# Patient Record
Sex: FEMALE | Race: WHITE | NOT HISPANIC OR LATINO | Employment: OTHER | URBAN - METROPOLITAN AREA
[De-identification: names, ages, dates, MRNs, and addresses within clinical notes are randomized per-mention and may not be internally consistent; named-entity substitution may affect disease eponyms.]

---

## 2017-01-23 ENCOUNTER — HOSPITAL ENCOUNTER (EMERGENCY)
Facility: HOSPITAL | Age: 67
Discharge: HOME | End: 2017-01-24
Attending: EMERGENCY MEDICINE
Payer: COMMERCIAL

## 2017-01-23 DIAGNOSIS — J40 BRONCHITIS: Primary | ICD-10-CM

## 2017-01-23 DIAGNOSIS — R05.9 COUGH: ICD-10-CM

## 2017-01-23 PROCEDURE — 99284 EMERGENCY DEPT VISIT MOD MDM: CPT | Mod: ,,, | Performed by: EMERGENCY MEDICINE

## 2017-01-23 PROCEDURE — 99283 EMERGENCY DEPT VISIT LOW MDM: CPT

## 2017-01-23 NOTE — ED AVS SNAPSHOT
[facility]  [address]  [facility] [address]               Angelina Man   2017 11:19 PM   ED    Description:  Female : 1950   Department:  [facility]           Your Care was Coordinated By:     Provider Role From To    Zuleika Bean MD Attending Provider [phone] --      Reason for Visit     Hemoptysis           Diagnoses this Visit        Comments    Bronchitis    -  Primary     Cough           ED Disposition     ED Disposition Condition Comment    Discharge             To Do List           Follow-up Information     Schedule an appointment as soon as possible for a visit with Marli Wilkinson MD.    Specialty:  Family Medicine    Contact information:    [address] [facility]  [address]  [facility] [address]  [phone]        [facility]     [facility] -  Assistance  Registered nurses in the [facility] provide clinical advisement, health education, appointment booking, and other advisory services.  Call for this free service at [phone].             Medications           Message regarding Medications     Verify the changes and/or additions to your medication regime listed below are the same as discussed with your clinician today.  If any of these changes or additions are incorrect, please notify your healthcare provider.             Verify that the below list of medications is an accurate representation of the medications you are currently taking.  If none reported, the list may be blank. If incorrect, please contact your healthcare provider. Carry this list with you in case of emergency.           Current Medications     acyclovir (ZOVIRAX) 400 MG tablet Take 1 tablet (400 mg total) by mouth 2 (two) times daily.    ascorbic acid (VITAMIN C) 1000 MG tablet Take 1,000 mg by mouth once daily.     b complex vitamins (VITAMIN B COMPLEX) tablet Take 1 tablet by mouth  "daily as needed.     biotin 1 mg tablet Take 1 mg by mouth once daily.     buPROPion (WELLBUTRIN XL) 150 MG TB24 tablet TAKE ONE TABLET BY MOUTH ONCE DAILY    calcium-vitamin D 500 mg(1,250mg) -200 unit per tablet Take 1 tablet by mouth once daily.     chondroitin sulfate-vit C-Mn (CHONDROITIN SULFATE COMPLEX) 400-60-2.5 mg Cap Take 1 tablet by mouth once daily.     clonazePAM (KLONOPIN) 1 MG tablet Take 1 tablet (1 mg total) by mouth nightly as needed.    CYANOCOBALAMIN/FOLIC ACID (VITAMIN B12-FOLIC ACID ORAL) Take by mouth.     dicyclomine (BENTYL) 20 mg tablet Take 1 tablet (20 mg total) by mouth 3 (three) times daily.    digestive enzymes Tab Take 1 tablet by mouth once daily.     diphenhydrAMINE (BENADRYL) 25 mg capsule Take 25 mg by mouth nightly as needed.     fexofenadine (ALLEGRA) 180 MG tablet Take 1 tablet (180 mg total) by mouth once daily.    fluticasone (FLONASE) 50 mcg/actuation nasal spray Spray once in each nostril twice daily as needed for rhinitis.    gabapentin (NEURONTIN) 300 MG capsule Take 1 capsule (300 mg total) by mouth 3 (three) times daily.    ginseng 200 mg Cap Take 200 mg by mouth 2 (two) times daily as needed.     glucosamine sulfate 2KCl 1,000 mg Tab Take 1 tablet by mouth once daily.     levothyroxine (SYNTHROID) 112 MCG tablet Take 1 tablet (112 mcg total) by mouth once daily.    methylPREDNISolone (MEDROL DOSEPACK) 4 mg tablet use as directed    multivitamin (THERAGRAN) per tablet Take 1 tablet by mouth once daily.     tretinoin (RETIN-A) 0.1 % cream Apply topically every evening.    triamterene-hydrochlorothiazide 37.5-25 mg (MAXZIDE-25) 37.5-25 mg per tablet Take 1 tablet by mouth once daily.    zinc 50 mg Tab Take 50 mg by mouth once daily.            Clinical Reference Information           Your Vitals Were     BP Pulse Temp Resp Height Weight    147/71 71 98.3 °F (36.8 °C) (Oral) 66 5' 4" (1.626 m) 52.2 kg (115 lb)    SpO2 BMI             99% 19.74 kg/m2         Allergies as " of 1/24/2017     No Known Allergies      Immunizations Administered on Date of Encounter - 1/24/2017     None      ED Micro, Lab, POCT     None      ED Imaging Orders     Start Ordered       Status Ordering Provider    01/24/17 0013 01/24/17 0013  X-Ray Chest PA And Lateral  1 time imaging      Final result         Discharge Instructions         Bronchitis, Viral (Adult)    You have a viral bronchitis. Bronchitis is inflammation and swelling of the lining of the lungs. This is often caused by an infection. Symptoms include a dry, hacking cough that is worse at night. The cough may bring up yellow-green mucus. You may also feel short of breath or wheeze. Other symptoms may include tiredness, chest discomfort, and chills.  Bronchitis that is caused by a virus is not treated with antibiotics. Instead, medicines may be given to help relieve symptoms. Symptoms can last up to 2 weeks, although the cough may last much longer.  This illness is contagious during the first few days and is spread through the air by coughing and sneezing, or by direct contact (touching the sick person and then touching your own eyes, nose, or mouth).  Most viral illnesses resolve within 10 to 14 days with rest and simple home remedies, although they may sometimes last for several weeks.  Home care  · If symptoms are severe, rest at home for the first 2 to 3 days. When you go back to your usual activities, don't let yourself get too tired.  · Do not smoke. Also avoid being exposed to secondhand smoke.  · You may use over-the-counter medicine to control fever or pain, unless another pain medicine was prescribed. (Note: If you have chronic liver or kidney disease or have ever had a stomach ulcer or gastrointestinal bleeding, talk with your healthcare provider before using these medicines. Also talk to your provider if you are taking medicine to prevent blood clots.) Aspirin should never be given to anyone younger than 18 years of age who is ill  with a viral infection or fever. It may cause severe liver or brain damage.  · Your appetite may be poor, so a light diet is fine. Avoid dehydration by drinking 6 to 8 glasses of fluids per day (such as water, soft drinks, sports drinks, juices, tea, or soup). Extra fluids will help loosen secretions in the nose and lungs.  · Over-the-counter cough, cold, and sore-throat medicines will not shorten the length of the illness, but they may help to reduce symptoms. (Note: Do not use decongestants if you have high blood pressure.)  Follow-up care  Follow up with your healthcare provider, or as advised. If you had an X-ray or ECG (electrocardiogram), a specialist will review it. You will be notified of any new findings that may affect your care.  Note: If you are age 65 or older, or if you have a chronic lung disease or condition that affects your immune system, or you smoke, talk to your healthcare provider about having pneumococcal vaccinations and a yearly influenza vaccination (flu shot).  When to seek medical advice  Call your healthcare provider right away if any of these occur:  · Fever of 100.4°F (38°C) or higher  · Coughing up increased amounts of colored sputum  · Weakness, drowsiness, headache, facial pain, ear pain, or a stiff neck  Call 911, or get immediate medical care  Contact emergency services right away if any of these occur:  · Coughing up blood  · Worsening weakness, drowsiness, headache, or stiff neck  · Trouble breathing, wheezing, or pain with breathing  © 8068-6465 [employer]. [address] [place] [address], [place] [address]. All rights reserved. This information is not intended as a substitute for professional medical care. Always follow your healthcare professional's instructions.        Hemoptysis    Hemoptysis is the medical term for coughing up blood. There are many causes for this, including minor illnesses like bronchitis. Hemoptysis can also be an early sign of a more serious  illness, like a blood clot in the lung (pulmonary embolism), cancer, tuberculosis, or pneumonia.  Less common causes of hemoptysis can be hard to diagnose in an emergency department or a clinic. More testing will be needed if the symptoms continue.  Home care  · Stay away from cigarette smoke. Smoke irritates the bronchial passages.  · Unless you are taking daily aspirin to prevent stroke or heart attack, do not take aspirin or products that contain aspirin. Aspirin affects how readily the blood clots. Medicines that prevent clotting may make hemoptysis worse.  · If you have a lung infection, drinking extra fluid will help loosen secretions in the lungs.  · Over-the-counter cough medicines that contain dextromethorphan may help reduce coughing. Check with a medical provider before taking dextromethorphan if you have a chronic illness, are pregnant, or take daily medications.  · If you were prescribed an antibiotic, take it until it is all gone. Take it even if you are feeling better after only a few days.  Follow-up care  Follow up with your health care provider, or as advised.  When to seek medical advice  Call your healthcare provider right away if any of these occur:  · Fever of 100.4ºF (38ºC) or higher  Call 911, or get immediate medical care  Call emergency services right away if any of these occur:  · Coughing up an increased amount of blood  · Trouble breathing, wheezing, or pain with breathing  · Chest pain or chest pressure  · Fainting or losing consciousness  · Rapid heartbeat  · Weakness or dizziness  © 5306-5539 [employer]. [address] [place] [address], [place] [address]. All rights reserved. This information is not intended as a substitute for professional medical care. Always follow your healthcare professional's instructions.          Your Scheduled Appointments     Jan 31, 2017  9:45 AM CST   Established Patient Visit with MD Hemal Simons - Neurosurgery 7th Fl ([address] )     [address]  [facility] [address]   [phone]               [facility] complies with applicable Federal civil rights laws and does not discriminate on the basis of race, color, national origin, age, disability, or sex.        Language Assistance Services     ATTENTION: Language assistance services are available, free of charge. Please call [phone].      ATENCIÓN: Si habla español, tiene a asher disposición servicios gratuitos de asistencia lingüística. Llame al [phone].     CHÚ Ý: N?u b?n nói Ti?ng Vi?t, có các d?ch v? h? tr? ngôn ng? mi?n phí dành cho b?n. G?i s? [phone].

## 2017-01-24 VITALS
WEIGHT: 115 LBS | DIASTOLIC BLOOD PRESSURE: 75 MMHG | HEART RATE: 75 BPM | SYSTOLIC BLOOD PRESSURE: 115 MMHG | HEIGHT: 64 IN | TEMPERATURE: 98 F | OXYGEN SATURATION: 100 % | BODY MASS INDEX: 19.63 KG/M2 | RESPIRATION RATE: 18 BRPM

## 2017-01-24 NOTE — DISCHARGE INSTRUCTIONS
Bronchitis, Viral (Adult)    You have a viral bronchitis. Bronchitis is inflammation and swelling of the lining of the lungs. This is often caused by an infection. Symptoms include a dry, hacking cough that is worse at night. The cough may bring up yellow-green mucus. You may also feel short of breath or wheeze. Other symptoms may include tiredness, chest discomfort, and chills.  Bronchitis that is caused by a virus is not treated with antibiotics. Instead, medicines may be given to help relieve symptoms. Symptoms can last up to 2 weeks, although the cough may last much longer.  This illness is contagious during the first few days and is spread through the air by coughing and sneezing, or by direct contact (touching the sick person and then touching your own eyes, nose, or mouth).  Most viral illnesses resolve within 10 to 14 days with rest and simple home remedies, although they may sometimes last for several weeks.  Home care  · If symptoms are severe, rest at home for the first 2 to 3 days. When you go back to your usual activities, don't let yourself get too tired.  · Do not smoke. Also avoid being exposed to secondhand smoke.  · You may use over-the-counter medicine to control fever or pain, unless another pain medicine was prescribed. (Note: If you have chronic liver or kidney disease or have ever had a stomach ulcer or gastrointestinal bleeding, talk with your healthcare provider before using these medicines. Also talk to your provider if you are taking medicine to prevent blood clots.) Aspirin should never be given to anyone younger than 18 years of age who is ill with a viral infection or fever. It may cause severe liver or brain damage.  · Your appetite may be poor, so a light diet is fine. Avoid dehydration by drinking 6 to 8 glasses of fluids per day (such as water, soft drinks, sports drinks, juices, tea, or soup). Extra fluids will help loosen secretions in the nose and lungs.  · Over-the-counter  cough, cold, and sore-throat medicines will not shorten the length of the illness, but they may help to reduce symptoms. (Note: Do not use decongestants if you have high blood pressure.)  Follow-up care  Follow up with your healthcare provider, or as advised. If you had an X-ray or ECG (electrocardiogram), a specialist will review it. You will be notified of any new findings that may affect your care.  Note: If you are age 65 or older, or if you have a chronic lung disease or condition that affects your immune system, or you smoke, talk to your healthcare provider about having pneumococcal vaccinations and a yearly influenza vaccination (flu shot).  When to seek medical advice  Call your healthcare provider right away if any of these occur:  · Fever of 100.4°F (38°C) or higher  · Coughing up increased amounts of colored sputum  · Weakness, drowsiness, headache, facial pain, ear pain, or a stiff neck  Call 911, or get immediate medical care  Contact emergency services right away if any of these occur:  · Coughing up blood  · Worsening weakness, drowsiness, headache, or stiff neck  · Trouble breathing, wheezing, or pain with breathing  © 2445-9657 [employer]. [address] [place] [address]. All rights reserved. This information is not intended as a substitute for professional medical care. Always follow your healthcare professional's instructions.        Hemoptysis    Hemoptysis is the medical term for coughing up blood. There are many causes for this, including minor illnesses like bronchitis. Hemoptysis can also be an early sign of a more serious illness, like a blood clot in the lung (pulmonary embolism), cancer, tuberculosis, or pneumonia.  Less common causes of hemoptysis can be hard to diagnose in an emergency department or a clinic. More testing will be needed if the symptoms continue.  Home care  · Stay away from cigarette smoke. Smoke irritates the bronchial passages.  · Unless you  are taking daily aspirin to prevent stroke or heart attack, do not take aspirin or products that contain aspirin. Aspirin affects how readily the blood clots. Medicines that prevent clotting may make hemoptysis worse.  · If you have a lung infection, drinking extra fluid will help loosen secretions in the lungs.  · Over-the-counter cough medicines that contain dextromethorphan may help reduce coughing. Check with a medical provider before taking dextromethorphan if you have a chronic illness, are pregnant, or take daily medications.  · If you were prescribed an antibiotic, take it until it is all gone. Take it even if you are feeling better after only a few days.  Follow-up care  Follow up with your health care provider, or as advised.  When to seek medical advice  Call your healthcare provider right away if any of these occur:  · Fever of 100.4ºF (38ºC) or higher  Call 911, or get immediate medical care  Call emergency services right away if any of these occur:  · Coughing up an increased amount of blood  · Trouble breathing, wheezing, or pain with breathing  · Chest pain or chest pressure  · Fainting or losing consciousness  · Rapid heartbeat  · Weakness or dizziness  © 1216-4552 [employer]. [address] [place] [address], [place] [address]. All rights reserved. This information is not intended as a substitute for professional medical care. Always follow your healthcare professional's instructions.

## 2017-01-24 NOTE — ED TRIAGE NOTES
"Angelina Man, a 67 y.o. female presents to the ED with a chief complaint of having one episode of hemoptysis.  Patient states that she was having a severe coughing spell and coughed up blood. Patient reports that the chief complaint has resolved, and that she feels better due to taking OTC medicines.      Chief Complaint   Patient presents with    Hemoptysis     Pt reports coughing for a couple of days and "spit up some blood" today.     Review of patient's allergies indicates:  No Known Allergies  Past Medical History   Diagnosis Date    Arthritis     Cataract     Dry eyes     Hypothyroidism 6/23/2016    Rash     Squamous cell carcinoma      in-situ right upper inner arm, right wrist    Status post lumbar surgery 6/23/2016    Stress fracture     Thyroid disease        "

## 2017-01-24 NOTE — ED PROVIDER NOTES
"Encounter Date: 1/23/2017    SCRIBE #1 NOTE: I, Serafin Olmedo, am scribing for, and in the presence of, Dr. Bean.       History     Chief Complaint   Patient presents with    Hemoptysis     Pt reports coughing for a couple of days and "spit up some blood" today.     Review of patient's allergies indicates:  No Known Allergies  HPI Comments: Time patient was seen by the provider: 12:02 AM      The patient is a 67 y.o. female with hx of: thyroid disease and hypothyroidism that presents to the ED with a complaint of acute hemoptysis, which began tonight. She notes an associated excessive productive cough, which began several days ago. She denies any sore throat, fever, chills, or shortness of breath. Pt endorses taking mucinex with minimal symptom relief.  No history of tobacco abuse.     The history is provided by the patient.     Past Medical History   Diagnosis Date    Arthritis     Cataract     Dry eyes     Hypothyroidism 6/23/2016    Rash     Squamous cell carcinoma      in-situ right upper inner arm, right wrist    Status post lumbar surgery 6/23/2016    Stress fracture     Thyroid disease      Past Medical History Pertinent Negatives   Diagnosis Date Noted    Abnormal Pap smear of vagina 6/29/2015    Amblyopia 8/28/2014    Basal cell carcinoma 8/7/2014    Diabetes mellitus 8/28/2014    Diabetic retinopathy 8/28/2014    Glaucoma 8/28/2014    Hypertension 8/28/2014    Macular degeneration 8/28/2014    Melanoma 8/7/2014    Retinal detachment 8/28/2014    Strabismus 8/28/2014    Uveitis 8/28/2014     Past Surgical History   Procedure Laterality Date    Colonoscopy      Cervical fusion       Family History   Problem Relation Age of Onset    Cancer Mother      Lung cancer    Heart failure Father     Colon cancer Father     Hypertension Father     Cataracts Father     Diabetes Son     No Known Problems Sister     No Known Problems Brother     No Known Problems Maternal Aunt     No " Known Problems Maternal Uncle     No Known Problems Paternal Aunt     No Known Problems Paternal Uncle     No Known Problems Maternal Grandmother     No Known Problems Maternal Grandfather     No Known Problems Paternal Grandmother     No Known Problems Paternal Grandfather     Melanoma Daughter     Amblyopia Neg Hx     Blindness Neg Hx     Glaucoma Neg Hx     Macular degeneration Neg Hx     Retinal detachment Neg Hx     Strabismus Neg Hx     Stroke Neg Hx     Thyroid disease Neg Hx     Breast cancer Neg Hx     Ovarian cancer Neg Hx      Social History   Substance Use Topics    Smoking status: Never Smoker    Smokeless tobacco: Never Used    Alcohol use Yes      Comment: socially, n ot weekly     Review of Systems   Constitutional: Negative for chills and fever.   HENT: Negative for sore throat.    Respiratory: Positive for cough. Negative for shortness of breath.         Hemoptysis   Cardiovascular: Negative for chest pain.   Gastrointestinal: Negative for nausea.   Genitourinary: Negative for dysuria.   Musculoskeletal: Negative for back pain.   Skin: Negative for rash.   Neurological: Negative for weakness.   Hematological: Does not bruise/bleed easily.       Physical Exam   Initial Vitals   BP Pulse Resp Temp SpO2   01/23/17 2008 01/23/17 2008 01/23/17 2008 01/23/17 2008 01/23/17 2008   147/71 71 66 98.3 °F (36.8 °C) 99 %     Physical Exam    Nursing note and vitals reviewed.  Constitutional: She appears well-developed.   Comfortable, well appearing, no distress   HENT:   Head: Normocephalic and atraumatic.   Oropharynx is clear. Mucous membranes are moist.     Neck: Neck supple.   Cardiovascular: Normal rate, regular rhythm and normal heart sounds.   No murmur heard.  Pulmonary/Chest: Breath sounds normal. No respiratory distress. She has no wheezes. She has no rales.   Musculoskeletal: She exhibits no edema.         ED Course   Procedures  Labs Reviewed - No data to display       X-Rays:    Independently Interpreted Readings:   Chest X-Ray: No acute infiltrate or effusion     Medical Decision Making:   History:   Old Medical Records: I decided to obtain old medical records.  Initial Assessment:   66 yo healthy f, here with URI sx x 3 days, worsening cough, episode hemoptysis tonight w coughing.  No fever/chills/night sweats/wt loss/cp/sob.  Nonsmoker.  On exam, pt very well-appearing, lungs CTA.  Most likely bronchitis, less likely PE or dangerous infection like TB, given acute nature of sx.  Plan for CXR and if negative, safe for d/c home with close outpatient f/u  Independently Interpreted Test(s):   I have ordered and independently interpreted X-rays - see prior notes.  Clinical Tests:   Radiological Study: Ordered and Reviewed            Scribe Attestation:   Scribe #1: I performed the above scribed service and the documentation accurately describes the services I performed. I attest to the accuracy of the note.    Attending Attestation:           Physician Attestation for Scribe:  Physician Attestation Statement for Scribe #1: I, Dr. Bean, reviewed documentation, as scribed by Serafin Olmedo in my presence, and it is both accurate and complete.                 ED Course     12:53 AM  CXR negative, will d/c home    Clinical Impression:   The primary encounter diagnosis was Bronchitis. A diagnosis of Cough was also pertinent to this visit.          Zuleika Bean MD  01/24/17 1957

## 2017-01-31 ENCOUNTER — TELEPHONE (OUTPATIENT)
Dept: NEUROSURGERY | Facility: CLINIC | Age: 67
End: 2017-01-31

## 2017-01-31 ENCOUNTER — OFFICE VISIT (OUTPATIENT)
Dept: NEUROSURGERY | Facility: CLINIC | Age: 67
End: 2017-01-31
Payer: COMMERCIAL

## 2017-01-31 VITALS
SYSTOLIC BLOOD PRESSURE: 114 MMHG | TEMPERATURE: 97 F | BODY MASS INDEX: 19.63 KG/M2 | WEIGHT: 115 LBS | HEART RATE: 67 BPM | HEIGHT: 64 IN | DIASTOLIC BLOOD PRESSURE: 73 MMHG

## 2017-01-31 DIAGNOSIS — M48.061 LUMBAR STENOSIS: ICD-10-CM

## 2017-01-31 DIAGNOSIS — M51.06 LUMBAR DISC HERNIATION WITH MYELOPATHY: Primary | ICD-10-CM

## 2017-01-31 DIAGNOSIS — Z98.890 STATUS POST LUMBAR SURGERY: Primary | ICD-10-CM

## 2017-01-31 PROCEDURE — 99214 OFFICE O/P EST MOD 30 MIN: CPT | Mod: S$GLB,,, | Performed by: NEUROLOGICAL SURGERY

## 2017-01-31 PROCEDURE — 99999 PR PBB SHADOW E&M-EST. PATIENT-LVL III: CPT | Mod: PBBFAC,,, | Performed by: NEUROLOGICAL SURGERY

## 2017-01-31 NOTE — PROGRESS NOTES
Subjective:    I, Kurtis Catalan, am scribing for, and in the presence of, Dr. Kaba.     Patient ID: Angelina Man is a 67 y.o. female.    Chief Complaint: Follow-up    HPI   Pt is a 67 y.o. female who presents for follow up after last evaluation on 11/29/2016. Pt has a history of right L4-5 diskectomy, now with some left-sided pain. We wanted to try a transformainal injection and were considering decompression and fusion. Pt states having injection 6 weeks ago without any response, now having had 2 rounds of injection. Regardless, she states that her left-sided back and leg pain currently is not very debilitating and that she is able to tolerate pain when walking. Pt reports that previous right-sided pain was improved for about 2 weeks after surgery, until she lifted a heavy suitcase.       Review of Systems   Constitutional: Negative for chills, fatigue and fever.   HENT: Negative for sinus pressure and trouble swallowing.    Eyes: Negative.  Negative for visual disturbance.   Respiratory: Negative.    Cardiovascular: Negative.    Gastrointestinal: Negative.  Negative for nausea and vomiting.   Endocrine: Negative.    Genitourinary: Negative.    Musculoskeletal: Positive for back pain (left-sided lower back pain, radiates to LLE).        Positive for LLE pain.   Neurological: Negative for dizziness, seizures, syncope, speech difficulty, weakness and numbness.       Objective:      Physical Exam:    Constitutional: She appears well-developed.     Eyes: Pupils are equal, round, and reactive to light. Conjunctivae and EOM are normal.     Cardiovascular: Normal rate, regular rhythm, normal pulses and intact distal pulses.     Abdominal: Soft.     Psych/Behavior: She is alert. She is oriented to person, place, and time. She has a normal mood and affect.     Musculoskeletal: Gait is normal.        Neck: Range of motion is full. There is no tenderness. Muscle strength is 5/5. Tone is normal.        Back: Range of motion is  full. There is no tenderness. Muscle strength is 5/5. Tone is normal.        Right Upper Extremities: Range of motion is full. There is no tenderness. Muscle strength is 5/5. Tone is normal.        Left Upper Extremities: Range of motion is full. There is no tenderness. Muscle strength is 5/5. Tone is normal.       Right Lower Extremities: Range of motion is full. There is no tenderness. Muscle strength is 5/5. Tone is normal.        Left Lower Extremities: Range of motion is full. There is no tenderness. Muscle strength is 5/5. Tone is normal.     Neurological:        Coordination: She has a normal Romberg Test, normal finger to nose coordination, normal heel to shin coordination and normal tandem walking coordination.        Sensory: There is no sensory deficit in the trunk. There is no sensory deficit in the extremities.        DTRs: DTRs are normal. Tricep reflexes are 2+ on the right side and 2+ on the left side. Bicep reflexes are 2+ on the right side and 2+ on the left side. Brachioradialis reflexes are 2+ on the right side and 2+ on the left side. Patellar reflexes are 2+ on the right side and 2+ on the left side. Achilles reflexes are 2+ on the right side and 2+ on the left side. She displays no Babinski's sign on the right side. She displays no Babinski's sign on the left side.        Cranial nerves: Cranial nerve(s) II, III, IV, V, VI, VII, VIII, IX, X, XI and XII are intact.       Pt still has significant has left-sided radiculopathy.   Still has positive SLR.   Wound is well-healed.   Has restricted lumbar range of motion.     Imaging:   MRI L-spine, dated 9/08/2016, shows multiple-level DDD, postsurgical changes at the right L4-5, but looks like disc herniation is asymmetric the left, with some foraminal stenosis indicative of re-herniation or scarring. Pt does not have contrast, so I cannot determine if there is scarring.     Assessment/Plan:   Pt has what looks like a recurrent disc on the oppositve  side at L4-5. She has failed conservative management which so far has included physical therapy and epidural injections. I would like to try another round of transforaminal injections. I think she would benefit from minimally invasive diskectomy. We discussed possibility of a fusion, but she is hesitant to have a TLIF. I think that is reasonable given her age. We will get a MRI scan with contrast to evaluate for scarring before surgery. I have discussed the risks/benefits, indications, and alternatives for the proposed procedure in detail. I have answered all of their questions and patient wishes to proceed with surgery. We will schedule patient.      I, Dr. Kaba, personally performed the services described in this documentation as scribed by Kurtis Catalan in my presence, and it is both accurate and complete.

## 2017-01-31 NOTE — LETTER
February 2, 2017      Marli Wilkinson MD  [address] [facility]  [address]  [facility] [address]           [facility] - Neurosurgery 7th Fl  [address]  [facility] [address]  Phone: [phone]          Patient: Angelina Man   MR Number: [id number]   YOB: 1950   Date of Visit: 1/31/2017       Dear Dr. Marli Wilkinson:    Thank you for referring Angelina Man to me for evaluation. Attached you will find relevant portions of my assessment and plan of care.    If you have questions, please do not hesitate to call me. I look forward to following Angelina Man along with you.    Sincerely,    Kalen Kaba MD    Enclosure  CC:  No Recipients    If you would like to receive this communication electronically, please contact [email or web address] or [phone] to request more information on [facility] access.    For providers and/or their staff who would like to refer a patient to [facility], please contact us through our one-stop-shop provider referral line, [facility] , at [phone].    If you feel you have received this communication in error or would no longer like to receive these types of communications, please e-mail [email or web address]

## 2017-02-01 ENCOUNTER — TELEPHONE (OUTPATIENT)
Dept: NEUROSURGERY | Facility: CLINIC | Age: 67
End: 2017-02-01

## 2017-02-01 NOTE — TELEPHONE ENCOUNTER
----- Message from Lilibeth Coyle sent at 2/1/2017 11:10 AM CST -----  Contact: Pt  Pt states IR/Pain Lumbar Injection was to be schedule for her at the [facility] location, she is checking the status on it    Pt contact number [phone] (work) [phone] (cell)  Thanks

## 2017-02-01 NOTE — TELEPHONE ENCOUNTER
I returned the pt call and LM regarding being scheduled at [facility] for injections

## 2017-02-02 ENCOUNTER — PATIENT MESSAGE (OUTPATIENT)
Dept: PAIN MEDICINE | Facility: CLINIC | Age: 67
End: 2017-02-02

## 2017-02-02 ENCOUNTER — TELEPHONE (OUTPATIENT)
Dept: NEUROSURGERY | Facility: CLINIC | Age: 67
End: 2017-02-02

## 2017-02-08 ENCOUNTER — HOSPITAL ENCOUNTER (OUTPATIENT)
Facility: OTHER | Age: 67
Discharge: HOME | End: 2017-02-08
Attending: ANESTHESIOLOGY | Admitting: ANESTHESIOLOGY
Payer: COMMERCIAL

## 2017-02-08 ENCOUNTER — SURGERY (OUTPATIENT)
Age: 67
End: 2017-02-08

## 2017-02-08 VITALS
BODY MASS INDEX: 19.63 KG/M2 | HEART RATE: 58 BPM | RESPIRATION RATE: 16 BRPM | WEIGHT: 115 LBS | HEIGHT: 64 IN | TEMPERATURE: 98 F | OXYGEN SATURATION: 94 % | DIASTOLIC BLOOD PRESSURE: 71 MMHG | SYSTOLIC BLOOD PRESSURE: 107 MMHG

## 2017-02-08 DIAGNOSIS — M48.061 LUMBAR STENOSIS: Primary | ICD-10-CM

## 2017-02-08 DIAGNOSIS — M51.36 DDD (DEGENERATIVE DISC DISEASE), LUMBAR: ICD-10-CM

## 2017-02-08 PROCEDURE — 25000003 PHARM REV CODE 250: Performed by: ANESTHESIOLOGY

## 2017-02-08 PROCEDURE — 64483 NJX AA&/STRD TFRM EPI L/S 1: CPT | Mod: LT,,, | Performed by: ANESTHESIOLOGY

## 2017-02-08 PROCEDURE — 63600175 PHARM REV CODE 636 W HCPCS: Performed by: ANESTHESIOLOGY

## 2017-02-08 PROCEDURE — 64483 NJX AA&/STRD TFRM EPI L/S 1: CPT | Performed by: ANESTHESIOLOGY

## 2017-02-08 PROCEDURE — 25500020 PHARM REV CODE 255: Performed by: ANESTHESIOLOGY

## 2017-02-08 PROCEDURE — 99152 MOD SED SAME PHYS/QHP 5/>YRS: CPT | Mod: ,,, | Performed by: ANESTHESIOLOGY

## 2017-02-08 RX ORDER — SODIUM CHLORIDE 9 MG/ML
3 INJECTION, SOLUTION INTRAMUSCULAR; INTRAVENOUS; SUBCUTANEOUS ONCE
Status: DISCONTINUED | OUTPATIENT
Start: 2017-02-08 | End: 2017-02-08 | Stop reason: HOSPADM

## 2017-02-08 RX ORDER — METHYLPREDNISOLONE ACETATE 40 MG/ML
INJECTION, SUSPENSION INTRA-ARTICULAR; INTRALESIONAL; INTRAMUSCULAR; SOFT TISSUE
Status: DISCONTINUED | OUTPATIENT
Start: 2017-02-08 | End: 2017-02-08 | Stop reason: HOSPADM

## 2017-02-08 RX ORDER — METHYLPREDNISOLONE ACETATE 80 MG/ML
80 INJECTION, SUSPENSION INTRA-ARTICULAR; INTRALESIONAL; INTRAMUSCULAR; SOFT TISSUE ONCE
Status: DISCONTINUED | OUTPATIENT
Start: 2017-02-08 | End: 2017-02-08 | Stop reason: HOSPADM

## 2017-02-08 RX ORDER — BUPIVACAINE HYDROCHLORIDE 2.5 MG/ML
10 INJECTION, SOLUTION EPIDURAL; INFILTRATION; INTRACAUDAL ONCE
Status: DISCONTINUED | OUTPATIENT
Start: 2017-02-08 | End: 2017-02-08 | Stop reason: HOSPADM

## 2017-02-08 RX ORDER — SODIUM CHLORIDE 9 MG/ML
INJECTION, SOLUTION INTRAVENOUS CONTINUOUS
Status: DISCONTINUED | OUTPATIENT
Start: 2017-02-08 | End: 2017-02-08 | Stop reason: HOSPADM

## 2017-02-08 RX ORDER — LIDOCAINE HYDROCHLORIDE 10 MG/ML
10 INJECTION INFILTRATION; PERINEURAL
Status: COMPLETED | OUTPATIENT
Start: 2017-02-08 | End: 2017-02-08

## 2017-02-08 RX ORDER — MIDAZOLAM HYDROCHLORIDE 1 MG/ML
2 INJECTION INTRAMUSCULAR; INTRAVENOUS
Status: DISCONTINUED | OUTPATIENT
Start: 2017-02-08 | End: 2017-02-08 | Stop reason: HOSPADM

## 2017-02-08 RX ORDER — MIDAZOLAM HYDROCHLORIDE 1 MG/ML
INJECTION INTRAMUSCULAR; INTRAVENOUS
Status: DISCONTINUED | OUTPATIENT
Start: 2017-02-08 | End: 2017-02-08 | Stop reason: HOSPADM

## 2017-02-08 RX ORDER — BUPIVACAINE HYDROCHLORIDE 2.5 MG/ML
INJECTION, SOLUTION EPIDURAL; INFILTRATION; INTRACAUDAL
Status: DISCONTINUED | OUTPATIENT
Start: 2017-02-08 | End: 2017-02-08 | Stop reason: HOSPADM

## 2017-02-08 RX ADMIN — LIDOCAINE HYDROCHLORIDE 10 ML: 10 INJECTION, SOLUTION INFILTRATION; PERINEURAL at 09:02

## 2017-02-08 RX ADMIN — MIDAZOLAM HYDROCHLORIDE 2 MG: 1 INJECTION, SOLUTION INTRAMUSCULAR; INTRAVENOUS at 09:02

## 2017-02-08 RX ADMIN — SODIUM CHLORIDE: 0.9 INJECTION, SOLUTION INTRAVENOUS at 08:02

## 2017-02-08 RX ADMIN — METHYLPREDNISOLONE ACETATE 40 MG: 40 INJECTION, SUSPENSION INTRA-ARTICULAR; INTRALESIONAL; INTRAMUSCULAR; SOFT TISSUE at 09:02

## 2017-02-08 RX ADMIN — BUPIVACAINE HYDROCHLORIDE 10 ML: 2.5 INJECTION, SOLUTION EPIDURAL; INFILTRATION; INTRACAUDAL; PERINEURAL at 09:02

## 2017-02-08 RX ADMIN — IOHEXOL 5 ML: 300 INJECTION, SOLUTION INTRAVENOUS at 09:02

## 2017-02-08 NOTE — OP NOTE
INFORMED CONSENT: The procedure, risks, benefits and options were discussed with patient. There are no contraindications to the procedure. The patient expressed understanding and agreed to proceed. The personnel performing the procedure was discussed.    02/08/2017    Surgeon: Cliff Acosta MD    Assistants: None    PROCEDURE:  Left  L4  TRANSFORAMINAL EPIDURAL STEROID INJECTION    Pre Procedure diagnosis:   Left  L4  transforaminal stenosis with radiculopathy    Post-Procedure diagnosis:   same    Complications: None    Specimens: None    Sedation: None    DESCRIPTION OF PROCEDURE: The patient was brought to the procedure room. IV access was obtained prior to the procedure. The patient was positioned prone on the fluoroscopy table. Continuous hemodynamic monitoring was initiated including blood pressure, EKG, and pulse oximetry. . The skin was prepped with chlorhexidine and draped in a sterile fashion. Skin anesthesia was achieved using a total of 5mL of lidocaine over the respective injection site.     The Left L4  transforaminal space was identified with fluoroscopy in the  AP, oblique, and lateral views.  A 22 gauge spinal quinke needle was then advanced into the area of the trans foraminal space with confirmation of proper needle position using AP, oblique, and lateral fluoroscopic views. Once the needle tip was in the area of the transforaminal space, and there was no blood, CSF or paraesthesias,  1.5 mL of Omnipaque 300mg/ml was injected.  Fluoroscopic imaging in the AP and lateral views revealed a clear outline of the spinal nerve with proximal spread of agent through the neural foramen into the epidural space. A total combination of 1 mL of Bupivicaine 0.25% and 40 mg depo medrol was injected with displacement of the contrast dye confirming that the medication went into the area of the transforaminal space. A sterile dressing was applied.   Patient tolerated the procedure well.    Conscious sedation  provided by M.D    The patient was monitored with continuous pulse oximetry, EKG, and intermittent blood pressure monitors.  The patient was hemodynamically stable throughout the entire process was responsive to voice, and breathing spontaneously.  Supplemental O2 was provided at 2L/min via nasal cannula.  Patient was comfortable for the duration of the procedure.    There was a total of 2mg IV Midazolam was titrated for the procedure      Patient was taken back to the recovery room for further observation.     The patient was discharged to home in stable condition

## 2017-02-08 NOTE — DISCHARGE INSTRUCTIONS

## 2017-02-08 NOTE — DISCHARGE SUMMARY
Discharge Note  Short Stay      SUMMARY     Admit Date: 2/8/2017    Attending Physician: Cliff Acosta      Discharge Physician: Cliff Acosta      Discharge Date: 2/8/2017 9:14 AM     PROCEDURE:  Left  L4  TRANSFORAMINAL EPIDURAL STEROID INJECTION    Pre Procedure diagnosis:   Left  L4  transforaminal stenosis with radiculopathy    Disposition: Home or self care    Patient Instructions:   Current Discharge Medication List      CONTINUE these medications which have NOT CHANGED    Details   acyclovir (ZOVIRAX) 400 MG tablet Take 1 tablet (400 mg total) by mouth 2 (two) times daily.  Qty: 60 tablet, Refills: 12    Associated Diagnoses: HSV infection      ascorbic acid (VITAMIN C) 1000 MG tablet Take 1,000 mg by mouth once daily.       b complex vitamins (VITAMIN B COMPLEX) tablet Take 1 tablet by mouth daily as needed.       biotin 1 mg tablet Take 1 mg by mouth once daily.       buPROPion (WELLBUTRIN XL) 150 MG TB24 tablet TAKE ONE TABLET BY MOUTH ONCE DAILY  Qty: 30 tablet, Refills: 12    Associated Diagnoses: Depression, unspecified depression type      calcium-vitamin D 500 mg(1,250mg) -200 unit per tablet Take 1 tablet by mouth once daily.       chondroitin sulfate-vit C-Mn (CHONDROITIN SULFATE COMPLEX) 400-60-2.5 mg Cap Take 1 tablet by mouth once daily.       clonazePAM (KLONOPIN) 1 MG tablet Take 1 tablet (1 mg total) by mouth nightly as needed.  Qty: 30 tablet, Refills: 5    Associated Diagnoses: Anxiety      dicyclomine (BENTYL) 20 mg tablet Take 1 tablet (20 mg total) by mouth 3 (three) times daily.  Qty: 90 tablet, Refills: 12    Associated Diagnoses: Irritable bowel syndrome with diarrhea      digestive enzymes Tab Take 1 tablet by mouth once daily.       diphenhydrAMINE (BENADRYL) 25 mg capsule Take 25 mg by mouth nightly as needed.       fluticasone (FLONASE) 50 mcg/actuation nasal spray Spray once in each nostril twice daily as needed for rhinitis.  Qty: 16 g, Refills: 11    Associated Diagnoses:  Other seasonal allergic rhinitis      gabapentin (NEURONTIN) 300 MG capsule Take 1 capsule (300 mg total) by mouth 3 (three) times daily.  Qty: 90 capsule, Refills: 1      ginseng 200 mg Cap Take 200 mg by mouth 2 (two) times daily as needed.       glucosamine sulfate 2KCl 1,000 mg Tab Take 1 tablet by mouth once daily.       levothyroxine (SYNTHROID) 112 MCG tablet Take 1 tablet (112 mcg total) by mouth once daily.  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       tretinoin (RETIN-A) 0.1 % cream Apply topically every evening.  Qty: 20 g, Refills: 6    Associated Diagnoses: Other acne      zinc 50 mg Tab Take 50 mg by mouth once daily.       CYANOCOBALAMIN/FOLIC ACID (VITAMIN B12-FOLIC ACID ORAL) Take by mouth.       fexofenadine (ALLEGRA) 180 MG tablet Take 1 tablet (180 mg total) by mouth once daily.  Qty: 30 tablet, Refills: 12    Associated Diagnoses: Seasonal allergies      triamterene-hydrochlorothiazide 37.5-25 mg (MAXZIDE-25) 37.5-25 mg per tablet Take 1 tablet by mouth once daily.  Qty: 30 tablet, Refills: 11             Resume home diet and activity

## 2017-02-08 NOTE — IP AVS SNAPSHOT
[facility] Location ([facility])  [address] [place], [place] [id number]  Phone: [phone]           Patient Discharge Instructions     Our goal is to set you up for success. This packet includes information on your condition, medications, and your home care. It will help you to care for yourself so you don't get sicker and need to go back to the hospital.     Please ask your nurse if you have any questions.        There are many details to remember when preparing to leave the hospital. Here is what you will need to do:    1. Take your medicine. If you are prescribed medications, review your Medication List in the following pages. You may have new medications to  at the pharmacy and others that you'll need to stop taking. Review the instructions for how and when to take your medications. Talk with your doctor or nurses if you are unsure of what to do.     2. Go to your follow-up appointments. Specific follow-up information is listed in the following pages. Your may be contacted by a transition nurse or clinical provider about future appointments. Be sure we have all of the phone numbers to reach you, if needed. Please contact your provider's office if you are unable to make an appointment.     3. Watch for warning signs. Your doctor or nurse will give you detailed warning signs to watch for and when to call for assistance. These instructions may also include educational information about your condition. If you experience any of warning signs to your health, call your doctor.               [facility]  Unless otherwise directed by your provider, please contact [facility], our nurse care line that is available for 24/7 assistance.     [phone] (toll-free)    Registered nurses in the [facility] provide clinical advisement, health education, appointment booking, and other advisory services.                    ** Verify the list of medication(s) below is accurate and up to  date. Carry this with you in case of emergency. If your medications have changed, please notify your healthcare provider.             Medication List      CHANGE how you take these medications        Additional Info                      fexofenadine 180 MG tablet   Commonly known as:  ALLEGRA   Quantity:  30 tablet   Refills:  12   Dose:  180 mg   What changed:    - when to take this  - reasons to take this    Instructions:  Take 1 tablet (180 mg total) by mouth once daily.     Begin Date    AM    Noon    PM    Bedtime         CONTINUE taking these medications        Additional Info                      acyclovir 400 MG tablet   Commonly known as:  ZOVIRAX   Quantity:  60 tablet   Refills:  12   Dose:  400 mg    Instructions:  Take 1 tablet (400 mg total) by mouth 2 (two) times daily.     Begin Date    AM    Noon    PM    Bedtime       ascorbic acid (vitamin C) 1000 MG tablet   Commonly known as:  VITAMIN C   Refills:  0   Dose:  1000 mg    Instructions:  Take 1,000 mg by mouth once daily.     Begin Date    AM    Noon    PM    Bedtime       biotin 1 mg tablet   Refills:  0   Dose:  1 mg    Instructions:  Take 1 mg by mouth once daily.     Begin Date    AM    Noon    PM    Bedtime       buPROPion 150 MG TB24 tablet   Commonly known as:  WELLBUTRIN XL   Quantity:  30 tablet   Refills:  12    Instructions:  TAKE ONE TABLET BY MOUTH ONCE DAILY     Begin Date    AM    Noon    PM    Bedtime       calcium-vitamin D3 500 mg(1,250mg) -200 unit per tablet   Refills:  0   Dose:  1 tablet    Instructions:  Take 1 tablet by mouth once daily.     Begin Date    AM    Noon    PM    Bedtime       CHONDROITIN SULFATE COMPLEX 400-60-2.5 mg Cap   Refills:  0   Dose:  1 tablet   Generic drug:  chondroitin sulfate-vit C-Mn    Instructions:  Take 1 tablet by mouth once daily.     Begin Date    AM    Noon    PM    Bedtime       clonazePAM 1 MG tablet   Commonly known as:  KLONOPIN   Quantity:  30 tablet   Refills:  5   Dose:  1 mg     Instructions:  Take 1 tablet (1 mg total) by mouth nightly as needed.     Begin Date    AM    Noon    PM    Bedtime       dicyclomine 20 mg tablet   Commonly known as:  BENTYL   Quantity:  90 tablet   Refills:  12   Dose:  20 mg    Instructions:  Take 1 tablet (20 mg total) by mouth 3 (three) times daily.     Begin Date    AM    Noon    PM    Bedtime       digestive enzymes Tab   Refills:  0   Dose:  1 tablet    Instructions:  Take 1 tablet by mouth once daily.     Begin Date    AM    Noon    PM    Bedtime       diphenhydrAMINE 25 mg capsule   Commonly known as:  BENADRYL   Refills:  0   Dose:  25 mg    Instructions:  Take 25 mg by mouth nightly as needed.     Begin Date    AM    Noon    PM    Bedtime       fluticasone 50 mcg/actuation nasal spray   Commonly known as:  FLONASE   Quantity:  16 g   Refills:  11    Instructions:  Spray once in each nostril twice daily as needed for rhinitis.     Begin Date    AM    Noon    PM    Bedtime       gabapentin 300 MG capsule   Commonly known as:  NEURONTIN   Quantity:  90 capsule   Refills:  1   Dose:  300 mg    Instructions:  Take 1 capsule (300 mg total) by mouth 3 (three) times daily.     Begin Date    AM    Noon    PM    Bedtime       ginseng 200 mg Cap   Refills:  0   Dose:  200 mg    Instructions:  Take 200 mg by mouth 2 (two) times daily as needed.     Begin Date    AM    Noon    PM    Bedtime       glucosamine sulfate 2KCl 1,000 mg Tab   Refills:  0   Dose:  1 tablet    Instructions:  Take 1 tablet by mouth once daily.     Begin Date    AM    Noon    PM    Bedtime       levothyroxine 112 MCG tablet   Commonly known as:  SYNTHROID   Quantity:  30 tablet   Refills:  11   Dose:  112 mcg    Instructions:  Take 1 tablet (112 mcg total) by mouth once daily.     Begin Date    AM    Noon    PM    Bedtime       multivitamin per tablet   Commonly known as:  THERAGRAN   Refills:  0   Dose:  1 tablet    Instructions:  Take 1 tablet by mouth once daily.     Begin Date    AM     Noon    PM    Bedtime       tretinoin 0.1 % cream   Commonly known as:  RETIN-A   Quantity:  20 g   Refills:  6    Instructions:  Apply topically every evening.     Begin Date    AM    Noon    PM    Bedtime       triamterene-hydrochlorothiazide 37.5-25 mg 37.5-25 mg per tablet   Commonly known as:  MAXZIDE-25   Quantity:  30 tablet   Refills:  11   Dose:  1 tablet    Instructions:  Take 1 tablet by mouth once daily.     Begin Date    AM    Noon    PM    Bedtime       VITAMIN B12-FOLIC ACID ORAL   Refills:  0    Instructions:  Take by mouth.     Begin Date    AM    Noon    PM    Bedtime       VITAMINS B COMPLEX tablet   Refills:  0   Dose:  1 tablet   Generic drug:  b complex vitamins    Instructions:  Take 1 tablet by mouth daily as needed.     Begin Date    AM    Noon    PM    Bedtime       zinc 50 mg Tab   Refills:  0   Dose:  50 mg    Instructions:  Take 50 mg by mouth once daily.     Begin Date    AM    Noon    PM    Bedtime                  Please bring to all follow up appointments:    1. A copy of your discharge instructions.  2. All medicines you are currently taking in their original bottles.  3. Identification and insurance card.    Please arrive 15 minutes ahead of scheduled appointment time.    Please call 24 hours in advance if you must reschedule your appointment and/or time.        Your Scheduled Appointments     Mar 13, 2017  1:00 PM CDT   Non-Fasting Lab with LAB, APPOINTMENT [place] [facility])    [address] [facility] [address] [phone]            Mar 13, 2017  1:30 PM CDT   Mri L Spine Cont with [facility] MRI2   [facility] ([facility] )    [address] [facility] [address] [phone]            Mar 13, 2017  3:00 PM CDT   Education Class with SPINE CLASS, [facility] - Spine Class 7th Fl ([facility] )    [address]  [place] [address] [phone]             "  Your Future Surgeries/Procedures     Mar 20, 2017   Surgery with Kalen Kaba MD   [facility] ([facility])    [address] [facility] [address] [phone]                  Discharge Instructions       Home Care Instructions Pain Management:    1. DIET:   You may resume your normal diet today.   2. BATHING:   You may shower with luke warm water.  3. DRESSING:   You may remove your bandage today.   4. ACTIVITY LEVEL:   You may resume your normal activities 24 hrs after your procedure.  5. MEDICATIONS:   You may resume your normal medications today.   6. SPECIAL INSTRUCTIONS:   No heat to the injection site for 24 hrs including, bath or shower, heating pad, moist heat, or hot tubs.    Use ice pack to injection site for any pain or discomfort.  Apply ice packs for 20 minute intervals as needed.   If you have received any sedatives by mouth today you may not drive for 12 hours.    If you have received any sedation through your IV, you may not drive for 24 hrs.     PLEASE CALL YOUR DOCTOR IF:  1. Redness or swelling around the injection site.  2. Fever of 101 degrees  3. Drainage (pus) from the injection site.  4. For any continuous bleeding (some dried blood over the incision is normal.)    FOR EMERGENCIES:   If any unusual problems or difficulties occur during clinic hours, call [phone] or [phone].         Admission Information     Date & Time Provider Department CSN    2/8/2017  8:02 AM Cliff Acosta MD [facility] [id number]      Care Providers     Provider Role Specialty Primary office phone    Cliff Acosta MD Attending Provider Pain Medicine [phone]    Cliff Acosta MD Surgeon  Pain Medicine [phone]      Your Vitals Were     BP Pulse Temp Resp Height Weight    138/83 61 97.5 °F (36.4 °C) (Oral) 18 5' 4" (1.626 m) 52.2 kg (115 lb)    SpO2 BMI             100% 19.74 kg/m2         Recent Lab Values        7/30/2012 8/8/2013 " 8/13/2014 8/24/2015                  8:20 AM  9:02 AM  9:03 AM  8:40 AM        A1C 5.5 5.5 5.3 5.4                 Allergies as of 2/8/2017     No Known Allergies      Advance Directives     An advance directive is a document which, in the event you are no longer able to make decisions for yourself, tells your healthcare team what kind of treatment you do or do not want to receive, or who you would like to make those decisions for you.  If you do not currently have an advance directive, [facility] encourages you to create one.  For more information call:  [phone]), [phone] ([phone]),  or log on to [email or web address].        Language Assistance Services     ATTENTION: Language assistance services are available, free of charge. Please call [phone].      ATENCIÓN: Si habla español, tiene a asher disposición servicios gratuitos de asistencia lingüística. Llame al [phone].     [facility] Ý: N?u b?n nói Ti?ng Vi?t, có các d?ch v? h? tr? ngôn ng? mi?n phí dành cho b?n. G?i s? [phone].         [facility] complies with applicable Federal civil rights laws and does not discriminate on the basis of race, color, national origin, age, disability, or sex.

## 2017-02-08 NOTE — PLAN OF CARE
Pt tolerated procedure well. Pt reports 0 pain after procedure. Assisted pt up for first time. Steady on feet. All discharge instructions given.

## 2017-02-08 NOTE — INTERVAL H&P NOTE
"The patient has been examined and the H&P has been reviewed:    I concur with the findings and no changes have occurred since H&P was written.     HPI  She continues to complains of LBP with radiation down the left leg which is not getting any better with the previous two CARISSA's.  She is currently scheduled for surgery with Dr. Kaba but would like to proceed with the third injection regardless.    PMHx, PSHx, Allergies, Medications reviewed in epic    ROS negative except pain complaints in HPI    OBJECTIVE:    Visit Vitals    /66 (BP Location: Right arm, Patient Position: Lying, BP Method: Automatic)    Pulse 64    Temp 97.5 °F (36.4 °C) (Oral)    Resp 18    Ht 5' 4" (1.626 m)    Wt 52.2 kg (115 lb)    SpO2 100%    Breastfeeding No    BMI 19.74 kg/m2       PHYSICAL EXAMINATION:    GENERAL: Well appearing, in no acute distress, alert and oriented x3.  PSYCH:  Mood and affect appropriate.  SKIN: Skin color, texture, turgor normal, no rashes or lesions.  CV: RRR with palpation of the radial artery.  PULM: No evidence of respiratory difficulty, symmetric chest rise. Clear to auscultation.  NEURO: Cranial nerves grossly intact.    Plan:    Proceed with procedure as planned    Delfin Dutta  02/08/2017     Anesthesia/Surgery risks, benefits and alternative options discussed and understood by patient/family.          There are no hospital problems to display for this patient.    "

## 2017-03-05 ENCOUNTER — PATIENT MESSAGE (OUTPATIENT)
Dept: NEUROSURGERY | Facility: CLINIC | Age: 67
End: 2017-03-05

## 2017-03-07 ENCOUNTER — TELEPHONE (OUTPATIENT)
Dept: NEUROSURGERY | Facility: CLINIC | Age: 67
End: 2017-03-07

## 2017-03-07 NOTE — TELEPHONE ENCOUNTER
The pt was not sure of the time of the surgery and what the spine class was for.  i informed the pt that Saumya the Dept. Lead MA calls the day before the surgery to go over arrival times and other instruction involving the surgery. I also informed the pt that the Spine class is designed to answer any question before the surgery, proper cleaning post surgery, and some expectations after the surgery.  The pt verbalized understanding.

## 2017-03-08 ENCOUNTER — PATIENT MESSAGE (OUTPATIENT)
Dept: NEUROSURGERY | Facility: CLINIC | Age: 67
End: 2017-03-08

## 2017-03-13 ENCOUNTER — HOSPITAL ENCOUNTER (OUTPATIENT)
Dept: RADIOLOGY | Facility: HOSPITAL | Age: 67
Discharge: HOME | End: 2017-03-13
Attending: NEUROLOGICAL SURGERY
Payer: COMMERCIAL

## 2017-03-13 ENCOUNTER — CLINICAL SUPPORT (OUTPATIENT)
Dept: NEUROSURGERY | Facility: CLINIC | Age: 67
End: 2017-03-13
Payer: COMMERCIAL

## 2017-03-13 ENCOUNTER — ANESTHESIA EVENT (OUTPATIENT)
Dept: SURGERY | Facility: HOSPITAL | Age: 67
End: 2017-03-13
Payer: COMMERCIAL

## 2017-03-13 DIAGNOSIS — Z98.890 STATUS POST LUMBAR SURGERY: ICD-10-CM

## 2017-03-13 DIAGNOSIS — M48.061 LUMBAR STENOSIS: ICD-10-CM

## 2017-03-13 LAB
CREAT SERPL-MCNC: 0.7 MG/DL (ref 0.5–1.4)
SAMPLE: NORMAL

## 2017-03-13 PROCEDURE — 72158 MRI LUMBAR SPINE W/O & W/DYE: CPT | Mod: TC

## 2017-03-13 PROCEDURE — A9585 GADOBUTROL INJECTION: HCPCS | Performed by: NEUROLOGICAL SURGERY

## 2017-03-13 PROCEDURE — 25500020 PHARM REV CODE 255: Performed by: NEUROLOGICAL SURGERY

## 2017-03-13 PROCEDURE — 72158 MRI LUMBAR SPINE W/O & W/DYE: CPT | Mod: 26,,, | Performed by: RADIOLOGY

## 2017-03-13 RX ORDER — GADOBUTROL 604.72 MG/ML
6 INJECTION INTRAVENOUS
Status: COMPLETED | OUTPATIENT
Start: 2017-03-13 | End: 2017-03-13

## 2017-03-13 RX ADMIN — GADOBUTROL 6 ML: 604.72 INJECTION INTRAVENOUS at 02:03

## 2017-03-13 NOTE — PRE ADMISSION SCREENING
Anesthesia Assessment: Preoperative EQUATION    Planned Procedure: Procedure(s) (LRB):  MICRODISKECTOMY-MINIMALLY INVASIVE (Left)  Requested Anesthesia Type:General  Surgeon: Kalen Kaba MD  Service: Neurosurgery  Known or anticipated Date of Surgery:3/20/2017    Surgeon notes: reviewed    Electronic QUestionnaire Assessment completed via nurse interview with patient.          Angelina Man [[id number]] - 67 y.o. Female        Providers Outside of [facility]      No data to display       Surgical Risk Level      Surgical Risk Level:  2           caRDScore (Clinical Anesthesia Rapid Decision Score)        Low  Total Score: 11      11 Sum of Clinical Scores       caRDScores (Grouped)      caRDScore - Ane:  10                caRDScore - CVD:  0                caRDScore - Pul:  0                caRDScore - Met:  1                caRDScore - Phy:  0           caRDScore Items           Pre-admit from 3/20/2017 in [facility]    Admission (Discharged) from 5/19/2016 in [facility]     Anesthesia        Has Hx of chronic urticaria or angioedema    Yes [2 instances that she broke out in hive unexplained ]     Had IM or IV steroid injections -2 or more in last 6 mo as outpt or in-patient  Yes       Has degenerative arthritis causing severely limited neck movement  Yes       Has painful neck extension  Yes       S/P cervical fusion or other surgery severely affecting neck mobility  Yes  Yes     S/P facial/plastic reconstruction/chin implant  Yes [face lift 2006 ]  Yes [face life in 2007]     Has had S/P back surgery for Disc/Fusion/Scoliosis  Yes [microsiskectomy ]       Has panic attacks    Yes     Has chronic anxiety  Yes  Yes     CVD        Activity similar to best ability for maximal activity or exercise  METS 4  METS 6-9     Pulmonary        Metabolic        Is on Rx for depression or bipolar disease  Yes  Yes     Physiologic          Flags      Red Flag Score:  0                Yellow  Flag Score:  9           Red Flags      No data to display       Yellow Flags           Pre-admit from 3/20/2017 in [facility]    Admission (Discharged) from 5/19/2016 in [facility]     Has Hx of chronic urticaria or angioedema    Yes [2 instances that she broke out in hive unexplained ]     Has had steroids in the last year  Yes  Yes [CARISSA 4/28/16 and po steroids 4/2016 for back ]     Takes herbal medications or vitamin supplements    Yes     NSAID    Yes [Mobic ]     Has degenerative arthritis causing severely limited neck movement  Yes       Has painful neck extension  Yes       S/P cervical fusion or other surgery severely affecting neck mobility  Yes  Yes     S/P facial/plastic reconstruction/chin implant  Yes [face lift 2006 ]  Yes [face life in 2007]     Has pain  Yes  Yes       PONV Risk Score (assumes periop narcotic use = +1, Max=4)      PONV Risk Score:  3           PONV Risk Factors  Total Score: 2      1 Female     1 Non-Smoker at present       Sleep Apnea  Total Score: 0        VIDAL STOP-Bang Risk Factors (Max=8)  Total Score: 1      1 Age >50       VIDAL Risk Level - 1 (Low), 2 (Moderate), 3 (High)      VIDAL Risk Level:  1           RCRI (Revised Cardiac Risk Indices of ACC/AHA guidelines, Max=6)  Total Score: 0        CAD Risk Factors  Total Score: 2      1 Female. Age >55     1 Exercise on a routine basis       CHADS Score if applicable (history of atrial fib/flutter, Max=6)  Total Score: 0        Maximal Exercise Capacity           Pre-admit from 3/20/2017 in [facility]     Maximal Exercise Capacity  METS 4       Summary of Dependence  Total Score: 1      1 Is totally independent of others for activities of daily living       Phone Fraility Score (Max = 17)  Total Score: 0        Pain Factors           Pre-admit from 3/20/2017 in [facility]     Has pain  Yes     Location and description of pain  left lower back and left leg       Associated pain symptoms  leg left pain      Duration of pain  Pain is chronic, has been present 6-12 months     Typical Pain Scores  5 to 6       Risk Triggers (Evidence-Based Risk Triggers)        Pulmonary Risk Triggers  Total Score: 1      1 Age > or = 60       Renal Risk Triggers  Total Score: 0        Delirium Risk Triggers  Total Score: 0        Urologic Risk Triggers  Total Score: 0        Logistics        Pre-op Clinic Logistics  Total Score: 8      1 Has Hx of chronic urticaria or angioedema     2 Takes herbal medications or vitamin supplements     1 Has had anesthesia, either as adult or as a child     2 NSAID     1 Has had S/P back surgery for Disc/Fusion/Scoliosis     1 Has chronic pain / depends on strong Rx (opioids, adjunctive meds)       DOSC Logistics  Total Score: 3      1 NSAID     2 S/P facial/plastic reconstruction/chin implant       Discharge Logistics  Total Score: 1      1 Has had S/P back surgery for Disc/Fusion/Scoliosis       Discharge Planning           Pre-admit from 3/20/2017 in [facility]    Admission (Discharged) from 5/19/2016 in [facility]     Discharge Planning        Discharge plans  Home with assistance  Home alone     Will assist patient 24/7, if needed  daughter   daughter      Who will transport you to therapy, if need  daughter        Any home barriers to going home    Yes     Barriers: Bedroom upstairs    Yes       Anes <For office use only>      Total Score: 12        Surgical Risk Level Assessment         Triage considerations:     The patient has no apparent active cardiac condition (No unstable coronary Syndrome such as severe unstable angina or recent [<1 month] myocardial infarction, decompensated CHF, severe valvular   disease or significant arrhythmia)    Previous anesthesia records:CHEYENNE 5/19/16     Last PCP note: 3-6 months ago , within [facility]   Subspecialty notes: n/a    Other important co-morbidities: hypothyroid,  anxiety      Tests already available:  Results have been reviewed. EKG 5/13/16            Instructions given. (See in Nurse's note)    Optimization:  Anesthesia Preop Clinic Assessment not   Indicated    Medical Opinion not  Indicated             Plan:    Testing:  Hematology Profile, BMP, PT/INR, PTT and T&S- done on 3/13/17    Pre-anesthesia  visit       Visit focus: n/a     Consultation:not indicated      Navigation: Tests Scheduled.                         Results will be tracked by Preop Clinic.                 Rocky Bolden,MSN,BA,RN-BC 3/13/17

## 2017-03-13 NOTE — ANESTHESIA PREPROCEDURE EVALUATION
Anesthesia Assessment: Preoperative EQUATION    Planned Procedure: Procedure(s) (LRB):  MICRODISKECTOMY-MINIMALLY INVASIVE (Left)  Requested Anesthesia Type:General  Surgeon: Kalen Kaba MD  Service: Neurosurgery  Known or anticipated Date of Surgery:3/20/2017    Surgeon notes: reviewed    Electronic QUestionnaire Assessment completed via nurse interview with patient.          Angelina Man [[id number]] - 67 y.o. Female        Providers Outside of [facility]      No data to display       Surgical Risk Level      Surgical Risk Level:  2           caRDScore (Clinical Anesthesia Rapid Decision Score)        Low  Total Score: 11      11 Sum of Clinical Scores       caRDScores (Grouped)      caRDScore - Ane:  10                caRDScore - CVD:  0                caRDScore - Pul:  0                caRDScore - Met:  1                caRDScore - Phy:  0           caRDScore Items           Pre-admit from 3/20/2017 in [facility]    Admission (Discharged) from 5/19/2016 in [facility]     Anesthesia        Has Hx of chronic urticaria or angioedema    Yes [2 instances that she broke out in hive unexplained ]     Had IM or IV steroid injections -2 or more in last 6 mo as outpt or in-patient  Yes       Has degenerative arthritis causing severely limited neck movement  Yes       Has painful neck extension  Yes       S/P cervical fusion or other surgery severely affecting neck mobility  Yes  Yes     S/P facial/plastic reconstruction/chin implant  Yes [face lift 2006 ]  Yes [face life in 2007]     Has had S/P back surgery for Disc/Fusion/Scoliosis  Yes [microsiskectomy ]       Has panic attacks    Yes     Has chronic anxiety  Yes  Yes     CVD        Activity similar to best ability for maximal activity or exercise  METS 4  METS 6-9     Pulmonary        Metabolic        Is on Rx for depression or bipolar disease  Yes  Yes     Physiologic          Flags      Red Flag Score:  0                Yellow  Flag Score:  9           Red Flags      No data to display       Yellow Flags           Pre-admit from 3/20/2017 in [facility]    Admission (Discharged) from 5/19/2016 in [facility]     Has Hx of chronic urticaria or angioedema    Yes [2 instances that she broke out in hive unexplained ]     Has had steroids in the last year  Yes  Yes [CARISSA 4/28/16 and po steroids 4/2016 for back ]     Takes herbal medications or vitamin supplements    Yes     NSAID    Yes [Mobic ]     Has degenerative arthritis causing severely limited neck movement  Yes       Has painful neck extension  Yes       S/P cervical fusion or other surgery severely affecting neck mobility  Yes  Yes     S/P facial/plastic reconstruction/chin implant  Yes [face lift 2006 ]  Yes [face life in 2007]     Has pain  Yes  Yes       PONV Risk Score (assumes periop narcotic use = +1, Max=4)      PONV Risk Score:  3           PONV Risk Factors  Total Score: 2      1 Female     1 Non-Smoker at present       Sleep Apnea  Total Score: 0        VIDAL STOP-Bang Risk Factors (Max=8)  Total Score: 1      1 Age >50       VIDAL Risk Level - 1 (Low), 2 (Moderate), 3 (High)      VIDAL Risk Level:  1           RCRI (Revised Cardiac Risk Indices of ACC/AHA guidelines, Max=6)  Total Score: 0        CAD Risk Factors  Total Score: 2      1 Female. Age >55     1 Exercise on a routine basis       CHADS Score if applicable (history of atrial fib/flutter, Max=6)  Total Score: 0        Maximal Exercise Capacity           Pre-admit from 3/20/2017 in [facility]     Maximal Exercise Capacity  METS 4       Summary of Dependence  Total Score: 1      1 Is totally independent of others for activities of daily living       Phone Fraility Score (Max = 17)  Total Score: 0        Pain Factors           Pre-admit from 3/20/2017 in [facility]     Has pain  Yes     Location and description of pain  left lower back and left leg       Associated pain symptoms  leg left pain      Duration of pain  Pain is chronic, has been present 6-12 months     Typical Pain Scores  5 to 6       Risk Triggers (Evidence-Based Risk Triggers)        Pulmonary Risk Triggers  Total Score: 1      1 Age > or = 60       Renal Risk Triggers  Total Score: 0        Delirium Risk Triggers  Total Score: 0        Urologic Risk Triggers  Total Score: 0        Logistics        Pre-op Clinic Logistics  Total Score: 8      1 Has Hx of chronic urticaria or angioedema     2 Takes herbal medications or vitamin supplements     1 Has had anesthesia, either as adult or as a child     2 NSAID     1 Has had S/P back surgery for Disc/Fusion/Scoliosis     1 Has chronic pain / depends on strong Rx (opioids, adjunctive meds)       DOSC Logistics  Total Score: 3      1 NSAID     2 S/P facial/plastic reconstruction/chin implant       Discharge Logistics  Total Score: 1      1 Has had S/P back surgery for Disc/Fusion/Scoliosis       Discharge Planning           Pre-admit from 3/20/2017 in [facility]    Admission (Discharged) from 5/19/2016 in [facility]     Discharge Planning        Discharge plans  Home with assistance  Home alone     Will assist patient 24/7, if needed  daughter   daughter      Who will transport you to therapy, if need  daughter        Any home barriers to going home    Yes     Barriers: Bedroom upstairs    Yes       Anes <For office use only>      Total Score: 12        Surgical Risk Level Assessment         Triage considerations:     The patient has no apparent active cardiac condition (No unstable coronary Syndrome such as severe unstable angina or recent [<1 month] myocardial infarction, decompensated CHF, severe valvular   disease or significant arrhythmia)    Previous anesthesia records:CHEYENNE 5/19/16     Last PCP note: 3-6 months ago , within [facility]   Subspecialty notes: n/a    Other important co-morbidities: hypothyroid,  anxiety      Tests already available:  Results have been reviewed. EKG 5/13/16            Instructions given. (See in Nurse's note)    Optimization:  Anesthesia Preop Clinic Assessment not   Indicated    Medical Opinion not  Indicated             Plan:    Testing:  Hematology Profile, BMP, PT/INR, PTT and T&S- done on 3/13/17    Pre-anesthesia  visit       Visit focus: n/a     Consultation:not indicated      Navigation: Tests Scheduled.                         Results will be tracked by Preop Clinic.                 Rocky Bolden,MSN,BA,RN-BC 3/13/17                                                                                                                     03/13/2017  Angelina Man is a 67 y.o., female.    [facility] Anesthesia Evaluation    I have reviewed the Patient Summary Reports.    I have reviewed the Nursing Notes.   I have reviewed the Medications.     Review of Systems  Anesthesia Hx:  No problems with previous Anesthesia  History of prior surgery of interest to airway management or planning: Previous anesthesia: General  Denies Personal Hx of Anesthesia complications.   Cardiovascular:  Cardiovascular Normal     Pulmonary:  Pulmonary Normal    Renal/:  Renal/ Normal     Hepatic/GI:  Hepatic/GI Normal    Musculoskeletal:   Arthritis   Spine Disorders: lumbar    Neurological:  Neurology Normal    Endocrine:   Hypothyroidism        Physical Exam  General:  Well nourished    Airway/Jaw/Neck:  Airway Findings: Mouth Opening: Normal Tongue: Normal  General Airway Assessment: Adult  Mallampati: II  TM Distance: Normal, at least 6 cm  Jaw/Neck Findings:  Neck ROM: Normal ROM      Dental:  Dental Findings: In tact   Chest/Lungs:  Chest/Lungs Findings: Clear to auscultation     Heart/Vascular:  Heart Findings: Rate: Normal  Rhythm: Regular Rhythm  Sounds: Normal        Mental Status:  Mental Status Findings:  Cooperative, Alert and Oriented         Anesthesia Plan  Type of Anesthesia, risks & benefits  discussed:  Anesthesia Type:  general  Patient's Preference: general  Intra-op Monitoring Plan:   Intra-op Monitoring Plan Comments:   Post Op Pain Control Plan:   Post Op Pain Control Plan Comments:   Induction:   IV  Beta Blocker:  Patient is not currently on a Beta-Blocker (No further documentation required).       Informed Consent: Patient understands risks and agrees with Anesthesia plan.  Questions answered. Anesthesia consent signed with patient.  ASA Score: 2     Day of Surgery Review of History & Physical:    H&P update referred to the surgeon.         Ready For Surgery From Anesthesia Perspective.

## 2017-03-17 ENCOUNTER — TELEPHONE (OUTPATIENT)
Dept: NEUROSURGERY | Facility: CLINIC | Age: 67
End: 2017-03-17

## 2017-03-17 NOTE — TELEPHONE ENCOUNTER
Called patient with her arrival time for surgery. Patient has been informed to arrival for 11am for her 1pm surgery start time. Patient has also been informed fast after midnight the night before surgery. Patient has also been informed to take a complete bath in dial antibacteria soap or Hibiclens the night before and the morning of surgery.

## 2017-03-19 NOTE — H&P
Pt is a 67 y.o. female who presents for follow up after last evaluation on 11/29/2016. Pt has a history of right L4-5 diskectomy, now with some left-sided pain. We wanted to try a transformainal injection and were considering decompression and fusion. Pt states having injection 6 weeks ago without any response, now having had 2 rounds of injection. Regardless, she states that her left-sided back and leg pain currently is not very debilitating and that she is able to tolerate pain when walking. Pt reports that previous right-sided pain was improved for about 2 weeks after surgery, until she lifted a heavy suitcase.       Review of Systems   Constitutional: Negative for chills, fatigue and fever.   HENT: Negative for sinus pressure and trouble swallowing.   Eyes: Negative. Negative for visual disturbance.   Respiratory: Negative.   Cardiovascular: Negative.   Gastrointestinal: Negative. Negative for nausea and vomiting.   Endocrine: Negative.   Genitourinary: Negative.   Musculoskeletal: Positive for back pain (left-sided lower back pain, radiates to LLE).   Positive for LLE pain.   Neurological: Negative for dizziness, seizures, syncope, speech difficulty, weakness and numbness.       Objective:      Physical Exam:     Constitutional: She appears well-developed.      Eyes: Pupils are equal, round, and reactive to light. Conjunctivae and EOM are normal.      Cardiovascular: Normal rate, regular rhythm, normal pulses and intact distal pulses.      Abdominal: Soft.     Psych/Behavior: She is alert. She is oriented to person, place, and time. She has a normal mood and affect.     Musculoskeletal: Gait is normal.   Neck: Range of motion is full. There is no tenderness. Muscle strength is 5/5. Tone is normal.   Back: Range of motion is full. There is no tenderness. Muscle strength is 5/5. Tone is normal.   Right Upper Extremities: Range of motion is full. There is no tenderness. Muscle strength is 5/5. Tone is normal.    Left Upper Extremities: Range of motion is full. There is no tenderness. Muscle strength is 5/5. Tone is normal.   Right Lower Extremities: Range of motion is full. There is no tenderness. Muscle strength is 5/5. Tone is normal.   Left Lower Extremities: Range of motion is full. There is no tenderness. Muscle strength is 5/5. Tone is normal.     Neurological:   Coordination: She has a normal Romberg Test, normal finger to nose coordination, normal heel to shin coordination and normal tandem walking coordination.   Sensory: There is no sensory deficit in the trunk. There is no sensory deficit in the extremities.   DTRs: DTRs are normal. Tricep reflexes are 2+ on the right side and 2+ on the left side. Bicep reflexes are 2+ on the right side and 2+ on the left side. Brachioradialis reflexes are 2+ on the right side and 2+ on the left side. Patellar reflexes are 2+ on the right side and 2+ on the left side. Achilles reflexes are 2+ on the right side and 2+ on the left side. She displays no Babinski's sign on the right side. She displays no Babinski's sign on the left side.   Cranial nerves: Cranial nerve(s) II, III, IV, V, VI, VII, VIII, IX, X, XI and XII are intact.       Pt still has significant has left-sided radiculopathy.   Still has positive SLR.   Wound is well-healed.   Has restricted lumbar range of motion.      Imaging:   MRI L-spine, dated 9/08/2016, shows multiple-level DDD, postsurgical changes at the right L4-5, but looks like disc herniation is asymmetric the left, with some foraminal stenosis indicative of re-herniation or scarring. Pt does not have contrast, so I cannot determine if there is scarring.      Assessment/Plan:   Pt has what looks like a recurrent disc on the oppositve side at L4-5. She has failed conservative management which so far has included physical therapy and epidural injections. I would like to try another round of transforaminal injections. I think she would benefit from  minimally invasive diskectomy. We discussed possibility of a fusion, but she is hesitant to have a TLIF. I think that is reasonable given her age. We will get a MRI scan with contrast to evaluate for scarring before surgery. I have discussed the risks/benefits, indications, and alternatives for the proposed procedure in detail. I have answered all of their questions and patient wishes to proceed with surgery. We will schedule patient.

## 2017-03-20 ENCOUNTER — ANESTHESIA (OUTPATIENT)
Dept: SURGERY | Facility: HOSPITAL | Age: 67
End: 2017-03-20
Payer: COMMERCIAL

## 2017-03-20 ENCOUNTER — HOSPITAL ENCOUNTER (OUTPATIENT)
Facility: HOSPITAL | Age: 67
Discharge: HOME | End: 2017-03-21
Attending: NEUROLOGICAL SURGERY | Admitting: NEUROLOGICAL SURGERY
Payer: COMMERCIAL

## 2017-03-20 DIAGNOSIS — M51.26 LUMBAR DISC HERNIATION: Primary | ICD-10-CM

## 2017-03-20 LAB
ANION GAP SERPL CALC-SCNC: 8 MMOL/L
APTT BLDCRRT: 25.3 SEC
BASOPHILS # BLD AUTO: 0.02 K/UL
BASOPHILS NFR BLD: 0.5 %
BUN SERPL-MCNC: 15 MG/DL
CALCIUM SERPL-MCNC: 8.9 MG/DL
CHLORIDE SERPL-SCNC: 105 MMOL/L
CO2 SERPL-SCNC: 30 MMOL/L
CREAT SERPL-MCNC: 0.7 MG/DL
DIFFERENTIAL METHOD: ABNORMAL
EOSINOPHIL # BLD AUTO: 0 K/UL
EOSINOPHIL NFR BLD: 1 %
ERYTHROCYTE [DISTWIDTH] IN BLOOD BY AUTOMATED COUNT: 13 %
EST. GFR  (AFRICAN AMERICAN): >60 ML/MIN/1.73 M^2
EST. GFR  (NON AFRICAN AMERICAN): >60 ML/MIN/1.73 M^2
GLUCOSE SERPL-MCNC: 84 MG/DL
HCT VFR BLD AUTO: 37.7 %
HGB BLD-MCNC: 12.5 G/DL
INR PPP: 1
LYMPHOCYTES # BLD AUTO: 1.4 K/UL
LYMPHOCYTES NFR BLD: 32.4 %
MCH RBC QN AUTO: 31.7 PG
MCHC RBC AUTO-ENTMCNC: 33.2 %
MCV RBC AUTO: 96 FL
MONOCYTES # BLD AUTO: 0.4 K/UL
MONOCYTES NFR BLD: 9.5 %
NEUTROPHILS # BLD AUTO: 2.4 K/UL
NEUTROPHILS NFR BLD: 56.6 %
PLATELET # BLD AUTO: 220 K/UL
PMV BLD AUTO: 10.1 FL
POTASSIUM SERPL-SCNC: 3.8 MMOL/L
PROTHROMBIN TIME: 10.7 SEC
RBC # BLD AUTO: 3.94 M/UL
SODIUM SERPL-SCNC: 143 MMOL/L
WBC # BLD AUTO: 4.2 K/UL

## 2017-03-20 PROCEDURE — 25000003 PHARM REV CODE 250: Performed by: NURSE ANESTHETIST, CERTIFIED REGISTERED

## 2017-03-20 PROCEDURE — 25000003 PHARM REV CODE 250: Performed by: STUDENT IN AN ORGANIZED HEALTH CARE EDUCATION/TRAINING PROGRAM

## 2017-03-20 PROCEDURE — D9220A PRA ANESTHESIA: Mod: ANES,,, | Performed by: ANESTHESIOLOGY

## 2017-03-20 PROCEDURE — 37000008 HC ANESTHESIA 1ST 15 MINUTES: Performed by: NEUROLOGICAL SURGERY

## 2017-03-20 PROCEDURE — 63600175 PHARM REV CODE 636 W HCPCS: Performed by: NURSE ANESTHETIST, CERTIFIED REGISTERED

## 2017-03-20 PROCEDURE — 85730 THROMBOPLASTIN TIME PARTIAL: CPT

## 2017-03-20 PROCEDURE — 71000039 HC RECOVERY, EACH ADD'L HOUR: Performed by: NEUROLOGICAL SURGERY

## 2017-03-20 PROCEDURE — 27000221 HC OXYGEN, UP TO 24 HOURS

## 2017-03-20 PROCEDURE — 36000711: Performed by: NEUROLOGICAL SURGERY

## 2017-03-20 PROCEDURE — 85610 PROTHROMBIN TIME: CPT

## 2017-03-20 PROCEDURE — 63600175 PHARM REV CODE 636 W HCPCS

## 2017-03-20 PROCEDURE — D9220A PRA ANESTHESIA: Mod: CRNA,,, | Performed by: NURSE ANESTHETIST, CERTIFIED REGISTERED

## 2017-03-20 PROCEDURE — 11000001 HC ACUTE MED/SURG PRIVATE ROOM

## 2017-03-20 PROCEDURE — 85025 COMPLETE CBC W/AUTO DIFF WBC: CPT

## 2017-03-20 PROCEDURE — 63600175 PHARM REV CODE 636 W HCPCS: Performed by: STUDENT IN AN ORGANIZED HEALTH CARE EDUCATION/TRAINING PROGRAM

## 2017-03-20 PROCEDURE — 25000003 PHARM REV CODE 250: Performed by: NEUROLOGICAL SURGERY

## 2017-03-20 PROCEDURE — 71000033 HC RECOVERY, INTIAL HOUR: Performed by: NEUROLOGICAL SURGERY

## 2017-03-20 PROCEDURE — 63042 LAMINOTOMY SINGLE LUMBAR: CPT | Mod: LT,,, | Performed by: NEUROLOGICAL SURGERY

## 2017-03-20 PROCEDURE — 27201423 OPTIME MED/SURG SUP & DEVICES STERILE SUPPLY: Performed by: NEUROLOGICAL SURGERY

## 2017-03-20 PROCEDURE — 37000009 HC ANESTHESIA EA ADD 15 MINS: Performed by: NEUROLOGICAL SURGERY

## 2017-03-20 PROCEDURE — 63600175 PHARM REV CODE 636 W HCPCS: Performed by: NEUROLOGICAL SURGERY

## 2017-03-20 PROCEDURE — 63600175 PHARM REV CODE 636 W HCPCS: Performed by: ANESTHESIOLOGY

## 2017-03-20 PROCEDURE — 80048 BASIC METABOLIC PNL TOTAL CA: CPT

## 2017-03-20 PROCEDURE — 63042 LAMINOTOMY SINGLE LUMBAR: CPT | Mod: 82,LT,, | Performed by: NEUROLOGICAL SURGERY

## 2017-03-20 PROCEDURE — 36000710: Performed by: NEUROLOGICAL SURGERY

## 2017-03-20 RX ORDER — MIDAZOLAM HYDROCHLORIDE 1 MG/ML
INJECTION, SOLUTION INTRAMUSCULAR; INTRAVENOUS
Status: DISCONTINUED | OUTPATIENT
Start: 2017-03-20 | End: 2017-03-20

## 2017-03-20 RX ORDER — ONDANSETRON 8 MG/1
8 TABLET, ORALLY DISINTEGRATING ORAL EVERY 6 HOURS PRN
Status: DISCONTINUED | OUTPATIENT
Start: 2017-03-20 | End: 2017-03-21 | Stop reason: HOSPADM

## 2017-03-20 RX ORDER — GABAPENTIN 300 MG/1
300 CAPSULE ORAL 3 TIMES DAILY
Status: DISCONTINUED | OUTPATIENT
Start: 2017-03-20 | End: 2017-03-21 | Stop reason: HOSPADM

## 2017-03-20 RX ORDER — DICYCLOMINE HYDROCHLORIDE 20 MG/1
20 TABLET ORAL 3 TIMES DAILY
Status: DISCONTINUED | OUTPATIENT
Start: 2017-03-20 | End: 2017-03-21 | Stop reason: HOSPADM

## 2017-03-20 RX ORDER — GLYCOPYRROLATE 0.2 MG/ML
INJECTION INTRAMUSCULAR; INTRAVENOUS
Status: DISCONTINUED | OUTPATIENT
Start: 2017-03-20 | End: 2017-03-20

## 2017-03-20 RX ORDER — ROCURONIUM BROMIDE 10 MG/ML
INJECTION, SOLUTION INTRAVENOUS
Status: DISCONTINUED | OUTPATIENT
Start: 2017-03-20 | End: 2017-03-20

## 2017-03-20 RX ORDER — DIPHENHYDRAMINE HCL 25 MG
25 CAPSULE ORAL NIGHTLY PRN
Status: DISCONTINUED | OUTPATIENT
Start: 2017-03-20 | End: 2017-03-21 | Stop reason: HOSPADM

## 2017-03-20 RX ORDER — MAG HYDROX/ALUMINUM HYD/SIMETH 200-200-20
30 SUSPENSION, ORAL (FINAL DOSE FORM) ORAL EVERY 4 HOURS PRN
Status: DISCONTINUED | OUTPATIENT
Start: 2017-03-20 | End: 2017-03-21 | Stop reason: HOSPADM

## 2017-03-20 RX ORDER — BACITRACIN 50000 [IU]/1
INJECTION, POWDER, FOR SOLUTION INTRAMUSCULAR
Status: DISCONTINUED | OUTPATIENT
Start: 2017-03-20 | End: 2017-03-20 | Stop reason: HOSPADM

## 2017-03-20 RX ORDER — SODIUM CHLORIDE 9 MG/ML
INJECTION, SOLUTION INTRAVENOUS CONTINUOUS
Status: DISCONTINUED | OUTPATIENT
Start: 2017-03-20 | End: 2017-03-21 | Stop reason: HOSPADM

## 2017-03-20 RX ORDER — LIDOCAINE HYDROCHLORIDE 10 MG/ML
1 INJECTION, SOLUTION EPIDURAL; INFILTRATION; INTRACAUDAL; PERINEURAL ONCE
Status: COMPLETED | OUTPATIENT
Start: 2017-03-20 | End: 2017-03-20

## 2017-03-20 RX ORDER — FENTANYL CITRATE 50 UG/ML
25 INJECTION, SOLUTION INTRAMUSCULAR; INTRAVENOUS EVERY 5 MIN PRN
Status: DISCONTINUED | OUTPATIENT
Start: 2017-03-20 | End: 2017-03-20 | Stop reason: HOSPADM

## 2017-03-20 RX ORDER — FAMOTIDINE 10 MG/ML
INJECTION INTRAVENOUS
Status: DISCONTINUED | OUTPATIENT
Start: 2017-03-20 | End: 2017-03-20

## 2017-03-20 RX ORDER — MORPHINE SULFATE 2 MG/ML
2 INJECTION, SOLUTION INTRAMUSCULAR; INTRAVENOUS EVERY 4 HOURS PRN
Status: DISCONTINUED | OUTPATIENT
Start: 2017-03-20 | End: 2017-03-21 | Stop reason: HOSPADM

## 2017-03-20 RX ORDER — BUPROPION HYDROCHLORIDE 150 MG/1
150 TABLET ORAL DAILY
Status: DISCONTINUED | OUTPATIENT
Start: 2017-03-21 | End: 2017-03-21 | Stop reason: HOSPADM

## 2017-03-20 RX ORDER — FENTANYL CITRATE 50 UG/ML
INJECTION, SOLUTION INTRAMUSCULAR; INTRAVENOUS
Status: COMPLETED
Start: 2017-03-20 | End: 2017-03-20

## 2017-03-20 RX ORDER — DEXAMETHASONE SODIUM PHOSPHATE 4 MG/ML
INJECTION, SOLUTION INTRA-ARTICULAR; INTRALESIONAL; INTRAMUSCULAR; INTRAVENOUS; SOFT TISSUE
Status: DISCONTINUED | OUTPATIENT
Start: 2017-03-20 | End: 2017-03-20

## 2017-03-20 RX ORDER — HYDROCODONE BITARTRATE AND ACETAMINOPHEN 5; 325 MG/1; MG/1
1 TABLET ORAL EVERY 4 HOURS PRN
Status: DISCONTINUED | OUTPATIENT
Start: 2017-03-20 | End: 2017-03-21 | Stop reason: HOSPADM

## 2017-03-20 RX ORDER — AMOXICILLIN 250 MG
2 CAPSULE ORAL NIGHTLY PRN
Status: DISCONTINUED | OUTPATIENT
Start: 2017-03-20 | End: 2017-03-21 | Stop reason: HOSPADM

## 2017-03-20 RX ORDER — ACETAMINOPHEN 10 MG/ML
1000 INJECTION, SOLUTION INTRAVENOUS EVERY 8 HOURS
Status: DISCONTINUED | OUTPATIENT
Start: 2017-03-20 | End: 2017-03-21 | Stop reason: HOSPADM

## 2017-03-20 RX ORDER — CETIRIZINE HYDROCHLORIDE 10 MG/1
10 TABLET ORAL DAILY
Status: DISCONTINUED | OUTPATIENT
Start: 2017-03-21 | End: 2017-03-21 | Stop reason: HOSPADM

## 2017-03-20 RX ORDER — CLONAZEPAM 1 MG/1
1 TABLET ORAL NIGHTLY PRN
Status: DISCONTINUED | OUTPATIENT
Start: 2017-03-20 | End: 2017-03-21 | Stop reason: HOSPADM

## 2017-03-20 RX ORDER — LIDOCAINE HCL/PF 100 MG/5ML
SYRINGE (ML) INTRAVENOUS
Status: DISCONTINUED | OUTPATIENT
Start: 2017-03-20 | End: 2017-03-20

## 2017-03-20 RX ORDER — METHYLPREDNISOLONE ACETATE 40 MG/ML
INJECTION, SUSPENSION INTRA-ARTICULAR; INTRALESIONAL; INTRAMUSCULAR; SOFT TISSUE
Status: DISCONTINUED | OUTPATIENT
Start: 2017-03-20 | End: 2017-03-20 | Stop reason: HOSPADM

## 2017-03-20 RX ORDER — SODIUM CHLORIDE 0.9 % (FLUSH) 0.9 %
3 SYRINGE (ML) INJECTION EVERY 8 HOURS
Status: DISCONTINUED | OUTPATIENT
Start: 2017-03-20 | End: 2017-03-20 | Stop reason: HOSPADM

## 2017-03-20 RX ORDER — BISACODYL 10 MG
10 SUPPOSITORY, RECTAL RECTAL DAILY
Status: DISCONTINUED | OUTPATIENT
Start: 2017-03-21 | End: 2017-03-21 | Stop reason: HOSPADM

## 2017-03-20 RX ORDER — ACYCLOVIR 200 MG/1
400 CAPSULE ORAL 2 TIMES DAILY
Status: DISCONTINUED | OUTPATIENT
Start: 2017-03-20 | End: 2017-03-21 | Stop reason: HOSPADM

## 2017-03-20 RX ORDER — BUPIVACAINE HYDROCHLORIDE AND EPINEPHRINE 5; 5 MG/ML; UG/ML
INJECTION, SOLUTION EPIDURAL; INTRACAUDAL; PERINEURAL
Status: DISCONTINUED | OUTPATIENT
Start: 2017-03-20 | End: 2017-03-20 | Stop reason: HOSPADM

## 2017-03-20 RX ORDER — FLUTICASONE PROPIONATE 50 MCG
2 SPRAY, SUSPENSION (ML) NASAL DAILY
Status: DISCONTINUED | OUTPATIENT
Start: 2017-03-21 | End: 2017-03-21 | Stop reason: HOSPADM

## 2017-03-20 RX ORDER — ONDANSETRON 2 MG/ML
INJECTION INTRAMUSCULAR; INTRAVENOUS
Status: DISCONTINUED | OUTPATIENT
Start: 2017-03-20 | End: 2017-03-20

## 2017-03-20 RX ORDER — PROPOFOL 10 MG/ML
VIAL (ML) INTRAVENOUS
Status: DISCONTINUED | OUTPATIENT
Start: 2017-03-20 | End: 2017-03-20

## 2017-03-20 RX ORDER — TRIAMTERENE AND HYDROCHLOROTHIAZIDE 37.5; 25 MG/1; MG/1
1 CAPSULE ORAL DAILY
Status: DISCONTINUED | OUTPATIENT
Start: 2017-03-21 | End: 2017-03-21 | Stop reason: HOSPADM

## 2017-03-20 RX ORDER — NEOSTIGMINE METHYLSULFATE 1 MG/ML
INJECTION, SOLUTION INTRAVENOUS
Status: DISCONTINUED | OUTPATIENT
Start: 2017-03-20 | End: 2017-03-20

## 2017-03-20 RX ORDER — LEVOTHYROXINE SODIUM 112 UG/1
112 TABLET ORAL DAILY
Status: DISCONTINUED | OUTPATIENT
Start: 2017-03-21 | End: 2017-03-21 | Stop reason: HOSPADM

## 2017-03-20 RX ORDER — FENTANYL CITRATE 50 UG/ML
INJECTION, SOLUTION INTRAMUSCULAR; INTRAVENOUS
Status: DISCONTINUED | OUTPATIENT
Start: 2017-03-20 | End: 2017-03-20

## 2017-03-20 RX ORDER — SODIUM CHLORIDE 0.9 % (FLUSH) 0.9 %
3 SYRINGE (ML) INJECTION
Status: DISCONTINUED | OUTPATIENT
Start: 2017-03-20 | End: 2017-03-20 | Stop reason: HOSPADM

## 2017-03-20 RX ORDER — LIDOCAINE HYDROCHLORIDE AND EPINEPHRINE 20; 10 MG/ML; UG/ML
INJECTION, SOLUTION INFILTRATION; PERINEURAL
Status: DISCONTINUED | OUTPATIENT
Start: 2017-03-20 | End: 2017-03-20 | Stop reason: HOSPADM

## 2017-03-20 RX ADMIN — FENTANYL CITRATE 25 MCG: 50 INJECTION INTRAMUSCULAR; INTRAVENOUS at 07:03

## 2017-03-20 RX ADMIN — EPHEDRINE SULFATE 15 MG: 50 INJECTION, SOLUTION INTRAMUSCULAR; INTRAVENOUS; SUBCUTANEOUS at 04:03

## 2017-03-20 RX ADMIN — ONDANSETRON 4 MG: 2 INJECTION INTRAMUSCULAR; INTRAVENOUS at 05:03

## 2017-03-20 RX ADMIN — EPHEDRINE SULFATE 15 MG: 50 INJECTION, SOLUTION INTRAMUSCULAR; INTRAVENOUS; SUBCUTANEOUS at 03:03

## 2017-03-20 RX ADMIN — ONDANSETRON 4 MG: 2 INJECTION INTRAMUSCULAR; INTRAVENOUS at 04:03

## 2017-03-20 RX ADMIN — FAMOTIDINE 20 MG: 10 INJECTION, SOLUTION INTRAVENOUS at 04:03

## 2017-03-20 RX ADMIN — SODIUM CHLORIDE: 0.9 INJECTION, SOLUTION INTRAVENOUS at 02:03

## 2017-03-20 RX ADMIN — ACETAMINOPHEN 1000 MG: 10 INJECTION, SOLUTION INTRAVENOUS at 09:03

## 2017-03-20 RX ADMIN — LIDOCAINE HYDROCHLORIDE 0.1 ML: 10 INJECTION, SOLUTION EPIDURAL; INFILTRATION; INTRACAUDAL; PERINEURAL at 02:03

## 2017-03-20 RX ADMIN — FENTANYL CITRATE 50 MCG: 50 INJECTION, SOLUTION INTRAMUSCULAR; INTRAVENOUS at 03:03

## 2017-03-20 RX ADMIN — ROCURONIUM BROMIDE 20 MG: 10 INJECTION, SOLUTION INTRAVENOUS at 03:03

## 2017-03-20 RX ADMIN — GLYCOPYRROLATE 0.6 MG: 0.2 INJECTION, SOLUTION INTRAMUSCULAR; INTRAVENOUS at 05:03

## 2017-03-20 RX ADMIN — EPHEDRINE SULFATE 5 MG: 50 INJECTION, SOLUTION INTRAMUSCULAR; INTRAVENOUS; SUBCUTANEOUS at 03:03

## 2017-03-20 RX ADMIN — DEXAMETHASONE SODIUM PHOSPHATE 8 MG: 4 INJECTION, SOLUTION INTRAMUSCULAR; INTRAVENOUS at 04:03

## 2017-03-20 RX ADMIN — HYDROCODONE BITARTRATE AND ACETAMINOPHEN 1 TABLET: 5; 325 TABLET ORAL at 11:03

## 2017-03-20 RX ADMIN — HYDROCODONE BITARTRATE AND ACETAMINOPHEN 1 TABLET: 5; 325 TABLET ORAL at 06:03

## 2017-03-20 RX ADMIN — PROPOFOL 140 MG: 10 INJECTION, EMULSION INTRAVENOUS at 03:03

## 2017-03-20 RX ADMIN — LIDOCAINE HYDROCHLORIDE 80 MG: 20 INJECTION, SOLUTION INTRAVENOUS at 03:03

## 2017-03-20 RX ADMIN — CLONAZEPAM 1 MG: 1 TABLET ORAL at 07:03

## 2017-03-20 RX ADMIN — Medication 2 G: at 03:03

## 2017-03-20 RX ADMIN — ACYCLOVIR 400 MG: 200 CAPSULE ORAL at 09:03

## 2017-03-20 RX ADMIN — MIDAZOLAM HYDROCHLORIDE 2 MG: 1 INJECTION, SOLUTION INTRAMUSCULAR; INTRAVENOUS at 03:03

## 2017-03-20 RX ADMIN — NEOSTIGMINE METHYLSULFATE 3 MG: 1 INJECTION INTRAVENOUS at 05:03

## 2017-03-20 RX ADMIN — GABAPENTIN 300 MG: 300 CAPSULE ORAL at 09:03

## 2017-03-20 RX ADMIN — DICYCLOMINE HYDROCHLORIDE 20 MG: 20 TABLET ORAL at 09:03

## 2017-03-20 RX ADMIN — DIPHENHYDRAMINE HYDROCHLORIDE 25 MG: 25 CAPSULE ORAL at 09:03

## 2017-03-20 NOTE — TRANSFER OF CARE
Anesthesia Transfer of Care Note    Patient: Angelina Man    Procedure(s) Performed: Procedure(s) (LRB):  MICRODISKECTOMY-MINIMALLY INVASIVE LEFT  L4-5 (Left)    Patient location: PACU    Anesthesia Type: general    Transport from OR: Transported from OR on 6-10 L/min O2 by face mask with adequate spontaneous ventilation    Post pain: adequate analgesia    Post assessment: no apparent anesthetic complications    Post vital signs: stable    Level of consciousness: awake, alert and lethargic    Nausea/Vomiting: no nausea/vomiting    Complications: none          Last vitals:   Visit Vitals    BP (!) 141/67 (BP Location: Right arm, Patient Position: Lying, BP Method: Automatic)    Pulse 81    Temp 36.3 °C (97.3 °F) (Temporal)    Resp 13    Ht 5' (1.524 m)    Wt 52.2 kg (115 lb)    SpO2 100%    Breastfeeding No    BMI 22.46 kg/m2

## 2017-03-20 NOTE — DISCHARGE SUMMARY
[facility]  Discharge Summary  Neurosurgery    Admit Date: 3/20/2017    Discharge Date and Time:  03/20/2017 5:50 PM    Attending Physician: Kalen Kaba MD     Discharge Physician: Driss Correia    Reason for Admission: L4/5 discectomy    Procedures Performed: Procedure(s) (LRB):  MICRODISKECTOMY-MINIMALLY INVASIVE LEFT  L4-5 (Left)    Hospital Course: pt with hx of LLE radiculopathy presented for elective MIS left L4/5 discectomy.  Pt tolerated the procedure well and there were no complications.  Pt will recover in PACU and then is stable for dc home to fu in clinic.    Consults: none    Significant Diagnostic Studies: none    Final Diagnoses:     Discharged Condition: good   Principal Problem: <principal problem not specified>   Secondary Diagnoses:   Active Hospital Problems    Diagnosis  POA    Lumbar disc herniation [M51.26]  Yes      Resolved Hospital Problems    Diagnosis Date Resolved POA   No resolved problems to display.       Disposition: Home or Self Care    Follow Up/Patient Instructions:   Pt will fu in clinic in 2 wks for wound check.  Medications:  Reconciled Home Medications:   Current Discharge Medication List      CONTINUE these medications which have NOT CHANGED    Details   acyclovir (ZOVIRAX) 400 MG tablet Take 1 tablet (400 mg total) by mouth 2 (two) times daily.  Qty: 60 tablet, Refills: 12    Associated Diagnoses: HSV infection      ascorbic acid (VITAMIN C) 1000 MG tablet Take 1,000 mg by mouth once daily.       b complex vitamins (VITAMIN B COMPLEX) tablet Take 1 tablet by mouth daily as needed.       biotin 1 mg tablet Take 1 mg by mouth once daily.       buPROPion (WELLBUTRIN XL) 150 MG TB24 tablet TAKE ONE TABLET BY MOUTH ONCE DAILY  Qty: 30 tablet, Refills: 12    Associated Diagnoses: Depression, unspecified depression type      calcium-vitamin D 500 mg(1,250mg) -200 unit per tablet Take 1 tablet by mouth once daily.       chondroitin sulfate-vit C-Mn  (CHONDROITIN SULFATE COMPLEX) 400-60-2.5 mg Cap Take 1 tablet by mouth once daily.       clonazePAM (KLONOPIN) 1 MG tablet Take 1 tablet (1 mg total) by mouth nightly as needed.  Qty: 30 tablet, Refills: 5    Associated Diagnoses: Anxiety      dicyclomine (BENTYL) 20 mg tablet Take 1 tablet (20 mg total) by mouth 3 (three) times daily.  Qty: 90 tablet, Refills: 12    Associated Diagnoses: Irritable bowel syndrome with diarrhea      digestive enzymes Tab Take 1 tablet by mouth once daily.       diphenhydrAMINE (BENADRYL) 25 mg capsule Take 25 mg by mouth nightly as needed.       fluticasone (FLONASE) 50 mcg/actuation nasal spray Spray once in each nostril twice daily as needed for rhinitis.  Qty: 16 g, Refills: 11    Associated Diagnoses: Other seasonal allergic rhinitis      gabapentin (NEURONTIN) 300 MG capsule Take 1 capsule (300 mg total) by mouth 3 (three) times daily.  Qty: 90 capsule, Refills: 1      ginseng 200 mg Cap Take 200 mg by mouth 2 (two) times daily as needed.       glucosamine sulfate 2KCl 1,000 mg Tab Take 1 tablet by mouth once daily.       levothyroxine (SYNTHROID) 112 MCG tablet Take 1 tablet (112 mcg total) by mouth once daily.  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       tretinoin (RETIN-A) 0.1 % cream Apply topically every evening.  Qty: 20 g, Refills: 6    Associated Diagnoses: Other acne      triamterene-hydrochlorothiazide 37.5-25 mg (MAXZIDE-25) 37.5-25 mg per tablet Take 1 tablet by mouth once daily.  Qty: 30 tablet, Refills: 11      zinc 50 mg Tab Take 50 mg by mouth once daily.       CYANOCOBALAMIN/FOLIC ACID (VITAMIN B12-FOLIC ACID ORAL) Take by mouth.       fexofenadine (ALLEGRA) 180 MG tablet Take 1 tablet (180 mg total) by mouth once daily.  Qty: 30 tablet, Refills: 12    Associated Diagnoses: Seasonal allergies           No discharge procedures on  file.

## 2017-03-20 NOTE — IP AVS SNAPSHOT
[facility]  [address]  [facility] [address]  Phone: [phone]           Patient Discharge Instructions     Our goal is to set you up for success. This packet includes information on your condition, medications, and your home care. It will help you to care for yourself so you don't get sicker and need to go back to the hospital.     Please ask your nurse if you have any questions.        There are many details to remember when preparing to leave the hospital. Here is what you will need to do:    1. Take your medicine. If you are prescribed medications, review your Medication List in the following pages. You may have new medications to  at the pharmacy and others that you'll need to stop taking. Review the instructions for how and when to take your medications. Talk with your doctor or nurses if you are unsure of what to do.     2. Go to your follow-up appointments. Specific follow-up information is listed in the following pages. Your may be contacted by a transition nurse or clinical provider about future appointments. Be sure we have all of the phone numbers to reach you, if needed. Please contact your provider's office if you are unable to make an appointment.     3. Watch for warning signs. Your doctor or nurse will give you detailed warning signs to watch for and when to call for assistance. These instructions may also include educational information about your condition. If you experience any of warning signs to your health, call your doctor.               [facility]  Unless otherwise directed by your provider, please contact [facility], our nurse care line that is available for 24/7 assistance.     [phone] (toll-free)    Registered nurses in the [facility] provide clinical advisement, health education, appointment booking, and other advisory services.                    ** Verify the list of medication(s) below is accurate and up  to date. Carry this with you in case of emergency. If your medications have changed, please notify your healthcare provider.             Medication List      START taking these medications        Additional Info                      hydrocodone-acetaminophen 5-325mg 5-325 mg per tablet   Commonly known as:  NORCO   Quantity:  60 tablet   Refills:  0   Dose:  1 tablet    Last time this was given:  1 tablet on 3/21/2017  5:02 AM   Instructions:  Take 1 tablet by mouth every 6 (six) hours as needed (pain 4-5/10).     Begin Date    AM    Noon    PM    Bedtime       ondansetron 8 MG Tbdl   Commonly known as:  ZOFRAN-ODT   Quantity:  60 tablet   Refills:  0   Dose:  8 mg    Instructions:  Take 1 tablet (8 mg total) by mouth every 6 (six) hours as needed.     Begin Date    AM    Noon    PM    Bedtime         CHANGE how you take these medications        Additional Info                      dicyclomine 20 mg tablet   Commonly known as:  BENTYL   Quantity:  90 tablet   Refills:  12   Dose:  20 mg   What changed:    - when to take this  - reasons to take this    Last time this was given:  20 mg on 3/21/2017  5:02 AM   Instructions:  Take 1 tablet (20 mg total) by mouth 3 (three) times daily.     Begin Date    AM    Noon    PM    Bedtime       fexofenadine 180 MG tablet   Commonly known as:  ALLEGRA   Quantity:  30 tablet   Refills:  12   Dose:  180 mg   What changed:    - when to take this  - reasons to take this    Instructions:  Take 1 tablet (180 mg total) by mouth once daily.     Begin Date    AM    Noon    PM    Bedtime       triamterene-hydrochlorothiazide 37.5-25 mg 37.5-25 mg per tablet   Commonly known as:  MAXZIDE-25   Quantity:  30 tablet   Refills:  11   Dose:  1 tablet   What changed:    - when to take this  - reasons to take this    Instructions:  Take 1 tablet by mouth once daily.     Begin Date    AM    Noon    PM    Bedtime         CONTINUE taking these medications        Additional Info                       acyclovir 400 MG tablet   Commonly known as:  ZOVIRAX   Quantity:  60 tablet   Refills:  12   Dose:  400 mg    Instructions:  Take 1 tablet (400 mg total) by mouth 2 (two) times daily.     Begin Date    AM    Noon    PM    Bedtime       ascorbic acid (vitamin C) 1000 MG tablet   Commonly known as:  VITAMIN C   Refills:  0   Dose:  1000 mg    Instructions:  Take 1,000 mg by mouth once daily.     Begin Date    AM    Noon    PM    Bedtime       biotin 1 mg tablet   Refills:  0   Dose:  1 mg    Instructions:  Take 1 mg by mouth once daily.     Begin Date    AM    Noon    PM    Bedtime       buPROPion 150 MG TB24 tablet   Commonly known as:  WELLBUTRIN XL   Quantity:  30 tablet   Refills:  12    Last time this was given:  150 mg on 3/21/2017  9:15 AM   Instructions:  TAKE ONE TABLET BY MOUTH ONCE DAILY     Begin Date    AM    Noon    PM    Bedtime       calcium-vitamin D3 500 mg(1,250mg) -200 unit per tablet   Refills:  0   Dose:  1 tablet    Instructions:  Take 1 tablet by mouth once daily.     Begin Date    AM    Noon    PM    Bedtime       CHONDROITIN SULFATE COMPLEX 400-60-2.5 mg Cap   Refills:  0   Dose:  1 tablet   Generic drug:  chondroitin sulfate-vit C-Mn    Instructions:  Take 1 tablet by mouth once daily.     Begin Date    AM    Noon    PM    Bedtime       clonazePAM 1 MG tablet   Commonly known as:  KLONOPIN   Quantity:  30 tablet   Refills:  5   Dose:  1 mg    Last time this was given:  1 mg on 3/20/2017  7:50 PM   Instructions:  Take 1 tablet (1 mg total) by mouth nightly as needed.     Begin Date    AM    Noon    PM    Bedtime       digestive enzymes Tab   Refills:  0   Dose:  1 tablet    Instructions:  Take 1 tablet by mouth once daily.     Begin Date    AM    Noon    PM    Bedtime       diphenhydrAMINE 25 mg capsule   Commonly known as:  BENADRYL   Refills:  0   Dose:  25 mg    Last time this was given:  25 mg on 3/20/2017  9:58 PM   Instructions:  Take 25 mg by mouth nightly as needed.     Begin Date     AM    Noon    PM    Bedtime       fluticasone 50 mcg/actuation nasal spray   Commonly known as:  FLONASE   Quantity:  16 g   Refills:  11    Last time this was given:  2 sprays on 3/21/2017  9:17 AM   Instructions:  Spray once in each nostril twice daily as needed for rhinitis.     Begin Date    AM    Noon    PM    Bedtime       gabapentin 300 MG capsule   Commonly known as:  NEURONTIN   Quantity:  90 capsule   Refills:  1   Dose:  300 mg    Last time this was given:  300 mg on 3/21/2017  5:02 AM   Instructions:  Take 1 capsule (300 mg total) by mouth 3 (three) times daily.     Begin Date    AM    Noon    PM    Bedtime       ginseng 200 mg Cap   Refills:  0   Dose:  200 mg    Instructions:  Take 200 mg by mouth 2 (two) times daily as needed.     Begin Date    AM    Noon    PM    Bedtime       glucosamine sulfate 2KCl 1,000 mg Tab   Refills:  0   Dose:  1 tablet    Instructions:  Take 1 tablet by mouth once daily.     Begin Date    AM    Noon    PM    Bedtime       levothyroxine 112 MCG tablet   Commonly known as:  SYNTHROID   Quantity:  30 tablet   Refills:  11   Dose:  112 mcg    Last time this was given:  112 mcg on 3/21/2017  9:15 AM   Instructions:  Take 1 tablet (112 mcg total) by mouth once daily.     Begin Date    AM    Noon    PM    Bedtime       multivitamin per tablet   Commonly known as:  THERAGRAN   Refills:  0   Dose:  1 tablet    Instructions:  Take 1 tablet by mouth once daily.     Begin Date    AM    Noon    PM    Bedtime       tretinoin 0.1 % cream   Commonly known as:  RETIN-A   Quantity:  20 g   Refills:  6    Instructions:  Apply topically every evening.     Begin Date    AM    Noon    PM    Bedtime       VITAMIN B12-FOLIC ACID ORAL   Refills:  0    Instructions:  Take by mouth.     Begin Date    AM    Noon    PM    Bedtime       VITAMINS B COMPLEX tablet   Refills:  0   Dose:  1 tablet   Generic drug:  b complex vitamins    Instructions:  Take 1 tablet by mouth daily as needed.     Begin Date     AM    Noon    PM    Bedtime       zinc 50 mg Tab   Refills:  0   Dose:  50 mg    Instructions:  Take 50 mg by mouth once daily.     Begin Date    AM    Noon    PM    Bedtime            Where to Get Your Medications      You can get these medications from any pharmacy     Bring a paper prescription for each of these medications     hydrocodone-acetaminophen 5-325mg 5-325 mg per tablet    ondansetron 8 MG Tbdl                  Please bring to all follow up appointments:    1. A copy of your discharge instructions.  2. All medicines you are currently taking in their original bottles.  3. Identification and insurance card.    Please arrive 15 minutes ahead of scheduled appointment time.    Please call 24 hours in advance if you must reschedule your appointment and/or time.        Your Scheduled Appointments     Apr 03, 2017  9:00 AM CDT   Follow Up/Office Visit with Marli Wilkinson MD   [facility] ([facility])    [address], [address]  [place] [address]   [phone]            Apr 03, 2017 10:00 AM CDT   Post OP with RN, NEUROSURGERY   [address] ([facility] )    [address]  [place] [address] [phone]            May 09, 2017  9:45 AM CDT   Post OP with Kalen Kaba MD   [address] ([facility] )    [address]  [place] [address] [phone]              Follow-up Information     Follow up with Kalen Kaba MD In 2 weeks.    Specialty:  Neurosurgery    Why:  For wound re-check    Contact information:    [address]  [place] [id number] [phone]          Discharge Instructions     Future Orders    Activity as tolerated     Call MD for:  difficulty breathing or increased cough     Call MD for:  increased confusion or weakness     Call MD for:  persistent dizziness, light-headedness, or visual disturbances     Call MD for:  persistent nausea and vomiting or diarrhea      Call MD for:  redness, tenderness, or signs of infection (pain, swelling, redness, odor or green/yellow discharge around incision site)     Call MD for:  severe persistent headache     Call MD for:  severe uncontrolled pain     Call MD for:  worsening rash     Diet general     Questions:    Total calories:      Fat restriction, if any:      Protein restriction, if any:      Na restriction, if any:      Fluid restriction:      Additional restrictions:          Discharge Instructions       Please follow ONLY the instructions that are checked below.    Activity Restrictions:  [x]  Return to work will be determined on an individual basis.  [x]  No lifting greater than 10 pounds.  [x]  Avoid bending and twisting the area of your surgery more than 45 degrees from neutral position in any direction.  [x]  No driving or operating machinery:  []  until cleared by your surgeon.  [x]  while taking narcotic pain medications or muscle relaxants.  [x]  No cervical collar, soft collar, or lumbar brace required.  []  Wear your brace at all times. You may be given an extra brace or soft collar to wear when showering.  []  Wear your brace at all times except when flat in bed.  []  Wear brace for comfort only.  []  Increase ambulation over the next 2 weeks so that you are walking 2 miles per day at 2 weeks post-operatively.  []  Walk on paved surfaces only. It is okay to walk up and down stairs while holding onto a side rail.  []  No sexual activity for 2-3 weeks.    Discharge Medication/Follow-up:  [x]  Please refer to discharge medication reconciliation form.  [x]  Do not take ANY non-steroidal anti-inflammatory drugs (NSAIDS), including the following: ibuprofen, naprosyn, Aleve, Advil, Indocin, Mobic, or Celebrex for:  [x]  4 weeks  []  8 weeks  []  6 months  [x]  Prescriptions for appropriate medication will be given upon discharge.   [x]  Pain control:             []  Muscle relaxer:            [x]  Take docusate (Colace 100 mg):  take one capsule a day as needed for constipation. You can get this over the counter.  [x]  Follow-up appointment:  [x]  10-14 days post-op for wound check by physician assistant/nurse  [x]  4-6 weeks with MD:  []  with x-rays  []  without x-rays  [x]  An appointment will be mailed to you.    Wound Care:  []  Remove dressing or bandaid in    days.  [x]  No bandage required. Keep your incision open to the air.  [x]  You may shower on the 2nd day after your surgery. Have the force of water hit you opposite from the incision. Pat the incision dry after your shower; do not scrub the incision.  [x]  You cannot take a bath until 8 weeks after surgery.  [x]  Apply bacitracin to incision twice a day for  14  more days.    Call your doctor or go to the Emergency Room for any signs of infection, including: increased redness, drainage, pain, or fever (temperature ?101.5 for 24 hours). Call your doctor or go to the Emergency Room if there are any localized neurological changes; problems with speech, vision, numbness, tingling, weakness, or severe headache; or for other concerns.    Special Instructions:  []  No use of tobacco products.  [x]  Diet: Please eat a regular diet as tolerated.  []  Other diet:              Specific physician instructions:           Physicians need 3 days' notice for pain medicine to be refilled. Pain medicine will only be refilled between 8 AM and 5 PM, Monday through Friday, due to Food and Drug Administration regulation of documentation.    If you have any questions about this form, please call [phone].    Form No. 26684 (Revised 10/31/2013)        Primary Diagnosis     Your primary diagnosis was:  Herniated Lumbar Intervertebral Disc      Admission Information     Date & Time Provider Department CSN    3/20/2017 11:13 AM Kalen Kaba MD [facility] [id number]      Care Providers     Provider Role Specialty Primary office phone    Kalen Kaba MD Attending Provider  Neurosurgery [phone]    Kalen Kaba MD Surgeon  Neurosurgery [phone]      Your Vitals Were     BP Pulse Temp Resp Height Weight    118/64 (BP Location: Left arm, Patient Position: Lying, BP Method: Automatic) 61 97.5 °F (36.4 °C) (Oral) 16 5' (1.524 m) 52.2 kg (115 lb)    SpO2 BMI             100% 22.46 kg/m2         Recent Lab Values        7/30/2012 8/8/2013 8/13/2014 8/24/2015                  8:20 AM  9:02 AM  9:03 AM  8:40 AM        A1C 5.5 5.5 5.3 5.4                 Pending Labs     Order Current Status    Type & Screen Collected (03/20/17 1322)      Allergies as of 3/21/2017     No Known Allergies      Advance Directives     An advance directive is a document which, in the event you are no longer able to make decisions for yourself, tells your healthcare team what kind of treatment you do or do not want to receive, or who you would like to make those decisions for you.  If you do not currently have an advance directive, [facility] encourages you to create one.  For more information call:  [phone]), [phone] ([phone]),  or log on to [email or web address].        Language Assistance Services     ATTENTION: Language assistance services are available, free of charge. Please call [phone].      ATENCIÓN: Si habla español, tiene a asher disposición servicios gratuitos de asistencia lingüística. Llame al [phone].     [facility] Ý: N?u b?n nói Ti?ng Vi?t, có các d?ch v? h? tr? ngôn ng? mi?n phí dành cho b?n. G?i s? [phone].         [facility] complies with applicable Federal civil rights laws and does not discriminate on the basis of race, color, national origin, age, disability, or sex.

## 2017-03-20 NOTE — BRIEF OP NOTE
[facility]  Neurosurgery  Operative Note    SUMMARY      Date of Procedure: 3/20/2017     Procedure: Procedure(s) (LRB):  MICRODISKECTOMY-MINIMALLY INVASIVE LEFT  L4-5 (Left)     Surgeon(s) and Role:     * Kalen Kaba MD - Primary     * Driss Correia DO - Resident - Assisting        Pre-Operative Diagnosis: Lumbar disc herniation with myelopathy [M51.06]    Post-Operative Diagnosis: Post-Op Diagnosis Codes:     * Lumbar disc herniation with myelopathy [M51.06]    Anesthesia: General    Technical Procedures Used: na    Description of the Findings of the Procedure: left L4/5 MIS discectomy    Significant Surgical Tasks Conducted by the Assistant(s), if Applicable: na    Complications: No    Estimated Blood Loss (EBL): * No values recorded between 3/20/2017  3:59 PM and 3/20/2017  5:49 PM *           Specimens:   Specimen     None           Implants: * No implants in log *           Condition: Good    Disposition: PACU - hemodynamically stable.    Attestation: I was present and scrubbed for the entire procedure.

## 2017-03-21 VITALS
SYSTOLIC BLOOD PRESSURE: 114 MMHG | RESPIRATION RATE: 18 BRPM | HEART RATE: 61 BPM | OXYGEN SATURATION: 96 % | TEMPERATURE: 97 F | HEIGHT: 60 IN | WEIGHT: 115 LBS | BODY MASS INDEX: 22.58 KG/M2 | DIASTOLIC BLOOD PRESSURE: 67 MMHG

## 2017-03-21 LAB
ANION GAP SERPL CALC-SCNC: 10 MMOL/L
BASOPHILS # BLD AUTO: 0.01 K/UL
BASOPHILS NFR BLD: 0.2 %
BUN SERPL-MCNC: 11 MG/DL
CALCIUM SERPL-MCNC: 7.9 MG/DL
CHLORIDE SERPL-SCNC: 107 MMOL/L
CO2 SERPL-SCNC: 24 MMOL/L
CREAT SERPL-MCNC: 0.7 MG/DL
DIFFERENTIAL METHOD: ABNORMAL
EOSINOPHIL # BLD AUTO: 0 K/UL
EOSINOPHIL NFR BLD: 0 %
ERYTHROCYTE [DISTWIDTH] IN BLOOD BY AUTOMATED COUNT: 12.9 %
EST. GFR  (AFRICAN AMERICAN): >60 ML/MIN/1.73 M^2
EST. GFR  (NON AFRICAN AMERICAN): >60 ML/MIN/1.73 M^2
GLUCOSE SERPL-MCNC: 98 MG/DL
HCT VFR BLD AUTO: 30.6 %
HGB BLD-MCNC: 10.7 G/DL
LYMPHOCYTES # BLD AUTO: 1 K/UL
LYMPHOCYTES NFR BLD: 15.5 %
MCH RBC QN AUTO: 32.1 PG
MCHC RBC AUTO-ENTMCNC: 35 %
MCV RBC AUTO: 92 FL
MONOCYTES # BLD AUTO: 0.5 K/UL
MONOCYTES NFR BLD: 8 %
NEUTROPHILS # BLD AUTO: 5 K/UL
NEUTROPHILS NFR BLD: 76.1 %
PLATELET # BLD AUTO: 202 K/UL
PMV BLD AUTO: 9.9 FL
POTASSIUM SERPL-SCNC: 4.6 MMOL/L
RBC # BLD AUTO: 3.33 M/UL
SODIUM SERPL-SCNC: 141 MMOL/L
WBC # BLD AUTO: 6.51 K/UL

## 2017-03-21 PROCEDURE — 97161 PT EVAL LOW COMPLEX 20 MIN: CPT

## 2017-03-21 PROCEDURE — 80048 BASIC METABOLIC PNL TOTAL CA: CPT

## 2017-03-21 PROCEDURE — 63600175 PHARM REV CODE 636 W HCPCS: Performed by: STUDENT IN AN ORGANIZED HEALTH CARE EDUCATION/TRAINING PROGRAM

## 2017-03-21 PROCEDURE — 36415 COLL VENOUS BLD VENIPUNCTURE: CPT

## 2017-03-21 PROCEDURE — 99024 POSTOP FOLLOW-UP VISIT: CPT | Mod: ,,, | Performed by: PHYSICIAN ASSISTANT

## 2017-03-21 PROCEDURE — 85025 COMPLETE CBC W/AUTO DIFF WBC: CPT

## 2017-03-21 PROCEDURE — 25000003 PHARM REV CODE 250: Performed by: STUDENT IN AN ORGANIZED HEALTH CARE EDUCATION/TRAINING PROGRAM

## 2017-03-21 PROCEDURE — 97165 OT EVAL LOW COMPLEX 30 MIN: CPT

## 2017-03-21 RX ORDER — ONDANSETRON 8 MG/1
8 TABLET, ORALLY DISINTEGRATING ORAL EVERY 6 HOURS PRN
Qty: 60 TABLET | Refills: 0 | Status: SHIPPED | OUTPATIENT
Start: 2017-03-21 | End: 2017-09-26

## 2017-03-21 RX ORDER — CEFAZOLIN SODIUM 2 G/50ML
2 SOLUTION INTRAVENOUS
Status: COMPLETED | OUTPATIENT
Start: 2017-03-21 | End: 2017-03-21

## 2017-03-21 RX ORDER — HYDROCODONE BITARTRATE AND ACETAMINOPHEN 5; 325 MG/1; MG/1
1 TABLET ORAL EVERY 6 HOURS PRN
Qty: 60 TABLET | Refills: 0 | Status: SHIPPED | OUTPATIENT
Start: 2017-03-21 | End: 2017-09-26

## 2017-03-21 RX ADMIN — ACETAMINOPHEN 1000 MG: 10 INJECTION, SOLUTION INTRAVENOUS at 05:03

## 2017-03-21 RX ADMIN — LEVOTHYROXINE SODIUM 112 MCG: 112 TABLET ORAL at 09:03

## 2017-03-21 RX ADMIN — BUPROPION HYDROCHLORIDE 150 MG: 150 TABLET, FILM COATED, EXTENDED RELEASE ORAL at 09:03

## 2017-03-21 RX ADMIN — TRIAMTERENE AND HYDROCHLOROTHIAZIDE 1 CAPSULE: 25; 37.5 CAPSULE ORAL at 09:03

## 2017-03-21 RX ADMIN — HYDROCODONE BITARTRATE AND ACETAMINOPHEN 1 TABLET: 5; 325 TABLET ORAL at 05:03

## 2017-03-21 RX ADMIN — ACYCLOVIR 400 MG: 200 CAPSULE ORAL at 09:03

## 2017-03-21 RX ADMIN — CEFAZOLIN SODIUM 2 G: 2 SOLUTION INTRAVENOUS at 09:03

## 2017-03-21 RX ADMIN — FLUTICASONE PROPIONATE 2 SPRAY: 50 SPRAY, METERED NASAL at 09:03

## 2017-03-21 RX ADMIN — CETIRIZINE HYDROCHLORIDE 10 MG: 10 TABLET, FILM COATED ORAL at 09:03

## 2017-03-21 RX ADMIN — CEFAZOLIN SODIUM 2 G: 2 SOLUTION INTRAVENOUS at 01:03

## 2017-03-21 RX ADMIN — DICYCLOMINE HYDROCHLORIDE 20 MG: 20 TABLET ORAL at 05:03

## 2017-03-21 RX ADMIN — GABAPENTIN 300 MG: 300 CAPSULE ORAL at 05:03

## 2017-03-21 NOTE — DISCHARGE INSTRUCTIONS
Please follow ONLY the instructions that are checked below.    Activity Restrictions:  [x]  Return to work will be determined on an individual basis.  [x]  No lifting greater than 10 pounds.  [x]  Avoid bending and twisting the area of your surgery more than 45 degrees from neutral position in any direction.  [x]  No driving or operating machinery:  []  until cleared by your surgeon.  [x]  while taking narcotic pain medications or muscle relaxants.  [x]  No cervical collar, soft collar, or lumbar brace required.  []  Wear your brace at all times. You may be given an extra brace or soft collar to wear when showering.  []  Wear your brace at all times except when flat in bed.  []  Wear brace for comfort only.  []  Increase ambulation over the next 2 weeks so that you are walking 2 miles per day at 2 weeks post-operatively.  []  Walk on paved surfaces only. It is okay to walk up and down stairs while holding onto a side rail.  []  No sexual activity for 2-3 weeks.    Discharge Medication/Follow-up:  [x]  Please refer to discharge medication reconciliation form.  [x]  Do not take ANY non-steroidal anti-inflammatory drugs (NSAIDS), including the following: ibuprofen, naprosyn, Aleve, Advil, Indocin, Mobic, or Celebrex for:  [x]  4 weeks  []  8 weeks  []  6 months  [x]  Prescriptions for appropriate medication will be given upon discharge.   [x]  Pain control:             []  Muscle relaxer:            [x]  Take docusate (Colace 100 mg): take one capsule a day as needed for constipation. You can get this over the counter.  [x]  Follow-up appointment:  [x]  10-14 days post-op for wound check by physician assistant/nurse  [x]  4-6 weeks with MD:  []  with x-rays  []  without x-rays  [x]  An appointment will be mailed to you.    Wound Care:  []  Remove dressing or bandaid in    days.  [x]  No bandage required. Keep your incision open to the air.  [x]  You may shower on the 2nd day after your surgery. Have the force of water  hit you opposite from the incision. Pat the incision dry after your shower; do not scrub the incision.  [x]  You cannot take a bath until 8 weeks after surgery.  [x]  Apply bacitracin to incision twice a day for  14  more days.    Call your doctor or go to the Emergency Room for any signs of infection, including: increased redness, drainage, pain, or fever (temperature ?101.5 for 24 hours). Call your doctor or go to the Emergency Room if there are any localized neurological changes; problems with speech, vision, numbness, tingling, weakness, or severe headache; or for other concerns.    Special Instructions:  []  No use of tobacco products.  [x]  Diet: Please eat a regular diet as tolerated.  []  Other diet:              Specific physician instructions:           Physicians need 3 days' notice for pain medicine to be refilled. Pain medicine will only be refilled between 8 AM and 5 PM, Monday through Friday, due to Food and Drug Administration regulation of documentation.    If you have any questions about this form, please call [phone].    Form No. 01821 (Revised 10/31/2013)

## 2017-03-21 NOTE — PT/OT/SLP EVAL
Physical Therapy  Evaluation/Discharge    Angelina Man   MRN: [id number]   Admitting Diagnosis: Lumbar disc herniation    PT Received On: 17  PT Start Time: 08     PT Stop Time: 0843    PT Total Time (min): 23 min       Billable Minutes:  Evaluation 23 min (Co-eval with OT)    Diagnosis: Lumbar disc herniation  S/p MICRODISKECTOMY-MINIMALLY INVASIVE LEFT L4-5 (Left)    Past Medical History:   Diagnosis Date    Arthritis     Bronchitis     Cataract     Dry eyes     Hypothyroidism 2016    Rash     Squamous cell carcinoma     in-situ right upper inner arm, right wrist    Status post lumbar surgery 2016    Stress fracture     Thyroid disease       Past Surgical History:   Procedure Laterality Date    CERVICAL FUSION      COLONOSCOPY         Referring physician: DO Bern  Date referred to PT: 3/20/17    General Precautions: Standard, fall  Orthopedic Precautions: N/A   Braces: N/A       Do you have any cultural, spiritual, Hoahaoism conflicts, given your current situation?: no conflicts    Patient History:  Lives With: alone  Living Arrangements: house  Home Accessibility: stairs within home  Home Layout: Bathroom on 2nd floor, Bedroom on 2nd floor  Number of Stairs Within Home: 12  Transportation Available: family or friend will provide  Living Environment Comment: Pt reported living alone in two story town house with br/ba on 2nd floor. Prior to admit, pt was (I) with all mobility and ADLs, very active. Pt owns no DME.   Equipment Currently Used at Home: none  DME owned (not currently used): none    Previous Level of Function:  Ambulation Skills: independent  Transfer Skills: independent  ADL Skills: independent  Work/Leisure Activity: independent (Works full time as )    Subjective:  Communicated with RN prior to session. Pt agreeable to participate in therapy evaluation.     Chief Complaint: decreased mobility, pain   Patient goals: return to PLOF     Pain Ratin/10    Pain Rating Post-Intervention: 0/10    Objective:   Patient found with: peripheral IV     Cognitive Exam:  Oriented to: Person, Place, Time and Situation    Follows Commands/attention: Follows multistep  commands  Communication: clear/fluent  Safety awareness/insight to disability: intact    Physical Exam:  Postural examination/scapula alignment: No postural abnormalities identified    Skin integrity: Visible skin intact  Edema: None noted BLEs    Sensation:   Intact  light/touch BLEs    Lower Extremity Range of Motion:  Right Lower Extremity: WFL  Left Lower Extremity: WFL    Lower Extremity Strength:  Right Lower Extremity: WFL  Left Lower Extremity: WFL    Gross motor coordination: WFL    Functional Mobility:  Bed Mobility:  Scooting/Bridging: Independent  Supine to Sit: Independent  Sit to Supine: Independent    Transfers:  Sit <> Stand Assistance: Independent (from EOB)  Sit <> Stand Assistive Device: No Assistive Device    Gait:   Gait Distance: Pt ambulated ~150' with no AD, no LOB; no significant gait or balance deficits, mild decrease in gait speed  Assistance 1: Supervision  Gait Assistive Device: No device  Gait Pattern: reciprocal  Gait Deviation(s): decreased raine    Stairs:  N/T     Balance:   Static Sit: GOOD: Takes MODERATE challenges from all directions  Dynamic Sit: GOOD: Maintains balance through MODERATE excursions of active trunk movement  Static Stand: GOOD: Takes MODERATE challenges from all directions  Dynamic stand: GOOD: Needs SUPERVISION only during gait and able to self right with moderate     Therapeutic Activities and Exercises:  Pt educated on role of PT and POC/discharge from therapy as well as safety with mobility. Pt verbalized understanding. Pt expressed no further concerns/questions.       AM-PAC 6 CLICK MOBILITY  How much help from another person does this patient currently need?   1 = Unable, Total/Dependent Assistance  2 = A lot, Maximum/Moderate Assistance  3 = A little,  Minimum/Contact Guard/Supervision  4 = None, Modified [place]/Independent    Turning over in bed (including adjusting bedclothes, sheets and blankets)?: 4  Sitting down on and standing up from a chair with arms (e.g., wheelchair, bedside commode, etc.): 4  Moving from lying on back to sitting on the side of the bed?: 4  Moving to and from a bed to a chair (including a wheelchair)?: 4  Need to walk in hospital room?: 3  Climbing 3-5 steps with a railing?: 3  Total Score: 22     AM-PAC Raw Score CMS G-Code Modifier Level of Impairment Assistance   6 % Total / Unable   7 - 9 CM 80 - 100% Maximal Assist   10 - 14 CL 60 - 80% Moderate Assist   15 - 19 CK 40 - 60% Moderate Assist   20 - 22 CJ 20 - 40% Minimal Assist   23 CI 1-20% SBA / CGA   24 CH 0% Independent/ Mod I     Patient left HOB elevated with all lines intact, call button in reach and RN notified.    Assessment:   Angelina Man is a 67 y.o. female with a medical diagnosis of Lumbar disc herniation, s/p MICRODISKECTOMY -MINIMALLY INVASIVE LEFT L4-5 (Left). Upon evaluation, pt demonstrated high (I) with functional mobility, no significant gait or balance deficits identified. Pt discharged from PT services 2/2 no further acute therapy needs. Recommending pt discharge home with no further therapy or DME needs anticipated.     Rehab identified problem list/impairments: Rehab identified problem list/impairments: impaired endurance, pain    Rehab potential is good.    Activity tolerance: Good    Discharge recommendations: Discharge Facility/Level Of Care Needs: home     Barriers to discharge: Barriers to Discharge: Inaccessible home environment (stairs within home)    Equipment recommendations: Equipment Needed After Discharge: none     GOALS:   Physical Therapy Goals     Not on file      Multidisciplinary Problems (Resolved)        Problem: Physical Therapy Goal    Goal Priority Disciplines Outcome Goal Variances Interventions   Physical Therapy Goal    (Resolved)     PT/OT, PT Outcome(s) achieved               PLAN:    Pt discharged from PT services 2/2 no further acute therapy needs. Recommending pt discharge home with no further therapy or DME needs anticipated.   Plan of Care reviewed with: patient        Ebony Manny, PT, DPT   3/21/2017  Pager: [phone]

## 2017-03-21 NOTE — PLAN OF CARE
PCP: Marli Wilkinson MD  ADDRESS: [address] [facility] [address] / [place] [address]    Payor: [employer]ENEFIT / Plan: [employer] [facility] [employer] / Product Type: Self Funded /     PHARMACY:    [facility] [place] - [address] [facility]  [address] [facility] [address]  Phone: [phone] Fax: [phone]      Future Appointments  Date Time Provider Department Center   4/3/2017 9:00 AM Marli Wilkinson MD [facility]   4/3/2017 10:00 AM RN, NEUROSURGERY [facility]   5/9/2017 9:45 AM Kalen Kaba MD [facility] [place]OS[address] [place] [address]       CM visit with pt to discuss POC and d/c plan. Pt lives alone, dtr to provide transportation home, likely today (dtr has small window today to pick pt up). Pt tolerating PO, minimal pain on left side, anxious to d/c home, CM anticipate early d/c.        03/21/17 1054   Discharge Assessment   Assessment Type Discharge Planning Assessment   Confirmed/corrected address and phone number on facesheet? Yes   Assessment information obtained from? Patient   Expected Length of Stay (days) 2   Communicated expected length of stay with patient/caregiver yes   Prior to hospitilization cognitive status: Alert/Oriented   Prior to hospitalization functional status: Independent   Current cognitive status: Alert/Oriented   Current Functional Status: Independent   Arrived From home or self-care   Lives With alone   Able to Return to Prior Arrangements yes   Is patient able to care for self after discharge? Yes   How many people do you have in your home that can help with your care after discharge? 1   Who are your caregiver(s) and their phone number(s)? Dtr, Sobe[place], [phone]   Patient's perception of discharge disposition home or selfcare   Readmission Within The Last 30 Days no previous admission in last 30 days   Patient currently being followed by outpatient case management? No   Patient  currently receives home health services? No   Does the patient currently use HME? No   Patient currently receives private duty nursing? N/A   Patient currently receives any other outside agency services? No   Equipment Currently Used at Home none   Do you have any problems affording any of your prescribed medications? No   Is the patient taking medications as prescribed? yes   Do you have any financial concerns preventing you from receiving the healthcare you need? No   Does the patient have transportation to healthcare appointments? Yes   Transportation Available family or friend will provide;car   On Dialysis? No   Does the patient receive services at the Coumadin Clinic? No   Are there any open cases? No   Discharge Plan A Home   Discharge Plan B Home with family   Patient/Family In Agreement With Plan yes

## 2017-03-21 NOTE — PLAN OF CARE
Problem: Occupational Therapy Goal  Goal: Occupational Therapy Goal  Outcome: Outcome(s) achieved Date Met:  03/21/17     OT evaluation complete and POC established.  No major deficits noted with ADLs and mobility.  Pt is safe to return home and does not require OT services in the acute care setting at this time.       JUDITH Sosa  3/21/2017

## 2017-03-21 NOTE — PLAN OF CARE
Future Appointments  Date Time Provider Department Center   4/3/2017 9:00 AM Marli Wilkinson MD [facility]   4/3/2017 10:00 AM RN, NEUROSURGERY [facility] NEUROS[address] [place] [address]   5/9/2017 9:45 AM Kalen Kaba MD [facility] NEUROS[address] [place] [address]          03/21/17 1312   Final Note   Assessment Type Final Discharge Note   Discharge Disposition Home   Discharge planning education complete? Yes   What phone number can be called within the next 1-3 days to see how you are doing after discharge? [phone]   Hospital Follow Up  Appt(s) scheduled? Yes   Discharge plans and expectations educations in teach back method with documentation complete? Yes   Offered [facility] -- Bedside Delivery? n/a   Discharge/Hospital Encounter Summary to ([facility]) PCP n/a   Referral to Outpatient Case Management complete? n/a   Referral to / orders for Home Health Complete? n/a   30 day supply of medicines given at discharge, if documented non-compliance / non-adherence? n/a   Any social issues identified prior to discharge? Yes   Did you assess the readiness or willingness of the family or caregiver to support self management of care? Yes

## 2017-03-21 NOTE — PROGRESS NOTES
Progress Note  Neurosurgery    Admit Date: 3/20/2017  Post-operative Day: 1 Day Post-Op  Hospital Day: 2    SUBJECTIVE:     Angelina Man is a 67 y.o. female s/p MIS left L4-5 microdiskectomy POD#1. Doing well post operatively. LLE radiculopathy stable. Ambulating without difficulty. Tolerating diet, voiding appropriately. Would like to dc home.     Follow-up For:  Procedure(s) (LRB):  MICRODISKECTOMY-MINIMALLY INVASIVE LEFT  L4-5 (Left)      Scheduled Meds:   acetaminophen  1,000 mg Intravenous Q8H    acyclovir  400 mg Oral BID    bisacodyl  10 mg Rectal Daily    buPROPion  150 mg Oral Daily    ceFAZolin 2 g/50mL Dextrose IVPB  2 g Intravenous Q8H    cetirizine  10 mg Oral Daily    dicyclomine  20 mg Oral TID    fluticasone  2 spray Each Nare Daily    gabapentin  300 mg Oral TID    levothyroxine  112 mcg Oral Daily    triamterene-hydrochlorothiazide 37.5-25 mg  1 capsule Oral Daily     Continuous Infusions:   sodium chloride 0.9% 10 mL/hr at 03/20/17 1411    sodium chloride 0.9%       PRN Meds:aluminum-magnesium hydroxide-simethicone, clonazePAM, diphenhydrAMINE, hydrocodone-acetaminophen 5-325mg, morphine, ondansetron, promethazine (PHENERGAN) IVPB, senna-docusate 8.6-50 mg    Review of patient's allergies indicates:  No Known Allergies    OBJECTIVE:     Vital Signs (Most Recent)  Temp: 97.6 °F (36.4 °C) (03/21/17 0405)  Pulse: 72 (03/21/17 0405)  Resp: 15 (03/21/17 0405)  BP: (!) 118/56 (03/21/17 0405)  SpO2: 99 % (03/21/17 0405)    Vital Signs Range (Last 24H):  Temp:  [97 °F (36.1 °C)-97.9 °F (36.6 °C)]   Pulse:  [59-81]   Resp:  [10-16]   BP: (118-141)/(56-71)   SpO2:  [98 %-100 %]     I & O (Last 24H):  Intake/Output Summary (Last 24 hours) at 03/21/17 0893  Last data filed at 03/21/17 0405   Gross per 24 hour   Intake             2162 ml   Output               75 ml   Net             2087 ml     Physical Exam:  General: well developed, well nourished, no distress.   Head: normocephalic,  atraumatic  Neurologic: Alert and oriented. Thought content appropriate.  GCS: Motor: 6/Verbal: 5/Eyes: 4 GCS Total: 15  Mental Status: Awake, Alert, Oriented x 4  Language: No aphasia  Speech: No dysarthria  Cranial nerves: face symmetric, tongue midline, CN II-XII grossly intact.   Eyes: pupils equal, round, reactive to light with accomodation, EOMI.  Pulmonary: normal respirations, no signs of respiratory distress  Abdomen: soft, non-distended, not tender to palpation  Sensory: intact to light touch throughout    Motor Strength: Moves all extremities spontaneously with good tone.  Full strength upper and lower extremities. No abnormal movements seen.     Strength  Deltoids Triceps Biceps Wrist Extension Wrist Flexion Hand    Upper: R 5/5 5/5 5/5 5/5 5/5 5/5    L 5/5 5/5 5/5 5/5 5/5 5/5     Iliopsoas Quadriceps Knee  Flexion Tibialis  anterior Gastro- cnemius EHL   Lower: R 5/5 5/5 5/5 5/5 5/5 5/5    L 5/5 5/5 5/5 5/5 5/5 5/5     DTR's: 2 + and symmetric in UE and LE  Pronator Drift: no drift noted  Finger-to-nose: Intact bilaterally  [place]: absent  Clonus: absent  Babinski: absent  Pulses: 2+ and symmetric radial and dorsalis pedis.  Skin: Skin is warm, dry and intact.    Lines/Drains:       Peripheral IV - Single Lumen 12/14/16 1043 Left Antecubital (Active)   Number of days:96            Peripheral IV - Single Lumen 03/20/17 1400 Left Forearm (Active)   Site Assessment Clean;Dry;Intact;No redness;No swelling 3/20/2017  7:30 PM   Line Status Blood return noted;Infusing 3/20/2017  7:30 PM   Dressing Status Clean;Dry;Intact 3/20/2017  7:30 PM   Dressing Intervention New dressing 3/20/2017  7:30 PM   Reason Not Rotated Not due 3/20/2017  7:30 PM   Number of days:0       Wound/Incision:  clean, dry, intact    Laboratory:  CBC:   Recent Labs  Lab 03/21/17  0446   WBC 6.51   RBC 3.33*   HGB 10.7*   HCT 30.6*      MCV 92   MCH 32.1*   MCHC 35.0     BMP:   Recent Labs  Lab 03/21/17  0446   GLU 98       K 4.6      CO2 24   BUN 11   CREATININE 0.7   CALCIUM 7.9*     ASSESSMENT/PLAN:     67 y.o. female s/p MIS left L4-5 microdiskectomy POD#1.    - Neurologically stable. Doing well post operatively.  - Pain control with NORCO or tylenol PRN.  - No brace required.  - Instructions were given verbally and written to patient and family. They voiced understanding. They were instructed to contact the clinic with any questions they might have prior to his follow up appointments.    Dispo: DC home today. F/u in 2 weeks with RN for wound check and in 4-6 weeks with Dr. Kaba.       MITZI RobbinsC  Neurosurgery  Pager: [phone]

## 2017-03-21 NOTE — PLAN OF CARE
Problem: Patient Care Overview  Goal: Plan of Care Review  Outcome: Ongoing (interventions implemented as appropriate)  Pt AAOX4, VSS. Surgical site CDI. No family at bedside. Pt is resting quietly now. She ambulates independently well without difficulty. No s/s infection, skin breakdown/pressure ulcers. Pain managed with PRN medications. IS education complete. SCD's on and functioning properly. Fall precautions maintained. Will resume care.     Problem: Pain, Acute (Adult)  Goal: Identify Related Risk Factors and Signs and Symptoms  Related risk factors and signs and symptoms are identified upon initiation of Human Response Clinical Practice Guideline (CPG)  Outcome: Ongoing (interventions implemented as appropriate)  PRN medication offered/administered

## 2017-03-21 NOTE — ANESTHESIA POSTPROCEDURE EVALUATION
Anesthesia Post Evaluation    Patient: Angelina Man    Procedure(s) Performed: Procedure(s) (LRB):  MICRODISKECTOMY-MINIMALLY INVASIVE LEFT  L4-5 (Left)    Final Anesthesia Type: general  Patient location during evaluation: PACU  Patient participation: Yes- Able to Participate  Level of consciousness: awake and alert and oriented  Post-procedure vital signs: reviewed and stable  Pain management: adequate  Airway patency: patent  PONV status at discharge: No PONV  Anesthetic complications: no      Cardiovascular status: blood pressure returned to baseline  Respiratory status: unassisted and room air  Hydration status: euvolemic  Follow-up not needed.        Visit Vitals    /63    Pulse 71    Temp 36.3 °C (97.3 °F) (Temporal)    Resp 10    Ht 5' (1.524 m)    Wt 52.2 kg (115 lb)    SpO2 98%    Breastfeeding No    BMI 22.46 kg/m2       Pain/Aliya Score: Pain Assessment Performed: Yes (3/20/2017  6:30 PM)  Presence of Pain: complains of pain/discomfort (3/20/2017  6:30 PM)  Pain Rating Prior to Med Admin: 6 (3/20/2017  7:05 PM)  Aliya Score: 10 (3/20/2017  6:30 PM)

## 2017-03-21 NOTE — PT/OT/SLP EVAL
Occupational Therapy  Evaluation/Discharge    Angelina Man   MRN: [id number]   Admitting Diagnosis: Lumbar disc herniation    OT Date of Treatment: 03/21/17   OT Start Time: 0820  OT Stop Time: 0842  OT Total Time (min): 22 min    Billable Minutes:  Evaluation 22  *Completed with PT    Diagnosis: Lumbar disc herniation     Past Medical History:   Diagnosis Date    Arthritis     Bronchitis     Cataract     Dry eyes     Hypothyroidism 6/23/2016    Rash     Squamous cell carcinoma     in-situ right upper inner arm, right wrist    Status post lumbar surgery 6/23/2016    Stress fracture     Thyroid disease       Past Surgical History:   Procedure Laterality Date    CERVICAL FUSION      COLONOSCOPY         Referring physician: Driss Correia, DO  Date referred to OT: 3/21/2017    General Precautions: Standard, fall  Orthopedic Precautions: N/A  Braces: N/A    Do you have any cultural, spiritual, Muslim conflicts, given your current situation?: None reported     Patient History:  Living Environment  Lives With: alone  Living Arrangements: house  Home Accessibility: stairs within home  Home Layout: Bathroom on 2nd floor, Bedroom on 2nd floor  Number of Stairs Within Home: 12  Transportation Available: family or friend will provide, car  Living Environment Comment: Pt lives alone in [address] [place] [address] house with bedroom and bathroom on 2nd floor.  PTA pt was (I) with all ADLs and mobility, and states she was very active working out regularly.  No DME.  Equipment Currently Used at Home: none    Prior level of function:   Bed Mobility/Transfers: independent  Grooming: independent  Bathing: independent  Upper Body Dressing: independent  Lower Body Dressing: independent  Toileting: independent  Home Management Skills: independent  Driving License: Yes  Mode of Transportation: Car  Occupation: Full time employment  Type of Occupation:  for [employer]  Leisure and Hobbies: Being active      Dominant hand: right    Subjective:  Communicated with RN prior to session.    Chief Complaint: None reported  Patient/Family stated goals: Return home and back to work    Pain Ratin/10  Pain Rating Post-Intervention: 0/10    Objective:  Patient found with: peripheral IV    Cognitive Exam:  Oriented to: Person, Place, Time and Situation  Follows Commands/attention: Follows multistep  commands  Communication: clear/fluent  Memory:  No Deficits noted  Safety awareness/insight to disability: intact  Coping skills/emotional control: Appropriate to situation    Visual/perceptual:  Intact    Physical Exam:  Postural examination/scapula alignment: No postural abnormalities identified  Skin integrity: Visible skin intact  Edema: None noted     Sensation:   Intact    Upper Extremity Range of Motion:  Right Upper Extremity: WNL  Left Upper Extremity: WNL    Upper Extremity Strength:  Right Upper Extremity: WNL  Left Upper Extremity: WNL   Strength: 5/5 both hands    Fine motor coordination:   Intact    Gross motor coordination: WFL    Functional Mobility:  Bed Mobility:  Rolling/Turning to Left: Independent  Rolling/Turning Right: Independent  Scooting/Bridging: Independent  Supine to Sit: Independent  Sit to Supine: Independent    Transfers:  Sit <> Stand Assistance: Independent x 1 trial from EOB  Sit <> Stand Assistive Device: No Assistive Device  Toilet Transfer Assistance: Independent  Toilet Transfer Assistive Device: No Assistive Device    Functional Ambulation: Pt walked 150 ft with supervision; no instances of LOB noted.      Activities of Daily Living:    LE Dressing Level of Assistance: Independent for doffing/donning socks while seated EOB    Balance:   Static Sit: NORMAL: No deviations seen in posture held statically  Dynamic Sit: NORMAL: No deviations seen in posture held dynamically  Static Stand: NORMAL: No deviations seen in posture held statically  Dynamic stand: NORMAL: No deviations seen in  "posture held dynamically    Therapeutic Activities and Exercises:  *Pt educated on role of OT/PT and POC discussed  *Spinal precautions reviewed with pt    AM-PAC 6 CLICK ADL  How much help from another person does this patient currently need?  1 = Unable, Total/Dependent Assistance  2 = A lot, Maximum/Moderate Assistance  3 = A little, Minimum/Contact Guard/Supervision  4 = None, Modified [place]/Independent    Putting on and taking off regular lower body clothing? : 4  Bathing (including washing, rinsing, drying)?: 4  Toileting, which includes using toilet, bedpan, or urinal? : 4  Putting on and taking off regular upper body clothing?: 4  Taking care of personal grooming such as brushing teeth?: 4  Eating meals?: 4  Total Score: 24    AM-PAC Raw Score CMS "G-Code Modifier Level of Impairment Assistance   6 % Total / Unable   7 - 9 CM 80 - 100% Maximal Assist   10 - 14 CL 60 - 80% Moderate Assist   15 - 19 CK 40 - 60% Moderate Assist   20 - 22 CJ 20 - 40% Minimal Assist   23 CI 1-20% SBA / CGA   24 CH 0% Independent/ Mod I       Patient left HOB elevated with all lines intact and call button in reach    Assessment:  Angelina Man is a 67 y.o. female with a medical diagnosis of Lumbar disc herniation and presents with some pain with movement in back.  Pt demonstrates strength and ROM in (B) UE needed for ADLs that is WNL, and is able to ambulate 150 ft with supervision.  PTA pt reports being (I) with all ADLs, and maintained active lifestyle.  Pt is safe to return home and does not require OT services in the acute care setting at this time.  Pt to d/c from OT with no further therapy needs recommended upon d/c from acute care.      Rehab identified problem list/impairments: Rehab identified problem list/impairments: impaired endurance, pain    Rehab potential is excellent.    Activity tolerance: Good    Discharge recommendations: Discharge Facility/Level Of Care Needs: home     Barriers to discharge: " Barriers to Discharge: Inaccessible home environment (stairs within home)    Equipment recommendations: none     GOALS:   Occupational Therapy Goals     Not on file      Multidisciplinary Problems (Resolved)        Problem: Occupational Therapy Goal    Goal Priority Disciplines Outcome Interventions   Occupational Therapy Goal   (Resolved)     OT, PT/OT Outcome(s) achieved              PLAN:  Patient does not require OT services in the acute care setting.  Pt to d/c from OT with no further therapy needs recommended upon d/c.    Plan of Care reviewed with: patient     JUDITH Sosa  03/21/2017

## 2017-03-21 NOTE — ANESTHESIA RELEASE NOTE
Anesthesia Release from PACU Note    Patient: Angelina Man    Procedure(s) Performed: Procedure(s) (LRB):  MICRODISKECTOMY-MINIMALLY INVASIVE LEFT  L4-5 (Left)    Anesthesia type: general    Post pain: Adequate analgesia    Post assessment: no apparent anesthetic complications, tolerated procedure well and no evidence of recall    Last Vitals:   Visit Vitals    /63    Pulse 71    Temp 36.3 °C (97.3 °F) (Temporal)    Resp 10    Ht 5' (1.524 m)    Wt 52.2 kg (115 lb)    SpO2 98%    Breastfeeding No    BMI 22.46 kg/m2       Post vital signs: stable    Level of consciousness: awake, alert  and oriented    Nausea/Vomiting: no nausea/no vomiting    Complications: none    Airway Patency: patent    Respiratory: unassisted, spontaneous ventilation, room air    Cardiovascular: stable and blood pressure at baseline    Hydration: euvolemic

## 2017-03-21 NOTE — PLAN OF CARE
Problem: Physical Therapy Goal  Goal: Physical Therapy Goal  Outcome: Outcome(s) achieved Date Met:  03/21/17  Pt chart reviewed and PT evaluation completed- see note for details. Pt demonstrated high (I) with functional mobility this date, no significant gait or balance deficits identified. Pt discharged from PT services 2/2 no further acute therapy needs. Recommending pt discharge home with no further therapy or DME needs anticipated.      Ebony Bryant, PT, DPT   3/21/2017  Pager: [phone]

## 2017-03-21 NOTE — PROGRESS NOTES
Certification of Assistant at Surgery       Surgery Date: 3/20/2017     Participating Surgeons:  Surgeon(s) and Role:     * Kalen Kaba MD - Primary     * Ladi Daniels MD - Assisting    Procedures:  Procedure(s) (LRB):  MICRODISKECTOMY-MINIMALLY INVASIVE LEFT  L4-5 (Left)    Assistant Surgeon's Certification of Necessity:  I understand that section 1842 (b) (6) (d) of the Social Security Act generally prohibits Medicare Part B reasonable charge payment for the services of assistants at surgery in teaching hospitals when qualified residents are available to furnish such services. I certify that the services for which payment is claimed were medically necessary, and that no qualified resident was available to perform the services. I further understand that these services are subject to post-payment review by the Medicare carrier.      Ladi Daniels MD    03/21/2017  4:33 PM

## 2017-03-21 NOTE — NURSING TRANSFER
Nursing Transfer Note      3/20/2017     Transfer To: 527[facility] from pacu    Transfer via stretcher    Transfer with none    Transported by pacu pct    Medicines sent: none    Chart send with patient: Yes    Notified: daughter    Patient reassessed at: 1830 3-

## 2017-03-22 NOTE — OP NOTE
DATE OF PROCEDURE:  03/20/2017    PREOPERATIVE DIAGNOSIS:  Recurrent L4-L5 herniated nucleus pulposus with L4-L5   spondylolisthesis.    POSTOPERATIVE DIAGNOSIS:  Recurrent L4-L5 herniated nucleus pulposus with L4-L5   spondylolisthesis.    OPERATIVE PROCEDURE UNDERGONE:  Redo L4-L5 hemilaminectomy, medial facetectomy   and microdiskectomy with microsurgical technique.    SURGEON:  Kalen Kaba M.D.    ASSISTANT:  Ladi Daniels M.D.  No resident physician was available to assist in   the operation.    ANESTHESIA:  General.    INDICATIONS FOR PROCEDURE:  This is a 67-year-old female who had a previous   L4-L5 diskectomy for right-sided radiculopathy in the past and now developed   left-sided radiculopathy, was found to have recurrent disk at L4-L5 with   spondylolisthesis.  We discussed the possibility of a TLIF, but the patient is   very hesitant for fusion and wanted to try a redo diskectomy.  The patient   understands that this increases her chance for instability and worsening of the   slippage.  She also had failed conservative management.    OPERATIVE NOTE:  The patient was taken to the Operating Room, anesthetized and   intubated by Anesthesia.  Preop antibiotics were administered.  The patient was   flipped on to prone position on a Andrés frame.  Back was prepped and draped in   sterile fashion.  Fluoroscopy was used to localize the appropriate level.  I   made a paramedian skin incision at the same levels of the previous incision,   just more asymmetric to the left.  We dissected the fascia.  We used to the   METRx tube system, dilated up to a 22 mm #4 tube, parked over the L4-L5 disk   space.  Microscope was brought in the field.  Under microsurgical technique, the   soft tissue and some of the scar tissue was removed.  We parked on the lamina   on the contralateral side.  I used a high-speed drill to perform a   hemilaminectomy and medial facetectomy on the left side, went through some of   the scarring from  the previous operation at that level in the epidural space.    After removing the scar, we were able to finally retract the thecal sac   medially, got into a calcified disk fragment that had extended cranially,   removed from underneath the axilla of the nerve root and then proceeded into the   disk space, performed microdiskectomy, went out laterally and performed a   foraminotomy, unroofed the nerve root as it went down and out.  Once we were   happy with the redo microdiskectomy and decompression, we obtained hemostasis   with FloSeal.  We then placed some FloSeal and then some Depo-Medrol, removed   the METRx tube, closed the wound in layers.  Sterile dressing was put in place.    The patient was extubated and brought to Recovery Room, without any problems or   complications.  EBL was minimal.      MAYELA/GAYATRI  dd: 03/22/2017 08:24:32 (CDT)  td: 03/22/2017 10:02:39 (CDT)  Doc ID   #[id number]  Job ID #[id number]    CC:

## 2017-03-23 ENCOUNTER — TELEPHONE (OUTPATIENT)
Dept: NEUROSURGERY | Facility: CLINIC | Age: 67
End: 2017-03-23

## 2017-03-23 RX ORDER — METHYLPREDNISOLONE 4 MG/1
TABLET ORAL
Qty: 1 PACKAGE | Refills: 0 | Status: SHIPPED | OUTPATIENT
Start: 2017-03-23 | End: 2017-04-03 | Stop reason: ALTCHOICE

## 2017-03-23 NOTE — TELEPHONE ENCOUNTER
Patient reports bilateral leg pain worse than prior to surgery. Denies any new numbness, weakness, or bowel/bladder incontinence. Reports she is taking Tylenol PRN and Gabapentin as prescribed. Advised patient, per Melody Jacome PA-C, Medrol dosepack called into pharmacy on file. Continue to take Gabapentin as prescribed. RN will call patient on Monday to follow-up. Understanding verbalized.

## 2017-03-27 ENCOUNTER — TELEPHONE (OUTPATIENT)
Dept: NEUROSURGERY | Facility: CLINIC | Age: 67
End: 2017-03-27

## 2017-03-27 NOTE — TELEPHONE ENCOUNTER
Spoke to patient to follow-up after starting Medrol dose pack on Thursday 3/23/17. Patient states she initially had some relief in the beginning of the dose pack, but now that she is tapering off the pain is returning. Reports the pain is in her leg left and extends past her knee to her ankle. Denies any new numbness or weakness. States she is taking her Gabapentin 300mg BID. She is not taking it as prescribed TID. Advised patient, per Melody Jacome PA-C, take the Gabapentin 3 times/day. Understanding verbalized. Encouraged to call clinic with any questions or concerns.

## 2017-03-31 ENCOUNTER — TELEPHONE (OUTPATIENT)
Dept: NEUROSURGERY | Facility: CLINIC | Age: 67
End: 2017-03-31

## 2017-03-31 NOTE — TELEPHONE ENCOUNTER
----- Message from Shira Barnett sent at 3/31/2017  8:50 AM CDT -----  Contact: pt [phone]  Pt is calling to speak with nurse regarding her pain level.pls call

## 2017-03-31 NOTE — TELEPHONE ENCOUNTER
----- Message from Zuleika Quinonez RN sent at 3/31/2017 11:46 AM CDT -----  Contact: Patient work [phone] cell  [phone]      ----- Message -----     From: Loly Valentine     Sent: 3/31/2017  11:41 AM       To: Sendy JACOBSON Staff    Patient is returning Zuleika's call.

## 2017-03-31 NOTE — TELEPHONE ENCOUNTER
"Patient reports she increased the Gabapentin to TID as instructed, but the pain in her left leg still has not gotten any better. States she thinks the pain is worse than prior to the surgery. Patient states "she thinks something is wrong". States she knows nothing can be done immediately, but wanted to give an update prior to her appointment on Monday.  "

## 2017-04-03 ENCOUNTER — CLINICAL SUPPORT (OUTPATIENT)
Dept: NEUROSURGERY | Facility: CLINIC | Age: 67
End: 2017-04-03
Payer: COMMERCIAL

## 2017-04-03 VITALS
HEART RATE: 73 BPM | SYSTOLIC BLOOD PRESSURE: 110 MMHG | WEIGHT: 104.31 LBS | HEIGHT: 60 IN | DIASTOLIC BLOOD PRESSURE: 76 MMHG | TEMPERATURE: 98 F | BODY MASS INDEX: 20.48 KG/M2

## 2017-04-03 PROCEDURE — 99999 PR PBB SHADOW E&M-EST. PATIENT-LVL IV: CPT | Mod: PBBFAC,,,

## 2017-04-03 RX ORDER — GABAPENTIN 300 MG/1
300 CAPSULE ORAL 3 TIMES DAILY
Qty: 90 CAPSULE | Refills: 3 | Status: SHIPPED | OUTPATIENT
Start: 2017-04-03 | End: 2017-09-05 | Stop reason: SINTOL

## 2017-04-03 NOTE — PROGRESS NOTES
Patient seen 2 weeks post op L4-5 microdiskectomy on 3/20/17 with Dr. Kaba. Incision to lower back assessed. Dissolvable sutures intact. Edges well approximated. No erythema, swelling, or drainage noted. Instructed patient to keep incision open to air, discontinue Bacitracin, no scrubbing incision, no submerging incision in tub bath/pool for 6 more weeks, and pat to dry. Patient denies any new numbness or weakness. No bowel/bladder incontinence. Reports preoperative left leg pain still present. States the pain extends down to her ankle. Patient reports she did a lot of activity this weekend and has been walking on the treadmill. Patient reports she has not been as cautious this time. Had long discussion with patient regarding too much activity and not enough activity. Discharge instructions reviewed including no lifting greater than 10 pounds, avoid bending or twisting of the back, no driving while taking narcotic pain medication/muscle relaxants, and no NSAID's for 6 more weeks. Patient verbalized understanding of all instructions given. 6 week post op appointment reviewed and provided in print. Encouraged to call clinic with any questions or concerns. Will call in refill for Gabapentin 300mg to [facility] per patient request.

## 2017-04-04 NOTE — DISCHARGE SUMMARY
[facility]  Discharge Summary  Neurosurgery    Admit Date: 3/20/2017    Discharge Date and Time:  04/04/2017 5:50 PM    Attending Physician: No att. providers found     Discharge Physician: Kalen Kaba    Reason for Admission: L4/5 discectomy    Procedures Performed: Procedure(s) (LRB):  MICRODISKECTOMY-MINIMALLY INVASIVE LEFT  L4-5 (Left)    Hospital Course: pt with hx of LLE radiculopathy presented for elective MIS left L4/5 discectomy.  Pt tolerated the procedure well and there were no complications.  Pt will recover in PACU and then is stable for dc home to fu in clinic.    Consults: none    Significant Diagnostic Studies: none    Final Diagnoses: Lumbar disc herniation      Discharged Condition: good   Principal Problem: Lumbar disc herniation   Secondary Diagnoses:   Active Hospital Problems    Diagnosis  POA    *Lumbar disc herniation [M51.26]  Yes      Resolved Hospital Problems    Diagnosis Date Resolved POA   No resolved problems to display.       Disposition: Home or Self Care     Diet: normal    Follow Up/Patient Instructions:   Pt will fu in clinic in 2 wks for wound check.  Medications:  Reconciled Home Medications:   Discharge Medication List as of 3/21/2017 11:21 AM      START taking these medications    Details   hydrocodone-acetaminophen 5-325mg (NORCO) 5-325 mg per tablet Take 1 tablet by mouth every 6 (six) hours as needed (pain 4-5/10)., Starting 3/21/2017, Until Discontinued, Print      ondansetron (ZOFRAN-ODT) 8 MG TbDL Take 1 tablet (8 mg total) by mouth every 6 (six) hours as needed., Starting 3/21/2017, Until Discontinued, Print         CONTINUE these medications which have NOT CHANGED    Details   acyclovir (ZOVIRAX) 400 MG tablet Take 1 tablet (400 mg total) by mouth 2 (two) times daily., Starting 9/20/2016, Until Wed 9/20/17, Print      ascorbic acid (VITAMIN C) 1000 MG tablet Take 1,000 mg by mouth once daily. , Until Discontinued, Historical Med      b complex  vitamins (VITAMIN B COMPLEX) tablet Take 1 tablet by mouth daily as needed. , Until Discontinued, Historical Med      biotin 1 mg tablet Take 1 mg by mouth once daily. , Until Discontinued, Historical Med      buPROPion (WELLBUTRIN XL) 150 MG TB24 tablet TAKE ONE TABLET BY MOUTH ONCE DAILY, Normal      calcium-vitamin D 500 mg(1,250mg) -200 unit per tablet Take 1 tablet by mouth once daily. , Until Discontinued, Historical Med      chondroitin sulfate-vit C-Mn (CHONDROITIN SULFATE COMPLEX) 400-60-2.5 mg Cap Take 1 tablet by mouth once daily. , Until Discontinued, Historical Med      clonazePAM (KLONOPIN) 1 MG tablet Take 1 tablet (1 mg total) by mouth nightly as needed., Starting 9/20/2016, Until Wed 9/20/17, Print      dicyclomine (BENTYL) 20 mg tablet Take 1 tablet (20 mg total) by mouth 3 (three) times daily., Starting 9/20/2016, Until Discontinued, Print      digestive enzymes Tab Take 1 tablet by mouth once daily. , Until Discontinued, Historical Med      diphenhydrAMINE (BENADRYL) 25 mg capsule Take 25 mg by mouth nightly as needed. , Until Discontinued, Historical Med      fluticasone (FLONASE) 50 mcg/actuation nasal spray Spray once in each nostril twice daily as needed for rhinitis., Starting 9/20/2016, Until Discontinued, Print      ginseng 200 mg Cap Take 200 mg by mouth 2 (two) times daily as needed. , Until Discontinued, Historical Med      glucosamine sulfate 2KCl 1,000 mg Tab Take 1 tablet by mouth once daily. , Until Discontinued, Historical Med      levothyroxine (SYNTHROID) 112 MCG tablet Take 1 tablet (112 mcg total) by mouth once daily., Starting 10/12/2016, Until Thu 10/12/17, Normal      multivitamin (THERAGRAN) per tablet Take 1 tablet by mouth once daily. , Until Discontinued, Historical Med      tretinoin (RETIN-A) 0.1 % cream Apply topically every evening., Starting 9/20/2016, Until Discontinued, Print      triamterene-hydrochlorothiazide 37.5-25 mg (MAXZIDE-25) 37.5-25 mg per tablet Take 1  tablet by mouth once daily., Starting 9/20/2016, Until Wed 9/20/17, Print      zinc 50 mg Tab Take 50 mg by mouth once daily. , Until Discontinued, Historical Med      gabapentin (NEURONTIN) 300 MG capsule Take 1 capsule (300 mg total) by mouth 3 (three) times daily., Starting 10/5/2016, Until Thu 10/5/17, Normal      CYANOCOBALAMIN/FOLIC ACID (VITAMIN B12-FOLIC ACID ORAL) Take by mouth. , Until Discontinued, Historical Med      fexofenadine (ALLEGRA) 180 MG tablet Take 1 tablet (180 mg total) by mouth once daily., Starting 1/22/2015, Until Discontinued, Print             Discharge Procedure Orders  Diet general     Activity as tolerated     Call MD for:  persistent nausea and vomiting or diarrhea     Call MD for:  severe uncontrolled pain     Call MD for:  redness, tenderness, or signs of infection (pain, swelling, redness, odor or green/yellow discharge around incision site)     Call MD for:  difficulty breathing or increased cough     Call MD for:  severe persistent headache     Call MD for:  worsening rash     Call MD for:  persistent dizziness, light-headedness, or visual disturbances     Call MD for:  increased confusion or weakness     Remove dressing in 24 hours   Order Comments: Pt may remove dressing tomorrow and may shower tomorrow.  Pt shouldn't soak wound.  Please pat wound dry and don't rub it.  Please don't apply gels, creams, ointments to wound.       Follow-up Information     Follow up with Kalen Kaba MD In 2 weeks.    Specialty:  Neurosurgery    Why:  For wound re-check    Contact information:    [facility] [address]  [facility] [id number] [phone]

## 2017-04-05 ENCOUNTER — TELEPHONE (OUTPATIENT)
Dept: FAMILY MEDICINE | Facility: CLINIC | Age: 67
End: 2017-04-05

## 2017-04-05 ENCOUNTER — OFFICE VISIT (OUTPATIENT)
Dept: FAMILY MEDICINE | Facility: CLINIC | Age: 67
End: 2017-04-05
Payer: COMMERCIAL

## 2017-04-05 VITALS
TEMPERATURE: 98 F | WEIGHT: 104.94 LBS | SYSTOLIC BLOOD PRESSURE: 92 MMHG | DIASTOLIC BLOOD PRESSURE: 62 MMHG | HEART RATE: 68 BPM | BODY MASS INDEX: 17.92 KG/M2 | HEIGHT: 64 IN

## 2017-04-05 DIAGNOSIS — N94.89 PELVIC CONGESTION: ICD-10-CM

## 2017-04-05 DIAGNOSIS — F41.9 ANXIETY: ICD-10-CM

## 2017-04-05 DIAGNOSIS — M54.10 RADICULOPATHY, UNSPECIFIED SPINAL REGION: Primary | ICD-10-CM

## 2017-04-05 PROCEDURE — 99214 OFFICE O/P EST MOD 30 MIN: CPT | Mod: S$GLB,,, | Performed by: FAMILY MEDICINE

## 2017-04-05 PROCEDURE — 99999 PR PBB SHADOW E&M-EST. PATIENT-LVL V: CPT | Mod: PBBFAC,,, | Performed by: FAMILY MEDICINE

## 2017-04-05 RX ORDER — CLONAZEPAM 1 MG/1
1 TABLET ORAL NIGHTLY PRN
Qty: 30 TABLET | Refills: 5 | Status: SHIPPED | OUTPATIENT
Start: 2017-04-05 | End: 2017-09-26 | Stop reason: SDUPTHER

## 2017-04-05 RX ORDER — GABAPENTIN 400 MG/1
400 CAPSULE ORAL 2 TIMES DAILY PRN
Qty: 60 CAPSULE | Refills: 11 | Status: SHIPPED | OUTPATIENT
Start: 2017-04-05 | End: 2017-09-05

## 2017-04-05 NOTE — TELEPHONE ENCOUNTER
Spoke with patient advised her of your message below to alternate the 300mg and 400mg of gabapentin according to the severity of the  Pain.  Patient verbalizes understanding.

## 2017-04-05 NOTE — TELEPHONE ENCOUNTER
----- Message from Mague Dangelo sent at 4/5/2017  8:56 AM CDT -----  Contact: Angelina [phone] (w) / [phone] (c)  Pt would like some clarification on gabapentin (NEURONTIN) dosage. Was taking 300mg 3xday, changed rx today to 400mg 2xday.    What dosage should she be taking and how frequently?    Pt can be reached at work number or you may leave a message on her cell.    Thanks!

## 2017-04-05 NOTE — MR AVS SNAPSHOT
[facility]  [address] [facility], [address]  [facility] [address]  Phone: [phone]  Fax: [phone]                  Angelina Man   2017 8:00 AM   Office Visit    Description:  Female : 1950   Provider:  Marli Wilkinson MD   Department:  [facility]           Reason for Visit     Medication Refill     Leg Pain           Diagnoses this Visit        Comments    Radiculopathy, unspecified spinal region    -  Primary     Anxiety         Pelvic congestion                To Do List           Future Appointments        Provider Department Dept Phone    2017 9:45 AM Kalen Kaba MD [facility] - Neurosurgery [facility] [phone]      Goals (5 Years of Data)     None       These Medications        Disp Refills Start End    gabapentin (NEURONTIN) 400 MG capsule 60 capsule 11 2017    Take 1 capsule (400 mg total) by mouth 2 (two) times daily as needed. - Oral    Pharmacy: [facility] - [place], [place] - [address] Ph #: [phone]       clonazePAM (KLONOPIN) 1 MG tablet 30 tablet 5 2017    Take 1 tablet (1 mg total) by mouth nightly as needed. - Oral    Pharmacy: [facility] - [place], [place] - [address] Ph #: [phone]         [facility]     [facility] Nurse Care Line -  Assistance  Unless otherwise directed by your provider, please contact [facility], our nurse care line that is available for  assistance.     Registered nurses in the [facility] provide: appointment scheduling, clinical advisement, health education, and other advisory services.  Call: [phone] (toll free)               Medications           Message regarding Medications     Verify the changes and/or additions to your medication regime listed below are the same as discussed with your clinician today.  If any of these changes or additions  are incorrect, please notify your healthcare provider.        START taking these NEW medications        Refills    gabapentin (NEURONTIN) 400 MG capsule 11    Sig: Take 1 capsule (400 mg total) by mouth 2 (two) times daily as needed.    Class: Normal    Route: Oral           Verify that the below list of medications is an accurate representation of the medications you are currently taking.  If none reported, the list may be blank. If incorrect, please contact your healthcare provider. Carry this list with you in case of emergency.           Current Medications     acyclovir (ZOVIRAX) 400 MG tablet Take 1 tablet (400 mg total) by mouth 2 (two) times daily.    ascorbic acid (VITAMIN C) 1000 MG tablet Take 1,000 mg by mouth once daily.     b complex vitamins (VITAMIN B COMPLEX) tablet Take 1 tablet by mouth daily as needed.     biotin 1 mg tablet Take 1 mg by mouth once daily.     buPROPion (WELLBUTRIN XL) 150 MG TB24 tablet TAKE ONE TABLET BY MOUTH ONCE DAILY    calcium-vitamin D 500 mg(1,250mg) -200 unit per tablet Take 1 tablet by mouth once daily.     chondroitin sulfate-vit C-Mn (CHONDROITIN SULFATE COMPLEX) 400-60-2.5 mg Cap Take 1 tablet by mouth once daily.     clonazePAM (KLONOPIN) 1 MG tablet Take 1 tablet (1 mg total) by mouth nightly as needed.    CYANOCOBALAMIN/FOLIC ACID (VITAMIN B12-FOLIC ACID ORAL) Take by mouth.     dicyclomine (BENTYL) 20 mg tablet Take 1 tablet (20 mg total) by mouth 3 (three) times daily.    digestive enzymes Tab Take 1 tablet by mouth once daily.     diphenhydrAMINE (BENADRYL) 25 mg capsule Take 25 mg by mouth nightly as needed.     fexofenadine (ALLEGRA) 180 MG tablet Take 1 tablet (180 mg total) by mouth once daily.    fluticasone (FLONASE) 50 mcg/actuation nasal spray Spray once in each nostril twice daily as needed for rhinitis.    gabapentin (NEURONTIN) 300 MG capsule Take 1 capsule (300 mg total) by mouth 3 (three) times daily.    ginseng 200 mg Cap Take 200 mg by mouth 2  "(two) times daily as needed.     glucosamine sulfate 2KCl 1,000 mg Tab Take 1 tablet by mouth once daily.     hydrocodone-acetaminophen 5-325mg (NORCO) 5-325 mg per tablet Take 1 tablet by mouth every 6 (six) hours as needed (pain 4-5/10).    levothyroxine (SYNTHROID) 112 MCG tablet Take 1 tablet (112 mcg total) by mouth once daily.    multivitamin (THERAGRAN) per tablet Take 1 tablet by mouth once daily.     ondansetron (ZOFRAN-ODT) 8 MG TbDL Take 1 tablet (8 mg total) by mouth every 6 (six) hours as needed.    tretinoin (RETIN-A) 0.1 % cream Apply topically every evening.    triamterene-hydrochlorothiazide 37.5-25 mg (MAXZIDE-25) 37.5-25 mg per tablet Take 1 tablet by mouth once daily.    zinc 50 mg Tab Take 50 mg by mouth once daily.     gabapentin (NEURONTIN) 400 MG capsule Take 1 capsule (400 mg total) by mouth 2 (two) times daily as needed.           Clinical Reference Information           Your Vitals Were     BP Pulse Temp Height Weight BMI    92/62 (BP Location: Right arm) 68 98 °F (36.7 °C) 5' 4" (1.626 m) 47.6 kg (104 lb 15 oz) 18.01 kg/m2      Blood Pressure          Most Recent Value    BP  92/62      Allergies as of 4/5/2017     No Known Allergies      Immunizations Administered on Date of Encounter - 4/5/2017     None      Orders Placed During Today's Visit     Future Labs/Procedures Expected by Expires    US Pelvis Complete Non OB  4/5/2017 4/5/2018      Language Assistance Services     ATTENTION: Language assistance services are available, free of charge. Please call [phone].      ATENCIÓN: Si chrisla bari, tiene a asher disposición servicios gratuitos de asistencia lingüística. Llame al [phone].     DUANE Ý: N?u b?n nói Ti?ng Vi?t, có các d?ch v? h? tr? ngôn ng? mi?n phí dành cho b?n. G?i s? [phone].         [facility] complies with applicable Federal civil rights laws and does not discriminate on the basis of race, color, national origin, age, disability, or sex.   "

## 2017-04-05 NOTE — TELEPHONE ENCOUNTER
----- Message from Jennifer Hernandez sent at 4/5/2017 11:59 AM CDT -----  Contact: self/[phone] cell/ office [phone]  Return call.

## 2017-04-06 NOTE — PROGRESS NOTES
Subjective:       Patient ID: Angelina Man is a 67 y.o. female.    Chief Complaint: Medication Refill (KLONOPIN) and Leg Pain (LEFT, POST SURGERY)   patient recently had a echo discectomy and is now on more pain than she was before surgery  Patient was placed on gabapentin 300 mg 3 times a day.  Would like to know if there is anything else that can help her  Patient also having a problem with bloating in the pelvis after eating.  Patient has a normal colonoscopy recently  HPI see above  Review of Systems   Constitutional: Negative.    HENT: Negative.    Eyes: Negative.    Respiratory: Negative.    Cardiovascular: Negative.    Gastrointestinal: Positive for abdominal distention.   Endocrine: Negative.    Genitourinary: Positive for pelvic pain.   Musculoskeletal: Positive for back pain.   Skin: Negative.    Allergic/Immunologic: Negative.    Neurological: Positive for numbness.   Psychiatric/Behavioral: Positive for sleep disturbance. The patient is nervous/anxious.        Objective:      Physical Exam   Constitutional: She is oriented to person, place, and time. She appears well-developed and well-nourished. No distress.   HENT:   Head: Normocephalic and atraumatic.   Right Ear: External ear normal.   Left Ear: External ear normal.   Nose: Nose normal.   Mouth/Throat: Oropharynx is clear and moist.   Eyes: Conjunctivae and EOM are normal. Pupils are equal, round, and reactive to light.   Neck: Neck supple. No JVD present. No thyromegaly present.   Cardiovascular: Normal rate, regular rhythm, normal heart sounds and intact distal pulses.  Exam reveals no friction rub.    No murmur heard.  Pulmonary/Chest: Effort normal and breath sounds normal.   Abdominal: Soft. Bowel sounds are normal. She exhibits no distension.   Musculoskeletal:        Lumbar back: She exhibits decreased range of motion, tenderness and spasm.   Neurological: She is alert and oriented to person, place, and time. She has normal reflexes. She  displays normal reflexes. No cranial nerve deficit. She exhibits normal muscle tone. Coordination normal.   Skin: Skin is warm and dry. No rash noted. She is not diaphoretic. No erythema. No pallor.   Psychiatric: She has a normal mood and affect. Her behavior is normal. Judgment and thought content normal.   Nursing note and vitals reviewed.      Assessment:       1. Radiculopathy, unspecified spinal region    2. Anxiety    3. Pelvic congestion        Plan:        US Pelvis Complete Non OB  4/5/2017 4/5/2018   We'll contact the patient results of testing are available.  Refer to the med card dated 4/5/2017      gabapentin (NEURONTIN) 400 MG capsule 60 capsule 11 4/5/2017 4/5/2018      Take 1 capsule (400 mg total) by mouth 2 (two) times daily as needed. - Oral     Pharmacy: [facility] - [place], [place] - [address] Ph #: [phone]        clonazePAM (KLONOPIN) 1 MG tablet 30 tablet 5 4/5/2017 4/5/2018     Take 1 tablet (1 mg total) by mouth nightly as needed. - Oral     Pharmacy: [facility] - [place], [place] - [address] Ph #: [phone]

## 2017-04-12 ENCOUNTER — TELEPHONE (OUTPATIENT)
Dept: NEUROSURGERY | Facility: CLINIC | Age: 67
End: 2017-04-12

## 2017-04-12 NOTE — TELEPHONE ENCOUNTER
I contacted the pt regarding recent changes in health. I LM on the  pt  cell VM requesting a call back. I also attempted to contact the pt on the work line with not success. Should the pt call back please scheduled with PA next avail.

## 2017-04-12 NOTE — TELEPHONE ENCOUNTER
----- Message from Sihra Barnett sent at 4/12/2017 12:46 PM CDT -----  Contact: pt [phone] or [phone]  Pt states she can't stand on her leg and would like to speak with nurse.pls call

## 2017-04-17 ENCOUNTER — HOSPITAL ENCOUNTER (OUTPATIENT)
Dept: RADIOLOGY | Facility: HOSPITAL | Age: 67
Discharge: HOME | End: 2017-04-17
Attending: FAMILY MEDICINE
Payer: COMMERCIAL

## 2017-04-17 DIAGNOSIS — N94.89 PELVIC CONGESTION: ICD-10-CM

## 2017-04-17 PROCEDURE — 76856 US EXAM PELVIC COMPLETE: CPT | Mod: TC

## 2017-04-17 PROCEDURE — 76830 TRANSVAGINAL US NON-OB: CPT | Mod: 26,,, | Performed by: RADIOLOGY

## 2017-04-17 PROCEDURE — 76856 US EXAM PELVIC COMPLETE: CPT | Mod: 26,,, | Performed by: RADIOLOGY

## 2017-04-18 ENCOUNTER — OFFICE VISIT (OUTPATIENT)
Dept: NEUROSURGERY | Facility: CLINIC | Age: 67
End: 2017-04-18
Payer: COMMERCIAL

## 2017-04-18 VITALS
HEIGHT: 64 IN | DIASTOLIC BLOOD PRESSURE: 73 MMHG | WEIGHT: 104.88 LBS | HEART RATE: 56 BPM | BODY MASS INDEX: 17.9 KG/M2 | SYSTOLIC BLOOD PRESSURE: 114 MMHG | TEMPERATURE: 98 F

## 2017-04-18 DIAGNOSIS — M62.838 MUSCLE SPASM OF LEFT LOWER EXTREMITY: ICD-10-CM

## 2017-04-18 DIAGNOSIS — M54.16 RADICULOPATHY OF LUMBAR REGION: ICD-10-CM

## 2017-04-18 DIAGNOSIS — M51.26 LUMBAR DISC HERNIATION: ICD-10-CM

## 2017-04-18 DIAGNOSIS — M51.36 DDD (DEGENERATIVE DISC DISEASE), LUMBAR: Primary | ICD-10-CM

## 2017-04-18 DIAGNOSIS — Z98.890 STATUS POST LUMBAR SURGERY: ICD-10-CM

## 2017-04-18 DIAGNOSIS — M48.061 LUMBAR STENOSIS: ICD-10-CM

## 2017-04-18 PROCEDURE — 99024 POSTOP FOLLOW-UP VISIT: CPT | Mod: S$GLB,,, | Performed by: PHYSICIAN ASSISTANT

## 2017-04-18 PROCEDURE — 99999 PR PBB SHADOW E&M-EST. PATIENT-LVL V: CPT | Mod: PBBFAC,,, | Performed by: PHYSICIAN ASSISTANT

## 2017-04-18 RX ORDER — METHYLPREDNISOLONE 4 MG/1
TABLET ORAL
Qty: 1 PACKAGE | Refills: 0 | Status: SHIPPED | OUTPATIENT
Start: 2017-04-18 | End: 2017-05-09

## 2017-04-18 NOTE — LETTER
April 18, 2017      Marli Wilkinson MD  [address] [facility]  [address]  [facility] [address]           [facility] 7th Fl  [address]  [facility] [address]  Phone: [phone]          Patient: Angelina Man   MR Number: [id number]   YOB: 1950   Date of Visit: 4/18/2017       Dear Dr. Marli Wilkinson:    Thank you for referring Angelina Man to me for evaluation. Attached you will find relevant portions of my assessment and plan of care.    If you have questions, please do not hesitate to call me. I look forward to following Angelina Man along with you.    Sincerely,    Werner Farnsworth PA-C    Enclosure  CC:  No Recipients    If you would like to receive this communication electronically, please contact [email or web address] or [phone] to request more information on [employer] Link access.    For providers and/or their staff who would like to refer a patient to [facility], please contact us through our one-stop-shop provider referral line, [facility], at [phone].    If you feel you have received this communication in error or would no longer like to receive these types of communications, please e-mail [email or web address]

## 2017-04-18 NOTE — PROGRESS NOTES
[facility] Fl  Established Patient  Neurosurgery    SUBJECTIVE:     Chief Complaint: Worsening LLE pain     History of Present Illness:  Angelina Man is a 67 y.o. female with history of right L4-5 discectomy with Dr. Kaba on 5/19/2016 who later presented with recurrent left L4-5 disc and LLE radiculopathy (left hip and left thigh) on MRI 3/2016. At that time, patient failed conservative tx including therapy and CARISSA so he recommended TLIF but patient preferred to have MIS discectomy instead. She is s/p Left L4-5 discectomy with Dr. Kaba on 3/20/17. Post operatively, she called into [facility] clinic 3/23/17 with continued LLE pain and was recommended a medrol dose pack (relieved pain initially) and continue gabapentin. She presents today for worsening LLE pain after surgery. Previously, left hip/buttock pain radiated to left thigh. After surgery, pain is now radiating down to lower leg and into ankle. She describes pain has constant pressure at thigh and becoming pressure/tingling in left lower leg. She has LLE muscle spasms. Denies any back pain, LE weakness, b/b incontinence, or gait instability. She does report of difficulty with walking secondary to pain.  Pain worsens at the end of the day, sitting directly on left buttocks, transitioning from standing/sitting, and with bending over. Some pain improvement with ibuprofen, flexeril, and gabapentin; no relief with OTC tylenol. Patient is reluctant to take pain meds at this time.           Review of patient's allergies indicates:  No Known Allergies    Past Medical History:   Diagnosis Date    Arthritis     Bronchitis     Cataract     Dry eyes     Hypothyroidism 6/23/2016    Rash     Squamous cell carcinoma     in-situ right upper inner arm, right wrist    Status post lumbar surgery 6/23/2016    Stress fracture     Thyroid disease      Past Surgical History:   Procedure Laterality Date    CERVICAL FUSION      COLONOSCOPY       Family History    Problem Relation Age of Onset    Cancer Mother      Lung cancer    Heart failure Father     Colon cancer Father     Hypertension Father     Cataracts Father     Diabetes Son     Heart disease Son     No Known Problems Sister     No Known Problems Brother     No Known Problems Maternal Aunt     No Known Problems Maternal Uncle     No Known Problems Paternal Aunt     No Known Problems Paternal Uncle     No Known Problems Maternal Grandmother     No Known Problems Maternal Grandfather     No Known Problems Paternal Grandmother     No Known Problems Paternal Grandfather     Melanoma Daughter     Amblyopia Neg Hx     Blindness Neg Hx     Glaucoma Neg Hx     Macular degeneration Neg Hx     Retinal detachment Neg Hx     Strabismus Neg Hx     Stroke Neg Hx     Thyroid disease Neg Hx     Breast cancer Neg Hx     Ovarian cancer Neg Hx      Social History   Substance Use Topics    Smoking status: Never Smoker    Smokeless tobacco: Never Used    Alcohol use Yes      Comment: socially, n ot weekly        Review of Systems:  Constitutional: no fever, chills or night sweats. No changes in weight   Eyes: no visual changes   ENT: no nasal congestion or sore throat   Respiratory: no cough or shortness of breath   Cardiovascular: no chest pain or palpitations   Gastrointestinal: no nausea or vomiting   Genitourinary: no hematuria or dysuria   Integument/Breast: no rash or pruritis   Hematologic/Lymphatic: no easy bruising or lymphadenopathy   Musculoskeletal: no arthralgias or myalgias.  Neurological: no seizures or tremors. No weakness or paresthesia. Positive for LLE radiculopathy.   Behavioral/Psych: no auditory or visual hallucinations   Endocrine: no heat or cold intolerance       OBJECTIVE:     Vital Signs (Most Recent):  Temp: 97.6 °F (36.4 °C) (04/18/17 1115)  Pulse: (!) 56 (04/18/17 1115)  BP: 114/73 (04/18/17 1115)    Physical Exam:  General: well developed, well nourished. no acute  distress.  Neurologic: Awake, alert and oriented x3. Thought content appropriate.  Head: normocephalic, atraumatic   GCS: Motor: 6/Verbal: 5/Eyes: 4 GCS Total: 15  Cranial nerves: face symmetric, tongue midline, pupils equal, round, reactive to light with accomodation, extraocular muscles intact. CN II-XII grossly intact.   Language: no aphasia  Speech: no dysarthria   Sensory: response to light touch throughout  Motor Strength: Moves all extremities spontaneously with good tone. Full strength upper and lower extremities. No abnormal movements seen.             Strength  Deltoids Triceps Biceps Wrist Extension Wrist Flexion Hand    Upper: R 5/5 5/5 5/5 5/5 5/5 5/5    L 5/5 5/5 5/5 5/5 5/5 5/5     Iliopsoas Quadriceps Knee  Flexion Tibialis  anterior Gastro- cnemius EHL   Lower: R 5/5 5/5 5/5 5/5 5/5 5/5    L 5/5 5/5 5/5 5/5 5/5 5/5   Interossi muscle strength- intact    Pronator Drift: no drift noted  Coordination: finger to nose normal bilaterally  Chacon: absent  Clonus: absent  Babinski: absent   Gait: Normal  Straight leg raise: negative bilaterally  No focal numbness or weakness  No midline or paraspinal tenderness to palpation  TTP of left buttock at sciatic notch  ENT: normal hearing with finger rub  Heart: RRR, no cyanosis, pallor, or edema.   Lungs:  normal respiratory effort  Abdomen: soft, non-tender and symmetric  Extremities: warm with no cyanosis, edema, or clubbing  Pulses: palpable distal pulses  Skin: warm, dry and intact. No visible rashes or lesions.     wound is c/d/i,  no drainage.  wound edges are well approximated.      Diagnostic Results:  None new    ASSESSMENT/PLAN:      67 y.o. female with history of right L4-5 discectomy with Dr. Kaba on 5/19/2016 who later presented with recurrent left L4-5 disc and LLE radiculopathy (left hip and left thigh) on MRI 3/2016. At that time, patient failed conservative tx including therapy and CARISSA so he recommended TLIF but patient preferred to have MIS  discectomy instead. She is s/p Left L4-5 discectomy with Dr. Kaba on 3/20/17.     -Patient is neuro stable. She has worsening of LLE radiculopathy post op.   -Medrol dose pack.  -Continue gabapentin, flexeril, and norco prn.   -Jainism pain mgt for Left L4-5 TESI.  -RTC in 3 weeks with MRI L spine prior. Continue f/u appt with Dr. Kaba    Discussed with Dr. Martínez    All questions were answered. Patient was encouraged to call clinic with any future concerns prior to follow up appt.    Please call with any questions.    Werner Farnsworth PA-C  Neurosurgery

## 2017-04-19 ENCOUNTER — TELEPHONE (OUTPATIENT)
Dept: PAIN MEDICINE | Facility: CLINIC | Age: 67
End: 2017-04-19

## 2017-04-19 NOTE — TELEPHONE ENCOUNTER
Left voice message requesting a return call to schedule left L4-5 TESI with Dr. Acosta.  Referred by Dr. Martínez.

## 2017-04-30 ENCOUNTER — PATIENT MESSAGE (OUTPATIENT)
Dept: NEUROSURGERY | Facility: CLINIC | Age: 67
End: 2017-04-30

## 2017-05-08 ENCOUNTER — HOSPITAL ENCOUNTER (OUTPATIENT)
Dept: RADIOLOGY | Facility: HOSPITAL | Age: 67
Discharge: HOME | End: 2017-05-08
Attending: NEUROLOGICAL SURGERY
Payer: COMMERCIAL

## 2017-05-08 ENCOUNTER — HOSPITAL ENCOUNTER (OUTPATIENT)
Dept: RADIOLOGY | Facility: HOSPITAL | Age: 67
Discharge: HOME | End: 2017-05-08
Payer: COMMERCIAL

## 2017-05-08 DIAGNOSIS — M62.838 MUSCLE SPASM OF LEFT LOWER EXTREMITY: ICD-10-CM

## 2017-05-08 DIAGNOSIS — M48.061 LUMBAR STENOSIS: ICD-10-CM

## 2017-05-08 DIAGNOSIS — M51.26 LUMBAR DISC HERNIATION: ICD-10-CM

## 2017-05-08 DIAGNOSIS — M51.36 DDD (DEGENERATIVE DISC DISEASE), LUMBAR: ICD-10-CM

## 2017-05-08 DIAGNOSIS — Z98.890 STATUS POST LUMBAR SURGERY: ICD-10-CM

## 2017-05-08 DIAGNOSIS — M54.16 RADICULOPATHY OF LUMBAR REGION: ICD-10-CM

## 2017-05-08 PROCEDURE — 72149 MRI LUMBAR SPINE W/DYE: CPT | Mod: 26,,, | Performed by: RADIOLOGY

## 2017-05-08 PROCEDURE — 72149 MRI LUMBAR SPINE W/DYE: CPT | Mod: TC

## 2017-05-08 PROCEDURE — 72148 MRI LUMBAR SPINE W/O DYE: CPT | Mod: 26,,, | Performed by: RADIOLOGY

## 2017-05-08 PROCEDURE — 72148 MRI LUMBAR SPINE W/O DYE: CPT | Mod: TC

## 2017-05-08 PROCEDURE — A9585 GADOBUTROL INJECTION: HCPCS

## 2017-05-08 PROCEDURE — 25500020 PHARM REV CODE 255

## 2017-05-08 RX ORDER — GADOBUTROL 604.72 MG/ML
5 INJECTION INTRAVENOUS
Status: COMPLETED | OUTPATIENT
Start: 2017-05-08 | End: 2017-05-08

## 2017-05-08 RX ADMIN — GADOBUTROL 5 ML: 604.72 INJECTION INTRAVENOUS at 10:05

## 2017-05-09 ENCOUNTER — PATIENT MESSAGE (OUTPATIENT)
Dept: NEUROSURGERY | Facility: CLINIC | Age: 67
End: 2017-05-09

## 2017-05-09 ENCOUNTER — OFFICE VISIT (OUTPATIENT)
Dept: NEUROSURGERY | Facility: CLINIC | Age: 67
End: 2017-05-09
Payer: COMMERCIAL

## 2017-05-09 VITALS
HEART RATE: 71 BPM | SYSTOLIC BLOOD PRESSURE: 104 MMHG | TEMPERATURE: 98 F | WEIGHT: 104.88 LBS | HEIGHT: 64 IN | DIASTOLIC BLOOD PRESSURE: 72 MMHG | BODY MASS INDEX: 17.9 KG/M2

## 2017-05-09 DIAGNOSIS — M48.061 LUMBAR STENOSIS: ICD-10-CM

## 2017-05-09 DIAGNOSIS — M51.26 LUMBAR DISC HERNIATION: Primary | ICD-10-CM

## 2017-05-09 PROCEDURE — 99024 POSTOP FOLLOW-UP VISIT: CPT | Mod: S$GLB,,, | Performed by: NEUROLOGICAL SURGERY

## 2017-05-09 PROCEDURE — 99999 PR PBB SHADOW E&M-EST. PATIENT-LVL III: CPT | Mod: PBBFAC,,, | Performed by: NEUROLOGICAL SURGERY

## 2017-05-09 NOTE — PROGRESS NOTES
Subjective:    I, Kurtis Catalan, am scribing for, and in the presence of, Dr. Kaba.     Patient ID: Angelina Man is a 67 y.o. female.    Chief Complaint: No chief complaint on file.    HPI   Pt is a 67 y.o. female who is 6 weeks s/p microdiskectomy on 3/20/2017 who presents for follow up evaluation. Pt today states that her LLE pain is increased compared to preoperatively. Whereas she previously had left-sided lower back pain that radiated to her buttocks and left thigh, she now has pain that originates in the left hip area and radiates down to her ankle. She has now tried 2 courses of Medrol dose pack, each time with some relief.     Review of Systems   Constitutional: Negative for chills, fatigue and fever.   HENT: Negative for sinus pressure and trouble swallowing.    Eyes: Negative.  Negative for visual disturbance.   Respiratory: Negative.    Cardiovascular: Negative.    Gastrointestinal: Negative.  Negative for nausea and vomiting.   Endocrine: Negative.    Genitourinary: Negative.    Musculoskeletal:        Positive for pain from her left hip down to her left foot.    Neurological: Negative for dizziness, seizures, syncope, speech difficulty, weakness and numbness.       Objective:      Physical Exam:    Constitutional: She appears well-developed.     Eyes: Pupils are equal, round, and reactive to light. Conjunctivae and EOM are normal.     Cardiovascular: Normal rate, regular rhythm, normal pulses and intact distal pulses.     Abdominal: Soft.     Psych/Behavior: She is alert. She is oriented to person, place, and time. She has a normal mood and affect.     Musculoskeletal: Gait is normal.        Neck: Range of motion is full. There is no tenderness. Muscle strength is 5/5. Tone is normal.        Back: Range of motion is full. There is no tenderness. Muscle strength is 5/5. Tone is normal.        Right Upper Extremities: Range of motion is full. There is no tenderness. Muscle strength is 5/5. Tone is normal.         Left Upper Extremities: Range of motion is full. There is no tenderness. Muscle strength is 5/5. Tone is normal.       Right Lower Extremities: Range of motion is full. There is no tenderness. Muscle strength is 5/5. Tone is normal.        Left Lower Extremities: Range of motion is full. There is no tenderness. Muscle strength is 5/5. Tone is normal.     Neurological:        Coordination: She has a normal Romberg Test, normal finger to nose coordination, normal heel to shin coordination and normal tandem walking coordination.        Sensory: There is no sensory deficit in the trunk. There is no sensory deficit in the extremities.        DTRs: DTRs are normal. Tricep reflexes are 2+ on the right side and 2+ on the left side. Bicep reflexes are 2+ on the right side and 2+ on the left side. Brachioradialis reflexes are 2+ on the right side and 2+ on the left side. Patellar reflexes are 2+ on the right side and 2+ on the left side. Achilles reflexes are 2+ on the right side and 2+ on the left side. She displays no Babinski's sign on the right side. She displays no Babinski's sign on the left side.        Cranial nerves: Cranial nerve(s) II, III, IV, V, VI, VII, VIII, IX, X, XI and XII are intact.       Pt with worsening left leg and hip pain.   Wound is well healed.     Imaging:   MRI L-spine, dated 5/08/2017, does not show any recurrent disc herniation. Previous disc herniation is improved. There is some scarring around to postoperative site and there is a synovial cyst on the right side. The left side does show postoperative changes. Grade I spondylolisthesis at L4-5.     Assessment/Plan:   Pt s/p redo L4-5 discectomy on the contralateral side for recurrent disc herniation, now with increasing leg pain and back pain. Updated MRI scan shows no disc herniation. She does have a grade I spondylolisthesis. Her pain did improve with Medrol dose pack, so I hope this is just irritation of the nerve and some scarring.  We will try transformainal injection and some aquatherapy. We will also get x-rays with flexion/extension to make sure she is not moving at that level. We will plan to get these things done and see her back in the near future.  I think pt will need TLIF at some point but she is understandably hesitant.     I, Dr. Kaba, personally performed the services described in this documentation as scribed by Kurtis Catalan in my presence, and it is both accurate and complete.

## 2017-05-09 NOTE — LETTER
May 10, 2017      Marli Wilkinson MD  [address] [facility]  [address]  [facility] [address]           [facility] 7th Fl  [address]  [facility] [address]  Phone: [phone]          Patient: Angelina Man   MR Number: [id number]   YOB: 1950   Date of Visit: 5/9/2017       Dear Dr. Marli Wilkinson:    Thank you for referring Angelina Man to me for evaluation. Attached you will find relevant portions of my assessment and plan of care.    If you have questions, please do not hesitate to call me. I look forward to following Angelina Man along with you.    Sincerely,    Kalen Kaba MD    Enclosure  CC:  No Recipients    If you would like to receive this communication electronically, please contact [email or web address] or [phone] to request more information on [employer] Link access.    For providers and/or their staff who would like to refer a patient to [facility], please contact us through our one-stop-shop provider referral line, [facility] , at [phone].    If you feel you have received this communication in error or would no longer like to receive these types of communications, please e-mail [email or web address]

## 2017-05-11 ENCOUNTER — PATIENT MESSAGE (OUTPATIENT)
Dept: NEUROSURGERY | Facility: CLINIC | Age: 67
End: 2017-05-11

## 2017-05-17 ENCOUNTER — SURGERY (OUTPATIENT)
Age: 67
End: 2017-05-17

## 2017-05-17 ENCOUNTER — HOSPITAL ENCOUNTER (OUTPATIENT)
Facility: OTHER | Age: 67
Discharge: HOME | End: 2017-05-17
Attending: ANESTHESIOLOGY | Admitting: ANESTHESIOLOGY
Payer: COMMERCIAL

## 2017-05-17 ENCOUNTER — TELEPHONE (OUTPATIENT)
Dept: PAIN MEDICINE | Facility: CLINIC | Age: 67
End: 2017-05-17

## 2017-05-17 VITALS
TEMPERATURE: 98 F | HEART RATE: 66 BPM | HEIGHT: 63 IN | DIASTOLIC BLOOD PRESSURE: 82 MMHG | BODY MASS INDEX: 18.61 KG/M2 | OXYGEN SATURATION: 98 % | SYSTOLIC BLOOD PRESSURE: 125 MMHG | RESPIRATION RATE: 17 BRPM | WEIGHT: 105 LBS

## 2017-05-17 DIAGNOSIS — M54.16 RADICULOPATHY OF LUMBAR REGION: Primary | ICD-10-CM

## 2017-05-17 PROCEDURE — 63600175 PHARM REV CODE 636 W HCPCS: Performed by: ANESTHESIOLOGY

## 2017-05-17 PROCEDURE — 99152 MOD SED SAME PHYS/QHP 5/>YRS: CPT | Mod: ,,, | Performed by: ANESTHESIOLOGY

## 2017-05-17 PROCEDURE — 25500020 PHARM REV CODE 255: Performed by: ANESTHESIOLOGY

## 2017-05-17 PROCEDURE — 64483 NJX AA&/STRD TFRM EPI L/S 1: CPT | Performed by: ANESTHESIOLOGY

## 2017-05-17 PROCEDURE — 64483 NJX AA&/STRD TFRM EPI L/S 1: CPT | Mod: LT,,, | Performed by: ANESTHESIOLOGY

## 2017-05-17 PROCEDURE — 25000003 PHARM REV CODE 250: Performed by: ANESTHESIOLOGY

## 2017-05-17 RX ORDER — BUPIVACAINE HYDROCHLORIDE 2.5 MG/ML
INJECTION, SOLUTION EPIDURAL; INFILTRATION; INTRACAUDAL
Status: DISCONTINUED | OUTPATIENT
Start: 2017-05-17 | End: 2017-05-17 | Stop reason: HOSPADM

## 2017-05-17 RX ORDER — BUPIVACAINE HYDROCHLORIDE 2.5 MG/ML
10 INJECTION, SOLUTION EPIDURAL; INFILTRATION; INTRACAUDAL ONCE
Status: DISCONTINUED | OUTPATIENT
Start: 2017-05-17 | End: 2017-05-17 | Stop reason: HOSPADM

## 2017-05-17 RX ORDER — METHYLPREDNISOLONE ACETATE 40 MG/ML
40 INJECTION, SUSPENSION INTRA-ARTICULAR; INTRALESIONAL; INTRAMUSCULAR; SOFT TISSUE ONCE
Status: COMPLETED | OUTPATIENT
Start: 2017-05-17 | End: 2017-05-17

## 2017-05-17 RX ORDER — MIDAZOLAM HYDROCHLORIDE 1 MG/ML
INJECTION INTRAMUSCULAR; INTRAVENOUS
Status: DISCONTINUED | OUTPATIENT
Start: 2017-05-17 | End: 2017-05-17 | Stop reason: HOSPADM

## 2017-05-17 RX ORDER — MIDAZOLAM HYDROCHLORIDE 1 MG/ML
2 INJECTION INTRAMUSCULAR; INTRAVENOUS
Status: DISCONTINUED | OUTPATIENT
Start: 2017-05-17 | End: 2017-05-17 | Stop reason: HOSPADM

## 2017-05-17 RX ORDER — LIDOCAINE HYDROCHLORIDE 10 MG/ML
10 INJECTION INFILTRATION; PERINEURAL
Status: COMPLETED | OUTPATIENT
Start: 2017-05-17 | End: 2017-05-17

## 2017-05-17 RX ORDER — SODIUM CHLORIDE 9 MG/ML
INJECTION, SOLUTION INTRAVENOUS CONTINUOUS
Status: DISCONTINUED | OUTPATIENT
Start: 2017-05-17 | End: 2017-05-17 | Stop reason: HOSPADM

## 2017-05-17 RX ADMIN — SODIUM CHLORIDE: 0.9 INJECTION, SOLUTION INTRAVENOUS at 07:05

## 2017-05-17 RX ADMIN — IOHEXOL 50 ML: 300 INJECTION, SOLUTION INTRAVENOUS at 08:05

## 2017-05-17 RX ADMIN — MIDAZOLAM HYDROCHLORIDE 2 MG: 1 INJECTION, SOLUTION INTRAMUSCULAR; INTRAVENOUS at 08:05

## 2017-05-17 RX ADMIN — METHYLPREDNISOLONE ACETATE 40 MG: 40 INJECTION, SUSPENSION INTRA-ARTICULAR; INTRALESIONAL; INTRAMUSCULAR; SOFT TISSUE at 08:05

## 2017-05-17 RX ADMIN — LIDOCAINE HYDROCHLORIDE 10 ML: 10 INJECTION, SOLUTION INFILTRATION; PERINEURAL at 08:05

## 2017-05-17 RX ADMIN — BUPIVACAINE HYDROCHLORIDE 10 ML: 2.5 INJECTION, SOLUTION EPIDURAL; INFILTRATION; INTRACAUDAL; PERINEURAL at 08:05

## 2017-05-17 NOTE — INTERVAL H&P NOTE
"The patient has been examined and the H&P has been reviewed:    I concur with the findings and no changes have occurred since H&P was written.    Anesthesia/Surgery risks, benefits and alternative options discussed and understood by patient/family.    HPI  Patient s/p microdiskectomy 3/2017 with persisting radicular symptoms with MRI 5/8 showing ? Fluid / scar over L4 nerve root causing mass effect, and patient reporting left L4 radiculopathy, patient has not had any fevers or chills or any signs of cauda equina syndrome.  There is no evidence of infection on the back.  Patient has been sent for direct procedural referral for left L4 TFESI for diagnostic/therapeutic purposes.    PMHx, PSHx, Allergies, Medications reviewed in epic    ROS negative except pain complaints in HPI    OBJECTIVE:    /72 (BP Location: Right arm, Patient Position: Lying, BP Method: Automatic)  Pulse 69  Temp 98 °F (36.7 °C) (Oral)   Resp 18  Ht 5' 3" (1.6 m)  Wt 47.6 kg (105 lb)  SpO2 97%  BMI 18.6 kg/m2    PHYSICAL EXAMINATION:    GENERAL: Well appearing, in no acute distress, alert and oriented x3.  PSYCH:  Mood and affect appropriate.  SKIN: Skin color, texture, turgor normal, no rashes or lesions.  CV: RRR with palpation of the radial artery.  PULM: No evidence of respiratory difficulty, symmetric chest rise. Clear to auscultation.  NEURO: Cranial nerves grossly intact.    Plan:    Proceed with procedure as planned    Obtain already ordered flex/ext xray of the lumbar spine.    F/u in clinic in 2 weeks.    Cliff Acosta  05/17/2017        There are no hospital problems to display for this patient.    "

## 2017-05-17 NOTE — OP NOTE
INFORMED CONSENT: The procedure, risks, benefits and options were discussed with patient. There are no contraindications to the procedure. The patient expressed understanding and agreed to proceed. The personnel performing the procedure was discussed.    05/17/2017    Surgeon: Cliff Acosta MD    Assistants: None    PROCEDURE:  Left  L4  TRANSFORAMINAL EPIDURAL STEROID INJECTION    Pre Procedure diagnosis:   Left  L4  transforaminal stenosis with radiculopathy    Post-Procedure diagnosis:   same    Complications: None    Specimens: None    Sedation: None    DESCRIPTION OF PROCEDURE: The patient was brought to the procedure room. IV access was obtained prior to the procedure. The patient was positioned prone on the fluoroscopy table. Continuous hemodynamic monitoring was initiated including blood pressure, EKG, and pulse oximetry. . The skin was prepped with chlorhexidine and draped in a sterile fashion. Skin anesthesia was achieved using a total of 5mL of lidocaine over the respective injection site.     The Left L4  transforaminal space was identified with fluoroscopy in the  AP, oblique, and lateral views.  A 22 gauge spinal quinke needle was then advanced into the area of the trans foraminal space with confirmation of proper needle position using AP, oblique, and lateral fluoroscopic views. Once the needle tip was in the area of the transforaminal space, and there was no blood, CSF or paraesthesias,  1.5 mL of Omnipaque 300mg/ml was injected.  Fluoroscopic imaging in the AP and lateral views revealed a clear outline of the spinal nerve with proximal spread of agent through the neural foramen into the epidural space. A total combination of 1 mL of Bupivicaine 0.25% and 40 mg depo medrol was injected with displacement of the contrast dye confirming that the medication went into the area of the transforaminal space. A sterile dressing was applied.   Patient tolerated the procedure well.    Conscious sedation  provided by M.D    The patient was monitored with continuous pulse oximetry, EKG, and intermittent blood pressure monitors.  The patient was hemodynamically stable throughout the entire process was responsive to voice, and breathing spontaneously.  Supplemental O2 was provided at 2L/min via nasal cannula.  Patient was comfortable for the duration of the procedure.    There was a total of 2mg IV Midazolam was titrated for the procedure      Patient was taken back to the recovery room for further observation.     The patient was discharged to home in stable condition

## 2017-05-17 NOTE — DISCHARGE SUMMARY
Discharge Note  Short Stay      SUMMARY     Admit Date: 5/17/2017    Attending Physician: Cliff Acosta      Discharge Physician: Cliff Acosta      Discharge Date: 5/17/2017 8:45 AM     PROCEDURE:  Left  L4  TRANSFORAMINAL EPIDURAL STEROID INJECTION    Pre Procedure diagnosis:   Left  L4  transforaminal stenosis with radiculopathy    Disposition: Home or self care    Patient Instructions:   Current Discharge Medication List      CONTINUE these medications which have NOT CHANGED    Details   acyclovir (ZOVIRAX) 400 MG tablet Take 1 tablet (400 mg total) by mouth 2 (two) times daily.  Qty: 60 tablet, Refills: 12    Associated Diagnoses: HSV infection      ascorbic acid (VITAMIN C) 1000 MG tablet Take 1,000 mg by mouth once daily.       b complex vitamins (VITAMIN B COMPLEX) tablet Take 1 tablet by mouth daily as needed.       biotin 1 mg tablet Take 1 mg by mouth once daily.       buPROPion (WELLBUTRIN XL) 150 MG TB24 tablet TAKE ONE TABLET BY MOUTH ONCE DAILY  Qty: 30 tablet, Refills: 12    Associated Diagnoses: Depression, unspecified depression type      calcium-vitamin D 500 mg(1,250mg) -200 unit per tablet Take 1 tablet by mouth once daily.       chondroitin sulfate-vit C-Mn (CHONDROITIN SULFATE COMPLEX) 400-60-2.5 mg Cap Take 1 tablet by mouth once daily.       clonazePAM (KLONOPIN) 1 MG tablet Take 1 tablet (1 mg total) by mouth nightly as needed.  Qty: 30 tablet, Refills: 5    Associated Diagnoses: Anxiety      CYANOCOBALAMIN/FOLIC ACID (VITAMIN B12-FOLIC ACID ORAL) Take by mouth.       dicyclomine (BENTYL) 20 mg tablet Take 1 tablet (20 mg total) by mouth 3 (three) times daily.  Qty: 90 tablet, Refills: 12    Associated Diagnoses: Irritable bowel syndrome with diarrhea      digestive enzymes Tab Take 1 tablet by mouth once daily.       diphenhydrAMINE (BENADRYL) 25 mg capsule Take 25 mg by mouth nightly as needed.       !! gabapentin (NEURONTIN) 300 MG capsule Take 1 capsule (300 mg total) by mouth 3  (three) times daily.  Qty: 90 capsule, Refills: 3      ginseng 200 mg Cap Take 200 mg by mouth 2 (two) times daily as needed.       glucosamine sulfate 2KCl 1,000 mg Tab Take 1 tablet by mouth once daily.       levothyroxine (SYNTHROID) 112 MCG tablet Take 1 tablet (112 mcg total) by mouth once daily.  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       tretinoin (RETIN-A) 0.1 % cream Apply topically every evening.  Qty: 20 g, Refills: 6    Associated Diagnoses: Other acne      zinc 50 mg Tab Take 50 mg by mouth once daily.       fexofenadine (ALLEGRA) 180 MG tablet Take 1 tablet (180 mg total) by mouth once daily.  Qty: 30 tablet, Refills: 12    Associated Diagnoses: Seasonal allergies      fluticasone (FLONASE) 50 mcg/actuation nasal spray Spray once in each nostril twice daily as needed for rhinitis.  Qty: 16 g, Refills: 11    Associated Diagnoses: Other seasonal allergic rhinitis      !! gabapentin (NEURONTIN) 400 MG capsule Take 1 capsule (400 mg total) by mouth 2 (two) times daily as needed.  Qty: 60 capsule, Refills: 11    Associated Diagnoses: Radiculopathy, unspecified spinal region      hydrocodone-acetaminophen 5-325mg (NORCO) 5-325 mg per tablet Take 1 tablet by mouth every 6 (six) hours as needed (pain 4-5/10).  Qty: 60 tablet, Refills: 0      ondansetron (ZOFRAN-ODT) 8 MG TbDL Take 1 tablet (8 mg total) by mouth every 6 (six) hours as needed.  Qty: 60 tablet, Refills: 0      triamterene-hydrochlorothiazide 37.5-25 mg (MAXZIDE-25) 37.5-25 mg per tablet Take 1 tablet by mouth once daily.  Qty: 30 tablet, Refills: 11       !! - Potential duplicate medications found. Please discuss with provider.          Resume home diet and activity

## 2017-05-17 NOTE — IP AVS SNAPSHOT
[facility] Location ([facility])  [address] [place], [place] [id number]  Phone: [phone]           Patient Discharge Instructions   Our goal is to set you up for success. This packet includes information on your condition, medications, and your home care.  It will help you care for yourself to prevent having to return to the hospital.     Please ask your nurse if you have any questions.      There are many details to remember when preparing to leave the hospital. Here is what you will need to do:    1. Take your medicine. If you are prescribed medications, review your Medication List on the following pages. You may have new medications to  at the pharmacy and others that you'll need to stop taking. Review the instructions for how and when to take your medications. Talk with your doctor or nurses if you are unsure of what to do.     2. Go to your follow-up appointments. Specific follow-up information is listed in the following pages. Your may be contacted by a nurse or clinical provider about future appointments. Be sure we have all of the phone numbers to reach you. Please contact your provider's office if you are unable to make an appointment.     3. Watch for warning signs. Your doctor or nurse will give you detailed warning signs to watch for and when to call for assistance. These instructions may also include educational information about your condition. If you experience any of warning signs to your health, call your doctor.           [facility]  Unless otherwise directed by your provider, please   contact [facility], our nurse care line   that is available for 24/7 assistance.     [phone] (toll-free)     Registered nurses in the [facility]   provide: appointment scheduling, clinical advisement, health education, and other advisory services.                  ** Verify the list of medication(s) below is accurate and up to date. Carry this with you in case of  emergency. If your medications have changed, please notify your healthcare provider.             Medication List      CHANGE how you take these medications        Additional Info                      dicyclomine 20 mg tablet   Commonly known as:  BENTYL   Quantity:  90 tablet   Refills:  12   Dose:  20 mg   What changed:    - when to take this  - reasons to take this    Instructions:  Take 1 tablet (20 mg total) by mouth 3 (three) times daily.     Begin Date    AM    Noon    PM    Bedtime       fexofenadine 180 MG tablet   Commonly known as:  ALLEGRA   Quantity:  30 tablet   Refills:  12   Dose:  180 mg   What changed:    - when to take this  - reasons to take this    Instructions:  Take 1 tablet (180 mg total) by mouth once daily.     Begin Date    AM    Noon    PM    Bedtime       triamterene-hydrochlorothiazide 37.5-25 mg 37.5-25 mg per tablet   Commonly known as:  MAXZIDE-25   Quantity:  30 tablet   Refills:  11   Dose:  1 tablet   What changed:    - when to take this  - reasons to take this    Instructions:  Take 1 tablet by mouth once daily.     Begin Date    AM    Noon    PM    Bedtime         CONTINUE taking these medications        Additional Info                      acyclovir 400 MG tablet   Commonly known as:  ZOVIRAX   Quantity:  60 tablet   Refills:  12   Dose:  400 mg    Instructions:  Take 1 tablet (400 mg total) by mouth 2 (two) times daily.     Begin Date    AM    Noon    PM    Bedtime       ascorbic acid (vitamin C) 1000 MG tablet   Commonly known as:  VITAMIN C   Refills:  0   Dose:  1000 mg    Instructions:  Take 1,000 mg by mouth once daily.     Begin Date    AM    Noon    PM    Bedtime       biotin 1 mg tablet   Refills:  0   Dose:  1 mg    Instructions:  Take 1 mg by mouth once daily.     Begin Date    AM    Noon    PM    Bedtime       buPROPion 150 MG TB24 tablet   Commonly known as:  WELLBUTRIN XL   Quantity:  30 tablet   Refills:  12    Instructions:  TAKE ONE TABLET BY MOUTH ONCE DAILY      Begin Date    AM    Noon    PM    Bedtime       calcium-vitamin D3 500 mg(1,250mg) -200 unit per tablet   Refills:  0   Dose:  1 tablet    Instructions:  Take 1 tablet by mouth once daily.     Begin Date    AM    Noon    PM    Bedtime       CHONDROITIN SULFATE COMPLEX 400-60-2.5 mg Cap   Refills:  0   Dose:  1 tablet   Generic drug:  chondroitin sulfate-vit C-Mn    Instructions:  Take 1 tablet by mouth once daily.     Begin Date    AM    Noon    PM    Bedtime       clonazePAM 1 MG tablet   Commonly known as:  KLONOPIN   Quantity:  30 tablet   Refills:  5   Dose:  1 mg    Instructions:  Take 1 tablet (1 mg total) by mouth nightly as needed.     Begin Date    AM    Noon    PM    Bedtime       digestive enzymes Tab   Refills:  0   Dose:  1 tablet    Instructions:  Take 1 tablet by mouth once daily.     Begin Date    AM    Noon    PM    Bedtime       diphenhydrAMINE 25 mg capsule   Commonly known as:  BENADRYL   Refills:  0   Dose:  25 mg    Instructions:  Take 25 mg by mouth nightly as needed.     Begin Date    AM    Noon    PM    Bedtime       fluticasone 50 mcg/actuation nasal spray   Commonly known as:  FLONASE   Quantity:  16 g   Refills:  11    Instructions:  Spray once in each nostril twice daily as needed for rhinitis.     Begin Date    AM    Noon    PM    Bedtime       * gabapentin 300 MG capsule   Commonly known as:  NEURONTIN   Quantity:  90 capsule   Refills:  3   Dose:  300 mg    Instructions:  Take 1 capsule (300 mg total) by mouth 3 (three) times daily.     Begin Date    AM    Noon    PM    Bedtime       * gabapentin 400 MG capsule   Commonly known as:  NEURONTIN   Quantity:  60 capsule   Refills:  11   Dose:  400 mg    Instructions:  Take 1 capsule (400 mg total) by mouth 2 (two) times daily as needed.     Begin Date    AM    Noon    PM    Bedtime       ginseng 200 mg Cap   Refills:  0   Dose:  200 mg    Instructions:  Take 200 mg by mouth 2 (two) times daily as needed.     Begin Date    AM    Noon     PM    Bedtime       glucosamine sulfate 2KCl 1,000 mg Tab   Refills:  0   Dose:  1 tablet    Instructions:  Take 1 tablet by mouth once daily.     Begin Date    AM    Noon    PM    Bedtime       hydrocodone-acetaminophen 5-325mg 5-325 mg per tablet   Commonly known as:  NORCO   Quantity:  60 tablet   Refills:  0   Dose:  1 tablet    Instructions:  Take 1 tablet by mouth every 6 (six) hours as needed (pain 4-5/10).     Begin Date    AM    Noon    PM    Bedtime       levothyroxine 112 MCG tablet   Commonly known as:  SYNTHROID   Quantity:  30 tablet   Refills:  11   Dose:  112 mcg    Instructions:  Take 1 tablet (112 mcg total) by mouth once daily.     Begin Date    AM    Noon    PM    Bedtime       multivitamin per tablet   Commonly known as:  THERAGRAN   Refills:  0   Dose:  1 tablet    Instructions:  Take 1 tablet by mouth once daily.     Begin Date    AM    Noon    PM    Bedtime       ondansetron 8 MG Tbdl   Commonly known as:  ZOFRAN-ODT   Quantity:  60 tablet   Refills:  0   Dose:  8 mg    Instructions:  Take 1 tablet (8 mg total) by mouth every 6 (six) hours as needed.     Begin Date    AM    Noon    PM    Bedtime       tretinoin 0.1 % cream   Commonly known as:  RETIN-A   Quantity:  20 g   Refills:  6    Instructions:  Apply topically every evening.     Begin Date    AM    Noon    PM    Bedtime       VITAMIN B12-FOLIC ACID ORAL   Refills:  0    Instructions:  Take by mouth.     Begin Date    AM    Noon    PM    Bedtime       VITAMINS B COMPLEX tablet   Refills:  0   Dose:  1 tablet   Generic drug:  b complex vitamins    Instructions:  Take 1 tablet by mouth daily as needed.     Begin Date    AM    Noon    PM    Bedtime       zinc 50 mg Tab   Refills:  0   Dose:  50 mg    Instructions:  Take 50 mg by mouth once daily.     Begin Date    AM    Noon    PM    Bedtime       * Notice:  This list has 2 medication(s) that are the same as other medications prescribed for you. Read the directions carefully, and ask your  doctor or other care provider to review them with you.               Please bring to all follow up appointments:    1. A copy of your discharge instructions.  2. All medicines you are currently taking in their original bottles.  3. Identification and insurance card.    Please arrive 15 minutes ahead of scheduled appointment time.    Please call 24 hours in advance if you must reschedule your appointment and/or time.        Your Scheduled Appointments     Jun 19, 2017 12:00 PM CDT   Diagnostic Xray with [facility] XROP3 485 LB LIMIT   [facility] ([facility] )    [address]  [facility] [address]   [phone]                  Discharge Instructions       Home Care Instructions Pain Management:    1. DIET:   You may resume your normal diet today.   2. BATHING:   You may shower with luke warm water. No tub baths or anything that will soak injection sites under water for 24 hours.  3. DRESSING:   You may remove your bandage today.   4. ACTIVITY LEVEL:   You may resume your normal activities 24 hrs after your procedure. Nothing strenuous today.  5. MEDICATIONS:   You may resume your normal medications today. To restart blood thinners, ask your doctor.  6. SPECIAL INSTRUCTIONS:   No heat to the injection site for 24 hrs including, bath or shower, heating pad, moist heat, or hot tubs.    Use ice pack to injection site for any pain or discomfort.  Apply ice packs for 20 minute intervals as needed.     If you have received any sedatives by mouth today you may not drive for 12 hours.    If you have received any sedation through your IV, you may not drive for 24 hrs.     PLEASE CALL YOUR DOCTOR IF:  1. Redness or swelling around the injection site.  2. Fever of 101 degrees or more  3. Drainage (pus) from the injection site.  4. For any continuous bleeding (some dried blood over the incision is normal.)    FOR EMERGENCIES:   If any unusual problems or difficulties occur during clinic hours,  "call [phone] or [phone].       Admission Information     Date & Time Provider Department CSN    5/17/2017  7:06 AM Cliff Acosta MD [facility] [id number]      Care Providers     Provider Role Specialty Primary office phone    Cliff Acosta MD Attending Provider Pain Medicine [phone]    Cliff Acosta MD Surgeon  Pain Medicine [phone]      Your Vitals Were     BP Pulse Temp Resp Height Weight    131/72 (BP Location: Right arm, Patient Position: Lying, BP Method: Automatic) 70 98 °F (36.7 °C) (Oral) 17 5' 3" (1.6 m) 47.6 kg (105 lb)    SpO2 BMI             98% 18.6 kg/m2         Recent Lab Values        7/30/2012 8/8/2013 8/13/2014 8/24/2015                  8:20 AM  9:02 AM  9:03 AM  8:40 AM        A1C 5.5 5.5 5.3 5.4                 Allergies as of 5/17/2017     No Known Allergies      Advance Directives     An advance directive is a document which, in the event you are no longer able to make decisions for yourself, tells your healthcare team what kind of treatment you do or do not want to receive, or who you would like to make those decisions for you.  If you do not currently have an advance directive, [facility] encourages you to create one.  For more information call:  [phone] ([phone]), [phone] ([phone]),  or log on to [email or web address].        Language Assistance Services     ATTENTION: Language assistance services are available, free of charge. Please call [phone].      ATENCIÓN: Si habla español, tiene a asher disposición servicios gratuitos de asistencia lingüística. Llame al [phone].     CHÚ Ý: N?u b?n nói Ti?ng Vi?t, có các d?ch v? h? tr? ngôn ng? mi?n phí dành cho b?n. G?i s? [phone].         [facility] complies with applicable Federal civil rights laws and does not discriminate on the basis of race, color, national origin, age, disability, or sex.        "

## 2017-05-17 NOTE — DISCHARGE INSTRUCTIONS
Home Care Instructions Pain Management:    1. DIET:   You may resume your normal diet today.   2. BATHING:   You may shower with luke warm water. No tub baths or anything that will soak injection sites under water for 24 hours.  3. DRESSING:   You may remove your bandage today.   4. ACTIVITY LEVEL:   You may resume your normal activities 24 hrs after your procedure. Nothing strenuous today.  5. MEDICATIONS:   You may resume your normal medications today. To restart blood thinners, ask your doctor.  6. SPECIAL INSTRUCTIONS:   No heat to the injection site for 24 hrs including, bath or shower, heating pad, moist heat, or hot tubs.    Use ice pack to injection site for any pain or discomfort.  Apply ice packs for 20 minute intervals as needed.     If you have received any sedatives by mouth today you may not drive for 12 hours.    If you have received any sedation through your IV, you may not drive for 24 hrs.     PLEASE CALL YOUR DOCTOR IF:  1. Redness or swelling around the injection site.  2. Fever of 101 degrees or more  3. Drainage (pus) from the injection site.  4. For any continuous bleeding (some dried blood over the incision is normal.)    FOR EMERGENCIES:   If any unusual problems or difficulties occur during clinic hours, call [phone] or [phone].

## 2017-05-17 NOTE — PLAN OF CARE
Pt tolerated procedure well. Pt reports 4/10 pain after procedure. Assisted pt up for first time. Steady on feet. All discharge instructions given.

## 2017-05-17 NOTE — TELEPHONE ENCOUNTER
----- Message from Virgen Mcconnell sent at 5/17/2017  9:40 AM CDT -----  _x  1st Request  _  2nd Request  _  3rd Request        Who: antoine    Why: pt. Would to speak with nurse in regards to xray appt.please call to discuss    What Number to Call Back:[phone] leave a voicemail or [phone] work number    When to Expect a call back: (Before the end of the day)   -- if the call is after 12:00, the call back will be tomorrow.

## 2017-05-30 ENCOUNTER — OFFICE VISIT (OUTPATIENT)
Dept: PAIN MEDICINE | Facility: CLINIC | Age: 67
End: 2017-05-30
Attending: ANESTHESIOLOGY
Payer: COMMERCIAL

## 2017-05-30 ENCOUNTER — HOSPITAL ENCOUNTER (OUTPATIENT)
Dept: RADIOLOGY | Facility: OTHER | Age: 67
Discharge: HOME | End: 2017-05-30
Attending: NEUROLOGICAL SURGERY
Payer: COMMERCIAL

## 2017-05-30 VITALS
BODY MASS INDEX: 18.71 KG/M2 | RESPIRATION RATE: 18 BRPM | HEIGHT: 63 IN | DIASTOLIC BLOOD PRESSURE: 79 MMHG | SYSTOLIC BLOOD PRESSURE: 121 MMHG | HEART RATE: 63 BPM | WEIGHT: 105.63 LBS | TEMPERATURE: 98 F

## 2017-05-30 DIAGNOSIS — M54.16 RADICULOPATHY OF LUMBAR REGION: ICD-10-CM

## 2017-05-30 DIAGNOSIS — M19.90 OTHER TYPE OF OSTEOARTHRITIS, UNSPECIFIED SITE: Primary | ICD-10-CM

## 2017-05-30 DIAGNOSIS — M48.061 LUMBAR STENOSIS: ICD-10-CM

## 2017-05-30 DIAGNOSIS — M51.26 LUMBAR DISC HERNIATION: ICD-10-CM

## 2017-05-30 PROCEDURE — 72114 X-RAY EXAM L-S SPINE BENDING: CPT | Mod: 26,,, | Performed by: RADIOLOGY

## 2017-05-30 PROCEDURE — 99999 PR PBB SHADOW E&M-EST. PATIENT-LVL III: CPT | Mod: PBBFAC,,, | Performed by: ANESTHESIOLOGY

## 2017-05-30 PROCEDURE — 72114 X-RAY EXAM L-S SPINE BENDING: CPT | Mod: TC

## 2017-05-30 PROCEDURE — 99214 OFFICE O/P EST MOD 30 MIN: CPT | Mod: S$GLB,,, | Performed by: ANESTHESIOLOGY

## 2017-05-30 RX ORDER — GABAPENTIN 300 MG/1
CAPSULE ORAL
Qty: 180 CAPSULE | Refills: 2 | Status: SHIPPED | OUTPATIENT
Start: 2017-05-30 | End: 2017-09-05 | Stop reason: SINTOL

## 2017-05-30 NOTE — PROGRESS NOTES
Chronic Pain - New Consult    Referring Physician: No ref. provider found    Chief Complaint:   Chief Complaint   Patient presents with    Low-back Pain     follow up after procedure        SUBJECTIVE: Disclaimer: This note has been generated using voice-recognition software. There may be typographical errors that have been missed during proof-reading    Initial encounter:    Angelina Man presents to the clinic for the evaluation of leg pain. The pain started in 2016 following prolonged walking and symptoms have been unchanged. She previously had radicular symptoms to both lower extremities. She had right sided microdiskectomy at L4-5 in 2016 which resolved her right sided radicular pain. She recently had left sided microdiskectomy at L4-5 on 3/20/2017 but continues to have left sided leg pain. She has had epidurals in the past through our office, ordered per neurosurgery with limited relief. She is s/p left L4 TF CARISSA on 5/17/17 with significant relief of her pain from her knee to her ankle. She does report occasional tingling in the calf muscle.     Brief history:    Pain Description:    The pain is located in the left thigh area and radiates to the ankle.      At BEST  2/10     At WORST  7/10 on the WORST day.      On average pain is rated as 5/10.     Today the pain is rated as 5/10    The pain is described as burning      Symptoms interfere with daily activity and work.     Exacerbating factors: Standing and Walking.      Mitigating factors rest and sitting.     Patient denies urinary incontinence and bowel incontinence.  Patient denies any suicidal or homicidal ideations    Pain Medications:  Current:  Gabapentin 300 mg TID     Tried in Past:  NSAIDs -Never  TCA -Never  SNRI -yes  Anti-convulsants -Never  Muscle Relaxants -Never  Opioids-Norco for post op pain    Physical Therapy/Home Exercise: yes       report:  Reviewed and consistent with medication use as prescribed.    Pain Procedures:   10/19/16-  Left L4 TF CARISSA  12/14/16- Left  L5 TF CARISSA  2/8/17- Left L4 TF CARISSA   5/17/17- Left L4 TF CARISSA    Chiropractor -never  Acupuncture - never  TENS unit -never  Spinal decompression - 5/19/16- right L4-5 microdiskectomy; 3/20/17- left L4-5 microdiskectomy    Joint replacement -never    Imaging:   X-ray Lumbar Spine 5/30/2017:  Findings:   Extensive degenerative disc disease at multiple levels. Grade 1 anterolisthesis L4 and L5 (12 mm). No significant change on flexion/extension views. Vertebral body heights are maintained. Paravertebral soft tissues are unremarkable.   Impression        Extensive degenerative changes as above without instability.     MRI Lumbar Spine 5/3/2017:  FINDINGS:    There are 5 lumbar vertebrae.  Vertebral body heights and alignment are maintained.  Bone marrow signal is preserved.  Diffuse disc space narrowing.  There is mild increased her signal within the L4-L5 disc space which is likely postsurgical. Conus terminates at L1 and appears unremarkable. Subcentimeter left renal cyst and right hepatic cyst.  Posterior abdominal structures otherwise unremarkable.Evaluation of sacroiliac joints is unremarkable.    L1-L2: Mild disc bulge with bilateral facet arthropathy without evidence of spinal stenosis or neuroforaminal narrowing.    L2-L3: Diffuse dilatation with bilateral facet arthropathy and hypertrophy of the ligamentum of flavum resulting in mild spinal stenosis and mild left neural foraminal narrowing.    L3-L4: Diffuse disc bulge with bilateral facet arthropathy and hypertrophy of the ligamentum of flavum resulting in mild spinal stenosis and mild bilateral neural foraminal narrowing.    L4-L5: Unchanged grade 1 anterolisthesis measuring 0.3 cm with postsurgical changes from prior hemilaminectomy and discectomy.  Diffuse disc bulge and facet arthropathy resulting in moderate bilateral neural foraminal narrowing. There is mild increased STIR signal within the intervertebral disc which is  likely postsurgical.  A peripherally enhancing fluid collection measuring maximal diameter of 1.3 cm is identified within the left L4-5 hemilaminectomy surgical bed.  There are persistent inflammatory changes exerting mass effect on the posterior left lateral thecal sac which is worsened in comparison to prior exam resulting in moderate spinal stenosis.    L5-S1: Diffuse disc bulge with bilateral facet arthropathy resulting in mild right neural foramen no spinal stenosis or left neural foraminal narrowing.   Impression        1.  Post surgical changes at L4-5 as above with a small peripherally enhancing fluid collection within the left L4-5 hemilaminectomy surgical bed likely representing a postsurgical seroma versus abscess.  There are worsening inflammatory changes exerting mass effect on the posterolateral left thecal sac at L4-5 resulting in moderate spinal stenosis.  2.  Unchanged grade 1 anterolisthesis at L4-5 with diffuse disc bulge and facet arthropathy resulting in moderate bilateral neural foraminal narrowing.  3.  Multilevel degenerative changes of the lumbar spine as above resulting in mild spinal stenosis at L2-3 and L3-4.  Mild neural foraminal narrowing by level as above.         Past Medical History:   Diagnosis Date    Arthritis     Bronchitis     Cataract     Dry eyes     Hypothyroidism 6/23/2016    Rash     Squamous cell carcinoma     in-situ right upper inner arm, right wrist    Status post lumbar surgery 6/23/2016    Stress fracture     Thyroid disease      Past Surgical History:   Procedure Laterality Date    CERVICAL FUSION      COLONOSCOPY       Social History     Social History    Marital status: Single     Spouse name: N/A    Number of children: N/A    Years of education: N/A     Occupational History     [employer]     Social History Main Topics    Smoking status: Never Smoker    Smokeless tobacco: Never Used    Alcohol use Yes      Comment: socially, n ot  weekly    Drug use: No    Sexual activity: No     Other Topics Concern    Are You Pregnant Or Think You May Be? No    Breast-Feeding No     Social History Narrative    No narrative on file     Family History   Problem Relation Age of Onset    Cancer Mother      Lung cancer    Heart failure Father     Colon cancer Father     Hypertension Father     Cataracts Father     Diabetes Son     Heart disease Son     No Known Problems Sister     No Known Problems Brother     No Known Problems Maternal Aunt     No Known Problems Maternal Uncle     No Known Problems Paternal Aunt     No Known Problems Paternal Uncle     No Known Problems Maternal Grandmother     No Known Problems Maternal Grandfather     No Known Problems Paternal Grandmother     No Known Problems Paternal Grandfather     Melanoma Daughter     Amblyopia Neg Hx     Blindness Neg Hx     Glaucoma Neg Hx     Macular degeneration Neg Hx     Retinal detachment Neg Hx     Strabismus Neg Hx     Stroke Neg Hx     Thyroid disease Neg Hx     Breast cancer Neg Hx     Ovarian cancer Neg Hx        Review of patient's allergies indicates:  No Known Allergies    Current Outpatient Prescriptions   Medication Sig    acyclovir (ZOVIRAX) 400 MG tablet Take 1 tablet (400 mg total) by mouth 2 (two) times daily.    ascorbic acid (VITAMIN C) 1000 MG tablet Take 1,000 mg by mouth once daily.     b complex vitamins (VITAMIN B COMPLEX) tablet Take 1 tablet by mouth daily as needed.     biotin 1 mg tablet Take 1 mg by mouth once daily.     buPROPion (WELLBUTRIN XL) 150 MG TB24 tablet TAKE ONE TABLET BY MOUTH ONCE DAILY    calcium-vitamin D 500 mg(1,250mg) -200 unit per tablet Take 1 tablet by mouth once daily.     chondroitin sulfate-vit C-Mn (CHONDROITIN SULFATE COMPLEX) 400-60-2.5 mg Cap Take 1 tablet by mouth once daily.     clonazePAM (KLONOPIN) 1 MG tablet Take 1 tablet (1 mg total) by mouth nightly as needed.    CYANOCOBALAMIN/FOLIC ACID  (VITAMIN B12-FOLIC ACID ORAL) Take by mouth.     dicyclomine (BENTYL) 20 mg tablet Take 1 tablet (20 mg total) by mouth 3 (three) times daily. (Patient taking differently: Take 20 mg by mouth 3 (three) times daily as needed. )    digestive enzymes Tab Take 1 tablet by mouth once daily.     diphenhydrAMINE (BENADRYL) 25 mg capsule Take 25 mg by mouth nightly as needed.     fexofenadine (ALLEGRA) 180 MG tablet Take 1 tablet (180 mg total) by mouth once daily. (Patient taking differently: Take 180 mg by mouth daily as needed. )    fluticasone (FLONASE) 50 mcg/actuation nasal spray Spray once in each nostril twice daily as needed for rhinitis.    gabapentin (NEURONTIN) 300 MG capsule Take 1 capsule (300 mg total) by mouth 3 (three) times daily.    gabapentin (NEURONTIN) 400 MG capsule Take 1 capsule (400 mg total) by mouth 2 (two) times daily as needed.    ginseng 200 mg Cap Take 200 mg by mouth 2 (two) times daily as needed.     glucosamine sulfate 2KCl 1,000 mg Tab Take 1 tablet by mouth once daily.     hydrocodone-acetaminophen 5-325mg (NORCO) 5-325 mg per tablet Take 1 tablet by mouth every 6 (six) hours as needed (pain 4-5/10).    levothyroxine (SYNTHROID) 112 MCG tablet Take 1 tablet (112 mcg total) by mouth once daily.    multivitamin (THERAGRAN) per tablet Take 1 tablet by mouth once daily.     ondansetron (ZOFRAN-ODT) 8 MG TbDL Take 1 tablet (8 mg total) by mouth every 6 (six) hours as needed.    tretinoin (RETIN-A) 0.1 % cream Apply topically every evening.    triamterene-hydrochlorothiazide 37.5-25 mg (MAXZIDE-25) 37.5-25 mg per tablet Take 1 tablet by mouth once daily. (Patient taking differently: Take 1 tablet by mouth daily as needed. )    zinc 50 mg Tab Take 50 mg by mouth once daily.     gabapentin (NEURONTIN) 300 MG capsule Increase to 2 tablets at night, 1 in AM and 1 in afternoon for 7 days.  Then increase to 2 tablets at night and 2 in morning, 1 tablet in afternoon for 7 days.  Then  "increase to 2 tablets three times a day (1800mg)     No current facility-administered medications for this visit.        REVIEW OF SYSTEMS:    GENERAL:  No weight loss, malaise or fevers.  HEENT:   No recent changes in vision or hearing  NECK:  Negative for lumps, no difficulty with swallowing.  RESPIRATORY:  Negative for cough, wheezing or shortness of breath, patient denies any recent URI.  CARDIOVASCULAR:  Negative for chest pain, leg swelling or palpitations.  GI:  Negative for abdominal discomfort, blood in stools or black stools or change in bowel habits.  MUSCULOSKELETAL:  See HPI.  SKIN:  Negative for lesions, rash, and itching.  PSYCH:  No mood disorder or recent psychosocial stressors.  Patients sleep is not disturbed secondary to pain.  HEMATOLOGY/LYMPHOLOGY:  Negative for prolonged bleeding, bruising easily or swollen nodes.  Patient is not currently taking any anti-coagulants  ENDO: No history of diabetes or thyroid dysfunction  NEURO:   No history of headaches, syncope, paralysis, seizures or tremors.  ENDO: Thyroid disease.   All other reviewed and negative other than HPI.    OBJECTIVE:    /79   Pulse 63   Temp 98.3 °F (36.8 °C) (Oral)   Resp 18   Ht 5' 3" (1.6 m)   Wt 47.9 kg (105 lb 9.6 oz)   BMI 18.71 kg/m²     PHYSICAL EXAMINATION:    GENERAL: Well appearing, in no acute distress, alert and oriented x3.  PSYCH:  Mood and affect appropriate.  SKIN: Skin color, texture, turgor normal, no rashes or lesions.  HEAD/FACE:  Normocephalic, atraumatic. Cranial nerves grossly intact.  NECK: No pain to palpation over the cervical paraspinous muscles. Spurling Negative. No pain with neck flexion, extension, or lateral flexion.   CV: RRR with palpation of the radial artery.  PULM: No evidence of respiratory difficulty, symmetric chest rise.  GI:  Soft and non-tender.  BACK: Straight leg raising in the supine position is negative to radicular pain. No pain to palpation over the facet joints of the " lumbar spine or spinous processes. Full ROM with pain on extension.   EXTREMITIES: Peripheral joint ROM is full and pain free without obvious instability or laxity in all four extremities. No deformities, edema, or skin discoloration. Good capillary refill.  MUSCULOSKELETAL: Shoulder, hip, and knee provocative maneuvers are negative.  There is no pain with palpation over the sacroiliac joints bilaterally.  FABERs test is negative.  FADIRs test is negative.   Bilateral upper and lower extremity strength is normal and symmetric.  No atrophy or tone abnormalities are noted.  NEURO: Bilateral upper and lower extremity coordination and muscle stretch reflexes are physiologic and symmetric.  Plantar response are downgoing. No clonus.  No loss of sensation is noted.  GAIT: normal.    ASSESSMENT: 67 y.o. year old female with pain, consistent with     Encounter Diagnoses   Name Primary?    Other type of osteoarthritis, unspecified site Yes    Radiculopathy of lumbar region        PLAN:     - Previous imaging was reviewed and discussed with the patient today.    - Schedule for repeat left L4 TF CARISSA. The procedure, risks, benefits and options were discussed with patient. There are no contraindications to the procedure. The patient expressed understanding and agreed to proceed.  Consent obtained today.    - Increase Gabapentin to 1800 mg daily. Titration instructions provided today.     - She may be a candidate for spinal cord stimulation in the future. Informational DVD provided today.     - RTC 2 weeks after above procedure.     The above plan and management options were discussed at length with patient. Patient is in agreement with the above and verbalized understanding. It will be communicated with the referring physician via electronic record, fax, or mail.    Cliff Acosta  05/30/2017

## 2017-05-31 ENCOUNTER — TELEPHONE (OUTPATIENT)
Dept: PAIN MEDICINE | Facility: CLINIC | Age: 67
End: 2017-05-31

## 2017-05-31 NOTE — TELEPHONE ENCOUNTER
----- Message from Elina Ingram sent at 5/31/2017  3:58 PM CDT -----  Contact: pt  _  1st Request  _  2nd Request  _  3rd Request        Who: pt    Why: pt needs to schedule an epidural for her back. Please call the pt [phone]. Please leave a message [phone] leave a voice mail at work also.    What Number to Call Back:[phone]    When to Expect a call back: (Before the end of the day)   -- if the call is after 12:00, the call back will be tomorrow.

## 2017-05-31 NOTE — TELEPHONE ENCOUNTER
Please contact patient and schedule.       PLAN:      - Previous imaging was reviewed and discussed with the patient today.     - Schedule for repeat left L4 TF CARISSA. The procedure, risks, benefits and options were discussed with patient. There are no contraindications to the procedure. The patient expressed understanding and agreed to proceed.  Consent obtained today.     - Increase Gabapentin to 1800 mg daily. Titration instructions provided today.      - She may be a candidate for spinal cord stimulation in the future. Informational DVD provided today.      - RTC 2 weeks after above procedure.      The above plan and management options were discussed at length with patient. Patient is in agreement with the above and verbalized understanding. It will be communicated with the referring physician via electronic record, fax, or mail.     Cliff Acosta  05/30/2017

## 2017-06-14 ENCOUNTER — HOSPITAL ENCOUNTER (OUTPATIENT)
Facility: OTHER | Age: 67
Discharge: HOME | End: 2017-06-14
Attending: ANESTHESIOLOGY | Admitting: ANESTHESIOLOGY
Payer: COMMERCIAL

## 2017-06-14 VITALS
HEART RATE: 67 BPM | DIASTOLIC BLOOD PRESSURE: 68 MMHG | HEIGHT: 63 IN | OXYGEN SATURATION: 98 % | RESPIRATION RATE: 16 BRPM | BODY MASS INDEX: 18.61 KG/M2 | TEMPERATURE: 98 F | WEIGHT: 105 LBS | SYSTOLIC BLOOD PRESSURE: 129 MMHG

## 2017-06-14 DIAGNOSIS — M54.16 RADICULOPATHY OF LUMBAR REGION: ICD-10-CM

## 2017-06-14 DIAGNOSIS — M51.26 LUMBAR DISC HERNIATION: Primary | ICD-10-CM

## 2017-06-14 PROCEDURE — 64483 NJX AA&/STRD TFRM EPI L/S 1: CPT | Mod: LT,,, | Performed by: ANESTHESIOLOGY

## 2017-06-14 PROCEDURE — 25000003 PHARM REV CODE 250: Performed by: ANESTHESIOLOGY

## 2017-06-14 PROCEDURE — 64483 NJX AA&/STRD TFRM EPI L/S 1: CPT | Performed by: ANESTHESIOLOGY

## 2017-06-14 PROCEDURE — 63600175 PHARM REV CODE 636 W HCPCS: Performed by: ANESTHESIOLOGY

## 2017-06-14 PROCEDURE — 99152 MOD SED SAME PHYS/QHP 5/>YRS: CPT | Mod: ,,, | Performed by: ANESTHESIOLOGY

## 2017-06-14 PROCEDURE — 25500020 PHARM REV CODE 255: Performed by: ANESTHESIOLOGY

## 2017-06-14 RX ORDER — SODIUM CHLORIDE 9 MG/ML
INJECTION, SOLUTION INTRAVENOUS CONTINUOUS
Status: DISCONTINUED | OUTPATIENT
Start: 2017-06-14 | End: 2017-06-14 | Stop reason: HOSPADM

## 2017-06-14 RX ORDER — LIDOCAINE HYDROCHLORIDE 10 MG/ML
INJECTION INFILTRATION; PERINEURAL
Status: DISCONTINUED | OUTPATIENT
Start: 2017-06-14 | End: 2017-06-14 | Stop reason: HOSPADM

## 2017-06-14 RX ORDER — BUPIVACAINE HYDROCHLORIDE 2.5 MG/ML
INJECTION, SOLUTION EPIDURAL; INFILTRATION; INTRACAUDAL
Status: DISCONTINUED | OUTPATIENT
Start: 2017-06-14 | End: 2017-06-14 | Stop reason: HOSPADM

## 2017-06-14 RX ORDER — METHYLPREDNISOLONE ACETATE 40 MG/ML
INJECTION, SUSPENSION INTRA-ARTICULAR; INTRALESIONAL; INTRAMUSCULAR; SOFT TISSUE
Status: DISCONTINUED | OUTPATIENT
Start: 2017-06-14 | End: 2017-06-14 | Stop reason: HOSPADM

## 2017-06-14 RX ORDER — MIDAZOLAM HYDROCHLORIDE 1 MG/ML
INJECTION INTRAMUSCULAR; INTRAVENOUS
Status: DISCONTINUED | OUTPATIENT
Start: 2017-06-14 | End: 2017-06-14 | Stop reason: HOSPADM

## 2017-06-14 RX ADMIN — SODIUM CHLORIDE: 0.9 INJECTION, SOLUTION INTRAVENOUS at 09:06

## 2017-06-14 NOTE — OP NOTE
INFORMED CONSENT: The procedure, risks, benefits and options were discussed with patient. There are no contraindications to the procedure. The patient expressed understanding and agreed to proceed. The personnel performing the procedure was discussed.    06/14/2017    Surgeon: Cliff Acosta MD    Assistants:   Malvin Jin MD PGY-4  I was present and supervising all critical portions of the procedure    PROCEDURE:  Left  L4  TRANSFORAMINAL EPIDURAL STEROID INJECTION    Pre Procedure diagnosis:   Left  L4  transforaminal stenosis with radiculopathy    Post-Procedure diagnosis:   same    Complications: None    Specimens: None    Sedation: None    DESCRIPTION OF PROCEDURE: The patient was brought to the procedure room. IV access was obtained prior to the procedure. The patient was positioned prone on the fluoroscopy table. Continuous hemodynamic monitoring was initiated including blood pressure, EKG, and pulse oximetry. . The skin was prepped with chlorhexidine and draped in a sterile fashion. Skin anesthesia was achieved using a total of 5mL of lidocaine over the respective injection site.     The Left L4  transforaminal space was identified with fluoroscopy in the  AP, oblique, and lateral views.  A 22 gauge spinal quinke needle was then advanced into the area of the trans foraminal space with confirmation of proper needle position using AP, oblique, and lateral fluoroscopic views. Once the needle tip was in the area of the transforaminal space, and there was no blood, CSF or paraesthesias,  1.5 mL of Omnipaque 300mg/ml was injected.  Fluoroscopic imaging in the AP and lateral views revealed a clear outline of the spinal nerve with proximal spread of agent through the neural foramen into the epidural space. A total combination of 1 mL of Bupivicaine 0.25% and 40 mg depo medrol was injected with displacement of the contrast dye confirming that the medication went into the area of the transforaminal space. A  sterile dressing was applied.   Patient tolerated the procedure well.    Conscious sedation provided by M.D    The patient was monitored with continuous pulse oximetry, EKG, and intermittent blood pressure monitors.  The patient was hemodynamically stable throughout the entire process was responsive to voice, and breathing spontaneously.  Supplemental O2 was provided at 2L/min via nasal cannula.  Patient was comfortable for the duration of the procedure.    There was a total of 2mg IV Midazolam was titrated for the procedure    Patient was taken back to the recovery room for further observation.     The patient was discharged to home in stable condition

## 2017-06-14 NOTE — DISCHARGE SUMMARY
Discharge Note  Short Stay      SUMMARY     Admit Date: 6/14/2017    Attending Physician: Cliff Acosta      Discharge Physician: Cliff Acosta      Discharge Date: 6/14/2017 9:43 AM     PROCEDURE:  Left  L4  TRANSFORAMINAL EPIDURAL STEROID INJECTION    Pre Procedure diagnosis:   Left  L4  transforaminal stenosis with radiculopathy    Disposition: Home or self care    Patient Instructions:   Current Discharge Medication List      CONTINUE these medications which have NOT CHANGED    Details   acyclovir (ZOVIRAX) 400 MG tablet Take 1 tablet (400 mg total) by mouth 2 (two) times daily.  Qty: 60 tablet, Refills: 12    Associated Diagnoses: HSV infection      ascorbic acid (VITAMIN C) 1000 MG tablet Take 1,000 mg by mouth once daily.       biotin 1 mg tablet Take 1 mg by mouth once daily.       buPROPion (WELLBUTRIN XL) 150 MG TB24 tablet TAKE ONE TABLET BY MOUTH ONCE DAILY  Qty: 30 tablet, Refills: 12    Associated Diagnoses: Depression, unspecified depression type      calcium-vitamin D 500 mg(1,250mg) -200 unit per tablet Take 1 tablet by mouth once daily.       chondroitin sulfate-vit C-Mn (CHONDROITIN SULFATE COMPLEX) 400-60-2.5 mg Cap Take 1 tablet by mouth once daily.       clonazePAM (KLONOPIN) 1 MG tablet Take 1 tablet (1 mg total) by mouth nightly as needed.  Qty: 30 tablet, Refills: 5    Associated Diagnoses: Anxiety      dicyclomine (BENTYL) 20 mg tablet Take 1 tablet (20 mg total) by mouth 3 (three) times daily.  Qty: 90 tablet, Refills: 12    Associated Diagnoses: Irritable bowel syndrome with diarrhea      digestive enzymes Tab Take 1 tablet by mouth once daily.       diphenhydrAMINE (BENADRYL) 25 mg capsule Take 25 mg by mouth nightly as needed.       fexofenadine (ALLEGRA) 180 MG tablet Take 1 tablet (180 mg total) by mouth once daily.  Qty: 30 tablet, Refills: 12    Associated Diagnoses: Seasonal allergies      !! gabapentin (NEURONTIN) 300 MG capsule Take 1 capsule (300 mg total) by mouth 3  (three) times daily.  Qty: 90 capsule, Refills: 3      !! gabapentin (NEURONTIN) 300 MG capsule Increase to 2 tablets at night, 1 in AM and 1 in afternoon for 7 days.  Then increase to 2 tablets at night and 2 in morning, 1 tablet in afternoon for 7 days.  Then increase to 2 tablets three times a day (1800mg)  Qty: 180 capsule, Refills: 2      !! gabapentin (NEURONTIN) 400 MG capsule Take 1 capsule (400 mg total) by mouth 2 (two) times daily as needed.  Qty: 60 capsule, Refills: 11    Associated Diagnoses: Radiculopathy, unspecified spinal region      ginseng 200 mg Cap Take 200 mg by mouth 2 (two) times daily as needed.       glucosamine sulfate 2KCl 1,000 mg Tab Take 1 tablet by mouth once daily.       levothyroxine (SYNTHROID) 112 MCG tablet Take 1 tablet (112 mcg total) by mouth once daily.  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       tretinoin (RETIN-A) 0.1 % cream Apply topically every evening.  Qty: 20 g, Refills: 6    Associated Diagnoses: Other acne      zinc 50 mg Tab Take 50 mg by mouth once daily.       b complex vitamins (VITAMIN B COMPLEX) tablet Take 1 tablet by mouth daily as needed.       CYANOCOBALAMIN/FOLIC ACID (VITAMIN B12-FOLIC ACID ORAL) Take by mouth.       fluticasone (FLONASE) 50 mcg/actuation nasal spray Spray once in each nostril twice daily as needed for rhinitis.  Qty: 16 g, Refills: 11    Associated Diagnoses: Other seasonal allergic rhinitis      hydrocodone-acetaminophen 5-325mg (NORCO) 5-325 mg per tablet Take 1 tablet by mouth every 6 (six) hours as needed (pain 4-5/10).  Qty: 60 tablet, Refills: 0      ondansetron (ZOFRAN-ODT) 8 MG TbDL Take 1 tablet (8 mg total) by mouth every 6 (six) hours as needed.  Qty: 60 tablet, Refills: 0      triamterene-hydrochlorothiazide 37.5-25 mg (MAXZIDE-25) 37.5-25 mg per tablet Take 1 tablet by mouth once daily.  Qty:  30 tablet, Refills: 11       !! - Potential duplicate medications found. Please discuss with provider.          Resume home diet and activity

## 2017-06-14 NOTE — DISCHARGE INSTRUCTIONS
Home Care Instructions Pain Management:    1. DIET:   You may resume your normal diet today.   2. BATHING:   You may shower with luke warm water. No tub baths or anything that will soak injection sites under water for 24 hours.  3. DRESSING:   You may remove your bandage today.   4. ACTIVITY LEVEL:   You may resume your normal activities 24 hrs after your procedure. Nothing strenuous today.  5. MEDICATIONS:   You may resume your normal medications today. To restart blood thinners, ask your doctor.  6. SPECIAL INSTRUCTIONS:   No heat to the injection site for 24 hrs including, bath or shower, heating pad, moist heat, or hot tubs.    Use ice pack to injection site for any pain or discomfort.  Apply ice packs for 20 minute intervals as needed.     If you have received any sedatives by mouth today you may not drive for 12 hours.    If you have received any sedation through your IV, you may not drive for 24 hrs.     PLEASE CALL YOUR DOCTOR IF:  1. Redness or swelling around the injection site.  2. Fever of 101 degrees or more  3. Drainage (pus) from the injection site.  4. For any continuous bleeding (some dried blood over the incision is normal.)    FOR EMERGENCIES:   If any unusual problems or difficulties occur during clinic hours, call [phone] or 388. Procedural Sedation  Procedural sedation is medicine to ease discomfort, pain, and anxiety during a procedure. The medicine is often given through an intravenous (IV) line in your arm or hand. In some cases, the medicine may be taken by mouth or inhaled. While you are under sedation, you will likely be awake. But you may not remember anything afterward.  Why procedural sedation is used  Sedation is used for many types of procedures. The goal is to reduce pain, anxiety, and stressful memories of a procedure. It can also help your health care provider treat you. For example, having a broken bone fixed may be easier if you feel relaxed.  Procedural sedation is used  only for short, basic procedures. It is not used for complex surgeries. Some procedures that use this type of sedation include:  · Dental surgery  · Breast biopsy, to take a sample of breast tissue  · Endoscopy, to look at gastrointestinal problems  · Bronchoscopy, to check for lung problems  · Bone or joint realignment, to fix a broken bone or dislocated joint  · Minor foot or skin surgery  · Electrical cardioversion, to restore a normal heart rhythm  · Lumbar puncture, to assess neurological disease  Risks of procedural sedation  Procedural sedation has some risks and possible side effects, such as:  · Headache  · Nausea and vomiting  · Unpleasant memory of the procedure  · Lowered rate of breathing  · Changes in heart rate and blood pressure (rare)  · Inhalation of stomach contents into your lungs (rare)  Side effects will likely go away shortly after the procedure. Your health care team will watch your heart rate and breathing during your sedation. This is to help prevent problems.  Your own risks may vary based on your age and your overall health. They also depend on the type of sedation you are given. Talk with your health care provider about the risks that apply most to you.  Getting ready for procedural sedation  Talk with your health care provider how to get ready for your procedure. Tell him or her about all the medicines you take. This includes over-the-counter medicines such as ibuprofen. It also includes vitamins, herbs, and other supplements. You may need to stop taking some medicines before the procedure, such as blood thinners and aspirin. If you smoke, you may need to stop. Talk with your health care provider if you need help to stop smoking.  Tell your health care provider if you:  · Have had any problems in the past with sedation or anesthesia  · Have had any recent changes in your health, such as an infection or fever  · Are pregnant or think you may be pregnant  Also, make sure to:  · Ask a  family member or friend to take you home after the procedure. You cannot drive on the day you receive sedation.  · Not eat or drink after midnight the night before your procedure, if advised.  · Follow all other instructions from your health care provider.  During your procedural sedation  You may have your procedure in a hospital or a medical clinic. Sedation is done by a trained health care provider. In general, you can expect the following:  · You will be given medicine through an IV line in your arm or hand. Or you may receive a shot. The medicine may also be given by mouth. Or you may inhale it through a mask.  · If you receive medicine through an IV, you may feel the effects very quickly. You will start to feel relaxed and drowsy.  · During the procedure, your heart rate, breathing, and blood pressure will be closely watched. Your breathing and blood pressure may decrease a little. But you will likely not need help with your breathing. You may receive a little extra oxygen through a mask.  · You will probably be awake the entire time. If you do fall asleep, you should be easy to wake up, if needed. You should feel little or no pain.  · When your procedure is over, the sedative medicine will be stopped.  After your procedural sedation  You will begin to feel more awake and aware. But you will likely be drowsy for a while afterward. You will be closely watched as you become more alert. You may have a faint memory of the procedure. Or you may not remember it at all.  You should be able to return home within an hour or two after your procedure. Plan to have someone stay with you for a few hours. Side effects such as headache and nausea may go away quickly. Tell your health care provider if they continue.  Dont drive or make any important decisions for at least 24 hours. Be sure to follow all after-care instructions.      When to call your health care provider  Have someone call your health care provider right  away if you have any of these:  · Drowsiness that gets worse  · Weakness or dizziness that gets worse  · Repeated vomiting  · You cant be awakened   Date Last Reviewed: 2/6/2015  © 1476-2602 [employer]. [address] [place] [address], [place] [address]. All rights reserved. This information is not intended as a substitute for professional medical care. Always follow your healthcare professional's instructions.

## 2017-06-29 ENCOUNTER — TELEPHONE (OUTPATIENT)
Dept: FAMILY MEDICINE | Facility: CLINIC | Age: 67
End: 2017-06-29

## 2017-06-29 ENCOUNTER — OFFICE VISIT (OUTPATIENT)
Dept: PAIN MEDICINE | Facility: CLINIC | Age: 67
End: 2017-06-29
Attending: ANESTHESIOLOGY
Payer: COMMERCIAL

## 2017-06-29 VITALS
WEIGHT: 103.63 LBS | HEIGHT: 63 IN | SYSTOLIC BLOOD PRESSURE: 101 MMHG | HEART RATE: 58 BPM | TEMPERATURE: 98 F | DIASTOLIC BLOOD PRESSURE: 67 MMHG | RESPIRATION RATE: 20 BRPM | BODY MASS INDEX: 18.36 KG/M2

## 2017-06-29 DIAGNOSIS — M54.17 LUMBOSACRAL RADICULOPATHY: ICD-10-CM

## 2017-06-29 DIAGNOSIS — Z00.00 ROUTINE GENERAL MEDICAL EXAMINATION AT A HEALTH CARE FACILITY: Primary | ICD-10-CM

## 2017-06-29 DIAGNOSIS — M51.36 DDD (DEGENERATIVE DISC DISEASE), LUMBAR: ICD-10-CM

## 2017-06-29 DIAGNOSIS — M54.16 RADICULOPATHY OF LUMBAR REGION: Primary | ICD-10-CM

## 2017-06-29 DIAGNOSIS — M51.26 LUMBAR DISC HERNIATION: ICD-10-CM

## 2017-06-29 PROCEDURE — 1125F AMNT PAIN NOTED PAIN PRSNT: CPT | Mod: S$GLB,,, | Performed by: ANESTHESIOLOGY

## 2017-06-29 PROCEDURE — 99213 OFFICE O/P EST LOW 20 MIN: CPT | Mod: S$GLB,,, | Performed by: ANESTHESIOLOGY

## 2017-06-29 PROCEDURE — 1159F MED LIST DOCD IN RCRD: CPT | Mod: S$GLB,,, | Performed by: ANESTHESIOLOGY

## 2017-06-29 PROCEDURE — 99999 PR PBB SHADOW E&M-EST. PATIENT-LVL III: CPT | Mod: PBBFAC,,, | Performed by: ANESTHESIOLOGY

## 2017-06-29 NOTE — PROGRESS NOTES
Chronic Pain - Established Visit    Referring Physician: No ref. provider found    Chief Complaint:   Chief Complaint   Patient presents with    Leg Pain        SUBJECTIVE: Disclaimer: This note has been generated using voice-recognition software. There may be typographical errors that have been missed during proof-reading    Interval History 6/29/2017:  The patient returns today for follow up of lower back and leg pain.  She is s/p left L4 TF CARISSA on 6/14/17 with 75% relief.  Her pain is now tolerable.  She has increased her exercise and activity recently.  She still has mild pain and numbness to her left lateral thigh above the knee.  She takes 2-3 fitness classes per week and also walks on the treadmill.  She is cautious with her exercises and avoids leg lifts and squats.  She has increased Gabapentin to 1500 mg daily with benefit.  She is not scheduled for any further follow up with Dr. Kaba.  Her pain today is 2/10.  The patient denies any bowel or bladder incontinence or signs of saddle paresthesia.  The patient denies any major medical changes since last office visit.    Initial encounter:    Angelina Man presents to the clinic for the evaluation of leg pain. The pain started in 2016 following prolonged walking and symptoms have been unchanged. She previously had radicular symptoms to both lower extremities. She had right sided microdiskectomy at L4-5 in 2016 which resolved her right sided radicular pain. She recently had left sided microdiskectomy at L4-5 on 3/20/2017 but continues to have left sided leg pain. She has had epidurals in the past through our office, ordered per neurosurgery with limited relief. She is s/p left L4 TF CARISSA on 5/17/17 with significant relief of her pain from her knee to her ankle. She does report occasional tingling in the calf muscle.     Brief history:    Pain Description:    The pain is located in the left thigh area and radiates to the ankle.      At BEST  2/10     At WORST   7/10 on the WORST day.      On average pain is rated as 5/10.     Today the pain is rated as 5/10    The pain is described as burning      Symptoms interfere with daily activity and work.     Exacerbating factors: Standing and Walking.      Mitigating factors rest and sitting.     Patient denies urinary incontinence and bowel incontinence.  Patient denies any suicidal or homicidal ideations    Pain Medications:  Current:  Gabapentin 1500 mg daily    Tried in Past:  NSAIDs -Never  TCA -Never  SNRI -yes  Anti-convulsants - Gabapentin  Muscle Relaxants -Never  Opioids-Norco for post op pain    Physical Therapy/Home Exercise: yes       report:  Reviewed and consistent with medication use as prescribed.    Pain Procedures:   10/19/16- Left L4 TF CARISSA  12/14/16- Left  L5 TF CARISSA  2/8/17- Left L4 TF CARISSA   5/17/17- Left L4 TF CARISSA  6/14/17 Left L4 TF CARISSA- 75% relief    Chiropractor -never  Acupuncture - never  TENS unit -never  Spinal decompression - 5/19/16- right L4-5 microdiskectomy; 3/20/17- left L4-5 microdiskectomy    Joint replacement -never    Imaging:   X-ray Lumbar Spine 5/30/2017:  Findings:   Extensive degenerative disc disease at multiple levels. Grade 1 anterolisthesis L4 and L5 (12 mm). No significant change on flexion/extension views. Vertebral body heights are maintained. Paravertebral soft tissues are unremarkable.   Impression        Extensive degenerative changes as above without instability.     MRI Lumbar Spine 5/3/2017:  FINDINGS:    There are 5 lumbar vertebrae.  Vertebral body heights and alignment are maintained.  Bone marrow signal is preserved.  Diffuse disc space narrowing.  There is mild increased her signal within the L4-L5 disc space which is likely postsurgical. Conus terminates at L1 and appears unremarkable. Subcentimeter left renal cyst and right hepatic cyst.  Posterior abdominal structures otherwise unremarkable.Evaluation of sacroiliac joints is unremarkable.    L1-L2: Mild disc bulge  with bilateral facet arthropathy without evidence of spinal stenosis or neuroforaminal narrowing.    L2-L3: Diffuse dilatation with bilateral facet arthropathy and hypertrophy of the ligamentum of flavum resulting in mild spinal stenosis and mild left neural foraminal narrowing.    L3-L4: Diffuse disc bulge with bilateral facet arthropathy and hypertrophy of the ligamentum of flavum resulting in mild spinal stenosis and mild bilateral neural foraminal narrowing.    L4-L5: Unchanged grade 1 anterolisthesis measuring 0.3 cm with postsurgical changes from prior hemilaminectomy and discectomy.  Diffuse disc bulge and facet arthropathy resulting in moderate bilateral neural foraminal narrowing. There is mild increased STIR signal within the intervertebral disc which is likely postsurgical.  A peripherally enhancing fluid collection measuring maximal diameter of 1.3 cm is identified within the left L4-5 hemilaminectomy surgical bed.  There are persistent inflammatory changes exerting mass effect on the posterior left lateral thecal sac which is worsened in comparison to prior exam resulting in moderate spinal stenosis.    L5-S1: Diffuse disc bulge with bilateral facet arthropathy resulting in mild right neural foramen no spinal stenosis or left neural foraminal narrowing.   Impression        1.  Post surgical changes at L4-5 as above with a small peripherally enhancing fluid collection within the left L4-5 hemilaminectomy surgical bed likely representing a postsurgical seroma versus abscess.  There are worsening inflammatory changes exerting mass effect on the posterolateral left thecal sac at L4-5 resulting in moderate spinal stenosis.  2.  Unchanged grade 1 anterolisthesis at L4-5 with diffuse disc bulge and facet arthropathy resulting in moderate bilateral neural foraminal narrowing.  3.  Multilevel degenerative changes of the lumbar spine as above resulting in mild spinal stenosis at L2-3 and L3-4.  Mild neural  foraminal narrowing by level as above.         Past Medical History:   Diagnosis Date    Arthritis     Bronchitis     Cataract     Dry eyes     Hypothyroidism 6/23/2016    Rash     Squamous cell carcinoma     in-situ right upper inner arm, right wrist    Status post lumbar surgery 6/23/2016    Stress fracture     Thyroid disease      Past Surgical History:   Procedure Laterality Date    CERVICAL FUSION      COLONOSCOPY       Social History     Social History    Marital status: Single     Spouse name: N/A    Number of children: N/A    Years of education: N/A     Occupational History     [employer]     Social History Main Topics    Smoking status: Never Smoker    Smokeless tobacco: Never Used    Alcohol use Yes      Comment: socially, n ot weekly    Drug use: No    Sexual activity: No     Other Topics Concern    Are You Pregnant Or Think You May Be? No    Breast-Feeding No     Social History Narrative    No narrative on file     Family History   Problem Relation Age of Onset    Cancer Mother      Lung cancer    Heart failure Father     Colon cancer Father     Hypertension Father     Cataracts Father     Diabetes Son     Heart disease Son     No Known Problems Sister     No Known Problems Brother     No Known Problems Maternal Aunt     No Known Problems Maternal Uncle     No Known Problems Paternal Aunt     No Known Problems Paternal Uncle     No Known Problems Maternal Grandmother     No Known Problems Maternal Grandfather     No Known Problems Paternal Grandmother     No Known Problems Paternal Grandfather     Melanoma Daughter     Amblyopia Neg Hx     Blindness Neg Hx     Glaucoma Neg Hx     Macular degeneration Neg Hx     Retinal detachment Neg Hx     Strabismus Neg Hx     Stroke Neg Hx     Thyroid disease Neg Hx     Breast cancer Neg Hx     Ovarian cancer Neg Hx        Review of patient's allergies indicates:  No Known Allergies    Current Outpatient  Prescriptions   Medication Sig    acyclovir (ZOVIRAX) 400 MG tablet Take 1 tablet (400 mg total) by mouth 2 (two) times daily.    ascorbic acid (VITAMIN C) 1000 MG tablet Take 1,000 mg by mouth once daily.     b complex vitamins (VITAMIN B COMPLEX) tablet Take 1 tablet by mouth daily as needed.     biotin 1 mg tablet Take 1 mg by mouth once daily.     buPROPion (WELLBUTRIN XL) 150 MG TB24 tablet TAKE ONE TABLET BY MOUTH ONCE DAILY    calcium-vitamin D 500 mg(1,250mg) -200 unit per tablet Take 1 tablet by mouth once daily.     chondroitin sulfate-vit C-Mn (CHONDROITIN SULFATE COMPLEX) 400-60-2.5 mg Cap Take 1 tablet by mouth once daily.     clonazePAM (KLONOPIN) 1 MG tablet Take 1 tablet (1 mg total) by mouth nightly as needed.    CYANOCOBALAMIN/FOLIC ACID (VITAMIN B12-FOLIC ACID ORAL) Take by mouth.     dicyclomine (BENTYL) 20 mg tablet Take 1 tablet (20 mg total) by mouth 3 (three) times daily. (Patient taking differently: Take 20 mg by mouth 3 (three) times daily as needed. )    digestive enzymes Tab Take 1 tablet by mouth once daily.     diphenhydrAMINE (BENADRYL) 25 mg capsule Take 25 mg by mouth nightly as needed.     fexofenadine (ALLEGRA) 180 MG tablet Take 1 tablet (180 mg total) by mouth once daily. (Patient taking differently: Take 180 mg by mouth daily as needed. )    fluticasone (FLONASE) 50 mcg/actuation nasal spray Spray once in each nostril twice daily as needed for rhinitis.    gabapentin (NEURONTIN) 300 MG capsule Take 1 capsule (300 mg total) by mouth 3 (three) times daily.    gabapentin (NEURONTIN) 300 MG capsule Increase to 2 tablets at night, 1 in AM and 1 in afternoon for 7 days.  Then increase to 2 tablets at night and 2 in morning, 1 tablet in afternoon for 7 days.  Then increase to 2 tablets three times a day (1800mg)    gabapentin (NEURONTIN) 400 MG capsule Take 1 capsule (400 mg total) by mouth 2 (two) times daily as needed.    ginseng 200 mg Cap Take 200 mg by mouth 2  (two) times daily as needed.     glucosamine sulfate 2KCl 1,000 mg Tab Take 1 tablet by mouth once daily.     hydrocodone-acetaminophen 5-325mg (NORCO) 5-325 mg per tablet Take 1 tablet by mouth every 6 (six) hours as needed (pain 4-5/10).    levothyroxine (SYNTHROID) 112 MCG tablet Take 1 tablet (112 mcg total) by mouth once daily.    multivitamin (THERAGRAN) per tablet Take 1 tablet by mouth once daily.     ondansetron (ZOFRAN-ODT) 8 MG TbDL Take 1 tablet (8 mg total) by mouth every 6 (six) hours as needed.    tretinoin (RETIN-A) 0.1 % cream Apply topically every evening.    triamterene-hydrochlorothiazide 37.5-25 mg (MAXZIDE-25) 37.5-25 mg per tablet Take 1 tablet by mouth once daily. (Patient taking differently: Take 1 tablet by mouth daily as needed. )    zinc 50 mg Tab Take 50 mg by mouth once daily.      No current facility-administered medications for this visit.        REVIEW OF SYSTEMS:    GENERAL:  No weight loss, malaise or fevers.  HEENT:   No recent changes in vision or hearing  NECK:  Negative for lumps, no difficulty with swallowing.  RESPIRATORY:  Negative for cough, wheezing or shortness of breath, patient denies any recent URI.  CARDIOVASCULAR:  Negative for chest pain, leg swelling or palpitations.  GI:  Negative for abdominal discomfort, blood in stools or black stools or change in bowel habits.  MUSCULOSKELETAL:  See HPI.  SKIN:  Negative for lesions, rash, and itching.  PSYCH:  No mood disorder or recent psychosocial stressors.  Patients sleep is not disturbed secondary to pain.  HEMATOLOGY/LYMPHOLOGY:  Negative for prolonged bleeding, bruising easily or swollen nodes.  Patient is not currently taking any anti-coagulants  ENDO: No history of diabetes or thyroid dysfunction  NEURO:   No history of headaches, syncope, paralysis, seizures or tremors.  ENDO: Thyroid disease.   All other reviewed and negative other than HPI.    OBJECTIVE:    /67   Pulse (!) 58   Temp 97.8 °F (36.6  "°C) (Oral)   Resp 20   Ht 5' 3" (1.6 m)   Wt 47 kg (103 lb 9.6 oz)   BMI 18.35 kg/m²     PHYSICAL EXAMINATION:    GENERAL: Well appearing, in no acute distress, alert and oriented x3.  PSYCH:  Mood and affect appropriate.  SKIN: Skin color, texture, turgor normal, no rashes or lesions.  HEAD/FACE:  Normocephalic, atraumatic. Cranial nerves grossly intact.  NECK: No pain to palpation over the cervical paraspinous muscles. Spurling Negative. No pain with neck flexion, extension, or lateral flexion.   CV: RRR with palpation of the radial artery.  PULM: No evidence of respiratory difficulty, symmetric chest rise.  GI:  Soft and non-tender.  BACK: Straight leg raising in the supine position is negative to radicular pain. No pain to palpation over the facet joints of the lumbar spine or spinous processes. Full ROM with pain on extension and flexion. Positive facet loading bilaterally, L>R.  EXTREMITIES: Peripheral joint ROM is full and pain free without obvious instability or laxity in all four extremities. No deformities, edema, or skin discoloration. Good capillary refill.  MUSCULOSKELETAL: Shoulder, hip, and knee provocative maneuvers are negative.  There is no pain with palpation over the sacroiliac joints bilaterally.  FABERs test is negative.  FADIRs test is negative.   Bilateral upper and lower extremity strength is normal and symmetric.  No atrophy or tone abnormalities are noted.  NEURO: Bilateral upper and lower extremity coordination and muscle stretch reflexes are physiologic and symmetric.  Plantar response are downgoing. No clonus.  No loss of sensation is noted.  GAIT: Normal.    ASSESSMENT: 67 y.o. year old female with lower back and left leg pain, consistent with     Encounter Diagnoses   Name Primary?    Radiculopathy of lumbar region Yes    Lumbosacral radiculopathy     Lumbar disc herniation     DDD (degenerative disc disease), lumbar        PLAN:     - Previous imaging was reviewed and " discussed with the patient today.    - She is s/p left L4 TF CARISSA with significant benefit.  We discussed that we can repeat if needed in the future but will be mindful of steroid load.    - We discussed specific exercises to avoid given recent history of lumbar surgery.  I cautioned against exercises such as squats and leg raises.    - Can continue Gabapentin 1500 mg daily.  She will attempt to increase to 1800 mg if tolerated.    - She may be a candidate for spinal cord stimulation in the future if pain worsens.    - RTC in 3 months or sooner if needed.        The above plan and management options were discussed at length with patient. Patient is in agreement with the above and verbalized understanding.    Cliff Acosta  06/29/2017

## 2017-06-29 NOTE — TELEPHONE ENCOUNTER
----- Message from Karol Hernandez sent at 6/29/2017  1:32 PM CDT -----  Contact: pt   Doctor appointment and lab have been scheduled.  Please link lab orders to the lab appointment.  Date of doctor appointment:  9/26  Physical or EP:  epp  Date of lab appointment:  9/20  Comments:

## 2017-07-31 ENCOUNTER — TELEPHONE (OUTPATIENT)
Dept: FAMILY MEDICINE | Facility: CLINIC | Age: 67
End: 2017-07-31

## 2017-07-31 NOTE — TELEPHONE ENCOUNTER
----- Message from Rebecca Pineda sent at 7/31/2017  1:27 PM CDT -----  Regarding: PA Submitted   Good Afternoon,    I submitted the PA for Ms. Man's Tretinoin 0.1% cream.  It will take up to 72 hours to get an decision from insurance.  Let me know if you have any questions.    Thanks,  Rebecca Pineda  Pharmacy Technician   [facility]  Phone: [phone]  Fax: [phone]

## 2017-07-31 NOTE — TELEPHONE ENCOUNTER
----- Message from Rebecca Pineda sent at 7/31/2017  1:27 PM CDT -----  Regarding: PA Submitted   Good Afternoon,    I submitted the PA for Ms. Man's Tretinoin 0.1% cream.  It will take up to 72 hours to get an decision from insurance.  Let me know if you have any questions.    Thanks,  Rebecca Pineda  Pharmacy Technician   [facility]  Phone: [phone]  Fax: [phone]

## 2017-08-01 ENCOUNTER — TELEPHONE (OUTPATIENT)
Dept: FAMILY MEDICINE | Facility: CLINIC | Age: 67
End: 2017-08-01

## 2017-08-01 NOTE — TELEPHONE ENCOUNTER
----- Message from Rebecca Pineda sent at 8/1/2017  9:13 AM CDT -----  Regarding: PA Approved   Good Morning,     I wanted to let you know that the PA for Ms. Man's Tretinoin cream was approved today. I left a message for the patient.  Please let me know if you have any questions.    Thanks,  Rebecca Pineda  Pharmacy Technician   [facility]  Phone: [phone]  Fax: [phone]

## 2017-08-25 ENCOUNTER — TELEPHONE (OUTPATIENT)
Dept: PAIN MEDICINE | Facility: CLINIC | Age: 67
End: 2017-08-25

## 2017-08-25 NOTE — TELEPHONE ENCOUNTER
----- Message from Ahmet Sneed sent at 8/25/2017 11:12 AM CDT -----  _X  1st Request  _  2nd Request  _  3rd Request        Who: ELSA LIU [[id number]]    Why: Pt would like to speak with someone regarding her gabapentin medication. Please call to discuss.    What Number to Call Back:[phone] or [phone]    When to Expect a call back: (With in 24 hours)     Returned patient call, no answer, left voicemail message

## 2017-08-30 ENCOUNTER — TELEPHONE (OUTPATIENT)
Dept: PAIN MEDICINE | Facility: CLINIC | Age: 67
End: 2017-08-30

## 2017-08-30 NOTE — TELEPHONE ENCOUNTER
----- Message from Heena Valdes sent at 8/30/2017 11:54 AM CDT -----  x 1st Request  _ 2nd Request  _ 3rd Request    Who: Angelina     Why: Pt is currently taking gabapentin and is requesting to speak to nurse in regards to being prescribe a different medication. Please call and advise.     What Number to Call Back: [phone] cell phone or office number: [phone]     When to Expect a call back: (Before the end of the day)  -- if call after 3:00 call back will be tomorrow.       Returned patient call, no answer, left voicemail message

## 2017-09-04 ENCOUNTER — PATIENT MESSAGE (OUTPATIENT)
Dept: PAIN MEDICINE | Facility: CLINIC | Age: 67
End: 2017-09-04

## 2017-09-05 ENCOUNTER — TELEPHONE (OUTPATIENT)
Dept: PAIN MEDICINE | Facility: CLINIC | Age: 67
End: 2017-09-05

## 2017-09-05 ENCOUNTER — OFFICE VISIT (OUTPATIENT)
Dept: PAIN MEDICINE | Facility: CLINIC | Age: 67
End: 2017-09-05
Payer: COMMERCIAL

## 2017-09-05 VITALS
HEIGHT: 63 IN | WEIGHT: 105 LBS | DIASTOLIC BLOOD PRESSURE: 83 MMHG | TEMPERATURE: 99 F | SYSTOLIC BLOOD PRESSURE: 135 MMHG | HEART RATE: 61 BPM | BODY MASS INDEX: 18.61 KG/M2

## 2017-09-05 DIAGNOSIS — M51.26 LUMBAR DISC HERNIATION: ICD-10-CM

## 2017-09-05 DIAGNOSIS — M96.1 POSTLAMINECTOMY SYNDROME OF LUMBAR REGION: ICD-10-CM

## 2017-09-05 DIAGNOSIS — M54.17 LUMBOSACRAL RADICULOPATHY: Primary | ICD-10-CM

## 2017-09-05 DIAGNOSIS — M51.36 DDD (DEGENERATIVE DISC DISEASE), LUMBAR: ICD-10-CM

## 2017-09-05 PROCEDURE — 3075F SYST BP GE 130 - 139MM HG: CPT | Mod: S$GLB,,, | Performed by: NURSE PRACTITIONER

## 2017-09-05 PROCEDURE — 3079F DIAST BP 80-89 MM HG: CPT | Mod: S$GLB,,, | Performed by: NURSE PRACTITIONER

## 2017-09-05 PROCEDURE — 1125F AMNT PAIN NOTED PAIN PRSNT: CPT | Mod: S$GLB,,, | Performed by: NURSE PRACTITIONER

## 2017-09-05 PROCEDURE — 99213 OFFICE O/P EST LOW 20 MIN: CPT | Mod: S$GLB,,, | Performed by: NURSE PRACTITIONER

## 2017-09-05 PROCEDURE — 99999 PR PBB SHADOW E&M-EST. PATIENT-LVL III: CPT | Mod: PBBFAC,,, | Performed by: NURSE PRACTITIONER

## 2017-09-05 PROCEDURE — 3008F BODY MASS INDEX DOCD: CPT | Mod: S$GLB,,, | Performed by: NURSE PRACTITIONER

## 2017-09-05 PROCEDURE — 1159F MED LIST DOCD IN RCRD: CPT | Mod: S$GLB,,, | Performed by: NURSE PRACTITIONER

## 2017-09-05 RX ORDER — PREGABALIN 75 MG/1
75 CAPSULE ORAL NIGHTLY
Qty: 30 CAPSULE | Refills: 0 | Status: SHIPPED | OUTPATIENT
Start: 2017-09-05 | End: 2017-09-12 | Stop reason: SDUPTHER

## 2017-09-05 NOTE — PROGRESS NOTES
Chronic Pain - Established Visit    Referring Physician: No ref. provider found    Chief Complaint:   Chief Complaint   Patient presents with    Low-back Pain        SUBJECTIVE: Disclaimer: This note has been generated using voice-recognition software. There may be typographical errors that have been missed during proof-reading    Interval History 9/5/2017:  The patient returns to clinic today for follow up. She reports increased low back pain with radiating pain down the back and side of both legs to her ankles, right greater than left. She describes the pain as burning and tingling. She reports that she has recently decreased her Gabapentin to 600 mg daily due to constipation and bloating. She continues to be physically active. She denies any other health changes. She denies any bowel or bladder incontinence. Her pain today is 7/10.    Interval History 6/29/2017:  The patient returns today for follow up of lower back and leg pain.  She is s/p left L4 TF CARISSA on 6/14/17 with 75% relief.  Her pain is now tolerable.  She has increased her exercise and activity recently.  She still has mild pain and numbness to her left lateral thigh above the knee.  She takes 2-3 fitness classes per week and also walks on the treadmill.  She is cautious with her exercises and avoids leg lifts and squats.  She has increased Gabapentin to 1500 mg daily with benefit.  She is not scheduled for any further follow up with Dr. Kaba.  Her pain today is 2/10.  The patient denies any bowel or bladder incontinence or signs of saddle paresthesia.  The patient denies any major medical changes since last office visit.    Initial encounter:    Angelina Man presents to the clinic for the evaluation of leg pain. The pain started in 2016 following prolonged walking and symptoms have been unchanged. She previously had radicular symptoms to both lower extremities. She had right sided microdiskectomy at L4-5 in 2016 which resolved her right sided  radicular pain. She recently had left sided microdiskectomy at L4-5 on 3/20/2017 but continues to have left sided leg pain. She has had epidurals in the past through our office, ordered per neurosurgery with limited relief. She is s/p left L4 TF CARISSA on 5/17/17 with significant relief of her pain from her knee to her ankle. She does report occasional tingling in the calf muscle.     Brief history:    Pain Description:    The pain is located in the left thigh area and radiates to the ankle.      At BEST  2/10     At WORST  7/10 on the WORST day.      On average pain is rated as 5/10.     Today the pain is rated as 5/10    The pain is described as burning      Symptoms interfere with daily activity and work.     Exacerbating factors: Standing and Walking.      Mitigating factors rest and sitting.     Patient denies urinary incontinence and bowel incontinence.  Patient denies any suicidal or homicidal ideations    Pain Medications:  Current:  Gabapentin 600 mg daily    Tried in Past:  NSAIDs -Never  TCA -Never  SNRI -yes  Anti-convulsants - Gabapentin  Muscle Relaxants -Never  Opioids-Norco for post op pain    Physical Therapy/Home Exercise: yes       report:  Reviewed and consistent with medication use as prescribed.    Pain Procedures:   10/19/16- Left L4 TF CARISSA  12/14/16- Left  L5 TF CARISSA  2/8/17- Left L4 TF CARISSA   5/17/17- Left L4 TF CARISSA  6/14/17 Left L4 TF CARISSA- 75% relief    Chiropractor -never  Acupuncture - never  TENS unit -never  Spinal decompression - 5/19/16- right L4-5 microdiskectomy; 3/20/17- left L4-5 microdiskectomy    Joint replacement -never    Imaging:   X-ray Lumbar Spine 5/30/2017:  Findings:   Extensive degenerative disc disease at multiple levels. Grade 1 anterolisthesis L4 and L5 (12 mm). No significant change on flexion/extension views. Vertebral body heights are maintained. Paravertebral soft tissues are unremarkable.   Impression        Extensive degenerative changes as above without  instability.     MRI Lumbar Spine 5/3/2017:  FINDINGS:    There are 5 lumbar vertebrae.  Vertebral body heights and alignment are maintained.  Bone marrow signal is preserved.  Diffuse disc space narrowing.  There is mild increased her signal within the L4-L5 disc space which is likely postsurgical. Conus terminates at L1 and appears unremarkable. Subcentimeter left renal cyst and right hepatic cyst.  Posterior abdominal structures otherwise unremarkable.Evaluation of sacroiliac joints is unremarkable.    L1-L2: Mild disc bulge with bilateral facet arthropathy without evidence of spinal stenosis or neuroforaminal narrowing.    L2-L3: Diffuse dilatation with bilateral facet arthropathy and hypertrophy of the ligamentum of flavum resulting in mild spinal stenosis and mild left neural foraminal narrowing.    L3-L4: Diffuse disc bulge with bilateral facet arthropathy and hypertrophy of the ligamentum of flavum resulting in mild spinal stenosis and mild bilateral neural foraminal narrowing.    L4-L5: Unchanged grade 1 anterolisthesis measuring 0.3 cm with postsurgical changes from prior hemilaminectomy and discectomy.  Diffuse disc bulge and facet arthropathy resulting in moderate bilateral neural foraminal narrowing. There is mild increased STIR signal within the intervertebral disc which is likely postsurgical.  A peripherally enhancing fluid collection measuring maximal diameter of 1.3 cm is identified within the left L4-5 hemilaminectomy surgical bed.  There are persistent inflammatory changes exerting mass effect on the posterior left lateral thecal sac which is worsened in comparison to prior exam resulting in moderate spinal stenosis.    L5-S1: Diffuse disc bulge with bilateral facet arthropathy resulting in mild right neural foramen no spinal stenosis or left neural foraminal narrowing.   Impression        1.  Post surgical changes at L4-5 as above with a small peripherally enhancing fluid collection within the  left L4-5 hemilaminectomy surgical bed likely representing a postsurgical seroma versus abscess.  There are worsening inflammatory changes exerting mass effect on the posterolateral left thecal sac at L4-5 resulting in moderate spinal stenosis.  2.  Unchanged grade 1 anterolisthesis at L4-5 with diffuse disc bulge and facet arthropathy resulting in moderate bilateral neural foraminal narrowing.  3.  Multilevel degenerative changes of the lumbar spine as above resulting in mild spinal stenosis at L2-3 and L3-4.  Mild neural foraminal narrowing by level as above.         Past Medical History:   Diagnosis Date    Arthritis     Bronchitis     Cataract     Dry eyes     Hypothyroidism 6/23/2016    Rash     Squamous cell carcinoma     in-situ right upper inner arm, right wrist    Status post lumbar surgery 6/23/2016    Stress fracture     Thyroid disease      Past Surgical History:   Procedure Laterality Date    CERVICAL FUSION      COLONOSCOPY       Social History     Social History    Marital status: Single     Spouse name: N/A    Number of children: N/A    Years of education: N/A     Occupational History     [email or web address]     Social History Main Topics    Smoking status: Never Smoker    Smokeless tobacco: Never Used    Alcohol use Yes      Comment: socially, n ot weekly    Drug use: No    Sexual activity: No     Other Topics Concern    Are You Pregnant Or Think You May Be? No    Breast-Feeding No     Social History Narrative    No narrative on file     Family History   Problem Relation Age of Onset    Cancer Mother      Lung cancer    Heart failure Father     Colon cancer Father     Hypertension Father     Cataracts Father     Diabetes Son     Heart disease Son     No Known Problems Sister     No Known Problems Brother     No Known Problems Maternal Aunt     No Known Problems Maternal Uncle     No Known Problems Paternal Aunt     No Known Problems Paternal Uncle     No  Known Problems Maternal Grandmother     No Known Problems Maternal Grandfather     No Known Problems Paternal Grandmother     No Known Problems Paternal Grandfather     Melanoma Daughter     Amblyopia Neg Hx     Blindness Neg Hx     Glaucoma Neg Hx     Macular degeneration Neg Hx     Retinal detachment Neg Hx     Strabismus Neg Hx     Stroke Neg Hx     Thyroid disease Neg Hx     Breast cancer Neg Hx     Ovarian cancer Neg Hx        Review of patient's allergies indicates:  No Known Allergies    Current Outpatient Prescriptions   Medication Sig    acyclovir (ZOVIRAX) 400 MG tablet Take 1 tablet (400 mg total) by mouth 2 (two) times daily.    ascorbic acid (VITAMIN C) 1000 MG tablet Take 1,000 mg by mouth once daily.     b complex vitamins (VITAMIN B COMPLEX) tablet Take 1 tablet by mouth daily as needed.     biotin 1 mg tablet Take 1 mg by mouth once daily.     buPROPion (WELLBUTRIN XL) 150 MG TB24 tablet TAKE ONE TABLET BY MOUTH ONCE DAILY    calcium-vitamin D 500 mg(1,250mg) -200 unit per tablet Take 1 tablet by mouth once daily.     chondroitin sulfate-vit C-Mn (CHONDROITIN SULFATE COMPLEX) 400-60-2.5 mg Cap Take 1 tablet by mouth once daily.     clonazePAM (KLONOPIN) 1 MG tablet Take 1 tablet (1 mg total) by mouth nightly as needed.    CYANOCOBALAMIN/FOLIC ACID (VITAMIN B12-FOLIC ACID ORAL) Take by mouth.     dicyclomine (BENTYL) 20 mg tablet Take 1 tablet (20 mg total) by mouth 3 (three) times daily. (Patient taking differently: Take 20 mg by mouth 3 (three) times daily as needed. )    digestive enzymes Tab Take 1 tablet by mouth once daily.     diphenhydrAMINE (BENADRYL) 25 mg capsule Take 25 mg by mouth nightly as needed.     fexofenadine (ALLEGRA) 180 MG tablet Take 1 tablet (180 mg total) by mouth once daily. (Patient taking differently: Take 180 mg by mouth daily as needed. )    fluticasone (FLONASE) 50 mcg/actuation nasal spray Spray once in each nostril twice daily as needed  for rhinitis.    gabapentin (NEURONTIN) 300 MG capsule Take 1 capsule (300 mg total) by mouth 3 (three) times daily.    gabapentin (NEURONTIN) 300 MG capsule Increase to 2 tablets at night, 1 in AM and 1 in afternoon for 7 days.  Then increase to 2 tablets at night and 2 in morning, 1 tablet in afternoon for 7 days.  Then increase to 2 tablets three times a day (1800mg)    ginseng 200 mg Cap Take 200 mg by mouth 2 (two) times daily as needed.     glucosamine sulfate 2KCl 1,000 mg Tab Take 1 tablet by mouth once daily.     hydrocodone-acetaminophen 5-325mg (NORCO) 5-325 mg per tablet Take 1 tablet by mouth every 6 (six) hours as needed (pain 4-5/10).    levothyroxine (SYNTHROID) 112 MCG tablet Take 1 tablet (112 mcg total) by mouth once daily.    multivitamin (THERAGRAN) per tablet Take 1 tablet by mouth once daily.     ondansetron (ZOFRAN-ODT) 8 MG TbDL Take 1 tablet (8 mg total) by mouth every 6 (six) hours as needed.    tretinoin (RETIN-A) 0.1 % cream Apply topically every evening.    triamterene-hydrochlorothiazide 37.5-25 mg (MAXZIDE-25) 37.5-25 mg per tablet Take 1 tablet by mouth once daily. (Patient taking differently: Take 1 tablet by mouth daily as needed. )    zinc 50 mg Tab Take 50 mg by mouth once daily.     gabapentin (NEURONTIN) 400 MG capsule Take 1 capsule (400 mg total) by mouth 2 (two) times daily as needed.     No current facility-administered medications for this visit.        REVIEW OF SYSTEMS:    GENERAL:  No weight loss, malaise or fevers.  HEENT:   No recent changes in vision or hearing  NECK:  Negative for lumps, no difficulty with swallowing.  RESPIRATORY:  Negative for cough, wheezing or shortness of breath, patient denies any recent URI.  CARDIOVASCULAR:  Negative for chest pain, leg swelling or palpitations.  GI:  Negative for abdominal discomfort, blood in stools or black stools or change in bowel habits.  MUSCULOSKELETAL:  See HPI.  SKIN:  Negative for lesions, rash, and  "itching.  PSYCH:  No mood disorder or recent psychosocial stressors.  Patients sleep is not disturbed secondary to pain.  HEMATOLOGY/LYMPHOLOGY:  Negative for prolonged bleeding, bruising easily or swollen nodes.  Patient is not currently taking any anti-coagulants  ENDO: No history of diabetes or thyroid dysfunction  NEURO:   No history of headaches, syncope, paralysis, seizures or tremors.  ENDO: Thyroid disease.   All other reviewed and negative other than HPI.    OBJECTIVE:    /83   Pulse 61   Temp 98.5 °F (36.9 °C) (Oral)   Ht 5' 3" (1.6 m)   Wt 47.6 kg (105 lb)   BMI 18.60 kg/m²     PHYSICAL EXAMINATION:    GENERAL: Well appearing, in no acute distress, alert and oriented x3.  PSYCH:  Mood and affect appropriate.  SKIN: Skin color, texture, turgor normal, no rashes or lesions.  HEAD/FACE:  Normocephalic, atraumatic. Cranial nerves grossly intact.  NECK: No pain to palpation over the cervical paraspinous muscles. Spurling Negative. No pain with neck flexion, extension, or lateral flexion.   CV: RRR with palpation of the radial artery.  PULM: No evidence of respiratory difficulty, symmetric chest rise.  GI:  Soft and non-tender.  BACK: Straight leg raising in the supine position is positive to radicular pain bilaterally. No pain to palpation over the facet joints of the lumbar spine or spinous processes. Full ROM with pain on extension and flexion. Positive facet loading bilaterally.  EXTREMITIES: Peripheral joint ROM is full and pain free without obvious instability or laxity in all four extremities. No deformities, edema, or skin discoloration. Good capillary refill.  MUSCULOSKELETAL: Shoulder, hip, and knee provocative maneuvers are negative.  There is no pain with palpation over the sacroiliac joints bilaterally.  FABERs test is negative.  FADIRs test is negative.   Bilateral upper and lower extremity strength is normal and symmetric.  No atrophy or tone abnormalities are noted.  NEURO: Bilateral " upper and lower extremity coordination and muscle stretch reflexes are physiologic and symmetric.  Plantar response are downgoing. No clonus.  No loss of sensation is noted.  GAIT: Antalgic- ambulates without assistance.    ASSESSMENT: 67 y.o. year old female with lower back and left leg pain, consistent with     Encounter Diagnoses   Name Primary?    Lumbosacral radiculopathy Yes    Lumbar disc herniation     DDD (degenerative disc disease), lumbar     Postlaminectomy syndrome of lumbar region        PLAN:     - Previous imaging was reviewed and discussed with the patient today.    - Schedule for bilateral L4 TF CARISSA. The procedure, risks, benefits and options were discussed with patient. There are no contraindications to the procedure. The patient expressed understanding and agreed to proceed.  Consent obtained today.    - Discontinue Gabapentin. Trial Lyrica 75 mg at bedtime.     - We discussed specific exercises to avoid given recent history of lumbar surgery.  I cautioned against exercises such as squats and leg raises.    - She may be a candidate for spinal cord stimulation in the future if pain worsens.    - RTC 2 weeks after above procedure.     - Dr. Acosta was consulted on the patient and agrees with this plan.    The above plan and management options were discussed at length with patient. Patient is in agreement with the above and verbalized understanding.    Mague Nunes NP  09/05/2017

## 2017-09-12 DIAGNOSIS — M51.36 DDD (DEGENERATIVE DISC DISEASE), LUMBAR: ICD-10-CM

## 2017-09-12 DIAGNOSIS — M51.26 LUMBAR DISC HERNIATION: ICD-10-CM

## 2017-09-12 DIAGNOSIS — M54.17 LUMBOSACRAL RADICULOPATHY: ICD-10-CM

## 2017-09-12 DIAGNOSIS — M96.1 POSTLAMINECTOMY SYNDROME OF LUMBAR REGION: ICD-10-CM

## 2017-09-12 RX ORDER — PREGABALIN 75 MG/1
75 CAPSULE ORAL 2 TIMES DAILY
Qty: 60 CAPSULE | Refills: 0 | Status: SHIPPED | OUTPATIENT
Start: 2017-09-12 | End: 2017-11-27

## 2017-09-12 NOTE — TELEPHONE ENCOUNTER
Spoke with patient about her pain. She reports increased pain since last visit. She has not noticed any benefit with Lyrica at this time. We will increase the Lyrica to 75 mg BID. She is scheduled for CARISSA next week. She is currently taking Ibuprofen with benefit. I did inform her to not exceed 3200 mg in a day.

## 2017-09-12 NOTE — TELEPHONE ENCOUNTER
"Contacted and spoke to patient regarding message. She reports she is having a lot more pain since her office visit. She doesn't feel the Lyrica is helping with her pain. She has been taking advil more and more and would like to discuss different treatment options, as she has pain medication available to her but she doesn't take it and wants to know if she does, how should she.     She would like you to discuss giving her a "double dose" of medication in her epidural to help with her pain.      She is asking If you would contact her to discuss further [phone] or [phone].  "

## 2017-09-12 NOTE — TELEPHONE ENCOUNTER
----- Message from Lorenzo Oliva sent at 9/12/2017 10:22 AM CDT -----  Contact: pt  x_ 1st Request  _ 2nd Request  _ 3rd Request    Who: pt    Why: is experiencing pain and is needing to speak with staff    What Number to Call Back: cell [phone] or work [phone] [facility]    When to Expect a call back: (Before the end of the day)  -- if call after 3:00 call back will be tomorrow.

## 2017-09-18 ENCOUNTER — TELEPHONE (OUTPATIENT)
Dept: PAIN MEDICINE | Facility: CLINIC | Age: 67
End: 2017-09-18

## 2017-09-18 NOTE — TELEPHONE ENCOUNTER
----- Message from Lorenzo Oliva sent at 9/18/2017 11:18 AM CDT -----  Contact: pt  x_ 1st Request  _ 2nd Request  _ 3rd Request    Who: pt    Why: is needing to confirm arrival time for procedure on 09/19/17    What Number to Call Back: cell [phone] or office [phone]    When to Expect a call back: (Before the end of the day)  -- if call after 3:00 call back will be tomorrow.     Returned patient call, no answer, left voicemail message

## 2017-09-19 ENCOUNTER — SURGERY (OUTPATIENT)
Age: 67
End: 2017-09-19

## 2017-09-19 ENCOUNTER — HOSPITAL ENCOUNTER (OUTPATIENT)
Facility: OTHER | Age: 67
Discharge: HOME | End: 2017-09-19
Attending: ANESTHESIOLOGY | Admitting: ANESTHESIOLOGY
Payer: COMMERCIAL

## 2017-09-19 VITALS
OXYGEN SATURATION: 98 % | TEMPERATURE: 98 F | DIASTOLIC BLOOD PRESSURE: 79 MMHG | HEART RATE: 68 BPM | BODY MASS INDEX: 17.93 KG/M2 | WEIGHT: 105 LBS | HEIGHT: 64 IN | RESPIRATION RATE: 16 BRPM | SYSTOLIC BLOOD PRESSURE: 137 MMHG

## 2017-09-19 DIAGNOSIS — G89.29 CHRONIC PAIN: ICD-10-CM

## 2017-09-19 DIAGNOSIS — M51.36 DDD (DEGENERATIVE DISC DISEASE), LUMBAR: Primary | ICD-10-CM

## 2017-09-19 PROCEDURE — 25000003 PHARM REV CODE 250: Performed by: ANESTHESIOLOGY

## 2017-09-19 PROCEDURE — 64483 NJX AA&/STRD TFRM EPI L/S 1: CPT | Mod: 50 | Performed by: ANESTHESIOLOGY

## 2017-09-19 PROCEDURE — 25500020 PHARM REV CODE 255: Performed by: ANESTHESIOLOGY

## 2017-09-19 PROCEDURE — 63600175 PHARM REV CODE 636 W HCPCS: Performed by: ANESTHESIOLOGY

## 2017-09-19 PROCEDURE — 99152 MOD SED SAME PHYS/QHP 5/>YRS: CPT | Mod: ,,, | Performed by: ANESTHESIOLOGY

## 2017-09-19 PROCEDURE — 64483 NJX AA&/STRD TFRM EPI L/S 1: CPT | Mod: 50,,, | Performed by: ANESTHESIOLOGY

## 2017-09-19 PROCEDURE — 25000003 PHARM REV CODE 250: Performed by: PHYSICAL MEDICINE & REHABILITATION

## 2017-09-19 RX ORDER — LIDOCAINE HYDROCHLORIDE 10 MG/ML
INJECTION INFILTRATION; PERINEURAL
Status: DISCONTINUED | OUTPATIENT
Start: 2017-09-19 | End: 2017-09-19 | Stop reason: HOSPADM

## 2017-09-19 RX ORDER — MIDAZOLAM HYDROCHLORIDE 1 MG/ML
INJECTION INTRAMUSCULAR; INTRAVENOUS
Status: DISCONTINUED | OUTPATIENT
Start: 2017-09-19 | End: 2017-09-19 | Stop reason: HOSPADM

## 2017-09-19 RX ORDER — SODIUM CHLORIDE 9 MG/ML
500 INJECTION, SOLUTION INTRAVENOUS CONTINUOUS
Status: DISCONTINUED | OUTPATIENT
Start: 2017-09-19 | End: 2017-09-19 | Stop reason: HOSPADM

## 2017-09-19 RX ORDER — BUPIVACAINE HYDROCHLORIDE 2.5 MG/ML
INJECTION, SOLUTION EPIDURAL; INFILTRATION; INTRACAUDAL
Status: DISCONTINUED | OUTPATIENT
Start: 2017-09-19 | End: 2017-09-19 | Stop reason: HOSPADM

## 2017-09-19 RX ORDER — METHYLPREDNISOLONE ACETATE 40 MG/ML
INJECTION, SUSPENSION INTRA-ARTICULAR; INTRALESIONAL; INTRAMUSCULAR; SOFT TISSUE
Status: DISCONTINUED | OUTPATIENT
Start: 2017-09-19 | End: 2017-09-19 | Stop reason: HOSPADM

## 2017-09-19 RX ADMIN — METHYLPREDNISOLONE ACETATE 40 MG: 40 INJECTION, SUSPENSION INTRA-ARTICULAR; INTRALESIONAL; INTRAMUSCULAR; SOFT TISSUE at 09:09

## 2017-09-19 RX ADMIN — MIDAZOLAM HYDROCHLORIDE 2 MG: 1 INJECTION, SOLUTION INTRAMUSCULAR; INTRAVENOUS at 09:09

## 2017-09-19 RX ADMIN — IOHEXOL 10 ML: 300 INJECTION, SOLUTION INTRAVENOUS at 09:09

## 2017-09-19 RX ADMIN — SODIUM CHLORIDE 500 ML: 900 INJECTION, SOLUTION INTRAVENOUS at 08:09

## 2017-09-19 RX ADMIN — BUPIVACAINE HYDROCHLORIDE 10 ML: 2.5 INJECTION, SOLUTION EPIDURAL; INFILTRATION; INTRACAUDAL; PERINEURAL at 09:09

## 2017-09-19 RX ADMIN — LIDOCAINE HYDROCHLORIDE 10 ML: 10 INJECTION, SOLUTION INFILTRATION; PERINEURAL at 09:09

## 2017-09-19 NOTE — H&P
"HPI patient here for bilateral L4 transforaminal CARISSA. She reports no changes in location or distribution of pain, only increased intensity on the right side.       PMHx, PSHx, Allergies, Medications reviewed in epic    ROS negative except pain complaints in HPI    OBJECTIVE:    /70   Pulse 88   Temp 97.7 °F (36.5 °C) (Oral)   Resp 18   Ht 5' 4" (1.626 m)   Wt 47.6 kg (105 lb)   SpO2 99%   BMI 18.02 kg/m²     PHYSICAL EXAMINATION:    GENERAL: Well appearing, in no acute distress, alert and oriented x3.  PSYCH:  Mood and affect appropriate.  SKIN: Skin color, texture, turgor normal, no rashes or lesions.  CV: RRR with palpation of the radial artery.  PULM: No evidence of respiratory difficulty, symmetric chest rise. Clear to auscultation.  NEURO: Cranial nerves grossly intact.    Plan:    Proceed with procedure with conscious sedation as planned    Shon Navas  09/19/2017      "

## 2017-09-19 NOTE — OP NOTE
INFORMED CONSENT: The procedure, risks, benefits and options were discussed with patient. There are no contraindications to the procedure. The patient expressed understanding and agreed to proceed. The personnel performing the procedure was discussed.    09/19/2017    Surgeon: Cliff Acosta MD    Assistants: None    PROCEDURE:  Bilateral  L4  1) Left  L4 TRANSFORAMINAL EPIDURAL STEROID INJECTION  2) Right  L4 TRANSFORAMINAL EPIDURAL STEROID INJECTION      Pre Procedure diagnosis:  Bilateral L4  Lumbar radiculopathy [M54.16]    Post-Procedure diagnosis:   same    Complications: None    Specimens: None      DESCRIPTION OF PROCEDURE: The patient was brought to the procedure room. IV access was obtained prior to the procedure. The patient was positioned prone on the fluoroscopy table. Continuous hemodynamic monitoring was initiated including blood pressure, EKG, and pulse oximetry. . The skin was prepped with chlorhexidine and draped in a sterile fashion. Skin anesthesia was achieved using a total of 10mL of lidocaine, 5mL over each respective injection site.     The  L4 transforaminal spaces were identified with fluoroscopy in the  AP, oblique, and lateral views.  A 22 gauge spinal quinke needle was then advanced into the area of the trans foraminal spaces bilaterally with confirmation of proper needle position using AP, oblique, and lateral fluoroscopic views. Once the needle tip was in the area of the transforaminal space, and there was no blood, CSF or paraesthesias,  1.5 mL of Omnipaque 300mg/ml was injected on each side for a total of 3mL.  Fluoroscopic imaging in the AP and lateral views revealed a clear outline of the spinal nerve with proximal spread of agent through the neural foramen into the epidural space. A total combination of 1 mL of Bupivicaine 0.25% and 40 mg depo medrol was injected on each side for a total of 4mL of injected medications with displacement of the contrast dye confirming that the  medication went into the area of the transforaminal spaces bilaterally. A sterile dressing was applied.   Patient tolerated the procedure well.    Conscious sedation provided by M.D    The patient was monitored with continuous pulse oximetry, EKG, and intermittent blood pressure monitors.  The patient was hemodynamically stable throughout the entire process was responsive to voice, and breathing spontaneously.  Supplemental O2 was provided at 2L/min via nasal cannula.  Patient was comfortable for the duration of the procedure. (See nurse documentation and case log for sedation time)    There was a total of 2mg IV Midazolam was titrated for the procedure      Patient was taken back to the recovery room for further observation.     The patient was discharged to home in stable condition

## 2017-09-19 NOTE — DISCHARGE INSTRUCTIONS

## 2017-09-19 NOTE — PLAN OF CARE
Pt tolerated procedure well. Pt [id number] tolerated procedure well. Pt reports /10 pain after procedure. Assisted pt up for first time. Steady on feet. All discharge instructions given. /10 pain after procedure. Assisted pt up for first time. Steady on feet. All discharge instructions given.

## 2017-09-19 NOTE — DISCHARGE SUMMARY
Discharge Note  Short Stay      SUMMARY     Admit Date: 9/19/2017    Attending Physician: Cliff Acosta    Discharge Diagnosis: Lumbar radiculopathy [M54.16]    Discharge Physician: Cliff Acosta      Discharge Date: 9/19/2017 9:21 AM     PROCEDURE:  Bilateral  L4  1) Left  L4 TRANSFORAMINAL EPIDURAL STEROID INJECTION  2) Right  L4 TRANSFORAMINAL EPIDURAL STEROID INJECTION      Pre Procedure diagnosis:  Bilateral L4  Lumbar radiculopathy [M54.16]    Disposition: Home or self care    Patient Instructions:   Current Discharge Medication List      CONTINUE these medications which have NOT CHANGED    Details   acyclovir (ZOVIRAX) 400 MG tablet Take 1 tablet (400 mg total) by mouth 2 (two) times daily.  Qty: 60 tablet, Refills: 12    Associated Diagnoses: HSV infection      ascorbic acid (VITAMIN C) 1000 MG tablet Take 1,000 mg by mouth once daily.       b complex vitamins (VITAMIN B COMPLEX) tablet Take 1 tablet by mouth daily as needed.       biotin 1 mg tablet Take 1 mg by mouth once daily.       buPROPion (WELLBUTRIN XL) 150 MG TB24 tablet TAKE ONE TABLET BY MOUTH ONCE DAILY  Qty: 30 tablet, Refills: 12    Associated Diagnoses: Depression, unspecified depression type      calcium-vitamin D 500 mg(1,250mg) -200 unit per tablet Take 1 tablet by mouth once daily.       chondroitin sulfate-vit C-Mn (CHONDROITIN SULFATE COMPLEX) 400-60-2.5 mg Cap Take 1 tablet by mouth once daily.       clonazePAM (KLONOPIN) 1 MG tablet Take 1 tablet (1 mg total) by mouth nightly as needed.  Qty: 30 tablet, Refills: 5    Associated Diagnoses: Anxiety      CYANOCOBALAMIN/FOLIC ACID (VITAMIN B12-FOLIC ACID ORAL) Take by mouth.       dicyclomine (BENTYL) 20 mg tablet Take 1 tablet (20 mg total) by mouth 3 (three) times daily.  Qty: 90 tablet, Refills: 12    Associated Diagnoses: Irritable bowel syndrome with diarrhea      digestive enzymes Tab Take 1 tablet by mouth once daily.       diphenhydrAMINE (BENADRYL) 25 mg capsule Take 25 mg  by mouth nightly as needed.       fexofenadine (ALLEGRA) 180 MG tablet Take 1 tablet (180 mg total) by mouth once daily.  Qty: 30 tablet, Refills: 12    Associated Diagnoses: Seasonal allergies      fluticasone (FLONASE) 50 mcg/actuation nasal spray Spray once in each nostril twice daily as needed for rhinitis.  Qty: 16 g, Refills: 11    Associated Diagnoses: Other seasonal allergic rhinitis      ginseng 200 mg Cap Take 200 mg by mouth 2 (two) times daily as needed.       glucosamine sulfate 2KCl 1,000 mg Tab Take 1 tablet by mouth once daily.       hydrocodone-acetaminophen 5-325mg (NORCO) 5-325 mg per tablet Take 1 tablet by mouth every 6 (six) hours as needed (pain 4-5/10).  Qty: 60 tablet, Refills: 0      levothyroxine (SYNTHROID) 112 MCG tablet Take 1 tablet (112 mcg total) by mouth once daily.  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       ondansetron (ZOFRAN-ODT) 8 MG TbDL Take 1 tablet (8 mg total) by mouth every 6 (six) hours as needed.  Qty: 60 tablet, Refills: 0      pregabalin (LYRICA) 75 MG capsule Take 1 capsule (75 mg total) by mouth 2 (two) times daily.  Qty: 60 capsule, Refills: 0    Associated Diagnoses: Lumbosacral radiculopathy; Lumbar disc herniation; DDD (degenerative disc disease), lumbar; Postlaminectomy syndrome of lumbar region      tretinoin (RETIN-A) 0.1 % cream Apply topically every evening.  Qty: 20 g, Refills: 6    Associated Diagnoses: Other acne      triamterene-hydrochlorothiazide 37.5-25 mg (MAXZIDE-25) 37.5-25 mg per tablet Take 1 tablet by mouth once daily.  Qty: 30 tablet, Refills: 11      zinc 50 mg Tab Take 50 mg by mouth once daily.              Resume home diet and activity

## 2017-09-20 ENCOUNTER — LAB VISIT (OUTPATIENT)
Dept: LAB | Facility: HOSPITAL | Age: 67
End: 2017-09-20
Attending: FAMILY MEDICINE
Payer: COMMERCIAL

## 2017-09-20 ENCOUNTER — PATIENT MESSAGE (OUTPATIENT)
Dept: PAIN MEDICINE | Facility: CLINIC | Age: 67
End: 2017-09-20

## 2017-09-20 DIAGNOSIS — Z00.00 ROUTINE GENERAL MEDICAL EXAMINATION AT A HEALTH CARE FACILITY: ICD-10-CM

## 2017-09-20 LAB
ALBUMIN SERPL BCP-MCNC: 3.6 G/DL
ALP SERPL-CCNC: 74 U/L
ALT SERPL W/O P-5'-P-CCNC: 17 U/L
ANION GAP SERPL CALC-SCNC: 12 MMOL/L
AST SERPL-CCNC: 23 U/L
BASOPHILS # BLD AUTO: 0.02 K/UL
BASOPHILS NFR BLD: 0.4 %
BILIRUB SERPL-MCNC: 0.4 MG/DL
BUN SERPL-MCNC: 23 MG/DL
CALCIUM SERPL-MCNC: 8.9 MG/DL
CHLORIDE SERPL-SCNC: 102 MMOL/L
CHOLEST SERPL-MCNC: 191 MG/DL
CHOLEST/HDLC SERPL: 2.3 {RATIO}
CO2 SERPL-SCNC: 25 MMOL/L
CREAT SERPL-MCNC: 0.9 MG/DL
DIFFERENTIAL METHOD: ABNORMAL
EOSINOPHIL # BLD AUTO: 0.1 K/UL
EOSINOPHIL NFR BLD: 1.6 %
ERYTHROCYTE [DISTWIDTH] IN BLOOD BY AUTOMATED COUNT: 12.8 %
EST. GFR  (AFRICAN AMERICAN): >60 ML/MIN/1.73 M^2
EST. GFR  (NON AFRICAN AMERICAN): >60 ML/MIN/1.73 M^2
GLUCOSE SERPL-MCNC: 83 MG/DL
HCT VFR BLD AUTO: 36.8 %
HDLC SERPL-MCNC: 83 MG/DL
HDLC SERPL: 43.5 %
HGB BLD-MCNC: 12.7 G/DL
LDLC SERPL CALC-MCNC: 99 MG/DL
LYMPHOCYTES # BLD AUTO: 1.7 K/UL
LYMPHOCYTES NFR BLD: 35.3 %
MCH RBC QN AUTO: 32.3 PG
MCHC RBC AUTO-ENTMCNC: 34.5 G/DL
MCV RBC AUTO: 94 FL
MONOCYTES # BLD AUTO: 0.5 K/UL
MONOCYTES NFR BLD: 10.5 %
NEUTROPHILS # BLD AUTO: 2.5 K/UL
NEUTROPHILS NFR BLD: 52 %
NONHDLC SERPL-MCNC: 108 MG/DL
PLATELET # BLD AUTO: 226 K/UL
PMV BLD AUTO: 9.9 FL
POTASSIUM SERPL-SCNC: 3.8 MMOL/L
PROT SERPL-MCNC: 6.6 G/DL
RBC # BLD AUTO: 3.93 M/UL
SODIUM SERPL-SCNC: 139 MMOL/L
T4 FREE SERPL-MCNC: 1.2 NG/DL
TRIGL SERPL-MCNC: 45 MG/DL
TSH SERPL DL<=0.005 MIU/L-ACNC: 4.1 UIU/ML
WBC # BLD AUTO: 4.85 K/UL

## 2017-09-20 PROCEDURE — 36415 COLL VENOUS BLD VENIPUNCTURE: CPT | Mod: PO

## 2017-09-20 PROCEDURE — 85025 COMPLETE CBC W/AUTO DIFF WBC: CPT

## 2017-09-20 PROCEDURE — 80061 LIPID PANEL: CPT

## 2017-09-20 PROCEDURE — 84443 ASSAY THYROID STIM HORMONE: CPT

## 2017-09-20 PROCEDURE — 80053 COMPREHEN METABOLIC PANEL: CPT

## 2017-09-20 PROCEDURE — 84439 ASSAY OF FREE THYROXINE: CPT

## 2017-09-21 NOTE — TELEPHONE ENCOUNTER
Spoke with patient. I informed her that her Lyrica was sent on 9/12/2017. We also discussed spinal cord stimulation. She is interested in this. We will discuss this further at her follow up.

## 2017-09-25 ENCOUNTER — OFFICE VISIT (OUTPATIENT)
Dept: DERMATOLOGY | Facility: CLINIC | Age: 67
End: 2017-09-25
Payer: COMMERCIAL

## 2017-09-25 DIAGNOSIS — L82.1 SK (SEBORRHEIC KERATOSIS): ICD-10-CM

## 2017-09-25 DIAGNOSIS — D69.2 SENILE PURPURA: Primary | ICD-10-CM

## 2017-09-25 DIAGNOSIS — Z85.828 PERSONAL HISTORY OF SKIN CANCER: ICD-10-CM

## 2017-09-25 DIAGNOSIS — D22.9 NEVUS: ICD-10-CM

## 2017-09-25 DIAGNOSIS — L90.5 SCAR: ICD-10-CM

## 2017-09-25 DIAGNOSIS — L81.4 LENTIGO: ICD-10-CM

## 2017-09-25 PROCEDURE — 3008F BODY MASS INDEX DOCD: CPT | Mod: S$GLB,,, | Performed by: DERMATOLOGY

## 2017-09-25 PROCEDURE — 1159F MED LIST DOCD IN RCRD: CPT | Mod: S$GLB,,, | Performed by: DERMATOLOGY

## 2017-09-25 PROCEDURE — 1126F AMNT PAIN NOTED NONE PRSNT: CPT | Mod: S$GLB,,, | Performed by: DERMATOLOGY

## 2017-09-25 PROCEDURE — 99214 OFFICE O/P EST MOD 30 MIN: CPT | Mod: S$GLB,,, | Performed by: DERMATOLOGY

## 2017-09-25 PROCEDURE — 99999 PR PBB SHADOW E&M-EST. PATIENT-LVL II: CPT | Mod: PBBFAC,,, | Performed by: DERMATOLOGY

## 2017-09-25 NOTE — PROGRESS NOTES
Subjective:       Patient ID:  Angelina Man is a 67 y.o. female who presents for   Chief Complaint   Patient presents with    Lesion     Pt here today for a  TBSE. Pt c/o rough lesions on right arm x a few months. No bleeding or pain. No prev tx.  Has easy bruising on arms.  Used exicpial for bruising and did not help.    This is a high risk patient here to check for the development of new lesions.  Daughter diagnosed with melanoma last year        Lesion         Review of Systems   Skin: Positive for daily sunscreen use and activity-related sunscreen use. Negative for recent sunburn and wears hat.   Hematologic/Lymphatic: Bruises/bleeds easily.        Objective:    Physical Exam   Constitutional: She appears well-developed and well-nourished. No distress.   Neurological: She is alert and oriented to person, place, and time. She is not disoriented.   Psychiatric: She has a normal mood and affect.   Skin:   Areas Examined (abnormalities noted in diagram):   Scalp / Hair Palpated and Inspected  Head / Face Inspection Performed  Neck Inspection Performed  Chest / Axilla Inspection Performed  Abdomen Inspection Performed  Genitals / Buttocks / Groin Inspection Performed  Back Inspection Performed  RUE Inspected  LUE Inspection Performed  RLE Inspected  LLE Inspection Performed  Nails and Digits Inspection Performed                   Diagram Legend     Erythematous scaling macule/papule c/w actinic keratosis       Vascular papule c/w angioma      Pigmented verrucoid papule/plaque c/w seborrheic keratosis      Yellow umbilicated papule c/w sebaceous hyperplasia      Irregularly shaped tan macule c/w lentigo     1-2 mm smooth white papules consistent with Milia      Movable subcutaneous cyst with punctum c/w epidermal inclusion cyst      Subcutaneous movable cyst c/w pilar cyst      Firm pink to brown papule c/w dermatofibroma      Pedunculated fleshy papule(s) c/w skin tag(s)      Evenly pigmented macule c/w  junctional nevus     Mildly variegated pigmented, slightly irregular-bordered macule c/w mildly atypical nevus      Flesh colored to evenly pigmented papule c/w intradermal nevus       Pink pearly papule/plaque c/w basal cell carcinoma      Erythematous hyperkeratotic cursted plaque c/w SCC      Surgical scar with no sign of skin cancer recurrence      Open and closed comedones      Inflammatory papules and pustules      Verrucoid papule consistent consistent with wart     Erythematous eczematous patches and plaques     Dystrophic onycholytic nail with subungual debris c/w onychomycosis     Umbilicated papule    Erythematous-base heme-crusted tan verrucoid plaque consistent with inflamed seborrheic keratosis     Erythematous Silvery Scaling Plaque c/w Psoriasis     See annotation      Assessment / Plan:        Senile purpura  Reassurance. Secondary to sun damage  and age causing weakening of the support around the blood vessels.   Can be exacerbated by blood thinners as well  Can use OTC Amlactin lotion or Dermend    Lentigo  This is a benign hyperpigmented sun induced lesion. Daily sun protection will reduce the number of new lesions. Treatment of these benign lesions are considered cosmetic.  The nature of sun-induced photo-aging and skin cancers is discussed.  Sun avoidance, protective clothing, and the use of 30-SPF sunscreens is advised. Observe closely for skin damage/changes, and call if such occurs.    Nevus  Discussed ABCDE's of nevi.  Monitor for new mole or moles that are becoming bigger, darker, irritated, or developing irregular borders. Brochure provided.    SK (seborrheic keratosis)  These are benign inherited growths without a malignant potential. Reassurance given to patient. No treatment is necessary.     Personal history of skin cancer  Scar  Pt with history of non melanoma skin cancer  Total body skin examination performed today including at least 12 points as noted in physical examination. No  suspicious lesions noted.             Return in about 1 year (around 9/25/2018) for recheck moles on left thigh.

## 2017-09-26 ENCOUNTER — OFFICE VISIT (OUTPATIENT)
Dept: FAMILY MEDICINE | Facility: CLINIC | Age: 67
End: 2017-09-26
Payer: COMMERCIAL

## 2017-09-26 VITALS
WEIGHT: 105.81 LBS | HEIGHT: 64 IN | DIASTOLIC BLOOD PRESSURE: 82 MMHG | SYSTOLIC BLOOD PRESSURE: 120 MMHG | TEMPERATURE: 98 F | BODY MASS INDEX: 18.06 KG/M2

## 2017-09-26 DIAGNOSIS — Z80.0 FAMILY HX OF COLON CANCER: ICD-10-CM

## 2017-09-26 DIAGNOSIS — Z12.31 OTHER SCREENING MAMMOGRAM: ICD-10-CM

## 2017-09-26 DIAGNOSIS — K58.0 IRRITABLE BOWEL SYNDROME WITH DIARRHEA: ICD-10-CM

## 2017-09-26 DIAGNOSIS — F41.9 ANXIETY: ICD-10-CM

## 2017-09-26 DIAGNOSIS — F32.A DEPRESSION, UNSPECIFIED DEPRESSION TYPE: ICD-10-CM

## 2017-09-26 DIAGNOSIS — E03.1 CONGENITAL HYPOTHYROIDISM WITHOUT GOITER: ICD-10-CM

## 2017-09-26 DIAGNOSIS — L70.8 OTHER ACNE: ICD-10-CM

## 2017-09-26 DIAGNOSIS — B00.9 HSV INFECTION: ICD-10-CM

## 2017-09-26 DIAGNOSIS — Z23 IMMUNIZATION DUE: ICD-10-CM

## 2017-09-26 DIAGNOSIS — I10 ESSENTIAL HYPERTENSION: ICD-10-CM

## 2017-09-26 DIAGNOSIS — J30.2 ACUTE SEASONAL ALLERGIC RHINITIS, UNSPECIFIED TRIGGER: Primary | ICD-10-CM

## 2017-09-26 PROCEDURE — 99397 PER PM REEVAL EST PAT 65+ YR: CPT | Mod: 25,S$GLB,, | Performed by: FAMILY MEDICINE

## 2017-09-26 PROCEDURE — 90715 TDAP VACCINE 7 YRS/> IM: CPT | Mod: S$GLB,,, | Performed by: FAMILY MEDICINE

## 2017-09-26 PROCEDURE — G0101 CA SCREEN;PELVIC/BREAST EXAM: HCPCS | Mod: S$GLB,,, | Performed by: FAMILY MEDICINE

## 2017-09-26 PROCEDURE — 90471 IMMUNIZATION ADMIN: CPT | Mod: S$GLB,,, | Performed by: FAMILY MEDICINE

## 2017-09-26 PROCEDURE — 99999 PR PBB SHADOW E&M-EST. PATIENT-LVL III: CPT | Mod: PBBFAC,,, | Performed by: FAMILY MEDICINE

## 2017-09-26 RX ORDER — FLUTICASONE PROPIONATE 50 MCG
1 SPRAY, SUSPENSION (ML) NASAL DAILY
Qty: 16 G | Refills: 2 | Status: SHIPPED | OUTPATIENT
Start: 2017-09-26 | End: 2021-12-22 | Stop reason: SDUPTHER

## 2017-09-26 RX ORDER — ACYCLOVIR 400 MG/1
400 TABLET ORAL 2 TIMES DAILY
Qty: 60 TABLET | Refills: 12 | Status: SHIPPED | OUTPATIENT
Start: 2017-09-26 | End: 2018-10-01 | Stop reason: SDUPTHER

## 2017-09-26 RX ORDER — TRIAMTERENE/HYDROCHLOROTHIAZID 37.5-25 MG
1 TABLET ORAL DAILY
Qty: 30 TABLET | Refills: 2 | Status: SHIPPED | OUTPATIENT
Start: 2017-09-26 | End: 2018-04-16

## 2017-09-26 RX ORDER — DICYCLOMINE HYDROCHLORIDE 20 MG/1
20 TABLET ORAL 3 TIMES DAILY PRN
Qty: 90 TABLET | Refills: 12 | Status: SHIPPED | OUTPATIENT
Start: 2017-09-26 | End: 2018-11-05 | Stop reason: SDUPTHER

## 2017-09-26 RX ORDER — BUPROPION HYDROCHLORIDE 150 MG/1
150 TABLET ORAL DAILY
Qty: 30 TABLET | Refills: 12 | Status: SHIPPED | OUTPATIENT
Start: 2017-09-26 | End: 2018-09-28 | Stop reason: SDUPTHER

## 2017-09-26 RX ORDER — CLONAZEPAM 1 MG/1
1 TABLET ORAL NIGHTLY PRN
Qty: 30 TABLET | Refills: 5 | Status: SHIPPED | OUTPATIENT
Start: 2017-09-26 | End: 2018-04-16 | Stop reason: SDUPTHER

## 2017-09-26 RX ORDER — TRETINOIN 1 MG/G
CREAM TOPICAL NIGHTLY
Qty: 20 G | Refills: 6 | Status: SHIPPED | OUTPATIENT
Start: 2017-09-26 | End: 2018-10-01 | Stop reason: SDUPTHER

## 2017-09-26 RX ORDER — TRIAMTERENE/HYDROCHLOROTHIAZID 37.5-25 MG
1 TABLET ORAL DAILY
COMMUNITY
End: 2017-09-26 | Stop reason: SDUPTHER

## 2017-09-26 RX ORDER — LEVOTHYROXINE SODIUM 112 UG/1
112 TABLET ORAL DAILY
Qty: 30 TABLET | Refills: 11 | Status: SHIPPED | OUTPATIENT
Start: 2017-09-26 | End: 2018-09-28 | Stop reason: SDUPTHER

## 2017-09-27 NOTE — PROGRESS NOTES
Angelina Man is a 67 y.o. female   Routine physical  Source of history: Patient  Past Medical History:   Diagnosis Date    Arthritis     Bronchitis     Cataract     Dry eyes     Hypothyroidism 6/23/2016    Rash     Squamous cell carcinoma     in-situ right upper inner arm, right wrist    Status post lumbar surgery 6/23/2016    Stress fracture     Thyroid disease      Patient  reports that she has never smoked. She has never used smokeless tobacco. She reports that she drinks alcohol. She reports that she does not use drugs.  Family History   Problem Relation Age of Onset    Cancer Mother      Lung cancer    Heart failure Father     Colon cancer Father     Hypertension Father     Cataracts Father     Diabetes Son     Heart disease Son     No Known Problems Sister     No Known Problems Brother     No Known Problems Maternal Aunt     No Known Problems Maternal Uncle     No Known Problems Paternal Aunt     No Known Problems Paternal Uncle     No Known Problems Maternal Grandmother     No Known Problems Maternal Grandfather     No Known Problems Paternal Grandmother     No Known Problems Paternal Grandfather     Melanoma Daughter     Amblyopia Neg Hx     Blindness Neg Hx     Glaucoma Neg Hx     Macular degeneration Neg Hx     Retinal detachment Neg Hx     Strabismus Neg Hx     Stroke Neg Hx     Thyroid disease Neg Hx     Breast cancer Neg Hx     Ovarian cancer Neg Hx      ROS:  GENERAL: No fever, chills, fatigability or weight loss.  SKIN: No rashes, itching or changes in color or texture of skin.  HEAD: No headaches or recent head trauma.  EYES: Visual acuity fine. No photophobia, ocular pain or diplopia.  EARS: Denies ear pain, discharge or vertigo.  NOSE: No loss of smell, no epistaxis or postnasal drip.  MOUTH & THROAT: No hoarseness or change in voice. No excessive gum bleeding.  NODES: Denies swollen glands.  CHEST: Denies JENNINGS, cyanosis, wheezing, cough and sputum  production.  CARDIOVASCULAR: Denies chest pain, PND, orthopnea or reduced exercise tolerance.  ABDOMEN: Appetite fine. No weight loss. Denies diarrhea, abdominal pain, hematemesis or blood in stool.  URINARY: No flank pain, dysuria or hematuria.  PERIPHERAL VASCULAR: No claudication or cyanosis.  MUSCULOSKELETAL: No joint stiffness or swelling. Denies back pain.  NEUROLOGIC: No history of seizures, paralysis, alteration of gait or coordination.  Answers for HPI/ROS submitted by the patient on 9/24/2017   activity change: Yes  unexpected weight change: No  neck pain: No  hearing loss: No  rhinorrhea: No  trouble swallowing: No  eye discharge: No  visual disturbance: No  chest tightness: No  wheezing: No  chest pain: No  palpitations: No  blood in stool: No  constipation: Yes  vomiting: No  diarrhea: No  polydipsia: No  polyuria: No  difficulty urinating: No  hematuria: No  menstrual problem: No  dysuria: No  joint swelling: No  arthralgias: Yes  headaches: No  weakness: No  confusion: No  dysphoric mood: No    OBJECTIVE:  APPEARANCE: thin , no acute distress  Vitals:    09/26/17 1301   BP: 120/82   Temp: 98 °F (36.7 °C)     SKIN: Normal skin turgor, no lesions.  HEENT: Both external auditory canals clear. Both tympanic membranes intact. PERRL. EOMI. Disk margins sharp.   No tonsillar enlargement. No pharyngeal erythema or exudate. No stridor.  NECK: No bruits. No cervical spine tenderness. No cervical lymphadenopathy. No thyromegaly.  NODES: No cervical, axillary or inguinal lymph node enlargement.  CHEST: Breath sounds clear bilaterally. Lungs clear to auscultation & percussion. Good air movement. No rales. No retractions. No rhonchi. No stridor. No wheezes.  CARDIOVASCULAR: Normal S1, S2. No murmurs. No edema.  BREASTS: no masses palpated in either breast or axillary area, symmetry noted.  ABDOMEN: Bowel sounds normal. No palpable aortic enlargement. No CVA tenderness. No pulsatile mass. No rebound  tenderness.  PELVIC:uterus, small non-palpable, no cmt, no adenexal masses palpable, no cystocoele palpated  GENITALIA:   Normal external exam          PERIPHERAL VASCULAR: Femoral pulses present and symmetrical. No edema.  MUSCULOSKELETAL: Degenerative changes of both ankles, foot, knee, wrist and hand.  BACK: No CVA tenderness. There is no spasm, tenderness or radiculopathy noted with palpation and there is full range of motion.   NEUROLOGIC:   Cranial Nerves: II-XII grossly intact.  Motor: 5/5 strength major flexors/extensors. No tremor.  DTR's: Knees, Ankles 2+ and equal bilaterally; downgoing toes.  Sensory: Intact to light touch distally.  Gait & Posture: Normal gait and fine motion. No cerebellar signs.  MENTAL STATUS: Alert. Oriented x 3. Language skills normal. Memory intact. No suicidal ideation. Normal affect.. Well kept appearance.    ASSESSMENT/PLAN:   Angelina was seen today for annual exam.    Diagnoses and all orders for this visit:    Acute seasonal allergic rhinitis, unspecified trigger  -     fluticasone (FLONASE) 50 mcg/actuation nasal spray; 1 spray by Each Nare route once daily.    HSV infection  -     acyclovir (ZOVIRAX) 400 MG tablet; Take 1 tablet (400 mg total) by mouth 2 (two) times daily.    Depression, unspecified depression type  -     buPROPion (WELLBUTRIN XL) 150 MG TB24 tablet; Take 1 tablet (150 mg total) by mouth once daily.    Anxiety  -     clonazePAM (KLONOPIN) 1 MG tablet; Take 1 tablet (1 mg total) by mouth nightly as needed.    Irritable bowel syndrome with diarrhea  -     dicyclomine (BENTYL) 20 mg tablet; Take 1 tablet (20 mg total) by mouth 3 (three) times daily as needed.    Congenital hypothyroidism without goiter  -     levothyroxine (SYNTHROID) 112 MCG tablet; Take 1 tablet (112 mcg total) by mouth once daily.    Other acne  -     tretinoin (RETIN-A) 0.1 % cream; Apply topically every evening.    Family hx of colon cancer  -     Case request GI: COLONOSCOPY    Other  screening mammogram  -     Mammo Digital Screening Bilateral With CAD; Future    Essential hypertension  -     triamterene-hydrochlorothiazide 37.5-25 mg (MAXZIDE-25) 37.5-25 mg per tablet; Take 1 tablet by mouth once daily.    Immunization due  -     Tdap Vaccine    labs discussed with pt.  Will contact pt when remainder  Of test results are available.

## 2017-10-06 DIAGNOSIS — Z80.0 FAMILY HISTORY OF COLON CANCER REQUIRING SCREENING COLONOSCOPY: Primary | ICD-10-CM

## 2017-10-06 RX ORDER — SODIUM, POTASSIUM,MAG SULFATES 17.5-3.13G
SOLUTION, RECONSTITUTED, ORAL ORAL
Qty: 1 BOTTLE | Refills: 0 | Status: ON HOLD | OUTPATIENT
Start: 2017-10-06 | End: 2017-11-14

## 2017-10-09 ENCOUNTER — OFFICE VISIT (OUTPATIENT)
Dept: PAIN MEDICINE | Facility: CLINIC | Age: 67
End: 2017-10-09
Attending: ANESTHESIOLOGY
Payer: COMMERCIAL

## 2017-10-09 VITALS
BODY MASS INDEX: 17.58 KG/M2 | HEART RATE: 71 BPM | WEIGHT: 103 LBS | HEIGHT: 64 IN | DIASTOLIC BLOOD PRESSURE: 79 MMHG | SYSTOLIC BLOOD PRESSURE: 121 MMHG | TEMPERATURE: 98 F

## 2017-10-09 DIAGNOSIS — M51.36 DDD (DEGENERATIVE DISC DISEASE), LUMBAR: ICD-10-CM

## 2017-10-09 DIAGNOSIS — M54.17 LUMBOSACRAL RADICULOPATHY: ICD-10-CM

## 2017-10-09 DIAGNOSIS — M96.1 POSTLAMINECTOMY SYNDROME OF LUMBAR REGION: Primary | ICD-10-CM

## 2017-10-09 DIAGNOSIS — G89.4 CHRONIC PAIN SYNDROME: ICD-10-CM

## 2017-10-09 PROCEDURE — 99213 OFFICE O/P EST LOW 20 MIN: CPT | Mod: S$GLB,,, | Performed by: NURSE PRACTITIONER

## 2017-10-09 PROCEDURE — 99999 PR PBB SHADOW E&M-EST. PATIENT-LVL III: CPT | Mod: PBBFAC,,, | Performed by: NURSE PRACTITIONER

## 2017-10-09 NOTE — PROGRESS NOTES
Chronic Pain - Established Visit    Referring Physician: No ref. provider found    Chief Complaint:   Chief Complaint   Patient presents with    Low-back Pain        SUBJECTIVE: Disclaimer: This note has been generated using voice-recognition software. There may be typographical errors that have been missed during proof-reading    Interval History 10/9/2017:  The patient returns to clinic today for follow up. She is s/p bilateral L4 TF CARISSA on 9/19/2017. She reports 50% relief of her left leg pain. She denies any relief to her right leg pain. She continues to report pain that radiates down the side of both legs to her ankles. Her pain is worse with prolonged walking. She is frustrated with continued pain, as this is stopping her from doing the activities that she enjoys. She is currently taking Lyrica 75 mg once daily with limited benefit. She did try to increase to BID but this caused constipation and bloating. She continues to be physically active as much as she can tolerate. She denies any other health changes. She denies any bowel or bladder incontinence. Her pain today is 9/10.    Interval History 9/5/2017:  The patient returns to clinic today for follow up. She reports increased low back pain with radiating pain down the back and side of both legs to her ankles, right greater than left. She describes the pain as burning and tingling. She reports that she has recently decreased her Gabapentin to 600 mg daily due to constipation and bloating. She continues to be physically active. She denies any other health changes. She denies any bowel or bladder incontinence. Her pain today is 7/10.    Interval History 6/29/2017:  The patient returns today for follow up of lower back and leg pain.  She is s/p left L4 TF CARISSA on 6/14/17 with 75% relief.  Her pain is now tolerable.  She has increased her exercise and activity recently.  She still has mild pain and numbness to her left lateral thigh above the knee.  She takes  2-3 fitness classes per week and also walks on the treadmill.  She is cautious with her exercises and avoids leg lifts and squats.  She has increased Gabapentin to 1500 mg daily with benefit.  She is not scheduled for any further follow up with Dr. Kaba.  Her pain today is 2/10.  The patient denies any bowel or bladder incontinence or signs of saddle paresthesia.  The patient denies any major medical changes since last office visit.    Initial encounter:    Angelina Man presents to the clinic for the evaluation of leg pain. The pain started in 2016 following prolonged walking and symptoms have been unchanged. She previously had radicular symptoms to both lower extremities. She had right sided microdiskectomy at L4-5 in 2016 which resolved her right sided radicular pain. She recently had left sided microdiskectomy at L4-5 on 3/20/2017 but continues to have left sided leg pain. She has had epidurals in the past through our office, ordered per neurosurgery with limited relief. She is s/p left L4 TF CARISSA on 5/17/17 with significant relief of her pain from her knee to her ankle. She does report occasional tingling in the calf muscle.     Brief history:    Pain Description:    The pain is located in the left thigh area and radiates to the ankle.      At BEST  2/10     At WORST  7/10 on the WORST day.      On average pain is rated as 5/10.     Today the pain is rated as 5/10    The pain is described as burning      Symptoms interfere with daily activity and work.     Exacerbating factors: Standing and Walking.      Mitigating factors rest and sitting.     Patient denies urinary incontinence and bowel incontinence.  Patient denies any suicidal or homicidal ideations    Pain Medications:  Current:  Gabapentin 600 mg daily    Tried in Past:  NSAIDs -Never  TCA -Never  SNRI -yes  Anti-convulsants - Gabapentin  Muscle Relaxants -Never  Opioids-Norco for post op pain    Physical Therapy/Home Exercise: yes       report:   Reviewed and consistent with medication use as prescribed.    Pain Procedures:   10/19/16- Left L4 TF CARISSA  12/14/16- Left  L5 TF CARISSA  2/8/17- Left L4 TF CARISSA   5/17/17- Left L4 TF CARISSA  6/14/17 Left L4 TF CARISSA- 75% relief  9/19/2017- Bilateral L4 TF CARISSA    Chiropractor -never  Acupuncture - never  TENS unit -never  Spinal decompression - 5/19/16- right L4-5 microdiskectomy; 3/20/17- left L4-5 microdiskectomy    Joint replacement -never    Imaging:   X-ray Lumbar Spine 5/30/2017:  Findings:   Extensive degenerative disc disease at multiple levels. Grade 1 anterolisthesis L4 and L5 (12 mm). No significant change on flexion/extension views. Vertebral body heights are maintained. Paravertebral soft tissues are unremarkable.   Impression        Extensive degenerative changes as above without instability.     MRI Lumbar Spine 5/3/2017:  FINDINGS:    There are 5 lumbar vertebrae.  Vertebral body heights and alignment are maintained.  Bone marrow signal is preserved.  Diffuse disc space narrowing.  There is mild increased her signal within the L4-L5 disc space which is likely postsurgical. Conus terminates at L1 and appears unremarkable. Subcentimeter left renal cyst and right hepatic cyst.  Posterior abdominal structures otherwise unremarkable.Evaluation of sacroiliac joints is unremarkable.    L1-L2: Mild disc bulge with bilateral facet arthropathy without evidence of spinal stenosis or neuroforaminal narrowing.    L2-L3: Diffuse dilatation with bilateral facet arthropathy and hypertrophy of the ligamentum of flavum resulting in mild spinal stenosis and mild left neural foraminal narrowing.    L3-L4: Diffuse disc bulge with bilateral facet arthropathy and hypertrophy of the ligamentum of flavum resulting in mild spinal stenosis and mild bilateral neural foraminal narrowing.    L4-L5: Unchanged grade 1 anterolisthesis measuring 0.3 cm with postsurgical changes from prior hemilaminectomy and discectomy.  Diffuse disc bulge and  facet arthropathy resulting in moderate bilateral neural foraminal narrowing. There is mild increased STIR signal within the intervertebral disc which is likely postsurgical.  A peripherally enhancing fluid collection measuring maximal diameter of 1.3 cm is identified within the left L4-5 hemilaminectomy surgical bed.  There are persistent inflammatory changes exerting mass effect on the posterior left lateral thecal sac which is worsened in comparison to prior exam resulting in moderate spinal stenosis.    L5-S1: Diffuse disc bulge with bilateral facet arthropathy resulting in mild right neural foramen no spinal stenosis or left neural foraminal narrowing.   Impression        1.  Post surgical changes at L4-5 as above with a small peripherally enhancing fluid collection within the left L4-5 hemilaminectomy surgical bed likely representing a postsurgical seroma versus abscess.  There are worsening inflammatory changes exerting mass effect on the posterolateral left thecal sac at L4-5 resulting in moderate spinal stenosis.  2.  Unchanged grade 1 anterolisthesis at L4-5 with diffuse disc bulge and facet arthropathy resulting in moderate bilateral neural foraminal narrowing.  3.  Multilevel degenerative changes of the lumbar spine as above resulting in mild spinal stenosis at L2-3 and L3-4.  Mild neural foraminal narrowing by level as above.         Past Medical History:   Diagnosis Date    Arthritis     Bronchitis     Cataract     Dry eyes     Hypothyroidism 6/23/2016    Rash     Squamous cell carcinoma     in-situ right upper inner arm, right wrist    Status post lumbar surgery 6/23/2016    Stress fracture     Thyroid disease      Past Surgical History:   Procedure Laterality Date    CERVICAL FUSION      COLONOSCOPY       Social History     Social History    Marital status: Single     Spouse name: N/A    Number of children: N/A    Years of education: N/A     Occupational History     [employer]     Social History Main Topics    Smoking status: Never Smoker    Smokeless tobacco: Never Used    Alcohol use Yes      Comment: socially, n ot weekly    Drug use: No    Sexual activity: No     Other Topics Concern    Are You Pregnant Or Think You May Be? No    Breast-Feeding No     Social History Narrative    No narrative on file     Family History   Problem Relation Age of Onset    Cancer Mother      Lung cancer    Heart failure Father     Colon cancer Father     Hypertension Father     Cataracts Father     Diabetes Son     Heart disease Son     No Known Problems Sister     No Known Problems Brother     No Known Problems Maternal Aunt     No Known Problems Maternal Uncle     No Known Problems Paternal Aunt     No Known Problems Paternal Uncle     No Known Problems Maternal Grandmother     No Known Problems Maternal Grandfather     No Known Problems Paternal Grandmother     No Known Problems Paternal Grandfather     Melanoma Daughter     Amblyopia Neg Hx     Blindness Neg Hx     Glaucoma Neg Hx     Macular degeneration Neg Hx     Retinal detachment Neg Hx     Strabismus Neg Hx     Stroke Neg Hx     Thyroid disease Neg Hx     Breast cancer Neg Hx     Ovarian cancer Neg Hx        Review of patient's allergies indicates:  No Known Allergies    Current Outpatient Prescriptions   Medication Sig    acyclovir (ZOVIRAX) 400 MG tablet Take 1 tablet (400 mg total) by mouth 2 (two) times daily.    ascorbic acid (VITAMIN C) 1000 MG tablet Take 1,000 mg by mouth once daily.     b complex vitamins (VITAMIN B COMPLEX) tablet Take 1 tablet by mouth daily as needed.     biotin 1 mg tablet Take 1 mg by mouth once daily.     buPROPion (WELLBUTRIN XL) 150 MG TB24 tablet Take 1 tablet (150 mg total) by mouth once daily.    calcium-vitamin D 500 mg(1,250mg) -200 unit per tablet Take 1 tablet by mouth once daily.     chondroitin sulfate-vit C-Mn (CHONDROITIN SULFATE COMPLEX) 400-60-2.5  mg Cap Take 1 tablet by mouth once daily.     clonazePAM (KLONOPIN) 1 MG tablet Take 1 tablet (1 mg total) by mouth nightly as needed.    CYANOCOBALAMIN/FOLIC ACID (VITAMIN B12-FOLIC ACID ORAL) Take by mouth.     dicyclomine (BENTYL) 20 mg tablet Take 1 tablet (20 mg total) by mouth 3 (three) times daily as needed.    digestive enzymes Tab Take 1 tablet by mouth once daily.     diphenhydrAMINE (BENADRYL) 25 mg capsule Take 25 mg by mouth nightly as needed.     fexofenadine (ALLEGRA) 180 MG tablet Take 1 tablet (180 mg total) by mouth once daily. (Patient taking differently: Take 180 mg by mouth daily as needed. )    fluticasone (FLONASE) 50 mcg/actuation nasal spray Spray once in each nostril twice daily as needed for rhinitis.    fluticasone (FLONASE) 50 mcg/actuation nasal spray 1 spray by Each Nare route once daily.    ginseng 200 mg Cap Take 200 mg by mouth 2 (two) times daily as needed.     glucosamine sulfate 2KCl 1,000 mg Tab Take 1 tablet by mouth once daily.     levothyroxine (SYNTHROID) 112 MCG tablet Take 1 tablet (112 mcg total) by mouth once daily.    multivitamin (THERAGRAN) per tablet Take 1 tablet by mouth once daily.     pregabalin (LYRICA) 75 MG capsule Take 1 capsule (75 mg total) by mouth 2 (two) times daily.    sodium,potassium,mag sulfates (SUPREP BOWEL PREP KIT) 17.5-3.13-1.6 gram SolR As directed-dispense 1 kit    tretinoin (RETIN-A) 0.1 % cream Apply topically every evening.    triamterene-hydrochlorothiazide 37.5-25 mg (MAXZIDE-25) 37.5-25 mg per tablet Take 1 tablet by mouth once daily.    zinc 50 mg Tab Take 50 mg by mouth once daily.      No current facility-administered medications for this visit.        REVIEW OF SYSTEMS:    GENERAL:  No weight loss, malaise or fevers.  HEENT:   No recent changes in vision or hearing  NECK:  Negative for lumps, no difficulty with swallowing.  RESPIRATORY:  Negative for cough, wheezing or shortness of breath, patient denies any recent  "URI.  CARDIOVASCULAR:  Negative for chest pain, leg swelling or palpitations.  GI:  Negative for abdominal discomfort, blood in stools or black stools or change in bowel habits.  MUSCULOSKELETAL:  See HPI.  SKIN:  Negative for lesions, rash, and itching.  PSYCH:  No mood disorder or recent psychosocial stressors.  Patients sleep is not disturbed secondary to pain.  HEMATOLOGY/LYMPHOLOGY:  Negative for prolonged bleeding, bruising easily or swollen nodes.  Patient is not currently taking any anti-coagulants  ENDO: No history of diabetes or thyroid dysfunction  NEURO:   No history of headaches, syncope, paralysis, seizures or tremors.  ENDO: Thyroid disease.   All other reviewed and negative other than HPI.    OBJECTIVE:    /79   Pulse 71   Temp 98 °F (36.7 °C) (Oral)   Ht 5' 4" (1.626 m)   Wt 46.7 kg (103 lb)   BMI 17.68 kg/m²     PHYSICAL EXAMINATION:    GENERAL: Well appearing, in no acute distress, alert and oriented x3.  PSYCH:  Mood and affect appropriate.  SKIN: Skin color, texture, turgor normal, no rashes or lesions.  HEAD/FACE:  Normocephalic, atraumatic. Cranial nerves grossly intact.  NECK: No pain to palpation over the cervical paraspinous muscles. Spurling Negative. No pain with neck flexion, extension, or lateral flexion.   CV: RRR with palpation of the radial artery.  PULM: No evidence of respiratory difficulty, symmetric chest rise.  GI:  Soft and non-tender.  BACK: Straight leg raising in the supine position is positive to radicular pain bilaterally. No pain to palpation over the facet joints of the lumbar spine or spinous processes. Full ROM with pain on extension and flexion. Positive facet loading bilaterally.  EXTREMITIES: Peripheral joint ROM is full and pain free without obvious instability or laxity in all four extremities. No deformities, edema, or skin discoloration. Good capillary refill.  MUSCULOSKELETAL: Shoulder, hip, and knee provocative maneuvers are negative.  There is no " pain with palpation over the sacroiliac joints bilaterally.  FABERs test is negative.  FADIRs test is negative.   Bilateral upper and lower extremity strength is normal and symmetric.  No atrophy or tone abnormalities are noted.  NEURO: Bilateral upper and lower extremity coordination and muscle stretch reflexes are physiologic and symmetric.  Plantar response are downgoing. No clonus.  No loss of sensation is noted.  GAIT: Antalgic- ambulates without assistance.    ASSESSMENT: 67 y.o. year old female with lower back and left leg pain, consistent with     Encounter Diagnoses   Name Primary?    Postlaminectomy syndrome of lumbar region Yes    Chronic pain syndrome     Lumbosacral radiculopathy     DDD (degenerative disc disease), lumbar        PLAN:     - Previous imaging was reviewed and discussed with the patient today.    - She has had limited relief with TF ESIs. She has been evaluated by neurosurgery in the past and would require a fusion. She is not interested in surgery at this time. She was previously given information about [employer] spinal cord stimulation and would like to move forward with this.     - Referral to psychiatry for testing for spinal cord stimulation.     - Continue Lyrica 75 mg at bedtime.     - We discussed specific exercises to avoid given recent history of lumbar surgery.  I cautioned against exercises such as squats and leg raises.    - RTC after above testing.     - Dr. Acosta was consulted on the patient and agrees with this plan.    The above plan and management options were discussed at length with patient. Patient is in agreement with the above and verbalized understanding.    Mague Nunes NP  10/09/2017

## 2017-10-23 ENCOUNTER — PATIENT MESSAGE (OUTPATIENT)
Dept: PAIN MEDICINE | Facility: CLINIC | Age: 67
End: 2017-10-23

## 2017-10-24 ENCOUNTER — TELEPHONE (OUTPATIENT)
Dept: PAIN MEDICINE | Facility: CLINIC | Age: 67
End: 2017-10-24

## 2017-10-24 DIAGNOSIS — T40.2X5A THERAPEUTIC OPIOID INDUCED CONSTIPATION: Primary | ICD-10-CM

## 2017-10-24 DIAGNOSIS — K59.03 THERAPEUTIC OPIOID INDUCED CONSTIPATION: Primary | ICD-10-CM

## 2017-10-24 NOTE — TELEPHONE ENCOUNTER
Phoned patient to discuss increased pain. She is awaiting psychiatric testing for SCS.     We will send Tramadol for her pain.     She has a history of constipation with opioids and Lyrica. We will send [employer].

## 2017-10-24 NOTE — TELEPHONE ENCOUNTER
Patient question routed to NP. Emailed patient inquiring what interventions have provided relief in [facility].

## 2017-10-29 ENCOUNTER — PATIENT MESSAGE (OUTPATIENT)
Dept: PAIN MEDICINE | Facility: CLINIC | Age: 67
End: 2017-10-29

## 2017-10-31 ENCOUNTER — OFFICE VISIT (OUTPATIENT)
Dept: PAIN MEDICINE | Facility: CLINIC | Age: 67
End: 2017-10-31
Payer: COMMERCIAL

## 2017-10-31 ENCOUNTER — HOSPITAL ENCOUNTER (OUTPATIENT)
Dept: RADIOLOGY | Facility: HOSPITAL | Age: 67
Discharge: HOME | End: 2017-10-31
Attending: NURSE PRACTITIONER
Payer: COMMERCIAL

## 2017-10-31 VITALS
HEART RATE: 69 BPM | SYSTOLIC BLOOD PRESSURE: 131 MMHG | HEIGHT: 64 IN | TEMPERATURE: 97 F | RESPIRATION RATE: 16 BRPM | BODY MASS INDEX: 17.58 KG/M2 | DIASTOLIC BLOOD PRESSURE: 85 MMHG | WEIGHT: 103 LBS

## 2017-10-31 DIAGNOSIS — M51.36 DDD (DEGENERATIVE DISC DISEASE), LUMBAR: ICD-10-CM

## 2017-10-31 DIAGNOSIS — M43.06 SPONDYLOLYSIS OF LUMBAR REGION: ICD-10-CM

## 2017-10-31 DIAGNOSIS — M96.1 POSTLAMINECTOMY SYNDROME OF LUMBAR REGION: ICD-10-CM

## 2017-10-31 DIAGNOSIS — M79.10 MYALGIA: ICD-10-CM

## 2017-10-31 DIAGNOSIS — M54.17 LUMBOSACRAL RADICULOPATHY: ICD-10-CM

## 2017-10-31 DIAGNOSIS — M96.1 POSTLAMINECTOMY SYNDROME OF LUMBAR REGION: Primary | ICD-10-CM

## 2017-10-31 DIAGNOSIS — M48.061 SPINAL STENOSIS OF LUMBAR REGION WITHOUT NEUROGENIC CLAUDICATION: ICD-10-CM

## 2017-10-31 PROCEDURE — 73521 X-RAY EXAM HIPS BI 2 VIEWS: CPT | Mod: TC

## 2017-10-31 PROCEDURE — 72114 X-RAY EXAM L-S SPINE BENDING: CPT | Mod: 26,,, | Performed by: RADIOLOGY

## 2017-10-31 PROCEDURE — 99213 OFFICE O/P EST LOW 20 MIN: CPT | Mod: S$GLB,,, | Performed by: NURSE PRACTITIONER

## 2017-10-31 PROCEDURE — 72114 X-RAY EXAM L-S SPINE BENDING: CPT | Mod: TC

## 2017-10-31 PROCEDURE — 99999 PR PBB SHADOW E&M-EST. PATIENT-LVL IV: CPT | Mod: PBBFAC,,, | Performed by: NURSE PRACTITIONER

## 2017-10-31 PROCEDURE — 73521 X-RAY EXAM HIPS BI 2 VIEWS: CPT | Mod: 26,,, | Performed by: RADIOLOGY

## 2017-10-31 RX ORDER — TRAMADOL HYDROCHLORIDE 50 MG/1
50 TABLET ORAL EVERY 6 HOURS PRN
COMMUNITY
End: 2018-01-03

## 2017-10-31 RX ORDER — CYCLOBENZAPRINE HCL 10 MG
10 TABLET ORAL 3 TIMES DAILY PRN
Qty: 90 TABLET | Refills: 1 | Status: SHIPPED | OUTPATIENT
Start: 2017-10-31 | End: 2017-11-30

## 2017-10-31 NOTE — PROGRESS NOTES
Chronic Pain - Established Visit    Referring Physician: No ref. provider found    Chief Complaint:   Chief Complaint   Patient presents with    Back Pain        SUBJECTIVE: Disclaimer: This note has been generated using voice-recognition software. There may be typographical errors that have been missed during proof-reading    Interval History 10/31/2017:  The patient returns today for follow up. She reports that her low back pain has worsened in the last week. She continues to report pain in her lower back that radiates down the side of both legs to her ankles. She describes this pain as throbbing and shooting. She reports that her pain is worse with prolonged standing and walking. She reports that after walking for a distance she has to stop and rest due to her pain. She also feels like she is limping due to her pain. She reports that she is unable to exercise due to this pain. Her pain does improve with sitting and resting. She is currently taking Tramadol with limited benefit. She denies any other health changes. She denies any bowel or bladder incontinence. Her pain today is 8/10.    Interval History 10/9/2017:  The patient returns to clinic today for follow up. She is s/p bilateral L4 TF CARISSA on 9/19/2017. She reports 50% relief of her left leg pain. She denies any relief to her right leg pain. She continues to report pain that radiates down the side of both legs to her ankles. Her pain is worse with prolonged walking. She is frustrated with continued pain, as this is stopping her from doing the activities that she enjoys. She is currently taking Lyrica 75 mg once daily with limited benefit. She did try to increase to BID but this caused constipation and bloating. She continues to be physically active as much as she can tolerate. She denies any other health changes. She denies any bowel or bladder incontinence. Her pain today is 9/10.    Interval History 9/5/2017:  The patient returns to clinic today for  follow up. She reports increased low back pain with radiating pain down the back and side of both legs to her ankles, right greater than left. She describes the pain as burning and tingling. She reports that she has recently decreased her Gabapentin to 600 mg daily due to constipation and bloating. She continues to be physically active. She denies any other health changes. She denies any bowel or bladder incontinence. Her pain today is 7/10.    Interval History 6/29/2017:  The patient returns today for follow up of lower back and leg pain.  She is s/p left L4 TF CARISSA on 6/14/17 with 75% relief.  Her pain is now tolerable.  She has increased her exercise and activity recently.  She still has mild pain and numbness to her left lateral thigh above the knee.  She takes 2-3 fitness classes per week and also walks on the treadmill.  She is cautious with her exercises and avoids leg lifts and squats.  She has increased Gabapentin to 1500 mg daily with benefit.  She is not scheduled for any further follow up with Dr. Kaba.  Her pain today is 2/10.  The patient denies any bowel or bladder incontinence or signs of saddle paresthesia.  The patient denies any major medical changes since last office visit.    Initial encounter:    Angelina Man presents to the clinic for the evaluation of leg pain. The pain started in 2016 following prolonged walking and symptoms have been unchanged. She previously had radicular symptoms to both lower extremities. She had right sided microdiskectomy at L4-5 in 2016 which resolved her right sided radicular pain. She recently had left sided microdiskectomy at L4-5 on 3/20/2017 but continues to have left sided leg pain. She has had epidurals in the past through our office, ordered per neurosurgery with limited relief. She is s/p left L4 TF CARISSA on 5/17/17 with significant relief of her pain from her knee to her ankle. She does report occasional tingling in the calf muscle.     Brief history:    Pain  Description:    The pain is located in the left thigh area and radiates to the ankle.      At BEST  2/10     At WORST  7/10 on the WORST day.      On average pain is rated as 5/10.     Today the pain is rated as 5/10    The pain is described as burning      Symptoms interfere with daily activity and work.     Exacerbating factors: Standing and Walking.      Mitigating factors rest and sitting.     Patient denies urinary incontinence and bowel incontinence.  Patient denies any suicidal or homicidal ideations    Pain Medications:  Current:  Gabapentin 600 mg daily    Tried in Past:  NSAIDs -Never  TCA -Never  SNRI -yes  Anti-convulsants - Gabapentin  Muscle Relaxants -Never  Opioids-Norco for post op pain    Physical Therapy/Home Exercise: yes       report:  Reviewed and consistent with medication use as prescribed.    Pain Procedures:   10/19/16- Left L4 TF CARISSA  12/14/16- Left  L5 TF CARISSA  2/8/17- Left L4 TF CARISSA   5/17/17- Left L4 TF CARISSA  6/14/17 Left L4 TF CARISSA- 75% relief  9/19/2017- Bilateral L4 TF CARISSA    Chiropractor -never  Acupuncture - never  TENS unit -never  Spinal decompression - 5/19/16- right L4-5 microdiskectomy; 3/20/17- left L4-5 microdiskectomy    Joint replacement -never    Imaging:   X-ray Lumbar Spine 5/30/2017:  Findings:   Extensive degenerative disc disease at multiple levels. Grade 1 anterolisthesis L4 and L5 (12 mm). No significant change on flexion/extension views. Vertebral body heights are maintained. Paravertebral soft tissues are unremarkable.   Impression        Extensive degenerative changes as above without instability.     MRI Lumbar Spine 5/3/2017:  FINDINGS:    There are 5 lumbar vertebrae.  Vertebral body heights and alignment are maintained.  Bone marrow signal is preserved.  Diffuse disc space narrowing.  There is mild increased her signal within the L4-L5 disc space which is likely postsurgical. Conus terminates at L1 and appears unremarkable. Subcentimeter left renal cyst and  right hepatic cyst.  Posterior abdominal structures otherwise unremarkable.Evaluation of sacroiliac joints is unremarkable.    L1-L2: Mild disc bulge with bilateral facet arthropathy without evidence of spinal stenosis or neuroforaminal narrowing.    L2-L3: Diffuse dilatation with bilateral facet arthropathy and hypertrophy of the ligamentum of flavum resulting in mild spinal stenosis and mild left neural foraminal narrowing.    L3-L4: Diffuse disc bulge with bilateral facet arthropathy and hypertrophy of the ligamentum of flavum resulting in mild spinal stenosis and mild bilateral neural foraminal narrowing.    L4-L5: Unchanged grade 1 anterolisthesis measuring 0.3 cm with postsurgical changes from prior hemilaminectomy and discectomy.  Diffuse disc bulge and facet arthropathy resulting in moderate bilateral neural foraminal narrowing. There is mild increased STIR signal within the intervertebral disc which is likely postsurgical.  A peripherally enhancing fluid collection measuring maximal diameter of 1.3 cm is identified within the left L4-5 hemilaminectomy surgical bed.  There are persistent inflammatory changes exerting mass effect on the posterior left lateral thecal sac which is worsened in comparison to prior exam resulting in moderate spinal stenosis.    L5-S1: Diffuse disc bulge with bilateral facet arthropathy resulting in mild right neural foramen no spinal stenosis or left neural foraminal narrowing.   Impression        1.  Post surgical changes at L4-5 as above with a small peripherally enhancing fluid collection within the left L4-5 hemilaminectomy surgical bed likely representing a postsurgical seroma versus abscess.  There are worsening inflammatory changes exerting mass effect on the posterolateral left thecal sac at L4-5 resulting in moderate spinal stenosis.  2.  Unchanged grade 1 anterolisthesis at L4-5 with diffuse disc bulge and facet arthropathy resulting in moderate bilateral neural  foraminal narrowing.  3.  Multilevel degenerative changes of the lumbar spine as above resulting in mild spinal stenosis at L2-3 and L3-4.  Mild neural foraminal narrowing by level as above.         Past Medical History:   Diagnosis Date    Arthritis     Bronchitis     Cataract     Dry eyes     Hypothyroidism 6/23/2016    Rash     Squamous cell carcinoma     in-situ right upper inner arm, right wrist    Status post lumbar surgery 6/23/2016    Stress fracture     Thyroid disease      Past Surgical History:   Procedure Laterality Date    CERVICAL FUSION      COLONOSCOPY       Social History     Social History    Marital status: Single     Spouse name: N/A    Number of children: N/A    Years of education: N/A     Occupational History     [employer]     Social History Main Topics    Smoking status: Never Smoker    Smokeless tobacco: Never Used    Alcohol use Yes      Comment: socially, n ot weekly    Drug use: No    Sexual activity: No     Other Topics Concern    Are You Pregnant Or Think You May Be? No    Breast-Feeding No     Social History Narrative    No narrative on file     Family History   Problem Relation Age of Onset    Cancer Mother      Lung cancer    Heart failure Father     Colon cancer Father     Hypertension Father     Cataracts Father     Diabetes Son     Heart disease Son     No Known Problems Sister     No Known Problems Brother     No Known Problems Maternal Aunt     No Known Problems Maternal Uncle     No Known Problems Paternal Aunt     No Known Problems Paternal Uncle     No Known Problems Maternal Grandmother     No Known Problems Maternal Grandfather     No Known Problems Paternal Grandmother     No Known Problems Paternal Grandfather     Melanoma Daughter     Amblyopia Neg Hx     Blindness Neg Hx     Glaucoma Neg Hx     Macular degeneration Neg Hx     Retinal detachment Neg Hx     Strabismus Neg Hx     Stroke Neg Hx     Thyroid  disease Neg Hx     Breast cancer Neg Hx     Ovarian cancer Neg Hx        Review of patient's allergies indicates:  No Known Allergies    Current Outpatient Prescriptions   Medication Sig    acyclovir (ZOVIRAX) 400 MG tablet Take 1 tablet (400 mg total) by mouth 2 (two) times daily.    ascorbic acid (VITAMIN C) 1000 MG tablet Take 1,000 mg by mouth once daily.     b complex vitamins (VITAMIN B COMPLEX) tablet Take 1 tablet by mouth daily as needed.     biotin 1 mg tablet Take 1 mg by mouth once daily.     buPROPion (WELLBUTRIN XL) 150 MG TB24 tablet Take 1 tablet (150 mg total) by mouth once daily.    calcium-vitamin D 500 mg(1,250mg) -200 unit per tablet Take 1 tablet by mouth once daily.     chondroitin sulfate-vit C-Mn (CHONDROITIN SULFATE COMPLEX) 400-60-2.5 mg Cap Take 1 tablet by mouth once daily.     clonazePAM (KLONOPIN) 1 MG tablet Take 1 tablet (1 mg total) by mouth nightly as needed.    CYANOCOBALAMIN/FOLIC ACID (VITAMIN B12-FOLIC ACID ORAL) Take by mouth.     dicyclomine (BENTYL) 20 mg tablet Take 1 tablet (20 mg total) by mouth 3 (three) times daily as needed.    digestive enzymes Tab Take 1 tablet by mouth once daily.     diphenhydrAMINE (BENADRYL) 25 mg capsule Take 25 mg by mouth nightly as needed.     fexofenadine (ALLEGRA) 180 MG tablet Take 1 tablet (180 mg total) by mouth once daily. (Patient taking differently: Take 180 mg by mouth daily as needed. )    fluticasone (FLONASE) 50 mcg/actuation nasal spray Spray once in each nostril twice daily as needed for rhinitis.    fluticasone (FLONASE) 50 mcg/actuation nasal spray 1 spray by Each Nare route once daily.    ginseng 200 mg Cap Take 200 mg by mouth 2 (two) times daily as needed.     glucosamine sulfate 2KCl 1,000 mg Tab Take 1 tablet by mouth once daily.     levothyroxine (SYNTHROID) 112 MCG tablet Take 1 tablet (112 mcg total) by mouth once daily.    multivitamin (THERAGRAN) per tablet Take 1 tablet by mouth once daily.      "naloxegol (MOVANTIK) tablet Take 25 mg by mouth once daily.    sodium,potassium,mag sulfates (SUPREP BOWEL PREP KIT) 17.5-3.13-1.6 gram SolR As directed-dispense 1 kit    traMADol (ULTRAM) 50 mg tablet Take 50 mg by mouth every 6 (six) hours as needed for Pain.    tretinoin (RETIN-A) 0.1 % cream Apply topically every evening.    triamterene-hydrochlorothiazide 37.5-25 mg (MAXZIDE-25) 37.5-25 mg per tablet Take 1 tablet by mouth once daily.    zinc 50 mg Tab Take 50 mg by mouth once daily.     pregabalin (LYRICA) 75 MG capsule Take 1 capsule (75 mg total) by mouth 2 (two) times daily.     No current facility-administered medications for this visit.        REVIEW OF SYSTEMS:    GENERAL:  No weight loss, malaise or fevers.  HEENT:   No recent changes in vision or hearing  NECK:  Negative for lumps, no difficulty with swallowing.  RESPIRATORY:  Negative for cough, wheezing or shortness of breath, patient denies any recent URI.  CARDIOVASCULAR:  Negative for chest pain, leg swelling or palpitations.  GI:  Negative for abdominal discomfort, blood in stools or black stools or change in bowel habits.  MUSCULOSKELETAL:  See HPI.  SKIN:  Negative for lesions, rash, and itching.  PSYCH:  No mood disorder or recent psychosocial stressors.  Patients sleep is not disturbed secondary to pain.  HEMATOLOGY/LYMPHOLOGY:  Negative for prolonged bleeding, bruising easily or swollen nodes.  Patient is not currently taking any anti-coagulants  ENDO: No history of diabetes or thyroid dysfunction  NEURO:   No history of headaches, syncope, paralysis, seizures or tremors.  ENDO: Thyroid disease.   All other reviewed and negative other than HPI.    OBJECTIVE:    /85   Pulse 69   Temp 97.1 °F (36.2 °C) (Oral)   Resp 16   Ht 5' 4" (1.626 m)   Wt 46.7 kg (103 lb)   BMI 17.68 kg/m²     PHYSICAL EXAMINATION:    GENERAL: Well appearing, in no acute distress, alert and oriented x3.  PSYCH:  Mood and affect appropriate.  SKIN: Skin " color, texture, turgor normal, no rashes or lesions.  HEAD/FACE:  Normocephalic, atraumatic. Cranial nerves grossly intact.  NECK: No pain to palpation over the cervical paraspinous muscles. Spurling Negative. No pain with neck flexion, extension, or lateral flexion.   CV: RRR with palpation of the radial artery.  PULM: No evidence of respiratory difficulty, symmetric chest rise.  GI:  Soft and non-tender.  BACK: Straight leg raising in the supine position is positive to radicular pain bilaterally. There is pain with palpation over lumbar spine. Full ROM with pain on extension and flexion. Positive facet loading bilaterally.  EXTREMITIES: Peripheral joint ROM is full and pain free without obvious instability or laxity in all four extremities. No deformities, edema, or skin discoloration. Good capillary refill.  MUSCULOSKELETAL: Shoulder, hip, and knee provocative maneuvers are negative.  There is no pain with palpation over the sacroiliac joints bilaterally.  FABERs test is negative.  FADIRs test is negative.   Bilateral upper and lower extremity strength is normal and symmetric.  No atrophy or tone abnormalities are noted.  NEURO: Patellar reflexes are 3+ bilaterally. 5/5 strength in right ankle with plantar and dorsiflexion. 5/5 strength in left ankle with plantar and dorsiflexion. 5/5 strength with right knee flexion and extension. 5/5 strength with left knee flexion and extension. 5/5 strength in right EHL, 5/5 strength in left EHL. Plantar response are downgoing. No clonus.  No loss of sensation is noted.  GAIT: Antalgic- ambulates without assistance.    ASSESSMENT: 67 y.o. year old female with lower back and left leg pain, consistent with     Encounter Diagnoses   Name Primary?    Postlaminectomy syndrome of lumbar region Yes    Lumbosacral radiculopathy     DDD (degenerative disc disease), lumbar     Spinal stenosis of lumbar region without neurogenic claudication     Spondylolysis of lumbar region      Myalgia        PLAN:     - Previous imaging was reviewed and discussed with the patient today.    - Obtain updated MRI and lumbar and hip x-rays to assess increased pain.    - She has had limited relief with TF ESIs. She has been evaluated by neurosurgery in the past and would require a fusion. She is not interested in surgery at this time. She was previously given information about [employer] spinal cord stimulation and would like to move forward with this.     - She is scheduled for psychiatric evaluation for spinal cord stimulation.     - Continue Lyrica 75 mg at bedtime.     - Continue Tramadol.     - Continue Flexeril. Refill provided today.     - We discussed specific exercises to avoid given recent history of lumbar surgery.  I cautioned against exercises such as squats and leg raises.    - RTC PRN. I will call her with imaging results.     - Dr. Acosta was consulted on the patient and agrees with this plan.    The above plan and management options were discussed at length with patient. Patient is in agreement with the above and verbalized understanding.    Mague Nunes NP  10/31/2017

## 2017-11-02 ENCOUNTER — HOSPITAL ENCOUNTER (OUTPATIENT)
Dept: RADIOLOGY | Facility: HOSPITAL | Age: 67
Discharge: HOME | End: 2017-11-02
Attending: NURSE PRACTITIONER
Payer: COMMERCIAL

## 2017-11-02 DIAGNOSIS — M96.1 POSTLAMINECTOMY SYNDROME OF LUMBAR REGION: ICD-10-CM

## 2017-11-02 DIAGNOSIS — M51.36 DDD (DEGENERATIVE DISC DISEASE), LUMBAR: ICD-10-CM

## 2017-11-02 DIAGNOSIS — M43.06 SPONDYLOLYSIS OF LUMBAR REGION: ICD-10-CM

## 2017-11-02 DIAGNOSIS — M54.17 LUMBOSACRAL RADICULOPATHY: ICD-10-CM

## 2017-11-02 DIAGNOSIS — M48.061 SPINAL STENOSIS OF LUMBAR REGION WITHOUT NEUROGENIC CLAUDICATION: ICD-10-CM

## 2017-11-02 PROCEDURE — 25500020 PHARM REV CODE 255: Performed by: NURSE PRACTITIONER

## 2017-11-02 PROCEDURE — A9585 GADOBUTROL INJECTION: HCPCS | Performed by: NURSE PRACTITIONER

## 2017-11-02 PROCEDURE — 72158 MRI LUMBAR SPINE W/O & W/DYE: CPT | Mod: 26,,, | Performed by: RADIOLOGY

## 2017-11-02 PROCEDURE — 72148 MRI LUMBAR SPINE W/O DYE: CPT | Mod: TC

## 2017-11-02 PROCEDURE — 72158 MRI LUMBAR SPINE W/O & W/DYE: CPT | Mod: TC

## 2017-11-02 RX ORDER — GADOBUTROL 604.72 MG/ML
5 INJECTION INTRAVENOUS
Status: COMPLETED | OUTPATIENT
Start: 2017-11-02 | End: 2017-11-02

## 2017-11-02 RX ADMIN — GADOBUTROL 5 ML: 604.72 INJECTION INTRAVENOUS at 08:11

## 2017-11-03 ENCOUNTER — TELEPHONE (OUTPATIENT)
Dept: NEUROSURGERY | Facility: CLINIC | Age: 67
End: 2017-11-03

## 2017-11-03 ENCOUNTER — TELEPHONE (OUTPATIENT)
Dept: PAIN MEDICINE | Facility: CLINIC | Age: 67
End: 2017-11-03

## 2017-11-03 NOTE — TELEPHONE ENCOUNTER
----- Message from Douglas Cedillo sent at 11/3/2017  2:28 PM CDT -----  Contact: PT  Pt would like a call back regarding scheduling appointment to have a stimulator placed pt insists on message being sent       Pt can be reached at [phone]  Or [phone]

## 2017-11-03 NOTE — TELEPHONE ENCOUNTER
SPOKE WITH PATIENT INFORMED HER THAT HER RESULTS WILL BE SHOWN TO DR. PATHAK. PATIENT HAS BEEN SCHEDULED FOR AN APPOINTMENT WITH DR. PATHAK ON 1/16/2018. PATIENT WAS ALSO INFORMED IF DR. PATHAK WANTED TO SEE HER SOONER, SHE WILL BE GIVEN CALL TO MOVE HER APPOINTMENT UP SOONER. PATIENT VERBALIZED CLEAR UNDERSTANDING.

## 2017-11-04 ENCOUNTER — OFFICE VISIT (OUTPATIENT)
Dept: URGENT CARE | Facility: CLINIC | Age: 67
End: 2017-11-04
Payer: COMMERCIAL

## 2017-11-04 VITALS
TEMPERATURE: 98 F | HEART RATE: 71 BPM | WEIGHT: 110 LBS | SYSTOLIC BLOOD PRESSURE: 113 MMHG | OXYGEN SATURATION: 100 % | DIASTOLIC BLOOD PRESSURE: 79 MMHG | BODY MASS INDEX: 18.78 KG/M2 | HEIGHT: 64 IN

## 2017-11-04 DIAGNOSIS — K59.00 CONSTIPATION, UNSPECIFIED CONSTIPATION TYPE: Primary | ICD-10-CM

## 2017-11-04 PROCEDURE — 99213 OFFICE O/P EST LOW 20 MIN: CPT | Mod: S$GLB,,, | Performed by: NURSE PRACTITIONER

## 2017-11-04 RX ORDER — LACTULOSE 10 G/15ML
10 SOLUTION ORAL 2 TIMES DAILY
Qty: 30 ML | Refills: 0 | Status: SHIPPED | OUTPATIENT
Start: 2017-11-04 | End: 2017-11-04 | Stop reason: SDUPTHER

## 2017-11-04 RX ORDER — LACTULOSE 10 G/15ML
10 SOLUTION ORAL 2 TIMES DAILY
Qty: 30 ML | Refills: 0 | Status: SHIPPED | OUTPATIENT
Start: 2017-11-04 | End: 2017-11-06 | Stop reason: SDUPTHER

## 2017-11-04 NOTE — PROGRESS NOTES
"Subjective:       Patient ID: Angelina Man is a 67 y.o. female.    Vitals:  height is 5' 4" (1.626 m) and weight is 49.9 kg (110 lb). Her temperature is 98.4 °F (36.9 °C). Her blood pressure is 113/79 and her pulse is 71. Her oxygen saturation is 100%.     Chief Complaint: Constipation    PATIENT PRESENTS WITH CONSTIPATION RELATED TO CHRONIC USE OF PAIN MEDICATION. SHE STATES THAT SHE HAS TAKEN EVERYTHING OVER THE COUNTER. SHE WAS ALSO PRESCRIBED MORVANTIK, BUT STATES THAT IT IS NOT WORKING.  SHE STATES THAT SHE WAS SEEN IN THE ER 1-2 YEARS AGO FOR THIS PROBLEM AND WAS PRESCRIBED AN ORANGE SYRUP THAT WORKED REALLY WELL. SHE IS REQUESTING RX FOR THIS MEDICATION.      Constipation   This is a new (started taking percocet......) problem. The current episode started 1 to 4 weeks ago. The problem has been gradually worsening since onset. The patient is not on a high fiber diet. She does not exercise regularly. There has not been adequate water intake. Associated symptoms include abdominal pain and bloating. Risk factors include change in medication usage/dosage. She has tried laxatives for the symptoms. The treatment provided no relief.     Review of Systems   Gastrointestinal: Positive for bloating, abdominal pain and constipation.       Objective:      Physical Exam   Constitutional: She is oriented to person, place, and time. She appears well-developed and well-nourished.   HENT:   Head: Normocephalic and atraumatic.   Right Ear: External ear normal.   Left Ear: External ear normal.   Nose: Nose normal.   Mouth/Throat: Mucous membranes are normal.   Eyes: Conjunctivae and lids are normal.   Neck: Trachea normal and full passive range of motion without pain. Neck supple.   Cardiovascular: Normal rate, regular rhythm and normal heart sounds.    Pulmonary/Chest: Effort normal and breath sounds normal. No respiratory distress.   Abdominal: Soft. Normal appearance and bowel sounds are normal. She exhibits distension. " She exhibits no abdominal bruit, no pulsatile midline mass and no mass. There is generalized tenderness. There is rigidity. There is no guarding.   Musculoskeletal: Normal range of motion. She exhibits no edema.   Neurological: She is alert and oriented to person, place, and time. She has normal strength.   Skin: Skin is warm, dry and intact. She is not diaphoretic. No pallor.   Psychiatric: She has a normal mood and affect. Her speech is normal and behavior is normal. Judgment and thought content normal. Cognition and memory are normal.   Nursing note and vitals reviewed.      Assessment:       1. Constipation, unspecified constipation type        Plan:         Constipation, unspecified constipation type  -     lactulose 10 gram/15 ml (CHRONULAC) 10 gram/15 mL (15 mL) solution; Take 15 mLs (10 g total) by mouth 2 (two) times daily.  Dispense: 30 mL; Refill: 0    IF NO RELIEF IN 24-48 HOURS, GO TO THE EMERGENCY ROOM    Please return here or go to the Emergency Department for any concerns or worsening of condition.  If you were prescribed antibiotics, please take them to completion.  If you were prescribed a narcotic medication, do not drive or operate heavy equipment or machinery while taking these medications.  Please follow up with your primary care doctor or specialist as needed.    If you  smoke, please stop smoking.

## 2017-11-04 NOTE — PATIENT INSTRUCTIONS
IF NO RELIEF IN 24-48 HOURS, GO TO THE EMERGENCY ROOM    Please return here or go to the Emergency Department for any concerns or worsening of condition.  If you were prescribed antibiotics, please take them to completion.  If you were prescribed a narcotic medication, do not drive or operate heavy equipment or machinery while taking these medications.  Please follow up with your primary care doctor or specialist as needed.    If you  smoke, please stop smoking.  Constipation (Adult)  Constipation means that you have bowel movements that are less frequent than usual. Stools often become very hard and difficult to pass.  Constipation is very common. At some point in life it affects almost everyone. Since everyone's bowel habits are different, what is constipation to one person may not be to another. Your healthcare provider may do tests to diagnose constipation. It depends on what he or she finds when evaluating you.    Symptoms of constipation include:  · Abdominal pain  · Bloating  · Vomiting  · Painful bowel movements  · Itching, swelling, bleeding, or pain around the anus  Causes  Constipation can have many causes. These include:  · Diet low in fiber  · Too much dairy  · Not drinking enough liquids  · Lack of exercise or physical activity. This is especially true for older adults.  · Changes in lifestyle or daily routine, including pregnancy, aging, work, and travel  · Frequent use or misuse of laxatives  · Ignoring the urge to have a bowel movement or delaying it until later  · Medicines, such as certain prescription pain medicines, iron supplements, antacids, certain antidepressants, and calcium supplements  · Diseases like irritable bowel syndrome, bowel obstructions, stroke, diabetes, thyroid disease, Parkinson disease, hemorrhoids, and colon cancer  Complications  Potential complications of constipation can include:  · Hemorrhoids  · Rectal bleeding from hemorrhoids or anal fissures (skin  tears)  · Hernias  · Dependency on laxatives  · Chronic constipation  · Fecal impaction  · Bowel obstruction or perforation  Home care  All treatment should be done after talking with your healthcare provider. This is especially true if you have another medical problems, are taking prescription medicines, or are an older adult. Treatment most often involves lifestyle changes. You may also need medicines. Your healthcare provider will tell you which will work best for you. Follow the advice below to help avoid this problem in the future.  Lifestyle changes  These lifestyle changes can help prevent constipation:  · Diet. Eat a high-fiber diet, with fresh fruit and vegetables, and reduce dairy intake, meats, and processed foods  · Fluids. It's important to get enough fluids each day. Drink plenty of water when you eat more fiber. If you are on diet that limits the amount of fluid you can have, talk about this with your healthcare provider.  · Regular exercise. Check with your healthcare provider first.  Medications  Take any medicines as directed. Some laxatives are safe to use only every now and then. Others can be taken on a regular basis. Talk with your doctor or pharmacist if you have questions.  Prescription pain medicines can cause constipation. If you are taking this kind of medicine, ask your healthcare provider if you should also take a stool softener.  Medicines you may take to treat constipation include:  · Fiber supplements  · Stool softeners  · Laxatives  · Enemas  · Rectal suppositories  Follow-up care  Follow up with your healthcare provider if symptoms don't get better in the next few days. You may need to have more tests or see a specialist.  Call 911  Call 911 if any of these occur:  · Trouble breathing  · Stiff, rigid abdomen that is severely painful to touch  · Confusion  · Fainting or loss of consciousness  · Rapid heart rate  · Chest pain  When to seek medical advice  Call your healthcare provider  right away if any of these occur:  · Fever over 100.4°F (38°C)  · Failure to resume normal bowel movements  · Pain in your abdomen or back gets worse  · Nausea or vomiting  · Swelling in your abdomen  · Blood in the stool  · Black, tarry stool  · Involuntary weight loss  · Weakness  Date Last Reviewed: 12/30/2015  © 6688-3169 [employer]. [address] [place] [address], [place] [address]. All rights reserved. This information is not intended as a substitute for professional medical care. Always follow your healthcare professional's instructions.

## 2017-11-05 ENCOUNTER — PATIENT MESSAGE (OUTPATIENT)
Dept: NEUROSURGERY | Facility: CLINIC | Age: 67
End: 2017-11-05

## 2017-11-05 ENCOUNTER — PATIENT MESSAGE (OUTPATIENT)
Dept: PAIN MEDICINE | Facility: CLINIC | Age: 67
End: 2017-11-05

## 2017-11-06 ENCOUNTER — PATIENT MESSAGE (OUTPATIENT)
Dept: FAMILY MEDICINE | Facility: CLINIC | Age: 67
End: 2017-11-06

## 2017-11-06 ENCOUNTER — TELEPHONE (OUTPATIENT)
Dept: FAMILY MEDICINE | Facility: CLINIC | Age: 67
End: 2017-11-06

## 2017-11-06 ENCOUNTER — TELEPHONE (OUTPATIENT)
Dept: PAIN MEDICINE | Facility: CLINIC | Age: 67
End: 2017-11-06

## 2017-11-06 ENCOUNTER — HOSPITAL ENCOUNTER (OUTPATIENT)
Dept: RADIOLOGY | Facility: HOSPITAL | Age: 67
Discharge: HOME | End: 2017-11-06
Attending: FAMILY MEDICINE
Payer: COMMERCIAL

## 2017-11-06 DIAGNOSIS — K59.00 CONSTIPATION, UNSPECIFIED CONSTIPATION TYPE: ICD-10-CM

## 2017-11-06 DIAGNOSIS — Z12.31 SCREENING MAMMOGRAM, ENCOUNTER FOR: ICD-10-CM

## 2017-11-06 DIAGNOSIS — K59.03 THERAPEUTIC OPIOID INDUCED CONSTIPATION: Primary | ICD-10-CM

## 2017-11-06 DIAGNOSIS — T40.2X5A THERAPEUTIC OPIOID INDUCED CONSTIPATION: Primary | ICD-10-CM

## 2017-11-06 DIAGNOSIS — K59.00 CONSTIPATION, UNSPECIFIED CONSTIPATION TYPE: Primary | ICD-10-CM

## 2017-11-06 PROCEDURE — 77063 BREAST TOMOSYNTHESIS BI: CPT | Mod: 26,,, | Performed by: RADIOLOGY

## 2017-11-06 PROCEDURE — 77067 SCR MAMMO BI INCL CAD: CPT | Mod: TC

## 2017-11-06 PROCEDURE — 77067 SCR MAMMO BI INCL CAD: CPT | Mod: 26,,, | Performed by: RADIOLOGY

## 2017-11-06 RX ORDER — LACTULOSE 10 G/15ML
10 SOLUTION ORAL 2 TIMES DAILY
Qty: 300 ML | Refills: 0 | Status: SHIPPED | OUTPATIENT
Start: 2017-11-06 | End: 2017-11-16

## 2017-11-06 RX ORDER — LUBIPROSTONE 8 UG/1
8 CAPSULE ORAL 2 TIMES DAILY WITH MEALS
Qty: 60 CAPSULE | Refills: 2 | Status: SHIPPED | OUTPATIENT
Start: 2017-11-06 | End: 2018-02-22 | Stop reason: SDUPTHER

## 2017-11-06 NOTE — TELEPHONE ENCOUNTER
----- Message from Cm To sent at 11/6/2017  2:38 PM CST -----  Contact: Angelina Man  _X  1st Request  _  2nd Request  _  3rd Request        Who: Angelina Man    Why: Patient following up on [facility] message regarding medication    What Number to Call Back: [phone]    When to Expect a call back: (Within 24 hours)    Please return the call at earliest convenience. Thanks!      Writer called patient to let her know that NP had given her an extension of prescription for lactulose and that they were looking into getting her an earlier appointment in neuro. No answer on patients end of phone after ringing stopped.

## 2017-11-06 NOTE — TELEPHONE ENCOUNTER
----- Message from Lazara Ashton sent at 11/6/2017  3:10 PM CST -----  Contact: patient work [phone] until 4 and cell [phone] cell doesn't work in office  Pt spoke with Rosamaria a little earlier today and needs something for constipation . Pain management doctor prescribed lactulose earlier in the year and it helped, Pt went to Urgent Care and what they prescribed isn't helping/ Mirialax. Can you order the lactulose? Please call pt .    Pt uses pharmacy at [facility] [phone]

## 2017-11-07 ENCOUNTER — PATIENT MESSAGE (OUTPATIENT)
Dept: PAIN MEDICINE | Facility: CLINIC | Age: 67
End: 2017-11-07

## 2017-11-07 NOTE — TELEPHONE ENCOUNTER
Emailed patient to update her on progress in obtaining earlier appointment with neurosurgery and to let her know that she has a prescription available for lactulose.

## 2017-11-08 ENCOUNTER — PATIENT MESSAGE (OUTPATIENT)
Dept: PAIN MEDICINE | Facility: CLINIC | Age: 67
End: 2017-11-08

## 2017-11-08 ENCOUNTER — PATIENT MESSAGE (OUTPATIENT)
Dept: NEUROSURGERY | Facility: CLINIC | Age: 67
End: 2017-11-08

## 2017-11-09 ENCOUNTER — PATIENT MESSAGE (OUTPATIENT)
Dept: PAIN MEDICINE | Facility: CLINIC | Age: 67
End: 2017-11-09

## 2017-11-10 ENCOUNTER — TELEPHONE (OUTPATIENT)
Dept: NEUROSURGERY | Facility: CLINIC | Age: 67
End: 2017-11-10

## 2017-11-10 DIAGNOSIS — M48.061 SPINAL STENOSIS OF LUMBAR REGION, UNSPECIFIED WHETHER NEUROGENIC CLAUDICATION PRESENT: Primary | ICD-10-CM

## 2017-11-14 ENCOUNTER — HOSPITAL ENCOUNTER (OUTPATIENT)
Facility: HOSPITAL | Age: 67
Discharge: HOME | End: 2017-11-14
Attending: COLON & RECTAL SURGERY | Admitting: COLON & RECTAL SURGERY
Payer: COMMERCIAL

## 2017-11-14 ENCOUNTER — PATIENT MESSAGE (OUTPATIENT)
Dept: ENDOSCOPY | Facility: HOSPITAL | Age: 67
End: 2017-11-14

## 2017-11-14 ENCOUNTER — SURGERY (OUTPATIENT)
Age: 67
End: 2017-11-14

## 2017-11-14 ENCOUNTER — ANESTHESIA EVENT (OUTPATIENT)
Dept: ENDOSCOPY | Facility: HOSPITAL | Age: 67
End: 2017-11-14
Payer: COMMERCIAL

## 2017-11-14 ENCOUNTER — ANESTHESIA (OUTPATIENT)
Dept: ENDOSCOPY | Facility: HOSPITAL | Age: 67
End: 2017-11-14
Payer: COMMERCIAL

## 2017-11-14 VITALS
WEIGHT: 105 LBS | OXYGEN SATURATION: 98 % | BODY MASS INDEX: 18.61 KG/M2 | HEIGHT: 63 IN | HEART RATE: 80 BPM | DIASTOLIC BLOOD PRESSURE: 60 MMHG | TEMPERATURE: 99 F | RESPIRATION RATE: 12 BRPM | SYSTOLIC BLOOD PRESSURE: 114 MMHG

## 2017-11-14 DIAGNOSIS — Z12.11 SCREEN FOR COLON CANCER: ICD-10-CM

## 2017-11-14 PROCEDURE — 37000008 HC ANESTHESIA 1ST 15 MINUTES: Performed by: COLON & RECTAL SURGERY

## 2017-11-14 PROCEDURE — 63600175 PHARM REV CODE 636 W HCPCS: Performed by: NURSE ANESTHETIST, CERTIFIED REGISTERED

## 2017-11-14 PROCEDURE — 25000003 PHARM REV CODE 250: Performed by: NURSE PRACTITIONER

## 2017-11-14 PROCEDURE — D9220A PRA ANESTHESIA: Mod: 33,CRNA,, | Performed by: NURSE ANESTHETIST, CERTIFIED REGISTERED

## 2017-11-14 PROCEDURE — G0105 COLORECTAL SCRN; HI RISK IND: HCPCS | Performed by: COLON & RECTAL SURGERY

## 2017-11-14 PROCEDURE — 37000009 HC ANESTHESIA EA ADD 15 MINS: Performed by: COLON & RECTAL SURGERY

## 2017-11-14 PROCEDURE — D9220A PRA ANESTHESIA: Mod: 33,ANES,, | Performed by: ANESTHESIOLOGY

## 2017-11-14 PROCEDURE — G0105 COLORECTAL SCRN; HI RISK IND: HCPCS | Mod: ,,, | Performed by: COLON & RECTAL SURGERY

## 2017-11-14 RX ORDER — LIDOCAINE HCL/PF 100 MG/5ML
SYRINGE (ML) INTRAVENOUS
Status: DISCONTINUED | OUTPATIENT
Start: 2017-11-14 | End: 2017-11-14

## 2017-11-14 RX ORDER — PROPOFOL 10 MG/ML
VIAL (ML) INTRAVENOUS CONTINUOUS PRN
Status: DISCONTINUED | OUTPATIENT
Start: 2017-11-14 | End: 2017-11-14

## 2017-11-14 RX ORDER — PROPOFOL 10 MG/ML
VIAL (ML) INTRAVENOUS
Status: DISCONTINUED | OUTPATIENT
Start: 2017-11-14 | End: 2017-11-14

## 2017-11-14 RX ORDER — SODIUM CHLORIDE 9 MG/ML
INJECTION, SOLUTION INTRAVENOUS CONTINUOUS
Status: DISCONTINUED | OUTPATIENT
Start: 2017-11-14 | End: 2017-11-14 | Stop reason: HOSPADM

## 2017-11-14 RX ADMIN — LIDOCAINE HYDROCHLORIDE 100 MG: 20 INJECTION, SOLUTION INTRAVENOUS at 08:11

## 2017-11-14 RX ADMIN — PROPOFOL 200 MCG/KG/MIN: 10 INJECTION, EMULSION INTRAVENOUS at 08:11

## 2017-11-14 RX ADMIN — PROPOFOL 40 MG: 10 INJECTION, EMULSION INTRAVENOUS at 08:11

## 2017-11-14 RX ADMIN — SODIUM CHLORIDE: 0.9 INJECTION, SOLUTION INTRAVENOUS at 08:11

## 2017-11-14 NOTE — ANESTHESIA PREPROCEDURE EVALUATION
11/14/2017  Angelina Man is a 67 y.o., female.    Anesthesia Evaluation    I have reviewed the Patient Summary Reports.     I have reviewed the Medications.     Review of Systems  Anesthesia Hx:  History of prior surgery of interest to airway management or planning:  Denies Personal Hx of Anesthesia complications.   Social:  Non-Smoker, No Alcohol Use    Hematology/Oncology:  Hematology Normal   Oncology Normal     EENT/Dental:EENT/Dental Normal   Cardiovascular:  Cardiovascular Normal Exercise tolerance: good     Pulmonary:  Pulmonary Normal    Renal/:  Renal/ Normal     Hepatic/GI:  Hepatic/GI Normal    Musculoskeletal:  Spine Disorders:    Neurological:  Neurology Normal    Endocrine:   Hypothyroidism (controlled)    Dermatological:  Skin Normal    Psych:  Psychiatric Normal           Physical Exam  General:  Well nourished    Airway/Jaw/Neck:  Airway Findings: Mouth Opening: Normal Tongue: Normal  General Airway Assessment: Adult, Good  Mallampati: II  TM Distance: Normal, at least 6 cm     Eyes/Ears/Nose:  EYES/EARS/NOSE FINDINGS: Normal   Dental:  Dental Findings: In tact   Chest/Lungs:  Chest/Lungs Findings: Clear to auscultation, Normal Respiratory Rate     Heart/Vascular:  Heart Findings: Rate: Normal  Rhythm: Regular Rhythm  Sounds: Normal  Heart murmur: negative       Mental Status:  Mental Status Findings:  Cooperative, Alert and Oriented         Anesthesia Plan  Type of Anesthesia, risks & benefits discussed:  Anesthesia Type:  general  Patient's Preference:   Intra-op Monitoring Plan:   Intra-op Monitoring Plan Comments:   Post Op Pain Control Plan:   Post Op Pain Control Plan Comments:   Induction:   IV  Beta Blocker:  Patient is not currently on a Beta-Blocker (No further documentation required).       Informed Consent: Patient understands risks and agrees with Anesthesia plan.   Questions answered. Anesthesia consent signed with patient.  ASA Score: 2     Day of Surgery Review of History & Physical:    H&P update referred to the surgeon.     Anesthesia Plan Notes:   67F hypothyroid, for screening cscope under [place] TIVA        Ready For Surgery From Anesthesia Perspective.

## 2017-11-14 NOTE — DISCHARGE INSTRUCTIONS

## 2017-11-14 NOTE — TRANSFER OF CARE
"Anesthesia Transfer of Care Note    Patient: Angelina Man    Procedure(s) Performed: Procedure(s) (LRB):  COLONOSCOPY (N/A)    Patient location: GI    Anesthesia Type: general    Transport from OR: Transported from OR on room air with adequate spontaneous ventilation    Post pain: adequate analgesia    Post assessment: no apparent anesthetic complications    Post vital signs: stable    Level of consciousness: awake    Nausea/Vomiting: no nausea/vomiting    Complications: none    Transfer of care protocol was followed      Last vitals:   Visit Vitals  BP (!) 103/58 (BP Location: Left arm, Patient Position: Lying)   Pulse 78   Temp 36.7 °C (98.1 °F) (Temporal)   Resp 15   Ht 5' 3" (1.6 m)   Wt 47.6 kg (105 lb)   SpO2 97%   Breastfeeding? No   BMI 18.60 kg/m²     "

## 2017-11-14 NOTE — H&P
Endoscopy H&P    Procedure : Colonoscopy      family history of colon cancer (father)       Past Medical History:   Diagnosis Date    Arthritis     Bronchitis     Cataract     Dry eyes     Hypothyroidism 6/23/2016    Rash     Squamous cell carcinoma     in-situ right upper inner arm, right wrist    Status post lumbar surgery 6/23/2016    Stress fracture     Thyroid disease              Review of Systems -ROS:  GENERAL: No fever, chills, fatigability or weight loss.  CHEST: Denies JENNINGS, cyanosis, wheezing, cough and sputum production.  CARDIOVASCULAR: Denies chest pain, PND, orthopnea or reduced exercise tolerance.   Musculoskeletal ROS: negative for - gait disturbance or joint pain  Neurological ROS: negative for - confusion or memory loss        Physical Exam:  General: well developed, well nourished, no distress  Head: normocephalic  Neck: supple, symmetrical, trachea midline  Lungs:  clear to auscultation bilaterally and normal respiratory effort  Heart: regular rate and rhythm, S1, S2 normal, no murmur, rub or gallop and regular rate and rhythm  Abdomen: soft, non-tender non-distented; bowel sounds normal; no masses,  no organomegaly  Extremities: no cyanosis or edema, or clubbing       Moderate Sedation (choice): Mallampati Score 1    ASA : II    IMP: family history of colon cancer (father)     Plan: Colonoscopy with Moderate sedation.  I have explained the procedure including indications, alternatives, expected outcomes and potential complications. The patient appears to understand and gives informed consent. The patient is medically ready for surgery.

## 2017-11-14 NOTE — PATIENT INSTRUCTIONS
Discharge Summary/Instructions after an Endoscopic Procedure  Patient Name: Angelina Man  Patient MRN: [id number]  Patient YOB: 1950 Tuesday, November 14, 2017  Kasi Mercado MD  RESTRICTIONS:  During your procedure today, you received medications for sedation.  These   medications may affect your judgment, balance and coordination.  Therefore,   for 24 hours, you have the following restrictions:   - DO NOT drive a car, operate machinery, make legal/financial decisions,   sign important papers or drink alcohol.    ACTIVITY:  The following day: return to full activity including work, except no heavy   lifting, straining or running for 3 days if polyps were removed.  DIET:  Eat and drink normally unless instructed otherwise.     TREATMENT FOR COMMON SIDE EFFECTS:  - Mild abdominal pain, belching, bloating or excessive gas: rest, eat   lightly and use a heating pad.  - Sore Throat: treat with throat lozenges and/or gargle with warm salt   water.  SYMPTOMS TO WATCH FOR AND REPORT TO YOUR PHYSICIAN:  1. Abdominal pain or bloating, other than gas cramps.  2. Chest pain.  3. Back pain.  4. Chills or fever occurring within 24 hours after the procedure.  5. Rectal bleeding, which would show as bright red, maroon, or black stools.   (A tablespoon of blood from the rectum is not serious, especially if   hemorrhoids are present.)  6. Vomiting.  7. Weakness or dizziness.  8. Because air was used during the procedure, expelling large amounts of air   from your rectum or belching is normal.  9. If a bowel prep was taken, you may not have a bowel movement for 1-3   days.  This is normal.  GO DIRECTLY TO THE NEAREST EMERGENCY ROOM IF YOU HAVE ANY OF THE FOLLOWING:      Difficulty breathing  Chills and/or fever over 101 F   Persistent vomiting and/or vomiting blood   Severe abdominal pain   Severe chest pain   Black, tarry stools   Bleeding- more than one tablespoon   Any other symptom or condition that you may feel needs  urgent attention  Your doctor recommends these additional instructions:  If any biopsies were taken, your doctor s clinic will contact you in 1 to 2   weeks with any results.  You are being discharged to home.   Your physician has recommended a repeat colonoscopy in five years for   screening purposes.  For questions, problems or results please call your physician - Kasi Mercado MD at Work:  [phone].  [facility] [place], EMERGENCY ROOM PHONE NUMBER: [phone]  IF A COMPLICATION OR EMERGENCY SITUATION ARISES AND YOU ARE UNABLE TO REACH   YOUR PHYSICIAN - GO DIRECTLY TO THE EMERGENCY ROOM.  Kasi Mercado MD  11/14/2017 8:48:07 AM  This report has been verified and signed electronically.

## 2017-11-15 ENCOUNTER — HOSPITAL ENCOUNTER (OUTPATIENT)
Dept: RADIOLOGY | Facility: OTHER | Age: 67
Discharge: HOME | End: 2017-11-15
Attending: NEUROLOGICAL SURGERY
Payer: COMMERCIAL

## 2017-11-15 ENCOUNTER — OFFICE VISIT (OUTPATIENT)
Dept: SPINE | Facility: CLINIC | Age: 67
End: 2017-11-15
Attending: NEUROLOGICAL SURGERY
Payer: COMMERCIAL

## 2017-11-15 ENCOUNTER — PATIENT MESSAGE (OUTPATIENT)
Dept: PAIN MEDICINE | Facility: CLINIC | Age: 67
End: 2017-11-15

## 2017-11-15 ENCOUNTER — TELEPHONE (OUTPATIENT)
Dept: PAIN MEDICINE | Facility: CLINIC | Age: 67
End: 2017-11-15

## 2017-11-15 VITALS — WEIGHT: 103 LBS | BODY MASS INDEX: 18.25 KG/M2

## 2017-11-15 DIAGNOSIS — M48.061 SPINAL STENOSIS OF LUMBAR REGION, UNSPECIFIED WHETHER NEUROGENIC CLAUDICATION PRESENT: ICD-10-CM

## 2017-11-15 DIAGNOSIS — M48.061 SPINAL STENOSIS OF LUMBAR REGION WITHOUT NEUROGENIC CLAUDICATION: Primary | ICD-10-CM

## 2017-11-15 DIAGNOSIS — M54.16 RADICULOPATHY OF LUMBAR REGION: ICD-10-CM

## 2017-11-15 PROCEDURE — 72131 CT LUMBAR SPINE W/O DYE: CPT | Mod: 26,,, | Performed by: RADIOLOGY

## 2017-11-15 PROCEDURE — 99999 PR PBB SHADOW E&M-EST. PATIENT-LVL II: CPT | Mod: PBBFAC,,, | Performed by: NEUROLOGICAL SURGERY

## 2017-11-15 PROCEDURE — 72120 X-RAY BEND ONLY L-S SPINE: CPT | Mod: 26,,, | Performed by: RADIOLOGY

## 2017-11-15 PROCEDURE — 72100 X-RAY EXAM L-S SPINE 2/3 VWS: CPT | Mod: 26,,, | Performed by: RADIOLOGY

## 2017-11-15 PROCEDURE — 72100 X-RAY EXAM L-S SPINE 2/3 VWS: CPT | Mod: TC

## 2017-11-15 PROCEDURE — 99214 OFFICE O/P EST MOD 30 MIN: CPT | Mod: S$GLB,,, | Performed by: NEUROLOGICAL SURGERY

## 2017-11-15 PROCEDURE — 72131 CT LUMBAR SPINE W/O DYE: CPT | Mod: TC

## 2017-11-15 RX ORDER — LACTULOSE 10 G/15ML
SOLUTION ORAL; RECTAL
Refills: 0 | COMMUNITY
Start: 2017-11-04 | End: 2017-11-15

## 2017-11-15 NOTE — ANESTHESIA POSTPROCEDURE EVALUATION
"Anesthesia Post Evaluation    Patient: Angelina aMn    Procedure(s) Performed: Procedure(s) (LRB):  COLONOSCOPY (N/A)    Final Anesthesia Type: general  Patient location during evaluation: GI PACU  Patient participation: Yes- Able to Participate  Level of consciousness: awake and alert  Pain management: adequate  Airway patency: patent  PONV status at discharge: No PONV  Anesthetic complications: no      Cardiovascular status: blood pressure returned to baseline  Respiratory status: unassisted, spontaneous ventilation and room air  Hydration status: euvolemic  Follow-up not needed.        Visit Vitals  /60 (BP Location: Left arm, Patient Position: Sitting)   Pulse 80   Temp 37.1 °C (98.7 °F) (Oral)   Resp 12   Ht 5' 3" (1.6 m)   Wt 47.6 kg (105 lb)   SpO2 98%   Breastfeeding? No   BMI 18.60 kg/m²       Pain/Aliya Score: Pain Assessment Performed: Yes (11/14/2017  9:20 AM)  Presence of Pain: denies (11/14/2017  9:20 AM)  Pain Rating Prior to Med Admin: 0 (11/14/2017  9:20 AM)  Aliya Score: 10 (11/14/2017  9:20 AM)      "

## 2017-11-15 NOTE — PROGRESS NOTES
Subjective:    I, Cata Spear, attest that this documentation has been prepared under the direction and in the presence of ARMIN Kaba MD.     Patient ID: Angelina Man is a 67 y.o. female.    Chief Complaint: No chief complaint on file.    HPI   Pt is a 67 y.o. female who presents for follow up after last evaluation on 5/9/2017. Pt had a previous L4-5 microdiskectomy in May 2016 and developed a recurrent disc and had another diskectomy in March 2017. She has now become grossly unstable with a pars defect on the right and a significant left L4-5 facet disruption. Pt is currently enduring hip pain radiating into her bilateral lower extremities, down to her ankles. Pt denies any back pain.     Review of Systems   Constitutional: Negative for chills, fatigue and fever.   HENT: Negative for sinus pressure and trouble swallowing.    Eyes: Negative.  Negative for visual disturbance.   Respiratory: Negative.    Cardiovascular: Negative.    Gastrointestinal: Negative.  Negative for nausea and vomiting.   Endocrine: Negative.    Genitourinary: Negative.    Musculoskeletal: Negative.  Negative for back pain.        Positive for hip pain radiating to bilateral lower extremities.    Neurological: Negative for dizziness, seizures, syncope, speech difficulty, weakness and numbness.       Objective:      Physical Exam:  Nursing note and vitals reviewed.    Constitutional: She appears well-developed.     Eyes: Pupils are equal, round, and reactive to light. Conjunctivae and EOM are normal.     Cardiovascular: Normal rate, regular rhythm, normal pulses and intact distal pulses.     Abdominal: Soft.     Psych/Behavior: She is alert. She is oriented to person, place, and time. She has a normal mood and affect.     Musculoskeletal: Gait is normal.        Neck: Range of motion is full. There is no tenderness. Muscle strength is 5/5. Tone is normal.        Back: Range of motion is full. There is no tenderness. Muscle strength is 5/5.  Tone is normal.        Right Upper Extremities: Range of motion is full. There is no tenderness. Muscle strength is 5/5. Tone is normal.        Left Upper Extremities: Range of motion is full. There is no tenderness. Muscle strength is 5/5. Tone is normal.       Right Lower Extremities: Range of motion is full. There is no tenderness. Muscle strength is 5/5. Tone is normal.        Left Lower Extremities: Range of motion is full. There is no tenderness. Muscle strength is 5/5. Tone is normal.     Neurological:        Coordination: She has a normal Romberg Test, normal finger to nose coordination, normal heel to shin coordination and normal tandem walking coordination.        DTRs: DTRs are normal. Tricep reflexes are 2+ on the right side and 2+ on the left side. Bicep reflexes are 2+ on the right side and 2+ on the left side. Brachioradialis reflexes are 2+ on the right side and 2+ on the left side. Patellar reflexes are 2+ on the right side and 2+ on the left side. Achilles reflexes are 2+ on the right side and 2+ on the left side.        Cranial nerves: Cranial nerve(s) II, III, IV, V, VI, VII, VIII, IX, X, XI and XII are intact.       Pt has mostly bilateral leg pain that fits a L5 distribution that has gotten worse. Increased back pain with standing up for a long period of time.   Full strength in both lower extremities.   Her back wound is well healed.   She walks with an antalgic gait leaning forward.     Imaging:   MRI L spine, dated 11/02/2017, clearly shows grade II spondylodesis and L5-S1 degeneration.     MRI scan shows she is slipped slightly more compared to the March scan. We had wanted to get flex/ extension X rays. We discussed the possibility of needing a fusion. The pt was not as enthusiastic about that and has not come back to see me since then.     IARMIN MD, personally reviewed the imaging and interpreted independent of the radiology report.    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 It looks like left L5-S1 joint is severely compromised and has a right pars fracture. I think she needs to be re-operated on with a TLIF at this level.     I have discussed the risks/benefits, indications, and alternatives for the proposed procedure in detail. I have answered all of their questions and patient wishes to proceed with surgery. We will schedule patient.     Dr. Hoskins in our department who has more experience with grade II or more slippage and since I have not made her better with 2 previous operations I would like him to perform her surgery. I would like to get her a back brace now and get her cleared preoperatively.     I, ARMIN Kaba MD, personally performed the services described in this documentation. All medical record entries made by the scribe, Cata Spear, were at my direction and in my presence.  I have reviewed the chart and agree that the record reflects my personal performance and is accurate and complete.

## 2017-11-16 ENCOUNTER — TELEPHONE (OUTPATIENT)
Dept: NEUROSURGERY | Facility: CLINIC | Age: 67
End: 2017-11-16

## 2017-11-16 NOTE — TELEPHONE ENCOUNTER
Left message on patient voicemail informing her of below appointment  For 10:15     . ----- Message from Domenica Scott RN sent at 11/16/2017  3:48 PM CST -----  Contact: Self @ [phone] or work: [phone]  There is no earlier availability. This is the first clinic upon his return.  ----- Message -----  From: Ronaldo Conner  Sent: 11/16/2017   3:41 PM  To: [facility] [facility] Staff    Pt states she's feeling worse, she can barely walk, and she's scared because her legs are feeling paralyzed, and she's asking to see Dr. Hoskins earlier than 12/05/17. Pt is added to wait list. Pls call if pt can be seen.

## 2017-11-16 NOTE — TELEPHONE ENCOUNTER
Patient is requesting a refill on oxycodone. Medication was not filled by Pain management. Please review and advise

## 2017-11-17 ENCOUNTER — TELEPHONE (OUTPATIENT)
Dept: FAMILY MEDICINE | Facility: CLINIC | Age: 67
End: 2017-11-17

## 2017-11-17 DIAGNOSIS — M54.9 BACK PAIN, UNSPECIFIED BACK LOCATION, UNSPECIFIED BACK PAIN LATERALITY, UNSPECIFIED CHRONICITY: Primary | ICD-10-CM

## 2017-11-17 NOTE — TELEPHONE ENCOUNTER
----- Message from Rosalva Hackett sent at 11/17/2017 12:13 PM CST -----  Contact: self [phone]  Patient would like a call back from MD only stated it's personal .. Please advise , Thanks

## 2017-11-17 NOTE — TELEPHONE ENCOUNTER
Spoke with the patient today, she advised that she is scheduled for surgery in a few weeks but is currently in severe pain. The only medication that has helped he pain is percocet, which she had left over from a previous procedure. She is requesting that pain medication be called in for her to last until she has surgery. Patient advised that she has tried to contact Dr. Kaba, but has been unsuccessful contacting him. Advised her that Dr. Christopher is out of clinic until Monday, and that I can not guarantee a response or call until then. Pt advised that she thinks she has enough medication to last until Monday, but would really like to speak with Dr. Christopher.

## 2017-11-20 ENCOUNTER — TELEPHONE (OUTPATIENT)
Dept: FAMILY MEDICINE | Facility: CLINIC | Age: 67
End: 2017-11-20

## 2017-11-20 ENCOUNTER — TELEPHONE (OUTPATIENT)
Dept: NEUROSURGERY | Facility: CLINIC | Age: 67
End: 2017-11-20

## 2017-11-20 RX ORDER — TRAMADOL HYDROCHLORIDE 50 MG/1
50 TABLET ORAL EVERY 6 HOURS PRN
Qty: 60 TABLET | Refills: 0 | Status: SHIPPED | OUTPATIENT
Start: 2017-11-20 | End: 2018-01-03

## 2017-11-20 NOTE — TELEPHONE ENCOUNTER
----- Message from Flory Brush sent at 11/20/2017  1:06 PM CST -----  Contact: self/[phone]/cell [phone]/office  Patient called in regards needing to talk with Dr Christopher about getting more pain medication until her surgery is done. Please call and advise.     Thank you!!!

## 2017-11-20 NOTE — TELEPHONE ENCOUNTER
Spoke with patient, she states that she needs a sooner appointment , I informed her that the soonest appointment we have is the way she already has set up , she can be added to the waiting list   ----- Message from Domenica Scott RN sent at 11/17/2017  1:39 PM CST -----  Contact: Self @ [phone] or work: [phone]      ----- Message -----  From: Ronaldo Conner  Sent: 11/17/2017  12:06 PM  To: [employer] Staff    Pt says she's returning a call to [facility].

## 2017-11-21 ENCOUNTER — TELEPHONE (OUTPATIENT)
Dept: PAIN MEDICINE | Facility: CLINIC | Age: 67
End: 2017-11-21

## 2017-11-21 ENCOUNTER — TELEPHONE (OUTPATIENT)
Dept: ENDOSCOPY | Facility: HOSPITAL | Age: 67
End: 2017-11-21

## 2017-11-21 DIAGNOSIS — M48.061 SPINAL STENOSIS OF LUMBAR REGION WITHOUT NEUROGENIC CLAUDICATION: ICD-10-CM

## 2017-11-21 DIAGNOSIS — M54.17 LUMBOSACRAL RADICULOPATHY: ICD-10-CM

## 2017-11-21 DIAGNOSIS — M51.36 DDD (DEGENERATIVE DISC DISEASE), LUMBAR: ICD-10-CM

## 2017-11-21 DIAGNOSIS — M96.1 POSTLAMINECTOMY SYNDROME OF LUMBAR REGION: Primary | ICD-10-CM

## 2017-11-21 DIAGNOSIS — M43.06 SPONDYLOLYSIS OF LUMBAR REGION: ICD-10-CM

## 2017-11-21 RX ORDER — OXYCODONE AND ACETAMINOPHEN 10; 325 MG/1; MG/1
1 TABLET ORAL EVERY 6 HOURS PRN
Qty: 120 TABLET | Refills: 0 | Status: SHIPPED | OUTPATIENT
Start: 2017-11-21 | End: 2017-12-21 | Stop reason: SDUPTHER

## 2017-11-21 NOTE — TELEPHONE ENCOUNTER
----- Message from Violeta Morrell MA sent at 11/20/2017 11:09 AM CST -----      ----- Message -----  From: Violeta Morrell MA  Sent: 11/20/2017  11:09 AM  To: Violeta Morrell MA    Received: 3 days ago   Message Contents   KYLE Villegas MA Whitley Matthews P Orgeron Amy Staff   Caller: Patient (Today, 12:09 PM)           x_  1st Request   _  2nd Request   _  3rd Request     Who: ELSA LIU [[id number]]     Why: Patient would like to speak with staff in regards to getting pain medication prescribed for her back and legs. Patients states she is in a lot of pain and cannot walk.     What Number to Call Back:[phone]     When to Expect a call back: (Within 24 hours)     Please return the call at earliest convenience. Thanks!

## 2017-11-21 NOTE — TELEPHONE ENCOUNTER
----- Message from Ahmet Sneed sent at 11/21/2017  3:23 PM CST -----  X_  1st Request  _  2nd Request  _  3rd Request        Who: ELSA LIU [[id number]]    Why: Requesting a call back in regards to getting some pain medicine. Please call to discuss.    What Number to Call Back:[phone]    When to Expect a call back: (Within 24 hours)    Please return the call at earliest convenience. Thanks!

## 2017-11-22 ENCOUNTER — TELEPHONE (OUTPATIENT)
Dept: PAIN MEDICINE | Facility: CLINIC | Age: 67
End: 2017-11-22

## 2017-11-22 NOTE — TELEPHONE ENCOUNTER
Spoke with patient regarding request for stronger medication, request sent to Dr Acosta on yesterday

## 2017-11-22 NOTE — TELEPHONE ENCOUNTER
----- Message from Violeta Morrell MA sent at 11/21/2017  5:04 PM CST -----  Bri Coronado Staff  Caller: Patient (Today,  3:19 PM)         x_  1st Request   _  2nd Request   _  3rd Request     Who: ELSA LIU [[id number]]     Why: Patient would like to speak with staff in regards to getting stronger pain medication. Patient states tramadol is not working.     What Number to Call Back: [phone]   When to Expect a call back: (Within 24 hours)     Please return the call at earliest convenience. Thanks!

## 2017-11-27 ENCOUNTER — OFFICE VISIT (OUTPATIENT)
Dept: OPTOMETRY | Facility: CLINIC | Age: 67
End: 2017-11-27
Payer: COMMERCIAL

## 2017-11-27 ENCOUNTER — PATIENT MESSAGE (OUTPATIENT)
Dept: SPINE | Facility: CLINIC | Age: 67
End: 2017-11-27

## 2017-11-27 ENCOUNTER — HOSPITAL ENCOUNTER (EMERGENCY)
Facility: HOSPITAL | Age: 67
Discharge: HOME | End: 2017-11-27
Attending: EMERGENCY MEDICINE
Payer: COMMERCIAL

## 2017-11-27 VITALS
RESPIRATION RATE: 18 BRPM | WEIGHT: 105 LBS | HEIGHT: 64 IN | BODY MASS INDEX: 17.93 KG/M2 | TEMPERATURE: 98 F | HEART RATE: 99 BPM | DIASTOLIC BLOOD PRESSURE: 71 MMHG | SYSTOLIC BLOOD PRESSURE: 145 MMHG | OXYGEN SATURATION: 100 %

## 2017-11-27 DIAGNOSIS — M53.86 SCIATICA ASSOCIATED WITH DISORDER OF LUMBAR SPINE: Primary | ICD-10-CM

## 2017-11-27 DIAGNOSIS — M54.17 LUMBOSACRAL RADICULOPATHY: ICD-10-CM

## 2017-11-27 DIAGNOSIS — H52.4 ASTIGMATISM WITH PRESBYOPIA, BILATERAL: Primary | ICD-10-CM

## 2017-11-27 DIAGNOSIS — H52.203 ASTIGMATISM WITH PRESBYOPIA, BILATERAL: Primary | ICD-10-CM

## 2017-11-27 DIAGNOSIS — H40.013 OPEN ANGLE WITH BORDERLINE FINDINGS AND LOW GLAUCOMA RISK IN BOTH EYES: ICD-10-CM

## 2017-11-27 DIAGNOSIS — H25.13 NUCLEAR SCLEROSIS, BILATERAL: ICD-10-CM

## 2017-11-27 DIAGNOSIS — M96.1 POSTLAMINECTOMY SYNDROME OF LUMBAR REGION: ICD-10-CM

## 2017-11-27 DIAGNOSIS — M51.26 LUMBAR DISC HERNIATION: ICD-10-CM

## 2017-11-27 DIAGNOSIS — H04.123 DRY EYES, BILATERAL: ICD-10-CM

## 2017-11-27 DIAGNOSIS — M51.36 DDD (DEGENERATIVE DISC DISEASE), LUMBAR: ICD-10-CM

## 2017-11-27 PROCEDURE — 92014 COMPRE OPH EXAM EST PT 1/>: CPT | Mod: S$GLB,,, | Performed by: OPTOMETRIST

## 2017-11-27 PROCEDURE — 99283 EMERGENCY DEPT VISIT LOW MDM: CPT

## 2017-11-27 PROCEDURE — 92015 DETERMINE REFRACTIVE STATE: CPT | Mod: S$GLB,,, | Performed by: OPTOMETRIST

## 2017-11-27 PROCEDURE — 99283 EMERGENCY DEPT VISIT LOW MDM: CPT | Mod: ,,, | Performed by: PHYSICIAN ASSISTANT

## 2017-11-27 PROCEDURE — 99999 PR PBB SHADOW E&M-EST. PATIENT-LVL II: CPT | Mod: PBBFAC,,, | Performed by: OPTOMETRIST

## 2017-11-27 RX ORDER — PREGABALIN 75 MG/1
75 CAPSULE ORAL 2 TIMES DAILY
Qty: 28 CAPSULE | Refills: 0 | Status: SHIPPED | OUTPATIENT
Start: 2017-11-27 | End: 2018-01-18 | Stop reason: SDUPTHER

## 2017-11-27 NOTE — ED PROVIDER NOTES
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 She presents to the ED for chronic back pain and leg pain. She reports right leg pain that increases at night. Describes the pain as constant, burning, and shooting pain. Rates pain 10/10. Denies relief of pain with percocet, which is prescribed by pain management- Dr. Acosta. Denies taking Lyrica. Reports chronic back pain is at baseline and unchanged. Denies recent falls, head trauma, or LOC. She denies urinary/fecal incontinence, new paresthesias, or lower extremity weakness. She denies fever,chills, nausea, vomiting, sob, chest pain, abd pain, dysuria, diarrhea, or constipation. She is a non smoker and reports alcohol use. Patient is followed by pain management- Dr. Acosta and neurosurgery Dr. Kaba.             Review of patient's allergies indicates:  No Known Allergies  Past Medical History:   Diagnosis Date    Arthritis     Bronchitis     Cataract     Dry eyes     Hypothyroidism 6/23/2016    Rash     Squamous cell carcinoma     in-situ right upper inner arm, right wrist    Status post lumbar surgery 6/23/2016    Stress fracture     Thyroid disease      Past Surgical History:   Procedure Laterality Date    CERVICAL FUSION      COLONOSCOPY      COLONOSCOPY N/A 11/14/2017    Procedure: COLONOSCOPY;  Surgeon: Kasi Mercado MD;  Location: [address];  Service: Endoscopy;  Laterality: N/A;    l4-5 mid discectomy Left 03/2017    l4-5 MIS diskectomy Right 05/2016     Family History   Problem Relation Age of Onset    Cancer Mother      Lung cancer    Heart failure Father     Colon cancer Father      Hypertension Father     Cataracts Father     Cancer Father 80     colon    Diabetes Son     Heart disease Son     No Known Problems Sister     No Known Problems Brother     No Known Problems Maternal Aunt     No Known Problems Maternal Uncle     No Known Problems Paternal Aunt     No Known Problems Paternal Uncle     No Known Problems Maternal Grandmother     No Known Problems Maternal Grandfather     No Known Problems Paternal Grandmother     No Known Problems Paternal Grandfather     Melanoma Daughter     Amblyopia Neg Hx     Blindness Neg Hx     Glaucoma Neg Hx     Macular degeneration Neg Hx     Retinal detachment Neg Hx     Strabismus Neg Hx     Stroke Neg Hx     Thyroid disease Neg Hx     Breast cancer Neg Hx     Ovarian cancer Neg Hx      Social History   Substance Use Topics    Smoking status: Never Smoker    Smokeless tobacco: Never Used    Alcohol use Yes      Comment: socially, not weekly     Review of Systems   Constitutional: Negative for fever.   HENT: Negative for sore throat.    Respiratory: Negative for shortness of breath.    Cardiovascular: Negative for chest pain.   Gastrointestinal: Negative for nausea.   Genitourinary: Negative for dysuria.   Musculoskeletal: Positive for arthralgias (right leg pain) and back pain.   Skin: Negative for rash.   Neurological: Negative for weakness.   Hematological: Does not bruise/bleed easily.       Physical Exam     Initial Vitals [11/27/17 0925]   BP Pulse Resp Temp SpO2   (!) 145/71 99 18 97.9 °F (36.6 °C) 100 %      MAP       95.67         Physical Exam    Vitals reviewed.  Constitutional: She appears well-developed and well-nourished. She is not diaphoretic. No distress.   Patient resting comfortably in NAD on RA.    HENT:   Head: Normocephalic and atraumatic.   Nose: Nose normal.   Eyes: Conjunctivae and EOM are normal. Pupils are equal, round, and reactive to light.   Neck: Normal range of motion.   Cardiovascular: Normal rate  and regular rhythm. Exam reveals no friction rub.    No murmur heard.  Pulmonary/Chest: Breath sounds normal. No respiratory distress. She has no wheezes. She has no rales.   Abdominal: Soft. Bowel sounds are normal. She exhibits no distension. There is no tenderness. There is no rebound.   Musculoskeletal: Normal range of motion.   No midline spinal tenderness.    Neurological: She is alert and oriented to person, place, and time. She has normal strength. No cranial nerve deficit or sensory deficit. Gait normal.   Motor strength of b/l UE and LE 5/5.    Skin: Skin is warm and dry. No erythema.   Psychiatric: She has a normal mood and affect. Thought content normal.         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 She presents to the ED for chronic back pain and leg pain. Patient is in no acute distress. Vitals stable. AAOx3. RRR. Lungs CTA. Abdomen is soft, non tender, non distended. Skin is normal appearance without rashes.     Differential diagnoses include, but are not limited to: sciatica, lumbar strain, arthritis, and lumbar degenerative disk disease. I considered but do not suspect cauda equina or spinal epidural abscess.     Will discharge home with prescription for Lyrica and instructed patient to F/U with neurosurgery and spine as scheduled.     I have discussed and reviewed with my supervising physician.           Attending Attestation:     Physician Attestation Statement for NP/PA:   I discussed this assessment and plan of this patient with the NP/PA, but I did not personally examine the patient. The face to face encounter was performed by the NP/PA.                  ED Course      Clinical Impression:   The primary encounter diagnosis was Sciatica associated with disorder  of lumbar spine. Diagnoses of Lumbosacral radiculopathy.   Disposition:   Disposition: Discharged  Condition: Stable                        Melody Cowan PA-C  11/27/17 1614       Kasi Shultz MD  11/28/17 [id number]

## 2017-11-27 NOTE — PROGRESS NOTES
HPI     ARI 11/2016 Using Systane once a day and another drop for redness.   Trouble with night driving and reading not as clear OD.OS.  Only small change in vision    Last edited by Tone Diaz, OD on 11/27/2017  2:47 PM. (History)              Assessment /Plan     For exam results, see Encounter Report.    Astigmatism with presbyopia, bilateral    Open angle with borderline findings and low glaucoma risk in both eyes    Nuclear sclerosis, bilateral    Dry eyes, bilateral      2. IOP low, nerve eval stable, Prev Normal HVF and prev normal OCT of RNFL . Still No treat at this time. Nerve eval stable. Low risk based on Cd . No fam history, pachy normal. IOP normal. RTC yearly with HVf and OCt.    3. Educated pt on presence of cataracts and effects on vision. No surgery at this time. Recheck in one year.  4. Recommend artificial tears. 1 drop 3-4x per day. Chronicity of disease and treatment discussed.       1. Spec Rx given. Different lens options discussed with patient. RTC 1 year full exam.

## 2017-11-27 NOTE — ED TRIAGE NOTES
Presents to ER with chronic back and lower extremity pain.  States that the pain is worse on the right.  Is to see her neurosurgeon 12/5 for the same.    GENERAL: The patient is well-developed and well-nourished in no apparent distress. Alert and oriented x4.                                                HEENT: Head is normocephalic and atraumatic. Extraocular muscles are intact. Pupils are equal, round, and reactive to light and accommodation. Nares appeared normal. Mouth is well hydrated and without lesions. Mucous membranes are moist. Posterior pharynx clear of any exudate or lesions.    NECK: Supple. No carotid bruits. No lymphadenopathy or thyromegaly.    LUNGS: Clear to auscultation.    HEART: Regular rate and rhythm without murmur.     ABDOMEN: Soft, nontender, and nondistended. Positive bowel sounds. No hepatosplenomegaly was noted.     EXTREMITIES: Without any cyanosis, clubbing, rash, lesions or edema.     NEUROLOGIC: Cranial nerves II through XII are grossly intact.     PSYCHIATRIC: Flat affect, but denies suicidal or homicidal ideations.    SKIN: No ulceration or induration present.

## 2017-11-30 ENCOUNTER — PATIENT MESSAGE (OUTPATIENT)
Dept: SPINE | Facility: CLINIC | Age: 67
End: 2017-11-30

## 2017-11-30 ENCOUNTER — TELEPHONE (OUTPATIENT)
Dept: NEUROSURGERY | Facility: CLINIC | Age: 67
End: 2017-11-30

## 2017-11-30 NOTE — TELEPHONE ENCOUNTER
----- Message from Ronaldo Conner sent at 11/29/2017  4:23 PM CST -----  Contact: Shanel colorado/ [employer] @ [phone]  Shanel states [employer] denied claim for MRI, due to not having clinical notes. [employer] is asking the office call to reopen the case to complete another claim. Pls call @ [phone]; tracking #[id number].

## 2017-11-30 NOTE — TELEPHONE ENCOUNTER
Left message on patient voicemail informing her that Dr. Hoskins does not have any sooner appointments .  Her appointment is next Tuesday and that's the day he's back in clinic  and it's the soonest . Give me a call back .      ----- Message from Bri Bush sent at 11/30/2017  3:38 PM CST -----  Contact: pt  x_  1st Request  _  2nd Request  _  3rd Request    Who: ELSA LIU [[id number]]    Why: Patient would like to speak with staff in regards to getting a sooner appt than 12/5. Patient states her right like fees like its paralyzed.       What Number to Call Back:[phone] or [phone]     When to Expect a call back: (Within 24 hours)    Please return the call at earliest convenience. Thanks!

## 2017-12-05 ENCOUNTER — TELEPHONE (OUTPATIENT)
Dept: NEUROSURGERY | Facility: CLINIC | Age: 67
End: 2017-12-05

## 2017-12-05 ENCOUNTER — OFFICE VISIT (OUTPATIENT)
Dept: SPINE | Facility: CLINIC | Age: 67
End: 2017-12-05
Attending: NEUROLOGICAL SURGERY
Payer: COMMERCIAL

## 2017-12-05 VITALS
DIASTOLIC BLOOD PRESSURE: 74 MMHG | BODY MASS INDEX: 17.42 KG/M2 | HEIGHT: 64 IN | WEIGHT: 102 LBS | SYSTOLIC BLOOD PRESSURE: 112 MMHG | RESPIRATION RATE: 16 BRPM

## 2017-12-05 DIAGNOSIS — M43.26 FUSION OF LUMBAR SPINE: Primary | ICD-10-CM

## 2017-12-05 DIAGNOSIS — M48.061 SPINAL STENOSIS OF LUMBAR REGION WITHOUT NEUROGENIC CLAUDICATION: ICD-10-CM

## 2017-12-05 DIAGNOSIS — M43.16 SPONDYLOLISTHESIS AT L4-L5 LEVEL: Primary | ICD-10-CM

## 2017-12-05 DIAGNOSIS — M43.17 SPONDYLOLISTHESIS OF LUMBOSACRAL REGION: Primary | ICD-10-CM

## 2017-12-05 PROCEDURE — 99999 PR PBB SHADOW E&M-EST. PATIENT-LVL III: CPT | Mod: PBBFAC,,, | Performed by: NEUROLOGICAL SURGERY

## 2017-12-05 PROCEDURE — 99214 OFFICE O/P EST MOD 30 MIN: CPT | Mod: S$GLB,,, | Performed by: NEUROLOGICAL SURGERY

## 2017-12-05 NOTE — PROGRESS NOTES
CHIEF COMPLAINT:  Pain across the whole lower back and in the lateral regions of the right leg    HPI:  Angelina Man is a 67 y.o. female who presents for neurosurgical evaluation. She is having pain across the whole lower back and in the lateral regions of the right leg, as well as anterior right leg paresthesias that started 1 years ago.  The pain came on gradually, and it has been constant ever since.  The patient describes the pain as stabbing, rated as a 10 on a pain scale of 1-10. She reports there is weakness in the right leg. The pain gets better with sitting and laying down in bed , and the pain is worse with standing. She tried narcotics with no relief. Patient denies accidents or trauma, reports bowel or bladder symptoms, and reports saddle anesthesia.        (Not in a hospital admission)    Review of patient's allergies indicates:  No Known Allergies    Past Medical History:   Diagnosis Date    Arthritis     Bronchitis     Cataract     Dry eyes     Hypothyroidism 6/23/2016    Rash     Squamous cell carcinoma     in-situ right upper inner arm, right wrist    Status post lumbar surgery 6/23/2016    Stress fracture     Thyroid disease      Past Surgical History:   Procedure Laterality Date    CERVICAL FUSION      COLONOSCOPY      COLONOSCOPY N/A 11/14/2017    Procedure: COLONOSCOPY;  Surgeon: Kasi Mercado MD;  Location: [facility] ([address] [place] [address]);  Service: Endoscopy;  Laterality: N/A;    l4-5 mid discectomy Left 03/2017    l4-5 MIS diskectomy Right 05/2016     Family History   Problem Relation Age of Onset    Cancer Mother      Lung cancer    Heart failure Father     Colon cancer Father     Hypertension Father     Cataracts Father     Cancer Father 80     colon    Diabetes Son     Heart disease Son     No Known Problems Sister     No Known Problems Brother     No Known Problems Maternal Aunt     No Known Problems Maternal Uncle     No Known Problems Paternal Aunt     No Known  Problems Paternal Uncle     No Known Problems Maternal Grandmother     No Known Problems Maternal Grandfather     No Known Problems Paternal Grandmother     No Known Problems Paternal Grandfather     Melanoma Daughter     Amblyopia Neg Hx     Blindness Neg Hx     Glaucoma Neg Hx     Macular degeneration Neg Hx     Retinal detachment Neg Hx     Strabismus Neg Hx     Stroke Neg Hx     Thyroid disease Neg Hx     Breast cancer Neg Hx     Ovarian cancer Neg Hx      Social History   Substance Use Topics    Smoking status: Never Smoker    Smokeless tobacco: Never Used    Alcohol use Yes      Comment: socially, not weekly        Review of Systems:  Constitutional: no fever or chills, pain well controlled  Eyes: no visual changes  ENT: no nasal congestion or sore throat  Respiratory: no cough or shortness of breath  Cardiovascular: no chest pain or palpitations  Gastrointestinal: no nausea or vomiting, tolerating diet  Genitourinary: no hematuria or dysuria  Integument/Breast: no rash or pruritis  Hematologic/Lymphatic: no easy bruising or lymphadenopathy  Musculoskeletal: no arthralgias or myalgias, positive for back pain  Neurological: no seizures or tremors  Behavioral/Psych: no auditory or visual hallucinations  Endocrine: no heat or cold intolerance    Review of Systems    OBJECTIVE:     Vital Signs (Most Recent)       Physical Exam:  Physical Exam:  Nursing note and vitals reviewed.    Constitutional: She appears well-developed and well-nourished.     Eyes: Pupils are equal, round, and reactive to light. Conjunctivae and EOM are normal.     Cardiovascular: Normal rate.     Abdominal: Soft. Bowel sounds are normal.     Psych/Behavior: She is alert. She is oriented to person, place, and time. She has a normal mood and affect.     Musculoskeletal: Gait is abnormal.        Neck: Range of motion is limited. Muscle strength is 5/5. Tone is normal.        Back: Range of motion is limited. Muscle strength is  5/5. Tone is normal.        Right Upper Extremities: Range of motion is full. Muscle strength is 5/5. Tone is normal.        Left Upper Extremities: Range of motion is full. Muscle strength is 5/5. Tone is normal.       Right Lower Extremities: Range of motion is limited. Muscle strength is 4/5. Tone is normal.        Left Lower Extremities: Range of motion is limited. Tone is normal.     Neurological:        DTRs: DTRs are DTRS NORMAL AND SYMMETRICnormal and symmetric.        Cranial nerves: Cranial nerve(s) II, III, IV, V, VI, VII, VIII, IX, X, XI and XII are intact.       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 All attendant risks, benefits and potential complications of the anticipated surgery were dicussed and patient wants to proceed with surgery

## 2017-12-07 ENCOUNTER — PATIENT MESSAGE (OUTPATIENT)
Dept: PAIN MEDICINE | Facility: CLINIC | Age: 67
End: 2017-12-07

## 2017-12-07 ENCOUNTER — PATIENT MESSAGE (OUTPATIENT)
Dept: SURGERY | Facility: HOSPITAL | Age: 67
End: 2017-12-07

## 2017-12-08 ENCOUNTER — TELEPHONE (OUTPATIENT)
Dept: OPHTHALMOLOGY | Facility: CLINIC | Age: 67
End: 2017-12-08

## 2017-12-08 ENCOUNTER — OFFICE VISIT (OUTPATIENT)
Dept: OPTOMETRY | Facility: CLINIC | Age: 67
End: 2017-12-08
Payer: COMMERCIAL

## 2017-12-08 DIAGNOSIS — M51.36 DDD (DEGENERATIVE DISC DISEASE), LUMBAR: ICD-10-CM

## 2017-12-08 DIAGNOSIS — M96.1 POSTLAMINECTOMY SYNDROME OF LUMBAR REGION: Primary | ICD-10-CM

## 2017-12-08 DIAGNOSIS — H04.123 DRY EYES, BILATERAL: ICD-10-CM

## 2017-12-08 DIAGNOSIS — K59.03 THERAPEUTIC OPIOID INDUCED CONSTIPATION: ICD-10-CM

## 2017-12-08 DIAGNOSIS — M54.17 LUMBOSACRAL RADICULOPATHY: ICD-10-CM

## 2017-12-08 DIAGNOSIS — H02.889 MEIBOMIAN GLAND DYSFUNCTION: ICD-10-CM

## 2017-12-08 DIAGNOSIS — M48.061 SPINAL STENOSIS OF LUMBAR REGION WITHOUT NEUROGENIC CLAUDICATION: ICD-10-CM

## 2017-12-08 DIAGNOSIS — H16.141 SPK (SUPERFICIAL PUNCTATE KERATITIS), RIGHT: Primary | ICD-10-CM

## 2017-12-08 DIAGNOSIS — H25.13 NUCLEAR SCLEROSIS, BILATERAL: ICD-10-CM

## 2017-12-08 DIAGNOSIS — T40.2X5A THERAPEUTIC OPIOID INDUCED CONSTIPATION: ICD-10-CM

## 2017-12-08 PROCEDURE — 99999 PR PBB SHADOW E&M-EST. PATIENT-LVL II: CPT | Mod: PBBFAC,,, | Performed by: OPTOMETRIST

## 2017-12-08 PROCEDURE — 92012 INTRM OPH EXAM EST PATIENT: CPT | Mod: S$GLB,,, | Performed by: OPTOMETRIST

## 2017-12-08 RX ORDER — LACTULOSE 10 G/15ML
10 SOLUTION ORAL 2 TIMES DAILY PRN
Qty: 900 ML | Refills: 1 | Status: SHIPPED | OUTPATIENT
Start: 2017-12-08 | End: 2018-01-04 | Stop reason: CLARIF

## 2017-12-08 NOTE — PATIENT INSTRUCTIONS
You have dryness of your eyes. Please use over-the-counter artificial tears or lubricants (such as Systane, Refresh, Blink, Genteal), 1 drop in each eye 3-4 times per day. Please avoid drops that advertise redness-relief as these can be unhealthy for your eyes and can cause permanent redness if used long-term.    You can also use Genteal Gel or Refresh PM in both eyes every evening. This gel is very thick and may cause blurred vision, so we recommend you use it before bedtime. In the morning you can gently wipe your eyes with a damp washcloth to remove any residual gel.     ==============================================     MEIBOMITIS    Your eyes look dry today. Your eyes are dry not because you're missing the watery portion of your tear film but because you're missing an oily component. The oil component keeps the tears from evaporating and provides stability to the tears.    This portion of the tears is produced by the Meibomian glands which are located just behind the base of the eyelashes. Your Meibomian glands are clogged because of a condition called Meibomitis. Meibomitis is caused by chronic inflammation inside the glands thought to be a reaction to the normal bacteria that live on your skin.     As this is a chronic condition the goal of treatment is to reduce your symptoms and the inflammation under control. The initial treatment is as follows:     - Warm Compresses: Heat a wash cloth by running it under hot water. Then hold this wash cloth over your closed eyelids and allow your eyelids to absorb the heat. After 20-30 seconds once the wash cloth cools down you'll need to heat it up again.  (An alternative heat source is a gel pack or microwavable eye mask, warmed in the microwave or in a dish of water,  wrapped with a warm wet washcloth). You want to get 10 minutes of warmth to your eyelids, so if the compress feels cool before the end of 10 minutes you should reheat it. You should consistently do this 2  times a day.     - Artificial tears: Some good Artificial tear drops to try are Blink, Refresh Optive, Systane Balance, Thera Tears or Genteal. You should use these consistently 2-3 times a day more if you need them. It's important to use these when in breezy environments or when doing prolonged near work such as reading or computer. The goal is to prevent your eyes from drying rather than instilling them once your eye is already dry.     - Omega 3 Supplement: 1000 mg of good quality fish oil (labeled DHA and/or EPA).   Fish oil, flax seed oil or omega 3 supplements have found to be beneficial in Meibomitis and dry eye syndrome. There are a lot of choices out there and not one single product has been shown to be more beneficial than others.    It usually takes 1-2 months of treatment before you'll notice a difference. However some will continue to have problems even with this therapy. If you continue to have problems please let me know.

## 2017-12-08 NOTE — PROGRESS NOTES
"HPI     Ms. Angelina Man here for an urgent visit.     Pt awoke this morning with blurred vision OD at all ranges, even with   correction. Vision has improved since this morning, but still a little   blurry even with correction. She also reports tearing OD, which is   improving. She denies eye pain, foreign body sensation, burning, or   itching; however OD feels a little "off." Pt wonders if blurred vision is   caused by pain medications (she has been taking them since October 2017),   or from sleeping on her right side every night. No previous occurences. OS   asymptomatic.     Would patient like a refraction today? No    (+)drops: Systane BID OU (last used this morning).   (-)flashes  (-)floaters  (-)diplopia    Diabetic No   Hemoglobin A1C       Date                     Value               Ref Range             Status                08/24/2015               5.4                 4.5 - 6.2 %           Final                 08/13/2014               5.3                 4.5 - 6.2 %           Final                 08/08/2013               5.5                 4.0 - 6.2 %           Final            ----------    OCULAR HISTORY  Last Eye Exam 11/27/2017 with Dr. Diaz   (-)eye surgery   Dry eyes OU  Cataracts OU  Open angle with borderline findings and low glaucoma risk in both eyes     FAMILY HISTORY  (-)Glaucoma       Last edited by Carol Ann Keith, OD on 12/8/2017  2:07 PM. (History)            Assessment /Plan     For exam results, see Encounter Report.    SPK (superficial punctate keratitis), right  Meibomian gland dysfunction  Dry eyes, bilateral   Central SPK OD cause of reduced best-corrected visual acuity OD. Explained relationship between dry eyes and blurred vision.   Continue artificial tears (recommended oil-based artificial tears) BID-TID OU. Also start warm compresses for 10 minutes qDay-BID OU and OTC lubricating gel QHS (OD or OU).   RTC if symptoms do not improve in 1-2 weeks.    Nuclear sclerosis, " bilateral   Stable since last exam about 2 weeks ago. Monitor.      RTC prn  Next annual comprehensive eye exam due in November 2018 with Dr. Diaz

## 2017-12-18 ENCOUNTER — PATIENT MESSAGE (OUTPATIENT)
Dept: SURGERY | Facility: HOSPITAL | Age: 67
End: 2017-12-18

## 2017-12-20 ENCOUNTER — PATIENT MESSAGE (OUTPATIENT)
Dept: PAIN MEDICINE | Facility: CLINIC | Age: 67
End: 2017-12-20

## 2017-12-20 DIAGNOSIS — M51.36 DDD (DEGENERATIVE DISC DISEASE), LUMBAR: ICD-10-CM

## 2017-12-20 DIAGNOSIS — M96.1 POSTLAMINECTOMY SYNDROME OF LUMBAR REGION: ICD-10-CM

## 2017-12-20 DIAGNOSIS — M54.17 LUMBOSACRAL RADICULOPATHY: ICD-10-CM

## 2017-12-20 DIAGNOSIS — M43.06 SPONDYLOLYSIS OF LUMBAR REGION: ICD-10-CM

## 2017-12-20 DIAGNOSIS — M48.061 SPINAL STENOSIS OF LUMBAR REGION WITHOUT NEUROGENIC CLAUDICATION: ICD-10-CM

## 2017-12-21 ENCOUNTER — PATIENT MESSAGE (OUTPATIENT)
Dept: PAIN MEDICINE | Facility: CLINIC | Age: 67
End: 2017-12-21

## 2017-12-21 RX ORDER — OXYCODONE AND ACETAMINOPHEN 10; 325 MG/1; MG/1
1 TABLET ORAL EVERY 6 HOURS PRN
Qty: 120 TABLET | Refills: 0 | Status: SHIPPED | OUTPATIENT
Start: 2017-12-21 | End: 2018-01-20

## 2017-12-21 NOTE — TELEPHONE ENCOUNTER
Emailed patient to let her know that prescription request was approved and should be ready later today.

## 2018-01-03 ENCOUNTER — HOSPITAL ENCOUNTER (OUTPATIENT)
Dept: RADIOLOGY | Facility: HOSPITAL | Age: 68
Discharge: HOME | End: 2018-01-03
Attending: FAMILY MEDICINE
Payer: COMMERCIAL

## 2018-01-03 ENCOUNTER — ANESTHESIA EVENT (OUTPATIENT)
Dept: SURGERY | Facility: HOSPITAL | Age: 68
DRG: 460 | End: 2018-01-03
Payer: COMMERCIAL

## 2018-01-03 ENCOUNTER — OFFICE VISIT (OUTPATIENT)
Dept: FAMILY MEDICINE | Facility: CLINIC | Age: 68
End: 2018-01-03
Payer: COMMERCIAL

## 2018-01-03 VITALS
BODY MASS INDEX: 17.13 KG/M2 | TEMPERATURE: 98 F | HEART RATE: 72 BPM | WEIGHT: 100.31 LBS | HEIGHT: 64 IN | DIASTOLIC BLOOD PRESSURE: 78 MMHG | SYSTOLIC BLOOD PRESSURE: 118 MMHG | RESPIRATION RATE: 14 BRPM

## 2018-01-03 DIAGNOSIS — Z01.818 PREOP EXAMINATION: Primary | ICD-10-CM

## 2018-01-03 DIAGNOSIS — R11.0 NAUSEA: ICD-10-CM

## 2018-01-03 DIAGNOSIS — K59.00 CONSTIPATION, UNSPECIFIED CONSTIPATION TYPE: ICD-10-CM

## 2018-01-03 DIAGNOSIS — Z01.818 PREOP EXAMINATION: ICD-10-CM

## 2018-01-03 LAB
BILIRUB UR QL STRIP: NEGATIVE
CAOX CRY UR QL COMP ASSIST: ABNORMAL
CLARITY UR REFRACT.AUTO: ABNORMAL
COLOR UR AUTO: ABNORMAL
GLUCOSE UR QL STRIP: NEGATIVE
HGB UR QL STRIP: NEGATIVE
HYALINE CASTS UR QL AUTO: 3 /LPF
KETONES UR QL STRIP: NEGATIVE
LEUKOCYTE ESTERASE UR QL STRIP: NEGATIVE
MICROSCOPIC COMMENT: ABNORMAL
NITRITE UR QL STRIP: NEGATIVE
PH UR STRIP: 5 [PH] (ref 5–8)
PROT UR QL STRIP: NEGATIVE
RBC #/AREA URNS AUTO: 1 /HPF (ref 0–4)
SP GR UR STRIP: 1.01 (ref 1–1.03)
URN SPEC COLLECT METH UR: ABNORMAL
UROBILINOGEN UR STRIP-ACNC: NEGATIVE EU/DL
WBC #/AREA URNS AUTO: 0 /HPF (ref 0–5)

## 2018-01-03 PROCEDURE — 71046 X-RAY EXAM CHEST 2 VIEWS: CPT | Mod: TC,FY,PO

## 2018-01-03 PROCEDURE — 81001 URINALYSIS AUTO W/SCOPE: CPT

## 2018-01-03 PROCEDURE — 99999 PR PBB SHADOW E&M-EST. PATIENT-LVL III: CPT | Mod: PBBFAC,,, | Performed by: FAMILY MEDICINE

## 2018-01-03 PROCEDURE — 71046 X-RAY EXAM CHEST 2 VIEWS: CPT | Mod: FY,,, | Performed by: RADIOLOGY

## 2018-01-03 PROCEDURE — 93010 ELECTROCARDIOGRAM REPORT: CPT | Mod: S$GLB,,, | Performed by: INTERNAL MEDICINE

## 2018-01-03 PROCEDURE — 99214 OFFICE O/P EST MOD 30 MIN: CPT | Mod: S$GLB,,, | Performed by: FAMILY MEDICINE

## 2018-01-03 PROCEDURE — 93005 ELECTROCARDIOGRAM TRACING: CPT | Mod: S$GLB,,, | Performed by: FAMILY MEDICINE

## 2018-01-03 RX ORDER — LACTULOSE 10 G/15ML
20 SOLUTION ORAL 3 TIMES DAILY
Qty: 900 ML | Refills: 0 | Status: SHIPPED | OUTPATIENT
Start: 2018-01-03 | End: 2018-01-13

## 2018-01-03 RX ORDER — CYCLOBENZAPRINE HCL 10 MG
10 TABLET ORAL 3 TIMES DAILY PRN
COMMUNITY
End: 2018-02-19 | Stop reason: SDUPTHER

## 2018-01-03 RX ORDER — PROMETHAZINE HYDROCHLORIDE 25 MG/1
25 TABLET ORAL EVERY 6 HOURS PRN
Qty: 30 TABLET | Refills: 2 | Status: SHIPPED | OUTPATIENT
Start: 2018-01-03 | End: 2018-04-16

## 2018-01-03 NOTE — ANESTHESIA PREPROCEDURE EVALUATION
[facility]  Anesthesia Pre-Operative Evaluation         Patient Name: Angelina Man  YOB: 1950  MRN: [id number]    SUBJECTIVE:     Pre-operative evaluation for Procedure(s) (LRB):  RIght L4-5 and L5-S1 MIS TLIF and Perc Screws (Right)     02/04/2018    Angelina Man is a 68 y.o. female w/ a significant PMHx of HTN, hypothyroidism and previous lumbar spine surgery.    Patient now presents for the above procedure(s).    Prev airway:   Placement Date: 05/19/16; Placement Time: 1701; Method of Intubation: Direct laryngoscopy; Inserted by: CRNA; Airway Device: Endotracheal Tube; Mask Ventilation: Easy; Intubated: Postinduction; Blade: Ellis #2; Airway Device Size: 7.0; Style: Cuffed; Cuff Inflation: Minimal occlusive pressure; Inflation Amount: 6; Placement Verified By: Auscultation, Capnometry, Colorimetric EtCO2 device; Grade: Grade I; Complicating Factors: None;      Patient Active Problem List   Diagnosis    Anxiety    DJD (degenerative joint disease)    Menopause    Spinal stenosis of lumbar region without neurogenic claudication    DDD (degenerative disc disease), lumbar    Status post lumbar surgery    Hypothyroidism    Low back pain    Lumbar disc herniation    Radiculopathy of lumbar region    Chronic pain    Screen for colon cancer    Spondylolisthesis at L4-L5 level       Review of patient's allergies indicates:  No Known Allergies    No current facility-administered medications for this encounter.     Current Outpatient Prescriptions:     acetaminophen (TYLENOL) 500 MG tablet, Take 500 mg by mouth every 6 (six) hours as needed for Pain., Disp: , Rfl:     acyclovir (ZOVIRAX) 400 MG tablet, Take 1 tablet (400 mg total) by mouth 2 (two) times daily., Disp: 60 tablet, Rfl: 12    biotin 1 mg tablet, Take 1 mg by mouth once daily. , Disp: , Rfl:     buPROPion (WELLBUTRIN XL) 150 MG TB24 tablet, Take 1 tablet (150 mg total) by mouth once daily., Disp: 30 tablet,  Rfl: 12    calcium-vitamin D 500 mg(1,250mg) -200 unit per tablet, Take 1 tablet by mouth once daily. , Disp: , Rfl:     chondroitin sulfate-vit C-Mn (CHONDROITIN SULFATE COMPLEX) 400-60-2.5 mg Cap, Take 1 tablet by mouth once daily. , Disp: , Rfl:     clonazePAM (KLONOPIN) 1 MG tablet, Take 1 tablet (1 mg total) by mouth nightly as needed., Disp: 30 tablet, Rfl: 5    cyclobenzaprine (FLEXERIL) 10 MG tablet, Take 10 mg by mouth 3 (three) times daily as needed for Muscle spasms., Disp: , Rfl:     dicyclomine (BENTYL) 20 mg tablet, Take 1 tablet (20 mg total) by mouth 3 (three) times daily as needed., Disp: 90 tablet, Rfl: 12    digestive enzymes Tab, Take 1 tablet by mouth once daily. , Disp: , Rfl:     fexofenadine (ALLEGRA) 180 MG tablet, Take 1 tablet (180 mg total) by mouth once daily. (Patient taking differently: Take 180 mg by mouth daily as needed. ), Disp: 30 tablet, Rfl: 12    ginseng 200 mg Cap, Take 200 mg by mouth 2 (two) times daily as needed. , Disp: , Rfl:     glucosamine sulfate 2KCl 1,000 mg Tab, Take 1 tablet by mouth once daily. , Disp: , Rfl:     levothyroxine (SYNTHROID) 112 MCG tablet, Take 1 tablet (112 mcg total) by mouth once daily., Disp: 30 tablet, Rfl: 11    lubiprostone (AMITIZA) 8 MCG Cap, Take 1 capsule (8 mcg total) by mouth 2 (two) times daily with meals., Disp: 60 capsule, Rfl: 2    promethazine (PHENERGAN) 25 MG tablet, Take 1 tablet (25 mg total) by mouth every 6 (six) hours as needed for Nausea., Disp: 30 tablet, Rfl: 2    tretinoin (RETIN-A) 0.1 % cream, Apply topically every evening., Disp: 20 g, Rfl: 6    zinc 50 mg Tab, Take 50 mg by mouth once daily. , Disp: , Rfl:     ascorbic acid (VITAMIN C) 1000 MG tablet, Take 1,000 mg by mouth once daily. , Disp: , Rfl:     CYANOCOBALAMIN/FOLIC ACID (VITAMIN B12-FOLIC ACID ORAL), Take by mouth. , Disp: , Rfl:     diphenhydrAMINE (BENADRYL) 25 mg capsule, Take 25 mg by mouth nightly as needed. , Disp: , Rfl:      fluticasone (FLONASE) 50 mcg/actuation nasal spray, 1 spray by Each Nare route once daily., Disp: 16 g, Rfl: 2    multivitamin (THERAGRAN) per tablet, Take 1 tablet by mouth once daily. , Disp: , Rfl:     naloxegol (MOVANTIK) tablet, Take 25 mg by mouth once daily., Disp: 30 tablet, Rfl: 1    pregabalin (LYRICA) 75 MG capsule, Take 1 capsule (75 mg total) by mouth 2 (two) times daily., Disp: 60 capsule, Rfl: 2    triamterene-hydrochlorothiazide 37.5-25 mg (MAXZIDE-25) 37.5-25 mg per tablet, Take 1 tablet by mouth once daily., Disp: 30 tablet, Rfl: 2    No current facility-administered medications on file prior to encounter.      Current Outpatient Prescriptions on File Prior to Encounter   Medication Sig Dispense Refill    acyclovir (ZOVIRAX) 400 MG tablet Take 1 tablet (400 mg total) by mouth 2 (two) times daily. 60 tablet 12    biotin 1 mg tablet Take 1 mg by mouth once daily.       buPROPion (WELLBUTRIN XL) 150 MG TB24 tablet Take 1 tablet (150 mg total) by mouth once daily. 30 tablet 12    calcium-vitamin D 500 mg(1,250mg) -200 unit per tablet Take 1 tablet by mouth once daily.       chondroitin sulfate-vit C-Mn (CHONDROITIN SULFATE COMPLEX) 400-60-2.5 mg Cap Take 1 tablet by mouth once daily.       clonazePAM (KLONOPIN) 1 MG tablet Take 1 tablet (1 mg total) by mouth nightly as needed. 30 tablet 5    dicyclomine (BENTYL) 20 mg tablet Take 1 tablet (20 mg total) by mouth 3 (three) times daily as needed. 90 tablet 12    digestive enzymes Tab Take 1 tablet by mouth once daily.       fexofenadine (ALLEGRA) 180 MG tablet Take 1 tablet (180 mg total) by mouth once daily. (Patient taking differently: Take 180 mg by mouth daily as needed. ) 30 tablet 12    ginseng 200 mg Cap Take 200 mg by mouth 2 (two) times daily as needed.       glucosamine sulfate 2KCl 1,000 mg Tab Take 1 tablet by mouth once daily.       levothyroxine (SYNTHROID) 112 MCG tablet Take 1 tablet (112 mcg total) by mouth once daily. 30  tablet 11    lubiprostone (AMITIZA) 8 MCG Cap Take 1 capsule (8 mcg total) by mouth 2 (two) times daily with meals. 60 capsule 2    tretinoin (RETIN-A) 0.1 % cream Apply topically every evening. 20 g 6    zinc 50 mg Tab Take 50 mg by mouth once daily.       ascorbic acid (VITAMIN C) 1000 MG tablet Take 1,000 mg by mouth once daily.       CYANOCOBALAMIN/FOLIC ACID (VITAMIN B12-FOLIC ACID ORAL) Take by mouth.       diphenhydrAMINE (BENADRYL) 25 mg capsule Take 25 mg by mouth nightly as needed.       fluticasone (FLONASE) 50 mcg/actuation nasal spray 1 spray by Each Nare route once daily. 16 g 2    multivitamin (THERAGRAN) per tablet Take 1 tablet by mouth once daily.       triamterene-hydrochlorothiazide 37.5-25 mg (MAXZIDE-25) 37.5-25 mg per tablet Take 1 tablet by mouth once daily. 30 tablet 2       Past Surgical History:   Procedure Laterality Date    CERVICAL FUSION      COLONOSCOPY      COLONOSCOPY N/A 11/14/2017    Procedure: COLONOSCOPY;  Surgeon: Kasi Mercado MD;  Location: [facility] ([address] [place] [address]);  Service: Endoscopy;  Laterality: N/A;    l4-5 mid discectomy Left 03/2017    l4-5 MIS diskectomy Right 05/2016       Social History     Social History    Marital status: Single     Spouse name: N/A    Number of children: N/A    Years of education: N/A     Occupational History     [place] [employer]     Social History Main Topics    Smoking status: Never Smoker    Smokeless tobacco: Never Used    Alcohol use Yes      Comment: socially, not weekly    Drug use: No    Sexual activity: No     Other Topics Concern    Are You Pregnant Or Think You May Be? No    Breast-Feeding No     Social History Narrative    No narrative on file       OBJECTIVE:     Vital Signs Range (Last 24H):         Significant Labs:  Lab Results   Component Value Date    WBC 5.06 01/03/2018    HGB 12.2 01/03/2018    HCT 36.6 (L) 01/03/2018     01/03/2018    CHOL 191 09/20/2017    TRIG 45 09/20/2017    HDL 83 (H)  2017    ALT 23 2018    AST 27 2018     2018    K 4.4 2018     2018    CREATININE 0.7 2018    BUN 11 2018    CO2 24 2018    TSH 4.101 (H) 2017    INR 1.0 2018    HGBA1C 5.4 2015       Diagnostic Studies: No relevant studies.    EK/3/2018  Vent. Rate : 072 BPM     Atrial Rate : 072 BPM     P-R Int : 136 ms          QRS Dur : 060 ms      QT Int : 368 ms       P-R-T Axes : 085 055 040 degrees     QTc Int : 402 ms    Sinus rhythm with Premature atrial complexes  Otherwise normal ECG  When compared with ECG of 13-MAY-2016 10:12,  Premature atrial complexes are now Present  Confirmed by DANY PATEL MD ([id number]) on 2018 10:00:42 AM    2D ECHO:  No results found for this or any previous visit.       ASSESSMENT/PLAN:         Anesthesia Evaluation    I have reviewed the Patient Summary Reports.     I have reviewed the Medications.     Review of Systems  Anesthesia Hx:  No problems with previous Anesthesia  History of prior surgery of interest to airway management or planning:  Denies Personal Hx of Anesthesia complications.   Social:  Non-Smoker, Social Alcohol Use    Hematology/Oncology:  Hematology Normal   Oncology Normal    --  Cancer in past history:    EENT/Dental:EENT/Dental Normal   Cardiovascular:   Exercise tolerance: poor Hypertension  Functional Capacity 2 METS    Pulmonary:  Pulmonary Normal    Renal/:  Renal/ Normal     Hepatic/GI:  Hepatic/GI Normal    Musculoskeletal:   Arthritis   Spine Disorders:    Neurological:  Neurology Normal    Endocrine:   Hypothyroidism    Dermatological:  Skin Normal    Psych:  Psychiatric Normal       STEFANY SCHULTZ 18    Physical Exam  General:  Well nourished    Airway/Jaw/Neck:  Airway Findings: Mouth Opening: Normal Tongue: Normal  General Airway Assessment: Adult  Mallampati: II  TM Distance: Normal, at least 6 cm  Jaw/Neck Findings:  Neck ROM: Normal ROM      Dental:  Dental Findings: In  tact   Chest/Lungs:  Chest/Lungs Findings: Clear to auscultation     Heart/Vascular:  Heart Findings: Rate: Normal  Rhythm: Regular Rhythm  Sounds: Normal        Mental Status:  Mental Status Findings:  Cooperative, Alert and Oriented         Anesthesia Plan  Type of Anesthesia, risks & benefits discussed:  Anesthesia Type:  general  Patient's Preference:   Intra-op Monitoring Plan: standard ASA monitors  Intra-op Monitoring Plan Comments:   Post Op Pain Control Plan: per primary service following discharge from PACU, multimodal analgesia and IV/PO Opioids PRN  Post Op Pain Control Plan Comments:   Induction:   IV  Beta Blocker:  Patient is not currently on a Beta-Blocker (No further documentation required).       Informed Consent: Patient understands risks and agrees with Anesthesia plan.  Questions answered. Anesthesia consent signed with patient.  ASA Score: 2     Day of Surgery Review of History & Physical:    H&P update referred to the surgeon.         Ready For Surgery From Anesthesia Perspective.

## 2018-01-03 NOTE — PRE ADMISSION SCREENING
Anesthesia Assessment: Preoperative EQUATION    Planned Procedure: Procedure(s) (LRB):  RIght L4-5 and L5-S1 MIS TLIF and Perc Screws (Right)  Requested Anesthesia Type:General  Surgeon: Maikol Hoskins MD  Service: Neurosurgery  Known or anticipated Date of Surgery:2/5/2018      Optimization:  Anesthesia Preop Clinic Assessment not  Indicated  Plan:    Testing:  Hematology Profile, CMP, PT/INR, PTT, T&S and UA      Consultation:Patient's PCP for a statement of optimization      Patient  has previously scheduled Medical Appointment: 1/3    Navigation: Tests Scheduled.              Consults scheduled.             Results will be tracked by Preop Clinic                        No anesthesia preop clinic visit.

## 2018-01-04 ENCOUNTER — TELEPHONE (OUTPATIENT)
Dept: FAMILY MEDICINE | Facility: CLINIC | Age: 68
End: 2018-01-04

## 2018-01-04 RX ORDER — ACETAMINOPHEN 500 MG
500 TABLET ORAL EVERY 6 HOURS PRN
COMMUNITY

## 2018-01-04 NOTE — PROGRESS NOTES
Angelina Man is a 67 y.o. female   preop clearance exam 2-8-18 lumbar fusion  Source of history: Patient  Past Medical History:   Diagnosis Date    Arthritis     Bronchitis     Cataract     Dry eyes     Hypothyroidism 6/23/2016    Rash     Squamous cell carcinoma     in-situ right upper inner arm, right wrist    Status post lumbar surgery 6/23/2016    Stress fracture     Thyroid disease      Patient  reports that she has never smoked. She has never used smokeless tobacco. She reports that she drinks alcohol. She reports that she does not use drugs.  Family History   Problem Relation Age of Onset    Cancer Mother      Lung cancer    Heart failure Father     Colon cancer Father     Hypertension Father     Cataracts Father     Cancer Father 80     colon    Diabetes Son     Heart disease Son     No Known Problems Sister     No Known Problems Brother     No Known Problems Maternal Aunt     No Known Problems Maternal Uncle     No Known Problems Paternal Aunt     No Known Problems Paternal Uncle     No Known Problems Maternal Grandmother     No Known Problems Maternal Grandfather     No Known Problems Paternal Grandmother     No Known Problems Paternal Grandfather     Melanoma Daughter     Amblyopia Neg Hx     Blindness Neg Hx     Glaucoma Neg Hx     Macular degeneration Neg Hx     Retinal detachment Neg Hx     Strabismus Neg Hx     Stroke Neg Hx     Thyroid disease Neg Hx     Breast cancer Neg Hx     Ovarian cancer Neg Hx      ROS:Answers for HPI/ROS submitted by the patient on 1/1/2018   activity change: Yes  unexpected weight change: No  neck pain: No  hearing loss: No  rhinorrhea: No  trouble swallowing: No  eye discharge: No  visual disturbance: No  chest tightness: No  wheezing: No  chest pain: No  palpitations: No  blood in stool: No  constipation: Yes  vomiting: No  diarrhea: No  polydipsia: No  polyuria: No  difficulty urinating: No  hematuria: No  menstrual problem:  No  dysuria: No  joint swelling: No  arthralgias: Yes  headaches: No  weakness: No  confusion: No  dysphoric mood: Yes    GENERAL: No fever, chills, fatigability or weight loss.  SKIN: No rashes, itching or changes in color or texture of skin.  HEAD: No headaches or recent head trauma.  EYES: Visual acuity fine. No photophobia, ocular pain or diplopia.  EARS: Denies ear pain, discharge or vertigo.  NOSE: No loss of smell, no epistaxis or postnasal drip.  MOUTH & THROAT: No hoarseness or change in voice. No excessive gum bleeding.  NODES: Denies swollen glands.  CHEST: Denies JENNINGS, cyanosis, wheezing, cough and sputum production.  CARDIOVASCULAR: Denies chest pain, PND, orthopnea or reduced exercise tolerance.  ABDOMEN: Appetite fine. No weight loss. Denies diarrhea, abdominal pain, hematemesis or blood in stool.  URINARY: No flank pain, dysuria or hematuria.  PERIPHERAL VASCULAR: No claudication or cyanosis.  MUSCULOSKELETAL: No joint stiffness or swelling. Denies back pain.  NEUROLOGIC: No history of seizures, paralysis, alteration of gait or coordination.    OBJECTIVE:  APPEARANCE: uncomfortable  Vitals:    01/03/18 0839   BP: 118/78   Pulse: 72   Resp: 14   Temp: 97.7 °F (36.5 °C)     SKIN: Normal skin turgor, no lesions.  HEENT: Both external auditory canals clear. Both tympanic membranes intact. PERRL. EOMI.   Disk margins sharp. No tonsillar enlargement. No pharyngeal erythema or exudate. No stridor.  NECK: No bruits. No cervical spine tenderness. No cervical lymphadenopathy. No thyromegaly.  CHEST: Breath sounds clear bilaterally. Lungs clear to auscultation & percussion. Good air movement. No rales.   CARDIOVASCULAR: Normal S1, S2. No murmurs. No edema.  ABDOMEN: Bowel sounds normal. No palpable aortic enlargement. No CVA tenderness. No pulsatile mass. No rebound tenderness.  PERIPHERAL VASCULAR: Femoral pulses present and symmetrical. No edema.  MUSCULOSKELETAL: Degenerative changes of both ankles, foot,  knee, wrist and hand.  BACK: No CVA tenderness. There is no spasm, tenderness or radiculopathy noted with palpation and there is full range of motion.   NEUROLOGIC:   Cranial Nerves: II-XII grossly intact.  Motor: 5/5 strength major flexors/extensors. No tremor.  DTR's: Knees, Ankles 2+ and equal bilaterally; downgoing toes.  Sensory: Intact to light touch distally.  Gait & Posture: Normal gait and fine motion. No cerebellar signs.  MENTAL STATUS: Alert. Oriented x 3. Language skills normal. Memory intact. No suicidal ideation.Well kept appearance.    ASSESSMENT/PLAN:   Angelina was seen today for pre-op exam.    Diagnoses and all orders for this visit:    Preop examination  -     IN OFFICE EKG 12-LEAD (to Muse)  -     X-Ray Chest PA And Lateral; Future  -     CBC auto differential; Future  -     Comprehensive metabolic panel; Future  -     APTT; Future  -     Protime-INR; Future  -     Cancel: POCT URINE DIPSTICK WITH MICROSCOPE, AUTOMATED  -     Cancel: URINALYSIS  -     Cancel: URINALYSIS; Future  -     URINALYSIS    Constipation, unspecified constipation type  -     lactulose (CHRONULAC) 20 gram/30 mL Soln; Take 30 mLs (20 g total) by mouth 3 (three) times daily.    Nausea  -     promethazine (PHENERGAN) 25 MG tablet; Take 1 tablet (25 mg total) by mouth every 6 (six) hours as needed for Nausea.    will give final clearance once results are available.    1-4-18 results reviewed pt cleared for surgery

## 2018-01-04 NOTE — TELEPHONE ENCOUNTER
----- Message from Ting Mauricio RN sent at 1/4/2018 10:23 AM CST -----  Patient is having  RIGHT L 4-5 & L5-S1 MIS TLIF on 2/5 with DR VAZQUEZ.  Anesthesia is requesting a medical optimization/clearance prior to surgery.  You recently saw pt on 1/3/18 for clearance.Please advise.    Thank you for your assistance  Ninoska SCHULTZ  Pre op anesthesia  [id number]

## 2018-01-17 ENCOUNTER — PATIENT MESSAGE (OUTPATIENT)
Dept: PAIN MEDICINE | Facility: CLINIC | Age: 68
End: 2018-01-17

## 2018-01-18 DIAGNOSIS — M54.17 LUMBOSACRAL RADICULOPATHY: ICD-10-CM

## 2018-01-18 DIAGNOSIS — T40.2X5A THERAPEUTIC OPIOID INDUCED CONSTIPATION: Primary | ICD-10-CM

## 2018-01-18 DIAGNOSIS — M51.26 LUMBAR DISC HERNIATION: ICD-10-CM

## 2018-01-18 DIAGNOSIS — M51.36 DDD (DEGENERATIVE DISC DISEASE), LUMBAR: ICD-10-CM

## 2018-01-18 DIAGNOSIS — M96.1 POSTLAMINECTOMY SYNDROME OF LUMBAR REGION: ICD-10-CM

## 2018-01-18 DIAGNOSIS — K59.03 THERAPEUTIC OPIOID INDUCED CONSTIPATION: Primary | ICD-10-CM

## 2018-01-18 RX ORDER — PREGABALIN 75 MG/1
75 CAPSULE ORAL 2 TIMES DAILY
Qty: 60 CAPSULE | Refills: 2 | Status: SHIPPED | OUTPATIENT
Start: 2018-01-18 | End: 2018-02-17

## 2018-01-23 ENCOUNTER — TELEPHONE (OUTPATIENT)
Dept: NEUROSURGERY | Facility: CLINIC | Age: 68
End: 2018-01-23

## 2018-02-02 ENCOUNTER — TELEPHONE (OUTPATIENT)
Dept: NEUROSURGERY | Facility: CLINIC | Age: 68
End: 2018-02-02

## 2018-02-02 NOTE — TELEPHONE ENCOUNTER
Spoke with patient , I advised patient to arrive for surgery at 5 am at the main campus location on the 2nd floor surgery center. Patient states that she will compliant and be on time and surgery center

## 2018-02-04 NOTE — H&P
Angelina Man is a 67 y.o. female who presents for neurosurgical evaluation. She is having pain across the whole lower back and in the lateral regions of the right leg, as well as anterior right leg paresthesias that started 1 years ago.  The pain came on gradually, and it has been constant ever since.  The patient describes the pain as stabbing, rated as a 10 on a pain scale of 1-10. She reports there is weakness in the right leg. The pain gets better with sitting and laying down in bed , and the pain is worse with standing. She tried narcotics with no relief. Patient denies accidents or trauma, reports bowel or bladder symptoms, and reports saddle anesthesia.           (Not in a hospital admission)     Review of patient's allergies indicates:  No Known Allergies          Past Medical History:   Diagnosis Date    Arthritis      Bronchitis      Cataract      Dry eyes      Hypothyroidism 6/23/2016    Rash      Squamous cell carcinoma       in-situ right upper inner arm, right wrist    Status post lumbar surgery 6/23/2016    Stress fracture      Thyroid disease              Past Surgical History:   Procedure Laterality Date    CERVICAL FUSION        COLONOSCOPY        COLONOSCOPY N/A 11/14/2017     Procedure: COLONOSCOPY;  Surgeon: Kasi Mercado MD;  Location: [address]);  Service: Endoscopy;  Laterality: N/A;    l4-5 mid discectomy Left 03/2017    l4-5 MIS diskectomy Right 05/2016             Family History   Problem Relation Age of Onset    Cancer Mother         Lung cancer    Heart failure Father      Colon cancer Father      Hypertension Father      Cataracts Father      Cancer Father 80       colon    Diabetes Son      Heart disease Son      No Known Problems Sister      No Known Problems Brother      No Known Problems Maternal Aunt      No Known Problems Maternal Uncle      No Known Problems Paternal Aunt      No Known Problems Paternal Uncle      No Known Problems  Maternal Grandmother      No Known Problems Maternal Grandfather      No Known Problems Paternal Grandmother      No Known Problems Paternal Grandfather      Melanoma Daughter      Amblyopia Neg Hx      Blindness Neg Hx      Glaucoma Neg Hx      Macular degeneration Neg Hx      Retinal detachment Neg Hx      Strabismus Neg Hx      Stroke Neg Hx      Thyroid disease Neg Hx      Breast cancer Neg Hx      Ovarian cancer Neg Hx                Social History    Substance Use Topics     Smoking status: Never Smoker     Smokeless tobacco: Never Used     Alcohol use Yes         Comment: socially, not weekly          Review of Systems:  Constitutional: no fever or chills, pain well controlled  Eyes: no visual changes  ENT: no nasal congestion or sore throat  Respiratory: no cough or shortness of breath  Cardiovascular: no chest pain or palpitations  Gastrointestinal: no nausea or vomiting, tolerating diet  Genitourinary: no hematuria or dysuria  Integument/Breast: no rash or pruritis  Hematologic/Lymphatic: no easy bruising or lymphadenopathy  Musculoskeletal: no arthralgias or myalgias, positive for back pain  Neurological: no seizures or tremors  Behavioral/Psych: no auditory or visual hallucinations  Endocrine: no heat or cold intolerance     Review of Systems     OBJECTIVE:      Vital Signs (Most Recent)     Physical Exam:  Physical Exam:  Nursing note and vitals reviewed.     Constitutional: She appears well-developed and well-nourished.      Eyes: Pupils are equal, round, and reactive to light. Conjunctivae and EOM are normal.      Cardiovascular: Normal rate.      Abdominal: Soft. Bowel sounds are normal.     Psych/Behavior: She is alert. She is oriented to person, place, and time. She has a normal mood and affect.     Musculoskeletal: Gait is abnormal.        Neck: Range of motion is limited. Muscle strength is 5/5. Tone is normal.        Back: Range of motion is limited. Muscle strength is 5/5. Tone  is normal.        Right Upper Extremities: Range of motion is full. Muscle strength is 5/5. Tone is normal.        Left Upper Extremities: Range of motion is full. Muscle strength is 5/5. Tone is normal.       Right Lower Extremities: Range of motion is limited. Muscle strength is 4/5. Tone is normal.        Left Lower Extremities: Range of motion is limited. Tone is normal.     Neurological:        DTRs: DTRs are DTRS NORMAL AND SYMMETRICnormal and symmetric.        Cranial nerves: Cranial nerve(s) II, III, IV, V, VI, VII, VIII, IX, X, XI and XII are intact.         Laboratory  none     Diagnostic Results:  X-Ray: Reviewed  MRI: Reviewed  L-spine- Grade 2 spondylolytic spondylolisthesis L4-5 and severe DDD L5-S1 with spinal stenosis     ASSESSMENT/PLAN:      Symptomatic L4-5 grade II spondylolisthesis and L5-S1 DDD with spinal stenosis     Plan- MIS-TLIF L4-5 and L5-S1 with L4-S1 perc instrumentation. All attendant risks, benefits and potential complications of the anticipated surgery were dicussed and patient wants to proceed with surgery

## 2018-02-05 ENCOUNTER — ANESTHESIA (OUTPATIENT)
Dept: SURGERY | Facility: HOSPITAL | Age: 68
DRG: 460 | End: 2018-02-05
Payer: COMMERCIAL

## 2018-02-05 ENCOUNTER — HOSPITAL ENCOUNTER (INPATIENT)
Facility: HOSPITAL | Age: 68
LOS: 2 days | Discharge: HOME | DRG: 460 | End: 2018-02-07
Attending: NEUROLOGICAL SURGERY | Admitting: NEUROLOGICAL SURGERY
Payer: COMMERCIAL

## 2018-02-05 DIAGNOSIS — M43.16 SPONDYLOLISTHESIS AT L4-L5 LEVEL: Primary | ICD-10-CM

## 2018-02-05 LAB
ABO + RH BLD: NORMAL
ANION GAP SERPL CALC-SCNC: 11 MMOL/L
ANION GAP SERPL CALC-SCNC: 9 MMOL/L
APTT BLDCRRT: 24.4 SEC
BASOPHILS # BLD AUTO: 0.02 K/UL
BASOPHILS # BLD AUTO: 0.05 K/UL
BASOPHILS NFR BLD: 0.2 %
BASOPHILS NFR BLD: 1.2 %
BLD GP AB SCN CELLS X3 SERPL QL: NORMAL
BUN SERPL-MCNC: 13 MG/DL
BUN SERPL-MCNC: 18 MG/DL
CALCIUM SERPL-MCNC: 8 MG/DL
CALCIUM SERPL-MCNC: 9 MG/DL
CHLORIDE SERPL-SCNC: 103 MMOL/L
CHLORIDE SERPL-SCNC: 108 MMOL/L
CO2 SERPL-SCNC: 23 MMOL/L
CO2 SERPL-SCNC: 27 MMOL/L
CREAT SERPL-MCNC: 0.7 MG/DL
CREAT SERPL-MCNC: 0.7 MG/DL
DIFFERENTIAL METHOD: ABNORMAL
DIFFERENTIAL METHOD: ABNORMAL
EOSINOPHIL # BLD AUTO: 0 K/UL
EOSINOPHIL # BLD AUTO: 0.1 K/UL
EOSINOPHIL NFR BLD: 0 %
EOSINOPHIL NFR BLD: 1.9 %
ERYTHROCYTE [DISTWIDTH] IN BLOOD BY AUTOMATED COUNT: 13.4 %
ERYTHROCYTE [DISTWIDTH] IN BLOOD BY AUTOMATED COUNT: 13.4 %
EST. GFR  (AFRICAN AMERICAN): >60 ML/MIN/1.73 M^2
EST. GFR  (AFRICAN AMERICAN): >60 ML/MIN/1.73 M^2
EST. GFR  (NON AFRICAN AMERICAN): >60 ML/MIN/1.73 M^2
EST. GFR  (NON AFRICAN AMERICAN): >60 ML/MIN/1.73 M^2
GLUCOSE SERPL-MCNC: 152 MG/DL
GLUCOSE SERPL-MCNC: 78 MG/DL
HCT VFR BLD AUTO: 30.7 %
HCT VFR BLD AUTO: 34.9 %
HGB BLD-MCNC: 10.2 G/DL
HGB BLD-MCNC: 11.9 G/DL
IMM GRANULOCYTES # BLD AUTO: 0.01 K/UL
IMM GRANULOCYTES # BLD AUTO: 0.05 K/UL
IMM GRANULOCYTES NFR BLD AUTO: 0.2 %
IMM GRANULOCYTES NFR BLD AUTO: 0.5 %
INR PPP: 1
LYMPHOCYTES # BLD AUTO: 0.7 K/UL
LYMPHOCYTES # BLD AUTO: 1.8 K/UL
LYMPHOCYTES NFR BLD: 40.9 %
LYMPHOCYTES NFR BLD: 7 %
MCH RBC QN AUTO: 32.5 PG
MCH RBC QN AUTO: 32.6 PG
MCHC RBC AUTO-ENTMCNC: 33.2 G/DL
MCHC RBC AUTO-ENTMCNC: 34.1 G/DL
MCV RBC AUTO: 95 FL
MCV RBC AUTO: 98 FL
MONOCYTES # BLD AUTO: 0.1 K/UL
MONOCYTES # BLD AUTO: 0.4 K/UL
MONOCYTES NFR BLD: 1.5 %
MONOCYTES NFR BLD: 9.3 %
NEUTROPHILS # BLD AUTO: 2 K/UL
NEUTROPHILS # BLD AUTO: 8.6 K/UL
NEUTROPHILS NFR BLD: 46.5 %
NEUTROPHILS NFR BLD: 90.8 %
NRBC BLD-RTO: 0 /100 WBC
NRBC BLD-RTO: 0 /100 WBC
PLATELET # BLD AUTO: 214 K/UL
PLATELET # BLD AUTO: 228 K/UL
PMV BLD AUTO: 9.3 FL
PMV BLD AUTO: 9.4 FL
POTASSIUM SERPL-SCNC: 3.5 MMOL/L
POTASSIUM SERPL-SCNC: 3.7 MMOL/L
PROTHROMBIN TIME: 10.9 SEC
RBC # BLD AUTO: 3.13 M/UL
RBC # BLD AUTO: 3.66 M/UL
SODIUM SERPL-SCNC: 140 MMOL/L
SODIUM SERPL-SCNC: 141 MMOL/L
WBC # BLD AUTO: 4.3 K/UL
WBC # BLD AUTO: 9.43 K/UL

## 2018-02-05 PROCEDURE — 71000033 HC RECOVERY, INTIAL HOUR: Performed by: NEUROLOGICAL SURGERY

## 2018-02-05 PROCEDURE — 25000003 PHARM REV CODE 250: Performed by: NEUROLOGICAL SURGERY

## 2018-02-05 PROCEDURE — C9290 INJ, BUPIVACAINE LIPOSOME: HCPCS | Performed by: NEUROLOGICAL SURGERY

## 2018-02-05 PROCEDURE — 63600175 PHARM REV CODE 636 W HCPCS: Performed by: NEUROLOGICAL SURGERY

## 2018-02-05 PROCEDURE — 85730 THROMBOPLASTIN TIME PARTIAL: CPT

## 2018-02-05 PROCEDURE — 63600175 PHARM REV CODE 636 W HCPCS: Performed by: STUDENT IN AN ORGANIZED HEALTH CARE EDUCATION/TRAINING PROGRAM

## 2018-02-05 PROCEDURE — 22630 ARTHRD PST TQ 1NTRSPC LUM: CPT | Mod: ,,, | Performed by: NEUROLOGICAL SURGERY

## 2018-02-05 PROCEDURE — 20930 SP BONE ALGRFT MORSEL ADD-ON: CPT | Mod: ,,, | Performed by: NEUROLOGICAL SURGERY

## 2018-02-05 PROCEDURE — D9220A PRA ANESTHESIA: Mod: CRNA,,, | Performed by: NURSE ANESTHETIST, CERTIFIED REGISTERED

## 2018-02-05 PROCEDURE — 85610 PROTHROMBIN TIME: CPT

## 2018-02-05 PROCEDURE — 86850 RBC ANTIBODY SCREEN: CPT

## 2018-02-05 PROCEDURE — 94799 UNLISTED PULMONARY SVC/PX: CPT

## 2018-02-05 PROCEDURE — 0SG00AJ FUSION OF LUMBAR VERTEBRAL JOINT WITH INTERBODY FUSION DEVICE, POSTERIOR APPROACH, ANTERIOR COLUMN, OPEN APPROACH: ICD-10-PCS | Performed by: NEUROLOGICAL SURGERY

## 2018-02-05 PROCEDURE — 07DR3ZZ EXTRACTION OF ILIAC BONE MARROW, PERCUTANEOUS APPROACH: ICD-10-PCS | Performed by: NEUROLOGICAL SURGERY

## 2018-02-05 PROCEDURE — 20600001 HC STEP DOWN PRIVATE ROOM

## 2018-02-05 PROCEDURE — 37000009 HC ANESTHESIA EA ADD 15 MINS: Performed by: NEUROLOGICAL SURGERY

## 2018-02-05 PROCEDURE — C1769 GUIDE WIRE: HCPCS | Performed by: NEUROLOGICAL SURGERY

## 2018-02-05 PROCEDURE — 20936 SP BONE AGRFT LOCAL ADD-ON: CPT | Mod: ,,, | Performed by: NEUROLOGICAL SURGERY

## 2018-02-05 PROCEDURE — 0SG30AJ FUSION OF LUMBOSACRAL JOINT WITH INTERBODY FUSION DEVICE, POSTERIOR APPROACH, ANTERIOR COLUMN, OPEN APPROACH: ICD-10-PCS | Performed by: NEUROLOGICAL SURGERY

## 2018-02-05 PROCEDURE — 36000710: Performed by: NEUROLOGICAL SURGERY

## 2018-02-05 PROCEDURE — 25000003 PHARM REV CODE 250: Performed by: STUDENT IN AN ORGANIZED HEALTH CARE EDUCATION/TRAINING PROGRAM

## 2018-02-05 PROCEDURE — C1713 ANCHOR/SCREW BN/BN,TIS/BN: HCPCS | Performed by: NEUROLOGICAL SURGERY

## 2018-02-05 PROCEDURE — 22853 INSJ BIOMECHANICAL DEVICE: CPT | Mod: ,,, | Performed by: NEUROLOGICAL SURGERY

## 2018-02-05 PROCEDURE — 22632 ARTHRD PST TQ 1NTRSPC LM EA: CPT | Mod: ,,, | Performed by: NEUROLOGICAL SURGERY

## 2018-02-05 PROCEDURE — 63600175 PHARM REV CODE 636 W HCPCS: Performed by: NURSE ANESTHETIST, CERTIFIED REGISTERED

## 2018-02-05 PROCEDURE — 85025 COMPLETE CBC W/AUTO DIFF WBC: CPT | Mod: 91

## 2018-02-05 PROCEDURE — 80048 BASIC METABOLIC PNL TOTAL CA: CPT | Mod: 91

## 2018-02-05 PROCEDURE — 36000711: Performed by: NEUROLOGICAL SURGERY

## 2018-02-05 PROCEDURE — 25000003 PHARM REV CODE 250: Performed by: NURSE ANESTHETIST, CERTIFIED REGISTERED

## 2018-02-05 PROCEDURE — 94761 N-INVAS EAR/PLS OXIMETRY MLT: CPT

## 2018-02-05 PROCEDURE — 27201423 OPTIME MED/SURG SUP & DEVICES STERILE SUPPLY: Performed by: NEUROLOGICAL SURGERY

## 2018-02-05 PROCEDURE — 27000221 HC OXYGEN, UP TO 24 HOURS

## 2018-02-05 PROCEDURE — 71000039 HC RECOVERY, EACH ADD'L HOUR: Performed by: NEUROLOGICAL SURGERY

## 2018-02-05 PROCEDURE — 27800903 OPTIME MED/SURG SUP & DEVICES OTHER IMPLANTS: Performed by: NEUROLOGICAL SURGERY

## 2018-02-05 PROCEDURE — 37000008 HC ANESTHESIA 1ST 15 MINUTES: Performed by: NEUROLOGICAL SURGERY

## 2018-02-05 PROCEDURE — D9220A PRA ANESTHESIA: Mod: ANES,,, | Performed by: ANESTHESIOLOGY

## 2018-02-05 PROCEDURE — 80048 BASIC METABOLIC PNL TOTAL CA: CPT

## 2018-02-05 PROCEDURE — 63600175 PHARM REV CODE 636 W HCPCS

## 2018-02-05 PROCEDURE — 22842 INSERT SPINE FIXATION DEVICE: CPT | Mod: ,,, | Performed by: NEUROLOGICAL SURGERY

## 2018-02-05 PROCEDURE — 0ST40ZZ RESECTION OF LUMBOSACRAL DISC, OPEN APPROACH: ICD-10-PCS | Performed by: NEUROLOGICAL SURGERY

## 2018-02-05 DEVICE — IMPLANTABLE DEVICE: Type: IMPLANTABLE DEVICE | Site: SPINE LUMBAR | Status: FUNCTIONAL

## 2018-02-05 DEVICE — SCREW CREO CANN MOD 6.5X45MM: Type: IMPLANTABLE DEVICE | Site: SPINE LUMBAR | Status: FUNCTIONAL

## 2018-02-05 DEVICE — CAP LOCKING CREO MIS: Type: IMPLANTABLE DEVICE | Site: SPINE LUMBAR | Status: FUNCTIONAL

## 2018-02-05 DEVICE — TULIP CREO MIS MOD POLY 30MM: Type: IMPLANTABLE DEVICE | Site: SPINE LUMBAR | Status: FUNCTIONAL

## 2018-02-05 DEVICE — IMPLANTABLE DEVICE: Type: IMPLANTABLE DEVICE | Site: BACK | Status: FUNCTIONAL

## 2018-02-05 DEVICE — SCREW CREO CANN MOD 7.5X45MM: Type: IMPLANTABLE DEVICE | Site: SPINE LUMBAR | Status: FUNCTIONAL

## 2018-02-05 RX ORDER — FENTANYL CITRATE 50 UG/ML
25 INJECTION, SOLUTION INTRAMUSCULAR; INTRAVENOUS EVERY 4 HOURS PRN
Status: DISCONTINUED | OUTPATIENT
Start: 2018-02-05 | End: 2018-02-07 | Stop reason: HOSPADM

## 2018-02-05 RX ORDER — MUPIROCIN 20 MG/G
OINTMENT TOPICAL
Status: DISCONTINUED | OUTPATIENT
Start: 2018-02-05 | End: 2018-02-05

## 2018-02-05 RX ORDER — LUBIPROSTONE 8 UG/1
8 CAPSULE ORAL 2 TIMES DAILY WITH MEALS
Status: DISCONTINUED | OUTPATIENT
Start: 2018-02-05 | End: 2018-02-07 | Stop reason: HOSPADM

## 2018-02-05 RX ORDER — ONDANSETRON 8 MG/1
8 TABLET, ORALLY DISINTEGRATING ORAL EVERY 6 HOURS PRN
Status: DISCONTINUED | OUTPATIENT
Start: 2018-02-05 | End: 2018-02-07 | Stop reason: HOSPADM

## 2018-02-05 RX ORDER — PHENYLEPHRINE HYDROCHLORIDE 10 MG/ML
INJECTION INTRAVENOUS
Status: DISCONTINUED | OUTPATIENT
Start: 2018-02-05 | End: 2018-02-05

## 2018-02-05 RX ORDER — SODIUM CHLORIDE 9 MG/ML
INJECTION, SOLUTION INTRAVENOUS CONTINUOUS
Status: DISCONTINUED | OUTPATIENT
Start: 2018-02-05 | End: 2018-02-06

## 2018-02-05 RX ORDER — BACITRACIN ZINC 500 UNIT/G
OINTMENT (GRAM) TOPICAL
Status: DISCONTINUED | OUTPATIENT
Start: 2018-02-05 | End: 2018-02-05 | Stop reason: HOSPADM

## 2018-02-05 RX ORDER — FENTANYL CITRATE 50 UG/ML
INJECTION, SOLUTION INTRAMUSCULAR; INTRAVENOUS
Status: COMPLETED
Start: 2018-02-05 | End: 2018-02-05

## 2018-02-05 RX ORDER — MUPIROCIN 20 MG/G
1 OINTMENT TOPICAL 2 TIMES DAILY
Status: DISCONTINUED | OUTPATIENT
Start: 2018-02-05 | End: 2018-02-07 | Stop reason: HOSPADM

## 2018-02-05 RX ORDER — OXYCODONE AND ACETAMINOPHEN 10; 325 MG/1; MG/1
1 TABLET ORAL EVERY 4 HOURS PRN
Status: DISCONTINUED | OUTPATIENT
Start: 2018-02-05 | End: 2018-02-07 | Stop reason: HOSPADM

## 2018-02-05 RX ORDER — SUCCINYLCHOLINE CHLORIDE 20 MG/ML
INJECTION INTRAMUSCULAR; INTRAVENOUS
Status: DISCONTINUED | OUTPATIENT
Start: 2018-02-05 | End: 2018-02-05

## 2018-02-05 RX ORDER — PREGABALIN 75 MG/1
75 CAPSULE ORAL 2 TIMES DAILY
Status: DISCONTINUED | OUTPATIENT
Start: 2018-02-05 | End: 2018-02-07 | Stop reason: HOSPADM

## 2018-02-05 RX ORDER — PROPOFOL 10 MG/ML
VIAL (ML) INTRAVENOUS
Status: DISCONTINUED | OUTPATIENT
Start: 2018-02-05 | End: 2018-02-05

## 2018-02-05 RX ORDER — BACITRACIN 50000 [IU]/1
INJECTION, POWDER, FOR SOLUTION INTRAMUSCULAR
Status: DISCONTINUED | OUTPATIENT
Start: 2018-02-05 | End: 2018-02-05 | Stop reason: HOSPADM

## 2018-02-05 RX ORDER — MIDAZOLAM HYDROCHLORIDE 1 MG/ML
INJECTION INTRAMUSCULAR; INTRAVENOUS
Status: DISCONTINUED | OUTPATIENT
Start: 2018-02-05 | End: 2018-02-05

## 2018-02-05 RX ORDER — ACETAMINOPHEN 10 MG/ML
INJECTION, SOLUTION INTRAVENOUS
Status: DISCONTINUED | OUTPATIENT
Start: 2018-02-05 | End: 2018-02-05

## 2018-02-05 RX ORDER — SODIUM CHLORIDE 9 MG/ML
INJECTION, SOLUTION INTRAVENOUS CONTINUOUS PRN
Status: DISCONTINUED | OUTPATIENT
Start: 2018-02-05 | End: 2018-02-05

## 2018-02-05 RX ORDER — AMOXICILLIN 250 MG
2 CAPSULE ORAL NIGHTLY PRN
Status: DISCONTINUED | OUTPATIENT
Start: 2018-02-05 | End: 2018-02-07 | Stop reason: HOSPADM

## 2018-02-05 RX ORDER — ROCURONIUM BROMIDE 10 MG/ML
INJECTION, SOLUTION INTRAVENOUS
Status: DISCONTINUED | OUTPATIENT
Start: 2018-02-05 | End: 2018-02-05

## 2018-02-05 RX ORDER — ACETAMINOPHEN 325 MG/1
650 TABLET ORAL EVERY 4 HOURS PRN
Status: DISCONTINUED | OUTPATIENT
Start: 2018-02-05 | End: 2018-02-07 | Stop reason: HOSPADM

## 2018-02-05 RX ORDER — BIOTIN 1 MG
1 TABLET ORAL DAILY
Status: DISCONTINUED | OUTPATIENT
Start: 2018-02-05 | End: 2018-02-06 | Stop reason: ALTCHOICE

## 2018-02-05 RX ORDER — LIDOCAINE HCL/PF 100 MG/5ML
SYRINGE (ML) INTRAVENOUS
Status: DISCONTINUED | OUTPATIENT
Start: 2018-02-05 | End: 2018-02-05

## 2018-02-05 RX ORDER — DIAZEPAM 5 MG/ML
5 INJECTION, SOLUTION INTRAMUSCULAR; INTRAVENOUS EVERY 8 HOURS
Status: COMPLETED | OUTPATIENT
Start: 2018-02-05 | End: 2018-02-06

## 2018-02-05 RX ORDER — FENTANYL CITRATE 50 UG/ML
INJECTION, SOLUTION INTRAMUSCULAR; INTRAVENOUS
Status: DISCONTINUED | OUTPATIENT
Start: 2018-02-05 | End: 2018-02-05

## 2018-02-05 RX ORDER — FLUTICASONE PROPIONATE 50 MCG
1 SPRAY, SUSPENSION (ML) NASAL DAILY
Status: DISCONTINUED | OUTPATIENT
Start: 2018-02-05 | End: 2018-02-07 | Stop reason: HOSPADM

## 2018-02-05 RX ORDER — BUPROPION HYDROCHLORIDE 150 MG/1
150 TABLET ORAL DAILY
Status: DISCONTINUED | OUTPATIENT
Start: 2018-02-06 | End: 2018-02-07 | Stop reason: HOSPADM

## 2018-02-05 RX ORDER — FENTANYL CITRATE 50 UG/ML
25 INJECTION, SOLUTION INTRAMUSCULAR; INTRAVENOUS EVERY 5 MIN PRN
Status: DISCONTINUED | OUTPATIENT
Start: 2018-02-05 | End: 2018-02-05

## 2018-02-05 RX ORDER — MUPIROCIN 20 MG/G
1 OINTMENT TOPICAL
Status: COMPLETED | OUTPATIENT
Start: 2018-02-05 | End: 2018-02-05

## 2018-02-05 RX ORDER — ONDANSETRON 2 MG/ML
INJECTION INTRAMUSCULAR; INTRAVENOUS
Status: DISCONTINUED | OUTPATIENT
Start: 2018-02-05 | End: 2018-02-05

## 2018-02-05 RX ORDER — DIAZEPAM 5 MG/1
5 TABLET ORAL EVERY 8 HOURS PRN
Status: DISCONTINUED | OUTPATIENT
Start: 2018-02-06 | End: 2018-02-06

## 2018-02-05 RX ORDER — LIDOCAINE HYDROCHLORIDE AND EPINEPHRINE 10; 10 MG/ML; UG/ML
INJECTION, SOLUTION INFILTRATION; PERINEURAL
Status: DISCONTINUED | OUTPATIENT
Start: 2018-02-05 | End: 2018-02-05 | Stop reason: HOSPADM

## 2018-02-05 RX ORDER — MAG HYDROX/ALUMINUM HYD/SIMETH 200-200-20
30 SUSPENSION, ORAL (FINAL DOSE FORM) ORAL EVERY 4 HOURS PRN
Status: DISCONTINUED | OUTPATIENT
Start: 2018-02-05 | End: 2018-02-07 | Stop reason: HOSPADM

## 2018-02-05 RX ORDER — LEVOTHYROXINE SODIUM 112 UG/1
112 TABLET ORAL
Status: DISCONTINUED | OUTPATIENT
Start: 2018-02-06 | End: 2018-02-07 | Stop reason: HOSPADM

## 2018-02-05 RX ORDER — CALCIUM CHLORIDE INJECTION 100 MG/ML
INJECTION, SOLUTION INTRAVENOUS
Status: DISCONTINUED | OUTPATIENT
Start: 2018-02-05 | End: 2018-02-05 | Stop reason: HOSPADM

## 2018-02-05 RX ORDER — CEFAZOLIN SODIUM 1 G/3ML
2 INJECTION, POWDER, FOR SOLUTION INTRAMUSCULAR; INTRAVENOUS
Status: COMPLETED | OUTPATIENT
Start: 2018-02-05 | End: 2018-02-05

## 2018-02-05 RX ORDER — BISACODYL 10 MG
10 SUPPOSITORY, RECTAL RECTAL DAILY
Status: DISCONTINUED | OUTPATIENT
Start: 2018-02-05 | End: 2018-02-07 | Stop reason: HOSPADM

## 2018-02-05 RX ORDER — ONDANSETRON 2 MG/ML
4 INJECTION INTRAMUSCULAR; INTRAVENOUS ONCE AS NEEDED
Status: DISCONTINUED | OUTPATIENT
Start: 2018-02-05 | End: 2018-02-05 | Stop reason: HOSPADM

## 2018-02-05 RX ORDER — SODIUM CHLORIDE 0.9 % (FLUSH) 0.9 %
3 SYRINGE (ML) INJECTION
Status: DISCONTINUED | OUTPATIENT
Start: 2018-02-05 | End: 2018-02-07 | Stop reason: HOSPADM

## 2018-02-05 RX ORDER — BUPIVACAINE HYDROCHLORIDE AND EPINEPHRINE 5; 5 MG/ML; UG/ML
INJECTION, SOLUTION EPIDURAL; INTRACAUDAL; PERINEURAL
Status: DISCONTINUED | OUTPATIENT
Start: 2018-02-05 | End: 2018-02-05 | Stop reason: HOSPADM

## 2018-02-05 RX ORDER — FENTANYL CITRATE 50 UG/ML
25 INJECTION, SOLUTION INTRAMUSCULAR; INTRAVENOUS EVERY 5 MIN PRN
Status: COMPLETED | OUTPATIENT
Start: 2018-02-05 | End: 2018-02-05

## 2018-02-05 RX ORDER — DIPHENHYDRAMINE HYDROCHLORIDE 50 MG/ML
25 INJECTION INTRAMUSCULAR; INTRAVENOUS EVERY 6 HOURS PRN
Status: DISCONTINUED | OUTPATIENT
Start: 2018-02-05 | End: 2018-02-05 | Stop reason: HOSPADM

## 2018-02-05 RX ORDER — TRIAMTERENE AND HYDROCHLOROTHIAZIDE 37.5; 25 MG/1; MG/1
1 CAPSULE ORAL DAILY
Status: DISCONTINUED | OUTPATIENT
Start: 2018-02-05 | End: 2018-02-07 | Stop reason: HOSPADM

## 2018-02-05 RX ORDER — HEPARIN SODIUM 1000 [USP'U]/ML
INJECTION, SOLUTION INTRAVENOUS; SUBCUTANEOUS
Status: DISCONTINUED | OUTPATIENT
Start: 2018-02-05 | End: 2018-02-05 | Stop reason: HOSPADM

## 2018-02-05 RX ORDER — KETAMINE HYDROCHLORIDE 100 MG/ML
INJECTION, SOLUTION INTRAMUSCULAR; INTRAVENOUS
Status: DISCONTINUED | OUTPATIENT
Start: 2018-02-05 | End: 2018-02-05

## 2018-02-05 RX ORDER — DEXAMETHASONE SODIUM PHOSPHATE 4 MG/ML
INJECTION, SOLUTION INTRA-ARTICULAR; INTRALESIONAL; INTRAMUSCULAR; INTRAVENOUS; SOFT TISSUE
Status: DISCONTINUED | OUTPATIENT
Start: 2018-02-05 | End: 2018-02-05

## 2018-02-05 RX ORDER — ASCORBIC ACID 250 MG
1000 TABLET ORAL DAILY
Status: DISCONTINUED | OUTPATIENT
Start: 2018-02-06 | End: 2018-02-07 | Stop reason: HOSPADM

## 2018-02-05 RX ORDER — GLYCOPYRROLATE 0.2 MG/ML
INJECTION INTRAMUSCULAR; INTRAVENOUS
Status: DISCONTINUED | OUTPATIENT
Start: 2018-02-05 | End: 2018-02-05

## 2018-02-05 RX ADMIN — OXYCODONE HYDROCHLORIDE AND ACETAMINOPHEN 1 TABLET: 10; 325 TABLET ORAL at 12:02

## 2018-02-05 RX ADMIN — OXYCODONE HYDROCHLORIDE AND ACETAMINOPHEN 1 TABLET: 10; 325 TABLET ORAL at 09:02

## 2018-02-05 RX ADMIN — FENTANYL CITRATE 25 MCG: 50 INJECTION INTRAMUSCULAR; INTRAVENOUS at 12:02

## 2018-02-05 RX ADMIN — EPHEDRINE SULFATE 5 MG: 50 INJECTION, SOLUTION INTRAMUSCULAR; INTRAVENOUS; SUBCUTANEOUS at 08:02

## 2018-02-05 RX ADMIN — PHENYLEPHRINE HYDROCHLORIDE 100 MCG: 10 INJECTION INTRAVENOUS at 07:02

## 2018-02-05 RX ADMIN — SUCCINYLCHOLINE CHLORIDE 100 MG: 20 INJECTION, SOLUTION INTRAMUSCULAR; INTRAVENOUS at 07:02

## 2018-02-05 RX ADMIN — PHENYLEPHRINE HYDROCHLORIDE 100 MCG: 10 INJECTION INTRAVENOUS at 11:02

## 2018-02-05 RX ADMIN — FENTANYL CITRATE 25 MCG: 50 INJECTION INTRAMUSCULAR; INTRAVENOUS at 01:02

## 2018-02-05 RX ADMIN — PHENYLEPHRINE HYDROCHLORIDE 200 MCG: 10 INJECTION INTRAVENOUS at 08:02

## 2018-02-05 RX ADMIN — SODIUM CHLORIDE: 0.9 INJECTION, SOLUTION INTRAVENOUS at 06:02

## 2018-02-05 RX ADMIN — PHENYLEPHRINE HYDROCHLORIDE 0.4 MCG/KG/MIN: 10 INJECTION INTRAVENOUS at 08:02

## 2018-02-05 RX ADMIN — EPHEDRINE SULFATE 5 MG: 50 INJECTION, SOLUTION INTRAMUSCULAR; INTRAVENOUS; SUBCUTANEOUS at 09:02

## 2018-02-05 RX ADMIN — PREGABALIN 75 MG: 75 CAPSULE ORAL at 12:02

## 2018-02-05 RX ADMIN — MIDAZOLAM HYDROCHLORIDE 2 MG: 1 INJECTION, SOLUTION INTRAMUSCULAR; INTRAVENOUS at 07:02

## 2018-02-05 RX ADMIN — GLYCOPYRROLATE 0.2 MG: 0.2 INJECTION, SOLUTION INTRAMUSCULAR; INTRAVENOUS at 07:02

## 2018-02-05 RX ADMIN — ONDANSETRON 4 MG: 2 INJECTION INTRAMUSCULAR; INTRAVENOUS at 10:02

## 2018-02-05 RX ADMIN — PHENYLEPHRINE HYDROCHLORIDE 100 MCG: 10 INJECTION INTRAVENOUS at 10:02

## 2018-02-05 RX ADMIN — EPHEDRINE SULFATE 5 MG: 50 INJECTION, SOLUTION INTRAMUSCULAR; INTRAVENOUS; SUBCUTANEOUS at 10:02

## 2018-02-05 RX ADMIN — CEFAZOLIN 2 G: 330 INJECTION, POWDER, FOR SOLUTION INTRAMUSCULAR; INTRAVENOUS at 07:02

## 2018-02-05 RX ADMIN — PROPOFOL 130 MG: 10 INJECTION, EMULSION INTRAVENOUS at 07:02

## 2018-02-05 RX ADMIN — MUPIROCIN 1 G: 20 OINTMENT TOPICAL at 06:02

## 2018-02-05 RX ADMIN — MUPIROCIN 1 G: 20 OINTMENT TOPICAL at 09:02

## 2018-02-05 RX ADMIN — SODIUM CHLORIDE, SODIUM GLUCONATE, SODIUM ACETATE, POTASSIUM CHLORIDE, MAGNESIUM CHLORIDE, SODIUM PHOSPHATE, DIBASIC, AND POTASSIUM PHOSPHATE: .53; .5; .37; .037; .03; .012; .00082 INJECTION, SOLUTION INTRAVENOUS at 08:02

## 2018-02-05 RX ADMIN — OXYCODONE HYDROCHLORIDE AND ACETAMINOPHEN 1 TABLET: 10; 325 TABLET ORAL at 05:02

## 2018-02-05 RX ADMIN — PREGABALIN 75 MG: 75 CAPSULE ORAL at 09:02

## 2018-02-05 RX ADMIN — FENTANYL CITRATE 25 MCG: 50 INJECTION, SOLUTION INTRAMUSCULAR; INTRAVENOUS at 11:02

## 2018-02-05 RX ADMIN — DIAZEPAM 5 MG: 5 INJECTION, SOLUTION INTRAMUSCULAR; INTRAVENOUS at 10:02

## 2018-02-05 RX ADMIN — DIAZEPAM 5 MG: 5 INJECTION, SOLUTION INTRAMUSCULAR; INTRAVENOUS at 02:02

## 2018-02-05 RX ADMIN — PHENYLEPHRINE HYDROCHLORIDE 50 MCG: 10 INJECTION INTRAVENOUS at 10:02

## 2018-02-05 RX ADMIN — FENTANYL CITRATE 25 MCG: 50 INJECTION INTRAMUSCULAR; INTRAVENOUS at 06:02

## 2018-02-05 RX ADMIN — EPHEDRINE SULFATE 10 MG: 50 INJECTION, SOLUTION INTRAMUSCULAR; INTRAVENOUS; SUBCUTANEOUS at 08:02

## 2018-02-05 RX ADMIN — PHENYLEPHRINE HYDROCHLORIDE 200 MCG: 10 INJECTION INTRAVENOUS at 07:02

## 2018-02-05 RX ADMIN — ROCURONIUM BROMIDE 10 MG: 10 INJECTION, SOLUTION INTRAVENOUS at 07:02

## 2018-02-05 RX ADMIN — FENTANYL CITRATE 150 MCG: 50 INJECTION, SOLUTION INTRAMUSCULAR; INTRAVENOUS at 07:02

## 2018-02-05 RX ADMIN — DEXAMETHASONE SODIUM PHOSPHATE 8 MG: 4 INJECTION, SOLUTION INTRAMUSCULAR; INTRAVENOUS at 07:02

## 2018-02-05 RX ADMIN — LIDOCAINE HYDROCHLORIDE 75 MG: 20 INJECTION, SOLUTION INTRAVENOUS at 07:02

## 2018-02-05 RX ADMIN — PHENYLEPHRINE HYDROCHLORIDE 100 MCG: 10 INJECTION INTRAVENOUS at 08:02

## 2018-02-05 RX ADMIN — KETAMINE HYDROCHLORIDE 20 MG: 100 INJECTION, SOLUTION, CONCENTRATE INTRAMUSCULAR; INTRAVENOUS at 07:02

## 2018-02-05 RX ADMIN — ACETAMINOPHEN 675 MG: 10 INJECTION, SOLUTION INTRAVENOUS at 07:02

## 2018-02-05 NOTE — NURSING TRANSFER
Nursing Transfer Note      2/5/2018      Transfer To: x-ray-->[id number]    Transfer via bed    Transfer with nonw    Transported by pct    Medicines sent: iv fluids    Chart send with patient: Yes    Notified: daughter    Patient reassessed at: 2/5/18 see flowsheets

## 2018-02-05 NOTE — OP NOTE
DATE OF PROCEDURE: 2/5/2018     PREOPERATIVE DIAGNOSES:   Spondylolisthesis of lumbosacral region [M43.17]    POSTOPERATIVE DIAGNOSES:   Spondylolisthesis of lumbosacral region [M43.17]    PROCEDURES PERFORMED:   Procedure(s) (LRB):  RIght L4-5 and L5-S1 MIS TLIF and Perc Screws (Right)    Surgeon(s) and Role:     * Artem Carrasco MD - Resident - Assisting     * Maikol Hoskins MD - Primary    ANESTHESIA: General    ESTIMATED BLOOD LOSS: 100cc    CLINICAL HISTORY AND INDICATION FOR SURGERY: Symptomatic L4 to L5 grade II degenerative spondylolisthesis and L5 to S1 severe degenerative disk disease with associated spinal stenosis in patient who presented with severe low back pain and Bilateral leg pain. she  failed nonsurgical treatment including physical therapy and spinal injections. She had 2 previous MIS laminectomy which helped but her back pain worsened over the last few months. We discussed surgery including all the attendant risks, benefits and potential complications of surgery, and she  wanted to proceed with surgery and consented to surgery.     OPERATIVE PROCEDURE: The patient was brought into the Operating Room, identified and general anesthesia was induced using endotracheal intubation. All the required IV lines were placed. Thigh-high PERI stockings and SCDs were placed for DVT prophylaxis. The patient was then gently logrolled in a prone position on the Taco table, and all neurocutaneous pressure points were checked and padded. Lumbosacral region was prepped and draped using sterile technique. We cannulated the iliac crest with Jamshidi needle to obtain bone marrow aspirate which was the spun down to obtain stem cells for later use in our interbody fusion.  We then used AP x-ray to ba the midline and the lateral margins of the L4, L5, and S1 pedicles bilaterally. We then designed paramedian incisions overlying the L4 to L5 and L5 to S1 disk spaces bilaterally. Local anesthetic was infiltrated.      We incised the skin, subcutaneous tissue, and the thoracolumbar fascia bilaterally. We then brought in the fluoroscopic machine, and using the AP images, we cannulated the pedicles of L4, L5 and S1 bilaterally using the Jamshidi needles. Using lateral x-ray, we advanced  Jamshidi needles and placed the K-wires into the bodies of L4, L5 and S1. We bent the K-wires out of the way on the Right side and assembled the METRx tube over the L4-5 disk space in order to do the TLIF. We brought in the operating microscope, and then using monopolar cautery, we removed soft tissue overlying the L4-5 facet on the Right side and the L4 lamina. There was a lost of scar tissue from the previous surgery and this combined with the grade II spondylolisthesis made this surgery very challenging and complex. We then used a high-speed drill and Kerrison of different configuration to expand the previous laminotomy at L4 to decompress  L4.nerve root, and osteotome and mallet to remove the medial facet at L4-5. Further decompression of exiting L4 nerve root was performed by taking out fibrous tissue overlying the L4 foramen.  The removed facet bone was given to the scrub tech who removed all the soft tissue, and grind up the bone for later use in our interbody. We then retracted the thecal sac medially, cauterized epidural bleeding veins, and used a 15 blade to make a annulotomy into the L4-5  disk space which was completely collapsed.  We then used disk   space brooks and rasp to remove remnant cartilage. We then placed a mixture of BMA and autograft from facet anteriorly into the L4-510 X 22. 7-13mm 4 degree lordotic expandable care and inserted it into L4-5 using fluoroscopic image guidance. We were satisfied with the final placement of the TLIF cage.     We removed the METRx retractor system and then assembled it at L5-S1 under x-ray guidance. We then brought in the operating microscope and used monopolar cautery to remove the soft  tissue overlying the L5 lamina and  L5-S1 facet. We used a high-speed drill and Kerrison of different configuration to do a laminotomy at L5. We used osteotome and mallet to remove the facet. We identified the attachment of ligamentum flavum and peeled it inferolaterally and removed it in piecemeal using Kerrison of different configuration. We unroofed the L5 foramen and retracted the thecal sac medially. We cauterized epidural bleeding veins. We then used a 15 blade to make an annulotomy at  L5-S1 and used disk space brooks to do a complete discectomy at  L5-S1. We used angled curette and ring curette to remove cartilaginous endplate materials and additional disk materials. We then placed a mixture of BMA and autograft anteriorly into the  L5-S1 and then placed 10 X 22, 8-14mm, 10 degree expandable cage into  L5-S1. Final AP and lateral x-rays demonstrated good placement of the TLIF cages.    For final instrumentation, since the patient's bone was relatively osteoporotic, we did not tap over the K-wires. Instead, we placed the pedicle screws using 6.5 X 45 at L4 and L4 bilateral and 6.5 X 40 at S1 bilaterally. This was done under x-ray guidance. We then placed the adjoining rods. We used  70 mm pratik, placed set screws and tightened them. We were able to fully reduce the listhetic L4 segment using the GLOBUS system. Final AP and lateral x-rays were satisfactory.     We irrigated the area of the surgery with bacitracin saline and. We then used 0 Vicryl suture to approximate the thoracolumbar fascia for the deep cutaneous tissue, 2-0 Vicryl stitch for the subcuticular layer and 4-0 Monocryl to approximate the skin edges. We applied bacitracin ointment and Telfa and Tegaderm for final dressing of the incisions. The patient was repositioned supine on the hospital bed, weaned off general anesthesia and extubated. She was moving both lower extremities symmetrically and strongly and was transferred to the Recovery Room in  stable condition.

## 2018-02-05 NOTE — TRANSFER OF CARE
"Anesthesia Transfer of Care Note    Patient: Angelina Man    Procedure(s) Performed: Procedure(s) (LRB):  RIght L4-5 and L5-S1 MIS TLIF and Perc Screws (Right)    Patient location: PACU    Anesthesia Type: general    Transport from OR: Transported from OR on 6-10 L/min O2 by face mask with adequate spontaneous ventilation    Post pain: adequate analgesia    Post assessment: no apparent anesthetic complications    Post vital signs: stable    Level of consciousness: responds to stimulation and sedated    Nausea/Vomiting: no nausea/vomiting    Complications: none    Transfer of care protocol was followed      Last vitals:   Visit Vitals  BP (!) 94/51 (BP Location: Right arm, Patient Position: Lying)   Pulse 81   Temp 36.5 °C (97.7 °F) (Oral)   Resp 19   Ht 5' 4" (1.626 m)   Wt 45.5 kg (100 lb 5 oz)   SpO2 100%   Breastfeeding? No   BMI 17.22 kg/m²     "

## 2018-02-05 NOTE — ANESTHESIA POSTPROCEDURE EVALUATION
"Anesthesia Post Evaluation    Patient: Angelina Man    Procedure(s) Performed: Procedure(s) (LRB):  RIght L4-5 and L5-S1 MIS TLIF and Perc Screws (Right)    Final Anesthesia Type: general  Patient location during evaluation: PACU  Patient participation: Yes- Able to Participate  Level of consciousness: awake and alert and oriented  Post-procedure vital signs: reviewed and stable  Pain management: adequate  Airway patency: patent  PONV status at discharge: No PONV  Anesthetic complications: no      Cardiovascular status: hemodynamically stable  Respiratory status: unassisted, spontaneous ventilation and room air  Hydration status: euvolemic  Follow-up not needed.        Visit Vitals  BP (!) 95/56   Pulse 70   Temp 36.9 °C (98.5 °F) (Oral)   Resp (!) 8   Ht 5' 4" (1.626 m)   Wt 45.5 kg (100 lb 5 oz)   SpO2 97%   Breastfeeding? No   BMI 17.22 kg/m²       Pain/Aliya Score: Pain Assessment Performed: Yes (2/5/2018  4:00 PM)  Presence of Pain: complains of pain/discomfort (2/5/2018  3:00 PM)  Pain Rating Prior to Med Admin: 6 (2/5/2018  1:20 PM)  Pain Rating Post Med Admin: 5 (2/5/2018  4:00 PM)  Aliya Score: 10 (2/5/2018 12:45 PM)      "

## 2018-02-05 NOTE — BRIEF OP NOTE
[facility]  Brief Operative Note    SUMMARY     Surgery Date: 2/5/2018     Surgeon(s) and Role:     * Artem Carrasco MD - Resident - Assisting     * Maikol Hoskins MD - Primary        Pre-op Diagnosis:  Spondylolisthesis of lumbosacral region [M43.17]    Post-op Diagnosis:  Post-Op Diagnosis Codes:     * Spondylolisthesis of lumbosacral region [M43.17]    Procedure(s) (LRB):  RIght L4-5 and L5-S1 MIS TLIF and Perc Screws (Right)    Anesthesia: General    Description of Procedure: Right posterolateral approach for minimally invasive total facetectomy L4-L5, L5-S1 with transforaminal lumbar interbody fusion L4-L5, L5-S1 with autologous PSIS aspirate harvested via left percutaneous approach plus bilateral percutaneous screw and prtaik placement L4-L5-S1.     Description of the findings of the procedure: See full op note.    Estimated Blood Loss: 150 mL         Specimens:   Specimen (12h ago through future)    None

## 2018-02-06 LAB
ANION GAP SERPL CALC-SCNC: 6 MMOL/L
BASOPHILS # BLD AUTO: 0.02 K/UL
BASOPHILS NFR BLD: 0.3 %
BUN SERPL-MCNC: 8 MG/DL
CALCIUM SERPL-MCNC: 8.1 MG/DL
CHLORIDE SERPL-SCNC: 106 MMOL/L
CO2 SERPL-SCNC: 27 MMOL/L
CREAT SERPL-MCNC: 0.7 MG/DL
DIFFERENTIAL METHOD: ABNORMAL
EOSINOPHIL # BLD AUTO: 0 K/UL
EOSINOPHIL NFR BLD: 0 %
ERYTHROCYTE [DISTWIDTH] IN BLOOD BY AUTOMATED COUNT: 13.8 %
EST. GFR  (AFRICAN AMERICAN): >60 ML/MIN/1.73 M^2
EST. GFR  (NON AFRICAN AMERICAN): >60 ML/MIN/1.73 M^2
GLUCOSE SERPL-MCNC: 125 MG/DL
HCT VFR BLD AUTO: 28.5 %
HGB BLD-MCNC: 9.5 G/DL
IMM GRANULOCYTES # BLD AUTO: 0.02 K/UL
IMM GRANULOCYTES NFR BLD AUTO: 0.3 %
LYMPHOCYTES # BLD AUTO: 0.9 K/UL
LYMPHOCYTES NFR BLD: 12.3 %
MCH RBC QN AUTO: 33.2 PG
MCHC RBC AUTO-ENTMCNC: 33.3 G/DL
MCV RBC AUTO: 100 FL
MONOCYTES # BLD AUTO: 0.7 K/UL
MONOCYTES NFR BLD: 9.8 %
NEUTROPHILS # BLD AUTO: 5.8 K/UL
NEUTROPHILS NFR BLD: 77.3 %
NRBC BLD-RTO: 0 /100 WBC
PLATELET # BLD AUTO: 191 K/UL
PMV BLD AUTO: 9.5 FL
POTASSIUM SERPL-SCNC: 3.7 MMOL/L
RBC # BLD AUTO: 2.86 M/UL
SODIUM SERPL-SCNC: 139 MMOL/L
WBC # BLD AUTO: 7.46 K/UL

## 2018-02-06 PROCEDURE — 63600175 PHARM REV CODE 636 W HCPCS: Performed by: STUDENT IN AN ORGANIZED HEALTH CARE EDUCATION/TRAINING PROGRAM

## 2018-02-06 PROCEDURE — 36415 COLL VENOUS BLD VENIPUNCTURE: CPT

## 2018-02-06 PROCEDURE — 85025 COMPLETE CBC W/AUTO DIFF WBC: CPT

## 2018-02-06 PROCEDURE — 99024 POSTOP FOLLOW-UP VISIT: CPT | Mod: ,,, | Performed by: PHYSICIAN ASSISTANT

## 2018-02-06 PROCEDURE — 97161 PT EVAL LOW COMPLEX 20 MIN: CPT

## 2018-02-06 PROCEDURE — 80048 BASIC METABOLIC PNL TOTAL CA: CPT

## 2018-02-06 PROCEDURE — 97165 OT EVAL LOW COMPLEX 30 MIN: CPT

## 2018-02-06 PROCEDURE — 63600175 PHARM REV CODE 636 W HCPCS: Performed by: PHYSICIAN ASSISTANT

## 2018-02-06 PROCEDURE — 25000003 PHARM REV CODE 250: Performed by: PHYSICIAN ASSISTANT

## 2018-02-06 PROCEDURE — 20600001 HC STEP DOWN PRIVATE ROOM

## 2018-02-06 PROCEDURE — 25000003 PHARM REV CODE 250: Performed by: STUDENT IN AN ORGANIZED HEALTH CARE EDUCATION/TRAINING PROGRAM

## 2018-02-06 RX ORDER — KETOROLAC TROMETHAMINE 15 MG/ML
15 INJECTION, SOLUTION INTRAMUSCULAR; INTRAVENOUS ONCE
Status: COMPLETED | OUTPATIENT
Start: 2018-02-06 | End: 2018-02-06

## 2018-02-06 RX ORDER — METOCLOPRAMIDE HYDROCHLORIDE 5 MG/ML
10 INJECTION INTRAMUSCULAR; INTRAVENOUS EVERY 6 HOURS
Status: DISPENSED | OUTPATIENT
Start: 2018-02-06 | End: 2018-02-07

## 2018-02-06 RX ORDER — LACTULOSE 10 G/15ML
20 SOLUTION ORAL ONCE
Status: COMPLETED | OUTPATIENT
Start: 2018-02-06 | End: 2018-02-06

## 2018-02-06 RX ORDER — HEPARIN SODIUM 5000 [USP'U]/ML
5000 INJECTION, SOLUTION INTRAVENOUS; SUBCUTANEOUS EVERY 8 HOURS
Status: DISCONTINUED | OUTPATIENT
Start: 2018-02-06 | End: 2018-02-07 | Stop reason: HOSPADM

## 2018-02-06 RX ORDER — ACETAMINOPHEN 10 MG/ML
1000 INJECTION, SOLUTION INTRAVENOUS EVERY 8 HOURS
Status: DISCONTINUED | OUTPATIENT
Start: 2018-02-06 | End: 2018-02-07 | Stop reason: HOSPADM

## 2018-02-06 RX ORDER — DICYCLOMINE HYDROCHLORIDE 20 MG/1
20 TABLET ORAL
Status: DISCONTINUED | OUTPATIENT
Start: 2018-02-06 | End: 2018-02-07 | Stop reason: HOSPADM

## 2018-02-06 RX ORDER — DIAZEPAM 5 MG/1
5 TABLET ORAL EVERY 6 HOURS PRN
Status: DISCONTINUED | OUTPATIENT
Start: 2018-02-06 | End: 2018-02-07 | Stop reason: HOSPADM

## 2018-02-06 RX ADMIN — LUBIPROSTONE 8 MCG: 8 CAPSULE, GELATIN COATED ORAL at 10:02

## 2018-02-06 RX ADMIN — PREGABALIN 75 MG: 75 CAPSULE ORAL at 09:02

## 2018-02-06 RX ADMIN — DICYCLOMINE HYDROCHLORIDE 20 MG: 20 TABLET ORAL at 09:02

## 2018-02-06 RX ADMIN — ACETAMINOPHEN 650 MG: 325 TABLET, FILM COATED ORAL at 06:02

## 2018-02-06 RX ADMIN — HEPARIN SODIUM 5000 UNITS: 5000 INJECTION, SOLUTION INTRAVENOUS; SUBCUTANEOUS at 09:02

## 2018-02-06 RX ADMIN — BUPROPION HYDROCHLORIDE 150 MG: 150 TABLET, EXTENDED RELEASE ORAL at 10:02

## 2018-02-06 RX ADMIN — METOCLOPRAMIDE 10 MG: 5 INJECTION, SOLUTION INTRAMUSCULAR; INTRAVENOUS at 11:02

## 2018-02-06 RX ADMIN — DIAZEPAM 5 MG: 5 INJECTION, SOLUTION INTRAMUSCULAR; INTRAVENOUS at 05:02

## 2018-02-06 RX ADMIN — LACTULOSE 20 G: 20 SOLUTION ORAL at 12:02

## 2018-02-06 RX ADMIN — ACETAMINOPHEN 1000 MG: 10 INJECTION, SOLUTION INTRAVENOUS at 09:02

## 2018-02-06 RX ADMIN — Medication 1000 MG: at 09:02

## 2018-02-06 RX ADMIN — OXYCODONE HYDROCHLORIDE AND ACETAMINOPHEN 1 TABLET: 10; 325 TABLET ORAL at 03:02

## 2018-02-06 RX ADMIN — OXYCODONE HYDROCHLORIDE AND ACETAMINOPHEN 1 TABLET: 10; 325 TABLET ORAL at 07:02

## 2018-02-06 RX ADMIN — DICYCLOMINE HYDROCHLORIDE 20 MG: 20 TABLET ORAL at 04:02

## 2018-02-06 RX ADMIN — KETOROLAC TROMETHAMINE 15 MG: 15 INJECTION, SOLUTION INTRAMUSCULAR; INTRAVENOUS at 07:02

## 2018-02-06 RX ADMIN — OXYCODONE HYDROCHLORIDE AND ACETAMINOPHEN 1 TABLET: 10; 325 TABLET ORAL at 12:02

## 2018-02-06 RX ADMIN — SODIUM PHOSPHATE, DIBASIC AND SODIUM PHOSPHATE, MONOBASIC 1 ENEMA: 7; 19 ENEMA RECTAL at 09:02

## 2018-02-06 RX ADMIN — LUBIPROSTONE 8 MCG: 8 CAPSULE, GELATIN COATED ORAL at 04:02

## 2018-02-06 RX ADMIN — TRIAMTERENE AND HYDROCHLOROTHIAZIDE 1 CAPSULE: 25; 37.5 CAPSULE ORAL at 09:02

## 2018-02-06 RX ADMIN — LEVOTHYROXINE SODIUM 112 MCG: 112 TABLET ORAL at 05:02

## 2018-02-06 NOTE — PLAN OF CARE
Problem: Physical Therapy Goal  Goal: Physical Therapy Goal  Outcome: Outcome(s) achieved Date Met: 02/06/18  Pt evaluation complete. Pt safe to d/c home from a mobility standpoint without needs for skilled PT at this time.    PAULO BRITO, PT  2/6/2018

## 2018-02-06 NOTE — PT/OT/SLP EVAL
Occupational Therapy   Evaluation and Discharge Note    Name: Angelina Man  MRN: [id number]  Admitting Diagnosis:  Spondylolisthesis at L4-L5 level 1 Day Post-Op; RIght L4-5 and L5-S1 MIS TLIF and Perc Screws (Right)    Recommendations:     Discharge Recommendations: home, outpatient PT  Discharge Equipment Recommendations:  shower chair  Barriers to discharge:  Inaccessible home environment, Decreased caregiver support (level of assistance needed )    History:     Occupational Profile:  Living Environment: Pt lives alone in the [place] area in a condo on the second story of the building; it has a 2 step entryway. Pt has a walk in shower with grab bars; she also has grab bars near her toilet.   Previous level of function: Pt was fully independent in all aspects of her life. She is currently driving and performing all typical ADL and IADL tasks at home and in her community. 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   Roles and Routines: employee, caretaker to self and home, community dweller, , friend    Equipment Owned:  grab bar, reacher, long handled bath sponge  Assistance upon Discharge: none readily available    Past Medical History:   Diagnosis Date    Arthritis     Bronchitis     Cataract     Dry eyes     Hypothyroidism 6/23/2016    Rash     Squamous cell carcinoma     in-situ right upper inner arm, right wrist    Status post lumbar surgery 6/23/2016    Stress fracture     Thyroid disease        Past Surgical History:   Procedure Laterality Date    ANGIOPLASTY      CERVICAL FUSION      COLONOSCOPY      COLONOSCOPY N/A 11/14/2017    Procedure: COLONOSCOPY;  Surgeon: Kasi Mercado MD;  Location: [address];  Service: Endoscopy;  Laterality: N/A;    l4-5 mid discectomy Left 03/2017    l4-5 MIS diskectomy Right 05/2016       Subjective     Chief Complaint: Feels weaker, pain in back and ankles  Patient/Family stated goals: To return to  [facility]  Communicated with: RN prior to session. Eval completed with PT. Pt alert and agreeable to session.   Pain/Comfort:  · Pain Rating 1: 7/10 (in lower back )  · Pain Addressed 1: Reposition, Cessation of Activity    Patients cultural, spiritual, Faith conflicts given the current situation: none reported     Objective:     Patient found with: peripheral IV    General Precautions: Standard, fall   Orthopedic Precautions:spinal precautions   Braces: LSO     Occupational Performance:  *LSO brace donned at EOB with Modified [place]*    Bed Mobility:    · Patient completed Rolling/Turning to Left with  modified independence, with side rail and HOB elevated   · Patient completed Scooting/Bridging with modified independence and HOB elevated   · Patient completed Supine to Sit with modified independence and HOB elevated     Functional Mobility/Transfers:  · Patient completed Sit <> Stand Transfer with supervision and from EOB   with  no assistive device   · Functional Mobility: supervision for short household distance    Activities of Daily Living:  · Grooming: modified independence to wash hands while standing at sink  · UB Dressing: modified independence to don gown like jacket while sitting EOB   · LB Dressing: modified independence to doff and don socks in 4 point position while sitting in chair    Cognitive/Visual Perceptual:  Cognitive/Psychosocial Skills:     -       Oriented to: Person, Place, Time and Situation   -       Follows Commands/attention:Follows multistep  commands  -       Communication: clear/fluent  -       Memory: No Deficits noted  -       Safety awareness/insight to disability: intact     Physical Exam:  Balance:    -       Modified [place] for static and dynamic sitting and static standing, supervision for dynamic standing  Postural examination/scapula alignment:    -       No postural abnormalities identified  Skin integrity: Wound on lumbar region (sx site)  Edema:  None  "noted  Sensation:    -       Intact  Dominant hand:    -       R  Upper Extremity Range of Motion:     -       Right Upper Extremity: WFL  -       Left Upper Extremity: WFL  Upper Extremity Strength:    -       Right Upper Extremity: WFL 4/5  -       Left Upper Extremity: WFL 4/5    Strength:    -       Right Upper Extremity: WFL 5/5   -       Left Upper Extremity: WFL 5/5  Fine Motor Coordination:    -       Intact-independently tied gown after setup while sitting EOB     Patient left up in chair with all lines intact and call button in reach    [facility] 6 Click:  [facility] Total Score: 24    Treatment & Education:  -Communication board updated, no family present for education  -Education regarding spinal precautions with ADL tasks  *handout reviewed and left with pt  *pt demo understanding and stated she practiced prior to sx  Education:    Assessment:     Angelina Man is a 68 y.o. female with a medical diagnosis of Spondylolisthesis at L4-L5 level; RIght L4-5 and L5-S1 MIS TLIF and Perc Screws (right). Pt modified independence with donning/doffing LSO brace while seated EOB; she stated she has utilized this brace with a prior sx. Pt demo good understanding and adherence to spinal precautions in ADL tasks. Pt does not require OT services at this time. Pt would greatly benefit from skilled OP PT at appropriate time in order to fully return to PLOF and maximum safety.      Clinical Decision Makin.  OT Low:  "Pt evaluation falls under low complexity for evaluation coding due to performance deficits noted in 1-3 areas as stated above and 0 co-morbities affecting current functional status. Data obtained from problem focused assessments. No modifications or assistance was required for completion of evaluation. Only brief occupational profile and history review completed."     Plan:     During this hospitalization, patient does not require further acute OT services.  Please re-consult if situation changes.  "   · Plan of Care Reviewed with: patient      Time Tracking:     OT Date of Treatment: 02/06/18  OT Start Time: 1055  OT Stop Time: 1119  OT Total Time (min): 24 min    Billable Minutes:Evaluation 24    DAVID Sosa  2/6/2018

## 2018-02-06 NOTE — PLAN OF CARE
Problem: Occupational Therapy Goal  Goal: Occupational Therapy Goal  Outcome: Outcome(s) achieved Date Met: 02/06/18  OT bony completed this date. No OT needs. Recommending OP PT upon DC. DAVID Sosa 2/6/2018

## 2018-02-06 NOTE — PLAN OF CARE
CM met with patient this am to obtain discharge planning assessment. Patient is POD 1 for TLIF.Planned discharge is home - Plan (A) or home with home health - Plan (B).    PCP:  Marli Wilkinson MD     Payor: BLUE [employer]OSS [employer] B[employer]EFIT / Plan: [employer]OSS [employer] EMPLOYEE / Product Type: Self Funded /        [facility] [address]  [address] [address]  Phone: [phone] Fax: [phone]       02/06/18 1227   Discharge Assessment   Assessment Type Discharge Planning Assessment   Confirmed/corrected address and phone number on facesheet? Yes   Assessment information obtained from? Patient   Communicated expected length of stay with patient/caregiver no   Prior to hospitilization cognitive status: Alert/Oriented   Prior to hospitalization functional status: Independent   Current cognitive status: Alert/Oriented   Current Functional Status: Independent   Lives With alone   Able to Return to Prior Arrangements yes   Is patient able to care for self after discharge? Yes   Who are your caregiver(s) and their phone number(s)? Sobeida Milton - daughter [phone]   Patient's perception of discharge disposition home or selfcare   Readmission Within The Last 30 Days no previous admission in last 30 days   Patient currently being followed by outpatient case management? No   Patient currently receives any other outside agency services? No   Equipment Currently Used at Home grab bar   Do you have any problems affording any of your prescribed medications? No   Is the patient taking medications as prescribed? yes   Does the patient have transportation home? Yes   Transportation Available family or friend will provide   Does the patient receive services at the [facility]? No   Discharge Plan A Home   Discharge Plan B Home;Home Health   Patient/Family In Agreement With Plan yes

## 2018-02-06 NOTE — PT/OT/SLP EVAL
Physical Therapy Evaluation and Discharge Note    Patient Name:  Angelina Man   MRN:  [id number]    Recommendations:     Discharge Recommendations:  outpatient PT   Discharge Equipment Recommendations: shower chair   Barriers to discharge: None    Assessment:     Angelina Man is a 68 y.o. female admitted with a medical diagnosis of Spondylolisthesis at L4-L5 level. .  At this time, patient is functioning at their prior level of function and does not require further acute PT services.     Recent Surgery: Procedure(s) (LRB):  RIght L4-5 and L5-S1 MIS TLIF and Perc Screws (Right) 1 Day Post-Op    Plan:     During this hospitalization, patient does not require further acute PT services.  Please re-consult if situation changes.     Plan of Care Reviewed with: patient    Subjective     Communicated with RN prior to session.  Patient found supine in bed upon PT entry to room, agreeable to evaluation.      Chief Complaint: LBP  Patient comments/goals: return home  Pain/Comfort:  · Pain Rating 1: 7/10  · Location - Side 1: Bilateral  · Location - Orientation 1: lower  · Location 1: back  · Pain Addressed 1: Reposition, Distraction  · Pain Rating Post-Intervention 1: 7/10    Living Environment:  Pt lives alone in 2nd floor apt with B HR, 2 QUE, and walk-in shower. Pt reports (I) with ADLs and amb and working at [employer].   Prior to admission, patients level of function was (I).  Patient has the following equipment:  none.  DME owned (not currently used): none.      Objective:     General Precautions: Standard, fall   Orthopedic Precautions:spinal precautions   Braces: LSO     Exams:  · Cognitive Exam:  Patient is oriented to Person, Place, Time and Situation and follows 100% of multi-step commands   · Gross Motor Coordination:  WFL  · Postural Exam:  Patient presented with the following abnormalities:    · -       No postural abnormalities identified  · Sensation:    · -       Intact  light/touch B LE,  impaired at B ankles  · Skin Integrity/Edema:      · -       Skin integrity: Visible skin intact  · RLE ROM: WFL  · RLE Strength: WFL  · LLE ROM: WFL  · LLE Strength: WFL    Functional Mobility:  Bed Mobility:     · Supine to Sit: modified independence    Transfers:     · Sit to Stand:  modified independence with no AD    Gait: ~200ft S without AD; no LOB or SOB    Stairs:  Pt ascended/descended 10 stair(s) with No Assistive Device with left handrail with Supervision.     AM-PAC 6 CLICK MOBILITY  Total Score:22       Therapeutic Activities and Exercises:  Pt donned LSO with mod (I) while seated EOB.  Pt educated on:  -role of PT/POC  -log rolling  -safety with transfers and amb  Pt safe to amb in hallway with RN staff.     Patient left up in chair with all lines intact, call button in reach and RN notified.    GOALS:    Physical Therapy Goals     Not on file          Multidisciplinary Problems (Resolved)        Problem: Physical Therapy Goal    Goal Priority Disciplines Outcome Goal Variances Interventions   Physical Therapy Goal   (Resolved)     PT/OT, PT Outcome(s) achieved                     History:     Past Medical History:   Diagnosis Date    Arthritis     Bronchitis     Cataract     Dry eyes     Hypothyroidism 6/23/2016    Rash     Squamous cell carcinoma     in-situ right upper inner arm, right wrist    Status post lumbar surgery 6/23/2016    Stress fracture     Thyroid disease        Past Surgical History:   Procedure Laterality Date    ANGIOPLASTY      CERVICAL FUSION      COLONOSCOPY      COLONOSCOPY N/A 11/14/2017    Procedure: COLONOSCOPY;  Surgeon: Kasi Mercado MD;  Location: [address];  Service: Endoscopy;  Laterality: N/A;    l4-5 mid discectomy Left 03/2017    l4-5 MIS diskectomy Right 05/2016       Clinical Decision Making:     History  Co-morbidities and personal factors that may impact the plan of care Examination  Body Structures and Functions, activity limitations  and participation restrictions that may impact the plan of care Clinical Presentation   Decision Making/ Complexity Score   Co-morbidities:   [] Time since onset of injury / illness / exacerbation  [] Status of current condition  []Patient's cognitive status and safety concerns    [] Multiple Medical Problems (see med hx)  Personal Factors:   [] Patient's age  [] Prior Level of function   [] Patient's home situation (environment and family support)  [] Patient's level of motivation  [] Expected progression of patient      HISTORY:(criteria)    [x] 08958 - no personal factors/history    [] 37195 - has 1-2 personal factor/comorbidity     [] 83584 - has >3 personal factor/comorbidity     Body Regions:  [x] Objective examination findings  [] Head     []  Neck  [x] Trunk   [] Upper Extremity  [x] Lower Extremity    Body Systems:  [x] For communication ability, affect, cognition, language, and learning style: the assessment of the ability to make needs known, consciousness, orientation (person, place, and time), expected emotional /behavioral responses, and learning preferences (eg, learning barriers, education  needs)  [x] For the neuromuscular system: a general assessment of gross coordinated movement (eg, balance, gait, locomotion, transfers, and transitions) and motor function  (motor control and motor learning)  [x] For the musculoskeletal system: the assessment of gross symmetry, gross range of motion, gross strength, height, and weight  [] For the integumentary system: the assessment of pliability(texture), presence of scar formation, skin color, and skin integrity  [] For cardiovascular/pulmonary system: the assessment of heart rate, respiratory rate, blood pressure, and edema     Activity limitations:    [] Patient's cognitive status and saf ety concerns          [x] Status of current condition      [] Weight bearing restriction  [] Cardiopulmunary Restriction    Participation Restrictions:   [] Goals and goal  agreement with the patient     [] Rehab potential (prognosis) and probable outcome      Examination of Body System: (criteria)    [] 50112 - addressing 1-2 elements    [] 92941 - addressing a total of 3 or more elements     [x] 25737 -  Addressing a total of 4 or more elements         Clinical Presentation: (criteria)  Stable - 06639     On examination of body system using standardized tests and measures patient presents with 1-2 elements from any of the following: body structures and functions, activity limitations, and/or participation restrictions.  Leading to a clinical presentation that is considered stable and/or uncomplicated                              Clinical Decision Making  (Eval Complexity):  Low- 64670     Time Tracking:     PT Received On: 02/06/18  PT Start Time: 1054     PT Stop Time: 1117  PT Total Time (min): 23 min     Billable Minutes: Evaluation 23      PAULO BRITO, PT  02/06/2018

## 2018-02-06 NOTE — PLAN OF CARE
Problem: Fall Risk (Adult)  Intervention: Reduce Risk/Promote Restraint Free Environment  Patient awake, lying in bed reading paper. iso brace on patient. No distress noted. Oxygen sat on room air 99%. Patient complaints of back pain and fentyl iv given. Pt tolerated well and instructed to call for assistance up. Dressings to lower back dry and intact.  Bed locked in lowest position, call light in reach.

## 2018-02-06 NOTE — PROGRESS NOTES
[facility]  Neurosurgery  Progress Note    Subjective:     History of Present Illness: Angelina Man is a 67 y.o. female who presents for neurosurgical evaluation. She is having pain across the whole lower back and in the lateral regions of the right leg, as well as anterior right leg paresthesias that started 1 years ago.  The pain came on gradually, and it has been constant ever since.  The patient describes the pain as stabbing, rated as a 10 on a pain scale of 1-10. She reports there is weakness in the right leg. The pain gets better with sitting and laying down in bed , and the pain is worse with standing. She tried narcotics with no relief. Patient denies accidents or trauma, reports bowel or bladder symptoms, and reports saddle anesthesia.          Post-Op Info:  Procedure(s) (LRB):  RIght L4-5 and L5-S1 MIS TLIF and Perc Screws (Right)   1 Day Post-Op   Interval History: The patient is doing well from a back pain perspective.  She ambulated in the esposito & climbed steps with therapy.  Her complaint is constipation & abdominal distention.  No N/V.  She states this is a chronic problem, but she is very bloated this am.  She did eat some breakfast.    Medications:  Continuous Infusions:  Scheduled Meds:   acetaminophen  1,000 mg Intravenous Q8H    ascorbic acid (vitamin C)  1,000 mg Oral Daily    bisacodyl  10 mg Rectal Daily    buPROPion  150 mg Oral Daily    dicyclomine  20 mg Oral QID (AC & HS)    fluticasone  1 spray Each Nare Daily    ketorolac  15 mg Intravenous Once    levothyroxine  112 mcg Oral Before breakfast    lubiprostone  8 mcg Oral BID WM    metoclopramide HCl  10 mg Intravenous Q6H    mupirocin  1 g Nasal BID    pregabalin  75 mg Oral BID    sodium phosphates  1 enema Rectal Once    triamterene-hydrochlorothiazide 37.5-25 mg  1 capsule Oral Daily     PRN Meds:acetaminophen, aluminum-magnesium hydroxide-simethicone, [COMPLETED] diazePAM **FOLLOWED BY** diazePAM,  fentaNYL, ondansetron, oxyCODONE-acetaminophen, promethazine (PHENERGAN) IVPB, senna-docusate 8.6-50 mg, sodium chloride 0.9%     Review of Systems   Allergic/Immunologic: Positive for food allergies.     Objective:     Weight: 45.5 kg (100 lb 5 oz)  Body mass index is 17.22 kg/m².  Vital Signs (Most Recent):  Temp: (!) 102.4 °F (39.1 °C) (02/06/18 1750)  Pulse: 94 (02/06/18 1750)  Resp: 18 (02/06/18 1750)  BP: 128/61 (02/06/18 1750)  SpO2: 97 % (02/06/18 1750) Vital Signs (24h Range):  Temp:  [98.1 °F (36.7 °C)-102.4 °F (39.1 °C)] 102.4 °F (39.1 °C)  Pulse:  [] 94  Resp:  [15-20] 18  SpO2:  [93 %-99 %] 97 %  BP: ()/(57-61) 128/61                           Neurosurgery Physical Exam   General: well developed, well nourished, no distress  Head: normocephalic, atraumatic  Neurologic: Alert and oriented. Thought content appropriate  GCS: Motor: 6/Verbal: 5/Eyes: 4 GCS Total: 15  Mental Status: Awake, Alert, Oriented x 4  Language: No aphasia  Speech: No dysarthria  Cranial nerves: face symmetric, tongue midline, CN II-XII grossly intact.   Eyes: pupils equal, round, reactive to light with accommodation, EOMI  Pulmonary: normal respirations, not labored, no accessory muscles used  Abdomen: soft, distended, slightly TTP  Sensory: intact to light touch throughout  Motor Strength: Moves all extremities spontaneously with good tone.  Full strength upper and lower extremities. No abnormal movements seen.     Strength  Deltoids Triceps Biceps Wrist Extension Wrist Flexion Hand    Upper: R 5/5 5/5 5/5 5/5 5/5 5/5    L 5/5 5/5 5/5 5/5 5/5 5/5     Iliopsoas Quadriceps Knee  Flexion Tibialis  anterior Gastro- cnemius EHL   Lower: R 5/5 5/5 5/5 5/5 5/5 5/5    L 5/5 5/5 5/5 5/5 5/5 5/5     Pronator Drift: no drift noted  Finger-to-nose: Intact bilaterally  [place]: absent  Clonus: absent  Babinski: absent  Pulses: 2+ and symmetric radial and dorsalis pedis  Skin: warm, dry and intact, no rashes  Dressing is clean,  dry, and intact.  There is no drainage, erythema, or edema.        Significant Labs:    Recent Labs  Lab 02/05/18  0611 02/05/18  1213 02/06/18  1027   GLU 78 152* 125*    140 139   K 3.7 3.5 3.7    108 106   CO2 27 23 27   BUN 18 13 8   CREATININE 0.7 0.7 0.7   CALCIUM 9.0 8.0* 8.1*       Recent Labs  Lab 02/05/18  0611 02/05/18  1213 02/06/18  1027   WBC 4.30 9.43 7.46   HGB 11.9* 10.2* 9.5*   HCT 34.9* 30.7* 28.5*    214 191       Recent Labs  Lab 02/05/18  0611   INR 1.0   APTT 24.4     Microbiology Results (last 7 days)     ** No results found for the last 168 hours. **          Significant Diagnostics:  I personally reviewed lumbar xrays & agree with the findings: New L4-S1 posterior rods and interpedicular screws, as well as interbody cages project in expected position. Near resolution of previously seen anterior listhesis of L4 over L5.    Unchanged mild dextroscoliosis of the lumbar spine centered at L2. No displaced fracture.    Assessment/Plan:     * Spondylolisthesis at L4-L5 level    Ms. Man is a 68yoF with lumbar spondylolisthesis s/p L4-5, L5-S1 TLIF  -Neurologically stable  -Leave dressing to incision  -Xrays stable  -TLSO brace when up/OOB  -Concern for ileus - given lactulose, enema, & reglan  -Reduce opioids as much as possible - IV tylenol, 1x dose IV toradol  -PT/OT recs for outpatient PT  -TEDs, SCDs, SQH for DVTP  -Likely home tomorrow if abd distention resolves with BM  -Rounded on patient with Dr. aGto Mauricio PA-C  Neurosurgery  [facility]

## 2018-02-06 NOTE — PLAN OF CARE
SW following for DC needs. SW in communication with CM.    No OT needs identified.     Makayla Walker, DODIE  [id number]

## 2018-02-07 VITALS
OXYGEN SATURATION: 98 % | WEIGHT: 100.31 LBS | RESPIRATION RATE: 18 BRPM | SYSTOLIC BLOOD PRESSURE: 117 MMHG | HEART RATE: 86 BPM | BODY MASS INDEX: 17.13 KG/M2 | DIASTOLIC BLOOD PRESSURE: 60 MMHG | HEIGHT: 64 IN | TEMPERATURE: 99 F

## 2018-02-07 LAB
ANION GAP SERPL CALC-SCNC: 9 MMOL/L
BASOPHILS # BLD AUTO: 0.03 K/UL
BASOPHILS NFR BLD: 0.3 %
BUN SERPL-MCNC: 6 MG/DL
CALCIUM SERPL-MCNC: 8.2 MG/DL
CHLORIDE SERPL-SCNC: 106 MMOL/L
CO2 SERPL-SCNC: 24 MMOL/L
CREAT SERPL-MCNC: 0.6 MG/DL
DIFFERENTIAL METHOD: ABNORMAL
EOSINOPHIL # BLD AUTO: 0 K/UL
EOSINOPHIL NFR BLD: 0 %
ERYTHROCYTE [DISTWIDTH] IN BLOOD BY AUTOMATED COUNT: 13.6 %
EST. GFR  (AFRICAN AMERICAN): >60 ML/MIN/1.73 M^2
EST. GFR  (NON AFRICAN AMERICAN): >60 ML/MIN/1.73 M^2
GLUCOSE SERPL-MCNC: 97 MG/DL
HCT VFR BLD AUTO: 27 %
HGB BLD-MCNC: 9.2 G/DL
IMM GRANULOCYTES # BLD AUTO: 0.03 K/UL
IMM GRANULOCYTES NFR BLD AUTO: 0.3 %
LYMPHOCYTES # BLD AUTO: 1.1 K/UL
LYMPHOCYTES NFR BLD: 11.6 %
MCH RBC QN AUTO: 32.7 PG
MCHC RBC AUTO-ENTMCNC: 34.1 G/DL
MCV RBC AUTO: 96 FL
MONOCYTES # BLD AUTO: 0.9 K/UL
MONOCYTES NFR BLD: 9.9 %
NEUTROPHILS # BLD AUTO: 7.1 K/UL
NEUTROPHILS NFR BLD: 77.9 %
NRBC BLD-RTO: 0 /100 WBC
PLATELET # BLD AUTO: 172 K/UL
PMV BLD AUTO: 9.7 FL
POTASSIUM SERPL-SCNC: 3.3 MMOL/L
RBC # BLD AUTO: 2.81 M/UL
SODIUM SERPL-SCNC: 139 MMOL/L
WBC # BLD AUTO: 9.07 K/UL

## 2018-02-07 PROCEDURE — 63600175 PHARM REV CODE 636 W HCPCS: Performed by: PHYSICIAN ASSISTANT

## 2018-02-07 PROCEDURE — 85025 COMPLETE CBC W/AUTO DIFF WBC: CPT

## 2018-02-07 PROCEDURE — 25000003 PHARM REV CODE 250: Performed by: PHYSICIAN ASSISTANT

## 2018-02-07 PROCEDURE — 25000003 PHARM REV CODE 250: Performed by: STUDENT IN AN ORGANIZED HEALTH CARE EDUCATION/TRAINING PROGRAM

## 2018-02-07 PROCEDURE — 36415 COLL VENOUS BLD VENIPUNCTURE: CPT

## 2018-02-07 PROCEDURE — 80048 BASIC METABOLIC PNL TOTAL CA: CPT

## 2018-02-07 PROCEDURE — 99024 POSTOP FOLLOW-UP VISIT: CPT | Mod: ,,, | Performed by: PHYSICIAN ASSISTANT

## 2018-02-07 RX ORDER — SYRING-NEEDL,DISP,INSUL,0.3 ML 29 G X1/2"
296 SYRINGE, EMPTY DISPOSABLE MISCELLANEOUS ONCE
Status: COMPLETED | OUTPATIENT
Start: 2018-02-07 | End: 2018-02-07

## 2018-02-07 RX ORDER — OXYCODONE AND ACETAMINOPHEN 10; 325 MG/1; MG/1
1 TABLET ORAL EVERY 6 HOURS PRN
Qty: 60 TABLET | Refills: 0 | Status: SHIPPED | OUTPATIENT
Start: 2018-02-07 | End: 2018-04-16

## 2018-02-07 RX ORDER — SULFAMETHOXAZOLE AND TRIMETHOPRIM 800; 160 MG/1; MG/1
1 TABLET ORAL 2 TIMES DAILY
Qty: 6 TABLET | Refills: 0 | Status: SHIPPED | OUTPATIENT
Start: 2018-02-07 | End: 2018-02-10

## 2018-02-07 RX ADMIN — MAGNESIUM CITRATE 296 ML: 1.75 LIQUID ORAL at 10:02

## 2018-02-07 RX ADMIN — LEVOTHYROXINE SODIUM 112 MCG: 112 TABLET ORAL at 08:02

## 2018-02-07 RX ADMIN — BUPROPION HYDROCHLORIDE 150 MG: 150 TABLET, EXTENDED RELEASE ORAL at 08:02

## 2018-02-07 RX ADMIN — Medication 1000 MG: at 08:02

## 2018-02-07 RX ADMIN — HEPARIN SODIUM 5000 UNITS: 5000 INJECTION, SOLUTION INTRAVENOUS; SUBCUTANEOUS at 05:02

## 2018-02-07 RX ADMIN — METOCLOPRAMIDE 10 MG: 5 INJECTION, SOLUTION INTRAMUSCULAR; INTRAVENOUS at 05:02

## 2018-02-07 RX ADMIN — ACETAMINOPHEN 1000 MG: 10 INJECTION, SOLUTION INTRAVENOUS at 05:02

## 2018-02-07 RX ADMIN — LUBIPROSTONE 8 MCG: 8 CAPSULE, GELATIN COATED ORAL at 01:02

## 2018-02-07 RX ADMIN — DICYCLOMINE HYDROCHLORIDE 20 MG: 20 TABLET ORAL at 01:02

## 2018-02-07 RX ADMIN — PREGABALIN 75 MG: 75 CAPSULE ORAL at 08:02

## 2018-02-07 NOTE — ASSESSMENT & PLAN NOTE
Ms. Man is a 68yoF with lumbar spondylolisthesis s/p L4-5, L5-S1 TLIF    -Neurologically stable  -Incision c/d/i open to air.  Continue bacitracin bid.  -Xrays stable  -TLSO brace when up/OOB  -Constipation- given lactulose, enema, & reglan with slight improvement. Two bowel movements reported today.    -Continue current bowel regimen.   -Reduce opioids as much as possible - IV tylenol, 1x dose IV toradol  -PT/OT recs for outpatient PT  -TEDs, SCDs, SQH for DVTP  -Pt with tmax of 102.4 last pm. CXR negative. WBC normal. UA and urine cx's pending.   -Will discuss plan with Dr. Hoskins     Please call with any questions or concerns

## 2018-02-07 NOTE — NURSING
NG tube out this morning, patient said she does not want to put it back in. Ambulated in the esposito this morning and at 10:30 pm. Magnesium citrate solution given to patient. Awaiting for results.

## 2018-02-07 NOTE — NURSING
Patient had another bowel movement at 12:00 pm, medium amount of liquid stool.Neurosurgery team notified.

## 2018-02-07 NOTE — HPI
Angelina Man is a 67 y.o. female who presents for neurosurgical evaluation. She is having pain across the whole lower back and in the lateral regions of the right leg, as well as anterior right leg paresthesias that started 1 years ago.  The pain came on gradually, and it has been constant ever since.  The patient describes the pain as stabbing, rated as a 10 on a pain scale of 1-10. She reports there is weakness in the right leg. The pain gets better with sitting and laying down in bed , and the pain is worse with standing. She tried narcotics with no relief. Patient denies accidents or trauma, reports bowel or bladder symptoms, and reports saddle anesthesia.

## 2018-02-07 NOTE — DISCHARGE INSTRUCTIONS
Please follow ONLY the instructions that are checked below.    Activity Restrictions:  [x]  Return to work will be determined on an individual basis.  [x]  No lifting greater than 10 pounds.  [x]  Avoid bending and twisting the area of your surgery more than 45 degrees from neutral position in any direction.  [x]  No driving or operating machinery:  [x]  until cleared by your surgeon.  [x]  while taking narcotic pain medications or muscle relaxants.  []  No cervical collar, soft collar, or lumbar brace required.  []  Wear your brace at all times. You may be given an extra brace or soft collar to wear when showering.  [x]  Wear your brace at all times except when flat in bed.  []  Wear brace for comfort only.  [x]  Increase ambulation over the next 2 weeksy.  [x]  Walk on paved surfaces only. It is okay to walk up and down stairs while holding onto a side rail.  [x]  No sexual activity for 2-3 weeks.    Discharge Medication/Follow-up:  [x]  Please refer to discharge medication reconciliation form.  [x]  Do not take ANY non-steroidal anti-inflammatory drugs (NSAIDS), including the following: ibuprofen, naprosyn, Aleve, Advil, Indocin, Mobic, or Celebrex for:  []  4 weeks  [x]  8 weeks  []  6 months  [x]  Prescriptions for appropriate medication will be given upon discharge.  [x]  Take docusate (Colace 100 mg): take one capsule a day as needed for constipation. You can get this over the counter. Continue current bowel regimen.  Please contact our clinic or go the ER immediately if you continue to experience constipation, abdominal distention and pain, nausea or vomiting.   [x]  Follow-up appointment:  [x]  10-14 days post-op for wound check by physician assistant/nurse- Aria Gore PA-C 2/19/18  [x]  4-6 weeks with MD:  []  with x-rays  []  without x-rays  []  An appointment will be mailed to you.    Wound Care:  []  Remove dressing or bandaid in    days.  [x]  No bandage required. Keep your incision open to the  air.  [x]  You may shower on the 2nd day after your surgery. Have the force of water hit you opposite from the incision. Pat the incision dry after your shower; do not scrub the incision.  [x]  You cannot take a bath until 8 weeks after surgery.  [x]  Apply bacitracin to incision twice a day    Call your doctor or go to the Emergency Room for any signs of infection, including: increased redness, drainage, pain, or fever (temperature ?101.5 for 24 hours). Call your doctor or go to the Emergency Room if there are any localized neurological changes; problems with speech, vision, numbness, tingling, weakness, or severe headache; or for other concerns.    Special Instructions:  [x]  No use of tobacco products.  [x]  Diet: Please eat a regular diet as tolerated.  []  Other diet:              Specific physician instructions:           Physicians need 3 days' notice for pain medicine to be refilled. Pain medicine will only be refilled between 8 AM and 5 PM, Monday through Friday, due to Food and Drug Administration regulation of documentation.    If you have any questions about this form, please call [phone].    Form No. 12609 (Revised 10/31/2013)

## 2018-02-07 NOTE — PROGRESS NOTES
[facility]  Neurosurgery  Progress Note    Subjective:     History of Present Illness: Angelina Man is a 67 y.o. female who presents for neurosurgical evaluation. She is having pain across the whole lower back and in the lateral regions of the right leg, as well as anterior right leg paresthesias that started 1 years ago.  The pain came on gradually, and it has been constant ever since.  The patient describes the pain as stabbing, rated as a 10 on a pain scale of 1-10. She reports there is weakness in the right leg. The pain gets better with sitting and laying down in bed , and the pain is worse with standing. She tried narcotics with no relief. Patient denies accidents or trauma, reports bowel or bladder symptoms, and reports saddle anesthesia.          Post-Op Info:  Procedure(s) (LRB):  RIght L4-5 and L5-S1 MIS TLIF and Perc Screws (Right)   2 Days Post-Op     Interval History: Pt reports back and leg pain is well controlled, especially with increased ambulation. She still is c/o abdominal distention, but claims abdominal pain has improved.  She reports liquid bm this morning and is passing gas. Denies fever, n/v, numbness/tingling, or weakness. NG tube removed prior to seeing pt this am.     Medications:  Continuous Infusions:  Scheduled Meds:   acetaminophen  1,000 mg Intravenous Q8H    ascorbic acid (vitamin C)  1,000 mg Oral Daily    bisacodyl  10 mg Rectal Daily    buPROPion  150 mg Oral Daily    dicyclomine  20 mg Oral QID (AC & HS)    fluticasone  1 spray Each Nare Daily    heparin (porcine)  5,000 Units Subcutaneous Q8H    levothyroxine  112 mcg Oral Before breakfast    lubiprostone  8 mcg Oral BID WM    mupirocin  1 g Nasal BID    pregabalin  75 mg Oral BID    triamterene-hydrochlorothiazide 37.5-25 mg  1 capsule Oral Daily     PRN Meds:acetaminophen, aluminum-magnesium hydroxide-simethicone, [COMPLETED] diazePAM **FOLLOWED BY** diazePAM, fentaNYL, ondansetron,  oxyCODONE-acetaminophen, promethazine (PHENERGAN) IVPB, senna-docusate 8.6-50 mg, sodium chloride 0.9%     Review of Systems  Objective:     Weight: 45.5 kg (100 lb 5 oz)  Body mass index is 17.22 kg/m².  Vital Signs (Most Recent):  Temp: 99.3 °F (37.4 °C) (02/07/18 1142)  Pulse: 75 (02/07/18 1142)  Resp: 18 (02/07/18 1142)  BP: (!) 90/53 (02/07/18 1142)  SpO2: 97 % (02/07/18 1142) Vital Signs (24h Range):  Temp:  [98.8 °F (37.1 °C)-102.4 °F (39.1 °C)] 99.3 °F (37.4 °C)  Pulse:  [] 75  Resp:  [15-18] 18  SpO2:  [95 %-97 %] 97 %  BP: ()/(53-66) 90/53                           Neurosurgery Physical Exam  General: well developed, well nourished, no distress.   Head: normocephalic, atraumatic  Neurologic: Alert and oriented. Thought content appropriate.  GCS: Motor: 6/Verbal: 5/Eyes: 4 GCS Total: 15  Mental Status: Awake, Alert, Oriented x 4  Language: No aphasia  Speech: No dysarthria  Cranial nerves: face symmetric, tongue midline, CN II-XII grossly intact.   Eyes: pupils equal, round, reactive to light with accomodation, EOMI.  Pulmonary: normal respirations, no signs of respiratory distress  Abdomen: soft, non-distended, not tender to palpation  Sensory: intact to light touch throughout    Motor Strength:Moves all extremities spontaneously with good tone.  Full strength upper and lower extremities. No abnormal movements seen.     Strength  Deltoids Triceps Biceps Wrist Extension Wrist Flexion Hand    Upper: R 5/5 5/5 5/5 5/5 5/5 5/5    L 5/5 5/5 5/5 5/5 5/5 5/5     Iliopsoas Quadriceps Knee  Flexion Tibialis  anterior Gastro- cnemius EHL   Lower: R 5/5 5/5 5/5 5/5 5/5 5/5    L 5/5 5/5 5/5 5/5 5/5 5/5     DTR's - 2 + and symmetric in UE and LE  Chacon: absent  Clonus: absent  Babinski: absent  Pulses: 2+ and symmetric radial and dorsalis pedis.  Skin: Skin is warm, dry and intact. Incision c/d/i    Significant Labs:    Recent Labs  Lab 02/06/18  1027 02/07/18  0627   * 97    139   K 3.7  3.3*    106   CO2 27 24   BUN 8 6*   CREATININE 0.7 0.6   CALCIUM 8.1* 8.2*       Recent Labs  Lab 02/06/18  1027 02/07/18  0627   WBC 7.46 9.07   HGB 9.5* 9.2*   HCT 28.5* 27.0*    172     No results for input(s): LABPT, INR, APTT in the last 48 hours.  Microbiology Results (last 7 days)     ** No results found for the last 168 hours. **        Recent Lab Results       02/07/18  0627      Immature Granulocytes 0.3     Immature Grans (Abs) 0.03  Comment:  Mild elevation in immature granulocytes is non specific and   can be seen in a variety of conditions including stress response,   acute inflammation, trauma and pregnancy. Correlation with other   laboratory and clinical findings is essential.       Anion Gap 9     Baso # 0.03     Basophil% 0.3     BUN, Bld 6(L)     Calcium 8.2(L)     Chloride 106     CO2 24     Creatinine 0.6     Differential Method Automated     eGFR if African American >60.0     eGFR if non  >60.0  Comment:  Calculation used to obtain the estimated glomerular filtration  rate (eGFR) is the CKD-EPI equation.        Eos # 0.0     Eosinophil% 0.0     Glucose 97     Gran # (ANC) 7.1     Gran% 77.9(H)     Hematocrit 27.0(L)     Hemoglobin 9.2(L)     Lymph # 1.1     Lymph% 11.6(L)     MCH 32.7(H)     MCHC 34.1     MCV 96     Mono # 0.9     Mono% 9.9     MPV 9.7     nRBC 0     Platelets 172     Potassium 3.3(L)     RBC 2.81(L)     RDW 13.6     Sodium 139     WBC 9.07         All pertinent labs from the last 24 hours have been reviewed.    Significant Diagnostics:  XR abdomen pelvis 2/6/18: Impression       Nonspecific gaseous distention of small and large bowel. Nasogastric tube terminates in the stomach.  No large fecal burden is identified. Postoperative changes are noted involving the lumbar spine.      XR chest 2/6/18:   No acute cardiopulmonary finding.    XR Lumbar spine 2/5/18:Findings:  New L4-S1 posterior rods and interpedicular screws, as well as interbody cages  project in expected position. Near resolution of previously seen anterior listhesis of L4 over L5.    Unchanged mild dextroscoliosis of the lumbar spine centered at L2. No displaced fracture.  I have reviewed all pertinent imaging results/findings within the past 24 hours.    Assessment/Plan:     * Spondylolisthesis at L4-L5 level    Ms. Man is a 68yoF with lumbar spondylolisthesis s/p L4-5, L5-S1 TLIF    -Neurologically stable  -Incision c/d/i open to air.  Continue bacitracin bid.  -Xrays stable  -TLSO brace when up/OOB  -Constipation- given lactulose, enema, & reglan with slight improvement. Pt reports this is a chronic problem and usually occurs with pain medication. Two bowel movements reported today.    -Continue current bowel regimen.   -Reduce opioids as much as possible - IV tylenol, 1x dose IV toradol  -PT/OT recs for outpatient PT  -TEDs, SCDs, SQH for DVTP  -Pt with tmax of 102.4 last pm. CXR negative. WBC normal. Pt states she gets asymptomatic UTI regularly.  Will begin Bactrim bid x 3 days. Encouraged to continue incentive spirometry   -Rounded on pt with Dr. Hoskins. Pt stable for dc.     Please call with any questions or concerns            Analy Jose PA-C  Neurosurgery  [facility]

## 2018-02-07 NOTE — PLAN OF CARE
Patient to be discharged home. CM ordered a BSC for home use. The patient does not have any home needs.  Family to provide transportation home.  Neurosurgery clinic to schedule follow up appointment.    Future Appointments  Date Time Provider Department Center   2/19/2018 9:00 AM Aria Gore PA-C [facility]        02/07/18 1525   Final Note   Assessment Type Final Discharge Note   Discharge Disposition Home   Hospital Follow Up  Appt(s) scheduled? (Neurosurgery clinic to schedule follow up appointment.)   Discharge plans and expectations educations in teach back method with documentation complete? Yes

## 2018-02-07 NOTE — SUBJECTIVE & OBJECTIVE
Interval History: The patient is doing well from a back pain perspective.  She ambulated in the esposito & climbed steps with therapy.  Her complaint is constipation & abdominal distention.  No N/V.  She states this is a chronic problem, but she is very bloated this am.  She did eat some breakfast.    Medications:  Continuous Infusions:  Scheduled Meds:   acetaminophen  1,000 mg Intravenous Q8H    ascorbic acid (vitamin C)  1,000 mg Oral Daily    bisacodyl  10 mg Rectal Daily    buPROPion  150 mg Oral Daily    dicyclomine  20 mg Oral QID (AC & HS)    fluticasone  1 spray Each Nare Daily    ketorolac  15 mg Intravenous Once    levothyroxine  112 mcg Oral Before breakfast    lubiprostone  8 mcg Oral BID WM    metoclopramide HCl  10 mg Intravenous Q6H    mupirocin  1 g Nasal BID    pregabalin  75 mg Oral BID    sodium phosphates  1 enema Rectal Once    triamterene-hydrochlorothiazide 37.5-25 mg  1 capsule Oral Daily     PRN Meds:acetaminophen, aluminum-magnesium hydroxide-simethicone, [COMPLETED] diazePAM **FOLLOWED BY** diazePAM, fentaNYL, ondansetron, oxyCODONE-acetaminophen, promethazine (PHENERGAN) IVPB, senna-docusate 8.6-50 mg, sodium chloride 0.9%     Review of Systems   Allergic/Immunologic: Positive for food allergies.     Objective:     Weight: 45.5 kg (100 lb 5 oz)  Body mass index is 17.22 kg/m².  Vital Signs (Most Recent):  Temp: (!) 102.4 °F (39.1 °C) (02/06/18 1750)  Pulse: 94 (02/06/18 1750)  Resp: 18 (02/06/18 1750)  BP: 128/61 (02/06/18 1750)  SpO2: 97 % (02/06/18 1750) Vital Signs (24h Range):  Temp:  [98.1 °F (36.7 °C)-102.4 °F (39.1 °C)] 102.4 °F (39.1 °C)  Pulse:  [] 94  Resp:  [15-20] 18  SpO2:  [93 %-99 %] 97 %  BP: ()/(57-61) 128/61                           Neurosurgery Physical Exam   General: well developed, well nourished, no distress  Head: normocephalic, atraumatic  Neurologic: Alert and oriented. Thought content appropriate  GCS: Motor: 6/Verbal: 5/Eyes: 4 GCS  Total: 15  Mental Status: Awake, Alert, Oriented x 4  Language: No aphasia  Speech: No dysarthria  Cranial nerves: face symmetric, tongue midline, CN II-XII grossly intact.   Eyes: pupils equal, round, reactive to light with accommodation, EOMI  Pulmonary: normal respirations, not labored, no accessory muscles used  Abdomen: soft, distended, slightly TTP  Sensory: intact to light touch throughout  Motor Strength: Moves all extremities spontaneously with good tone.  Full strength upper and lower extremities. No abnormal movements seen.     Strength  Deltoids Triceps Biceps Wrist Extension Wrist Flexion Hand    Upper: R 5/5 5/5 5/5 5/5 5/5 5/5    L 5/5 5/5 5/5 5/5 5/5 5/5     Iliopsoas Quadriceps Knee  Flexion Tibialis  anterior Gastro- cnemius EHL   Lower: R 5/5 5/5 5/5 5/5 5/5 5/5    L 5/5 5/5 5/5 5/5 5/5 5/5     Pronator Drift: no drift noted  Finger-to-nose: Intact bilaterally  Chacon: absent  Clonus: absent  Babinski: absent  Pulses: 2+ and symmetric radial and dorsalis pedis  Skin: warm, dry and intact, no rashes  Dressing is clean, dry, and intact.  There is no drainage, erythema, or edema.        Significant Labs:    Recent Labs  Lab 02/05/18  0611 02/05/18  1213 02/06/18  1027   GLU 78 152* 125*    140 139   K 3.7 3.5 3.7    108 106   CO2 27 23 27   BUN 18 13 8   CREATININE 0.7 0.7 0.7   CALCIUM 9.0 8.0* 8.1*       Recent Labs  Lab 02/05/18  0611 02/05/18  1213 02/06/18  1027   WBC 4.30 9.43 7.46   HGB 11.9* 10.2* 9.5*   HCT 34.9* 30.7* 28.5*    214 191       Recent Labs  Lab 02/05/18  0611   INR 1.0   APTT 24.4     Microbiology Results (last 7 days)     ** No results found for the last 168 hours. **          Significant Diagnostics:  I personally reviewed lumbar xrays & agree with the findings: New L4-S1 posterior rods and interpedicular screws, as well as interbody cages project in expected position. Near resolution of previously seen anterior listhesis of L4 over L5.    Unchanged  mild dextroscoliosis of the lumbar spine centered at L2. No displaced fracture.

## 2018-02-07 NOTE — ASSESSMENT & PLAN NOTE
Ms. Man is a 68yoF with lumbar spondylolisthesis s/p L4-5, L5-S1 TLIF    -Neurologically stable  -Incision c/d/i open to air.  Continue bacitracin bid.  -Xrays stable  -TLSO brace when up/OOB  -Constipation- given lactulose, enema, & reglan with slight improvement. Two bowel movements reported today.    -Continue current bowel regimen.   -Reduce opioids as much as possible - IV tylenol, 1x dose IV toradol  -PT/OT recs for outpatient PT  -TEDs, SCDs, SQH for DVTP  -Pt with tmax of 102.4 last pm. CXR negative. WBC normal. Most likely due to post op  -Pt stable for discharge.  She was encouraged to continue current bowel regimen and call with any questions, concerns, or worsening of symptoms.     Discussed with Dr. Hoskins

## 2018-02-07 NOTE — DISCHARGE SUMMARY
[facility]  Neurosurgery  Discharge Summary      Patient Name: Angelina Man  MRN: [id number]  Admission Date: 2/5/20182  Hospital Length of Stay: 2 days  Discharge Date and Time:  02/07/2018 2:27 PM  Attending Physician: Maikol Hoskins MD   Discharging Provider: Analy Jose PA-C  Primary Care Provider: Marli Wilkinson MD    HPI:   Angelina Man is a 67 y.o. female who presents for neurosurgical evaluation. She is having pain across the whole lower back and in the lateral regions of the right leg, as well as anterior right leg paresthesias that started 1 years ago.  The pain came on gradually, and it has been constant ever since.  The patient describes the pain as stabbing, rated as a 10 on a pain scale of 1-10. She reports there is weakness in the right leg. The pain gets better with sitting and laying down in bed , and the pain is worse with standing. She tried narcotics with no relief. Patient denies accidents or trauma, reports bowel or bladder symptoms, and reports saddle anesthesia.          Procedure(s) (LRB):  RIght L4-5 and L5-S1 MIS TLIF and Perc Screws (Right)     Hospital Course: 2/6: abd distention - reglan, enema, lactulose, back pain controlled  2/7: Pt reports small liquid BM today and is passing gas. Pain well controlled.     Consults:     Significant Diagnostic Studies: Labs:   BMP:   Recent Labs  Lab 02/06/18  1027 02/07/18  0627   * 97    139   K 3.7 3.3*    106   CO2 27 24   BUN 8 6*   CREATININE 0.7 0.6   CALCIUM 8.1* 8.2*    and CBC   Recent Labs  Lab 02/06/18  1027 02/07/18  0627   WBC 7.46 9.07   HGB 9.5* 9.2*   HCT 28.5* 27.0*    172     Radiology: X-Ray: CXR: X-Ray Chest 1 View (CXR):   Results for orders placed or performed during the hospital encounter of 02/05/18   X-Ray Chest 1 View    Narrative    COMPARISON: 01/03/2018.    FINDINGS: One view of the chest was obtained.  Cardiac silhouette is not enlarged. Nasogastric  "tube overlies the stomach. No large focal consolidation. No pleural effusion or pneumothorax.    Impression    No acute cardiopulmonary finding.      Electronically signed by: Jerry Deshpande  Date:     02/06/18  Time:    23:53     and X-Ray Chest PA and Lateral (CXR): No results found for this visit on 02/05/18.    Pending Diagnostic Studies:     None        Final Active Diagnoses:    Diagnosis Date Noted POA    PRINCIPAL PROBLEM:  Spondylolisthesis at L4-L5 level [M43.16] 12/05/2017 Not Applicable      Problems Resolved During this Admission:    Diagnosis Date Noted Date Resolved POA      Discharged Condition: good    Disposition:     Follow Up:    Patient Instructions:     COMMODE FOR HOME USE   Order Specific Question Answer Comments   Type: Standard    Height: 5' 4" (1.626 m)    Weight: 45.5 kg (100 lb 5 oz)    Does patient have medical equipment at home? grab bar    Length of need (1-99 months): 99      Diet Adult Regular     Activity as tolerated     Notify your health care provider if you experience any of the following:  increased confusion or weakness     Notify your health care provider if you experience any of the following:  persistent dizziness, light-headedness, or visual disturbances     Notify your health care provider if you experience any of the following:  worsening rash     Notify your health care provider if you experience any of the following:  severe persistent headache     Notify your health care provider if you experience any of the following:  difficulty breathing or increased cough     Notify your health care provider if you experience any of the following:  redness, tenderness, or signs of infection (pain, swelling, redness, odor or green/yellow discharge around incision site)     Notify your health care provider if you experience any of the following:  severe uncontrolled pain     Notify your health care provider if you experience any of the following:  persistent nausea and vomiting or " diarrhea     Notify your health care provider if you experience any of the following:  temperature >100.4       Medications:  Reconciled Home Medications:   Current Discharge Medication List      CONTINUE these medications which have NOT CHANGED    Details   acetaminophen (TYLENOL) 500 MG tablet Take 500 mg by mouth every 6 (six) hours as needed for Pain.      acyclovir (ZOVIRAX) 400 MG tablet Take 1 tablet (400 mg total) by mouth 2 (two) times daily.  Qty: 60 tablet, Refills: 12    Associated Diagnoses: HSV infection      ascorbic acid (VITAMIN C) 1000 MG tablet Take 1,000 mg by mouth once daily.       biotin 1 mg tablet Take 1 mg by mouth once daily.       buPROPion (WELLBUTRIN XL) 150 MG TB24 tablet Take 1 tablet (150 mg total) by mouth once daily.  Qty: 30 tablet, Refills: 12    Associated Diagnoses: Depression, unspecified depression type      calcium-vitamin D 500 mg(1,250mg) -200 unit per tablet Take 1 tablet by mouth once daily.       chondroitin sulfate-vit C-Mn (CHONDROITIN SULFATE COMPLEX) 400-60-2.5 mg Cap Take 1 tablet by mouth once daily.       clonazePAM (KLONOPIN) 1 MG tablet Take 1 tablet (1 mg total) by mouth nightly as needed.  Qty: 30 tablet, Refills: 5    Associated Diagnoses: Anxiety      cyclobenzaprine (FLEXERIL) 10 MG tablet Take 10 mg by mouth 3 (three) times daily as needed for Muscle spasms.      dicyclomine (BENTYL) 20 mg tablet Take 1 tablet (20 mg total) by mouth 3 (three) times daily as needed.  Qty: 90 tablet, Refills: 12    Associated Diagnoses: Irritable bowel syndrome with diarrhea      digestive enzymes Tab Take 1 tablet by mouth once daily.       fluticasone (FLONASE) 50 mcg/actuation nasal spray 1 spray by Each Nare route once daily.  Qty: 16 g, Refills: 2    Associated Diagnoses: Acute seasonal allergic rhinitis, unspecified trigger      glucosamine sulfate 2KCl 1,000 mg Tab Take 1 tablet by mouth once daily.       levothyroxine (SYNTHROID) 112 MCG tablet Take 1 tablet (112  mcg total) by mouth once daily.  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  Qty: 60 capsule, Refills: 2    Associated Diagnoses: Constipation, unspecified constipation type      multivitamin (THERAGRAN) per tablet Take 1 tablet by mouth once daily.       naloxegol (MOVANTIK) tablet Take 25 mg by mouth once daily.  Qty: 30 tablet, Refills: 1    Associated Diagnoses: Therapeutic opioid induced constipation      pregabalin (LYRICA) 75 MG capsule Take 1 capsule (75 mg total) by mouth 2 (two) times daily.  Qty: 60 capsule, Refills: 2    Associated Diagnoses: Lumbosacral radiculopathy; Lumbar disc herniation; DDD (degenerative disc disease), lumbar; Postlaminectomy syndrome of lumbar region      promethazine (PHENERGAN) 25 MG tablet Take 1 tablet (25 mg total) by mouth every 6 (six) hours as needed for Nausea.  Qty: 30 tablet, Refills: 2    Associated Diagnoses: Nausea      tretinoin (RETIN-A) 0.1 % cream Apply topically every evening.  Qty: 20 g, Refills: 6    Associated Diagnoses: Other acne      zinc 50 mg Tab Take 50 mg by mouth once daily.       CYANOCOBALAMIN/FOLIC ACID (VITAMIN B12-FOLIC ACID ORAL) Take by mouth.       diphenhydrAMINE (BENADRYL) 25 mg capsule Take 25 mg by mouth nightly as needed.       fexofenadine (ALLEGRA) 180 MG tablet Take 1 tablet (180 mg total) by mouth once daily.  Qty: 30 tablet, Refills: 12    Associated Diagnoses: Seasonal allergies      ginseng 200 mg Cap Take 200 mg by mouth 2 (two) times daily as needed.       triamterene-hydrochlorothiazide 37.5-25 mg (MAXZIDE-25) 37.5-25 mg per tablet Take 1 tablet by mouth once daily.  Qty: 30 tablet, Refills: 2    Associated Diagnoses: Essential hypertension             Analy Jose PA-C  Neurosurgery  [facility]

## 2018-02-07 NOTE — NURSING
Pt's stomach is distended and pt c/o abdominal pain. Notified Dr. Avilez with neuro surgery. KUB and NG tube placement orders received. Ordered not to call him back tonight, that he will look at KUB results in the morning.

## 2018-02-07 NOTE — HOSPITAL COURSE
2/6: abd distention - reglan, enema, lactulose, back pain controlled  2/7: Pt reports small liquid BM today and is passing gas. Pain well controlled.

## 2018-02-07 NOTE — SUBJECTIVE & OBJECTIVE
Interval History: Pt reports back and leg pain is well controlled, especially with increased ambulation. She still is c/o abdominal distention, but claims abdominal pain has improved.  She reports liquid bm this morning and is passing gas. Denies fever, n/v, numbness/tingling, or weakness.     Medications:  Continuous Infusions:  Scheduled Meds:   acetaminophen  1,000 mg Intravenous Q8H    ascorbic acid (vitamin C)  1,000 mg Oral Daily    bisacodyl  10 mg Rectal Daily    buPROPion  150 mg Oral Daily    dicyclomine  20 mg Oral QID (AC & HS)    fluticasone  1 spray Each Nare Daily    heparin (porcine)  5,000 Units Subcutaneous Q8H    levothyroxine  112 mcg Oral Before breakfast    lubiprostone  8 mcg Oral BID WM    mupirocin  1 g Nasal BID    pregabalin  75 mg Oral BID    triamterene-hydrochlorothiazide 37.5-25 mg  1 capsule Oral Daily     PRN Meds:acetaminophen, aluminum-magnesium hydroxide-simethicone, [COMPLETED] diazePAM **FOLLOWED BY** diazePAM, fentaNYL, ondansetron, oxyCODONE-acetaminophen, promethazine (PHENERGAN) IVPB, senna-docusate 8.6-50 mg, sodium chloride 0.9%     Review of Systems  Objective:     Weight: 45.5 kg (100 lb 5 oz)  Body mass index is 17.22 kg/m².  Vital Signs (Most Recent):  Temp: 99.3 °F (37.4 °C) (02/07/18 1142)  Pulse: 75 (02/07/18 1142)  Resp: 18 (02/07/18 1142)  BP: (!) 90/53 (02/07/18 1142)  SpO2: 97 % (02/07/18 1142) Vital Signs (24h Range):  Temp:  [98.8 °F (37.1 °C)-102.4 °F (39.1 °C)] 99.3 °F (37.4 °C)  Pulse:  [] 75  Resp:  [15-18] 18  SpO2:  [95 %-97 %] 97 %  BP: ()/(53-66) 90/53                           Neurosurgery Physical Exam  General: well developed, well nourished, no distress.   Head: normocephalic, atraumatic  Neurologic: Alert and oriented. Thought content appropriate.  GCS: Motor: 6/Verbal: 5/Eyes: 4 GCS Total: 15  Mental Status: Awake, Alert, Oriented x 4  Language: No aphasia  Speech: No dysarthria  Cranial nerves: face symmetric, tongue  midline, CN II-XII grossly intact.   Eyes: pupils equal, round, reactive to light with accomodation, EOMI.  Pulmonary: normal respirations, no signs of respiratory distress  Abdomen: soft, non-distended, not tender to palpation  Sensory: intact to light touch throughout    Motor Strength:Moves all extremities spontaneously with good tone.  Full strength upper and lower extremities. No abnormal movements seen.     Strength  Deltoids Triceps Biceps Wrist Extension Wrist Flexion Hand    Upper: R 5/5 5/5 5/5 5/5 5/5 5/5    L 5/5 5/5 5/5 5/5 5/5 5/5     Iliopsoas Quadriceps Knee  Flexion Tibialis  anterior Gastro- cnemius EHL   Lower: R 5/5 5/5 5/5 5/5 5/5 5/5    L 5/5 5/5 5/5 5/5 5/5 5/5     DTR's - 2 + and symmetric in UE and LE  [place]: absent  Clonus: absent  Babinski: absent  Pulses: 2+ and symmetric radial and dorsalis pedis.  Skin: Skin is warm, dry and intact. Incision c/d/i    Significant Labs:    Recent Labs  Lab 02/06/18  1027 02/07/18  0627   * 97    139   K 3.7 3.3*    106   CO2 27 24   BUN 8 6*   CREATININE 0.7 0.6   CALCIUM 8.1* 8.2*       Recent Labs  Lab 02/06/18  1027 02/07/18  0627   WBC 7.46 9.07   HGB 9.5* 9.2*   HCT 28.5* 27.0*    172     No results for input(s): LABPT, INR, APTT in the last 48 hours.  Microbiology Results (last 7 days)     ** No results found for the last 168 hours. **        Recent Lab Results       02/07/18  0627      Immature Granulocytes 0.3     Immature Grans (Abs) 0.03  Comment:  Mild elevation in immature granulocytes is non specific and   can be seen in a variety of conditions including stress response,   acute inflammation, trauma and pregnancy. Correlation with other   laboratory and clinical findings is essential.       Anion Gap 9     Baso # 0.03     Basophil% 0.3     BUN, Bld 6(L)     Calcium 8.2(L)     Chloride 106     CO2 24     Creatinine 0.6     Differential Method Automated     eGFR if African American >60.0     eGFR if non African  American >60.0  Comment:  Calculation used to obtain the estimated glomerular filtration  rate (eGFR) is the CKD-EPI equation.        Eos # 0.0     Eosinophil% 0.0     Glucose 97     Gran # (ANC) 7.1     Gran% 77.9(H)     Hematocrit 27.0(L)     Hemoglobin 9.2(L)     Lymph # 1.1     Lymph% 11.6(L)     MCH 32.7(H)     MCHC 34.1     MCV 96     Mono # 0.9     Mono% 9.9     MPV 9.7     nRBC 0     Platelets 172     Potassium 3.3(L)     RBC 2.81(L)     RDW 13.6     Sodium 139     WBC 9.07         All pertinent labs from the last 24 hours have been reviewed.    Significant Diagnostics:  XR abdomen pelvis 2/6/18: Impression       Nonspecific gaseous distention of small and large bowel. Nasogastric tube terminates in the stomach.  No large fecal burden is identified. Postoperative changes are noted involving the lumbar spine.      XR chest 2/6/18:   No acute cardiopulmonary finding.    XR Lumbar spine 2/5/18:Findings:  New L4-S1 posterior rods and interpedicular screws, as well as interbody cages project in expected position. Near resolution of previously seen anterior listhesis of L4 over L5.    Unchanged mild dextroscoliosis of the lumbar spine centered at L2. No displaced fracture.  I have reviewed all pertinent imaging results/findings within the past 24 hours.

## 2018-02-07 NOTE — ASSESSMENT & PLAN NOTE
Ms. Man is a 68yoF with lumbar spondylolisthesis s/p L4-5, L5-S1 TLIF  -Neurologically stable  -Leave dressing to incision  -Xrays stable  -TLSO brace when up/OOB  -Concern for ileus - given lactulose, enema, & reglan  -Reduce opioids as much as possible - IV tylenol, 1x dose IV toradol  -PT/OT recs for outpatient PT  -TEDs, SCDs, SQH for DVTP  -Likely home tomorrow if abd distention resolves with BM  -Rounded on patient with Dr. Hoskins

## 2018-02-07 NOTE — PLAN OF CARE
SW following for DC needs. SW in communication with CM.    Makayla Walker, [facility]  [id number]

## 2018-02-14 ENCOUNTER — TELEPHONE (OUTPATIENT)
Dept: NEUROSURGERY | Facility: CLINIC | Age: 68
End: 2018-02-14

## 2018-02-14 DIAGNOSIS — Z98.1 S/P LUMBAR SPINAL FUSION: Primary | ICD-10-CM

## 2018-02-18 NOTE — PROGRESS NOTES
Subjective:      Patient ID: Angelina Man is a 68 y.o. female.    Chief Complaint: Follow-up      HPI  (Gato)    She is here for her 2 week postop wound check. She is s/p RIght L4-5 and L5-S1 MIS TLIF and Perc Screws (Right) by Dr. Hoskins on 2/5/18. She is doing very well. Her preop right leg pain has improved and is only aching at this point. She does note difficulty sleeping at night- she feels like she has restless leg syndrome, she has pain in both legs (more aching than like her preop pain) and she has movement in right leg that is difficult to control. Night is her worst time. She had only been taking tylenol for pain. She tried a percocet last night and it did not help.       Review of Systems   Constitution: Negative for chills, fever, night sweats and weight gain.   Gastrointestinal: Negative for bowel incontinence, nausea and vomiting.   Genitourinary: Negative for bladder incontinence.   Neurological: Negative for disturbances in coordination and loss of balance.           Objective:        General: Angelina is well-developed, well-nourished, appears stated age, in no acute distress, alert and oriented to time, place and person.     Ortho/SPM Exam    On exam of patient's lumbar spine, the incisions are clean, dry and intact. No surrounding erythema, fluctuance, or signs of infection noted. ROM not tested due to being postop. She is wearing her LSO brace.     Strength testing of the bilateral LEs shows  Right hip abduction:  +5/5  Left hip abduction:  +5/5  Right hip flexion:  +5/5   Left hip flexion:  +5/5  Right hip extensors:  +5/5  Left hip extensors:  +5/5  Right quadriceps:  +5/5  Left quadriceps:  +5/5  Right hamstring:  +5/5  Left hamstring:  +5/5  Right dorsiflexion:  +5/5  Left dorsiflexion:  +5/5  Right plantar flexion:  +5/5  Left plantar flexion:  +5/5   Right EHL:  +5/5   Left EHL:  +5/5    Sensation grossly intact to bilateral LEs as well.     Calf is supple and nontender bilaterally.          Assessment:       1. S/P lumbar fusion           Plan:       Orders Placed This Encounter    cyclobenzaprine (FLEXERIL) 10 MG tablet       Doing well s/p above surgery. Having most difficulty at night with bilateral leg pain/aching. This should continue to improve. Following plan made with patient:     - Reviewed wound care.   - Continue with walking program. Continue with LSO brace.   - Refill given on flexeril to use mostly at night. May try starting back on lyrica as well.   - She has valium at home and can try this if flexeril does not help. She knows not to take them together. Call if she needs refill.   - Will review with Dr. Hoskins regarding going back to work on 3/12/18. Will email and call her regarding this.   - She will f/u as scheduled.     Follow-up: Follow-up in about 4 weeks (around 3/19/2018). If there are any questions prior to this, the patient was instructed to contact the office.

## 2018-02-19 ENCOUNTER — OFFICE VISIT (OUTPATIENT)
Dept: SPINE | Facility: CLINIC | Age: 68
End: 2018-02-19
Payer: COMMERCIAL

## 2018-02-19 VITALS
SYSTOLIC BLOOD PRESSURE: 112 MMHG | BODY MASS INDEX: 17.07 KG/M2 | HEIGHT: 64 IN | WEIGHT: 100 LBS | HEART RATE: 86 BPM | DIASTOLIC BLOOD PRESSURE: 55 MMHG

## 2018-02-19 DIAGNOSIS — Z98.1 S/P LUMBAR FUSION: Primary | ICD-10-CM

## 2018-02-19 PROCEDURE — 99024 POSTOP FOLLOW-UP VISIT: CPT | Mod: S$GLB,,, | Performed by: PHYSICIAN ASSISTANT

## 2018-02-19 PROCEDURE — 99999 PR PBB SHADOW E&M-EST. PATIENT-LVL V: CPT | Mod: PBBFAC,,, | Performed by: PHYSICIAN ASSISTANT

## 2018-02-19 RX ORDER — LACTULOSE 10 G/15ML
10 SOLUTION ORAL; RECTAL DAILY PRN
COMMUNITY
Start: 2018-01-03 | End: 2018-10-02

## 2018-02-19 RX ORDER — CYCLOBENZAPRINE HCL 10 MG
10 TABLET ORAL 3 TIMES DAILY PRN
Qty: 90 TABLET | Refills: 0 | Status: SHIPPED | OUTPATIENT
Start: 2018-02-19 | End: 2018-03-21

## 2018-02-21 ENCOUNTER — PATIENT MESSAGE (OUTPATIENT)
Dept: FAMILY MEDICINE | Facility: CLINIC | Age: 68
End: 2018-02-21

## 2018-02-21 DIAGNOSIS — K59.00 CONSTIPATION, UNSPECIFIED CONSTIPATION TYPE: ICD-10-CM

## 2018-02-22 RX ORDER — LUBIPROSTONE 8 UG/1
8 CAPSULE ORAL 2 TIMES DAILY WITH MEALS
Qty: 60 CAPSULE | Refills: 6 | Status: SHIPPED | OUTPATIENT
Start: 2018-02-22 | End: 2018-04-16

## 2018-02-23 ENCOUNTER — PATIENT MESSAGE (OUTPATIENT)
Dept: SPINE | Facility: CLINIC | Age: 68
End: 2018-02-23

## 2018-02-27 ENCOUNTER — PATIENT MESSAGE (OUTPATIENT)
Dept: SPINE | Facility: CLINIC | Age: 68
End: 2018-02-27

## 2018-03-08 ENCOUNTER — DOCUMENTATION ONLY (OUTPATIENT)
Dept: SPINE | Facility: CLINIC | Age: 68
End: 2018-03-08

## 2018-03-08 NOTE — PROGRESS NOTES
Called and left message that Dr Hoskins had a change with his schedule.  Appointment that was scheduled ont he 27th has been rescheduled to the 20th at 3:15 for x-rays and see Dr Hoskins at 4. Appointment letter mailed to patient.

## 2018-03-20 ENCOUNTER — OFFICE VISIT (OUTPATIENT)
Dept: SPINE | Facility: CLINIC | Age: 68
End: 2018-03-20
Attending: NEUROLOGICAL SURGERY
Payer: COMMERCIAL

## 2018-03-20 ENCOUNTER — HOSPITAL ENCOUNTER (OUTPATIENT)
Dept: RADIOLOGY | Facility: OTHER | Age: 68
Discharge: HOME | End: 2018-03-20
Attending: NEUROLOGICAL SURGERY
Payer: COMMERCIAL

## 2018-03-20 VITALS
HEIGHT: 64 IN | BODY MASS INDEX: 17.07 KG/M2 | DIASTOLIC BLOOD PRESSURE: 60 MMHG | SYSTOLIC BLOOD PRESSURE: 108 MMHG | WEIGHT: 100 LBS | HEART RATE: 65 BPM

## 2018-03-20 DIAGNOSIS — Z98.1 S/P LUMBAR SPINAL FUSION: ICD-10-CM

## 2018-03-20 DIAGNOSIS — Z98.1 S/P LUMBAR FUSION: Primary | ICD-10-CM

## 2018-03-20 PROCEDURE — 72100 X-RAY EXAM L-S SPINE 2/3 VWS: CPT | Mod: TC,FY

## 2018-03-20 PROCEDURE — 72100 X-RAY EXAM L-S SPINE 2/3 VWS: CPT | Mod: 26,,, | Performed by: RADIOLOGY

## 2018-03-20 PROCEDURE — 99024 POSTOP FOLLOW-UP VISIT: CPT | Mod: S$GLB,,, | Performed by: NEUROLOGICAL SURGERY

## 2018-03-20 PROCEDURE — 99999 PR PBB SHADOW E&M-EST. PATIENT-LVL III: CPT | Mod: PBBFAC,,, | Performed by: NEUROLOGICAL SURGERY

## 2018-03-20 NOTE — PROGRESS NOTES
CHIEF COMPLAINT:    4-6 week follow-up    HPI:    Angelina Man is a 68 y.o.-year-old female who presents today for post-operative follow-up.She is s/p Right L4-5 and L5-S1 MIS TLIF and Perc Screws that was done by Dr. Cuello on 2-5-2018. Currently, the patient's symptoms are better since surgery. The patient is complaining of pain in the low back pain moving into the buttocks with associated paresthesias into the bilateral leg pain and numbness in right foot.She denies any new onset of weakness. The patient describes the pain as dull. The patient rates the pain 1 on the pain scale. The pain is worse with sitting and standing and better with movement. Post-op medications the patient is currently taking are: medication list.    ROS:    NAD    PE:    AAOX3  PERRL  EOMI    Lumbar incision:  C/D/I    ROM:   Full    Sensation:  Intact to light touch (All 4 extremities)    Strength: Full strength      Deltoids Biceps Triceps Wrist Ext. Wrist Flex. Hand    RUE         LUE          Hip Flex. Knee Flex. Knee Ext. Dorsi Flex Plantar Flex EHL   RLE         LLE           Gait:  normal    DTR:  2+ and symmetric bilaterally    SLR- negative    IMAGING:    XRays: L-spine: L4-S1 MIS TLIF with good placement of instrumentation and cages    CT: none    MRI: none    ASSESSMENT:   Doing very well post op    PLAN:   Follow 6 months with L-spine CT to assess spinal fusion.

## 2018-03-21 ENCOUNTER — PATIENT MESSAGE (OUTPATIENT)
Dept: SPINE | Facility: CLINIC | Age: 68
End: 2018-03-21

## 2018-04-16 ENCOUNTER — OFFICE VISIT (OUTPATIENT)
Dept: FAMILY MEDICINE | Facility: CLINIC | Age: 68
End: 2018-04-16
Payer: COMMERCIAL

## 2018-04-16 VITALS
TEMPERATURE: 98 F | SYSTOLIC BLOOD PRESSURE: 98 MMHG | WEIGHT: 102.06 LBS | DIASTOLIC BLOOD PRESSURE: 66 MMHG | BODY MASS INDEX: 17 KG/M2 | HEIGHT: 65 IN | HEART RATE: 60 BPM

## 2018-04-16 DIAGNOSIS — F41.9 ANXIETY: ICD-10-CM

## 2018-04-16 DIAGNOSIS — M54.2 NECK PAIN: ICD-10-CM

## 2018-04-16 DIAGNOSIS — K59.00 CONSTIPATION, UNSPECIFIED CONSTIPATION TYPE: Primary | ICD-10-CM

## 2018-04-16 PROCEDURE — 99999 PR PBB SHADOW E&M-EST. PATIENT-LVL IV: CPT | Mod: PBBFAC,,, | Performed by: FAMILY MEDICINE

## 2018-04-16 PROCEDURE — 3078F DIAST BP <80 MM HG: CPT | Mod: CPTII,S$GLB,, | Performed by: FAMILY MEDICINE

## 2018-04-16 PROCEDURE — 99214 OFFICE O/P EST MOD 30 MIN: CPT | Mod: S$GLB,,, | Performed by: FAMILY MEDICINE

## 2018-04-16 PROCEDURE — 3074F SYST BP LT 130 MM HG: CPT | Mod: CPTII,S$GLB,, | Performed by: FAMILY MEDICINE

## 2018-04-16 RX ORDER — PREGABALIN 75 MG/1
CAPSULE ORAL
COMMUNITY
Start: 2018-03-15 | End: 2018-04-16

## 2018-04-16 RX ORDER — NALOXEGOL OXALATE 25 MG/1
TABLET, FILM COATED ORAL
COMMUNITY
Start: 2018-03-02 | End: 2018-04-16

## 2018-04-16 RX ORDER — LUBIPROSTONE 24 UG/1
24 CAPSULE ORAL 2 TIMES DAILY WITH MEALS
Qty: 60 CAPSULE | Refills: 6 | Status: SHIPPED | OUTPATIENT
Start: 2018-04-16 | End: 2018-04-16 | Stop reason: SDUPTHER

## 2018-04-16 RX ORDER — CLONAZEPAM 1 MG/1
1.5 TABLET ORAL NIGHTLY PRN
Qty: 45 TABLET | Refills: 5 | Status: SHIPPED | OUTPATIENT
Start: 2018-04-16 | End: 2018-11-05 | Stop reason: SDUPTHER

## 2018-04-16 RX ORDER — LUBIPROSTONE 24 UG/1
24 CAPSULE ORAL 2 TIMES DAILY WITH MEALS
Qty: 60 CAPSULE | Refills: 6 | Status: SHIPPED | OUTPATIENT
Start: 2018-04-16 | End: 2019-02-05

## 2018-04-17 NOTE — PROGRESS NOTES
Subjective:       Patient ID: Angelina Man is a 68 y.o. female.    Chief Complaint: Neck Pain; Abdominal Pain; and Medication Refill    HPI  Review of Systems   Constitutional: Negative for activity change and unexpected weight change.   HENT: Negative for hearing loss, rhinorrhea and trouble swallowing.    Eyes: Negative for discharge and visual disturbance.   Respiratory: Negative for chest tightness and wheezing.    Cardiovascular: Negative for chest pain and palpitations.   Gastrointestinal: Negative for blood in stool, constipation, diarrhea and vomiting.   Endocrine: Negative for polydipsia and polyuria.   Genitourinary: Negative for difficulty urinating, dysuria, hematuria and menstrual problem.   Musculoskeletal: Negative for arthralgias, joint swelling and neck pain.   Neurological: Negative for weakness and headaches.   Psychiatric/Behavioral: Negative for confusion and dysphoric mood.       Objective:      Physical Exam   Constitutional: She is oriented to person, place, and time. She appears well-developed and well-nourished. No distress.   HENT:   Head: Normocephalic and atraumatic.   Eyes: Conjunctivae and EOM are normal. Pupils are equal, round, and reactive to light.   Neck: Normal range of motion. Neck supple. No JVD present.   Pulmonary/Chest: Effort normal.   Abdominal: Soft. Bowel sounds are normal.   Musculoskeletal: Normal range of motion. She exhibits edema, tenderness and deformity.   Neurological: She is alert and oriented to person, place, and time. She displays normal reflexes. No cranial nerve deficit or sensory deficit. She exhibits normal muscle tone. Coordination normal.   Skin: Skin is warm and dry. No rash noted. She is not diaphoretic. No erythema. No pallor.   Psychiatric: She has a normal mood and affect. Her behavior is normal. Judgment and thought content normal.   Nursing note and vitals reviewed.      Assessment:       1. Constipation, unspecified constipation type    2.  Anxiety    3. Neck pain        Plan:   See med card 4-16-18  zinc 50 mg Tab 50 mg, Daily         tretinoin (RETIN-A) 0.1 % cream Nightly        pregabalin (LYRICA) 75 MG capsule () 75 mg, 2 times daily        multivitamin (THERAGRAN) per tablet 1 tablet, Daily        lubiprostone (AMITIZA) 24 MCG Cap 24 mcg, 2 times daily with meals        levothyroxine (SYNTHROID) 112 MCG tablet 112 mcg, Daily        lactulose (CHRONULAC) 10 gram/15 mL solution 10 g, Daily PRN        glucosamine sulfate 2KCl 1,000 mg Tab 1 tablet, Daily        ginseng 200 mg Cap 200 mg, Daily PRN        fluticasone (FLONASE) 50 mcg/actuation nasal spray 1 spray, Daily            diphenhydrAMINE (BENADRYL) 25 mg capsule 25 mg, Nightly PRN        digestive enzymes Tab 1 tablet, Daily        dicyclomine (BENTYL) 20 mg tablet 20 mg, 3 times daily PRN        clonazePAM (KLONOPIN) 1 MG tablet 1.5 mg, Nightly PRN       Will contact pt with results when available   X-Ray Cervical Spine AP And Lateral

## 2018-05-12 ENCOUNTER — PATIENT MESSAGE (OUTPATIENT)
Dept: DERMATOLOGY | Facility: CLINIC | Age: 68
End: 2018-05-12

## 2018-05-15 ENCOUNTER — HOSPITAL ENCOUNTER (OUTPATIENT)
Dept: RADIOLOGY | Facility: HOSPITAL | Age: 68
Discharge: HOME | End: 2018-05-15
Attending: FAMILY MEDICINE
Payer: COMMERCIAL

## 2018-05-15 DIAGNOSIS — M54.2 NECK PAIN: ICD-10-CM

## 2018-05-15 PROCEDURE — 72040 X-RAY EXAM NECK SPINE 2-3 VW: CPT | Mod: 26,,, | Performed by: RADIOLOGY

## 2018-05-15 PROCEDURE — 72040 X-RAY EXAM NECK SPINE 2-3 VW: CPT | Mod: TC

## 2018-06-11 ENCOUNTER — PATIENT MESSAGE (OUTPATIENT)
Dept: DERMATOLOGY | Facility: CLINIC | Age: 68
End: 2018-06-11

## 2018-06-11 ENCOUNTER — OFFICE VISIT (OUTPATIENT)
Dept: DERMATOLOGY | Facility: CLINIC | Age: 68
End: 2018-06-11
Payer: COMMERCIAL

## 2018-06-11 DIAGNOSIS — L82.1 SK (SEBORRHEIC KERATOSIS): ICD-10-CM

## 2018-06-11 DIAGNOSIS — L90.5 SCAR: Primary | ICD-10-CM

## 2018-06-11 DIAGNOSIS — L81.4 LENTIGO: ICD-10-CM

## 2018-06-11 DIAGNOSIS — L82.0 INFLAMED SEBORRHEIC KERATOSIS: ICD-10-CM

## 2018-06-11 DIAGNOSIS — L57.0 AK (ACTINIC KERATOSIS): ICD-10-CM

## 2018-06-11 DIAGNOSIS — Z85.828 PERSONAL HISTORY OF SKIN CANCER: ICD-10-CM

## 2018-06-11 DIAGNOSIS — D18.00 ANGIOMA: ICD-10-CM

## 2018-06-11 PROCEDURE — 17110 DESTRUCTION B9 LES UP TO 14: CPT | Mod: S$GLB,,, | Performed by: DERMATOLOGY

## 2018-06-11 PROCEDURE — 99214 OFFICE O/P EST MOD 30 MIN: CPT | Mod: 25,S$GLB,, | Performed by: DERMATOLOGY

## 2018-06-11 PROCEDURE — 99999 PR PBB SHADOW E&M-EST. PATIENT-LVL II: CPT | Mod: PBBFAC,,, | Performed by: DERMATOLOGY

## 2018-06-11 PROCEDURE — 17000 DESTRUCT PREMALG LESION: CPT | Mod: 59,S$GLB,, | Performed by: DERMATOLOGY

## 2018-06-11 NOTE — PROGRESS NOTES
Subjective:       Patient ID:  Angelina Man is a 68 y.o. female who presents for   Chief Complaint   Patient presents with    Skin Check    Lesion     High risk pt with hx NMSC here to check for the development of new lesions.  Has several new lesions, not bleeding or painful. No tx.  Lesion on right shoulder is new for patient.  Raised and scaly and has larger scaly growth associated with it.  No tx.  Irritated by friction.         Lesion         Review of Systems   Constitutional: Negative for fever, chills, weight loss, weight gain, fatigue, night sweats and malaise.   Skin: Positive for daily sunscreen use and activity-related sunscreen use. Negative for recent sunburn.        Objective:    Physical Exam   Constitutional: She appears well-developed and well-nourished. No distress.   Neurological: She is alert and oriented to person, place, and time. She is not disoriented.   Psychiatric: She has a normal mood and affect.   Skin:   Areas Examined (abnormalities noted in diagram):   Scalp / Hair Palpated and Inspected  Head / Face Inspection Performed  Neck Inspection Performed  Chest / Axilla Inspection Performed  Abdomen Inspection Performed  Genitals / Buttocks / Groin Inspection Performed  Back Inspection Performed  RUE Inspected  LUE Inspection Performed  RLE Inspected  LLE Inspection Performed  Nails and Digits Inspection Performed                   Diagram Legend     Erythematous scaling macule/papule c/w actinic keratosis       Vascular papule c/w angioma      Pigmented verrucoid papule/plaque c/w seborrheic keratosis      Yellow umbilicated papule c/w sebaceous hyperplasia      Irregularly shaped tan macule c/w lentigo     1-2 mm smooth white papules consistent with Milia      Movable subcutaneous cyst with punctum c/w epidermal inclusion cyst      Subcutaneous movable cyst c/w pilar cyst      Firm pink to brown papule c/w dermatofibroma      Pedunculated fleshy papule(s) c/w skin tag(s)       Evenly pigmented macule c/w junctional nevus     Mildly variegated pigmented, slightly irregular-bordered macule c/w mildly atypical nevus      Flesh colored to evenly pigmented papule c/w intradermal nevus       Pink pearly papule/plaque c/w basal cell carcinoma      Erythematous hyperkeratotic cursted plaque c/w SCC      Surgical scar with no sign of skin cancer recurrence      Open and closed comedones      Inflammatory papules and pustules      Verrucoid papule consistent consistent with wart     Erythematous eczematous patches and plaques     Dystrophic onycholytic nail with subungual debris c/w onychomycosis     Umbilicated papule    Erythematous-base heme-crusted tan verrucoid plaque consistent with inflamed seborrheic keratosis     Erythematous Silvery Scaling Plaque c/w Psoriasis     See annotation      Assessment / Plan:        Scar  Personal history of skin cancer  Pt with history of non melanoma skin cancer  Total body skin examination performed today including at least 12 points as noted in physical examination. No suspicious lesions noted.    AK (actinic keratosis)  Cryosurgery Procedure Note    Verbal consent from the patient is obtained including, but not limited to, risk of hypopigmentation/hyperpigmentation, scar, recurrence of lesion. The patient is aware of the precancerous quality and need for treatment of these lesions. Liquid nitrogen cryosurgery is applied to the 1 actinic keratoses, as detailed in the physical exam, to produce a freeze injury. The patient is aware that blisters may form and is instructed on wound care with gentle cleansing and use of vaseline ointment to keep moist until healed. The patient is supplied a handout on cryosurgery and is instructed to call if lesions do not completely resolve.    Inflamed seborrheic keratosis  Cryosurgery procedure note:    Verbal consent from the patient is obtained including, but not limited to, risk of hypopigmentation/hyperpigmentation,  scar, recurrence of lesion. Liquid nitrogen cryosurgery is applied to 1 lesions to produce a freeze injury. The patient is aware that blisters may form and is instructed on wound care with gentle cleansing and use of vaseline ointment to keep moist until healed. The patient is supplied a handout on cryosurgery and is instructed to call if lesions do not completely resolve.    SK (seborrheic keratosis)  These are benign inherited growths without a malignant potential. Reassurance given to patient. No treatment is necessary.     Angioma  These are benign vascular lesions that are inherited.  Treatment is not necessary.    Lentigo  This is a benign hyperpigmented sun induced lesion. Daily sun protection will reduce the number of new lesions. Treatment of these benign lesions are considered cosmetic.  The nature of sun-induced photo-aging and skin cancers is discussed.  Sun avoidance, protective clothing, and the use of 30-SPF sunscreens is advised. Observe closely for skin damage/changes, and call if such occurs.             Follow-up in about 1 year (around 6/11/2019) for f/u sooner if lesion on right shoulder does not resolve.

## 2018-07-23 ENCOUNTER — PATIENT MESSAGE (OUTPATIENT)
Dept: SPINE | Facility: CLINIC | Age: 68
End: 2018-07-23

## 2018-08-13 ENCOUNTER — TELEPHONE (OUTPATIENT)
Dept: FAMILY MEDICINE | Facility: CLINIC | Age: 68
End: 2018-08-13

## 2018-08-13 DIAGNOSIS — Z00.00 ROUTINE GENERAL MEDICAL EXAMINATION AT A HEALTH CARE FACILITY: Primary | ICD-10-CM

## 2018-08-13 NOTE — TELEPHONE ENCOUNTER
Spoke with patient concerning the request for a lab appt for scheduled appt on 9/27 patient states she was informed that she was scheduled for a physical.  Patient advised that I would need to reschedule her since she was booked incorrectly.  Patient scheduled for an EPP with labs prior.  Patient would like to keep scheduled appt in September since she would like to discuss medications for hiking with Dr. Christopher

## 2018-08-13 NOTE — TELEPHONE ENCOUNTER
----- Message from Lurdes Munoz sent at 8/13/2018  2:42 PM CDT -----  Contact: Angelina Man  [phone]  Angelina is requesting labs to go along with her EP appointment on 9/27. Please call her back at your soonest convince.

## 2018-08-22 ENCOUNTER — PATIENT MESSAGE (OUTPATIENT)
Dept: SPINE | Facility: CLINIC | Age: 68
End: 2018-08-22

## 2018-09-04 ENCOUNTER — PATIENT MESSAGE (OUTPATIENT)
Dept: FAMILY MEDICINE | Facility: CLINIC | Age: 68
End: 2018-09-04

## 2018-09-04 NOTE — TELEPHONE ENCOUNTER
"Patient Review of Clinical Information     Problems   This clinical information was not verified.   Medications   This clinical information was not verified, but there are updates pending review:   Reported Medications Start Date Reported By  Comments   acetazolamide 25 mg/mL Susp  Angelina Man when i have gone hiking in '14 and '15, Dr. Christopher presecribed Acetazolamide 125 MG tabs, which help with altitude adjustment; I am leaving on Oct. 1st 2018 and would like a prescription, if possible. I have also changed my "drugstore" from [facility] to [facility] on [facility] in [place]: [phone]; Please let me know if this can be approved. Thank you for your assistance,  Angelina Man   Allergies   This clinical information was not verified.     Pharmacy and medication added.   "

## 2018-09-12 ENCOUNTER — PATIENT MESSAGE (OUTPATIENT)
Dept: FAMILY MEDICINE | Facility: CLINIC | Age: 68
End: 2018-09-12

## 2018-09-12 ENCOUNTER — PATIENT MESSAGE (OUTPATIENT)
Dept: SPINE | Facility: CLINIC | Age: 68
End: 2018-09-12

## 2018-09-17 ENCOUNTER — PATIENT MESSAGE (OUTPATIENT)
Dept: NEUROSURGERY | Facility: CLINIC | Age: 68
End: 2018-09-17

## 2018-09-25 ENCOUNTER — PATIENT MESSAGE (OUTPATIENT)
Dept: FAMILY MEDICINE | Facility: CLINIC | Age: 68
End: 2018-09-25

## 2018-09-26 ENCOUNTER — PATIENT MESSAGE (OUTPATIENT)
Dept: FAMILY MEDICINE | Facility: CLINIC | Age: 68
End: 2018-09-26

## 2018-09-27 ENCOUNTER — PATIENT MESSAGE (OUTPATIENT)
Dept: FAMILY MEDICINE | Facility: CLINIC | Age: 68
End: 2018-09-27

## 2018-09-28 ENCOUNTER — TELEPHONE (OUTPATIENT)
Dept: SPINE | Facility: CLINIC | Age: 68
End: 2018-09-28

## 2018-09-28 DIAGNOSIS — F32.A DEPRESSION, UNSPECIFIED DEPRESSION TYPE: ICD-10-CM

## 2018-09-28 DIAGNOSIS — E03.1 CONGENITAL HYPOTHYROIDISM WITHOUT GOITER: ICD-10-CM

## 2018-09-28 RX ORDER — LEVOTHYROXINE SODIUM 112 UG/1
112 TABLET ORAL DAILY
Qty: 30 TABLET | Refills: 11 | Status: SHIPPED | OUTPATIENT
Start: 2018-09-28 | End: 2018-11-01

## 2018-09-28 RX ORDER — BUPROPION HYDROCHLORIDE 150 MG/1
150 TABLET ORAL DAILY
Qty: 30 TABLET | Refills: 12 | Status: SHIPPED | OUTPATIENT
Start: 2018-09-28 | End: 2019-10-21 | Stop reason: SDUPTHER

## 2018-09-28 RX ORDER — LEVOTHYROXINE SODIUM 112 UG/1
112 TABLET ORAL DAILY
Qty: 30 TABLET | Refills: 11 | Status: SHIPPED | OUTPATIENT
Start: 2018-09-28 | End: 2018-09-28

## 2018-09-28 RX ORDER — BUPROPION HYDROCHLORIDE 150 MG/1
150 TABLET ORAL DAILY
Qty: 30 TABLET | Refills: 12 | Status: SHIPPED | OUTPATIENT
Start: 2018-09-28 | End: 2018-09-28

## 2018-09-29 ENCOUNTER — PATIENT MESSAGE (OUTPATIENT)
Dept: FAMILY MEDICINE | Facility: CLINIC | Age: 68
End: 2018-09-29

## 2018-10-01 ENCOUNTER — PATIENT MESSAGE (OUTPATIENT)
Dept: FAMILY MEDICINE | Facility: CLINIC | Age: 68
End: 2018-10-01

## 2018-10-01 DIAGNOSIS — L70.8 OTHER ACNE: ICD-10-CM

## 2018-10-01 DIAGNOSIS — B00.9 HSV INFECTION: ICD-10-CM

## 2018-10-01 RX ORDER — TRETINOIN 1 MG/G
CREAM TOPICAL NIGHTLY
Qty: 20 G | Refills: 6 | Status: SHIPPED | OUTPATIENT
Start: 2018-10-01 | End: 2019-11-11 | Stop reason: SDUPTHER

## 2018-10-01 RX ORDER — ACYCLOVIR 400 MG/1
400 TABLET ORAL 2 TIMES DAILY
Qty: 60 TABLET | Refills: 12 | Status: SHIPPED | OUTPATIENT
Start: 2018-10-01 | End: 2019-02-05 | Stop reason: SDUPTHER

## 2018-10-02 ENCOUNTER — OFFICE VISIT (OUTPATIENT)
Dept: SPINE | Facility: CLINIC | Age: 68
End: 2018-10-02
Attending: NEUROLOGICAL SURGERY
Payer: MEDICARE

## 2018-10-02 ENCOUNTER — HOSPITAL ENCOUNTER (OUTPATIENT)
Dept: RADIOLOGY | Facility: OTHER | Age: 68
Discharge: HOME | End: 2018-10-02
Attending: NEUROLOGICAL SURGERY
Payer: MEDICARE

## 2018-10-02 VITALS
WEIGHT: 108 LBS | TEMPERATURE: 98 F | SYSTOLIC BLOOD PRESSURE: 116 MMHG | DIASTOLIC BLOOD PRESSURE: 70 MMHG | HEART RATE: 74 BPM | BODY MASS INDEX: 18.25 KG/M2

## 2018-10-02 DIAGNOSIS — Z98.1 S/P LUMBAR SPINAL FUSION: ICD-10-CM

## 2018-10-02 DIAGNOSIS — Z98.1 S/P LUMBAR FUSION: ICD-10-CM

## 2018-10-02 DIAGNOSIS — Z98.1 S/P LUMBAR FUSION: Primary | ICD-10-CM

## 2018-10-02 PROCEDURE — 72131 CT LUMBAR SPINE W/O DYE: CPT | Mod: 26,,, | Performed by: RADIOLOGY

## 2018-10-02 PROCEDURE — 3074F SYST BP LT 130 MM HG: CPT | Mod: CPTII,,, | Performed by: NEUROLOGICAL SURGERY

## 2018-10-02 PROCEDURE — 3288F FALL RISK ASSESSMENT DOCD: CPT | Mod: CPTII,,, | Performed by: NEUROLOGICAL SURGERY

## 2018-10-02 PROCEDURE — 99213 OFFICE O/P EST LOW 20 MIN: CPT | Mod: S$PBB,,, | Performed by: NEUROLOGICAL SURGERY

## 2018-10-02 PROCEDURE — 72131 CT LUMBAR SPINE W/O DYE: CPT | Mod: TC

## 2018-10-02 PROCEDURE — 99213 OFFICE O/P EST LOW 20 MIN: CPT | Mod: PBBFAC,25 | Performed by: NEUROLOGICAL SURGERY

## 2018-10-02 PROCEDURE — 1100F PTFALLS ASSESS-DOCD GE2>/YR: CPT | Mod: CPTII,,, | Performed by: NEUROLOGICAL SURGERY

## 2018-10-02 PROCEDURE — 3078F DIAST BP <80 MM HG: CPT | Mod: CPTII,,, | Performed by: NEUROLOGICAL SURGERY

## 2018-10-02 PROCEDURE — 99999 PR PBB SHADOW E&M-EST. PATIENT-LVL III: CPT | Mod: PBBFAC,,, | Performed by: NEUROLOGICAL SURGERY

## 2018-10-02 NOTE — PROGRESS NOTES
CHIEF COMPLAINT:    6 month follow-up    HPI:    Angelina Man is a 68 y.o.-year-old female who presents today for post-operative follow-up. She is s/p Right L4-5 and L5-S1 MIS TLIF that was done by Dr. Hoskins on 2/5/2018. Currently, the patient's symptoms are better since surgery. The patient denies pain and expresses muscle aches after exercising. She denies any new onset of weakness. The patient does describes numbness and tingling in the bilateral feet. Post-op medications the patient is currently taking are: none.    ROS:    NAD    PE:    AAOX3  PERRL  EOMI    Lumbar incision:  C/D/I    ROM:   Decreased secondary to pain    Sensation:  Intact to light touch (All 4 extremities)    Strength: Full strength      Deltoids Biceps Triceps Wrist Ext. Wrist Flex. Hand    RUE         LUE          Hip Flex. Knee Flex. Knee Ext. Dorsi Flex Plantar Flex EHL   RLE         LLE           Gait:  normal    DTR:  2+ and symmetric bilaterally    No abnormal reflexes    SLR- negative    IMAGING:    XRays:none    CT:  L-spine: L4-5 and L5-S1 MIS TLIF with good placement of spacers and instrumentation. Good evidence of interbody bony mass.     MRI: none    ASSESSMENT:   Doing better post op    PLAN:   Follow up 6 months with repeat CT scan to check progression of spinal fusion.

## 2018-10-04 ENCOUNTER — OFFICE VISIT (OUTPATIENT)
Dept: URGENT CARE | Facility: CLINIC | Age: 68
End: 2018-10-04
Payer: MEDICARE

## 2018-10-04 VITALS
OXYGEN SATURATION: 100 % | SYSTOLIC BLOOD PRESSURE: 132 MMHG | RESPIRATION RATE: 16 BRPM | TEMPERATURE: 97 F | DIASTOLIC BLOOD PRESSURE: 84 MMHG | BODY MASS INDEX: 17.99 KG/M2 | WEIGHT: 108 LBS | HEIGHT: 65 IN | HEART RATE: 69 BPM

## 2018-10-04 DIAGNOSIS — H61.23 BILATERAL IMPACTED CERUMEN: Primary | ICD-10-CM

## 2018-10-04 PROCEDURE — 1100F PTFALLS ASSESS-DOCD GE2>/YR: CPT | Mod: CPTII,S$GLB,, | Performed by: PHYSICIAN ASSISTANT

## 2018-10-04 PROCEDURE — 3288F FALL RISK ASSESSMENT DOCD: CPT | Mod: CPTII,S$GLB,, | Performed by: PHYSICIAN ASSISTANT

## 2018-10-04 PROCEDURE — 3075F SYST BP GE 130 - 139MM HG: CPT | Mod: CPTII,S$GLB,, | Performed by: PHYSICIAN ASSISTANT

## 2018-10-04 PROCEDURE — 3079F DIAST BP 80-89 MM HG: CPT | Mod: CPTII,S$GLB,, | Performed by: PHYSICIAN ASSISTANT

## 2018-10-04 PROCEDURE — 69210 REMOVE IMPACTED EAR WAX UNI: CPT | Mod: S$GLB,,, | Performed by: PHYSICIAN ASSISTANT

## 2018-10-04 PROCEDURE — 99213 OFFICE O/P EST LOW 20 MIN: CPT | Mod: 25,S$GLB,, | Performed by: PHYSICIAN ASSISTANT

## 2018-10-04 NOTE — PROGRESS NOTES
"Subjective:       Patient ID: Angelina Man is a 68 y.o. female.    Vitals:  height is 5' 4.5" (1.638 m) and weight is 49 kg (108 lb). Her oral temperature is 97.4 °F (36.3 °C). Her blood pressure is 132/84 and her pulse is 69. Her respiration is 16 and oxygen saturation is 100%.     Chief Complaint: Otalgia    Patient thinks there may be something in her right ear. She noticed it today around 4pm when she dug in it and she is not sure if it is clogged from earwax or if she oushed something in it.      Otalgia    There is pain in the right ear. This is a new problem. The current episode started today. The problem has been unchanged. Pertinent negatives include no abdominal pain, coughing, headaches or sore throat. She has tried nothing for the symptoms. The treatment provided no relief.     Review of Systems   Constitution: Negative for chills, fever and malaise/fatigue.   HENT: Positive for ear pain. Negative for congestion, hoarse voice and sore throat.    Eyes: Negative for discharge and redness.   Cardiovascular: Negative for chest pain, dyspnea on exertion and leg swelling.   Respiratory: Negative for cough, shortness of breath, sputum production and wheezing.    Musculoskeletal: Negative for myalgias.   Gastrointestinal: Negative for abdominal pain and nausea.   Neurological: Negative for headaches.       Objective:      Physical Exam   Constitutional: She is oriented to person, place, and time. She appears well-developed and well-nourished.   HENT:   Head: Normocephalic and atraumatic.   Right Ear: External ear normal.   Left Ear: External ear normal.   Nose: Nose normal.   Mouth/Throat: Oropharynx is clear and moist.   Bilateral cerumen impactions   Eyes: Conjunctivae, EOM and lids are normal.   Neck: Trachea normal, full passive range of motion without pain and phonation normal. Neck supple.   Musculoskeletal: Normal range of motion.   Neurological: She is alert and oriented to person, place, and time. "   Skin: Skin is warm, dry and intact.   Psychiatric: She has a normal mood and affect. Her speech is normal and behavior is normal. Judgment and thought content normal. Cognition and memory are normal.   Nursing note and vitals reviewed.      Assessment:       1. Bilateral impacted cerumen        Plan:         Bilateral impacted cerumen    CERUMEN REMOVAL (SEPARATE PROCEDURE IN OFFICE)     Procedure: Removal of impacted cerumen bilateral  Pre Procedure Diagnosis: Cerumen Impaction  Post Procedure Diagnosis: Cerumen Impaction     Verbal informed consent : Reviewed with the patient the risk of trauma to ear canal, ear drum or hearing, discomfort during procedure and/or inability to remove cerumen impaction in one session or unforeseen events or complications.     Anesthesia: None needed    Procedure in detail:  Utilizing the following: Ring Currette of appropriate size. The impacted cerumen of the ear canal(s) was removed atraumatically with TM and EAC then inspected and found to be clear of wax.      Tympanic Membrane findings and examination after cerumen removal:Right Ear: normal Left Ear: normal     Complications: No      TM clear post removal bilaterally     Condition:  Improved/Good

## 2018-10-04 NOTE — PATIENT INSTRUCTIONS
Earwax (Treated)    Everyone produces earwax from the lining of the ear canal. It lubricates and protects the ear. The wax that forms in the canal slowly moves toward the outside of the ear and falls out. Sometimes wax can build up in the ear canal. This can cause a blockage and loss of hearing. A buildup of earwax was removed from your ear today.  Home care  If you have a tendency to build up wax in the ear canal, you should clear the wax at home regularly, before it causes discomfort. This should be about once every six months.  · Unless a medicine was prescribed, you may use an over-the-counter product made for clearing earwax. These contain carbamide peroxide and are available over-the-counter in a kit with a small bulb syringe.  · Lie down with the blocked ear facing upward. Apply one dropper full of medicine and wait a few minutes. Grasp the outer ear and wiggle it to help the solution enter the canal.  · Lean over a sink or basin with the blocked ear turned downward. Use a rubber bulb syringe filled with warm (not hot or cold) water to rinse the ear several times. Use gentle pressure only. You may need to repeat the irrigation several times before the wax flows out.  · If you are having trouble draining all the water out of your ear canal, put a few drops of rubbing alcohol into the ear canal. This will help remove the remaining water.  Don'ts  · Dont use cold water to rinse the ear. This will make you dizzy.  · Dont do this procedure if you have an ear infection. Symptoms include ear pain, fever, or fluid draining from the ear.  · Dont do this procedure if you have a punctured eardrum.  · Dont use cotton swabs, matches, hairpins, keys, or other objects to clean the ear canal. This can cause infection of the ear canal or rupture of the eardrum. Because of their size and shape, cotton swabs can push the earwax deeper into the ear canal instead of removing it.  Follow-up care  Follow up with your  healthcare provider, or as advised.  When to seek medical advice  Call your healthcare provider right away if any of these occur:  · Worsening ear pain  · Fever of 100.4°F (38°C) or higher, or as directed by your healthcare provider  · Hearing does not return to normal after three days of treatment  · Fluid drainage or bleeding from the ear canal  · Swelling, redness, or tenderness of the outer ear  · Headache, neck pain, or stiff neck  Date Last Reviewed: 3/22/2015  © 8801-7048 [employer]. [address] [place] [address]. All rights reserved. This information is not intended as a substitute for professional medical care. Always follow your healthcare professional's instructions.

## 2018-10-30 ENCOUNTER — LAB VISIT (OUTPATIENT)
Dept: LAB | Facility: HOSPITAL | Age: 68
End: 2018-10-30
Attending: FAMILY MEDICINE
Payer: MEDICARE

## 2018-10-30 DIAGNOSIS — E03.9 HYPOTHYROIDISM, UNSPECIFIED TYPE: Primary | ICD-10-CM

## 2018-10-30 DIAGNOSIS — Z00.00 ROUTINE GENERAL MEDICAL EXAMINATION AT A HEALTH CARE FACILITY: ICD-10-CM

## 2018-10-30 LAB
ALBUMIN SERPL BCP-MCNC: 3.5 G/DL
ALP SERPL-CCNC: 76 U/L
ALT SERPL W/O P-5'-P-CCNC: 18 U/L
ANION GAP SERPL CALC-SCNC: 7 MMOL/L
AST SERPL-CCNC: 20 U/L
BASOPHILS # BLD AUTO: 0.05 K/UL
BASOPHILS NFR BLD: 1 %
BILIRUB SERPL-MCNC: 0.6 MG/DL
BUN SERPL-MCNC: 15 MG/DL
CALCIUM SERPL-MCNC: 9.2 MG/DL
CHLORIDE SERPL-SCNC: 105 MMOL/L
CHOLEST SERPL-MCNC: 208 MG/DL
CHOLEST/HDLC SERPL: 2.3 {RATIO}
CO2 SERPL-SCNC: 29 MMOL/L
CREAT SERPL-MCNC: 0.7 MG/DL
DIFFERENTIAL METHOD: ABNORMAL
EOSINOPHIL # BLD AUTO: 0.2 K/UL
EOSINOPHIL NFR BLD: 3.1 %
ERYTHROCYTE [DISTWIDTH] IN BLOOD BY AUTOMATED COUNT: 12.9 %
EST. GFR  (AFRICAN AMERICAN): >60 ML/MIN/1.73 M^2
EST. GFR  (NON AFRICAN AMERICAN): >60 ML/MIN/1.73 M^2
GLUCOSE SERPL-MCNC: 84 MG/DL
HCT VFR BLD AUTO: 40.6 %
HDLC SERPL-MCNC: 89 MG/DL
HDLC SERPL: 42.8 %
HGB BLD-MCNC: 13.2 G/DL
IMM GRANULOCYTES # BLD AUTO: 0.01 K/UL
IMM GRANULOCYTES NFR BLD AUTO: 0.2 %
LDLC SERPL CALC-MCNC: 103 MG/DL
LYMPHOCYTES # BLD AUTO: 1.6 K/UL
LYMPHOCYTES NFR BLD: 31.6 %
MCH RBC QN AUTO: 32.8 PG
MCHC RBC AUTO-ENTMCNC: 32.5 G/DL
MCV RBC AUTO: 101 FL
MONOCYTES # BLD AUTO: 0.5 K/UL
MONOCYTES NFR BLD: 9 %
NEUTROPHILS # BLD AUTO: 2.8 K/UL
NEUTROPHILS NFR BLD: 55.1 %
NONHDLC SERPL-MCNC: 119 MG/DL
NRBC BLD-RTO: 0 /100 WBC
PLATELET # BLD AUTO: 248 K/UL
PMV BLD AUTO: 10.4 FL
POTASSIUM SERPL-SCNC: 3.7 MMOL/L
PROT SERPL-MCNC: 6.6 G/DL
RBC # BLD AUTO: 4.02 M/UL
SODIUM SERPL-SCNC: 141 MMOL/L
T4 FREE SERPL-MCNC: 1.11 NG/DL
TRIGL SERPL-MCNC: 80 MG/DL
TSH SERPL DL<=0.005 MIU/L-ACNC: 4.01 UIU/ML
WBC # BLD AUTO: 5.13 K/UL

## 2018-10-30 PROCEDURE — 80053 COMPREHEN METABOLIC PANEL: CPT

## 2018-10-30 PROCEDURE — 85025 COMPLETE CBC W/AUTO DIFF WBC: CPT

## 2018-10-30 PROCEDURE — 80061 LIPID PANEL: CPT

## 2018-10-30 PROCEDURE — 36415 COLL VENOUS BLD VENIPUNCTURE: CPT | Mod: PO

## 2018-10-30 PROCEDURE — 84439 ASSAY OF FREE THYROXINE: CPT

## 2018-10-30 PROCEDURE — 84443 ASSAY THYROID STIM HORMONE: CPT

## 2018-11-01 ENCOUNTER — TELEPHONE (OUTPATIENT)
Dept: FAMILY MEDICINE | Facility: CLINIC | Age: 68
End: 2018-11-01

## 2018-11-01 RX ORDER — LEVOTHYROXINE SODIUM 125 UG/1
125 TABLET ORAL DAILY
Qty: 30 TABLET | Refills: 11 | Status: SHIPPED | OUTPATIENT
Start: 2018-11-01 | End: 2018-11-05 | Stop reason: SDUPTHER

## 2018-11-05 ENCOUNTER — OFFICE VISIT (OUTPATIENT)
Dept: OPTOMETRY | Facility: CLINIC | Age: 68
End: 2018-11-05
Payer: COMMERCIAL

## 2018-11-05 ENCOUNTER — OFFICE VISIT (OUTPATIENT)
Dept: FAMILY MEDICINE | Facility: CLINIC | Age: 68
End: 2018-11-05
Payer: MEDICARE

## 2018-11-05 VITALS
RESPIRATION RATE: 16 BRPM | DIASTOLIC BLOOD PRESSURE: 70 MMHG | BODY MASS INDEX: 17.72 KG/M2 | WEIGHT: 103.81 LBS | HEIGHT: 64 IN | SYSTOLIC BLOOD PRESSURE: 110 MMHG | TEMPERATURE: 98 F

## 2018-11-05 DIAGNOSIS — E87.6 HYPOKALEMIA: ICD-10-CM

## 2018-11-05 DIAGNOSIS — N94.89 PELVIC CONGESTION SYNDROME: ICD-10-CM

## 2018-11-05 DIAGNOSIS — Z23 IMMUNIZATION DUE: Primary | ICD-10-CM

## 2018-11-05 DIAGNOSIS — H16.139 ACTINIC KERATITIS, UNSPECIFIED LATERALITY: ICD-10-CM

## 2018-11-05 DIAGNOSIS — Z12.39 SCREENING BREAST EXAMINATION: ICD-10-CM

## 2018-11-05 DIAGNOSIS — H52.4 ASTIGMATISM WITH PRESBYOPIA, BILATERAL: Primary | ICD-10-CM

## 2018-11-05 DIAGNOSIS — H52.203 ASTIGMATISM WITH PRESBYOPIA, BILATERAL: Primary | ICD-10-CM

## 2018-11-05 DIAGNOSIS — K58.0 IRRITABLE BOWEL SYNDROME WITH DIARRHEA: ICD-10-CM

## 2018-11-05 DIAGNOSIS — R10.2 PELVIC PAIN: ICD-10-CM

## 2018-11-05 DIAGNOSIS — E03.9 HYPOTHYROIDISM, UNSPECIFIED TYPE: ICD-10-CM

## 2018-11-05 DIAGNOSIS — E83.51 HYPOCALCEMIA: ICD-10-CM

## 2018-11-05 DIAGNOSIS — F41.9 ANXIETY: ICD-10-CM

## 2018-11-05 DIAGNOSIS — H25.13 NUCLEAR SCLEROSIS, BILATERAL: ICD-10-CM

## 2018-11-05 DIAGNOSIS — H40.013 OPEN ANGLE WITH BORDERLINE FINDINGS AND LOW GLAUCOMA RISK IN BOTH EYES: ICD-10-CM

## 2018-11-05 DIAGNOSIS — H04.123 DRY EYES, BILATERAL: ICD-10-CM

## 2018-11-05 PROCEDURE — G0009 ADMIN PNEUMOCOCCAL VACCINE: HCPCS | Mod: S$GLB,,, | Performed by: FAMILY MEDICINE

## 2018-11-05 PROCEDURE — 99999 PR PBB SHADOW E&M-EST. PATIENT-LVL II: CPT | Mod: PBBFAC,,, | Performed by: OPTOMETRIST

## 2018-11-05 PROCEDURE — 3074F SYST BP LT 130 MM HG: CPT | Mod: CPTII,S$GLB,, | Performed by: FAMILY MEDICINE

## 2018-11-05 PROCEDURE — 99397 PER PM REEVAL EST PAT 65+ YR: CPT | Mod: 25,S$GLB,, | Performed by: FAMILY MEDICINE

## 2018-11-05 PROCEDURE — 92015 DETERMINE REFRACTIVE STATE: CPT | Mod: S$GLB,,, | Performed by: OPTOMETRIST

## 2018-11-05 PROCEDURE — 90732 PPSV23 VACC 2 YRS+ SUBQ/IM: CPT | Mod: S$GLB,,, | Performed by: FAMILY MEDICINE

## 2018-11-05 PROCEDURE — 3078F DIAST BP <80 MM HG: CPT | Mod: CPTII,S$GLB,, | Performed by: FAMILY MEDICINE

## 2018-11-05 PROCEDURE — 92014 COMPRE OPH EXAM EST PT 1/>: CPT | Mod: S$GLB,,, | Performed by: OPTOMETRIST

## 2018-11-05 PROCEDURE — 17000 DESTRUCT PREMALG LESION: CPT | Mod: S$GLB,,, | Performed by: FAMILY MEDICINE

## 2018-11-05 PROCEDURE — 99999 PR PBB SHADOW E&M-EST. PATIENT-LVL V: CPT | Mod: PBBFAC,,, | Performed by: FAMILY MEDICINE

## 2018-11-05 RX ORDER — DICYCLOMINE HYDROCHLORIDE 20 MG/1
20 TABLET ORAL 3 TIMES DAILY PRN
Qty: 90 TABLET | Refills: 12 | Status: SHIPPED | OUTPATIENT
Start: 2018-11-05 | End: 2020-08-14

## 2018-11-05 RX ORDER — CLONAZEPAM 1 MG/1
1.5 TABLET ORAL NIGHTLY PRN
Qty: 45 TABLET | Refills: 5 | Status: SHIPPED | OUTPATIENT
Start: 2018-11-05 | End: 2019-08-13 | Stop reason: SDUPTHER

## 2018-11-05 RX ORDER — LEVOTHYROXINE SODIUM 137 UG/1
137 TABLET ORAL
Qty: 30 TABLET | Refills: 11 | Status: SHIPPED | OUTPATIENT
Start: 2018-11-05 | End: 2019-02-05 | Stop reason: SDUPTHER

## 2018-11-05 RX ORDER — POTASSIUM CHLORIDE 600 MG/1
8 TABLET, FILM COATED, EXTENDED RELEASE ORAL ONCE
Qty: 30 TABLET | Refills: 12 | Status: SHIPPED | OUTPATIENT
Start: 2018-11-05 | End: 2018-11-05

## 2018-11-05 RX ORDER — LEVOTHYROXINE SODIUM 125 UG/1
125 TABLET ORAL DAILY
Qty: 30 TABLET | Refills: 11 | Status: SHIPPED | OUTPATIENT
Start: 2018-11-05 | End: 2018-11-05

## 2018-11-05 NOTE — PROGRESS NOTES
Two patient identifiers verified.  Allergies reviewed.  Pneumovax administered to Left deltoid as per MD order advise patient to wait 15min after shot is given for any adverse reaction.

## 2018-11-06 NOTE — PROGRESS NOTES
Angelina Man is a 68 y.o. female   Routine physical  Source of history: Patient  Past Medical History:   Diagnosis Date    Arthritis     Bronchitis     Cataract     Dry eyes     Hypothyroidism 6/23/2016    Rash     Squamous cell carcinoma     in-situ right upper inner arm, right wrist    Status post lumbar surgery 6/23/2016    Stress fracture     Thyroid disease      Patient  reports that  has never smoked. she has never used smokeless tobacco. She reports that she drinks alcohol. She reports that she does not use drugs.  Family History   Problem Relation Age of Onset    Cancer Mother         Lung cancer    Heart failure Father     Colon cancer Father     Hypertension Father     Cataracts Father     Cancer Father 80        colon    Diabetes Son     Heart disease Son     No Known Problems Sister     No Known Problems Brother     No Known Problems Maternal Aunt     No Known Problems Maternal Uncle     No Known Problems Paternal Aunt     No Known Problems Paternal Uncle     No Known Problems Maternal Grandmother     No Known Problems Maternal Grandfather     No Known Problems Paternal Grandmother     No Known Problems Paternal Grandfather     Melanoma Daughter     Amblyopia Neg Hx     Blindness Neg Hx     Glaucoma Neg Hx     Macular degeneration Neg Hx     Retinal detachment Neg Hx     Strabismus Neg Hx     Stroke Neg Hx     Thyroid disease Neg Hx     Breast cancer Neg Hx     Ovarian cancer Neg Hx      ROS:  GENERAL: No fever, chills, fatigability or weight loss.  SKIN: No rashes, itching or changes in color or texture of skin.  HEAD: No headaches or recent head trauma.  EYES: Visual acuity fine. No photophobia, ocular pain or diplopia.  EARS: Denies ear pain, discharge or vertigo.  NOSE: No loss of smell, no epistaxis or postnasal drip.  MOUTH & THROAT: No hoarseness or change in voice. No excessive gum bleeding.  NODES: Denies swollen glands.  CHEST: Denies JENNINGS, cyanosis,  wheezing, cough and sputum production.  CARDIOVASCULAR: Denies chest pain, PND, orthopnea or reduced exercise tolerance.  ABDOMEN: Appetite fine. No weight loss. Denies diarrhea, abdominal pain, hematemesis or blood in stool.  URINARY: No flank pain, dysuria or hematuria.  PERIPHERAL VASCULAR: No claudication or cyanosis.  MUSCULOSKELETAL: No joint stiffness or swelling. Denies back pain.  NEUROLOGIC: No history of seizures, paralysis, alteration of gait or coordination.    OBJECTIVE:  APPEARANCE:      Thin no acute distress  SKIN: Normal skin turgor, no rashes noted good turgor 1 large actinic keratosis at the base of the neck  HEENT: Both external auditory canals clear. Both tympanic membranes intact. PERRL. EOMI. Disk margins sharp. No tonsillar enlargement. No pharyngeal erythema or exudate. No stridor.  NECK: No bruits. No cervical spine tenderness. No cervical lymphadenopathy. No thyromegaly.  NODES: No cervical, axillary or inguinal lymph node enlargement.  CHEST: Breath sounds clear bilaterally. Lungs clear to auscultation & percussion. Good air movement. No rales. No retractions. No rhonchi. No stridor. No wheezes.  CARDIOVASCULAR: Normal S1, S2. No murmurs. No edema.  BREASTS: no masses palpated in either breast or axillary area, symmetry noted.  ABDOMEN: Bowel sounds normal. No palpable aortic enlargement. No CVA tenderness. No pulsatile mass. No rebound tenderness.  PERIPHERAL VASCULAR: Femoral pulses present and symmetrical. No edema.  MUSCULOSKELETAL: Degenerative changes of both ankles, foot, knee, wrist and hand.  BACK: No CVA tenderness. There is no spasm, tenderness or radiculopathy noted with palpation and there is full range of motion.   NEUROLOGIC:   Cranial Nerves: II-XII grossly intact.  Motor: 5/5 strength major flexors/extensors. No tremor.  DTR's: Knees, Ankles 2+ and equal bilaterally; downgoing toes.  Sensory: Intact to light touch distally.  Gait & Posture: Normal gait and fine motion.  No cerebellar signs.  MENTAL STATUS: Alert. Oriented x 3. Language skills normal. Memory intact. No suicidal ideation. Normal affect. Well kept appearance.    ASSESSMENT/PLAN:   Angelina was seen today for annual exam.    Diagnoses and all orders for this visit:    Immunization due  -     (In Office Administered) Pneumococcal Polysaccharide Vaccine (23 Valent) (SQ/IM)    Hypothyroidism, unspecified type  -     TSH; Future  -     Discontinue: levothyroxine (SYNTHROID) 125 MCG tablet; Take 1 tablet (125 mcg total) by mouth once daily.  -     levothyroxine (SYNTHROID) 137 MCG Tab tablet; Take 1 tablet (137 mcg total) by mouth before breakfast.    Hypocalcemia  -     Calcium; Future    Anxiety  -     clonazePAM (KLONOPIN) 1 MG tablet; Take 1.5 tablets (1.5 mg total) by mouth nightly as needed.    Irritable bowel syndrome with diarrhea  -     dicyclomine (BENTYL) 20 mg tablet; Take 1 tablet (20 mg total) by mouth 3 (three) times daily as needed.    Screening breast examination  -     Mammo Digital Screening Bilateral With CAD; Future    Actinic keratitis, unspecified laterality  -     Ambulatory referral to Dermatology    Pelvic pain  -     US Pelvis Complete Non OB; Future    Pelvic congestion syndrome  -     US Pelvis Complete Non OB; Future    Hypokalemia  -     Potassium; Future    Other orders  -     potassium chloride (KLOR-CON) 8 MEQ TbSR; Take 1 tablet (8 mEq total) by mouth once. for 1 dose     With informed consent cryotherapy was placed on the lesion at the base of the neck x3 with good margins   patient was instructed on daily local wound care to the area heals patient is follow-up appoint with Dermatology for possible retreat if needed

## 2018-11-07 ENCOUNTER — HOSPITAL ENCOUNTER (OUTPATIENT)
Dept: RADIOLOGY | Facility: HOSPITAL | Age: 68
Discharge: HOME | End: 2018-11-07
Attending: FAMILY MEDICINE
Payer: MEDICARE

## 2018-11-07 DIAGNOSIS — Z12.39 SCREENING BREAST EXAMINATION: ICD-10-CM

## 2018-11-07 DIAGNOSIS — N94.89 PELVIC CONGESTION SYNDROME: ICD-10-CM

## 2018-11-07 DIAGNOSIS — R10.2 PELVIC PAIN: ICD-10-CM

## 2018-11-07 PROCEDURE — 76830 TRANSVAGINAL US NON-OB: CPT | Mod: 26,,, | Performed by: RADIOLOGY

## 2018-11-07 PROCEDURE — 76856 US EXAM PELVIC COMPLETE: CPT | Mod: TC

## 2018-11-07 PROCEDURE — 76856 US EXAM PELVIC COMPLETE: CPT | Mod: 26,,, | Performed by: RADIOLOGY

## 2018-11-07 PROCEDURE — 77063 BREAST TOMOSYNTHESIS BI: CPT | Mod: 26,,, | Performed by: RADIOLOGY

## 2018-11-07 PROCEDURE — 76830 TRANSVAGINAL US NON-OB: CPT | Mod: TC

## 2018-11-07 PROCEDURE — 77067 SCR MAMMO BI INCL CAD: CPT | Mod: 26,,, | Performed by: RADIOLOGY

## 2018-11-07 PROCEDURE — 77063 BREAST TOMOSYNTHESIS BI: CPT | Mod: TC

## 2018-11-08 ENCOUNTER — PATIENT MESSAGE (OUTPATIENT)
Dept: FAMILY MEDICINE | Facility: CLINIC | Age: 68
End: 2018-11-08

## 2018-11-08 NOTE — TELEPHONE ENCOUNTER
Patient noticed the following per [facility], unable to edit problem list please advise.   Patient Review of Clinical Information     Problems   This clinical information was not verified, but there are updates pending review:   No Longer Applicable Problems Start Date Reported By  Comments   Menopause 10/29/2012 Angelina Man am 69 years old: not in menopause   Medications   This clinical information was not verified.   Allergies   This clinical information was not verified.

## 2019-01-28 ENCOUNTER — LAB VISIT (OUTPATIENT)
Dept: LAB | Facility: HOSPITAL | Age: 69
End: 2019-01-28
Attending: FAMILY MEDICINE
Payer: MEDICARE

## 2019-01-28 DIAGNOSIS — E87.6 HYPOKALEMIA: ICD-10-CM

## 2019-01-28 DIAGNOSIS — E83.51 HYPOCALCEMIA: ICD-10-CM

## 2019-01-28 DIAGNOSIS — E03.9 HYPOTHYROIDISM, UNSPECIFIED TYPE: ICD-10-CM

## 2019-01-28 LAB
CALCIUM SERPL-MCNC: 9.2 MG/DL
POTASSIUM SERPL-SCNC: 4.3 MMOL/L
TSH SERPL DL<=0.005 MIU/L-ACNC: 3.14 UIU/ML

## 2019-01-28 PROCEDURE — 84443 ASSAY THYROID STIM HORMONE: CPT | Mod: HCNC

## 2019-01-28 PROCEDURE — 36415 COLL VENOUS BLD VENIPUNCTURE: CPT | Mod: HCNC,PO

## 2019-01-28 PROCEDURE — 82310 ASSAY OF CALCIUM: CPT | Mod: HCNC

## 2019-01-28 PROCEDURE — 84132 ASSAY OF SERUM POTASSIUM: CPT | Mod: HCNC

## 2019-02-05 ENCOUNTER — PATIENT MESSAGE (OUTPATIENT)
Dept: FAMILY MEDICINE | Facility: CLINIC | Age: 69
End: 2019-02-05

## 2019-02-05 ENCOUNTER — OFFICE VISIT (OUTPATIENT)
Dept: FAMILY MEDICINE | Facility: CLINIC | Age: 69
End: 2019-02-05
Payer: MEDICARE

## 2019-02-05 ENCOUNTER — PATIENT MESSAGE (OUTPATIENT)
Dept: SPINE | Facility: CLINIC | Age: 69
End: 2019-02-05

## 2019-02-05 VITALS
WEIGHT: 102.94 LBS | HEIGHT: 64 IN | TEMPERATURE: 98 F | SYSTOLIC BLOOD PRESSURE: 118 MMHG | DIASTOLIC BLOOD PRESSURE: 70 MMHG | HEART RATE: 58 BPM | BODY MASS INDEX: 17.57 KG/M2

## 2019-02-05 DIAGNOSIS — B00.9 HSV INFECTION: ICD-10-CM

## 2019-02-05 DIAGNOSIS — E03.9 HYPOTHYROIDISM, UNSPECIFIED TYPE: ICD-10-CM

## 2019-02-05 DIAGNOSIS — I73.00 RAYNAUD'S PHENOMENON WITHOUT GANGRENE: Primary | ICD-10-CM

## 2019-02-05 PROCEDURE — 3074F SYST BP LT 130 MM HG: CPT | Mod: CPTII,S$GLB,, | Performed by: FAMILY MEDICINE

## 2019-02-05 PROCEDURE — 1101F PR PT FALLS ASSESS DOC 0-1 FALLS W/OUT INJ PAST YR: ICD-10-PCS | Mod: CPTII,S$GLB,, | Performed by: FAMILY MEDICINE

## 2019-02-05 PROCEDURE — 3078F DIAST BP <80 MM HG: CPT | Mod: CPTII,S$GLB,, | Performed by: FAMILY MEDICINE

## 2019-02-05 PROCEDURE — 1101F PT FALLS ASSESS-DOCD LE1/YR: CPT | Mod: CPTII,S$GLB,, | Performed by: FAMILY MEDICINE

## 2019-02-05 PROCEDURE — 3078F PR MOST RECENT DIASTOLIC BLOOD PRESSURE < 80 MM HG: ICD-10-PCS | Mod: CPTII,S$GLB,, | Performed by: FAMILY MEDICINE

## 2019-02-05 PROCEDURE — 99214 PR OFFICE/OUTPT VISIT, EST, LEVL IV, 30-39 MIN: ICD-10-PCS | Mod: S$GLB,,, | Performed by: FAMILY MEDICINE

## 2019-02-05 PROCEDURE — 99999 PR PBB SHADOW E&M-EST. PATIENT-LVL IV: CPT | Mod: PBBFAC,,, | Performed by: FAMILY MEDICINE

## 2019-02-05 PROCEDURE — 3074F PR MOST RECENT SYSTOLIC BLOOD PRESSURE < 130 MM HG: ICD-10-PCS | Mod: CPTII,S$GLB,, | Performed by: FAMILY MEDICINE

## 2019-02-05 PROCEDURE — 99214 OFFICE O/P EST MOD 30 MIN: CPT | Mod: S$GLB,,, | Performed by: FAMILY MEDICINE

## 2019-02-05 PROCEDURE — 99999 PR PBB SHADOW E&M-EST. PATIENT-LVL IV: ICD-10-PCS | Mod: PBBFAC,,, | Performed by: FAMILY MEDICINE

## 2019-02-05 RX ORDER — DILTIAZEM HYDROCHLORIDE 30 MG/1
TABLET, FILM COATED ORAL
Qty: 90 TABLET | Refills: 3 | Status: ON HOLD | OUTPATIENT
Start: 2019-02-05 | End: 2021-07-14 | Stop reason: HOSPADM

## 2019-02-05 RX ORDER — ACYCLOVIR 400 MG/1
400 TABLET ORAL 2 TIMES DAILY
Qty: 60 TABLET | Refills: 12 | Status: SHIPPED | OUTPATIENT
Start: 2019-02-05 | End: 2020-03-16 | Stop reason: SDUPTHER

## 2019-02-05 RX ORDER — LEVOTHYROXINE SODIUM 137 UG/1
137 TABLET ORAL
Qty: 30 TABLET | Refills: 11 | Status: SHIPPED | OUTPATIENT
Start: 2019-02-05 | End: 2019-10-21 | Stop reason: SDUPTHER

## 2019-02-05 RX ORDER — ACYCLOVIR 400 MG/1
400 TABLET ORAL 2 TIMES DAILY
Qty: 60 TABLET | Refills: 12 | Status: SHIPPED | OUTPATIENT
Start: 2019-02-05 | End: 2019-02-05 | Stop reason: SDUPTHER

## 2019-02-05 NOTE — PATIENT INSTRUCTIONS
Raynaud Disease  Your healthcare provider has told you that you have Raynaud disease. It is also called Raynaud phenomenon or Raynaud syndrome. There is no cure for Raynaud disease, but you can manage it to help prevent attacks.    What are the symptoms of Raynaud disease?  A Raynaud disease attack is often triggered by cold or stress. During an attack, blood vessels suddenly narrow (called vasospasm).  This most often happens in fingers and toes. In rare cases, the nose, ears, or even tongue are affected. Narrowed blood vessels reduce the blood supply to the area. The area then turns white, then blue. The area may feel numb or painful. As the attack passes, the blood vessels open. The affected area may turn bright red as it warms up, then returns to normal color.  What is the cause of Raynaud disease?  With Raynaud disease, it is believed that blood vessels in the affected areas overrespond to certain triggers, such as cold. This makes them narrow (called vasospasm) much more than in people without the disease. What causes the blood vessels to react so strongly to certain triggers is unknown. In between attacks, the blood vessels are normal and healthy. Attacks dont permanently damage the blood vessels, but may thicken the artery walls.   In some cases, Raynaud disease happens along with another disease or condition. This is often a connective tissue disorder, such as lupus, scleroderma, or rheumatoid arthritis. This is called secondary Raynaud disease (as opposed to primary Raynaud disease discussed above) and may be more severe. If this is the case for you, you and your healthcare provider can discuss treatment for the underlying condition.  What are the risk factors?  Risk factors for Raynaud disease include:  · Women are more likely to get Raynaud disease than men.  · Younger individuals are at higher risk, usually ages 15 to 30.  · Living in colder climates increases risk.  · Having a family member with  Raynaud disease increases one's risk.  · Underlying rheumatoid conditions may increase one's risk.   What are possible triggers?  Triggers for Raynaud disease include:   · Cold  · Stress  · Caffeine  · Smoking  · Repetitive movements  · Certain medicines, such as beta-blockers, migraine medicine, birth control pills and others  · Injury  How is Raynaud disease diagnosed?  Your description of your symptoms, a health history, and a physical exam are often enough for a diagnosis. Blood tests and other tests may be done to see if any underlying conditions are present and rule out other problems.  How is Raynaud disease treated?  There is no cure for Raynaud disease. But you can control symptoms and reduce the number and severity of attacks. For most people, avoiding triggers is enough to limit attacks. Your healthcare provider may suggest the following:  · Take precautions to help prevent your hands and feet from losing circulation. This includes:  ¨ Dressing warmly in cold weather.  ¨ Wear gloves or mittens when your hands may become cold, such as when you use the refrigerator or freezer.  ¨ Avoid stress and caffeine.  ¨ Exercise regularly. This may reduce the number and severity of attacks.   ¨ If you smoke, quitting may improve the condition. This is because smoking causes your blood vessels to narrow and reduces blood flow.  · Soak your hands or feet in warm (not hot) water. Do this at the first sign of attack. Keep soaking until your skin color returns to normal.  In some people, symptoms are persistent or troubling. For these cases, other treatments are a choice. Your healthcare provider can tell you more about the following:  · Prescription medicines that relax and widen blood vessels, such as calcium channel blockers. These may help relieve symptoms.  · Nerve surgery for severe cases that dont respond to other treatments. Surgery removes the nerves that surround the blood vessels in the hands and feet. Without  nerve stimulation, the blood vessels stay more relaxed. They are less likely to become very narrow due to stimulus. Nerves may be blocked using injections in some cases.  Most cases of Raynaud disease are not cause for concern. The disease doesnt get worse and isnt likely to cause any permanent damage. If attacks are severe, very prolonged, or very often, skin damage may result. Controlling attacks can help prevent this.  When to seek medical care  The following problems happen rarely, but they can be serious. Call your healthcare provider right away if you notice any of the following:  · Infection or sores on the skin  · A finger or toe turns black  · The skin breaks open on its own  · A rash develops  · A finger or toe joint becomes painful or swollen   Date Last Reviewed: 1/27/2016  © 0236-2598 [employer]. [address] [place] [address], [place] [address]. All rights reserved. This information is not intended as a substitute for professional medical care. Always follow your healthcare professional's instructions.

## 2019-02-06 NOTE — PROGRESS NOTES
Subjective:       Patient ID: Angelina Man is a 69 y.o. female.    Chief Complaint: Results (labs)   A discuss results of her last set of labs  Get refills of her medications and treatment for her Raynaud's which is seem to have gotten worse  HPI see above  Review of Systems   Constitutional: Negative for activity change and unexpected weight change.   HENT: Negative for hearing loss, rhinorrhea and trouble swallowing.    Eyes: Negative for discharge and visual disturbance.   Respiratory: Negative for chest tightness and wheezing.    Cardiovascular: Negative for chest pain and palpitations.   Gastrointestinal: Negative for blood in stool, constipation, diarrhea and vomiting.   Endocrine: Negative for polydipsia and polyuria.   Genitourinary: Negative for difficulty urinating, dysuria, hematuria and menstrual problem.   Musculoskeletal: Negative for arthralgias, joint swelling and neck pain.   Neurological: Negative for weakness and headaches.   Psychiatric/Behavioral: Negative for confusion and dysphoric mood.       Objective:      Physical Exam   Constitutional: She is oriented to person, place, and time. She appears well-developed and well-nourished. No distress.   HENT:   Head: Normocephalic and atraumatic.   Right Ear: External ear normal.   Left Ear: External ear normal.   Nose: Nose normal.   Mouth/Throat: Oropharynx is clear and moist.   Eyes: Conjunctivae and EOM are normal. Pupils are equal, round, and reactive to light.   Neck: Normal range of motion. Neck supple. No JVD present. No thyromegaly present.   Pulmonary/Chest: Effort normal.   Musculoskeletal: Normal range of motion. She exhibits tenderness and deformity. She exhibits no edema.   Neurological: She is alert and oriented to person, place, and time. She displays normal reflexes. No cranial nerve deficit or sensory deficit. She exhibits normal muscle tone. Coordination normal.   Skin: Skin is warm and dry. No rash noted. She is not diaphoretic.  No erythema. No pallor.   Psychiatric: She has a normal mood and affect. Her behavior is normal. Judgment and thought content normal.   Nursing note and vitals reviewed.      Assessment:       1. Raynaud's phenomenon without gangrene    2. Hypothyroidism, unspecified type    3. HSV infection        Plan:         see med card dated 02/05/2019   tretinoin (RETIN-A) 0.1 % cream Nightly       Alternative Therapy - Antiarthritics    glucosamine sulfate 2KCl 1,000 mg Tab 1 tablet, Daily       Alternative Therapy - Unclassified    chondroitin sulfate-vit C-Mn (CHONDROITIN SULFATE COMPLEX) 400-60-2.5 mg Cap 1 tablet, 2 times daily        ginseng 200 mg Cap 200 mg, Daily PRN       Analgesic or Antipyretic Non-Opioid    acetaminophen (TYLENOL) 500 MG tablet 500 mg, Every 6 hours PRN       Antianginal - Coronary Vasodilators (Nitrates)    nitroGLYCERIN 2% TD oint (NITROGLYN) 2 % ointment        Antianxiety Agent - Benzodiazepines, Anticonvulsant - Benzodiazepines, Benzodiazepines    clonazePAM (KLONOPIN) 1 MG tablet 1.5 mg, Nightly PRN       Antidepressant-Norepinephrine and Dopamine Reuptake Inhibitors (NDRIs)    buPROPion (WELLBUTRIN XL) 150 MG TB24 tablet 150 mg, Daily       Antihistamine - 1st Generation - Ethanolamines, Antihistamines - 1st Generation, Sedative-Hypnotic - Antihistamines    diphenhydrAMINE (BENADRYL) 25 mg capsule 25 mg, Nightly PRN       Calcium Channel Blockers - Benzothiazepines    diltiaZEM (CARDIZEM) 30 MG tablet        Digestive Enzymes    digestive enzymes Tab 1 tablet, Daily       Diuretic - Carbonic Anhydrase Inhibitors    acetazolamide 25 mg/mL Susp        GI Antispasmodic - Synthetic Tertiary Amines    dicyclomine (BENTYL) 20 mg tablet 20 mg, 3 times daily PRN       Herpes Antiviral Agent - Purine Analogs    acyclovir (ZOVIRAX) 400 MG tablet 400 mg, 2 times daily       IBS Agent - Gastrointestinal Chloride Channel Activator Agents, Irritable Bowel Syndrome (IBS) Agents        Minerals and  Electrolytes - Calcium Replacement/Vitamin D Combinations    calcium-vitamin D 500 mg(1,250mg) -200 unit per tablet 1 tablet, 2 times daily with meals       Minerals and Electrolytes - Zinc    zinc 50 mg Tab 50 mg, Daily       Multivitamins    multivitamin (THERAGRAN) per tablet 1 tablet, Daily       Nasal Corticosteroids    fluticasone (FLONASE) 50 mcg/actuation nasal spray 1 spray, Daily        Patient taking differently: 1 spray Each Nare Daily PRN, Reported on 4/16/2018      Thyroid Hormones - Synthetic T4 (Thyroxine)    levothyroxine (SYNTHROID) 137 MCG Tab tablet 137 mcg, Before breakfast       Vitamins - Biotin    biotin 1 mg tablet 1 mg, 2 times daily       Vitamins - C, Ascorbic Acid and Derivatives    ascorbic acid (VITAMIN C) 1000 MG tablet 1,000 mg, Daily         Follow-up in 3 months to review the blood test for thyroid

## 2019-02-11 ENCOUNTER — INITIAL CONSULT (OUTPATIENT)
Dept: DERMATOLOGY | Facility: CLINIC | Age: 69
End: 2019-02-11
Payer: MEDICARE

## 2019-02-11 DIAGNOSIS — L57.0 AK (ACTINIC KERATOSIS): ICD-10-CM

## 2019-02-11 DIAGNOSIS — B35.1 ONYCHOMYCOSIS: Primary | ICD-10-CM

## 2019-02-11 PROCEDURE — 99999 PR PBB SHADOW E&M-EST. PATIENT-LVL II: ICD-10-PCS | Mod: PBBFAC,,, | Performed by: DERMATOLOGY

## 2019-02-11 PROCEDURE — 17000 PR DESTRUCTION(LASER SURGERY,CRYOSURGERY,CHEMOSURGERY),PREMALIGNANT LESIONS,FIRST LESION: ICD-10-PCS | Mod: S$GLB,,, | Performed by: DERMATOLOGY

## 2019-02-11 PROCEDURE — 99999 PR PBB SHADOW E&M-EST. PATIENT-LVL II: CPT | Mod: PBBFAC,,, | Performed by: DERMATOLOGY

## 2019-02-11 PROCEDURE — 1101F PR PT FALLS ASSESS DOC 0-1 FALLS W/OUT INJ PAST YR: ICD-10-PCS | Mod: CPTII,S$GLB,, | Performed by: DERMATOLOGY

## 2019-02-11 PROCEDURE — 99213 OFFICE O/P EST LOW 20 MIN: CPT | Mod: 25,S$GLB,, | Performed by: DERMATOLOGY

## 2019-02-11 PROCEDURE — 99213 PR OFFICE/OUTPT VISIT, EST, LEVL III, 20-29 MIN: ICD-10-PCS | Mod: 25,S$GLB,, | Performed by: DERMATOLOGY

## 2019-02-11 PROCEDURE — 1101F PT FALLS ASSESS-DOCD LE1/YR: CPT | Mod: CPTII,S$GLB,, | Performed by: DERMATOLOGY

## 2019-02-11 PROCEDURE — 17000 DESTRUCT PREMALG LESION: CPT | Mod: S$GLB,,, | Performed by: DERMATOLOGY

## 2019-02-11 RX ORDER — CICLOPIROX OLAMINE 7.7 MG/100ML
SUSPENSION TOPICAL
Qty: 30 ML | Refills: 6 | Status: SHIPPED | OUTPATIENT
Start: 2019-02-11 | End: 2021-10-07

## 2019-02-11 NOTE — PROGRESS NOTES
Subjective:       Patient ID:  Angelina Man is a 69 y.o. female who presents for   Chief Complaint   Patient presents with    Spot    Nail Problem     Pt present today to have a spot rechecked on chest. Pt stated her PCP froze the spot and told her that a dermatologist should recheck the spot. Pt also present for a nail fungus. Has had the fungus for about two months with no treatment. Fungus is on pt's right hand on the third finger.       Spot  - Initial    Nail Problem         Review of Systems   Constitutional: Negative for fever, chills and fatigue.   Skin: Positive for daily sunscreen use and activity-related sunscreen use. Negative for tendency to form keloidal scars and recent sunburn.   Hematologic/Lymphatic: Bruises/bleeds easily.        Objective:    Physical Exam   Constitutional: She appears well-developed and well-nourished. No distress.   Neurological: She is alert and oriented to person, place, and time. She is not disoriented.   Psychiatric: She has a normal mood and affect.   Skin:   Areas Examined (abnormalities noted in diagram):   Chest / Axilla Inspection Performed  Nails and Digits Inspection Performed                  Diagram Legend     Erythematous scaling macule/papule c/w actinic keratosis       Vascular papule c/w angioma      Pigmented verrucoid papule/plaque c/w seborrheic keratosis      Yellow umbilicated papule c/w sebaceous hyperplasia      Irregularly shaped tan macule c/w lentigo     1-2 mm smooth white papules consistent with Milia      Movable subcutaneous cyst with punctum c/w epidermal inclusion cyst      Subcutaneous movable cyst c/w pilar cyst      Firm pink to brown papule c/w dermatofibroma      Pedunculated fleshy papule(s) c/w skin tag(s)      Evenly pigmented macule c/w junctional nevus     Mildly variegated pigmented, slightly irregular-bordered macule c/w mildly atypical nevus      Flesh colored to evenly pigmented papule c/w intradermal nevus       Pink pearly  papule/plaque c/w basal cell carcinoma      Erythematous hyperkeratotic cursted plaque c/w SCC      Surgical scar with no sign of skin cancer recurrence      Open and closed comedones      Inflammatory papules and pustules      Verrucoid papule consistent consistent with wart     Erythematous eczematous patches and plaques     Dystrophic onycholytic nail with subungual debris c/w onychomycosis     Umbilicated papule    Erythematous-base heme-crusted tan verrucoid plaque consistent with inflamed seborrheic keratosis     Erythematous Silvery Scaling Plaque c/w Psoriasis     See annotation      Assessment / Plan:        Onychomycosis  Pt with acrylic nail in place, cannot do culture of nail plate  -     ciclopirox (LOPROX) 0.77 % Susp; aaa qhs  Dispense: 30 mL; Refill: 6    AK (actinic keratosis)  Cryosurgery Procedure Note    Verbal consent from the patient is obtained including, but not limited to, risk of hypopigmentation/hyperpigmentation, scar, recurrence of lesion. The patient is aware of the precancerous quality and need for treatment of these lesions. Liquid nitrogen cryosurgery is applied to the 1 actinic keratoses, as detailed in the physical exam, to produce a freeze injury. The patient is aware that blisters may form and is instructed on wound care with gentle cleansing and use of vaseline ointment to keep moist until healed. The patient is supplied a handout on cryosurgery and is instructed to call if lesions do not completely resolve.               Follow-up if symptoms worsen or fail to improve.

## 2019-02-11 NOTE — LETTER
February 11, 2019      Marli Wilkinson MD  [address] [facility]  [address]  [facility] [address]           [place] - Dermatology  [address] [place] [address]  Phone: [phone]  Fax: [phone]          Patient: Angelina Man   MR Number: [id number]   YOB: 1950   Date of Visit: 2/11/2019       Dear Dr. Marli Wilkinson:    Thank you for referring Angelina Man to me for evaluation. Attached you will find relevant portions of my assessment and plan of care.    If you have questions, please do not hesitate to call me. I look forward to following Angelina Man along with you.    Sincerely,    Fozia Garcia MD    Enclosure  CC:  No Recipients    If you would like to receive this communication electronically, please contact [email or web address] or [phone] to request more information on [employer] Link access.    For providers and/or their staff who would like to refer a patient to [facility], please contact us through our one-stop-shop provider referral line, [facility], at [phone].    If you feel you have received this communication in error or would no longer like to receive these types of communications, please e-mail [email or web address]

## 2019-03-07 ENCOUNTER — PATIENT MESSAGE (OUTPATIENT)
Dept: SPINE | Facility: CLINIC | Age: 69
End: 2019-03-07

## 2019-04-07 ENCOUNTER — PATIENT MESSAGE (OUTPATIENT)
Dept: SPINE | Facility: CLINIC | Age: 69
End: 2019-04-07

## 2019-04-09 ENCOUNTER — HOSPITAL ENCOUNTER (OUTPATIENT)
Dept: RADIOLOGY | Facility: OTHER | Age: 69
Discharge: HOME | End: 2019-04-09
Attending: NEUROLOGICAL SURGERY
Payer: MEDICARE

## 2019-04-09 ENCOUNTER — OFFICE VISIT (OUTPATIENT)
Dept: SPINE | Facility: CLINIC | Age: 69
End: 2019-04-09
Attending: NEUROLOGICAL SURGERY
Payer: MEDICARE

## 2019-04-09 VITALS — SYSTOLIC BLOOD PRESSURE: 128 MMHG | DIASTOLIC BLOOD PRESSURE: 76 MMHG | HEART RATE: 65 BPM

## 2019-04-09 DIAGNOSIS — Z98.1 S/P LUMBAR SPINAL FUSION: ICD-10-CM

## 2019-04-09 PROCEDURE — 3074F PR MOST RECENT SYSTOLIC BLOOD PRESSURE < 130 MM HG: ICD-10-PCS | Mod: CPTII,S$GLB,, | Performed by: NEUROLOGICAL SURGERY

## 2019-04-09 PROCEDURE — 3074F SYST BP LT 130 MM HG: CPT | Mod: CPTII,S$GLB,, | Performed by: NEUROLOGICAL SURGERY

## 2019-04-09 PROCEDURE — 99213 OFFICE O/P EST LOW 20 MIN: CPT | Mod: S$GLB,,, | Performed by: NEUROLOGICAL SURGERY

## 2019-04-09 PROCEDURE — 72131 CT LUMBAR SPINE W/O DYE: CPT | Mod: TC,HCNC

## 2019-04-09 PROCEDURE — 72131 CT LUMBAR SPINE W/O DYE: CPT | Mod: 26,HCNC,, | Performed by: RADIOLOGY

## 2019-04-09 PROCEDURE — 99999 PR PBB SHADOW E&M-EST. PATIENT-LVL III: CPT | Mod: PBBFAC,HCNC,, | Performed by: NEUROLOGICAL SURGERY

## 2019-04-09 PROCEDURE — 3078F DIAST BP <80 MM HG: CPT | Mod: CPTII,S$GLB,, | Performed by: NEUROLOGICAL SURGERY

## 2019-04-09 PROCEDURE — 3078F PR MOST RECENT DIASTOLIC BLOOD PRESSURE < 80 MM HG: ICD-10-PCS | Mod: CPTII,S$GLB,, | Performed by: NEUROLOGICAL SURGERY

## 2019-04-09 PROCEDURE — 72131 CT LUMBAR SPINE WITHOUT CONTRAST: ICD-10-PCS | Mod: 26,HCNC,, | Performed by: RADIOLOGY

## 2019-04-09 PROCEDURE — 1101F PT FALLS ASSESS-DOCD LE1/YR: CPT | Mod: CPTII,S$GLB,, | Performed by: NEUROLOGICAL SURGERY

## 2019-04-09 PROCEDURE — 99999 PR PBB SHADOW E&M-EST. PATIENT-LVL III: ICD-10-PCS | Mod: PBBFAC,HCNC,, | Performed by: NEUROLOGICAL SURGERY

## 2019-04-09 PROCEDURE — 1101F PR PT FALLS ASSESS DOC 0-1 FALLS W/OUT INJ PAST YR: ICD-10-PCS | Mod: CPTII,S$GLB,, | Performed by: NEUROLOGICAL SURGERY

## 2019-04-09 PROCEDURE — 99213 PR OFFICE/OUTPT VISIT, EST, LEVL III, 20-29 MIN: ICD-10-PCS | Mod: S$GLB,,, | Performed by: NEUROLOGICAL SURGERY

## 2019-04-09 NOTE — PROGRESS NOTES
CHIEF COMPLAINT:    1 year follow up    HPI:    Angelina Man is a 69 y.o.-year-old female who presents today for post-operative follow-up. She is s/p Rt L4-5,L5-S1 MIS TLIF & Perc Screws that was done by Dr. Hoskins on 2/5/2018. Currently, the patient's symptoms are better since surgery. The patient denies pain. She denies any new onset of weakness. The patient does describe a feeling of numbness to right foot. The patient rates the pain 0 on the pain scale. Post-op medications the patient is currently taking are: None    ROS:    NAD    PE:    AAOX3  PERRL  EOMI    Lumbar incision:  C/D/I    ROM:   Full    Sensation:  Intact to light touch (All 4 extremities)    Strength: Full strength      Deltoids Biceps Triceps Wrist Ext. Wrist Flex. Hand    RUE         LUE          Hip Flex. Knee Flex. Knee Ext. Dorsi Flex Plantar Flex EHL   RLE         LLE           Gait:  normal    DTR:  2+ and symmetric bilaterally'    No abnormal reflexes    SLR-negative    IMAGING:    XRays: none    CT: none    MRI: none    ASSESSMENT:   Doing better 1 year post op    PLAN:   Follow up 1 year with lumbar CT scan to assess progression of spinal fusion.

## 2019-04-10 ENCOUNTER — OFFICE VISIT (OUTPATIENT)
Dept: URGENT CARE | Facility: CLINIC | Age: 69
End: 2019-04-10
Payer: MEDICARE

## 2019-04-10 VITALS
WEIGHT: 115 LBS | SYSTOLIC BLOOD PRESSURE: 101 MMHG | OXYGEN SATURATION: 100 % | TEMPERATURE: 98 F | HEIGHT: 64 IN | DIASTOLIC BLOOD PRESSURE: 63 MMHG | RESPIRATION RATE: 18 BRPM | HEART RATE: 75 BPM | BODY MASS INDEX: 19.63 KG/M2

## 2019-04-10 DIAGNOSIS — T14.8XXA PUNCTURE WOUND: Primary | ICD-10-CM

## 2019-04-10 PROCEDURE — 3078F PR MOST RECENT DIASTOLIC BLOOD PRESSURE < 80 MM HG: ICD-10-PCS | Mod: CPTII,S$GLB,, | Performed by: NURSE PRACTITIONER

## 2019-04-10 PROCEDURE — 99214 PR OFFICE/OUTPT VISIT, EST, LEVL IV, 30-39 MIN: ICD-10-PCS | Mod: S$GLB,,, | Performed by: NURSE PRACTITIONER

## 2019-04-10 PROCEDURE — 3074F PR MOST RECENT SYSTOLIC BLOOD PRESSURE < 130 MM HG: ICD-10-PCS | Mod: CPTII,S$GLB,, | Performed by: NURSE PRACTITIONER

## 2019-04-10 PROCEDURE — 99214 OFFICE O/P EST MOD 30 MIN: CPT | Mod: S$GLB,,, | Performed by: NURSE PRACTITIONER

## 2019-04-10 PROCEDURE — 3078F DIAST BP <80 MM HG: CPT | Mod: CPTII,S$GLB,, | Performed by: NURSE PRACTITIONER

## 2019-04-10 PROCEDURE — 1101F PT FALLS ASSESS-DOCD LE1/YR: CPT | Mod: CPTII,S$GLB,, | Performed by: NURSE PRACTITIONER

## 2019-04-10 PROCEDURE — 3074F SYST BP LT 130 MM HG: CPT | Mod: CPTII,S$GLB,, | Performed by: NURSE PRACTITIONER

## 2019-04-10 PROCEDURE — 1101F PR PT FALLS ASSESS DOC 0-1 FALLS W/OUT INJ PAST YR: ICD-10-PCS | Mod: CPTII,S$GLB,, | Performed by: NURSE PRACTITIONER

## 2019-04-10 RX ORDER — MUPIROCIN 20 MG/G
OINTMENT TOPICAL
Qty: 22 G | Refills: 1 | Status: SHIPPED | OUTPATIENT
Start: 2019-04-10 | End: 2021-12-21

## 2019-04-10 RX ORDER — SULFAMETHOXAZOLE AND TRIMETHOPRIM 800; 160 MG/1; MG/1
1 TABLET ORAL 2 TIMES DAILY
Qty: 20 TABLET | Refills: 0 | Status: SHIPPED | OUTPATIENT
Start: 2019-04-10 | End: 2019-04-20

## 2019-04-10 NOTE — PATIENT INSTRUCTIONS
Patient educated to look for signs of increased infection such as purulent drainage, fever, or increased swelling.    If symptoms persist or worsen patient is to follow up immediately.      Cellulitis  Cellulitis is an infection of the deep layers of skin. A break in the skin, such as a cut or scratch, can let bacteria under the skin. If the bacteria get to deep layers of the skin, it can be serious. If not treated, cellulitis can get into the bloodstream and lymph nodes. The infection can then spread throughout the body. This causes serious illness.  Cellulitis causes the affected skin to become red, swollen, warm, and sore. The reddened areas have a visible border. An open sore may leak fluid (pus). You may have a fever, chills, and pain.  Cellulitis is treated with antibiotics taken for 7 to 10 days. An open sore may be cleaned and covered with cool wet gauze. Symptoms should get better 1 to 2 days after treatment is started. Make sure to take all the antibiotics for the full number of days until they are gone. Keep taking the medicine even if your symptoms go away.  Home care  Follow these tips:  · Limit the use of the part of your body with cellulitis.   · If the infection is on your leg, keep your leg raised while sitting. This will help to reduce swelling.  · Take all of the antibiotic medicine exactly as directed until it is gone. Do not miss any doses, especially during the first 7 days. Dont stop taking the medicine when your symptoms get better.  · Keep the affected area clean and dry.  · Wash your hands with soap and warm water before and after touching your skin. Anyone else who touches your skin should also wash his or her hands. Don't share towels.  Follow-up care  Follow up with your healthcare provider, or as advised. If your infection does not go away on the first antibiotic, your healthcare provider will prescribe a different one.  When to seek medical advice  Call your healthcare provider right  away if any of these occur:  · Red areas that spread  · Swelling or pain that gets worse  · Fluid leaking from the skin (pus)  · Fever higher of 100.4º F (38.0º C) or higher after 2 days on antibiotics  Date Last Reviewed: 9/1/2016  © 5498-8741 [employer]. [address] [place] [address], [place] [address]. All rights reserved. This information is not intended as a substitute for professional medical care. Always follow your healthcare professional's instructions.

## 2019-07-31 RX ORDER — LUBIPROSTONE 24 UG/1
CAPSULE, GELATIN COATED ORAL
Qty: 60 CAPSULE | Refills: 0 | Status: SHIPPED | OUTPATIENT
Start: 2019-07-31 | End: 2019-08-13 | Stop reason: SDUPTHER

## 2019-08-13 ENCOUNTER — OFFICE VISIT (OUTPATIENT)
Dept: PRIMARY CARE CLINIC | Facility: CLINIC | Age: 69
End: 2019-08-13
Payer: MEDICARE

## 2019-08-13 ENCOUNTER — TELEPHONE (OUTPATIENT)
Dept: PRIMARY CARE CLINIC | Facility: CLINIC | Age: 69
End: 2019-08-13

## 2019-08-13 VITALS
DIASTOLIC BLOOD PRESSURE: 72 MMHG | TEMPERATURE: 98 F | SYSTOLIC BLOOD PRESSURE: 116 MMHG | BODY MASS INDEX: 18.51 KG/M2 | WEIGHT: 108.44 LBS | HEART RATE: 67 BPM | HEIGHT: 64 IN

## 2019-08-13 DIAGNOSIS — K58.9 IRRITABLE BOWEL SYNDROME, UNSPECIFIED TYPE: Primary | ICD-10-CM

## 2019-08-13 DIAGNOSIS — Z00.00 ROUTINE ADULT HEALTH MAINTENANCE: Primary | ICD-10-CM

## 2019-08-13 DIAGNOSIS — F41.9 ANXIETY: ICD-10-CM

## 2019-08-13 DIAGNOSIS — E03.9 HYPOTHYROIDISM, UNSPECIFIED TYPE: ICD-10-CM

## 2019-08-13 PROCEDURE — 3074F PR MOST RECENT SYSTOLIC BLOOD PRESSURE < 130 MM HG: ICD-10-PCS | Mod: HCNC,CPTII,S$GLB, | Performed by: FAMILY MEDICINE

## 2019-08-13 PROCEDURE — 99999 PR PBB SHADOW E&M-EST. PATIENT-LVL III: ICD-10-PCS | Mod: PBBFAC,HCNC,, | Performed by: FAMILY MEDICINE

## 2019-08-13 PROCEDURE — 1101F PR PT FALLS ASSESS DOC 0-1 FALLS W/OUT INJ PAST YR: ICD-10-PCS | Mod: HCNC,CPTII,S$GLB, | Performed by: FAMILY MEDICINE

## 2019-08-13 PROCEDURE — 99999 PR PBB SHADOW E&M-EST. PATIENT-LVL III: CPT | Mod: PBBFAC,HCNC,, | Performed by: FAMILY MEDICINE

## 2019-08-13 PROCEDURE — 3074F SYST BP LT 130 MM HG: CPT | Mod: HCNC,CPTII,S$GLB, | Performed by: FAMILY MEDICINE

## 2019-08-13 PROCEDURE — 99214 PR OFFICE/OUTPT VISIT, EST, LEVL IV, 30-39 MIN: ICD-10-PCS | Mod: HCNC,S$GLB,, | Performed by: FAMILY MEDICINE

## 2019-08-13 PROCEDURE — 1101F PT FALLS ASSESS-DOCD LE1/YR: CPT | Mod: HCNC,CPTII,S$GLB, | Performed by: FAMILY MEDICINE

## 2019-08-13 PROCEDURE — 3078F PR MOST RECENT DIASTOLIC BLOOD PRESSURE < 80 MM HG: ICD-10-PCS | Mod: HCNC,CPTII,S$GLB, | Performed by: FAMILY MEDICINE

## 2019-08-13 PROCEDURE — 3078F DIAST BP <80 MM HG: CPT | Mod: HCNC,CPTII,S$GLB, | Performed by: FAMILY MEDICINE

## 2019-08-13 PROCEDURE — 99214 OFFICE O/P EST MOD 30 MIN: CPT | Mod: HCNC,S$GLB,, | Performed by: FAMILY MEDICINE

## 2019-08-13 RX ORDER — LUBIPROSTONE 24 UG/1
24 CAPSULE ORAL 2 TIMES DAILY
Qty: 60 CAPSULE | Refills: 0 | Status: SHIPPED | OUTPATIENT
Start: 2019-08-13 | End: 2020-12-17 | Stop reason: SDUPTHER

## 2019-08-13 RX ORDER — CLONAZEPAM 1 MG/1
1.5 TABLET ORAL NIGHTLY PRN
Qty: 45 TABLET | Refills: 5 | Status: SHIPPED | OUTPATIENT
Start: 2019-08-13 | End: 2020-04-25 | Stop reason: SDUPTHER

## 2019-08-13 RX ORDER — LACTULOSE 10 G/15ML
20 SOLUTION ORAL 3 TIMES DAILY
Qty: 900 ML | Refills: 3 | Status: SHIPPED | OUTPATIENT
Start: 2019-08-13 | End: 2019-08-23

## 2019-08-13 NOTE — TELEPHONE ENCOUNTER
Labs linked to appt    ----- Message from Racheal Lima sent at 8/13/2019  1:56 PM CDT -----  Doctor appointment and lab have been scheduled.  Please link lab orders to the lab appointment.  Date of doctor appointment: 12/12    Date of lab appointment:  12/5  Physical or EP: Physical   Comments:

## 2019-08-14 NOTE — PROGRESS NOTES
Subjective:       Patient ID: Angelina Man is a 69 y.o. female.    Chief Complaint: Medication Refill (clonazepam)   Patient use this medication for sleep only.  Patient is still having problems with constipation associated with irritable bowel  Patient has been using Amitiza which is expensive and her insurance no longer covers this medication.  Patient was advised to see her primary care get a replacement medication to help with constipation  HPI see above  Review of Systems   Constitutional: Negative for activity change and unexpected weight change.   HENT: Negative for hearing loss, rhinorrhea and trouble swallowing.    Eyes: Negative for discharge and visual disturbance.   Respiratory: Negative for chest tightness and wheezing.    Cardiovascular: Negative for chest pain and palpitations.   Gastrointestinal: Positive for constipation. Negative for blood in stool, diarrhea and vomiting.   Endocrine: Negative for polydipsia and polyuria.   Genitourinary: Negative for difficulty urinating, dysuria, hematuria and menstrual problem.   Musculoskeletal: Positive for neck pain. Negative for arthralgias and joint swelling.   Neurological: Negative for weakness and headaches.   Psychiatric/Behavioral: Negative for confusion and dysphoric mood.       Objective:      Physical Exam   Constitutional: She appears well-developed.   HENT:   Head: Normocephalic and atraumatic.   Eyes: Pupils are equal, round, and reactive to light. Conjunctivae and EOM are normal.   Neck: Normal range of motion. Neck supple.   Cardiovascular: Normal rate, regular rhythm, normal heart sounds and intact distal pulses.   Pulmonary/Chest: Effort normal and breath sounds normal.   Abdominal: Soft. Bowel sounds are normal. She exhibits distension.   Skin: Skin is warm and dry.   Psychiatric: She has a normal mood and affect. Her behavior is normal. Judgment and thought content normal.   Nursing note and vitals reviewed.      Assessment:       1.  Irritable bowel syndrome, unspecified type    2. Anxiety        Plan:     Angelina was seen today for medication refill.    Diagnoses and all orders for this visit:    Irritable bowel syndrome, unspecified type  -     lactulose (CHRONULAC) 20 gram/30 mL Soln; Take 30 mLs (20 g total) by mouth 3 (three) times daily. for 10 days  -     lubiprostone (AMITIZA) 24 MCG Cap; Take 1 capsule (24 mcg total) by mouth 2 (two) times daily.    Anxiety  -     clonazePAM (KLONOPIN) 1 MG tablet; Take 1.5 tablets (1.5 mg total) by mouth nightly as needed.

## 2019-08-15 ENCOUNTER — LAB VISIT (OUTPATIENT)
Dept: LAB | Facility: HOSPITAL | Age: 69
End: 2019-08-15
Attending: INTERNAL MEDICINE
Payer: MEDICARE

## 2019-08-15 ENCOUNTER — OFFICE VISIT (OUTPATIENT)
Dept: GASTROENTEROLOGY | Facility: CLINIC | Age: 69
End: 2019-08-15
Payer: MEDICARE

## 2019-08-15 VITALS
DIASTOLIC BLOOD PRESSURE: 77 MMHG | BODY MASS INDEX: 18.6 KG/M2 | SYSTOLIC BLOOD PRESSURE: 116 MMHG | HEART RATE: 75 BPM | HEIGHT: 64 IN | WEIGHT: 108.94 LBS

## 2019-08-15 DIAGNOSIS — K58.1 IRRITABLE BOWEL SYNDROME WITH CONSTIPATION: ICD-10-CM

## 2019-08-15 DIAGNOSIS — K21.9 GASTROESOPHAGEAL REFLUX DISEASE, ESOPHAGITIS PRESENCE NOT SPECIFIED: ICD-10-CM

## 2019-08-15 DIAGNOSIS — K58.1 IRRITABLE BOWEL SYNDROME WITH CONSTIPATION: Primary | ICD-10-CM

## 2019-08-15 LAB
IGA SERPL-MCNC: 263 MG/DL (ref 40–350)
IGG SERPL-MCNC: 766 MG/DL (ref 650–1600)
IGM SERPL-MCNC: 36 MG/DL (ref 50–300)

## 2019-08-15 PROCEDURE — 3074F PR MOST RECENT SYSTOLIC BLOOD PRESSURE < 130 MM HG: ICD-10-PCS | Mod: HCNC,CPTII,S$GLB, | Performed by: INTERNAL MEDICINE

## 2019-08-15 PROCEDURE — 1101F PT FALLS ASSESS-DOCD LE1/YR: CPT | Mod: HCNC,CPTII,S$GLB, | Performed by: INTERNAL MEDICINE

## 2019-08-15 PROCEDURE — 99999 PR PBB SHADOW E&M-EST. PATIENT-LVL III: ICD-10-PCS | Mod: PBBFAC,HCNC,, | Performed by: INTERNAL MEDICINE

## 2019-08-15 PROCEDURE — 3078F DIAST BP <80 MM HG: CPT | Mod: HCNC,CPTII,S$GLB, | Performed by: INTERNAL MEDICINE

## 2019-08-15 PROCEDURE — 99204 OFFICE O/P NEW MOD 45 MIN: CPT | Mod: HCNC,S$GLB,, | Performed by: INTERNAL MEDICINE

## 2019-08-15 PROCEDURE — 1101F PR PT FALLS ASSESS DOC 0-1 FALLS W/OUT INJ PAST YR: ICD-10-PCS | Mod: HCNC,CPTII,S$GLB, | Performed by: INTERNAL MEDICINE

## 2019-08-15 PROCEDURE — 99204 PR OFFICE/OUTPT VISIT, NEW, LEVL IV, 45-59 MIN: ICD-10-PCS | Mod: HCNC,S$GLB,, | Performed by: INTERNAL MEDICINE

## 2019-08-15 PROCEDURE — 82784 ASSAY IGA/IGD/IGG/IGM EACH: CPT | Mod: 59,HCNC

## 2019-08-15 PROCEDURE — 83516 IMMUNOASSAY NONANTIBODY: CPT | Mod: HCNC

## 2019-08-15 PROCEDURE — 99999 PR PBB SHADOW E&M-EST. PATIENT-LVL III: CPT | Mod: PBBFAC,HCNC,, | Performed by: INTERNAL MEDICINE

## 2019-08-15 PROCEDURE — 3078F PR MOST RECENT DIASTOLIC BLOOD PRESSURE < 80 MM HG: ICD-10-PCS | Mod: HCNC,CPTII,S$GLB, | Performed by: INTERNAL MEDICINE

## 2019-08-15 PROCEDURE — 3074F SYST BP LT 130 MM HG: CPT | Mod: HCNC,CPTII,S$GLB, | Performed by: INTERNAL MEDICINE

## 2019-08-15 NOTE — PROGRESS NOTES
Subjective:       Patient ID: Angelina Man is a 69 y.o. female.    Chief Complaint: GI Problem (bloating)    HPI  Review of Systems   Constitutional: Negative for activity change, appetite change, chills, diaphoresis, fatigue, fever and unexpected weight change.   HENT: Negative for congestion, ear pain, mouth sores, nosebleeds, postnasal drip, rhinorrhea, sinus pressure, sore throat, trouble swallowing and voice change.    Eyes: Negative for pain.   Respiratory: Negative for cough, shortness of breath and wheezing.    Cardiovascular: Negative for chest pain, palpitations and leg swelling.   Genitourinary: Negative for difficulty urinating, dysuria, flank pain, hematuria and menstrual problem.   Musculoskeletal: Negative for arthralgias, back pain, gait problem, joint swelling, myalgias and neck pain.   Skin: Negative for rash.   Neurological: Negative for dizziness, tremors, syncope, numbness and headaches.   Hematological: Negative for adenopathy. Does not bruise/bleed easily.   Psychiatric/Behavioral: Negative for agitation, behavioral problems, confusion, decreased concentration and dysphoric mood. The patient is not nervous/anxious.        Objective:      Physical Exam   Constitutional: She is oriented to person, place, and time. She appears well-developed and well-nourished. No distress.   HENT:   Head: Normocephalic and atraumatic.   Right Ear: External ear normal.   Left Ear: External ear normal.   Nose: Nose normal.   Mouth/Throat: Oropharynx is clear and moist. No oropharyngeal exudate.   Eyes: Pupils are equal, round, and reactive to light. Conjunctivae are normal. No scleral icterus.   Neck: Normal range of motion. Neck supple. No thyromegaly present.   Cardiovascular: Normal rate, regular rhythm, normal heart sounds and intact distal pulses. Exam reveals no gallop.   No murmur heard.  Pulmonary/Chest: Effort normal and breath sounds normal. She has no wheezes. She has no rales.   Abdominal: Soft.  Normal appearance. She exhibits distension. She exhibits no shifting dullness, no fluid wave, no abdominal bruit and no mass. Bowel sounds are increased. There is no hepatosplenomegaly. There is no tenderness.   Musculoskeletal: Normal range of motion. She exhibits no edema or tenderness.   Lymphadenopathy:     She has no cervical adenopathy.   Neurological: She is alert and oriented to person, place, and time. No cranial nerve deficit.   Skin: Skin is warm and dry. No rash noted.   Psychiatric: She has a normal mood and affect. Her behavior is normal. Judgment and thought content normal.       Assessment:       1. Irritable bowel syndrome with constipation    2. Gastroesophageal reflux disease, esophagitis presence not specified        Plan:         Reina is here today in consultation.  She has seen gastroenterology before , but was many years ago and I have no record of that.  She starts off by saying that her entire life she has had a problem with abdominal bloating.  Also her entire life she has had a problem with constipation and she has been laxative dependent for many years.  With regard to the bloating is present every day.  It is worse in the evening.  It is sometimes associated with abdominal pain. This pain is generalized across the lower abdomen.  It is possibly worse on the right side of the abdomen.  The pain seems to come on after certain foods.  She will go several days without a bowel movement but she does not do that because she will take laxatives.  When she takes the laxatives she will often have loose stool the next day. Often urgency.  She will have a sensation of incomplete evacuation.  Amitiza helps her but this also gives her loose stools.  It is also expensive is is not covered by her insurance very well. Her bloating is relieved after having a bowel movement.  When she tries to eat more fiber that makes all of her symptoms worse.  She also has occasional reflux symptoms.  Occasion the   symptoms are severe.  She gets relief with Gaviscon.  She describes intermittent solid food dysphagia.    Family history  Her father had colon cancer and unfortunately her son has been recently diagnosed with esophageal cancer    Social history she has excellent exercise activity.  Admittedly she does not drink enough water but is trying to do so now.  She  has tried various over-the-counter preparations including probiotics    Past medication trials  Amitiza this helps  Chronic lack this provides some relief but she has release stuck with that.  MiraLax initially she said it did not work for her but she has never really tried it on an every day basis.  Bentyl this was given for pain it may help with pain but seems with constipation worse  Linzess never tried  Gas-X he takes this regularly  [place]oushatta caps never taken  Beano may have been briefly tried in the past  I veronique never tried    Assessment  1.  IBS constipation. She has already had a colonoscopy this was done by Dr. barry less than 2 years ago.  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  He recommended a follow-up in 5 years.  She does not need any repeat colonoscopy.  I do not recommend any imaging.  I wonder whether her  significant bloating is associated with small bowel bacterial overgrowth.  I recommended that we do a breath test looking for that and if positive treat accordingly.  I have also recommended that we see if Linzess is covered by her insurance as this might give her better relief if taken on a regular basis.  If Linzess is not covered then we might try a combination of MiraLax every day combined with something for gas like [place] caps.  2.  Gastroesophageal reflux because she has the dysphagia, which is probably just due to a lower esophageal ring, I have recommended EGD.    Recommendation  1.  Breath test for SIBO  2.  Linzess   3.  EGD   4.  Consider charcoal capsules MiraLax combination   5.  Consider dietitian consult for FODMAP or possibly other  diets.  6.  Follow-up in our department with nurse practitioner in 4 months

## 2019-08-15 NOTE — LETTER
August 15, 2019      Marli Wilkinson MD  [address]  [facility] [address]           [facility] - Gastroenterology  [address]  [facility] [address]  Phone: [phone]  Fax: [phone]          Patient: Angelina Man   MR Number: [id number]   YOB: 1950   Date of Visit: 8/15/2019       Dear Dr. Marli Wilkinson:    Thank you for referring Angelina Man to me for evaluation. Attached you will find relevant portions of my assessment and plan of care.    If you have questions, please do not hesitate to call me. I look forward to following Angelina Man along with you.    Sincerely,    Rg Milton MD    Enclosure  CC:  No Recipients    If you would like to receive this communication electronically, please contact [email or web address] or [phone] to request more information on [employer] Link access.    For providers and/or their staff who would like to refer a patient to [facility], please contact us through our one-stop-shop provider referral line, [facility], at [phone].    If you feel you have received this communication in error or would no longer like to receive these types of communications, please e-mail [email or web address]

## 2019-08-19 ENCOUNTER — PATIENT MESSAGE (OUTPATIENT)
Dept: ENDOSCOPY | Facility: HOSPITAL | Age: 69
End: 2019-08-19

## 2019-08-19 LAB — TTG IGA SER-ACNC: 5 UNITS

## 2019-08-28 ENCOUNTER — TELEPHONE (OUTPATIENT)
Dept: ENDOSCOPY | Facility: HOSPITAL | Age: 69
End: 2019-08-28

## 2019-09-12 ENCOUNTER — TELEPHONE (OUTPATIENT)
Dept: GASTROENTEROLOGY | Facility: CLINIC | Age: 69
End: 2019-09-12

## 2019-09-12 ENCOUNTER — PATIENT MESSAGE (OUTPATIENT)
Dept: OPTOMETRY | Facility: CLINIC | Age: 69
End: 2019-09-12

## 2019-09-12 NOTE — TELEPHONE ENCOUNTER
----- Message from Rg Milton MD sent at 9/12/2019 12:29 PM CDT -----  Contact: [phone]  My next recommendation was to try daily miralax with charcocaps for the gas.  And then she would f/u with Melissa  ----- Message -----  From: Melissa Shah MA  Sent: 9/12/2019   9:33 AM  To: Rg Milton MD    Spoke with patient regarding recent breath test.  Aware test was negative.  Stated she tried the Linzess but stopped after 3 days of taking it because it caused severe stomach pain.

## 2019-09-12 NOTE — TELEPHONE ENCOUNTER
----- Message from Rg Milton MD sent at 9/12/2019 12:29 PM CDT -----  Contact: [phone]  My next recommendation was to try daily miralax with charcocaps for the gas.  And then she would f/u with Melissa  ----- Message -----  From: Melissa Shah MA  Sent: 9/12/2019   9:33 AM  To: Rg Milton MD    Spoke with patient regarding recent breath test.  Aware test was negative.  Stated she tried the Linzess but stopped after 3 days of taking it because it caused severe stomach pain.

## 2019-09-12 NOTE — TELEPHONE ENCOUNTER
----- Message from Rg Milton MD sent at 9/12/2019 12:29 PM CDT -----  Contact: [phone]  My next recommendation was to try daily miralax with charcocaps for the gas.  And then she would f/u with Melissa  ----- Message -----  From: Melissa Shah MA  Sent: 9/12/2019   9:33 AM  To: Rg Milton MD    Spoke with patient regarding recent breath test.  Aware test was negative.  Stated she tried the Linzess but stopped after 3 days of taking it because it caused severe stomach pain.

## 2019-09-12 NOTE — TELEPHONE ENCOUNTER
----- Message from Rg Milton MD sent at 9/12/2019 12:29 PM CDT -----  Contact: [phone]  My next recommendation was to try daily miralax with charcocaps for the gas.  And then she would f/u with Melissa  ----- Message -----  From: Melissa Shah MA  Sent: 9/12/2019   9:33 AM  To: Rg Milton MD    Spoke with patient regarding recent breath test.  Aware test was negative.  Stated she tried the Linzess but stopped after 3 days of taking it because it caused severe stomach pain.

## 2019-10-09 ENCOUNTER — ANESTHESIA EVENT (OUTPATIENT)
Dept: ENDOSCOPY | Facility: HOSPITAL | Age: 69
End: 2019-10-09
Payer: MEDICARE

## 2019-10-10 ENCOUNTER — ANESTHESIA (OUTPATIENT)
Dept: ENDOSCOPY | Facility: HOSPITAL | Age: 69
End: 2019-10-10
Payer: MEDICARE

## 2019-10-10 ENCOUNTER — HOSPITAL ENCOUNTER (OUTPATIENT)
Facility: HOSPITAL | Age: 69
Discharge: HOME | End: 2019-10-10
Attending: INTERNAL MEDICINE | Admitting: INTERNAL MEDICINE
Payer: MEDICARE

## 2019-10-10 VITALS
SYSTOLIC BLOOD PRESSURE: 123 MMHG | WEIGHT: 110 LBS | RESPIRATION RATE: 16 BRPM | HEIGHT: 64 IN | BODY MASS INDEX: 18.78 KG/M2 | OXYGEN SATURATION: 100 % | DIASTOLIC BLOOD PRESSURE: 66 MMHG | TEMPERATURE: 97 F | HEART RATE: 63 BPM

## 2019-10-10 DIAGNOSIS — K21.9 GASTROESOPHAGEAL REFLUX DISEASE, ESOPHAGITIS PRESENCE NOT SPECIFIED: Primary | ICD-10-CM

## 2019-10-10 PROCEDURE — 37000008 HC ANESTHESIA 1ST 15 MINUTES: Mod: HCNC | Performed by: INTERNAL MEDICINE

## 2019-10-10 PROCEDURE — 25000003 PHARM REV CODE 250: Mod: HCNC | Performed by: NURSE ANESTHETIST, CERTIFIED REGISTERED

## 2019-10-10 PROCEDURE — 37000009 HC ANESTHESIA EA ADD 15 MINS: Mod: HCNC | Performed by: INTERNAL MEDICINE

## 2019-10-10 PROCEDURE — E9220 PRA ENDO ANESTHESIA: ICD-10-PCS | Mod: HCNC,,, | Performed by: NURSE ANESTHETIST, CERTIFIED REGISTERED

## 2019-10-10 PROCEDURE — 43249 ESOPH EGD DILATION <30 MM: CPT | Mod: HCNC,,, | Performed by: INTERNAL MEDICINE

## 2019-10-10 PROCEDURE — 43249 ESOPH EGD DILATION <30 MM: CPT | Mod: HCNC | Performed by: INTERNAL MEDICINE

## 2019-10-10 PROCEDURE — E9220 PRA ENDO ANESTHESIA: HCPCS | Mod: HCNC,,, | Performed by: NURSE ANESTHETIST, CERTIFIED REGISTERED

## 2019-10-10 PROCEDURE — 43249 PR EGD, FLEX, W/BALL DILATION, < 30MM: ICD-10-PCS | Mod: HCNC,,, | Performed by: INTERNAL MEDICINE

## 2019-10-10 PROCEDURE — 63600175 PHARM REV CODE 636 W HCPCS: Mod: HCNC | Performed by: NURSE ANESTHETIST, CERTIFIED REGISTERED

## 2019-10-10 PROCEDURE — 63600175 PHARM REV CODE 636 W HCPCS: Mod: HCNC | Performed by: INTERNAL MEDICINE

## 2019-10-10 PROCEDURE — C1726 CATH, BAL DIL, NON-VASCULAR: HCPCS | Mod: HCNC | Performed by: INTERNAL MEDICINE

## 2019-10-10 RX ORDER — GLYCOPYRROLATE 0.2 MG/ML
INJECTION INTRAMUSCULAR; INTRAVENOUS
Status: DISCONTINUED | OUTPATIENT
Start: 2019-10-10 | End: 2019-10-10

## 2019-10-10 RX ORDER — SODIUM CHLORIDE 9 MG/ML
INJECTION, SOLUTION INTRAVENOUS CONTINUOUS
Status: CANCELLED | OUTPATIENT
Start: 2019-10-10

## 2019-10-10 RX ORDER — LIDOCAINE HCL/PF 100 MG/5ML
SYRINGE (ML) INTRAVENOUS
Status: DISCONTINUED | OUTPATIENT
Start: 2019-10-10 | End: 2019-10-10

## 2019-10-10 RX ORDER — SODIUM CHLORIDE 9 MG/ML
INJECTION, SOLUTION INTRAVENOUS CONTINUOUS
Status: DISCONTINUED | OUTPATIENT
Start: 2019-10-10 | End: 2019-10-10 | Stop reason: HOSPADM

## 2019-10-10 RX ORDER — SODIUM CHLORIDE 0.9 % (FLUSH) 0.9 %
10 SYRINGE (ML) INJECTION
Status: DISCONTINUED | OUTPATIENT
Start: 2019-10-10 | End: 2019-10-10 | Stop reason: HOSPADM

## 2019-10-10 RX ORDER — PROPOFOL 10 MG/ML
VIAL (ML) INTRAVENOUS
Status: DISCONTINUED | OUTPATIENT
Start: 2019-10-10 | End: 2019-10-10

## 2019-10-10 RX ADMIN — PROPOFOL 70 MG: 10 INJECTION, EMULSION INTRAVENOUS at 07:10

## 2019-10-10 RX ADMIN — PROPOFOL 20 MG: 10 INJECTION, EMULSION INTRAVENOUS at 07:10

## 2019-10-10 RX ADMIN — LIDOCAINE HYDROCHLORIDE 50 MG: 20 INJECTION, SOLUTION INTRAVENOUS at 07:10

## 2019-10-10 RX ADMIN — SODIUM CHLORIDE: 0.9 INJECTION, SOLUTION INTRAVENOUS at 07:10

## 2019-10-10 RX ADMIN — GLYCOPYRROLATE 0.2 MG: 0.2 INJECTION, SOLUTION INTRAMUSCULAR; INTRAVENOUS at 07:10

## 2019-10-10 NOTE — H&P
Short Stay Endoscopy History and Physical    PCP - Marli Wilkinson MD  Referring Physician - Rg Milton MD  [address] [place] [address]    Procedure - EGD  ASA - per anesthesia  Mallampati - per anesthesia  History of Anesthesia problems - no  Family history Anesthesia problems -  no   Plan of anesthesia - General    HPI  69 y.o. female with hypothyroidism, here for EGD evaluation of reflux and dysphagia    Reason for procedure: Reflux and dysphagia    ROS:  Constitutional: No fevers, chills, No weight loss  CV: No chest pain  Pulm: No cough, No shortness of breath  GI: see HPI    Medical History:  has a past medical history of Arthritis, Bronchitis, Cataract, Dry eyes, Hypothyroidism (6/23/2016), Rash, Squamous cell carcinoma, Status post lumbar surgery (6/23/2016), Stress fracture, and Thyroid disease.    Surgical History:  has a past surgical history that includes Colonoscopy; Cervical fusion; Colonoscopy (N/A, 11/14/2017); l4-5 MIS diskectomy (Right, 05/2016); l4-5 mid discectomy (Left, 03/2017); and Angioplasty.    Family History: family history includes Cancer in her mother; Cancer (age of onset: 80) in her father; Cataracts in her father; Colon cancer in her father; Diabetes in her son; Heart disease in her son; Heart failure in her father; Hypertension in her father; Melanoma in her daughter; No Known Problems in her brother, maternal aunt, maternal grandfather, maternal grandmother, maternal uncle, paternal aunt, paternal grandfather, paternal grandmother, paternal uncle, and sister., see HPI    Social History:  reports that she has never smoked. She has never used smokeless tobacco. She reports that she drinks alcohol. She reports that she does not use drugs.    Review of patient's allergies indicates:  No Known Allergies    Medications:   Medications Prior to Admission   Medication Sig Dispense Refill Last Dose    acyclovir (ZOVIRAX) 400 MG tablet Take 1 tablet (400 mg  total) by mouth 2 (two) times daily. 60 tablet 12 10/9/2019 at Unknown time    ascorbic acid (VITAMIN C) 1000 MG tablet Take 1,000 mg by mouth once daily.    Past Week at Unknown time    biotin 1 mg tablet Take 1 mg by mouth 2 (two) times daily.    Past Week at Unknown time    buPROPion (WELLBUTRIN XL) 150 MG TB24 tablet Take 1 tablet (150 mg total) by mouth once daily. 30 tablet 12 10/9/2019 at Unknown time    calcium-vitamin D 500 mg(1,250mg) -200 unit per tablet Take 1 tablet by mouth 2 (two) times daily with meals.    Past Week at Unknown time    clonazePAM (KLONOPIN) 1 MG tablet Take 1.5 tablets (1.5 mg total) by mouth nightly as needed. 45 tablet 5 10/9/2019 at Unknown time    digestive enzymes Tab Take 1 tablet by mouth once daily.    Past Week at Unknown time    diphenhydrAMINE (BENADRYL) 25 mg capsule Take 25 mg by mouth nightly as needed.    10/9/2019 at Unknown time    ginseng 200 mg Cap Take 200 mg by mouth daily as needed.    Past Week at Unknown time    levothyroxine (SYNTHROID) 137 MCG Tab tablet Take 1 tablet (137 mcg total) by mouth before breakfast. 30 tablet 11 10/9/2019 at Unknown time    lubiprostone (AMITIZA) 24 MCG Cap Take 1 capsule (24 mcg total) by mouth 2 (two) times daily. 60 capsule 0 Past Week at Unknown time    multivitamin (THERAGRAN) per tablet Take 1 tablet by mouth once daily.    Past Week at Unknown time    zinc 50 mg Tab Take 50 mg by mouth once daily.    Past Week at Unknown time    acetaminophen (TYLENOL) 500 MG tablet Take 500 mg by mouth every 6 (six) hours as needed for Pain.   More than a month at Unknown time    acetazolamide 25 mg/mL Susp    More than a month at Unknown time    chondroitin sulfate-vit C-Mn (CHONDROITIN SULFATE COMPLEX) 400-60-2.5 mg Cap Take 1 tablet by mouth 2 (two) times daily.    More than a month at Unknown time    ciclopirox (LOPROX) 0.77 % Susp aaa qhs 30 mL 6 Taking    dicyclomine (BENTYL) 20 mg tablet Take 1 tablet (20 mg total)  by mouth 3 (three) times daily as needed. 90 tablet 12 More than a month at Unknown time    diltiaZEM (CARDIZEM) 30 MG tablet Once daily for raynauds. 90 tablet 3 More than a month at Unknown time    fluticasone (FLONASE) 50 mcg/actuation nasal spray 1 spray by Each Nare route once daily. (Patient taking differently: 1 spray by Each Nare route daily as needed. ) 16 g 2 More than a month at Unknown time    glucosamine sulfate 2KCl 1,000 mg Tab Take 1 tablet by mouth once daily.    Taking    linaCLOtide (LINZESS) 145 mcg Cap capsule Take 1 capsule (145 mcg total) by mouth once daily. 30 capsule 6 More than a month at Unknown time    mupirocin (BACTROBAN) 2 % ointment Apply to affected area 3 times daily 22 g 1 Taking    nitroGLYCERIN 2% TD oint (NITROGLYN) 2 % ointment Apply small amount to fingertips prn raynaud attacks 30 g 2 Taking    tretinoin (RETIN-A) 0.1 % cream Apply topically every evening. 20 g 6 Taking       Physical Exam:    Vital Signs:   Vitals:    10/10/19 0716   BP: (!) 148/70   Pulse: 61   Resp: 18       General Appearance: Well appearing in no acute distress  Abdomen: Soft, non tender, non distended with normal bowel sounds, no masses    Labs:  Lab Results   Component Value Date    WBC 5.13 10/30/2018    HGB 13.2 10/30/2018    HCT 40.6 10/30/2018     10/30/2018    CHOL 208 (H) 10/30/2018    TRIG 80 10/30/2018    HDL 89 (H) 10/30/2018    ALT 18 10/30/2018    AST 20 10/30/2018     10/30/2018    K 4.3 01/28/2019     10/30/2018    CREATININE 0.7 10/30/2018    BUN 15 10/30/2018    CO2 29 10/30/2018    TSH 3.138 01/28/2019    INR 1.0 02/05/2018    HGBA1C 5.4 08/24/2015       I have explained the risks and benefits of this endoscopic procedure to the patient including but not limited to bleeding, inflammation, infection, perforation, and death.      Veto Greenwood MD

## 2019-10-10 NOTE — ANESTHESIA POSTPROCEDURE EVALUATION
Anesthesia Post Evaluation    Patient: Angelina Man    Procedure(s) Performed: Procedure(s) (LRB):  EGD (ESOPHAGOGASTRODUODENOSCOPY) (N/A)    Final Anesthesia Type: general  Patient location during evaluation: PACU  Patient participation: Yes- Able to Participate  Level of consciousness: awake and alert and oriented  Post-procedure vital signs: reviewed and stable  Pain management: adequate  Airway patency: patent  PONV status at discharge: No PONV  Anesthetic complications: no      Cardiovascular status: hemodynamically stable  Respiratory status: nasal cannula  Hydration status: euvolemic  Follow-up not needed.          Vitals Value Taken Time   /66 10/10/2019  8:19 AM   Temp 36.1 °C (97 °F) 10/10/2019  7:50 AM   Pulse 63 10/10/2019  8:19 AM   Resp 16 10/10/2019  8:19 AM   SpO2 100 % 10/10/2019  8:19 AM         Event Time     Out of Recovery 08:23:33          Pain/Aliya Score: Aliya Score: 10 (10/10/2019  8:19 AM)

## 2019-10-10 NOTE — TRANSFER OF CARE
"Anesthesia Transfer of Care Note    Patient: Angelina Man    Procedure(s) Performed: Procedure(s) (LRB):  EGD (ESOPHAGOGASTRODUODENOSCOPY) (N/A)    Patient location: GI    Anesthesia Type: general    Transport from OR: Transported from OR on room air with adequate spontaneous ventilation    Post pain: adequate analgesia    Post assessment: no apparent anesthetic complications and tolerated procedure well    Post vital signs: stable    Level of consciousness: responds to stimulation    Nausea/Vomiting: no nausea/vomiting    Complications: none    Transfer of care protocol was followed      Last vitals:   Visit Vitals  /63 (BP Location: Left arm, Patient Position: Lying)   Pulse 61   Temp 36.1 °C (97 °F) (Axillary)   Resp 16   Ht 5' 4" (1.626 m)   Wt 49.9 kg (110 lb)   LMP  (LMP Unknown)   SpO2 100%   Breastfeeding? No   BMI 18.88 kg/m²     "

## 2019-10-10 NOTE — ANESTHESIA PREPROCEDURE EVALUATION
10/10/2019  Angelina Man is a 69 y.o., female.    Past Medical History:   Diagnosis Date    Arthritis     Bronchitis     Cataract     Dry eyes     Hypothyroidism 6/23/2016    Rash     Squamous cell carcinoma     in-situ right upper inner arm, right wrist    Status post lumbar surgery 6/23/2016    Stress fracture     Thyroid disease      Past Surgical History:   Procedure Laterality Date    ANGIOPLASTY      CERVICAL FUSION      COLONOSCOPY      COLONOSCOPY N/A 11/14/2017    Procedure: COLONOSCOPY;  Surgeon: Kasi Mercado MD;  Location: [address];  Service: Endoscopy;  Laterality: N/A;    l4-5 mid discectomy Left 03/2017    l4-5 MIS diskectomy Right 05/2016           Anesthesia Evaluation    I have reviewed the Patient Summary Reports.    I have reviewed the Nursing Notes.   I have reviewed the Medications.     Review of Systems  Anesthesia Hx:  No problems with previous Anesthesia  History of prior surgery of interest to airway management or planning: cervical fusion. Previous anesthesia: General   Social:  Non-Smoker    Musculoskeletal:   Arthritis     Neurological:   Neuromuscular Disease,    Endocrine:   Hypothyroidism        Physical Exam  General:  Well nourished    Airway/Jaw/Neck:  Airway Findings: Mouth Opening: Normal Tongue: Normal  General Airway Assessment: Adult  Mallampati: II  TM Distance: Normal, at least 6 cm  Jaw/Neck Findings:  Neck ROM: Extension Painful     Eyes/Ears/Nose:  Eyes/Ears/Nose Findings:    Dental:  Dental Findings: In tact   Chest/Lungs:  Chest/Lungs Findings: Clear to auscultation     Heart/Vascular:  Heart Findings: Rate: Normal  Rhythm: Regular Rhythm  Sounds: Normal        Mental Status:  Mental Status Findings:  Cooperative, Alert and Oriented         Anesthesia Plan  Type of Anesthesia, risks & benefits discussed:  Anesthesia Type:   general  Patient's Preference:   Intra-op Monitoring Plan: standard ASA monitors  Intra-op Monitoring Plan Comments:   Post Op Pain Control Plan: multimodal analgesia  Post Op Pain Control Plan Comments:   Induction:   IV  Beta Blocker:  Patient is not currently on a Beta-Blocker (No further documentation required).       Informed Consent: Patient understands risks and agrees with Anesthesia plan.  Questions answered. Anesthesia consent signed with patient.  ASA Score: 2     Day of Surgery Review of History & Physical: I have interviewed and examined the patient. I have reviewed the patient's H&P dated:  There are no significant changes.          Ready For Surgery From Anesthesia Perspective.

## 2019-10-10 NOTE — PLAN OF CARE
Plan of care discussed with patient. All questions and concerns addressed and answered. Free of pain or distress. PIV removed without complications. VS stable. Procedure report and discharge instructions gone over and patient aware of restrictions and when to resume medications. Patient in agreement.    [id number] Dr. Milton at bedside

## 2019-10-10 NOTE — [employer] PATIENT INSTRUCTIONS
Discharge Summary/Instructions after an Endoscopic Procedure  Patient Name: Angelina Man  Patient MRN: [id number]  Patient YOB: 1950  Thursday, October 10, 2019  Rg Milton MD  RESTRICTIONS:  During your procedure today, you received medications for sedation.  These   medications may affect your judgment, balance and coordination.  Therefore,   for 24 hours, you have the following restrictions:   - DO NOT drive a car, operate machinery, make legal/financial decisions,   sign important papers or drink alcohol.    ACTIVITY:  Today: no heavy lifting, straining or running due to procedural   sedation/anesthesia.  The following day: return to full activity including work.  DIET:  Eat and drink normally unless instructed otherwise.     TREATMENT FOR COMMON SIDE EFFECTS:  - Mild abdominal pain, nausea, belching, bloating or excessive gas:  rest,   eat lightly and use a heating pad.  - Sore Throat: treat with throat lozenges and/or gargle with warm salt   water.  - Because air was used during the procedure, expelling large amounts of air   from your rectum or belching is normal.  - If a bowel prep was taken, you may not have a bowel movement for 1-3 days.    This is normal.  SYMPTOMS TO WATCH FOR AND REPORT TO YOUR PHYSICIAN:  1. Abdominal pain or bloating, other than gas cramps.  2. Chest pain.  3. Back pain.  4. Signs of infection such as: chills or fever occurring within 24 hours   after the procedure.  5. Rectal bleeding, which would show as bright red, maroon, or black stools.   (A tablespoon of blood from the rectum is not serious, especially if   hemorrhoids are present.)  6. Vomiting.  7. Weakness or dizziness.  GO DIRECTLY TO THE NEAREST EMERGENCY ROOM IF YOU HAVE ANY OF THE FOLLOWING:      Difficulty breathing              Chills and/or fever over 101 F   Persistent vomiting and/or vomiting blood   Severe abdominal pain   Severe chest pain   Black, tarry stools   Bleeding- more than one  tablespoon   Any other symptom or condition that you feel may need urgent attention  Your doctor recommends these additional instructions:  If any biopsies were taken, your doctors clinic will contact you in 1 to 2   weeks with any results.  - Patient has a contact number available for emergencies.  The signs and   symptoms of potential delayed complications were discussed with the   patient.  Return to normal activities tomorrow.  Written discharge   instructions were provided to the patient.   - Discharge patient to home (ambulatory).   - Resume previous diet.   - Continue present medications.   - Return to GI clinic PRN.  For questions, problems or results please call your physician - Rg Milton MD at Work:  [phone].  [facility], EMERGENCY ROOM PHONE NUMBER: [phone]  IF A COMPLICATION OR EMERGENCY SITUATION ARISES AND YOU ARE UNABLE TO REACH   YOUR PHYSICIAN - GO DIRECTLY TO THE EMERGENCY ROOM.  Rg Milton MD  10/10/2019 7:47:25 AM  This report has been verified and signed electronically.  [employer]

## 2019-10-17 ENCOUNTER — TELEPHONE (OUTPATIENT)
Dept: ENDOSCOPY | Facility: HOSPITAL | Age: 69
End: 2019-10-17

## 2019-10-21 ENCOUNTER — PATIENT MESSAGE (OUTPATIENT)
Dept: PRIMARY CARE CLINIC | Facility: CLINIC | Age: 69
End: 2019-10-21

## 2019-10-21 DIAGNOSIS — F32.A DEPRESSION, UNSPECIFIED DEPRESSION TYPE: ICD-10-CM

## 2019-10-21 DIAGNOSIS — E03.9 HYPOTHYROIDISM, UNSPECIFIED TYPE: ICD-10-CM

## 2019-10-21 RX ORDER — LEVOTHYROXINE SODIUM 137 UG/1
137 TABLET ORAL
Qty: 30 TABLET | Refills: 11 | Status: CANCELLED | OUTPATIENT
Start: 2019-10-21 | End: 2020-10-20

## 2019-10-21 RX ORDER — BUPROPION HYDROCHLORIDE 150 MG/1
150 TABLET ORAL DAILY
Qty: 30 TABLET | Refills: 5 | Status: SHIPPED | OUTPATIENT
Start: 2019-10-21 | End: 2019-10-22 | Stop reason: SDUPTHER

## 2019-10-21 RX ORDER — LEVOTHYROXINE SODIUM 137 UG/1
137 TABLET ORAL
Qty: 30 TABLET | Refills: 5 | Status: SHIPPED | OUTPATIENT
Start: 2019-10-21 | End: 2019-10-22 | Stop reason: SDUPTHER

## 2019-10-21 NOTE — TELEPHONE ENCOUNTER
Pharmacy advised that the Rx for levothyroxine sent on 02/05/19 was never received. Please send new Rx.     LOV 08/13/19

## 2019-10-22 DIAGNOSIS — F32.A DEPRESSION, UNSPECIFIED DEPRESSION TYPE: ICD-10-CM

## 2019-10-22 DIAGNOSIS — E03.9 HYPOTHYROIDISM, UNSPECIFIED TYPE: ICD-10-CM

## 2019-10-22 RX ORDER — BUPROPION HYDROCHLORIDE 150 MG/1
150 TABLET ORAL DAILY
Qty: 30 TABLET | Refills: 5 | Status: SHIPPED | OUTPATIENT
Start: 2019-10-22 | End: 2020-04-14 | Stop reason: SDUPTHER

## 2019-10-22 RX ORDER — LEVOTHYROXINE SODIUM 137 UG/1
137 TABLET ORAL
Qty: 30 TABLET | Refills: 5 | Status: SHIPPED | OUTPATIENT
Start: 2019-10-22 | End: 2020-05-19 | Stop reason: SDUPTHER

## 2019-11-06 ENCOUNTER — PATIENT MESSAGE (OUTPATIENT)
Dept: NEUROSURGERY | Facility: CLINIC | Age: 69
End: 2019-11-06

## 2019-11-06 ENCOUNTER — OFFICE VISIT (OUTPATIENT)
Dept: OPTOMETRY | Facility: CLINIC | Age: 69
End: 2019-11-06
Payer: COMMERCIAL

## 2019-11-06 DIAGNOSIS — H04.123 DRY EYES, BILATERAL: ICD-10-CM

## 2019-11-06 DIAGNOSIS — H40.013 OPEN ANGLE WITH BORDERLINE FINDINGS AND LOW GLAUCOMA RISK IN BOTH EYES: ICD-10-CM

## 2019-11-06 DIAGNOSIS — H52.203 ASTIGMATISM WITH PRESBYOPIA, BILATERAL: ICD-10-CM

## 2019-11-06 DIAGNOSIS — H43.812 POSTERIOR VITREOUS DETACHMENT OF LEFT EYE: ICD-10-CM

## 2019-11-06 DIAGNOSIS — H25.13 NUCLEAR SCLEROSIS, BILATERAL: Primary | ICD-10-CM

## 2019-11-06 DIAGNOSIS — H52.4 ASTIGMATISM WITH PRESBYOPIA, BILATERAL: ICD-10-CM

## 2019-11-06 PROCEDURE — 92014 COMPRE OPH EXAM EST PT 1/>: CPT | Mod: S$GLB,,, | Performed by: OPTOMETRIST

## 2019-11-06 PROCEDURE — 92014 PR EYE EXAM, EST PATIENT,COMPREHESV: ICD-10-PCS | Mod: S$GLB,,, | Performed by: OPTOMETRIST

## 2019-11-06 PROCEDURE — 99999 PR PBB SHADOW E&M-EST. PATIENT-LVL II: ICD-10-PCS | Mod: PBBFAC,,, | Performed by: OPTOMETRIST

## 2019-11-06 PROCEDURE — 92015 DETERMINE REFRACTIVE STATE: CPT | Mod: S$GLB,,, | Performed by: OPTOMETRIST

## 2019-11-06 PROCEDURE — 92015 PR REFRACTION: ICD-10-PCS | Mod: S$GLB,,, | Performed by: OPTOMETRIST

## 2019-11-06 PROCEDURE — 99999 PR PBB SHADOW E&M-EST. PATIENT-LVL II: CPT | Mod: PBBFAC,,, | Performed by: OPTOMETRIST

## 2019-11-06 NOTE — PROGRESS NOTES
HPI     Blur ou at dist/near, x mos, no assoc pain or red, no relief over time,   constant  Using tears with relief  Black line in vision OS just today, no flash    Last edited by Tone Diaz, OD on 11/6/2019  1:19 PM. (History)            Assessment /Plan     For exam results, see Encounter Report.    Nuclear sclerosis, bilateral    Dry eyes, bilateral    Open angle with borderline findings and low glaucoma risk in both eyes    Posterior vitreous detachment of left eye    Astigmatism with presbyopia, bilateral      1. Educated pt on presence of cataracts and effects on vision. No surgery at this time. Recheck in one year.  2. Recommend artificial tears. 1 drop 2x per day. Chronicity of disease and treatment discussed.  3. IOP low and stable, nerve eval stable, pt does not want any testing that is not covered by insurance, CD ratio stable, pachy normal, no fam history of glaucoma.  4. No evidence of holes, tears or detachment of retina. Negative Shafers sign in vitreous. 90 diopter lens exam negative in all quadrants. Patient educated to signs and symptoms of retinal detachment and return to clinic immediately if signs or symptoms arise. RTC 1 month follow up dfe if symptoms persist.   5. New Spec Rx given. Different lens options discussed with patient. RTC 1 year full exam.

## 2019-11-10 ENCOUNTER — PATIENT MESSAGE (OUTPATIENT)
Dept: PRIMARY CARE CLINIC | Facility: CLINIC | Age: 69
End: 2019-11-10

## 2019-11-10 DIAGNOSIS — Z12.39 SCREENING FOR BREAST CANCER: Primary | ICD-10-CM

## 2019-11-10 DIAGNOSIS — Z12.31 ENCOUNTER FOR SCREENING MAMMOGRAM FOR BREAST CANCER: ICD-10-CM

## 2019-11-10 DIAGNOSIS — Z00.00 ROUTINE ADULT HEALTH MAINTENANCE: ICD-10-CM

## 2019-11-11 ENCOUNTER — PATIENT MESSAGE (OUTPATIENT)
Dept: PRIMARY CARE CLINIC | Facility: CLINIC | Age: 69
End: 2019-11-11

## 2019-11-11 DIAGNOSIS — L70.8 OTHER ACNE: ICD-10-CM

## 2019-11-11 RX ORDER — TRETINOIN 1 MG/G
CREAM TOPICAL NIGHTLY
Qty: 20 G | Refills: 6 | Status: SHIPPED | OUTPATIENT
Start: 2019-11-11 | End: 2020-11-28 | Stop reason: SDUPTHER

## 2019-11-14 ENCOUNTER — HOSPITAL ENCOUNTER (OUTPATIENT)
Dept: RADIOLOGY | Facility: HOSPITAL | Age: 69
Discharge: HOME | End: 2019-11-14
Attending: FAMILY MEDICINE
Payer: MEDICARE

## 2019-11-14 DIAGNOSIS — Z12.31 ENCOUNTER FOR SCREENING MAMMOGRAM FOR BREAST CANCER: ICD-10-CM

## 2019-11-14 PROCEDURE — 77067 MAMMO DIGITAL SCREENING BILAT WITH TOMOSYNTHESIS_CAD: ICD-10-PCS | Mod: 26,HCNC,, | Performed by: RADIOLOGY

## 2019-11-14 PROCEDURE — 77063 MAMMO DIGITAL SCREENING BILAT WITH TOMOSYNTHESIS_CAD: ICD-10-PCS | Mod: 26,HCNC,, | Performed by: RADIOLOGY

## 2019-11-14 PROCEDURE — 77067 SCR MAMMO BI INCL CAD: CPT | Mod: TC,HCNC,PO

## 2019-11-14 PROCEDURE — 77067 SCR MAMMO BI INCL CAD: CPT | Mod: 26,HCNC,, | Performed by: RADIOLOGY

## 2019-11-14 PROCEDURE — 77063 BREAST TOMOSYNTHESIS BI: CPT | Mod: 26,HCNC,, | Performed by: RADIOLOGY

## 2019-12-05 ENCOUNTER — LAB VISIT (OUTPATIENT)
Dept: LAB | Facility: HOSPITAL | Age: 69
End: 2019-12-05
Attending: FAMILY MEDICINE
Payer: MEDICARE

## 2019-12-05 DIAGNOSIS — Z00.00 ROUTINE ADULT HEALTH MAINTENANCE: ICD-10-CM

## 2019-12-05 DIAGNOSIS — E03.9 HYPOTHYROIDISM, UNSPECIFIED TYPE: ICD-10-CM

## 2019-12-05 LAB
ALBUMIN SERPL BCP-MCNC: 3.6 G/DL (ref 3.5–5.2)
ALP SERPL-CCNC: 68 U/L (ref 55–135)
ALT SERPL W/O P-5'-P-CCNC: 18 U/L (ref 10–44)
ANION GAP SERPL CALC-SCNC: 8 MMOL/L (ref 8–16)
AST SERPL-CCNC: 21 U/L (ref 10–40)
BASOPHILS # BLD AUTO: 0.04 K/UL (ref 0–0.2)
BASOPHILS NFR BLD: 1 % (ref 0–1.9)
BILIRUB SERPL-MCNC: 0.5 MG/DL (ref 0.1–1)
BUN SERPL-MCNC: 11 MG/DL (ref 8–23)
CALCIUM SERPL-MCNC: 8.6 MG/DL (ref 8.7–10.5)
CHLORIDE SERPL-SCNC: 107 MMOL/L (ref 95–110)
CHOLEST SERPL-MCNC: 205 MG/DL (ref 120–199)
CHOLEST/HDLC SERPL: 2.5 {RATIO} (ref 2–5)
CO2 SERPL-SCNC: 29 MMOL/L (ref 23–29)
CREAT SERPL-MCNC: 0.7 MG/DL (ref 0.5–1.4)
DIFFERENTIAL METHOD: ABNORMAL
EOSINOPHIL # BLD AUTO: 0.1 K/UL (ref 0–0.5)
EOSINOPHIL NFR BLD: 2.5 % (ref 0–8)
ERYTHROCYTE [DISTWIDTH] IN BLOOD BY AUTOMATED COUNT: 13.3 % (ref 11.5–14.5)
EST. GFR  (AFRICAN AMERICAN): >60 ML/MIN/1.73 M^2
EST. GFR  (NON AFRICAN AMERICAN): >60 ML/MIN/1.73 M^2
GLUCOSE SERPL-MCNC: 84 MG/DL (ref 70–110)
HCT VFR BLD AUTO: 39.3 % (ref 37–48.5)
HDLC SERPL-MCNC: 83 MG/DL (ref 40–75)
HDLC SERPL: 40.5 % (ref 20–50)
HGB BLD-MCNC: 12.6 G/DL (ref 12–16)
IMM GRANULOCYTES # BLD AUTO: 0 K/UL (ref 0–0.04)
IMM GRANULOCYTES NFR BLD AUTO: 0 % (ref 0–0.5)
LDLC SERPL CALC-MCNC: 110.2 MG/DL (ref 63–159)
LYMPHOCYTES # BLD AUTO: 1.3 K/UL (ref 1–4.8)
LYMPHOCYTES NFR BLD: 31.4 % (ref 18–48)
MCH RBC QN AUTO: 33.6 PG (ref 27–31)
MCHC RBC AUTO-ENTMCNC: 32.1 G/DL (ref 32–36)
MCV RBC AUTO: 105 FL (ref 82–98)
MONOCYTES # BLD AUTO: 0.5 K/UL (ref 0.3–1)
MONOCYTES NFR BLD: 11.8 % (ref 4–15)
NEUTROPHILS # BLD AUTO: 2.2 K/UL (ref 1.8–7.7)
NEUTROPHILS NFR BLD: 53.3 % (ref 38–73)
NONHDLC SERPL-MCNC: 122 MG/DL
NRBC BLD-RTO: 0 /100 WBC
PLATELET # BLD AUTO: 224 K/UL (ref 150–350)
PMV BLD AUTO: 10.7 FL (ref 9.2–12.9)
POTASSIUM SERPL-SCNC: 4.1 MMOL/L (ref 3.5–5.1)
PROT SERPL-MCNC: 6.3 G/DL (ref 6–8.4)
RBC # BLD AUTO: 3.75 M/UL (ref 4–5.4)
SODIUM SERPL-SCNC: 144 MMOL/L (ref 136–145)
TRIGL SERPL-MCNC: 59 MG/DL (ref 30–150)
TSH SERPL DL<=0.005 MIU/L-ACNC: 1.61 UIU/ML (ref 0.4–4)
WBC # BLD AUTO: 4.08 K/UL (ref 3.9–12.7)

## 2019-12-05 PROCEDURE — 80053 COMPREHEN METABOLIC PANEL: CPT | Mod: HCNC

## 2019-12-05 PROCEDURE — 84443 ASSAY THYROID STIM HORMONE: CPT | Mod: HCNC

## 2019-12-05 PROCEDURE — 80061 LIPID PANEL: CPT | Mod: HCNC

## 2019-12-05 PROCEDURE — 85025 COMPLETE CBC W/AUTO DIFF WBC: CPT | Mod: HCNC

## 2019-12-05 PROCEDURE — 36415 COLL VENOUS BLD VENIPUNCTURE: CPT | Mod: HCNC,PO

## 2019-12-09 ENCOUNTER — OFFICE VISIT (OUTPATIENT)
Dept: OPTOMETRY | Facility: CLINIC | Age: 69
End: 2019-12-09
Payer: MEDICARE

## 2019-12-09 DIAGNOSIS — H01.02B SQUAMOUS BLEPHARITIS OF UPPER AND LOWER EYELIDS OF BOTH EYES: ICD-10-CM

## 2019-12-09 DIAGNOSIS — H01.02A SQUAMOUS BLEPHARITIS OF UPPER AND LOWER EYELIDS OF BOTH EYES: ICD-10-CM

## 2019-12-09 DIAGNOSIS — H00.029 MEIBOMITIS, UNSPECIFIED LATERALITY: Primary | ICD-10-CM

## 2019-12-09 PROCEDURE — 92012 PR EYE EXAM, EST PATIENT,INTERMED: ICD-10-PCS | Mod: HCNC,S$GLB,, | Performed by: OPTOMETRIST

## 2019-12-09 PROCEDURE — 99999 PR PBB SHADOW E&M-EST. PATIENT-LVL II: CPT | Mod: PBBFAC,HCNC,, | Performed by: OPTOMETRIST

## 2019-12-09 PROCEDURE — 92012 INTRM OPH EXAM EST PATIENT: CPT | Mod: HCNC,S$GLB,, | Performed by: OPTOMETRIST

## 2019-12-09 PROCEDURE — 99999 PR PBB SHADOW E&M-EST. PATIENT-LVL II: ICD-10-PCS | Mod: PBBFAC,HCNC,, | Performed by: OPTOMETRIST

## 2019-12-09 RX ORDER — NEOMYCIN SULFATE, POLYMYXIN B SULFATE AND DEXAMETHASONE 3.5; 10000; 1 MG/ML; [USP'U]/ML; MG/ML
1 SUSPENSION/ DROPS OPHTHALMIC 4 TIMES DAILY
Qty: 5 ML | Refills: 0 | Status: SHIPPED | OUTPATIENT
Start: 2019-12-09 | End: 2019-12-16

## 2019-12-09 NOTE — PROGRESS NOTES
URIEL CHAPMAN 11/2019  Patient had noticed blood on white part of eye about 1 week   ago and has since cleared.  Patient has also noticed some redness along   lid margins.  Eyes sometimes feel irritated.  Using Systane 2 or 3 times a   day.   No pain or vision changes.    Last edited by Minal Denis on 12/9/2019  2:41 PM. (History)            Assessment /Plan     For exam results, see Encounter Report.    Meibomitis, unspecified laterality  -     neomycin-polymyxin-dexamethasone (MAXITROL) 3.5mg/mL-10,000 unit/mL-0.1 % DrpS; Place 1 drop into both eyes 4 (four) times daily. 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 Start Maxitrol drops 4x/day x 1 week OU, RTC or call after 1 week if no better.

## 2019-12-12 ENCOUNTER — OFFICE VISIT (OUTPATIENT)
Dept: PRIMARY CARE CLINIC | Facility: CLINIC | Age: 69
End: 2019-12-12
Payer: MEDICARE

## 2019-12-12 VITALS
HEART RATE: 60 BPM | SYSTOLIC BLOOD PRESSURE: 130 MMHG | BODY MASS INDEX: 19.06 KG/M2 | TEMPERATURE: 98 F | HEIGHT: 63 IN | WEIGHT: 107.56 LBS | OXYGEN SATURATION: 97 % | DIASTOLIC BLOOD PRESSURE: 82 MMHG

## 2019-12-12 DIAGNOSIS — M94.9 DISORDER OF CARTILAGE, UNSPECIFIED: ICD-10-CM

## 2019-12-12 DIAGNOSIS — R06.00 DYSPNEA, UNSPECIFIED TYPE: ICD-10-CM

## 2019-12-12 DIAGNOSIS — Z01.31 ENCOUNTER FOR EXAMINATION OF BLOOD PRESSURE WITH ABNORMAL FINDINGS: ICD-10-CM

## 2019-12-12 DIAGNOSIS — M85.80 OSTEOPENIA, UNSPECIFIED LOCATION: ICD-10-CM

## 2019-12-12 DIAGNOSIS — Z82.49 FAMILY HISTORY OF ACUTE HEART FAILURE: ICD-10-CM

## 2019-12-12 DIAGNOSIS — T78.40XA ALLERGIC STATE, INITIAL ENCOUNTER: Primary | ICD-10-CM

## 2019-12-12 DIAGNOSIS — R20.9 COLD EXTREMITIES: ICD-10-CM

## 2019-12-12 PROCEDURE — 99397 PER PM REEVAL EST PAT 65+ YR: CPT | Mod: HCNC,S$GLB,, | Performed by: FAMILY MEDICINE

## 2019-12-12 PROCEDURE — 99999 PR PBB SHADOW E&M-EST. PATIENT-LVL IV: ICD-10-PCS | Mod: PBBFAC,HCNC,, | Performed by: FAMILY MEDICINE

## 2019-12-12 PROCEDURE — 99397 PR PREVENTIVE VISIT,EST,65 & OVER: ICD-10-PCS | Mod: HCNC,S$GLB,, | Performed by: FAMILY MEDICINE

## 2019-12-12 PROCEDURE — 99999 PR PBB SHADOW E&M-EST. PATIENT-LVL IV: CPT | Mod: PBBFAC,HCNC,, | Performed by: FAMILY MEDICINE

## 2019-12-12 RX ORDER — MONTELUKAST SODIUM 10 MG/1
10 TABLET ORAL NIGHTLY
Qty: 30 TABLET | Refills: 6 | Status: SHIPPED | OUTPATIENT
Start: 2019-12-12 | End: 2020-01-11

## 2019-12-13 NOTE — PROGRESS NOTES
Angelina Man is a 69 y.o. female   Routine physical  Source of history: Patient  Past Medical History:   Diagnosis Date    Arthritis     Bronchitis     Cataract     Dry eyes     Hypothyroidism 6/23/2016    Rash     Squamous cell carcinoma     in-situ right upper inner arm, right wrist    Status post lumbar surgery 6/23/2016    Stress fracture     Thyroid disease      Patient  reports that she has never smoked. She has never used smokeless tobacco. She reports that she drinks alcohol. She reports that she does not use drugs.  Family History   Problem Relation Age of Onset    Cancer Mother         Lung cancer    Heart failure Father     Colon cancer Father     Hypertension Father     Cataracts Father     Cancer Father 80        colon    Diabetes Son     Heart disease Son     No Known Problems Sister     No Known Problems Brother     No Known Problems Maternal Aunt     No Known Problems Maternal Uncle     No Known Problems Paternal Aunt     No Known Problems Paternal Uncle     No Known Problems Maternal Grandmother     No Known Problems Maternal Grandfather     No Known Problems Paternal Grandmother     No Known Problems Paternal Grandfather     Melanoma Daughter     Amblyopia Neg Hx     Blindness Neg Hx     Glaucoma Neg Hx     Macular degeneration Neg Hx     Retinal detachment Neg Hx     Strabismus Neg Hx     Stroke Neg Hx     Thyroid disease Neg Hx     Breast cancer Neg Hx     Ovarian cancer Neg Hx      ROS:Answers for HPI/ROS submitted by the patient on 12/5/2019   activity change: No  unexpected weight change: No  neck pain: Yes  hearing loss: No  rhinorrhea: No  trouble swallowing: No  eye discharge: No  visual disturbance: No  chest tightness: No  wheezing: No  chest pain: No  palpitations: No  blood in stool: No  constipation: Yes  vomiting: No  diarrhea: No  polydipsia: No  polyuria: No  difficulty urinating: No  hematuria: No  menstrual problem: No  dysuria: No  joint  swelling: No  arthralgias: No  headaches: No  weakness: No  confusion: No  dysphoric mood: No    GENERAL: No fever, chills, fatigability or weight loss.  SKIN: No rashes, itching or changes in color or texture of skin.  HEAD: No headaches or recent head trauma.  EYES: Visual acuity fine. No photophobia, ocular pain or diplopia.  EARS: Denies ear pain, discharge or vertigo.  NOSE: No loss of smell, no epistaxis or postnasal drip.  MOUTH & THROAT: No hoarseness or change in voice. No excessive gum bleeding.  NODES: Denies swollen glands.  CHEST: Denies JENNINGS, cyanosis, wheezing, cough and sputum production.  CARDIOVASCULAR: Denies chest pain, PND, orthopnea or reduced exercise tolerance.  ABDOMEN: Appetite fine. No weight loss. Denies diarrhea, abdominal pain, hematemesis or blood in stool.  URINARY: No flank pain, dysuria or hematuria.  PERIPHERAL VASCULAR: No claudication or cyanosis.  MUSCULOSKELETAL: No joint stiffness or swelling. Denies back pain.  NEUROLOGIC: No history of seizures, paralysis, alteration of gait or coordination.    OBJECTIVE:  APPEARANCE:  Thin no acute distress  Vitals:    12/12/19 1450   BP: 130/82   Pulse: 60   Temp: 97.6 °F (36.4 °C)     SKIN: Normal skin turgor, no lesions.  HEENT: Both external auditory canals clear. Both tympanic membranes intact. PERRL. EOMI.   Disk margins sharp. No tonsillar enlargement. No pharyngeal erythema or exudate. No stridor.  NECK: No bruits. No cervical spine tenderness. No cervical lymphadenopathy. No thyromegaly.  NODES: No cervical, axillary or inguinal lymph node enlargement.  CHEST: Breath sounds clear bilaterally. Lungs clear to auscultation & percussion.   Good air movement. No rales. No retractions. No rhonchi. No stridor. No wheezes.  CARDIOVASCULAR: Normal S1, S2. No murmurs. No edema.  BREASTS: no masses palpated in either breast or axillary area, symmetry noted.  ABDOMEN: Bowel sounds normal. No palpable aortic enlargement. No CVA tenderness.   No  pulsatile mass. No rebound tenderness.  PERIPHERAL VASCULAR: Femoral pulses present and symmetrical. No edema.  MUSCULOSKELETAL: Degenerative changes of both ankles, foot, knee, wrist and hand.  BACK: No CVA tenderness. There is no spasm, tenderness or radiculopathy noted with palpation and there is full range of motion.   NEUROLOGIC:   Cranial Nerves: II-XII grossly intact.  Motor: 5/5 strength major flexors/extensors. No tremor.  DTR's: Knees, Ankles 2+ and equal bilaterally; downgoing toes.  Sensory: Intact to light touch distally.  Gait & Posture: Normal gait and fine motion. No cerebellar signs.  MENTAL STATUS: Alert. Oriented x 3. Language skills normal.   Memory intact. No suicidal ideation. Normal affect. Normal cognitive functions.Well kept appearance.    ASSESSMENT/PLAN:   Angelina was seen today for annual exam.    Diagnoses and all orders for this visit:    Allergic state, initial encounter  -     montelukast (SINGULAIR) 10 mg tablet; Take 1 tablet (10 mg total) by mouth every evening.    Osteopenia, unspecified location  -     DXA Bone Density Spine And Hip; Future    Disorder of cartilage, unspecified   -     DXA Bone Density Spine And Hip; Future    Cold extremities  -     US Lower Extremity Arteries Bilateral; Future    Family history of acute heart failure    Dyspnea, unspecified type  -     Stress Echo Which stress agent will be used? Treadmill Exercise; Color Flow Doppler? No; Future    Encounter for examination of blood pressure with abnormal findings   -     Stress Echo Which stress agent will be used? Treadmill Exercise; Color Flow Doppler? No; Future     Labs reviewed no areas of concern will contact patient with results of pending test when available

## 2019-12-16 ENCOUNTER — HOSPITAL ENCOUNTER (OUTPATIENT)
Dept: CARDIOLOGY | Facility: CLINIC | Age: 69
Discharge: HOME | End: 2019-12-16
Attending: FAMILY MEDICINE
Payer: MEDICARE

## 2019-12-16 ENCOUNTER — OFFICE VISIT (OUTPATIENT)
Dept: GASTROENTEROLOGY | Facility: CLINIC | Age: 69
End: 2019-12-16
Payer: MEDICARE

## 2019-12-16 ENCOUNTER — TELEPHONE (OUTPATIENT)
Dept: GASTROENTEROLOGY | Facility: CLINIC | Age: 69
End: 2019-12-16

## 2019-12-16 VITALS
BODY MASS INDEX: 18.95 KG/M2 | WEIGHT: 106.94 LBS | SYSTOLIC BLOOD PRESSURE: 122 MMHG | HEIGHT: 63 IN | DIASTOLIC BLOOD PRESSURE: 79 MMHG | HEART RATE: 73 BPM

## 2019-12-16 VITALS — WEIGHT: 107 LBS | HEIGHT: 63 IN | BODY MASS INDEX: 18.96 KG/M2

## 2019-12-16 DIAGNOSIS — K58.1 IRRITABLE BOWEL SYNDROME WITH CONSTIPATION: Primary | ICD-10-CM

## 2019-12-16 DIAGNOSIS — Z01.31 ENCOUNTER FOR EXAMINATION OF BLOOD PRESSURE WITH ABNORMAL FINDINGS: ICD-10-CM

## 2019-12-16 DIAGNOSIS — R13.10 DYSPHAGIA, UNSPECIFIED TYPE: ICD-10-CM

## 2019-12-16 DIAGNOSIS — R06.00 DYSPNEA, UNSPECIFIED TYPE: ICD-10-CM

## 2019-12-16 LAB
ASCENDING AORTA: 2.85 CM
BSA FOR ECHO PROCEDURE: 1.47 M2
CV ECHO LV RWT: 0.32 CM
CV STRESS BASE HR: 64 BPM
DIASTOLIC BLOOD PRESSURE: 77 MMHG
DOP CALC LVOT AREA: 2.7 CM2
DOP CALC LVOT DIAMETER: 1.86 CM
DOP CALC LVOT PEAK VEL: 0.7 M/S
DOP CALC LVOT STROKE VOLUME: 41.14 CM3
DOP CALCLVOT PEAK VEL VTI: 15.15 CM
E WAVE DECELERATION TIME: 209.6 MSEC
E/A RATIO: 1.38
E/E' RATIO: 6.6 M/S
ECHO LV POSTERIOR WALL: 0.67 CM (ref 0.6–1.1)
FRACTIONAL SHORTENING: 38 % (ref 28–44)
INTERVENTRICULAR SEPTUM: 0.72 CM (ref 0.6–1.1)
IVRT: 0.09 MSEC
LA MAJOR: 5.14 CM
LA MINOR: 5.42 CM
LA WIDTH: 4.32 CM
LEFT ATRIUM SIZE: 3.3 CM
LEFT ATRIUM VOLUME INDEX: 43.1 ML/M2
LEFT ATRIUM VOLUME: 63.94 CM3
LEFT INTERNAL DIMENSION IN SYSTOLE: 2.6 CM (ref 2.1–4)
LEFT VENTRICLE DIASTOLIC VOLUME INDEX: 53.45 ML/M2
LEFT VENTRICLE DIASTOLIC VOLUME: 79.23 ML
LEFT VENTRICLE MASS INDEX: 57 G/M2
LEFT VENTRICLE SYSTOLIC VOLUME INDEX: 16.6 ML/M2
LEFT VENTRICLE SYSTOLIC VOLUME: 24.66 ML
LEFT VENTRICULAR INTERNAL DIMENSION IN DIASTOLE: 4.21 CM (ref 3.5–6)
LEFT VENTRICULAR MASS: 84.63 G
LV LATERAL E/E' RATIO: 5.5 M/S
LV SEPTAL E/E' RATIO: 8.25 M/S
MV PEAK A VEL: 0.48 M/S
MV PEAK E VEL: 0.66 M/S
OHS CV CPX 1 MINUTE RECOVERY HEART RATE: 104 BPM
OHS CV CPX 85 PERCENT MAX PREDICTED HEART RATE MALE: 122
OHS CV CPX ESTIMATED METS: 13
OHS CV CPX MAX PREDICTED HEART RATE: 144
OHS CV CPX PATIENT IS FEMALE: 1
OHS CV CPX PATIENT IS MALE: 0
OHS CV CPX PEAK DIASTOLIC BLOOD PRESSURE: 65 MMHG
OHS CV CPX PEAK HEAR RATE: 126 BPM
OHS CV CPX PEAK RATE PRESSURE PRODUCT: NORMAL
OHS CV CPX PEAK SYSTOLIC BLOOD PRESSURE: 150 MMHG
OHS CV CPX PERCENT MAX PREDICTED HEART RATE ACHIEVED: 88
OHS CV CPX RATE PRESSURE PRODUCT PRESENTING: 8512
PISA TR MAX VEL: 2.2 M/S
PULM VEIN S/D RATIO: 1
PV PEAK D VEL: 0.35 M/S
PV PEAK S VEL: 0.35 M/S
RA MAJOR: 4.8 CM
RA PRESSURE: 8 MMHG
RA WIDTH: 3.47 CM
RIGHT VENTRICULAR END-DIASTOLIC DIMENSION: 3.24 CM
RV TISSUE DOPPLER FREE WALL SYSTOLIC VELOCITY 1 (APICAL 4 CHAMBER VIEW): 12.01 CM/S
SINUS: 3 CM
STJ: 2.47 CM
STRESS ECHO POST EXERCISE DUR MIN: 7 MINUTES
STRESS ECHO POST EXERCISE DUR SEC: 49 SECONDS
SYSTOLIC BLOOD PRESSURE: 133 MMHG
TDI LATERAL: 0.12 M/S
TDI SEPTAL: 0.08 M/S
TDI: 0.1 M/S
TR MAX PG: 19 MMHG
TRICUSPID ANNULAR PLANE SYSTOLIC EXCURSION: 2.6 CM
TV REST PULMONARY ARTERY PRESSURE: 27 MMHG

## 2019-12-16 PROCEDURE — 3074F SYST BP LT 130 MM HG: CPT | Mod: HCNC,CPTII,S$GLB, | Performed by: NURSE PRACTITIONER

## 2019-12-16 PROCEDURE — 99214 PR OFFICE/OUTPT VISIT, EST, LEVL IV, 30-39 MIN: ICD-10-PCS | Mod: HCNC,S$GLB,, | Performed by: NURSE PRACTITIONER

## 2019-12-16 PROCEDURE — 3288F PR FALLS RISK ASSESSMENT DOCUMENTED: ICD-10-PCS | Mod: HCNC,CPTII,S$GLB, | Performed by: NURSE PRACTITIONER

## 2019-12-16 PROCEDURE — 1126F AMNT PAIN NOTED NONE PRSNT: CPT | Mod: HCNC,S$GLB,, | Performed by: NURSE PRACTITIONER

## 2019-12-16 PROCEDURE — 1100F PR PT FALLS ASSESS DOC 2+ FALLS/FALL W/INJURY/YR: ICD-10-PCS | Mod: HCNC,CPTII,S$GLB, | Performed by: NURSE PRACTITIONER

## 2019-12-16 PROCEDURE — 1159F PR MEDICATION LIST DOCUMENTED IN MEDICAL RECORD: ICD-10-PCS | Mod: HCNC,S$GLB,, | Performed by: NURSE PRACTITIONER

## 2019-12-16 PROCEDURE — 3074F PR MOST RECENT SYSTOLIC BLOOD PRESSURE < 130 MM HG: ICD-10-PCS | Mod: HCNC,CPTII,S$GLB, | Performed by: NURSE PRACTITIONER

## 2019-12-16 PROCEDURE — 3078F DIAST BP <80 MM HG: CPT | Mod: HCNC,CPTII,S$GLB, | Performed by: NURSE PRACTITIONER

## 2019-12-16 PROCEDURE — 1100F PTFALLS ASSESS-DOCD GE2>/YR: CPT | Mod: HCNC,CPTII,S$GLB, | Performed by: NURSE PRACTITIONER

## 2019-12-16 PROCEDURE — 99214 OFFICE O/P EST MOD 30 MIN: CPT | Mod: HCNC,S$GLB,, | Performed by: NURSE PRACTITIONER

## 2019-12-16 PROCEDURE — 99499 UNLISTED E&M SERVICE: CPT | Mod: HCNC,S$GLB,, | Performed by: NURSE PRACTITIONER

## 2019-12-16 PROCEDURE — 1159F MED LIST DOCD IN RCRD: CPT | Mod: HCNC,S$GLB,, | Performed by: NURSE PRACTITIONER

## 2019-12-16 PROCEDURE — 99499 RISK ADDL DX/OHS AUDIT: ICD-10-PCS | Mod: HCNC,S$GLB,, | Performed by: NURSE PRACTITIONER

## 2019-12-16 PROCEDURE — 93351 STRESS TTE COMPLETE: CPT | Mod: HCNC,S$GLB,, | Performed by: INTERNAL MEDICINE

## 2019-12-16 PROCEDURE — 99999 PR PBB SHADOW E&M-EST. PATIENT-LVL V: ICD-10-PCS | Mod: PBBFAC,HCNC,, | Performed by: NURSE PRACTITIONER

## 2019-12-16 PROCEDURE — 1126F PR PAIN SEVERITY QUANTIFIED, NO PAIN PRESENT: ICD-10-PCS | Mod: HCNC,S$GLB,, | Performed by: NURSE PRACTITIONER

## 2019-12-16 PROCEDURE — 3078F PR MOST RECENT DIASTOLIC BLOOD PRESSURE < 80 MM HG: ICD-10-PCS | Mod: HCNC,CPTII,S$GLB, | Performed by: NURSE PRACTITIONER

## 2019-12-16 PROCEDURE — 3288F FALL RISK ASSESSMENT DOCD: CPT | Mod: HCNC,CPTII,S$GLB, | Performed by: NURSE PRACTITIONER

## 2019-12-16 PROCEDURE — 99999 PR PBB SHADOW E&M-EST. PATIENT-LVL V: CPT | Mod: PBBFAC,HCNC,, | Performed by: NURSE PRACTITIONER

## 2019-12-16 PROCEDURE — 93351 STRESS ECHO (CUPID ONLY): ICD-10-PCS | Mod: HCNC,S$GLB,, | Performed by: INTERNAL MEDICINE

## 2019-12-16 NOTE — PROGRESS NOTES
[facility] Consultation Note    Reason for Consult:  The primary encounter diagnosis was Irritable bowel syndrome with constipation. A diagnosis of Dysphagia, unspecified type was also pertinent to this visit.    PCP:   Marli Wilkinson   [address] [facility] [place] [address]    Referring MD:  Marli Wilkinson Md  [address] [place] [address]    HPI:  This is a 69 y.o. female here for evaluation of IBS with constipation and bloating. She is an established patient last seen by Dr. Milton 8/15/2019 . SIBO breath test was negative.  A colonoscopy about 2 years ago that was normal. Dr. Milton prescribed Linzess 145 mcg and it caused severe abdominal cramping for the patient. It was recommended she try MiraLax and charcoal capsules.  Tried Linzess 145 mcg. Has not tried the Miralax. Right now she is using several OTC laxatives. She desires to have a BM every day in order to feel better.   Was prescribed Amitiza by PCP, worked, but insurance does not cover.   Has a lot of bloating , got worse last summer.  Salads make sx worse.    ROS:  Constitutional: No fevers, chills, No weight loss  ENT: + allergies  CV: No chest pain  Pulm: No cough, + shortness of breath  GI: see HPI  Psych: + Anxiety, +depression    Medical History:  has a past medical history of Arthritis, Bronchitis, Cataract, Dry eyes, Hypothyroidism (6/23/2016), Rash, Squamous cell carcinoma, Status post lumbar surgery (6/23/2016), Stress fracture, and Thyroid disease.    Surgical History:  has a past surgical history that includes Colonoscopy; Cervical fusion; Colonoscopy (N/A, 11/14/2017); l4-5 MIS diskectomy (Right, 05/2016); l4-5 mid discectomy (Left, 03/2017); Angioplasty; and Esophagogastroduodenoscopy (N/A, 10/10/2019).    Family History: family history includes Cancer in her mother; Cancer (age of onset: 80) in her father; Cataracts in her father; Colon cancer in her father; Diabetes in her  son; Heart disease in her son; Heart failure in her father; Hypertension in her father; Melanoma in her daughter; No Known Problems in her brother, maternal aunt, maternal grandfather, maternal grandmother, maternal uncle, paternal aunt, paternal grandfather, paternal grandmother, paternal uncle, and sister..     Social History:  reports that she has never smoked. She has never used smokeless tobacco. She reports that she drinks alcohol. She reports that she does not use drugs.    Review of patient's allergies indicates:  No Known Allergies    Current Outpatient Medications on File Prior to Visit   Medication Sig Dispense Refill    acetaminophen (TYLENOL) 500 MG tablet Take 500 mg by mouth every 6 (six) hours as needed for Pain.      acetazolamide 25 mg/mL Susp       acyclovir (ZOVIRAX) 400 MG tablet Take 1 tablet (400 mg total) by mouth 2 (two) times daily. 60 tablet 12    ascorbic acid (VITAMIN C) 1000 MG tablet Take 1,000 mg by mouth once daily.       biotin 1 mg tablet Take 1 mg by mouth 2 (two) times daily.       buPROPion (WELLBUTRIN XL) 150 MG TB24 tablet Take 1 tablet (150 mg total) by mouth once daily. 30 tablet 5    calcium-vitamin D 500 mg(1,250mg) -200 unit per tablet Take 1 tablet by mouth 2 (two) times daily with meals.       chondroitin sulfate-vit C-Mn (CHONDROITIN SULFATE COMPLEX) 400-60-2.5 mg Cap Take 1 tablet by mouth 2 (two) times daily.       ciclopirox (LOPROX) 0.77 % Susp aaa qhs 30 mL 6    clonazePAM (KLONOPIN) 1 MG tablet Take 1.5 tablets (1.5 mg total) by mouth nightly as needed. 45 tablet 5    dicyclomine (BENTYL) 20 mg tablet Take 1 tablet (20 mg total) by mouth 3 (three) times daily as needed. 90 tablet 12    digestive enzymes Tab Take 1 tablet by mouth once daily.       diltiaZEM (CARDIZEM) 30 MG tablet Once daily for raynauds. 90 tablet 3    diphenhydrAMINE (BENADRYL) 25 mg capsule Take 25 mg by mouth nightly as needed.       fluticasone (FLONASE) 50 mcg/actuation nasal  "spray 1 spray by Each Nare route once daily. 16 g 2    ginseng 200 mg Cap Take 200 mg by mouth daily as needed.       glucosamine sulfate 2KCl 1,000 mg Tab Take 1 tablet by mouth once daily.       levothyroxine (SYNTHROID) 137 MCG Tab tablet Take 1 tablet (137 mcg total) by mouth before breakfast. 30 tablet 5    lubiprostone (AMITIZA) 24 MCG Cap Take 1 capsule (24 mcg total) by mouth 2 (two) times daily. 60 capsule 0    montelukast (SINGULAIR) 10 mg tablet Take 1 tablet (10 mg total) by mouth every evening. 30 tablet 6    multivitamin (THERAGRAN) per tablet Take 1 tablet by mouth once daily.       mupirocin (BACTROBAN) 2 % ointment Apply to affected area 3 times daily 22 g 1    neomycin-polymyxin-dexamethasone (MAXITROL) 3.5mg/mL-10,000 unit/mL-0.1 % DrpS Place 1 drop into both eyes 4 (four) times daily. for 7 days 5 mL 0    nitroGLYCERIN 2% TD oint (NITROGLYN) 2 % ointment Apply small amount to fingertips prn raynaud attacks 30 g 2    tretinoin (RETIN-A) 0.1 % cream Apply topically every evening. 20 g 6    zinc 50 mg Tab Take 50 mg by mouth once daily.       [DISCONTINUED] linaCLOtide (LINZESS) 145 mcg Cap capsule Take 1 capsule (145 mcg total) by mouth once daily. 30 capsule 6     No current facility-administered medications on file prior to visit.          Objective Findings:    Vital Signs:  /79 (BP Location: Right arm)   Pulse 73   Ht 5' 3" (1.6 m)   Wt 48.5 kg (106 lb 14.8 oz)   LMP  (LMP Unknown)   BMI 18.94 kg/m²   Body mass index is 18.94 kg/m².    Physical Exam:  General Appearance: Well appearing in no acute distress  Head:   Normocephalic, without obvious abnormality  Eyes:    No scleral icterus  ENT: Neck supple  Lungs: CTA bilaterally in anterior and posterior fields, no wheezes, no crackles.  Heart:  Regular rate and rhythm, S1, S2 normal, no murmurs heard  Abdomen: Soft, non tender, non distended with positive bowel sounds in all four quadrants. No hepatosplenomegaly, ascites, " or mass  Extremities: No edema  Skin: No rash  Neurologic: AAO x 3      Labs:  Lab Results   Component Value Date    WBC 4.08 12/05/2019    HGB 12.6 12/05/2019    HCT 39.3 12/05/2019     12/05/2019    CRP 1.2 06/24/2009    CHOL 205 (H) 12/05/2019    TRIG 59 12/05/2019    HDL 83 (H) 12/05/2019    ALT 18 12/05/2019    AST 21 12/05/2019     12/05/2019    K 4.1 12/05/2019     12/05/2019    CREATININE 0.7 12/05/2019    BUN 11 12/05/2019    CO2 29 12/05/2019    TSH 1.609 12/05/2019    INR 1.0 02/05/2018    HGBA1C 5.4 08/24/2015       Imaging reviewed:  None  Endoscopy: reviewed    EGD 10/10/2019 normal    Colonoscopy 2017 W/ Dr. Mercado     - Preparation of the colon was fair.                        - Melanosis in the colon.                        - The examination was otherwise normal.                        - No specimens collected.    Assessment:  Ms. Man is a 69 y.o. F with:    1. Irritable bowel syndrome with constipation    2. Dysphagia, unspecified type         She did not try the MiraLax as recommended that she reports she tried this in the past and did not work. Amitiza works but insurance is not covered by insurance. Will see if Trulance is covered.  We discussed other natural supplements that can be found on [employer] to help with - Zenwise and Calm. She agreed to trying those.  Dysphagia is stable right now    Recommendations:  1. Trulance  2. FODMAP diet. She will let me know if she would like to meet with dietician.   3. Zenwise or Calm (bought off amazon)    F/u pending above    25 MINUTES TOTAL FACE TO FACE >50% SPENT IN COUNSELING AND COORDINATION OF CARE     Order summary:  Orders Placed This Encounter    plecanatide (TRULANCE) 3 mg Tab         Thank you so much for allowing me to participate in the care of Angelina Man    Melissa Brown, SHADIP-C

## 2019-12-16 NOTE — LETTER
December 16, 2019      Marli Wilkinson MD  [address]  [facility] [address]           [facility]  [address]  [facility] [address]  Phone: [phone]  Fax: [phone]          Patient: Angelina Man   MR Number: [id number]   YOB: 1950   Date of Visit: 12/16/2019       Dear Dr. Marli Wilkinson:    Thank you for referring Angelina Man to me for evaluation. Attached you will find relevant portions of my assessment and plan of care.    If you have questions, please do not hesitate to call me. I look forward to following Angelina Man along with you.    Sincerely,    Melissa Brown, AFUA    Enclosure  CC:  No Recipients    If you would like to receive this communication electronically, please contact [email or web address] or [phone] to request more information on [email or web address] access.    For providers and/or their staff who would like to refer a patient to [facility], please contact us through our one-stop-shop provider referral line, [facility] , at [phone].    If you feel you have received this communication in error or would no longer like to receive these types of communications, please e-mail [email or web address]

## 2019-12-16 NOTE — PATIENT INSTRUCTIONS
Try Zenwise or Calm, only one at a time. This can be found on amazon.  Let Melissa know if you would like to see GI dietician.      The following foods have been identified as being high in FODMAPs:   THESE ARE FOODS TO AVOID  Fruits    Apples  Apricots  Blackberries  Cherries  Grapefruit  Sperry  Nectarines  Peaches  Pears  Plums and prunes  Pomegranates  Watermelon  High concentration of fructose from canned fruit, dried fruit or fruit juice  Grains    Barley  Couscous  Farro  Rye  Semolina  Wheat  Lactose-Containing Foods    Buttermilk  Cream  Custard  Ice cream  Margarine  Milk (cow, goat, sheep)  Soft cheese, including cottage cheese and ricotta  Yogurt (regular and Greek)  Dairy Substitutes    Oat milk (although a 1/8 serving is considered low-FODMAP)  Soy milk (U[place]  Legumes    Baked beans  Black-eyed peas  Butter beans  Chickpeas  Lentils  Kidney beans  Lima beans  Soybeans  Split peas  Sweeteners    Agave  Fructose  High fructose corn syrup  Honey  Isomalt  Maltitol  Mannitol  Molasses  Sorbitol  Xylitol  Vegetables    Artichokes  Asparagus  Beets  Goodyear sprouts  Cauliflower  Celery  Garlic  Leeks  Mushrooms  Okra  Onions  Peas  Scallions (white parts)  Shallots  Snow peas  Sugar snap peas          The following foods have been identified as being low in FODMAPs:  THESE ARE FOODS THAT ARE OKAY TO EAT  Fruits    Avocado (limit 1/8 of whole)  Banana  Blueberry  Cantaloupe  Grapes  Honeydew melon  Kiwi  Lemon  Lime  Mandarin oranges  Olives  Orange  Papaya  Plantain  Pineapple  Raspberry  Rhubarb  Strawberry  Tangelo  Sweeteners    Artificial sweeteners that do not end in -ol  Brown sugar  Glucose  Maple syrup  Powdered sugar  Sugar (sucrose)  Dairy and Alternatives    [place]ton milk  Coconut milk (limit 1/2 cup)  Hemp milk  Rice milk  Butter  Certain cheeses, such as  brie, camembert, mozzarella, Parmesan  Lactose-free products, such as lactose-free milk, ice cream, and yogurt  Vegetables    Arugula (rockPikimal  lettuce)  Bamboo shoots  Bell peppers  Broccoli  Bok dara  Carrots  Celeriac  Andres greens  Common Cabbage  Corn (half a cob)  Eggplant  Endive  Fennel  Green beans  Kale  Lettuce  Parsley  Parsnip  Potato  Radicchio   Scallions (green parts only)  Spinach, baby  Squash  Sweet potato  Swiss chard  Tomato  Turnip  Water chestnut  Zucchini  Grains    Amaranth  Brown rice  Bulgur wheat (limit to 1/4 cup cooked)  Oats  Gluten-free products  Quinoa  Spelt products  Nuts    Almonds (limit 10)  Brazil nuts  Hazelnuts (limit 10)  Macadamia nuts  Peanuts  Pecan  Pine nuts  Walnuts  Seeds    Woodstock  Chriss  Pumpkin  Sesame  Sunflower  Protein Sources    Beef  Chicken  Eggs  Fish  Lamb  Pork  Shellfish  Tofu and tempeh  McGraw

## 2019-12-21 RX ORDER — POTASSIUM CHLORIDE 600 MG/1
TABLET, FILM COATED, EXTENDED RELEASE ORAL
Qty: 30 TABLET | Refills: 0 | Status: SHIPPED | OUTPATIENT
Start: 2019-12-21 | End: 2020-02-27 | Stop reason: SDUPTHER

## 2020-01-06 ENCOUNTER — HOSPITAL ENCOUNTER (OUTPATIENT)
Dept: RADIOLOGY | Facility: HOSPITAL | Age: 70
Discharge: HOME | End: 2020-01-06
Attending: FAMILY MEDICINE
Payer: MEDICARE

## 2020-01-06 DIAGNOSIS — M81.0 OSTEOPOROSIS, UNSPECIFIED OSTEOPOROSIS TYPE, UNSPECIFIED PATHOLOGICAL FRACTURE PRESENCE: Primary | ICD-10-CM

## 2020-01-06 DIAGNOSIS — M94.9 DISORDER OF CARTILAGE, UNSPECIFIED: ICD-10-CM

## 2020-01-06 DIAGNOSIS — M85.80 OSTEOPENIA, UNSPECIFIED LOCATION: ICD-10-CM

## 2020-01-06 DIAGNOSIS — R20.9 COLD EXTREMITIES: ICD-10-CM

## 2020-01-06 PROCEDURE — 77080 DXA BONE DENSITY AXIAL: CPT | Mod: 26,HCNC,, | Performed by: RADIOLOGY

## 2020-01-06 PROCEDURE — 77080 DEXA BONE DENSITY SPINE HIP: ICD-10-PCS | Mod: 26,HCNC,, | Performed by: RADIOLOGY

## 2020-01-06 PROCEDURE — 93925 LOWER EXTREMITY STUDY: CPT | Mod: TC,HCNC

## 2020-01-06 PROCEDURE — 77080 DXA BONE DENSITY AXIAL: CPT | Mod: TC,HCNC

## 2020-01-06 PROCEDURE — 93925 US LOWER EXTREMITY ARTERIES BILATERAL: ICD-10-PCS | Mod: 26,HCNC,, | Performed by: RADIOLOGY

## 2020-01-06 PROCEDURE — 93925 LOWER EXTREMITY STUDY: CPT | Mod: 26,HCNC,, | Performed by: RADIOLOGY

## 2020-01-06 RX ORDER — ALENDRONATE SODIUM 70 MG/1
70 TABLET ORAL
Qty: 4 TABLET | Refills: 11 | Status: SHIPPED | OUTPATIENT
Start: 2020-01-06 | End: 2020-08-14

## 2020-01-07 ENCOUNTER — TELEPHONE (OUTPATIENT)
Dept: PRIMARY CARE CLINIC | Facility: CLINIC | Age: 70
End: 2020-01-07

## 2020-01-07 NOTE — TELEPHONE ENCOUNTER
Patient states that she does not want to take any medication that may upset her stomach.  She would like to speak with you further about what other options she may have and just how bad the results are.

## 2020-01-07 NOTE — TELEPHONE ENCOUNTER
----- Message from Lazara Buckley sent at 1/7/2020  4:21 PM CST -----  Contact: self/[phone]  Pt called in regards to not really wanting to take the Rx for     alendronate (FOSAMAX) 70 MG tablet 4 tablet 11 1/6/2020 1/5/2021  Take 1 tablet (70 mg total) by mouth every 7 days     She also wants to talk to the dr about her test results.      Please advise

## 2020-01-08 NOTE — TELEPHONE ENCOUNTER
Spoke with pt who has bone loss in her jaw already is scheduled for bone grafts. Pt will wait till next bone density   Before deciding. In the meantime she will continue to exercise, take vitamin D 400iu per day and calcium 1200meq per day.

## 2020-02-06 ENCOUNTER — PATIENT MESSAGE (OUTPATIENT)
Dept: NEUROSURGERY | Facility: CLINIC | Age: 70
End: 2020-02-06

## 2020-02-13 ENCOUNTER — OFFICE VISIT (OUTPATIENT)
Dept: DERMATOLOGY | Facility: CLINIC | Age: 70
End: 2020-02-13
Payer: MEDICARE

## 2020-02-13 DIAGNOSIS — L81.4 LENTIGO: ICD-10-CM

## 2020-02-13 DIAGNOSIS — D18.01 CHERRY ANGIOMA: ICD-10-CM

## 2020-02-13 DIAGNOSIS — L82.0 INFLAMED SEBORRHEIC KERATOSIS: Primary | ICD-10-CM

## 2020-02-13 DIAGNOSIS — D22.9 NEVUS: ICD-10-CM

## 2020-02-13 DIAGNOSIS — L90.5 SCAR: ICD-10-CM

## 2020-02-13 DIAGNOSIS — Z85.828 PERSONAL HISTORY OF SKIN CANCER: ICD-10-CM

## 2020-02-13 DIAGNOSIS — L82.1 SK (SEBORRHEIC KERATOSIS): ICD-10-CM

## 2020-02-13 PROCEDURE — 1126F AMNT PAIN NOTED NONE PRSNT: CPT | Mod: HCNC,S$GLB,, | Performed by: DERMATOLOGY

## 2020-02-13 PROCEDURE — 99214 OFFICE O/P EST MOD 30 MIN: CPT | Mod: 25,HCNC,S$GLB, | Performed by: DERMATOLOGY

## 2020-02-13 PROCEDURE — 1159F MED LIST DOCD IN RCRD: CPT | Mod: HCNC,S$GLB,, | Performed by: DERMATOLOGY

## 2020-02-13 PROCEDURE — 1101F PT FALLS ASSESS-DOCD LE1/YR: CPT | Mod: HCNC,CPTII,S$GLB, | Performed by: DERMATOLOGY

## 2020-02-13 PROCEDURE — 99999 PR PBB SHADOW E&M-EST. PATIENT-LVL II: CPT | Mod: PBBFAC,HCNC,, | Performed by: DERMATOLOGY

## 2020-02-13 PROCEDURE — 99999 PR PBB SHADOW E&M-EST. PATIENT-LVL II: ICD-10-PCS | Mod: PBBFAC,HCNC,, | Performed by: DERMATOLOGY

## 2020-02-13 PROCEDURE — 99214 PR OFFICE/OUTPT VISIT, EST, LEVL IV, 30-39 MIN: ICD-10-PCS | Mod: 25,HCNC,S$GLB, | Performed by: DERMATOLOGY

## 2020-02-13 PROCEDURE — 17110 PR DESTRUCTION BENIGN LESIONS UP TO 14: ICD-10-PCS | Mod: HCNC,S$GLB,, | Performed by: DERMATOLOGY

## 2020-02-13 PROCEDURE — 1159F PR MEDICATION LIST DOCUMENTED IN MEDICAL RECORD: ICD-10-PCS | Mod: HCNC,S$GLB,, | Performed by: DERMATOLOGY

## 2020-02-13 PROCEDURE — 1126F PR PAIN SEVERITY QUANTIFIED, NO PAIN PRESENT: ICD-10-PCS | Mod: HCNC,S$GLB,, | Performed by: DERMATOLOGY

## 2020-02-13 PROCEDURE — 1101F PR PT FALLS ASSESS DOC 0-1 FALLS W/OUT INJ PAST YR: ICD-10-PCS | Mod: HCNC,CPTII,S$GLB, | Performed by: DERMATOLOGY

## 2020-02-13 PROCEDURE — 17110 DESTRUCTION B9 LES UP TO 14: CPT | Mod: HCNC,S$GLB,, | Performed by: DERMATOLOGY

## 2020-02-13 NOTE — PROGRESS NOTES
Subjective:       Patient ID:  Angelina Man is a 70 y.o. female who presents for   Chief Complaint   Patient presents with    Skin Check    Spot     Pt here today for tbse  Pt c/o spot on right forearm, x 2wks, scaly , bleeding and inflamed , no tx  C/o moles on right side. Present for years but getting larger. No tx.   This is a high risk patient here to check for the development of new lesions.        Review of Systems   Constitutional: Negative for fever, chills, fatigue and malaise.   Skin: Positive for daily sunscreen use and activity-related sunscreen use. Negative for recent sunburn.   Hematologic/Lymphatic: Bruises/bleeds easily.        Objective:    Physical Exam   Constitutional: She appears well-developed and well-nourished. No distress.   Neurological: She is alert and oriented to person, place, and time. She is not disoriented.   Psychiatric: She has a normal mood and affect.   Skin:   Areas Examined (abnormalities noted in diagram):   Scalp / Hair Palpated and Inspected  Head / Face Inspection Performed  Neck Inspection Performed  Chest / Axilla Inspection Performed  Abdomen Inspection Performed  Genitals / Buttocks / Groin Inspection Performed  Back Inspection Performed  RUE Inspected  LUE Inspection Performed  RLE Inspected  LLE Inspection Performed  Nails and Digits Inspection Performed                   Diagram Legend     Erythematous scaling macule/papule c/w actinic keratosis       Vascular papule c/w angioma      Pigmented verrucoid papule/plaque c/w seborrheic keratosis      Yellow umbilicated papule c/w sebaceous hyperplasia      Irregularly shaped tan macule c/w lentigo     1-2 mm smooth white papules consistent with Milia      Movable subcutaneous cyst with punctum c/w epidermal inclusion cyst      Subcutaneous movable cyst c/w pilar cyst      Firm pink to brown papule c/w dermatofibroma      Pedunculated fleshy papule(s) c/w skin tag(s)      Evenly pigmented macule c/w junctional  nevus     Mildly variegated pigmented, slightly irregular-bordered macule c/w mildly atypical nevus      Flesh colored to evenly pigmented papule c/w intradermal nevus       Pink pearly papule/plaque c/w basal cell carcinoma      Erythematous hyperkeratotic cursted plaque c/w SCC      Surgical scar with no sign of skin cancer recurrence      Open and closed comedones      Inflammatory papules and pustules      Verrucoid papule consistent consistent with wart     Erythematous eczematous patches and plaques     Dystrophic onycholytic nail with subungual debris c/w onychomycosis     Umbilicated papule    Erythematous-base heme-crusted tan verrucoid plaque consistent with inflamed seborrheic keratosis     Erythematous Silvery Scaling Plaque c/w Psoriasis     See annotation      Assessment / Plan:        Inflamed seborrheic keratosis  Cryosurgery procedure note:    Verbal consent from the patient is obtained including, but not limited to, risk of hypopigmentation/hyperpigmentation, scar, recurrence of lesion. Liquid nitrogen cryosurgery is applied to 2 lesions to produce a freeze injury. The patient is aware that blisters may form and is instructed on wound care with gentle cleansing and use of vaseline ointment to keep moist until healed. The patient is supplied a handout on cryosurgery and is instructed to call if lesions do not completely resolve.    SK (seborrheic keratosis)  These are benign inherited growths without a malignant potential. Reassurance given to patient. No treatment is necessary.     Cherry angioma  These are benign vascular lesions that are inherited.  Treatment is not necessary.    Nevus  Discussed ABCDE's of nevi.  Monitor for new mole or moles that are becoming bigger, darker, irritated, or developing irregular borders. Brochure provided.    Lentigo  This is a benign hyperpigmented sun induced lesion. Daily sun protection will reduce the number of new lesions. Treatment of these benign lesions  are considered cosmetic.  The nature of sun-induced photo-aging and skin cancers is discussed.  Sun avoidance, protective clothing, and the use of 30-SPF sunscreens is advised. Observe closely for skin damage/changes, and call if such occurs.    Personal history of skin cancer  Scar  Pt with history of non melanoma skin cancer  Total body skin examination performed today including at least 12 points as noted in physical examination. No suspicious lesions noted.             Follow up in about 1 year (around 2/13/2021).

## 2020-02-27 RX ORDER — POTASSIUM CHLORIDE 600 MG/1
8 TABLET, FILM COATED, EXTENDED RELEASE ORAL DAILY
Qty: 30 TABLET | Refills: 0 | Status: SHIPPED | OUTPATIENT
Start: 2020-02-27 | End: 2020-04-25 | Stop reason: SDUPTHER

## 2020-03-16 DIAGNOSIS — B00.9 HSV INFECTION: ICD-10-CM

## 2020-03-16 RX ORDER — ACYCLOVIR 400 MG/1
400 TABLET ORAL 2 TIMES DAILY
Qty: 60 TABLET | Refills: 12 | Status: SHIPPED | OUTPATIENT
Start: 2020-03-16 | End: 2021-03-28 | Stop reason: SDUPTHER

## 2020-03-17 ENCOUNTER — HOSPITAL ENCOUNTER (OUTPATIENT)
Dept: RADIOLOGY | Facility: HOSPITAL | Age: 70
Discharge: HOME | End: 2020-03-17
Attending: NEUROLOGICAL SURGERY
Payer: MEDICARE

## 2020-03-17 ENCOUNTER — OFFICE VISIT (OUTPATIENT)
Dept: NEUROSURGERY | Facility: CLINIC | Age: 70
End: 2020-03-17
Payer: MEDICARE

## 2020-03-17 VITALS
SYSTOLIC BLOOD PRESSURE: 124 MMHG | WEIGHT: 110 LBS | HEART RATE: 90 BPM | DIASTOLIC BLOOD PRESSURE: 82 MMHG | HEIGHT: 64 IN | TEMPERATURE: 97 F | BODY MASS INDEX: 18.78 KG/M2

## 2020-03-17 DIAGNOSIS — Z98.1 S/P LUMBAR SPINAL FUSION: ICD-10-CM

## 2020-03-17 DIAGNOSIS — Z98.1 HISTORY OF LUMBAR FUSION: Primary | ICD-10-CM

## 2020-03-17 PROCEDURE — 99999 PR PBB SHADOW E&M-EST. PATIENT-LVL V: CPT | Mod: PBBFAC,HCNC,, | Performed by: NEUROLOGICAL SURGERY

## 2020-03-17 PROCEDURE — 1159F MED LIST DOCD IN RCRD: CPT | Mod: HCNC,S$GLB,, | Performed by: NEUROLOGICAL SURGERY

## 2020-03-17 PROCEDURE — 99214 OFFICE O/P EST MOD 30 MIN: CPT | Mod: HCNC,S$GLB,, | Performed by: NEUROLOGICAL SURGERY

## 2020-03-17 PROCEDURE — 1101F PR PT FALLS ASSESS DOC 0-1 FALLS W/OUT INJ PAST YR: ICD-10-PCS | Mod: HCNC,CPTII,S$GLB, | Performed by: NEUROLOGICAL SURGERY

## 2020-03-17 PROCEDURE — 3074F SYST BP LT 130 MM HG: CPT | Mod: HCNC,CPTII,S$GLB, | Performed by: NEUROLOGICAL SURGERY

## 2020-03-17 PROCEDURE — 3079F DIAST BP 80-89 MM HG: CPT | Mod: HCNC,CPTII,S$GLB, | Performed by: NEUROLOGICAL SURGERY

## 2020-03-17 PROCEDURE — 72131 CT LUMBAR SPINE W/O DYE: CPT | Mod: TC,HCNC

## 2020-03-17 PROCEDURE — 1101F PT FALLS ASSESS-DOCD LE1/YR: CPT | Mod: HCNC,CPTII,S$GLB, | Performed by: NEUROLOGICAL SURGERY

## 2020-03-17 PROCEDURE — 72131 CT LUMBAR SPINE WITHOUT CONTRAST: ICD-10-PCS | Mod: 26,HCNC,, | Performed by: RADIOLOGY

## 2020-03-17 PROCEDURE — 1125F AMNT PAIN NOTED PAIN PRSNT: CPT | Mod: HCNC,S$GLB,, | Performed by: NEUROLOGICAL SURGERY

## 2020-03-17 PROCEDURE — 3079F PR MOST RECENT DIASTOLIC BLOOD PRESSURE 80-89 MM HG: ICD-10-PCS | Mod: HCNC,CPTII,S$GLB, | Performed by: NEUROLOGICAL SURGERY

## 2020-03-17 PROCEDURE — 1159F PR MEDICATION LIST DOCUMENTED IN MEDICAL RECORD: ICD-10-PCS | Mod: HCNC,S$GLB,, | Performed by: NEUROLOGICAL SURGERY

## 2020-03-17 PROCEDURE — 99214 PR OFFICE/OUTPT VISIT, EST, LEVL IV, 30-39 MIN: ICD-10-PCS | Mod: HCNC,S$GLB,, | Performed by: NEUROLOGICAL SURGERY

## 2020-03-17 PROCEDURE — 72131 CT LUMBAR SPINE W/O DYE: CPT | Mod: 26,HCNC,, | Performed by: RADIOLOGY

## 2020-03-17 PROCEDURE — 1125F PR PAIN SEVERITY QUANTIFIED, PAIN PRESENT: ICD-10-PCS | Mod: HCNC,S$GLB,, | Performed by: NEUROLOGICAL SURGERY

## 2020-03-17 PROCEDURE — 3074F PR MOST RECENT SYSTOLIC BLOOD PRESSURE < 130 MM HG: ICD-10-PCS | Mod: HCNC,CPTII,S$GLB, | Performed by: NEUROLOGICAL SURGERY

## 2020-03-17 PROCEDURE — 99999 PR PBB SHADOW E&M-EST. PATIENT-LVL V: ICD-10-PCS | Mod: PBBFAC,HCNC,, | Performed by: NEUROLOGICAL SURGERY

## 2020-03-17 NOTE — PROGRESS NOTES
Established [facility]    SUBJECTIVE:     History of Present Illness:  Patient is here to see me for evaluation after our last evaluation or on 04/09/2019.  She is status post 2 level lumbar interbody fusion by Dr. Shaw from 02/05/2018.  She has done relatively well currently just mild back discomfort but is able to work out remain active.  She feels her back pain is much better than preop.  She denies any bowel bladder issues.    Review of patient's allergies indicates:  No Known Allergies    Current Outpatient Medications   Medication Sig Dispense Refill    acetaminophen (TYLENOL) 500 MG tablet Take 500 mg by mouth every 6 (six) hours as needed for Pain.      acetazolamide 25 mg/mL Susp       acyclovir (ZOVIRAX) 400 MG tablet Take 1 tablet (400 mg total) by mouth 2 (two) times daily. 60 tablet 12    ascorbic acid (VITAMIN C) 1000 MG tablet Take 1,000 mg by mouth once daily.       biotin 1 mg tablet Take 1 mg by mouth 2 (two) times daily.       buPROPion (WELLBUTRIN XL) 150 MG TB24 tablet Take 1 tablet (150 mg total) by mouth once daily. 30 tablet 5    calcium-vitamin D 500 mg(1,250mg) -200 unit per tablet Take 1 tablet by mouth 2 (two) times daily with meals.       chondroitin sulfate-vit C-Mn (CHONDROITIN SULFATE COMPLEX) 400-60-2.5 mg Cap Take 1 tablet by mouth 2 (two) times daily.       ciclopirox (LOPROX) 0.77 % Susp aaa qhs 30 mL 6    clonazePAM (KLONOPIN) 1 MG tablet Take 1.5 tablets (1.5 mg total) by mouth nightly as needed. 45 tablet 5    digestive enzymes Tab Take 1 tablet by mouth once daily.       diltiaZEM (CARDIZEM) 30 MG tablet Once daily for raynauds. 90 tablet 3    diphenhydrAMINE (BENADRYL) 25 mg capsule Take 25 mg by mouth nightly as needed.       fluticasone (FLONASE) 50 mcg/actuation nasal spray 1 spray by Each Nare route once daily. 16 g 2    ginseng 200 mg Cap Take 200 mg by mouth daily as needed.       glucosamine sulfate 2KCl 1,000 mg Tab Take 1 tablet by mouth once daily.        levothyroxine (SYNTHROID) 137 MCG Tab tablet Take 1 tablet (137 mcg total) by mouth before breakfast. 30 tablet 5    lubiprostone (AMITIZA) 24 MCG Cap Take 1 capsule (24 mcg total) by mouth 2 (two) times daily. 60 capsule 0    multivitamin (THERAGRAN) per tablet Take 1 tablet by mouth once daily.       mupirocin (BACTROBAN) 2 % ointment Apply to affected area 3 times daily 22 g 1    nitroGLYCERIN 2% TD oint (NITROGLYN) 2 % ointment Apply small amount to fingertips prn raynaud attacks 30 g 2    plecanatide (TRULANCE) 3 mg Tab Take 1 tablet (3 mg) by mouth once daily. 30 tablet 2    potassium chloride (KLOR-CON) 8 MEQ TbSR Take 1 tablet (8 mEq total) by mouth once daily. 30 tablet 0    tretinoin (RETIN-A) 0.1 % cream Apply topically every evening. 20 g 6    zinc 50 mg Tab Take 50 mg by mouth once daily.       alendronate (FOSAMAX) 70 MG tablet Take 1 tablet (70 mg total) by mouth every 7 days. 4 tablet 11    dicyclomine (BENTYL) 20 mg tablet Take 1 tablet (20 mg total) by mouth 3 (three) times daily as needed. 90 tablet 12     No current facility-administered medications for this visit.        Past Medical History:   Diagnosis Date    Arthritis     Bronchitis     Cataract     Dry eyes     Hypothyroidism 6/23/2016    Rash     Squamous cell carcinoma     in-situ right upper inner arm, right wrist    Status post lumbar surgery 6/23/2016    Stress fracture     Thyroid disease      Past Surgical History:   Procedure Laterality Date    ANGIOPLASTY      CERVICAL FUSION      COLONOSCOPY      COLONOSCOPY N/A 11/14/2017    Procedure: COLONOSCOPY;  Surgeon: Kasi Mercado MD;  Location: [facility] ([address] [place] [address]);  Service: Endoscopy;  Laterality: N/A;    ESOPHAGOGASTRODUODENOSCOPY N/A 10/10/2019    Procedure: EGD (ESOPHAGOGASTRODUODENOSCOPY);  Surgeon: Rg Milton MD;  [facility]  Service: Endoscopy;  Laterality: N/A;    l4-5 mid discectomy Left 03/2017    l4-5 MIS diskectomy  Right 05/2016     Family History     Problem Relation (Age of Onset)    Cancer Mother, Father (80)    Cataracts Father    Colon cancer Father    Diabetes Son    Heart disease Son    Heart failure Father    Hypertension Father    Melanoma Daughter    No Known Problems Sister, Brother, Maternal Aunt, Maternal Uncle, Paternal Aunt, Paternal Uncle, Maternal Grandmother, Maternal Grandfather, Paternal Grandmother, Paternal Grandfather        Social History     Socioeconomic History    Marital status: Single     Spouse name: Not on file    Number of children: Not on file    Years of education: Not on file    Highest education level: Not on file   Occupational History     Employer: [employer]   Social Needs    Financial resource strain: Not on file    Food insecurity:     Worry: Not on file     Inability: Not on file    Transportation needs:     Medical: Not on file     Non-medical: Not on file   Tobacco Use    Smoking status: Never Smoker    Smokeless tobacco: Never Used   Substance and Sexual Activity    Alcohol use: Yes     Comment: socially, not weekly    Drug use: No    Sexual activity: Never   Lifestyle    Physical activity:     Days per week: Not on file     Minutes per session: Not on file    Stress: Not on file   Relationships    Social connections:     Talks on phone: Not on file     Gets together: Not on file     Attends Sikhism service: Not on file     Active member of club or organization: Not on file     Attends meetings of clubs or organizations: Not on file     Relationship status: Not on file   Other Topics Concern    Are you pregnant or think you may be? No    Breast-feeding No   Social History Narrative    Not on file       Review of Systems:  Review of Systems   Constitutional: Negative.    HENT: Negative.    Eyes: Negative.    Respiratory: Positive for shortness of breath.    Cardiovascular: Negative.    Gastrointestinal: Negative.    Musculoskeletal: Positive for back  "pain.   Allergic/Immunologic: Negative.    Hematological: Negative.    Psychiatric/Behavioral: Negative.        OBJECTIVE:     Vital Signs  Temp: 97.2 °F (36.2 °C)  Pulse: 90  BP: 124/82  Pain Score:   3  Height: 5' 4" (162.6 cm)  Weight: 49.9 kg (110 lb)  Body mass index is 18.88 kg/m².    Physical Exam:  Physical Exam:    Musculoskeletal:        Right Upper Extremities: Muscle strength is 5/5.        Left Upper Extremities: Muscle strength is 5/5.       Right Lower Extremities: Muscle strength is 5/5.        Left Lower Extremities: Muscle strength is 5/5.         Diagnostic Results:  CT scan shows hardware is still in good position.  Looks like she is not yet completely fused across the 2 levels and there is slight concerned about some lucency around the screws but no evidence that the screws were backing out no evidence of true hardware failure.  She has got pretty significant degenerative changes at the 2 level above the fusion as well.    ASSESSMENT/PLAN:     Overall I think patient is doing very well.  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        Note dictated with voice recognition software, please excuse any grammatical errors.    "

## 2020-03-18 ENCOUNTER — TELEPHONE (OUTPATIENT)
Dept: PRIMARY CARE CLINIC | Facility: CLINIC | Age: 70
End: 2020-03-18

## 2020-04-14 DIAGNOSIS — F32.A DEPRESSION, UNSPECIFIED DEPRESSION TYPE: ICD-10-CM

## 2020-04-14 RX ORDER — BUPROPION HYDROCHLORIDE 150 MG/1
150 TABLET ORAL DAILY
Qty: 30 TABLET | Refills: 11 | Status: SHIPPED | OUTPATIENT
Start: 2020-04-14 | End: 2021-02-06

## 2020-04-25 DIAGNOSIS — F41.9 ANXIETY: ICD-10-CM

## 2020-04-25 RX ORDER — POTASSIUM CHLORIDE 600 MG/1
8 TABLET, FILM COATED, EXTENDED RELEASE ORAL DAILY
Qty: 30 TABLET | Refills: 0 | Status: SHIPPED | OUTPATIENT
Start: 2020-04-25 | End: 2020-06-01

## 2020-04-25 RX ORDER — CLONAZEPAM 1 MG/1
1.5 TABLET ORAL NIGHTLY PRN
Qty: 45 TABLET | Refills: 0 | Status: SHIPPED | OUTPATIENT
Start: 2020-04-25 | End: 2020-12-17 | Stop reason: SDUPTHER

## 2020-04-25 NOTE — TELEPHONE ENCOUNTER
LOV 12/12/19    Clonazepam last filled on 01/24/2020 per the [facility]

## 2020-05-19 ENCOUNTER — PATIENT MESSAGE (OUTPATIENT)
Dept: PRIMARY CARE CLINIC | Facility: CLINIC | Age: 70
End: 2020-05-19

## 2020-06-03 ENCOUNTER — OFFICE VISIT (OUTPATIENT)
Dept: URGENT CARE | Facility: CLINIC | Age: 70
End: 2020-06-03
Payer: MEDICARE

## 2020-06-03 VITALS
SYSTOLIC BLOOD PRESSURE: 113 MMHG | RESPIRATION RATE: 18 BRPM | TEMPERATURE: 97 F | DIASTOLIC BLOOD PRESSURE: 74 MMHG | HEIGHT: 64 IN | OXYGEN SATURATION: 98 % | BODY MASS INDEX: 18.78 KG/M2 | HEART RATE: 99 BPM | WEIGHT: 110 LBS

## 2020-06-03 DIAGNOSIS — Z71.89 EDUCATED ABOUT COVID-19 VIRUS INFECTION: Primary | ICD-10-CM

## 2020-06-03 PROCEDURE — 99000 PR SPECIMEN HANDLING,DR OFF->LAB: ICD-10-PCS | Mod: S$GLB,,, | Performed by: NURSE PRACTITIONER

## 2020-06-03 PROCEDURE — 99211 PR OFFICE/OUTPT VISIT, EST, LEVL I: ICD-10-PCS | Mod: S$GLB,,, | Performed by: NURSE PRACTITIONER

## 2020-06-03 PROCEDURE — 99000 SPECIMEN HANDLING OFFICE-LAB: CPT | Mod: S$GLB,,, | Performed by: NURSE PRACTITIONER

## 2020-06-03 PROCEDURE — 99211 OFF/OP EST MAY X REQ PHY/QHP: CPT | Mod: S$GLB,,, | Performed by: NURSE PRACTITIONER

## 2020-06-03 PROCEDURE — 86769 SARS-COV-2 COVID-19 ANTIBODY: CPT | Mod: HCNC

## 2020-06-03 RX ORDER — MONTELUKAST SODIUM 10 MG/1
10 TABLET ORAL DAILY
COMMUNITY
Start: 2020-05-14 | End: 2021-02-18 | Stop reason: SDUPTHER

## 2020-06-03 NOTE — PROGRESS NOTES
"Subjective:       Patient ID: Angelina Man is a 70 y.o. female.    Vitals:  height is 5' 4" (1.626 m) and weight is 49.9 kg (110 lb). Her temperature is 97 °F (36.1 °C). Her blood pressure is 113/74 and her pulse is 99. Her respiration is 18 and oxygen saturation is 98%.     Chief Complaint: COVID-19 Concerns    70 yr old female presenting to the Urgent Care for COVID antibody test. Patient denies any symptoms now. Patient denies any past symptoms. Patient just wants to know if she was ever exposed to the virus.     Cough   This is a new problem. Pertinent negatives include no chills, ear pain, eye redness, fever, hemoptysis, myalgias, rash, sore throat, shortness of breath or wheezing. Nothing aggravates the symptoms. She has tried nothing for the symptoms.       Constitution: Negative for chills, sweating, fatigue and fever.   HENT: Negative for ear pain, congestion, sinus pain, sinus pressure, sore throat and voice change.    Neck: Negative for painful lymph nodes.   Eyes: Negative for eye redness.   Respiratory: Negative for chest tightness, cough, sputum production, bloody sputum, COPD, shortness of breath, stridor, wheezing and asthma.    Gastrointestinal: Negative for nausea and vomiting.   Musculoskeletal: Negative for muscle ache.   Skin: Negative for rash.   Allergic/Immunologic: Negative for seasonal allergies and asthma.   Hematologic/Lymphatic: Negative for swollen lymph nodes.       Objective:      Physical Exam    Counseled patient and answered questions in regards to COVID-19 testing and diagnosis.      Assessment:       1. Educated About Covid-19 Virus Infection        Plan:         Educated About Covid-19 Virus Infection  -     COVID-19 (SARS CoV-2) IgG Antibody      Patient Instructions   Someone will call you from [facility] within 24-48 hours to discuss lab results.       Instructions for Patients with Confirmed or Suspected COVID-19    If you are awaiting your test result, you will either be " called or it will be released to the patient portal.  If you have any questions about your test, please visit [email or web address] or call our COVID-19 information line at [phone].       Stay home and stay away from family members and friends. The CDC says, you can leave home after these three things have happened: 1) You have had no fever for at least 72 hours (that is three full days of no fever without the use of medicine that reduces fevers) 2) AND other symptoms have improved (for example, when your cough or shortness of breath have improved) 3) AND at least 7 days have passed since your symptoms first appeared.   Separate yourself from other people and animals in your home.   Call ahead before visiting your doctor.   Wear a facemask.   Cover your coughs and sneezes.   Wash your hands often with soap and water; hand  can be used, too.   Avoid sharing personal household items.   Wipe down surfaces used daily.   Monitor your symptoms. Seek prompt medical attention if your illness is worsening (e.g., difficulty breathing).    Before seeking care, call your healthcare provider.   If you have a medical emergency and need to call 911, notify the dispatch personnel that you have, or are being evaluated for COVID-19. If possible, put on a facemask before emergency medical services arrive.        Recommended precautions for household members, intimate partners, and caregivers in a home setting of a patient with symptomatic laboratory-confirmed COVID-19 or a patient under investigation.  Household members, intimate partners, and caregivers in the home setting awaiting tests results have close contact with a person with symptomatic, laboratory-confirmed COVID-19 or a person under investigation. Close contacts should monitor their health; they should call their provider right away if they develop symptoms suggestive of COVID-19 (e.g., fever, cough, shortness of breath).    Close contacts  should also follow these recommendations:   Make sure that you understand and can help the patient follow their provider's instructions for medication(s) and care. You should help the patient with basic needs in the home and provide support for getting groceries, prescriptions, and other personal needs.   Monitor the patient's symptoms. If the patient is getting sicker, call his or her healthcare provider and tell them that the patient has laboratory-confirmed COVID-19. If the patient has a medical emergency and you need to call 911, notify the dispatch personnel that the patient has, or is being evaluated for COVID-19.   Household members should stay in another room or be  from the patient. Household members should use a separate bedroom and bathroom, if available.   Prohibit visitors.   Household members should care for any pets in the home.   Make sure that shared spaces in the home have good air flow, such as by an air conditioner or an opened window, weather permitting.   Perform hand hygiene frequently. Wash your hands often with soap and water for at least 20 seconds or use an alcohol-based hand  (that contains > 60% alcohol) covering all surfaces of your hands and rubbing them together until they feel dry. Soap and water should be used preferentially.   Avoid touching your eyes, nose, and mouth.   The patient should wear a facemask. If the patient is not able to wear a facemask (for example, because it causes trouble breathing), caregivers should wear a mask when they are in the same room as the patient.   Wear a disposable facemask and gloves when you touch or have contact with the patient's blood, stool, or body fluids, such as saliva, sputum, nasal mucus, vomit, urine.  o Throw out disposable facemasks and gloves after using them. Do not reuse.  o When removing personal protective equipment, first remove and dispose of gloves. Then, immediately clean your hands with soap and  water or alcohol-based hand . Next, remove and dispose of facemask, and immediately clean your hands again with soap and water or alcohol-based hand .   You should not share dishes, drinking glasses, cups, eating utensils, towels, bedding, or other items with the patient. After the patient uses these items, you should wash them thoroughly (see below Wash laundry thoroughly).   Clean all high-touch surfaces, such as counters, tabletops, doorknobs, bathroom fixtures, toilets, phones, keyboards, tablets, and bedside tables, every day. Also, clean any surfaces that may have blood, stool, or body fluids on them.   Use a household cleaning spray or wipe, according to the label instructions. Labels contain instructions for safe and effective use of the cleaning product including precautions you should take when applying the product, such as wearing gloves and making sure you have good ventilation during use of the product.   Wash laundry thoroughly.  o Immediately remove and wash clothes or bedding that have blood, stool, or body fluids on them.  o Wear disposable gloves while handling soiled items and keep soiled items away from your body. Clean your hands (with soap and water or an alcohol-based hand ) immediately after removing your gloves.  o Read and follow directions on labels of laundry or clothing items and detergent. In general, using a normal laundry detergent according to washing machine instructions and dry thoroughly using the warmest temperatures recommended on the clothing label.   Place all used disposable gloves, facemasks, and other contaminated items in a lined container before disposing of them with other household waste. Clean your hands (with soap and water or an alcohol-based hand ) immediately after handling these items. Soap and water should be used preferentially if hands are visibly dirty.   Discuss any additional questions with your state or local  health department or healthcare provider. Check available hours when contacting your local health department.    For more information see CDC link below.      https://www.cdc.gov/coronavirus/2019-ncov/hcp/guidance-prevent-spread.html#precautions        Sources:  [facility], [place] [facility] and [facility]    If you were prescribed a narcotic or controlled medication, do not drive or operate heavy equipment or machinery while taking these medications.  You must understand that you've received an Urgent Care treatment only and that you may be released before all your medical problems are known or treated. You, the patient, will arrange for follow up care as instructed.  Follow up with your PCP or specialty clinic as directed within 2-5 days if not improved or as needed.  You can call [phone] to schedule an appointment with the appropriate provider.  If your condition worsens we recommend that you receive another evaluation at the emergency room immediately or contact your primary medical clinics after hours call service to discuss your concerns.  Please return here or go to the Emergency Department for any concerns or worsening of condition.

## 2020-06-03 NOTE — PATIENT INSTRUCTIONS
Someone will call you from [facility] within 24-48 hours to discuss lab results.       Instructions for Patients with Confirmed or Suspected COVID-19    If you are awaiting your test result, you will either be called or it will be released to the patient portal.  If you have any questions about your test, please visit [email or web address] or call our COVID-19 information line at [phone].       Stay home and stay away from family members and friends. The CDC says, you can leave home after these three things have happened: 1) You have had no fever for at least 72 hours (that is three full days of no fever without the use of medicine that reduces fevers) 2) AND other symptoms have improved (for example, when your cough or shortness of breath have improved) 3) AND at least 7 days have passed since your symptoms first appeared.   Separate yourself from other people and animals in your home.   Call ahead before visiting your doctor.   Wear a facemask.   Cover your coughs and sneezes.   Wash your hands often with soap and water; hand  can be used, too.   Avoid sharing personal household items.   Wipe down surfaces used daily.   Monitor your symptoms. Seek prompt medical attention if your illness is worsening (e.g., difficulty breathing).    Before seeking care, call your healthcare provider.   If you have a medical emergency and need to call 911, notify the dispatch personnel that you have, or are being evaluated for COVID-19. If possible, put on a facemask before emergency medical services arrive.        Recommended precautions for household members, intimate partners, and caregivers in a home setting of a patient with symptomatic laboratory-confirmed COVID-19 or a patient under investigation.  Household members, intimate partners, and caregivers in the home setting awaiting tests results have close contact with a person with symptomatic, laboratory-confirmed COVID-19 or a person under  investigation. Close contacts should monitor their health; they should call their provider right away if they develop symptoms suggestive of COVID-19 (e.g., fever, cough, shortness of breath).    Close contacts should also follow these recommendations:   Make sure that you understand and can help the patient follow their provider's instructions for medication(s) and care. You should help the patient with basic needs in the home and provide support for getting groceries, prescriptions, and other personal needs.   Monitor the patient's symptoms. If the patient is getting sicker, call his or her healthcare provider and tell them that the patient has laboratory-confirmed COVID-19. If the patient has a medical emergency and you need to call 911, notify the dispatch personnel that the patient has, or is being evaluated for COVID-19.   Household members should stay in another room or be  from the patient. Household members should use a separate bedroom and bathroom, if available.   Prohibit visitors.   Household members should care for any pets in the home.   Make sure that shared spaces in the home have good air flow, such as by an air conditioner or an opened window, weather permitting.   Perform hand hygiene frequently. Wash your hands often with soap and water for at least 20 seconds or use an alcohol-based hand  (that contains > 60% alcohol) covering all surfaces of your hands and rubbing them together until they feel dry. Soap and water should be used preferentially.   Avoid touching your eyes, nose, and mouth.   The patient should wear a facemask. If the patient is not able to wear a facemask (for example, because it causes trouble breathing), caregivers should wear a mask when they are in the same room as the patient.   Wear a disposable facemask and gloves when you touch or have contact with the patient's blood, stool, or body fluids, such as saliva, sputum, nasal mucus, vomit,  urine.  o Throw out disposable facemasks and gloves after using them. Do not reuse.  o When removing personal protective equipment, first remove and dispose of gloves. Then, immediately clean your hands with soap and water or alcohol-based hand . Next, remove and dispose of facemask, and immediately clean your hands again with soap and water or alcohol-based hand .   You should not share dishes, drinking glasses, cups, eating utensils, towels, bedding, or other items with the patient. After the patient uses these items, you should wash them thoroughly (see below Wash laundry thoroughly).   Clean all high-touch surfaces, such as counters, tabletops, doorknobs, bathroom fixtures, toilets, phones, keyboards, tablets, and bedside tables, every day. Also, clean any surfaces that may have blood, stool, or body fluids on them.   Use a household cleaning spray or wipe, according to the label instructions. Labels contain instructions for safe and effective use of the cleaning product including precautions you should take when applying the product, such as wearing gloves and making sure you have good ventilation during use of the product.   Wash laundry thoroughly.  o Immediately remove and wash clothes or bedding that have blood, stool, or body fluids on them.  o Wear disposable gloves while handling soiled items and keep soiled items away from your body. Clean your hands (with soap and water or an alcohol-based hand ) immediately after removing your gloves.  o Read and follow directions on labels of laundry or clothing items and detergent. In general, using a normal laundry detergent according to washing machine instructions and dry thoroughly using the warmest temperatures recommended on the clothing label.   Place all used disposable gloves, facemasks, and other contaminated items in a lined container before disposing of them with other household waste. Clean your hands (with soap and  water or an alcohol-based hand ) immediately after handling these items. Soap and water should be used preferentially if hands are visibly dirty.   Discuss any additional questions with your state or local health department or healthcare provider. Check available hours when contacting your local health department.    For more information see CDC link below.      https://www.cdc.gov/coronavirus/2019-ncov/hcp/guidance-prevent-spread.html#precautions        Sources:  CDC, [place] Department of [facility] and [facility]    If you were prescribed a narcotic or controlled medication, do not drive or operate heavy equipment or machinery while taking these medications.  You must understand that you've received an Urgent Care treatment only and that you may be released before all your medical problems are known or treated. You, the patient, will arrange for follow up care as instructed.  Follow up with your PCP or specialty clinic as directed within 2-5 days if not improved or as needed.  You can call [phone] to schedule an appointment with the appropriate provider.  If your condition worsens we recommend that you receive another evaluation at the emergency room immediately or contact your primary medical clinics after hours call service to discuss your concerns.  Please return here or go to the Emergency Department for any concerns or worsening of condition.

## 2020-06-04 ENCOUNTER — TELEPHONE (OUTPATIENT)
Dept: URGENT CARE | Facility: CLINIC | Age: 70
End: 2020-06-04

## 2020-06-04 LAB — SARS-COV-2 IGG SERPLBLD QL IA.RAPID: NEGATIVE

## 2020-06-04 NOTE — TELEPHONE ENCOUNTER
Called and discussed COVID-19 lab results with patient. Patient verbalized understanding. Patient reported she had already viewed the results on [facility].

## 2020-06-25 ENCOUNTER — OFFICE VISIT (OUTPATIENT)
Dept: PRIMARY CARE CLINIC | Facility: CLINIC | Age: 70
End: 2020-06-25
Payer: MEDICARE

## 2020-06-25 VITALS
DIASTOLIC BLOOD PRESSURE: 84 MMHG | SYSTOLIC BLOOD PRESSURE: 118 MMHG | TEMPERATURE: 98 F | RESPIRATION RATE: 18 BRPM | OXYGEN SATURATION: 100 %

## 2020-06-25 DIAGNOSIS — M19.90 OTHER TYPE OF OSTEOARTHRITIS, UNSPECIFIED SITE: ICD-10-CM

## 2020-06-25 DIAGNOSIS — F41.9 ANXIETY: Primary | ICD-10-CM

## 2020-06-25 DIAGNOSIS — E03.9 HYPOTHYROIDISM, UNSPECIFIED TYPE: ICD-10-CM

## 2020-06-25 PROCEDURE — 1159F MED LIST DOCD IN RCRD: CPT | Mod: HCNC,S$GLB,, | Performed by: FAMILY MEDICINE

## 2020-06-25 PROCEDURE — 1126F AMNT PAIN NOTED NONE PRSNT: CPT | Mod: HCNC,S$GLB,, | Performed by: FAMILY MEDICINE

## 2020-06-25 PROCEDURE — 1126F PR PAIN SEVERITY QUANTIFIED, NO PAIN PRESENT: ICD-10-PCS | Mod: HCNC,S$GLB,, | Performed by: FAMILY MEDICINE

## 2020-06-25 PROCEDURE — 3074F PR MOST RECENT SYSTOLIC BLOOD PRESSURE < 130 MM HG: ICD-10-PCS | Mod: HCNC,CPTII,S$GLB, | Performed by: FAMILY MEDICINE

## 2020-06-25 PROCEDURE — 99999 PR PBB SHADOW E&M-EST. PATIENT-LVL III: ICD-10-PCS | Mod: PBBFAC,HCNC,, | Performed by: FAMILY MEDICINE

## 2020-06-25 PROCEDURE — 3079F PR MOST RECENT DIASTOLIC BLOOD PRESSURE 80-89 MM HG: ICD-10-PCS | Mod: HCNC,CPTII,S$GLB, | Performed by: FAMILY MEDICINE

## 2020-06-25 PROCEDURE — 99999 PR PBB SHADOW E&M-EST. PATIENT-LVL III: CPT | Mod: PBBFAC,HCNC,, | Performed by: FAMILY MEDICINE

## 2020-06-25 PROCEDURE — 3074F SYST BP LT 130 MM HG: CPT | Mod: HCNC,CPTII,S$GLB, | Performed by: FAMILY MEDICINE

## 2020-06-25 PROCEDURE — 1159F PR MEDICATION LIST DOCUMENTED IN MEDICAL RECORD: ICD-10-PCS | Mod: HCNC,S$GLB,, | Performed by: FAMILY MEDICINE

## 2020-06-25 PROCEDURE — 3079F DIAST BP 80-89 MM HG: CPT | Mod: HCNC,CPTII,S$GLB, | Performed by: FAMILY MEDICINE

## 2020-06-25 PROCEDURE — 99499 UNLISTED E&M SERVICE: CPT | Mod: HCNC,S$GLB,, | Performed by: FAMILY MEDICINE

## 2020-06-25 PROCEDURE — 1101F PT FALLS ASSESS-DOCD LE1/YR: CPT | Mod: HCNC,CPTII,S$GLB, | Performed by: FAMILY MEDICINE

## 2020-06-25 PROCEDURE — 1101F PR PT FALLS ASSESS DOC 0-1 FALLS W/OUT INJ PAST YR: ICD-10-PCS | Mod: HCNC,CPTII,S$GLB, | Performed by: FAMILY MEDICINE

## 2020-06-25 PROCEDURE — 99499 RISK ADDL DX/OHS AUDIT: ICD-10-PCS | Mod: HCNC,S$GLB,, | Performed by: FAMILY MEDICINE

## 2020-06-25 PROCEDURE — 99214 OFFICE O/P EST MOD 30 MIN: CPT | Mod: HCNC,S$GLB,, | Performed by: FAMILY MEDICINE

## 2020-06-25 PROCEDURE — 99214 PR OFFICE/OUTPT VISIT, EST, LEVL IV, 30-39 MIN: ICD-10-PCS | Mod: HCNC,S$GLB,, | Performed by: FAMILY MEDICINE
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
## 2020-07-10 ENCOUNTER — TELEPHONE (OUTPATIENT)
Dept: OPTOMETRY | Facility: CLINIC | Age: 70
End: 2020-07-10

## 2020-07-10 DIAGNOSIS — H04.123 BILATERAL DRY EYES: Primary | ICD-10-CM

## 2020-07-10 RX ORDER — NEOMYCIN SULFATE, POLYMYXIN B SULFATE AND DEXAMETHASONE 3.5; 10000; 1 MG/ML; [USP'U]/ML; MG/ML
1 SUSPENSION/ DROPS OPHTHALMIC 4 TIMES DAILY
Qty: 5 ML | Refills: 0 | Status: SHIPPED | OUTPATIENT
Start: 2020-07-10 | End: 2020-07-17

## 2020-07-10 NOTE — TELEPHONE ENCOUNTER
----- Message from Tone Diaz, OD sent at 7/10/2020 10:15 AM CDT -----  Contact: Pt @ [phone]  Sent in Maxitrol, have pt follow up next week if not better.   ----- Message -----  From: Minal Denis  Sent: 7/10/2020   9:56 AM CDT  To: Tone Diaz, OD    Can you send maxitrol in for her again?  She said her eyes feel dry and irritated and she has not been doing lid scrubs.  ----- Message -----  From: Gabrielle Arreola  Sent: 7/10/2020   9:27 AM CDT  To: Joe OTOOLE Staff    Pt says she woke up w/ the OS red, itching and swollen. She says she has experienced this before and it may possibly be severe dry eye. She is requesting to be seen today, but there isn't an availability that soon. She is wanting to speak to someone to see about getting in or some pt advice since Dr. Diaz has handled this issue before.

## 2020-07-10 NOTE — PROGRESS NOTES
Assessment /Plan     For exam results, see Encounter Report.    Bilateral dry eyes  -     neomycin-polymyxin-dexamethasone (MAXITROL) 3.5mg/mL-10,000 unit/mL-0.1 % DrpS; Place 1 drop into both eyes 4 (four) times daily. for 7 days  Dispense: 5 mL; Refill: 0      1. Sent in Maxitrol drop for eyes , itchy swelling and red. RTC Monday f no better over the weekend.

## 2020-07-14 ENCOUNTER — TELEPHONE (OUTPATIENT)
Dept: OPTOMETRY | Facility: CLINIC | Age: 70
End: 2020-07-14

## 2020-07-14 NOTE — TELEPHONE ENCOUNTER
----- Message from Rosamaria Mcconnell MA sent at 7/14/2020  8:54 AM CDT -----  Regarding: FW: Symptoms not improving and appears to be getting worse  Contact: Angelina    ----- Message -----  From: Dodie Calderon  Sent: 7/14/2020   8:47 AM CDT  To: Joe Hoskins  Subject: Symptoms not improving and appears to be get#    Pt calling to inform that her symptoms have not improved since using the Maxitrol drops.  She needs to know what to do now and if she can be seen on today or tomorrow.  Please contact her to discuss further  [phone]

## 2020-07-16 ENCOUNTER — HOSPITAL ENCOUNTER (OUTPATIENT)
Facility: HOSPITAL | Age: 70
Discharge: HOME | End: 2020-07-17
Attending: EMERGENCY MEDICINE | Admitting: EMERGENCY MEDICINE
Payer: MEDICARE

## 2020-07-16 DIAGNOSIS — I48.91 ATRIAL FIBRILLATION: ICD-10-CM

## 2020-07-16 DIAGNOSIS — I48.91 ATRIAL FIBRILLATION WITH RVR: Primary | ICD-10-CM

## 2020-07-16 DIAGNOSIS — I48.91 ATRIAL FIBRILLATION, UNSPECIFIED TYPE: ICD-10-CM

## 2020-07-16 LAB
ALBUMIN SERPL BCP-MCNC: 3.7 G/DL (ref 3.5–5.2)
ALP SERPL-CCNC: 86 U/L (ref 55–135)
ALT SERPL W/O P-5'-P-CCNC: 30 U/L (ref 10–44)
ANION GAP SERPL CALC-SCNC: 10 MMOL/L (ref 8–16)
AST SERPL-CCNC: 39 U/L (ref 10–40)
BASOPHILS # BLD AUTO: 0.03 K/UL (ref 0–0.2)
BASOPHILS NFR BLD: 0.5 % (ref 0–1.9)
BILIRUB SERPL-MCNC: 0.4 MG/DL (ref 0.1–1)
BILIRUB UR QL STRIP: NEGATIVE
BNP SERPL-MCNC: 189 PG/ML (ref 0–99)
BUN SERPL-MCNC: 8 MG/DL (ref 8–23)
CALCIUM SERPL-MCNC: 8.8 MG/DL (ref 8.7–10.5)
CHLORIDE SERPL-SCNC: 105 MMOL/L (ref 95–110)
CLARITY UR REFRACT.AUTO: CLEAR
CO2 SERPL-SCNC: 28 MMOL/L (ref 23–29)
COLOR UR AUTO: YELLOW
CREAT SERPL-MCNC: 0.8 MG/DL (ref 0.5–1.4)
DIFFERENTIAL METHOD: ABNORMAL
EOSINOPHIL # BLD AUTO: 0 K/UL (ref 0–0.5)
EOSINOPHIL NFR BLD: 0.2 % (ref 0–8)
ERYTHROCYTE [DISTWIDTH] IN BLOOD BY AUTOMATED COUNT: 13 % (ref 11.5–14.5)
EST. GFR  (AFRICAN AMERICAN): >60 ML/MIN/1.73 M^2
EST. GFR  (NON AFRICAN AMERICAN): >60 ML/MIN/1.73 M^2
GLUCOSE SERPL-MCNC: 98 MG/DL (ref 70–110)
GLUCOSE UR QL STRIP: NEGATIVE
HCT VFR BLD AUTO: 44.6 % (ref 37–48.5)
HGB BLD-MCNC: 14.1 G/DL (ref 12–16)
HGB UR QL STRIP: NEGATIVE
IMM GRANULOCYTES # BLD AUTO: 0.01 K/UL (ref 0–0.04)
IMM GRANULOCYTES NFR BLD AUTO: 0.2 % (ref 0–0.5)
KETONES UR QL STRIP: NEGATIVE
LEUKOCYTE ESTERASE UR QL STRIP: NEGATIVE
LYMPHOCYTES # BLD AUTO: 0.6 K/UL (ref 1–4.8)
LYMPHOCYTES NFR BLD: 11.3 % (ref 18–48)
MAGNESIUM SERPL-MCNC: 1.8 MG/DL (ref 1.6–2.6)
MCH RBC QN AUTO: 32.7 PG (ref 27–31)
MCHC RBC AUTO-ENTMCNC: 31.6 G/DL (ref 32–36)
MCV RBC AUTO: 104 FL (ref 82–98)
MONOCYTES # BLD AUTO: 0.1 K/UL (ref 0.3–1)
MONOCYTES NFR BLD: 2.5 % (ref 4–15)
NEUTROPHILS # BLD AUTO: 4.8 K/UL (ref 1.8–7.7)
NEUTROPHILS NFR BLD: 85.3 % (ref 38–73)
NITRITE UR QL STRIP: NEGATIVE
NRBC BLD-RTO: 0 /100 WBC
PH UR STRIP: 7 [PH] (ref 5–8)
PLATELET # BLD AUTO: 170 K/UL (ref 150–350)
PMV BLD AUTO: 11.5 FL (ref 9.2–12.9)
POTASSIUM SERPL-SCNC: 5.1 MMOL/L (ref 3.5–5.1)
PROT SERPL-MCNC: 7.1 G/DL (ref 6–8.4)
PROT UR QL STRIP: NEGATIVE
RBC # BLD AUTO: 4.31 M/UL (ref 4–5.4)
SARS-COV-2 RDRP RESP QL NAA+PROBE: NEGATIVE
SODIUM SERPL-SCNC: 143 MMOL/L (ref 136–145)
SP GR UR STRIP: 1.01 (ref 1–1.03)
TROPONIN I SERPL DL<=0.01 NG/ML-MCNC: <0.006 NG/ML (ref 0–0.03)
TSH SERPL DL<=0.005 MIU/L-ACNC: 1.2 UIU/ML (ref 0.4–4)
URN SPEC COLLECT METH UR: NORMAL
WBC # BLD AUTO: 5.58 K/UL (ref 3.9–12.7)

## 2020-07-16 PROCEDURE — 25000003 PHARM REV CODE 250: Mod: HCNC | Performed by: EMERGENCY MEDICINE

## 2020-07-16 PROCEDURE — 93010 ELECTROCARDIOGRAM REPORT: CPT | Mod: HCNC,,, | Performed by: INTERNAL MEDICINE

## 2020-07-16 PROCEDURE — 25000003 PHARM REV CODE 250: Mod: HCNC | Performed by: STUDENT IN AN ORGANIZED HEALTH CARE EDUCATION/TRAINING PROGRAM

## 2020-07-16 PROCEDURE — 99291 CRITICAL CARE FIRST HOUR: CPT | Mod: HCNC,,, | Performed by: EMERGENCY MEDICINE

## 2020-07-16 PROCEDURE — 99220 PR INITIAL OBSERVATION CARE,LEVL III: ICD-10-PCS | Mod: HCNC,,, | Performed by: HOSPITALIST

## 2020-07-16 PROCEDURE — 96374 THER/PROPH/DIAG INJ IV PUSH: CPT | Mod: HCNC

## 2020-07-16 PROCEDURE — G0378 HOSPITAL OBSERVATION PER HR: HCPCS | Mod: HCNC

## 2020-07-16 PROCEDURE — 99203 PR OFFICE/OUTPT VISIT, NEW, LEVL III, 30-44 MIN: ICD-10-PCS | Mod: HCNC,GC,, | Performed by: INTERNAL MEDICINE

## 2020-07-16 PROCEDURE — 93005 ELECTROCARDIOGRAM TRACING: CPT | Mod: HCNC

## 2020-07-16 PROCEDURE — 25000003 PHARM REV CODE 250: Mod: HCNC | Performed by: HOSPITALIST

## 2020-07-16 PROCEDURE — 83735 ASSAY OF MAGNESIUM: CPT | Mod: HCNC

## 2020-07-16 PROCEDURE — 85025 COMPLETE CBC W/AUTO DIFF WBC: CPT | Mod: HCNC

## 2020-07-16 PROCEDURE — 80053 COMPREHEN METABOLIC PANEL: CPT | Mod: HCNC

## 2020-07-16 PROCEDURE — 84443 ASSAY THYROID STIM HORMONE: CPT | Mod: HCNC

## 2020-07-16 PROCEDURE — 84484 ASSAY OF TROPONIN QUANT: CPT | Mod: HCNC

## 2020-07-16 PROCEDURE — 81003 URINALYSIS AUTO W/O SCOPE: CPT | Mod: HCNC

## 2020-07-16 PROCEDURE — 93010 EKG 12-LEAD: ICD-10-PCS | Mod: HCNC,,, | Performed by: INTERNAL MEDICINE

## 2020-07-16 PROCEDURE — U0002 COVID-19 LAB TEST NON-CDC: HCPCS | Mod: HCNC

## 2020-07-16 PROCEDURE — 96361 HYDRATE IV INFUSION ADD-ON: CPT | Mod: HCNC

## 2020-07-16 PROCEDURE — 83880 ASSAY OF NATRIURETIC PEPTIDE: CPT | Mod: HCNC

## 2020-07-16 PROCEDURE — 96376 TX/PRO/DX INJ SAME DRUG ADON: CPT | Mod: HCNC

## 2020-07-16 PROCEDURE — 99220 PR INITIAL OBSERVATION CARE,LEVL III: CPT | Mod: HCNC,,, | Performed by: HOSPITALIST

## 2020-07-16 PROCEDURE — 99291 CRITICAL CARE FIRST HOUR: CPT | Mod: 25,HCNC

## 2020-07-16 PROCEDURE — 99291 PR CRITICAL CARE, E/M 30-74 MINUTES: ICD-10-PCS | Mod: HCNC,,, | Performed by: EMERGENCY MEDICINE

## 2020-07-16 PROCEDURE — 99203 OFFICE O/P NEW LOW 30 MIN: CPT | Mod: HCNC,GC,, | Performed by: INTERNAL MEDICINE

## 2020-07-16 RX ORDER — TALC
6 POWDER (GRAM) TOPICAL NIGHTLY PRN
Status: DISCONTINUED | OUTPATIENT
Start: 2020-07-16 | End: 2020-07-17 | Stop reason: HOSPADM

## 2020-07-16 RX ORDER — LANOLIN ALCOHOL/MO/W.PET/CERES
400 CREAM (GRAM) TOPICAL ONCE
Status: COMPLETED | OUTPATIENT
Start: 2020-07-16 | End: 2020-07-16

## 2020-07-16 RX ORDER — METOPROLOL TARTRATE 25 MG/1
25 TABLET, FILM COATED ORAL 2 TIMES DAILY
Status: DISCONTINUED | OUTPATIENT
Start: 2020-07-16 | End: 2020-07-17 | Stop reason: HOSPADM

## 2020-07-16 RX ORDER — SODIUM CHLORIDE 9 MG/ML
500 INJECTION, SOLUTION INTRAVENOUS
Status: COMPLETED | OUTPATIENT
Start: 2020-07-16 | End: 2020-07-16

## 2020-07-16 RX ORDER — ONDANSETRON 2 MG/ML
4 INJECTION INTRAMUSCULAR; INTRAVENOUS EVERY 8 HOURS PRN
Status: DISCONTINUED | OUTPATIENT
Start: 2020-07-16 | End: 2020-07-17 | Stop reason: HOSPADM

## 2020-07-16 RX ORDER — PROCHLORPERAZINE EDISYLATE 5 MG/ML
5 INJECTION INTRAMUSCULAR; INTRAVENOUS EVERY 6 HOURS PRN
Status: DISCONTINUED | OUTPATIENT
Start: 2020-07-16 | End: 2020-07-17 | Stop reason: HOSPADM

## 2020-07-16 RX ORDER — IBUPROFEN 200 MG
16 TABLET ORAL
Status: DISCONTINUED | OUTPATIENT
Start: 2020-07-16 | End: 2020-07-17 | Stop reason: HOSPADM

## 2020-07-16 RX ORDER — DILTIAZEM HYDROCHLORIDE 5 MG/ML
10 INJECTION INTRAVENOUS
Status: COMPLETED | OUTPATIENT
Start: 2020-07-16 | End: 2020-07-16

## 2020-07-16 RX ORDER — IPRATROPIUM BROMIDE AND ALBUTEROL SULFATE 2.5; .5 MG/3ML; MG/3ML
3 SOLUTION RESPIRATORY (INHALATION) EVERY 6 HOURS PRN
Status: DISCONTINUED | OUTPATIENT
Start: 2020-07-16 | End: 2020-07-17 | Stop reason: HOSPADM

## 2020-07-16 RX ORDER — SODIUM CHLORIDE 0.9 % (FLUSH) 0.9 %
10 SYRINGE (ML) INJECTION
Status: DISCONTINUED | OUTPATIENT
Start: 2020-07-16 | End: 2020-07-17 | Stop reason: HOSPADM

## 2020-07-16 RX ORDER — ACETAMINOPHEN 325 MG/1
650 TABLET ORAL EVERY 4 HOURS PRN
Status: DISCONTINUED | OUTPATIENT
Start: 2020-07-16 | End: 2020-07-16

## 2020-07-16 RX ORDER — CLONAZEPAM 0.5 MG/1
1.5 TABLET ORAL NIGHTLY PRN
Status: DISCONTINUED | OUTPATIENT
Start: 2020-07-16 | End: 2020-07-17 | Stop reason: HOSPADM

## 2020-07-16 RX ORDER — HYDROCODONE BITARTRATE AND ACETAMINOPHEN 5; 325 MG/1; MG/1
1 TABLET ORAL EVERY 6 HOURS PRN
Status: DISCONTINUED | OUTPATIENT
Start: 2020-07-16 | End: 2020-07-17 | Stop reason: HOSPADM

## 2020-07-16 RX ORDER — BUPROPION HYDROCHLORIDE 150 MG/1
150 TABLET ORAL DAILY
Status: DISCONTINUED | OUTPATIENT
Start: 2020-07-17 | End: 2020-07-17 | Stop reason: HOSPADM

## 2020-07-16 RX ORDER — ACYCLOVIR 200 MG/1
400 CAPSULE ORAL 2 TIMES DAILY
Status: DISCONTINUED | OUTPATIENT
Start: 2020-07-16 | End: 2020-07-17 | Stop reason: HOSPADM

## 2020-07-16 RX ORDER — MONTELUKAST SODIUM 10 MG/1
10 TABLET ORAL NIGHTLY
Status: DISCONTINUED | OUTPATIENT
Start: 2020-07-16 | End: 2020-07-17 | Stop reason: HOSPADM

## 2020-07-16 RX ORDER — CHLORHEXIDINE GLUCONATE ORAL RINSE 1.2 MG/ML
15 SOLUTION DENTAL 2 TIMES DAILY
Status: DISCONTINUED | OUTPATIENT
Start: 2020-07-16 | End: 2020-07-17 | Stop reason: HOSPADM

## 2020-07-16 RX ORDER — IBUPROFEN 200 MG
24 TABLET ORAL
Status: DISCONTINUED | OUTPATIENT
Start: 2020-07-16 | End: 2020-07-17 | Stop reason: HOSPADM

## 2020-07-16 RX ORDER — AMOXICILLIN 500 MG/1
500 CAPSULE ORAL EVERY 12 HOURS
Status: DISCONTINUED | OUTPATIENT
Start: 2020-07-16 | End: 2020-07-17

## 2020-07-16 RX ADMIN — ACYCLOVIR 400 MG: 200 CAPSULE ORAL at 09:07

## 2020-07-16 RX ADMIN — DILTIAZEM HYDROCHLORIDE 10 MG: 5 INJECTION INTRAVENOUS at 02:07

## 2020-07-16 RX ADMIN — SODIUM CHLORIDE 500 ML: 0.9 INJECTION, SOLUTION INTRAVENOUS at 02:07

## 2020-07-16 RX ADMIN — MAGNESIUM GLUCONATE 500 MG ORAL TABLET 400 MG: 500 TABLET ORAL at 05:07

## 2020-07-16 RX ADMIN — CLONAZEPAM 1.5 MG: 0.5 TABLET ORAL at 09:07

## 2020-07-16 RX ADMIN — APIXABAN 5 MG: 5 TABLET, FILM COATED ORAL at 09:07

## 2020-07-16 RX ADMIN — CHLORHEXIDINE GLUCONATE 0.12% ORAL RINSE 15 ML: 1.2 LIQUID ORAL at 11:07

## 2020-07-16 RX ADMIN — AMOXICILLIN 500 MG: 500 CAPSULE ORAL at 11:07

## 2020-07-16 RX ADMIN — HYDROCODONE BITARTRATE AND ACETAMINOPHEN 1 TABLET: 5; 325 TABLET ORAL at 09:07

## 2020-07-16 RX ADMIN — Medication 6 MG: at 09:07

## 2020-07-16 RX ADMIN — MONTELUKAST 10 MG: 10 TABLET, FILM COATED ORAL at 09:07

## 2020-07-16 RX ADMIN — METOPROLOL TARTRATE 25 MG: 25 TABLET, FILM COATED ORAL at 11:07

## 2020-07-16 RX ADMIN — DILTIAZEM HYDROCHLORIDE 10 MG: 5 INJECTION INTRAVENOUS at 04:07

## 2020-07-16 NOTE — CONSULTS
[facility]  Cardiology  Consult Note    Patient Name: Angelina Man  MRN: [id number]  Admission Date: 7/16/2020  Hospital Length of Stay: 0 days  Code Status: Prior   Attending Provider: Suzan Mcpherson MD   Consulting Provider: Jevon Black MD  Primary Care Physician: Marli Wilkinson MD  Principal Problem:<principal problem not specified>    Patient information was obtained from patient and ER records.     Inpatient consult to Cardiology  Consult performed by: Jevon Black MD  Consult ordered by: Suzan Mcpherson MD        Subjective:     Chief Complaint:  AF     HPI:   69 yo F with PMH of HTN (now resolved - controlled with no meds just diet), Raynauds,  presenting with new onset AF from her dentist office after he placed a new crown. She denies symptoms. Although she says from time to time gets dyspneic and took 1 dose of diuretic yday from LE swelling. Labs benign , EKG rate controlled AF after IV diltiazem in ER. Bedside TTE biatrial enlargement, low normal EF, small circumfrential pericardial effusion, dilated IVC, at least moderate MR.    Past Medical History:   Diagnosis Date    Arthritis     Bronchitis     Cataract     Dry eyes     Hypothyroidism 6/23/2016    Rash     Squamous cell carcinoma     in-situ right upper inner arm, right wrist    Status post lumbar surgery 6/23/2016    Stress fracture     Thyroid disease        Past Surgical History:   Procedure Laterality Date    ANGIOPLASTY      CERVICAL FUSION      COLONOSCOPY      COLONOSCOPY N/A 11/14/2017    Procedure: COLONOSCOPY;  Surgeon: Kasi Mercado MD;  Location: [address]);  Service: Endoscopy;  Laterality: N/A;    ESOPHAGOGASTRODUODENOSCOPY N/A 10/10/2019    Procedure: EGD (ESOPHAGOGASTRODUODENOSCOPY);  Surgeon: Rg Milton MD;  Location: [address]);  Service: Endoscopy;  Laterality: N/A;    l4-5 mid discectomy Left 03/2017    l4-5 MIS diskectomy  Right 05/2016       Review of patient's allergies indicates:  No Known Allergies    No current facility-administered medications on file prior to encounter.      Current Outpatient Medications on File Prior to Encounter   Medication Sig    acyclovir (ZOVIRAX) 400 MG tablet Take 1 tablet (400 mg total) by mouth 2 (two) times daily.    buPROPion (WELLBUTRIN XL) 150 MG TB24 tablet Take 1 tablet (150 mg total) by mouth once daily.    clonazePAM (KLONOPIN) 1 MG tablet Take 1.5 tablets (1.5 mg total) by mouth nightly as needed.    levothyroxine (SYNTHROID) 137 MCG Tab tablet Take 1 tablet (137 mcg total) by mouth before breakfast.    montelukast (SINGULAIR) 10 mg tablet     acetaminophen (TYLENOL) 500 MG tablet Take 500 mg by mouth every 6 (six) hours as needed for Pain.    acetazolamide 25 mg/mL Susp     alendronate (FOSAMAX) 70 MG tablet Take 1 tablet (70 mg total) by mouth every 7 days.    ascorbic acid (VITAMIN C) 1000 MG tablet Take 1,000 mg by mouth once daily.     biotin 1 mg tablet Take 1 mg by mouth 2 (two) times daily.     calcium-vitamin D 500 mg(1,250mg) -200 unit per tablet Take 1 tablet by mouth 2 (two) times daily with meals.     chondroitin sulfate-vit C-Mn (CHONDROITIN SULFATE COMPLEX) 400-60-2.5 mg Cap Take 1 tablet by mouth 2 (two) times daily.     ciclopirox (LOPROX) 0.77 % Susp aaa qhs    dicyclomine (BENTYL) 20 mg tablet Take 1 tablet (20 mg total) by mouth 3 (three) times daily as needed.    digestive enzymes Tab Take 1 tablet by mouth once daily.     diltiaZEM (CARDIZEM) 30 MG tablet Once daily for raynauds.    diphenhydrAMINE (BENADRYL) 25 mg capsule Take 25 mg by mouth nightly as needed.     fluticasone (FLONASE) 50 mcg/actuation nasal spray 1 spray by Each Nare route once daily.    ginseng 200 mg Cap Take 200 mg by mouth daily as needed.     glucosamine sulfate 2KCl 1,000 mg Tab Take 1 tablet by mouth once daily.     lubiprostone (AMITIZA) 24 MCG Cap Take 1 capsule (24 mcg  total) by mouth 2 (two) times daily.    multivitamin (THERAGRAN) per tablet Take 1 tablet by mouth once daily.     mupirocin (BACTROBAN) 2 % ointment Apply to affected area 3 times daily    neomycin-polymyxin-dexamethasone (MAXITROL) 3.5mg/mL-10,000 unit/mL-0.1 % DrpS Place 1 drop into both eyes 4 (four) times daily. for 7 days    nitroGLYCERIN 2% TD oint (NITROGLYN) 2 % ointment Apply small amount to fingertips prn raynaud attacks    plecanatide (TRULANCE) 3 mg Tab Take 1 tablet (3 mg) by mouth once daily.    potassium chloride (KLOR-CON) 8 MEQ TbSR TAKE 1 TABLET(8 MEQ) BY MOUTH EVERY DAY    tretinoin (RETIN-A) 0.1 % cream Apply topically every evening.    zinc 50 mg Tab Take 50 mg by mouth once daily.      Family History     Problem Relation (Age of Onset)    Cancer Mother, Father (80)    Cataracts Father    Colon cancer Father    Diabetes Son    Heart disease Son    Heart failure Father    Hypertension Father    Melanoma Daughter    No Known Problems Sister, Brother, Maternal Aunt, Maternal Uncle, Paternal Aunt, Paternal Uncle, Maternal Grandmother, Maternal Grandfather, Paternal Grandmother, Paternal Grandfather        Tobacco Use    Smoking status: Never Smoker    Smokeless tobacco: Never Used   Substance and Sexual Activity    Alcohol use: Yes     Frequency: 4 or more times a week     Drinks per session: 1 or 2     Binge frequency: Never     Comment: socially, not weekly    Drug use: No    Sexual activity: Never     Review of Systems   Constitution: Negative for chills, decreased appetite and diaphoresis.   HENT: Negative for congestion and ear discharge.    Eyes: Negative for blurred vision and discharge.   Cardiovascular: Negative for chest pain, dyspnea on exertion, irregular heartbeat, leg swelling and paroxysmal nocturnal dyspnea.   Respiratory: Negative for cough, hemoptysis and shortness of breath.    Gastrointestinal: Negative for abdominal pain.     Objective:     Vital Signs (Most  Recent):  Temp: 97.7 °F (36.5 °C) (07/16/20 1229)  Pulse: 76 (07/16/20 1736)  Resp: 14 (07/16/20 1741)  BP: 123/75 (07/16/20 1736)  SpO2: 98 % (07/16/20 1536) Vital Signs (24h Range):  Temp:  [97.7 °F (36.5 °C)] 97.7 °F (36.5 °C)  Pulse:  [] 76  Resp:  [14-18] 14  SpO2:  [95 %-98 %] 98 %  BP: (114-146)/() 123/75     Weight: 54.4 kg (120 lb)  Body mass index is 19.37 kg/m².    SpO2: 98 %  O2 Device (Oxygen Therapy): room air      Intake/Output Summary (Last 24 hours) at 7/16/2020 1811  Last data filed at 7/16/2020 1523  Gross per 24 hour   Intake 500 ml   Output --   Net 500 ml       Lines/Drains/Airways     Peripheral Intravenous Line                 Peripheral IV - Single Lumen 07/16/20 1241 20 G Right Forearm less than 1 day         Peripheral IV - Single Lumen 07/16/20 1422 18 G Left Antecubital less than 1 day                Physical Exam   Constitutional: She is oriented to person, place, and time. She appears well-developed and well-nourished. No distress.   Eyes: Pupils are equal, round, and reactive to light. Conjunctivae are normal.   Neck: No tracheal deviation present. No thyromegaly present.   Cardiovascular: Normal rate, normal heart sounds and intact distal pulses. An irregularly irregular rhythm present. Exam reveals no gallop and no friction rub.   No murmur heard.  Pulses:       Radial pulses are 2+ on the right side and 2+ on the left side.        Femoral pulses are 2+ on the right side and 2+ on the left side.  Pulmonary/Chest: Effort normal and breath sounds normal. No respiratory distress. She has no wheezes. She has no rales.   Abdominal: Soft. Bowel sounds are normal. She exhibits no distension. There is no abdominal tenderness.   Musculoskeletal:         General: No deformity or edema.   Neurological: She is alert and oriented to person, place, and time. No cranial nerve deficit. Coordination normal.   Skin: Skin is warm and dry. She is not diaphoretic.   Psychiatric: She has a  normal mood and affect. Her behavior is normal.       Significant Labs:   BMP:   Recent Labs   Lab 07/16/20  1330   GLU 98      K 5.1      CO2 28   BUN 8   CREATININE 0.8   CALCIUM 8.8   MG 1.8       Significant Imaging: Echocardiogram: Transthoracic echo (TTE) complete (Cupid Only): No results found for this or any previous visit.    Assessment and Plan:     New onset a-fib  - Patient seen and examined  - New diagnosis of AF  - Admit to Medicine  - NPO at midnight  - [facility] 2-3  - SANGITA/DCCV in AM  - Eliquis 5 bid  - Metoprolol Tart 25 bid uptitrate as needed for rate control  - Formal TTE  - Keep MG> 2 K> 4  - I talked to her dnetist on the phone, ok to proceed with anticoagulation (may have some oozing)  - Here are the medications he ordered for her to start today post crown placement (please start them inpatient)  1. Amoxicillin 500 tid x 5 days  2. Chlohexedeine mouth wash 0.12% bid  *She will probably need PRN pain meds for tooth pain          VTE Risk Mitigation (From admission, onward)         Ordered     apixaban tablet 5 mg  2 times daily      07/16/20 8987                Thank you for your consult. I will follow-up with patient. Please contact us if you have any additional questions.    Jevon Black MD  Cardiology   [facility]

## 2020-07-16 NOTE — ED TRIAGE NOTES
Irregular Heart Beat (Getting crown replaced at dentist, dentist called EMS scondary to patient having new onset afib RVR)

## 2020-07-16 NOTE — HPI
71 yo F with PMH of HTN (now resolved - controlled with no meds just diet), Raynauds,  presenting with new onset AF from her dentist office after he placed a new crown. She denies symptoms. , EKG rate controlled AF after IV diltiazem in ER. Bedside TTE biatrial enlargement, low normal EF, small circumfrential pericardial effusion, dilated IVC, at least moderate MR. No formal TTE in system. Prior EGD dysphagia in 2019, unremarkable findings.

## 2020-07-16 NOTE — ED NOTES
Patient identifiers verified and correct for Angelina Man 70 y.o. female  Chief Complaint   Patient presents with    Irregular Heart Beat     Getting crown replaced at dentist, dentist called EMS scondary to patient having new onset afib RVR     Past Medical History:   Diagnosis Date    Arthritis     Bronchitis     Cataract     Dry eyes     Hypothyroidism 6/23/2016    Rash     Squamous cell carcinoma     in-situ right upper inner arm, right wrist    Status post lumbar surgery 6/23/2016    Stress fracture     Thyroid disease      Allergies reported Review of patient's allergies indicates:  No Known Allergies    LOC: The patient is awake, alert and aware of environment with an appropriate affect, the patient is oriented x 3 and speaking appropriately.   APPEARANCE: Patient appears comfortable and in no acute distress, patient is clean and well groomed.  SKIN: The skin is warm and dry, color consistent with ethnicity, patient has normal skin turgor and moist mucus membranes, skin intact, no breakdown or bruising noted.   MUSCULOSKELETAL: Patient moving all extremities spontaneously, no swelling noted.  RESPIRATORY: Airway is open and patent, respirations are spontaneous, patient has a normal effort and rate, no accessory muscle use noted, pt placed on continuous pulse ox with O2 sats noted at 97% on room air.  CARDIAC: Pt placed on cardiac monitor. no edema noted, capillary refill < 3 seconds. Irregular Heart Beat (Getting crown replaced at dentist, dentist called EMS scondary to patient having new onset afib RVR)  GASTRO: Soft and non tender to palpation, no distention noted, normoactive bowel sounds present in all four quadrants. Pt states bowel movements have been regular.  : Pt denies any pain or frequency with urination.  NEURO: Pt opens eyes spontaneously, behavior appropriate to situation, follows commands, facial expression symmetrical, bilateral hand grasp equal and even, purposeful motor  response noted, normal sensation in all extremities when touched with a finger.

## 2020-07-16 NOTE — H&P
Hospital Medicine  History and Physical Exam    [place] [facility] Sherman Milton MD  Admit Date: 7/16/2020  Principal Problem:  New onset a-fib   Patient information was obtained from spouse/SO, past medical records and ER records.   Primary care Physician: Marli Wilkinson MD  Code status: Full Code    HPI: 69 yo F with PMHx hypothyroidism, anxiety, and arthritis presents to the ED from dental office after she was noted to have an irregular heart beat with rate in the 140's. The patient underwent a dental implant and received propofol, fentanyl, decadron, versed, and zofran IV during the procedure. The irregular HR was noted shortly following the procedure while pt. Was recovering. On to ED arrival, pt. Presented in atrial fibrillation with rate 147 which improved after administration of IV diltiazem. She remains irregular at the time of my interview but rate controlled. The patient denies any previous history of irregular heart beat. She denies any lightheadedness, palpitations, SOB, or chest pain. She retrospectively mentions that she has occasionally noted that her heart beat felt  irregular at times when she checked it over the last few months, but she denies ever feeling like her heart was beating fast or any other symptoms ar the time.    Hemoglobin A1C   Date Value Ref Range Status   08/24/2015 5.4 4.5 - 6.2 % Final   08/13/2014 5.3 4.5 - 6.2 % Final   08/08/2013 5.5 4.0 - 6.2 % Final       Past Medical History: Patient has a past medical history of Arthritis, Bronchitis, Cataract, Dry eyes, Hypothyroidism (6/23/2016), Rash, Squamous cell carcinoma, Status post lumbar surgery (6/23/2016), Stress fracture, and Thyroid disease.    Past Surgical History: Patient has a past surgical history that includes Colonoscopy; Cervical fusion; Colonoscopy (N/A, 11/14/2017); l4-5 MIS diskectomy (Right, 05/2016); l4-5 mid discectomy (Left, 03/2017); Angioplasty; and Esophagogastroduodenoscopy (N/A,  10/10/2019).    Social History: Patient reports that she has never smoked. She has never used smokeless tobacco. She reports current alcohol use. She reports that she does not use drugs.    Family History: family history includes Cancer in her mother; Cancer (age of onset: 80) in her father; Cataracts in her father; Colon cancer in her father; Diabetes in her son; Heart disease in her son; Heart failure in her father; Hypertension in her father; Melanoma in her daughter; No Known Problems in her brother, maternal aunt, maternal grandfather, maternal grandmother, maternal uncle, paternal aunt, paternal grandfather, paternal grandmother, paternal uncle, and sister.    Medications: reviewed     Allergies: Patient has No Known Allergies.    ROS  Pain Scale: 0 /10   Constitutional: no fever or chills  Respiratory: no cough or shortness of breath  Cardiovascular: no chest pain or palpitations  Gastrointestinal: no nausea or vomiting, no abdominal pain or change in bowel habits  Genitourinary: no hematuria or dysuria  Integument/Breast: no rash or pruritis  Hematologic/Lymphatic: no easy bruising or lymphadenopathy  Musculoskeletal: no arthralgias or myalgias  Neurological: no seizures or tremors  Behavioral/Psych: no depression or anxiety    PEx  Temp:  [97.7 °F (36.5 °C)]   Pulse:  []   Resp:  [14-18]   BP: (114-146)/()   SpO2:  [95 %-98 %]   Body mass index is 19.37 kg/m².     Intake/Output Summary (Last 24 hours) at 7/16/2020 1845  Last data filed at 7/16/2020 1523  Gross per 24 hour   Intake 500 ml   Output --   Net 500 ml       General appearance: no distress, pt. Resting comfortably  Mental status: Alert and oriented x 3  HEENT:  conjunctivae/corneas clear, PERRL  Neck: supple, thyroid not enlarged  Pulm:   normal respiratory effort, CTA B, no c/w/r  Card: Irregularly Irregular Rhythm, S1, S2 normal, no murmur, click, rub or gallop  Abd: soft, NT, ND, BS present; no masses, no organomegaly  Ext: no  c/c/e  Pulses: 2+, symmetric  Skin: color, texture, turgor normal. No rashes or lesions  Neuro: CN II-XII grossly intact, no focal numbness or weakness, normal strength and tone     Recent Results (from the past 24 hour(s))   CBC auto differential    Collection Time: 07/16/20  1:30 PM   Result Value Ref Range    WBC 5.58 3.90 - 12.70 K/uL    RBC 4.31 4.00 - 5.40 M/uL    Hemoglobin 14.1 12.0 - 16.0 g/dL    Hematocrit 44.6 37.0 - 48.5 %    Mean Corpuscular Volume 104 (H) 82 - 98 fL    Mean Corpuscular Hemoglobin 32.7 (H) 27.0 - 31.0 pg    Mean Corpuscular Hemoglobin Conc 31.6 (L) 32.0 - 36.0 g/dL    RDW 13.0 11.5 - 14.5 %    Platelets 170 150 - 350 K/uL    MPV 11.5 9.2 - 12.9 fL    Immature Granulocytes 0.2 0.0 - 0.5 %    Gran # (ANC) 4.8 1.8 - 7.7 K/uL    Immature Grans (Abs) 0.01 0.00 - 0.04 K/uL    Lymph # 0.6 (L) 1.0 - 4.8 K/uL    Mono # 0.1 (L) 0.3 - 1.0 K/uL    Eos # 0.0 0.0 - 0.5 K/uL    Baso # 0.03 0.00 - 0.20 K/uL    nRBC 0 0 /100 WBC    Gran% 85.3 (H) 38.0 - 73.0 %    Lymph% 11.3 (L) 18.0 - 48.0 %    Mono% 2.5 (L) 4.0 - 15.0 %    Eosinophil% 0.2 0.0 - 8.0 %    Basophil% 0.5 0.0 - 1.9 %    Differential Method Automated    Comprehensive metabolic panel    Collection Time: 07/16/20  1:30 PM   Result Value Ref Range    Sodium 143 136 - 145 mmol/L    Potassium 5.1 3.5 - 5.1 mmol/L    Chloride 105 95 - 110 mmol/L    CO2 28 23 - 29 mmol/L    Glucose 98 70 - 110 mg/dL    BUN, Bld 8 8 - 23 mg/dL    Creatinine 0.8 0.5 - 1.4 mg/dL    Calcium 8.8 8.7 - 10.5 mg/dL    Total Protein 7.1 6.0 - 8.4 g/dL    Albumin 3.7 3.5 - 5.2 g/dL    Total Bilirubin 0.4 0.1 - 1.0 mg/dL    Alkaline Phosphatase 86 55 - 135 U/L    AST 39 10 - 40 U/L    ALT 30 10 - 44 U/L    Anion Gap 10 8 - 16 mmol/L    eGFR if African American >60.0 >60 mL/min/1.73 m^2    eGFR if non African American >60.0 >60 mL/min/1.73 m^2   Magnesium    Collection Time: 07/16/20  1:30 PM   Result Value Ref Range    Magnesium 1.8 1.6 - 2.6 mg/dL   TSH    Collection Time:  07/16/20  1:30 PM   Result Value Ref Range    TSH 1.196 0.400 - 4.000 uIU/mL   Troponin I    Collection Time: 07/16/20  1:30 PM   Result Value Ref Range    Troponin I <0.006 0.000 - 0.026 ng/mL   Brain natriuretic peptide    Collection Time: 07/16/20  1:30 PM   Result Value Ref Range     (H) 0 - 99 pg/mL   Urinalysis, Reflex to Urine Culture Urine, Clean Catch    Collection Time: 07/16/20  2:01 PM    Specimen: Urine   Result Value Ref Range    Specimen UA Urine, Clean Catch     Color, UA Yellow Yellow, Straw, Carmen    Appearance, UA Clear Clear    pH, UA 7.0 5.0 - 8.0    Specific Gravity, UA 1.010 1.005 - 1.030    Protein, UA Negative Negative    Glucose, UA Negative Negative    Ketones, UA Negative Negative    Bilirubin (UA) Negative Negative    Occult Blood UA Negative Negative    Nitrite, UA Negative Negative    Leukocytes, UA Negative Negative       No results for input(s): POCTGLUCOSE in the last 168 hours.    Active Hospital Problems    Diagnosis  POA    New onset a-fib [I48.91]  Unknown    Atrial fibrillation with RVR [I48.91]  Yes      Resolved Hospital Problems   No resolved problems to display.         Assessment and Plan:  New Onset Atrial Fibrillation/Flutter  -Pt. With Afib with RVR noted on admit EKG, initial rate 140's. Remains irregular even after rate controlled. Flutter waves noted on monitor and on repeat EKG, suggesting may be flutter with variable block  -Cardiology consulted, recommend SANGITA cardioversion in am. Pt. Made NPO at midnight  -Eliquis initiated for AC  -Lopressor 25 mg BID started for rate control    Dental Implant  -Pt. S/p dental implant, needs to be on amoxicillin 500 mg TID x 5 days and chlorhexidine mouth was BID  -Pain control ordered PRN    Hypothyroidism  -Continue home med synthroid    Anxiety  -Continue home meds wellbutrin and PRN klonopin    DVT PPx: Ruchi Milton MD  Hospital Medicine Staff  [phone] pager

## 2020-07-16 NOTE — SUBJECTIVE & OBJECTIVE
Past Medical History:   Diagnosis Date    Arthritis     Bronchitis     Cataract     Dry eyes     Hypothyroidism 6/23/2016    Rash     Squamous cell carcinoma     in-situ right upper inner arm, right wrist    Status post lumbar surgery 6/23/2016    Stress fracture     Thyroid disease        Past Surgical History:   Procedure Laterality Date    ANGIOPLASTY      CERVICAL FUSION      COLONOSCOPY      COLONOSCOPY N/A 11/14/2017    Procedure: COLONOSCOPY;  Surgeon: Kasi Mercado MD;  [facility]  Service: Endoscopy;  Laterality: N/A;    ESOPHAGOGASTRODUODENOSCOPY N/A 10/10/2019    Procedure: EGD (ESOPHAGOGASTRODUODENOSCOPY);  Surgeon: Rg Milton MD;  [facility]  Service: Endoscopy;  Laterality: N/A;    l4-5 mid discectomy Left 03/2017    l4-5 MIS diskectomy Right 05/2016       Review of patient's allergies indicates:  No Known Allergies    No current facility-administered medications on file prior to encounter.      Current Outpatient Medications on File Prior to Encounter   Medication Sig    acyclovir (ZOVIRAX) 400 MG tablet Take 1 tablet (400 mg total) by mouth 2 (two) times daily.    buPROPion (WELLBUTRIN XL) 150 MG TB24 tablet Take 1 tablet (150 mg total) by mouth once daily.    clonazePAM (KLONOPIN) 1 MG tablet Take 1.5 tablets (1.5 mg total) by mouth nightly as needed.    levothyroxine (SYNTHROID) 137 MCG Tab tablet Take 1 tablet (137 mcg total) by mouth before breakfast.    montelukast (SINGULAIR) 10 mg tablet     acetaminophen (TYLENOL) 500 MG tablet Take 500 mg by mouth every 6 (six) hours as needed for Pain.    acetazolamide 25 mg/mL Susp     alendronate (FOSAMAX) 70 MG tablet Take 1 tablet (70 mg total) by mouth every 7 days.    ascorbic acid (VITAMIN C) 1000 MG tablet Take 1,000 mg by mouth once daily.     biotin 1 mg tablet Take 1 mg by mouth 2 (two) times daily.     calcium-vitamin D 500 mg(1,250mg) -200 unit per tablet Take 1 tablet by  mouth 2 (two) times daily with meals.     chondroitin sulfate-vit C-Mn (CHONDROITIN SULFATE COMPLEX) 400-60-2.5 mg Cap Take 1 tablet by mouth 2 (two) times daily.     ciclopirox (LOPROX) 0.77 % Susp aaa qhs    dicyclomine (BENTYL) 20 mg tablet Take 1 tablet (20 mg total) by mouth 3 (three) times daily as needed.    digestive enzymes Tab Take 1 tablet by mouth once daily.     diltiaZEM (CARDIZEM) 30 MG tablet Once daily for raynauds.    diphenhydrAMINE (BENADRYL) 25 mg capsule Take 25 mg by mouth nightly as needed.     fluticasone (FLONASE) 50 mcg/actuation nasal spray 1 spray by Each Nare route once daily.    ginseng 200 mg Cap Take 200 mg by mouth daily as needed.     glucosamine sulfate 2KCl 1,000 mg Tab Take 1 tablet by mouth once daily.     lubiprostone (AMITIZA) 24 MCG Cap Take 1 capsule (24 mcg total) by mouth 2 (two) times daily.    multivitamin (THERAGRAN) per tablet Take 1 tablet by mouth once daily.     mupirocin (BACTROBAN) 2 % ointment Apply to affected area 3 times daily    neomycin-polymyxin-dexamethasone (MAXITROL) 3.5mg/mL-10,000 unit/mL-0.1 % DrpS Place 1 drop into both eyes 4 (four) times daily. for 7 days    nitroGLYCERIN 2% TD oint (NITROGLYN) 2 % ointment Apply small amount to fingertips prn raynaud attacks    plecanatide (TRULANCE) 3 mg Tab Take 1 tablet (3 mg) by mouth once daily.    potassium chloride (KLOR-CON) 8 MEQ TbSR TAKE 1 TABLET(8 MEQ) BY MOUTH EVERY DAY    tretinoin (RETIN-A) 0.1 % cream Apply topically every evening.    zinc 50 mg Tab Take 50 mg by mouth once daily.      Family History     Problem Relation (Age of Onset)    Cancer Mother, Father (80)    Cataracts Father    Colon cancer Father    Diabetes Son    Heart disease Son    Heart failure Father    Hypertension Father    Melanoma Daughter    No Known Problems Sister, Brother, Maternal Aunt, Maternal Uncle, Paternal Aunt, Paternal Uncle, Maternal Grandmother, Maternal Grandfather, Paternal Grandmother,  Paternal Grandfather        Tobacco Use    Smoking status: Never Smoker    Smokeless tobacco: Never Used   Substance and Sexual Activity    Alcohol use: Yes     Frequency: 4 or more times a week     Drinks per session: 1 or 2     Binge frequency: Never     Comment: socially, not weekly    Drug use: No    Sexual activity: Never     Review of Systems   Constitution: Negative for chills, decreased appetite and diaphoresis.   HENT: Negative for congestion and ear discharge.    Eyes: Negative for blurred vision and discharge.   Cardiovascular: Negative for chest pain, dyspnea on exertion, irregular heartbeat, leg swelling and paroxysmal nocturnal dyspnea.   Respiratory: Negative for cough, hemoptysis and shortness of breath.    Gastrointestinal: Negative for abdominal pain.     Objective:     Vital Signs (Most Recent):  Temp: 97.7 °F (36.5 °C) (07/16/20 1229)  Pulse: 76 (07/16/20 1736)  Resp: 14 (07/16/20 1741)  BP: 123/75 (07/16/20 1736)  SpO2: 98 % (07/16/20 1536) Vital Signs (24h Range):  Temp:  [97.7 °F (36.5 °C)] 97.7 °F (36.5 °C)  Pulse:  [] 76  Resp:  [14-18] 14  SpO2:  [95 %-98 %] 98 %  BP: (114-146)/() 123/75     Weight: 54.4 kg (120 lb)  Body mass index is 19.37 kg/m².    SpO2: 98 %  O2 Device (Oxygen Therapy): room air      Intake/Output Summary (Last 24 hours) at 7/16/2020 1811  Last data filed at 7/16/2020 1523  Gross per 24 hour   Intake 500 ml   Output --   Net 500 ml       Lines/Drains/Airways     Peripheral Intravenous Line                 Peripheral IV - Single Lumen 07/16/20 1241 20 G Right Forearm less than 1 day         Peripheral IV - Single Lumen 07/16/20 1422 18 G Left Antecubital less than 1 day                Physical Exam   Constitutional: She is oriented to person, place, and time. She appears well-developed and well-nourished. No distress.   Eyes: Pupils are equal, round, and reactive to light. Conjunctivae are normal.   Neck: No tracheal deviation present. No thyromegaly  present.   Cardiovascular: Normal rate, normal heart sounds and intact distal pulses. An irregularly irregular rhythm present. Exam reveals no gallop and no friction rub.   No murmur heard.  Pulses:       Radial pulses are 2+ on the right side and 2+ on the left side.        Femoral pulses are 2+ on the right side and 2+ on the left side.  Pulmonary/Chest: Effort normal and breath sounds normal. No respiratory distress. She has no wheezes. She has no rales.   Abdominal: Soft. Bowel sounds are normal. She exhibits no distension. There is no abdominal tenderness.   Musculoskeletal:         General: No deformity or edema.   Neurological: She is alert and oriented to person, place, and time. No cranial nerve deficit. Coordination normal.   Skin: Skin is warm and dry. She is not diaphoretic.   Psychiatric: She has a normal mood and affect. Her behavior is normal.       Significant Labs:   BMP:   Recent Labs   Lab 07/16/20  1330   GLU 98      K 5.1      CO2 28   BUN 8   CREATININE 0.8   CALCIUM 8.8   MG 1.8       Significant Imaging: Echocardiogram: Transthoracic echo (TTE) complete (Cupid Only): No results found for this or any previous visit.

## 2020-07-16 NOTE — ED PROVIDER NOTES
Encounter Date: 7/16/2020       History     Chief Complaint   Patient presents with    Irregular Heart Beat     Getting crown replaced at dentist, dentist called EMS scondary to patient having new onset afib RVR     70-year-old female with past medical history anxiety, hypothyroid, presenting with new onset atrial fibrillation just prior to arrival.  Patient reports that she was at dentist undergoing sedation with propofol, fentanyl, Versed, for dental implant, when she was noted to have AFib with RVR in the 140s on the monitor.  She denies any chest pain, shortness of breath, recent illness.  She does note that sometimes she feels as if her heart rate is irregular when she is working out, which she does daily, but has never experienced palpitations. No exacerbating or alleviating factors.  Patient prescribed Cardizem 30 mg daily for Raynaud's but has never taken it.         Review of patient's allergies indicates:  No Known Allergies  Past Medical History:   Diagnosis Date    Arthritis     Bronchitis     Cataract     Dry eyes     Hypothyroidism 6/23/2016    Rash     Squamous cell carcinoma     in-situ right upper inner arm, right wrist    Status post lumbar surgery 6/23/2016    Stress fracture     Thyroid disease      Past Surgical History:   Procedure Laterality Date    ANGIOPLASTY      CERVICAL FUSION      COLONOSCOPY      COLONOSCOPY N/A 11/14/2017    Procedure: COLONOSCOPY;  Surgeon: Kasi Mercado MD;  Location: [address]);  Service: Endoscopy;  Laterality: N/A;    ESOPHAGOGASTRODUODENOSCOPY N/A 10/10/2019    Procedure: EGD (ESOPHAGOGASTRODUODENOSCOPY);  Surgeon: Rg Milton MD;  Location: [address]);  Service: Endoscopy;  Laterality: N/A;    l4-5 mid discectomy Left 03/2017    l4-5 MIS diskectomy Right 05/2016     Family History   Problem Relation Age of Onset    Cancer Mother         Lung cancer    Heart failure Father     Colon cancer Father     Hypertension Father      Cataracts Father     Cancer Father 80        colon    Diabetes Son     Heart disease Son     No Known Problems Sister     No Known Problems Brother     No Known Problems Maternal Aunt     No Known Problems Maternal Uncle     No Known Problems Paternal Aunt     No Known Problems Paternal Uncle     No Known Problems Maternal Grandmother     No Known Problems Maternal Grandfather     No Known Problems Paternal Grandmother     No Known Problems Paternal Grandfather     Melanoma Daughter     Amblyopia Neg Hx     Blindness Neg Hx     Glaucoma Neg Hx     Macular degeneration Neg Hx     Retinal detachment Neg Hx     Strabismus Neg Hx     Stroke Neg Hx     Thyroid disease Neg Hx     Breast cancer Neg Hx     Ovarian cancer Neg Hx      Social History     Tobacco Use    Smoking status: Never Smoker    Smokeless tobacco: Never Used   Substance Use Topics    Alcohol use: Yes     Frequency: 4 or more times a week     Drinks per session: 1 or 2     Binge frequency: Never     Comment: socially, not weekly    Drug use: No     Review of Systems   Constitutional: Negative for chills, diaphoresis, fatigue and fever.   HENT: Positive for dental problem. Negative for congestion, nosebleeds and sinus pain.    Eyes: Negative for visual disturbance.        Neg vision changes.    Respiratory: Negative for cough, chest tightness and shortness of breath.    Cardiovascular: Positive for palpitations. Negative for chest pain and leg swelling.   Gastrointestinal: Negative for abdominal distention, abdominal pain, anal bleeding, blood in stool, diarrhea, nausea and vomiting.        Neg changes in stooling.   Genitourinary: Negative for decreased urine volume, dysuria and pelvic pain.        Neg changes in urination. Neg vaginal itching, lesions, or discharge.   Musculoskeletal: Negative for arthralgias, back pain, myalgias and neck stiffness.   Skin: Negative for pallor and rash.   Allergic/Immunologic: Negative for  immunocompromised state.   Neurological: Negative for dizziness, tremors, speech difficulty, weakness, light-headedness and headaches.   Hematological: Does not bruise/bleed easily.   Psychiatric/Behavioral: Negative for decreased concentration.       Physical Exam     Initial Vitals [07/16/20 1229]   BP Pulse Resp Temp SpO2   130/80 (!) 147 18 97.7 °F (36.5 °C) 98 %      MAP       --         Physical Exam    Nursing note and vitals reviewed.  Constitutional: She appears well-developed and well-nourished. She is not diaphoretic. No distress.   HENT:   Head: Normocephalic and atraumatic.   Nose: Nose normal.   Eyes: Conjunctivae and EOM are normal. Pupils are equal, round, and reactive to light. No scleral icterus.   Neck: Normal range of motion. Neck supple. No thyromegaly present.   Cardiovascular: Intact distal pulses.   No murmur heard.  Afib with rate 100s-140s   Pulmonary/Chest: Breath sounds normal. No respiratory distress. She has no wheezes. She has no rhonchi. She has no rales. She exhibits no tenderness.   Abdominal: Soft. Bowel sounds are normal. She exhibits no distension. There is no abdominal tenderness.   Musculoskeletal: Normal range of motion. No tenderness or edema.   Neurological: She is alert and oriented to person, place, and time. She has normal strength. No cranial nerve deficit or sensory deficit.   Skin: Skin is warm and dry. Capillary refill takes less than 2 seconds. No rash noted. No pallor.   Psychiatric: She has a normal mood and affect. Her behavior is normal. Judgment and thought content normal.         ED Course   Critical Care    Date/Time: 7/16/2020 7:00 PM  Performed by: Suzan Mcpherson MD  Authorized by: Suzan Mcpherson MD   Direct patient critical care time: 10 minutes  Additional history critical care time: 10 minutes  Ordering / reviewing critical care time: 10 minutes  Documentation critical care time: 10 minutes  Consulting other physicians critical care time: 10  minutes  Total critical care time (exclusive of procedural time) : 50 minutes  Critical care time was exclusive of separately billable procedures and treating other patients and teaching time.  Critical care was necessary to treat or prevent imminent or life-threatening deterioration of the following conditions: cardiac failure.  Critical care was time spent personally by me on the following activities: development of treatment plan with patient or surrogate, discussions with consultants, interpretation of cardiac output measurements, evaluation of patient's response to treatment, examination of patient, obtaining history from patient or surrogate, ordering and performing treatments and interventions, ordering and review of laboratory studies, ordering and review of radiographic studies, pulse oximetry, re-evaluation of patient's condition and review of old charts.        Labs Reviewed   CBC W/ AUTO DIFFERENTIAL - Abnormal; Notable for the following components:       Result Value    Mean Corpuscular Volume 104 (*)     Mean Corpuscular Hemoglobin 32.7 (*)     Mean Corpuscular Hemoglobin Conc 31.6 (*)     Lymph # 0.6 (*)     Mono # 0.1 (*)     Gran% 85.3 (*)     Lymph% 11.3 (*)     Mono% 2.5 (*)     All other components within normal limits   B-TYPE NATRIURETIC PEPTIDE - Abnormal; Notable for the following components:     (*)     All other components within normal limits   COMPREHENSIVE METABOLIC PANEL   URINALYSIS, REFLEX TO URINE CULTURE    Narrative:     Specimen Source->Urine   MAGNESIUM   TSH   TROPONIN I   SARS-COV-2 RNA AMPLIFICATION, QUAL        ECG Results                         EKG 12-lead (Final result)  Result time 07/16/20 21:54:57    Final result by Interface, Lab In [facility] (07/16/20 21:54:57)                 Narrative:    Test Reason : I48.91,    Vent. Rate : 098 BPM     Atrial Rate : 394 BPM     P-R Int : 000 ms          QRS Dur : 058 ms      QT Int : 324 ms       P-R-T Axes : 000 083 018  degrees     QTc Int : 413 ms    Atrial fibrillation  Low voltage QRS  Abnormal ECG  When compared with ECG of 16-DEC-2019 13:03,  Atrial fibrillation has replaced sinus rhythm  Confirmed by Horacio NGUYEN, Rolan REYES ([id number]) on 7/16/2020 9:54:44 PM    Referred By: NGA   SELF           Confirmed By:Rolan Leonard MD                            Imaging Results          X-Ray Chest AP Portable (Final result)  Result time 07/16/20 13:58:38    Final result by Jimmie Noland III, MD (07/16/20 13:58:38)                 Impression:      Cardiomegaly.      Electronically signed by: Jimmie Noland MD  Date:    07/16/2020  Time:    13:58             Narrative:    EXAMINATION:  XR CHEST AP PORTABLE    CLINICAL HISTORY:  Unspecified atrial fibrillation    FINDINGS:  There is cardiomegaly.  Lungs are clear and there is DJD.                                 Medical Decision Making:   History:   Old Medical Records: I decided to obtain old medical records.  Old Records Summarized: records from another hospital.       <> Summary of Records: Given sedatives (see HPI)  Initial Assessment:   Atrial fibrillation initially 100s-140s, now 90s-110s.   Differential Diagnosis:   Medication reaction, CHF, arrhythmia, electrolyte or metabolic abnormality, thyroid d/o  Independently Interpreted Test(s):   I have ordered and independently interpreted X-rays - see summary below.       <> Summary of X-Ray Reading(s): Cardiomegaly, but no pulm edema or consolidations  I have ordered and independently interpreted EKG Reading(s) - see summary below       <> Summary of EKG Reading(s): Afib rate rate 98, no ischemic changes  Clinical Tests:   Lab Tests: Ordered and Reviewed  Radiological Study: Ordered and Reviewed  Medical Tests: Ordered and Reviewed  ED Management:  New onset Afib, no evidence of HF, will give Cardizem    3:12 PM  Heart rate 70s but remains irregular after Cardizem 10 mg    3:56 PM  Labs neg aside from slight BNP elevated 170s, CXR also  shows cardiomegaly.  Other:   I have discussed this case with another health care provider.       <> Summary of the Discussion: D/w Cards who will admit for cardioversion tomorrow                                 Clinical Impression:       ICD-10-CM ICD-9-CM   1. Atrial fibrillation with RVR  I48.91 427.31   2. Atrial fibrillation  I48.91 427.31   3. Atrial fibrillation, unspecified type  I48.91 427.31             ED Disposition Condition    Observation                           Suzan Mcpherson MD  07/18/20 1956

## 2020-07-16 NOTE — ASSESSMENT & PLAN NOTE
- Patient seen and examined  - New diagnosis of AF  - Admit to Medicine  - NPO at midnight  [facility]  - SANGITA/DCCV in AM  - Eliquis 5 bid  - Metoprolol Tart 25 bid uptitrate as needed for rate control  - Formal TTE  - Keep MG> 2 K> 4  - I talked to her dnetist on the phone, ok to proceed with anticoagulation (may have some oozing)  - Here are the medications he ordered for her to start today post crown placement (please start them inpatient)  1. Amoxicillin 500 tid x 5 days  2. Chlohexedeine mouth wash 0.12% bid  *She will probably need PRN pain meds for tooth pain

## 2020-07-17 ENCOUNTER — ANESTHESIA EVENT (OUTPATIENT)
Dept: MEDSURG UNIT | Facility: HOSPITAL | Age: 70
End: 2020-07-17
Payer: MEDICARE

## 2020-07-17 ENCOUNTER — ANESTHESIA (OUTPATIENT)
Dept: MEDSURG UNIT | Facility: HOSPITAL | Age: 70
End: 2020-07-17
Payer: MEDICARE

## 2020-07-17 VITALS
TEMPERATURE: 98 F | DIASTOLIC BLOOD PRESSURE: 65 MMHG | OXYGEN SATURATION: 99 % | SYSTOLIC BLOOD PRESSURE: 126 MMHG | HEART RATE: 54 BPM | WEIGHT: 115 LBS | HEIGHT: 64 IN | BODY MASS INDEX: 19.63 KG/M2 | RESPIRATION RATE: 16 BRPM

## 2020-07-17 LAB
ANION GAP SERPL CALC-SCNC: 7 MMOL/L (ref 8–16)
ASCENDING AORTA: 2.96 CM
AV INDEX (PROSTH): 0.64
AV MEAN GRADIENT: 2 MMHG
AV PEAK GRADIENT: 4 MMHG
AV VALVE AREA: 1.5 CM2
AV VELOCITY RATIO: 0.64
BASOPHILS # BLD AUTO: 0.03 K/UL (ref 0–0.2)
BASOPHILS NFR BLD: 0.5 % (ref 0–1.9)
BSA FOR ECHO PROCEDURE: 1.54 M2
BUN SERPL-MCNC: 10 MG/DL (ref 8–23)
CALCIUM SERPL-MCNC: 8.3 MG/DL (ref 8.7–10.5)
CHLORIDE SERPL-SCNC: 106 MMOL/L (ref 95–110)
CO2 SERPL-SCNC: 27 MMOL/L (ref 23–29)
CREAT SERPL-MCNC: 0.7 MG/DL (ref 0.5–1.4)
CV ECHO LV RWT: 0.29 CM
DIFFERENTIAL METHOD: ABNORMAL
DOP CALC AO PEAK VEL: 1 M/S
DOP CALC AO VTI: 19.25 CM
DOP CALC LVOT AREA: 2.3 CM2
DOP CALC LVOT DIAMETER: 1.73 CM
DOP CALC LVOT PEAK VEL: 0.64 M/S
DOP CALC LVOT STROKE VOLUME: 28.94 CM3
DOP CALCLVOT PEAK VEL VTI: 12.32 CM
ECHO LV POSTERIOR WALL: 0.64 CM (ref 0.6–1.1)
EOSINOPHIL # BLD AUTO: 0.1 K/UL (ref 0–0.5)
EOSINOPHIL NFR BLD: 1 % (ref 0–8)
ERYTHROCYTE [DISTWIDTH] IN BLOOD BY AUTOMATED COUNT: 13 % (ref 11.5–14.5)
EST. GFR  (AFRICAN AMERICAN): >60 ML/MIN/1.73 M^2
EST. GFR  (NON AFRICAN AMERICAN): >60 ML/MIN/1.73 M^2
FRACTIONAL SHORTENING: 30 % (ref 28–44)
GLUCOSE SERPL-MCNC: 85 MG/DL (ref 70–110)
HCT VFR BLD AUTO: 37.5 % (ref 37–48.5)
HGB BLD-MCNC: 12.1 G/DL (ref 12–16)
IMM GRANULOCYTES # BLD AUTO: 0.01 K/UL (ref 0–0.04)
IMM GRANULOCYTES NFR BLD AUTO: 0.2 % (ref 0–0.5)
INTERVENTRICULAR SEPTUM: 0.62 CM (ref 0.6–1.1)
LA MAJOR: 5.95 CM
LA MINOR: 4.29 CM
LA WIDTH: 3.48 CM
LEFT ATRIUM SIZE: 3.27 CM
LEFT ATRIUM VOLUME INDEX: 31.1 ML/M2
LEFT ATRIUM VOLUME: 48.22 CM3
LEFT INTERNAL DIMENSION IN SYSTOLE: 3.15 CM (ref 2.1–4)
LEFT VENTRICLE DIASTOLIC VOLUME INDEX: 59.12 ML/M2
LEFT VENTRICLE DIASTOLIC VOLUME: 91.58 ML
LEFT VENTRICLE MASS INDEX: 54 G/M2
LEFT VENTRICLE SYSTOLIC VOLUME INDEX: 25.4 ML/M2
LEFT VENTRICLE SYSTOLIC VOLUME: 39.31 ML
LEFT VENTRICULAR INTERNAL DIMENSION IN DIASTOLE: 4.48 CM (ref 3.5–6)
LEFT VENTRICULAR MASS: 83.14 G
LYMPHOCYTES # BLD AUTO: 2.1 K/UL (ref 1–4.8)
LYMPHOCYTES NFR BLD: 36 % (ref 18–48)
MAGNESIUM SERPL-MCNC: 1.7 MG/DL (ref 1.6–2.6)
MCH RBC QN AUTO: 33.1 PG (ref 27–31)
MCHC RBC AUTO-ENTMCNC: 32.3 G/DL (ref 32–36)
MCV RBC AUTO: 103 FL (ref 82–98)
MONOCYTES # BLD AUTO: 0.6 K/UL (ref 0.3–1)
MONOCYTES NFR BLD: 10.8 % (ref 4–15)
MV PEAK E VEL: 0.83 M/S
NEUTROPHILS # BLD AUTO: 3 K/UL (ref 1.8–7.7)
NEUTROPHILS NFR BLD: 51.5 % (ref 38–73)
NRBC BLD-RTO: 0 /100 WBC
PISA TR MAX VEL: 1.87 M/S
PLATELET # BLD AUTO: 184 K/UL (ref 150–350)
PMV BLD AUTO: 10.8 FL (ref 9.2–12.9)
POTASSIUM SERPL-SCNC: 4.2 MMOL/L (ref 3.5–5.1)
RA MAJOR: 5.36 CM
RA PRESSURE: 15 MMHG
RA WIDTH: 3.09 CM
RBC # BLD AUTO: 3.66 M/UL (ref 4–5.4)
RV TISSUE DOPPLER FREE WALL SYSTOLIC VELOCITY 1 (APICAL 4 CHAMBER VIEW): 11.37 CM/S
SINUS: 2.76 CM
SODIUM SERPL-SCNC: 140 MMOL/L (ref 136–145)
STJ: 2.71 CM
TR MAX PG: 14 MMHG
TRICUSPID ANNULAR PLANE SYSTOLIC EXCURSION: 2.51 CM
TV REST PULMONARY ARTERY PRESSURE: 29 MMHG
WBC # BLD AUTO: 5.73 K/UL (ref 3.9–12.7)

## 2020-07-17 PROCEDURE — 25000003 PHARM REV CODE 250: Mod: HCNC | Performed by: PHYSICIAN ASSISTANT

## 2020-07-17 PROCEDURE — 25000003 PHARM REV CODE 250: Mod: HCNC | Performed by: HOSPITALIST

## 2020-07-17 PROCEDURE — D9220A PRA ANESTHESIA: Mod: HCNC,ANES,, | Performed by: ANESTHESIOLOGY

## 2020-07-17 PROCEDURE — 25000003 PHARM REV CODE 250: Mod: HCNC | Performed by: INTERNAL MEDICINE

## 2020-07-17 PROCEDURE — 25000003 PHARM REV CODE 250: Mod: HCNC | Performed by: STUDENT IN AN ORGANIZED HEALTH CARE EDUCATION/TRAINING PROGRAM

## 2020-07-17 PROCEDURE — 63600175 PHARM REV CODE 636 W HCPCS: Mod: HCNC | Performed by: NURSE ANESTHETIST, CERTIFIED REGISTERED

## 2020-07-17 PROCEDURE — A4216 STERILE WATER/SALINE, 10 ML: HCPCS | Mod: HCNC | Performed by: ANESTHESIOLOGY

## 2020-07-17 PROCEDURE — 80048 BASIC METABOLIC PNL TOTAL CA: CPT | Mod: HCNC

## 2020-07-17 PROCEDURE — 99217 PR OBSERVATION CARE DISCHARGE: CPT | Mod: HCNC,,, | Performed by: PHYSICIAN ASSISTANT

## 2020-07-17 PROCEDURE — D9220A PRA ANESTHESIA: ICD-10-PCS | Mod: HCNC,ANES,, | Performed by: ANESTHESIOLOGY

## 2020-07-17 PROCEDURE — 92960 PR CARDIOVERSION, ELECTIVE;EXTERN: ICD-10-PCS | Mod: HCNC,,, | Performed by: INTERNAL MEDICINE

## 2020-07-17 PROCEDURE — 92960 CARDIOVERSION ELECTRIC EXT: CPT | Mod: HCNC,,, | Performed by: INTERNAL MEDICINE

## 2020-07-17 PROCEDURE — 94761 N-INVAS EAR/PLS OXIMETRY MLT: CPT | Mod: HCNC

## 2020-07-17 PROCEDURE — D9220A PRA ANESTHESIA: Mod: HCNC,CRNA,, | Performed by: NURSE ANESTHETIST, CERTIFIED REGISTERED

## 2020-07-17 PROCEDURE — 99217 PR OBSERVATION CARE DISCHARGE: ICD-10-PCS | Mod: HCNC,,, | Performed by: PHYSICIAN ASSISTANT

## 2020-07-17 PROCEDURE — 25000003 PHARM REV CODE 250: Mod: HCNC | Performed by: NURSE ANESTHETIST, CERTIFIED REGISTERED

## 2020-07-17 PROCEDURE — 37000008 HC ANESTHESIA 1ST 15 MINUTES: Mod: HCNC | Performed by: INTERNAL MEDICINE

## 2020-07-17 PROCEDURE — D9220A PRA ANESTHESIA: ICD-10-PCS | Mod: HCNC,CRNA,, | Performed by: NURSE ANESTHETIST, CERTIFIED REGISTERED

## 2020-07-17 PROCEDURE — 25000003 PHARM REV CODE 250: Mod: HCNC | Performed by: ANESTHESIOLOGY

## 2020-07-17 PROCEDURE — 92960 CARDIOVERSION ELECTRIC EXT: CPT | Mod: HCNC | Performed by: INTERNAL MEDICINE

## 2020-07-17 PROCEDURE — 85025 COMPLETE CBC W/AUTO DIFF WBC: CPT | Mod: HCNC

## 2020-07-17 PROCEDURE — 36415 COLL VENOUS BLD VENIPUNCTURE: CPT | Mod: HCNC

## 2020-07-17 PROCEDURE — G0378 HOSPITAL OBSERVATION PER HR: HCPCS | Mod: HCNC

## 2020-07-17 PROCEDURE — 83735 ASSAY OF MAGNESIUM: CPT | Mod: HCNC

## 2020-07-17 PROCEDURE — 37000009 HC ANESTHESIA EA ADD 15 MINS: Mod: HCNC | Performed by: INTERNAL MEDICINE

## 2020-07-17 RX ORDER — AMOXICILLIN 500 MG/1
500 CAPSULE ORAL EVERY 8 HOURS
Status: DISCONTINUED | OUTPATIENT
Start: 2020-07-17 | End: 2020-07-17 | Stop reason: HOSPADM

## 2020-07-17 RX ORDER — LANOLIN ALCOHOL/MO/W.PET/CERES
400 CREAM (GRAM) TOPICAL ONCE
Status: DISCONTINUED | OUTPATIENT
Start: 2020-07-17 | End: 2020-07-17

## 2020-07-17 RX ORDER — HYDROMORPHONE HYDROCHLORIDE 1 MG/ML
0.2 INJECTION, SOLUTION INTRAMUSCULAR; INTRAVENOUS; SUBCUTANEOUS EVERY 5 MIN PRN
Status: CANCELLED | OUTPATIENT
Start: 2020-07-17

## 2020-07-17 RX ORDER — PROPOFOL 10 MG/ML
VIAL (ML) INTRAVENOUS CONTINUOUS PRN
Status: DISCONTINUED | OUTPATIENT
Start: 2020-07-17 | End: 2020-07-17

## 2020-07-17 RX ORDER — LANOLIN ALCOHOL/MO/W.PET/CERES
400 CREAM (GRAM) TOPICAL EVERY 4 HOURS
Status: DISCONTINUED | OUTPATIENT
Start: 2020-07-17 | End: 2020-07-17 | Stop reason: HOSPADM

## 2020-07-17 RX ORDER — SODIUM CHLORIDE 0.9 % (FLUSH) 0.9 %
3 SYRINGE (ML) INJECTION
Status: DISCONTINUED | OUTPATIENT
Start: 2020-07-17 | End: 2020-07-17 | Stop reason: HOSPADM

## 2020-07-17 RX ORDER — LIDOCAINE HYDROCHLORIDE 20 MG/ML
INJECTION INTRAVENOUS
Status: DISCONTINUED | OUTPATIENT
Start: 2020-07-17 | End: 2020-07-17

## 2020-07-17 RX ORDER — PROPOFOL 10 MG/ML
VIAL (ML) INTRAVENOUS
Status: DISCONTINUED | OUTPATIENT
Start: 2020-07-17 | End: 2020-07-17

## 2020-07-17 RX ORDER — METOPROLOL TARTRATE 25 MG/1
25 TABLET, FILM COATED ORAL 2 TIMES DAILY
Qty: 60 TABLET | Refills: 11 | Status: ON HOLD | OUTPATIENT
Start: 2020-07-17 | End: 2021-07-14 | Stop reason: HOSPADM

## 2020-07-17 RX ORDER — SODIUM CHLORIDE 9 MG/ML
INJECTION, SOLUTION INTRAVENOUS CONTINUOUS PRN
Status: DISCONTINUED | OUTPATIENT
Start: 2020-07-17 | End: 2020-07-17

## 2020-07-17 RX ORDER — GLYCOPYRROLATE 0.2 MG/ML
INJECTION INTRAMUSCULAR; INTRAVENOUS
Status: DISCONTINUED | OUTPATIENT
Start: 2020-07-17 | End: 2020-07-17

## 2020-07-17 RX ORDER — SILVER SULFADIAZINE 10 G/1000G
CREAM TOPICAL
Status: DISCONTINUED | OUTPATIENT
Start: 2020-07-17 | End: 2020-07-17 | Stop reason: HOSPADM

## 2020-07-17 RX ORDER — ONDANSETRON 2 MG/ML
4 INJECTION INTRAMUSCULAR; INTRAVENOUS DAILY PRN
Status: CANCELLED | OUTPATIENT
Start: 2020-07-17

## 2020-07-17 RX ADMIN — CHLORHEXIDINE GLUCONATE 0.12% ORAL RINSE 15 ML: 1.2 LIQUID ORAL at 10:07

## 2020-07-17 RX ADMIN — Medication 3 ML: at 01:07

## 2020-07-17 RX ADMIN — MAGNESIUM OXIDE 400 MG (241.3 MG MAGNESIUM) TABLET 400 MG: TABLET at 10:07

## 2020-07-17 RX ADMIN — LEVOTHYROXINE SODIUM 137 MCG: 25 TABLET ORAL at 05:07

## 2020-07-17 RX ADMIN — AMOXICILLIN 500 MG: 500 CAPSULE ORAL at 01:07

## 2020-07-17 RX ADMIN — SODIUM CHLORIDE: 0.9 INJECTION, SOLUTION INTRAVENOUS at 02:07

## 2020-07-17 RX ADMIN — APIXABAN 5 MG: 5 TABLET, FILM COATED ORAL at 10:07

## 2020-07-17 RX ADMIN — METOPROLOL TARTRATE 25 MG: 25 TABLET, FILM COATED ORAL at 10:07

## 2020-07-17 RX ADMIN — GLYCOPYRROLATE 0.2 MG: 0.2 INJECTION, SOLUTION INTRAMUSCULAR; INTRAVENOUS at 02:07

## 2020-07-17 RX ADMIN — PROPOFOL 80 MG: 10 INJECTION, EMULSION INTRAVENOUS at 02:07

## 2020-07-17 RX ADMIN — PROPOFOL 100 MCG/KG/MIN: 10 INJECTION, EMULSION INTRAVENOUS at 02:07

## 2020-07-17 RX ADMIN — MAGNESIUM OXIDE 400 MG (241.3 MG MAGNESIUM) TABLET 400 MG: TABLET at 01:07

## 2020-07-17 RX ADMIN — LIDOCAINE HYDROCHLORIDE 100 MG: 20 INJECTION, SOLUTION INTRAVENOUS at 02:07

## 2020-07-17 RX ADMIN — ACYCLOVIR 400 MG: 200 CAPSULE ORAL at 10:07

## 2020-07-17 RX ADMIN — BUPROPION HYDROCHLORIDE 150 MG: 150 TABLET, FILM COATED, EXTENDED RELEASE ORAL at 10:07

## 2020-07-17 NOTE — PLAN OF CARE
CM met with patient at the bedside to discuss discharge planning assessment. Pt lives alone and has transportation home at discharge. Pt verified PCP and Pharmacy. CM will continue to follow for discharge needs.         07/17/20 0957   Discharge Assessment   Assessment Type Discharge Planning Assessment   Confirmed/corrected address and phone number on facesheet? Yes   Assessment information obtained from? Patient   Expected Length of Stay (days) 1   Communicated expected length of stay with patient/caregiver yes   Prior to hospitilization cognitive status: Alert/Oriented   Prior to hospitalization functional status: Independent   Current cognitive status: Alert/Oriented   Current Functional Status: Independent   Lives With alone   Able to Return to Prior Arrangements yes   Is patient able to care for self after discharge? Yes   Patient's perception of discharge disposition home or selfcare   Readmission Within the Last 30 Days no previous admission in last 30 days   Patient currently being followed by outpatient case management? No   Patient currently receives any other outside agency services? No   Equipment Currently Used at Home none   Do you have any problems affording any of your prescribed medications? No   Is the patient taking medications as prescribed? yes   Does the patient have transportation home? Yes   Transportation Anticipated family or friend will provide   Does the patient receive services at the [facility]? No   Discharge Plan A Home   Discharge Plan B Home with family   DME Needed Upon Discharge  none   Patient/Family in Agreement with Plan yes

## 2020-07-17 NOTE — HOSPITAL COURSE
Mrs. Man was admitted to observation for new onset Afib. Started on metoprolol 25 mg BID for rate control. Eliquis 5 mg BID. CHADS-VASC 2-3. Cardiology consulted, SANGITA/DCCV successful.  TTE with EF 55%, normal diastolic function, Mild mitral prolapse of the posterior mitral leaflet, Mild mitral regurgitation, Elevated central venous pressure (15 mmHg), estimated PA systolic pressure is 29 mmHg, Trivial circumferential pericardial effusion. Patient discharged on Metoprolol 25 mg BID, eliquis 5 mg BID. Ambulatory referral to cardiology given. Patient verbalized understanding. All questions answered.

## 2020-07-17 NOTE — TRANSFER OF CARE
"Anesthesia Transfer of Care Note    Patient: Angelina Man    Procedure(s) Performed: Procedure(s) (LRB):  CARDIOVERSION (N/A)  ECHOCARDIOGRAM,TRANSESOPHAGEAL (N/A)    Patient location: PACU    Anesthesia Type: general    Transport from OR: Transported from OR on 2-3 L/min O2 by NC with adequate spontaneous ventilation    Post pain: adequate analgesia    Post assessment: no apparent anesthetic complications and tolerated procedure well    Post vital signs: stable    Level of consciousness: sedated    Nausea/Vomiting: no nausea/vomiting    Complications: none    Transfer of care protocol was followed      Last vitals:   Visit Vitals  /66   Pulse 77   Temp 36.2 °C (97.2 °F)   Resp 16   Ht 5' 4" (1.626 m)   Wt 52.2 kg (115 lb)   LMP  (LMP Unknown)   SpO2 100%   Breastfeeding No   BMI 19.74 kg/m²     "

## 2020-07-17 NOTE — SUBJECTIVE & OBJECTIVE
Past Medical History:   Diagnosis Date    Arthritis     Bronchitis     Cataract     Dry eyes     Hypothyroidism 6/23/2016    Rash     Squamous cell carcinoma     in-situ right upper inner arm, right wrist    Status post lumbar surgery 6/23/2016    Stress fracture     Thyroid disease        Past Surgical History:   Procedure Laterality Date    ANGIOPLASTY      CERVICAL FUSION      COLONOSCOPY      COLONOSCOPY N/A 11/14/2017    Procedure: COLONOSCOPY;  Surgeon: Kasi Mercado MD;  [facility]  Service: Endoscopy;  Laterality: N/A;    ESOPHAGOGASTRODUODENOSCOPY N/A 10/10/2019    Procedure: EGD (ESOPHAGOGASTRODUODENOSCOPY);  Surgeon: Rg Milton MD;  [facility]  Service: Endoscopy;  Laterality: N/A;    l4-5 mid discectomy Left 03/2017    l4-5 MIS diskectomy Right 05/2016       Review of patient's allergies indicates:  No Known Allergies    No current facility-administered medications on file prior to encounter.      Current Outpatient Medications on File Prior to Encounter   Medication Sig    acyclovir (ZOVIRAX) 400 MG tablet Take 1 tablet (400 mg total) by mouth 2 (two) times daily.    buPROPion (WELLBUTRIN XL) 150 MG TB24 tablet Take 1 tablet (150 mg total) by mouth once daily.    clonazePAM (KLONOPIN) 1 MG tablet Take 1.5 tablets (1.5 mg total) by mouth nightly as needed.    levothyroxine (SYNTHROID) 137 MCG Tab tablet Take 1 tablet (137 mcg total) by mouth before breakfast.    montelukast (SINGULAIR) 10 mg tablet     acetaminophen (TYLENOL) 500 MG tablet Take 500 mg by mouth every 6 (six) hours as needed for Pain.    acetazolamide 25 mg/mL Susp     alendronate (FOSAMAX) 70 MG tablet Take 1 tablet (70 mg total) by mouth every 7 days.    ascorbic acid (VITAMIN C) 1000 MG tablet Take 1,000 mg by mouth once daily.     biotin 1 mg tablet Take 1 mg by mouth 2 (two) times daily.     calcium-vitamin D 500 mg(1,250mg) -200 unit per tablet Take 1 tablet by  mouth 2 (two) times daily with meals.     chondroitin sulfate-vit C-Mn (CHONDROITIN SULFATE COMPLEX) 400-60-2.5 mg Cap Take 1 tablet by mouth 2 (two) times daily.     ciclopirox (LOPROX) 0.77 % Susp aaa qhs    dicyclomine (BENTYL) 20 mg tablet Take 1 tablet (20 mg total) by mouth 3 (three) times daily as needed.    digestive enzymes Tab Take 1 tablet by mouth once daily.     diltiaZEM (CARDIZEM) 30 MG tablet Once daily for raynauds.    diphenhydrAMINE (BENADRYL) 25 mg capsule Take 25 mg by mouth nightly as needed.     fluticasone (FLONASE) 50 mcg/actuation nasal spray 1 spray by Each Nare route once daily.    ginseng 200 mg Cap Take 200 mg by mouth daily as needed.     glucosamine sulfate 2KCl 1,000 mg Tab Take 1 tablet by mouth once daily.     lubiprostone (AMITIZA) 24 MCG Cap Take 1 capsule (24 mcg total) by mouth 2 (two) times daily.    multivitamin (THERAGRAN) per tablet Take 1 tablet by mouth once daily.     mupirocin (BACTROBAN) 2 % ointment Apply to affected area 3 times daily    neomycin-polymyxin-dexamethasone (MAXITROL) 3.5mg/mL-10,000 unit/mL-0.1 % DrpS Place 1 drop into both eyes 4 (four) times daily. for 7 days    nitroGLYCERIN 2% TD oint (NITROGLYN) 2 % ointment Apply small amount to fingertips prn raynaud attacks    plecanatide (TRULANCE) 3 mg Tab Take 1 tablet (3 mg) by mouth once daily.    potassium chloride (KLOR-CON) 8 MEQ TbSR TAKE 1 TABLET(8 MEQ) BY MOUTH EVERY DAY    tretinoin (RETIN-A) 0.1 % cream Apply topically every evening.    zinc 50 mg Tab Take 50 mg by mouth once daily.      Family History     Problem Relation (Age of Onset)    Cancer Mother, Father (80)    Cataracts Father    Colon cancer Father    Diabetes Son    Heart disease Son    Heart failure Father    Hypertension Father    Melanoma Daughter    No Known Problems Sister, Brother, Maternal Aunt, Maternal Uncle, Paternal Aunt, Paternal Uncle, Maternal Grandmother, Maternal Grandfather, Paternal Grandmother,  Paternal Grandfather        Tobacco Use    Smoking status: Never Smoker    Smokeless tobacco: Never Used   Substance and Sexual Activity    Alcohol use: Yes     Frequency: 4 or more times a week     Drinks per session: 1 or 2     Binge frequency: Never     Comment: socially, not weekly    Drug use: No    Sexual activity: Never     Review of Systems   Constitution: Negative. Negative for chills and fever.   HENT: Negative.    Eyes: Negative.    Cardiovascular: Negative for chest pain, claudication and paroxysmal nocturnal dyspnea.   Respiratory: Negative for cough, shortness of breath and wheezing.    Endocrine: Negative.    Hematologic/Lymphatic: Does not bruise/bleed easily.   Skin: Negative for nail changes and rash.   Musculoskeletal: Negative.  Negative for back pain.   Gastrointestinal: Negative for abdominal pain, melena, nausea and vomiting.   Neurological: Negative for dizziness and headaches.   Psychiatric/Behavioral: Negative for altered mental status, depression and substance abuse.   Allergic/Immunologic: Negative.      Objective:     Vital Signs (Most Recent):  Temp: 97.4 °F (36.3 °C) (07/17/20 0809)  Pulse: 66 (07/17/20 0809)  Resp: 16 (07/17/20 0809)  BP: 114/62 (07/17/20 0809)  SpO2: 100 % (07/17/20 1000) Vital Signs (24h Range):  Temp:  [97 °F (36.1 °C)-97.9 °F (36.6 °C)] 97.4 °F (36.3 °C)  Pulse:  [] 66  Resp:  [14-20] 16  SpO2:  [95 %-100 %] 100 %  BP: ()/() 114/62     Weight: 52.4 kg (115 lb 8.3 oz)  Body mass index is 19.83 kg/m².    SpO2: 100 %  O2 Device (Oxygen Therapy): room air      Intake/Output Summary (Last 24 hours) at 7/17/2020 1122  Last data filed at 7/16/2020 1523  Gross per 24 hour   Intake 500 ml   Output --   Net 500 ml       Lines/Drains/Airways     Peripheral Intravenous Line                 Peripheral IV - Single Lumen 07/16/20 1422 18 G Left Antecubital less than 1 day                Physical Exam   Constitutional: She is oriented to person, place, and  time. She appears well-developed and well-nourished.   HENT:   Head: Normocephalic and atraumatic.   Eyes: Pupils are equal, round, and reactive to light. Conjunctivae and EOM are normal.   Neck: No JVD present.   Cardiovascular: Normal rate, normal heart sounds and intact distal pulses. Exam reveals no gallop and no friction rub.   No murmur heard.  Irregular rhythm   Pulmonary/Chest: Effort normal. No stridor. She has no wheezes. She has no rales. She exhibits no tenderness.   Abdominal: Soft. Bowel sounds are normal. She exhibits no distension and no mass. There is no abdominal tenderness. There is no guarding.   Musculoskeletal:         General: No tenderness, deformity or edema.   Neurological: She is alert and oriented to person, place, and time.   Skin: Skin is warm and dry. No rash noted. No erythema.   Psychiatric: She has a normal mood and affect.       Significant Labs:   CMP   Recent Labs   Lab 07/16/20  1330 07/17/20  0634    140   K 5.1 4.2    106   CO2 28 27   GLU 98 85   BUN 8 10   CREATININE 0.8 0.7   CALCIUM 8.8 8.3*   PROT 7.1  --    ALBUMIN 3.7  --    BILITOT 0.4  --    ALKPHOS 86  --    AST 39  --    ALT 30  --    ANIONGAP 10 7*   ESTGFRAFRICA >60.0 >60.0   EGFRNONAA >60.0 >60.0    and CBC   Recent Labs   Lab 07/16/20  1330 07/17/20  0634   WBC 5.58 5.73   HGB 14.1 12.1   HCT 44.6 37.5    184       Significant Imaging: Echocardiogram: Transthoracic echo (TTE) complete (Cupid Only): No results found for this or any previous visit.

## 2020-07-17 NOTE — DISCHARGE SUMMARY
[facility] Hospital Medicine  Discharge Summary      Patient Name: Angelina Man  MRN: [id number]  Admission Date: 7/16/2020  Hospital Length of Stay: 0 days  Discharge Date and Time: 7/17/2020  5:58 PM  Attending Physician: Kasi Malhotra MD   Discharging Provider: Yas Vega PA-C  Primary Care Provider: Marli Wilkinson MD  [facility] Medicine Team: [facility] TEAM C - FELLOWS/CONSULTS Yas Vega PA-C    HPI:   69 yo F with PMHx hypothyroidism, anxiety, and arthritis presents to the ED from dental office after she was noted to have an irregular heart beat with rate in the 140's. The patient underwent a dental implant and received propofol, fentanyl, decadron, versed, and zofran IV during the procedure. The irregular HR was noted shortly following the procedure while pt. Was recovering. On to ED arrival, pt. Presented in atrial fibrillation with rate 147 which improved after administration of IV diltiazem. She remains irregular at the time of my interview but rate controlled. The patient denies any previous history of irregular heart beat. She denies any lightheadedness, palpitations, SOB, or chest pain. She retrospectively mentions that she has occasionally noted that her heart beat felt  irregular at times when she checked it over the last few months, but she denies ever feeling like her heart was beating fast or any other symptoms ar the time.    Procedure(s) (LRB):  CARDIOVERSION (N/A)  ECHOCARDIOGRAM,TRANSESOPHAGEAL (N/A)      Hospital Course:   Mrs. Man was admitted to observation for new onset Afib. Started on metoprolol 25 mg BID for rate control. Eliquis 5 mg BID. CHADS-VASC 2-3. Cardiology consulted, SANGITA/DCCV successful.  TTE with EF 55%, normal diastolic function, Mild mitral prolapse of the posterior mitral leaflet, Mild mitral regurgitation, Elevated central venous pressure (15 mmHg), estimated PA systolic pressure is 29 mmHg, Trivial  circumferential pericardial effusion. Patient discharged on Metoprolol 25 mg BID, eliquis 5 mg BID. Ambulatory referral to cardiology given. Patient verbalized understanding. All questions answered.      Consults:   Consults (From admission, onward)        Status Ordering Provider     Inpatient consult to Cardiology  Once     Provider:  (Not yet assigned)    Completed LOI [place]          * New onset a-fib  -Pt. with new onset Afib with RVR noted on admit EKG, initial rate 140's. Remains irregular even after rate controlled.  -Cardiology consulted, appreciate assistance  - DCCV/SANGITA cardioversion successful  - CHADSVASC 2-3  - metoprolol 25 mg BID ol  - Eliquis 5mg BID   - TTE with EF 55%, normal diastolic function, Mild mitral prolapse of the posterior mitral leaflet, Mild mitral regurgitation, Elevated central venous pressure (15 mmHg), estimated PA systolic pressure is 29 mmHg, Trivial circumferential pericardial effusion.  - discharged on eliquis and metoprolol  - ambulatory referral to cardiology given      Final Active Diagnoses:    Diagnosis Date Noted POA    PRINCIPAL PROBLEM:  New onset a-fib [I48.91] 07/16/2020 Yes    Atrial fibrillation with RVR [I48.91] 07/16/2020 Yes    Hypothyroidism [E03.9] 06/23/2016 Yes    Anxiety [F41.9] 10/29/2012 Yes      Problems Resolved During this Admission:       Discharged Condition: good    Disposition: Home or Self Care    Follow Up:  Follow-up Information     Marli Wilkinson MD In 1 week.    Specialty: Family Medicine  Contact information:  [address] [address] [address]  [address]  [place] [id number] [phone]                 Patient Instructions:      Ambulatory referral/consult to Cardiology   Standing Status: Future   Referral Priority: Routine Referral Type: Consultation   Referral Reason: Specialty Services Required   Requested Specialty: Cardiology   Number of Visits Requested: 1     Diet Cardiac     Notify your health care provider if you  experience any of the following:  temperature >100.4     Notify your health care provider if you experience any of the following:  severe uncontrolled pain     Notify your health care provider if you experience any of the following:  increased confusion or weakness     Notify your health care provider if you experience any of the following:  persistent dizziness, light-headedness, or visual disturbances     Activity as tolerated       Significant Diagnostic Studies: Labs:   CMP   Recent Labs   Lab 07/16/20  1330 07/17/20  0634    140   K 5.1 4.2    106   CO2 28 27   GLU 98 85   BUN 8 10   CREATININE 0.8 0.7   CALCIUM 8.8 8.3*   PROT 7.1  --    ALBUMIN 3.7  --    BILITOT 0.4  --    ALKPHOS 86  --    AST 39  --    ALT 30  --    ANIONGAP 10 7*   ESTGFRAFRICA >60.0 >60.0   EGFRNONAA >60.0 >60.0    and CBC   Recent Labs   Lab 07/16/20  1330 07/17/20  0634   WBC 5.58 5.73   HGB 14.1 12.1   HCT 44.6 37.5    184       Pending Diagnostic Studies:     None         Medications:  Reconciled Home Medications:      Medication List      START taking these medications    apixaban 5 mg Tab  Commonly known as: ELIQUIS  Take 1 tablet (5 mg total) by mouth 2 (two) times daily.     metoprolol tartrate 25 MG tablet  Commonly known as: LOPRESSOR  Take 1 tablet (25 mg total) by mouth 2 (two) times daily.        CONTINUE taking these medications    acetaminophen 500 MG tablet  Commonly known as: TYLENOL  Take 500 mg by mouth every 6 (six) hours as needed for Pain.     acetazolamide 25 mg/mL Susp     acyclovir 400 MG tablet  Commonly known as: ZOVIRAX  Take 1 tablet (400 mg total) by mouth 2 (two) times daily.     alendronate 70 MG tablet  Commonly known as: FOSAMAX  Take 1 tablet (70 mg total) by mouth every 7 days.     ascorbic acid (vitamin C) 1000 MG tablet  Commonly known as: VITAMIN C  Take 1,000 mg by mouth once daily.     biotin 1 mg tablet  Take 1 mg by mouth 2 (two) times daily.     buPROPion 150 MG TB24  tablet  Commonly known as: WELLBUTRIN XL  Take 1 tablet (150 mg total) by mouth once daily.     calcium-vitamin D3 500 mg(1,250mg) -200 unit per tablet  Commonly known as: OS-IGLESIA 500 + D3  Take 1 tablet by mouth 2 (two) times daily with meals.     CHONDROITIN SULFATE COMPLEX 400-60-2.5 mg Cap  Generic drug: chondroitin sulfate-vit C-Mn  Take 1 tablet by mouth 2 (two) times daily.     ciclopirox 0.77 % Susp  Commonly known as: LOPROX  aaa qhs     clonazePAM 1 MG tablet  Commonly known as: KLONOPIN  Take 1.5 tablets (1.5 mg total) by mouth nightly as needed.     dicyclomine 20 mg tablet  Commonly known as: BentyL  Take 1 tablet (20 mg total) by mouth 3 (three) times daily as needed.     digestive enzymes Tab  Take 1 tablet by mouth once daily.     diltiaZEM 30 MG tablet  Commonly known as: CARDIZEM  Once daily for raynauds.     diphenhydrAMINE 25 mg capsule  Commonly known as: BENADRYL  Take 25 mg by mouth nightly as needed.     fluticasone propionate 50 mcg/actuation nasal spray  Commonly known as: FLONASE  1 spray by Each Nare route once daily.     ginseng 200 mg Cap  Take 200 mg by mouth daily as needed.     glucosamine sulfate 2KCl 1,000 mg Tab  Take 1 tablet by mouth once daily.     levothyroxine 137 MCG Tab tablet  Commonly known as: SYNTHROID  Take 1 tablet (137 mcg total) by mouth before breakfast.     lubiprostone 24 MCG Cap  Commonly known as: AMITIZA  Take 1 capsule (24 mcg total) by mouth 2 (two) times daily.     montelukast 10 mg tablet  Commonly known as: SINGULAIR     multivitamin per tablet  Commonly known as: THERAGRAN  Take 1 tablet by mouth once daily.     mupirocin 2 % ointment  Commonly known as: BACTROBAN  Apply to affected area 3 times daily     neomycin-polymyxin-dexamethasone 3.5mg/mL-10,000 unit/mL-0.1 % Drps  Commonly known as: MAXITROL  Place 1 drop into both eyes 4 (four) times daily. for 7 days     nitroGLYCERIN 2% TD oint 2 % ointment  Commonly known as: NITROGLYN  Apply small amount to  fingertips prn raynaud attacks     potassium chloride 8 MEQ Tbsr  Commonly known as: KLOR-CON  TAKE 1 TABLET(8 MEQ) BY MOUTH EVERY DAY     tretinoin 0.1 % cream  Commonly known as: RETIN-A  Apply topically every evening.     TRULANCE 3 mg Tab  Generic drug: plecanatide  Take 1 tablet (3 mg) by mouth once daily.     zinc 50 mg Tab  Take 50 mg by mouth once daily.            Indwelling Lines/Drains at time of discharge:   Lines/Drains/Airways     None                 Time spent on the discharge of patient: 36 minutes  Patient was seen and examined on the date of discharge and determined to be suitable for discharge.         Yas Vega PA-C  Department of Hospital Medicine  [facility]

## 2020-07-17 NOTE — PLAN OF CARE
Pt resting in bed in NAD. A-Fib is rate controlled at the present. Pain well controlled though night. NPO status maintained since midnight. VSS and patient is AOX4. No safety issues this shift

## 2020-07-17 NOTE — HPI
71 yo F with PMHx hypothyroidism, anxiety, and arthritis presents to the ED from dental office after she was noted to have an irregular heart beat with rate in the 140's. The patient underwent a dental implant and received propofol, fentanyl, decadron, versed, and zofran IV during the procedure. The irregular HR was noted shortly following the procedure while pt. Was recovering. On to ED arrival, pt. Presented in atrial fibrillation with rate 147 which improved after administration of IV diltiazem. She remains irregular at the time of my interview but rate controlled. The patient denies any previous history of irregular heart beat. She denies any lightheadedness, palpitations, SOB, or chest pain. She retrospectively mentions that she has occasionally noted that her heart beat felt  irregular at times when she checked it over the last few months, but she denies ever feeling like her heart was beating fast or any other symptoms ar the time.

## 2020-07-17 NOTE — CONSULTS
[facility]  Cardiology  Consult Note    Patient Name: Angelina Man  MRN: [id number]  Admission Date: 7/16/2020  Hospital Length of Stay: 0 days  Code Status: Full Code   Attending Provider: Kasi Malhotra MD   Consulting Provider: Graham Snow MD  Primary Care Physician: Marli Wilkinson MD  Principal Problem:New onset a-fib    Patient information was obtained from patient, past medical records and ER records.     Consults  Subjective:     Chief Complaint: afib w RVR     HPI:   71 yo F with PMH of HTN (now resolved - controlled with no meds just diet), Raynauds,  presenting with new onset AF from her dentist office after he placed a new crown. She denies symptoms. , EKG rate controlled AF after IV diltiazem in ER. Bedside TTE biatrial enlargement, low normal EF, small circumfrential pericardial effusion, dilated IVC, at least moderate MR. No formal TTE in system. Prior EGD dysphagia in 2019, unremarkable findings.      Past Medical History:   Diagnosis Date    Arthritis     Bronchitis     Cataract     Dry eyes     Hypothyroidism 6/23/2016    Rash     Squamous cell carcinoma     in-situ right upper inner arm, right wrist    Status post lumbar surgery 6/23/2016    Stress fracture     Thyroid disease        Past Surgical History:   Procedure Laterality Date    ANGIOPLASTY      CERVICAL FUSION      COLONOSCOPY      COLONOSCOPY N/A 11/14/2017    Procedure: COLONOSCOPY;  Surgeon: Kasi Mercado MD;  Location: [address]);  Service: Endoscopy;  Laterality: N/A;    ESOPHAGOGASTRODUODENOSCOPY N/A 10/10/2019    Procedure: EGD (ESOPHAGOGASTRODUODENOSCOPY);  Surgeon: Rg Milton MD;  Location: [address]);  Service: Endoscopy;  Laterality: N/A;    l4-5 mid discectomy Left 03/2017    l4-5 MIS diskectomy Right 05/2016       Review of patient's allergies indicates:  No Known Allergies    No current facility-administered medications on file prior to  encounter.      Current Outpatient Medications on File Prior to Encounter   Medication Sig    acyclovir (ZOVIRAX) 400 MG tablet Take 1 tablet (400 mg total) by mouth 2 (two) times daily.    buPROPion (WELLBUTRIN XL) 150 MG TB24 tablet Take 1 tablet (150 mg total) by mouth once daily.    clonazePAM (KLONOPIN) 1 MG tablet Take 1.5 tablets (1.5 mg total) by mouth nightly as needed.    levothyroxine (SYNTHROID) 137 MCG Tab tablet Take 1 tablet (137 mcg total) by mouth before breakfast.    montelukast (SINGULAIR) 10 mg tablet     acetaminophen (TYLENOL) 500 MG tablet Take 500 mg by mouth every 6 (six) hours as needed for Pain.    acetazolamide 25 mg/mL Susp     alendronate (FOSAMAX) 70 MG tablet Take 1 tablet (70 mg total) by mouth every 7 days.    ascorbic acid (VITAMIN C) 1000 MG tablet Take 1,000 mg by mouth once daily.     biotin 1 mg tablet Take 1 mg by mouth 2 (two) times daily.     calcium-vitamin D 500 mg(1,250mg) -200 unit per tablet Take 1 tablet by mouth 2 (two) times daily with meals.     chondroitin sulfate-vit C-Mn (CHONDROITIN SULFATE COMPLEX) 400-60-2.5 mg Cap Take 1 tablet by mouth 2 (two) times daily.     ciclopirox (LOPROX) 0.77 % Susp aaa qhs    dicyclomine (BENTYL) 20 mg tablet Take 1 tablet (20 mg total) by mouth 3 (three) times daily as needed.    digestive enzymes Tab Take 1 tablet by mouth once daily.     diltiaZEM (CARDIZEM) 30 MG tablet Once daily for raynauds.    diphenhydrAMINE (BENADRYL) 25 mg capsule Take 25 mg by mouth nightly as needed.     fluticasone (FLONASE) 50 mcg/actuation nasal spray 1 spray by Each Nare route once daily.    ginseng 200 mg Cap Take 200 mg by mouth daily as needed.     glucosamine sulfate 2KCl 1,000 mg Tab Take 1 tablet by mouth once daily.     lubiprostone (AMITIZA) 24 MCG Cap Take 1 capsule (24 mcg total) by mouth 2 (two) times daily.    multivitamin (THERAGRAN) per tablet Take 1 tablet by mouth once daily.     mupirocin (BACTROBAN) 2 %  ointment Apply to affected area 3 times daily    neomycin-polymyxin-dexamethasone (MAXITROL) 3.5mg/mL-10,000 unit/mL-0.1 % DrpS Place 1 drop into both eyes 4 (four) times daily. for 7 days    nitroGLYCERIN 2% TD oint (NITROGLYN) 2 % ointment Apply small amount to fingertips prn raynaud attacks    plecanatide (TRULANCE) 3 mg Tab Take 1 tablet (3 mg) by mouth once daily.    potassium chloride (KLOR-CON) 8 MEQ TbSR TAKE 1 TABLET(8 MEQ) BY MOUTH EVERY DAY    tretinoin (RETIN-A) 0.1 % cream Apply topically every evening.    zinc 50 mg Tab Take 50 mg by mouth once daily.      Family History     Problem Relation (Age of Onset)    Cancer Mother, Father (80)    Cataracts Father    Colon cancer Father    Diabetes Son    Heart disease Son    Heart failure Father    Hypertension Father    Melanoma Daughter    No Known Problems Sister, Brother, Maternal Aunt, Maternal Uncle, Paternal Aunt, Paternal Uncle, Maternal Grandmother, Maternal Grandfather, Paternal Grandmother, Paternal Grandfather        Tobacco Use    Smoking status: Never Smoker    Smokeless tobacco: Never Used   Substance and Sexual Activity    Alcohol use: Yes     Frequency: 4 or more times a week     Drinks per session: 1 or 2     Binge frequency: Never     Comment: socially, not weekly    Drug use: No    Sexual activity: Never     Review of Systems   Constitution: Negative. Negative for chills and fever.   HENT: Negative.    Eyes: Negative.    Cardiovascular: Negative for chest pain, claudication and paroxysmal nocturnal dyspnea.   Respiratory: Negative for cough, shortness of breath and wheezing.    Endocrine: Negative.    Hematologic/Lymphatic: Does not bruise/bleed easily.   Skin: Negative for nail changes and rash.   Musculoskeletal: Negative.  Negative for back pain.   Gastrointestinal: Negative for abdominal pain, melena, nausea and vomiting.   Neurological: Negative for dizziness and headaches.   Psychiatric/Behavioral: Negative for altered  mental status, depression and substance abuse.   Allergic/Immunologic: Negative.      Objective:     Vital Signs (Most Recent):  Temp: 97.4 °F (36.3 °C) (07/17/20 0809)  Pulse: 66 (07/17/20 0809)  Resp: 16 (07/17/20 0809)  BP: 114/62 (07/17/20 0809)  SpO2: 100 % (07/17/20 1000) Vital Signs (24h Range):  Temp:  [97 °F (36.1 °C)-97.9 °F (36.6 °C)] 97.4 °F (36.3 °C)  Pulse:  [] 66  Resp:  [14-20] 16  SpO2:  [95 %-100 %] 100 %  BP: ()/() 114/62     Weight: 52.4 kg (115 lb 8.3 oz)  Body mass index is 19.83 kg/m².    SpO2: 100 %  O2 Device (Oxygen Therapy): room air      Intake/Output Summary (Last 24 hours) at 7/17/2020 1122  Last data filed at 7/16/2020 1523  Gross per 24 hour   Intake 500 ml   Output --   Net 500 ml       Lines/Drains/Airways     Peripheral Intravenous Line                 Peripheral IV - Single Lumen 07/16/20 1422 18 G Left Antecubital less than 1 day                Physical Exam   Constitutional: She is oriented to person, place, and time. She appears well-developed and well-nourished.   HENT:   Head: Normocephalic and atraumatic.   Eyes: Pupils are equal, round, and reactive to light. Conjunctivae and EOM are normal.   Neck: No JVD present.   Cardiovascular: Normal rate, normal heart sounds and intact distal pulses. Exam reveals no gallop and no friction rub.   No murmur heard.  Irregular rhythm   Pulmonary/Chest: Effort normal. No stridor. She has no wheezes. She has no rales. She exhibits no tenderness.   Abdominal: Soft. Bowel sounds are normal. She exhibits no distension and no mass. There is no abdominal tenderness. There is no guarding.   Musculoskeletal:         General: No tenderness, deformity or edema.   Neurological: She is alert and oriented to person, place, and time.   Skin: Skin is warm and dry. No rash noted. No erythema.   Psychiatric: She has a normal mood and affect.       Significant Labs:   [facility]   Recent Labs   Lab 07/16/20  1330 07/17/20  0634    140   K  5.1 4.2    106   CO2 28 27   GLU 98 85   BUN 8 10   CREATININE 0.8 0.7   CALCIUM 8.8 8.3*   PROT 7.1  --    ALBUMIN 3.7  --    BILITOT 0.4  --    ALKPHOS 86  --    AST 39  --    ALT 30  --    ANIONGAP 10 7*   ESTGFRAFRICA >60.0 >60.0   EGFRNONAA >60.0 >60.0    and CBC   Recent Labs   Lab 07/16/20  1330 07/17/20  0634   WBC 5.58 5.73   HGB 14.1 12.1   HCT 44.6 37.5    184       Significant Imaging: Echocardiogram: Transthoracic echo (TTE) complete (Cupid Only): No results found for this or any previous visit.    Assessment and Plan:     * New onset a-fib  1. SANGITA for evaluation of left atrial appendage thrombus  -No absolute contraindications of esophageal stricture, tumor, perforation, laceration,or diverticulum and/or active GI bleed  -The risks, benefits & alternatives of the procedure were explained to the patient.   -The risks of transesophageal echo include but are not limited to:  Dental trauma, esophageal trauma/perforation, bleeding, laryngospasm/brochospasm, aspiration, sore throat/hoarseness, & dislodgement of the endotracheal tube/nasogastric tube (where applicable).    -The risks of moderate sedation include hypotension, respiratory depression, arrhythmias, bronchospasm, & death.    -Informed consent was obtained. The patient is agreeable to proceed with the procedure and all questions and concerns addressed.    Case discussed with an attending in echocardiography lab.     Further recommendations per attending addendum            VTE Risk Mitigation (From admission, onward)         Ordered     apixaban tablet 5 mg  2 times daily      07/16/20 7076                Thank you for your consult. I will follow-up with patient. Please contact us if you have any additional questions.    Graham Snow MD  Cardiology   [facility]

## 2020-07-17 NOTE — NURSING TRANSFER
Nursing Transfer Note      7/17/2020     Transfer To: 746    Transfer via stretcher    Transfer with cardiac monitoring and called telemetry room to let them know patient going back to her room    Transported by mallorie    Medicines sent: silvadene cream    Chart send with patient: Yes    Notified: reported to floor nurse and left at 425pm to transport and getting back from transport now    Patient reassessed at: see epic (date, time)    Upon arrival to floor: to room no complaints no distress noted.

## 2020-07-17 NOTE — ED NOTES
Pt placed on portable telemetry monitoring box # 5136.  Ability to visualize pt's rhythm confirmed with telemetry.  afib with a HR of 81 noted.

## 2020-07-17 NOTE — PROGRESS NOTES
Patient admitted to recovery see [facility] for complete assessment pacu bcg's maintained safety measures verified patient waking from sedation called for ekg called daughter and updated on patient location.

## 2020-07-17 NOTE — NURSING
Patient discharged home to care of self and family. Education provided on follow up care and medication management.  Pt instructed on site care and activity restrictions.  Patient verbalizes understanding and acknowledges all discharge instructions and follow up care. Pt to follow up with outpatient cardiologist and instructed to return to nearest emergency dept for re-occurring symptoms. Pt transported home via family with all personal belongings.

## 2020-07-17 NOTE — NURSING
Received pt from ED via stretcher in NAD. Pt ambulated from stretcher to bed. Pt is AOX4. No c/o pain at this time. Oriented to room, bed controls and call system. A-FIB on tele. Plan of care reviewed with patient and questions answered.

## 2020-07-17 NOTE — ASSESSMENT & PLAN NOTE
-Pt. with new onset Afib with RVR noted on admit EKG, initial rate 140's. Remains irregular even after rate controlled.  -Cardiology consulted, appreciate assistance  - DCCV/SANGITA cardioversion successful  - CHADSVASC 2-3  - metoprolol 25 mg BID ol  - Eliquis 5mg BID   - TTE with EF 55%, normal diastolic function, Mild mitral prolapse of the posterior mitral leaflet, Mild mitral regurgitation, Elevated central venous pressure (15 mmHg), estimated PA systolic pressure is 29 mmHg, Trivial circumferential pericardial effusion.  - discharged on eliquis and metoprolol  - ambulatory referral to cardiology given

## 2020-07-17 NOTE — ASSESSMENT & PLAN NOTE
1. SANGITA for evaluation of left atrial appendage thrombus  -No absolute contraindications of esophageal stricture, tumor, perforation, laceration,or diverticulum and/or active GI bleed  -The risks, benefits & alternatives of the procedure were explained to the patient.   -The risks of transesophageal echo include but are not limited to:  Dental trauma, esophageal trauma/perforation, bleeding, laryngospasm/brochospasm, aspiration, sore throat/hoarseness, & dislodgement of the endotracheal tube/nasogastric tube (where applicable).    -The risks of moderate sedation include hypotension, respiratory depression, arrhythmias, bronchospasm, & death.    -Informed consent was obtained. The patient is agreeable to proceed with the procedure and all questions and concerns addressed.    Case discussed with an attending in echocardiography lab.     Further recommendations per attending addendum

## 2020-07-17 NOTE — ANESTHESIA PREPROCEDURE EVALUATION
07/17/2020  Angelina Man is a 70 y.o., female with new onset a.fib.     Patient Active Problem List   Diagnosis    Anxiety    DJD (degenerative joint disease)    Menopause    Spinal stenosis of lumbar region without neurogenic claudication    DDD (degenerative disc disease), lumbar    S/P lumbar fusion    Hypothyroidism    Low back pain    Lumbar disc herniation    Radiculopathy of lumbar region    Chronic pain    Screen for colon cancer    Spondylolisthesis at L4-L5 level    Personal history of skin cancer    Gastroesophageal reflux disease    New onset a-fib    Atrial fibrillation with RVR           Anesthesia Evaluation    I have reviewed the Patient Summary Reports.    I have reviewed the Nursing Notes. I have reviewed the NPO Status.   I have reviewed the Medications.     Review of Systems  Anesthesia Hx:  No problems with previous Anesthesia    Social:  Non-Smoker, No Alcohol Use    Hematology/Oncology:  Hematology Normal   Oncology Normal     EENT/Dental:EENT/Dental Normal   Cardiovascular:   Exercise tolerance: good Dysrhythmias atrial fibrillation NYHA Classification I    Pulmonary:  Pulmonary Normal    Renal/:  Renal/ Normal     Hepatic/GI:   GERD    Musculoskeletal:   Arthritis     Neurological:   Neuromuscular Disease,    Endocrine:   Hypothyroidism    Dermatological:  Skin Normal    Psych:  Psychiatric Normal           Physical Exam  General:  Well nourished    Airway/Jaw/Neck:  Airway Findings: Mouth Opening: Normal Tongue: Normal  General Airway Assessment: Adult  Mallampati: I  Improves to I with phonation.  TM Distance: Normal, at least 6 cm        Eyes/Ears/Nose:  EYES/EARS/NOSE FINDINGS: Normal   Dental:  DENTAL FINDINGS: Normal   Chest/Lungs:  Chest/Lungs Findings: Clear to auscultation, Normal Respiratory Rate     Heart/Vascular:  Heart Findings: Rate: Normal   Rhythm: Regular Rhythm  Sounds: Normal     Abdomen:  Abdomen Findings: Normal    Musculoskeletal:  Musculoskeletal Findings: Normal   Skin:  Skin Findings: Normal    Mental Status:  Mental Status Findings:  Cooperative, Alert and Oriented         Anesthesia Plan  Type of Anesthesia, risks & benefits discussed:  Anesthesia Type:  general  Patient's Preference:   Intra-op Monitoring Plan: standard ASA monitors  Intra-op Monitoring Plan Comments:   Post Op Pain Control Plan: per primary service following discharge from PACU  Post Op Pain Control Plan Comments:   Induction:   IV  Beta Blocker:         Informed Consent: Patient understands risks and agrees with Anesthesia plan.  Questions answered. Anesthesia consent signed with patient.  ASA Score: 2     Day of Surgery Review of History & Physical:            Ready For Surgery From Anesthesia Perspective.     Lab Results   Component Value Date    WBC 5.73 07/17/2020    HGB 12.1 07/17/2020    HCT 37.5 07/17/2020     (H) 07/17/2020     07/17/2020       BMP  Lab Results   Component Value Date     07/17/2020    K 4.2 07/17/2020     07/17/2020    CO2 27 07/17/2020    BUN 10 07/17/2020    CREATININE 0.7 07/17/2020    CALCIUM 8.3 (L) 07/17/2020    ANIONGAP 7 (L) 07/17/2020    ESTGFRAFRICA >60.0 07/17/2020    EGFRNONAA >60.0 07/17/2020           ECHO-  · The stress echo portion of this study is negative for myocardial ischemia.  · The EKG portion of this study is negative for myocardial ischemia.  · Arrhythmias during stress: rare PACs,  · The patient's exercise capacity was above average.  · The test was stopped because the patient experienced atypical leg pain.  · Normal left ventricular systolic function. The estimated ejection fraction is 65%  · Normal LV diastolic function.  · Normal right ventricular systolic function.  · Moderate left atrial enlargement.  · The estimated PA systolic pressure is 27 mm Hg  · Intermediate central venous pressure (8  mm Hg).

## 2020-07-18 RX ORDER — METOPROLOL TARTRATE 25 MG/1
25 TABLET, FILM COATED ORAL 2 TIMES DAILY
Qty: 60 TABLET | Refills: 11 | Status: ON HOLD | OUTPATIENT
Start: 2020-07-18 | End: 2021-07-14 | Stop reason: HOSPADM

## 2020-07-20 ENCOUNTER — TELEPHONE (OUTPATIENT)
Dept: ELECTROPHYSIOLOGY | Facility: CLINIC | Age: 70
End: 2020-07-20

## 2020-07-20 DIAGNOSIS — I49.8 OTHER SPECIFIED CARDIAC ARRHYTHMIAS: Primary | ICD-10-CM

## 2020-07-20 LAB — BSA FOR ECHO PROCEDURE: 1.53 M2

## 2020-07-20 NOTE — ANESTHESIA POSTPROCEDURE EVALUATION
Anesthesia Post Evaluation    Patient: Angelina Man    Procedure(s) Performed: Procedure(s) (LRB):  CARDIOVERSION (N/A)  ECHOCARDIOGRAM,TRANSESOPHAGEAL (N/A)    Final Anesthesia Type: general    Patient location during evaluation: PACU  Patient participation: Yes- Able to Participate  Level of consciousness: awake and alert  Post-procedure vital signs: reviewed and stable  Pain management: adequate  Airway patency: patent    PONV status at discharge: No PONV  Anesthetic complications: no      Cardiovascular status: blood pressure returned to baseline  Respiratory status: unassisted, spontaneous ventilation and room air  Hydration status: euvolemic  Follow-up not needed.          Vitals Value Taken Time   /65 07/17/20 1659   Temp 36.7 °C (98.1 °F) 07/17/20 1600   Pulse 54 07/17/20 1659   Resp 16 07/17/20 1659   SpO2 99 % 07/17/20 1659         Event Time   Out of Recovery 07/17/2020 17:00:00         Pain/Aliya Score: No data recorded

## 2020-07-20 NOTE — TELEPHONE ENCOUNTER
Spoke with pt to schedule f/u appt s/p DCCV. Pt will call us to r/s appt if she cannot make it.  Pt noted that she had an angiogram with Dr Bryant many years ago at [facility], but has not seen any other cardiologists.  Pt asked if she needed to keep her appt with Dr Pereira for which I told her she should, as her ED visit included an ambulatory referral to general cardiology.

## 2020-07-20 NOTE — PLAN OF CARE
07/20/20 0859   Final Note   Assessment Type Final Discharge Note   Anticipated Discharge Disposition Home   What phone number can be called within the next 1-3 days to see how you are doing after discharge? [phone]   Discharge plans and expectations educations in teach back method with documentation complete? Yes   Right Care Referral Info   Post Acute Recommendation No Care   Post-Acute Status   Post-Acute Authorization Other   Other Status No Post-Acute Service Needs

## 2020-07-29 ENCOUNTER — OFFICE VISIT (OUTPATIENT)
Dept: PRIMARY CARE CLINIC | Facility: CLINIC | Age: 70
End: 2020-07-29
Payer: MEDICARE

## 2020-07-29 DIAGNOSIS — I48.91 ATRIAL FIBRILLATION STATUS POST CARDIOVERSION: Primary | ICD-10-CM

## 2020-07-29 DIAGNOSIS — Z79.01 ANTICOAGULATED: ICD-10-CM

## 2020-07-29 PROCEDURE — 1101F PT FALLS ASSESS-DOCD LE1/YR: CPT | Mod: HCNC,CPTII,95, | Performed by: FAMILY MEDICINE

## 2020-07-29 PROCEDURE — 1101F PR PT FALLS ASSESS DOC 0-1 FALLS W/OUT INJ PAST YR: ICD-10-PCS | Mod: HCNC,CPTII,95, | Performed by: FAMILY MEDICINE

## 2020-07-29 PROCEDURE — 1159F MED LIST DOCD IN RCRD: CPT | Mod: HCNC,95,, | Performed by: FAMILY MEDICINE

## 2020-07-29 PROCEDURE — 1159F PR MEDICATION LIST DOCUMENTED IN MEDICAL RECORD: ICD-10-PCS | Mod: HCNC,95,, | Performed by: FAMILY MEDICINE

## 2020-07-29 PROCEDURE — 99214 OFFICE O/P EST MOD 30 MIN: CPT | Mod: HCNC,95,, | Performed by: FAMILY MEDICINE

## 2020-07-29 PROCEDURE — 99214 PR OFFICE/OUTPT VISIT, EST, LEVL IV, 30-39 MIN: ICD-10-PCS | Mod: HCNC,95,, | Performed by: FAMILY MEDICINE

## 2020-08-03 NOTE — PROGRESS NOTES
Subjective:    The patient location is: [place]  The chief complaint leading to consultation is: ER follow up    Visit type: audiovisual    Face to Face time with patient: 20 minutes of total time spent on the encounter, which includes face to face time and non-face to face time preparing to see the patient (eg, review of tests), Obtaining and/or reviewing separately obtained history, Documenting clinical information in the electronic or other health record, Independently interpreting results (not separately reported) and communicating results to the patient/family/caregiver, or Care coordination (not separately reported).         Each patient to whom he or she provides medical services by telemedicine is:  (1) informed of the relationship between the physician and patient and the respective role of any other health care provider with respect to management of the patient; and (2) notified that he or she may decline to receive medical services by telemedicine and may withdraw from such care at any time.    Notes:    Patient ID: Angelina Man is a 70 y.o. female.    Chief Complaint: f/u after visit to ER for afib new onset  HPIsee above  Review of Systems   Constitutional: Negative for activity change and unexpected weight change.   HENT: Negative for hearing loss, rhinorrhea and trouble swallowing.    Eyes: Negative for discharge and visual disturbance.   Respiratory: Negative for chest tightness and wheezing.    Cardiovascular: Negative for chest pain and palpitations.   Gastrointestinal: Negative for blood in stool, constipation, diarrhea and vomiting.   Endocrine: Negative for polydipsia and polyuria.   Genitourinary: Negative for difficulty urinating, dysuria, hematuria and menstrual problem.   Musculoskeletal: Negative for arthralgias, joint swelling and neck pain.   Neurological: Negative for weakness and headaches.   Psychiatric/Behavioral: Negative for confusion and dysphoric mood.         Objective:       Physical Exam  Constitutional:       Appearance: Normal appearance. She is normal weight.   HENT:      Head: Normocephalic and atraumatic.      Nose: Nose normal.   Eyes:      Extraocular Movements: Extraocular movements intact.      Conjunctiva/sclera: Conjunctivae normal.      Pupils: Pupils are equal, round, and reactive to light.   Neck:      Musculoskeletal: Normal range of motion.   Pulmonary:      Effort: Pulmonary effort is normal. No respiratory distress.      Breath sounds: No stridor.   Musculoskeletal: Normal range of motion.   Skin:     General: Skin is dry.      Coloration: Skin is not jaundiced.      Findings: No bruising, erythema, lesion or rash.   Neurological:      Mental Status: She is alert.   Psychiatric:         Mood and Affect: Mood normal.         Behavior: Behavior normal.         Thought Content: Thought content normal.         Judgment: Judgment normal.         Assessment:     atrial fibrillation new onset  The ER follow-up for the above the   Plan:      acetaminophen (TYLENOL) 500 MG tablet     acetazolamide 25 mg/mL Susp     acyclovir (ZOVIRAX) 400 MG tablet     alendronate (FOSAMAX) 70 MG tablet     apixaban (ELIQUIS) 5 mg Tab     apixaban (ELIQUIS) 5 mg Tab     ascorbic acid (VITAMIN C) 1000 MG tablet     biotin 1 mg tablet     buPROPion (WELLBUTRIN XL) 150 MG TB24 tablet     calcium-vitamin D 500 mg(1,250mg) -200 unit per tablet     chondroitin sulfate-vit C-Mn (CHONDROITIN SULFATE COMPLEX) 400-60-2.5 mg Cap     ciclopirox (LOPROX) 0.77 % Susp     clonazePAM (KLONOPIN) 1 MG tablet ()     dicyclomine (BENTYL) 20 mg tablet     digestive enzymes Tab     diltiaZEM (CARDIZEM) 30 MG tablet     diphenhydrAMINE (BENADRYL) 25 mg capsule     fluticasone (FLONASE) 50 mcg/actuation nasal spray     ginseng 200 mg Cap     glucosamine sulfate 2KCl 1,000 mg Tab     levothyroxine (SYNTHROID) 137 MCG Tab tablet     lubiprostone (AMITIZA) 24 MCG Cap     metoprolol tartrate (LOPRESSOR) 25 MG  tablet     metoprolol tartrate (LOPRESSOR) 25 MG tablet     montelukast (SINGULAIR) 10 mg tablet     multivitamin (THERAGRAN) per tablet     mupirocin (BACTROBAN) 2 % ointment     nitroGLYCERIN 2% TD oint (NITROGLYN) 2 % ointment     plecanatide (TRULANCE) 3 mg Tab     potassium chloride (KLOR-CON) 8 MEQ TbSR     tretinoin (RETIN-A) 0.1 % cream     zinc 50 mg Tab

## 2020-08-14 ENCOUNTER — OFFICE VISIT (OUTPATIENT)
Dept: CARDIOLOGY | Facility: CLINIC | Age: 70
End: 2020-08-14
Payer: MEDICARE

## 2020-08-14 VITALS
SYSTOLIC BLOOD PRESSURE: 116 MMHG | HEART RATE: 79 BPM | WEIGHT: 106.69 LBS | BODY MASS INDEX: 18.21 KG/M2 | HEIGHT: 64 IN | DIASTOLIC BLOOD PRESSURE: 74 MMHG

## 2020-08-14 DIAGNOSIS — I48.0 PAROXYSMAL ATRIAL FIBRILLATION: Primary | ICD-10-CM

## 2020-08-14 DIAGNOSIS — E03.9 HYPOTHYROIDISM, UNSPECIFIED TYPE: ICD-10-CM

## 2020-08-14 DIAGNOSIS — Z98.1 S/P LUMBAR FUSION: ICD-10-CM

## 2020-08-14 DIAGNOSIS — I49.8 OTHER SPECIFIED CARDIAC ARRHYTHMIAS: ICD-10-CM

## 2020-08-14 DIAGNOSIS — K21.9 GASTROESOPHAGEAL REFLUX DISEASE, ESOPHAGITIS PRESENCE NOT SPECIFIED: ICD-10-CM

## 2020-08-14 DIAGNOSIS — I48.91 ATRIAL FIBRILLATION WITH RVR: ICD-10-CM

## 2020-08-14 PROCEDURE — 3074F PR MOST RECENT SYSTOLIC BLOOD PRESSURE < 130 MM HG: ICD-10-PCS | Mod: HCNC,CPTII,S$GLB, | Performed by: INTERNAL MEDICINE

## 2020-08-14 PROCEDURE — 3078F PR MOST RECENT DIASTOLIC BLOOD PRESSURE < 80 MM HG: ICD-10-PCS | Mod: HCNC,CPTII,S$GLB, | Performed by: INTERNAL MEDICINE

## 2020-08-14 PROCEDURE — 99214 OFFICE O/P EST MOD 30 MIN: CPT | Mod: 25,HCNC,S$GLB, | Performed by: INTERNAL MEDICINE

## 2020-08-14 PROCEDURE — 3074F SYST BP LT 130 MM HG: CPT | Mod: HCNC,CPTII,S$GLB, | Performed by: INTERNAL MEDICINE

## 2020-08-14 PROCEDURE — 93000 ELECTROCARDIOGRAM COMPLETE: CPT | Mod: HCNC,S$GLB,, | Performed by: INTERNAL MEDICINE

## 2020-08-14 PROCEDURE — 3008F PR BODY MASS INDEX (BMI) DOCUMENTED: ICD-10-PCS | Mod: HCNC,CPTII,S$GLB, | Performed by: INTERNAL MEDICINE

## 2020-08-14 PROCEDURE — 3008F BODY MASS INDEX DOCD: CPT | Mod: HCNC,CPTII,S$GLB, | Performed by: INTERNAL MEDICINE

## 2020-08-14 PROCEDURE — 99999 PR PBB SHADOW E&M-EST. PATIENT-LVL IV: ICD-10-PCS | Mod: PBBFAC,HCNC,, | Performed by: INTERNAL MEDICINE

## 2020-08-14 PROCEDURE — 3078F DIAST BP <80 MM HG: CPT | Mod: HCNC,CPTII,S$GLB, | Performed by: INTERNAL MEDICINE

## 2020-08-14 PROCEDURE — 1159F PR MEDICATION LIST DOCUMENTED IN MEDICAL RECORD: ICD-10-PCS | Mod: HCNC,S$GLB,, | Performed by: INTERNAL MEDICINE

## 2020-08-14 PROCEDURE — 99999 PR PBB SHADOW E&M-EST. PATIENT-LVL IV: CPT | Mod: PBBFAC,HCNC,, | Performed by: INTERNAL MEDICINE

## 2020-08-14 PROCEDURE — 93000 RHYTHM STRIP: ICD-10-PCS | Mod: HCNC,S$GLB,, | Performed by: INTERNAL MEDICINE

## 2020-08-14 PROCEDURE — 1126F PR PAIN SEVERITY QUANTIFIED, NO PAIN PRESENT: ICD-10-PCS | Mod: HCNC,S$GLB,, | Performed by: INTERNAL MEDICINE

## 2020-08-14 PROCEDURE — 1159F MED LIST DOCD IN RCRD: CPT | Mod: HCNC,S$GLB,, | Performed by: INTERNAL MEDICINE

## 2020-08-14 PROCEDURE — 1101F PR PT FALLS ASSESS DOC 0-1 FALLS W/OUT INJ PAST YR: ICD-10-PCS | Mod: HCNC,CPTII,S$GLB, | Performed by: INTERNAL MEDICINE

## 2020-08-14 PROCEDURE — 99214 PR OFFICE/OUTPT VISIT, EST, LEVL IV, 30-39 MIN: ICD-10-PCS | Mod: 25,HCNC,S$GLB, | Performed by: INTERNAL MEDICINE

## 2020-08-14 PROCEDURE — 1101F PT FALLS ASSESS-DOCD LE1/YR: CPT | Mod: HCNC,CPTII,S$GLB, | Performed by: INTERNAL MEDICINE

## 2020-08-14 PROCEDURE — 1126F AMNT PAIN NOTED NONE PRSNT: CPT | Mod: HCNC,S$GLB,, | Performed by: INTERNAL MEDICINE

## 2020-08-14 NOTE — LETTER
August 14, 2020      Yas Vega PA-C  [address]  [facility] [address]           [facility] 3rd Floor  [address]  [facility] [address]  Phone: [phone]          Patient: Angelina Man   MR Number: [id number]   YOB: 1950   Date of Visit: 8/14/2020       Dear Yas Vega:    Thank you for referring Angelina Man to me for evaluation. Attached you will find relevant portions of my assessment and plan of care.    If you have questions, please do not hesitate to call me. I look forward to following Angelina Man along with you.    Sincerely,    David Pereira MD    Enclosure  CC:  No Recipients    If you would like to receive this communication electronically, please contact [email or web address] or [phone] to request more information on [employer] access.    For providers and/or their staff who would like to refer a patient to [facility], please contact us through our one-stop-shop provider referral line, [facility], at [phone].    If you feel you have received this communication in error or would no longer like to receive these types of communications, please e-mail [email or web address]

## 2020-08-14 NOTE — Clinical Note
Thank you for referring Angelina Man for follow-up of  Paroxysmal atrial fibrillation. Please see my note for details of this encounter. If you have any questions, please contact me.  Thank you again for the referral.

## 2020-08-17 ENCOUNTER — TELEPHONE (OUTPATIENT)
Dept: PRIMARY CARE CLINIC | Facility: CLINIC | Age: 70
End: 2020-08-17

## 2020-08-17 DIAGNOSIS — E03.9 HYPOTHYROIDISM, UNSPECIFIED TYPE: ICD-10-CM

## 2020-08-17 DIAGNOSIS — Z00.00 ROUTINE GENERAL MEDICAL EXAMINATION AT A HEALTH CARE FACILITY: Primary | ICD-10-CM

## 2020-08-17 NOTE — TELEPHONE ENCOUNTER
Annual 12/17/2020 scheduled, labs ordered and message sent to patient to schedule lab appointment.

## 2020-09-18 ENCOUNTER — OFFICE VISIT (OUTPATIENT)
Dept: CARDIOLOGY | Facility: CLINIC | Age: 70
End: 2020-09-18
Payer: MEDICARE

## 2020-09-18 ENCOUNTER — TELEPHONE (OUTPATIENT)
Dept: CARDIOLOGY | Facility: CLINIC | Age: 70
End: 2020-09-18

## 2020-09-18 VITALS
HEART RATE: 70 BPM | DIASTOLIC BLOOD PRESSURE: 70 MMHG | BODY MASS INDEX: 18.26 KG/M2 | HEIGHT: 64 IN | SYSTOLIC BLOOD PRESSURE: 107 MMHG | WEIGHT: 106.94 LBS

## 2020-09-18 DIAGNOSIS — Z98.1 S/P LUMBAR FUSION: ICD-10-CM

## 2020-09-18 DIAGNOSIS — K21.9 GASTROESOPHAGEAL REFLUX DISEASE, ESOPHAGITIS PRESENCE NOT SPECIFIED: ICD-10-CM

## 2020-09-18 DIAGNOSIS — E03.9 HYPOTHYROIDISM, UNSPECIFIED TYPE: ICD-10-CM

## 2020-09-18 DIAGNOSIS — I48.91 ATRIAL FIBRILLATION AND FLUTTER: Primary | ICD-10-CM

## 2020-09-18 DIAGNOSIS — I48.91 ATRIAL FIBRILLATION, UNSPECIFIED TYPE: Primary | ICD-10-CM

## 2020-09-18 DIAGNOSIS — I48.92 ATRIAL FIBRILLATION AND FLUTTER: Primary | ICD-10-CM

## 2020-09-18 PROCEDURE — 1159F MED LIST DOCD IN RCRD: CPT | Mod: HCNC,S$GLB,, | Performed by: INTERNAL MEDICINE

## 2020-09-18 PROCEDURE — 3008F BODY MASS INDEX DOCD: CPT | Mod: HCNC,CPTII,S$GLB, | Performed by: INTERNAL MEDICINE

## 2020-09-18 PROCEDURE — 1125F PR PAIN SEVERITY QUANTIFIED, PAIN PRESENT: ICD-10-PCS | Mod: HCNC,S$GLB,, | Performed by: INTERNAL MEDICINE

## 2020-09-18 PROCEDURE — 99214 PR OFFICE/OUTPT VISIT, EST, LEVL IV, 30-39 MIN: ICD-10-PCS | Mod: HCNC,S$GLB,, | Performed by: INTERNAL MEDICINE

## 2020-09-18 PROCEDURE — 99999 PR PBB SHADOW E&M-EST. PATIENT-LVL III: CPT | Mod: PBBFAC,HCNC,, | Performed by: INTERNAL MEDICINE

## 2020-09-18 PROCEDURE — 1101F PT FALLS ASSESS-DOCD LE1/YR: CPT | Mod: HCNC,CPTII,S$GLB, | Performed by: INTERNAL MEDICINE

## 2020-09-18 PROCEDURE — 3078F PR MOST RECENT DIASTOLIC BLOOD PRESSURE < 80 MM HG: ICD-10-PCS | Mod: HCNC,CPTII,S$GLB, | Performed by: INTERNAL MEDICINE

## 2020-09-18 PROCEDURE — 93010 EKG 12-LEAD: ICD-10-PCS | Mod: HCNC,S$GLB,, | Performed by: INTERNAL MEDICINE

## 2020-09-18 PROCEDURE — 99999 PR PBB SHADOW E&M-EST. PATIENT-LVL III: ICD-10-PCS | Mod: PBBFAC,HCNC,, | Performed by: INTERNAL MEDICINE

## 2020-09-18 PROCEDURE — 93005 ELECTROCARDIOGRAM TRACING: CPT | Mod: HCNC,S$GLB,, | Performed by: INTERNAL MEDICINE

## 2020-09-18 PROCEDURE — 3074F PR MOST RECENT SYSTOLIC BLOOD PRESSURE < 130 MM HG: ICD-10-PCS | Mod: HCNC,CPTII,S$GLB, | Performed by: INTERNAL MEDICINE

## 2020-09-18 PROCEDURE — 1125F AMNT PAIN NOTED PAIN PRSNT: CPT | Mod: HCNC,S$GLB,, | Performed by: INTERNAL MEDICINE

## 2020-09-18 PROCEDURE — 93010 ELECTROCARDIOGRAM REPORT: CPT | Mod: HCNC,S$GLB,, | Performed by: INTERNAL MEDICINE

## 2020-09-18 PROCEDURE — 1159F PR MEDICATION LIST DOCUMENTED IN MEDICAL RECORD: ICD-10-PCS | Mod: HCNC,S$GLB,, | Performed by: INTERNAL MEDICINE

## 2020-09-18 PROCEDURE — 3008F PR BODY MASS INDEX (BMI) DOCUMENTED: ICD-10-PCS | Mod: HCNC,CPTII,S$GLB, | Performed by: INTERNAL MEDICINE

## 2020-09-18 PROCEDURE — 3074F SYST BP LT 130 MM HG: CPT | Mod: HCNC,CPTII,S$GLB, | Performed by: INTERNAL MEDICINE

## 2020-09-18 PROCEDURE — 93005 EKG 12-LEAD: ICD-10-PCS | Mod: HCNC,S$GLB,, | Performed by: INTERNAL MEDICINE

## 2020-09-18 PROCEDURE — 3078F DIAST BP <80 MM HG: CPT | Mod: HCNC,CPTII,S$GLB, | Performed by: INTERNAL MEDICINE

## 2020-09-18 PROCEDURE — 99214 OFFICE O/P EST MOD 30 MIN: CPT | Mod: HCNC,S$GLB,, | Performed by: INTERNAL MEDICINE

## 2020-09-18 PROCEDURE — 1101F PR PT FALLS ASSESS DOC 0-1 FALLS W/OUT INJ PAST YR: ICD-10-PCS | Mod: HCNC,CPTII,S$GLB, | Performed by: INTERNAL MEDICINE

## 2020-09-18 RX ORDER — APIXABAN 5 MG/1
TABLET, FILM COATED ORAL 2 TIMES DAILY
COMMUNITY
Start: 2020-09-13 | End: 2020-12-07 | Stop reason: SDUPTHER

## 2020-09-18 NOTE — PROGRESS NOTES
Subjective:   Patient ID:  Angelina Man is a 70 y.o. female who presents for follow-up of Paroxysmal atrial fibrillation (6 weeks fu)      HPI: Patient with hypothyroidism, anxiety, and arthritis was seen in the ED 16-Jul-2020 with atrial fibrillation.  Patient underwent successful cardioversion but was noted to be in atrial fibrillation at her last clinic visit.  She has had periodic dyspnea, but not totally predictive.  Most of the dyspnea is associated with exertion.  Patient denies any chest discomfort on exertion or at rest. She denies any orthopnea, PND, or edema.  Patient denies any palpitations, lightheadedness, or syncope. Patient denies any palpitations, lightheadedness, or syncope.     Past Medical History:   Diagnosis Date    Arthritis     Bronchitis     Cataract     Dry eyes     Hypothyroidism 6/23/2016    Rash     Squamous cell carcinoma     in-situ right upper inner arm, right wrist    Status post lumbar surgery 6/23/2016    Stress fracture     Thyroid disease        Outpatient Medications Prior to Visit   Medication Sig Dispense Refill    acetaminophen (TYLENOL) 500 MG tablet Take 500 mg by mouth every 6 (six) hours as needed for Pain.      acyclovir (ZOVIRAX) 400 MG tablet Take 1 tablet (400 mg total) by mouth 2 (two) times daily. 60 tablet 12    ascorbic acid (VITAMIN C) 1000 MG tablet Take 1,000 mg by mouth once daily.       biotin 1 mg tablet Take 1 mg by mouth 2 (two) times daily.       buPROPion (WELLBUTRIN XL) 150 MG TB24 tablet Take 1 tablet (150 mg total) by mouth once daily. 30 tablet 11    calcium-vitamin D 500 mg(1,250mg) -200 unit per tablet Take 1 tablet by mouth 2 (two) times daily with meals.       chondroitin sulfate-vit C-Mn (CHONDROITIN SULFATE COMPLEX) 400-60-2.5 mg Cap Take 1 tablet by mouth 2 (two) times daily.       ciclopirox (LOPROX) 0.77 % Susp aaa qhs 30 mL 6    clonazePAM (KLONOPIN) 1 MG tablet Take 1.5 tablets (1.5 mg total) by mouth nightly as  needed. 45 tablet 0    digestive enzymes Tab Take 1 tablet by mouth once daily.       diltiaZEM (CARDIZEM) 30 MG tablet Once daily for raynauds. 90 tablet 3    diphenhydrAMINE (BENADRYL) 25 mg capsule Take 25 mg by mouth nightly as needed.       ELIQUIS 5 mg Tab 2 (two) times a day.      fluticasone (FLONASE) 50 mcg/actuation nasal spray 1 spray by Each Nare route once daily. 16 g 2    ginseng 200 mg Cap Take 200 mg by mouth daily as needed.       glucosamine sulfate 2KCl 1,000 mg Tab Take 1 tablet by mouth once daily.       levothyroxine (SYNTHROID) 137 MCG Tab tablet Take 1 tablet (137 mcg total) by mouth before breakfast. 30 tablet 5    lubiprostone (AMITIZA) 24 MCG Cap Take 1 capsule (24 mcg total) by mouth 2 (two) times daily. 60 capsule 0    metoprolol tartrate (LOPRESSOR) 25 MG tablet Take 1 tablet (25 mg total) by mouth 2 (two) times daily. 60 tablet 11    montelukast (SINGULAIR) 10 mg tablet Take 10 mg by mouth once daily.       multivitamin (THERAGRAN) per tablet Take 1 tablet by mouth once daily.       mupirocin (BACTROBAN) 2 % ointment Apply to affected area 3 times daily 22 g 1    nitroGLYCERIN 2% TD oint (NITROGLYN) 2 % ointment Apply small amount to fingertips prn raynaud attacks 30 g 2    tretinoin (RETIN-A) 0.1 % cream Apply topically every evening. 20 g 6    zinc 50 mg Tab Take 50 mg by mouth once daily.       acetazolamide 25 mg/mL Susp       metoprolol tartrate (LOPRESSOR) 25 MG tablet Take 1 tablet (25 mg total) by mouth 2 (two) times daily. (Patient not taking: Reported on 9/18/2020) 60 tablet 11    plecanatide (TRULANCE) 3 mg Tab Take 1 tablet (3 mg) by mouth once daily. (Patient not taking: Reported on 9/18/2020) 30 tablet 2     No facility-administered medications prior to visit.        ROS - No change from prior visit in neurologic, respiratory, endocrine, GI, hematologic, or constitutional complaints   Objective:   Physical Exam   Constitutional: She is oriented to  "person, place, and time. She appears well-developed and well-nourished.   /70 (BP Location: Left arm, Patient Position: Sitting, BP Method: Pediatric (Automatic))   Pulse 70   Ht 5' 4" (1.626 m)   Wt 48.5 kg (106 lb 14.8 oz)   LMP  (LMP Unknown)   BMI 18.35 kg/m²      Neck: Neck supple. No JVD (Flutter waves present) present. Carotid bruit is not present.   Cardiovascular: Normal rate, normal heart sounds and intact distal pulses. An irregularly irregular rhythm present. Exam reveals no gallop and no friction rub.   No murmur heard.  Pulmonary/Chest: Effort normal and breath sounds normal. She has no wheezes. She has no rales.   Abdominal: Soft. Bowel sounds are normal. She exhibits no abdominal bruit. There is no hepatomegaly. There is no abdominal tenderness.   Musculoskeletal:         General: No edema.   Neurological: She is alert and oriented to person, place, and time. She has normal strength.   Skin: No cyanosis. Nails show no clubbing.         Lab Results   Component Value Date     07/17/2020    K 4.2 07/17/2020    BUN 10 07/17/2020    CREATININE 0.7 07/17/2020    GLU 85 07/17/2020    HGBA1C 5.4 08/24/2015    CHOL 205 (H) 12/05/2019    HDL 83 (H) 12/05/2019    LDLCALC 110.2 12/05/2019    TRIG 59 12/05/2019    CHOLHDL 40.5 12/05/2019    HGB 12.1 07/17/2020    HCT 37.5 07/17/2020     07/17/2020    INR 1.0 02/05/2018     ECG shows atrial flutter with variable ventricular response and average heart rate around 80 bpm. There is low limb lead voltage in an otherwise normal ECG (except for rhythm).  Assessment:     1. Atrial fibrillation and flutter    2. Hypothyroidism, unspecified type    3. S/P lumbar fusion    4. Gastroesophageal reflux disease, esophagitis presence not specified      The patient continues in an atrial arrhythmia, now appearing as atrial flutter.  Past echocardiograms show the patient to have normal LV function.  Her heart rate is presently controlled, but may be " increasing when she exerts herself which may be the reason for her occasional bouts of dyspnea.  She sees Dr Bray (Cardiac Electrophysiology) next week for further evaluation and intervention of her arrhythmia.  Unless there are intervening problems, patient should return for re-evaluation in 3 months.   Plan:     Angelina was seen today for paroxysmal atrial fibrillation.    Diagnoses and all orders for this visit:    Atrial fibrillation and flutter  -     EKG 12-lead; Future  -     EKG 12-lead    Hypothyroidism, unspecified type    S/P lumbar fusion    Gastroesophageal reflux disease, esophagitis presence not specified    Other orders  -     ELIQUIS 5 mg Tab; 2 (two) times a day.          David Pereira MD  Consultative Cardiology

## 2020-09-22 ENCOUNTER — TELEPHONE (OUTPATIENT)
Dept: ELECTROPHYSIOLOGY | Facility: CLINIC | Age: 70
End: 2020-09-22

## 2020-09-23 ENCOUNTER — HOSPITAL ENCOUNTER (OUTPATIENT)
Dept: CARDIOLOGY | Facility: CLINIC | Age: 70
Discharge: HOME | End: 2020-09-23
Payer: MEDICARE

## 2020-09-23 ENCOUNTER — OFFICE VISIT (OUTPATIENT)
Dept: ELECTROPHYSIOLOGY | Facility: CLINIC | Age: 70
End: 2020-09-23
Payer: MEDICARE

## 2020-09-23 VITALS
WEIGHT: 108.94 LBS | HEIGHT: 64 IN | HEART RATE: 74 BPM | SYSTOLIC BLOOD PRESSURE: 110 MMHG | DIASTOLIC BLOOD PRESSURE: 80 MMHG | BODY MASS INDEX: 18.6 KG/M2

## 2020-09-23 DIAGNOSIS — E03.4 HYPOTHYROIDISM DUE TO ACQUIRED ATROPHY OF THYROID: ICD-10-CM

## 2020-09-23 DIAGNOSIS — I49.8 OTHER SPECIFIED CARDIAC ARRHYTHMIAS: ICD-10-CM

## 2020-09-23 DIAGNOSIS — I48.19 PERSISTENT ATRIAL FIBRILLATION: Primary | ICD-10-CM

## 2020-09-23 DIAGNOSIS — F41.9 ANXIETY: ICD-10-CM

## 2020-09-23 PROCEDURE — 1126F PR PAIN SEVERITY QUANTIFIED, NO PAIN PRESENT: ICD-10-PCS | Mod: HCNC,S$GLB,, | Performed by: INTERNAL MEDICINE

## 2020-09-23 PROCEDURE — 93005 ELECTROCARDIOGRAM TRACING: CPT | Mod: HCNC,S$GLB,, | Performed by: INTERNAL MEDICINE

## 2020-09-23 PROCEDURE — 93010 ELECTROCARDIOGRAM REPORT: CPT | Mod: HCNC,S$GLB,, | Performed by: INTERNAL MEDICINE

## 2020-09-23 PROCEDURE — 3074F PR MOST RECENT SYSTOLIC BLOOD PRESSURE < 130 MM HG: ICD-10-PCS | Mod: HCNC,CPTII,S$GLB, | Performed by: INTERNAL MEDICINE

## 2020-09-23 PROCEDURE — 3079F DIAST BP 80-89 MM HG: CPT | Mod: HCNC,CPTII,S$GLB, | Performed by: INTERNAL MEDICINE

## 2020-09-23 PROCEDURE — 99204 OFFICE O/P NEW MOD 45 MIN: CPT | Mod: HCNC,S$GLB,, | Performed by: INTERNAL MEDICINE

## 2020-09-23 PROCEDURE — 99204 PR OFFICE/OUTPT VISIT, NEW, LEVL IV, 45-59 MIN: ICD-10-PCS | Mod: HCNC,S$GLB,, | Performed by: INTERNAL MEDICINE

## 2020-09-23 PROCEDURE — 1101F PT FALLS ASSESS-DOCD LE1/YR: CPT | Mod: HCNC,CPTII,S$GLB, | Performed by: INTERNAL MEDICINE

## 2020-09-23 PROCEDURE — 93005 RHYTHM STRIP: ICD-10-PCS | Mod: HCNC,S$GLB,, | Performed by: INTERNAL MEDICINE

## 2020-09-23 PROCEDURE — 99999 PR PBB SHADOW E&M-EST. PATIENT-LVL III: ICD-10-PCS | Mod: PBBFAC,HCNC,, | Performed by: INTERNAL MEDICINE

## 2020-09-23 PROCEDURE — 1159F PR MEDICATION LIST DOCUMENTED IN MEDICAL RECORD: ICD-10-PCS | Mod: HCNC,S$GLB,, | Performed by: INTERNAL MEDICINE

## 2020-09-23 PROCEDURE — 99999 PR PBB SHADOW E&M-EST. PATIENT-LVL III: CPT | Mod: PBBFAC,HCNC,, | Performed by: INTERNAL MEDICINE

## 2020-09-23 PROCEDURE — 93010 RHYTHM STRIP: ICD-10-PCS | Mod: HCNC,S$GLB,, | Performed by: INTERNAL MEDICINE

## 2020-09-23 PROCEDURE — 3008F PR BODY MASS INDEX (BMI) DOCUMENTED: ICD-10-PCS | Mod: HCNC,CPTII,S$GLB, | Performed by: INTERNAL MEDICINE

## 2020-09-23 PROCEDURE — 3079F PR MOST RECENT DIASTOLIC BLOOD PRESSURE 80-89 MM HG: ICD-10-PCS | Mod: HCNC,CPTII,S$GLB, | Performed by: INTERNAL MEDICINE

## 2020-09-23 PROCEDURE — 1126F AMNT PAIN NOTED NONE PRSNT: CPT | Mod: HCNC,S$GLB,, | Performed by: INTERNAL MEDICINE

## 2020-09-23 PROCEDURE — 1101F PR PT FALLS ASSESS DOC 0-1 FALLS W/OUT INJ PAST YR: ICD-10-PCS | Mod: HCNC,CPTII,S$GLB, | Performed by: INTERNAL MEDICINE

## 2020-09-23 PROCEDURE — 1159F MED LIST DOCD IN RCRD: CPT | Mod: HCNC,S$GLB,, | Performed by: INTERNAL MEDICINE

## 2020-09-23 PROCEDURE — 3074F SYST BP LT 130 MM HG: CPT | Mod: HCNC,CPTII,S$GLB, | Performed by: INTERNAL MEDICINE

## 2020-09-23 PROCEDURE — 3008F BODY MASS INDEX DOCD: CPT | Mod: HCNC,CPTII,S$GLB, | Performed by: INTERNAL MEDICINE

## 2020-09-23 NOTE — PROGRESS NOTES
"Subjective:    Patient ID:  Angelina Man is a 70 y.o. female who presents for follow-up of Atrial Fibrillation      HPI 71 yo female with atrial fibrillation, anxiety, hypothyroidism.  Presented to dentist's office for a crown. Sent to ED for elevated HR. Noted to be in atrial fibrillation with RVR. She was not symptomatic.  SANGITA/DCCV 7/17/20. Placed on Eliquis and metoprolol.    Echo 7/17/20 normal biventricular structure and function, mild prolapse of the posterior mitral valve leaflet.  Thinks she feels "ok." Feels like she may be more fatigued.  Significant exercise (spinning classes, walks or does stairmaster daily, Pilates, body-pumping). Notes rare shortness of breath with significant exertion, not new, improved with singulair.  Notes shortness of breath with anxiety. Her son passed away last year. Has other significant stressors.    CHADSVASc is 2, on Eliquis 5 mg BID.    Has not been evaluated for sleep apnea.  1 cup of coffee daily.  1/2- 1 glass of wine nightly.        Review of Systems   Constitution: Negative. Negative for fever and malaise/fatigue.   HENT: Negative for congestion and sore throat.    Cardiovascular: Negative for chest pain, dyspnea on exertion, irregular heartbeat, leg swelling, near-syncope, orthopnea, palpitations, paroxysmal nocturnal dyspnea and syncope.   Respiratory: Positive for shortness of breath. Negative for cough.    Gastrointestinal: Negative for abdominal pain, constipation and diarrhea.   Neurological: Negative for dizziness, light-headedness and weakness.   Psychiatric/Behavioral: Negative for depression. The patient is not nervous/anxious.         Objective:    Physical Exam   Constitutional: She is oriented to person, place, and time. She appears well-developed and well-nourished.   Eyes: Conjunctivae are normal. No scleral icterus.   Neck: No JVD present. No tracheal deviation present.   Cardiovascular: Normal rate. An irregularly irregular rhythm present. PMI is " not displaced.   Pulmonary/Chest: Effort normal and breath sounds normal. No respiratory distress.   Abdominal: Soft. There is no hepatosplenomegaly. There is no abdominal tenderness.   Musculoskeletal:         General: No tenderness or edema.   Neurological: She is alert and oriented to person, place, and time.   Skin: Skin is warm and dry. No rash noted.   Psychiatric: She has a normal mood and affect. Her behavior is normal.         Assessment:       1. Persistent atrial fibrillation    2. Anxiety    3. Hypothyroidism due to acquired atrophy of thyroid         Plan:           Persistent atrial fibrillation.  Appears to be asymptomatic. The majority of her challenges appear to be related to anxiety.   Offered trial of Flecainide + DCCV and assess for symptomatic benefit. Recommended rate control strategy.

## 2020-09-25 ENCOUNTER — TELEPHONE (OUTPATIENT)
Dept: OPTOMETRY | Facility: CLINIC | Age: 70
End: 2020-09-25

## 2020-09-25 NOTE — TELEPHONE ENCOUNTER
----- Message from Zoey Benton sent at 9/25/2020  2:11 PM CDT -----  Regarding: FW: Eyes are really blurry and she is having a problem focusing  Contact: Angelina    ----- Message -----  From: Uzma Herr  Sent: 9/25/2020   2:04 PM CDT  To: Joe OTOOLE Staff  Subject: Eyes are really blurry and she is having a p#    MsDenverMagda would like a sooner appointment. Eyes are really blurry and she is having a problem focusing today. She can be reached at [phone]

## 2020-09-28 ENCOUNTER — OFFICE VISIT (OUTPATIENT)
Dept: OPTOMETRY | Facility: CLINIC | Age: 70
End: 2020-09-28
Payer: MEDICARE

## 2020-09-28 DIAGNOSIS — H04.123 BILATERAL DRY EYES: Primary | ICD-10-CM

## 2020-09-28 PROCEDURE — 99999 PR PBB SHADOW E&M-EST. PATIENT-LVL II: CPT | Mod: PBBFAC,HCNC,, | Performed by: OPTOMETRIST

## 2020-09-28 PROCEDURE — 92012 INTRM OPH EXAM EST PATIENT: CPT | Mod: HCNC,S$GLB,, | Performed by: OPTOMETRIST

## 2020-09-28 PROCEDURE — 92012 PR EYE EXAM, EST PATIENT,INTERMED: ICD-10-PCS | Mod: HCNC,S$GLB,, | Performed by: OPTOMETRIST

## 2020-09-28 PROCEDURE — 99999 PR PBB SHADOW E&M-EST. PATIENT-LVL II: ICD-10-PCS | Mod: PBBFAC,HCNC,, | Performed by: OPTOMETRIST

## 2020-09-28 RX ORDER — HYDROCHLOROTHIAZIDE 12.5 MG/1
CAPSULE ORAL
COMMUNITY
Start: 2020-06-25 | End: 2021-06-21

## 2020-09-28 RX ORDER — CYCLOSPORINE 0.5 MG/ML
1 EMULSION OPHTHALMIC 2 TIMES DAILY
Qty: 60 EACH | Refills: 11 | OUTPATIENT
Start: 2020-09-28 | End: 2020-09-28 | Stop reason: SDUPTHER

## 2020-09-28 RX ORDER — TRAMADOL HYDROCHLORIDE 50 MG/1
TABLET ORAL
COMMUNITY
Start: 2020-07-16 | End: 2021-12-21

## 2020-09-28 RX ORDER — IBUPROFEN 800 MG/1
TABLET ORAL
COMMUNITY
Start: 2020-07-16 | End: 2021-10-07

## 2020-09-28 RX ORDER — CYCLOSPORINE 0.5 MG/ML
1 EMULSION OPHTHALMIC 2 TIMES DAILY
Qty: 60 EACH | Refills: 11 | OUTPATIENT
Start: 2020-09-28 | End: 2020-09-30 | Stop reason: SDUPTHER

## 2020-09-28 NOTE — PROGRESS NOTES
URIEL CHAPMAN 12/2019  Patient is back early due to blurred vision off and on for   the past several months  Using AT's BID ou but patient still has a gritty,   FBS OU.  Patient has appt. For annual exam in November and would like to   address the problem she is having today.     Last edited by Minal Denis on 9/28/2020  1:50 PM. (History)            Assessment /Plan     For exam results, see Encounter Report.    Bilateral dry eyes  -     Discontinue: cycloSPORINE (RESTASIS) 0.05 % ophthalmic emulsion; Place 1 drop into both eyes 2 (two) times daily.  Dispense: 60 each; Refill: 11  -     cycloSPORINE (RESTASIS) 0.05 % ophthalmic emulsion; Place 1 drop into both eyes 2 (two) times daily.  Dispense: 60 each; Refill: 11      1. Vision fluctuations and foreign body sensation, start Restasis bid ou, add systane during the day. RTC or call if no better in 2-4 weeks.

## 2020-09-30 DIAGNOSIS — H04.123 BILATERAL DRY EYES: ICD-10-CM

## 2020-09-30 RX ORDER — CYCLOSPORINE 0.5 MG/ML
1 EMULSION OPHTHALMIC 2 TIMES DAILY
Qty: 60 EACH | Refills: 11 | Status: SHIPPED | OUTPATIENT
Start: 2020-09-30 | End: 2021-12-07 | Stop reason: SDUPTHER

## 2020-10-15 ENCOUNTER — TELEPHONE (OUTPATIENT)
Dept: OPTOMETRY | Facility: CLINIC | Age: 70
End: 2020-10-15

## 2020-10-15 ENCOUNTER — PATIENT MESSAGE (OUTPATIENT)
Dept: PRIMARY CARE CLINIC | Facility: CLINIC | Age: 70
End: 2020-10-15

## 2020-10-15 DIAGNOSIS — E87.6 HYPOKALEMIA: Primary | ICD-10-CM

## 2020-10-15 NOTE — TELEPHONE ENCOUNTER
----- Message from Laureen Robb sent at 10/15/2020  2:21 PM CDT -----  Pt is calling because she stated she did not cancel or want to cancel her appt for the 9th of Nov she still wants to keep it       PT CONTACT [phone]

## 2020-10-17 RX ORDER — POTASSIUM CHLORIDE 600 MG/1
8 CAPSULE, EXTENDED RELEASE ORAL DAILY
Qty: 30 CAPSULE | Refills: 6 | Status: SHIPPED | OUTPATIENT
Start: 2020-10-17 | End: 2021-11-18 | Stop reason: SDUPTHER

## 2020-11-08 ENCOUNTER — PATIENT MESSAGE (OUTPATIENT)
Dept: PRIMARY CARE CLINIC | Facility: CLINIC | Age: 70
End: 2020-11-08

## 2020-11-09 ENCOUNTER — OFFICE VISIT (OUTPATIENT)
Dept: OPTOMETRY | Facility: CLINIC | Age: 70
End: 2020-11-09
Payer: COMMERCIAL

## 2020-11-09 DIAGNOSIS — H52.4 ASTIGMATISM WITH PRESBYOPIA, BILATERAL: ICD-10-CM

## 2020-11-09 DIAGNOSIS — H04.123 BILATERAL DRY EYES: Primary | ICD-10-CM

## 2020-11-09 DIAGNOSIS — H40.013 OPEN ANGLE WITH BORDERLINE FINDINGS AND LOW GLAUCOMA RISK IN BOTH EYES: ICD-10-CM

## 2020-11-09 DIAGNOSIS — H25.13 NUCLEAR SCLEROSIS, BILATERAL: ICD-10-CM

## 2020-11-09 DIAGNOSIS — H52.203 ASTIGMATISM WITH PRESBYOPIA, BILATERAL: ICD-10-CM

## 2020-11-09 DIAGNOSIS — Z12.31 SCREENING MAMMOGRAM, ENCOUNTER FOR: Primary | ICD-10-CM

## 2020-11-09 PROCEDURE — 99999 PR PBB SHADOW E&M-EST. PATIENT-LVL III: ICD-10-PCS | Mod: PBBFAC,,, | Performed by: OPTOMETRIST

## 2020-11-09 PROCEDURE — 92012 PR EYE EXAM, EST PATIENT,INTERMED: ICD-10-PCS | Mod: S$GLB,,, | Performed by: OPTOMETRIST

## 2020-11-09 PROCEDURE — 99999 PR PBB SHADOW E&M-EST. PATIENT-LVL III: CPT | Mod: PBBFAC,,, | Performed by: OPTOMETRIST

## 2020-11-09 PROCEDURE — 92012 INTRM OPH EXAM EST PATIENT: CPT | Mod: S$GLB,,, | Performed by: OPTOMETRIST

## 2020-11-09 NOTE — PROGRESS NOTES
URIEL CHAPMAN 09/20  Here for dry eye follow up.  Using Restasis BID OU and Systane    BID OU.  Patient states her vision has improved and her eyes are no   longer hurting.  Wants glasses rx recheck    Last edited by Tone Diaz, OD on 11/9/2020  2:15 PM. (History)            Assessment /Plan     For exam results, see Encounter Report.    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 Doing better with Restasis, cont bid ou, supplement with tears.  2. Educated pt on presence of cataracts and effects on vision. No surgery at this time. Recheck in one year.  3. New Spec Rx given. Different lens options discussed with patient. RTC 1 year full exam.  4. Cont with yearly eye exams.

## 2020-11-16 DIAGNOSIS — E03.9 HYPOTHYROIDISM, UNSPECIFIED TYPE: ICD-10-CM

## 2020-11-16 RX ORDER — LEVOTHYROXINE SODIUM 137 UG/1
137 TABLET ORAL
Qty: 30 TABLET | Refills: 5 | Status: SHIPPED | OUTPATIENT
Start: 2020-11-16 | End: 2021-05-12 | Stop reason: SDUPTHER

## 2020-11-24 ENCOUNTER — HOSPITAL ENCOUNTER (OUTPATIENT)
Dept: RADIOLOGY | Facility: HOSPITAL | Age: 70
Discharge: HOME | End: 2020-11-24
Attending: FAMILY MEDICINE
Payer: MEDICARE

## 2020-11-24 DIAGNOSIS — Z12.31 SCREENING MAMMOGRAM, ENCOUNTER FOR: ICD-10-CM

## 2020-11-24 PROCEDURE — 77063 BREAST TOMOSYNTHESIS BI: CPT | Mod: 26,HCNC,, | Performed by: RADIOLOGY

## 2020-11-24 PROCEDURE — 77067 MAMMO DIGITAL SCREENING BILAT WITH TOMO: ICD-10-PCS | Mod: 26,HCNC,, | Performed by: RADIOLOGY

## 2020-11-24 PROCEDURE — 77067 SCR MAMMO BI INCL CAD: CPT | Mod: 26,HCNC,, | Performed by: RADIOLOGY

## 2020-11-24 PROCEDURE — 77067 SCR MAMMO BI INCL CAD: CPT | Mod: TC,HCNC,PO

## 2020-11-24 PROCEDURE — 77063 MAMMO DIGITAL SCREENING BILAT WITH TOMO: ICD-10-PCS | Mod: 26,HCNC,, | Performed by: RADIOLOGY

## 2020-11-28 DIAGNOSIS — L70.8 OTHER ACNE: ICD-10-CM

## 2020-11-30 RX ORDER — TRETINOIN 1 MG/G
CREAM TOPICAL NIGHTLY
Qty: 20 G | Refills: 6 | Status: SHIPPED | OUTPATIENT
Start: 2020-11-30 | End: 2021-12-22 | Stop reason: SDUPTHER

## 2020-12-03 ENCOUNTER — LAB VISIT (OUTPATIENT)
Dept: LAB | Facility: HOSPITAL | Age: 70
End: 2020-12-03
Attending: FAMILY MEDICINE
Payer: MEDICARE

## 2020-12-03 DIAGNOSIS — E03.9 HYPOTHYROIDISM, UNSPECIFIED TYPE: ICD-10-CM

## 2020-12-03 DIAGNOSIS — Z00.00 ROUTINE GENERAL MEDICAL EXAMINATION AT A HEALTH CARE FACILITY: ICD-10-CM

## 2020-12-03 LAB
ALBUMIN SERPL BCP-MCNC: 3.5 G/DL (ref 3.5–5.2)
ALP SERPL-CCNC: 94 U/L (ref 55–135)
ALT SERPL W/O P-5'-P-CCNC: 43 U/L (ref 10–44)
ANION GAP SERPL CALC-SCNC: 7 MMOL/L (ref 8–16)
AST SERPL-CCNC: 36 U/L (ref 10–40)
BASOPHILS # BLD AUTO: 0.03 K/UL (ref 0–0.2)
BASOPHILS NFR BLD: 0.7 % (ref 0–1.9)
BILIRUB SERPL-MCNC: 0.6 MG/DL (ref 0.1–1)
BUN SERPL-MCNC: 13 MG/DL (ref 8–23)
CALCIUM SERPL-MCNC: 8.5 MG/DL (ref 8.7–10.5)
CHLORIDE SERPL-SCNC: 103 MMOL/L (ref 95–110)
CHOLEST SERPL-MCNC: 172 MG/DL (ref 120–199)
CHOLEST/HDLC SERPL: 2.4 {RATIO} (ref 2–5)
CO2 SERPL-SCNC: 31 MMOL/L (ref 23–29)
CREAT SERPL-MCNC: 0.8 MG/DL (ref 0.5–1.4)
DIFFERENTIAL METHOD: ABNORMAL
EOSINOPHIL # BLD AUTO: 0.1 K/UL (ref 0–0.5)
EOSINOPHIL NFR BLD: 1.4 % (ref 0–8)
ERYTHROCYTE [DISTWIDTH] IN BLOOD BY AUTOMATED COUNT: 13 % (ref 11.5–14.5)
EST. GFR  (AFRICAN AMERICAN): >60 ML/MIN/1.73 M^2
EST. GFR  (NON AFRICAN AMERICAN): >60 ML/MIN/1.73 M^2
GLUCOSE SERPL-MCNC: 83 MG/DL (ref 70–110)
HCT VFR BLD AUTO: 41 % (ref 37–48.5)
HDLC SERPL-MCNC: 72 MG/DL (ref 40–75)
HDLC SERPL: 41.9 % (ref 20–50)
HGB BLD-MCNC: 13 G/DL (ref 12–16)
IMM GRANULOCYTES # BLD AUTO: 0.01 K/UL (ref 0–0.04)
IMM GRANULOCYTES NFR BLD AUTO: 0.2 % (ref 0–0.5)
LDLC SERPL CALC-MCNC: 86 MG/DL (ref 63–159)
LYMPHOCYTES # BLD AUTO: 1.3 K/UL (ref 1–4.8)
LYMPHOCYTES NFR BLD: 30 % (ref 18–48)
MCH RBC QN AUTO: 33.5 PG (ref 27–31)
MCHC RBC AUTO-ENTMCNC: 31.7 G/DL (ref 32–36)
MCV RBC AUTO: 106 FL (ref 82–98)
MONOCYTES # BLD AUTO: 0.5 K/UL (ref 0.3–1)
MONOCYTES NFR BLD: 11.8 % (ref 4–15)
NEUTROPHILS # BLD AUTO: 2.3 K/UL (ref 1.8–7.7)
NEUTROPHILS NFR BLD: 55.9 % (ref 38–73)
NONHDLC SERPL-MCNC: 100 MG/DL
NRBC BLD-RTO: 0 /100 WBC
PLATELET # BLD AUTO: 194 K/UL (ref 150–350)
PMV BLD AUTO: 10.9 FL (ref 9.2–12.9)
POTASSIUM SERPL-SCNC: 4 MMOL/L (ref 3.5–5.1)
PROT SERPL-MCNC: 5.9 G/DL (ref 6–8.4)
RBC # BLD AUTO: 3.88 M/UL (ref 4–5.4)
SODIUM SERPL-SCNC: 141 MMOL/L (ref 136–145)
TRIGL SERPL-MCNC: 70 MG/DL (ref 30–150)
TSH SERPL DL<=0.005 MIU/L-ACNC: 1.67 UIU/ML (ref 0.4–4)
WBC # BLD AUTO: 4.16 K/UL (ref 3.9–12.7)

## 2020-12-03 PROCEDURE — 80053 COMPREHEN METABOLIC PANEL: CPT | Mod: HCNC

## 2020-12-03 PROCEDURE — 85025 COMPLETE CBC W/AUTO DIFF WBC: CPT | Mod: HCNC

## 2020-12-03 PROCEDURE — 36415 COLL VENOUS BLD VENIPUNCTURE: CPT | Mod: HCNC,PO

## 2020-12-03 PROCEDURE — 80061 LIPID PANEL: CPT | Mod: HCNC

## 2020-12-03 PROCEDURE — 84443 ASSAY THYROID STIM HORMONE: CPT | Mod: HCNC

## 2020-12-05 ENCOUNTER — PATIENT MESSAGE (OUTPATIENT)
Dept: CARDIOLOGY | Facility: CLINIC | Age: 70
End: 2020-12-05

## 2020-12-07 RX ORDER — APIXABAN 5 MG/1
5 TABLET, FILM COATED ORAL 2 TIMES DAILY
Qty: 180 TABLET | Refills: 3 | Status: SHIPPED | OUTPATIENT
Start: 2020-12-07 | End: 2021-10-05 | Stop reason: SDUPTHER

## 2020-12-16 ENCOUNTER — PATIENT MESSAGE (OUTPATIENT)
Dept: OPTOMETRY | Facility: CLINIC | Age: 70
End: 2020-12-16

## 2020-12-17 ENCOUNTER — OFFICE VISIT (OUTPATIENT)
Dept: PRIMARY CARE CLINIC | Facility: CLINIC | Age: 70
End: 2020-12-17
Payer: MEDICARE

## 2020-12-17 VITALS
HEART RATE: 75 BPM | DIASTOLIC BLOOD PRESSURE: 80 MMHG | HEIGHT: 64 IN | SYSTOLIC BLOOD PRESSURE: 106 MMHG | BODY MASS INDEX: 17.77 KG/M2 | WEIGHT: 104.06 LBS | OXYGEN SATURATION: 99 %

## 2020-12-17 DIAGNOSIS — F41.9 ANXIETY: ICD-10-CM

## 2020-12-17 DIAGNOSIS — M48.07 SPINAL STENOSIS, LUMBOSACRAL REGION: ICD-10-CM

## 2020-12-17 DIAGNOSIS — Z98.1 ARTHRODESIS STATUS: ICD-10-CM

## 2020-12-17 DIAGNOSIS — M43.27 FUSION OF SPINE, LUMBOSACRAL REGION: ICD-10-CM

## 2020-12-17 DIAGNOSIS — R14.0 ABDOMINAL BLOATING: ICD-10-CM

## 2020-12-17 DIAGNOSIS — K58.9 IRRITABLE BOWEL SYNDROME, UNSPECIFIED TYPE: Primary | ICD-10-CM

## 2020-12-17 PROCEDURE — 99999 PR PBB SHADOW E&M-EST. PATIENT-LVL V: ICD-10-PCS | Mod: PBBFAC,HCNC,, | Performed by: FAMILY MEDICINE

## 2020-12-17 PROCEDURE — 1125F PR PAIN SEVERITY QUANTIFIED, PAIN PRESENT: ICD-10-PCS | Mod: HCNC,S$GLB,, | Performed by: FAMILY MEDICINE

## 2020-12-17 PROCEDURE — 3008F PR BODY MASS INDEX (BMI) DOCUMENTED: ICD-10-PCS | Mod: HCNC,CPTII,S$GLB, | Performed by: FAMILY MEDICINE

## 2020-12-17 PROCEDURE — 99397 PER PM REEVAL EST PAT 65+ YR: CPT | Mod: HCNC,S$GLB,, | Performed by: FAMILY MEDICINE

## 2020-12-17 PROCEDURE — 1101F PR PT FALLS ASSESS DOC 0-1 FALLS W/OUT INJ PAST YR: ICD-10-PCS | Mod: HCNC,CPTII,S$GLB, | Performed by: FAMILY MEDICINE

## 2020-12-17 PROCEDURE — 1157F PR ADVANCE CARE PLAN OR EQUIV PRESENT IN MEDICAL RECORD: ICD-10-PCS | Mod: HCNC,S$GLB,, | Performed by: FAMILY MEDICINE

## 2020-12-17 PROCEDURE — 3288F PR FALLS RISK ASSESSMENT DOCUMENTED: ICD-10-PCS | Mod: HCNC,CPTII,S$GLB, | Performed by: FAMILY MEDICINE

## 2020-12-17 PROCEDURE — 3079F PR MOST RECENT DIASTOLIC BLOOD PRESSURE 80-89 MM HG: ICD-10-PCS | Mod: HCNC,CPTII,S$GLB, | Performed by: FAMILY MEDICINE

## 2020-12-17 PROCEDURE — 3074F PR MOST RECENT SYSTOLIC BLOOD PRESSURE < 130 MM HG: ICD-10-PCS | Mod: HCNC,CPTII,S$GLB, | Performed by: FAMILY MEDICINE

## 2020-12-17 PROCEDURE — 1101F PT FALLS ASSESS-DOCD LE1/YR: CPT | Mod: HCNC,CPTII,S$GLB, | Performed by: FAMILY MEDICINE

## 2020-12-17 PROCEDURE — 3008F BODY MASS INDEX DOCD: CPT | Mod: HCNC,CPTII,S$GLB, | Performed by: FAMILY MEDICINE

## 2020-12-17 PROCEDURE — 3288F FALL RISK ASSESSMENT DOCD: CPT | Mod: HCNC,CPTII,S$GLB, | Performed by: FAMILY MEDICINE

## 2020-12-17 PROCEDURE — 3079F DIAST BP 80-89 MM HG: CPT | Mod: HCNC,CPTII,S$GLB, | Performed by: FAMILY MEDICINE

## 2020-12-17 PROCEDURE — 99397 PR PREVENTIVE VISIT,EST,65 & OVER: ICD-10-PCS | Mod: HCNC,S$GLB,, | Performed by: FAMILY MEDICINE

## 2020-12-17 PROCEDURE — 1157F ADVNC CARE PLAN IN RCRD: CPT | Mod: HCNC,S$GLB,, | Performed by: FAMILY MEDICINE

## 2020-12-17 PROCEDURE — 3074F SYST BP LT 130 MM HG: CPT | Mod: HCNC,CPTII,S$GLB, | Performed by: FAMILY MEDICINE

## 2020-12-17 PROCEDURE — 1125F AMNT PAIN NOTED PAIN PRSNT: CPT | Mod: HCNC,S$GLB,, | Performed by: FAMILY MEDICINE

## 2020-12-17 PROCEDURE — 99999 PR PBB SHADOW E&M-EST. PATIENT-LVL V: CPT | Mod: PBBFAC,HCNC,, | Performed by: FAMILY MEDICINE

## 2020-12-17 RX ORDER — CLONAZEPAM 1 MG/1
1.5 TABLET ORAL NIGHTLY PRN
Qty: 45 TABLET | Refills: 5 | Status: SHIPPED | OUTPATIENT
Start: 2020-12-17 | End: 2021-02-06

## 2020-12-17 RX ORDER — LUBIPROSTONE 24 UG/1
24 CAPSULE ORAL
Qty: 30 CAPSULE | Refills: 4 | Status: SHIPPED | OUTPATIENT
Start: 2020-12-17 | End: 2021-12-22 | Stop reason: SDUPTHER

## 2020-12-17 NOTE — PROGRESS NOTES
Chart has been dictated using voice recognition software.  It is not been reviewed carefully for any transcriptional errors due to this technology.   Subjective:   Patient ID:  Angelina Man is a 70 y.o. female who presents for follow-up of No chief complaint on file.      HPI: Patient with hypothyroidism, anxiety, and arthritis was seen in the ED 16-Jul-2020 with atrial fibrillation adn underwent successful cardioversion 17-Jul-2020.    Patient notes that she has tanesha a mild increase in dyspnea but in iother ways, she feels better (fewer naps).  Patient denies any dyspnea at rest or on exertion, orthopnea, or PND. Has occasional edema when she eats salty snacks. Patient denies any chest discomfort on exertion or at rest. Patient denies any palpitations, lightheadedness, or syncope.     Past Medical History:   Diagnosis Date    Arthritis     Bronchitis     Cataract     Dry eyes     Glaucoma, suspect - Both Eyes     Hypothyroidism 6/23/2016    Rash     Squamous cell carcinoma     in-situ right upper inner arm, right wrist    Status post lumbar surgery 6/23/2016    Stress fracture     Thyroid disease        Outpatient Medications Prior to Visit   Medication Sig Dispense Refill    acetaminophen (TYLENOL) 500 MG tablet Take 500 mg by mouth every 6 (six) hours as needed for Pain.      acyclovir (ZOVIRAX) 400 MG tablet Take 1 tablet (400 mg total) by mouth 2 (two) times daily. 60 tablet 12    ascorbic acid (VITAMIN C) 1000 MG tablet Take 1,000 mg by mouth once daily.       biotin 1 mg tablet Take 1 mg by mouth 2 (two) times daily.       buPROPion (WELLBUTRIN XL) 150 MG TB24 tablet Take 1 tablet (150 mg total) by mouth once daily. 30 tablet 11    calcium-vitamin D 500 mg(1,250mg) -200 unit per tablet Take 1 tablet by mouth 2 (two) times daily with meals.       chondroitin sulfate-vit C-Mn (CHONDROITIN SULFATE COMPLEX) 400-60-2.5 mg Cap Take 1 tablet by mouth 2 (two) times daily.       ciclopirox  (LOPROX) 0.77 % Susp aaa qhs 30 mL 6    clonazePAM (KLONOPIN) 1 MG tablet Take 1.5 tablets (1.5 mg total) by mouth nightly as needed. 45 tablet 5    cycloSPORINE (RESTASIS) 0.05 % ophthalmic emulsion Place 1 drop into both eyes 2 (two) times daily. 60 each 11    digestive enzymes Tab Take 1 tablet by mouth once daily.       diltiaZEM (CARDIZEM) 30 MG tablet Once daily for raynauds. 90 tablet 3    diphenhydrAMINE (BENADRYL) 25 mg capsule Take 25 mg by mouth nightly as needed.       ELIQUIS 5 mg Tab Take 1 tablet (5 mg total) by mouth 2 (two) times daily. 180 tablet 3    fluticasone (FLONASE) 50 mcg/actuation nasal spray 1 spray by Each Nare route once daily. 16 g 2    ginseng 200 mg Cap Take 200 mg by mouth daily as needed.       glucosamine sulfate 2KCl 1,000 mg Tab Take 1 tablet by mouth once daily.       hydroCHLOROthiazide (MICROZIDE) 12.5 mg capsule       ibuprofen (ADVIL,MOTRIN) 800 MG tablet       levothyroxine (SYNTHROID) 137 MCG Tab tablet Take 1 tablet (137 mcg total) by mouth before breakfast. 30 tablet 5    lubiprostone (AMITIZA) 24 MCG Cap Take 1 capsule (24 mcg total) by mouth daily with breakfast. 30 capsule 4    metoprolol tartrate (LOPRESSOR) 25 MG tablet Take 1 tablet (25 mg total) by mouth 2 (two) times daily. 60 tablet 11    metoprolol tartrate (LOPRESSOR) 25 MG tablet Take 1 tablet (25 mg total) by mouth 2 (two) times daily. 60 tablet 11    montelukast (SINGULAIR) 10 mg tablet Take 10 mg by mouth once daily.       multivitamin (THERAGRAN) per tablet Take 1 tablet by mouth once daily.       mupirocin (BACTROBAN) 2 % ointment Apply to affected area 3 times daily 22 g 1    nitroGLYCERIN 2% TD oint (NITROGLYN) 2 % ointment Apply small amount to fingertips prn raynaud attacks 30 g 2    plecanatide (TRULANCE) 3 mg Tab Take 1 tablet (3 mg) by mouth once daily. 30 tablet 2    potassium chloride (MICRO-K) 8 mEq CpSR Take 1 capsule (8 mEq total) by mouth once daily. 30 capsule 6     "traMADoL (ULTRAM) 50 mg tablet       tretinoin (RETIN-A) 0.1 % cream Apply topically every evening. 20 g 6    zinc 50 mg Tab Take 50 mg by mouth once daily.        No facility-administered medications prior to visit.        ROS - No change from prior visit in neurologic, respiratory, endocrine, GI, hematologic, or constitutional complaints.  The patient still has significant anxiety.  Objective:   Physical Exam   Constitutional: She is oriented to person, place, and time. She appears well-developed and well-nourished.   /85 (BP Location: Left arm, Patient Position: Sitting, BP Method: Small (Automatic))   Pulse 82   Ht 5' 4" (1.626 m)   Wt 46.4 kg (102 lb 4.7 oz)   LMP  (LMP Unknown)   BMI 17.56 kg/m²      Neck: Neck supple. No JVD present. Carotid bruit is not present.   Cardiovascular: Normal rate, normal heart sounds and intact distal pulses. An irregularly irregular rhythm present. Exam reveals no gallop and no friction rub.   No murmur heard.  Pulmonary/Chest: Effort normal and breath sounds normal. She has no wheezes. She has no rales.   Abdominal: Soft. Bowel sounds are normal. She exhibits no abdominal bruit. There is no hepatomegaly. There is no abdominal tenderness.   Musculoskeletal:         General: No edema.   Neurological: She is alert and oriented to person, place, and time. She has normal strength.   Skin: No cyanosis. Nails show no clubbing.         Lab Results   Component Value Date     12/03/2020    K 4.0 12/03/2020    BUN 13 12/03/2020    CREATININE 0.8 12/03/2020    GLU 83 12/03/2020    HGBA1C 5.4 08/24/2015    CHOL 172 12/03/2020    HDL 72 12/03/2020    LDLCALC 86.0 12/03/2020    TRIG 70 12/03/2020    CHOLHDL 41.9 12/03/2020    HGB 13.0 12/03/2020    HCT 41.0 12/03/2020     12/03/2020    INR 1.0 02/05/2018     ECG shows trial fibrillation with Low voltage QRS in limb leads.  No change from 23-Sep-2020.  Assessment:     1. Atrial fibrillation, unspecified type    2. " Hypothyroidism, unspecified type    3. S/P lumbar fusion    4. Gastroesophageal reflux disease, unspecified whether esophagitis present      The patient remains in atrial fibrillation with a controlled ventricular response.  Patient is stable with no real symptoms cardiac ischemia, heart failure, or significant arrhythmias.  The majority of her shortness of breath is related to anxiety.  At this time, there does not appear to be any advantage for cardioverting the patient and starting medications that may have significant side effects.  This was discussed with the patient in the decision was made to continue with rate controlled therapy for her atrial fibrillation.  No changes were made in the patient's medical regimen at this time. Unless there are intervening problems, patient should return for re-evaluation in 6 months.   Plan:     Diagnoses and all orders for this visit:    Atrial fibrillation, unspecified type    Hypothyroidism, unspecified type    S/P lumbar fusion    Gastroesophageal reflux disease, unspecified whether esophagitis present          David Pereira MD  Consultative Cardiology

## 2020-12-18 ENCOUNTER — PATIENT MESSAGE (OUTPATIENT)
Dept: PRIMARY CARE CLINIC | Facility: CLINIC | Age: 70
End: 2020-12-18

## 2020-12-18 ENCOUNTER — OFFICE VISIT (OUTPATIENT)
Dept: CARDIOLOGY | Facility: CLINIC | Age: 70
End: 2020-12-18
Payer: MEDICARE

## 2020-12-18 ENCOUNTER — HOSPITAL ENCOUNTER (OUTPATIENT)
Dept: CARDIOLOGY | Facility: CLINIC | Age: 70
Discharge: HOME | End: 2020-12-18
Payer: MEDICARE

## 2020-12-18 VITALS
WEIGHT: 102.31 LBS | HEART RATE: 82 BPM | BODY MASS INDEX: 17.47 KG/M2 | DIASTOLIC BLOOD PRESSURE: 85 MMHG | SYSTOLIC BLOOD PRESSURE: 123 MMHG | HEIGHT: 64 IN

## 2020-12-18 DIAGNOSIS — E03.9 HYPOTHYROIDISM, UNSPECIFIED TYPE: ICD-10-CM

## 2020-12-18 DIAGNOSIS — I48.91 ATRIAL FIBRILLATION AND FLUTTER: ICD-10-CM

## 2020-12-18 DIAGNOSIS — Z98.1 S/P LUMBAR FUSION: ICD-10-CM

## 2020-12-18 DIAGNOSIS — I48.91 ATRIAL FIBRILLATION, UNSPECIFIED TYPE: Primary | ICD-10-CM

## 2020-12-18 DIAGNOSIS — K21.9 GASTROESOPHAGEAL REFLUX DISEASE, UNSPECIFIED WHETHER ESOPHAGITIS PRESENT: ICD-10-CM

## 2020-12-18 DIAGNOSIS — I48.92 ATRIAL FIBRILLATION AND FLUTTER: ICD-10-CM

## 2020-12-18 PROCEDURE — 1159F PR MEDICATION LIST DOCUMENTED IN MEDICAL RECORD: ICD-10-PCS | Mod: HCNC,S$GLB,, | Performed by: INTERNAL MEDICINE

## 2020-12-18 PROCEDURE — 1157F ADVNC CARE PLAN IN RCRD: CPT | Mod: HCNC,S$GLB,, | Performed by: INTERNAL MEDICINE

## 2020-12-18 PROCEDURE — 99999 PR PBB SHADOW E&M-EST. PATIENT-LVL III: ICD-10-PCS | Mod: PBBFAC,HCNC,, | Performed by: INTERNAL MEDICINE

## 2020-12-18 PROCEDURE — 3074F SYST BP LT 130 MM HG: CPT | Mod: HCNC,CPTII,S$GLB, | Performed by: INTERNAL MEDICINE

## 2020-12-18 PROCEDURE — 99499 RISK ADDL DX/OHS AUDIT: ICD-10-PCS | Mod: S$GLB,,, | Performed by: INTERNAL MEDICINE

## 2020-12-18 PROCEDURE — 3079F DIAST BP 80-89 MM HG: CPT | Mod: HCNC,CPTII,S$GLB, | Performed by: INTERNAL MEDICINE

## 2020-12-18 PROCEDURE — 99499 UNLISTED E&M SERVICE: CPT | Mod: S$GLB,,, | Performed by: INTERNAL MEDICINE

## 2020-12-18 PROCEDURE — 3079F PR MOST RECENT DIASTOLIC BLOOD PRESSURE 80-89 MM HG: ICD-10-PCS | Mod: HCNC,CPTII,S$GLB, | Performed by: INTERNAL MEDICINE

## 2020-12-18 PROCEDURE — 1159F MED LIST DOCD IN RCRD: CPT | Mod: HCNC,S$GLB,, | Performed by: INTERNAL MEDICINE

## 2020-12-18 PROCEDURE — 99214 OFFICE O/P EST MOD 30 MIN: CPT | Mod: HCNC,S$GLB,, | Performed by: INTERNAL MEDICINE

## 2020-12-18 PROCEDURE — 1126F PR PAIN SEVERITY QUANTIFIED, NO PAIN PRESENT: ICD-10-PCS | Mod: HCNC,S$GLB,, | Performed by: INTERNAL MEDICINE

## 2020-12-18 PROCEDURE — 93010 ELECTROCARDIOGRAM REPORT: CPT | Mod: HCNC,S$GLB,, | Performed by: INTERNAL MEDICINE

## 2020-12-18 PROCEDURE — 1157F PR ADVANCE CARE PLAN OR EQUIV PRESENT IN MEDICAL RECORD: ICD-10-PCS | Mod: HCNC,S$GLB,, | Performed by: INTERNAL MEDICINE

## 2020-12-18 PROCEDURE — 93010 EKG 12-LEAD: ICD-10-PCS | Mod: HCNC,S$GLB,, | Performed by: INTERNAL MEDICINE

## 2020-12-18 PROCEDURE — 99214 PR OFFICE/OUTPT VISIT, EST, LEVL IV, 30-39 MIN: ICD-10-PCS | Mod: HCNC,S$GLB,, | Performed by: INTERNAL MEDICINE

## 2020-12-18 PROCEDURE — 1126F AMNT PAIN NOTED NONE PRSNT: CPT | Mod: HCNC,S$GLB,, | Performed by: INTERNAL MEDICINE

## 2020-12-18 PROCEDURE — 3008F PR BODY MASS INDEX (BMI) DOCUMENTED: ICD-10-PCS | Mod: HCNC,CPTII,S$GLB, | Performed by: INTERNAL MEDICINE

## 2020-12-18 PROCEDURE — 3074F PR MOST RECENT SYSTOLIC BLOOD PRESSURE < 130 MM HG: ICD-10-PCS | Mod: HCNC,CPTII,S$GLB, | Performed by: INTERNAL MEDICINE

## 2020-12-18 PROCEDURE — 99999 PR PBB SHADOW E&M-EST. PATIENT-LVL III: CPT | Mod: PBBFAC,HCNC,, | Performed by: INTERNAL MEDICINE

## 2020-12-18 PROCEDURE — 93005 ELECTROCARDIOGRAM TRACING: CPT | Mod: HCNC,S$GLB,, | Performed by: INTERNAL MEDICINE

## 2020-12-18 PROCEDURE — 93005 EKG 12-LEAD: ICD-10-PCS | Mod: HCNC,S$GLB,, | Performed by: INTERNAL MEDICINE

## 2020-12-18 PROCEDURE — 3008F BODY MASS INDEX DOCD: CPT | Mod: HCNC,CPTII,S$GLB, | Performed by: INTERNAL MEDICINE

## 2020-12-18 NOTE — Clinical Note
Thank you for referring Angelina Man for follow-up of atrial fibrillation. Please see my note for details of this encounter. If you have any questions, please contact me.  Thank you again for the referral.

## 2020-12-22 NOTE — PROGRESS NOTES
Angelina Man is a 70 y.o. female   Routine physical  Source of history: Patient  Past Medical History:   Diagnosis Date    Arthritis     Bronchitis     Cataract     Dry eyes     Glaucoma, suspect - Both Eyes     Hypothyroidism 6/23/2016    Rash     Squamous cell carcinoma     in-situ right upper inner arm, right wrist    Status post lumbar surgery 6/23/2016    Stress fracture     Thyroid disease      Patient  reports that she has never smoked. She has never used smokeless tobacco. She reports current alcohol use of about 7.0 standard drinks of alcohol per week. She reports that she does not use drugs.  Family History   Problem Relation Age of Onset    Cancer Mother         Lung cancer    Heart failure Father     Colon cancer Father     Hypertension Father     Cataracts Father     Cancer Father 80        colon    Diabetes Son     Heart disease Son     No Known Problems Sister     No Known Problems Brother     No Known Problems Maternal Aunt     No Known Problems Maternal Uncle     No Known Problems Paternal Aunt     No Known Problems Paternal Uncle     No Known Problems Maternal Grandmother     No Known Problems Maternal Grandfather     No Known Problems Paternal Grandmother     No Known Problems Paternal Grandfather     Melanoma Daughter     Amblyopia Neg Hx     Blindness Neg Hx     Glaucoma Neg Hx     Macular degeneration Neg Hx     Retinal detachment Neg Hx     Strabismus Neg Hx     Stroke Neg Hx     Thyroid disease Neg Hx     Breast cancer Neg Hx     Ovarian cancer Neg Hx      ROS:  Answers for HPI/ROS submitted by the patient on 12/14/2020   activity change: No  unexpected weight change: No  neck pain: Yes  hearing loss: No  rhinorrhea: Yes  trouble swallowing: No  eye discharge: No  visual disturbance: Yes  chest tightness: No  wheezing: No  chest pain: No  palpitations: No  blood in stool: No  constipation: Yes  vomiting: No  diarrhea: No  polydipsia: No  polyuria:  No  difficulty urinating: No  hematuria: No  menstrual problem: No  dysuria: No  joint swelling: No  arthralgias: No  headaches: No  weakness: No  confusion: No  dysphoric mood: Yes    GENERAL: No fever, chills, fatigability or weight loss.  SKIN: No rashes, itching or changes in color or texture of skin.  HEAD: No headaches or recent head trauma.  EYES: Visual acuity fine. No photophobia, ocular pain or diplopia.  EARS: Denies ear pain, discharge or vertigo.  NOSE: No loss of smell, no epistaxis or postnasal drip.  MOUTH & THROAT: No hoarseness or change in voice. No excessive gum bleeding.  NODES: Denies swollen glands.  CHEST: Denies JENNINGS, cyanosis, wheezing, cough and sputum production.  CARDIOVASCULAR: Denies chest pain, PND, orthopnea or reduced exercise tolerance.  ABDOMEN: Appetite fine. No weight loss. Denies diarrhea, abdominal pain, hematemesis or blood in stool.  URINARY: No flank pain, dysuria or hematuria.  PERIPHERAL VASCULAR: No claudication or cyanosis.  MUSCULOSKELETAL: No joint stiffness or swelling. Denies back pain.  NEUROLOGIC: No history of seizures, paralysis, alteration of gait or coordination.    OBJECTIVE:  APPEARANCE: thin no acute distress  Vitals:    12/17/20 1259   BP: 106/80   Pulse: 75     SKIN: Normal skin turgor, no lesions.  HEENT: Both external auditory canals clear. Both tympanic membranes intact.   PERRL. EOMI. Disk margins sharp. No tonsillar enlargement. No pharyngeal erythema or exudate. No stridor.  NECK: No bruits. No cervical spine tenderness. No cervical lymphadenopathy. No thyromegaly.  NODES: No cervical, axillary or inguinal lymph node enlargement.  CHEST: Breath sounds clear bilaterally. Lungs clear to auscultation & percussion.   Good air movement. No rales. No retractions. No rhonchi. No stridor. No wheezes.  CARDIOVASCULAR: Normal S1, S2. No murmurs. No edema.  BREASTS: no masses palpated in either breast or axillary area, symmetry noted.  ABDOMEN: Bowel sounds  normal. No palpable aortic enlargement. No CVA tenderness.   No pulsatile mass. No rebound tenderness.  PERIPHERAL VASCULAR: Femoral pulses present and symmetrical. No edema.  MUSCULOSKELETAL: Degenerative changes of both ankles, foot, knee, wrist and hand.  BACK: No CVA tenderness. There is no spasm, tenderness or radiculopathy noted with palpation and there is full range of motion.   NEUROLOGIC:   Cranial Nerves: II-XII grossly intact.  Motor: 5/5 strength major flexors/extensors. No tremor.  DTR's: Knees, Ankles 2+ and equal bilaterally; downgoing toes.  Sensory: Intact to light touch distally.  Gait & Posture: Normal gait and fine motion. No cerebellar signs.  MENTAL STATUS: Alert. Oriented x 3. Language skills normal. Memory intact.   No suicidal ideation. Normal affect. Normal cognitive functions.  Well kept appearance.    ASSESSMENT/PLAN:   Angelina was seen today for annual exam.    Diagnoses and all orders for this visit:    Irritable bowel syndrome, unspecified type  -     Ambulatory referral/consult to Pharmacy Assistance; Future  -     lubiprostone (AMITIZA) 24 MCG Cap; Take 1 capsule (24 mcg total) by mouth daily with breakfast. (Patient not taking: Reported on 12/18/2020)    Anxiety  -     clonazePAM (KLONOPIN) 1 MG tablet; Take 1.5 tablets (1.5 mg total) by mouth nightly as needed.    Arthrodesis status  -     MRI Cervical Spine W WO Cont; Future    Spinal stenosis, lumbosacral region  -     MRI Lumbar Spine Without Contrast; Future    Fusion of spine, lumbosacral region  -     MRI Lumbar Spine Without Contrast; Future    Abdominal bloating  -     US Pelvis Complete Non OB; Future    labs reviewed will contact   Pt with results when available

## 2021-01-03 ENCOUNTER — PATIENT MESSAGE (OUTPATIENT)
Dept: PRIMARY CARE CLINIC | Facility: CLINIC | Age: 71
End: 2021-01-03

## 2021-01-07 ENCOUNTER — HOSPITAL ENCOUNTER (OUTPATIENT)
Dept: RADIOLOGY | Facility: OTHER | Age: 71
Discharge: HOME | End: 2021-01-07
Attending: FAMILY MEDICINE
Payer: MEDICARE

## 2021-01-07 ENCOUNTER — HOSPITAL ENCOUNTER (OUTPATIENT)
Dept: RADIOLOGY | Facility: HOSPITAL | Age: 71
Discharge: HOME | End: 2021-01-07
Attending: FAMILY MEDICINE
Payer: MEDICARE

## 2021-01-07 DIAGNOSIS — Z98.1 ARTHRODESIS STATUS: ICD-10-CM

## 2021-01-07 DIAGNOSIS — M48.07 SPINAL STENOSIS, LUMBOSACRAL REGION: ICD-10-CM

## 2021-01-07 DIAGNOSIS — M43.27 FUSION OF SPINE, LUMBOSACRAL REGION: ICD-10-CM

## 2021-01-07 DIAGNOSIS — R14.0 ABDOMINAL BLOATING: ICD-10-CM

## 2021-01-07 PROCEDURE — 25500020 PHARM REV CODE 255: Mod: HCNC | Performed by: FAMILY MEDICINE

## 2021-01-07 PROCEDURE — 72156 MRI NECK SPINE W/O & W/DYE: CPT | Mod: TC,HCNC

## 2021-01-07 PROCEDURE — 76856 US EXAM PELVIC COMPLETE: CPT | Mod: TC,HCNC

## 2021-01-07 PROCEDURE — 76830 TRANSVAGINAL US NON-OB: CPT | Mod: 26,HCNC,, | Performed by: RADIOLOGY

## 2021-01-07 PROCEDURE — 76856 US PELVIS COMP WITH TRANSVAG NON-OB (XPD): ICD-10-PCS | Mod: 26,HCNC,, | Performed by: RADIOLOGY

## 2021-01-07 PROCEDURE — 76830 US PELVIS COMP WITH TRANSVAG NON-OB (XPD): ICD-10-PCS | Mod: 26,HCNC,, | Performed by: RADIOLOGY

## 2021-01-07 PROCEDURE — 72158 MRI LUMBAR SPINE W/O & W/DYE: CPT | Mod: 26,HCNC,, | Performed by: RADIOLOGY

## 2021-01-07 PROCEDURE — 76856 US EXAM PELVIC COMPLETE: CPT | Mod: 26,HCNC,, | Performed by: RADIOLOGY

## 2021-01-07 PROCEDURE — 72156 MRI NECK SPINE W/O & W/DYE: CPT | Mod: 26,HCNC,, | Performed by: RADIOLOGY

## 2021-01-07 PROCEDURE — 72156 MRI CERVICAL SPINE W WO CONTRAST: ICD-10-PCS | Mod: 26,HCNC,, | Performed by: RADIOLOGY

## 2021-01-07 PROCEDURE — 72158 MRI LUMBAR SPINE W WO CONTRAST: ICD-10-PCS | Mod: 26,HCNC,, | Performed by: RADIOLOGY

## 2021-01-07 PROCEDURE — 72158 MRI LUMBAR SPINE W/O & W/DYE: CPT | Mod: TC,HCNC

## 2021-01-07 PROCEDURE — A9585 GADOBUTROL INJECTION: HCPCS | Mod: HCNC | Performed by: FAMILY MEDICINE

## 2021-01-07 RX ORDER — GADOBUTROL 604.72 MG/ML
5 INJECTION INTRAVENOUS
Status: COMPLETED | OUTPATIENT
Start: 2021-01-07 | End: 2021-01-07

## 2021-01-07 RX ADMIN — GADOBUTROL 5 ML: 604.72 INJECTION INTRAVENOUS at 01:01

## 2021-01-12 ENCOUNTER — IMMUNIZATION (OUTPATIENT)
Dept: INTERNAL MEDICINE | Facility: CLINIC | Age: 71
End: 2021-01-12
Payer: MEDICARE

## 2021-01-12 DIAGNOSIS — Z23 NEED FOR VACCINATION: ICD-10-CM

## 2021-01-12 PROCEDURE — 91300 COVID-19, MRNA, LNP-S, PF, 30 MCG/0.3 ML DOSE VACCINE: CPT | Mod: PBBFAC | Performed by: FAMILY MEDICINE

## 2021-01-13 ENCOUNTER — PATIENT MESSAGE (OUTPATIENT)
Dept: PRIMARY CARE CLINIC | Facility: CLINIC | Age: 71
End: 2021-01-13

## 2021-01-14 ENCOUNTER — PATIENT MESSAGE (OUTPATIENT)
Dept: PRIMARY CARE CLINIC | Facility: CLINIC | Age: 71
End: 2021-01-14

## 2021-01-14 DIAGNOSIS — N28.1 RENAL CYST: ICD-10-CM

## 2021-01-14 DIAGNOSIS — K76.89 LIVER CYST: Primary | ICD-10-CM

## 2021-01-15 ENCOUNTER — PATIENT MESSAGE (OUTPATIENT)
Dept: PRIMARY CARE CLINIC | Facility: CLINIC | Age: 71
End: 2021-01-15

## 2021-01-19 ENCOUNTER — HOSPITAL ENCOUNTER (OUTPATIENT)
Dept: RADIOLOGY | Facility: HOSPITAL | Age: 71
Discharge: HOME | End: 2021-01-19
Attending: FAMILY MEDICINE
Payer: MEDICARE

## 2021-01-19 DIAGNOSIS — K76.89 LIVER CYST: ICD-10-CM

## 2021-01-19 PROCEDURE — 76700 US EXAM ABDOM COMPLETE: CPT | Mod: TC

## 2021-01-19 PROCEDURE — 76700 US ABDOMEN COMPLETE: ICD-10-PCS | Mod: 26,,, | Performed by: RADIOLOGY

## 2021-01-19 PROCEDURE — 76700 US EXAM ABDOM COMPLETE: CPT | Mod: 26,,, | Performed by: RADIOLOGY

## 2021-01-21 ENCOUNTER — HOSPITAL ENCOUNTER (OUTPATIENT)
Dept: RADIOLOGY | Facility: HOSPITAL | Age: 71
Discharge: HOME | End: 2021-01-21
Attending: FAMILY MEDICINE
Payer: MEDICARE

## 2021-01-21 DIAGNOSIS — N28.1 RENAL CYST: ICD-10-CM

## 2021-01-21 DIAGNOSIS — D17.9 ANGIOMYOLIPOMA: Primary | ICD-10-CM

## 2021-01-21 DIAGNOSIS — K76.89 LIVER CYST: ICD-10-CM

## 2021-01-21 PROCEDURE — 76770 US EXAM ABDO BACK WALL COMP: CPT | Mod: TC

## 2021-01-21 PROCEDURE — 76770 US EXAM ABDO BACK WALL COMP: CPT | Mod: 26,,, | Performed by: RADIOLOGY

## 2021-01-21 PROCEDURE — 76770 US RETROPERITONEAL COMPLETE: ICD-10-PCS | Mod: 26,,, | Performed by: RADIOLOGY

## 2021-01-25 ENCOUNTER — PATIENT MESSAGE (OUTPATIENT)
Dept: PRIMARY CARE CLINIC | Facility: CLINIC | Age: 71
End: 2021-01-25

## 2021-01-25 ENCOUNTER — TELEPHONE (OUTPATIENT)
Dept: PRIMARY CARE CLINIC | Facility: CLINIC | Age: 71
End: 2021-01-25

## 2021-01-27 ENCOUNTER — PATIENT MESSAGE (OUTPATIENT)
Dept: PRIMARY CARE CLINIC | Facility: CLINIC | Age: 71
End: 2021-01-27

## 2021-01-27 ENCOUNTER — OFFICE VISIT (OUTPATIENT)
Dept: SURGERY | Facility: CLINIC | Age: 71
End: 2021-01-27
Payer: MEDICARE

## 2021-01-27 VITALS
TEMPERATURE: 99 F | BODY MASS INDEX: 18.1 KG/M2 | HEIGHT: 64 IN | SYSTOLIC BLOOD PRESSURE: 130 MMHG | OXYGEN SATURATION: 100 % | WEIGHT: 106 LBS | HEART RATE: 66 BPM | DIASTOLIC BLOOD PRESSURE: 71 MMHG

## 2021-01-27 DIAGNOSIS — D17.9 ANGIOMYOLIPOMA: ICD-10-CM

## 2021-01-27 DIAGNOSIS — K76.89 LIVER CYST: Primary | ICD-10-CM

## 2021-01-27 PROCEDURE — 1126F AMNT PAIN NOTED NONE PRSNT: CPT | Mod: S$GLB,,, | Performed by: SURGERY

## 2021-01-27 PROCEDURE — 99999 PR PBB SHADOW E&M-EST. PATIENT-LVL III: CPT | Mod: PBBFAC,,, | Performed by: SURGERY

## 2021-01-27 PROCEDURE — 3075F SYST BP GE 130 - 139MM HG: CPT | Mod: CPTII,S$GLB,, | Performed by: SURGERY

## 2021-01-27 PROCEDURE — 1159F PR MEDICATION LIST DOCUMENTED IN MEDICAL RECORD: ICD-10-PCS | Mod: S$GLB,,, | Performed by: SURGERY

## 2021-01-27 PROCEDURE — 99203 PR OFFICE/OUTPT VISIT, NEW, LEVL III, 30-44 MIN: ICD-10-PCS | Mod: S$GLB,,, | Performed by: SURGERY

## 2021-01-27 PROCEDURE — 1159F MED LIST DOCD IN RCRD: CPT | Mod: S$GLB,,, | Performed by: SURGERY

## 2021-01-27 PROCEDURE — 3008F PR BODY MASS INDEX (BMI) DOCUMENTED: ICD-10-PCS | Mod: CPTII,S$GLB,, | Performed by: SURGERY

## 2021-01-27 PROCEDURE — 1157F ADVNC CARE PLAN IN RCRD: CPT | Mod: S$GLB,,, | Performed by: SURGERY

## 2021-01-27 PROCEDURE — 3075F PR MOST RECENT SYSTOLIC BLOOD PRESS GE 130-139MM HG: ICD-10-PCS | Mod: CPTII,S$GLB,, | Performed by: SURGERY

## 2021-01-27 PROCEDURE — 3078F PR MOST RECENT DIASTOLIC BLOOD PRESSURE < 80 MM HG: ICD-10-PCS | Mod: CPTII,S$GLB,, | Performed by: SURGERY

## 2021-01-27 PROCEDURE — 1157F PR ADVANCE CARE PLAN OR EQUIV PRESENT IN MEDICAL RECORD: ICD-10-PCS | Mod: S$GLB,,, | Performed by: SURGERY

## 2021-01-27 PROCEDURE — 99999 PR PBB SHADOW E&M-EST. PATIENT-LVL III: ICD-10-PCS | Mod: PBBFAC,,, | Performed by: SURGERY

## 2021-01-27 PROCEDURE — 1126F PR PAIN SEVERITY QUANTIFIED, NO PAIN PRESENT: ICD-10-PCS | Mod: S$GLB,,, | Performed by: SURGERY

## 2021-01-27 PROCEDURE — 99203 OFFICE O/P NEW LOW 30 MIN: CPT | Mod: S$GLB,,, | Performed by: SURGERY

## 2021-01-27 PROCEDURE — 3008F BODY MASS INDEX DOCD: CPT | Mod: CPTII,S$GLB,, | Performed by: SURGERY

## 2021-01-27 PROCEDURE — 3078F DIAST BP <80 MM HG: CPT | Mod: CPTII,S$GLB,, | Performed by: SURGERY

## 2021-02-01 ENCOUNTER — PATIENT MESSAGE (OUTPATIENT)
Dept: PRIMARY CARE CLINIC | Facility: CLINIC | Age: 71
End: 2021-02-01

## 2021-02-04 ENCOUNTER — IMMUNIZATION (OUTPATIENT)
Dept: INTERNAL MEDICINE | Facility: CLINIC | Age: 71
End: 2021-02-04
Payer: MEDICARE

## 2021-02-04 DIAGNOSIS — Z23 NEED FOR VACCINATION: Primary | ICD-10-CM

## 2021-02-04 PROCEDURE — 91300 COVID-19, MRNA, LNP-S, PF, 30 MCG/0.3 ML DOSE VACCINE: CPT | Mod: PBBFAC | Performed by: INTERNAL MEDICINE

## 2021-02-04 PROCEDURE — 0002A COVID-19, MRNA, LNP-S, PF, 30 MCG/0.3 ML DOSE VACCINE: CPT | Mod: PBBFAC | Performed by: INTERNAL MEDICINE

## 2021-02-06 ENCOUNTER — OFFICE VISIT (OUTPATIENT)
Dept: URGENT CARE | Facility: CLINIC | Age: 71
End: 2021-02-06
Payer: MEDICARE

## 2021-02-06 VITALS
RESPIRATION RATE: 14 BRPM | SYSTOLIC BLOOD PRESSURE: 136 MMHG | HEART RATE: 64 BPM | DIASTOLIC BLOOD PRESSURE: 91 MMHG | TEMPERATURE: 98 F | HEIGHT: 64 IN | WEIGHT: 106 LBS | BODY MASS INDEX: 18.1 KG/M2 | OXYGEN SATURATION: 99 %

## 2021-02-06 DIAGNOSIS — S60.512A CAT SCRATCH OF LEFT HAND, INITIAL ENCOUNTER: Primary | ICD-10-CM
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
## 2021-02-06 PROCEDURE — 3008F PR BODY MASS INDEX (BMI) DOCUMENTED: ICD-10-PCS | Mod: CPTII,S$GLB,, | Performed by: FAMILY MEDICINE

## 2021-02-06 PROCEDURE — 99214 OFFICE O/P EST MOD 30 MIN: CPT | Mod: S$GLB,,, | Performed by: FAMILY MEDICINE

## 2021-02-06 PROCEDURE — 1157F PR ADVANCE CARE PLAN OR EQUIV PRESENT IN MEDICAL RECORD: ICD-10-PCS | Mod: S$GLB,,, | Performed by: FAMILY MEDICINE

## 2021-02-06 PROCEDURE — 3008F BODY MASS INDEX DOCD: CPT | Mod: CPTII,S$GLB,, | Performed by: FAMILY MEDICINE

## 2021-02-06 PROCEDURE — 1157F ADVNC CARE PLAN IN RCRD: CPT | Mod: S$GLB,,, | Performed by: FAMILY MEDICINE

## 2021-02-06 PROCEDURE — 99214 PR OFFICE/OUTPT VISIT, EST, LEVL IV, 30-39 MIN: ICD-10-PCS | Mod: S$GLB,,, | Performed by: FAMILY MEDICINE

## 2021-02-06 RX ORDER — AMOXICILLIN AND CLAVULANATE POTASSIUM 875; 125 MG/1; MG/1
1 TABLET, FILM COATED ORAL 2 TIMES DAILY
Qty: 20 TABLET | Refills: 0 | Status: SHIPPED | OUTPATIENT
Start: 2021-02-06 | End: 2021-02-16

## 2021-02-06 RX ORDER — MUPIROCIN 20 MG/G
OINTMENT TOPICAL
Qty: 22 G | Refills: 1 | Status: SHIPPED | OUTPATIENT
Start: 2021-02-06 | End: 2021-12-21

## 2021-02-15 ENCOUNTER — TELEPHONE (OUTPATIENT)
Dept: PRIMARY CARE CLINIC | Facility: CLINIC | Age: 71
End: 2021-02-15

## 2021-02-17 ENCOUNTER — PATIENT MESSAGE (OUTPATIENT)
Dept: PRIMARY CARE CLINIC | Facility: CLINIC | Age: 71
End: 2021-02-17

## 2021-02-17 ENCOUNTER — TELEPHONE (OUTPATIENT)
Dept: PRIMARY CARE CLINIC | Facility: CLINIC | Age: 71
End: 2021-02-17

## 2021-02-17 DIAGNOSIS — J30.2 SEASONAL ALLERGIES: Primary | ICD-10-CM

## 2021-02-18 RX ORDER — MONTELUKAST SODIUM 10 MG/1
10 TABLET ORAL DAILY
Qty: 30 TABLET | Refills: 1 | Status: SHIPPED | OUTPATIENT
Start: 2021-02-18 | End: 2021-05-12 | Stop reason: SDUPTHER

## 2021-02-22 ENCOUNTER — OFFICE VISIT (OUTPATIENT)
Dept: DERMATOLOGY | Facility: CLINIC | Age: 71
End: 2021-02-22
Payer: MEDICARE

## 2021-02-22 ENCOUNTER — PATIENT MESSAGE (OUTPATIENT)
Dept: DERMATOLOGY | Facility: CLINIC | Age: 71
End: 2021-02-22

## 2021-02-22 DIAGNOSIS — L81.7 PIGMENTED PURPURIC DERMATOSIS: ICD-10-CM

## 2021-02-22 DIAGNOSIS — L81.4 LENTIGO: ICD-10-CM

## 2021-02-22 DIAGNOSIS — L21.9 SEBORRHEIC DERMATITIS, UNSPECIFIED: ICD-10-CM

## 2021-02-22 DIAGNOSIS — Z85.828 PERSONAL HISTORY OF SKIN CANCER: ICD-10-CM

## 2021-02-22 DIAGNOSIS — L90.5 SCAR: ICD-10-CM

## 2021-02-22 DIAGNOSIS — L82.1 SK (SEBORRHEIC KERATOSIS): ICD-10-CM

## 2021-02-22 DIAGNOSIS — D22.9 NEVUS: ICD-10-CM

## 2021-02-22 DIAGNOSIS — L57.0 AK (ACTINIC KERATOSIS): Primary | ICD-10-CM

## 2021-02-22 DIAGNOSIS — D18.01 CHERRY ANGIOMA: ICD-10-CM

## 2021-02-22 PROBLEM — Z80.8 FAMILY HX OF MELANOMA: Status: ACTIVE | Noted: 2021-02-22

## 2021-02-22 PROCEDURE — 1101F PR PT FALLS ASSESS DOC 0-1 FALLS W/OUT INJ PAST YR: ICD-10-PCS | Mod: CPTII,S$GLB,, | Performed by: DERMATOLOGY

## 2021-02-22 PROCEDURE — 17000 PR DESTRUCTION(LASER SURGERY,CRYOSURGERY,CHEMOSURGERY),PREMALIGNANT LESIONS,FIRST LESION: ICD-10-PCS | Mod: S$GLB,,, | Performed by: DERMATOLOGY

## 2021-02-22 PROCEDURE — 1157F ADVNC CARE PLAN IN RCRD: CPT | Mod: S$GLB,,, | Performed by: DERMATOLOGY

## 2021-02-22 PROCEDURE — 99999 PR PBB SHADOW E&M-EST. PATIENT-LVL IV: ICD-10-PCS | Mod: PBBFAC,,, | Performed by: DERMATOLOGY

## 2021-02-22 PROCEDURE — 1159F MED LIST DOCD IN RCRD: CPT | Mod: S$GLB,,, | Performed by: DERMATOLOGY

## 2021-02-22 PROCEDURE — 3288F PR FALLS RISK ASSESSMENT DOCUMENTED: ICD-10-PCS | Mod: CPTII,S$GLB,, | Performed by: DERMATOLOGY

## 2021-02-22 PROCEDURE — 1159F PR MEDICATION LIST DOCUMENTED IN MEDICAL RECORD: ICD-10-PCS | Mod: S$GLB,,, | Performed by: DERMATOLOGY

## 2021-02-22 PROCEDURE — 99213 PR OFFICE/OUTPT VISIT, EST, LEVL III, 20-29 MIN: ICD-10-PCS | Mod: 25,S$GLB,, | Performed by: DERMATOLOGY

## 2021-02-22 PROCEDURE — 17003 DESTRUCTION, PREMALIGNANT LESIONS; SECOND THROUGH 14 LESIONS: ICD-10-PCS | Mod: S$GLB,,, | Performed by: DERMATOLOGY

## 2021-02-22 PROCEDURE — 99213 OFFICE O/P EST LOW 20 MIN: CPT | Mod: 25,S$GLB,, | Performed by: DERMATOLOGY

## 2021-02-22 PROCEDURE — 17003 DESTRUCT PREMALG LES 2-14: CPT | Mod: S$GLB,,, | Performed by: DERMATOLOGY

## 2021-02-22 PROCEDURE — 1126F AMNT PAIN NOTED NONE PRSNT: CPT | Mod: S$GLB,,, | Performed by: DERMATOLOGY

## 2021-02-22 PROCEDURE — 3288F FALL RISK ASSESSMENT DOCD: CPT | Mod: CPTII,S$GLB,, | Performed by: DERMATOLOGY

## 2021-02-22 PROCEDURE — 1157F PR ADVANCE CARE PLAN OR EQUIV PRESENT IN MEDICAL RECORD: ICD-10-PCS | Mod: S$GLB,,, | Performed by: DERMATOLOGY

## 2021-02-22 PROCEDURE — 99999 PR PBB SHADOW E&M-EST. PATIENT-LVL IV: CPT | Mod: PBBFAC,,, | Performed by: DERMATOLOGY

## 2021-02-22 PROCEDURE — 1126F PR PAIN SEVERITY QUANTIFIED, NO PAIN PRESENT: ICD-10-PCS | Mod: S$GLB,,, | Performed by: DERMATOLOGY

## 2021-02-22 PROCEDURE — 17000 DESTRUCT PREMALG LESION: CPT | Mod: S$GLB,,, | Performed by: DERMATOLOGY

## 2021-02-22 PROCEDURE — 1101F PT FALLS ASSESS-DOCD LE1/YR: CPT | Mod: CPTII,S$GLB,, | Performed by: DERMATOLOGY

## 2021-03-12 ENCOUNTER — PATIENT MESSAGE (OUTPATIENT)
Dept: UROLOGY | Facility: CLINIC | Age: 71
End: 2021-03-12

## 2021-03-16 ENCOUNTER — OFFICE VISIT (OUTPATIENT)
Dept: NEUROSURGERY | Facility: CLINIC | Age: 71
End: 2021-03-16
Payer: MEDICARE

## 2021-03-16 VITALS — DIASTOLIC BLOOD PRESSURE: 75 MMHG | HEART RATE: 53 BPM | SYSTOLIC BLOOD PRESSURE: 113 MMHG

## 2021-03-16 DIAGNOSIS — Z98.1 HISTORY OF LUMBAR FUSION: Primary | ICD-10-CM

## 2021-03-16 DIAGNOSIS — M43.22 CERVICAL VERTEBRAL FUSION: ICD-10-CM

## 2021-03-16 PROCEDURE — 1101F PT FALLS ASSESS-DOCD LE1/YR: CPT | Mod: CPTII,S$GLB,, | Performed by: NEUROLOGICAL SURGERY

## 2021-03-16 PROCEDURE — 3288F PR FALLS RISK ASSESSMENT DOCUMENTED: ICD-10-PCS | Mod: CPTII,S$GLB,, | Performed by: NEUROLOGICAL SURGERY

## 2021-03-16 PROCEDURE — 99499 RISK ADDL DX/OHS AUDIT: ICD-10-PCS | Mod: S$GLB,,, | Performed by: NEUROLOGICAL SURGERY

## 2021-03-16 PROCEDURE — 1159F PR MEDICATION LIST DOCUMENTED IN MEDICAL RECORD: ICD-10-PCS | Mod: S$GLB,,, | Performed by: NEUROLOGICAL SURGERY

## 2021-03-16 PROCEDURE — 3078F DIAST BP <80 MM HG: CPT | Mod: CPTII,S$GLB,, | Performed by: NEUROLOGICAL SURGERY

## 2021-03-16 PROCEDURE — 3288F FALL RISK ASSESSMENT DOCD: CPT | Mod: CPTII,S$GLB,, | Performed by: NEUROLOGICAL SURGERY

## 2021-03-16 PROCEDURE — 3074F PR MOST RECENT SYSTOLIC BLOOD PRESSURE < 130 MM HG: ICD-10-PCS | Mod: CPTII,S$GLB,, | Performed by: NEUROLOGICAL SURGERY

## 2021-03-16 PROCEDURE — 1126F PR PAIN SEVERITY QUANTIFIED, NO PAIN PRESENT: ICD-10-PCS | Mod: S$GLB,,, | Performed by: NEUROLOGICAL SURGERY

## 2021-03-16 PROCEDURE — 1157F PR ADVANCE CARE PLAN OR EQUIV PRESENT IN MEDICAL RECORD: ICD-10-PCS | Mod: S$GLB,,, | Performed by: NEUROLOGICAL SURGERY

## 2021-03-16 PROCEDURE — 1101F PR PT FALLS ASSESS DOC 0-1 FALLS W/OUT INJ PAST YR: ICD-10-PCS | Mod: CPTII,S$GLB,, | Performed by: NEUROLOGICAL SURGERY

## 2021-03-16 PROCEDURE — 99214 OFFICE O/P EST MOD 30 MIN: CPT | Mod: S$GLB,,, | Performed by: NEUROLOGICAL SURGERY

## 2021-03-16 PROCEDURE — 99999 PR PBB SHADOW E&M-EST. PATIENT-LVL IV: ICD-10-PCS | Mod: PBBFAC,,, | Performed by: NEUROLOGICAL SURGERY

## 2021-03-16 PROCEDURE — 1159F MED LIST DOCD IN RCRD: CPT | Mod: S$GLB,,, | Performed by: NEUROLOGICAL SURGERY

## 2021-03-16 PROCEDURE — 3074F SYST BP LT 130 MM HG: CPT | Mod: CPTII,S$GLB,, | Performed by: NEUROLOGICAL SURGERY

## 2021-03-16 PROCEDURE — 99499 UNLISTED E&M SERVICE: CPT | Mod: S$GLB,,, | Performed by: NEUROLOGICAL SURGERY

## 2021-03-16 PROCEDURE — 99999 PR PBB SHADOW E&M-EST. PATIENT-LVL IV: CPT | Mod: PBBFAC,,, | Performed by: NEUROLOGICAL SURGERY

## 2021-03-16 PROCEDURE — 1126F AMNT PAIN NOTED NONE PRSNT: CPT | Mod: S$GLB,,, | Performed by: NEUROLOGICAL SURGERY

## 2021-03-16 PROCEDURE — 99214 PR OFFICE/OUTPT VISIT, EST, LEVL IV, 30-39 MIN: ICD-10-PCS | Mod: S$GLB,,, | Performed by: NEUROLOGICAL SURGERY

## 2021-03-16 PROCEDURE — 3078F PR MOST RECENT DIASTOLIC BLOOD PRESSURE < 80 MM HG: ICD-10-PCS | Mod: CPTII,S$GLB,, | Performed by: NEUROLOGICAL SURGERY

## 2021-03-16 PROCEDURE — 1157F ADVNC CARE PLAN IN RCRD: CPT | Mod: S$GLB,,, | Performed by: NEUROLOGICAL SURGERY

## 2021-03-16 RX ORDER — CLONAZEPAM 1 MG/1
1 TABLET ORAL DAILY
COMMUNITY
End: 2021-04-19

## 2021-03-16 RX ORDER — BUPROPION HYDROCHLORIDE 150 MG/1
150 TABLET ORAL DAILY
COMMUNITY
End: 2021-04-13 | Stop reason: SDUPTHER

## 2021-03-18 ENCOUNTER — PATIENT MESSAGE (OUTPATIENT)
Dept: RESEARCH | Facility: HOSPITAL | Age: 71
End: 2021-03-18

## 2021-03-26 ENCOUNTER — PATIENT MESSAGE (OUTPATIENT)
Dept: RESEARCH | Facility: HOSPITAL | Age: 71
End: 2021-03-26

## 2021-03-28 DIAGNOSIS — B00.9 HSV INFECTION: ICD-10-CM

## 2021-03-29 RX ORDER — ACYCLOVIR 400 MG/1
400 TABLET ORAL 2 TIMES DAILY
Qty: 60 TABLET | Refills: 12 | OUTPATIENT
Start: 2021-03-29 | End: 2021-03-29

## 2021-03-30 ENCOUNTER — OFFICE VISIT (OUTPATIENT)
Dept: UROLOGY | Facility: CLINIC | Age: 71
End: 2021-03-30
Payer: MEDICARE

## 2021-03-30 VITALS
WEIGHT: 104.75 LBS | SYSTOLIC BLOOD PRESSURE: 115 MMHG | HEART RATE: 73 BPM | BODY MASS INDEX: 17.88 KG/M2 | HEIGHT: 64 IN | DIASTOLIC BLOOD PRESSURE: 71 MMHG

## 2021-03-30 DIAGNOSIS — F41.9 ANXIETY: Primary | ICD-10-CM

## 2021-03-30 DIAGNOSIS — D17.71 RENAL ANGIOMYOLIPOMA: ICD-10-CM

## 2021-03-30 DIAGNOSIS — N28.1 RENAL CYST, ACQUIRED: ICD-10-CM

## 2021-03-30 PROCEDURE — 3078F PR MOST RECENT DIASTOLIC BLOOD PRESSURE < 80 MM HG: ICD-10-PCS | Mod: CPTII,S$GLB,, | Performed by: UROLOGY

## 2021-03-30 PROCEDURE — 3074F SYST BP LT 130 MM HG: CPT | Mod: CPTII,S$GLB,, | Performed by: UROLOGY

## 2021-03-30 PROCEDURE — 1101F PR PT FALLS ASSESS DOC 0-1 FALLS W/OUT INJ PAST YR: ICD-10-PCS | Mod: CPTII,S$GLB,, | Performed by: UROLOGY

## 2021-03-30 PROCEDURE — 99204 OFFICE O/P NEW MOD 45 MIN: CPT | Mod: S$GLB,,, | Performed by: UROLOGY

## 2021-03-30 PROCEDURE — 3288F FALL RISK ASSESSMENT DOCD: CPT | Mod: CPTII,S$GLB,, | Performed by: UROLOGY

## 2021-03-30 PROCEDURE — 1159F PR MEDICATION LIST DOCUMENTED IN MEDICAL RECORD: ICD-10-PCS | Mod: S$GLB,,, | Performed by: UROLOGY

## 2021-03-30 PROCEDURE — 1125F PR PAIN SEVERITY QUANTIFIED, PAIN PRESENT: ICD-10-PCS | Mod: S$GLB,,, | Performed by: UROLOGY

## 2021-03-30 PROCEDURE — 99204 PR OFFICE/OUTPT VISIT, NEW, LEVL IV, 45-59 MIN: ICD-10-PCS | Mod: S$GLB,,, | Performed by: UROLOGY

## 2021-03-30 PROCEDURE — 3078F DIAST BP <80 MM HG: CPT | Mod: CPTII,S$GLB,, | Performed by: UROLOGY

## 2021-03-30 PROCEDURE — 3008F PR BODY MASS INDEX (BMI) DOCUMENTED: ICD-10-PCS | Mod: CPTII,S$GLB,, | Performed by: UROLOGY

## 2021-03-30 PROCEDURE — 99999 PR PBB SHADOW E&M-EST. PATIENT-LVL V: ICD-10-PCS | Mod: PBBFAC,,, | Performed by: UROLOGY

## 2021-03-30 PROCEDURE — 1159F MED LIST DOCD IN RCRD: CPT | Mod: S$GLB,,, | Performed by: UROLOGY

## 2021-03-30 PROCEDURE — 1101F PT FALLS ASSESS-DOCD LE1/YR: CPT | Mod: CPTII,S$GLB,, | Performed by: UROLOGY

## 2021-03-30 PROCEDURE — 3074F PR MOST RECENT SYSTOLIC BLOOD PRESSURE < 130 MM HG: ICD-10-PCS | Mod: CPTII,S$GLB,, | Performed by: UROLOGY

## 2021-03-30 PROCEDURE — 99999 PR PBB SHADOW E&M-EST. PATIENT-LVL V: CPT | Mod: PBBFAC,,, | Performed by: UROLOGY

## 2021-03-30 PROCEDURE — 3008F BODY MASS INDEX DOCD: CPT | Mod: CPTII,S$GLB,, | Performed by: UROLOGY

## 2021-03-30 PROCEDURE — 1157F PR ADVANCE CARE PLAN OR EQUIV PRESENT IN MEDICAL RECORD: ICD-10-PCS | Mod: S$GLB,,, | Performed by: UROLOGY

## 2021-03-30 PROCEDURE — 1125F AMNT PAIN NOTED PAIN PRSNT: CPT | Mod: S$GLB,,, | Performed by: UROLOGY

## 2021-03-30 PROCEDURE — 3288F PR FALLS RISK ASSESSMENT DOCUMENTED: ICD-10-PCS | Mod: CPTII,S$GLB,, | Performed by: UROLOGY

## 2021-03-30 PROCEDURE — 1157F ADVNC CARE PLAN IN RCRD: CPT | Mod: S$GLB,,, | Performed by: UROLOGY

## 2021-04-01 ENCOUNTER — PES CALL (OUTPATIENT)
Dept: ADMINISTRATIVE | Facility: CLINIC | Age: 71
End: 2021-04-01

## 2021-04-12 ENCOUNTER — PATIENT MESSAGE (OUTPATIENT)
Dept: PRIMARY CARE CLINIC | Facility: CLINIC | Age: 71
End: 2021-04-12

## 2021-04-12 DIAGNOSIS — F41.9 ANXIETY: Primary | ICD-10-CM

## 2021-04-13 RX ORDER — BUPROPION HYDROCHLORIDE 150 MG/1
150 TABLET ORAL DAILY
Qty: 90 TABLET | Refills: 2 | Status: SHIPPED | OUTPATIENT
Start: 2021-04-13 | End: 2021-12-22 | Stop reason: SDUPTHER

## 2021-06-21 ENCOUNTER — OFFICE VISIT (OUTPATIENT)
Dept: CARDIOLOGY | Facility: CLINIC | Age: 71
End: 2021-06-21
Payer: MEDICARE

## 2021-06-21 VITALS
SYSTOLIC BLOOD PRESSURE: 121 MMHG | HEIGHT: 64 IN | BODY MASS INDEX: 17.42 KG/M2 | DIASTOLIC BLOOD PRESSURE: 71 MMHG | WEIGHT: 102.06 LBS | HEART RATE: 78 BPM

## 2021-06-21 DIAGNOSIS — I48.0 PAROXYSMAL ATRIAL FIBRILLATION: ICD-10-CM

## 2021-06-21 DIAGNOSIS — I83.93 ASYMPTOMATIC VARICOSE VEINS OF BOTH LOWER EXTREMITIES: ICD-10-CM

## 2021-06-21 DIAGNOSIS — I48.11 LONGSTANDING PERSISTENT ATRIAL FIBRILLATION: Primary | ICD-10-CM

## 2021-06-21 DIAGNOSIS — K21.9 GASTROESOPHAGEAL REFLUX DISEASE, UNSPECIFIED WHETHER ESOPHAGITIS PRESENT: ICD-10-CM

## 2021-06-21 DIAGNOSIS — Z98.1 S/P LUMBAR FUSION: ICD-10-CM

## 2021-06-21 DIAGNOSIS — E03.9 HYPOTHYROIDISM, UNSPECIFIED TYPE: ICD-10-CM

## 2021-06-21 PROCEDURE — 3008F BODY MASS INDEX DOCD: CPT | Mod: CPTII,S$GLB,, | Performed by: INTERNAL MEDICINE

## 2021-06-21 PROCEDURE — 1157F PR ADVANCE CARE PLAN OR EQUIV PRESENT IN MEDICAL RECORD: ICD-10-PCS | Mod: S$GLB,,, | Performed by: INTERNAL MEDICINE

## 2021-06-21 PROCEDURE — 1159F MED LIST DOCD IN RCRD: CPT | Mod: S$GLB,,, | Performed by: INTERNAL MEDICINE

## 2021-06-21 PROCEDURE — 93010 EKG 12-LEAD: ICD-10-PCS | Mod: S$GLB,,, | Performed by: INTERNAL MEDICINE

## 2021-06-21 PROCEDURE — 99499 RISK ADDL DX/OHS AUDIT: ICD-10-PCS | Mod: S$GLB,,, | Performed by: INTERNAL MEDICINE

## 2021-06-21 PROCEDURE — 1159F PR MEDICATION LIST DOCUMENTED IN MEDICAL RECORD: ICD-10-PCS | Mod: S$GLB,,, | Performed by: INTERNAL MEDICINE

## 2021-06-21 PROCEDURE — 93005 ELECTROCARDIOGRAM TRACING: CPT | Mod: S$GLB,,, | Performed by: INTERNAL MEDICINE

## 2021-06-21 PROCEDURE — 1126F PR PAIN SEVERITY QUANTIFIED, NO PAIN PRESENT: ICD-10-PCS | Mod: S$GLB,,, | Performed by: INTERNAL MEDICINE

## 2021-06-21 PROCEDURE — 99999 PR PBB SHADOW E&M-EST. PATIENT-LVL V: CPT | Mod: PBBFAC,,, | Performed by: INTERNAL MEDICINE

## 2021-06-21 PROCEDURE — 1157F ADVNC CARE PLAN IN RCRD: CPT | Mod: S$GLB,,, | Performed by: INTERNAL MEDICINE

## 2021-06-21 PROCEDURE — 93010 ELECTROCARDIOGRAM REPORT: CPT | Mod: S$GLB,,, | Performed by: INTERNAL MEDICINE

## 2021-06-21 PROCEDURE — 3008F PR BODY MASS INDEX (BMI) DOCUMENTED: ICD-10-PCS | Mod: CPTII,S$GLB,, | Performed by: INTERNAL MEDICINE

## 2021-06-21 PROCEDURE — 99999 PR PBB SHADOW E&M-EST. PATIENT-LVL V: ICD-10-PCS | Mod: PBBFAC,,, | Performed by: INTERNAL MEDICINE

## 2021-06-21 PROCEDURE — 99499 UNLISTED E&M SERVICE: CPT | Mod: S$GLB,,, | Performed by: INTERNAL MEDICINE

## 2021-06-21 PROCEDURE — 1126F AMNT PAIN NOTED NONE PRSNT: CPT | Mod: S$GLB,,, | Performed by: INTERNAL MEDICINE

## 2021-06-21 PROCEDURE — 93005 EKG 12-LEAD: ICD-10-PCS | Mod: S$GLB,,, | Performed by: INTERNAL MEDICINE

## 2021-06-21 PROCEDURE — 99214 PR OFFICE/OUTPT VISIT, EST, LEVL IV, 30-39 MIN: ICD-10-PCS | Mod: S$GLB,,, | Performed by: INTERNAL MEDICINE

## 2021-06-21 PROCEDURE — 99214 OFFICE O/P EST MOD 30 MIN: CPT | Mod: S$GLB,,, | Performed by: INTERNAL MEDICINE

## 2021-06-22 ENCOUNTER — OFFICE VISIT (OUTPATIENT)
Dept: CARDIOLOGY | Facility: CLINIC | Age: 71
End: 2021-06-22
Payer: MEDICARE

## 2021-06-22 VITALS
HEIGHT: 64 IN | DIASTOLIC BLOOD PRESSURE: 70 MMHG | HEART RATE: 70 BPM | SYSTOLIC BLOOD PRESSURE: 110 MMHG | BODY MASS INDEX: 17.32 KG/M2 | WEIGHT: 101.44 LBS

## 2021-06-22 DIAGNOSIS — I83.93 ASYMPTOMATIC VARICOSE VEINS OF BOTH LOWER EXTREMITIES: ICD-10-CM

## 2021-06-22 DIAGNOSIS — E03.9 HYPOTHYROIDISM, UNSPECIFIED TYPE: ICD-10-CM

## 2021-06-22 DIAGNOSIS — I83.893 VARICOSE VEINS OF BOTH LOWER EXTREMITIES WITH COMPLICATIONS: Primary | ICD-10-CM

## 2021-06-22 DIAGNOSIS — Z98.1 S/P LUMBAR FUSION: ICD-10-CM

## 2021-06-22 DIAGNOSIS — I48.11 LONGSTANDING PERSISTENT ATRIAL FIBRILLATION: ICD-10-CM

## 2021-06-22 DIAGNOSIS — K21.9 GASTROESOPHAGEAL REFLUX DISEASE WITHOUT ESOPHAGITIS: ICD-10-CM

## 2021-06-22 DIAGNOSIS — M43.16 SPONDYLOLISTHESIS AT L4-L5 LEVEL: ICD-10-CM

## 2021-06-22 DIAGNOSIS — R60.0 EDEMA OF BOTH LEGS: ICD-10-CM

## 2021-06-22 DIAGNOSIS — M48.061 SPINAL STENOSIS OF LUMBAR REGION WITHOUT NEUROGENIC CLAUDICATION: ICD-10-CM

## 2021-06-22 PROCEDURE — 99999 PR PBB SHADOW E&M-EST. PATIENT-LVL III: ICD-10-PCS | Mod: PBBFAC,,, | Performed by: INTERNAL MEDICINE

## 2021-06-22 PROCEDURE — 99205 OFFICE O/P NEW HI 60 MIN: CPT | Mod: S$GLB,,, | Performed by: INTERNAL MEDICINE

## 2021-06-22 PROCEDURE — 1159F PR MEDICATION LIST DOCUMENTED IN MEDICAL RECORD: ICD-10-PCS | Mod: S$GLB,,, | Performed by: INTERNAL MEDICINE

## 2021-06-22 PROCEDURE — 3008F PR BODY MASS INDEX (BMI) DOCUMENTED: ICD-10-PCS | Mod: CPTII,S$GLB,, | Performed by: INTERNAL MEDICINE

## 2021-06-22 PROCEDURE — 99999 PR PBB SHADOW E&M-EST. PATIENT-LVL III: CPT | Mod: PBBFAC,,, | Performed by: INTERNAL MEDICINE

## 2021-06-22 PROCEDURE — 1157F ADVNC CARE PLAN IN RCRD: CPT | Mod: S$GLB,,, | Performed by: INTERNAL MEDICINE

## 2021-06-22 PROCEDURE — 1157F PR ADVANCE CARE PLAN OR EQUIV PRESENT IN MEDICAL RECORD: ICD-10-PCS | Mod: S$GLB,,, | Performed by: INTERNAL MEDICINE

## 2021-06-22 PROCEDURE — 3008F BODY MASS INDEX DOCD: CPT | Mod: CPTII,S$GLB,, | Performed by: INTERNAL MEDICINE

## 2021-06-22 PROCEDURE — 99205 PR OFFICE/OUTPT VISIT, NEW, LEVL V, 60-74 MIN: ICD-10-PCS | Mod: S$GLB,,, | Performed by: INTERNAL MEDICINE

## 2021-06-22 PROCEDURE — 1159F MED LIST DOCD IN RCRD: CPT | Mod: S$GLB,,, | Performed by: INTERNAL MEDICINE

## 2021-06-25 ENCOUNTER — TELEPHONE (OUTPATIENT)
Dept: ELECTROPHYSIOLOGY | Facility: CLINIC | Age: 71
End: 2021-06-25

## 2021-06-25 ENCOUNTER — RESEARCH ENCOUNTER (OUTPATIENT)
Dept: RESEARCH | Facility: HOSPITAL | Age: 71
End: 2021-06-25

## 2021-06-25 ENCOUNTER — PES CALL (OUTPATIENT)
Dept: ADMINISTRATIVE | Facility: CLINIC | Age: 71
End: 2021-06-25

## 2021-06-25 DIAGNOSIS — I83.93 ASYMPTOMATIC VARICOSE VEINS OF BOTH LOWER EXTREMITIES: Primary | ICD-10-CM

## 2021-06-28 ENCOUNTER — PATIENT MESSAGE (OUTPATIENT)
Dept: ELECTROPHYSIOLOGY | Facility: CLINIC | Age: 71
End: 2021-06-28

## 2021-06-28 ENCOUNTER — OFFICE VISIT (OUTPATIENT)
Dept: ELECTROPHYSIOLOGY | Facility: CLINIC | Age: 71
End: 2021-06-28
Payer: MEDICARE

## 2021-06-28 ENCOUNTER — TELEPHONE (OUTPATIENT)
Dept: ELECTROPHYSIOLOGY | Facility: CLINIC | Age: 71
End: 2021-06-28

## 2021-06-28 VITALS
HEIGHT: 64 IN | SYSTOLIC BLOOD PRESSURE: 100 MMHG | HEART RATE: 92 BPM | DIASTOLIC BLOOD PRESSURE: 63 MMHG | BODY MASS INDEX: 17.28 KG/M2 | WEIGHT: 101.19 LBS

## 2021-06-28 DIAGNOSIS — I48.11 LONGSTANDING PERSISTENT ATRIAL FIBRILLATION: Primary | ICD-10-CM

## 2021-06-28 DIAGNOSIS — I48.0 PAROXYSMAL ATRIAL FIBRILLATION: ICD-10-CM

## 2021-06-28 DIAGNOSIS — Z01.89 ENCOUNTER FOR OTHER SPECIFIED SPECIAL EXAMINATIONS: ICD-10-CM

## 2021-06-28 DIAGNOSIS — I48.91 ATRIAL FIBRILLATION, UNSPECIFIED TYPE: Primary | ICD-10-CM

## 2021-06-28 DIAGNOSIS — F41.9 ANXIETY: ICD-10-CM

## 2021-06-28 DIAGNOSIS — E03.4 HYPOTHYROIDISM DUE TO ACQUIRED ATROPHY OF THYROID: ICD-10-CM

## 2021-06-28 PROCEDURE — 1126F AMNT PAIN NOTED NONE PRSNT: CPT | Mod: S$GLB,,, | Performed by: INTERNAL MEDICINE

## 2021-06-28 PROCEDURE — 99215 OFFICE O/P EST HI 40 MIN: CPT | Mod: S$GLB,,, | Performed by: INTERNAL MEDICINE

## 2021-06-28 PROCEDURE — 3288F FALL RISK ASSESSMENT DOCD: CPT | Mod: CPTII,S$GLB,, | Performed by: INTERNAL MEDICINE

## 2021-06-28 PROCEDURE — 3288F PR FALLS RISK ASSESSMENT DOCUMENTED: ICD-10-PCS | Mod: CPTII,S$GLB,, | Performed by: INTERNAL MEDICINE

## 2021-06-28 PROCEDURE — 1101F PR PT FALLS ASSESS DOC 0-1 FALLS W/OUT INJ PAST YR: ICD-10-PCS | Mod: CPTII,S$GLB,, | Performed by: INTERNAL MEDICINE

## 2021-06-28 PROCEDURE — 99215 PR OFFICE/OUTPT VISIT, EST, LEVL V, 40-54 MIN: ICD-10-PCS | Mod: S$GLB,,, | Performed by: INTERNAL MEDICINE

## 2021-06-28 PROCEDURE — 99999 PR PBB SHADOW E&M-EST. PATIENT-LVL III: ICD-10-PCS | Mod: PBBFAC,,, | Performed by: INTERNAL MEDICINE

## 2021-06-28 PROCEDURE — 1157F PR ADVANCE CARE PLAN OR EQUIV PRESENT IN MEDICAL RECORD: ICD-10-PCS | Mod: S$GLB,,, | Performed by: INTERNAL MEDICINE

## 2021-06-28 PROCEDURE — 1157F ADVNC CARE PLAN IN RCRD: CPT | Mod: S$GLB,,, | Performed by: INTERNAL MEDICINE

## 2021-06-28 PROCEDURE — 1101F PT FALLS ASSESS-DOCD LE1/YR: CPT | Mod: CPTII,S$GLB,, | Performed by: INTERNAL MEDICINE

## 2021-06-28 PROCEDURE — 3008F BODY MASS INDEX DOCD: CPT | Mod: CPTII,S$GLB,, | Performed by: INTERNAL MEDICINE

## 2021-06-28 PROCEDURE — 1126F PR PAIN SEVERITY QUANTIFIED, NO PAIN PRESENT: ICD-10-PCS | Mod: S$GLB,,, | Performed by: INTERNAL MEDICINE

## 2021-06-28 PROCEDURE — 1159F PR MEDICATION LIST DOCUMENTED IN MEDICAL RECORD: ICD-10-PCS | Mod: S$GLB,,, | Performed by: INTERNAL MEDICINE

## 2021-06-28 PROCEDURE — 1159F MED LIST DOCD IN RCRD: CPT | Mod: S$GLB,,, | Performed by: INTERNAL MEDICINE

## 2021-06-28 PROCEDURE — 99999 PR PBB SHADOW E&M-EST. PATIENT-LVL III: CPT | Mod: PBBFAC,,, | Performed by: INTERNAL MEDICINE

## 2021-06-28 PROCEDURE — 3008F PR BODY MASS INDEX (BMI) DOCUMENTED: ICD-10-PCS | Mod: CPTII,S$GLB,, | Performed by: INTERNAL MEDICINE

## 2021-06-28 RX ORDER — PROPAFENONE HYDROCHLORIDE 225 MG/1
225 CAPSULE, EXTENDED RELEASE ORAL EVERY 12 HOURS
Qty: 60 CAPSULE | Refills: 11 | Status: SHIPPED | OUTPATIENT
Start: 2021-06-28 | End: 2021-08-13

## 2021-07-01 ENCOUNTER — HOSPITAL ENCOUNTER (OUTPATIENT)
Dept: CARDIOLOGY | Facility: HOSPITAL | Age: 71
Discharge: HOME | End: 2021-07-01
Attending: INTERNAL MEDICINE
Payer: MEDICARE

## 2021-07-01 DIAGNOSIS — I83.893 VARICOSE VEINS OF BOTH LOWER EXTREMITIES WITH COMPLICATIONS: ICD-10-CM

## 2021-07-01 LAB
IMMEDIATE ARM BP: 127 MMHG
IMMEDIATE LEFT ABI: 1.28
IMMEDIATE LEFT TIBIAL: 162 MMHG
IMMEDIATE RIGHT ABI: 1.16
IMMEDIATE RIGHT TIBIAL: 147 MMHG
LEFT ABI: 1.17
LEFT ARM BP: 126 MMHG
LEFT CALF BP: 178 MMHG
LEFT DORSALIS PEDIS: 128 MMHG
LEFT GREAT SAPHENOUS DISTAL THIGH DIA: 0.18 CM
LEFT GREAT SAPHENOUS JUNCTION DIA: 0.6 CM
LEFT GREAT SAPHENOUS KNEE DIA: 0.25 CM
LEFT GREAT SAPHENOUS MIDDLE THIGH DIA: 0.3 CM
LEFT GREAT SAPHENOUS PROXIMAL CALF DIA: 0.26 CM
LEFT LOWER LEG BP: 151 MMHG
LEFT POSTERIOR TIBIAL: 147 MMHG
LEFT SMALL SAPHENOUS KNEE DIA: 0.27 CM
LEFT SMALL SAPHENOUS SPJ DIA: 0.18 CM
LEFT TBI: 0.4
LEFT TOE PRESSURE: 51 MMHG
LEFT UPPER LEG BP: 201 MMHG
RIGHT ABI: 1.1
RIGHT ARM BP: 121 MMHG
RIGHT CALF BP: 158 MMHG
RIGHT DORSALIS PEDIS: 128 MMHG
RIGHT GREAT SAPHENOUS DISTAL THIGH DIA: 0.2 CM
RIGHT GREAT SAPHENOUS JUNCTION DIA: 0.3 CM
RIGHT GREAT SAPHENOUS KNEE DIA: 0.3 CM
RIGHT GREAT SAPHENOUS KNEE REFLUX: 1050 MS
RIGHT GREAT SAPHENOUS MIDDLE THIGH DIA: 0.32 CM
RIGHT GREAT SAPHENOUS PROXIMAL CALF DIA: 0.18 CM
RIGHT LOWER LEG BP: 153 MMHG
RIGHT POSTERIOR TIBIAL: 138 MMHG
RIGHT SMALL SAPHENOUS KNEE DIA: 0.2 CM
RIGHT SMALL SAPHENOUS SPJ DIA: 0.14 CM
RIGHT TBI: 0.51
RIGHT TOE PRESSURE: 64 MMHG
RIGHT UPPER LEG BP: 215 MMHG
TREADMILL GRADE: 12 %
TREADMILL SPEED: 2 MPH
TREADMILL TIME: 5 MIN

## 2021-07-01 PROCEDURE — 93924 LWR XTR VASC STDY BILAT: CPT | Mod: 50

## 2021-07-01 PROCEDURE — 93924 LWR XTR VASC STDY BILAT: CPT | Mod: 26,,, | Performed by: INTERNAL MEDICINE

## 2021-07-01 PROCEDURE — 93970 EXTREMITY STUDY: CPT | Mod: 26,,, | Performed by: INTERNAL MEDICINE

## 2021-07-01 PROCEDURE — 93970 CV US LOWER VENOUS INSUFFICIENCY BILATERAL (CUPID ONLY): ICD-10-PCS | Mod: 26,,, | Performed by: INTERNAL MEDICINE

## 2021-07-01 PROCEDURE — 93924 SEGMENTAL PRESSURE LOWER EXTREMITY: ICD-10-PCS | Mod: 26,,, | Performed by: INTERNAL MEDICINE

## 2021-07-01 PROCEDURE — 93970 EXTREMITY STUDY: CPT | Mod: TC

## 2021-07-02 ENCOUNTER — PATIENT MESSAGE (OUTPATIENT)
Dept: CARDIOLOGY | Facility: CLINIC | Age: 71
End: 2021-07-02

## 2021-07-06 ENCOUNTER — PATIENT MESSAGE (OUTPATIENT)
Dept: CARDIOLOGY | Facility: CLINIC | Age: 71
End: 2021-07-06

## 2021-07-07 ENCOUNTER — PATIENT MESSAGE (OUTPATIENT)
Dept: ELECTROPHYSIOLOGY | Facility: CLINIC | Age: 71
End: 2021-07-07

## 2021-07-07 ENCOUNTER — LAB VISIT (OUTPATIENT)
Dept: LAB | Facility: HOSPITAL | Age: 71
End: 2021-07-07
Attending: INTERNAL MEDICINE
Payer: MEDICARE

## 2021-07-07 DIAGNOSIS — I48.91 ATRIAL FIBRILLATION, UNSPECIFIED TYPE: ICD-10-CM

## 2021-07-07 LAB
ANION GAP SERPL CALC-SCNC: 8 MMOL/L (ref 8–16)
APTT BLDCRRT: 27.8 SEC (ref 21–32)
BASOPHILS # BLD AUTO: 0.03 K/UL (ref 0–0.2)
BASOPHILS NFR BLD: 0.6 % (ref 0–1.9)
BUN SERPL-MCNC: 13 MG/DL (ref 8–23)
CALCIUM SERPL-MCNC: 9.1 MG/DL (ref 8.7–10.5)
CHLORIDE SERPL-SCNC: 102 MMOL/L (ref 95–110)
CO2 SERPL-SCNC: 29 MMOL/L (ref 23–29)
CREAT SERPL-MCNC: 0.8 MG/DL (ref 0.5–1.4)
DIFFERENTIAL METHOD: ABNORMAL
EOSINOPHIL # BLD AUTO: 0.1 K/UL (ref 0–0.5)
EOSINOPHIL NFR BLD: 1.4 % (ref 0–8)
ERYTHROCYTE [DISTWIDTH] IN BLOOD BY AUTOMATED COUNT: 12.6 % (ref 11.5–14.5)
EST. GFR  (AFRICAN AMERICAN): >60 ML/MIN/1.73 M^2
EST. GFR  (NON AFRICAN AMERICAN): >60 ML/MIN/1.73 M^2
GLUCOSE SERPL-MCNC: 86 MG/DL (ref 70–110)
HCT VFR BLD AUTO: 40.7 % (ref 37–48.5)
HGB BLD-MCNC: 13.4 G/DL (ref 12–16)
IMM GRANULOCYTES # BLD AUTO: 0.01 K/UL (ref 0–0.04)
IMM GRANULOCYTES NFR BLD AUTO: 0.2 % (ref 0–0.5)
INR PPP: 1.1 (ref 0.8–1.2)
LYMPHOCYTES # BLD AUTO: 1.6 K/UL (ref 1–4.8)
LYMPHOCYTES NFR BLD: 32.6 % (ref 18–48)
MCH RBC QN AUTO: 33.6 PG (ref 27–31)
MCHC RBC AUTO-ENTMCNC: 32.9 G/DL (ref 32–36)
MCV RBC AUTO: 102 FL (ref 82–98)
MONOCYTES # BLD AUTO: 0.5 K/UL (ref 0.3–1)
MONOCYTES NFR BLD: 10.2 % (ref 4–15)
NEUTROPHILS # BLD AUTO: 2.7 K/UL (ref 1.8–7.7)
NEUTROPHILS NFR BLD: 55 % (ref 38–73)
NRBC BLD-RTO: 0 /100 WBC
PLATELET # BLD AUTO: 218 K/UL (ref 150–450)
PMV BLD AUTO: 10.6 FL (ref 9.2–12.9)
POTASSIUM SERPL-SCNC: 4.1 MMOL/L (ref 3.5–5.1)
PROTHROMBIN TIME: 11.5 SEC (ref 9–12.5)
RBC # BLD AUTO: 3.99 M/UL (ref 4–5.4)
SODIUM SERPL-SCNC: 139 MMOL/L (ref 136–145)
WBC # BLD AUTO: 4.91 K/UL (ref 3.9–12.7)

## 2021-07-07 PROCEDURE — 36415 COLL VENOUS BLD VENIPUNCTURE: CPT | Performed by: INTERNAL MEDICINE

## 2021-07-07 PROCEDURE — 85025 COMPLETE CBC W/AUTO DIFF WBC: CPT | Performed by: INTERNAL MEDICINE

## 2021-07-07 PROCEDURE — 85610 PROTHROMBIN TIME: CPT | Performed by: INTERNAL MEDICINE

## 2021-07-07 PROCEDURE — 80048 BASIC METABOLIC PNL TOTAL CA: CPT | Performed by: INTERNAL MEDICINE

## 2021-07-07 PROCEDURE — 85730 THROMBOPLASTIN TIME PARTIAL: CPT | Performed by: INTERNAL MEDICINE

## 2021-07-09 ENCOUNTER — PATIENT MESSAGE (OUTPATIENT)
Dept: PRIMARY CARE CLINIC | Facility: CLINIC | Age: 71
End: 2021-07-09

## 2021-07-09 DIAGNOSIS — E03.9 HYPOTHYROIDISM, UNSPECIFIED TYPE: ICD-10-CM

## 2021-07-09 RX ORDER — LEVOTHYROXINE SODIUM 137 UG/1
137 TABLET ORAL
Qty: 30 TABLET | Refills: 0 | Status: SHIPPED | OUTPATIENT
Start: 2021-07-09 | End: 2021-08-09 | Stop reason: SDUPTHER

## 2021-07-10 ENCOUNTER — PATIENT MESSAGE (OUTPATIENT)
Dept: MEDSURG UNIT | Facility: HOSPITAL | Age: 71
End: 2021-07-10

## 2021-07-14 ENCOUNTER — ANESTHESIA EVENT (OUTPATIENT)
Dept: MEDSURG UNIT | Facility: HOSPITAL | Age: 71
End: 2021-07-14
Payer: MEDICARE

## 2021-07-14 ENCOUNTER — HOSPITAL ENCOUNTER (OUTPATIENT)
Facility: HOSPITAL | Age: 71
Discharge: HOME | End: 2021-07-14
Attending: STUDENT IN AN ORGANIZED HEALTH CARE EDUCATION/TRAINING PROGRAM | Admitting: STUDENT IN AN ORGANIZED HEALTH CARE EDUCATION/TRAINING PROGRAM
Payer: MEDICARE

## 2021-07-14 ENCOUNTER — ANESTHESIA (OUTPATIENT)
Dept: MEDSURG UNIT | Facility: HOSPITAL | Age: 71
End: 2021-07-14
Payer: MEDICARE

## 2021-07-14 VITALS
SYSTOLIC BLOOD PRESSURE: 122 MMHG | WEIGHT: 101 LBS | TEMPERATURE: 98 F | OXYGEN SATURATION: 100 % | HEIGHT: 64 IN | DIASTOLIC BLOOD PRESSURE: 86 MMHG | BODY MASS INDEX: 17.24 KG/M2 | HEART RATE: 49 BPM | RESPIRATION RATE: 14 BRPM

## 2021-07-14 DIAGNOSIS — I48.91 ATRIAL FIBRILLATION: ICD-10-CM

## 2021-07-14 DIAGNOSIS — I48.91 ATRIAL FIBRILLATION, UNSPECIFIED TYPE: ICD-10-CM

## 2021-07-14 PROCEDURE — 25000003 PHARM REV CODE 250: Performed by: ANESTHESIOLOGY

## 2021-07-14 PROCEDURE — 37000009 HC ANESTHESIA EA ADD 15 MINS: Performed by: STUDENT IN AN ORGANIZED HEALTH CARE EDUCATION/TRAINING PROGRAM

## 2021-07-14 PROCEDURE — 93005 ELECTROCARDIOGRAM TRACING: CPT

## 2021-07-14 PROCEDURE — 92960 CARDIOVERSION ELECTRIC EXT: CPT | Mod: ,,, | Performed by: STUDENT IN AN ORGANIZED HEALTH CARE EDUCATION/TRAINING PROGRAM

## 2021-07-14 PROCEDURE — 37000008 HC ANESTHESIA 1ST 15 MINUTES: Performed by: STUDENT IN AN ORGANIZED HEALTH CARE EDUCATION/TRAINING PROGRAM

## 2021-07-14 PROCEDURE — 92960 PR CARDIOVERSION, ELECTIVE;EXTERN: ICD-10-PCS | Mod: ,,, | Performed by: STUDENT IN AN ORGANIZED HEALTH CARE EDUCATION/TRAINING PROGRAM

## 2021-07-14 PROCEDURE — 93010 ELECTROCARDIOGRAM REPORT: CPT | Mod: ,,, | Performed by: INTERNAL MEDICINE

## 2021-07-14 PROCEDURE — 25000003 PHARM REV CODE 250: Performed by: STUDENT IN AN ORGANIZED HEALTH CARE EDUCATION/TRAINING PROGRAM

## 2021-07-14 PROCEDURE — 92960 CARDIOVERSION ELECTRIC EXT: CPT | Performed by: STUDENT IN AN ORGANIZED HEALTH CARE EDUCATION/TRAINING PROGRAM

## 2021-07-14 PROCEDURE — 63600175 PHARM REV CODE 636 W HCPCS: Performed by: ANESTHESIOLOGY

## 2021-07-14 PROCEDURE — D9220A PRA ANESTHESIA: ICD-10-PCS | Mod: ,,, | Performed by: ANESTHESIOLOGY

## 2021-07-14 PROCEDURE — D9220A PRA ANESTHESIA: Mod: ,,, | Performed by: ANESTHESIOLOGY

## 2021-07-14 PROCEDURE — 93010 EKG 12-LEAD: ICD-10-PCS | Mod: ,,, | Performed by: INTERNAL MEDICINE

## 2021-07-14 RX ORDER — SODIUM CHLORIDE 0.9 % (FLUSH) 0.9 %
3 SYRINGE (ML) INJECTION
Status: DISCONTINUED | OUTPATIENT
Start: 2021-07-14 | End: 2021-07-14 | Stop reason: HOSPADM

## 2021-07-14 RX ORDER — LIDOCAINE HYDROCHLORIDE 20 MG/ML
INJECTION, SOLUTION EPIDURAL; INFILTRATION; INTRACAUDAL; PERINEURAL
Status: DISCONTINUED | OUTPATIENT
Start: 2021-07-14 | End: 2021-07-14

## 2021-07-14 RX ORDER — METOPROLOL SUCCINATE 25 MG/1
12.5 TABLET, EXTENDED RELEASE ORAL DAILY
Qty: 15 TABLET | Refills: 11 | Status: SHIPPED | OUTPATIENT
Start: 2021-07-14 | End: 2021-08-13

## 2021-07-14 RX ORDER — SILVER SULFADIAZINE 10 G/1000G
CREAM TOPICAL
Status: DISCONTINUED | OUTPATIENT
Start: 2021-07-14 | End: 2021-07-14 | Stop reason: HOSPADM

## 2021-07-14 RX ORDER — METOPROLOL SUCCINATE 25 MG/1
12.5 TABLET, EXTENDED RELEASE ORAL DAILY
Qty: 15 TABLET | Refills: 11 | Status: SHIPPED | OUTPATIENT
Start: 2021-07-14 | End: 2021-07-14 | Stop reason: SDUPTHER

## 2021-07-14 RX ORDER — PROPOFOL 10 MG/ML
VIAL (ML) INTRAVENOUS
Status: DISCONTINUED | OUTPATIENT
Start: 2021-07-14 | End: 2021-07-14

## 2021-07-14 RX ORDER — HYDROMORPHONE HYDROCHLORIDE 1 MG/ML
0.2 INJECTION, SOLUTION INTRAMUSCULAR; INTRAVENOUS; SUBCUTANEOUS EVERY 5 MIN PRN
Status: DISCONTINUED | OUTPATIENT
Start: 2021-07-14 | End: 2021-07-14 | Stop reason: HOSPADM

## 2021-07-14 RX ORDER — ONDANSETRON 2 MG/ML
4 INJECTION INTRAMUSCULAR; INTRAVENOUS DAILY PRN
Status: DISCONTINUED | OUTPATIENT
Start: 2021-07-14 | End: 2021-07-14 | Stop reason: HOSPADM

## 2021-07-14 RX ORDER — MEPERIDINE HYDROCHLORIDE 50 MG/ML
12.5 INJECTION INTRAMUSCULAR; INTRAVENOUS; SUBCUTANEOUS ONCE AS NEEDED
Status: DISCONTINUED | OUTPATIENT
Start: 2021-07-14 | End: 2021-07-14 | Stop reason: HOSPADM

## 2021-07-14 RX ADMIN — LIDOCAINE HYDROCHLORIDE 50 MG: 20 INJECTION, SOLUTION EPIDURAL; INFILTRATION; INTRACAUDAL at 09:07

## 2021-07-14 RX ADMIN — PROPOFOL 100 MG: 10 INJECTION, EMULSION INTRAVENOUS at 09:07

## 2021-07-16 ENCOUNTER — TELEPHONE (OUTPATIENT)
Dept: GASTROENTEROLOGY | Facility: CLINIC | Age: 71
End: 2021-07-16

## 2021-07-16 ENCOUNTER — PATIENT MESSAGE (OUTPATIENT)
Dept: ELECTROPHYSIOLOGY | Facility: CLINIC | Age: 71
End: 2021-07-16

## 2021-07-21 ENCOUNTER — HOSPITAL ENCOUNTER (OUTPATIENT)
Dept: CARDIOLOGY | Facility: CLINIC | Age: 71
Discharge: HOME | End: 2021-07-21
Payer: MEDICARE

## 2021-07-21 DIAGNOSIS — I48.91 ATRIAL FIBRILLATION, UNSPECIFIED TYPE: Primary | ICD-10-CM

## 2021-07-21 DIAGNOSIS — I48.91 ATRIAL FIBRILLATION, UNSPECIFIED TYPE: ICD-10-CM

## 2021-07-21 PROCEDURE — 93005 RHYTHM STRIP: ICD-10-PCS | Mod: S$GLB,,, | Performed by: INTERNAL MEDICINE

## 2021-07-21 PROCEDURE — 93005 ELECTROCARDIOGRAM TRACING: CPT | Mod: S$GLB,,, | Performed by: INTERNAL MEDICINE

## 2021-07-21 PROCEDURE — 93010 RHYTHM STRIP: ICD-10-PCS | Mod: S$GLB,,, | Performed by: INTERNAL MEDICINE

## 2021-07-21 PROCEDURE — 93010 ELECTROCARDIOGRAM REPORT: CPT | Mod: S$GLB,,, | Performed by: INTERNAL MEDICINE

## 2021-07-23 ENCOUNTER — PATIENT MESSAGE (OUTPATIENT)
Dept: SURGERY | Facility: CLINIC | Age: 71
End: 2021-07-23

## 2021-07-23 ENCOUNTER — PATIENT MESSAGE (OUTPATIENT)
Dept: PRIMARY CARE CLINIC | Facility: CLINIC | Age: 71
End: 2021-07-23

## 2021-07-23 ENCOUNTER — PATIENT MESSAGE (OUTPATIENT)
Dept: UROLOGY | Facility: CLINIC | Age: 71
End: 2021-07-23

## 2021-07-27 ENCOUNTER — HOSPITAL ENCOUNTER (OUTPATIENT)
Dept: RADIOLOGY | Facility: OTHER | Age: 71
Discharge: HOME | End: 2021-07-27
Attending: STUDENT IN AN ORGANIZED HEALTH CARE EDUCATION/TRAINING PROGRAM
Payer: MEDICARE

## 2021-07-27 DIAGNOSIS — K76.89 LIVER CYST: ICD-10-CM

## 2021-07-27 DIAGNOSIS — D17.9 ANGIOMYOLIPOMA: ICD-10-CM

## 2021-07-27 PROCEDURE — 76705 ECHO EXAM OF ABDOMEN: CPT | Mod: TC

## 2021-07-27 PROCEDURE — 76705 ECHO EXAM OF ABDOMEN: CPT | Mod: 26,,, | Performed by: RADIOLOGY

## 2021-07-27 PROCEDURE — 76705 US ABDOMEN LIMITED: ICD-10-PCS | Mod: 26,,, | Performed by: RADIOLOGY

## 2021-07-27 PROCEDURE — 76770 US KIDNEY: ICD-10-PCS | Mod: 26,,, | Performed by: RADIOLOGY

## 2021-07-27 PROCEDURE — 76770 US EXAM ABDO BACK WALL COMP: CPT | Mod: 26,,, | Performed by: RADIOLOGY

## 2021-07-27 PROCEDURE — 76770 US EXAM ABDO BACK WALL COMP: CPT | Mod: TC

## 2021-07-31 ENCOUNTER — PATIENT MESSAGE (OUTPATIENT)
Dept: UROLOGY | Facility: CLINIC | Age: 71
End: 2021-07-31

## 2021-07-31 ENCOUNTER — PATIENT MESSAGE (OUTPATIENT)
Dept: SURGERY | Facility: CLINIC | Age: 71
End: 2021-07-31

## 2021-08-02 ENCOUNTER — OFFICE VISIT (OUTPATIENT)
Dept: GASTROENTEROLOGY | Facility: CLINIC | Age: 71
End: 2021-08-02
Payer: MEDICARE

## 2021-08-02 VITALS
HEIGHT: 64 IN | SYSTOLIC BLOOD PRESSURE: 107 MMHG | WEIGHT: 99.44 LBS | DIASTOLIC BLOOD PRESSURE: 68 MMHG | HEART RATE: 86 BPM | BODY MASS INDEX: 16.98 KG/M2

## 2021-08-02 DIAGNOSIS — R13.10 DYSPHAGIA, UNSPECIFIED TYPE: Primary | ICD-10-CM

## 2021-08-02 DIAGNOSIS — I48.91 ATRIAL FIBRILLATION: ICD-10-CM

## 2021-08-02 PROCEDURE — 99999 PR PBB SHADOW E&M-EST. PATIENT-LVL III: ICD-10-PCS | Mod: PBBFAC,GC,, | Performed by: STUDENT IN AN ORGANIZED HEALTH CARE EDUCATION/TRAINING PROGRAM

## 2021-08-02 PROCEDURE — 99213 PR OFFICE/OUTPT VISIT, EST, LEVL III, 20-29 MIN: ICD-10-PCS | Mod: GC,S$GLB,, | Performed by: STUDENT IN AN ORGANIZED HEALTH CARE EDUCATION/TRAINING PROGRAM

## 2021-08-02 PROCEDURE — 99213 OFFICE O/P EST LOW 20 MIN: CPT | Mod: GC,S$GLB,, | Performed by: STUDENT IN AN ORGANIZED HEALTH CARE EDUCATION/TRAINING PROGRAM

## 2021-08-02 PROCEDURE — 99999 PR PBB SHADOW E&M-EST. PATIENT-LVL III: CPT | Mod: PBBFAC,GC,, | Performed by: STUDENT IN AN ORGANIZED HEALTH CARE EDUCATION/TRAINING PROGRAM

## 2021-08-03 ENCOUNTER — PATIENT MESSAGE (OUTPATIENT)
Dept: SURGERY | Facility: CLINIC | Age: 71
End: 2021-08-03

## 2021-08-03 ENCOUNTER — PATIENT MESSAGE (OUTPATIENT)
Dept: PRIMARY CARE CLINIC | Facility: CLINIC | Age: 71
End: 2021-08-03

## 2021-08-04 ENCOUNTER — PATIENT MESSAGE (OUTPATIENT)
Dept: PRIMARY CARE CLINIC | Facility: CLINIC | Age: 71
End: 2021-08-04

## 2021-08-05 ENCOUNTER — TELEPHONE (OUTPATIENT)
Dept: RESEARCH | Facility: HOSPITAL | Age: 71
End: 2021-08-05

## 2021-08-05 ENCOUNTER — TELEPHONE (OUTPATIENT)
Dept: ENDOSCOPY | Facility: HOSPITAL | Age: 71
End: 2021-08-05

## 2021-08-09 ENCOUNTER — PATIENT MESSAGE (OUTPATIENT)
Dept: PRIMARY CARE CLINIC | Facility: CLINIC | Age: 71
End: 2021-08-09

## 2021-08-09 ENCOUNTER — PATIENT MESSAGE (OUTPATIENT)
Dept: CARDIOLOGY | Facility: CLINIC | Age: 71
End: 2021-08-09

## 2021-08-09 DIAGNOSIS — E03.9 HYPOTHYROIDISM, UNSPECIFIED TYPE: ICD-10-CM

## 2021-08-09 RX ORDER — LEVOTHYROXINE SODIUM 137 UG/1
137 TABLET ORAL
Qty: 30 TABLET | Refills: 4 | Status: SHIPPED | OUTPATIENT
Start: 2021-08-09 | End: 2021-12-22 | Stop reason: SDUPTHER

## 2021-08-12 PROBLEM — Z92.89 HISTORY OF CARDIOVERSION: Status: ACTIVE | Noted: 2021-08-12

## 2021-08-12 PROBLEM — I48.91 NEW ONSET A-FIB: Status: RESOLVED | Noted: 2020-07-16 | Resolved: 2021-08-12

## 2021-08-13 ENCOUNTER — OFFICE VISIT (OUTPATIENT)
Dept: ELECTROPHYSIOLOGY | Facility: CLINIC | Age: 71
End: 2021-08-13
Payer: MEDICARE

## 2021-08-13 VITALS
BODY MASS INDEX: 17.05 KG/M2 | SYSTOLIC BLOOD PRESSURE: 94 MMHG | HEART RATE: 94 BPM | HEIGHT: 64 IN | DIASTOLIC BLOOD PRESSURE: 70 MMHG | WEIGHT: 99.88 LBS

## 2021-08-13 DIAGNOSIS — I48.91 ATRIAL FIBRILLATION, UNSPECIFIED TYPE: Primary | ICD-10-CM

## 2021-08-13 DIAGNOSIS — Z92.89 HISTORY OF CARDIOVERSION: ICD-10-CM

## 2021-08-13 DIAGNOSIS — Z79.01 ANTICOAGULANT LONG-TERM USE: ICD-10-CM

## 2021-08-13 PROCEDURE — 93010 RHYTHM STRIP: ICD-10-PCS | Mod: S$GLB,,, | Performed by: INTERNAL MEDICINE

## 2021-08-13 PROCEDURE — 93005 RHYTHM STRIP: ICD-10-PCS | Mod: S$GLB,,, | Performed by: NURSE PRACTITIONER

## 2021-08-13 PROCEDURE — 1159F MED LIST DOCD IN RCRD: CPT | Mod: CPTII,S$GLB,, | Performed by: NURSE PRACTITIONER

## 2021-08-13 PROCEDURE — 99214 PR OFFICE/OUTPT VISIT, EST, LEVL IV, 30-39 MIN: ICD-10-PCS | Mod: S$GLB,,, | Performed by: NURSE PRACTITIONER

## 2021-08-13 PROCEDURE — 1101F PT FALLS ASSESS-DOCD LE1/YR: CPT | Mod: CPTII,S$GLB,, | Performed by: NURSE PRACTITIONER

## 2021-08-13 PROCEDURE — 1160F RVW MEDS BY RX/DR IN RCRD: CPT | Mod: CPTII,S$GLB,, | Performed by: NURSE PRACTITIONER

## 2021-08-13 PROCEDURE — 3074F SYST BP LT 130 MM HG: CPT | Mod: CPTII,S$GLB,, | Performed by: NURSE PRACTITIONER

## 2021-08-13 PROCEDURE — 1126F PR PAIN SEVERITY QUANTIFIED, NO PAIN PRESENT: ICD-10-PCS | Mod: CPTII,S$GLB,, | Performed by: NURSE PRACTITIONER

## 2021-08-13 PROCEDURE — 93005 ELECTROCARDIOGRAM TRACING: CPT | Mod: S$GLB,,, | Performed by: NURSE PRACTITIONER

## 2021-08-13 PROCEDURE — 3288F PR FALLS RISK ASSESSMENT DOCUMENTED: ICD-10-PCS | Mod: CPTII,S$GLB,, | Performed by: NURSE PRACTITIONER

## 2021-08-13 PROCEDURE — 1101F PR PT FALLS ASSESS DOC 0-1 FALLS W/OUT INJ PAST YR: ICD-10-PCS | Mod: CPTII,S$GLB,, | Performed by: NURSE PRACTITIONER

## 2021-08-13 PROCEDURE — 3008F BODY MASS INDEX DOCD: CPT | Mod: CPTII,S$GLB,, | Performed by: NURSE PRACTITIONER

## 2021-08-13 PROCEDURE — 99999 PR PBB SHADOW E&M-EST. PATIENT-LVL V: ICD-10-PCS | Mod: PBBFAC,,, | Performed by: NURSE PRACTITIONER

## 2021-08-13 PROCEDURE — 1157F ADVNC CARE PLAN IN RCRD: CPT | Mod: CPTII,S$GLB,, | Performed by: NURSE PRACTITIONER

## 2021-08-13 PROCEDURE — 3078F PR MOST RECENT DIASTOLIC BLOOD PRESSURE < 80 MM HG: ICD-10-PCS | Mod: CPTII,S$GLB,, | Performed by: NURSE PRACTITIONER

## 2021-08-13 PROCEDURE — 3008F PR BODY MASS INDEX (BMI) DOCUMENTED: ICD-10-PCS | Mod: CPTII,S$GLB,, | Performed by: NURSE PRACTITIONER

## 2021-08-13 PROCEDURE — 3074F PR MOST RECENT SYSTOLIC BLOOD PRESSURE < 130 MM HG: ICD-10-PCS | Mod: CPTII,S$GLB,, | Performed by: NURSE PRACTITIONER

## 2021-08-13 PROCEDURE — 93010 ELECTROCARDIOGRAM REPORT: CPT | Mod: S$GLB,,, | Performed by: INTERNAL MEDICINE

## 2021-08-13 PROCEDURE — 1159F PR MEDICATION LIST DOCUMENTED IN MEDICAL RECORD: ICD-10-PCS | Mod: CPTII,S$GLB,, | Performed by: NURSE PRACTITIONER

## 2021-08-13 PROCEDURE — 1160F PR REVIEW ALL MEDS BY PRESCRIBER/CLIN PHARMACIST DOCUMENTED: ICD-10-PCS | Mod: CPTII,S$GLB,, | Performed by: NURSE PRACTITIONER

## 2021-08-13 PROCEDURE — 99999 PR PBB SHADOW E&M-EST. PATIENT-LVL V: CPT | Mod: PBBFAC,,, | Performed by: NURSE PRACTITIONER

## 2021-08-13 PROCEDURE — 3288F FALL RISK ASSESSMENT DOCD: CPT | Mod: CPTII,S$GLB,, | Performed by: NURSE PRACTITIONER

## 2021-08-13 PROCEDURE — 99214 OFFICE O/P EST MOD 30 MIN: CPT | Mod: S$GLB,,, | Performed by: NURSE PRACTITIONER

## 2021-08-13 PROCEDURE — 1126F AMNT PAIN NOTED NONE PRSNT: CPT | Mod: CPTII,S$GLB,, | Performed by: NURSE PRACTITIONER

## 2021-08-13 PROCEDURE — 1157F PR ADVANCE CARE PLAN OR EQUIV PRESENT IN MEDICAL RECORD: ICD-10-PCS | Mod: CPTII,S$GLB,, | Performed by: NURSE PRACTITIONER

## 2021-08-13 PROCEDURE — 3078F DIAST BP <80 MM HG: CPT | Mod: CPTII,S$GLB,, | Performed by: NURSE PRACTITIONER

## 2021-08-13 RX ORDER — METOPROLOL SUCCINATE 25 MG/1
25 TABLET, EXTENDED RELEASE ORAL DAILY
Qty: 30 TABLET | Refills: 11 | Status: SHIPPED | OUTPATIENT
Start: 2021-08-13 | End: 2022-08-09 | Stop reason: SDUPTHER

## 2021-08-24 ENCOUNTER — CLINICAL SUPPORT (OUTPATIENT)
Dept: CARDIOLOGY | Facility: HOSPITAL | Age: 71
End: 2021-08-24
Attending: NURSE PRACTITIONER
Payer: MEDICARE

## 2021-08-24 DIAGNOSIS — I48.91 ATRIAL FIBRILLATION, UNSPECIFIED TYPE: ICD-10-CM

## 2021-08-24 DIAGNOSIS — E03.9 HYPOTHYROIDISM, UNSPECIFIED TYPE: Primary | ICD-10-CM

## 2021-08-24 DIAGNOSIS — Z12.31 SCREENING MAMMOGRAM, ENCOUNTER FOR: Primary | ICD-10-CM

## 2021-08-24 DIAGNOSIS — Z00.00 ROUTINE ADULT HEALTH MAINTENANCE: ICD-10-CM

## 2021-08-24 DIAGNOSIS — R79.9 ABNORMAL BLOOD CHEMISTRY: ICD-10-CM

## 2021-08-24 PROCEDURE — 93227 XTRNL ECG REC<48 HR R&I: CPT | Mod: ,,, | Performed by: STUDENT IN AN ORGANIZED HEALTH CARE EDUCATION/TRAINING PROGRAM

## 2021-08-24 PROCEDURE — 93227 HOLTER MONITOR - 24 HOUR (CUPID ONLY): ICD-10-PCS | Mod: ,,, | Performed by: STUDENT IN AN ORGANIZED HEALTH CARE EDUCATION/TRAINING PROGRAM

## 2021-08-24 PROCEDURE — 93225 XTRNL ECG REC<48 HRS REC: CPT

## 2021-08-26 ENCOUNTER — PATIENT MESSAGE (OUTPATIENT)
Dept: ENDOSCOPY | Facility: HOSPITAL | Age: 71
End: 2021-08-26

## 2021-08-26 DIAGNOSIS — Z01.818 PRE-OP TESTING: ICD-10-CM

## 2021-08-26 LAB
OHS CV EVENT MONITOR DAY: 0
OHS CV HOLTER LENGTH DECIMAL HOURS: 24
OHS CV HOLTER LENGTH HOURS: 24
OHS CV HOLTER LENGTH MINUTES: 0

## 2021-09-03 ENCOUNTER — PATIENT MESSAGE (OUTPATIENT)
Dept: NEUROSURGERY | Facility: CLINIC | Age: 71
End: 2021-09-03

## 2021-09-24 ENCOUNTER — PATIENT MESSAGE (OUTPATIENT)
Dept: PRIMARY CARE CLINIC | Facility: CLINIC | Age: 71
End: 2021-09-24

## 2021-09-27 ENCOUNTER — PATIENT MESSAGE (OUTPATIENT)
Dept: PRIMARY CARE CLINIC | Facility: CLINIC | Age: 71
End: 2021-09-27

## 2021-09-29 ENCOUNTER — IMMUNIZATION (OUTPATIENT)
Dept: INTERNAL MEDICINE | Facility: CLINIC | Age: 71
End: 2021-09-29
Payer: MEDICARE

## 2021-09-29 DIAGNOSIS — Z23 NEED FOR VACCINATION: Primary | ICD-10-CM

## 2021-09-29 PROCEDURE — 0003A COVID-19, MRNA, LNP-S, PF, 30 MCG/0.3 ML DOSE VACCINE: CPT | Mod: HCNC,CV19,PBBFAC | Performed by: INTERNAL MEDICINE

## 2021-09-29 PROCEDURE — 91300 COVID-19, MRNA, LNP-S, PF, 30 MCG/0.3 ML DOSE VACCINE: CPT | Mod: HCNC,PBBFAC | Performed by: INTERNAL MEDICINE

## 2021-10-03 ENCOUNTER — LAB VISIT (OUTPATIENT)
Dept: SPORTS MEDICINE | Facility: CLINIC | Age: 71
End: 2021-10-03
Payer: MEDICARE

## 2021-10-03 DIAGNOSIS — Z01.818 PRE-OP TESTING: ICD-10-CM

## 2021-10-03 LAB
SARS-COV-2 RNA RESP QL NAA+PROBE: NOT DETECTED
SARS-COV-2- CYCLE NUMBER: NORMAL

## 2021-10-03 PROCEDURE — U0005 INFEC AGEN DETEC AMPLI PROBE: HCPCS | Performed by: FAMILY MEDICINE

## 2021-10-03 PROCEDURE — U0003 INFECTIOUS AGENT DETECTION BY NUCLEIC ACID (DNA OR RNA); SEVERE ACUTE RESPIRATORY SYNDROME CORONAVIRUS 2 (SARS-COV-2) (CORONAVIRUS DISEASE [COVID-19]), AMPLIFIED PROBE TECHNIQUE, MAKING USE OF HIGH THROUGHPUT TECHNOLOGIES AS DESCRIBED BY CMS-2020-01-R: HCPCS | Mod: HCNC | Performed by: FAMILY MEDICINE

## 2021-10-05 ENCOUNTER — PATIENT MESSAGE (OUTPATIENT)
Dept: CARDIOLOGY | Facility: CLINIC | Age: 71
End: 2021-10-05

## 2021-10-05 ENCOUNTER — ANESTHESIA EVENT (OUTPATIENT)
Dept: ENDOSCOPY | Facility: HOSPITAL | Age: 71
End: 2021-10-05
Payer: MEDICARE

## 2021-10-06 ENCOUNTER — ANESTHESIA (OUTPATIENT)
Dept: ENDOSCOPY | Facility: HOSPITAL | Age: 71
End: 2021-10-06
Payer: MEDICARE

## 2021-10-06 ENCOUNTER — HOSPITAL ENCOUNTER (OUTPATIENT)
Facility: HOSPITAL | Age: 71
Discharge: HOME | End: 2021-10-06
Attending: INTERNAL MEDICINE | Admitting: INTERNAL MEDICINE
Payer: MEDICARE

## 2021-10-06 VITALS
HEIGHT: 65 IN | RESPIRATION RATE: 20 BRPM | TEMPERATURE: 98 F | OXYGEN SATURATION: 96 % | BODY MASS INDEX: 17.49 KG/M2 | HEART RATE: 72 BPM | SYSTOLIC BLOOD PRESSURE: 110 MMHG | DIASTOLIC BLOOD PRESSURE: 69 MMHG | WEIGHT: 105 LBS

## 2021-10-06 DIAGNOSIS — R13.10 DYSPHAGIA: ICD-10-CM

## 2021-10-06 PROCEDURE — E9220 PRA ENDO ANESTHESIA: HCPCS | Mod: HCNC,,, | Performed by: NURSE ANESTHETIST, CERTIFIED REGISTERED

## 2021-10-06 PROCEDURE — 37000008 HC ANESTHESIA 1ST 15 MINUTES: Mod: HCNC | Performed by: INTERNAL MEDICINE

## 2021-10-06 PROCEDURE — 37000009 HC ANESTHESIA EA ADD 15 MINS: Mod: HCNC | Performed by: INTERNAL MEDICINE

## 2021-10-06 PROCEDURE — 43249 PR EGD, FLEX, W/BALL DILATION, < 30MM: ICD-10-PCS | Mod: HCNC,,, | Performed by: INTERNAL MEDICINE

## 2021-10-06 PROCEDURE — 43249 ESOPH EGD DILATION <30 MM: CPT | Mod: HCNC,,, | Performed by: INTERNAL MEDICINE

## 2021-10-06 PROCEDURE — 25000003 PHARM REV CODE 250: Mod: HCNC | Performed by: INTERNAL MEDICINE

## 2021-10-06 PROCEDURE — 63600175 PHARM REV CODE 636 W HCPCS: Mod: HCNC | Performed by: NURSE ANESTHETIST, CERTIFIED REGISTERED

## 2021-10-06 PROCEDURE — 43249 ESOPH EGD DILATION <30 MM: CPT | Mod: HCNC | Performed by: INTERNAL MEDICINE

## 2021-10-06 PROCEDURE — C1726 CATH, BAL DIL, NON-VASCULAR: HCPCS | Mod: HCNC | Performed by: INTERNAL MEDICINE

## 2021-10-06 PROCEDURE — E9220 PRA ENDO ANESTHESIA: ICD-10-PCS | Mod: HCNC,,, | Performed by: NURSE ANESTHETIST, CERTIFIED REGISTERED

## 2021-10-06 RX ORDER — APIXABAN 5 MG/1
5 TABLET, FILM COATED ORAL 2 TIMES DAILY
Qty: 180 TABLET | Refills: 3 | Status: SHIPPED | OUTPATIENT
Start: 2021-10-06 | End: 2022-10-24

## 2021-10-06 RX ORDER — SODIUM CHLORIDE 9 MG/ML
INJECTION, SOLUTION INTRAVENOUS CONTINUOUS
Status: DISCONTINUED | OUTPATIENT
Start: 2021-10-06 | End: 2021-10-06 | Stop reason: HOSPADM

## 2021-10-06 RX ORDER — PROPOFOL 10 MG/ML
VIAL (ML) INTRAVENOUS
Status: DISCONTINUED | OUTPATIENT
Start: 2021-10-06 | End: 2021-10-06

## 2021-10-06 RX ORDER — SODIUM CHLORIDE 9 MG/ML
INJECTION, SOLUTION INTRAVENOUS CONTINUOUS
Status: CANCELLED | OUTPATIENT
Start: 2021-10-06

## 2021-10-06 RX ORDER — LIDOCAINE HCL/PF 100 MG/5ML
SYRINGE (ML) INTRAVENOUS
Status: DISCONTINUED | OUTPATIENT
Start: 2021-10-06 | End: 2021-10-06

## 2021-10-06 RX ORDER — PROPOFOL 10 MG/ML
VIAL (ML) INTRAVENOUS CONTINUOUS PRN
Status: DISCONTINUED | OUTPATIENT
Start: 2021-10-06 | End: 2021-10-06

## 2021-10-06 RX ADMIN — SODIUM CHLORIDE: 0.9 INJECTION, SOLUTION INTRAVENOUS at 07:10

## 2021-10-06 RX ADMIN — PROPOFOL 175 MCG/KG/MIN: 10 INJECTION, EMULSION INTRAVENOUS at 08:10

## 2021-10-06 RX ADMIN — PROPOFOL 40 MG: 10 INJECTION, EMULSION INTRAVENOUS at 08:10

## 2021-10-06 RX ADMIN — Medication 50 MG: at 08:10

## 2021-10-07 ENCOUNTER — OFFICE VISIT (OUTPATIENT)
Dept: CARDIOLOGY | Facility: CLINIC | Age: 71
End: 2021-10-07
Payer: MEDICARE

## 2021-10-07 VITALS
HEART RATE: 82 BPM | SYSTOLIC BLOOD PRESSURE: 110 MMHG | WEIGHT: 103.19 LBS | BODY MASS INDEX: 17.19 KG/M2 | HEIGHT: 65 IN | DIASTOLIC BLOOD PRESSURE: 66 MMHG | OXYGEN SATURATION: 99 %

## 2021-10-07 DIAGNOSIS — I87.2 VENOUS INSUFFICIENCY OF BOTH LOWER EXTREMITIES: Primary | ICD-10-CM

## 2021-10-07 DIAGNOSIS — I83.893 VARICOSE VEINS OF BOTH LOWER EXTREMITIES WITH COMPLICATIONS: ICD-10-CM

## 2021-10-07 DIAGNOSIS — R60.0 EDEMA OF BOTH LEGS: ICD-10-CM

## 2021-10-07 PROCEDURE — 99999 PR PBB SHADOW E&M-EST. PATIENT-LVL V: CPT | Mod: PBBFAC,HCNC,, | Performed by: INTERNAL MEDICINE

## 2021-10-07 PROCEDURE — 1126F PR PAIN SEVERITY QUANTIFIED, NO PAIN PRESENT: ICD-10-PCS | Mod: HCNC,CPTII,S$GLB, | Performed by: INTERNAL MEDICINE

## 2021-10-07 PROCEDURE — 3074F PR MOST RECENT SYSTOLIC BLOOD PRESSURE < 130 MM HG: ICD-10-PCS | Mod: HCNC,CPTII,S$GLB, | Performed by: INTERNAL MEDICINE

## 2021-10-07 PROCEDURE — 1159F PR MEDICATION LIST DOCUMENTED IN MEDICAL RECORD: ICD-10-PCS | Mod: HCNC,CPTII,S$GLB, | Performed by: INTERNAL MEDICINE

## 2021-10-07 PROCEDURE — 3078F PR MOST RECENT DIASTOLIC BLOOD PRESSURE < 80 MM HG: ICD-10-PCS | Mod: HCNC,CPTII,S$GLB, | Performed by: INTERNAL MEDICINE

## 2021-10-07 PROCEDURE — 1126F AMNT PAIN NOTED NONE PRSNT: CPT | Mod: HCNC,CPTII,S$GLB, | Performed by: INTERNAL MEDICINE

## 2021-10-07 PROCEDURE — 1101F PT FALLS ASSESS-DOCD LE1/YR: CPT | Mod: HCNC,CPTII,S$GLB, | Performed by: INTERNAL MEDICINE

## 2021-10-07 PROCEDURE — 3074F SYST BP LT 130 MM HG: CPT | Mod: HCNC,CPTII,S$GLB, | Performed by: INTERNAL MEDICINE

## 2021-10-07 PROCEDURE — 1157F ADVNC CARE PLAN IN RCRD: CPT | Mod: HCNC,CPTII,S$GLB, | Performed by: INTERNAL MEDICINE

## 2021-10-07 PROCEDURE — 1159F MED LIST DOCD IN RCRD: CPT | Mod: HCNC,CPTII,S$GLB, | Performed by: INTERNAL MEDICINE

## 2021-10-07 PROCEDURE — 3288F FALL RISK ASSESSMENT DOCD: CPT | Mod: HCNC,CPTII,S$GLB, | Performed by: INTERNAL MEDICINE

## 2021-10-07 PROCEDURE — 3078F DIAST BP <80 MM HG: CPT | Mod: HCNC,CPTII,S$GLB, | Performed by: INTERNAL MEDICINE

## 2021-10-07 PROCEDURE — 99999 PR PBB SHADOW E&M-EST. PATIENT-LVL V: ICD-10-PCS | Mod: PBBFAC,HCNC,, | Performed by: INTERNAL MEDICINE

## 2021-10-07 PROCEDURE — 1157F PR ADVANCE CARE PLAN OR EQUIV PRESENT IN MEDICAL RECORD: ICD-10-PCS | Mod: HCNC,CPTII,S$GLB, | Performed by: INTERNAL MEDICINE

## 2021-10-07 PROCEDURE — 3008F PR BODY MASS INDEX (BMI) DOCUMENTED: ICD-10-PCS | Mod: HCNC,CPTII,S$GLB, | Performed by: INTERNAL MEDICINE

## 2021-10-07 PROCEDURE — 1101F PR PT FALLS ASSESS DOC 0-1 FALLS W/OUT INJ PAST YR: ICD-10-PCS | Mod: HCNC,CPTII,S$GLB, | Performed by: INTERNAL MEDICINE

## 2021-10-07 PROCEDURE — 3288F PR FALLS RISK ASSESSMENT DOCUMENTED: ICD-10-PCS | Mod: HCNC,CPTII,S$GLB, | Performed by: INTERNAL MEDICINE

## 2021-10-07 PROCEDURE — 99215 PR OFFICE/OUTPT VISIT, EST, LEVL V, 40-54 MIN: ICD-10-PCS | Mod: HCNC,S$GLB,, | Performed by: INTERNAL MEDICINE

## 2021-10-07 PROCEDURE — 3008F BODY MASS INDEX DOCD: CPT | Mod: HCNC,CPTII,S$GLB, | Performed by: INTERNAL MEDICINE

## 2021-10-07 PROCEDURE — 99215 OFFICE O/P EST HI 40 MIN: CPT | Mod: HCNC,S$GLB,, | Performed by: INTERNAL MEDICINE

## 2021-10-11 ENCOUNTER — PATIENT MESSAGE (OUTPATIENT)
Dept: PRIMARY CARE CLINIC | Facility: CLINIC | Age: 71
End: 2021-10-11

## 2021-10-11 DIAGNOSIS — J30.2 SEASONAL ALLERGIES: ICD-10-CM

## 2021-10-11 RX ORDER — MONTELUKAST SODIUM 10 MG/1
10 TABLET ORAL DAILY
Qty: 30 TABLET | Refills: 1 | Status: SHIPPED | OUTPATIENT
Start: 2021-10-11 | End: 2021-10-13

## 2021-10-12 ENCOUNTER — DOCUMENTATION ONLY (OUTPATIENT)
Dept: CARDIOLOGY | Facility: CLINIC | Age: 71
End: 2021-10-12

## 2021-10-12 ENCOUNTER — OFFICE VISIT (OUTPATIENT)
Dept: CARDIOLOGY | Facility: CLINIC | Age: 71
End: 2021-10-12
Payer: MEDICARE

## 2021-10-12 ENCOUNTER — EDUCATION (OUTPATIENT)
Dept: CARDIOLOGY | Facility: CLINIC | Age: 71
End: 2021-10-12

## 2021-10-12 VITALS
WEIGHT: 104.25 LBS | HEART RATE: 82 BPM | DIASTOLIC BLOOD PRESSURE: 78 MMHG | SYSTOLIC BLOOD PRESSURE: 124 MMHG | OXYGEN SATURATION: 99 % | HEIGHT: 65 IN | BODY MASS INDEX: 17.37 KG/M2

## 2021-10-12 DIAGNOSIS — I83.893 VARICOSE VEINS OF BOTH LOWER EXTREMITIES WITH COMPLICATIONS: Primary | ICD-10-CM

## 2021-10-12 DIAGNOSIS — R60.0 EDEMA OF BOTH LEGS: ICD-10-CM

## 2021-10-12 DIAGNOSIS — I87.2 VENOUS INSUFFICIENCY OF BOTH LOWER EXTREMITIES: ICD-10-CM

## 2021-10-12 DIAGNOSIS — I48.11 LONGSTANDING PERSISTENT ATRIAL FIBRILLATION: ICD-10-CM

## 2021-10-12 DIAGNOSIS — Z92.89 HISTORY OF CARDIOVERSION: ICD-10-CM

## 2021-10-12 PROCEDURE — 99214 OFFICE O/P EST MOD 30 MIN: CPT | Mod: HCNC,S$GLB,, | Performed by: INTERNAL MEDICINE

## 2021-10-12 PROCEDURE — 3074F PR MOST RECENT SYSTOLIC BLOOD PRESSURE < 130 MM HG: ICD-10-PCS | Mod: HCNC,CPTII,S$GLB, | Performed by: INTERNAL MEDICINE

## 2021-10-12 PROCEDURE — 99214 PR OFFICE/OUTPT VISIT, EST, LEVL IV, 30-39 MIN: ICD-10-PCS | Mod: HCNC,S$GLB,, | Performed by: INTERNAL MEDICINE

## 2021-10-12 PROCEDURE — 99999 PR PBB SHADOW E&M-EST. PATIENT-LVL V: ICD-10-PCS | Mod: PBBFAC,HCNC,, | Performed by: INTERNAL MEDICINE

## 2021-10-12 PROCEDURE — 1126F PR PAIN SEVERITY QUANTIFIED, NO PAIN PRESENT: ICD-10-PCS | Mod: HCNC,CPTII,S$GLB, | Performed by: INTERNAL MEDICINE

## 2021-10-12 PROCEDURE — 3078F PR MOST RECENT DIASTOLIC BLOOD PRESSURE < 80 MM HG: ICD-10-PCS | Mod: HCNC,CPTII,S$GLB, | Performed by: INTERNAL MEDICINE

## 2021-10-12 PROCEDURE — 1160F PR REVIEW ALL MEDS BY PRESCRIBER/CLIN PHARMACIST DOCUMENTED: ICD-10-PCS | Mod: HCNC,CPTII,S$GLB, | Performed by: INTERNAL MEDICINE

## 2021-10-12 PROCEDURE — 1159F PR MEDICATION LIST DOCUMENTED IN MEDICAL RECORD: ICD-10-PCS | Mod: HCNC,CPTII,S$GLB, | Performed by: INTERNAL MEDICINE

## 2021-10-12 PROCEDURE — 3008F BODY MASS INDEX DOCD: CPT | Mod: HCNC,CPTII,S$GLB, | Performed by: INTERNAL MEDICINE

## 2021-10-12 PROCEDURE — 3074F SYST BP LT 130 MM HG: CPT | Mod: HCNC,CPTII,S$GLB, | Performed by: INTERNAL MEDICINE

## 2021-10-12 PROCEDURE — 1157F PR ADVANCE CARE PLAN OR EQUIV PRESENT IN MEDICAL RECORD: ICD-10-PCS | Mod: HCNC,CPTII,S$GLB, | Performed by: INTERNAL MEDICINE

## 2021-10-12 PROCEDURE — 3008F PR BODY MASS INDEX (BMI) DOCUMENTED: ICD-10-PCS | Mod: HCNC,CPTII,S$GLB, | Performed by: INTERNAL MEDICINE

## 2021-10-12 PROCEDURE — 1126F AMNT PAIN NOTED NONE PRSNT: CPT | Mod: HCNC,CPTII,S$GLB, | Performed by: INTERNAL MEDICINE

## 2021-10-12 PROCEDURE — 1159F MED LIST DOCD IN RCRD: CPT | Mod: HCNC,CPTII,S$GLB, | Performed by: INTERNAL MEDICINE

## 2021-10-12 PROCEDURE — 3078F DIAST BP <80 MM HG: CPT | Mod: HCNC,CPTII,S$GLB, | Performed by: INTERNAL MEDICINE

## 2021-10-12 PROCEDURE — 99999 PR PBB SHADOW E&M-EST. PATIENT-LVL V: CPT | Mod: PBBFAC,HCNC,, | Performed by: INTERNAL MEDICINE

## 2021-10-12 PROCEDURE — 1157F ADVNC CARE PLAN IN RCRD: CPT | Mod: HCNC,CPTII,S$GLB, | Performed by: INTERNAL MEDICINE

## 2021-10-12 PROCEDURE — 1160F RVW MEDS BY RX/DR IN RCRD: CPT | Mod: HCNC,CPTII,S$GLB, | Performed by: INTERNAL MEDICINE

## 2021-10-13 RX ORDER — DIAZEPAM 10 MG/1
10 TABLET ORAL ONCE
Qty: 1 TABLET | Refills: 0 | Status: SHIPPED | OUTPATIENT
Start: 2021-10-13 | End: 2021-12-21

## 2021-11-03 ENCOUNTER — OFFICE VISIT (OUTPATIENT)
Dept: CARDIOLOGY | Facility: CLINIC | Age: 71
End: 2021-11-03
Payer: MEDICARE

## 2021-11-03 VITALS
DIASTOLIC BLOOD PRESSURE: 79 MMHG | WEIGHT: 103.19 LBS | HEART RATE: 81 BPM | HEIGHT: 64 IN | BODY MASS INDEX: 17.62 KG/M2 | SYSTOLIC BLOOD PRESSURE: 125 MMHG

## 2021-11-03 DIAGNOSIS — I48.11 LONGSTANDING PERSISTENT ATRIAL FIBRILLATION: Primary | ICD-10-CM

## 2021-11-03 DIAGNOSIS — Z98.1 S/P LUMBAR FUSION: ICD-10-CM

## 2021-11-03 DIAGNOSIS — I83.93 ASYMPTOMATIC VARICOSE VEINS OF BOTH LOWER EXTREMITIES: ICD-10-CM

## 2021-11-03 DIAGNOSIS — K21.9 GASTROESOPHAGEAL REFLUX DISEASE WITHOUT ESOPHAGITIS: ICD-10-CM

## 2021-11-03 DIAGNOSIS — E03.4 HYPOTHYROIDISM DUE TO ACQUIRED ATROPHY OF THYROID: ICD-10-CM

## 2021-11-03 PROBLEM — Z79.01 ANTICOAGULANT LONG-TERM USE: Status: ACTIVE | Noted: 2021-11-03

## 2021-11-03 PROCEDURE — 1157F PR ADVANCE CARE PLAN OR EQUIV PRESENT IN MEDICAL RECORD: ICD-10-PCS | Mod: HCNC,CPTII,S$GLB, | Performed by: INTERNAL MEDICINE

## 2021-11-03 PROCEDURE — 3078F DIAST BP <80 MM HG: CPT | Mod: HCNC,CPTII,S$GLB, | Performed by: INTERNAL MEDICINE

## 2021-11-03 PROCEDURE — 1159F PR MEDICATION LIST DOCUMENTED IN MEDICAL RECORD: ICD-10-PCS | Mod: HCNC,CPTII,S$GLB, | Performed by: INTERNAL MEDICINE

## 2021-11-03 PROCEDURE — 3074F PR MOST RECENT SYSTOLIC BLOOD PRESSURE < 130 MM HG: ICD-10-PCS | Mod: HCNC,CPTII,S$GLB, | Performed by: INTERNAL MEDICINE

## 2021-11-03 PROCEDURE — 1160F RVW MEDS BY RX/DR IN RCRD: CPT | Mod: HCNC,CPTII,S$GLB, | Performed by: INTERNAL MEDICINE

## 2021-11-03 PROCEDURE — 3078F PR MOST RECENT DIASTOLIC BLOOD PRESSURE < 80 MM HG: ICD-10-PCS | Mod: HCNC,CPTII,S$GLB, | Performed by: INTERNAL MEDICINE

## 2021-11-03 PROCEDURE — 99214 OFFICE O/P EST MOD 30 MIN: CPT | Mod: HCNC,S$GLB,, | Performed by: INTERNAL MEDICINE

## 2021-11-03 PROCEDURE — 99999 PR PBB SHADOW E&M-EST. PATIENT-LVL V: CPT | Mod: PBBFAC,HCNC,, | Performed by: INTERNAL MEDICINE

## 2021-11-03 PROCEDURE — 3008F PR BODY MASS INDEX (BMI) DOCUMENTED: ICD-10-PCS | Mod: HCNC,CPTII,S$GLB, | Performed by: INTERNAL MEDICINE

## 2021-11-03 PROCEDURE — 3008F BODY MASS INDEX DOCD: CPT | Mod: HCNC,CPTII,S$GLB, | Performed by: INTERNAL MEDICINE

## 2021-11-03 PROCEDURE — 99999 PR PBB SHADOW E&M-EST. PATIENT-LVL V: ICD-10-PCS | Mod: PBBFAC,HCNC,, | Performed by: INTERNAL MEDICINE

## 2021-11-03 PROCEDURE — 1160F PR REVIEW ALL MEDS BY PRESCRIBER/CLIN PHARMACIST DOCUMENTED: ICD-10-PCS | Mod: HCNC,CPTII,S$GLB, | Performed by: INTERNAL MEDICINE

## 2021-11-03 PROCEDURE — 1159F MED LIST DOCD IN RCRD: CPT | Mod: HCNC,CPTII,S$GLB, | Performed by: INTERNAL MEDICINE

## 2021-11-03 PROCEDURE — 99214 PR OFFICE/OUTPT VISIT, EST, LEVL IV, 30-39 MIN: ICD-10-PCS | Mod: HCNC,S$GLB,, | Performed by: INTERNAL MEDICINE

## 2021-11-03 PROCEDURE — 1157F ADVNC CARE PLAN IN RCRD: CPT | Mod: HCNC,CPTII,S$GLB, | Performed by: INTERNAL MEDICINE

## 2021-11-03 PROCEDURE — 1125F PR PAIN SEVERITY QUANTIFIED, PAIN PRESENT: ICD-10-PCS | Mod: HCNC,CPTII,S$GLB, | Performed by: INTERNAL MEDICINE

## 2021-11-03 PROCEDURE — 1125F AMNT PAIN NOTED PAIN PRSNT: CPT | Mod: HCNC,CPTII,S$GLB, | Performed by: INTERNAL MEDICINE

## 2021-11-03 PROCEDURE — 3074F SYST BP LT 130 MM HG: CPT | Mod: HCNC,CPTII,S$GLB, | Performed by: INTERNAL MEDICINE

## 2021-11-11 ENCOUNTER — HOSPITAL ENCOUNTER (OUTPATIENT)
Dept: CARDIOLOGY | Facility: HOSPITAL | Age: 71
Discharge: HOME | End: 2021-11-11
Payer: MEDICARE

## 2021-11-11 ENCOUNTER — RESEARCH ENCOUNTER (OUTPATIENT)
Dept: RESEARCH | Facility: HOSPITAL | Age: 71
End: 2021-11-11
Payer: MEDICARE

## 2021-11-11 ENCOUNTER — HOSPITAL ENCOUNTER (OUTPATIENT)
Dept: CARDIOLOGY | Facility: HOSPITAL | Age: 71
Discharge: HOME | End: 2021-11-11
Attending: INTERNAL MEDICINE
Payer: MEDICARE

## 2021-11-11 DIAGNOSIS — I83.893 VARICOSE VEINS OF BOTH LOWER EXTREMITIES WITH COMPLICATIONS: ICD-10-CM

## 2021-11-11 DIAGNOSIS — I83.893 VARICOSE VEINS OF BOTH LOWER EXTREMITIES WITH COMPLICATIONS: Primary | ICD-10-CM

## 2021-11-11 PROCEDURE — C1885 CATH, TRANSLUMIN ANGIO LASER: HCPCS

## 2021-11-11 PROCEDURE — 36478 CV US LASER VEIN ABLATION 1ST VEIN WITH IMAGING: ICD-10-PCS | Mod: HCNC,,, | Performed by: INTERNAL MEDICINE

## 2021-11-11 PROCEDURE — 36478 ENDOVENOUS LASER 1ST VEIN: CPT | Mod: HCNC

## 2021-11-11 PROCEDURE — 36478 ENDOVENOUS LASER 1ST VEIN: CPT | Mod: HCNC,,, | Performed by: INTERNAL MEDICINE

## 2021-11-17 ENCOUNTER — OFFICE VISIT (OUTPATIENT)
Dept: ELECTROPHYSIOLOGY | Facility: CLINIC | Age: 71
End: 2021-11-17
Payer: MEDICARE

## 2021-11-17 ENCOUNTER — RESEARCH ENCOUNTER (OUTPATIENT)
Dept: RESEARCH | Facility: HOSPITAL | Age: 71
End: 2021-11-17

## 2021-11-17 ENCOUNTER — HOSPITAL ENCOUNTER (OUTPATIENT)
Dept: CARDIOLOGY | Facility: CLINIC | Age: 71
Discharge: HOME | End: 2021-11-17
Payer: MEDICARE

## 2021-11-17 VITALS
WEIGHT: 101.44 LBS | HEART RATE: 76 BPM | BODY MASS INDEX: 17.32 KG/M2 | HEIGHT: 64 IN | SYSTOLIC BLOOD PRESSURE: 124 MMHG | DIASTOLIC BLOOD PRESSURE: 69 MMHG

## 2021-11-17 DIAGNOSIS — Z79.01 ANTICOAGULANT LONG-TERM USE: ICD-10-CM

## 2021-11-17 DIAGNOSIS — I48.91 ATRIAL FIBRILLATION, UNSPECIFIED TYPE: ICD-10-CM

## 2021-11-17 DIAGNOSIS — I48.19 PERSISTENT ATRIAL FIBRILLATION: Primary | ICD-10-CM

## 2021-11-17 DIAGNOSIS — Z92.89 HISTORY OF CARDIOVERSION: ICD-10-CM

## 2021-11-17 PROCEDURE — 93000 ELECTROCARDIOGRAM COMPLETE: CPT | Mod: HCNC,S$GLB,, | Performed by: INTERNAL MEDICINE

## 2021-11-17 PROCEDURE — 3288F PR FALLS RISK ASSESSMENT DOCUMENTED: ICD-10-PCS | Mod: HCNC,CPTII,S$GLB, | Performed by: NURSE PRACTITIONER

## 2021-11-17 PROCEDURE — 3074F SYST BP LT 130 MM HG: CPT | Mod: HCNC,CPTII,S$GLB, | Performed by: NURSE PRACTITIONER

## 2021-11-17 PROCEDURE — 1157F PR ADVANCE CARE PLAN OR EQUIV PRESENT IN MEDICAL RECORD: ICD-10-PCS | Mod: HCNC,CPTII,S$GLB, | Performed by: NURSE PRACTITIONER

## 2021-11-17 PROCEDURE — 3078F PR MOST RECENT DIASTOLIC BLOOD PRESSURE < 80 MM HG: ICD-10-PCS | Mod: HCNC,CPTII,S$GLB, | Performed by: NURSE PRACTITIONER

## 2021-11-17 PROCEDURE — 3078F DIAST BP <80 MM HG: CPT | Mod: HCNC,CPTII,S$GLB, | Performed by: NURSE PRACTITIONER

## 2021-11-17 PROCEDURE — 99214 PR OFFICE/OUTPT VISIT, EST, LEVL IV, 30-39 MIN: ICD-10-PCS | Mod: HCNC,S$GLB,, | Performed by: NURSE PRACTITIONER

## 2021-11-17 PROCEDURE — 93000 RHYTHM STRIP: ICD-10-PCS | Mod: HCNC,S$GLB,, | Performed by: INTERNAL MEDICINE

## 2021-11-17 PROCEDURE — 1126F PR PAIN SEVERITY QUANTIFIED, NO PAIN PRESENT: ICD-10-PCS | Mod: HCNC,CPTII,S$GLB, | Performed by: NURSE PRACTITIONER

## 2021-11-17 PROCEDURE — 1160F PR REVIEW ALL MEDS BY PRESCRIBER/CLIN PHARMACIST DOCUMENTED: ICD-10-PCS | Mod: HCNC,CPTII,S$GLB, | Performed by: NURSE PRACTITIONER

## 2021-11-17 PROCEDURE — 1101F PR PT FALLS ASSESS DOC 0-1 FALLS W/OUT INJ PAST YR: ICD-10-PCS | Mod: HCNC,CPTII,S$GLB, | Performed by: NURSE PRACTITIONER

## 2021-11-17 PROCEDURE — 1126F AMNT PAIN NOTED NONE PRSNT: CPT | Mod: HCNC,CPTII,S$GLB, | Performed by: NURSE PRACTITIONER

## 2021-11-17 PROCEDURE — 1157F ADVNC CARE PLAN IN RCRD: CPT | Mod: HCNC,CPTII,S$GLB, | Performed by: NURSE PRACTITIONER

## 2021-11-17 PROCEDURE — 1159F MED LIST DOCD IN RCRD: CPT | Mod: HCNC,CPTII,S$GLB, | Performed by: NURSE PRACTITIONER

## 2021-11-17 PROCEDURE — 3074F PR MOST RECENT SYSTOLIC BLOOD PRESSURE < 130 MM HG: ICD-10-PCS | Mod: HCNC,CPTII,S$GLB, | Performed by: NURSE PRACTITIONER

## 2021-11-17 PROCEDURE — 1101F PT FALLS ASSESS-DOCD LE1/YR: CPT | Mod: HCNC,CPTII,S$GLB, | Performed by: NURSE PRACTITIONER

## 2021-11-17 PROCEDURE — 1159F PR MEDICATION LIST DOCUMENTED IN MEDICAL RECORD: ICD-10-PCS | Mod: HCNC,CPTII,S$GLB, | Performed by: NURSE PRACTITIONER

## 2021-11-17 PROCEDURE — 3008F PR BODY MASS INDEX (BMI) DOCUMENTED: ICD-10-PCS | Mod: HCNC,CPTII,S$GLB, | Performed by: NURSE PRACTITIONER

## 2021-11-17 PROCEDURE — 99999 PR PBB SHADOW E&M-EST. PATIENT-LVL V: ICD-10-PCS | Mod: PBBFAC,HCNC,, | Performed by: NURSE PRACTITIONER

## 2021-11-17 PROCEDURE — 99214 OFFICE O/P EST MOD 30 MIN: CPT | Mod: HCNC,S$GLB,, | Performed by: NURSE PRACTITIONER

## 2021-11-17 PROCEDURE — 3288F FALL RISK ASSESSMENT DOCD: CPT | Mod: HCNC,CPTII,S$GLB, | Performed by: NURSE PRACTITIONER

## 2021-11-17 PROCEDURE — 99999 PR PBB SHADOW E&M-EST. PATIENT-LVL V: CPT | Mod: PBBFAC,HCNC,, | Performed by: NURSE PRACTITIONER

## 2021-11-17 PROCEDURE — 3008F BODY MASS INDEX DOCD: CPT | Mod: HCNC,CPTII,S$GLB, | Performed by: NURSE PRACTITIONER

## 2021-11-17 PROCEDURE — 1160F RVW MEDS BY RX/DR IN RCRD: CPT | Mod: HCNC,CPTII,S$GLB, | Performed by: NURSE PRACTITIONER

## 2021-11-18 ENCOUNTER — TELEPHONE (OUTPATIENT)
Dept: RESEARCH | Facility: HOSPITAL | Age: 71
End: 2021-11-18
Payer: MEDICARE

## 2021-11-18 DIAGNOSIS — E87.6 HYPOKALEMIA: ICD-10-CM

## 2021-11-19 RX ORDER — POTASSIUM CHLORIDE 600 MG/1
8 CAPSULE, EXTENDED RELEASE ORAL DAILY
Qty: 30 CAPSULE | Refills: 1 | Status: SHIPPED | OUTPATIENT
Start: 2021-11-19 | End: 2021-11-19

## 2021-11-22 ENCOUNTER — RESEARCH ENCOUNTER (OUTPATIENT)
Dept: RESEARCH | Facility: HOSPITAL | Age: 71
End: 2021-11-22
Payer: MEDICARE

## 2021-11-22 ENCOUNTER — HOSPITAL ENCOUNTER (OUTPATIENT)
Dept: CARDIOLOGY | Facility: HOSPITAL | Age: 71
Discharge: HOME | End: 2021-11-22
Attending: NURSE PRACTITIONER
Payer: MEDICARE

## 2021-11-22 ENCOUNTER — PATIENT MESSAGE (OUTPATIENT)
Dept: ELECTROPHYSIOLOGY | Facility: CLINIC | Age: 71
End: 2021-11-22
Payer: MEDICARE

## 2021-11-22 VITALS
HEART RATE: 75 BPM | BODY MASS INDEX: 17.24 KG/M2 | WEIGHT: 101 LBS | HEIGHT: 64 IN | SYSTOLIC BLOOD PRESSURE: 130 MMHG | DIASTOLIC BLOOD PRESSURE: 70 MMHG

## 2021-11-22 DIAGNOSIS — I48.19 PERSISTENT ATRIAL FIBRILLATION: ICD-10-CM

## 2021-11-22 LAB
ASCENDING AORTA: 3.27 CM
AV INDEX (PROSTH): 0.66
AV MEAN GRADIENT: 2 MMHG
AV PEAK GRADIENT: 3 MMHG
AV VALVE AREA: 2.24 CM2
AV VELOCITY RATIO: 0.66
BSA FOR ECHO PROCEDURE: 1.44 M2
CV ECHO LV RWT: 0.41 CM
DOP CALC AO PEAK VEL: 0.93 M/S
DOP CALC AO VTI: 21.02 CM
DOP CALC LVOT AREA: 3.4 CM2
DOP CALC LVOT DIAMETER: 2.08 CM
DOP CALC LVOT PEAK VEL: 0.61 M/S
DOP CALC LVOT STROKE VOLUME: 47 CM3
DOP CALCLVOT PEAK VEL VTI: 13.84 CM
E/E' RATIO: 9.67 M/S
ECHO LV POSTERIOR WALL: 0.86 CM (ref 0.6–1.1)
EJECTION FRACTION: 50 %
FRACTIONAL SHORTENING: 22 % (ref 28–44)
INTERVENTRICULAR SEPTUM: 0.61 CM (ref 0.6–1.1)
IVRT: 95.15 MSEC
LA MAJOR: 5.93 CM
LA MINOR: 6.04 CM
LA WIDTH: 4.7 CM
LEFT ATRIUM SIZE: 3.89 CM
LEFT ATRIUM VOLUME INDEX MOD: 71 ML/M2
LEFT ATRIUM VOLUME INDEX: 63.7 ML/M2
LEFT ATRIUM VOLUME MOD: 103.73 CM3
LEFT ATRIUM VOLUME: 93 CM3
LEFT INTERNAL DIMENSION IN SYSTOLE: 3.24 CM (ref 2.1–4)
LEFT VENTRICLE DIASTOLIC VOLUME INDEX: 52.58 ML/M2
LEFT VENTRICLE DIASTOLIC VOLUME: 76.76 ML
LEFT VENTRICLE MASS INDEX: 61 G/M2
LEFT VENTRICLE SYSTOLIC VOLUME INDEX: 28.8 ML/M2
LEFT VENTRICLE SYSTOLIC VOLUME: 42.06 ML
LEFT VENTRICULAR INTERNAL DIMENSION IN DIASTOLE: 4.16 CM (ref 3.5–6)
LEFT VENTRICULAR MASS: 89.18 G
LV LATERAL E/E' RATIO: 8.7 M/S
LV SEPTAL E/E' RATIO: 10.88 M/S
MV PEAK E VEL: 0.87 M/S
PISA TR MAX VEL: 2.69 M/S
RA MAJOR: 5.77 CM
RA PRESSURE: 15 MMHG
RA WIDTH: 4.3 CM
RIGHT VENTRICULAR END-DIASTOLIC DIMENSION: 3.28 CM
RV TISSUE DOPPLER FREE WALL SYSTOLIC VELOCITY 1 (APICAL 4 CHAMBER VIEW): 9.12 CM/S
SINUS: 3.22 CM
STJ: 2.62 CM
TDI LATERAL: 0.1 M/S
TDI SEPTAL: 0.08 M/S
TDI: 0.09 M/S
TR MAX PG: 29 MMHG
TRICUSPID ANNULAR PLANE SYSTOLIC EXCURSION: 1.67 CM
TV REST PULMONARY ARTERY PRESSURE: 44 MMHG

## 2021-11-22 PROCEDURE — 93306 TTE W/DOPPLER COMPLETE: CPT | Mod: HCNC

## 2021-11-22 PROCEDURE — 93306 TTE W/DOPPLER COMPLETE: CPT | Mod: 26,HCNC,, | Performed by: INTERNAL MEDICINE

## 2021-11-22 PROCEDURE — 93306 ECHO (CUPID ONLY): ICD-10-PCS | Mod: 26,HCNC,, | Performed by: INTERNAL MEDICINE

## 2021-11-23 ENCOUNTER — TELEPHONE (OUTPATIENT)
Dept: CARDIOLOGY | Facility: HOSPITAL | Age: 71
End: 2021-11-23
Payer: MEDICARE

## 2021-11-23 DIAGNOSIS — I27.20 PULMONARY HYPERTENSION: Primary | ICD-10-CM

## 2021-11-29 ENCOUNTER — HOSPITAL ENCOUNTER (OUTPATIENT)
Dept: RADIOLOGY | Facility: HOSPITAL | Age: 71
Discharge: HOME | End: 2021-11-29
Attending: FAMILY MEDICINE
Payer: MEDICARE

## 2021-11-29 VITALS — HEIGHT: 64 IN | WEIGHT: 105 LBS | BODY MASS INDEX: 17.93 KG/M2

## 2021-11-29 DIAGNOSIS — Z12.31 SCREENING MAMMOGRAM, ENCOUNTER FOR: ICD-10-CM

## 2021-11-29 PROCEDURE — 77067 SCR MAMMO BI INCL CAD: CPT | Mod: 26,HCNC,, | Performed by: RADIOLOGY

## 2021-11-29 PROCEDURE — 77063 BREAST TOMOSYNTHESIS BI: CPT | Mod: 26,HCNC,, | Performed by: RADIOLOGY

## 2021-11-29 PROCEDURE — 77063 MAMMO DIGITAL SCREENING BILAT WITH TOMO: ICD-10-PCS | Mod: 26,HCNC,, | Performed by: RADIOLOGY

## 2021-11-29 PROCEDURE — 77067 MAMMO DIGITAL SCREENING BILAT WITH TOMO: ICD-10-PCS | Mod: 26,HCNC,, | Performed by: RADIOLOGY

## 2021-11-29 PROCEDURE — 77067 SCR MAMMO BI INCL CAD: CPT | Mod: TC,HCNC

## 2021-11-30 ENCOUNTER — PATIENT MESSAGE (OUTPATIENT)
Dept: ELECTROPHYSIOLOGY | Facility: CLINIC | Age: 71
End: 2021-11-30
Payer: MEDICARE

## 2021-12-01 ENCOUNTER — TELEPHONE (OUTPATIENT)
Dept: TRANSPLANT | Facility: CLINIC | Age: 71
End: 2021-12-01
Payer: MEDICARE

## 2021-12-01 DIAGNOSIS — Z79.899 POLYPHARMACY: Primary | ICD-10-CM

## 2021-12-01 DIAGNOSIS — I27.9 CHRONIC PULMONARY HEART DISEASE: ICD-10-CM

## 2021-12-01 DIAGNOSIS — R06.82 TACHYPNEA: ICD-10-CM

## 2021-12-06 ENCOUNTER — TELEPHONE (OUTPATIENT)
Dept: OPTOMETRY | Facility: CLINIC | Age: 71
End: 2021-12-06
Payer: MEDICARE

## 2021-12-07 ENCOUNTER — OFFICE VISIT (OUTPATIENT)
Dept: OPTOMETRY | Facility: CLINIC | Age: 71
End: 2021-12-07
Payer: MEDICARE

## 2021-12-07 DIAGNOSIS — H25.13 NUCLEAR SCLEROSIS, BILATERAL: ICD-10-CM

## 2021-12-07 DIAGNOSIS — H04.123 BILATERAL DRY EYES: Primary | ICD-10-CM

## 2021-12-07 DIAGNOSIS — H40.013 OPEN ANGLE WITH BORDERLINE FINDINGS AND LOW GLAUCOMA RISK IN BOTH EYES: ICD-10-CM

## 2021-12-07 DIAGNOSIS — H52.4 ASTIGMATISM WITH PRESBYOPIA, BILATERAL: ICD-10-CM

## 2021-12-07 DIAGNOSIS — H52.203 ASTIGMATISM WITH PRESBYOPIA, BILATERAL: ICD-10-CM

## 2021-12-07 PROCEDURE — 92012 PR EYE EXAM, EST PATIENT,INTERMED: ICD-10-PCS | Mod: HCNC,S$GLB,, | Performed by: OPTOMETRIST

## 2021-12-07 PROCEDURE — 99999 PR PBB SHADOW E&M-EST. PATIENT-LVL II: ICD-10-PCS | Mod: PBBFAC,HCNC,, | Performed by: OPTOMETRIST

## 2021-12-07 PROCEDURE — 92015 PR REFRACTION: ICD-10-PCS | Mod: HCNC,S$GLB,, | Performed by: OPTOMETRIST

## 2021-12-07 PROCEDURE — 92015 DETERMINE REFRACTIVE STATE: CPT | Mod: HCNC,S$GLB,, | Performed by: OPTOMETRIST

## 2021-12-07 PROCEDURE — 99999 PR PBB SHADOW E&M-EST. PATIENT-LVL II: CPT | Mod: PBBFAC,HCNC,, | Performed by: OPTOMETRIST

## 2021-12-07 PROCEDURE — 92012 INTRM OPH EXAM EST PATIENT: CPT | Mod: HCNC,S$GLB,, | Performed by: OPTOMETRIST

## 2021-12-07 RX ORDER — CYCLOSPORINE 0.5 MG/ML
1 EMULSION OPHTHALMIC 2 TIMES DAILY
Qty: 60 EACH | Refills: 11 | Status: SHIPPED | OUTPATIENT
Start: 2021-12-07 | End: 2024-01-02

## 2021-12-10 ENCOUNTER — TELEPHONE (OUTPATIENT)
Dept: RESEARCH | Facility: HOSPITAL | Age: 71
End: 2021-12-10
Payer: MEDICARE

## 2021-12-14 ENCOUNTER — HOSPITAL ENCOUNTER (OUTPATIENT)
Dept: CARDIOLOGY | Facility: HOSPITAL | Age: 71
Discharge: HOME | End: 2021-12-14
Attending: INTERNAL MEDICINE
Payer: MEDICARE

## 2021-12-14 ENCOUNTER — RESEARCH ENCOUNTER (OUTPATIENT)
Dept: RESEARCH | Facility: HOSPITAL | Age: 71
End: 2021-12-14
Payer: MEDICARE

## 2021-12-14 ENCOUNTER — DOCUMENTATION ONLY (OUTPATIENT)
Dept: CARDIOLOGY | Facility: CLINIC | Age: 71
End: 2021-12-14
Payer: MEDICARE

## 2021-12-14 ENCOUNTER — OFFICE VISIT (OUTPATIENT)
Dept: CARDIOLOGY | Facility: CLINIC | Age: 71
End: 2021-12-14
Payer: MEDICARE

## 2021-12-14 VITALS
BODY MASS INDEX: 17.88 KG/M2 | HEIGHT: 64 IN | HEART RATE: 73 BPM | WEIGHT: 104.75 LBS | DIASTOLIC BLOOD PRESSURE: 88 MMHG | SYSTOLIC BLOOD PRESSURE: 142 MMHG

## 2021-12-14 DIAGNOSIS — I83.893 VARICOSE VEINS OF BOTH LOWER EXTREMITIES WITH COMPLICATIONS: ICD-10-CM

## 2021-12-14 DIAGNOSIS — I83.893 VARICOSE VEINS OF BOTH LOWER EXTREMITIES WITH COMPLICATIONS: Primary | ICD-10-CM

## 2021-12-14 DIAGNOSIS — I48.11 LONGSTANDING PERSISTENT ATRIAL FIBRILLATION: ICD-10-CM

## 2021-12-14 DIAGNOSIS — I87.2 VENOUS INSUFFICIENCY OF BOTH LOWER EXTREMITIES: ICD-10-CM

## 2021-12-14 PROCEDURE — 1157F PR ADVANCE CARE PLAN OR EQUIV PRESENT IN MEDICAL RECORD: ICD-10-PCS | Mod: HCNC,CPTII,S$GLB, | Performed by: INTERNAL MEDICINE

## 2021-12-14 PROCEDURE — 1157F ADVNC CARE PLAN IN RCRD: CPT | Mod: HCNC,CPTII,S$GLB, | Performed by: INTERNAL MEDICINE

## 2021-12-14 PROCEDURE — 99999 PR PBB SHADOW E&M-EST. PATIENT-LVL V: ICD-10-PCS | Mod: PBBFAC,HCNC,, | Performed by: INTERNAL MEDICINE

## 2021-12-14 PROCEDURE — 93971 CV US LOWER VENOUS INSUFFICIENCY LEFT (CUPID ONLY): ICD-10-PCS | Mod: 26,HCNC,LT, | Performed by: INTERNAL MEDICINE

## 2021-12-14 PROCEDURE — 99999 PR PBB SHADOW E&M-EST. PATIENT-LVL V: CPT | Mod: PBBFAC,HCNC,, | Performed by: INTERNAL MEDICINE

## 2021-12-14 PROCEDURE — 99214 OFFICE O/P EST MOD 30 MIN: CPT | Mod: HCNC,S$GLB,, | Performed by: INTERNAL MEDICINE

## 2021-12-14 PROCEDURE — 93971 EXTREMITY STUDY: CPT | Mod: TC,HCNC,LT

## 2021-12-14 PROCEDURE — 93971 EXTREMITY STUDY: CPT | Mod: 26,HCNC,LT, | Performed by: INTERNAL MEDICINE

## 2021-12-14 PROCEDURE — 99214 PR OFFICE/OUTPT VISIT, EST, LEVL IV, 30-39 MIN: ICD-10-PCS | Mod: HCNC,S$GLB,, | Performed by: INTERNAL MEDICINE

## 2021-12-15 ENCOUNTER — LAB VISIT (OUTPATIENT)
Dept: LAB | Facility: HOSPITAL | Age: 71
End: 2021-12-15
Payer: MEDICARE

## 2021-12-15 DIAGNOSIS — R79.9 ABNORMAL BLOOD CHEMISTRY: ICD-10-CM

## 2021-12-15 DIAGNOSIS — E03.9 HYPOTHYROIDISM, UNSPECIFIED TYPE: ICD-10-CM

## 2021-12-15 LAB
ALBUMIN SERPL BCP-MCNC: 3.5 G/DL (ref 3.5–5.2)
ALP SERPL-CCNC: 111 U/L (ref 55–135)
ALT SERPL W/O P-5'-P-CCNC: 28 U/L (ref 10–44)
ANION GAP SERPL CALC-SCNC: 8 MMOL/L (ref 8–16)
AST SERPL-CCNC: 28 U/L (ref 10–40)
BASOPHILS # BLD AUTO: 0.03 K/UL (ref 0–0.2)
BASOPHILS NFR BLD: 0.6 % (ref 0–1.9)
BILIRUB SERPL-MCNC: 0.5 MG/DL (ref 0.1–1)
BUN SERPL-MCNC: 11 MG/DL (ref 8–23)
CALCIUM SERPL-MCNC: 8.9 MG/DL (ref 8.7–10.5)
CHLORIDE SERPL-SCNC: 103 MMOL/L (ref 95–110)
CHOLEST SERPL-MCNC: 187 MG/DL (ref 120–199)
CHOLEST/HDLC SERPL: 2.6 {RATIO} (ref 2–5)
CO2 SERPL-SCNC: 29 MMOL/L (ref 23–29)
CREAT SERPL-MCNC: 0.8 MG/DL (ref 0.5–1.4)
DIFFERENTIAL METHOD: ABNORMAL
EOSINOPHIL # BLD AUTO: 0.1 K/UL (ref 0–0.5)
EOSINOPHIL NFR BLD: 2.2 % (ref 0–8)
ERYTHROCYTE [DISTWIDTH] IN BLOOD BY AUTOMATED COUNT: 12.9 % (ref 11.5–14.5)
EST. GFR  (AFRICAN AMERICAN): >60 ML/MIN/1.73 M^2
EST. GFR  (NON AFRICAN AMERICAN): >60 ML/MIN/1.73 M^2
GLUCOSE SERPL-MCNC: 88 MG/DL (ref 70–110)
HCT VFR BLD AUTO: 41.6 % (ref 37–48.5)
HDLC SERPL-MCNC: 73 MG/DL (ref 40–75)
HDLC SERPL: 39 % (ref 20–50)
HGB BLD-MCNC: 13.5 G/DL (ref 12–16)
IMM GRANULOCYTES # BLD AUTO: 0 K/UL (ref 0–0.04)
IMM GRANULOCYTES NFR BLD AUTO: 0 % (ref 0–0.5)
LDLC SERPL CALC-MCNC: 102.4 MG/DL (ref 63–159)
LYMPHOCYTES # BLD AUTO: 1.8 K/UL (ref 1–4.8)
LYMPHOCYTES NFR BLD: 39.7 % (ref 18–48)
MCH RBC QN AUTO: 32.5 PG (ref 27–31)
MCHC RBC AUTO-ENTMCNC: 32.5 G/DL (ref 32–36)
MCV RBC AUTO: 100 FL (ref 82–98)
MONOCYTES # BLD AUTO: 0.5 K/UL (ref 0.3–1)
MONOCYTES NFR BLD: 9.9 % (ref 4–15)
NEUTROPHILS # BLD AUTO: 2.2 K/UL (ref 1.8–7.7)
NEUTROPHILS NFR BLD: 47.6 % (ref 38–73)
NONHDLC SERPL-MCNC: 114 MG/DL
NRBC BLD-RTO: 0 /100 WBC
PLATELET # BLD AUTO: 258 K/UL (ref 150–450)
PMV BLD AUTO: 10.4 FL (ref 9.2–12.9)
POTASSIUM SERPL-SCNC: 3.6 MMOL/L (ref 3.5–5.1)
PROT SERPL-MCNC: 6.4 G/DL (ref 6–8.4)
RBC # BLD AUTO: 4.15 M/UL (ref 4–5.4)
SODIUM SERPL-SCNC: 140 MMOL/L (ref 136–145)
T4 FREE SERPL-MCNC: 0.93 NG/DL (ref 0.71–1.51)
TRIGL SERPL-MCNC: 58 MG/DL (ref 30–150)
TSH SERPL DL<=0.005 MIU/L-ACNC: 4.82 UIU/ML (ref 0.4–4)
WBC # BLD AUTO: 4.64 K/UL (ref 3.9–12.7)

## 2021-12-15 PROCEDURE — 84443 ASSAY THYROID STIM HORMONE: CPT | Mod: HCNC | Performed by: FAMILY MEDICINE

## 2021-12-15 PROCEDURE — 36415 COLL VENOUS BLD VENIPUNCTURE: CPT | Mod: HCNC | Performed by: FAMILY MEDICINE

## 2021-12-15 PROCEDURE — 80061 LIPID PANEL: CPT | Mod: HCNC | Performed by: FAMILY MEDICINE

## 2021-12-15 PROCEDURE — 85025 COMPLETE CBC W/AUTO DIFF WBC: CPT | Mod: HCNC | Performed by: FAMILY MEDICINE

## 2021-12-15 PROCEDURE — 84439 ASSAY OF FREE THYROXINE: CPT | Mod: HCNC | Performed by: FAMILY MEDICINE

## 2021-12-15 PROCEDURE — 80053 COMPREHEN METABOLIC PANEL: CPT | Mod: HCNC | Performed by: FAMILY MEDICINE

## 2021-12-19 ENCOUNTER — PATIENT MESSAGE (OUTPATIENT)
Dept: OPTOMETRY | Facility: CLINIC | Age: 71
End: 2021-12-19
Payer: MEDICARE

## 2021-12-21 ENCOUNTER — OFFICE VISIT (OUTPATIENT)
Dept: CARDIOLOGY | Facility: CLINIC | Age: 71
End: 2021-12-21
Payer: MEDICARE

## 2021-12-21 VITALS
BODY MASS INDEX: 17.77 KG/M2 | WEIGHT: 104.06 LBS | SYSTOLIC BLOOD PRESSURE: 120 MMHG | OXYGEN SATURATION: 100 % | DIASTOLIC BLOOD PRESSURE: 82 MMHG | HEART RATE: 84 BPM | HEIGHT: 64 IN

## 2021-12-21 DIAGNOSIS — I48.11 LONGSTANDING PERSISTENT ATRIAL FIBRILLATION: Primary | ICD-10-CM

## 2021-12-21 DIAGNOSIS — I87.2 VENOUS INSUFFICIENCY OF BOTH LOWER EXTREMITIES: ICD-10-CM

## 2021-12-21 DIAGNOSIS — R60.0 EDEMA OF BOTH LEGS: ICD-10-CM

## 2021-12-21 DIAGNOSIS — I83.93 ASYMPTOMATIC VARICOSE VEINS OF BOTH LOWER EXTREMITIES: ICD-10-CM

## 2021-12-21 DIAGNOSIS — I83.893 VARICOSE VEINS OF BOTH LOWER EXTREMITIES WITH COMPLICATIONS: ICD-10-CM

## 2021-12-21 DIAGNOSIS — Z79.01 ANTICOAGULANT LONG-TERM USE: ICD-10-CM

## 2021-12-21 PROCEDURE — 1157F PR ADVANCE CARE PLAN OR EQUIV PRESENT IN MEDICAL RECORD: ICD-10-PCS | Mod: HCNC,CPTII,S$GLB, | Performed by: INTERNAL MEDICINE

## 2021-12-21 PROCEDURE — 99999 PR PBB SHADOW E&M-EST. PATIENT-LVL V: CPT | Mod: PBBFAC,HCNC,, | Performed by: INTERNAL MEDICINE

## 2021-12-21 PROCEDURE — 99215 OFFICE O/P EST HI 40 MIN: CPT | Mod: HCNC,S$GLB,, | Performed by: INTERNAL MEDICINE

## 2021-12-21 PROCEDURE — 99499 RISK ADDL DX/OHS AUDIT: ICD-10-PCS | Mod: HCNC,S$GLB,, | Performed by: STUDENT IN AN ORGANIZED HEALTH CARE EDUCATION/TRAINING PROGRAM

## 2021-12-21 PROCEDURE — 1157F ADVNC CARE PLAN IN RCRD: CPT | Mod: HCNC,CPTII,S$GLB, | Performed by: INTERNAL MEDICINE

## 2021-12-21 PROCEDURE — 99215 PR OFFICE/OUTPT VISIT, EST, LEVL V, 40-54 MIN: ICD-10-PCS | Mod: HCNC,S$GLB,, | Performed by: INTERNAL MEDICINE

## 2021-12-21 PROCEDURE — 99999 PR PBB SHADOW E&M-EST. PATIENT-LVL V: ICD-10-PCS | Mod: PBBFAC,HCNC,, | Performed by: INTERNAL MEDICINE

## 2021-12-21 PROCEDURE — 99499 UNLISTED E&M SERVICE: CPT | Mod: HCNC,S$GLB,, | Performed by: STUDENT IN AN ORGANIZED HEALTH CARE EDUCATION/TRAINING PROGRAM

## 2021-12-22 ENCOUNTER — LAB VISIT (OUTPATIENT)
Dept: LAB | Facility: HOSPITAL | Age: 71
End: 2021-12-22
Attending: FAMILY MEDICINE
Payer: MEDICARE

## 2021-12-22 ENCOUNTER — OFFICE VISIT (OUTPATIENT)
Dept: PRIMARY CARE CLINIC | Facility: CLINIC | Age: 71
End: 2021-12-22
Payer: MEDICARE

## 2021-12-22 VITALS
HEART RATE: 99 BPM | BODY MASS INDEX: 17.84 KG/M2 | WEIGHT: 104.5 LBS | TEMPERATURE: 98 F | SYSTOLIC BLOOD PRESSURE: 124 MMHG | OXYGEN SATURATION: 96 % | DIASTOLIC BLOOD PRESSURE: 86 MMHG | HEIGHT: 64 IN

## 2021-12-22 DIAGNOSIS — J30.2 ACUTE SEASONAL ALLERGIC RHINITIS: ICD-10-CM

## 2021-12-22 DIAGNOSIS — E03.8 OTHER SPECIFIED HYPOTHYROIDISM: Primary | ICD-10-CM

## 2021-12-22 DIAGNOSIS — I48.0 PAROXYSMAL ATRIAL FIBRILLATION: ICD-10-CM

## 2021-12-22 DIAGNOSIS — E03.8 OTHER SPECIFIED HYPOTHYROIDISM: ICD-10-CM

## 2021-12-22 DIAGNOSIS — E87.6 HYPOKALEMIA: ICD-10-CM

## 2021-12-22 DIAGNOSIS — K58.9 IRRITABLE BOWEL SYNDROME, UNSPECIFIED TYPE: ICD-10-CM

## 2021-12-22 DIAGNOSIS — R10.30 LOWER ABDOMINAL PAIN: ICD-10-CM

## 2021-12-22 DIAGNOSIS — L70.8 OTHER ACNE: ICD-10-CM

## 2021-12-22 DIAGNOSIS — F41.9 ANXIETY: ICD-10-CM

## 2021-12-22 DIAGNOSIS — B00.9 HSV INFECTION: ICD-10-CM

## 2021-12-22 DIAGNOSIS — E03.9 HYPOTHYROIDISM, UNSPECIFIED TYPE: ICD-10-CM

## 2021-12-22 LAB — TSH SERPL DL<=0.005 MIU/L-ACNC: 2.88 UIU/ML (ref 0.4–4)

## 2021-12-22 PROCEDURE — 99397 PER PM REEVAL EST PAT 65+ YR: CPT | Mod: HCNC,S$GLB,, | Performed by: FAMILY MEDICINE

## 2021-12-22 PROCEDURE — 3288F PR FALLS RISK ASSESSMENT DOCUMENTED: ICD-10-PCS | Mod: HCNC,CPTII,S$GLB, | Performed by: FAMILY MEDICINE

## 2021-12-22 PROCEDURE — 84443 ASSAY THYROID STIM HORMONE: CPT | Mod: HCNC | Performed by: FAMILY MEDICINE

## 2021-12-22 PROCEDURE — 3008F PR BODY MASS INDEX (BMI) DOCUMENTED: ICD-10-PCS | Mod: HCNC,CPTII,S$GLB, | Performed by: FAMILY MEDICINE

## 2021-12-22 PROCEDURE — 3074F PR MOST RECENT SYSTOLIC BLOOD PRESSURE < 130 MM HG: ICD-10-PCS | Mod: HCNC,CPTII,S$GLB, | Performed by: FAMILY MEDICINE

## 2021-12-22 PROCEDURE — 1157F ADVNC CARE PLAN IN RCRD: CPT | Mod: HCNC,CPTII,S$GLB, | Performed by: FAMILY MEDICINE

## 2021-12-22 PROCEDURE — 3079F PR MOST RECENT DIASTOLIC BLOOD PRESSURE 80-89 MM HG: ICD-10-PCS | Mod: HCNC,CPTII,S$GLB, | Performed by: FAMILY MEDICINE

## 2021-12-22 PROCEDURE — 99999 PR PBB SHADOW E&M-EST. PATIENT-LVL V: ICD-10-PCS | Mod: PBBFAC,HCNC,, | Performed by: FAMILY MEDICINE

## 2021-12-22 PROCEDURE — 1101F PT FALLS ASSESS-DOCD LE1/YR: CPT | Mod: HCNC,CPTII,S$GLB, | Performed by: FAMILY MEDICINE

## 2021-12-22 PROCEDURE — 1126F AMNT PAIN NOTED NONE PRSNT: CPT | Mod: HCNC,CPTII,S$GLB, | Performed by: FAMILY MEDICINE

## 2021-12-22 PROCEDURE — 3074F SYST BP LT 130 MM HG: CPT | Mod: HCNC,CPTII,S$GLB, | Performed by: FAMILY MEDICINE

## 2021-12-22 PROCEDURE — 3008F BODY MASS INDEX DOCD: CPT | Mod: HCNC,CPTII,S$GLB, | Performed by: FAMILY MEDICINE

## 2021-12-22 PROCEDURE — 99999 PR PBB SHADOW E&M-EST. PATIENT-LVL V: CPT | Mod: PBBFAC,HCNC,, | Performed by: FAMILY MEDICINE

## 2021-12-22 PROCEDURE — 99397 PR PREVENTIVE VISIT,EST,65 & OVER: ICD-10-PCS | Mod: HCNC,S$GLB,, | Performed by: FAMILY MEDICINE

## 2021-12-22 PROCEDURE — 3288F FALL RISK ASSESSMENT DOCD: CPT | Mod: HCNC,CPTII,S$GLB, | Performed by: FAMILY MEDICINE

## 2021-12-22 PROCEDURE — 3079F DIAST BP 80-89 MM HG: CPT | Mod: HCNC,CPTII,S$GLB, | Performed by: FAMILY MEDICINE

## 2021-12-22 PROCEDURE — 1159F PR MEDICATION LIST DOCUMENTED IN MEDICAL RECORD: ICD-10-PCS | Mod: HCNC,CPTII,S$GLB, | Performed by: FAMILY MEDICINE

## 2021-12-22 PROCEDURE — 1157F PR ADVANCE CARE PLAN OR EQUIV PRESENT IN MEDICAL RECORD: ICD-10-PCS | Mod: HCNC,CPTII,S$GLB, | Performed by: FAMILY MEDICINE

## 2021-12-22 PROCEDURE — 1159F MED LIST DOCD IN RCRD: CPT | Mod: HCNC,CPTII,S$GLB, | Performed by: FAMILY MEDICINE

## 2021-12-22 PROCEDURE — 1101F PR PT FALLS ASSESS DOC 0-1 FALLS W/OUT INJ PAST YR: ICD-10-PCS | Mod: HCNC,CPTII,S$GLB, | Performed by: FAMILY MEDICINE

## 2021-12-22 PROCEDURE — 36415 COLL VENOUS BLD VENIPUNCTURE: CPT | Mod: HCNC,PN | Performed by: FAMILY MEDICINE

## 2021-12-22 PROCEDURE — 1126F PR PAIN SEVERITY QUANTIFIED, NO PAIN PRESENT: ICD-10-PCS | Mod: HCNC,CPTII,S$GLB, | Performed by: FAMILY MEDICINE

## 2021-12-22 RX ORDER — LEVOTHYROXINE SODIUM 137 UG/1
137 TABLET ORAL
Qty: 30 TABLET | Refills: 11 | Status: SHIPPED | OUTPATIENT
Start: 2021-12-22 | End: 2022-07-28 | Stop reason: SDUPTHER

## 2021-12-22 RX ORDER — CLONAZEPAM 1 MG/1
TABLET ORAL
Qty: 45 TABLET | Refills: 0 | Status: SHIPPED | OUTPATIENT
Start: 2021-12-22 | End: 2021-12-22 | Stop reason: SDUPTHER

## 2021-12-22 RX ORDER — POTASSIUM CHLORIDE 600 MG/1
CAPSULE, EXTENDED RELEASE ORAL
Qty: 90 CAPSULE | Refills: 3 | Status: ON HOLD | OUTPATIENT
Start: 2021-12-22 | End: 2022-01-27

## 2021-12-22 RX ORDER — CLONAZEPAM 1 MG/1
TABLET ORAL
Qty: 45 TABLET | Refills: 5 | Status: SHIPPED | OUTPATIENT
Start: 2021-12-22 | End: 2022-07-28 | Stop reason: SDUPTHER

## 2021-12-22 RX ORDER — MONTELUKAST SODIUM 10 MG/1
10 TABLET ORAL DAILY
Qty: 30 TABLET | Refills: 11 | Status: SHIPPED | OUTPATIENT
Start: 2021-12-22 | End: 2023-01-03 | Stop reason: ALTCHOICE

## 2021-12-22 RX ORDER — LUBIPROSTONE 24 UG/1
24 CAPSULE ORAL DAILY PRN
Qty: 30 CAPSULE | Refills: 6 | Status: SHIPPED | OUTPATIENT
Start: 2021-12-22 | End: 2023-01-03 | Stop reason: SDUPTHER

## 2021-12-22 RX ORDER — ACYCLOVIR 400 MG/1
400 TABLET ORAL 2 TIMES DAILY
Qty: 60 TABLET | Refills: 12 | Status: SHIPPED | OUTPATIENT
Start: 2021-12-22 | End: 2023-06-08 | Stop reason: SDUPTHER

## 2021-12-22 RX ORDER — TRETINOIN 1 MG/G
CREAM TOPICAL NIGHTLY
Qty: 20 G | Refills: 6 | Status: SHIPPED | OUTPATIENT
Start: 2021-12-22 | End: 2023-01-03 | Stop reason: SDUPTHER

## 2021-12-22 RX ORDER — BUPROPION HYDROCHLORIDE 150 MG/1
150 TABLET ORAL DAILY
Qty: 90 TABLET | Refills: 2 | Status: SHIPPED | OUTPATIENT
Start: 2021-12-22 | End: 2022-07-28 | Stop reason: SDUPTHER

## 2021-12-22 RX ORDER — FLUTICASONE PROPIONATE 50 MCG
1 SPRAY, SUSPENSION (ML) NASAL DAILY
Qty: 16 G | Refills: 12 | Status: SHIPPED | OUTPATIENT
Start: 2021-12-22 | End: 2024-01-03 | Stop reason: SDUPTHER

## 2021-12-28 ENCOUNTER — HOSPITAL ENCOUNTER (OUTPATIENT)
Dept: PULMONOLOGY | Facility: CLINIC | Age: 71
Discharge: HOME | End: 2021-12-28
Attending: INTERNAL MEDICINE
Payer: MEDICARE

## 2021-12-28 ENCOUNTER — LAB VISIT (OUTPATIENT)
Dept: LAB | Facility: HOSPITAL | Age: 71
End: 2021-12-28
Attending: INTERNAL MEDICINE
Payer: MEDICARE

## 2021-12-28 ENCOUNTER — OFFICE VISIT (OUTPATIENT)
Dept: TRANSPLANT | Facility: CLINIC | Age: 71
End: 2021-12-28
Attending: INTERNAL MEDICINE
Payer: MEDICARE

## 2021-12-28 VITALS
BODY MASS INDEX: 17.84 KG/M2 | HEIGHT: 64 IN | HEART RATE: 71 BPM | WEIGHT: 105.38 LBS | SYSTOLIC BLOOD PRESSURE: 143 MMHG | DIASTOLIC BLOOD PRESSURE: 90 MMHG | HEIGHT: 64 IN | WEIGHT: 104.5 LBS | BODY MASS INDEX: 17.99 KG/M2

## 2021-12-28 DIAGNOSIS — I27.20 PULMONARY HYPERTENSION: Primary | ICD-10-CM

## 2021-12-28 DIAGNOSIS — R06.02 SOB (SHORTNESS OF BREATH): ICD-10-CM

## 2021-12-28 DIAGNOSIS — I27.9 CHRONIC PULMONARY HEART DISEASE: ICD-10-CM

## 2021-12-28 DIAGNOSIS — R06.82 TACHYPNEA: ICD-10-CM

## 2021-12-28 DIAGNOSIS — Z79.899 POLYPHARMACY: ICD-10-CM

## 2021-12-28 LAB
ALBUMIN SERPL BCP-MCNC: 3.5 G/DL (ref 3.5–5.2)
ALP SERPL-CCNC: 108 U/L (ref 55–135)
ALT SERPL W/O P-5'-P-CCNC: 35 U/L (ref 10–44)
ANION GAP SERPL CALC-SCNC: 7 MMOL/L (ref 8–16)
AST SERPL-CCNC: 33 U/L (ref 10–40)
BASOPHILS # BLD AUTO: 0.05 K/UL (ref 0–0.2)
BASOPHILS NFR BLD: 0.9 % (ref 0–1.9)
BILIRUB SERPL-MCNC: 0.3 MG/DL (ref 0.1–1)
BNP SERPL-MCNC: 268 PG/ML (ref 0–99)
BUN SERPL-MCNC: 10 MG/DL (ref 8–23)
CALCIUM SERPL-MCNC: 8.6 MG/DL (ref 8.7–10.5)
CHLORIDE SERPL-SCNC: 105 MMOL/L (ref 95–110)
CO2 SERPL-SCNC: 29 MMOL/L (ref 23–29)
CREAT SERPL-MCNC: 0.7 MG/DL (ref 0.5–1.4)
DIFFERENTIAL METHOD: ABNORMAL
EOSINOPHIL # BLD AUTO: 0.1 K/UL (ref 0–0.5)
EOSINOPHIL NFR BLD: 0.9 % (ref 0–8)
ERYTHROCYTE [DISTWIDTH] IN BLOOD BY AUTOMATED COUNT: 12.7 % (ref 11.5–14.5)
EST. GFR  (AFRICAN AMERICAN): >60 ML/MIN/1.73 M^2
EST. GFR  (NON AFRICAN AMERICAN): >60 ML/MIN/1.73 M^2
GLUCOSE SERPL-MCNC: 79 MG/DL (ref 70–110)
HCT VFR BLD AUTO: 36.9 % (ref 37–48.5)
HGB BLD-MCNC: 12.3 G/DL (ref 12–16)
IMM GRANULOCYTES # BLD AUTO: 0.01 K/UL (ref 0–0.04)
IMM GRANULOCYTES NFR BLD AUTO: 0.2 % (ref 0–0.5)
LYMPHOCYTES # BLD AUTO: 1.5 K/UL (ref 1–4.8)
LYMPHOCYTES NFR BLD: 26.6 % (ref 18–48)
MAGNESIUM SERPL-MCNC: 1.6 MG/DL (ref 1.6–2.6)
MCH RBC QN AUTO: 32.8 PG (ref 27–31)
MCHC RBC AUTO-ENTMCNC: 33.3 G/DL (ref 32–36)
MCV RBC AUTO: 98 FL (ref 82–98)
MONOCYTES # BLD AUTO: 0.4 K/UL (ref 0.3–1)
MONOCYTES NFR BLD: 7.5 % (ref 4–15)
NEUTROPHILS # BLD AUTO: 3.7 K/UL (ref 1.8–7.7)
NEUTROPHILS NFR BLD: 63.9 % (ref 38–73)
NRBC BLD-RTO: 0 /100 WBC
PLATELET # BLD AUTO: 220 K/UL (ref 150–450)
PMV BLD AUTO: 9.9 FL (ref 9.2–12.9)
POTASSIUM SERPL-SCNC: 3.5 MMOL/L (ref 3.5–5.1)
PROT SERPL-MCNC: 6.2 G/DL (ref 6–8.4)
RBC # BLD AUTO: 3.75 M/UL (ref 4–5.4)
SODIUM SERPL-SCNC: 141 MMOL/L (ref 136–145)
WBC # BLD AUTO: 5.71 K/UL (ref 3.9–12.7)

## 2021-12-28 PROCEDURE — 99204 PR OFFICE/OUTPT VISIT, NEW, LEVL IV, 45-59 MIN: ICD-10-PCS | Mod: HCNC,S$GLB,, | Performed by: INTERNAL MEDICINE

## 2021-12-28 PROCEDURE — 94618 PULMONARY STRESS TESTING: ICD-10-PCS | Mod: HCNC,S$GLB,, | Performed by: INTERNAL MEDICINE

## 2021-12-28 PROCEDURE — 83735 ASSAY OF MAGNESIUM: CPT | Mod: HCNC | Performed by: INTERNAL MEDICINE

## 2021-12-28 PROCEDURE — 99999 PR PBB SHADOW E&M-EST. PATIENT-LVL V: CPT | Mod: PBBFAC,HCNC,, | Performed by: INTERNAL MEDICINE

## 2021-12-28 PROCEDURE — 3080F DIAST BP >= 90 MM HG: CPT | Mod: HCNC,CPTII,S$GLB, | Performed by: INTERNAL MEDICINE

## 2021-12-28 PROCEDURE — 80053 COMPREHEN METABOLIC PANEL: CPT | Mod: HCNC | Performed by: INTERNAL MEDICINE

## 2021-12-28 PROCEDURE — 99999 PR PBB SHADOW E&M-EST. PATIENT-LVL V: ICD-10-PCS | Mod: PBBFAC,HCNC,, | Performed by: INTERNAL MEDICINE

## 2021-12-28 PROCEDURE — 1159F MED LIST DOCD IN RCRD: CPT | Mod: HCNC,CPTII,S$GLB, | Performed by: INTERNAL MEDICINE

## 2021-12-28 PROCEDURE — 99499 RISK ADDL DX/OHS AUDIT: ICD-10-PCS | Mod: HCNC,S$GLB,, | Performed by: INTERNAL MEDICINE

## 2021-12-28 PROCEDURE — 3288F FALL RISK ASSESSMENT DOCD: CPT | Mod: HCNC,CPTII,S$GLB, | Performed by: INTERNAL MEDICINE

## 2021-12-28 PROCEDURE — 1157F ADVNC CARE PLAN IN RCRD: CPT | Mod: HCNC,CPTII,S$GLB, | Performed by: INTERNAL MEDICINE

## 2021-12-28 PROCEDURE — 1159F PR MEDICATION LIST DOCUMENTED IN MEDICAL RECORD: ICD-10-PCS | Mod: HCNC,CPTII,S$GLB, | Performed by: INTERNAL MEDICINE

## 2021-12-28 PROCEDURE — 1157F PR ADVANCE CARE PLAN OR EQUIV PRESENT IN MEDICAL RECORD: ICD-10-PCS | Mod: HCNC,CPTII,S$GLB, | Performed by: INTERNAL MEDICINE

## 2021-12-28 PROCEDURE — 1101F PT FALLS ASSESS-DOCD LE1/YR: CPT | Mod: HCNC,CPTII,S$GLB, | Performed by: INTERNAL MEDICINE

## 2021-12-28 PROCEDURE — 1126F PR PAIN SEVERITY QUANTIFIED, NO PAIN PRESENT: ICD-10-PCS | Mod: HCNC,CPTII,S$GLB, | Performed by: INTERNAL MEDICINE

## 2021-12-28 PROCEDURE — 3008F PR BODY MASS INDEX (BMI) DOCUMENTED: ICD-10-PCS | Mod: HCNC,CPTII,S$GLB, | Performed by: INTERNAL MEDICINE

## 2021-12-28 PROCEDURE — 3077F SYST BP >= 140 MM HG: CPT | Mod: HCNC,CPTII,S$GLB, | Performed by: INTERNAL MEDICINE

## 2021-12-28 PROCEDURE — 94618 PULMONARY STRESS TESTING: CPT | Mod: HCNC,S$GLB,, | Performed by: INTERNAL MEDICINE

## 2021-12-28 PROCEDURE — 99499 UNLISTED E&M SERVICE: CPT | Mod: HCNC,S$GLB,, | Performed by: INTERNAL MEDICINE

## 2021-12-28 PROCEDURE — 3288F PR FALLS RISK ASSESSMENT DOCUMENTED: ICD-10-PCS | Mod: HCNC,CPTII,S$GLB, | Performed by: INTERNAL MEDICINE

## 2021-12-28 PROCEDURE — 85025 COMPLETE CBC W/AUTO DIFF WBC: CPT | Mod: HCNC | Performed by: INTERNAL MEDICINE

## 2021-12-28 PROCEDURE — 99204 OFFICE O/P NEW MOD 45 MIN: CPT | Mod: HCNC,S$GLB,, | Performed by: INTERNAL MEDICINE

## 2021-12-28 PROCEDURE — 1101F PR PT FALLS ASSESS DOC 0-1 FALLS W/OUT INJ PAST YR: ICD-10-PCS | Mod: HCNC,CPTII,S$GLB, | Performed by: INTERNAL MEDICINE

## 2021-12-28 PROCEDURE — 1160F RVW MEDS BY RX/DR IN RCRD: CPT | Mod: HCNC,CPTII,S$GLB, | Performed by: INTERNAL MEDICINE

## 2021-12-28 PROCEDURE — 3080F PR MOST RECENT DIASTOLIC BLOOD PRESSURE >= 90 MM HG: ICD-10-PCS | Mod: HCNC,CPTII,S$GLB, | Performed by: INTERNAL MEDICINE

## 2021-12-28 PROCEDURE — 36415 COLL VENOUS BLD VENIPUNCTURE: CPT | Mod: HCNC | Performed by: INTERNAL MEDICINE

## 2021-12-28 PROCEDURE — 1126F AMNT PAIN NOTED NONE PRSNT: CPT | Mod: HCNC,CPTII,S$GLB, | Performed by: INTERNAL MEDICINE

## 2021-12-28 PROCEDURE — 83880 ASSAY OF NATRIURETIC PEPTIDE: CPT | Mod: HCNC | Performed by: INTERNAL MEDICINE

## 2021-12-28 PROCEDURE — 3077F PR MOST RECENT SYSTOLIC BLOOD PRESSURE >= 140 MM HG: ICD-10-PCS | Mod: HCNC,CPTII,S$GLB, | Performed by: INTERNAL MEDICINE

## 2021-12-28 PROCEDURE — 3008F BODY MASS INDEX DOCD: CPT | Mod: HCNC,CPTII,S$GLB, | Performed by: INTERNAL MEDICINE

## 2021-12-28 PROCEDURE — 1160F PR REVIEW ALL MEDS BY PRESCRIBER/CLIN PHARMACIST DOCUMENTED: ICD-10-PCS | Mod: HCNC,CPTII,S$GLB, | Performed by: INTERNAL MEDICINE

## 2021-12-28 NOTE — H&P (VIEW-ONLY)
Subjective:    Patient ID:  Angelina Man is a 71 y.o. female who presents for evaluation of Pulmonary Hypertension.    HPI  71-year-old white female was referred after an echocardiogram demonstrated elevated pulmonary artery pressure after undergoing cardioversion of atrial fibrillation.  In 2021 she was back in AF and on ECHO at that time her CVP was estimated at 15 mm Hg and peak pulmonary artery systolic pressure estimated at 44 mm Hg.  Patient tells me that they have decided not to convert her further from her fibrillation.    She is extremely active.  She exercises regularly doing a combination of Pilates, treated, walking and expanded least 1 hour every day exercising.  She has never had deep venous thrombosis.  She has never had a pulmonary embolism.  She has never had hepatitis and has no history of risk factors for HIV.  No small joint pain or diagnosis of collagen vascular disease.  She does have a history of Raynaud's.    While she exercises regularly she tells me that she feels more difficulty accomplishing this exercise but is able to completed.  She reports  short of breath is mainly at rest.  She gives an example of 1 time she went out of town and had no shortness of breath but when she [place]ness of breath returned.  She sleeps propped up but says this is not for breathing is for general comfort.  She has rods in her neck and back and this is more comfortable for her.  She has no dizzy or lightheaded spells.  No palpitations presyncope or syncope.  She does not feel her atrial fibrillation.      Her son  2 years ago and she reports some stress with that.  She lives alone.    PAST MEDICAL HISTORY:  Raynaud's  Permanent atrial fibrillation  Hypothyroidism  Venous insufficiency    OPERATIONS:  Cervical spine fusion  Lumbar fusion    SOCIAL HISTORY:  She has never smoked cigarettes.  With COVID she got in the habit of drinking 1 glass of  "wine per night prior to that just occasional  No history of illicit drug use    FAMILY HISTORY:  Negative for amyloidosis  Both parents have  mother of lung cancer and father of colon cancer and hypertension.  Review of Systems   Constitutional: Negative for weight gain and weight loss.   Cardiovascular: Positive for leg swelling ( has known venous insufficiency at times has edema seeing Dr. Cedeño).   Musculoskeletal:        She has no history of carpal tunnel syndrome or symptoms of this entity   Gastrointestinal: Positive for constipation (She is prone to constipation but this is old). Negative for change in bowel habit and diarrhea.   Neurological: Negative for dizziness, focal weakness, light-headedness, numbness, paresthesias and sensory change.   Psychiatric/Behavioral: Negative for substance abuse. The patient is nervous/anxious.         Objective:  BP (!) 143/90 (BP Location: Left arm, Patient Position: Sitting, BP Method: Medium (Automatic))   Pulse 71   Ht 5' 4" (1.626 m)   Wt 47.8 kg (105 lb 6.1 oz)   LMP  (LMP Unknown)   BMI 18.09 kg/m²       Physical Exam  Constitutional:       General: She is not in acute distress.     Appearance: She is well-developed. She is not ill-appearing, toxic-appearing or diaphoretic.      Comments: BP (!) 143/90 (BP Location: Left arm, Patient Position: Sitting, BP Method: Medium (Automatic))   Pulse 71   Ht 5' 4" (1.626 m)   Wt 47.8 kg (105 lb 6.1 oz)   LMP  (LMP Unknown)   BMI 18.09 kg/m²   Friendly white female in no acute distress   HENT:      Head: Normocephalic and atraumatic.   Eyes:      General: No scleral icterus.        Right eye: No discharge.         Left eye: No discharge.      Conjunctiva/sclera: Conjunctivae normal.   Neck:      Thyroid: No thyromegaly.      Vascular: No JVD.      Trachea: No tracheal deviation.      Comments: CVP estimated at 6 cm water  Cardiovascular:      Rate and Rhythm: Normal rate and regular rhythm.      Heart sounds: " Normal heart sounds. No murmur heard.  No gallop.    Pulmonary:      Effort: Pulmonary effort is normal.      Breath sounds: Normal breath sounds.   Abdominal:      General: Bowel sounds are normal. There is no distension (Liver span 10 cm).      Palpations: Abdomen is soft. There is no mass.      Tenderness: There is no abdominal tenderness. There is no guarding or rebound.   Musculoskeletal:         General: No tenderness.      Right lower leg: No edema.      Left lower leg: No edema.      Comments: Arthritic changes in the hand particularly on the right involving the fingers and consistent with osteoarthritis with deformity of the ring finger right hand from the arthritis  No evidence of carpal tunnel syndrome   Skin:     General: Skin is warm and dry.   Neurological:      General: No focal deficit present.      Mental Status: She is alert and oriented to person, place, and time. Mental status is at baseline.   Psychiatric:         Mood and Affect: Mood normal.         Behavior: Behavior normal.         Thought Content: Thought content normal.         Judgment: Judgment normal.     Six Minute Walk Test:   427 m                                            O2 sat  100% at rest and 100% post exercise.       Ford 1 prior to exercise and 0.5 post exercise    Echo 11/22/2021  Summary  · The left ventricle is normal in size with low normal systolic function. The estimated ejection fraction is 50%.  · Normal right ventricular size with low normal right ventricular systolic function.  · Biatrial enlargement.  · Mild mitral regurgitation.  · Mild tricuspid regurgitation.  · The estimated PA systolic pressure is 44 mmHg.  · There is pulmonary hypertension.  · Elevated central venous pressure (15 mmHg).  · Trivial circumferential pericardial effusion.  · Atrial fibrillation observed.         EKG 11/17/2021 atrial fibrillation with low-voltage QRS      RHC  has not had one    CXR 07/16/2020 demonstrates cardiomegaly otherwise  unremarkable      CT Chest has not undergone one       PFTs has not undergone one    V/Q Scan  has not undergone one    Lab Results   Component Value Date     (H) 12/28/2021     (H) 07/16/2020     Lab Results   Component Value Date     (H) 12/28/2021     12/28/2021    K 3.5 12/28/2021    MG 1.6 12/28/2021     12/28/2021    CO2 29 12/28/2021    BUN 10 12/28/2021    CREATININE 0.7 12/28/2021    GLU 79 12/28/2021    HGBA1C 5.4 08/24/2015    AST 33 12/28/2021    ALT 35 12/28/2021    ALBUMIN 3.5 12/28/2021    PROT 6.2 12/28/2021    BILITOT 0.3 12/28/2021    WBC 5.71 12/28/2021    HGB 12.3 12/28/2021    HCT 36.9 (L) 12/28/2021     12/28/2021    INR 1.1 07/07/2021    TSH 2.882 12/22/2021    [id number] 6.8 10/27/2014    FREET4 0.93 12/15/2021    CHOL 187 12/15/2021    HDL 73 12/15/2021    LDLCALC 102.4 12/15/2021    TRIG 58 12/15/2021     Assessment:       1. Pulmonary hypertension    2. SOB (shortness of breath)      WHO Group:  I suspect group 2 or overestimation by echo as CVP estimated 15 mm Hg at that time which based upon today's examination was likely incorrect   Functional Class 1 (her shortness of breath occurs at rest)     Plan:   I discussed with the patient the performance of a right heart catheterization to determine/confirm whether she has pulmonary hypertension.  If so, I suspect it is due to diastolic dysfunction in a 71-year-old female who is otherwise healthy and this test will help with that determination if pulmonary hypertension is present as well.  If she has normal pulmonary artery pressures at rest will have her perform exercise in the lab.     She does not require repeat lab as her cardiac output will be determined with thermodilution with plan for exercise during the procedure and there is no need to repeat electrolytes for this study    We discussed the procedure and risk.  We discussed the other option of simply repeating the echocardiogram.  With the  shortness of breath visits she reports we have elected to proceed with a right heart catheterization.    This will be scheduled when I am next in the lab.    Patient has received COVID vaccination

## 2021-12-29 ENCOUNTER — HOSPITAL ENCOUNTER (OUTPATIENT)
Dept: RADIOLOGY | Facility: HOSPITAL | Age: 71
Discharge: HOME | End: 2021-12-29
Attending: FAMILY MEDICINE
Payer: MEDICARE

## 2021-12-29 DIAGNOSIS — R10.30 LOWER ABDOMINAL PAIN: ICD-10-CM

## 2021-12-29 PROCEDURE — 76830 US PELVIS COMP WITH TRANSVAG NON-OB (XPD): ICD-10-PCS | Mod: 26,HCNC,, | Performed by: RADIOLOGY

## 2021-12-29 PROCEDURE — 76830 TRANSVAGINAL US NON-OB: CPT | Mod: 26,HCNC,, | Performed by: RADIOLOGY

## 2021-12-29 PROCEDURE — 76856 US PELVIS COMP WITH TRANSVAG NON-OB (XPD): ICD-10-PCS | Mod: 26,HCNC,, | Performed by: RADIOLOGY

## 2021-12-29 PROCEDURE — 76856 US EXAM PELVIC COMPLETE: CPT | Mod: 26,HCNC,, | Performed by: RADIOLOGY

## 2021-12-29 PROCEDURE — 76856 US EXAM PELVIC COMPLETE: CPT | Mod: TC,HCNC

## 2021-12-31 ENCOUNTER — PATIENT MESSAGE (OUTPATIENT)
Dept: CARDIOLOGY | Facility: CLINIC | Age: 71
End: 2021-12-31
Payer: MEDICARE

## 2022-01-03 RX ORDER — APIXABAN 5 MG/1
5 TABLET, FILM COATED ORAL 2 TIMES DAILY
Qty: 180 TABLET | Refills: 3 | OUTPATIENT
Start: 2022-01-03

## 2022-01-04 ENCOUNTER — OFFICE VISIT (OUTPATIENT)
Dept: OPTOMETRY | Facility: CLINIC | Age: 72
End: 2022-01-04
Payer: MEDICARE

## 2022-01-04 DIAGNOSIS — H52.4 ASTIGMATISM WITH PRESBYOPIA, BILATERAL: Primary | ICD-10-CM

## 2022-01-04 DIAGNOSIS — H52.203 ASTIGMATISM WITH PRESBYOPIA, BILATERAL: Primary | ICD-10-CM

## 2022-01-04 PROCEDURE — 1101F PT FALLS ASSESS-DOCD LE1/YR: CPT | Mod: HCNC,CPTII,S$GLB, | Performed by: OPTOMETRIST

## 2022-01-04 PROCEDURE — 1159F MED LIST DOCD IN RCRD: CPT | Mod: HCNC,CPTII,S$GLB, | Performed by: OPTOMETRIST

## 2022-01-04 PROCEDURE — 99999 PR PBB SHADOW E&M-EST. PATIENT-LVL III: ICD-10-PCS | Mod: PBBFAC,HCNC,, | Performed by: OPTOMETRIST

## 2022-01-04 PROCEDURE — 1160F PR REVIEW ALL MEDS BY PRESCRIBER/CLIN PHARMACIST DOCUMENTED: ICD-10-PCS | Mod: HCNC,CPTII,S$GLB, | Performed by: OPTOMETRIST

## 2022-01-04 PROCEDURE — 1101F PR PT FALLS ASSESS DOC 0-1 FALLS W/OUT INJ PAST YR: ICD-10-PCS | Mod: HCNC,CPTII,S$GLB, | Performed by: OPTOMETRIST

## 2022-01-04 PROCEDURE — 1157F PR ADVANCE CARE PLAN OR EQUIV PRESENT IN MEDICAL RECORD: ICD-10-PCS | Mod: HCNC,CPTII,S$GLB, | Performed by: OPTOMETRIST

## 2022-01-04 PROCEDURE — 1126F AMNT PAIN NOTED NONE PRSNT: CPT | Mod: HCNC,CPTII,S$GLB, | Performed by: OPTOMETRIST

## 2022-01-04 PROCEDURE — 1159F PR MEDICATION LIST DOCUMENTED IN MEDICAL RECORD: ICD-10-PCS | Mod: HCNC,CPTII,S$GLB, | Performed by: OPTOMETRIST

## 2022-01-04 PROCEDURE — 3288F PR FALLS RISK ASSESSMENT DOCUMENTED: ICD-10-PCS | Mod: HCNC,CPTII,S$GLB, | Performed by: OPTOMETRIST

## 2022-01-04 PROCEDURE — 1126F PR PAIN SEVERITY QUANTIFIED, NO PAIN PRESENT: ICD-10-PCS | Mod: HCNC,CPTII,S$GLB, | Performed by: OPTOMETRIST

## 2022-01-04 PROCEDURE — 99499 NO LOS: ICD-10-PCS | Mod: HCNC,S$GLB,, | Performed by: OPTOMETRIST

## 2022-01-04 PROCEDURE — 99499 UNLISTED E&M SERVICE: CPT | Mod: HCNC,S$GLB,, | Performed by: OPTOMETRIST

## 2022-01-04 PROCEDURE — 3288F FALL RISK ASSESSMENT DOCD: CPT | Mod: HCNC,CPTII,S$GLB, | Performed by: OPTOMETRIST

## 2022-01-04 PROCEDURE — 1157F ADVNC CARE PLAN IN RCRD: CPT | Mod: HCNC,CPTII,S$GLB, | Performed by: OPTOMETRIST

## 2022-01-04 PROCEDURE — 99999 PR PBB SHADOW E&M-EST. PATIENT-LVL III: CPT | Mod: PBBFAC,HCNC,, | Performed by: OPTOMETRIST

## 2022-01-04 PROCEDURE — 1160F RVW MEDS BY RX/DR IN RCRD: CPT | Mod: HCNC,CPTII,S$GLB, | Performed by: OPTOMETRIST

## 2022-01-04 NOTE — PROGRESS NOTES
HPI     Patient states she went to the Optical at [facility] and was told she needed   to have her glasses check. Pt c/o of blurry vision at near with her new   glasses on. Patient states her eyes also feel weak and has a heaviness to   it. Patient states she sometimes has a FBS OS.    Eye meds:  Restasis 2-3 x a day OU  Systane Ultra PRN OU    Last edited by Melody Meadows on 1/4/2022  2:36 PM. (History)            Assessment /Plan     For exam results, see Encounter Report.    Astigmatism with presbyopia, bilateral      1. Remake Spectacle Rx given, discussed different options for glasses. RTC prn.

## 2022-01-06 ENCOUNTER — HOSPITAL ENCOUNTER (OUTPATIENT)
Dept: CARDIOLOGY | Facility: HOSPITAL | Age: 72
Discharge: HOME | End: 2022-01-06
Attending: INTERNAL MEDICINE
Payer: MEDICARE

## 2022-01-06 DIAGNOSIS — I83.893 VARICOSE VEINS OF BOTH LOWER EXTREMITIES WITH COMPLICATIONS: ICD-10-CM

## 2022-01-06 PROCEDURE — 36478 ENDOVENOUS LASER 1ST VEIN: CPT | Mod: HCNC,,, | Performed by: INTERNAL MEDICINE

## 2022-01-06 PROCEDURE — 36478 CV US LASER VEIN ABLATION 1ST VEIN WITH IMAGING: ICD-10-PCS | Mod: HCNC,,, | Performed by: INTERNAL MEDICINE

## 2022-01-06 PROCEDURE — 36478 ENDOVENOUS LASER 1ST VEIN: CPT | Mod: HCNC,RT

## 2022-01-06 PROCEDURE — C1885 CATH, TRANSLUMIN ANGIO LASER: HCPCS

## 2022-01-12 DIAGNOSIS — I87.2 VENOUS INSUFFICIENCY OF BOTH LOWER EXTREMITIES: Primary | ICD-10-CM

## 2022-01-13 ENCOUNTER — PATIENT MESSAGE (OUTPATIENT)
Dept: PRIMARY CARE CLINIC | Facility: CLINIC | Age: 72
End: 2022-01-13
Payer: MEDICARE

## 2022-01-14 ENCOUNTER — TELEPHONE (OUTPATIENT)
Dept: TRANSPLANT | Facility: CLINIC | Age: 72
End: 2022-01-14
Payer: MEDICARE

## 2022-01-14 ENCOUNTER — PATIENT MESSAGE (OUTPATIENT)
Dept: MEDSURG UNIT | Facility: HOSPITAL | Age: 72
End: 2022-01-14
Payer: MEDICARE

## 2022-01-14 NOTE — TELEPHONE ENCOUNTER
Received message asking how long she needs to hold Eliquis for RHC by me.  She just needs to hold the night before and day of with day of being the most important.  I cannot reach her so will ask PH nurse to convey and I will send message to her via [employer]

## 2022-01-16 ENCOUNTER — PATIENT MESSAGE (OUTPATIENT)
Dept: TRANSPLANT | Facility: CLINIC | Age: 72
End: 2022-01-16
Payer: MEDICARE

## 2022-01-27 ENCOUNTER — PATIENT MESSAGE (OUTPATIENT)
Dept: MEDSURG UNIT | Facility: HOSPITAL | Age: 72
End: 2022-01-27

## 2022-01-27 ENCOUNTER — HOSPITAL ENCOUNTER (OUTPATIENT)
Facility: HOSPITAL | Age: 72
Discharge: HOME | End: 2022-01-27
Attending: INTERNAL MEDICINE | Admitting: INTERNAL MEDICINE
Payer: MEDICARE

## 2022-01-27 VITALS
OXYGEN SATURATION: 99 % | BODY MASS INDEX: 17.35 KG/M2 | SYSTOLIC BLOOD PRESSURE: 140 MMHG | HEIGHT: 64 IN | DIASTOLIC BLOOD PRESSURE: 81 MMHG | WEIGHT: 101.63 LBS | TEMPERATURE: 98 F | RESPIRATION RATE: 16 BRPM | HEART RATE: 81 BPM

## 2022-01-27 DIAGNOSIS — I51.89 DIASTOLIC DYSFUNCTION: Primary | ICD-10-CM

## 2022-01-27 DIAGNOSIS — I50.9 CHF (CONGESTIVE HEART FAILURE): ICD-10-CM

## 2022-01-27 DIAGNOSIS — R06.02 SOB (SHORTNESS OF BREATH): ICD-10-CM

## 2022-01-27 DIAGNOSIS — I27.20 PULMONARY HYPERTENSION: ICD-10-CM

## 2022-01-27 DIAGNOSIS — I48.11 LONGSTANDING PERSISTENT ATRIAL FIBRILLATION: ICD-10-CM

## 2022-01-27 PROCEDURE — 93451 RIGHT HEART CATH: CPT | Mod: 26,HCNC,GC, | Performed by: INTERNAL MEDICINE

## 2022-01-27 PROCEDURE — 25000003 PHARM REV CODE 250: Mod: HCNC | Performed by: INTERNAL MEDICINE

## 2022-01-27 PROCEDURE — 93451 PR RIGHT HEART CATH O2 SATURATION & CARDIAC OUTPUT: ICD-10-PCS | Mod: 26,HCNC,GC, | Performed by: INTERNAL MEDICINE

## 2022-01-27 PROCEDURE — C1894 INTRO/SHEATH, NON-LASER: HCPCS | Mod: HCNC | Performed by: INTERNAL MEDICINE

## 2022-01-27 PROCEDURE — C1751 CATH, INF, PER/CENT/MIDLINE: HCPCS | Mod: HCNC | Performed by: INTERNAL MEDICINE

## 2022-01-27 PROCEDURE — 93451 RIGHT HEART CATH: CPT | Mod: HCNC,GC | Performed by: INTERNAL MEDICINE

## 2022-01-27 RX ORDER — LIDOCAINE HYDROCHLORIDE 20 MG/ML
INJECTION, SOLUTION EPIDURAL; INFILTRATION; INTRACAUDAL; PERINEURAL
Status: DISCONTINUED | OUTPATIENT
Start: 2022-01-27 | End: 2022-01-27 | Stop reason: HOSPADM

## 2022-01-27 RX ORDER — SPIRONOLACTONE 25 MG/1
25 TABLET ORAL DAILY
Qty: 30 TABLET | Refills: 1 | Status: SHIPPED | OUTPATIENT
Start: 2022-01-27 | End: 2022-03-21 | Stop reason: SDUPTHER

## 2022-01-27 NOTE — Clinical Note
The PA catheter was repositioned to the pulmonary wedge. Hemodynamics were performed. O2 saturation was measured at 96%.

## 2022-01-27 NOTE — PROCEDURES
Brief Operative Note:    : Satnam Pickard Jr., MD     Referring Physician: Satnam Pickard Jr.     All Operators: Surgeon(s):  MD Bal Najera Jr., MD     Preoperative Diagnosis: Pulmonary hypertension [I27.20]     Postop Diagnosis: Exercise induced Pulmonary hypertension [I27.20]    Treatments/Procedures: Procedure(s) (LRB):  INSERTION, CATHETER, RIGHT HEART (Right)    Findings: Exercise induced pulmonary hypertension and diastolic dysfunction is present. Normal PA and wedge pressures (11) before exercise and significant increase in PA and PCW pressures during exercise. Pre exercise CO of 2.63 by thermodilution which increased to 5.37. See catheterization report for full details.    Estimated Blood loss: 20 cc    Specimens removed: No    Patient care discussed with Dr Melly Denis

## 2022-01-27 NOTE — PROGRESS NOTES
Report received from ANTOINE Lawrence. Patient s/p RHC. dressingto R IJ cdi. No complaints from patient. Vss. Call light in reach.

## 2022-01-27 NOTE — Clinical Note
The site was marked. The right neck was prepped. The site was prepped with ChloraPrep. The site was clipped. The patient was draped. The patient was positioned supine.  (Right IJ)

## 2022-01-27 NOTE — OP NOTE
[facility] Lab Post Procedure Note    Patient tolerated the procedure well.   There were no complications.   Please see full report in EPIC for details.

## 2022-01-27 NOTE — PATIENT INSTRUCTIONS
Continue all activities as prior to this admission in the morning    IF NO BLEEDING RESUME ELIQUIS TONIGHT    AFTER THE PROCEDURE:   -DO NOT DRINK OR EAT ANYTHING UNTIL NO LONGER HOARSE   -You may remove the bandage in 24 hours and wash with soap and water.   -You may shower, but do not soak in a tub for three days.   PRECAUTIONS FOR THE NEXT 24 HOURS:   -If you need to cough, sneeze, have a bowel movement, or bear down, hold pressure over your bandage.   -Do not  anything heavier than a gallon of milk(about 5 pounds)   -Avoid excessive bending over.   SYMPTOMS TO WATCH FOR AND REPORT TO YOUR DOCTOR:   -BLEEDING: hold pressure over the site until bleeding stops. Proceed to Emergency Room by ambulance (do not drive yourself) if unable to stop bleeding. Notify your doctor.   -HEMATOMA(hard bruise under the skin): David around the bruise if one develops. Call your doctor if it increases in size or if you have difficulty talking, swallowing, breathing or anything unusual.   SIGNS OF INFECTION:Fever (temperature over 100.5 F), pus or redness   -RASH   -CHEST PAIN OR SHORTNESS OF BREATH   You may call you coordinator in the Heart Failure/Heart Transplant/Pulmonary Hypertension Clinic at [phone] during normal business hours(Monday through Friday from 8 A.M. to 5 P.M.) After hours, call the Heart Transplant Service doctor on call at [phone].

## 2022-01-27 NOTE — INTERVAL H&P NOTE
She feels no different than at the time of the visit  No change in exam  No additional labs were required for this procedure  Will perform thermodilution CO and exercise if needed  She held apixaban last night and this AM as instructed  Fellow obtained consent earlier.  I spoke with her again this AM re: procedure as we also reviewed in clinic  This procedure has been fully reviewed with the patient and written informed consent has been obtained.

## 2022-01-27 NOTE — PROGRESS NOTES
Patient discharged per MD orders. Instructions given on medications, wound care, activity, signs of infection, when to call MD, and follow up appointments. Pt verbalized understanding. Patient refused transport, ambulated off unit.

## 2022-01-27 NOTE — DISCHARGE SUMMARY
[facility] - Short Stay Cardiac Unit  Discharge Note  Short Stay    Procedure(s) (LRB):  INSERTION, CATHETER, RIGHT HEART (Right)    OUTCOME: Patient tolerated treatment/procedure well without complication and is now ready for discharge.    DISPOSITION: home  resume eliquis tonight if no bleeding. stop potassium and start aldactgone tomorow    FINAL DIAGNOSIS:  Diastolic dysfunction    FOLLOWUP: will obtain 24 hr holter.  will obtain EP consult.  will start aldactone 25 mg qd and stop potassium--she will make this channge tomorrow.  Will obtain BMP to check K level on 1/31 and 2/14.  Pressures sandro with exercise consistent with diastolic dysfunction.  Her problem however is at rest.  At time she has salvos of rapid AF with marked increase in PAD.  I am going to determine if spells of uncontrolled AF occur with her SOB at rest.  She will see EP for consideration of ablation to restore sinus.    DISCHARGE INSTRUCTIONS:  See patient instruction sheet, stop potassium after today and start aldactone tomorrow as in f/u above.    TIME SPENT ON DISCHARGE: 20 minutes

## 2022-01-27 NOTE — PROGRESS NOTES
She had [facility] today and during procedure at rest several salvos of AF with RVR.  When this occurs PASP fell but PADP was significantly higher.    At rest PA pressures normal but elevated significantly with exercise    I wonder if uncontrolled AF salvos account for symptoms at rest    She does fine with upright activity.  Discussed diastolic dysfunction and treatment option with aldactone international trial neg but [place] subgroup was positive.  She would like to start.  I am comfortable using baseline lab to start tomorrow and then Monday obtain BMP and also 2 weeks later    I will speak with EP re: observation of rapid beats and obtain holter.  I wonder if patient triggered loop recorder would help as well.

## 2022-01-27 NOTE — Clinical Note
A percutaneous stick to the right internal jugular vein was performed. Ultrasound guidance was used to obtain access.

## 2022-01-28 ENCOUNTER — PATIENT MESSAGE (OUTPATIENT)
Dept: PRIMARY CARE CLINIC | Facility: CLINIC | Age: 72
End: 2022-01-28
Payer: MEDICARE

## 2022-01-31 ENCOUNTER — LAB VISIT (OUTPATIENT)
Dept: LAB | Facility: HOSPITAL | Age: 72
End: 2022-01-31
Attending: INTERNAL MEDICINE
Payer: MEDICARE

## 2022-01-31 ENCOUNTER — PATIENT MESSAGE (OUTPATIENT)
Dept: PRIMARY CARE CLINIC | Facility: CLINIC | Age: 72
End: 2022-01-31
Payer: MEDICARE

## 2022-01-31 DIAGNOSIS — I51.89 DIASTOLIC DYSFUNCTION: ICD-10-CM

## 2022-01-31 LAB
ANION GAP SERPL CALC-SCNC: 10 MMOL/L (ref 8–16)
BUN SERPL-MCNC: 10 MG/DL (ref 8–23)
CALCIUM SERPL-MCNC: 8.8 MG/DL (ref 8.7–10.5)
CHLORIDE SERPL-SCNC: 105 MMOL/L (ref 95–110)
CO2 SERPL-SCNC: 27 MMOL/L (ref 23–29)
CREAT SERPL-MCNC: 0.8 MG/DL (ref 0.5–1.4)
EST. GFR  (AFRICAN AMERICAN): >60 ML/MIN/1.73 M^2
EST. GFR  (NON AFRICAN AMERICAN): >60 ML/MIN/1.73 M^2
GLUCOSE SERPL-MCNC: 86 MG/DL (ref 70–110)
POTASSIUM SERPL-SCNC: 3.7 MMOL/L (ref 3.5–5.1)
SODIUM SERPL-SCNC: 142 MMOL/L (ref 136–145)

## 2022-01-31 PROCEDURE — 80048 BASIC METABOLIC PNL TOTAL CA: CPT | Mod: HCNC | Performed by: INTERNAL MEDICINE

## 2022-01-31 PROCEDURE — 36415 COLL VENOUS BLD VENIPUNCTURE: CPT | Mod: HCNC | Performed by: INTERNAL MEDICINE

## 2022-01-31 NOTE — OP NOTE
[facility] Lab Post Procedure Note    Patient tolerated the procedure well.   There were no complications.   Please see full report in EPIC for details.

## 2022-01-31 NOTE — DISCHARGE SUMMARY
[facility] Lab Post Procedure Note    Patient tolerated the procedure well.   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   Please see full report in EPIC for details.

## 2022-01-31 NOTE — DISCHARGE SUMMARY
[facility] - Short Stay Cardiac Unit  Discharge Note  Short Stay    Procedure(s) (LRB):  INSERTION, CATHETER, RIGHT HEART (Right)    OUTCOME: Patient tolerated treatment/procedure well without complication and is now ready for discharge.  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  Explained to patient and she is willing to proceed.    DISPOSITION: Home or Self Care    FINAL DIAGNOSIS:  Diastolic dysfunction    FOLLOWUP: None needed with HTS once aldactone tolerance with lab confirmed will transition this script to her referring physician.     DISCHARGE INSTRUCTIONS:  See patient instructions; resume eliquis tonight  Start aldactone 25 mg qd Friday and stop potassium.  Obtain BMP on Monday and 2 weeks later.    TIME SPENT ON DISCHARGE: 15 minutes

## 2022-01-31 NOTE — DISCHARGE SUMMARY
[facility] - Short Stay Cardiac Unit  Discharge Note  Short Stay    Procedure(s) (LRB):  INSERTION, CATHETER, RIGHT HEART (Right)    OUTCOME: Patient tolerated treatment/procedure well without complication and is now ready for discharge.    DISPOSITION: Home or Self Care    FINAL DIAGNOSIS:  Diastolic dysfunction    FOLLOWUP: obtain BMP on Monday and 2 weeks later to determine K responsde to aldactone 25 mg qd with discontinuation of potassium.  Long term she will not require f/u with HTS.    DISCHARGE INSTRUCTIONS:  No discharge procedures on file.     TIME SPENT ON DISCHARGE: 15 minutes

## 2022-02-02 DIAGNOSIS — Z71.9 HEALTH EDUCATION/COUNSELING: Primary | ICD-10-CM

## 2022-02-08 DIAGNOSIS — Z71.9 HEALTH EDUCATION/COUNSELING: Primary | ICD-10-CM

## 2022-02-14 ENCOUNTER — LAB VISIT (OUTPATIENT)
Dept: LAB | Facility: HOSPITAL | Age: 72
End: 2022-02-14
Attending: INTERNAL MEDICINE
Payer: MEDICARE

## 2022-02-14 ENCOUNTER — CLINICAL SUPPORT (OUTPATIENT)
Dept: CARDIOLOGY | Facility: HOSPITAL | Age: 72
End: 2022-02-14
Attending: INTERNAL MEDICINE
Payer: MEDICARE

## 2022-02-14 DIAGNOSIS — I51.89 DIASTOLIC DYSFUNCTION: ICD-10-CM

## 2022-02-14 DIAGNOSIS — I48.11 LONGSTANDING PERSISTENT ATRIAL FIBRILLATION: ICD-10-CM

## 2022-02-14 LAB
ANION GAP SERPL CALC-SCNC: 11 MMOL/L (ref 8–16)
BUN SERPL-MCNC: 12 MG/DL (ref 8–23)
CALCIUM SERPL-MCNC: 9.3 MG/DL (ref 8.7–10.5)
CHLORIDE SERPL-SCNC: 103 MMOL/L (ref 95–110)
CO2 SERPL-SCNC: 29 MMOL/L (ref 23–29)
CREAT SERPL-MCNC: 0.8 MG/DL (ref 0.5–1.4)
EST. GFR  (AFRICAN AMERICAN): >60 ML/MIN/1.73 M^2
EST. GFR  (NON AFRICAN AMERICAN): >60 ML/MIN/1.73 M^2
GLUCOSE SERPL-MCNC: 84 MG/DL (ref 70–110)
POTASSIUM SERPL-SCNC: 4.3 MMOL/L (ref 3.5–5.1)
SODIUM SERPL-SCNC: 143 MMOL/L (ref 136–145)

## 2022-02-14 PROCEDURE — 93227 XTRNL ECG REC<48 HR R&I: CPT | Mod: HCNC,,, | Performed by: STUDENT IN AN ORGANIZED HEALTH CARE EDUCATION/TRAINING PROGRAM

## 2022-02-14 PROCEDURE — 80048 BASIC METABOLIC PNL TOTAL CA: CPT | Mod: HCNC | Performed by: INTERNAL MEDICINE

## 2022-02-14 PROCEDURE — 93226 XTRNL ECG REC<48 HR SCAN A/R: CPT | Mod: HCNC

## 2022-02-14 PROCEDURE — 93227 HOLTER MONITOR - 24 HOUR (CUPID ONLY): ICD-10-PCS | Mod: HCNC,,, | Performed by: STUDENT IN AN ORGANIZED HEALTH CARE EDUCATION/TRAINING PROGRAM

## 2022-02-14 PROCEDURE — 36415 COLL VENOUS BLD VENIPUNCTURE: CPT | Mod: HCNC | Performed by: INTERNAL MEDICINE

## 2022-02-17 ENCOUNTER — PATIENT MESSAGE (OUTPATIENT)
Dept: TRANSPLANT | Facility: CLINIC | Age: 72
End: 2022-02-17
Payer: MEDICARE

## 2022-02-18 NOTE — TELEPHONE ENCOUNTER
F/U via phone. Discussed PH, different types of PH, treatment plan, and next steps. She is happy that there is communication between Dr. Pickard, Dr. Bray, and Dr. Pereira. Answered all questions and concerns. Let her know that she can call with any further questions.

## 2022-02-22 ENCOUNTER — HOSPITAL ENCOUNTER (OUTPATIENT)
Dept: RADIOLOGY | Facility: HOSPITAL | Age: 72
Discharge: HOME | End: 2022-02-22
Attending: NEUROLOGICAL SURGERY
Payer: MEDICARE

## 2022-02-22 ENCOUNTER — OFFICE VISIT (OUTPATIENT)
Dept: NEUROSURGERY | Facility: CLINIC | Age: 72
End: 2022-02-22
Payer: MEDICARE

## 2022-02-22 VITALS
BODY MASS INDEX: 17.24 KG/M2 | TEMPERATURE: 98 F | WEIGHT: 101 LBS | SYSTOLIC BLOOD PRESSURE: 102 MMHG | DIASTOLIC BLOOD PRESSURE: 70 MMHG | HEART RATE: 89 BPM | HEIGHT: 64 IN

## 2022-02-22 DIAGNOSIS — Z98.1 S/P CERVICAL SPINAL FUSION: ICD-10-CM

## 2022-02-22 DIAGNOSIS — M43.22 CERVICAL VERTEBRAL FUSION: ICD-10-CM

## 2022-02-22 DIAGNOSIS — Z98.1 S/P LUMBAR FUSION: Primary | ICD-10-CM

## 2022-02-22 DIAGNOSIS — Z98.1 HISTORY OF LUMBAR FUSION: ICD-10-CM

## 2022-02-22 PROCEDURE — 3074F SYST BP LT 130 MM HG: CPT | Mod: HCNC,CPTII,S$GLB, | Performed by: PHYSICIAN ASSISTANT

## 2022-02-22 PROCEDURE — 72125 CT NECK SPINE W/O DYE: CPT | Mod: TC,HCNC

## 2022-02-22 PROCEDURE — 1101F PT FALLS ASSESS-DOCD LE1/YR: CPT | Mod: HCNC,CPTII,S$GLB, | Performed by: PHYSICIAN ASSISTANT

## 2022-02-22 PROCEDURE — 1101F PR PT FALLS ASSESS DOC 0-1 FALLS W/OUT INJ PAST YR: ICD-10-PCS | Mod: HCNC,CPTII,S$GLB, | Performed by: PHYSICIAN ASSISTANT

## 2022-02-22 PROCEDURE — 3288F PR FALLS RISK ASSESSMENT DOCUMENTED: ICD-10-PCS | Mod: HCNC,CPTII,S$GLB, | Performed by: PHYSICIAN ASSISTANT

## 2022-02-22 PROCEDURE — 72131 CT LUMBAR SPINE WITHOUT CONTRAST: ICD-10-PCS | Mod: 26,HCNC,, | Performed by: INTERNAL MEDICINE

## 2022-02-22 PROCEDURE — 99214 PR OFFICE/OUTPT VISIT, EST, LEVL IV, 30-39 MIN: ICD-10-PCS | Mod: HCNC,S$GLB,, | Performed by: PHYSICIAN ASSISTANT

## 2022-02-22 PROCEDURE — 99999 PR PBB SHADOW E&M-EST. PATIENT-LVL IV: CPT | Mod: PBBFAC,HCNC,, | Performed by: PHYSICIAN ASSISTANT

## 2022-02-22 PROCEDURE — 3078F PR MOST RECENT DIASTOLIC BLOOD PRESSURE < 80 MM HG: ICD-10-PCS | Mod: HCNC,CPTII,S$GLB, | Performed by: PHYSICIAN ASSISTANT

## 2022-02-22 PROCEDURE — 3008F BODY MASS INDEX DOCD: CPT | Mod: HCNC,CPTII,S$GLB, | Performed by: PHYSICIAN ASSISTANT

## 2022-02-22 PROCEDURE — 1157F PR ADVANCE CARE PLAN OR EQUIV PRESENT IN MEDICAL RECORD: ICD-10-PCS | Mod: HCNC,CPTII,S$GLB, | Performed by: PHYSICIAN ASSISTANT

## 2022-02-22 PROCEDURE — 1157F ADVNC CARE PLAN IN RCRD: CPT | Mod: HCNC,CPTII,S$GLB, | Performed by: PHYSICIAN ASSISTANT

## 2022-02-22 PROCEDURE — 1126F PR PAIN SEVERITY QUANTIFIED, NO PAIN PRESENT: ICD-10-PCS | Mod: HCNC,CPTII,S$GLB, | Performed by: PHYSICIAN ASSISTANT

## 2022-02-22 PROCEDURE — 72131 CT LUMBAR SPINE W/O DYE: CPT | Mod: 26,HCNC,, | Performed by: INTERNAL MEDICINE

## 2022-02-22 PROCEDURE — 3078F DIAST BP <80 MM HG: CPT | Mod: HCNC,CPTII,S$GLB, | Performed by: PHYSICIAN ASSISTANT

## 2022-02-22 PROCEDURE — 3008F PR BODY MASS INDEX (BMI) DOCUMENTED: ICD-10-PCS | Mod: HCNC,CPTII,S$GLB, | Performed by: PHYSICIAN ASSISTANT

## 2022-02-22 PROCEDURE — 72125 CT NECK SPINE W/O DYE: CPT | Mod: 26,HCNC,, | Performed by: INTERNAL MEDICINE

## 2022-02-22 PROCEDURE — 1126F AMNT PAIN NOTED NONE PRSNT: CPT | Mod: HCNC,CPTII,S$GLB, | Performed by: PHYSICIAN ASSISTANT

## 2022-02-22 PROCEDURE — 72131 CT LUMBAR SPINE W/O DYE: CPT | Mod: TC,HCNC

## 2022-02-22 PROCEDURE — 72125 CT CERVICAL SPINE WITHOUT CONTRAST: ICD-10-PCS | Mod: 26,HCNC,, | Performed by: INTERNAL MEDICINE

## 2022-02-22 PROCEDURE — 3288F FALL RISK ASSESSMENT DOCD: CPT | Mod: HCNC,CPTII,S$GLB, | Performed by: PHYSICIAN ASSISTANT

## 2022-02-22 PROCEDURE — 99214 OFFICE O/P EST MOD 30 MIN: CPT | Mod: HCNC,S$GLB,, | Performed by: PHYSICIAN ASSISTANT

## 2022-02-22 PROCEDURE — 99999 PR PBB SHADOW E&M-EST. PATIENT-LVL IV: ICD-10-PCS | Mod: PBBFAC,HCNC,, | Performed by: PHYSICIAN ASSISTANT

## 2022-02-22 PROCEDURE — 3074F PR MOST RECENT SYSTOLIC BLOOD PRESSURE < 130 MM HG: ICD-10-PCS | Mod: HCNC,CPTII,S$GLB, | Performed by: PHYSICIAN ASSISTANT

## 2022-02-22 NOTE — PROGRESS NOTES
Neurosurgery  Established Patient    SUBJECTIVE:     History of Present Illness:   72-year-old female with a history of cervical corpectomy and fusion as well as a 2 level lumbar interbody fusion done back in 2018 who presents to clinic for follow up. At her previous appointment last year, there was some concern about hardware loosening so CT cervical spine and CT lumbar spine were obtained. She reports she has been doing very well since her last visit. She walks multiple miles per day and her back pain is essentially resolved. She has unchanged neck discomfort but no evidence of myelopathy or radiculopathy.  She is a nonsmoker.     Review of patient's allergies indicates:  No Known Allergies    Current Outpatient Medications   Medication Sig Dispense Refill    acetaminophen (TYLENOL) 500 MG tablet Take 500 mg by mouth every 6 (six) hours as needed for Pain.      acyclovir (ZOVIRAX) 400 MG tablet Take 1 tablet (400 mg total) by mouth 2 (two) times daily. 60 tablet 12    ascorbic acid (VITAMIN C) 1000 MG tablet Take 1,000 mg by mouth once daily.       biotin 1 mg tablet Take 1 mg by mouth 2 (two) times daily.       buPROPion (WELLBUTRIN XL) 150 MG TB24 tablet Take 1 tablet (150 mg total) by mouth once daily. 90 tablet 2    calcium-vitamin D 500 mg(1,250mg) -200 unit per tablet Take 1 tablet by mouth 2 (two) times daily with meals.       clonazePAM (KLONOPIN) 1 MG tablet TAKE 1& 1/2 TABLET BY MOUTH NIGHTLY AS NEEDED 45 tablet 5    cycloSPORINE (RESTASIS) 0.05 % ophthalmic emulsion Place 1 drop into both eyes 2 (two) times daily. 60 each 11    digestive enzymes Tab Take 1 tablet by mouth once daily.       diphenhydrAMINE (BENADRYL) 25 mg capsule Take 25 mg by mouth nightly as needed.       ELIQUIS 5 mg Tab Take 1 tablet (5 mg total) by mouth 2 (two) times daily. 180 tablet 3    fluticasone propionate (FLONASE) 50 mcg/actuation nasal spray 1 spray (50 mcg total) by Each Nostril route once daily. 16 g 12     levothyroxine (SYNTHROID) 137 MCG Tab tablet Take 1 tablet (137 mcg total) by mouth before breakfast. 30 tablet 11    lubiprostone (AMITIZA) 24 MCG Cap Take 1 capsule (24 mcg total) by mouth daily as needed. 30 capsule 6    metoprolol succinate (TOPROL-XL) 25 MG 24 hr tablet Take 1 tablet (25 mg total) by mouth once daily. 30 tablet 11    montelukast (SINGULAIR) 10 mg tablet Take 1 tablet (10 mg total) by mouth once daily. 30 tablet 11    multivitamin (THERAGRAN) per tablet Take 1 tablet by mouth once daily.       spironolactone (ALDACTONE) 25 MG tablet Take 1 tablet (25 mg total) by mouth once daily. 30 tablet 1    tretinoin (RETIN-A) 0.1 % cream Apply topically every evening. 20 g 6    zinc 50 mg Tab Take 50 mg by mouth once daily.        No current facility-administered medications for this visit.       Past Medical History:   Diagnosis Date    Arthritis     Bronchitis     Cataract     Dry eyes     Glaucoma, suspect - Both Eyes     Hypothyroidism 6/23/2016    Rash     Squamous cell carcinoma     in-situ right upper inner arm, right wrist    Status post lumbar surgery 6/23/2016    Stress fracture     Thyroid disease      Past Surgical History:   Procedure Laterality Date    ANGIOPLASTY      CERVICAL FUSION      COLONOSCOPY      COLONOSCOPY N/A 11/14/2017    Procedure: COLONOSCOPY;  Surgeon: Kasi Mercado MD;  Location: [facility] ([address] [place] [address]);  Service: Endoscopy;  Laterality: N/A;    ESOPHAGOGASTRODUODENOSCOPY N/A 10/10/2019    Procedure: EGD (ESOPHAGOGASTRODUODENOSCOPY);  Surgeon: Rg Milton MD;  [address]  Service: Endoscopy;  Laterality: N/A;    ESOPHAGOGASTRODUODENOSCOPY N/A 10/6/2021    Procedure: EGD (ESOPHAGOGASTRODUODENOSCOPY);  Surgeon: Rg Milton MD;  Location: [facility] ([facility]);  Service: Endoscopy;  Laterality: N/A;  covid test 10/3 bernice pathak emailed/portal -ml    l4-5 mid discectomy Left 03/2017    l4-5 MIS diskectomy Right 05/2016     RIGHT HEART CATHETERIZATION Right 1/27/2022    Procedure: INSERTION, CATHETER, RIGHT HEART;  Surgeon: Satnam Pickard Jr., MD;  Location: [facility] CATH LAB;  Service: Cardiology;  Laterality: Right;    TREATMENT OF CARDIAC ARRHYTHMIA N/A 7/17/2020    Procedure: CARDIOVERSION;  Surgeon: Kwan Bray MD;  Location: [facility] EP LAB;  Service: Cardiology;  Laterality: N/A;  AF,DCCV/SANGITA, ANES, SK, 746    TREATMENT OF CARDIAC ARRHYTHMIA N/A 7/14/2021    Procedure: CARDIOVERSION;  Surgeon: SYDNIE Cedillo MD;  Location: [facility] EP LAB;  Service: Cardiology;  Laterality: N/A;  AF, SANGITA, DCCV, MAC, EH, 3 Prep     Family History     Problem Relation (Age of Onset)    Cancer Mother, Father (80), Son    Cataracts Father    Colon cancer Father    Diabetes Son    Heart disease Son    Heart failure Father    Hypertension Father    Melanoma Daughter    No Known Problems Sister, Brother, Maternal Aunt, Maternal Uncle, Paternal Aunt, Paternal Uncle, Maternal Grandmother, Maternal Grandfather, Paternal Grandmother, Paternal Grandfather        Social History     Socioeconomic History    Marital status: Single   Occupational History     Employer: [employer]   Tobacco Use    Smoking status: Never Smoker    Smokeless tobacco: Never Used   Substance and Sexual Activity    Alcohol use: Yes     Alcohol/week: 1.0 standard drink     Types: 1 Glasses of wine per week     Comment: 1 glass on weekends    Drug use: No    Sexual activity: Never   Other Topics Concern    Are you pregnant or think you may be? No    Breast-feeding No     Social Determinants of Health     Financial Resource Strain: Low Risk     Difficulty of Paying Living Expenses: Not hard at all   Food Insecurity: No Food Insecurity    Worried About Running Out of Food in the Last Year: Never true    Ran Out of Food in the Last Year: Never true   Transportation Needs: No Transportation Needs    Lack of Transportation (Medical): No    Lack of Transportation  (Non-Medical): No   Physical Activity: Sufficiently Active    Days of Exercise per Week: 7 days    Minutes of Exercise per Session: 60 min   Stress: No Stress Concern Present    Feeling of Stress : Only a little   Social Connections: Unknown    Frequency of Communication with Friends and Family: Once a week    Frequency of Social Gatherings with Friends and Family: Once a week    Active Member of Clubs or Organizations: Yes    Attends Club or Organization Meetings: 1 to 4 times per year    Marital Status:    Housing Stability: Low Risk     Unable to Pay for Housing in the Last Year: No    Number of Places Lived in the Last Year: 1    Unstable Housing in the Last Year: No       Review of Systems  Constitutional: no fever, chills or night sweats. No changes in weight   Eyes: no visual changes   ENT: no nasal congestion or sore throat   Respiratory: no cough or shortness of breath   Cardiovascular: no chest pain or palpitations   Gastrointestinal: no nausea or vomiting   Genitourinary: no hematuria or dysuria   Integument/Breast: no rash or pruritis   Hematologic/Lymphatic: no easy bruising or lymphadenopathy   Musculoskeletal: no arthralgias, + myalgias.   Neurological: no seizures or tremors   Behavioral/Psych: no auditory or visual hallucinations   Endocrine: no heat or cold intolerance   OBJECTIVE:     Vital Signs     Body mass index is 17.34 kg/m².    Neurosurgery Physical Exam  General: well developed, well nourished, no distress.   Head: normocephalic, atraumatic  Neurologic: Alert and oriented. Thought content appropriate.  GCS: Motor: 6/Verbal: 5/Eyes: 4 GCS Total: 15  Mental Status: Awake, Alert, Oriented x3  Cranial nerves: face symmetric, tongue midline, CN II-XII grossly intact.   Eyes: pupils equal, round, reactive to light with accomodation, EOMI.   Sensory: intact to light touch throughout  Motor Strength:Moves all extremities spontaneously with good tone.  Full strength upper and  lower extremities. No abnormal movements seen.   DTR's - 2 + and symmetric in UE and LE  [place]: absent  Clonus: absent  Babinski: absent  Pulses: 2+ and symmetric radial and dorsalis pedis. No lower extremity edema  Straight leg raise: negative  Gait: normal      Diagnostic Results:  MRI scan of cervical spine 01/07/21 shows post operative changes and adjacent level disease below level to fusion with mild cord compression but no cord signal changes. MRI of the lumbar spine postoperative changes but no obvious signs of compression.     CT cervical spine dated 2/22/22 reviewed and shows post-op changes from C5 corpectomy with C4-6 anterior fusion without hardware complication.     CT lumbar spine dated 2/22/22 reviewed and shows L4-S1 instrumented fusion without hardware complication and evidence for bony fusion     ASSESSMENT/PLAN:     72-year-old female with a history of C5 corpectomy and C4-6 fusion as well as a L4-S1 interbody fusion in 2018.  Patient clinically doing well at this stage. She remains very active and reports her back pain has improved. Imaging was reviewed with the patient and appears stable. She may RTC prn with any new or worsening complaints. The plan was discussed with the patient. All of the the patient's questions and concerns were answered and she voiced understanding. Please feel free to call with any further questions.         Marilou Shannon PA-C  Neurosurgery

## 2022-02-23 ENCOUNTER — OFFICE VISIT (OUTPATIENT)
Dept: DERMATOLOGY | Facility: CLINIC | Age: 72
End: 2022-02-23
Payer: MEDICARE

## 2022-02-23 DIAGNOSIS — L90.5 SCAR: ICD-10-CM

## 2022-02-23 DIAGNOSIS — L82.1 SK (SEBORRHEIC KERATOSIS): ICD-10-CM

## 2022-02-23 DIAGNOSIS — D22.9 NEVUS: ICD-10-CM

## 2022-02-23 DIAGNOSIS — D18.01 CHERRY ANGIOMA: Primary | ICD-10-CM

## 2022-02-23 DIAGNOSIS — Z85.828 PERSONAL HISTORY OF SKIN CANCER: ICD-10-CM

## 2022-02-23 DIAGNOSIS — L81.4 LENTIGO: ICD-10-CM

## 2022-02-23 PROCEDURE — 1101F PT FALLS ASSESS-DOCD LE1/YR: CPT | Mod: HCNC,CPTII,S$GLB, | Performed by: DERMATOLOGY

## 2022-02-23 PROCEDURE — 1159F PR MEDICATION LIST DOCUMENTED IN MEDICAL RECORD: ICD-10-PCS | Mod: HCNC,CPTII,S$GLB, | Performed by: DERMATOLOGY

## 2022-02-23 PROCEDURE — 1160F RVW MEDS BY RX/DR IN RCRD: CPT | Mod: HCNC,CPTII,S$GLB, | Performed by: DERMATOLOGY

## 2022-02-23 PROCEDURE — 1126F PR PAIN SEVERITY QUANTIFIED, NO PAIN PRESENT: ICD-10-PCS | Mod: HCNC,CPTII,S$GLB, | Performed by: DERMATOLOGY

## 2022-02-23 PROCEDURE — 3288F FALL RISK ASSESSMENT DOCD: CPT | Mod: HCNC,CPTII,S$GLB, | Performed by: DERMATOLOGY

## 2022-02-23 PROCEDURE — 1126F AMNT PAIN NOTED NONE PRSNT: CPT | Mod: HCNC,CPTII,S$GLB, | Performed by: DERMATOLOGY

## 2022-02-23 PROCEDURE — 99213 OFFICE O/P EST LOW 20 MIN: CPT | Mod: HCNC,S$GLB,, | Performed by: DERMATOLOGY

## 2022-02-23 PROCEDURE — 1160F PR REVIEW ALL MEDS BY PRESCRIBER/CLIN PHARMACIST DOCUMENTED: ICD-10-PCS | Mod: HCNC,CPTII,S$GLB, | Performed by: DERMATOLOGY

## 2022-02-23 PROCEDURE — 99213 PR OFFICE/OUTPT VISIT, EST, LEVL III, 20-29 MIN: ICD-10-PCS | Mod: HCNC,S$GLB,, | Performed by: DERMATOLOGY

## 2022-02-23 PROCEDURE — 1101F PR PT FALLS ASSESS DOC 0-1 FALLS W/OUT INJ PAST YR: ICD-10-PCS | Mod: HCNC,CPTII,S$GLB, | Performed by: DERMATOLOGY

## 2022-02-23 PROCEDURE — 99999 PR PBB SHADOW E&M-EST. PATIENT-LVL III: CPT | Mod: PBBFAC,HCNC,, | Performed by: DERMATOLOGY

## 2022-02-23 PROCEDURE — 1157F PR ADVANCE CARE PLAN OR EQUIV PRESENT IN MEDICAL RECORD: ICD-10-PCS | Mod: HCNC,CPTII,S$GLB, | Performed by: DERMATOLOGY

## 2022-02-23 PROCEDURE — 99999 PR PBB SHADOW E&M-EST. PATIENT-LVL III: ICD-10-PCS | Mod: PBBFAC,HCNC,, | Performed by: DERMATOLOGY

## 2022-02-23 PROCEDURE — 1159F MED LIST DOCD IN RCRD: CPT | Mod: HCNC,CPTII,S$GLB, | Performed by: DERMATOLOGY

## 2022-02-23 PROCEDURE — 3288F PR FALLS RISK ASSESSMENT DOCUMENTED: ICD-10-PCS | Mod: HCNC,CPTII,S$GLB, | Performed by: DERMATOLOGY

## 2022-02-23 PROCEDURE — 1157F ADVNC CARE PLAN IN RCRD: CPT | Mod: HCNC,CPTII,S$GLB, | Performed by: DERMATOLOGY

## 2022-02-23 NOTE — PROGRESS NOTES
"  Subjective:       Patient ID:  Angelina Man is a 72 y.o. female who presents for   Chief Complaint   Patient presents with    Skin Check     tbse    Lesion     Front neck, back , L inner ear     Pt c/o lesions on front neck and back x few weeks with no symptoms and no tx.   This is a high risk patient here to check for the development of new lesions.    H/o NMSC    Patient is here today for a "mole" check.   Pt has a history of  extensive sun exposure in the past.   Pt recalls several blistering sunburns in the past- several   Pt has history of tanning bed use- yes  Pt has  had moles removed in the past- no  Pt has history of melanoma in first degree relatives-  no    HPI     Skin Check      Additional comments: tbse              Lesion      Additional comments: Front neck, back , L inner ear          Last edited by Lyly Zimmerman MA on 2/23/2022  1:55 PM. (History)            Review of Systems   HENT: Negative for nosebleeds and sore throat.    Skin: Positive for daily sunscreen use and activity-related sunscreen use. Negative for recent sunburn.        Objective:    Physical Exam   Constitutional: She appears well-developed and well-nourished. No distress.   HENT:   Ears:    Neurological: She is alert and oriented to person, place, and time. She is not disoriented.   Psychiatric: She has a normal mood and affect.   Skin:   Areas Examined (abnormalities noted in diagram):   Scalp / Hair Palpated and Inspected  Head / Face Inspection Performed  Neck Inspection Performed  Chest / Axilla Inspection Performed  Abdomen Inspection Performed  Genitals / Buttocks / Groin Inspection Performed  Back Inspection Performed  RUE Inspected  LUE Inspection Performed  RLE Inspected  LLE Inspection Performed  Nails and Digits Inspection Performed                       Diagram Legend     Erythematous scaling macule/papule c/w actinic keratosis       Vascular papule c/w angioma      Pigmented verrucoid papule/plaque c/w " seborrheic keratosis      Yellow umbilicated papule c/w sebaceous hyperplasia      Irregularly shaped tan macule c/w lentigo     1-2 mm smooth white papules consistent with Milia      Movable subcutaneous cyst with punctum c/w epidermal inclusion cyst      Subcutaneous movable cyst c/w pilar cyst      Firm pink to brown papule c/w dermatofibroma      Pedunculated fleshy papule(s) c/w skin tag(s)      Evenly pigmented macule c/w junctional nevus     Mildly variegated pigmented, slightly irregular-bordered macule c/w mildly atypical nevus      Flesh colored to evenly pigmented papule c/w intradermal nevus       Pink pearly papule/plaque c/w basal cell carcinoma      Erythematous hyperkeratotic cursted plaque c/w SCC      Surgical scar with no sign of skin cancer recurrence      Open and closed comedones      Inflammatory papules and pustules      Verrucoid papule consistent consistent with wart     Erythematous eczematous patches and plaques     Dystrophic onycholytic nail with subungual debris c/w onychomycosis     Umbilicated papule    Erythematous-base heme-crusted tan verrucoid plaque consistent with inflamed seborrheic keratosis     Erythematous Silvery Scaling Plaque c/w Psoriasis     See annotation      Assessment / Plan:        Cherry angioma  These are benign vascular lesions that are inherited.  Treatment is not necessary.    Lentigo  This is a benign hyperpigmented sun induced lesion. Recommend daily sun protection/avoidance and use of at least SPF 30, broad spectrum sunscreen (OTC drug) will reduce the number of new lesions. Treatment of these benign lesions are considered cosmetic.    The nature of sun-induced photo-aging and skin cancers is discussed.  Sun avoidance, protective clothing, and the use of 30-SPF sunscreens is advised. Observe closely for skin damage/changes, and call if such occurs.    SK (seborrheic keratosis)  These are benign inherited growths without a malignant potential. Reassurance  given to patient. No treatment is necessary.     Nevus  Discussed ABCDE's of nevi.  Monitor for new mole or moles that are becoming bigger, darker, irritated, or developing irregular borders. Brochure provided. Instructed patient to observe lesion(s) for changes and follow up in clinic if changes are noted. Patient to monitor skin at home for new or changing lesions.     Personal history of skin cancer  Scar  Pt with history of non melanoma skin cancer  Total body skin examination performed today including at least 12 points as noted in physical examination. No suspicious lesions noted.Monitor for new or changing lesions as discussed such as pink scaly patches that are occasionally tender or not healing, 'pimples' that never go away, lesions that are not healing or bleeding.              Follow up in about 1 year (around 2/23/2023).

## 2022-03-01 ENCOUNTER — PATIENT MESSAGE (OUTPATIENT)
Dept: PRIMARY CARE CLINIC | Facility: CLINIC | Age: 72
End: 2022-03-01
Payer: MEDICARE

## 2022-03-15 ENCOUNTER — TELEPHONE (OUTPATIENT)
Dept: ELECTROPHYSIOLOGY | Facility: CLINIC | Age: 72
End: 2022-03-15
Payer: MEDICARE

## 2022-03-15 NOTE — TELEPHONE ENCOUNTER
Spoke with Ms. Man to remind her of her appt with Dr. Bray on tomorrow morning. The pt was asked to come in for 830a. The pt verbalized understanding.

## 2022-03-15 NOTE — PROGRESS NOTES
"Subjective:    Patient ID:  Angelina Man is a 72 y.o. female who presents for follow-up of Atrial Fibrillation      HPI 73 yo female with atrial fibrillation, anxiety, hypothyroidism.     Background:  Presented to dentist's office for a crown. Sent to ED for elevated HR. Noted to be in atrial fibrillation with RVR. She was not symptomatic.  SANGITA/DCCV 7/17/20. Placed on Eliquis and metoprolol.     Echo 7/17/20 normal biventricular structure and function, mild prolapse of the posterior mitral valve leaflet.  Thinks she feels "ok." Feels like she may be more fatigued.  Significant exercise (spinning classes, walks or does stairmaster daily, Pilates, body-pumping). Notes rare shortness of breath with significant exertion, not new, improved with singulair.  Notes shortness of breath with anxiety. Her son passed away last year. Has other significant stressors.     CHADSVASc is 2, on Eliquis 5 mg BID.     SANGITA/DCCV 7/17/20  Developed recurrence.  Offered trial of Flecainide + DCCV. She deferred.  his is unchanged since 9/20. "I can't say I am feeling bad."  It remained unclear as to whether she had symptoms.  DCCV 7/14/21. Started Propafenone 3 days prior. Maintained nsr for 2 days. Did not note symptomatic benefit. Noted recurrence via Apple Watch.  We elected for rate control strategy.    Noted to have Pulm Htn by echo.  Referred to Dr. Pickard. RHC revealed normal PA pressures at baseline, increased with exercise.      Update:    Reports occasional dyspnea. Question raised as to whether this may be related to AF.  Holter 2/14/22 AF with average ventricular response 79 bpm, 2.3 second pause while sleeping.          Review of Systems   Constitutional: Negative. Negative for fever and malaise/fatigue.   HENT: Negative for congestion and sore throat.    Cardiovascular: Negative for chest pain, dyspnea on exertion, irregular heartbeat, leg swelling, near-syncope, orthopnea, palpitations, paroxysmal nocturnal dyspnea and " syncope.   Respiratory: Positive for shortness of breath. Negative for cough.    Gastrointestinal: Negative for abdominal pain, constipation and diarrhea.   Neurological: Negative for dizziness, light-headedness and weakness.   Psychiatric/Behavioral: Negative for depression. The patient is not nervous/anxious.    All other systems reviewed and are negative.       Objective:    Physical Exam  Constitutional:       Appearance: She is well-developed.   Eyes:      General: No scleral icterus.     Conjunctiva/sclera: Conjunctivae normal.   Neck:      Vascular: No JVD.      Trachea: No tracheal deviation.   Cardiovascular:      Rate and Rhythm: Normal rate. Rhythm irregular.      Chest Wall: PMI is not displaced.   Pulmonary:      Effort: Pulmonary effort is normal. No respiratory distress.      Breath sounds: Normal breath sounds.   Abdominal:      Palpations: Abdomen is soft.      Tenderness: There is no abdominal tenderness.   Musculoskeletal:         General: No tenderness.   Skin:     General: Skin is warm and dry.      Findings: No rash.   Neurological:      Mental Status: She is alert and oriented to person, place, and time.   Psychiatric:         Behavior: Behavior normal.           Assessment:       1. Longstanding persistent atrial fibrillation    2. Diastolic dysfunction    3. Pulmonary hypertension    4. Chronic pulmonary heart disease    5. Chronic pain syndrome    6. Hypothyroidism due to acquired atrophy of thyroid         Plan:       Longstanding persistent atrial fibrillation.  She has developed dyspnea since I last saw her. Of note, when she was persistently out of rhythm in the past she did not have symptoms, and it is less likely to subsequently develop symptoms (generally the progression is from symptomatic to asymptomatic, not reversed).  One could could a rhythm control strategy and assess for symptomatic benefit, but given her longstanding persistent nature this would require either RFA (multiple  likely, with success rates of 30-40%) or Hybrid surgical approach. Other option would be PPM + AVN ablation and assess for symptomatic benefit. Given the lower likelihood of significant symptomatic benefit and the fact that this would render her dependent on PPM, I would defer.  Continue with rate control strategy.  She has questions as to whether she truly has Pulmonary Htn, and what this means. I asked her to speak with Dr. Pickard in regard to this.  PRN f/u with EP

## 2022-03-16 ENCOUNTER — OFFICE VISIT (OUTPATIENT)
Dept: ELECTROPHYSIOLOGY | Facility: CLINIC | Age: 72
End: 2022-03-16
Payer: MEDICARE

## 2022-03-16 VITALS
WEIGHT: 100.06 LBS | SYSTOLIC BLOOD PRESSURE: 117 MMHG | BODY MASS INDEX: 17.08 KG/M2 | DIASTOLIC BLOOD PRESSURE: 76 MMHG | HEART RATE: 68 BPM | HEIGHT: 64 IN

## 2022-03-16 DIAGNOSIS — E03.4 HYPOTHYROIDISM DUE TO ACQUIRED ATROPHY OF THYROID: ICD-10-CM

## 2022-03-16 DIAGNOSIS — I48.11 LONGSTANDING PERSISTENT ATRIAL FIBRILLATION: Primary | ICD-10-CM

## 2022-03-16 DIAGNOSIS — I27.20 PULMONARY HYPERTENSION: ICD-10-CM

## 2022-03-16 DIAGNOSIS — G89.4 CHRONIC PAIN SYNDROME: ICD-10-CM

## 2022-03-16 DIAGNOSIS — I27.9 CHRONIC PULMONARY HEART DISEASE: ICD-10-CM

## 2022-03-16 DIAGNOSIS — I51.89 DIASTOLIC DYSFUNCTION: ICD-10-CM

## 2022-03-16 PROCEDURE — 1101F PT FALLS ASSESS-DOCD LE1/YR: CPT | Mod: CPTII,S$GLB,, | Performed by: INTERNAL MEDICINE

## 2022-03-16 PROCEDURE — 99999 PR PBB SHADOW E&M-EST. PATIENT-LVL IV: CPT | Mod: PBBFAC,,, | Performed by: INTERNAL MEDICINE

## 2022-03-16 PROCEDURE — 3074F SYST BP LT 130 MM HG: CPT | Mod: CPTII,S$GLB,, | Performed by: INTERNAL MEDICINE

## 2022-03-16 PROCEDURE — 1101F PR PT FALLS ASSESS DOC 0-1 FALLS W/OUT INJ PAST YR: ICD-10-PCS | Mod: CPTII,S$GLB,, | Performed by: INTERNAL MEDICINE

## 2022-03-16 PROCEDURE — 3288F FALL RISK ASSESSMENT DOCD: CPT | Mod: CPTII,S$GLB,, | Performed by: INTERNAL MEDICINE

## 2022-03-16 PROCEDURE — 99215 PR OFFICE/OUTPT VISIT, EST, LEVL V, 40-54 MIN: ICD-10-PCS | Mod: S$GLB,,, | Performed by: INTERNAL MEDICINE

## 2022-03-16 PROCEDURE — 1159F PR MEDICATION LIST DOCUMENTED IN MEDICAL RECORD: ICD-10-PCS | Mod: CPTII,S$GLB,, | Performed by: INTERNAL MEDICINE

## 2022-03-16 PROCEDURE — 1126F AMNT PAIN NOTED NONE PRSNT: CPT | Mod: CPTII,S$GLB,, | Performed by: INTERNAL MEDICINE

## 2022-03-16 PROCEDURE — 1159F MED LIST DOCD IN RCRD: CPT | Mod: CPTII,S$GLB,, | Performed by: INTERNAL MEDICINE

## 2022-03-16 PROCEDURE — 3078F DIAST BP <80 MM HG: CPT | Mod: CPTII,S$GLB,, | Performed by: INTERNAL MEDICINE

## 2022-03-16 PROCEDURE — 99215 OFFICE O/P EST HI 40 MIN: CPT | Mod: S$GLB,,, | Performed by: INTERNAL MEDICINE

## 2022-03-16 PROCEDURE — 1126F PR PAIN SEVERITY QUANTIFIED, NO PAIN PRESENT: ICD-10-PCS | Mod: CPTII,S$GLB,, | Performed by: INTERNAL MEDICINE

## 2022-03-16 PROCEDURE — 3008F PR BODY MASS INDEX (BMI) DOCUMENTED: ICD-10-PCS | Mod: CPTII,S$GLB,, | Performed by: INTERNAL MEDICINE

## 2022-03-16 PROCEDURE — 1157F ADVNC CARE PLAN IN RCRD: CPT | Mod: CPTII,S$GLB,, | Performed by: INTERNAL MEDICINE

## 2022-03-16 PROCEDURE — 3074F PR MOST RECENT SYSTOLIC BLOOD PRESSURE < 130 MM HG: ICD-10-PCS | Mod: CPTII,S$GLB,, | Performed by: INTERNAL MEDICINE

## 2022-03-16 PROCEDURE — 3008F BODY MASS INDEX DOCD: CPT | Mod: CPTII,S$GLB,, | Performed by: INTERNAL MEDICINE

## 2022-03-16 PROCEDURE — 3288F PR FALLS RISK ASSESSMENT DOCUMENTED: ICD-10-PCS | Mod: CPTII,S$GLB,, | Performed by: INTERNAL MEDICINE

## 2022-03-16 PROCEDURE — 3078F PR MOST RECENT DIASTOLIC BLOOD PRESSURE < 80 MM HG: ICD-10-PCS | Mod: CPTII,S$GLB,, | Performed by: INTERNAL MEDICINE

## 2022-03-16 PROCEDURE — 1157F PR ADVANCE CARE PLAN OR EQUIV PRESENT IN MEDICAL RECORD: ICD-10-PCS | Mod: CPTII,S$GLB,, | Performed by: INTERNAL MEDICINE

## 2022-03-16 PROCEDURE — 99999 PR PBB SHADOW E&M-EST. PATIENT-LVL IV: ICD-10-PCS | Mod: PBBFAC,,, | Performed by: INTERNAL MEDICINE

## 2022-03-20 ENCOUNTER — PATIENT MESSAGE (OUTPATIENT)
Dept: TRANSPLANT | Facility: CLINIC | Age: 72
End: 2022-03-20
Payer: MEDICARE

## 2022-03-21 DIAGNOSIS — R06.02 SOB (SHORTNESS OF BREATH): ICD-10-CM

## 2022-03-21 DIAGNOSIS — I51.89 DIASTOLIC DYSFUNCTION: ICD-10-CM

## 2022-03-22 ENCOUNTER — OFFICE VISIT (OUTPATIENT)
Dept: CARDIOLOGY | Facility: CLINIC | Age: 72
End: 2022-03-22
Payer: MEDICARE

## 2022-03-22 ENCOUNTER — HOSPITAL ENCOUNTER (OUTPATIENT)
Dept: CARDIOLOGY | Facility: HOSPITAL | Age: 72
Discharge: HOME | End: 2022-03-22
Attending: INTERNAL MEDICINE
Payer: MEDICARE

## 2022-03-22 VITALS
DIASTOLIC BLOOD PRESSURE: 64 MMHG | SYSTOLIC BLOOD PRESSURE: 102 MMHG | OXYGEN SATURATION: 100 % | BODY MASS INDEX: 17.28 KG/M2 | HEART RATE: 85 BPM | HEIGHT: 64 IN | WEIGHT: 101.19 LBS

## 2022-03-22 DIAGNOSIS — I83.893 VARICOSE VEINS OF BOTH LOWER EXTREMITIES WITH COMPLICATIONS: Primary | ICD-10-CM

## 2022-03-22 DIAGNOSIS — I48.19 PERSISTENT ATRIAL FIBRILLATION: ICD-10-CM

## 2022-03-22 DIAGNOSIS — Z92.89 HISTORY OF CARDIOVERSION: ICD-10-CM

## 2022-03-22 DIAGNOSIS — I87.2 VENOUS INSUFFICIENCY OF BOTH LOWER EXTREMITIES: ICD-10-CM

## 2022-03-22 DIAGNOSIS — I27.20 PULMONARY HYPERTENSION: ICD-10-CM

## 2022-03-22 DIAGNOSIS — I27.9 CHRONIC PULMONARY HEART DISEASE: ICD-10-CM

## 2022-03-22 DIAGNOSIS — I51.89 DIASTOLIC DYSFUNCTION: ICD-10-CM

## 2022-03-22 DIAGNOSIS — R60.0 EDEMA OF BOTH LEGS: ICD-10-CM

## 2022-03-22 PROCEDURE — 3288F FALL RISK ASSESSMENT DOCD: CPT | Mod: CPTII,S$GLB,, | Performed by: INTERNAL MEDICINE

## 2022-03-22 PROCEDURE — 3008F PR BODY MASS INDEX (BMI) DOCUMENTED: ICD-10-PCS | Mod: CPTII,S$GLB,, | Performed by: INTERNAL MEDICINE

## 2022-03-22 PROCEDURE — 3074F PR MOST RECENT SYSTOLIC BLOOD PRESSURE < 130 MM HG: ICD-10-PCS | Mod: CPTII,S$GLB,, | Performed by: INTERNAL MEDICINE

## 2022-03-22 PROCEDURE — 93971 CV US LOWER VENOUS INSUFFICIENCY RIGHT: ICD-10-PCS | Mod: 26,RT,, | Performed by: INTERNAL MEDICINE

## 2022-03-22 PROCEDURE — 99999 PR PBB SHADOW E&M-EST. PATIENT-LVL V: CPT | Mod: PBBFAC,,, | Performed by: INTERNAL MEDICINE

## 2022-03-22 PROCEDURE — 99215 OFFICE O/P EST HI 40 MIN: CPT | Mod: S$GLB,,, | Performed by: INTERNAL MEDICINE

## 2022-03-22 PROCEDURE — 99215 PR OFFICE/OUTPT VISIT, EST, LEVL V, 40-54 MIN: ICD-10-PCS | Mod: S$GLB,,, | Performed by: INTERNAL MEDICINE

## 2022-03-22 PROCEDURE — 1126F AMNT PAIN NOTED NONE PRSNT: CPT | Mod: CPTII,S$GLB,, | Performed by: INTERNAL MEDICINE

## 2022-03-22 PROCEDURE — 3074F SYST BP LT 130 MM HG: CPT | Mod: CPTII,S$GLB,, | Performed by: INTERNAL MEDICINE

## 2022-03-22 PROCEDURE — 93971 EXTREMITY STUDY: CPT | Mod: TC,RT

## 2022-03-22 PROCEDURE — 3008F BODY MASS INDEX DOCD: CPT | Mod: CPTII,S$GLB,, | Performed by: INTERNAL MEDICINE

## 2022-03-22 PROCEDURE — 99999 PR PBB SHADOW E&M-EST. PATIENT-LVL V: ICD-10-PCS | Mod: PBBFAC,,, | Performed by: INTERNAL MEDICINE

## 2022-03-22 PROCEDURE — 3078F PR MOST RECENT DIASTOLIC BLOOD PRESSURE < 80 MM HG: ICD-10-PCS | Mod: CPTII,S$GLB,, | Performed by: INTERNAL MEDICINE

## 2022-03-22 PROCEDURE — 1126F PR PAIN SEVERITY QUANTIFIED, NO PAIN PRESENT: ICD-10-PCS | Mod: CPTII,S$GLB,, | Performed by: INTERNAL MEDICINE

## 2022-03-22 PROCEDURE — 1159F MED LIST DOCD IN RCRD: CPT | Mod: CPTII,S$GLB,, | Performed by: INTERNAL MEDICINE

## 2022-03-22 PROCEDURE — 1157F PR ADVANCE CARE PLAN OR EQUIV PRESENT IN MEDICAL RECORD: ICD-10-PCS | Mod: CPTII,S$GLB,, | Performed by: INTERNAL MEDICINE

## 2022-03-22 PROCEDURE — 3288F PR FALLS RISK ASSESSMENT DOCUMENTED: ICD-10-PCS | Mod: CPTII,S$GLB,, | Performed by: INTERNAL MEDICINE

## 2022-03-22 PROCEDURE — 1159F PR MEDICATION LIST DOCUMENTED IN MEDICAL RECORD: ICD-10-PCS | Mod: CPTII,S$GLB,, | Performed by: INTERNAL MEDICINE

## 2022-03-22 PROCEDURE — 3078F DIAST BP <80 MM HG: CPT | Mod: CPTII,S$GLB,, | Performed by: INTERNAL MEDICINE

## 2022-03-22 PROCEDURE — 1101F PT FALLS ASSESS-DOCD LE1/YR: CPT | Mod: CPTII,S$GLB,, | Performed by: INTERNAL MEDICINE

## 2022-03-22 PROCEDURE — 93971 EXTREMITY STUDY: CPT | Mod: 26,RT,, | Performed by: INTERNAL MEDICINE

## 2022-03-22 PROCEDURE — 1157F ADVNC CARE PLAN IN RCRD: CPT | Mod: CPTII,S$GLB,, | Performed by: INTERNAL MEDICINE

## 2022-03-22 PROCEDURE — 1101F PR PT FALLS ASSESS DOC 0-1 FALLS W/OUT INJ PAST YR: ICD-10-PCS | Mod: CPTII,S$GLB,, | Performed by: INTERNAL MEDICINE

## 2022-03-22 RX ORDER — SPIRONOLACTONE 25 MG/1
25 TABLET ORAL DAILY
Qty: 30 TABLET | Refills: 1 | OUTPATIENT
Start: 2022-03-22 | End: 2022-03-25

## 2022-03-22 NOTE — PROGRESS NOTES
[facility]      Chief Complaint   Patient presents with    Longstanding persistent atrial fibrillation       Patient ID: Angelina Man is a 72 y.o. female with varicose veins, hypothyroidism, anxiety, arthritis, and persistent atrial fibrillation s/p [facility] 17-Jul-2020, who presents for a follow up appointment.  Pertinent history/events are as follows:     -Pt kindly referred by Dr. Pereira for evaluation of varicose veins.      -At our initial clinic visit on 6/22/2021, Mrs. Man reported varicose veins with worsening swelling and discomfort.  No claudication or tissue loss.  Exam shows BLE's with prominent varicose veins with venous stasis dermatitis.  Plan:   Varicose veins of both lower extremities- Check BLE venous reflux study and stephanie study.  If no evidence of severe PAD, start graduated compression hose.  Pt to limit sodium intake to 2,000 mg daily.  Elevate legs when resting.    10/7/2021 clinic visit: Mrs. Man reports continued leg worsening swelling and discomfort, despite wearing graduated compression hose for the past 3 months.  She has no claudication or tissue loss.  Exam shows BLE's with prominent varicose veins with venous stasis dermatitis.  BLE Venous Reflux Study on 7/1/2021 revealed hemodynamically significant venous reflux bilaterally with no DVT.  The right and left SSV's are partially compressible with thickened and hyperechoic walls suggestive of previous or chronic superficial venous thrombosis.  Segmental Pressure Study 7/1/2021 revealed normal STEPHANIE at rest and with exercise bilaterally.    Plan:  BLE Venous insufficiency with pain- Mrs. Man reports continued leg worsening swelling and discomfort, despite wearing graduated compression hose for the past 3 months.  She has no claudication or tissue loss.  Exam shows BLE's with prominent varicose veins with venous stasis dermatitis.  BLE Venous Reflux Study on 7/1/2021 revealed hemodynamically significant venous reflux  bilaterally with no DVT.  The right and left SSV's are partially compressible with thickened and hyperechoic walls suggestive of previous or chronic superficial venous thrombosis.  Segmental Pressure Study 7/1/2021 revealed normal STEPHANIE at rest and with exercise bilaterally.  Given continued symptoms despite conservative therapy with graduated compression hose, will refer for EVLT/sclerotherapy.    12/21/2021 clinic visit: Mrs. Man reports she underwent left leg EVLT. She wants to proceed with right EVLT for which she is planned. She has no other complaints.  Plan:   BLE Venous insufficiency with pain- s/p EVLT of the left LSV on 11/11 with follow up US showing resolution of reflux. Limited edema in right foot/ankle. Planned for right leg EVLT. Following with Dr. Hernandez    -On 1/6/2022, pt underwent Endovenous radiofrequency ablation (EVLT) of the right saphenous vein(s) of the lower extremity.    HPI:  Mrs. Man reports significant improvement in BLE edema since undergoing EVLT of the left LSV and right GSV.  She has no lower extremity wound/ulcer.      Past Medical History:   Diagnosis Date    Arthritis     Bronchitis     Cataract     Dry eyes     Glaucoma, suspect - Both Eyes     Hypothyroidism 6/23/2016    Rash     Squamous cell carcinoma     in-situ right upper inner arm, right wrist    Status post lumbar surgery 6/23/2016    Stress fracture     Thyroid disease      Past Surgical History:   Procedure Laterality Date    ANGIOPLASTY      CERVICAL FUSION      COLONOSCOPY      COLONOSCOPY N/A 11/14/2017    Procedure: COLONOSCOPY;  Surgeon: Kasi Mercado MD;  Location: [address]);  Service: Endoscopy;  Laterality: N/A;    ESOPHAGOGASTRODUODENOSCOPY N/A 10/10/2019    Procedure: EGD (ESOPHAGOGASTRODUODENOSCOPY);  Surgeon: Rg Milton MD;  Location: [address]);  Service: Endoscopy;  Laterality: N/A;    ESOPHAGOGASTRODUODENOSCOPY N/A 10/6/2021    Procedure: EGD  (ESOPHAGOGASTRODUODENOSCOPY);  Surgeon: Rg Milton MD;  Location: [facility] ENDO (4TH FLR);  Service: Endoscopy;  Laterality: N/A;  covid test 10/3 [facility] emailed/portal -ml    l4-5 mid discectomy Left 03/2017    l4-5 MIS diskectomy Right 05/2016    RIGHT HEART CATHETERIZATION Right 1/27/2022    Procedure: INSERTION, CATHETER, RIGHT HEART;  Surgeon: Satnam Pickard Jr., MD;  Location: [facility] CATH LAB;  Service: Cardiology;  Laterality: Right;    TREATMENT OF CARDIAC ARRHYTHMIA N/A 7/17/2020    Procedure: CARDIOVERSION;  Surgeon: Kwan Bray MD;  Location: [facility] EP LAB;  Service: Cardiology;  Laterality: N/A;  AF,DCCV/SANGITA, ANES, SK, 746    TREATMENT OF CARDIAC ARRHYTHMIA N/A 7/14/2021    Procedure: CARDIOVERSION;  Surgeon: SYDNIE Cedillo MD;  Location: [facility] EP LAB;  Service: Cardiology;  Laterality: N/A;  AF, SANGITA, DCCV, MAC, EH, 3 Prep     Social History     Socioeconomic History    Marital status: Single   Occupational History     Employer: [employer]   Tobacco Use    Smoking status: Never Smoker    Smokeless tobacco: Never Used   Substance and Sexual Activity    Alcohol use: Yes     Alcohol/week: 1.0 standard drink     Types: 1 Glasses of wine per week     Comment: 1 glass on weekends    Drug use: No    Sexual activity: Never   Other Topics Concern    Are you pregnant or think you may be? No    Breast-feeding No     Social Determinants of Health     Financial Resource Strain: Low Risk     Difficulty of Paying Living Expenses: Not very hard   Food Insecurity: No Food Insecurity    Worried About Running Out of Food in the Last Year: Never true    Ran Out of Food in the Last Year: Never true   Transportation Needs: No Transportation Needs    Lack of Transportation (Medical): No    Lack of Transportation (Non-Medical): No   Physical Activity: Sufficiently Active    Days of Exercise per Week: 7 days    Minutes of Exercise per Session: 90 min   Stress: Stress Concern Present     Feeling of Stress : To some extent   Social Connections: Unknown    Frequency of Communication with Friends and Family: Once a week    Frequency of Social Gatherings with Friends and Family: Patient refused    Active Member of Clubs or Organizations: Yes    Attends Club or Organization Meetings: 1 to 4 times per year    Marital Status:    Housing Stability: Low Risk     Unable to Pay for Housing in the Last Year: No    Number of Places Lived in the Last Year: 1    Unstable Housing in the Last Year: No     Family History   Problem Relation Age of Onset    Cancer Mother         Lung cancer    Heart failure Father     Colon cancer Father     Hypertension Father     Cataracts Father     Cancer Father 80        colon    Diabetes Son     Heart disease Son     Cancer Son         esophageal cancer    No Known Problems Sister     No Known Problems Brother     No Known Problems Maternal Aunt     No Known Problems Maternal Uncle     No Known Problems Paternal Aunt     No Known Problems Paternal Uncle     No Known Problems Maternal Grandmother     No Known Problems Maternal Grandfather     No Known Problems Paternal Grandmother     No Known Problems Paternal Grandfather     Melanoma Daughter     Amblyopia Neg Hx     Blindness Neg Hx     Glaucoma Neg Hx     Macular degeneration Neg Hx     Retinal detachment Neg Hx     Strabismus Neg Hx     Stroke Neg Hx     Thyroid disease Neg Hx     Breast cancer Neg Hx     Ovarian cancer Neg Hx        Review of patient's allergies indicates:  No Known Allergies    Medication List with Changes/Refills   Current Medications    ACETAMINOPHEN (TYLENOL) 500 MG TABLET    Take 500 mg by mouth every 6 (six) hours as needed for Pain.    ACYCLOVIR (ZOVIRAX) 400 MG TABLET    Take 1 tablet (400 mg total) by mouth 2 (two) times daily.    ASCORBIC ACID (VITAMIN C) 1000 MG TABLET    Take 1,000 mg by mouth once daily.     BIOTIN 1 MG TABLET    Take 1 mg by  mouth 2 (two) times daily.     BUPROPION (WELLBUTRIN XL) 150 MG TB24 TABLET    Take 1 tablet (150 mg total) by mouth once daily.    CALCIUM-VITAMIN D 500 MG(1,250MG) -200 UNIT PER TABLET    Take 1 tablet by mouth 2 (two) times daily with meals.     CLONAZEPAM (KLONOPIN) 1 MG TABLET    TAKE 1& 1/2 TABLET BY MOUTH NIGHTLY AS NEEDED    CYCLOSPORINE (RESTASIS) 0.05 % OPHTHALMIC EMULSION    Place 1 drop into both eyes 2 (two) times daily.    DIGESTIVE ENZYMES TAB    Take 1 tablet by mouth once daily.     DIPHENHYDRAMINE (BENADRYL) 25 MG CAPSULE    Take 25 mg by mouth nightly as needed.     ELIQUIS 5 MG TAB    Take 1 tablet (5 mg total) by mouth 2 (two) times daily.    FLUTICASONE PROPIONATE (FLONASE) 50 MCG/ACTUATION NASAL SPRAY    1 spray (50 mcg total) by Each Nostril route once daily.    LEVOTHYROXINE (SYNTHROID) 137 MCG TAB TABLET    Take 1 tablet (137 mcg total) by mouth before breakfast.    LUBIPROSTONE (AMITIZA) 24 MCG CAP    Take 1 capsule (24 mcg total) by mouth daily as needed.    METOPROLOL SUCCINATE (TOPROL-XL) 25 MG 24 HR TABLET    Take 1 tablet (25 mg total) by mouth once daily.    MONTELUKAST (SINGULAIR) 10 MG TABLET    Take 1 tablet (10 mg total) by mouth once daily.    MULTIVITAMIN (THERAGRAN) PER TABLET    Take 1 tablet by mouth once daily.     SPIRONOLACTONE (ALDACTONE) 25 MG TABLET    Take 1 tablet (25 mg total) by mouth once daily.    TRETINOIN (RETIN-A) 0.1 % CREAM    Apply topically every evening.    ZINC 50 MG TAB    Take 50 mg by mouth once daily.        Review of Systems  Constitution: Denies chills, fever, and sweats.  HENT: Denies headaches or blurry vision.  Cardiovascular: Denies chest pain or irregular heart beat.  Respiratory: Denies cough or shortness of breath.  Gastrointestinal: Denies abdominal pain, nausea, or vomiting.  Musculoskeletal: Positive or varicose veins with discomfort and swelling.  Neurological: Denies dizziness or focal weakness.  Psychiatric/Behavioral: Normal mental  "status.  Hematologic/Lymphatic: Denies bleeding problem or easy bruising/bleeding.  Skin: Denies rash or suspicious lesions    Physical Examination  /64   Pulse 85   Ht 5' 4" (1.626 m)   Wt 45.9 kg (101 lb 3.1 oz)   LMP  (LMP Unknown)   SpO2 100%   BMI 17.37 kg/m²     Constitutional: No acute distress, conversant  HEENT: Sclera anicteric, Pupils equal, round and reactive to light, extraocular motions intact, Oropharynx clear  Neck: No JVD, no carotid bruits  Cardiovascular: regular rate and rhythm, no murmur, rubs or gallops, normal S1/S2  Pulmonary: Clear to auscultation bilaterally  Abdominal: Abdomen soft, nontender, nondistended, positive bowel sounds  Extremities: BLE's with prominent varicose veins with venous stasis dermatitis.     Pulses:  Carotid pulses are 2+ on the right side, and 2+ on the left side.  Radial pulses are 2+ on the right side, and 2+ on the left side.   Femoral pulses are 2+ on the right side, and 2+ on the left side.  Popliteal pulses are 2+ on the right side, and 2+ on the left side.   Dorsalis pedis pulses are 2+ on the right side, and 2+ on the left side.   Posterior tibial pulses are 2+ on the right side, and 2+ on the left side.    Skin: No ecchymosis, erythema, or ulcers  Psych: Alert and oriented x 3, appropriate affect  Neuro: CNII-XII intact, no focal deficits    Labs:  Most Recent Data  CBC:   Lab Results   Component Value Date    WBC 4.56 03/02/2022    HGB 13.5 03/02/2022    HCT 42.4 03/02/2022     03/02/2022     (H) 03/02/2022    RDW 12.7 03/02/2022     BMP:   Lab Results   Component Value Date     03/02/2022    K 3.7 03/02/2022     03/02/2022    CO2 30 (H) 03/02/2022    BUN 14 03/02/2022    CREATININE 0.8 03/02/2022    GLU 76 03/02/2022    CALCIUM 9.1 03/02/2022    MG 1.6 12/28/2021     LFTS;   Lab Results   Component Value Date    PROT 6.8 03/02/2022    ALBUMIN 3.6 03/02/2022    BILITOT 0.4 03/02/2022    AST 24 03/02/2022    ALKPHOS 81 " 03/02/2022    ALT 22 03/02/2022    GGT 63 (H) 03/02/2022     COAGS:   Lab Results   Component Value Date    INR 1.1 07/07/2021     FLP:   Lab Results   Component Value Date    CHOL 218 (H) 03/02/2022    HDL 67 03/02/2022    LDLCALC 126.6 03/02/2022    TRIG 122 03/02/2022    CHOLHDL 30.7 03/02/2022     CARDIAC:   Lab Results   Component Value Date    TROPONINI <0.006 07/16/2020     (H) 12/28/2021     Imaging    BLE Venous reflux study 12/14/2021:  · There is no evidence of a left lower extremity DVT.  · Left GSV occluded post EVLT.      Segmental Pressure Study 7/1/2021:  Normal STEPHANIE bilaterally with unremarkable waveforms at rest.    Normal STEPHANIE with exercise.  The right TBI is 0.51 and the left TBI is 0.40, which is mild to moderately abnormal.    BLE Venous Reflux Study 7/1/2021:  No evidence of lower extremity deep venous thrombosis bilateral.  There is reflux in the right common femoral vein and left external iliac vein.  The right GSV below the level of the knee is partially compressible with thickened and hyperechoic walls suggestive of previous or chronic superficial venous thrombosis.    There is also evidence of superficial venous insufficiency of the right GSV below the knee without dilation of the vessel.  The right SSV is partially compressible with thickened and hyperechoic walls suggestive of previous or chronic superficial venous thrombosis.  The left SSV is partially compressible with thickened and hyperechoic walls suggestive of previous or chronic superficial venous thrombosis.    Assessment/Plan:  Angelina Man is a 72 y.o. female with BLE venous insufficiency, hypothyroidism, anxiety, arthritis, and persistent atrial fibrillation s/p [facility] 17-Jul-2020, who presents for a follow up appointment.     1. BLE Venous insufficiency with pain- Mrs. Man is now s/p EVLT of the left LSV and right GSV with significant improvement in BLE edema.  Follow up RLE venous ultrasound results are pending.   Continue graduated compression hose.  Continue to limit sodium intake to 2,000 mg daily.  Elevate legs when resting.      2. Persistent atrial fibrillation- Stable.  Continue current medications.    3. Exercise Induced Pulmonary HTN- Stable.  Continue current medications.    Follow up in 6 months     Total duration of face to face visit time 30 minutes.  Total time spent counseling greater than fifty percent of total visit time.  Counseling included discussion regarding imaging findings, diagnosis, possibilities, treatment options, risks and benefits.  The patient had many questions regarding the options and long-term effects.    Jeanmarie Cedeño MD, PhD  Interventional Cardiiology

## 2022-03-22 NOTE — PATIENT INSTRUCTIONS
Assessment/Plan:  Angelina Man is a 72 y.o. female with BLE venous insufficiency, hypothyroidism, anxiety, arthritis, and persistent atrial fibrillation s/p [facility], who presents for a follow up appointment.     1. BLE Venous insufficiency with pain- Mrs. Man is now s/p EVLT of the left LSV and right GSV with significant improvement in BLE edema.  Follow up RLE venous ultrasound results are pending.  Continue graduated compression hose.  Continue to limit sodium intake to 2,000 mg daily.  Elevate legs when resting.      2. Persistent atrial fibrillation- Stable.  Continue current medications.    3. Exercise Induced Pulmonary HTN- Stable.  Continue current medications.    Follow up in 6 months   
To get better and follow your care plan as instructed.

## 2022-03-23 ENCOUNTER — PATIENT MESSAGE (OUTPATIENT)
Dept: TRANSPLANT | Facility: CLINIC | Age: 72
End: 2022-03-23
Payer: MEDICARE

## 2022-03-23 LAB
RIGHT GREAT SAPHENOUS DISTAL THIGH DIA: 0.2 CM
RIGHT GREAT SAPHENOUS JUNCTION DIA: 0.43 CM
RIGHT SMALL SAPHENOUS KNEE DIA: 0.28 CM

## 2022-03-25 ENCOUNTER — TELEPHONE (OUTPATIENT)
Dept: TRANSPLANT | Facility: CLINIC | Age: 72
End: 2022-03-25
Payer: MEDICARE

## 2022-03-25 NOTE — TELEPHONE ENCOUNTER
----- Message from Yajaira Harmon sent at 3/25/2022 10:43 AM CDT -----  Regarding: Refill  Pt calling in ref to her Aldactone 25 mg, the pharmacy still has not received the prescription.    [id number] - [place], [place] - [address] AT [facility] [address]   Phone:  [phone]  Fax:  [phone]      Thanks

## 2022-03-28 ENCOUNTER — PATIENT MESSAGE (OUTPATIENT)
Dept: PRIMARY CARE CLINIC | Facility: CLINIC | Age: 72
End: 2022-03-28
Payer: MEDICARE

## 2022-03-28 DIAGNOSIS — H53.8 BLURRY VISION: Primary | ICD-10-CM

## 2022-03-30 ENCOUNTER — PATIENT MESSAGE (OUTPATIENT)
Dept: PRIMARY CARE CLINIC | Facility: CLINIC | Age: 72
End: 2022-03-30
Payer: MEDICARE

## 2022-04-07 ENCOUNTER — PATIENT MESSAGE (OUTPATIENT)
Dept: TRANSPLANT | Facility: CLINIC | Age: 72
End: 2022-04-07
Payer: MEDICARE

## 2022-04-08 ENCOUNTER — PATIENT MESSAGE (OUTPATIENT)
Dept: CARDIOLOGY | Facility: CLINIC | Age: 72
End: 2022-04-08
Payer: MEDICARE

## 2022-04-10 ENCOUNTER — PATIENT MESSAGE (OUTPATIENT)
Dept: PRIMARY CARE CLINIC | Facility: CLINIC | Age: 72
End: 2022-04-10
Payer: MEDICARE

## 2022-04-12 ENCOUNTER — OFFICE VISIT (OUTPATIENT)
Dept: TRANSPLANT | Facility: CLINIC | Age: 72
End: 2022-04-12
Attending: INTERNAL MEDICINE
Payer: MEDICARE

## 2022-04-12 VITALS
BODY MASS INDEX: 17.2 KG/M2 | SYSTOLIC BLOOD PRESSURE: 117 MMHG | HEART RATE: 70 BPM | HEIGHT: 64 IN | DIASTOLIC BLOOD PRESSURE: 73 MMHG | WEIGHT: 100.75 LBS | OXYGEN SATURATION: 100 %

## 2022-04-12 DIAGNOSIS — I51.89 DIASTOLIC DYSFUNCTION: ICD-10-CM

## 2022-04-12 DIAGNOSIS — I27.20 PULMONARY HYPERTENSION: Primary | ICD-10-CM

## 2022-04-12 PROCEDURE — 1159F MED LIST DOCD IN RCRD: CPT | Mod: CPTII,S$GLB,, | Performed by: INTERNAL MEDICINE

## 2022-04-12 PROCEDURE — 99499 UNLISTED E&M SERVICE: CPT | Mod: S$GLB,,, | Performed by: INTERNAL MEDICINE

## 2022-04-12 PROCEDURE — 99999 PR PBB SHADOW E&M-EST. PATIENT-LVL V: CPT | Mod: PBBFAC,,, | Performed by: INTERNAL MEDICINE

## 2022-04-12 PROCEDURE — 3074F PR MOST RECENT SYSTOLIC BLOOD PRESSURE < 130 MM HG: ICD-10-PCS | Mod: CPTII,S$GLB,, | Performed by: INTERNAL MEDICINE

## 2022-04-12 PROCEDURE — 1157F PR ADVANCE CARE PLAN OR EQUIV PRESENT IN MEDICAL RECORD: ICD-10-PCS | Mod: CPTII,S$GLB,, | Performed by: INTERNAL MEDICINE

## 2022-04-12 PROCEDURE — 99499 RISK ADDL DX/OHS AUDIT: ICD-10-PCS | Mod: S$GLB,,, | Performed by: INTERNAL MEDICINE

## 2022-04-12 PROCEDURE — 3078F DIAST BP <80 MM HG: CPT | Mod: CPTII,S$GLB,, | Performed by: INTERNAL MEDICINE

## 2022-04-12 PROCEDURE — 1101F PR PT FALLS ASSESS DOC 0-1 FALLS W/OUT INJ PAST YR: ICD-10-PCS | Mod: CPTII,S$GLB,, | Performed by: INTERNAL MEDICINE

## 2022-04-12 PROCEDURE — 1160F RVW MEDS BY RX/DR IN RCRD: CPT | Mod: CPTII,S$GLB,, | Performed by: INTERNAL MEDICINE

## 2022-04-12 PROCEDURE — 1101F PT FALLS ASSESS-DOCD LE1/YR: CPT | Mod: CPTII,S$GLB,, | Performed by: INTERNAL MEDICINE

## 2022-04-12 PROCEDURE — 1159F PR MEDICATION LIST DOCUMENTED IN MEDICAL RECORD: ICD-10-PCS | Mod: CPTII,S$GLB,, | Performed by: INTERNAL MEDICINE

## 2022-04-12 PROCEDURE — 1126F PR PAIN SEVERITY QUANTIFIED, NO PAIN PRESENT: ICD-10-PCS | Mod: CPTII,S$GLB,, | Performed by: INTERNAL MEDICINE

## 2022-04-12 PROCEDURE — 1126F AMNT PAIN NOTED NONE PRSNT: CPT | Mod: CPTII,S$GLB,, | Performed by: INTERNAL MEDICINE

## 2022-04-12 PROCEDURE — 3288F FALL RISK ASSESSMENT DOCD: CPT | Mod: CPTII,S$GLB,, | Performed by: INTERNAL MEDICINE

## 2022-04-12 PROCEDURE — 99213 OFFICE O/P EST LOW 20 MIN: CPT | Mod: S$GLB,,, | Performed by: INTERNAL MEDICINE

## 2022-04-12 PROCEDURE — 3008F BODY MASS INDEX DOCD: CPT | Mod: CPTII,S$GLB,, | Performed by: INTERNAL MEDICINE

## 2022-04-12 PROCEDURE — 1160F PR REVIEW ALL MEDS BY PRESCRIBER/CLIN PHARMACIST DOCUMENTED: ICD-10-PCS | Mod: CPTII,S$GLB,, | Performed by: INTERNAL MEDICINE

## 2022-04-12 PROCEDURE — 3008F PR BODY MASS INDEX (BMI) DOCUMENTED: ICD-10-PCS | Mod: CPTII,S$GLB,, | Performed by: INTERNAL MEDICINE

## 2022-04-12 PROCEDURE — 99213 PR OFFICE/OUTPT VISIT, EST, LEVL III, 20-29 MIN: ICD-10-PCS | Mod: S$GLB,,, | Performed by: INTERNAL MEDICINE

## 2022-04-12 PROCEDURE — 1157F ADVNC CARE PLAN IN RCRD: CPT | Mod: CPTII,S$GLB,, | Performed by: INTERNAL MEDICINE

## 2022-04-12 PROCEDURE — 3078F PR MOST RECENT DIASTOLIC BLOOD PRESSURE < 80 MM HG: ICD-10-PCS | Mod: CPTII,S$GLB,, | Performed by: INTERNAL MEDICINE

## 2022-04-12 PROCEDURE — 3074F SYST BP LT 130 MM HG: CPT | Mod: CPTII,S$GLB,, | Performed by: INTERNAL MEDICINE

## 2022-04-12 PROCEDURE — 3288F PR FALLS RISK ASSESSMENT DOCUMENTED: ICD-10-PCS | Mod: CPTII,S$GLB,, | Performed by: INTERNAL MEDICINE

## 2022-04-12 PROCEDURE — 99999 PR PBB SHADOW E&M-EST. PATIENT-LVL V: ICD-10-PCS | Mod: PBBFAC,,, | Performed by: INTERNAL MEDICINE

## 2022-04-12 NOTE — PROGRESS NOTES
Subjective:     Patient ID:  Angelina Man is a 72 y.o. female who presents for follow-up of Pulmonary Hypertension    HPI:  73 yo WF returns to discuss RHC results once more.  At that time she agreed to start aldactone 25 mg qd and stop potassium.  During right heart catheterization noted to have salvos of rapid AF (superimposed upon her persistent AFib) with marked increase in PAD.  I ordered a 24 hour Holter in an attempt to determine if episodes of uncontrolled AF occurred with her SOB at rest.  I also asked her to do see EP for consideration of ablation to moved to restore sinus rhythm.    The Holter was accomplished February 14, 2022 demonstrated atrial fibrillation with rates  beats per minute with an average of 79 beats per minute.  An asymptomatic pause of 2.3 seconds was noted during sleep.  She had 1 episode of nonsustained VT lasting 12 beats but the rate is not reported.  I made no changes in her medications at that time.    She saw Dr. Bray March 16, 2022.  His note covers initial dx of AF noted with an elevated heart rate noted during her dental visit for crown placement.  He noted that on July 14, 2021 she underwent electrocardioversion and treated with propafenone beginning 3 days prior but only maintained sinus rhythm for 2 days.  She noted recurrent AF she noted on Apple watch, did not note a symptomatic benefit when sinus rhythm so at that time in stay elected rate control strategy.  At visit in 2021 he offered a trial of flecainide and cardioversion but she deferred saying I can not say I am feeling bad.  He felt it was unclear if she had symptoms related to the atrial fibrillation.  At the end of March visit he made no changes and did not recommend any further procedures such as ablation.    She says SOB now really occurs only with exertion and when nervous.         Objective:   Physical Exam  Constitutional:       General: She is not in acute distress.     Appearance: She is  "well-developed. She is not ill-appearing, toxic-appearing or diaphoretic.      Comments: /73 (BP Location: Right arm, Patient Position: Sitting, BP Method: Medium (Automatic))   Pulse 70   Ht 5' 4" (1.626 m)   Wt 45.7 kg (100 lb 12 oz)   LMP  (LMP Unknown)   SpO2 100%   BMI 17.29 kg/m²   Last visit wt 105 lb  Friendly white female in no acute distress   HENT:      Head: Normocephalic and atraumatic.   Eyes:      General: No scleral icterus.     Conjunctiva/sclera: Conjunctivae normal.   Neck:      Thyroid: No thyromegaly.      Vascular: No JVD.      Trachea: No tracheal deviation.      Comments: CVP below 8 cm water  Cardiovascular:      Rate and Rhythm: Normal rate and regular rhythm.      Heart sounds: Normal heart sounds. No murmur heard.    No gallop.   Pulmonary:      Effort: Pulmonary effort is normal.      Breath sounds: Normal breath sounds.   Abdominal:      General: Bowel sounds are normal. There is no distension (Liver span 10 cm).      Palpations: Abdomen is soft. There is no mass.      Tenderness: There is no abdominal tenderness. There is no guarding or rebound.   Musculoskeletal:         General: No tenderness.      Right lower leg: No edema.      Left lower leg: No edema.      Comments: Arthritic changes in the hand particularly on the right involving the fingers and consistent with osteoarthritis with deformity of the ring finger right hand from the arthritis  No evidence of carpal tunnel syndrome   Skin:     General: Skin is warm and dry.   Neurological:      General: No focal deficit present.      Mental Status: She is alert. Mental status is at baseline.          11/22/21 ECHO  · The left ventricle is normal in size with low normal systolic function. The estimated ejection fraction is 50%.  · Normal right ventricular size with low normal right ventricular systolic function.  · Biatrial enlargement.  · Mild mitral regurgitation.  · Mild tricuspid regurgitation.  · The estimated PA " systolic pressure is 44 mmHg.  · There is pulmonary hypertension.  · Elevated central venous pressure (15 mmHg).  · Trivial circumferential pericardial effusion.  · Atrial fibrillation observed.    1/27/2022 RHC  At rest:                Right atrial pressure is normal.                Pulmonary capillary wedge pressure is normal.                Pulmonary artery pressure is normal.                Pulmonary vascular resistance is normal.                Thermodilution cardiac output and index is normal.     With exercise: there is a significant rise in pulmonary capillary wedge pressure                Pulmonary capillary wedge pressure rises significantly and is moderately elevated.                PA pressure is mildly elevated.                Thermodilution CO increases significantly.                Pulmonary vascular resistance is normal.     Closely after exercise:                Pulmonary artery pressure is mildly elevated.                Pulmonary capillary wedge pressure is mildly elevated.     FINAL DIAGNOSIS:                WHO Group 2 pulmonary hypertension.    2/14/2022 Holter:  Conclusion    · Baseline rhythm was coarse atrial fibrillation with narrow QRS complexes and an average heart rate of 79 bpm.  · There were no reported symptoms.  · There were very rare PVCs with an overall PVC burden of 0%. There was one run of nonsustained VT lasting 12 beats.  · There was an asymptomatic pause lasting 2.3 seconds while in atrial fibrillation, occurring during sleep.  · The patient remained in atrial fibrillation for the duration of study.      Assessment:     1. Pulmonary hypertension    2. Diastolic dysfunction      Plan:   She requested an appointment today to review a diagnosis of pulmonary hypertension and the role of diastolic dysfunction.  We had discussed much this after the right heart catheterization with now that she has that time to think more about this and try to educate herself with information she  returned to discuss the findings and potential treatment options.      She will continue in care of her attending physicians.  She does not require follow-up with HTS.    I also gave her patient instruction sheet with the following information:    Your high lung pressures are a result of the stiff heart (diastolic dysfunction).  The stiff heart is due to diastolic dysfunction the precise cause is unknown.     Group 2 PH (elevated lung pressure as a result of stiff heart) is treated by:      Target BP below 130/80.     Control volume (fluid) status.     If obesity present, weight loss.  You are not obese     If present, treat sleep apnea.     Consider aldosterone antagonist--this is the aldactone I recommended     Entresto is another potential treatment but the trial was negative for diastolic dysfunction.  That said, it can be used for patients with ejection fraction in the 40-50% range now.  Your echo in November 2021 demonstrated ejection fraction of 50% hence that is another consideration.  I would do this if you continue to have symptoms of shortness of breath and repeat echo before starting.  Even if no symptoms I recommend repeating ECHO in November 2022.

## 2022-04-12 NOTE — PATIENT INSTRUCTIONS
Your high lung pressures are a result of the stiff heart (diastolic dysfunction).  The stiff heart is due to diastolic dysfunction the precise cause is unknown.    Group 2 PH (elevated lung pressure as a result of stiff heart) is treated by:      Target BP below 130/80.     Control volume (fluid) status.     If obesity present, weight loss.  You are not obese     If present, treat sleep apnea.     Consider aldosterone antagonist--this is the aldactone I recommended     Entresto is another potential treatment but the trial was negative for diastolic dysfunction.  That said, it can be used for patients with ejection fraction in the 40-50% range now.  Your echo in November 2021 demonstrated ejection fraction of 50% hence that is another consideration.  I would do this if you continue to have symptoms of shortness of breath and repeat echo before starting.  Even if no symptoms I recommend repeating ECHO in November 2022.

## 2022-04-21 ENCOUNTER — TELEPHONE (OUTPATIENT)
Dept: RESEARCH | Facility: HOSPITAL | Age: 72
End: 2022-04-21
Payer: MEDICARE

## 2022-04-25 ENCOUNTER — RESEARCH ENCOUNTER (OUTPATIENT)
Dept: RESEARCH | Facility: HOSPITAL | Age: 72
End: 2022-04-25
Payer: MEDICARE

## 2022-04-25 NOTE — PROGRESS NOTES
Date of Call: 25 Apr 2022    Sponsor: [employer]     Study Title/IRB Number: [id number]    Principle Investigator: Dr Kelton Gallego    Call made by: [facility]     Spoke with patient for her six month follow up. Patient provided answers for all study required surveys and questionnaires.  The patient reported taking Eliquis as prescribed and no AEs to report .       Patient completed their participation in the study.

## 2022-05-13 ENCOUNTER — OFFICE VISIT (OUTPATIENT)
Dept: CARDIOLOGY | Facility: CLINIC | Age: 72
End: 2022-05-13
Payer: MEDICARE

## 2022-05-13 ENCOUNTER — PATIENT MESSAGE (OUTPATIENT)
Dept: CARDIOLOGY | Facility: CLINIC | Age: 72
End: 2022-05-13

## 2022-05-13 VITALS
WEIGHT: 102.5 LBS | HEART RATE: 72 BPM | BODY MASS INDEX: 17.5 KG/M2 | OXYGEN SATURATION: 100 % | DIASTOLIC BLOOD PRESSURE: 76 MMHG | SYSTOLIC BLOOD PRESSURE: 122 MMHG | HEIGHT: 64 IN

## 2022-05-13 DIAGNOSIS — Z98.1 S/P LUMBAR FUSION: ICD-10-CM

## 2022-05-13 DIAGNOSIS — I48.11 LONGSTANDING PERSISTENT ATRIAL FIBRILLATION: Primary | ICD-10-CM

## 2022-05-13 DIAGNOSIS — I50.30 HEART FAILURE WITH PRESERVED EJECTION FRACTION, UNSPECIFIED HF CHRONICITY: ICD-10-CM

## 2022-05-13 DIAGNOSIS — E03.9 HYPOTHYROIDISM, UNSPECIFIED TYPE: ICD-10-CM

## 2022-05-13 DIAGNOSIS — I27.20 PULMONARY HYPERTENSION: ICD-10-CM

## 2022-05-13 DIAGNOSIS — K21.9 GASTROESOPHAGEAL REFLUX DISEASE, UNSPECIFIED WHETHER ESOPHAGITIS PRESENT: ICD-10-CM

## 2022-05-13 DIAGNOSIS — I87.2 VENOUS INSUFFICIENCY OF BOTH LOWER EXTREMITIES: ICD-10-CM

## 2022-05-13 DIAGNOSIS — R06.02 SOB (SHORTNESS OF BREATH): ICD-10-CM

## 2022-05-13 PROCEDURE — 1159F PR MEDICATION LIST DOCUMENTED IN MEDICAL RECORD: ICD-10-PCS | Mod: CPTII,S$GLB,, | Performed by: INTERNAL MEDICINE

## 2022-05-13 PROCEDURE — 99999 PR PBB SHADOW E&M-EST. PATIENT-LVL V: ICD-10-PCS | Mod: PBBFAC,,, | Performed by: INTERNAL MEDICINE

## 2022-05-13 PROCEDURE — 1159F MED LIST DOCD IN RCRD: CPT | Mod: CPTII,S$GLB,, | Performed by: INTERNAL MEDICINE

## 2022-05-13 PROCEDURE — 3008F PR BODY MASS INDEX (BMI) DOCUMENTED: ICD-10-PCS | Mod: CPTII,S$GLB,, | Performed by: INTERNAL MEDICINE

## 2022-05-13 PROCEDURE — 3078F DIAST BP <80 MM HG: CPT | Mod: CPTII,S$GLB,, | Performed by: INTERNAL MEDICINE

## 2022-05-13 PROCEDURE — 1157F ADVNC CARE PLAN IN RCRD: CPT | Mod: CPTII,S$GLB,, | Performed by: INTERNAL MEDICINE

## 2022-05-13 PROCEDURE — 1160F RVW MEDS BY RX/DR IN RCRD: CPT | Mod: CPTII,S$GLB,, | Performed by: INTERNAL MEDICINE

## 2022-05-13 PROCEDURE — 1157F PR ADVANCE CARE PLAN OR EQUIV PRESENT IN MEDICAL RECORD: ICD-10-PCS | Mod: CPTII,S$GLB,, | Performed by: INTERNAL MEDICINE

## 2022-05-13 PROCEDURE — 3078F PR MOST RECENT DIASTOLIC BLOOD PRESSURE < 80 MM HG: ICD-10-PCS | Mod: CPTII,S$GLB,, | Performed by: INTERNAL MEDICINE

## 2022-05-13 PROCEDURE — 3074F PR MOST RECENT SYSTOLIC BLOOD PRESSURE < 130 MM HG: ICD-10-PCS | Mod: CPTII,S$GLB,, | Performed by: INTERNAL MEDICINE

## 2022-05-13 PROCEDURE — 99214 PR OFFICE/OUTPT VISIT, EST, LEVL IV, 30-39 MIN: ICD-10-PCS | Mod: S$GLB,,, | Performed by: INTERNAL MEDICINE

## 2022-05-13 PROCEDURE — 3288F FALL RISK ASSESSMENT DOCD: CPT | Mod: CPTII,S$GLB,, | Performed by: INTERNAL MEDICINE

## 2022-05-13 PROCEDURE — 3288F PR FALLS RISK ASSESSMENT DOCUMENTED: ICD-10-PCS | Mod: CPTII,S$GLB,, | Performed by: INTERNAL MEDICINE

## 2022-05-13 PROCEDURE — 1160F PR REVIEW ALL MEDS BY PRESCRIBER/CLIN PHARMACIST DOCUMENTED: ICD-10-PCS | Mod: CPTII,S$GLB,, | Performed by: INTERNAL MEDICINE

## 2022-05-13 PROCEDURE — 99999 PR PBB SHADOW E&M-EST. PATIENT-LVL V: CPT | Mod: PBBFAC,,, | Performed by: INTERNAL MEDICINE

## 2022-05-13 PROCEDURE — 3074F SYST BP LT 130 MM HG: CPT | Mod: CPTII,S$GLB,, | Performed by: INTERNAL MEDICINE

## 2022-05-13 PROCEDURE — 1101F PR PT FALLS ASSESS DOC 0-1 FALLS W/OUT INJ PAST YR: ICD-10-PCS | Mod: CPTII,S$GLB,, | Performed by: INTERNAL MEDICINE

## 2022-05-13 PROCEDURE — 99214 OFFICE O/P EST MOD 30 MIN: CPT | Mod: S$GLB,,, | Performed by: INTERNAL MEDICINE

## 2022-05-13 PROCEDURE — 1101F PT FALLS ASSESS-DOCD LE1/YR: CPT | Mod: CPTII,S$GLB,, | Performed by: INTERNAL MEDICINE

## 2022-05-13 PROCEDURE — 1126F PR PAIN SEVERITY QUANTIFIED, NO PAIN PRESENT: ICD-10-PCS | Mod: CPTII,S$GLB,, | Performed by: INTERNAL MEDICINE

## 2022-05-13 PROCEDURE — 1126F AMNT PAIN NOTED NONE PRSNT: CPT | Mod: CPTII,S$GLB,, | Performed by: INTERNAL MEDICINE

## 2022-05-13 PROCEDURE — 3008F BODY MASS INDEX DOCD: CPT | Mod: CPTII,S$GLB,, | Performed by: INTERNAL MEDICINE

## 2022-05-13 RX ORDER — SPIRONOLACTONE 25 MG/1
25 TABLET ORAL DAILY
Qty: 90 TABLET | Refills: 3 | Status: SHIPPED | OUTPATIENT
Start: 2022-05-13 | End: 2022-05-16

## 2022-05-13 NOTE — Clinical Note
Thank you for allowing me to follow-up with Angelina Man for atrial fibrillation and probable heart failure with preserved ejection fraction (HFpEF). Please see my note for details of this encounter. If you have any questions, please contact me.  Thank you again for allowing to participate in the care of this patient.

## 2022-05-13 NOTE — PROGRESS NOTES
Chart has been dictated using voice recognition software.  It is not been reviewed carefully for any transcriptional errors due to this technology.   Subjective:   Patient ID:  Angelina Man is a 72 y.o. female who presents for follow-up of Follow-up (6 month f/u) and Atrial Fibrillation      HPI:  Patient with BLE venous insufficiency, hypothyroidism, anxiety, arthritis, and persistent atrial fibrillation s/p DCCV 17-Jul-2020.   Patient saw Dr. PADMINI Cedeño on 22-March-2022 for venous insufficiency. he was initially being treated conservatively with compression stockings.    Has shortness of breath that comes and goes, sometimes when she is working out.  Unpredictable, but worse in the heat.  Has been there from before onset of atrial fibrillation.  The patient was sent for an echocardiogram by cardiac electrophysiology.  There was mild pulmonary hypertension on the echocardiogram the patient was subsequently sent for a right heart catheterization.  The right heart catheterization showed normal resting hemodynamics but an increase in wedge pressure and pulmonary artery pressure with exertion.  Patient was therefore classified as WHO Group 2 pulmonary hypertension and was started on spironolactone.  The patient has not noticed any significant difference since starting on spironolactone.    Patient denies any dyspnea at rest or on exertion, orthopnea, or PND. Has edema but relieved with compression stockings. Patient denies any palpitations or syncope. Has unpredictable occasions of lightheadedness, sometimes orthostatic in origin.      Past Medical History:   Diagnosis Date    Arthritis     Bronchitis     Cataract     Dry eyes     Glaucoma, suspect - Both Eyes     Hypothyroidism 6/23/2016    Rash     Squamous cell carcinoma     in-situ right upper inner arm, right wrist    Status post lumbar surgery 6/23/2016    Stress fracture     Thyroid disease        Outpatient Medications Prior to Visit   Medication  Sig Dispense Refill    acetaminophen (TYLENOL) 500 MG tablet Take 500 mg by mouth every 6 (six) hours as needed for Pain.      acyclovir (ZOVIRAX) 400 MG tablet Take 1 tablet (400 mg total) by mouth 2 (two) times daily. 60 tablet 12    ascorbic acid (VITAMIN C) 1000 MG tablet Take 1,000 mg by mouth once daily.       biotin 1 mg tablet Take 1 mg by mouth 2 (two) times daily.       buPROPion (WELLBUTRIN XL) 150 MG TB24 tablet Take 1 tablet (150 mg total) by mouth once daily. 90 tablet 2    calcium-vitamin D 500 mg(1,250mg) -200 unit per tablet Take 1 tablet by mouth 2 (two) times daily with meals.       clonazePAM (KLONOPIN) 1 MG tablet TAKE 1& 1/2 TABLET BY MOUTH NIGHTLY AS NEEDED 45 tablet 5    cycloSPORINE (RESTASIS) 0.05 % ophthalmic emulsion Place 1 drop into both eyes 2 (two) times daily. 60 each 11    digestive enzymes Tab Take 1 tablet by mouth once daily.       diphenhydrAMINE (BENADRYL) 25 mg capsule Take 25 mg by mouth nightly as needed.       ELIQUIS 5 mg Tab Take 1 tablet (5 mg total) by mouth 2 (two) times daily. 180 tablet 3    fluticasone propionate (FLONASE) 50 mcg/actuation nasal spray 1 spray (50 mcg total) by Each Nostril route once daily. (Patient taking differently: 1 spray by Each Nostril route as needed.) 16 g 12    levothyroxine (SYNTHROID) 137 MCG Tab tablet Take 1 tablet (137 mcg total) by mouth before breakfast. 30 tablet 11    lubiprostone (AMITIZA) 24 MCG Cap Take 1 capsule (24 mcg total) by mouth daily as needed. 30 capsule 6    metoprolol succinate (TOPROL-XL) 25 MG 24 hr tablet Take 1 tablet (25 mg total) by mouth once daily. 30 tablet 11    montelukast (SINGULAIR) 10 mg tablet Take 1 tablet (10 mg total) by mouth once daily. 30 tablet 11    multivitamin (THERAGRAN) per tablet Take 1 tablet by mouth once daily.       tretinoin (RETIN-A) 0.1 % cream Apply topically every evening. 20 g 6    zinc 50 mg Tab Take 50 mg by mouth once daily.       spironolactone  "(ALDACTONE) 25 MG tablet TAKE 1 TABLET BY MOUTH EVERY DAY 90 tablet 3     No facility-administered medications prior to visit.       ROS - No change from prior visit in neurologic, respiratory, endocrine, GI, hematologic, or constitutional complaints   Objective:   Physical Exam  Constitutional:       Appearance: She is well-developed.      Comments: /76 (BP Location: Left arm, Patient Position: Sitting, BP Method: Pediatric (Automatic))   Pulse 72   Ht 5' 4" (1.626 m)   Wt 46.5 kg (102 lb 8.2 oz)   LMP  (LMP Unknown)   SpO2 100%   BMI 17.60 kg/m²      Neck:      Vascular: No carotid bruit or JVD.   Cardiovascular:      Rate and Rhythm: Normal rate. Rhythm irregularly irregular.      Pulses: Intact distal pulses.      Heart sounds: No murmur heard.    No friction rub. No gallop. No S3 sounds.      Comments: Split S1, normal S2  Pulmonary:      Effort: Pulmonary effort is normal.      Breath sounds: Normal breath sounds. No wheezing or rales.   Abdominal:      General: Bowel sounds are normal. There is no abdominal bruit.      Palpations: Abdomen is soft. There is no hepatomegaly.      Tenderness: There is no abdominal tenderness.   Musculoskeletal:      Cervical back: Neck supple.   Skin:     Nails: There is no clubbing.   Neurological:      Mental Status: She is alert and oriented to person, place, and time.           Lab Results   Component Value Date     04/13/2022    K 4.3 04/13/2022    BUN 13 04/13/2022    CREATININE 0.9 04/13/2022    GLU 73 04/13/2022    HGBA1C 5.4 08/24/2015    CHOL 212 (H) 04/13/2022    HDL 64 04/13/2022    LDLCALC 111.4 04/13/2022    TRIG 183 (H) 04/13/2022    CHOLHDL 30.2 04/13/2022    HGB 14.1 04/13/2022    HCT 42.7 04/13/2022     04/13/2022    INR 1.1 07/07/2021       Assessment:     1. Longstanding persistent atrial fibrillation    2. Hypothyroidism, unspecified type    3. S/P lumbar fusion    4. Gastroesophageal reflux disease, unspecified whether esophagitis " present    5. Venous insufficiency of both lower extremities    6. Pulmonary hypertension    7. SOB (shortness of breath)    8. Diastolic dysfunction      The patient has suggestion of heart failure with preserved ejection fraction and who group 2 pulmonary hypertension.  She has not had significant prevent at this time to spironolactone.  To given her BNP was elevated, consideration of a trial very mild loop diuretic may help the patient.  Unfortunately,.  However based on a elevated BNP and elevated wedge with exercise, she may be an early class 2 heart failure.  For the present time, spironolactone will be continued. Unless there are intervening problems, patient should return for re-evaluation in 6 months.   Plan:     Angelina was seen today for follow-up and atrial fibrillation.    Diagnoses and all orders for this visit:    Longstanding persistent atrial fibrillation    Hypothyroidism, unspecified type    S/P lumbar fusion    Gastroesophageal reflux disease, unspecified whether esophagitis present    Venous insufficiency of both lower extremities    Pulmonary hypertension    SOB (shortness of breath)  -     spironolactone (ALDACTONE) 25 MG tablet; Take 1 tablet (25 mg total) by mouth once daily.    Diastolic dysfunction  -     spironolactone (ALDACTONE) 25 MG tablet; Take 1 tablet (25 mg total) by mouth once daily.          David Pereira MD  Consultative Cardiology

## 2022-05-15 PROBLEM — I50.30 (HFPEF) HEART FAILURE WITH PRESERVED EJECTION FRACTION: Status: ACTIVE | Noted: 2022-05-15

## 2022-05-16 ENCOUNTER — PATIENT MESSAGE (OUTPATIENT)
Dept: CARDIOLOGY | Facility: CLINIC | Age: 72
End: 2022-05-16
Payer: MEDICARE

## 2022-05-16 ENCOUNTER — TELEPHONE (OUTPATIENT)
Dept: CARDIOLOGY | Facility: CLINIC | Age: 72
End: 2022-05-16
Payer: MEDICARE

## 2022-05-16 DIAGNOSIS — I27.20 PULMONARY HYPERTENSION: ICD-10-CM

## 2022-05-16 DIAGNOSIS — I50.30 HEART FAILURE WITH PRESERVED EJECTION FRACTION, UNSPECIFIED HF CHRONICITY: Primary | ICD-10-CM

## 2022-05-16 RX ORDER — FUROSEMIDE 20 MG/1
20 TABLET ORAL DAILY
Qty: 30 TABLET | Refills: 11 | Status: SHIPPED | OUTPATIENT
Start: 2022-05-16 | End: 2022-06-08

## 2022-05-16 NOTE — TELEPHONE ENCOUNTER
Patient noted to have evidence of HFpEF wuith pulmonary hypertension (WHO group 2).  Discussed the possibility of going into the SPIRRIT-HFpEF trial with her. Patient is willing to particpate.  Therefore, will discontinue spironolactone and start on furosemide 20 mg qd with BMP in 1 week. Will reassess in June and enroll into trial at that time.  Patient acknowledged understanding of information and agreement with plan.

## 2022-05-25 ENCOUNTER — LAB VISIT (OUTPATIENT)
Dept: LAB | Facility: HOSPITAL | Age: 72
End: 2022-05-25
Attending: INTERNAL MEDICINE
Payer: MEDICARE

## 2022-05-25 DIAGNOSIS — I50.30 HEART FAILURE WITH PRESERVED EJECTION FRACTION, UNSPECIFIED HF CHRONICITY: ICD-10-CM

## 2022-05-25 DIAGNOSIS — I27.20 PULMONARY HYPERTENSION: ICD-10-CM

## 2022-05-25 LAB
ANION GAP SERPL CALC-SCNC: 10 MMOL/L (ref 8–16)
BUN SERPL-MCNC: 17 MG/DL (ref 8–23)
CALCIUM SERPL-MCNC: 9.9 MG/DL (ref 8.7–10.5)
CHLORIDE SERPL-SCNC: 100 MMOL/L (ref 95–110)
CO2 SERPL-SCNC: 33 MMOL/L (ref 23–29)
CREAT SERPL-MCNC: 1 MG/DL (ref 0.5–1.4)
EST. GFR  (AFRICAN AMERICAN): >60 ML/MIN/1.73 M^2
EST. GFR  (NON AFRICAN AMERICAN): 55.6 ML/MIN/1.73 M^2
GLUCOSE SERPL-MCNC: 68 MG/DL (ref 70–110)
POTASSIUM SERPL-SCNC: 4.2 MMOL/L (ref 3.5–5.1)
SODIUM SERPL-SCNC: 143 MMOL/L (ref 136–145)

## 2022-05-25 PROCEDURE — 36415 COLL VENOUS BLD VENIPUNCTURE: CPT | Performed by: INTERNAL MEDICINE

## 2022-05-25 PROCEDURE — 80048 BASIC METABOLIC PNL TOTAL CA: CPT | Performed by: INTERNAL MEDICINE

## 2022-05-31 NOTE — NURSING
Patient had a small  And liquid bowel movement, she said she also passed gas.   Azathioprine Pregnancy And Lactation Text: This medication is Pregnancy Category D and isn't considered safe during pregnancy. It is unknown if this medication is excreted in breast milk.

## 2022-06-06 NOTE — PROGRESS NOTES
Chart has been dictated using voice recognition software.  It is not been reviewed carefully for any transcriptional errors due to this technology.   Subjective:   Patient ID:  Angelina Man is a 72 y.o. female who presents for follow-up of No chief complaint on file.      HPI: Patient with BLE venous insufficiency, hypothyroidism, anxiety, arthritis, persistent atrial fibrillation s/p DCCV 17-Jul-2020 but with recurrent chronic AF, and HFpEF. Patient recently started on diuretics and presents for re-evaluation.     Patient has intermittent dyspnea, but ot consistent.  Slightly better with the furosemide.  Has increased exercise tolerance. Patient denies any chest discomfort on exertion or at rest. No edema, orthopnea or PND. Patient denies any palpitations or syncope. Has some orthostatic lightheadedness.  Has some positional vertigo.    Past Medical History:   Diagnosis Date    Arthritis     Bronchitis     Cataract     Dry eyes     Glaucoma, suspect - Both Eyes     Hypothyroidism 6/23/2016    Rash     Squamous cell carcinoma     in-situ right upper inner arm, right wrist    Status post lumbar surgery 6/23/2016    Stress fracture     Thyroid disease        Outpatient Medications Prior to Visit   Medication Sig Dispense Refill    acetaminophen (TYLENOL) 500 MG tablet Take 500 mg by mouth every 6 (six) hours as needed for Pain.      acyclovir (ZOVIRAX) 400 MG tablet Take 1 tablet (400 mg total) by mouth 2 (two) times daily. 60 tablet 12    ascorbic acid (VITAMIN C) 1000 MG tablet Take 1,000 mg by mouth once daily.       biotin 1 mg tablet Take 1 mg by mouth 2 (two) times daily.       buPROPion (WELLBUTRIN XL) 150 MG TB24 tablet Take 1 tablet (150 mg total) by mouth once daily. 90 tablet 2    calcium-vitamin D 500 mg(1,250mg) -200 unit per tablet Take 1 tablet by mouth 2 (two) times daily with meals.       clonazePAM (KLONOPIN) 1 MG tablet TAKE 1& 1/2 TABLET BY MOUTH NIGHTLY AS NEEDED 45 tablet 5  "   cycloSPORINE (RESTASIS) 0.05 % ophthalmic emulsion Place 1 drop into both eyes 2 (two) times daily. 60 each 11    digestive enzymes Tab Take 1 tablet by mouth once daily.       diphenhydrAMINE (BENADRYL) 25 mg capsule Take 25 mg by mouth nightly as needed.       ELIQUIS 5 mg Tab Take 1 tablet (5 mg total) by mouth 2 (two) times daily. 180 tablet 3    fluticasone propionate (FLONASE) 50 mcg/actuation nasal spray 1 spray (50 mcg total) by Each Nostril route once daily. (Patient taking differently: 1 spray by Each Nostril route as needed.) 16 g 12    furosemide (LASIX) 20 MG tablet Take 1 tablet (20 mg total) by mouth once daily. 30 tablet 11    levothyroxine (SYNTHROID) 137 MCG Tab tablet Take 1 tablet (137 mcg total) by mouth before breakfast. 30 tablet 11    lubiprostone (AMITIZA) 24 MCG Cap Take 1 capsule (24 mcg total) by mouth daily as needed. 30 capsule 6    metoprolol succinate (TOPROL-XL) 25 MG 24 hr tablet Take 1 tablet (25 mg total) by mouth once daily. 30 tablet 11    montelukast (SINGULAIR) 10 mg tablet Take 1 tablet (10 mg total) by mouth once daily. 30 tablet 11    multivitamin (THERAGRAN) per tablet Take 1 tablet by mouth once daily.       tretinoin (RETIN-A) 0.1 % cream Apply topically every evening. 20 g 6    zinc 50 mg Tab Take 50 mg by mouth once daily.        No facility-administered medications prior to visit.       ROS - No change from prior visit in neurologic, respiratory, endocrine, GI, hematologic, or constitutional complaints other than hemorrhoidal bleeding.  Objective:   Physical Exam  Constitutional:       Appearance: She is well-developed and underweight.      Comments: BP (!) 93/57 (BP Location: Left arm, Patient Position: Sitting, BP Method: Small (Automatic))   Pulse 84   Ht 5' 4" (1.626 m)   Wt 45.2 kg (99 lb 10.4 oz)   LMP  (LMP Unknown)   SpO2 100%   BMI 17.10 kg/m²      Neck:      Vascular: No carotid bruit or JVD.   Cardiovascular:      Rate and Rhythm: " Normal rate. Rhythm irregularly irregular.      Pulses: Intact distal pulses.      Heart sounds: Normal heart sounds. No murmur heard.    No friction rub. No gallop.   Pulmonary:      Effort: Pulmonary effort is normal.      Breath sounds: Normal breath sounds. No wheezing or rales.   Abdominal:      General: Bowel sounds are normal. There is no abdominal bruit.      Palpations: Abdomen is soft. There is no hepatomegaly.      Tenderness: There is no abdominal tenderness.   Musculoskeletal:      Cervical back: Neck supple.   Skin:     Nails: There is no clubbing.   Neurological:      Mental Status: She is alert and oriented to person, place, and time.           Lab Results   Component Value Date     05/25/2022    K 4.2 05/25/2022    BUN 17 05/25/2022    CREATININE 1.0 05/25/2022    GLU 68 (L) 05/25/2022    HGBA1C 5.4 08/24/2015    CHOL 212 (H) 04/13/2022    HDL 64 04/13/2022    LDLCALC 111.4 04/13/2022    TRIG 183 (H) 04/13/2022    CHOLHDL 30.2 04/13/2022    HGB 14.1 04/13/2022    HCT 42.7 04/13/2022     04/13/2022    INR 1.1 07/07/2021       Assessment:     1. Longstanding persistent atrial fibrillation    2. Pulmonary hypertension    3. Heart failure with preserved ejection fraction, unspecified HF chronicity    4. Hypothyroidism, unspecified type    5. Venous insufficiency of both lower extremities    6. Gastroesophageal reflux disease, unspecified whether esophagitis present    7. S/P lumbar fusion      Patient is doing well and appears to have somewhat improved functional capacity since initiating furosemide therapy.  However, her blood pressure and BMP suggests she may be slightly intravascularly depleted.  Therefore, her furosemide will be decreased to every other day.  The patient will be contacted in 1 month to see if she is interested in participating in the SPIRRIT trial.  If she is, she will then be further evaluated with repeat blood test and entered in the trial which will randomize her to  spironolactone her usual care.  If the patient is not interested in going into the SPIRRIT trial and is doing well, then no changes will be made and she will be seen in clinic again in 5 months if there are no intervening problems.     Plan:     Diagnoses and all orders for this visit:    Longstanding persistent atrial fibrillation    Pulmonary hypertension    Heart failure with preserved ejection fraction, unspecified HF chronicity    Hypothyroidism, unspecified type    Venous insufficiency of both lower extremities    Gastroesophageal reflux disease, unspecified whether esophagitis present    S/P lumbar fusion          David Pereira MD  Consultative Cardiology

## 2022-06-08 ENCOUNTER — OFFICE VISIT (OUTPATIENT)
Dept: CARDIOLOGY | Facility: CLINIC | Age: 72
End: 2022-06-08
Payer: MEDICARE

## 2022-06-08 VITALS
WEIGHT: 99.63 LBS | HEART RATE: 84 BPM | HEIGHT: 64 IN | BODY MASS INDEX: 17.01 KG/M2 | DIASTOLIC BLOOD PRESSURE: 57 MMHG | SYSTOLIC BLOOD PRESSURE: 93 MMHG | OXYGEN SATURATION: 100 %

## 2022-06-08 DIAGNOSIS — K21.9 GASTROESOPHAGEAL REFLUX DISEASE, UNSPECIFIED WHETHER ESOPHAGITIS PRESENT: ICD-10-CM

## 2022-06-08 DIAGNOSIS — I27.20 PULMONARY HYPERTENSION: ICD-10-CM

## 2022-06-08 DIAGNOSIS — I48.11 LONGSTANDING PERSISTENT ATRIAL FIBRILLATION: Primary | ICD-10-CM

## 2022-06-08 DIAGNOSIS — Z98.1 S/P LUMBAR FUSION: ICD-10-CM

## 2022-06-08 DIAGNOSIS — I50.30 HEART FAILURE WITH PRESERVED EJECTION FRACTION, UNSPECIFIED HF CHRONICITY: ICD-10-CM

## 2022-06-08 DIAGNOSIS — I87.2 VENOUS INSUFFICIENCY OF BOTH LOWER EXTREMITIES: ICD-10-CM

## 2022-06-08 DIAGNOSIS — E03.9 HYPOTHYROIDISM, UNSPECIFIED TYPE: ICD-10-CM

## 2022-06-08 PROCEDURE — 3288F FALL RISK ASSESSMENT DOCD: CPT | Mod: CPTII,S$GLB,, | Performed by: INTERNAL MEDICINE

## 2022-06-08 PROCEDURE — 3078F DIAST BP <80 MM HG: CPT | Mod: CPTII,S$GLB,, | Performed by: INTERNAL MEDICINE

## 2022-06-08 PROCEDURE — 1126F PR PAIN SEVERITY QUANTIFIED, NO PAIN PRESENT: ICD-10-PCS | Mod: CPTII,S$GLB,, | Performed by: INTERNAL MEDICINE

## 2022-06-08 PROCEDURE — 99999 PR PBB SHADOW E&M-EST. PATIENT-LVL V: ICD-10-PCS | Mod: PBBFAC,,, | Performed by: INTERNAL MEDICINE

## 2022-06-08 PROCEDURE — 1160F RVW MEDS BY RX/DR IN RCRD: CPT | Mod: CPTII,S$GLB,, | Performed by: INTERNAL MEDICINE

## 2022-06-08 PROCEDURE — 3074F PR MOST RECENT SYSTOLIC BLOOD PRESSURE < 130 MM HG: ICD-10-PCS | Mod: CPTII,S$GLB,, | Performed by: INTERNAL MEDICINE

## 2022-06-08 PROCEDURE — 1160F PR REVIEW ALL MEDS BY PRESCRIBER/CLIN PHARMACIST DOCUMENTED: ICD-10-PCS | Mod: CPTII,S$GLB,, | Performed by: INTERNAL MEDICINE

## 2022-06-08 PROCEDURE — 1159F PR MEDICATION LIST DOCUMENTED IN MEDICAL RECORD: ICD-10-PCS | Mod: CPTII,S$GLB,, | Performed by: INTERNAL MEDICINE

## 2022-06-08 PROCEDURE — 3074F SYST BP LT 130 MM HG: CPT | Mod: CPTII,S$GLB,, | Performed by: INTERNAL MEDICINE

## 2022-06-08 PROCEDURE — 3078F PR MOST RECENT DIASTOLIC BLOOD PRESSURE < 80 MM HG: ICD-10-PCS | Mod: CPTII,S$GLB,, | Performed by: INTERNAL MEDICINE

## 2022-06-08 PROCEDURE — 1157F PR ADVANCE CARE PLAN OR EQUIV PRESENT IN MEDICAL RECORD: ICD-10-PCS | Mod: CPTII,S$GLB,, | Performed by: INTERNAL MEDICINE

## 2022-06-08 PROCEDURE — 99214 PR OFFICE/OUTPT VISIT, EST, LEVL IV, 30-39 MIN: ICD-10-PCS | Mod: S$GLB,,, | Performed by: INTERNAL MEDICINE

## 2022-06-08 PROCEDURE — 1157F ADVNC CARE PLAN IN RCRD: CPT | Mod: CPTII,S$GLB,, | Performed by: INTERNAL MEDICINE

## 2022-06-08 PROCEDURE — 1101F PT FALLS ASSESS-DOCD LE1/YR: CPT | Mod: CPTII,S$GLB,, | Performed by: INTERNAL MEDICINE

## 2022-06-08 PROCEDURE — 99999 PR PBB SHADOW E&M-EST. PATIENT-LVL V: CPT | Mod: PBBFAC,,, | Performed by: INTERNAL MEDICINE

## 2022-06-08 PROCEDURE — 3008F PR BODY MASS INDEX (BMI) DOCUMENTED: ICD-10-PCS | Mod: CPTII,S$GLB,, | Performed by: INTERNAL MEDICINE

## 2022-06-08 PROCEDURE — 1159F MED LIST DOCD IN RCRD: CPT | Mod: CPTII,S$GLB,, | Performed by: INTERNAL MEDICINE

## 2022-06-08 PROCEDURE — 1101F PR PT FALLS ASSESS DOC 0-1 FALLS W/OUT INJ PAST YR: ICD-10-PCS | Mod: CPTII,S$GLB,, | Performed by: INTERNAL MEDICINE

## 2022-06-08 PROCEDURE — 99214 OFFICE O/P EST MOD 30 MIN: CPT | Mod: S$GLB,,, | Performed by: INTERNAL MEDICINE

## 2022-06-08 PROCEDURE — 1126F AMNT PAIN NOTED NONE PRSNT: CPT | Mod: CPTII,S$GLB,, | Performed by: INTERNAL MEDICINE

## 2022-06-08 PROCEDURE — 3008F BODY MASS INDEX DOCD: CPT | Mod: CPTII,S$GLB,, | Performed by: INTERNAL MEDICINE

## 2022-06-08 PROCEDURE — 3288F PR FALLS RISK ASSESSMENT DOCUMENTED: ICD-10-PCS | Mod: CPTII,S$GLB,, | Performed by: INTERNAL MEDICINE

## 2022-06-08 RX ORDER — FUROSEMIDE 20 MG/1
20 TABLET ORAL EVERY OTHER DAY
Qty: 15 TABLET | Refills: 11 | Status: SHIPPED | OUTPATIENT
Start: 2022-06-08 | End: 2023-07-17 | Stop reason: SDUPTHER

## 2022-06-08 NOTE — Clinical Note
Thank you for allowing me to follow-up with Angelina Man for permanent atrial fibrillation and heart failure with preserved ejection fraction (HFpEF). Please see my note for details of this encounter. If you have any questions, please contact me.  Thank you again for allowing to participate in the care of this patient.

## 2022-06-09 ENCOUNTER — PATIENT MESSAGE (OUTPATIENT)
Dept: UROLOGY | Facility: CLINIC | Age: 72
End: 2022-06-09
Payer: MEDICARE

## 2022-06-09 ENCOUNTER — TELEPHONE (OUTPATIENT)
Dept: OPHTHALMOLOGY | Facility: CLINIC | Age: 72
End: 2022-06-09
Payer: MEDICARE

## 2022-06-09 DIAGNOSIS — D17.71 RENAL ANGIOMYOLIPOMA: Primary | ICD-10-CM

## 2022-06-18 ENCOUNTER — PATIENT MESSAGE (OUTPATIENT)
Dept: UROLOGY | Facility: CLINIC | Age: 72
End: 2022-06-18
Payer: MEDICARE

## 2022-06-20 ENCOUNTER — TELEPHONE (OUTPATIENT)
Dept: UROLOGY | Facility: CLINIC | Age: 72
End: 2022-06-20
Payer: MEDICARE

## 2022-06-20 NOTE — TELEPHONE ENCOUNTER
"I spoke with patient, and advised her she can eat and drink as normal after checking with ultrasound tech.    Magda, Taylor Regan MD 2 days ago           I was called and scheduled for [facility] ultrasound on Tuesday, 22nd ...2:45.  At time of scheduling, I was told instructions were to "fast" for 8 hours before, no food/liquids except to take any needed medications. Since then, I have not received via mail or message any other directives, as I have usually had before these "tests"...and I just want to verify that this is correct information (and not the test where I need to have a full bladder.)  Please let me know know before end of day Monday. Thank you in advance for your assistance,  Angelina Man    "

## 2022-06-21 ENCOUNTER — HOSPITAL ENCOUNTER (OUTPATIENT)
Dept: RADIOLOGY | Facility: HOSPITAL | Age: 72
Discharge: HOME | End: 2022-06-21
Attending: UROLOGY
Payer: MEDICARE

## 2022-06-21 DIAGNOSIS — D17.71 RENAL ANGIOMYOLIPOMA: ICD-10-CM

## 2022-06-21 PROCEDURE — 76770 US EXAM ABDO BACK WALL COMP: CPT | Mod: 26,,, | Performed by: STUDENT IN AN ORGANIZED HEALTH CARE EDUCATION/TRAINING PROGRAM

## 2022-06-21 PROCEDURE — 76770 US KIDNEY: ICD-10-PCS | Mod: 26,,, | Performed by: STUDENT IN AN ORGANIZED HEALTH CARE EDUCATION/TRAINING PROGRAM

## 2022-06-21 PROCEDURE — 76770 US EXAM ABDO BACK WALL COMP: CPT | Mod: TC

## 2022-06-22 ENCOUNTER — TELEPHONE (OUTPATIENT)
Dept: UROLOGY | Facility: CLINIC | Age: 72
End: 2022-06-22
Payer: MEDICARE

## 2022-06-22 DIAGNOSIS — D17.71 RENAL ANGIOMYOLIPOMA: Primary | ICD-10-CM

## 2022-06-22 DIAGNOSIS — N28.1 RENAL CYST, ACQUIRED: ICD-10-CM

## 2022-06-22 NOTE — TELEPHONE ENCOUNTER
I left message to call back,Taylor Bryant MD sent to [facility] Staff  Ultrasound stable. Have f/u with np/pa. Not seen since 2021.

## 2022-06-30 ENCOUNTER — PATIENT MESSAGE (OUTPATIENT)
Dept: PRIMARY CARE CLINIC | Facility: CLINIC | Age: 72
End: 2022-06-30
Payer: MEDICARE

## 2022-07-04 ENCOUNTER — PATIENT MESSAGE (OUTPATIENT)
Dept: PRIMARY CARE CLINIC | Facility: CLINIC | Age: 72
End: 2022-07-04
Payer: MEDICARE

## 2022-07-14 ENCOUNTER — PATIENT MESSAGE (OUTPATIENT)
Dept: CARDIOLOGY | Facility: CLINIC | Age: 72
End: 2022-07-14
Payer: MEDICARE

## 2022-07-18 ENCOUNTER — PATIENT MESSAGE (OUTPATIENT)
Dept: PRIMARY CARE CLINIC | Facility: CLINIC | Age: 72
End: 2022-07-18
Payer: MEDICARE

## 2022-07-18 DIAGNOSIS — F41.9 ANXIETY: ICD-10-CM

## 2022-07-18 RX ORDER — CLONAZEPAM 1 MG/1
TABLET ORAL
Qty: 45 TABLET | Refills: 5 | OUTPATIENT
Start: 2022-07-18

## 2022-07-18 NOTE — TELEPHONE ENCOUNTER
No new care gaps identified.  [facility] Embedded Care Gaps. Reference number: [id number]. 7/18/2022   3:56:25 PM CDT

## 2022-07-21 ENCOUNTER — PATIENT MESSAGE (OUTPATIENT)
Dept: PRIMARY CARE CLINIC | Facility: CLINIC | Age: 72
End: 2022-07-21
Payer: MEDICARE

## 2022-07-22 ENCOUNTER — PATIENT MESSAGE (OUTPATIENT)
Dept: PRIMARY CARE CLINIC | Facility: CLINIC | Age: 72
End: 2022-07-22

## 2022-07-22 ENCOUNTER — OFFICE VISIT (OUTPATIENT)
Dept: PRIMARY CARE CLINIC | Facility: CLINIC | Age: 72
End: 2022-07-22
Payer: MEDICARE

## 2022-07-22 DIAGNOSIS — F41.9 ANXIETY: Primary | ICD-10-CM

## 2022-07-22 DIAGNOSIS — F51.01 PRIMARY INSOMNIA: ICD-10-CM

## 2022-07-22 PROCEDURE — 1159F PR MEDICATION LIST DOCUMENTED IN MEDICAL RECORD: ICD-10-PCS | Mod: CPTII,95,, | Performed by: FAMILY MEDICINE

## 2022-07-22 PROCEDURE — 1157F PR ADVANCE CARE PLAN OR EQUIV PRESENT IN MEDICAL RECORD: ICD-10-PCS | Mod: CPTII,95,, | Performed by: FAMILY MEDICINE

## 2022-07-22 PROCEDURE — 1160F RVW MEDS BY RX/DR IN RCRD: CPT | Mod: CPTII,95,, | Performed by: FAMILY MEDICINE

## 2022-07-22 PROCEDURE — 99443 PR PHYSICIAN TELEPHONE EVALUATION 21-30 MIN: ICD-10-PCS | Mod: 95,,, | Performed by: FAMILY MEDICINE

## 2022-07-22 PROCEDURE — 99443 PR PHYSICIAN TELEPHONE EVALUATION 21-30 MIN: CPT | Mod: 95,,, | Performed by: FAMILY MEDICINE

## 2022-07-22 PROCEDURE — 1159F MED LIST DOCD IN RCRD: CPT | Mod: CPTII,95,, | Performed by: FAMILY MEDICINE

## 2022-07-22 PROCEDURE — 1157F ADVNC CARE PLAN IN RCRD: CPT | Mod: CPTII,95,, | Performed by: FAMILY MEDICINE

## 2022-07-22 PROCEDURE — 1160F PR REVIEW ALL MEDS BY PRESCRIBER/CLIN PHARMACIST DOCUMENTED: ICD-10-PCS | Mod: CPTII,95,, | Performed by: FAMILY MEDICINE

## 2022-07-25 ENCOUNTER — PATIENT MESSAGE (OUTPATIENT)
Dept: PRIMARY CARE CLINIC | Facility: CLINIC | Age: 72
End: 2022-07-25
Payer: MEDICARE

## 2022-07-28 ENCOUNTER — OFFICE VISIT (OUTPATIENT)
Dept: PRIMARY CARE CLINIC | Facility: CLINIC | Age: 72
End: 2022-07-28
Payer: MEDICARE

## 2022-07-28 DIAGNOSIS — E03.9 HYPOTHYROIDISM, UNSPECIFIED TYPE: ICD-10-CM

## 2022-07-28 DIAGNOSIS — F41.9 ANXIETY: ICD-10-CM

## 2022-07-28 PROCEDURE — 1157F ADVNC CARE PLAN IN RCRD: CPT | Mod: CPTII,95,, | Performed by: FAMILY MEDICINE

## 2022-07-28 PROCEDURE — 99443 PR PHYSICIAN TELEPHONE EVALUATION 21-30 MIN: CPT | Mod: 95,,, | Performed by: FAMILY MEDICINE

## 2022-07-28 PROCEDURE — 99443 PR PHYSICIAN TELEPHONE EVALUATION 21-30 MIN: ICD-10-PCS | Mod: 95,,, | Performed by: FAMILY MEDICINE

## 2022-07-28 PROCEDURE — 1157F PR ADVANCE CARE PLAN OR EQUIV PRESENT IN MEDICAL RECORD: ICD-10-PCS | Mod: CPTII,95,, | Performed by: FAMILY MEDICINE

## 2022-07-28 RX ORDER — BUPROPION HYDROCHLORIDE 150 MG/1
150 TABLET ORAL DAILY
Qty: 90 TABLET | Refills: 2 | Status: SHIPPED | OUTPATIENT
Start: 2022-07-28 | End: 2023-06-21 | Stop reason: SDUPTHER

## 2022-07-28 RX ORDER — BUPROPION HYDROCHLORIDE 150 MG/1
150 TABLET ORAL DAILY
Qty: 90 TABLET | Refills: 2 | Status: SHIPPED | OUTPATIENT
Start: 2022-07-28 | End: 2022-07-28

## 2022-07-28 RX ORDER — CLONAZEPAM 1 MG/1
TABLET ORAL
Qty: 45 TABLET | Refills: 5 | Status: SHIPPED | OUTPATIENT
Start: 2022-07-28 | End: 2022-07-28

## 2022-07-28 RX ORDER — LEVOTHYROXINE SODIUM 137 UG/1
137 TABLET ORAL
Qty: 30 TABLET | Refills: 11 | Status: SHIPPED | OUTPATIENT
Start: 2022-07-28 | End: 2024-01-03 | Stop reason: SDUPTHER

## 2022-07-28 RX ORDER — CLONAZEPAM 1 MG/1
TABLET ORAL
Qty: 45 TABLET | Refills: 5 | Status: SHIPPED | OUTPATIENT
Start: 2022-07-28 | End: 2023-11-08

## 2022-07-28 NOTE — PROGRESS NOTES
Established Patient - Audio Only Telehealth Visit     The patient location is:  [place]  The chief complaint leading to consultation is:   Anxiety/ insomnia  Visit type: Virtual visit with audio only (telephone)  Total time spent with patient: 21 minutes  The reason for the audio only service rather than synchronous audio and video virtual visit was related to technical difficulties or patient preference/necessity.     Each patient to whom I provide medical services by telemedicine is:  (1) informed of the relationship between the physician and patient and the respective role of any other health care provider with respect to management of the patient; and (2) notified that they may decline to receive medical services by telemedicine and may withdraw from such care at any time. Patient verbally consented to receive this service via voice-only telephone call.       HPI: insomnia/anxiety     Assessment and plan:     1. Anxiety  - clonazePAM (KLONOPIN) 1 MG tablet; TAKE 1& 1/2 TABLET BY MOUTH NIGHTLY AS NEEDED  Dispense: 45 tablet; Refill: 5  - buPROPion (WELLBUTRIN XL) 150 MG TB24 tablet; Take 1 tablet (150 mg total) by mouth once daily.  Dispense: 90 tablet; Refill: 2    2. Hypothyroidism, unspecified type  - levothyroxine (SYNTHROID) 137 MCG Tab tablet; Take 1 tablet (137 mcg total) by mouth before breakfast.  Dispense: 30 tablet; Refill: 11                       This service was not originating from a related E/M service provided within the previous 7 days nor will  to an E/M service or procedure within the next 24 hours or my soonest available appointment.  Prevailing standard of care was able to be met in this audio-only visit.

## 2022-08-01 ENCOUNTER — PATIENT MESSAGE (OUTPATIENT)
Dept: URGENT CARE | Facility: CLINIC | Age: 72
End: 2022-08-01

## 2022-08-01 ENCOUNTER — OFFICE VISIT (OUTPATIENT)
Dept: URGENT CARE | Facility: CLINIC | Age: 72
End: 2022-08-01
Payer: MEDICARE

## 2022-08-01 VITALS
OXYGEN SATURATION: 95 % | TEMPERATURE: 98 F | DIASTOLIC BLOOD PRESSURE: 70 MMHG | SYSTOLIC BLOOD PRESSURE: 109 MMHG | BODY MASS INDEX: 17.07 KG/M2 | RESPIRATION RATE: 12 BRPM | HEIGHT: 64 IN | HEART RATE: 92 BPM | WEIGHT: 100 LBS

## 2022-08-01 DIAGNOSIS — U07.1 COVID-19 VIRUS INFECTION: Primary | ICD-10-CM

## 2022-08-01 DIAGNOSIS — U07.1 COVID: ICD-10-CM

## 2022-08-01 LAB
CTP QC/QA: YES
SARS-COV-2 RDRP RESP QL NAA+PROBE: POSITIVE

## 2022-08-01 PROCEDURE — 99214 OFFICE O/P EST MOD 30 MIN: CPT | Mod: S$GLB,,, | Performed by: STUDENT IN AN ORGANIZED HEALTH CARE EDUCATION/TRAINING PROGRAM

## 2022-08-01 PROCEDURE — 3078F DIAST BP <80 MM HG: CPT | Mod: CPTII,S$GLB,, | Performed by: STUDENT IN AN ORGANIZED HEALTH CARE EDUCATION/TRAINING PROGRAM

## 2022-08-01 PROCEDURE — 99214 PR OFFICE/OUTPT VISIT, EST, LEVL IV, 30-39 MIN: ICD-10-PCS | Mod: S$GLB,,, | Performed by: STUDENT IN AN ORGANIZED HEALTH CARE EDUCATION/TRAINING PROGRAM

## 2022-08-01 PROCEDURE — 3078F PR MOST RECENT DIASTOLIC BLOOD PRESSURE < 80 MM HG: ICD-10-PCS | Mod: CPTII,S$GLB,, | Performed by: STUDENT IN AN ORGANIZED HEALTH CARE EDUCATION/TRAINING PROGRAM

## 2022-08-01 PROCEDURE — 1160F RVW MEDS BY RX/DR IN RCRD: CPT | Mod: CPTII,S$GLB,, | Performed by: STUDENT IN AN ORGANIZED HEALTH CARE EDUCATION/TRAINING PROGRAM

## 2022-08-01 PROCEDURE — 1159F PR MEDICATION LIST DOCUMENTED IN MEDICAL RECORD: ICD-10-PCS | Mod: CPTII,S$GLB,, | Performed by: STUDENT IN AN ORGANIZED HEALTH CARE EDUCATION/TRAINING PROGRAM

## 2022-08-01 PROCEDURE — 3008F BODY MASS INDEX DOCD: CPT | Mod: CPTII,S$GLB,, | Performed by: STUDENT IN AN ORGANIZED HEALTH CARE EDUCATION/TRAINING PROGRAM

## 2022-08-01 PROCEDURE — 1157F ADVNC CARE PLAN IN RCRD: CPT | Mod: CPTII,S$GLB,, | Performed by: STUDENT IN AN ORGANIZED HEALTH CARE EDUCATION/TRAINING PROGRAM

## 2022-08-01 PROCEDURE — 1159F MED LIST DOCD IN RCRD: CPT | Mod: CPTII,S$GLB,, | Performed by: STUDENT IN AN ORGANIZED HEALTH CARE EDUCATION/TRAINING PROGRAM

## 2022-08-01 PROCEDURE — 3008F PR BODY MASS INDEX (BMI) DOCUMENTED: ICD-10-PCS | Mod: CPTII,S$GLB,, | Performed by: STUDENT IN AN ORGANIZED HEALTH CARE EDUCATION/TRAINING PROGRAM

## 2022-08-01 PROCEDURE — U0002 COVID-19 LAB TEST NON-CDC: HCPCS | Mod: QW,S$GLB,, | Performed by: STUDENT IN AN ORGANIZED HEALTH CARE EDUCATION/TRAINING PROGRAM

## 2022-08-01 PROCEDURE — 3074F PR MOST RECENT SYSTOLIC BLOOD PRESSURE < 130 MM HG: ICD-10-PCS | Mod: CPTII,S$GLB,, | Performed by: STUDENT IN AN ORGANIZED HEALTH CARE EDUCATION/TRAINING PROGRAM

## 2022-08-01 PROCEDURE — 1125F PR PAIN SEVERITY QUANTIFIED, PAIN PRESENT: ICD-10-PCS | Mod: CPTII,S$GLB,, | Performed by: STUDENT IN AN ORGANIZED HEALTH CARE EDUCATION/TRAINING PROGRAM

## 2022-08-01 PROCEDURE — 1160F PR REVIEW ALL MEDS BY PRESCRIBER/CLIN PHARMACIST DOCUMENTED: ICD-10-PCS | Mod: CPTII,S$GLB,, | Performed by: STUDENT IN AN ORGANIZED HEALTH CARE EDUCATION/TRAINING PROGRAM

## 2022-08-01 PROCEDURE — 1157F PR ADVANCE CARE PLAN OR EQUIV PRESENT IN MEDICAL RECORD: ICD-10-PCS | Mod: CPTII,S$GLB,, | Performed by: STUDENT IN AN ORGANIZED HEALTH CARE EDUCATION/TRAINING PROGRAM

## 2022-08-01 PROCEDURE — 3074F SYST BP LT 130 MM HG: CPT | Mod: CPTII,S$GLB,, | Performed by: STUDENT IN AN ORGANIZED HEALTH CARE EDUCATION/TRAINING PROGRAM

## 2022-08-01 PROCEDURE — U0002: ICD-10-PCS | Mod: QW,S$GLB,, | Performed by: STUDENT IN AN ORGANIZED HEALTH CARE EDUCATION/TRAINING PROGRAM

## 2022-08-01 PROCEDURE — 1125F AMNT PAIN NOTED PAIN PRSNT: CPT | Mod: CPTII,S$GLB,, | Performed by: STUDENT IN AN ORGANIZED HEALTH CARE EDUCATION/TRAINING PROGRAM

## 2022-08-01 RX ORDER — IPRATROPIUM BROMIDE 42 UG/1
2 SPRAY, METERED NASAL 3 TIMES DAILY
Qty: 30 ML | Refills: 0 | Status: SHIPPED | OUTPATIENT
Start: 2022-08-01 | End: 2022-12-02

## 2022-08-01 RX ORDER — BENZONATATE 100 MG/1
100 CAPSULE ORAL 3 TIMES DAILY PRN
Qty: 30 CAPSULE | Refills: 0 | Status: SHIPPED | OUTPATIENT
Start: 2022-08-01 | End: 2022-10-31

## 2022-08-01 NOTE — PROGRESS NOTES
"Subjective:       Patient ID: Angelina Man is a 72 y.o. female.    Vitals:  height is 5' 4" (1.626 m) and weight is 45.4 kg (100 lb). Her oral temperature is 98.3 °F (36.8 °C). Her blood pressure is 109/70 and her pulse is 92. Her respiration is 12 and oxygen saturation is 95%.     Chief Complaint: Cough    73 y/o female complains of cough as well as congestion, sore throat, myalgias, headaches and fatigue since Saturday, 7/30/2022. Her symptoms have gradually improved since onset; however, she continues to experience a cough, headache, and congestion. She is concerned of a COVID infection and would like to be tested.     Cough  This is a new problem. The current episode started in the past 7 days (Saturday). The problem has been gradually improving. The cough is productive of sputum. Associated symptoms include chills, headaches, myalgias, nasal congestion, postnasal drip and a sore throat. Pertinent negatives include no chest pain, ear congestion, ear pain, fever, heartburn, hemoptysis, rash, rhinorrhea, shortness of breath, sweats, weight loss or wheezing. Nothing aggravates the symptoms. Treatments tried: Mucinex and Flonase. The treatment provided moderate relief. Her past medical history is significant for bronchitis. There is no history of asthma, bronchiectasis, COPD, emphysema, environmental allergies or pneumonia.       Constitution: Positive for chills. Negative for fever.   HENT: Positive for postnasal drip and sore throat. Negative for ear pain.    Cardiovascular: Negative for chest pain.   Respiratory: Positive for cough. Negative for bloody sputum, shortness of breath and wheezing.    Gastrointestinal: Negative for heartburn.   Musculoskeletal: Positive for muscle ache.   Skin: Negative for rash.   Allergic/Immunologic: Negative for environmental allergies.   Neurological: Positive for headaches.       Objective:      Physical Exam   Constitutional: She is oriented to person, place, and time. She " does not appear ill. No distress.   HENT:   Head: Normocephalic.   Eyes: Conjunctivae are normal.   Pulmonary/Chest: No respiratory distress.   Neurological: She is alert and oriented to person, place, and time. Coordination normal.   Psychiatric: Her behavior is normal. Mood and thought content normal.   Nursing note and vitals reviewed.        Assessment:       1. COVID-19 virus infection    2. COVID        Results for orders placed or performed in visit on 08/01/22   POCT COVID-19 Rapid Screening   Result Value Ref Range    POC Rapid COVID Positive (A) Negative     Acceptable Yes      *Note: Due to a large number of results and/or encounters for the requested time period, some results have not been displayed. A complete set of results can be found in Results Review.       Plan:         COVID-19 virus infection  -     POCT COVID-19 Rapid Screening  -     ipratropium (ATROVENT) 42 mcg (0.06 %) nasal spray; 2 sprays by Nasal route 3 (three) times daily.  Dispense: 30 mL; Refill: 0  -     benzonatate (TESSALON) 100 MG capsule; Take 1 capsule (100 mg total) by mouth 3 (three) times daily as needed for Cough.  Dispense: 30 capsule; Refill: 0  -     COVID-19 Home Symptom Monitoring  - Duration (days): 14  -     Ambulatory referral/consult to [facility]    COVID    Exam denotes features of patient observation only.   Vitals stable. No evidence of respiratory distress noted, able to speak in complete sentences without pause.    Requesting COVID-19 testing post exposure and/or given symptoms.   Tested for COVID-19 today. Rapid test was Positive. Educated on COVID-19 and COVID-19 testing. Advised on COVID-19 precautions. COVID Risk Score: 4 . I did not feel it was appropriate to discontinue patient's Eliquis to administer paxlovid. Patient meets the criteria for the [facility] antibody infusion treatment and has agreed to be referred. Information fact sheets on the monoclonal antibodies from the order set were  printed and provided to the patient.      Discussed supportive care and OTC meds for symptom relief. Advised on return/follow-up precautions. Advised on ER precautions. Answered all patient questions. Patient verbalized understanding and voiced agreement with current treatment plan.

## 2022-08-01 NOTE — PATIENT INSTRUCTIONS
Below are suggestions for symptomatic relief of your upper respiratory symptoms:              -Salt water gargles to soothe throat pain.              -Chloroseptic spray and Cepacol lozenges also help to numb throat pain.              -Warm herbal teas with honey/lemon/amanda can help soothe sore throat and hoarseness              -Nasal saline spray reduces inflammation and dryness.              -Warm face compresses to help with facial sinus pain/pressure.              -Humidifiers and steam can help with nasal dryness and congestion              -Vicks vapor rub at night.              -Flonase OTC or Nasacort OTC for nasal congestion and post-nasal drip. Ok to use twice daily for the first week, then reduce to once daily after symptoms have begun to improve.              -Afrin is a nasal spray that can give immediate relief of nasal congestion but you cannot use this medication for more than 3 days              -Simple foods like chicken noodle soup.              - Mucinex for congestion or Mucinex DM for cough during the day time. Delsym helps with coughing at night. Mucinex-D if you have sinus pressure/sinus pain or chest congestion. (caution if history of high blood pressure or palpitations). You must increase your water intake when using expectorants (Mucinex).            -Zyrtec/Claritin/Allegra/Xyzal should help with allergies.  -If you DO NOT have Hypertension or any history of palpitations, it is ok to take over the counter Sudafed or Mucinex D or Allegra-D or Claritin-D or Zyrtec-D.  -If you do take one of the above, it is ok to combine that with plain over the counter Mucinex or Allegra or Claritin or Zyrtec. If, for example, you are taking Zyrtec -D, you can combine that with Mucinex, but not Mucinex-D.  If you are taking Mucinex-D, you can combine that with plain Allegra or Claritin or Zyrtec.   -If you DO have Hypertension or palpitations, it is safe to take Coricidin HBP for relief of sinus  symptoms.    You have tested positive for COVID-19 today.  Please note that patients who test positive for COVID-19 are required by the CDC to undergo isolation for 5 days after their symptoms first began.     This isolation starts from the day you first developed symptoms, not the day of your positive test. For example, if your symptoms began on a Monday but tested positive on the following Wednesday, your 10-day isolation begins from that Monday, not the Wednesday you tested positive.    However, if you are asymptomatic (a person who does not have any symptoms) and COVID-19 positive, your -day isolation begins on the day you tested positive, regardless of exposure date.    You must to wear a mask for an additional 5 days after resuming public activities once your 5 day quarantine ends.    Also, per the CDC guidelines, once your 5 days have passed, and you have not had fever greater than 100.4F in the last 24 hours without taking any fever reducers such as Tylenol (Acetaminophen) or Motrin (Ibuprofen) AND you have 24h of symptom improvement, you may return to your normal activities including social distancing, wearing masks, and frequent handwashing - YOU DO NOT NEED ANOTHER TEST IN ORDER TO END YOUR QUARANTINE.      CDC Testing and Quarantine Guidelines for patients with exposure to a known-positive COVID-19 person (within 6 feet for more than 15 minutes over a 24hour period):    You should quarantine post exposure to someone who has COVID 19 if you meet any of the following criteria:   - Symptomatic  - Vaccinated but have not yet received a booster if you are qualified to do so  - Vaccinated but it has not been more than 2 weeks since your last dose.  - Vaccinated but have only received one dose in a 2 dose series.     Test 5 days after exposure or at first sign of symptoms. If negative, continue to mask and monitor for symptoms for 10 days post exposure.     If positive, see above for positive COVID test  qurantine guidelines.

## 2022-08-02 ENCOUNTER — INFUSION (OUTPATIENT)
Dept: INFECTIOUS DISEASES | Facility: HOSPITAL | Age: 72
End: 2022-08-02
Attending: STUDENT IN AN ORGANIZED HEALTH CARE EDUCATION/TRAINING PROGRAM
Payer: MEDICARE

## 2022-08-02 VITALS
WEIGHT: 100 LBS | HEART RATE: 78 BPM | OXYGEN SATURATION: 98 % | SYSTOLIC BLOOD PRESSURE: 110 MMHG | DIASTOLIC BLOOD PRESSURE: 64 MMHG | BODY MASS INDEX: 17.07 KG/M2 | HEIGHT: 64 IN | RESPIRATION RATE: 16 BRPM | TEMPERATURE: 98 F

## 2022-08-02 DIAGNOSIS — U07.1 COVID-19: Primary | ICD-10-CM

## 2022-08-02 PROCEDURE — 63600175 PHARM REV CODE 636 W HCPCS: Performed by: INTERNAL MEDICINE

## 2022-08-02 PROCEDURE — M0222 HC IV INJECTION, BEBTELOVIMAB, INCL POST ADMIN MONIT: HCPCS | Performed by: INTERNAL MEDICINE

## 2022-08-02 RX ORDER — EPINEPHRINE 0.3 MG/.3ML
0.3 INJECTION SUBCUTANEOUS
Status: ACTIVE | OUTPATIENT
Start: 2022-08-02 | End: 2022-08-05

## 2022-08-02 RX ORDER — DIPHENHYDRAMINE HYDROCHLORIDE 50 MG/ML
25 INJECTION INTRAMUSCULAR; INTRAVENOUS
Status: ACTIVE | OUTPATIENT
Start: 2022-08-02 | End: 2022-08-03

## 2022-08-02 RX ORDER — ACETAMINOPHEN 325 MG/1
650 TABLET ORAL
Status: ACTIVE | OUTPATIENT
Start: 2022-08-02 | End: 2022-08-03

## 2022-08-02 RX ORDER — ALBUTEROL SULFATE 90 UG/1
2 AEROSOL, METERED RESPIRATORY (INHALATION)
Status: ACTIVE | OUTPATIENT
Start: 2022-08-02 | End: 2022-08-05

## 2022-08-02 RX ORDER — ONDANSETRON 4 MG/1
4 TABLET, ORALLY DISINTEGRATING ORAL
Status: ACTIVE | OUTPATIENT
Start: 2022-08-02 | End: 2022-08-03

## 2022-08-02 RX ORDER — BEBTELOVIMAB 87.5 MG/ML
175 INJECTION, SOLUTION INTRAVENOUS
Status: COMPLETED | OUTPATIENT
Start: 2022-08-02 | End: 2022-08-02

## 2022-08-02 RX ADMIN — BEBTELOVIMAB 175 MG: 87.5 INJECTION, SOLUTION INTRAVENOUS at 10:08

## 2022-08-02 NOTE — PROGRESS NOTES
Patient remains with no signs of complications noted. Patient received Bebtelovimab IV Injection according to FDA recommendations and [facility] SOP without complications noted and left with mask in place. Drug fact sheet provided. Pt discharged home. Ambulatory. Remains AAox3. No distress noted. RR even and unlabored.

## 2022-08-02 NOTE — PROGRESS NOTES
Patient arrives for Bebtelovimab IV Injection. Ambulatory. Pt AAox3. No distress noted. RR even and unlabored.     Symptoms and onset date:  07/30/22 congestion, cough, aches, HA, tiredness     Tested COVID + on 08/01/22

## 2022-08-02 NOTE — PROGRESS NOTES
1055 Bebtelovimab IV Injection administered. Pt tolerated injection well. No S/S of injection reaction noted at present time. One hour observation started.

## 2022-08-09 ENCOUNTER — PATIENT MESSAGE (OUTPATIENT)
Dept: ELECTROPHYSIOLOGY | Facility: CLINIC | Age: 72
End: 2022-08-09
Payer: MEDICARE

## 2022-08-09 ENCOUNTER — PATIENT MESSAGE (OUTPATIENT)
Dept: CARDIOLOGY | Facility: CLINIC | Age: 72
End: 2022-08-09
Payer: MEDICARE

## 2022-08-09 NOTE — PROGRESS NOTES
Established Patient - Audio Only Telehealth Visit     The patient location is: [place]  The chief complaint leading to consultation is:   Anxiety and insomnia  Visit type: Virtual visit with audio only (telephone)  Total time spent with patient: 21 minutes  The reason for the audio only service rather than synchronous audio and video virtual visit was related to technical difficulties or patient preference/necessity.     Each patient to whom I provide medical services by telemedicine is:  (1) informed of the relationship between the physician and patient and the respective role of any other health care provider with respect to management of the patient; and (2) notified that they may decline to receive medical services by telemedicine and may withdraw from such care at any time. Patient verbally consented to receive this service via voice-only telephone call.       HPI: Anxiety and insomnia     Assessment and plan:       []  tretinoin (RETIN-A) 0.1 % cream Nightly      Analgesic or Antipyretic Non-Opioid   []  acetaminophen (TYLENOL) 500 MG tablet 500 mg, Every 6 hours PRN      Antianxiety Agent - Benzodiazepines, Anticonvulsant - Benzodiazepines, Benzodiazepines   []  clonazePAM (KLONOPIN) 1 MG tablet Medication reviewed and prescribed      Antidepressant-Norepinephrine and Dopamine Reuptake Inhibitors (NDRIs)   []  buPROPion (WELLBUTRIN XL) 150 MG TB24 tablet 150 mg, Daily      Antihistamine - 1st Generation - Ethanolamines, Antihistamines - 1st Generation, Sedative-Hypnotic - Antihistamines   []  diphenhydrAMINE (BENADRYL) 25 mg capsule 25 mg, Nightly PRN      Antitussives - Non-Opioid   []  benzonatate (TESSALON) 100 MG capsule 100 mg, 3 times daily PRN      Asthma Therapy - Leukotriene Receptor Antagonists   []  montelukast (SINGULAIR) 10 mg tablet 10 mg, Daily      Beta Blockers Cardiac Selective   []  metoprolol succinate (TOPROL-XL) 25 MG 24 hr tablet 25 mg, Daily      Digestive Enzymes   []   digestive enzymes Tab 1 tablet, Daily      Direct Factor Xa Inhibitors   []  ELIQUIS 5 mg Tab 5 mg, 2 times daily          This service was not originating from a related E/M service provided within the previous 7 days nor will  to an E/M service or procedure within the next 24 hours or my soonest available appointment.  Prevailing standard of care was able to be met in this audio-only visit.

## 2022-08-10 RX ORDER — METOPROLOL SUCCINATE 25 MG/1
25 TABLET, EXTENDED RELEASE ORAL DAILY
Qty: 30 TABLET | Refills: 11 | Status: SHIPPED | OUTPATIENT
Start: 2022-08-10 | End: 2023-07-31

## 2022-08-23 ENCOUNTER — TELEPHONE (OUTPATIENT)
Dept: PRIMARY CARE CLINIC | Facility: CLINIC | Age: 72
End: 2022-08-23
Payer: MEDICARE

## 2022-08-23 NOTE — TELEPHONE ENCOUNTER
----- Message from Julita Jones sent at 8/23/2022  1:29 PM CDT -----  Contact: pt [phone]  [email or web address]     Patient is calling for Medical Advice regarding: left foot pain, patient states possible stress fracture    How long has patient had these symptoms:  24 hours    Pharmacy name and phone#:  [id number] [place] - [address] AT [facility] [address]  [facility] [address] [id number]  Phone: [phone] Fax: [phone]        Would like response via [employer]:  call    Comments: Patient would like orders for xray of foot also

## 2022-08-23 NOTE — PROGRESS NOTES
"INTERNAL MEDICINE CLINIC - SAME DAY APPOINTMENT  Progress Note    PRESENTING HISTORY     PCP: Marli Wilkinson MD    Chief Complaint/Reason for Visit:   No chief complaint on file.      History of Present Illness & ROS : Ms. Angelina Man is a 72 y.o. female.    Same day appt.  Very pleasant lady.   New to me and clinic practice site.   Reports that she 'works out daily; cycles, no longer runs / jogs, but has experienced cramping sensation to left foot that was preceeded with pain to the outer foot region, but has since 'resolved and almost cancelled today's appt". She has a reported history of 'stress fracture', uncertain if this is the same foot. No recent trauma or injury endorsed.   Symptoms have resolved, has resumed exercising and 'don't really feel anything' at this time.   She does follow up closely with Vascular medicine provider at [facility] for BLE Vascular issues / Varicose Veins.     Review of Systems:  Eyes: denies visual changes at this time denies floaters   ENT: no nasal congestion or sore throat  Respiratory: no cough or shorness of breath  Cardiovascular: no chest pain or palpitations  Gastrointestinal: no nausea or vomiting, no abdominal pain or change in bowel habits  Genitourinary: no hematuria or dysuria; denies frequency  Hematologic/Lymphatic: no easy bruising or lymphadenopathy  Musculoskeletal: no arthralgias or myalgias  Neurological: no seizures or tremors  Endocrine: no heat or cold intolerance      PAST HISTORY:     Past Medical History:   Diagnosis Date    Arthritis     Atrial fibrillation     Bronchitis     Cataract     Dry eyes     Glaucoma, suspect - Both Eyes     Hypothyroidism 06/23/2016    Pulmonary hypertension     Rash     Squamous cell carcinoma     in-situ right upper inner arm, right wrist    Status post lumbar surgery 06/23/2016    Stress fracture     Thyroid disease        Past Surgical History:   Procedure Laterality Date    ANGIOPLASTY      " CERVICAL FUSION      COLONOSCOPY      COLONOSCOPY N/A 11/14/2017    Procedure: COLONOSCOPY;  Surgeon: Kasi Mercado MD;  Location: [facility] END[facility] (4TH FLR);  Service: Endoscopy;  Laterality: N/A;    ESOPHAGOGASTRODUODENOSCOPY N/A 10/10/2019    Procedure: EGD (ESOPHAGOGASTRODUODENOSCOPY);  Surgeon: Rg Milton MD;  Location: [facility] END[facility] (4TH FLR);  Service: Endoscopy;  Laterality: N/A;    ESOPHAGOGASTRODUODENOSCOPY N/A 10/6/2021    Procedure: EGD (ESOPHAGOGASTRODUODENOSCOPY);  Surgeon: Rg Milton MD;  Location: [facility] END[facility] (4TH FLR);  Service: Endoscopy;  Laterality: N/A;  covid test 10/3 [place], instr emailed/portal -ml    l4-5 mid discectomy Left 03/2017    l4-5 MIS diskectomy Right 05/2016    RIGHT HEART CATHETERIZATION Right 1/27/2022    Procedure: INSERTION, CATHETER, RIGHT HEART;  Surgeon: Satnam Pickard Jr., MD;  Location: [facility] CATH LAB;  Service: Cardiology;  Laterality: Right;    TREATMENT OF CARDIAC ARRHYTHMIA N/A 7/17/2020    Procedure: CARDIOVERSION;  Surgeon: Kwan Bray MD;  Location: [facility] EP LAB;  Service: Cardiology;  Laterality: N/A;  AF,DCCV/SANGITA, ANES, SK, 746    TREATMENT OF CARDIAC ARRHYTHMIA N/A 7/14/2021    Procedure: CARDIOVERSION;  Surgeon: SYDNIE Cedillo MD;  Location: [facility] EP LAB;  Service: Cardiology;  Laterality: N/A;  AF, SANGITA, DCCV, MAC, EH, 3 Prep       Family History   Problem Relation Age of Onset    Cancer Mother         Lung cancer    Heart failure Father     Colon cancer Father     Hypertension Father     Cataracts Father     Cancer Father 80        colon    Diabetes Son     Heart disease Son     Cancer Son         esophageal cancer    No Known Problems Sister     No Known Problems Brother     No Known Problems Maternal Aunt     No Known Problems Maternal Uncle     No Known Problems Paternal Aunt     No Known Problems Paternal Uncle     No Known Problems Maternal Grandmother     No Known Problems Maternal Grandfather     No Known Problems  Paternal Grandmother     No Known Problems Paternal Grandfather     Melanoma Daughter     Amblyopia Neg Hx     Blindness Neg Hx     Glaucoma Neg Hx     Macular degeneration Neg Hx     Retinal detachment Neg Hx     Strabismus Neg Hx     Stroke Neg Hx     Thyroid disease Neg Hx     Breast cancer Neg Hx     Ovarian cancer Neg Hx        Social History     Socioeconomic History    Marital status: Single   Occupational History     Employer: [employer]   Tobacco Use    Smoking status: Never Smoker    Smokeless tobacco: Never Used   Substance and Sexual Activity    Alcohol use: Yes     Alcohol/week: 1.0 standard drink     Types: 1 Glasses of wine per week     Comment: 1 glass on weekends    Drug use: No    Sexual activity: Never   Other Topics Concern    Are you pregnant or think you may be? No    Breast-feeding No     Social Determinants of Health     Financial Resource Strain: Low Risk     Difficulty of Paying Living Expenses: Not very hard   Food Insecurity: No Food Insecurity    Worried About Running Out of Food in the Last Year: Never true    Ran Out of Food in the Last Year: Never true   Transportation Needs: No Transportation Needs    Lack of Transportation (Medical): No    Lack of Transportation (Non-Medical): No   Physical Activity: Sufficiently Active    Days of Exercise per Week: 7 days    Minutes of Exercise per Session: 90 min   Stress: No Stress Concern Present    Feeling of Stress : Only a little   Social Connections: Unknown    Frequency of Communication with Friends and Family: Once a week    Frequency of Social Gatherings with Friends and Family: Patient refused    Active Member of Clubs or Organizations: Yes    Attends Club or Organization Meetings: 1 to 4 times per year    Marital Status:    Housing Stability: Low Risk     Unable to Pay for Housing in the Last Year: No    Number of Places Lived in the Last Year: 1    Unstable Housing in the Last  Year: No       MEDICATIONS & ALLERGIES:     Current Outpatient Medications on File Prior to Visit   Medication Sig Dispense Refill    acetaminophen (TYLENOL) 500 MG tablet Take 500 mg by mouth every 6 (six) hours as needed for Pain.      acyclovir (ZOVIRAX) 400 MG tablet Take 1 tablet (400 mg total) by mouth 2 (two) times daily. 60 tablet 12    ascorbic acid (VITAMIN C) 1000 MG tablet Take 1,000 mg by mouth once daily.       benzonatate (TESSALON) 100 MG capsule Take 1 capsule (100 mg total) by mouth 3 (three) times daily as needed for Cough. 30 capsule 0    biotin 1 mg tablet Take 1 mg by mouth 2 (two) times daily.       buPROPion (WELLBUTRIN XL) 150 MG TB24 tablet Take 1 tablet (150 mg total) by mouth once daily. 90 tablet 2    calcium-vitamin D 500 mg(1,250mg) -200 unit per tablet Take 1 tablet by mouth 2 (two) times daily with meals.       clonazePAM (KLONOPIN) 1 MG tablet TAKE 1& 1/2 TABLET BY MOUTH NIGHTLY AS NEEDED 45 tablet 5    cycloSPORINE (RESTASIS) 0.05 % ophthalmic emulsion Place 1 drop into both eyes 2 (two) times daily. 60 each 11    digestive enzymes Tab Take 1 tablet by mouth once daily.       diphenhydrAMINE (BENADRYL) 25 mg capsule Take 25 mg by mouth nightly as needed.       ELIQUIS 5 mg Tab Take 1 tablet (5 mg total) by mouth 2 (two) times daily. 180 tablet 3    fluticasone propionate (FLONASE) 50 mcg/actuation nasal spray 1 spray (50 mcg total) by Each Nostril route once daily. (Patient taking differently: 1 spray by Each Nostril route as needed.) 16 g 12    furosemide (LASIX) 20 MG tablet Take 1 tablet (20 mg total) by mouth every other day. 15 tablet 11    ipratropium (ATROVENT) 42 mcg (0.06 %) nasal spray 2 sprays by Nasal route 3 (three) times daily. 30 mL 0    levothyroxine (SYNTHROID) 137 MCG Tab tablet Take 1 tablet (137 mcg total) by mouth before breakfast. 30 tablet 11    lubiprostone (AMITIZA) 24 MCG Cap Take 1 capsule (24 mcg total) by mouth daily as needed. 30  capsule 6    metoprolol succinate (TOPROL-XL) 25 MG 24 hr tablet Take 1 tablet (25 mg total) by mouth once daily. 30 tablet 11    montelukast (SINGULAIR) 10 mg tablet Take 1 tablet (10 mg total) by mouth once daily. 30 tablet 11    multivitamin (THERAGRAN) per tablet Take 1 tablet by mouth once daily.       tretinoin (RETIN-A) 0.1 % cream Apply topically every evening. 20 g 6    zinc 50 mg Tab Take 50 mg by mouth once daily.        No current facility-administered medications on file prior to visit.        Review of patient's allergies indicates:  No Known Allergies    Medications Reconciliation:   I have reconciled the patient's home medications with the patient/family. I have updated all changes.  Refer to After-Visit Medication List.    OBJECTIVE:     Vital Signs:  There were no vitals filed for this visit.  Wt Readings from Last 3 Encounters:   08/02/22 0900 45.4 kg (100 lb)   08/01/22 1401 45.4 kg (100 lb)   06/08/22 0828 45.2 kg (99 lb 10.4 oz)     There is no height or weight on file to calculate BMI.   Wt Readings from Last 3 Encounters:   08/25/22 45.5 kg (100 lb 5 oz)   08/02/22 45.4 kg (100 lb)   08/01/22 45.4 kg (100 lb)     Temp Readings from Last 3 Encounters:   08/02/22 98.3 °F (36.8 °C) (Oral)   08/01/22 98.3 °F (36.8 °C) (Oral)   02/22/22 97.7 °F (36.5 °C) (Oral)     BP Readings from Last 3 Encounters:   08/25/22 112/64   08/02/22 110/64   08/01/22 109/70     Pulse Readings from Last 3 Encounters:   08/25/22 85   08/02/22 78   08/01/22 92       Physical Exam:  (Focused Exam)  General: Well developed, well nourished. No distress.  HEENT: Head is normocephalic, atraumatic; ears are normal.   Eyes: Clear conjunctiva.  Neck: Supple, symmetrical neck; trachea midline.  Extremities: No LE edema. Pulses 2+ and symmetric.   BLE with hyperpigmentation due to chronic vascular problems being followed by Vascular med expert; increase varicose veins to foot and some bony prominences, but uncertain if these  are chronic to lateral aspect of left foot   Skin: Skin color, texture, turgor normal. No rashes.  Musculoskeletal: Normal gait.   Lymph Nodes: No cervical or supraclavicular adenopathy.  Neurologic: Normal strength and tone. No focal numbness or weakness.   Psychiatric: Not depressed.      Laboratory  Lab Results   Component Value Date    WBC 5.30 04/13/2022    HGB 14.1 04/13/2022    HCT 42.7 04/13/2022     04/13/2022    CHOL 212 (H) 04/13/2022    TRIG 183 (H) 04/13/2022    HDL 64 04/13/2022    ALT 29 04/13/2022    AST 28 04/13/2022     05/25/2022    K 4.2 05/25/2022     05/25/2022    CREATININE 1.0 05/25/2022    BUN 17 05/25/2022    CO2 33 (H) 05/25/2022    TSH 2.882 12/22/2021    INR 1.1 07/07/2021    HGBA1C 5.4 08/24/2015         ASSESSMENT & PLAN:     Same day apt  Notations made to chart in regards to today's appt.       Foot cramps/ Left foot pain:   ` Check xray to ascertain no underlying bony involvement (low suspicion, but possible); if pain or discomforts should recur and given some risk factors, consideration to be given to checking CT +/- Podiatry eval.   ` Check chemistry of imbalance as to possible 'cause' for the 'cramps' (takes Furosemide every other day)>  -     X-Ray Foot Complete 3 view Left; Future; Expected date: 08/25/2022  -     Basic Metabolic Panel; Future; Expected date: 08/25/2022    Future Appointments   Date Time Provider Department Center   9/22/2022  1:30 PM Jeanmarie Cedeño MD PhD [facility] PERVASC [facility]   10/4/2022  8:30 AM Tone Diaz OD [facility] OPTOMTY [place]   11[facility]2/2022  [facility]   11/14/2022  2:00 PM David Pereira MD [facility] CARDIO [facility]   12/20/2022  2:00 PM Zina Rockwell MD [facility] [place]   6/19/2023 10:15 AM [facility] ULTR [facility]   6/20/2023  2:40 PM Soha Aaron NP [facility] UROLOG [facility] [place]        Medication List          Accurate as of August 25, 2022  3:58 PM. If you  have any questions, ask your nurse or doctor.            CHANGE how you take these medications    fluticasone propionate 50 mcg/actuation nasal spray  Commonly known as: FLONASE  1 spray (50 mcg total) by Each Nostril route once daily.  What changed:   · when to take this  · reasons to take this        CONTINUE taking these medications    acetaminophen 500 MG tablet  Commonly known as: TYLENOL     acyclovir 400 MG tablet  Commonly known as: ZOVIRAX  Take 1 tablet (400 mg total) by mouth 2 (two) times daily.     AMITIZA 24 MCG Cap  Generic drug: lubiprostone  Take 1 capsule (24 mcg total) by mouth daily as needed.     ascorbic acid (vitamin C) 1000 MG tablet  Commonly known as: VITAMIN C     benzonatate 100 MG capsule  Commonly known as: TESSALON  Take 1 capsule (100 mg total) by mouth 3 (three) times daily as needed for Cough.     biotin 1 mg tablet     buPROPion 150 MG TB24 tablet  Commonly known as: WELLBUTRIN XL  Take 1 tablet (150 mg total) by mouth once daily.     calcium-vitamin D3 500 mg-5 mcg (200 unit) per tablet  Commonly known as: OS-IGLESIA 500 + D3     clonazePAM 1 MG tablet  Commonly known as: KlonoPIN  TAKE 1& 1/2 TABLET BY MOUTH NIGHTLY AS NEEDED     digestive enzymes Tab     diphenhydrAMINE 25 mg capsule  Commonly known as: BENADRYL     ELIQUIS 5 mg Tab  Generic drug: apixaban  Take 1 tablet (5 mg total) by mouth 2 (two) times daily.     furosemide 20 MG tablet  Commonly known as: LASIX  Take 1 tablet (20 mg total) by mouth every other day.     ipratropium 42 mcg (0.06 %) nasal spray  Commonly known as: ATROVENT  2 sprays by Nasal route 3 (three) times daily.     levothyroxine 137 MCG Tab tablet  Commonly known as: SYNTHROID  Take 1 tablet (137 mcg total) by mouth before breakfast.     metoprolol succinate 25 MG 24 hr tablet  Commonly known as: TOPROL-XL  Take 1 tablet (25 mg total) by mouth once daily.     montelukast 10 mg tablet  Commonly known as: SINGULAIR  Take 1 tablet (10 mg total) by mouth once  daily.     multivitamin per tablet  Commonly known as: THERAGRAN     RESTASIS 0.05 % ophthalmic emulsion  Generic drug: cycloSPORINE  Place 1 drop into both eyes 2 (two) times daily.     tretinoin 0.1 % cream  Commonly known as: RETIN-A  Apply topically every evening.     zinc 50 mg Tab            Signing Physician:  PETR Lloyd

## 2022-08-25 ENCOUNTER — OFFICE VISIT (OUTPATIENT)
Dept: INTERNAL MEDICINE | Facility: CLINIC | Age: 72
End: 2022-08-25
Payer: MEDICARE

## 2022-08-25 ENCOUNTER — PATIENT MESSAGE (OUTPATIENT)
Dept: INTERNAL MEDICINE | Facility: CLINIC | Age: 72
End: 2022-08-25

## 2022-08-25 ENCOUNTER — HOSPITAL ENCOUNTER (OUTPATIENT)
Dept: RADIOLOGY | Facility: HOSPITAL | Age: 72
Discharge: HOME | End: 2022-08-25
Attending: NURSE PRACTITIONER
Payer: MEDICARE

## 2022-08-25 VITALS
HEIGHT: 64 IN | DIASTOLIC BLOOD PRESSURE: 64 MMHG | HEART RATE: 85 BPM | OXYGEN SATURATION: 100 % | WEIGHT: 100.31 LBS | BODY MASS INDEX: 17.13 KG/M2 | SYSTOLIC BLOOD PRESSURE: 112 MMHG

## 2022-08-25 DIAGNOSIS — M79.672 LEFT FOOT PAIN: ICD-10-CM

## 2022-08-25 DIAGNOSIS — R25.2 FOOT CRAMPS: ICD-10-CM

## 2022-08-25 DIAGNOSIS — R25.2 FOOT CRAMPS: Primary | ICD-10-CM

## 2022-08-25 PROCEDURE — 3288F FALL RISK ASSESSMENT DOCD: CPT | Mod: CPTII,S$GLB,, | Performed by: NURSE PRACTITIONER

## 2022-08-25 PROCEDURE — 73630 XR FOOT COMPLETE 3 VIEW LEFT: ICD-10-PCS | Mod: 26,LT,, | Performed by: RADIOLOGY

## 2022-08-25 PROCEDURE — 99214 PR OFFICE/OUTPT VISIT, EST, LEVL IV, 30-39 MIN: ICD-10-PCS | Mod: S$GLB,,, | Performed by: NURSE PRACTITIONER

## 2022-08-25 PROCEDURE — 1157F PR ADVANCE CARE PLAN OR EQUIV PRESENT IN MEDICAL RECORD: ICD-10-PCS | Mod: CPTII,S$GLB,, | Performed by: NURSE PRACTITIONER

## 2022-08-25 PROCEDURE — 1101F PR PT FALLS ASSESS DOC 0-1 FALLS W/OUT INJ PAST YR: ICD-10-PCS | Mod: CPTII,S$GLB,, | Performed by: NURSE PRACTITIONER

## 2022-08-25 PROCEDURE — 1157F ADVNC CARE PLAN IN RCRD: CPT | Mod: CPTII,S$GLB,, | Performed by: NURSE PRACTITIONER

## 2022-08-25 PROCEDURE — 99999 PR PBB SHADOW E&M-EST. PATIENT-LVL V: CPT | Mod: PBBFAC,,, | Performed by: NURSE PRACTITIONER

## 2022-08-25 PROCEDURE — 3008F BODY MASS INDEX DOCD: CPT | Mod: CPTII,S$GLB,, | Performed by: NURSE PRACTITIONER

## 2022-08-25 PROCEDURE — 73630 X-RAY EXAM OF FOOT: CPT | Mod: TC,LT

## 2022-08-25 PROCEDURE — 3074F SYST BP LT 130 MM HG: CPT | Mod: CPTII,S$GLB,, | Performed by: NURSE PRACTITIONER

## 2022-08-25 PROCEDURE — 1159F MED LIST DOCD IN RCRD: CPT | Mod: CPTII,S$GLB,, | Performed by: NURSE PRACTITIONER

## 2022-08-25 PROCEDURE — 3078F PR MOST RECENT DIASTOLIC BLOOD PRESSURE < 80 MM HG: ICD-10-PCS | Mod: CPTII,S$GLB,, | Performed by: NURSE PRACTITIONER

## 2022-08-25 PROCEDURE — 99214 OFFICE O/P EST MOD 30 MIN: CPT | Mod: S$GLB,,, | Performed by: NURSE PRACTITIONER

## 2022-08-25 PROCEDURE — 3008F PR BODY MASS INDEX (BMI) DOCUMENTED: ICD-10-PCS | Mod: CPTII,S$GLB,, | Performed by: NURSE PRACTITIONER

## 2022-08-25 PROCEDURE — 1126F AMNT PAIN NOTED NONE PRSNT: CPT | Mod: CPTII,S$GLB,, | Performed by: NURSE PRACTITIONER

## 2022-08-25 PROCEDURE — 3078F DIAST BP <80 MM HG: CPT | Mod: CPTII,S$GLB,, | Performed by: NURSE PRACTITIONER

## 2022-08-25 PROCEDURE — 1126F PR PAIN SEVERITY QUANTIFIED, NO PAIN PRESENT: ICD-10-PCS | Mod: CPTII,S$GLB,, | Performed by: NURSE PRACTITIONER

## 2022-08-25 PROCEDURE — 3288F PR FALLS RISK ASSESSMENT DOCUMENTED: ICD-10-PCS | Mod: CPTII,S$GLB,, | Performed by: NURSE PRACTITIONER

## 2022-08-25 PROCEDURE — 1101F PT FALLS ASSESS-DOCD LE1/YR: CPT | Mod: CPTII,S$GLB,, | Performed by: NURSE PRACTITIONER

## 2022-08-25 PROCEDURE — 73630 X-RAY EXAM OF FOOT: CPT | Mod: 26,LT,, | Performed by: RADIOLOGY

## 2022-08-25 PROCEDURE — 99999 PR PBB SHADOW E&M-EST. PATIENT-LVL V: ICD-10-PCS | Mod: PBBFAC,,, | Performed by: NURSE PRACTITIONER

## 2022-08-25 PROCEDURE — 1159F PR MEDICATION LIST DOCUMENTED IN MEDICAL RECORD: ICD-10-PCS | Mod: CPTII,S$GLB,, | Performed by: NURSE PRACTITIONER

## 2022-08-25 PROCEDURE — 3074F PR MOST RECENT SYSTOLIC BLOOD PRESSURE < 130 MM HG: ICD-10-PCS | Mod: CPTII,S$GLB,, | Performed by: NURSE PRACTITIONER

## 2022-08-26 ENCOUNTER — TELEPHONE (OUTPATIENT)
Dept: INTERNAL MEDICINE | Facility: CLINIC | Age: 72
End: 2022-08-26
Payer: MEDICARE

## 2022-08-26 DIAGNOSIS — E87.5 SERUM POTASSIUM ELEVATED: Primary | ICD-10-CM

## 2022-08-27 ENCOUNTER — OFFICE VISIT (OUTPATIENT)
Dept: URGENT CARE | Facility: CLINIC | Age: 72
End: 2022-08-27
Payer: MEDICARE

## 2022-08-27 VITALS
OXYGEN SATURATION: 99 % | SYSTOLIC BLOOD PRESSURE: 104 MMHG | BODY MASS INDEX: 17.24 KG/M2 | RESPIRATION RATE: 20 BRPM | TEMPERATURE: 98 F | HEART RATE: 90 BPM | HEIGHT: 64 IN | WEIGHT: 101 LBS | DIASTOLIC BLOOD PRESSURE: 67 MMHG

## 2022-08-27 DIAGNOSIS — Z79.01 ON CONTINUOUS ORAL ANTICOAGULATION: ICD-10-CM

## 2022-08-27 DIAGNOSIS — S80.812A ABRASION OF LEFT LOWER EXTREMITY, INITIAL ENCOUNTER: Primary | ICD-10-CM

## 2022-08-27 DIAGNOSIS — S80.922A: ICD-10-CM

## 2022-08-27 PROCEDURE — 3074F PR MOST RECENT SYSTOLIC BLOOD PRESSURE < 130 MM HG: ICD-10-PCS | Mod: CPTII,S$GLB,, | Performed by: FAMILY MEDICINE

## 2022-08-27 PROCEDURE — 1125F PR PAIN SEVERITY QUANTIFIED, PAIN PRESENT: ICD-10-PCS | Mod: CPTII,S$GLB,, | Performed by: FAMILY MEDICINE

## 2022-08-27 PROCEDURE — 1159F PR MEDICATION LIST DOCUMENTED IN MEDICAL RECORD: ICD-10-PCS | Mod: CPTII,S$GLB,, | Performed by: FAMILY MEDICINE

## 2022-08-27 PROCEDURE — 1160F PR REVIEW ALL MEDS BY PRESCRIBER/CLIN PHARMACIST DOCUMENTED: ICD-10-PCS | Mod: CPTII,S$GLB,, | Performed by: FAMILY MEDICINE

## 2022-08-27 PROCEDURE — 1160F RVW MEDS BY RX/DR IN RCRD: CPT | Mod: CPTII,S$GLB,, | Performed by: FAMILY MEDICINE

## 2022-08-27 PROCEDURE — 3078F DIAST BP <80 MM HG: CPT | Mod: CPTII,S$GLB,, | Performed by: FAMILY MEDICINE

## 2022-08-27 PROCEDURE — 99213 PR OFFICE/OUTPT VISIT, EST, LEVL III, 20-29 MIN: ICD-10-PCS | Mod: S$GLB,,, | Performed by: FAMILY MEDICINE

## 2022-08-27 PROCEDURE — 1159F MED LIST DOCD IN RCRD: CPT | Mod: CPTII,S$GLB,, | Performed by: FAMILY MEDICINE

## 2022-08-27 PROCEDURE — 99213 OFFICE O/P EST LOW 20 MIN: CPT | Mod: S$GLB,,, | Performed by: FAMILY MEDICINE

## 2022-08-27 PROCEDURE — 3008F BODY MASS INDEX DOCD: CPT | Mod: CPTII,S$GLB,, | Performed by: FAMILY MEDICINE

## 2022-08-27 PROCEDURE — 3008F PR BODY MASS INDEX (BMI) DOCUMENTED: ICD-10-PCS | Mod: CPTII,S$GLB,, | Performed by: FAMILY MEDICINE

## 2022-08-27 PROCEDURE — 1125F AMNT PAIN NOTED PAIN PRSNT: CPT | Mod: CPTII,S$GLB,, | Performed by: FAMILY MEDICINE

## 2022-08-27 PROCEDURE — 1157F ADVNC CARE PLAN IN RCRD: CPT | Mod: CPTII,S$GLB,, | Performed by: FAMILY MEDICINE

## 2022-08-27 PROCEDURE — 1157F PR ADVANCE CARE PLAN OR EQUIV PRESENT IN MEDICAL RECORD: ICD-10-PCS | Mod: CPTII,S$GLB,, | Performed by: FAMILY MEDICINE

## 2022-08-27 PROCEDURE — 3078F PR MOST RECENT DIASTOLIC BLOOD PRESSURE < 80 MM HG: ICD-10-PCS | Mod: CPTII,S$GLB,, | Performed by: FAMILY MEDICINE

## 2022-08-27 PROCEDURE — 3074F SYST BP LT 130 MM HG: CPT | Mod: CPTII,S$GLB,, | Performed by: FAMILY MEDICINE

## 2022-08-27 NOTE — PROGRESS NOTES
"Subjective:       Patient ID: Angelina Man is a 72 y.o. female.    Chief Complaint: Leg Injury (Left leg injury)    This is a 72 y.o. female who presents today with a chief complaint of left leg injury, that happened today.      Injury  This is a new problem. The current episode started today. The problem occurs constantly. The problem has been unchanged. Pertinent negatives include no chest pain, chills, congestion, coughing, fatigue, fever, nausea, numbness, rash, vomiting or weakness. Nothing aggravates the symptoms. She has tried immobilization for the symptoms. The treatment provided no relief.     Constitution: Negative for activity change, appetite change, chills, fatigue, fever and generalized weakness.   HENT:  Negative for congestion, postnasal drip and sinus pressure.    Neck: Negative for neck swelling.   Cardiovascular:  Negative for chest pain, leg swelling, palpitations, sob on exertion and passing out.   Eyes:  Negative for vision loss.   Respiratory:  Negative for chest tightness, cough and shortness of breath.    Gastrointestinal:  Negative for nausea and vomiting.   Genitourinary:  Negative for dysuria.   Musculoskeletal:  Positive for trauma.   Skin:  Negative for rash.   Neurological:  Negative for passing out, altered mental status and numbness.   Psychiatric/Behavioral:  Negative for altered mental status, confusion and agitation.      Objective:      Vitals:    08/27/22 1336   BP: 104/67   Pulse: 90   Resp: 20   Temp: 97.7 °F (36.5 °C)   TempSrc: Oral   SpO2: 99%   Weight: 45.8 kg (101 lb)   Height: 5' 4" (1.626 m)      Physical Exam   Constitutional: She is oriented to person, place, and time.  Non-toxic appearance. She does not appear ill. No distress.   HENT:   Head: Atraumatic.   Eyes: Conjunctivae are normal.   Cardiovascular: Normal rate, normal heart sounds and normal pulses. An irregular rhythm present.   Pulmonary/Chest: Effort normal and breath sounds normal.   Musculoskeletal:   "       General: Signs of injury present.   Neurological: She is alert and oriented to person, place, and time.   Skin: Skin is not diaphoretic.   Psychiatric: Judgment and thought content normal.               Assessment:       1. Abrasion of left lower extremity, initial encounter    2. Superficial injury of left lower leg without infection    3. On continuous oral anticoagulation          Plan:         1. Abrasion of left lower extremity, initial encounter  -     Ambulatory referral/consult to Wound Clinic    2. Superficial injury of left lower leg without infection  -     Ambulatory referral/consult to Wound Clinic    3. On continuous oral anticoagulation  Continue Eliquis for A-fib stroke prevention- no life threatening hemorrhage    Patient Instructions   No imaging performed; you have a superficial abrasion from the pedal and I do not suspect broken bones.  You will need to be monitored as an outpatient to ensure good healing without infection.  Mupirocin and dressing has been applied.  Follow up with wound care (alternatively you may follow up with primary care provider if you so choose).  Call to schedule appointment with wound care: Call to schedule an appointment: [phone]

## 2022-08-27 NOTE — PATIENT INSTRUCTIONS
No imaging performed; you have a superficial abrasion from the pedal and I do not suspect broken bones.  You will need to be monitored as an outpatient to ensure good healing without infection.  Mupirocin and dressing has been applied.  Follow up with wound care (alternatively you may follow up with primary care provider if you so choose).  Call to schedule appointment with wound care: Call to schedule an appointment: [phone]

## 2022-08-29 ENCOUNTER — TELEPHONE (OUTPATIENT)
Dept: URGENT CARE | Facility: CLINIC | Age: 72
End: 2022-08-29
Payer: MEDICARE

## 2022-08-29 ENCOUNTER — TELEPHONE (OUTPATIENT)
Dept: WOUND CARE | Facility: CLINIC | Age: 72
End: 2022-08-29
Payer: MEDICARE

## 2022-08-29 ENCOUNTER — PATIENT MESSAGE (OUTPATIENT)
Dept: INTERNAL MEDICINE | Facility: CLINIC | Age: 72
End: 2022-08-29
Payer: MEDICARE

## 2022-08-29 DIAGNOSIS — S80.819A ABRASION OF LOWER EXTREMITY, UNSPECIFIED LATERALITY, INITIAL ENCOUNTER: Primary | ICD-10-CM

## 2022-08-29 RX ORDER — MUPIROCIN 20 MG/G
OINTMENT TOPICAL 3 TIMES DAILY
Qty: 15 G | Refills: 0 | Status: SHIPPED | OUTPATIENT
Start: 2022-08-29 | End: 2022-10-31 | Stop reason: ALTCHOICE

## 2022-08-29 NOTE — TELEPHONE ENCOUNTER
----- Message from Wanda Nelson MA sent at 8/29/2022  1:47 PM CDT -----  Contact: Angelina   Patient calling to inform staff that she won't be able to make today's appointment that was offered, but she will be in on 8/31 for the appointment that was scheduled.

## 2022-08-29 NOTE — TELEPHONE ENCOUNTER
Returned called and spoke with patient who advised her car is in the shop but she will keep the scheduled appointment for Wednesday, 8/31/2022 at 230pm.

## 2022-08-30 ENCOUNTER — LAB VISIT (OUTPATIENT)
Dept: LAB | Facility: HOSPITAL | Age: 72
End: 2022-08-30
Payer: MEDICARE

## 2022-08-30 ENCOUNTER — PATIENT MESSAGE (OUTPATIENT)
Dept: INTERNAL MEDICINE | Facility: CLINIC | Age: 72
End: 2022-08-30
Payer: MEDICARE

## 2022-08-30 DIAGNOSIS — E87.5 SERUM POTASSIUM ELEVATED: ICD-10-CM

## 2022-08-30 LAB — POTASSIUM SERPL-SCNC: 3.9 MMOL/L (ref 3.5–5.1)

## 2022-08-30 PROCEDURE — 36415 COLL VENOUS BLD VENIPUNCTURE: CPT | Performed by: NURSE PRACTITIONER

## 2022-08-30 PROCEDURE — 84132 ASSAY OF SERUM POTASSIUM: CPT | Performed by: NURSE PRACTITIONER

## 2022-08-31 ENCOUNTER — OFFICE VISIT (OUTPATIENT)
Dept: WOUND CARE | Facility: CLINIC | Age: 72
End: 2022-08-31
Payer: MEDICARE

## 2022-08-31 ENCOUNTER — PATIENT MESSAGE (OUTPATIENT)
Dept: CARDIOLOGY | Facility: CLINIC | Age: 72
End: 2022-08-31
Payer: MEDICARE

## 2022-08-31 DIAGNOSIS — I83.93 ASYMPTOMATIC VARICOSE VEINS OF BOTH LOWER EXTREMITIES: ICD-10-CM

## 2022-08-31 DIAGNOSIS — I51.89 DIASTOLIC DYSFUNCTION: ICD-10-CM

## 2022-08-31 DIAGNOSIS — I87.2 VENOUS INSUFFICIENCY OF BOTH LOWER EXTREMITIES: ICD-10-CM

## 2022-08-31 DIAGNOSIS — R60.0 EDEMA OF BOTH LEGS: ICD-10-CM

## 2022-08-31 DIAGNOSIS — S81.802A OPEN WOUND OF LEFT LOWER EXTREMITY, INITIAL ENCOUNTER: Primary | ICD-10-CM

## 2022-08-31 PROCEDURE — 1157F PR ADVANCE CARE PLAN OR EQUIV PRESENT IN MEDICAL RECORD: ICD-10-PCS | Mod: CPTII,S$GLB,,

## 2022-08-31 PROCEDURE — 97597 DBRDMT OPN WND 1ST 20 CM/<: CPT | Mod: S$GLB,,,

## 2022-08-31 PROCEDURE — 1101F PT FALLS ASSESS-DOCD LE1/YR: CPT | Mod: CPTII,S$GLB,,

## 2022-08-31 PROCEDURE — 97597 DEBRIDEMENT: ICD-10-PCS | Mod: S$GLB,,,

## 2022-08-31 PROCEDURE — 99214 OFFICE O/P EST MOD 30 MIN: CPT | Mod: 25,S$GLB,,

## 2022-08-31 PROCEDURE — 99214 PR OFFICE/OUTPT VISIT, EST, LEVL IV, 30-39 MIN: ICD-10-PCS | Mod: 25,S$GLB,,

## 2022-08-31 PROCEDURE — 1157F ADVNC CARE PLAN IN RCRD: CPT | Mod: CPTII,S$GLB,,

## 2022-08-31 PROCEDURE — 3288F FALL RISK ASSESSMENT DOCD: CPT | Mod: CPTII,S$GLB,,

## 2022-08-31 PROCEDURE — 99999 PR PBB SHADOW E&M-EST. PATIENT-LVL III: ICD-10-PCS | Mod: PBBFAC,,,

## 2022-08-31 PROCEDURE — 1159F MED LIST DOCD IN RCRD: CPT | Mod: CPTII,S$GLB,,

## 2022-08-31 PROCEDURE — 1159F PR MEDICATION LIST DOCUMENTED IN MEDICAL RECORD: ICD-10-PCS | Mod: CPTII,S$GLB,,

## 2022-08-31 PROCEDURE — 3288F PR FALLS RISK ASSESSMENT DOCUMENTED: ICD-10-PCS | Mod: CPTII,S$GLB,,

## 2022-08-31 PROCEDURE — 99999 PR PBB SHADOW E&M-EST. PATIENT-LVL III: CPT | Mod: PBBFAC,,,

## 2022-08-31 PROCEDURE — 1101F PR PT FALLS ASSESS DOC 0-1 FALLS W/OUT INJ PAST YR: ICD-10-PCS | Mod: CPTII,S$GLB,,

## 2022-08-31 NOTE — PROCEDURES
Debridement    Date/Time: 8/31/2022 2:30 PM  Performed by: Hannah Rivera PA-C  Authorized by: Hannah Rivera PA-C     Consent Done?:  Yes (Written)  Local anesthesia used?: No      Wound Details:    Location:  Left leg    Type of Debridement:  Excisional       Length (cm):  3.7       Area (sq cm):  10.7       Width (cm):  2.9       Percent Debrided (%):  100       Depth (cm):  0       Total Area Debrided (sq cm):  10.7    Depth of debridement:  Epidermis/Dermis    Tissue debrided:  Dermis and Epidermis    Devitalized tissue debrided:  Biofilm, Exudate, Fibrin, Slough and Necrotic/Eschar    Instruments:  Curette    Bleeding:  Minimal  Hemostasis Achieved: Yes    Method Used:  Pressure  Patient tolerance:  Patient tolerated the procedure well with no immediate complications

## 2022-08-31 NOTE — PROGRESS NOTES
Subjective:       Patient ID: Angelina Man is a 72 y.o. female with PMHx of DDD, s/p lumbar fusion, anxiety, pulmonary hypertension, atrial fibrillation, varicose veins of BLE, hx of cardioversion, diastolic dysfunction, HFpEF, renal cyst, renal angiomyolipoma, hypothyroidism, GERD, dysphagia, DJD, spondylolisthesis at L4-L5, and bilateral leg edema    Chief Complaint: Wound Consult    Patient present for an evaluation of a left lower extremity wound. Patient reports this started on 8/27/22. She hit her leg getting off of a bike while in spinning class. She went to urgent care to address her new wound. There she was referred to wound care. Patient reports dressing her wound with Bactroban and covered with telfa and coban. Denies fever, chills, erythema, warmth, drainage, or pain.      Review of Systems   Constitutional:  Negative for activity change, chills, diaphoresis, fatigue and fever.   Respiratory:  Negative for apnea, chest tightness and shortness of breath.    Cardiovascular:  Positive for leg swelling. Negative for chest pain and palpitations.   Musculoskeletal:  Negative for gait problem and joint swelling.   Skin:  Positive for wound. Negative for pallor and rash.   Neurological:  Negative for syncope, weakness and numbness.   Psychiatric/Behavioral:  Negative for agitation. The patient is not nervous/anxious.    All other systems reviewed and are negative.    Objective:      Physical Exam  Vitals reviewed.   Constitutional:       General: She is not in acute distress.     Appearance: Normal appearance.   Cardiovascular:      Rate and Rhythm: Normal rate and regular rhythm.      Pulses: Normal pulses.   Pulmonary:      Effort: No respiratory distress.   Musculoskeletal:         General: No swelling.      Right lower leg: Edema present.      Left lower leg: Edema present.   Skin:     General: Skin is warm and dry.      Capillary Refill: Capillary refill takes less than 2 seconds.      Findings:  Ecchymosis and wound present. No erythema.          Neurological:      General: No focal deficit present.      Mental Status: She is alert and oriented to person, place, and time.   Psychiatric:         Mood and Affect: Mood normal.         Behavior: Behavior normal.         Thought Content: Thought content normal.         Judgment: Judgment normal.       Assessment:       1. Laceration of left lower extremity, initial encounter    2. Venous insufficiency of both lower extremities    3. Asymptomatic varicose veins of both lower extremities    4. Diastolic dysfunction    5. Edema of both legs           Altered Skin Integrity Left anterior;lower Leg (Active)       Altered Skin Integrity Present on Admission:    Side: Left   Orientation: anterior;lower   Location: Leg   Wound Number:    Is this injury device related?:    Primary Wound Type:    Description of Altered Skin Integrity:    Ankle-Brachial Index:    Pulses:    Removal Indication and Assessment:    Wound Outcome:    (Retired) Wound Length (cm):    (Retired) Wound Width (cm):    (Retired) Depth (cm):    Wound Description (Comments):    Removal Indications:    Wound Image    08/31/22 1420   Dressing Appearance Dry;Intact;Clean 08/31/22 1420   Drainage Amount None 08/31/22 1420   Black (%), Wound Tissue Color 100 % 08/31/22 1420   Periwound Area Intact;Pink;Edematous;Ecchymotic 08/31/22 1420   Wound Length (cm) 3.7 cm 08/31/22 1420   Wound Width (cm) 2.9 cm 08/31/22 1420   Wound Depth (cm) 0 cm 08/31/22 1420   Wound Volume (cm^3) 0 cm^3 08/31/22 1420   Wound Surface Area (cm^2) 10.73 cm^2 08/31/22 1420   Care Cleansed with:;Soap and water;Wound cleanser 08/31/22 1420   Dressing Applied;Hydrogel;Compression wrap 08/31/22 1420   Periwound Care Skin barrier film applied 08/31/22 1420   Compression Unna's Boot;Two layer compression 08/31/22 1420       Angelina was seen in the clinic room and placed in the supine position on the treatment table.  The dressing was  removed with scissors and the leg was cleansed with Easi-clense sponges and dried thoroughly.  Sharp debridement with scissors and pickups to remove necrotic tissue. Patient tolerated procedure well.     Plan of Care: Hydrogel to wound bed, calmoseptine to periwound area, and a 2 layer calamine wrap. The patient's foot was positioned at a 90 degree angle.  A calamine wrap followed by coban were applied using a spiral technique avoiding creases or folds.  The wrap was started behind the first metatarsal and ended below the tibial tubercle of the knee.  There was overlap of each turn half the width of the previous turn.  The compression wrap will be changed every 7 days.      Plan:       Orders Placed This Encounter   Procedures    Debridement     This order was created via procedure documentation     Left lower extremity wound dressed as detailed above.  Instructed patient to minimize ambulation and elevate legs whenever sedentary.  Patient was instructed to not get the dressings wet and to use cast covers for showering.  Should the dressing become wet, she is to remove it, place a wet-to-dry dressing over the wound, cover with gauze and roll gauze and use ace wraps for compression and to secure bandages.  She should then notify this office as soon as possible to have a new dressing applied.      Discussed nutrition and the role of protein in wound healing with the patient. Instructed patient to eat 5 small meals of protein a day. Gave a handout illustrating healthy protein options. Pt voiced understanding.    Verbal and written instructions given to patient.  RTC in 1 week    Hannah Rivera PA-C

## 2022-09-08 ENCOUNTER — OFFICE VISIT (OUTPATIENT)
Dept: WOUND CARE | Facility: CLINIC | Age: 72
End: 2022-09-08
Payer: MEDICARE

## 2022-09-08 ENCOUNTER — PATIENT MESSAGE (OUTPATIENT)
Dept: WOUND CARE | Facility: CLINIC | Age: 72
End: 2022-09-08

## 2022-09-08 VITALS
BODY MASS INDEX: 17.24 KG/M2 | WEIGHT: 101 LBS | TEMPERATURE: 98 F | HEIGHT: 64 IN | HEART RATE: 86 BPM | SYSTOLIC BLOOD PRESSURE: 107 MMHG | DIASTOLIC BLOOD PRESSURE: 64 MMHG

## 2022-09-08 DIAGNOSIS — I51.89 DIASTOLIC DYSFUNCTION: ICD-10-CM

## 2022-09-08 DIAGNOSIS — I87.2 VENOUS INSUFFICIENCY OF BOTH LOWER EXTREMITIES: ICD-10-CM

## 2022-09-08 DIAGNOSIS — S81.802A OPEN WOUND OF LEFT LOWER EXTREMITY, INITIAL ENCOUNTER: Primary | ICD-10-CM

## 2022-09-08 DIAGNOSIS — L03.116 CELLULITIS OF LEFT LOWER EXTREMITY: ICD-10-CM

## 2022-09-08 DIAGNOSIS — I83.93 ASYMPTOMATIC VARICOSE VEINS OF BOTH LOWER EXTREMITIES: ICD-10-CM

## 2022-09-08 PROCEDURE — 1125F AMNT PAIN NOTED PAIN PRSNT: CPT | Mod: CPTII,S$GLB,,

## 2022-09-08 PROCEDURE — 1157F ADVNC CARE PLAN IN RCRD: CPT | Mod: CPTII,S$GLB,,

## 2022-09-08 PROCEDURE — 3008F BODY MASS INDEX DOCD: CPT | Mod: CPTII,S$GLB,,

## 2022-09-08 PROCEDURE — 99999 PR PBB SHADOW E&M-EST. PATIENT-LVL IV: CPT | Mod: PBBFAC,,,

## 2022-09-08 PROCEDURE — 99214 PR OFFICE/OUTPT VISIT, EST, LEVL IV, 30-39 MIN: ICD-10-PCS | Mod: S$GLB,,,

## 2022-09-08 PROCEDURE — 3078F PR MOST RECENT DIASTOLIC BLOOD PRESSURE < 80 MM HG: ICD-10-PCS | Mod: CPTII,S$GLB,,

## 2022-09-08 PROCEDURE — 99999 PR PBB SHADOW E&M-EST. PATIENT-LVL IV: ICD-10-PCS | Mod: PBBFAC,,,

## 2022-09-08 PROCEDURE — 3074F SYST BP LT 130 MM HG: CPT | Mod: CPTII,S$GLB,,

## 2022-09-08 PROCEDURE — 1157F PR ADVANCE CARE PLAN OR EQUIV PRESENT IN MEDICAL RECORD: ICD-10-PCS | Mod: CPTII,S$GLB,,

## 2022-09-08 PROCEDURE — 3288F FALL RISK ASSESSMENT DOCD: CPT | Mod: CPTII,S$GLB,,

## 2022-09-08 PROCEDURE — 1101F PR PT FALLS ASSESS DOC 0-1 FALLS W/OUT INJ PAST YR: ICD-10-PCS | Mod: CPTII,S$GLB,,

## 2022-09-08 PROCEDURE — 1125F PR PAIN SEVERITY QUANTIFIED, PAIN PRESENT: ICD-10-PCS | Mod: CPTII,S$GLB,,

## 2022-09-08 PROCEDURE — 1101F PT FALLS ASSESS-DOCD LE1/YR: CPT | Mod: CPTII,S$GLB,,

## 2022-09-08 PROCEDURE — 3078F DIAST BP <80 MM HG: CPT | Mod: CPTII,S$GLB,,

## 2022-09-08 PROCEDURE — 99214 OFFICE O/P EST MOD 30 MIN: CPT | Mod: S$GLB,,,

## 2022-09-08 PROCEDURE — 3008F PR BODY MASS INDEX (BMI) DOCUMENTED: ICD-10-PCS | Mod: CPTII,S$GLB,,

## 2022-09-08 PROCEDURE — 1159F MED LIST DOCD IN RCRD: CPT | Mod: CPTII,S$GLB,,

## 2022-09-08 PROCEDURE — 3288F PR FALLS RISK ASSESSMENT DOCUMENTED: ICD-10-PCS | Mod: CPTII,S$GLB,,

## 2022-09-08 PROCEDURE — 3074F PR MOST RECENT SYSTOLIC BLOOD PRESSURE < 130 MM HG: ICD-10-PCS | Mod: CPTII,S$GLB,,

## 2022-09-08 PROCEDURE — 1159F PR MEDICATION LIST DOCUMENTED IN MEDICAL RECORD: ICD-10-PCS | Mod: CPTII,S$GLB,,

## 2022-09-08 RX ORDER — CEPHALEXIN 500 MG/1
500 CAPSULE ORAL EVERY 12 HOURS
Qty: 20 CAPSULE | Refills: 0 | Status: SHIPPED | OUTPATIENT
Start: 2022-09-08 | End: 2022-09-18

## 2022-09-08 NOTE — PROGRESS NOTES
Subjective:       Patient ID: Angelina Man is a 72 y.o. female with PMHx of DDD, s/p lumbar fusion, anxiety, pulmonary hypertension, atrial fibrillation, varicose veins of BLE, hx of cardioversion, diastolic dysfunction, HFpEF, renal cyst, renal angiomyolipoma, hypothyroidism, GERD, dysphagia, DJD, spondylolisthesis at L4-L5, and bilateral leg edema    Chief Complaint: Wound Check (Open wound of left lower extremity, initial encounter)    Patient present for a reevaluation of a left lower extremity wound. Patient reports exercising multiple times a day. She states she does not want a calamine wrap again. Denies fever, chills, erythema, warmth, drainage, or pain.      Review of Systems   Constitutional:  Negative for activity change, chills, diaphoresis, fatigue and fever.   Respiratory:  Negative for apnea, chest tightness and shortness of breath.    Cardiovascular:  Positive for leg swelling. Negative for chest pain and palpitations.   Musculoskeletal:  Negative for gait problem and joint swelling.   Skin:  Positive for wound. Negative for pallor and rash.   Neurological:  Negative for syncope, weakness and numbness.   Psychiatric/Behavioral:  Negative for agitation. The patient is not nervous/anxious.    All other systems reviewed and are negative.    Objective:      Physical Exam  Vitals reviewed.   Constitutional:       General: She is not in acute distress.     Appearance: Normal appearance.   Cardiovascular:      Rate and Rhythm: Normal rate and regular rhythm.      Pulses: Normal pulses.   Pulmonary:      Effort: No respiratory distress.   Musculoskeletal:         General: No swelling.      Right lower leg: No edema.      Left lower leg: Left lower leg edema: Compression stocking noted.   Skin:     General: Skin is warm and dry.      Capillary Refill: Capillary refill takes less than 2 seconds.      Findings: Erythema and wound present. No ecchymosis.          Neurological:      General: No focal deficit  present.      Mental Status: She is alert and oriented to person, place, and time.   Psychiatric:         Mood and Affect: Mood normal.         Behavior: Behavior normal.         Thought Content: Thought content normal.         Judgment: Judgment normal.       Assessment:       1. Open wound of left lower extremity, initial encounter    2. Venous insufficiency of both lower extremities    3. Asymptomatic varicose veins of both lower extremities    4. Diastolic dysfunction    5. Cellulitis of left lower extremity           Altered Skin Integrity Left anterior;lower Leg (Active)       Altered Skin Integrity Present on Admission:    Side: Left   Orientation: anterior;lower   Location: Leg   Wound Number:    Is this injury device related?:    Primary Wound Type:    Description of Altered Skin Integrity:    Ankle-Brachial Index:    Pulses:    Removal Indication and Assessment:    Wound Outcome:    (Retired) Wound Length (cm):    (Retired) Wound Width (cm):    (Retired) Depth (cm):    Wound Description (Comments):    Removal Indications:    Wound Image   09/08/22 1114   Dressing Appearance Dry;Intact;Clean;Moist drainage 09/08/22 1114   Drainage Amount Moderate 09/08/22 1114   Drainage Characteristics/Odor Clear;Yellow 09/08/22 1114   Red (%), Wound Tissue Color 100 % 09/08/22 1114   Periwound Area Intact;Warm;Edematous;Redness;Macerated 09/08/22 1114   Wound Length (cm) 3.1 cm 09/08/22 1114   Wound Width (cm) 2.7 cm 09/08/22 1114   Wound Depth (cm) 0 cm 09/08/22 1114   Wound Volume (cm^3) 0 cm^3 09/08/22 1114   Wound Surface Area (cm^2) 8.37 cm^2 09/08/22 1114   Care Cleansed with:;Soap and water;Wound cleanser 09/08/22 1114   Dressing Applied;Calcium alginate;Island/border 09/08/22 1114   Periwound Care Skin barrier film applied 09/08/22 1114       Angelina was seen in the clinic room and placed in the supine position on the treatment table.  The dressing was removed with scissors and the leg was cleansed with Easi-clense  sponges and dried thoroughly.  Erythema and warmth noted surrounding the wound.     Plan of Care: Aquacel border dressing to wound bed. Patient declines unna wrap.       Plan:    Discussed wound cellulitis with patient. Prescribed keflex. Discussed side effects of medication. Instructed patient to take medication as prescribed. Patient is scheduled for oral surgery on 9/13. Instructed patient to contact surgeon and inform him of her wound cellulitis and keflex prescription. Pt voiced understanding.    Left lower extremity wound dressed as detailed above.  Instructed patient to minimize ambulation and elevate legs whenever sedentary. Discussed excessive exercising can cause delayed wound healing.   Patient was instructed to not get the dressings wet and to use cast covers for showering.  Should the dressing become wet, she is to remove it, and place another aquacel border dressing over it. She should then notify this office as soon as possible to have a new dressing applied.      Discussed nutrition and the role of protein in wound healing with the patient. Instructed patient to eat 5 small meals of protein a day. Gave a handout illustrating healthy protein options. Pt voiced understanding.    Verbal and written instructions given to patient.  RTC in 1 week    Hannah Rivera PA-C

## 2022-09-09 ENCOUNTER — TELEPHONE (OUTPATIENT)
Dept: WOUND CARE | Facility: CLINIC | Age: 72
End: 2022-09-09
Payer: MEDICARE

## 2022-09-09 NOTE — TELEPHONE ENCOUNTER
Spoke with the patient an instructed her to change the aquacel foam bandage only if it become saturated or gets wet from her taking a shower or a bath. And to apply the other aquacel foam bandage that was given to her over the wound bed and keep her wound care appointment on 09/15/2022.

## 2022-09-15 ENCOUNTER — IMMUNIZATION (OUTPATIENT)
Dept: INTERNAL MEDICINE | Facility: CLINIC | Age: 72
End: 2022-09-15
Payer: MEDICARE

## 2022-09-15 ENCOUNTER — OFFICE VISIT (OUTPATIENT)
Dept: WOUND CARE | Facility: CLINIC | Age: 72
End: 2022-09-15
Payer: MEDICARE

## 2022-09-15 VITALS
BODY MASS INDEX: 17.01 KG/M2 | SYSTOLIC BLOOD PRESSURE: 112 MMHG | TEMPERATURE: 98 F | HEART RATE: 78 BPM | DIASTOLIC BLOOD PRESSURE: 63 MMHG | HEIGHT: 64 IN | WEIGHT: 99.63 LBS

## 2022-09-15 DIAGNOSIS — I87.2 VENOUS INSUFFICIENCY OF BOTH LOWER EXTREMITIES: ICD-10-CM

## 2022-09-15 DIAGNOSIS — I51.89 DIASTOLIC DYSFUNCTION: ICD-10-CM

## 2022-09-15 DIAGNOSIS — I83.93 ASYMPTOMATIC VARICOSE VEINS OF BOTH LOWER EXTREMITIES: ICD-10-CM

## 2022-09-15 DIAGNOSIS — L03.116 CELLULITIS OF LEFT LOWER EXTREMITY: ICD-10-CM

## 2022-09-15 DIAGNOSIS — S81.802A OPEN WOUND OF LEFT LOWER EXTREMITY, INITIAL ENCOUNTER: Primary | ICD-10-CM

## 2022-09-15 PROCEDURE — 3288F FALL RISK ASSESSMENT DOCD: CPT | Mod: CPTII,S$GLB,ICN,

## 2022-09-15 PROCEDURE — 99999 PR PBB SHADOW E&M-EST. PATIENT-LVL IV: ICD-10-PCS | Mod: PBBFAC,,,

## 2022-09-15 PROCEDURE — 99213 OFFICE O/P EST LOW 20 MIN: CPT | Mod: 25,S$GLB,ICN,

## 2022-09-15 PROCEDURE — 3008F PR BODY MASS INDEX (BMI) DOCUMENTED: ICD-10-PCS | Mod: CPTII,S$GLB,ICN,

## 2022-09-15 PROCEDURE — 1125F AMNT PAIN NOTED PAIN PRSNT: CPT | Mod: CPTII,S$GLB,ICN,

## 2022-09-15 PROCEDURE — 1101F PR PT FALLS ASSESS DOC 0-1 FALLS W/OUT INJ PAST YR: ICD-10-PCS | Mod: CPTII,S$GLB,ICN,

## 2022-09-15 PROCEDURE — 1157F ADVNC CARE PLAN IN RCRD: CPT | Mod: CPTII,S$GLB,ICN,

## 2022-09-15 PROCEDURE — 3074F PR MOST RECENT SYSTOLIC BLOOD PRESSURE < 130 MM HG: ICD-10-PCS | Mod: CPTII,S$GLB,ICN,

## 2022-09-15 PROCEDURE — 99999 PR PBB SHADOW E&M-EST. PATIENT-LVL IV: CPT | Mod: PBBFAC,,,

## 2022-09-15 PROCEDURE — 97597 DBRDMT OPN WND 1ST 20 CM/<: CPT | Mod: S$GLB,ICN,,

## 2022-09-15 PROCEDURE — 3008F BODY MASS INDEX DOCD: CPT | Mod: CPTII,S$GLB,ICN,

## 2022-09-15 PROCEDURE — 3078F DIAST BP <80 MM HG: CPT | Mod: CPTII,S$GLB,ICN,

## 2022-09-15 PROCEDURE — G0008 FLU VACCINE - QUADRIVALENT - ADJUVANTED: ICD-10-PCS | Mod: S$GLB,,, | Performed by: INTERNAL MEDICINE

## 2022-09-15 PROCEDURE — 1159F MED LIST DOCD IN RCRD: CPT | Mod: CPTII,S$GLB,ICN,

## 2022-09-15 PROCEDURE — 90694 FLU VACCINE - QUADRIVALENT - ADJUVANTED: ICD-10-PCS | Mod: S$GLB,,, | Performed by: INTERNAL MEDICINE

## 2022-09-15 PROCEDURE — 99213 PR OFFICE/OUTPT VISIT, EST, LEVL III, 20-29 MIN: ICD-10-PCS | Mod: 25,S$GLB,ICN,

## 2022-09-15 PROCEDURE — 3074F SYST BP LT 130 MM HG: CPT | Mod: CPTII,S$GLB,ICN,

## 2022-09-15 PROCEDURE — 1101F PT FALLS ASSESS-DOCD LE1/YR: CPT | Mod: CPTII,S$GLB,ICN,

## 2022-09-15 PROCEDURE — 3078F PR MOST RECENT DIASTOLIC BLOOD PRESSURE < 80 MM HG: ICD-10-PCS | Mod: CPTII,S$GLB,ICN,

## 2022-09-15 PROCEDURE — 1159F PR MEDICATION LIST DOCUMENTED IN MEDICAL RECORD: ICD-10-PCS | Mod: CPTII,S$GLB,ICN,

## 2022-09-15 PROCEDURE — 3288F PR FALLS RISK ASSESSMENT DOCUMENTED: ICD-10-PCS | Mod: CPTII,S$GLB,ICN,

## 2022-09-15 PROCEDURE — 1157F PR ADVANCE CARE PLAN OR EQUIV PRESENT IN MEDICAL RECORD: ICD-10-PCS | Mod: CPTII,S$GLB,ICN,

## 2022-09-15 PROCEDURE — G0008 ADMIN INFLUENZA VIRUS VAC: HCPCS | Mod: S$GLB,,, | Performed by: INTERNAL MEDICINE

## 2022-09-15 PROCEDURE — 1125F PR PAIN SEVERITY QUANTIFIED, PAIN PRESENT: ICD-10-PCS | Mod: CPTII,S$GLB,ICN,

## 2022-09-15 PROCEDURE — 97597 DEBRIDEMENT: ICD-10-PCS | Mod: S$GLB,ICN,,

## 2022-09-15 PROCEDURE — 90694 VACC AIIV4 NO PRSRV 0.5ML IM: CPT | Mod: S$GLB,,, | Performed by: INTERNAL MEDICINE

## 2022-09-15 NOTE — PROCEDURES
"Debridement    Date/Time: 9/15/2022 1:00 PM  Performed by: Hannah Rivera PA-C  Authorized by: Hannah Rivera PA-C     Time out: Immediately prior to procedure a "time out" was called to verify the correct patient, procedure, equipment, support staff and site/side marked as required.    Consent Done?:  Yes (Written)  Local anesthesia used?: Yes    Local anesthetic:  Topical anesthetic (Topical 4% Lidocaine Hydrochloride)    Wound Details:    Location:  Left leg    Type of Debridement:  Excisional       Length (cm):  2.4       Area (sq cm):  4.8       Width (cm):  2       Percent Debrided (%):  100       Depth (cm):  0       Total Area Debrided (sq cm):  4.8    Depth of debridement:  Epidermis/Dermis    Tissue debrided:  Dermis and Epidermis    Devitalized tissue debrided:  Biofilm, Exudate, Fibrin, Slough and Necrotic/Eschar    Instruments:  Curette    Bleeding:  Minimal  Hemostasis Achieved: Yes    Method Used:  Pressure  Patient tolerance:  Patient tolerated the procedure well with no immediate complications  "

## 2022-09-15 NOTE — PROGRESS NOTES
Subjective:       Patient ID: Angelina Man is a 72 y.o. female with PMHx of DDD, s/p lumbar fusion, anxiety, pulmonary hypertension, atrial fibrillation, varicose veins of BLE, hx of cardioversion, diastolic dysfunction, HFpEF, renal cyst, renal angiomyolipoma, hypothyroidism, GERD, dysphagia, DJD, spondylolisthesis at L4-L5, and bilateral leg edema    Chief Complaint: Wound Check    Patient present for a reevaluation of a left lower extremity wound. Patient reports exercising multiple times a day. She states she does not want a calamine wrap again. Denies fever, chills, erythema, warmth, drainage, or pain.      Review of Systems   Constitutional:  Negative for activity change, chills, diaphoresis, fatigue and fever.   Respiratory:  Negative for apnea, chest tightness and shortness of breath.    Cardiovascular:  Positive for leg swelling. Negative for chest pain and palpitations.   Musculoskeletal:  Negative for gait problem and joint swelling.   Skin:  Positive for wound. Negative for pallor and rash.   Neurological:  Negative for syncope, weakness and numbness.   Psychiatric/Behavioral:  Negative for agitation. The patient is not nervous/anxious.    All other systems reviewed and are negative.    Objective:      Physical Exam  Vitals reviewed.   Constitutional:       General: She is not in acute distress.     Appearance: Normal appearance.   Cardiovascular:      Rate and Rhythm: Normal rate and regular rhythm.      Pulses: Normal pulses.   Pulmonary:      Effort: No respiratory distress.   Musculoskeletal:         General: No swelling.      Right lower leg: No edema.      Left lower leg: Left lower leg edema: Compression stocking noted.   Skin:     General: Skin is warm and dry.      Capillary Refill: Capillary refill takes less than 2 seconds.      Findings: Erythema and wound present. No ecchymosis.          Neurological:      General: No focal deficit present.      Mental Status: She is alert and oriented to  person, place, and time.   Psychiatric:         Mood and Affect: Mood normal.         Behavior: Behavior normal.         Thought Content: Thought content normal.         Judgment: Judgment normal.       Assessment:       1. Open wound of left lower extremity, initial encounter    2. Venous insufficiency of both lower extremities    3. Asymptomatic varicose veins of both lower extremities    4. Diastolic dysfunction           Altered Skin Integrity Left anterior;lower Leg (Active)       Altered Skin Integrity Present on Admission:    Side: Left   Orientation: anterior;lower   Location: Leg   Wound Number:    Is this injury device related?:    Primary Wound Type:    Description of Altered Skin Integrity:    Ankle-Brachial Index:    Pulses:    Removal Indication and Assessment:    Wound Outcome:    (Retired) Wound Length (cm):    (Retired) Wound Width (cm):    (Retired) Depth (cm):    Wound Description (Comments):    Removal Indications:    Wound Image    09/15/22 1410   Dressing Appearance Dry;Intact;Clean;Dried drainage 09/15/22 1410   Drainage Amount Small 09/15/22 1410   Drainage Characteristics/Odor Bleeding controlled 09/15/22 1410   Black (%), Wound Tissue Color 50 % 09/15/22 1410   Red (%), Wound Tissue Color 50 % 09/15/22 1410   Periwound Area Intact;Pink;Edematous 09/15/22 1410   Wound Length (cm) 2.4 cm 09/15/22 1410   Wound Width (cm) 2 cm 09/15/22 1410   Wound Depth (cm) 0 cm 09/15/22 1410   Wound Volume (cm^3) 0 cm^3 09/15/22 1410   Wound Surface Area (cm^2) 4.8 cm^2 09/15/22 1410   Care Cleansed with:;Soap and water;Wound cleanser 09/15/22 1410   Dressing Applied;Hydrogel;Calcium alginate;Island/border 09/15/22 1410   Periwound Care Skin barrier film applied 09/15/22 1410       Angelina was seen in the clinic room and placed in the supine position on the treatment table.  The dressing was removed with scissors and the leg was cleansed with Easi-clense sponges and dried thoroughly.  Erythema and warmth  noted surrounding the wound is improving. Placed topical 4% Lidocaine Hydrochloride to wound bed for 15 minutes. Sharp debridement with 5 mm curette to remove necrotic tissue. Pt tolerated procedure well.       Plan of Care: Hydrogel to wound bed and covered with an aquacel border dressing to wound bed. Patient declines unna wrap.       Plan:    Discussed wound cellulitis with patient. Instructed patient to continue to take medication as prescribed.     Left lower extremity wound dressed as detailed above.  Instructed patient to minimize ambulation and elevate legs whenever sedentary. Discussed excessive exercising can cause delayed wound healing.   Patient was instructed to not get the dressings wet and to use cast covers for showering.  Should the dressing become wet, she is to remove it, and place another aquacel border dressing over it. She should then notify this office as soon as possible to have a new dressing applied.      Discussed nutrition and the role of protein in wound healing with the patient. Instructed patient to eat 5 small meals of protein a day. Gave a handout illustrating healthy protein options. Pt voiced understanding.    Verbal and written instructions given to patient.  RTC in 1 week    Hannah Rivera PA-C

## 2022-09-21 ENCOUNTER — OFFICE VISIT (OUTPATIENT)
Dept: WOUND CARE | Facility: CLINIC | Age: 72
End: 2022-09-21
Payer: MEDICARE

## 2022-09-21 VITALS
HEART RATE: 79 BPM | BODY MASS INDEX: 17.35 KG/M2 | WEIGHT: 101.63 LBS | TEMPERATURE: 98 F | SYSTOLIC BLOOD PRESSURE: 103 MMHG | DIASTOLIC BLOOD PRESSURE: 57 MMHG | HEIGHT: 64 IN

## 2022-09-21 DIAGNOSIS — I83.93 ASYMPTOMATIC VARICOSE VEINS OF BOTH LOWER EXTREMITIES: ICD-10-CM

## 2022-09-21 DIAGNOSIS — I87.2 VENOUS INSUFFICIENCY OF BOTH LOWER EXTREMITIES: ICD-10-CM

## 2022-09-21 DIAGNOSIS — I51.89 DIASTOLIC DYSFUNCTION: ICD-10-CM

## 2022-09-21 DIAGNOSIS — S81.802A OPEN WOUND OF LEFT LOWER EXTREMITY, INITIAL ENCOUNTER: Primary | ICD-10-CM

## 2022-09-21 PROCEDURE — 3078F PR MOST RECENT DIASTOLIC BLOOD PRESSURE < 80 MM HG: ICD-10-PCS | Mod: CPTII,S$GLB,ICN,

## 2022-09-21 PROCEDURE — 99499 UNLISTED E&M SERVICE: CPT | Mod: S$GLB,,,

## 2022-09-21 PROCEDURE — 97597 DBRDMT OPN WND 1ST 20 CM/<: CPT | Mod: S$GLB,ICN,,

## 2022-09-21 PROCEDURE — 99499 NO LOS: ICD-10-PCS | Mod: S$GLB,,,

## 2022-09-21 PROCEDURE — 1101F PR PT FALLS ASSESS DOC 0-1 FALLS W/OUT INJ PAST YR: ICD-10-PCS | Mod: CPTII,S$GLB,ICN,

## 2022-09-21 PROCEDURE — 1159F MED LIST DOCD IN RCRD: CPT | Mod: CPTII,S$GLB,ICN,

## 2022-09-21 PROCEDURE — 3008F BODY MASS INDEX DOCD: CPT | Mod: CPTII,S$GLB,ICN,

## 2022-09-21 PROCEDURE — 3074F SYST BP LT 130 MM HG: CPT | Mod: CPTII,S$GLB,ICN,

## 2022-09-21 PROCEDURE — 1126F PR PAIN SEVERITY QUANTIFIED, NO PAIN PRESENT: ICD-10-PCS | Mod: CPTII,S$GLB,ICN,

## 2022-09-21 PROCEDURE — 1126F AMNT PAIN NOTED NONE PRSNT: CPT | Mod: CPTII,S$GLB,ICN,

## 2022-09-21 PROCEDURE — 3008F PR BODY MASS INDEX (BMI) DOCUMENTED: ICD-10-PCS | Mod: CPTII,S$GLB,ICN,

## 2022-09-21 PROCEDURE — 97597 DEBRIDEMENT: ICD-10-PCS | Mod: S$GLB,ICN,,

## 2022-09-21 PROCEDURE — 1157F ADVNC CARE PLAN IN RCRD: CPT | Mod: CPTII,S$GLB,ICN,

## 2022-09-21 PROCEDURE — 99999 PR PBB SHADOW E&M-EST. PATIENT-LVL IV: CPT | Mod: PBBFAC,,,

## 2022-09-21 PROCEDURE — 1101F PT FALLS ASSESS-DOCD LE1/YR: CPT | Mod: CPTII,S$GLB,ICN,

## 2022-09-21 PROCEDURE — 3074F PR MOST RECENT SYSTOLIC BLOOD PRESSURE < 130 MM HG: ICD-10-PCS | Mod: CPTII,S$GLB,ICN,

## 2022-09-21 PROCEDURE — 1157F PR ADVANCE CARE PLAN OR EQUIV PRESENT IN MEDICAL RECORD: ICD-10-PCS | Mod: CPTII,S$GLB,ICN,

## 2022-09-21 PROCEDURE — 3078F DIAST BP <80 MM HG: CPT | Mod: CPTII,S$GLB,ICN,

## 2022-09-21 PROCEDURE — 1159F PR MEDICATION LIST DOCUMENTED IN MEDICAL RECORD: ICD-10-PCS | Mod: CPTII,S$GLB,ICN,

## 2022-09-21 PROCEDURE — 3288F PR FALLS RISK ASSESSMENT DOCUMENTED: ICD-10-PCS | Mod: CPTII,S$GLB,ICN,

## 2022-09-21 PROCEDURE — 3288F FALL RISK ASSESSMENT DOCD: CPT | Mod: CPTII,S$GLB,ICN,

## 2022-09-21 PROCEDURE — 99999 PR PBB SHADOW E&M-EST. PATIENT-LVL IV: ICD-10-PCS | Mod: PBBFAC,,,

## 2022-09-21 NOTE — PROGRESS NOTES
Subjective:       Patient ID: Angelina Man is a 72 y.o. female with PMHx of DDD, s/p lumbar fusion, anxiety, pulmonary hypertension, atrial fibrillation, varicose veins of BLE, hx of cardioversion, diastolic dysfunction, HFpEF, renal cyst, renal angiomyolipoma, hypothyroidism, GERD, dysphagia, DJD, spondylolisthesis at L4-L5, and bilateral leg edema    Chief Complaint: Wound Check    Patient present for a reevaluation of a left lower extremity wound. Patient reports exercising multiple times a day. She states she does not want a calamine wrap again. Denies fever, chills, erythema, warmth, drainage, or pain.      Review of Systems   Constitutional:  Negative for activity change, chills, diaphoresis, fatigue and fever.   Respiratory:  Negative for apnea, chest tightness and shortness of breath.    Cardiovascular:  Positive for leg swelling. Negative for chest pain and palpitations.   Musculoskeletal:  Negative for gait problem and joint swelling.   Skin:  Positive for wound. Negative for pallor and rash.   Neurological:  Negative for syncope, weakness and numbness.   Psychiatric/Behavioral:  Negative for agitation. The patient is not nervous/anxious.    All other systems reviewed and are negative.    Objective:      Physical Exam  Vitals reviewed.   Constitutional:       General: She is not in acute distress.     Appearance: Normal appearance.   Cardiovascular:      Rate and Rhythm: Normal rate and regular rhythm.      Pulses: Normal pulses.   Pulmonary:      Effort: No respiratory distress.   Musculoskeletal:         General: No swelling.      Right lower leg: No edema.      Left lower leg: Left lower leg edema: Compression stocking noted.   Skin:     General: Skin is warm and dry.      Capillary Refill: Capillary refill takes less than 2 seconds.      Findings: Wound present. No ecchymosis or erythema.          Neurological:      General: No focal deficit present.      Mental Status: She is alert and oriented to  person, place, and time.   Psychiatric:         Mood and Affect: Mood normal.         Behavior: Behavior normal.         Thought Content: Thought content normal.         Judgment: Judgment normal.       Assessment:       1. Open wound of left lower extremity, initial encounter    2. Venous insufficiency of both lower extremities    3. Asymptomatic varicose veins of both lower extremities    4. Diastolic dysfunction           Altered Skin Integrity Left anterior;lower Leg (Active)       Altered Skin Integrity Present on Admission:    Side: Left   Orientation: anterior;lower   Location: Leg   Wound Number:    Is this injury device related?:    Primary Wound Type:    Description of Altered Skin Integrity:    Ankle-Brachial Index:    Pulses:    Removal Indication and Assessment:    Wound Outcome:    (Retired) Wound Length (cm):    (Retired) Wound Width (cm):    (Retired) Depth (cm):    Wound Description (Comments):    Removal Indications:    Wound Image   09/21/22 1356   Dressing Appearance Dry;Intact;Clean;Dried drainage 09/21/22 1356   Drainage Amount Scant 09/21/22 1356   Drainage Characteristics/Odor Yellow 09/21/22 1356   Black (%), Wound Tissue Color 100 % 09/21/22 1356   Periwound Area Intact;Pink;Edematous 09/21/22 1356   Wound Length (cm) 2.3 cm 09/21/22 1356   Wound Width (cm) 2.2 cm 09/21/22 1356   Wound Depth (cm) 0 cm 09/21/22 1356   Wound Volume (cm^3) 0 cm^3 09/21/22 1356   Wound Surface Area (cm^2) 5.06 cm^2 09/21/22 1356   Care Cleansed with:;Soap and water;Wound cleanser 09/21/22 1356   Dressing Applied;Island/border;Silicone 09/21/22 1356       Angelina was seen in the clinic room and placed in the supine position on the treatment table.  The dressing was removed with scissors and the leg was cleansed with Easi-clense sponges and dried thoroughly. Placed topical 4% Lidocaine Hydrochloride to wound bed for 15 minutes. Sharp debridement with 5 mm curette to remove necrotic tissue. Pt tolerated procedure  well.     Plan of Care: Mepilex lite to wound bed and covered with a border dressing. Patient declines unna wrap.     Plan:      Left lower extremity wound dressed as detailed above.  Instructed patient to minimize ambulation and elevate legs whenever sedentary. Discussed excessive exercising can cause delayed wound healing.   Patient was instructed to not get the dressings wet and to use cast covers for showering.  Should the dressing become wet, she is to remove it, and place another aquacel border dressing over it. She should then notify this office as soon as possible to have a new dressing applied.      Discussed nutrition and the role of protein in wound healing with the patient. Instructed patient to eat 5 small meals of protein a day. Pt voiced understanding.    Verbal and written instructions given to patient.  RTC in 1 week    Hannah Rivera PA-C

## 2022-09-21 NOTE — PROCEDURES
"Debridement    Date/Time: 9/21/2022 1:30 PM  Performed by: Hannah Rivera PA-C  Authorized by: Hannah Rivera PA-C     Time out: Immediately prior to procedure a "time out" was called to verify the correct patient, procedure, equipment, support staff and site/side marked as required.    Consent Done?:  Yes (Written)  Local anesthesia used?: Yes    Local anesthetic:  Topical anesthetic (Topical 4% Lidocaine Hydrochloride)    Wound Details:    Location:  Left leg    Type of Debridement:  Excisional       Length (cm):  2.3       Area (sq cm):  5.06       Width (cm):  2.2       Percent Debrided (%):  100       Depth (cm):  0       Total Area Debrided (sq cm):  5.06    Depth of debridement:  Epidermis/Dermis    Tissue debrided:  Dermis and Epidermis    Devitalized tissue debrided:  Biofilm, Exudate, Fibrin, Slough and Necrotic/Eschar    Instruments:  Curette    Bleeding:  Minimal  Hemostasis Achieved: Yes    Method Used:  Pressure and Silver Nitrate (1 silver nitrate needed)  Patient tolerance:  Patient tolerated the procedure well with no immediate complications  "

## 2022-09-22 ENCOUNTER — OFFICE VISIT (OUTPATIENT)
Dept: CARDIOLOGY | Facility: CLINIC | Age: 72
End: 2022-09-22
Payer: MEDICARE

## 2022-09-22 VITALS
SYSTOLIC BLOOD PRESSURE: 126 MMHG | DIASTOLIC BLOOD PRESSURE: 68 MMHG | HEIGHT: 64 IN | WEIGHT: 101.44 LBS | BODY MASS INDEX: 17.32 KG/M2 | OXYGEN SATURATION: 97 % | HEART RATE: 75 BPM

## 2022-09-22 DIAGNOSIS — I87.2 VENOUS INSUFFICIENCY OF BOTH LOWER EXTREMITIES: ICD-10-CM

## 2022-09-22 DIAGNOSIS — L97.921 ULCER OF LEFT LOWER EXTREMITY, LIMITED TO BREAKDOWN OF SKIN: ICD-10-CM

## 2022-09-22 DIAGNOSIS — Z92.89 HISTORY OF CARDIOVERSION: ICD-10-CM

## 2022-09-22 DIAGNOSIS — I48.0 PAROXYSMAL ATRIAL FIBRILLATION: ICD-10-CM

## 2022-09-22 DIAGNOSIS — R60.0 EDEMA OF BOTH LEGS: ICD-10-CM

## 2022-09-22 DIAGNOSIS — I48.11 LONGSTANDING PERSISTENT ATRIAL FIBRILLATION: ICD-10-CM

## 2022-09-22 DIAGNOSIS — I83.93 ASYMPTOMATIC VARICOSE VEINS OF BOTH LOWER EXTREMITIES: ICD-10-CM

## 2022-09-22 DIAGNOSIS — I50.32 CHRONIC HEART FAILURE WITH PRESERVED EJECTION FRACTION: ICD-10-CM

## 2022-09-22 DIAGNOSIS — I83.893 VARICOSE VEINS OF BOTH LOWER EXTREMITIES WITH COMPLICATIONS: Primary | ICD-10-CM

## 2022-09-22 DIAGNOSIS — I51.89 DIASTOLIC DYSFUNCTION: ICD-10-CM

## 2022-09-22 PROCEDURE — 3288F PR FALLS RISK ASSESSMENT DOCUMENTED: ICD-10-PCS | Mod: CPTII,S$GLB,, | Performed by: INTERNAL MEDICINE

## 2022-09-22 PROCEDURE — 3288F FALL RISK ASSESSMENT DOCD: CPT | Mod: CPTII,S$GLB,, | Performed by: INTERNAL MEDICINE

## 2022-09-22 PROCEDURE — 99215 OFFICE O/P EST HI 40 MIN: CPT | Mod: S$GLB,,, | Performed by: INTERNAL MEDICINE

## 2022-09-22 PROCEDURE — 99999 PR PBB SHADOW E&M-EST. PATIENT-LVL V: ICD-10-PCS | Mod: PBBFAC,,, | Performed by: INTERNAL MEDICINE

## 2022-09-22 PROCEDURE — 3074F SYST BP LT 130 MM HG: CPT | Mod: CPTII,S$GLB,, | Performed by: INTERNAL MEDICINE

## 2022-09-22 PROCEDURE — 3008F PR BODY MASS INDEX (BMI) DOCUMENTED: ICD-10-PCS | Mod: CPTII,S$GLB,, | Performed by: INTERNAL MEDICINE

## 2022-09-22 PROCEDURE — 99215 PR OFFICE/OUTPT VISIT, EST, LEVL V, 40-54 MIN: ICD-10-PCS | Mod: S$GLB,,, | Performed by: INTERNAL MEDICINE

## 2022-09-22 PROCEDURE — 1101F PR PT FALLS ASSESS DOC 0-1 FALLS W/OUT INJ PAST YR: ICD-10-PCS | Mod: CPTII,S$GLB,, | Performed by: INTERNAL MEDICINE

## 2022-09-22 PROCEDURE — 99999 PR PBB SHADOW E&M-EST. PATIENT-LVL V: CPT | Mod: PBBFAC,,, | Performed by: INTERNAL MEDICINE

## 2022-09-22 PROCEDURE — 3078F PR MOST RECENT DIASTOLIC BLOOD PRESSURE < 80 MM HG: ICD-10-PCS | Mod: CPTII,S$GLB,, | Performed by: INTERNAL MEDICINE

## 2022-09-22 PROCEDURE — 1159F MED LIST DOCD IN RCRD: CPT | Mod: CPTII,S$GLB,, | Performed by: INTERNAL MEDICINE

## 2022-09-22 PROCEDURE — 1159F PR MEDICATION LIST DOCUMENTED IN MEDICAL RECORD: ICD-10-PCS | Mod: CPTII,S$GLB,, | Performed by: INTERNAL MEDICINE

## 2022-09-22 PROCEDURE — 1126F PR PAIN SEVERITY QUANTIFIED, NO PAIN PRESENT: ICD-10-PCS | Mod: CPTII,S$GLB,, | Performed by: INTERNAL MEDICINE

## 2022-09-22 PROCEDURE — 1126F AMNT PAIN NOTED NONE PRSNT: CPT | Mod: CPTII,S$GLB,, | Performed by: INTERNAL MEDICINE

## 2022-09-22 PROCEDURE — 3008F BODY MASS INDEX DOCD: CPT | Mod: CPTII,S$GLB,, | Performed by: INTERNAL MEDICINE

## 2022-09-22 PROCEDURE — 3074F PR MOST RECENT SYSTOLIC BLOOD PRESSURE < 130 MM HG: ICD-10-PCS | Mod: CPTII,S$GLB,, | Performed by: INTERNAL MEDICINE

## 2022-09-22 PROCEDURE — 3078F DIAST BP <80 MM HG: CPT | Mod: CPTII,S$GLB,, | Performed by: INTERNAL MEDICINE

## 2022-09-22 PROCEDURE — 1101F PT FALLS ASSESS-DOCD LE1/YR: CPT | Mod: CPTII,S$GLB,, | Performed by: INTERNAL MEDICINE

## 2022-09-22 PROCEDURE — 1157F PR ADVANCE CARE PLAN OR EQUIV PRESENT IN MEDICAL RECORD: ICD-10-PCS | Mod: CPTII,S$GLB,, | Performed by: INTERNAL MEDICINE

## 2022-09-22 PROCEDURE — 1157F ADVNC CARE PLAN IN RCRD: CPT | Mod: CPTII,S$GLB,, | Performed by: INTERNAL MEDICINE

## 2022-09-22 NOTE — PROGRESS NOTES
[facility]      Chief Complaint   Patient presents with    Establish Care    Follow-up    Atrial Fibrillation    Shortness of Breath       Patient ID: Angelina Man is a 72 y.o. female with varicose veins, hypothyroidism, anxiety, arthritis, and persistent atrial fibrillation s/p [facility] 17-Jul-2020, who presents for a follow up appointment.  Pertinent history/events are as follows:     -Pt kindly referred by Dr. Pereira for evaluation of varicose veins.      -At our initial clinic visit on 6/22/2021, Mrs. Man reported varicose veins with worsening swelling and discomfort.  No claudication or tissue loss.  Exam shows BLE's with prominent varicose veins with venous stasis dermatitis.  Plan:   Varicose veins of both lower extremities- Check BLE venous reflux study and stephanie study.  If no evidence of severe PAD, start graduated compression hose.  Pt to limit sodium intake to 2,000 mg daily.  Elevate legs when resting.    10/7/2021 clinic visit: Mrs. Man reports continued leg worsening swelling and discomfort, despite wearing graduated compression hose for the past 3 months.  She has no claudication or tissue loss.  Exam shows BLE's with prominent varicose veins with venous stasis dermatitis.  BLE Venous Reflux Study on 7/1/2021 revealed hemodynamically significant venous reflux bilaterally with no DVT.  The right and left SSV's are partially compressible with thickened and hyperechoic walls suggestive of previous or chronic superficial venous thrombosis.  Segmental Pressure Study 7/1/2021 revealed normal STEPHANIE at rest and with exercise bilaterally.    Plan:  BLE Venous insufficiency with pain- Mrs. Man reports continued leg worsening swelling and discomfort, despite wearing graduated compression hose for the past 3 months.  She has no claudication or tissue loss.  Exam shows BLE's with prominent varicose veins with venous stasis dermatitis.  BLE Venous Reflux Study on 7/1/2021 revealed hemodynamically  significant venous reflux bilaterally with no DVT.  The right and left SSV's are partially compressible with thickened and hyperechoic walls suggestive of previous or chronic superficial venous thrombosis.  Segmental Pressure Study 7/1/2021 revealed normal STEPHANIE at rest and with exercise bilaterally.  Given continued symptoms despite conservative therapy with graduated compression hose, will refer for EVLT/sclerotherapy.    12/21/2021 clinic visit: Mrs. Man reports she underwent left leg EVLT. She wants to proceed with right EVLT for which she is planned. She has no other complaints.  Plan:   BLE Venous insufficiency with pain- s/p EVLT of the left LSV on 11/11 with follow up US showing resolution of reflux. Limited edema in right foot/ankle. Planned for right leg EVLT. Following with Dr. Hernandez    -On 1/6/2022, pt underwent Endovenous radiofrequency ablation (EVLT) of the right saphenous vein(s) of the lower extremity.    3/22/2022 clinic visit: Mrs. Man reports significant improvement in BLE edema since undergoing EVLT of the left LSV and right GSV.  She has no lower extremity wound/ulcer.  Plan:   BLE Venous insufficiency with pain- Mrs. Man is now s/p EVLT of the left LSV and right GSV with significant improvement in BLE edema.  Follow up RLE venous ultrasound results are pending.  Continue graduated compression hose.  Continue to limit sodium intake to 2,000 mg daily.  Elevate legs when resting.    Persistent atrial fibrillation- Stable.  Continue current medications.  Exercise Induced Pulmonary HTN- Stable.  Continue current medications.    HPI:  Mrs. Man reports hitting her left leg on a pedal at spin class and developing a wound 3 weeks ago.  The wound is being managed by wound care.  She reports no significant LE edema.     Past Medical History:   Diagnosis Date    Arthritis     Atrial fibrillation     Bronchitis     Cataract     Dry eyes     Glaucoma, suspect - Both Eyes     Hypothyroidism  06/23/2016    Pulmonary hypertension     Rash     Squamous cell carcinoma     in-situ right upper inner arm, right wrist    Status post lumbar surgery 06/23/2016    Stress fracture     Thyroid disease      Past Surgical History:   Procedure Laterality Date    ANGIOPLASTY      CERVICAL FUSION      COLONOSCOPY      COLONOSCOPY N/A 11/14/2017    Procedure: COLONOSCOPY;  Surgeon: Kasi Mercado MD;  Location: [facility] ENDO (4TH FLR);  Service: Endoscopy;  Laterality: N/A;    ESOPHAGOGASTRODUODENOSCOPY N/A 10/10/2019    Procedure: EGD (ESOPHAGOGASTRODUODENOSCOPY);  Surgeon: Rg Milton MD;  Location: [facility] ENDO (4TH FLR);  Service: Endoscopy;  Laterality: N/A;    ESOPHAGOGASTRODUODENOSCOPY N/A 10/6/2021    Procedure: EGD (ESOPHAGOGASTRODUODENOSCOPY);  Surgeon: Rg Milton MD;  Location: [facility] ENDO (4TH FLR);  Service: Endoscopy;  Laterality: N/A;  covid test 10/3 [place], [facility] emailed/portal -ml    l4-5 mid discectomy Left 03/2017    l4-5 MIS diskectomy Right 05/2016    RIGHT HEART CATHETERIZATION Right 1/27/2022    Procedure: INSERTION, CATHETER, RIGHT HEART;  Surgeon: Satnam Pickard Jr., MD;  Location: [facility] CATH LAB;  Service: Cardiology;  Laterality: Right;    TREATMENT OF CARDIAC ARRHYTHMIA N/A 7/17/2020    Procedure: CARDIOVERSION;  Surgeon: Kwan Bray MD;  Location: [facility] EP LAB;  Service: Cardiology;  Laterality: N/A;  AF,DCCV/SANGITA, ANES, SK, 746    TREATMENT OF CARDIAC ARRHYTHMIA N/A 7/14/2021    Procedure: CARDIOVERSION;  Surgeon: SYDNIE Cedillo MD;  Location: [facility] EP LAB;  Service: Cardiology;  Laterality: N/A;  AF, SANGITA, DCCV, MAC, EH, 3 Prep     Social History     Socioeconomic History    Marital status: Single   Occupational History     Employer: [email or web address]   Tobacco Use    Smoking status: Never    Smokeless tobacco: Never   Substance and Sexual Activity    Alcohol use: Yes     Alcohol/week: 1.0 standard drink     Types: 1 Glasses of wine per week     Comment: 1 glass on weekends     Drug use: No    Sexual activity: Never   Other Topics Concern    Are you pregnant or think you may be? No    Breast-feeding No     Social Determinants of Health     Financial Resource Strain: Low Risk     Difficulty of Paying Living Expenses: Not hard at all   Food Insecurity: No Food Insecurity    Worried About Running Out of Food in the Last Year: Never true    Ran Out of Food in the Last Year: Never true   Transportation Needs: No Transportation Needs    Lack of Transportation (Medical): No    Lack of Transportation (Non-Medical): No   Physical Activity: Sufficiently Active    Days of Exercise per Week: 7 days    Minutes of Exercise per Session: 80 min   Stress: Stress Concern Present    Feeling of Stress : To some extent   Social Connections: Unknown    Frequency of Communication with Friends and Family: Once a week    Frequency of Social Gatherings with Friends and Family: Patient refused    Active Member of Clubs or Organizations: Yes    Attends Club or Organization Meetings: 1 to 4 times per year    Marital Status:    Housing Stability: Low Risk     Unable to Pay for Housing in the Last Year: No    Number of Places Lived in the Last Year: 1    Unstable Housing in the Last Year: No     Family History   Problem Relation Age of Onset    Cancer Mother         Lung cancer    Heart failure Father     Colon cancer Father     Hypertension Father     Cataracts Father     Cancer Father 80        colon    Diabetes Son     Heart disease Son     Cancer Son         esophageal cancer    No Known Problems Sister     No Known Problems Brother     No Known Problems Maternal Aunt     No Known Problems Maternal Uncle     No Known Problems Paternal Aunt     No Known Problems Paternal Uncle     No Known Problems Maternal Grandmother     No Known Problems Maternal Grandfather     No Known Problems Paternal Grandmother     No Known Problems Paternal Grandfather     Melanoma Daughter     Amblyopia Neg Hx     Blindness Neg Hx      Glaucoma Neg Hx     Macular degeneration Neg Hx     Retinal detachment Neg Hx     Strabismus Neg Hx     Stroke Neg Hx     Thyroid disease Neg Hx     Breast cancer Neg Hx     Ovarian cancer Neg Hx        Review of patient's allergies indicates:  No Known Allergies    Medication List with Changes/Refills   Current Medications    ACETAMINOPHEN (TYLENOL) 500 MG TABLET    Take 500 mg by mouth every 6 (six) hours as needed for Pain.    ACYCLOVIR (ZOVIRAX) 400 MG TABLET    Take 1 tablet (400 mg total) by mouth 2 (two) times daily.    ASCORBIC ACID (VITAMIN C) 1000 MG TABLET    Take 1,000 mg by mouth once daily.     BENZONATATE (TESSALON) 100 MG CAPSULE    Take 1 capsule (100 mg total) by mouth 3 (three) times daily as needed for Cough.    BIOTIN 1 MG TABLET    Take 1 mg by mouth 2 (two) times daily.     BUPROPION (WELLBUTRIN XL) 150 MG TB24 TABLET    Take 1 tablet (150 mg total) by mouth once daily.    CALCIUM-VITAMIN D 500 MG(1,250MG) -200 UNIT PER TABLET    Take 1 tablet by mouth 2 (two) times daily with meals.     CLONAZEPAM (KLONOPIN) 1 MG TABLET    TAKE 1& 1/2 TABLET BY MOUTH NIGHTLY AS NEEDED    CYCLOSPORINE (RESTASIS) 0.05 % OPHTHALMIC EMULSION    Place 1 drop into both eyes 2 (two) times daily.    DIGESTIVE ENZYMES TAB    Take 1 tablet by mouth once daily.     DIPHENHYDRAMINE (BENADRYL) 25 MG CAPSULE    Take 25 mg by mouth nightly as needed.     ELIQUIS 5 MG TAB    Take 1 tablet (5 mg total) by mouth 2 (two) times daily.    FLUTICASONE PROPIONATE (FLONASE) 50 MCG/ACTUATION NASAL SPRAY    1 spray (50 mcg total) by Each Nostril route once daily.    FUROSEMIDE (LASIX) 20 MG TABLET    Take 1 tablet (20 mg total) by mouth every other day.    IPRATROPIUM (ATROVENT) 42 MCG (0.06 %) NASAL SPRAY    2 sprays by Nasal route 3 (three) times daily.    LEVOTHYROXINE (SYNTHROID) 137 MCG TAB TABLET    Take 1 tablet (137 mcg total) by mouth before breakfast.    LUBIPROSTONE (AMITIZA) 24 MCG CAP    Take 1 capsule (24 mcg total)  "by mouth daily as needed.    METOPROLOL SUCCINATE (TOPROL-XL) 25 MG 24 HR TABLET    Take 1 tablet (25 mg total) by mouth once daily.    MONTELUKAST (SINGULAIR) 10 MG TABLET    Take 1 tablet (10 mg total) by mouth once daily.    MULTIVITAMIN (THERAGRAN) PER TABLET    Take 1 tablet by mouth once daily.     MUPIROCIN (BACTROBAN) 2 % OINTMENT    Apply topically 3 (three) times daily.    TRETINOIN (RETIN-A) 0.1 % CREAM    Apply topically every evening.    ZINC 50 MG TAB    Take 50 mg by mouth once daily.        Review of Systems  Constitution: Denies chills, fever, and sweats.  HENT: Denies headaches or blurry vision.  Cardiovascular: Denies chest pain or irregular heart beat.  Respiratory: Denies cough or shortness of breath.  Gastrointestinal: Denies abdominal pain, nausea, or vomiting.  Musculoskeletal: Positive or varicose veins with discomfort and swelling.  Neurological: Denies dizziness or focal weakness.  Psychiatric/Behavioral: Normal mental status.  Hematologic/Lymphatic: Denies bleeding problem or easy bruising/bleeding.  Skin: Denies rash or suspicious lesions    Physical Examination  /68 (BP Location: Left arm, Patient Position: Sitting, BP Method: Medium (Automatic))   Pulse 75   Ht 5' 4" (1.626 m)   Wt 46 kg (101 lb 6.6 oz)   LMP  (LMP Unknown)   SpO2 97%   BMI 17.41 kg/m²     Constitutional: No acute distress, conversant  HEENT: Sclera anicteric, Pupils equal, round and reactive to light, extraocular motions intact, Oropharynx clear  Neck: No JVD, no carotid bruits  Cardiovascular: regular rate and rhythm, no murmur, rubs or gallops, normal S1/S2  Pulmonary: Clear to auscultation bilaterally  Abdominal: Abdomen soft, nontender, nondistended, positive bowel sounds  Extremities: BLE's with prominent varicose veins with venous stasis dermatitis.     Pulses:  Carotid pulses are 2+ on the right side, and 2+ on the left side.  Radial pulses are 2+ on the right side, and 2+ on the left side. "   Femoral pulses are 2+ on the right side, and 2+ on the left side.  Popliteal pulses are 2+ on the right side, and 2+ on the left side.   Dorsalis pedis pulses are 2+ on the right side, and 2+ on the left side.   Posterior tibial pulses are 2+ on the right side, and 2+ on the left side.    Skin: No ecchymosis, erythema, or ulcers  Psych: Alert and oriented x 3, appropriate affect  Neuro: CNII-XII intact, no focal deficits    Labs:  Most Recent Data  CBC:   Lab Results   Component Value Date    WBC 5.30 04/13/2022    HGB 14.1 04/13/2022    HCT 42.7 04/13/2022     04/13/2022    MCV 99 (H) 04/13/2022    RDW 12.9 04/13/2022     BMP:   Lab Results   Component Value Date     08/25/2022    K 3.9 08/30/2022     08/25/2022    CO2 30 (H) 08/25/2022    BUN 12 08/25/2022    CREATININE 0.8 08/25/2022    GLU 93 08/25/2022    CALCIUM 9.6 08/25/2022    MG 1.6 12/28/2021     LFTS;   Lab Results   Component Value Date    PROT 6.9 04/13/2022    ALBUMIN 3.7 04/13/2022    BILITOT 0.3 04/13/2022    AST 28 04/13/2022    ALKPHOS 84 04/13/2022    ALT 29 04/13/2022    GGT 52 04/13/2022     COAGS:   Lab Results   Component Value Date    INR 1.1 07/07/2021     FLP:   Lab Results   Component Value Date    CHOL 212 (H) 04/13/2022    HDL 64 04/13/2022    LDLCALC 111.4 04/13/2022    TRIG 183 (H) 04/13/2022    CHOLHDL 30.2 04/13/2022     CARDIAC:   Lab Results   Component Value Date    TROPONINI <0.006 07/16/2020     (H) 12/28/2021     Imaging    BLE Venous Reflux Study 3/22/2022:  The right lesser saphenous vein is partially compressible consistent with SVT.  The contralateral (left) common femoral vein is patent. .  The right greater saphenous vein is occluded consistent with successful prior EVLT.    BLE Venous reflux study 12/14/2021:  There is no evidence of a left lower extremity DVT.  Left GSV occluded post EVLT.    Segmental Pressure Study 7/1/2021:  Normal STEPHANIE bilaterally with unremarkable waveforms at rest.     Normal STEPHANIE with exercise.  The right TBI is 0.51 and the left TBI is 0.40, which is mild to moderately abnormal.    BLE Venous Reflux Study 7/1/2021:  No evidence of lower extremity deep venous thrombosis bilateral.  There is reflux in the right common femoral vein and left external iliac vein.  The right GSV below the level of the knee is partially compressible with thickened and hyperechoic walls suggestive of previous or chronic superficial venous thrombosis.    There is also evidence of superficial venous insufficiency of the right GSV below the knee without dilation of the vessel.  The right SSV is partially compressible with thickened and hyperechoic walls suggestive of previous or chronic superficial venous thrombosis.  The left SSV is partially compressible with thickened and hyperechoic walls suggestive of previous or chronic superficial venous thrombosis.    Assessment/Plan:  Angelina Man is a 72 y.o. female with BLE venous insufficiency, hypothyroidism, anxiety, arthritis, and persistent atrial fibrillation s/p [facility] 17-Jul-2020, who presents for a follow up appointment.     1. BLE Venous insufficiency with pain- Mrs. Man is now s/p EVLT of the left LSV and right GSV with significant improvement in BLE edema.  Follow up RLE venous ultrasound results as above.  Continue graduated compression hose.  Continue to limit sodium intake to 2,000 mg daily.  Elevate legs when resting.      2. Left leg wound- Appears to be healing appropriately.  Continue wound care.      3. Persistent atrial fibrillation- Stable.  Continue current medications.    4. Exercise Induced Pulmonary HTN- Stable.  Continue current medications.    Follow up in 4 months     Total duration of face to face visit time 30 minutes.  Total time spent counseling greater than fifty percent of total visit time.  Counseling included discussion regarding imaging findings, diagnosis, possibilities, treatment options, risks and benefits.  The  patient had many questions regarding the options and long-term effects.    Jeanmarie Cedeño MD, PhD  Interventional Cardiiology

## 2022-09-22 NOTE — PATIENT INSTRUCTIONS
Assessment/Plan:  Angelina Man is a 72 y.o. female with BLE venous insufficiency, hypothyroidism, anxiety, arthritis, and persistent [facility] who presents for a follow up appointment.     1. BLE Venous insufficiency with pain- Mrs. Man is now s/p EVLT of the left LSV and right GSV with significant improvement in BLE edema.  Follow up RLE venous ultrasound results as above.  Continue graduated compression hose.  Continue to limit sodium intake to 2,000 mg daily.  Elevate legs when resting.      2. Left leg wound- Appears to be healing appropriately.  Continue wound care.      3. Persistent atrial fibrillation- Stable.  Continue current medications.    4. Exercise Induced Pulmonary HTN- Stable.  Continue current medications.    Follow up in 4 months

## 2022-09-26 ENCOUNTER — IMMUNIZATION (OUTPATIENT)
Dept: INTERNAL MEDICINE | Facility: CLINIC | Age: 72
End: 2022-09-26
Payer: MEDICARE

## 2022-09-26 ENCOUNTER — PATIENT MESSAGE (OUTPATIENT)
Dept: NEUROSURGERY | Facility: CLINIC | Age: 72
End: 2022-09-26
Payer: MEDICARE

## 2022-09-26 DIAGNOSIS — Z23 NEED FOR VACCINATION: Primary | ICD-10-CM

## 2022-09-26 PROCEDURE — 0124A COVID-19, MRNA, LNP-S, BIVALENT BOOSTER, PF, 30 MCG/0.3 ML DOSE: CPT | Mod: CV19,PBBFAC | Performed by: INTERNAL MEDICINE

## 2022-09-26 PROCEDURE — 91312 COVID-19, MRNA, LNP-S, BIVALENT BOOSTER, PF, 30 MCG/0.3 ML DOSE: CPT | Mod: S$GLB,,, | Performed by: INTERNAL MEDICINE

## 2022-09-26 PROCEDURE — 91312 COVID-19, MRNA, LNP-S, BIVALENT BOOSTER, PF, 30 MCG/0.3 ML DOSE: ICD-10-PCS | Mod: S$GLB,,, | Performed by: INTERNAL MEDICINE

## 2022-09-29 ENCOUNTER — OFFICE VISIT (OUTPATIENT)
Dept: WOUND CARE | Facility: CLINIC | Age: 72
End: 2022-09-29
Payer: MEDICARE

## 2022-09-29 VITALS
BODY MASS INDEX: 17.28 KG/M2 | HEIGHT: 64 IN | TEMPERATURE: 98 F | SYSTOLIC BLOOD PRESSURE: 118 MMHG | WEIGHT: 101.19 LBS | DIASTOLIC BLOOD PRESSURE: 79 MMHG | HEART RATE: 75 BPM

## 2022-09-29 DIAGNOSIS — I87.2 VENOUS INSUFFICIENCY OF BOTH LOWER EXTREMITIES: ICD-10-CM

## 2022-09-29 DIAGNOSIS — I83.93 ASYMPTOMATIC VARICOSE VEINS OF BOTH LOWER EXTREMITIES: ICD-10-CM

## 2022-09-29 DIAGNOSIS — S81.802A OPEN WOUND OF LEFT LOWER EXTREMITY, INITIAL ENCOUNTER: Primary | ICD-10-CM

## 2022-09-29 DIAGNOSIS — I51.89 DIASTOLIC DYSFUNCTION: ICD-10-CM

## 2022-09-29 PROCEDURE — 1157F ADVNC CARE PLAN IN RCRD: CPT | Mod: CPTII,S$GLB,,

## 2022-09-29 PROCEDURE — 99999 PR PBB SHADOW E&M-EST. PATIENT-LVL IV: ICD-10-PCS | Mod: PBBFAC,,,

## 2022-09-29 PROCEDURE — 1157F PR ADVANCE CARE PLAN OR EQUIV PRESENT IN MEDICAL RECORD: ICD-10-PCS | Mod: CPTII,S$GLB,,

## 2022-09-29 PROCEDURE — 99212 OFFICE O/P EST SF 10 MIN: CPT | Mod: 25,S$GLB,,

## 2022-09-29 PROCEDURE — 3074F SYST BP LT 130 MM HG: CPT | Mod: CPTII,S$GLB,,

## 2022-09-29 PROCEDURE — 1159F MED LIST DOCD IN RCRD: CPT | Mod: CPTII,S$GLB,,

## 2022-09-29 PROCEDURE — 1126F PR PAIN SEVERITY QUANTIFIED, NO PAIN PRESENT: ICD-10-PCS | Mod: CPTII,S$GLB,,

## 2022-09-29 PROCEDURE — 1101F PR PT FALLS ASSESS DOC 0-1 FALLS W/OUT INJ PAST YR: ICD-10-PCS | Mod: CPTII,S$GLB,,

## 2022-09-29 PROCEDURE — 97597 DBRDMT OPN WND 1ST 20 CM/<: CPT | Mod: S$GLB,,,

## 2022-09-29 PROCEDURE — 99999 PR PBB SHADOW E&M-EST. PATIENT-LVL IV: CPT | Mod: PBBFAC,,,

## 2022-09-29 PROCEDURE — 3008F PR BODY MASS INDEX (BMI) DOCUMENTED: ICD-10-PCS | Mod: CPTII,S$GLB,,

## 2022-09-29 PROCEDURE — 3008F BODY MASS INDEX DOCD: CPT | Mod: CPTII,S$GLB,,

## 2022-09-29 PROCEDURE — 3074F PR MOST RECENT SYSTOLIC BLOOD PRESSURE < 130 MM HG: ICD-10-PCS | Mod: CPTII,S$GLB,,

## 2022-09-29 PROCEDURE — 3078F DIAST BP <80 MM HG: CPT | Mod: CPTII,S$GLB,,

## 2022-09-29 PROCEDURE — 1159F PR MEDICATION LIST DOCUMENTED IN MEDICAL RECORD: ICD-10-PCS | Mod: CPTII,S$GLB,,

## 2022-09-29 PROCEDURE — 1101F PT FALLS ASSESS-DOCD LE1/YR: CPT | Mod: CPTII,S$GLB,,

## 2022-09-29 PROCEDURE — 3288F PR FALLS RISK ASSESSMENT DOCUMENTED: ICD-10-PCS | Mod: CPTII,S$GLB,,

## 2022-09-29 PROCEDURE — 1126F AMNT PAIN NOTED NONE PRSNT: CPT | Mod: CPTII,S$GLB,,

## 2022-09-29 PROCEDURE — 97597 DEBRIDEMENT: ICD-10-PCS | Mod: S$GLB,,,

## 2022-09-29 PROCEDURE — 99212 PR OFFICE/OUTPT VISIT, EST, LEVL II, 10-19 MIN: ICD-10-PCS | Mod: 25,S$GLB,,

## 2022-09-29 PROCEDURE — 3078F PR MOST RECENT DIASTOLIC BLOOD PRESSURE < 80 MM HG: ICD-10-PCS | Mod: CPTII,S$GLB,,

## 2022-09-29 PROCEDURE — 3288F FALL RISK ASSESSMENT DOCD: CPT | Mod: CPTII,S$GLB,,

## 2022-09-29 NOTE — PROGRESS NOTES
Subjective:       Patient ID: Angelina Man is a 72 y.o. female with PMHx of DDD, s/p lumbar fusion, anxiety, pulmonary hypertension, atrial fibrillation, varicose veins of BLE, hx of cardioversion, diastolic dysfunction, HFpEF, renal cyst, renal angiomyolipoma, hypothyroidism, GERD, dysphagia, DJD, spondylolisthesis at L4-L5, and bilateral leg edema    Chief Complaint: Wound Check    Patient present for a reevaluation of a left lower extremity wound. Patient reports exercising multiple times a day. Pt reports a new left lower extremity wound. She states she scratched her leg accidentally while unloading her groceries right before this appointment. Patient follows with Dr. Cedeño for venous insufficiency. Denies fever, chills, erythema, warmth, drainage, or pain.      Review of Systems   Constitutional:  Negative for activity change, chills, diaphoresis, fatigue and fever.   Respiratory:  Negative for apnea, chest tightness and shortness of breath.    Cardiovascular:  Positive for leg swelling. Negative for chest pain and palpitations.   Musculoskeletal:  Negative for gait problem and joint swelling.   Skin:  Positive for wound. Negative for pallor and rash.   Neurological:  Negative for syncope, weakness and numbness.   Psychiatric/Behavioral:  Negative for agitation. The patient is not nervous/anxious.    All other systems reviewed and are negative.    Objective:      Physical Exam  Vitals reviewed.   Constitutional:       General: She is not in acute distress.     Appearance: Normal appearance.   Cardiovascular:      Rate and Rhythm: Normal rate and regular rhythm.      Pulses: Normal pulses.   Pulmonary:      Effort: No respiratory distress.   Musculoskeletal:         General: No swelling.      Right lower leg: No edema.      Left lower leg: Left lower leg edema: Compression stocking noted.   Skin:     General: Skin is warm and dry.      Capillary Refill: Capillary refill takes less than 2 seconds.       Findings: Wound present. No ecchymosis or erythema.          Neurological:      General: No focal deficit present.      Mental Status: She is alert and oriented to person, place, and time.   Psychiatric:         Mood and Affect: Mood normal.         Behavior: Behavior normal.         Thought Content: Thought content normal.         Judgment: Judgment normal.       Assessment:       1. Open wound of left lower extremity, initial encounter    2. Venous insufficiency of both lower extremities    3. Diastolic dysfunction    4. Asymptomatic varicose veins of both lower extremities           Altered Skin Integrity Left anterior;lower Leg (Active)       Altered Skin Integrity Present on Admission:    Side: Left   Orientation: anterior;lower   Location: Leg   Wound Number:    Is this injury device related?:    Primary Wound Type:    Description of Altered Skin Integrity:    Ankle-Brachial Index:    Pulses:    Removal Indication and Assessment:    Wound Outcome:    (Retired) Wound Length (cm):    (Retired) Wound Width (cm):    (Retired) Depth (cm):    Wound Description (Comments):    Removal Indications:    Wound Image    09/29/22 1506   Dressing Appearance Dry;Intact;Clean;Dried drainage 09/29/22 1506   Drainage Amount Scant 09/29/22 1506   Drainage Characteristics/Odor Tan;Bleeding controlled 09/29/22 1506   Black (%), Wound Tissue Color 80 % 09/29/22 1506   Red (%), Wound Tissue Color 20 % 09/29/22 1506   Periwound Area Intact;Edematous;[place] 09/29/22 1506   Wound Length (cm) 1.8 cm 09/29/22 1506   Wound Width (cm) 1.9 cm 09/29/22 1506   Wound Depth (cm) 0 cm 09/29/22 1506   Wound Volume (cm^3) 0 cm^3 09/29/22 1506   Wound Surface Area (cm^2) 3.42 cm^2 09/29/22 1506   Care Cleansed with:;Soap and water;Wound cleanser 09/29/22 1506   Dressing Applied;Island/border;Silicone 09/29/22 1506            Altered Skin Integrity Left anterior;lower;lateral Leg (Active)       Altered Skin Integrity Present on Admission:    Side: Left    Orientation: anterior;lower;lateral   Location: Leg   Wound Number:    Is this injury device related?:    Primary Wound Type:    Description of Altered Skin Integrity:    Ankle-Brachial Index:    Pulses:    Removal Indication and Assessment:    Wound Outcome:    (Retired) Wound Length (cm):    (Retired) Wound Width (cm):    (Retired) Depth (cm):    Wound Description (Comments):    Removal Indications:    Dressing Appearance Open to air 09/29/22 1506   Drainage Amount None 09/29/22 1506   Red (%), Wound Tissue Color 100 % 09/29/22 1506   Periwound Area Intact 09/29/22 1506   Wound Length (cm) 0.1 cm 09/29/22 1506   Wound Width (cm) 1.5 cm 09/29/22 1506   Wound Depth (cm) 0 cm 09/29/22 1506   Wound Volume (cm^3) 0 cm^3 09/29/22 1506   Wound Surface Area (cm^2) 0.15 cm^2 09/29/22 1506   Care Soap and water;Cleansed with:;Wound cleanser 09/29/22 1506   Dressing Applied;Island/border;Silicone 09/29/22 1506       Angelina was seen in the clinic room and placed in the supine position on the treatment table.  The dressing was removed and the leg was cleansed with Easi-clense sponges and dried thoroughly. Placed topical 4% Lidocaine Hydrochloride to wound bed for 15 minutes. Sharp debridement with 5 mm curette to remove necrotic tissue. Pt tolerated procedure well. New left lateral lower extremity wound noted.     Plan of Care: Mepilex border dressing to both wound beds. Patient declines unna wrap.     Plan:      Left lower extremity wound dressed as detailed above.  Instructed patient to minimize ambulation and elevate legs whenever sedentary. Discussed excessive exercising can cause delayed wound healing.   Patient was instructed to not get the dressings wet and to use cast covers for showering.  Should the dressing become wet, she is to remove it, and place another aquacel border dressing over it. She should then notify this office as soon as possible to have a new dressing applied.      Discussed nutrition and the role  of protein in wound healing with the patient. Instructed patient to eat 5 small meals of protein a day. Pt voiced understanding.    Verbal and written instructions given to patient.  RTC in 1 week    Hannah Rivera PA-C

## 2022-09-29 NOTE — PROCEDURES
"Debridement    Date/Time: 9/29/2022 2:30 PM  Performed by: Hannah Rivera PA-C  Authorized by: Hannah Rivera PA-C     Time out: Immediately prior to procedure a "time out" was called to verify the correct patient, procedure, equipment, support staff and site/side marked as required.    Consent Done?:  Yes (Written)  Local anesthesia used?: Yes    Local anesthetic:  Topical anesthetic (Topical 4% Lidocaine Hydrochloride)    Wound Details:    Location:  Left leg    Type of Debridement:  Excisional       Length (cm):  1.8       Area (sq cm):  3.42       Width (cm):  1.9       Percent Debrided (%):  100       Depth (cm):  0       Total Area Debrided (sq cm):  3.42    Depth of debridement:  Epidermis/Dermis    Tissue debrided:  Dermis and Epidermis    Devitalized tissue debrided:  Biofilm, Exudate, Fibrin, Slough and Necrotic/Eschar    Instruments:  Curette    Bleeding:  Minimal  Hemostasis Achieved: Yes    Method Used:  Pressure  Patient tolerance:  Patient tolerated the procedure well with no immediate complications  "

## 2022-10-04 ENCOUNTER — PATIENT MESSAGE (OUTPATIENT)
Dept: OPTOMETRY | Facility: CLINIC | Age: 72
End: 2022-10-04

## 2022-10-04 ENCOUNTER — OFFICE VISIT (OUTPATIENT)
Dept: OPTOMETRY | Facility: CLINIC | Age: 72
End: 2022-10-04
Payer: MEDICARE

## 2022-10-04 DIAGNOSIS — H04.123 BILATERAL DRY EYES: ICD-10-CM

## 2022-10-04 DIAGNOSIS — H25.13 NUCLEAR SCLEROSIS OF BOTH EYES: Primary | ICD-10-CM

## 2022-10-04 DIAGNOSIS — H40.013 OPEN ANGLE WITH BORDERLINE FINDINGS AND LOW GLAUCOMA RISK IN BOTH EYES: ICD-10-CM

## 2022-10-04 PROCEDURE — 1159F PR MEDICATION LIST DOCUMENTED IN MEDICAL RECORD: ICD-10-PCS | Mod: CPTII,S$GLB,, | Performed by: OPTOMETRIST

## 2022-10-04 PROCEDURE — 1126F PR PAIN SEVERITY QUANTIFIED, NO PAIN PRESENT: ICD-10-PCS | Mod: CPTII,S$GLB,, | Performed by: OPTOMETRIST

## 2022-10-04 PROCEDURE — 1101F PR PT FALLS ASSESS DOC 0-1 FALLS W/OUT INJ PAST YR: ICD-10-PCS | Mod: CPTII,S$GLB,, | Performed by: OPTOMETRIST

## 2022-10-04 PROCEDURE — 1160F PR REVIEW ALL MEDS BY PRESCRIBER/CLIN PHARMACIST DOCUMENTED: ICD-10-PCS | Mod: CPTII,S$GLB,, | Performed by: OPTOMETRIST

## 2022-10-04 PROCEDURE — 99999 PR PBB SHADOW E&M-EST. PATIENT-LVL IV: CPT | Mod: PBBFAC,,, | Performed by: OPTOMETRIST

## 2022-10-04 PROCEDURE — 92012 INTRM OPH EXAM EST PATIENT: CPT | Mod: S$GLB,,, | Performed by: OPTOMETRIST

## 2022-10-04 PROCEDURE — 1126F AMNT PAIN NOTED NONE PRSNT: CPT | Mod: CPTII,S$GLB,, | Performed by: OPTOMETRIST

## 2022-10-04 PROCEDURE — 3288F FALL RISK ASSESSMENT DOCD: CPT | Mod: CPTII,S$GLB,, | Performed by: OPTOMETRIST

## 2022-10-04 PROCEDURE — 1157F PR ADVANCE CARE PLAN OR EQUIV PRESENT IN MEDICAL RECORD: ICD-10-PCS | Mod: CPTII,S$GLB,, | Performed by: OPTOMETRIST

## 2022-10-04 PROCEDURE — 1101F PT FALLS ASSESS-DOCD LE1/YR: CPT | Mod: CPTII,S$GLB,, | Performed by: OPTOMETRIST

## 2022-10-04 PROCEDURE — 92012 PR EYE EXAM, EST PATIENT,INTERMED: ICD-10-PCS | Mod: S$GLB,,, | Performed by: OPTOMETRIST

## 2022-10-04 PROCEDURE — 99999 PR PBB SHADOW E&M-EST. PATIENT-LVL IV: ICD-10-PCS | Mod: PBBFAC,,, | Performed by: OPTOMETRIST

## 2022-10-04 PROCEDURE — 3288F PR FALLS RISK ASSESSMENT DOCUMENTED: ICD-10-PCS | Mod: CPTII,S$GLB,, | Performed by: OPTOMETRIST

## 2022-10-04 PROCEDURE — 1159F MED LIST DOCD IN RCRD: CPT | Mod: CPTII,S$GLB,, | Performed by: OPTOMETRIST

## 2022-10-04 PROCEDURE — 1160F RVW MEDS BY RX/DR IN RCRD: CPT | Mod: CPTII,S$GLB,, | Performed by: OPTOMETRIST

## 2022-10-04 PROCEDURE — 1157F ADVNC CARE PLAN IN RCRD: CPT | Mod: CPTII,S$GLB,, | Performed by: OPTOMETRIST

## 2022-10-04 NOTE — PROGRESS NOTES
HPI    71 Y/o female is here for routine eye exam with C/o pt states she needs a   referral to see Ophthalmologist for Cataract Sx   Pt states she has been having trouble driving at night states the in   coming lights are blinding   Pt states she is having trouble seeing with Rx lens on also   Pt denies pain and discomfort   No f/f    Eye med: Restatsis OU TID   Systane comfort ointment OU QHS   Last edited by Rashad Garcia MA on 10/4/2022  8:36 AM.            Assessment /Plan     For exam results, see Encounter Report.    Nuclear sclerosis of both eyes  -     Ambulatory referral/consult to Ophthalmology; Future; Expected date: 10/11/2022    Bilateral dry eyes  -     Ambulatory referral/consult to Ophthalmology; Future; Expected date: 10/11/2022    Open angle with borderline findings and low glaucoma risk in both eyes  -     Ambulatory referral/consult to Ophthalmology; Future; Expected date: 10/11/2022      Refer to Dr. Brown for cataract evaluation and possible removal.    2.  Recommend artificial tears. 1 drop 2x per day. Chronicity of disease and treatment discussed.   3. Low risk based on CD, IOP low, RTC with ophth for eval, overdue for OCT of rnfl and HVF(24-2)

## 2022-10-06 ENCOUNTER — OFFICE VISIT (OUTPATIENT)
Dept: WOUND CARE | Facility: CLINIC | Age: 72
End: 2022-10-06
Payer: MEDICARE

## 2022-10-06 VITALS
BODY MASS INDEX: 17.35 KG/M2 | DIASTOLIC BLOOD PRESSURE: 78 MMHG | HEART RATE: 83 BPM | SYSTOLIC BLOOD PRESSURE: 125 MMHG | WEIGHT: 101.63 LBS | HEIGHT: 64 IN | TEMPERATURE: 98 F

## 2022-10-06 DIAGNOSIS — I87.2 VENOUS INSUFFICIENCY OF BOTH LOWER EXTREMITIES: ICD-10-CM

## 2022-10-06 DIAGNOSIS — I51.89 DIASTOLIC DYSFUNCTION: ICD-10-CM

## 2022-10-06 DIAGNOSIS — S81.802A OPEN WOUND OF LEFT LOWER EXTREMITY, INITIAL ENCOUNTER: Primary | ICD-10-CM

## 2022-10-06 DIAGNOSIS — I83.93 ASYMPTOMATIC VARICOSE VEINS OF BOTH LOWER EXTREMITIES: ICD-10-CM

## 2022-10-06 PROCEDURE — 99999 PR PBB SHADOW E&M-EST. PATIENT-LVL IV: ICD-10-PCS | Mod: PBBFAC,,,

## 2022-10-06 PROCEDURE — 3078F DIAST BP <80 MM HG: CPT | Mod: CPTII,S$GLB,,

## 2022-10-06 PROCEDURE — 1159F PR MEDICATION LIST DOCUMENTED IN MEDICAL RECORD: ICD-10-PCS | Mod: CPTII,S$GLB,,

## 2022-10-06 PROCEDURE — 3078F PR MOST RECENT DIASTOLIC BLOOD PRESSURE < 80 MM HG: ICD-10-PCS | Mod: CPTII,S$GLB,,

## 2022-10-06 PROCEDURE — 1160F PR REVIEW ALL MEDS BY PRESCRIBER/CLIN PHARMACIST DOCUMENTED: ICD-10-PCS | Mod: CPTII,S$GLB,,

## 2022-10-06 PROCEDURE — 1126F AMNT PAIN NOTED NONE PRSNT: CPT | Mod: CPTII,S$GLB,,

## 2022-10-06 PROCEDURE — 1160F RVW MEDS BY RX/DR IN RCRD: CPT | Mod: CPTII,S$GLB,,

## 2022-10-06 PROCEDURE — 3074F PR MOST RECENT SYSTOLIC BLOOD PRESSURE < 130 MM HG: ICD-10-PCS | Mod: CPTII,S$GLB,,

## 2022-10-06 PROCEDURE — 1157F PR ADVANCE CARE PLAN OR EQUIV PRESENT IN MEDICAL RECORD: ICD-10-PCS | Mod: CPTII,S$GLB,,

## 2022-10-06 PROCEDURE — 1157F ADVNC CARE PLAN IN RCRD: CPT | Mod: CPTII,S$GLB,,

## 2022-10-06 PROCEDURE — 3074F SYST BP LT 130 MM HG: CPT | Mod: CPTII,S$GLB,,

## 2022-10-06 PROCEDURE — 1126F PR PAIN SEVERITY QUANTIFIED, NO PAIN PRESENT: ICD-10-PCS | Mod: CPTII,S$GLB,,

## 2022-10-06 PROCEDURE — 99999 PR PBB SHADOW E&M-EST. PATIENT-LVL IV: CPT | Mod: PBBFAC,,,

## 2022-10-06 PROCEDURE — 99213 PR OFFICE/OUTPT VISIT, EST, LEVL III, 20-29 MIN: ICD-10-PCS | Mod: 25,S$GLB,,

## 2022-10-06 PROCEDURE — 99213 OFFICE O/P EST LOW 20 MIN: CPT | Mod: 25,S$GLB,,

## 2022-10-06 PROCEDURE — 3008F BODY MASS INDEX DOCD: CPT | Mod: CPTII,S$GLB,,

## 2022-10-06 PROCEDURE — 3008F PR BODY MASS INDEX (BMI) DOCUMENTED: ICD-10-PCS | Mod: CPTII,S$GLB,,

## 2022-10-06 PROCEDURE — 1159F MED LIST DOCD IN RCRD: CPT | Mod: CPTII,S$GLB,,

## 2022-10-06 NOTE — PROGRESS NOTES
Subjective:       Patient ID: Angelina Man is a 72 y.o. female with PMHx of DDD, s/p lumbar fusion, anxiety, pulmonary hypertension, atrial fibrillation, varicose veins of BLE, hx of cardioversion, diastolic dysfunction, HFpEF, renal cyst, renal angiomyolipoma, hypothyroidism, GERD, dysphagia, DJD, spondylolisthesis at L4-L5, and bilateral leg edema    Chief Complaint: Wound Check    Patient present for a reevaluation of a left lower extremity wound. She reports exercising multiple times a day.  Patient follows with Dr. Cedeño for venous insufficiency. No complaints at this time. Denies fever, chills, erythema, warmth, drainage, or pain.      Review of Systems   Constitutional:  Negative for activity change, chills, diaphoresis, fatigue and fever.   Respiratory:  Negative for apnea, chest tightness and shortness of breath.    Cardiovascular:  Positive for leg swelling. Negative for chest pain and palpitations.   Musculoskeletal:  Negative for gait problem and joint swelling.   Skin:  Positive for wound. Negative for pallor and rash.   Neurological:  Negative for syncope, weakness and numbness.   Psychiatric/Behavioral:  Negative for agitation. The patient is not nervous/anxious.    All other systems reviewed and are negative.    Objective:      Physical Exam  Vitals reviewed.   Constitutional:       General: She is not in acute distress.     Appearance: Normal appearance.   Cardiovascular:      Rate and Rhythm: Normal rate and regular rhythm.      Pulses: Normal pulses.   Pulmonary:      Effort: No respiratory distress.   Musculoskeletal:         General: No swelling.      Right lower leg: No edema.      Left lower leg: Left lower leg edema: Compression stocking noted.   Skin:     General: Skin is warm and dry.      Capillary Refill: Capillary refill takes less than 2 seconds.      Findings: Wound present. No ecchymosis or erythema.          Neurological:      General: No focal deficit present.      Mental  Status: She is alert and oriented to person, place, and time.   Psychiatric:         Mood and Affect: Mood normal.         Behavior: Behavior normal.         Thought Content: Thought content normal.         Judgment: Judgment normal.       Assessment:       1. Open wound of left lower extremity, initial encounter    2. Venous insufficiency of both lower extremities    3. Asymptomatic varicose veins of both lower extremities    4. Diastolic dysfunction           Altered Skin Integrity Left anterior;lower Leg (Active)       Altered Skin Integrity Present on Admission:    Side: Left   Orientation: anterior;lower   Location: Leg   Wound Number:    Is this injury device related?:    Primary Wound Type:    Description of Altered Skin Integrity:    Ankle-Brachial Index:    Pulses:    Removal Indication and Assessment:    Wound Outcome:    (Retired) Wound Length (cm):    (Retired) Wound Width (cm):    (Retired) Depth (cm):    Wound Description (Comments):    Removal Indications:    Wound Image   10/06/22 1441   Dressing Appearance Dry;Intact;Clean 10/06/22 1441   Drainage Amount None 10/06/22 1441   Black (%), Wound Tissue Color 100 % 10/06/22 1441   Periwound Area Intact;Pink;Edematous 10/06/22 1441   Wound Length (cm) 1.5 cm 10/06/22 1441   Wound Width (cm) 1.5 cm 10/06/22 1441   Wound Depth (cm) 0 cm 10/06/22 1441   Wound Volume (cm^3) 0 cm^3 10/06/22 1441   Wound Surface Area (cm^2) 2.25 cm^2 10/06/22 1441   Care Cleansed with:;Soap and water 10/06/22 1441   Dressing Applied;Island/border;Silicone;Other (comment) 10/06/22 1441       Angelina was seen in the clinic room and placed in the supine position on the treatment table.  The dressing was removed and the leg was cleansed with Easi-clense sponges and dried thoroughly.  Left lateral lower extremity wound noted to be healed.     Plan of Care: Betadine to wound bed and covered with a mepilex border dressing. Patient declines unna wrap.     Plan:      Left lower extremity  wound dressed as detailed above.  Instructed patient to elevate legs whenever sedentary.   Patient was instructed to not get the dressings wet and to use cast covers for showering.  Should the dressing become wet, she is to remove it, and place another aquacel border dressing over it. She should then notify this office as soon as possible to have a new dressing applied.      Discussed nutrition and the role of protein in wound healing with the patient. Instructed patient to continue protein regimen for wound healing. Pt voiced understanding.    Verbal and written instructions given to patient.  RTC in 1 week    Hannah Rivera PA-C

## 2022-10-12 ENCOUNTER — OFFICE VISIT (OUTPATIENT)
Dept: WOUND CARE | Facility: CLINIC | Age: 72
End: 2022-10-12
Payer: MEDICARE

## 2022-10-12 VITALS
HEART RATE: 89 BPM | WEIGHT: 102.94 LBS | HEIGHT: 64 IN | SYSTOLIC BLOOD PRESSURE: 132 MMHG | DIASTOLIC BLOOD PRESSURE: 57 MMHG | TEMPERATURE: 98 F | BODY MASS INDEX: 17.57 KG/M2

## 2022-10-12 DIAGNOSIS — I87.2 VENOUS INSUFFICIENCY OF BOTH LOWER EXTREMITIES: ICD-10-CM

## 2022-10-12 DIAGNOSIS — S81.802A OPEN WOUND OF LEFT LOWER EXTREMITY, INITIAL ENCOUNTER: Primary | ICD-10-CM

## 2022-10-12 DIAGNOSIS — I51.89 DIASTOLIC DYSFUNCTION: ICD-10-CM

## 2022-10-12 DIAGNOSIS — I83.93 ASYMPTOMATIC VARICOSE VEINS OF BOTH LOWER EXTREMITIES: ICD-10-CM

## 2022-10-12 PROCEDURE — 99213 PR OFFICE/OUTPT VISIT, EST, LEVL III, 20-29 MIN: ICD-10-PCS | Mod: 25,S$GLB,,

## 2022-10-12 PROCEDURE — 99999 PR PBB SHADOW E&M-EST. PATIENT-LVL IV: ICD-10-PCS | Mod: PBBFAC,,,

## 2022-10-12 PROCEDURE — 3008F PR BODY MASS INDEX (BMI) DOCUMENTED: ICD-10-PCS | Mod: CPTII,S$GLB,,

## 2022-10-12 PROCEDURE — 3288F FALL RISK ASSESSMENT DOCD: CPT | Mod: CPTII,S$GLB,,

## 2022-10-12 PROCEDURE — 1159F PR MEDICATION LIST DOCUMENTED IN MEDICAL RECORD: ICD-10-PCS | Mod: CPTII,S$GLB,,

## 2022-10-12 PROCEDURE — 3288F PR FALLS RISK ASSESSMENT DOCUMENTED: ICD-10-PCS | Mod: CPTII,S$GLB,,

## 2022-10-12 PROCEDURE — 1126F AMNT PAIN NOTED NONE PRSNT: CPT | Mod: CPTII,S$GLB,,

## 2022-10-12 PROCEDURE — 3008F BODY MASS INDEX DOCD: CPT | Mod: CPTII,S$GLB,,

## 2022-10-12 PROCEDURE — 3075F SYST BP GE 130 - 139MM HG: CPT | Mod: CPTII,S$GLB,,

## 2022-10-12 PROCEDURE — 97597 DEBRIDEMENT: ICD-10-PCS | Mod: S$GLB,,,

## 2022-10-12 PROCEDURE — 1101F PT FALLS ASSESS-DOCD LE1/YR: CPT | Mod: CPTII,S$GLB,,

## 2022-10-12 PROCEDURE — 1101F PR PT FALLS ASSESS DOC 0-1 FALLS W/OUT INJ PAST YR: ICD-10-PCS | Mod: CPTII,S$GLB,,

## 2022-10-12 PROCEDURE — 99999 PR PBB SHADOW E&M-EST. PATIENT-LVL IV: CPT | Mod: PBBFAC,,,

## 2022-10-12 PROCEDURE — 1126F PR PAIN SEVERITY QUANTIFIED, NO PAIN PRESENT: ICD-10-PCS | Mod: CPTII,S$GLB,,

## 2022-10-12 PROCEDURE — 97597 DBRDMT OPN WND 1ST 20 CM/<: CPT | Mod: S$GLB,,,

## 2022-10-12 PROCEDURE — 3075F PR MOST RECENT SYSTOLIC BLOOD PRESS GE 130-139MM HG: ICD-10-PCS | Mod: CPTII,S$GLB,,

## 2022-10-12 PROCEDURE — 99213 OFFICE O/P EST LOW 20 MIN: CPT | Mod: 25,S$GLB,,

## 2022-10-12 PROCEDURE — 1159F MED LIST DOCD IN RCRD: CPT | Mod: CPTII,S$GLB,,

## 2022-10-12 PROCEDURE — 3078F DIAST BP <80 MM HG: CPT | Mod: CPTII,S$GLB,,

## 2022-10-12 PROCEDURE — 1157F PR ADVANCE CARE PLAN OR EQUIV PRESENT IN MEDICAL RECORD: ICD-10-PCS | Mod: CPTII,S$GLB,,

## 2022-10-12 PROCEDURE — 3078F PR MOST RECENT DIASTOLIC BLOOD PRESSURE < 80 MM HG: ICD-10-PCS | Mod: CPTII,S$GLB,,

## 2022-10-12 PROCEDURE — 1157F ADVNC CARE PLAN IN RCRD: CPT | Mod: CPTII,S$GLB,,

## 2022-10-12 NOTE — PROCEDURES
"Debridement    Date/Time: 10/12/2022 2:30 PM  Performed by: Hannah Rivera PA-C  Authorized by: Hannah Rivera PA-C     Time out: Immediately prior to procedure a "time out" was called to verify the correct patient, procedure, equipment, support staff and site/side marked as required.    Consent Done?:  Yes (Written)  Local anesthesia used?: No      Wound Details:    Location:  Left leg    Type of Debridement:  Excisional       Length (cm):  1.2       Area (sq cm):  1.2       Width (cm):  1       Percent Debrided (%):  100       Depth (cm):  0       Total Area Debrided (sq cm):  1.2    Depth of debridement:  Epidermis/Dermis    Tissue debrided:  Epidermis and Dermis    Devitalized tissue debrided:  Biofilm, Exudate, Fibrin, Necrotic/Eschar and Slough    Instruments:  Forceps and Scissors    Bleeding:  Minimal  Hemostasis Achieved: Yes    Method Used:  Pressure  Patient tolerance:  Patient tolerated the procedure well with no immediate complications  "

## 2022-10-12 NOTE — PROGRESS NOTES
Subjective:       Patient ID: Angelina Man is a 72 y.o. female     Chief Complaint: Wound Check    Patient present for a reevaluation of a left lower extremity wound. She reports exercising multiple times a day.  Patient follows with Dr. Cedeño for venous insufficiency. No complaints at this time. Denies fever, chills, erythema, warmth, drainage, or pain.      Review of Systems   Constitutional:  Negative for activity change, chills, diaphoresis, fatigue and fever.   Respiratory:  Negative for apnea, chest tightness and shortness of breath.    Cardiovascular:  Positive for leg swelling. Negative for chest pain and palpitations.   Musculoskeletal:  Negative for gait problem and joint swelling.   Skin:  Positive for wound. Negative for pallor and rash.   Neurological:  Negative for syncope, weakness and numbness.   Psychiatric/Behavioral:  Negative for agitation. The patient is not nervous/anxious.    All other systems reviewed and are negative.    Objective:      Physical Exam  Vitals reviewed.   Constitutional:       General: She is not in acute distress.     Appearance: Normal appearance.   Cardiovascular:      Rate and Rhythm: Normal rate and regular rhythm.      Pulses: Normal pulses.   Pulmonary:      Effort: No respiratory distress.   Musculoskeletal:         General: No swelling.      Right lower leg: No edema.      Left lower leg: Left lower leg edema: Compression stocking noted.   Skin:     General: Skin is warm and dry.      Capillary Refill: Capillary refill takes less than 2 seconds.      Findings: Wound present. No ecchymosis or erythema.          Neurological:      General: No focal deficit present.      Mental Status: She is alert and oriented to person, place, and time.   Psychiatric:         Mood and Affect: Mood normal.         Behavior: Behavior normal.         Thought Content: Thought content normal.         Judgment: Judgment normal.       Assessment:       1. Open wound of left lower  extremity, initial encounter    2. Venous insufficiency of both lower extremities    3. Asymptomatic varicose veins of both lower extremities    4. Diastolic dysfunction           Altered Skin Integrity Left anterior;lower Leg (Active)       Altered Skin Integrity Present on Admission:    Side: Left   Orientation: anterior;lower   Location: Leg   Wound Number:    Is this injury device related?:    Primary Wound Type:    Description of Altered Skin Integrity:    Ankle-Brachial Index:    Pulses:    Removal Indication and Assessment:    Wound Outcome:    (Retired) Wound Length (cm):    (Retired) Wound Width (cm):    (Retired) Depth (cm):    Wound Description (Comments):    Removal Indications:    Wound Image    10/12/22 1432   Dressing Appearance Dry;Intact;Clean 10/12/22 1432   Drainage Amount Scant 10/12/22 1432   Drainage Characteristics/Odor Mast 10/12/22 1432   Black (%), Wound Tissue Color 100 % 10/12/22 1432   Periwound Area Intact;Edematous;Pink 10/12/22 1432   Wound Length (cm) 1.2 cm 10/12/22 1432   Wound Width (cm) 1 cm 10/12/22 1432   Wound Depth (cm) 0 cm 10/12/22 1432   Wound Volume (cm^3) 0 cm^3 10/12/22 1432   Wound Surface Area (cm^2) 1.2 cm^2 10/12/22 1432   Care Cleansed with:;Soap and water 10/12/22 1432   Dressing Applied;Island/border;Silicone 10/12/22 1432       Angelina was seen in the clinic room and placed in the supine position on the treatment table.  The dressing was removed and the leg was cleansed with Easi-clense sponges and dried thoroughly. Sharp debridement with scissors and pickups to remove necrotic tissue. Pt tolerated procedure well.      Plan of Care: Mepilex border dressing to wound bed. Patient declines unna wrap.     Plan:      Left lower extremity wound dressed as detailed above.  Patient was instructed to not get the dressings wet and to use cast covers for showering.  Should the dressing become wet, she is to remove it, and place another aquacel border dressing over it. She  should then notify this office as soon as possible to have a new dressing applied.      Discussed nutrition and the role of protein in wound healing with the patient. Instructed patient to continue protein regimen for wound healing. Pt voiced understanding.    Instructed patient to elevate legs whenever sedentary.     Verbal and written instructions given to patient.  RTC in 1 week    Hannah Rivera PA-C

## 2022-10-18 ENCOUNTER — OFFICE VISIT (OUTPATIENT)
Dept: WOUND CARE | Facility: CLINIC | Age: 72
End: 2022-10-18
Payer: MEDICARE

## 2022-10-18 VITALS
HEART RATE: 75 BPM | HEIGHT: 64 IN | BODY MASS INDEX: 17.39 KG/M2 | TEMPERATURE: 98 F | SYSTOLIC BLOOD PRESSURE: 127 MMHG | DIASTOLIC BLOOD PRESSURE: 70 MMHG | WEIGHT: 101.88 LBS

## 2022-10-18 DIAGNOSIS — S81.802A OPEN WOUND OF LEFT LOWER EXTREMITY, INITIAL ENCOUNTER: Primary | ICD-10-CM

## 2022-10-18 PROCEDURE — 1157F ADVNC CARE PLAN IN RCRD: CPT | Mod: CPTII,S$GLB,, | Performed by: SURGERY

## 2022-10-18 PROCEDURE — 3078F DIAST BP <80 MM HG: CPT | Mod: CPTII,S$GLB,, | Performed by: SURGERY

## 2022-10-18 PROCEDURE — 1159F MED LIST DOCD IN RCRD: CPT | Mod: CPTII,S$GLB,, | Performed by: SURGERY

## 2022-10-18 PROCEDURE — 1101F PR PT FALLS ASSESS DOC 0-1 FALLS W/OUT INJ PAST YR: ICD-10-PCS | Mod: CPTII,S$GLB,, | Performed by: SURGERY

## 2022-10-18 PROCEDURE — 1157F PR ADVANCE CARE PLAN OR EQUIV PRESENT IN MEDICAL RECORD: ICD-10-PCS | Mod: CPTII,S$GLB,, | Performed by: SURGERY

## 2022-10-18 PROCEDURE — 3074F SYST BP LT 130 MM HG: CPT | Mod: CPTII,S$GLB,, | Performed by: SURGERY

## 2022-10-18 PROCEDURE — 3288F PR FALLS RISK ASSESSMENT DOCUMENTED: ICD-10-PCS | Mod: CPTII,S$GLB,, | Performed by: SURGERY

## 2022-10-18 PROCEDURE — 97597 DBRDMT OPN WND 1ST 20 CM/<: CPT | Mod: S$GLB,,, | Performed by: SURGERY

## 2022-10-18 PROCEDURE — 3288F FALL RISK ASSESSMENT DOCD: CPT | Mod: CPTII,S$GLB,, | Performed by: SURGERY

## 2022-10-18 PROCEDURE — 99999 PR PBB SHADOW E&M-EST. PATIENT-LVL IV: ICD-10-PCS | Mod: PBBFAC,,, | Performed by: SURGERY

## 2022-10-18 PROCEDURE — 1159F PR MEDICATION LIST DOCUMENTED IN MEDICAL RECORD: ICD-10-PCS | Mod: CPTII,S$GLB,, | Performed by: SURGERY

## 2022-10-18 PROCEDURE — 3078F PR MOST RECENT DIASTOLIC BLOOD PRESSURE < 80 MM HG: ICD-10-PCS | Mod: CPTII,S$GLB,, | Performed by: SURGERY

## 2022-10-18 PROCEDURE — 99999 PR PBB SHADOW E&M-EST. PATIENT-LVL IV: CPT | Mod: PBBFAC,,, | Performed by: SURGERY

## 2022-10-18 PROCEDURE — 3074F PR MOST RECENT SYSTOLIC BLOOD PRESSURE < 130 MM HG: ICD-10-PCS | Mod: CPTII,S$GLB,, | Performed by: SURGERY

## 2022-10-18 PROCEDURE — 1101F PT FALLS ASSESS-DOCD LE1/YR: CPT | Mod: CPTII,S$GLB,, | Performed by: SURGERY

## 2022-10-18 PROCEDURE — 1126F AMNT PAIN NOTED NONE PRSNT: CPT | Mod: CPTII,S$GLB,, | Performed by: SURGERY

## 2022-10-18 PROCEDURE — 97597 PR DEBRIDEMENT OPEN WOUND 20 SQ CM<: ICD-10-PCS | Mod: S$GLB,,, | Performed by: SURGERY

## 2022-10-18 PROCEDURE — 1126F PR PAIN SEVERITY QUANTIFIED, NO PAIN PRESENT: ICD-10-PCS | Mod: CPTII,S$GLB,, | Performed by: SURGERY

## 2022-10-18 NOTE — PROGRESS NOTES
Wound is smaller  Patient has  2 + pt pulse  Area of open wound is necrotic  Area debrided     Procedure Note   Pre op diagnosis-wound left anterior lower leg  Post op diagnosis-same  topical lidocaine used  Curette to wound  Area of necrotic tissue removed  Skin and muscle  Good bleeding Pressure held to control bleeding  Wound dressed      Patient told to shower 3 times a day and wash wound   Apply a small amount of bacitracin. Wash off bacitracin every time she washes wound

## 2022-10-23 ENCOUNTER — PATIENT MESSAGE (OUTPATIENT)
Dept: CARDIOLOGY | Facility: CLINIC | Age: 72
End: 2022-10-23
Payer: MEDICARE

## 2022-10-24 RX ORDER — APIXABAN 5 MG/1
5 TABLET, FILM COATED ORAL 2 TIMES DAILY
Qty: 180 TABLET | Refills: 3 | OUTPATIENT
Start: 2022-10-24

## 2022-10-25 ENCOUNTER — OFFICE VISIT (OUTPATIENT)
Dept: WOUND CARE | Facility: CLINIC | Age: 72
End: 2022-10-25
Payer: MEDICARE

## 2022-10-25 VITALS
SYSTOLIC BLOOD PRESSURE: 115 MMHG | DIASTOLIC BLOOD PRESSURE: 69 MMHG | TEMPERATURE: 98 F | HEART RATE: 68 BPM | WEIGHT: 101.63 LBS | BODY MASS INDEX: 17.35 KG/M2 | HEIGHT: 64 IN

## 2022-10-25 DIAGNOSIS — I87.2 VENOUS INSUFFICIENCY OF BOTH LOWER EXTREMITIES: Primary | ICD-10-CM

## 2022-10-25 DIAGNOSIS — Z87.828 HEALED WOUND: ICD-10-CM

## 2022-10-25 PROCEDURE — 99214 OFFICE O/P EST MOD 30 MIN: CPT | Mod: S$GLB,,,

## 2022-10-25 PROCEDURE — 3288F FALL RISK ASSESSMENT DOCD: CPT | Mod: CPTII,S$GLB,,

## 2022-10-25 PROCEDURE — 3078F DIAST BP <80 MM HG: CPT | Mod: CPTII,S$GLB,,

## 2022-10-25 PROCEDURE — 99999 PR PBB SHADOW E&M-EST. PATIENT-LVL IV: CPT | Mod: PBBFAC,,,

## 2022-10-25 PROCEDURE — 1101F PT FALLS ASSESS-DOCD LE1/YR: CPT | Mod: CPTII,S$GLB,,

## 2022-10-25 PROCEDURE — 1126F PR PAIN SEVERITY QUANTIFIED, NO PAIN PRESENT: ICD-10-PCS | Mod: CPTII,S$GLB,,

## 2022-10-25 PROCEDURE — 1126F AMNT PAIN NOTED NONE PRSNT: CPT | Mod: CPTII,S$GLB,,

## 2022-10-25 PROCEDURE — 3078F PR MOST RECENT DIASTOLIC BLOOD PRESSURE < 80 MM HG: ICD-10-PCS | Mod: CPTII,S$GLB,,

## 2022-10-25 PROCEDURE — 3074F PR MOST RECENT SYSTOLIC BLOOD PRESSURE < 130 MM HG: ICD-10-PCS | Mod: CPTII,S$GLB,,

## 2022-10-25 PROCEDURE — 99214 PR OFFICE/OUTPT VISIT, EST, LEVL IV, 30-39 MIN: ICD-10-PCS | Mod: S$GLB,,,

## 2022-10-25 PROCEDURE — 3074F SYST BP LT 130 MM HG: CPT | Mod: CPTII,S$GLB,,

## 2022-10-25 PROCEDURE — 1159F PR MEDICATION LIST DOCUMENTED IN MEDICAL RECORD: ICD-10-PCS | Mod: CPTII,S$GLB,,

## 2022-10-25 PROCEDURE — 99999 PR PBB SHADOW E&M-EST. PATIENT-LVL IV: ICD-10-PCS | Mod: PBBFAC,,,

## 2022-10-25 PROCEDURE — 1101F PR PT FALLS ASSESS DOC 0-1 FALLS W/OUT INJ PAST YR: ICD-10-PCS | Mod: CPTII,S$GLB,,

## 2022-10-25 PROCEDURE — 1157F PR ADVANCE CARE PLAN OR EQUIV PRESENT IN MEDICAL RECORD: ICD-10-PCS | Mod: CPTII,S$GLB,,

## 2022-10-25 PROCEDURE — 1157F ADVNC CARE PLAN IN RCRD: CPT | Mod: CPTII,S$GLB,,

## 2022-10-25 PROCEDURE — 3288F PR FALLS RISK ASSESSMENT DOCUMENTED: ICD-10-PCS | Mod: CPTII,S$GLB,,

## 2022-10-25 PROCEDURE — 1159F MED LIST DOCD IN RCRD: CPT | Mod: CPTII,S$GLB,,

## 2022-10-25 NOTE — PROGRESS NOTES
Subjective:       Patient ID: Angelina Man is a 72 y.o. female     Chief Complaint: Wound Check    Patient present for a reevaluation of a left lower extremity wound. She reports exercising multiple times a day.  Patient follows with Dr. Cedeño for venous insufficiency. No complaints at this time. Denies fever, chills, erythema, warmth, drainage, or pain.      Review of Systems   Constitutional:  Negative for activity change, chills, diaphoresis, fatigue and fever.   Respiratory:  Negative for apnea, chest tightness and shortness of breath.    Cardiovascular:  Positive for leg swelling. Negative for chest pain and palpitations.   Musculoskeletal:  Negative for gait problem and joint swelling.   Skin:  Positive for wound. Negative for pallor and rash.   Neurological:  Negative for syncope, weakness and numbness.   Psychiatric/Behavioral:  Negative for agitation. The patient is not nervous/anxious.    All other systems reviewed and are negative.    Objective:      Physical Exam  Vitals reviewed.   Constitutional:       General: She is not in acute distress.     Appearance: Normal appearance.   Cardiovascular:      Rate and Rhythm: Normal rate and regular rhythm.      Pulses: Normal pulses.   Pulmonary:      Effort: No respiratory distress.   Musculoskeletal:         General: No swelling.      Right lower leg: No edema.      Left lower leg: Left lower leg edema: Compression stocking noted.   Skin:     General: Skin is warm and dry.      Capillary Refill: Capillary refill takes less than 2 seconds.      Findings: Wound present. No ecchymosis or erythema.          Neurological:      General: No focal deficit present.      Mental Status: She is alert and oriented to person, place, and time.   Psychiatric:         Mood and Affect: Mood normal.         Behavior: Behavior normal.         Thought Content: Thought content normal.         Judgment: Judgment normal.       Assessment:       1. Venous insufficiency of both  lower extremities    2. Healed wound           Altered Skin Integrity Left anterior;lower Leg (Active)       Altered Skin Integrity Present on Admission:    Side: Left   Orientation: anterior;lower   Location: Leg   Wound Number:    Is this injury device related?:    Primary Wound Type:    Description of Altered Skin Integrity:    Ankle-Brachial Index:    Pulses:    Removal Indication and Assessment:    Wound Outcome:    (Retired) Wound Length (cm):    (Retired) Wound Width (cm):    (Retired) Depth (cm):    Wound Description (Comments):    Removal Indications:    Wound Image   10/25/22 1505   Dressing Appearance Dry;Intact;Clean 10/25/22 1505   Drainage Amount None 10/25/22 1505   Care Cleansed with:;Soap and water 10/25/22 1505   Compression Compression Stocking (20-30 mmHg) 10/25/22 1505       Angelina was seen in the clinic room and placed in the supine position on the treatment table.  The dressing was removed and the leg was cleansed with Easi-clense sponges and dried thoroughly. No open wound noted      Plan of Care: 20-30 mmHg compression stockings to bilateral lower extremities.    Plan:      Bilateral lower extremities dressed as detailed above.  Instructed patient to place compression stockings on first thing in the morning and remove before bed. Patient instructed to not sleep in compression stockings.    No open wounds noted.  Precautions given to prevent wound from re-opening.     Verbal and written instructions given to patient.  RTC PRJANA Rivera PA-C

## 2022-10-26 ENCOUNTER — PATIENT MESSAGE (OUTPATIENT)
Dept: RESEARCH | Facility: HOSPITAL | Age: 72
End: 2022-10-26
Payer: MEDICARE

## 2022-10-31 ENCOUNTER — OFFICE VISIT (OUTPATIENT)
Dept: INTERNAL MEDICINE | Facility: CLINIC | Age: 72
End: 2022-10-31
Payer: MEDICARE

## 2022-10-31 VITALS
SYSTOLIC BLOOD PRESSURE: 122 MMHG | DIASTOLIC BLOOD PRESSURE: 80 MMHG | WEIGHT: 101.88 LBS | HEIGHT: 64 IN | OXYGEN SATURATION: 96 % | BODY MASS INDEX: 17.39 KG/M2 | HEART RATE: 78 BPM

## 2022-10-31 DIAGNOSIS — I50.32 CHRONIC HEART FAILURE WITH PRESERVED EJECTION FRACTION: ICD-10-CM

## 2022-10-31 DIAGNOSIS — M51.26 LUMBAR DISC HERNIATION: ICD-10-CM

## 2022-10-31 DIAGNOSIS — Z00.00 ENCOUNTER FOR PREVENTIVE HEALTH EXAMINATION: Primary | ICD-10-CM

## 2022-10-31 DIAGNOSIS — I48.11 LONGSTANDING PERSISTENT ATRIAL FIBRILLATION: ICD-10-CM

## 2022-10-31 DIAGNOSIS — M15.9 PRIMARY OSTEOARTHRITIS INVOLVING MULTIPLE JOINTS: ICD-10-CM

## 2022-10-31 DIAGNOSIS — I27.20 PULMONARY HYPERTENSION: ICD-10-CM

## 2022-10-31 DIAGNOSIS — Z12.31 ENCOUNTER FOR SCREENING MAMMOGRAM FOR BREAST CANCER: ICD-10-CM

## 2022-10-31 DIAGNOSIS — E03.9 HYPOTHYROIDISM, UNSPECIFIED TYPE: ICD-10-CM

## 2022-10-31 DIAGNOSIS — I83.893 VARICOSE VEINS OF BOTH LOWER EXTREMITIES WITH COMPLICATIONS: ICD-10-CM

## 2022-10-31 DIAGNOSIS — F41.9 ANXIETY: ICD-10-CM

## 2022-10-31 DIAGNOSIS — Z98.1 S/P LUMBAR FUSION: ICD-10-CM

## 2022-10-31 DIAGNOSIS — M48.061 SPINAL STENOSIS OF LUMBAR REGION WITHOUT NEUROGENIC CLAUDICATION: ICD-10-CM

## 2022-10-31 DIAGNOSIS — G95.20 UNSPECIFIED CORD COMPRESSION: ICD-10-CM

## 2022-10-31 DIAGNOSIS — K21.9 GASTROESOPHAGEAL REFLUX DISEASE, UNSPECIFIED WHETHER ESOPHAGITIS PRESENT: ICD-10-CM

## 2022-10-31 DIAGNOSIS — M51.36 DDD (DEGENERATIVE DISC DISEASE), LUMBAR: ICD-10-CM

## 2022-10-31 DIAGNOSIS — G89.29 OTHER CHRONIC PAIN: ICD-10-CM

## 2022-10-31 DIAGNOSIS — R63.6 UNDERWEIGHT: ICD-10-CM

## 2022-10-31 PROBLEM — Z12.11 SCREEN FOR COLON CANCER: Status: RESOLVED | Noted: 2017-11-14 | Resolved: 2022-10-31

## 2022-10-31 PROBLEM — R60.0 EDEMA OF BOTH LEGS: Status: RESOLVED | Noted: 2021-06-22 | Resolved: 2022-10-31

## 2022-10-31 PROCEDURE — 3074F PR MOST RECENT SYSTOLIC BLOOD PRESSURE < 130 MM HG: ICD-10-PCS | Mod: CPTII,S$GLB,, | Performed by: NURSE PRACTITIONER

## 2022-10-31 PROCEDURE — 3079F PR MOST RECENT DIASTOLIC BLOOD PRESSURE 80-89 MM HG: ICD-10-PCS | Mod: CPTII,S$GLB,, | Performed by: NURSE PRACTITIONER

## 2022-10-31 PROCEDURE — 3079F DIAST BP 80-89 MM HG: CPT | Mod: CPTII,S$GLB,, | Performed by: NURSE PRACTITIONER

## 2022-10-31 PROCEDURE — 99499 UNLISTED E&M SERVICE: CPT | Mod: HCNC,S$GLB,, | Performed by: NURSE PRACTITIONER

## 2022-10-31 PROCEDURE — 1157F PR ADVANCE CARE PLAN OR EQUIV PRESENT IN MEDICAL RECORD: ICD-10-PCS | Mod: CPTII,S$GLB,, | Performed by: NURSE PRACTITIONER

## 2022-10-31 PROCEDURE — 3288F PR FALLS RISK ASSESSMENT DOCUMENTED: ICD-10-PCS | Mod: CPTII,S$GLB,, | Performed by: NURSE PRACTITIONER

## 2022-10-31 PROCEDURE — 1126F AMNT PAIN NOTED NONE PRSNT: CPT | Mod: CPTII,S$GLB,, | Performed by: NURSE PRACTITIONER

## 2022-10-31 PROCEDURE — 1126F PR PAIN SEVERITY QUANTIFIED, NO PAIN PRESENT: ICD-10-PCS | Mod: CPTII,S$GLB,, | Performed by: NURSE PRACTITIONER

## 2022-10-31 PROCEDURE — 1160F RVW MEDS BY RX/DR IN RCRD: CPT | Mod: CPTII,S$GLB,, | Performed by: NURSE PRACTITIONER

## 2022-10-31 PROCEDURE — 99999 PR PBB SHADOW E&M-EST. PATIENT-LVL V: CPT | Mod: PBBFAC,,, | Performed by: NURSE PRACTITIONER

## 2022-10-31 PROCEDURE — 1160F PR REVIEW ALL MEDS BY PRESCRIBER/CLIN PHARMACIST DOCUMENTED: ICD-10-PCS | Mod: CPTII,S$GLB,, | Performed by: NURSE PRACTITIONER

## 2022-10-31 PROCEDURE — 1170F FXNL STATUS ASSESSED: CPT | Mod: CPTII,S$GLB,, | Performed by: NURSE PRACTITIONER

## 2022-10-31 PROCEDURE — 1101F PR PT FALLS ASSESS DOC 0-1 FALLS W/OUT INJ PAST YR: ICD-10-PCS | Mod: CPTII,S$GLB,, | Performed by: NURSE PRACTITIONER

## 2022-10-31 PROCEDURE — 3074F SYST BP LT 130 MM HG: CPT | Mod: CPTII,S$GLB,, | Performed by: NURSE PRACTITIONER

## 2022-10-31 PROCEDURE — G0439 PPPS, SUBSEQ VISIT: HCPCS | Mod: S$GLB,,, | Performed by: NURSE PRACTITIONER

## 2022-10-31 PROCEDURE — 3288F FALL RISK ASSESSMENT DOCD: CPT | Mod: CPTII,S$GLB,, | Performed by: NURSE PRACTITIONER

## 2022-10-31 PROCEDURE — 1159F PR MEDICATION LIST DOCUMENTED IN MEDICAL RECORD: ICD-10-PCS | Mod: CPTII,S$GLB,, | Performed by: NURSE PRACTITIONER

## 2022-10-31 PROCEDURE — G0439 PR MEDICARE ANNUAL WELLNESS SUBSEQUENT VISIT: ICD-10-PCS | Mod: S$GLB,,, | Performed by: NURSE PRACTITIONER

## 2022-10-31 PROCEDURE — 99999 PR PBB SHADOW E&M-EST. PATIENT-LVL V: ICD-10-PCS | Mod: PBBFAC,,, | Performed by: NURSE PRACTITIONER

## 2022-10-31 PROCEDURE — 1170F PR FUNCTIONAL STATUS ASSESSED: ICD-10-PCS | Mod: CPTII,S$GLB,, | Performed by: NURSE PRACTITIONER

## 2022-10-31 PROCEDURE — 1159F MED LIST DOCD IN RCRD: CPT | Mod: CPTII,S$GLB,, | Performed by: NURSE PRACTITIONER

## 2022-10-31 PROCEDURE — 99499 RISK ADDL DX/OHS AUDIT: ICD-10-PCS | Mod: HCNC,S$GLB,, | Performed by: NURSE PRACTITIONER

## 2022-10-31 PROCEDURE — 1157F ADVNC CARE PLAN IN RCRD: CPT | Mod: CPTII,S$GLB,, | Performed by: NURSE PRACTITIONER

## 2022-10-31 PROCEDURE — 1101F PT FALLS ASSESS-DOCD LE1/YR: CPT | Mod: CPTII,S$GLB,, | Performed by: NURSE PRACTITIONER

## 2022-10-31 RX ORDER — MINERAL OIL
180 ENEMA (ML) RECTAL DAILY
COMMUNITY
End: 2023-04-05

## 2022-10-31 NOTE — PATIENT INSTRUCTIONS
Counseling and Referral of Other Preventative  (Italic type indicates deductible and co-insurance are waived)    Patient Name: Angelina Man  Today's Date: 10/31/2022    Health Maintenance         Date Due Completion Date    Colorectal Cancer Screening 11/14/2022 11/14/2017    Mammogram 11/29/2022 11/29/2021    DEXA Scan 01/06/2023 1/6/2020    Lipid Panel 04/13/2027 4/13/2022    TETANUS VACCINE 09/26/2027 9/26/2017          Orders Placed This Encounter   Procedures    Mammo Digital Screening Bilat     The following information is provided to all patients.  This information is to help you find resources for any of the problems found today that may be affecting your health:                Living healthy guide: www.[facility].[place].gov      Understanding Diabetes: www.diabetes.org      Eating healthy: www.cdc.gov/healthyweight      [facility] home safety checklist: www.cdc.gov/steadi/patient.html      Agency on Aging: www.goea.[place].[facility]      Alcoholics anonymous (AA): www.aa.org      Physical Activity: www.donte.nih.gov/er6lpvz      Tobacco use: www.quitwithusla.org   4

## 2022-10-31 NOTE — PROGRESS NOTES
I offered to discuss advanced care planning, including how to pick a person who would make decisions for you if you were unable to make them for yourself, called a health care power of , and what kind of decisions you might make such as use of life sustaining treatments such as ventilators and tube feeding when faced with a life limiting illness recorded on a living will that they will need to know. (How you want to be cared for as you near the end of your natural life)     X  Patient has advanced directives on file, they would like to make changes. I provided information on how to revoke their previous directives and make new ones.

## 2022-10-31 NOTE — PROGRESS NOTES
"  Angelina Man presented for a  Medicare AWV and comprehensive Health Risk Assessment today. The following components were reviewed and updated:    Medical history  Family History  Social history  Allergies and Current Medications  Health Risk Assessment  Health Maintenance  Care Team         ** See Completed Assessments for Annual Wellness Visit within the encounter summary.**         The following assessments were completed:  Living Situation  CAGE  Depression Screening  Timed Get Up and Go  Whisper Test  Cognitive Function Screening      Nutrition Screening  ADL Screening  PAQ Screening  OPIOID Screening: Patient does not have a prescription for narcotics. Patient does not use substance         Vitals:    10/31/22 1406   BP: 122/80   BP Location: Right arm   Pulse: 78   SpO2: 96%   Weight: 46.2 kg (101 lb 13.6 oz)   Height: 5' 4" (1.626 m)     Body mass index is 17.48 kg/m².  Physical Exam  Vitals and nursing note reviewed.   Constitutional:       Appearance: She is well-developed.   HENT:      Head: Normocephalic.   Cardiovascular:      Rate and Rhythm: Normal rate. Rhythm irregular.   Pulmonary:      Effort: Pulmonary effort is normal.      Breath sounds: Normal breath sounds.   Abdominal:      General: Bowel sounds are normal.      Palpations: Abdomen is soft.   Musculoskeletal:         General: Normal range of motion.   Skin:     General: Skin is warm and dry.   Neurological:      Mental Status: She is alert and oriented to person, place, and time.      Motor: No abnormal muscle tone.   Psychiatric:         Mood and Affect: Mood normal.             Diagnoses and health risks identified today and associated recommendations/orders:    1. Encounter for preventive health examination  Here for Health Risk Assessment/Annual Wellness Visit.  Health maintenance reviewed and updated. Follow up in one year.   Declined colonoscopy today.    2. Longstanding persistent atrial fibrillation  Chronic, stable on current " medications. Followed by Cardiology, PCP.     3. Pulmonary hypertension  Chronic, stable on current medications. Per right heart cath 1/27/22. Followed by Cardiology, PCP.    4. Chronic heart failure with preserved ejection fraction  Chronic, stable on current medications. Followed by Cardiology, PCP.    5. Varicose veins of both lower extremities with complications  Chronic, stable on current medications. Followed by Cardiology, PCP.    6. Anxiety  Chronic, stable on current medication. PHQ-2 score 1. Followed by PCP.     7. Hypothyroidism, unspecified type  Chronic, stable on current medication. Followed by PCP.     8. Gastroesophageal reflux disease, unspecified whether esophagitis present  Chronic, stable. Followed by PCP.     9. Other chronic pain  Chronic, stable on current OTC PRN medication. Followed by PCP, Neurosurgery.     10. Primary osteoarthritis involving multiple joints  Chronic, stable on current OTC PRN medication. Followed by PCP, Neurosurgery.     11. Spinal stenosis of lumbar region without neurogenic claudication  Chronic, stable on current OTC PRN medication. Followed by PCP, Neurosurgery.     12. DDD (degenerative disc disease), lumbar  Chronic, stable on current OTC PRN medication. Followed by PCP, Neurosurgery.     13. S/P lumbar fusion  Followed by Neurosurgery.     14. Lumbar disc herniation  Chronic, stable. Followed by PCP, Neurosurgery.     15. Unspecified cord compression  Chronic, stable Followed by Neurosurgery.     16. Underweight  Chronic, reports weight stable. Last albumin 3.7. Followed by PCP.    17. Encounter for screening mammogram for breast cancer  - Mammo Digital Screening Bilat; Future      Provided Angelina with a 5-10 year written screening schedule and personal prevention plan. Recommendations were developed using the USPSTF age appropriate recommendations. Education, counseling, and referrals were provided as needed. After Visit Summary printed and given to patient  which includes a list of additional screenings\tests needed.    Follow up in 2 months (on 1/3/2023).transfer of care to Dr Moiz Batista NP

## 2022-11-02 ENCOUNTER — HOSPITAL ENCOUNTER (OUTPATIENT)
Dept: CARDIOLOGY | Facility: HOSPITAL | Age: 72
Discharge: HOME | End: 2022-11-02
Attending: INTERNAL MEDICINE
Payer: MEDICARE

## 2022-11-02 VITALS
DIASTOLIC BLOOD PRESSURE: 60 MMHG | WEIGHT: 101 LBS | BODY MASS INDEX: 17.24 KG/M2 | HEART RATE: 78 BPM | HEIGHT: 64 IN | SYSTOLIC BLOOD PRESSURE: 105 MMHG

## 2022-11-02 DIAGNOSIS — I27.20 PULMONARY HYPERTENSION: ICD-10-CM

## 2022-11-02 DIAGNOSIS — I50.30 HEART FAILURE WITH PRESERVED EJECTION FRACTION, UNSPECIFIED HF CHRONICITY: ICD-10-CM

## 2022-11-02 LAB
ASCENDING AORTA: 3 CM
AV INDEX (PROSTH): 0.82
AV MEAN GRADIENT: 2 MMHG
AV PEAK GRADIENT: 4 MMHG
AV VALVE AREA: 2.8 CM2
AV VELOCITY RATIO: 0.8
BSA FOR ECHO PROCEDURE: 1.44 M2
CV ECHO LV RWT: 0.29 CM
DOP CALC AO PEAK VEL: 1.01 M/S
DOP CALC AO VTI: 21.13 CM
DOP CALC LVOT AREA: 3.4 CM2
DOP CALC LVOT DIAMETER: 2.09 CM
DOP CALC LVOT PEAK VEL: 0.81 M/S
DOP CALC LVOT STROKE VOLUME: 59.25 CM3
DOP CALCLVOT PEAK VEL VTI: 17.28 CM
E/E' RATIO: 8.42 M/S
ECHO LV POSTERIOR WALL: 0.58 CM (ref 0.6–1.1)
EJECTION FRACTION: 60 %
FRACTIONAL SHORTENING: 23 % (ref 28–44)
INTERVENTRICULAR SEPTUM: 0.66 CM (ref 0.6–1.1)
LA MAJOR: 5.2 CM
LA MINOR: 5 CM
LA WIDTH: 4.4 CM
LEFT ATRIUM SIZE: 3.52 CM
LEFT ATRIUM VOLUME INDEX MOD: 26.1 ML/M2
LEFT ATRIUM VOLUME INDEX: 46 ML/M2
LEFT ATRIUM VOLUME MOD: 38.14 CM3
LEFT ATRIUM VOLUME: 67.11 CM3
LEFT INTERNAL DIMENSION IN SYSTOLE: 3.13 CM (ref 2.1–4)
LEFT VENTRICLE DIASTOLIC VOLUME INDEX: 49.69 ML/M2
LEFT VENTRICLE DIASTOLIC VOLUME: 72.55 ML
LEFT VENTRICLE MASS INDEX: 47 G/M2
LEFT VENTRICLE SYSTOLIC VOLUME INDEX: 26.5 ML/M2
LEFT VENTRICLE SYSTOLIC VOLUME: 38.7 ML
LEFT VENTRICULAR INTERNAL DIMENSION IN DIASTOLE: 4.06 CM (ref 3.5–6)
LEFT VENTRICULAR MASS: 68.79 G
LV LATERAL E/E' RATIO: 8.89 M/S
LV SEPTAL E/E' RATIO: 8 M/S
MV A" WAVE DURATION": 9.13 MSEC
MV PEAK E VEL: 0.8 M/S
PISA MRMAX VEL: 0.05 M/S
PISA TR MAX VEL: 2.19 M/S
PULM VEIN S/D RATIO: 1.4
PV PEAK D VEL: 0.25 M/S
PV PEAK S VEL: 0.35 M/S
RA MAJOR: 5.4 CM
RA PRESSURE: 8 MMHG
RA WIDTH: 3.09 CM
RIGHT VENTRICULAR END-DIASTOLIC DIMENSION: 2.84 CM
RV TISSUE DOPPLER FREE WALL SYSTOLIC VELOCITY 1 (APICAL 4 CHAMBER VIEW): 11.74 CM/S
SINUS: 2.59 CM
STJ: 2.55 CM
TDI LATERAL: 0.09 M/S
TDI SEPTAL: 0.1 M/S
TDI: 0.1 M/S
TR MAX PG: 19 MMHG
TRICUSPID ANNULAR PLANE SYSTOLIC EXCURSION: 1.56 CM
TV REST PULMONARY ARTERY PRESSURE: 27 MMHG

## 2022-11-02 PROCEDURE — 93306 ECHO (CUPID ONLY): ICD-10-PCS | Mod: 26,,, | Performed by: INTERNAL MEDICINE

## 2022-11-02 PROCEDURE — 93306 TTE W/DOPPLER COMPLETE: CPT | Mod: 26,,, | Performed by: INTERNAL MEDICINE

## 2022-11-02 PROCEDURE — 93306 TTE W/DOPPLER COMPLETE: CPT

## 2022-11-04 NOTE — TELEPHONE ENCOUNTER
----- Message from Shira Barnett sent at 3/7/2017 10:35 AM CST -----  Contact: pt [phone] or cell [phone]  Pt is calling to speak with nurse regarding surgery.pls call    Sachin Weston  Identified pt with two pt identifiers(name and ). Chief Complaint   Patient presents with    Annual Wellness Visit    Diabetes       Reviewed record In preparation for visit and have obtained necessary documentation. 1. Have you been to the ER, urgent care clinic or hospitalized since your last visit? No     2. Have you seen or consulted any other health care providers outside of the [address] [place] [address] since your last visit? Include any pap smears or colon screening. No    Vitals reviewed with provider. Health Maintenance reviewed:     Health Maintenance Due   Topic    Eye Exam Retinal or Dilated     COVID-19 Vaccine (2 - Booster for [place] series)    Foot Exam Q1     Flu Vaccine (1)    MICROALBUMIN Q1     Lipid Screen     A1C test (Diabetic or Prediabetic)     Medicare Yearly Exam           Wt Readings from Last 3 Encounters:   22 139 lb (63 kg)   22 139 lb (63 kg)   22 139 lb 8 oz (63.3 kg)        Temp Readings from Last 3 Encounters:   22 98 °F (36.7 °C) (Oral)   22 98.5 °F (36.9 °C) (Oral)   21 98.2 °F (36.8 °C) (Oral)        BP Readings from Last 3 Encounters:   22 114/72   22 (!) 143/75   22 129/78        Pulse Readings from Last 3 Encounters:   22 83   22 83   22 71      There were no vitals filed for this visit.        Learning Assessment:   :       Learning Assessment 2017   PRIMARY LEARNER Patient   PRIMARY LANGUAGE ENGLISH   LEARNER PREFERENCE PRIMARY READING   ANSWERED BY patient   RELATIONSHIP SELF        Depression Screening:   :       3 most recent PHQ Screens 2022   PHQ Not Done -   Little interest or pleasure in doing things Not at all   Feeling down, depressed, irritable, or hopeless Not at all   Total Score PHQ 2 0   Trouble falling or staying asleep, or sleeping too much -   Feeling tired or having little energy -   Poor appetite, weight loss, or overeating -   Feeling bad about yourself - or that you are a failure or have let yourself or your family down -   Trouble concentrating on things such as school, work, reading, or watching TV -   Moving or speaking so slowly that other people could have noticed; or the opposite being so fidgety that others notice -   Thoughts of being better off dead, or hurting yourself in some way -   PHQ 9 Score -   How difficult have these problems made it for you to do your work, take care of your home and get along with others -        Fall Risk Assessment:   :       Fall Risk Assessment, last 12 mths 11/4/2022   Able to walk? Yes   Fall in past 12 months? 1   Do you feel unsteady? 0   Are you worried about falling 0   Is the gait abnormal? 0   Number of falls in past 12 months 2   Fall with injury? 0        Abuse Screening:   :       Abuse Screening Questionnaire 11/4/2022 11/4/2021 10/21/2020 7/31/2019 4/28/2017   Do you ever feel afraid of your partner? N N N N N   Are you in a relationship with someone who physically or mentally threatens you? N N N N N   Is it safe for you to go home?  Y Y Y Y Y        ADL Screening:   :       ADL Assessment 11/4/2022   Feeding yourself No Help Needed   Getting from bed to chair No Help Needed   Getting dressed No Help Needed   Bathing or showering No Help Needed   Walk across the room (includes cane/walker) No Help Needed   Using the telphone No Help Needed   Taking your medications No Help Needed   Preparing meals No Help Needed   Managing money (expenses/bills) No Help Needed   Moderately strenuous housework (laundry) No Help Needed   Shopping for personal items (toiletries/medicines) No Help Needed   Shopping for groceries No Help Needed   Driving Help Needed   Climbing a flight of stairs No Help Needed   Getting to places beyond walking distances Help Needed

## 2022-11-08 ENCOUNTER — PATIENT MESSAGE (OUTPATIENT)
Dept: CARDIOLOGY | Facility: CLINIC | Age: 72
End: 2022-11-08
Payer: MEDICARE

## 2022-11-10 NOTE — PROGRESS NOTES
Chart has been dictated using voice recognition software.  It is not been reviewed carefully for any transcriptional errors due to this technology.   Subjective:   Patient ID:  Angelina Man is a 72 y.o. female who presents for follow-up of Establish Care, Follow-up, and Results      HPI: Patient with BLE venous insufficiency, hypothyroidism, anxiety, arthritis, persistent atrial fibrillation s/p DCCV 17-Jul-2020 but with recurrent chronic AF, and HFpEF.  Patient has been followed by Dr. Cedeño for her lower extremity edema.    Patient has had symptoms of congestion, greenish mucus tinged with blood, particularly from the right nostril, and some chest congestion.  Patient denies any chest discomfort on exertion or at rest.  Patient denies any dyspnea at rest or on exertion, orthopnea, or PND. Will get edema if she does not wear her compression stockings. Patient denies any palpitations or syncope. Has had decreased appetite.     Her echocardiogram 5 months ago shows slightly improved left ventricular ejection fraction but more importantly a decrease in pulmonary pressures such that she no longer has evidence on echo of pulmonary hypertension.      Past Medical History:   Diagnosis Date    Arthritis     Atrial fibrillation     Bronchitis     Cataract     Dry eyes     Glaucoma, suspect - Both Eyes     Hypothyroidism 06/23/2016    Pulmonary hypertension     Rash     Squamous cell carcinoma     in-situ right upper inner arm, right wrist    Status post lumbar surgery 06/23/2016    Stress fracture     Thyroid disease        Outpatient Medications Prior to Visit   Medication Sig Dispense Refill    acetaminophen (TYLENOL) 500 MG tablet Take 500 mg by mouth every 6 (six) hours as needed for Pain.      acyclovir (ZOVIRAX) 400 MG tablet Take 1 tablet (400 mg total) by mouth 2 (two) times daily. (Patient taking differently: Take 400 mg by mouth Daily.) 60 tablet 12    ascorbic acid (VITAMIN C) 1000 MG tablet Take 1,000 mg by  mouth once daily.       biotin 1 mg tablet Take 1 mg by mouth 2 (two) times daily.       buPROPion (WELLBUTRIN XL) 150 MG TB24 tablet Take 1 tablet (150 mg total) by mouth once daily. 90 tablet 2    calcium-vitamin D 500 mg(1,250mg) -200 unit per tablet Take 1 tablet by mouth 2 (two) times daily with meals.       clonazePAM (KLONOPIN) 1 MG tablet TAKE 1& 1/2 TABLET BY MOUTH NIGHTLY AS NEEDED 45 tablet 5    cycloSPORINE (RESTASIS) 0.05 % ophthalmic emulsion Place 1 drop into both eyes 2 (two) times daily. 60 each 11    digestive enzymes Tab Take 1 tablet by mouth once daily.       diphenhydrAMINE (BENADRYL) 25 mg capsule Take 25 mg by mouth nightly as needed.       ELIQUIS 5 mg Tab TAKE 1 TABLET(5 MG) BY MOUTH TWICE DAILY 180 tablet 3    fexofenadine (ALLEGRA) 180 MG tablet Take 180 mg by mouth once daily.      fluticasone propionate (FLONASE) 50 mcg/actuation nasal spray 1 spray (50 mcg total) by Each Nostril route once daily. 16 g 12    furosemide (LASIX) 20 MG tablet Take 1 tablet (20 mg total) by mouth every other day. 15 tablet 11    ipratropium (ATROVENT) 42 mcg (0.06 %) nasal spray 2 sprays by Nasal route 3 (three) times daily. 30 mL 0    levothyroxine (SYNTHROID) 137 MCG Tab tablet Take 1 tablet (137 mcg total) by mouth before breakfast. 30 tablet 11    lubiprostone (AMITIZA) 24 MCG Cap Take 1 capsule (24 mcg total) by mouth daily as needed. 30 capsule 6    metoprolol succinate (TOPROL-XL) 25 MG 24 hr tablet Take 1 tablet (25 mg total) by mouth once daily. 30 tablet 11    montelukast (SINGULAIR) 10 mg tablet Take 1 tablet (10 mg total) by mouth once daily. 30 tablet 11    multivitamin (THERAGRAN) per tablet Take 1 tablet by mouth once daily.       tretinoin (RETIN-A) 0.1 % cream Apply topically every evening. 20 g 6    zinc 50 mg Tab Take 50 mg by mouth once daily.        No facility-administered medications prior to visit.       Review of Systems   Constitutional: Negative for weight gain and weight loss.  "  HENT:  Positive for nosebleeds (see HPI).    Respiratory:  Negative for hemoptysis.    Hematologic/Lymphatic: Negative for bleeding problem. Does not bruise/bleed easily.   Skin:         Has been going to wound care for open sore on left leg (pretibial)   Gastrointestinal:  Positive for constipation. Negative for diarrhea, hematemesis, hematochezia, melena, nausea and vomiting.   Neurological:  Positive for numbness (feet and tips of fingers). Negative for focal weakness and loss of balance.    Objective:   Physical Exam  Constitutional:       Appearance: She is well-developed.      Comments: /63 (BP Location: Left arm, Patient Position: Sitting, BP Method: Small (Automatic))   Pulse (!) 117   Ht 5' 4" (1.626 m)   Wt 45.4 kg (100 lb 1.4 oz)   LMP  (LMP Unknown)   SpO2 97%   BMI 17.18 kg/m²      Neck:      Vascular: No carotid bruit or JVD.   Cardiovascular:      Rate and Rhythm: Tachycardia present. Rhythm irregularly irregular.      Pulses: Intact distal pulses.      Heart sounds: Normal heart sounds. No murmur heard.    No friction rub. No gallop.   Pulmonary:      Effort: Pulmonary effort is normal.      Breath sounds: Normal breath sounds. No wheezing or rales.   Abdominal:      General: Bowel sounds are normal. There is no abdominal bruit.      Palpations: Abdomen is soft. There is no hepatomegaly.      Tenderness: There is no abdominal tenderness.   Musculoskeletal:      Cervical back: Neck supple.   Skin:     Nails: There is no clubbing.   Neurological:      Mental Status: She is alert and oriented to person, place, and time.         Lab Results   Component Value Date     08/25/2022    K 3.9 08/30/2022    BUN 12 08/25/2022    CREATININE 0.8 08/25/2022    GLU 93 08/25/2022    HGBA1C 5.4 08/24/2015    CHOL 212 (H) 04/13/2022    HDL 64 04/13/2022    LDLCALC 111.4 04/13/2022    TRIG 183 (H) 04/13/2022    CHOLHDL 30.2 04/13/2022    HGB 14.1 04/13/2022    HCT 42.7 04/13/2022     " 04/13/2022    INR 1.1 07/07/2021     ECG (today) shows atrial fibrillation with an average heart rate of 89 beats per minute and with low limb lead voltage.  It was otherwise normal.  Assessment:     1. Longstanding persistent atrial fibrillation    2. Chronic heart failure with preserved ejection fraction    3. Hypothyroidism, unspecified type    4. Venous insufficiency of both lower extremities    5. Gastroesophageal reflux disease, unspecified whether esophagitis present    6. S/P lumbar fusion    7. Nasal congestion      The patient remains in atrial fibrillation and is otherwise asymptomatic.  However, in view of recent data suggesting that rhythm control is better than rate control, the patient will be sent back to cardiac electrophysiology for re-evaluation for candidacy for pulmonary vein isolation by ablation.  No change will be made in her cardiac medications at this time.    The patient has symptoms sinusitis and will therefore be sent for an ENT evaluation.  The streaking of blood may be due to her use of anticoagulation.     All other cardiovascular HCC diagnoses not discussed above are currently stable and do not need a change in management at this time.  All non-cardiovascular HCC diagnoses are not contributing to any cardiovascular problem at this time, unless mentioned above in the HPI and/or Assessment, and will be managed by the primary and/or appropriate specialty care providers.      Unless there are intervening problems, patient should return for re-evaluation in 6 months.     Plan:     Angelina was seen today for establish care, follow-up and results.    Diagnoses and all orders for this visit:    Longstanding persistent atrial fibrillation  -     EKG 12-lead; Future  -     Ambulatory referral/consult to Internal Medicine; Future; Expected date: 11/21/2022  -     Ambulatory referral/consult to Electrophysiology; Future; Expected date: 11/21/2022    Chronic heart failure with preserved ejection  fraction    Hypothyroidism, unspecified type    Venous insufficiency of both lower extremities  -     Ambulatory referral/consult to Internal Medicine; Future; Expected date: 11/21/2022    Gastroesophageal reflux disease, unspecified whether esophagitis present  -     Ambulatory referral/consult to Internal Medicine; Future; Expected date: 11/21/2022    S/P lumbar fusion    Nasal congestion  -     Ambulatory referral/consult to ENT; Future; Expected date: 11/14/2022        David Pereira MD  Consultative Cardiology       No

## 2022-11-14 ENCOUNTER — OFFICE VISIT (OUTPATIENT)
Dept: CARDIOLOGY | Facility: CLINIC | Age: 72
End: 2022-11-14
Payer: MEDICARE

## 2022-11-14 VITALS
WEIGHT: 100.06 LBS | HEART RATE: 117 BPM | SYSTOLIC BLOOD PRESSURE: 102 MMHG | HEIGHT: 64 IN | BODY MASS INDEX: 17.08 KG/M2 | DIASTOLIC BLOOD PRESSURE: 63 MMHG | OXYGEN SATURATION: 97 %

## 2022-11-14 DIAGNOSIS — I50.32 CHRONIC HEART FAILURE WITH PRESERVED EJECTION FRACTION: ICD-10-CM

## 2022-11-14 DIAGNOSIS — I87.2 VENOUS INSUFFICIENCY OF BOTH LOWER EXTREMITIES: ICD-10-CM

## 2022-11-14 DIAGNOSIS — I48.91 ATRIAL FIBRILLATION, UNSPECIFIED TYPE: Primary | ICD-10-CM

## 2022-11-14 DIAGNOSIS — E03.9 HYPOTHYROIDISM, UNSPECIFIED TYPE: ICD-10-CM

## 2022-11-14 DIAGNOSIS — I48.11 LONGSTANDING PERSISTENT ATRIAL FIBRILLATION: Primary | ICD-10-CM

## 2022-11-14 DIAGNOSIS — R09.81 NASAL CONGESTION: ICD-10-CM

## 2022-11-14 DIAGNOSIS — K21.9 GASTROESOPHAGEAL REFLUX DISEASE, UNSPECIFIED WHETHER ESOPHAGITIS PRESENT: ICD-10-CM

## 2022-11-14 DIAGNOSIS — Z98.1 S/P LUMBAR FUSION: ICD-10-CM

## 2022-11-14 PROBLEM — I27.20 PULMONARY HYPERTENSION: Status: RESOLVED | Noted: 2021-12-28 | Resolved: 2022-11-14

## 2022-11-14 PROBLEM — I27.20 PULMONARY HYPERTENSION: Status: RESOLVED | Noted: 2021-12-28 | Resolved: 2022-05-16

## 2022-11-14 PROBLEM — I27.20 PULMONARY HYPERTENSION: Status: RESOLVED | Noted: 2021-12-28 | Resolved: 2022-11-02

## 2022-11-14 PROCEDURE — 3074F SYST BP LT 130 MM HG: CPT | Mod: CPTII,S$GLB,, | Performed by: INTERNAL MEDICINE

## 2022-11-14 PROCEDURE — 1157F PR ADVANCE CARE PLAN OR EQUIV PRESENT IN MEDICAL RECORD: ICD-10-PCS | Mod: CPTII,S$GLB,, | Performed by: INTERNAL MEDICINE

## 2022-11-14 PROCEDURE — 1126F PR PAIN SEVERITY QUANTIFIED, NO PAIN PRESENT: ICD-10-PCS | Mod: CPTII,S$GLB,, | Performed by: INTERNAL MEDICINE

## 2022-11-14 PROCEDURE — 93000 ELECTROCARDIOGRAM COMPLETE: CPT | Mod: S$GLB,,, | Performed by: INTERNAL MEDICINE

## 2022-11-14 PROCEDURE — 1101F PR PT FALLS ASSESS DOC 0-1 FALLS W/OUT INJ PAST YR: ICD-10-PCS | Mod: CPTII,S$GLB,, | Performed by: INTERNAL MEDICINE

## 2022-11-14 PROCEDURE — 3288F PR FALLS RISK ASSESSMENT DOCUMENTED: ICD-10-PCS | Mod: CPTII,S$GLB,, | Performed by: INTERNAL MEDICINE

## 2022-11-14 PROCEDURE — 1126F AMNT PAIN NOTED NONE PRSNT: CPT | Mod: CPTII,S$GLB,, | Performed by: INTERNAL MEDICINE

## 2022-11-14 PROCEDURE — 1160F PR REVIEW ALL MEDS BY PRESCRIBER/CLIN PHARMACIST DOCUMENTED: ICD-10-PCS | Mod: CPTII,S$GLB,, | Performed by: INTERNAL MEDICINE

## 2022-11-14 PROCEDURE — 99999 PR PBB SHADOW E&M-EST. PATIENT-LVL V: CPT | Mod: PBBFAC,,, | Performed by: INTERNAL MEDICINE

## 2022-11-14 PROCEDURE — 3078F PR MOST RECENT DIASTOLIC BLOOD PRESSURE < 80 MM HG: ICD-10-PCS | Mod: CPTII,S$GLB,, | Performed by: INTERNAL MEDICINE

## 2022-11-14 PROCEDURE — 93000 EKG 12-LEAD: ICD-10-PCS | Mod: S$GLB,,, | Performed by: INTERNAL MEDICINE

## 2022-11-14 PROCEDURE — 99214 PR OFFICE/OUTPT VISIT, EST, LEVL IV, 30-39 MIN: ICD-10-PCS | Mod: 25,S$GLB,, | Performed by: INTERNAL MEDICINE

## 2022-11-14 PROCEDURE — 1101F PT FALLS ASSESS-DOCD LE1/YR: CPT | Mod: CPTII,S$GLB,, | Performed by: INTERNAL MEDICINE

## 2022-11-14 PROCEDURE — 1159F MED LIST DOCD IN RCRD: CPT | Mod: CPTII,S$GLB,, | Performed by: INTERNAL MEDICINE

## 2022-11-14 PROCEDURE — 1160F RVW MEDS BY RX/DR IN RCRD: CPT | Mod: CPTII,S$GLB,, | Performed by: INTERNAL MEDICINE

## 2022-11-14 PROCEDURE — 3008F PR BODY MASS INDEX (BMI) DOCUMENTED: ICD-10-PCS | Mod: CPTII,S$GLB,, | Performed by: INTERNAL MEDICINE

## 2022-11-14 PROCEDURE — 3008F BODY MASS INDEX DOCD: CPT | Mod: CPTII,S$GLB,, | Performed by: INTERNAL MEDICINE

## 2022-11-14 PROCEDURE — 3288F FALL RISK ASSESSMENT DOCD: CPT | Mod: CPTII,S$GLB,, | Performed by: INTERNAL MEDICINE

## 2022-11-14 PROCEDURE — 1159F PR MEDICATION LIST DOCUMENTED IN MEDICAL RECORD: ICD-10-PCS | Mod: CPTII,S$GLB,, | Performed by: INTERNAL MEDICINE

## 2022-11-14 PROCEDURE — 99999 PR PBB SHADOW E&M-EST. PATIENT-LVL V: ICD-10-PCS | Mod: PBBFAC,,, | Performed by: INTERNAL MEDICINE

## 2022-11-14 PROCEDURE — 3078F DIAST BP <80 MM HG: CPT | Mod: CPTII,S$GLB,, | Performed by: INTERNAL MEDICINE

## 2022-11-14 PROCEDURE — 99214 OFFICE O/P EST MOD 30 MIN: CPT | Mod: 25,S$GLB,, | Performed by: INTERNAL MEDICINE

## 2022-11-14 PROCEDURE — 3074F PR MOST RECENT SYSTOLIC BLOOD PRESSURE < 130 MM HG: ICD-10-PCS | Mod: CPTII,S$GLB,, | Performed by: INTERNAL MEDICINE

## 2022-11-14 PROCEDURE — 1157F ADVNC CARE PLAN IN RCRD: CPT | Mod: CPTII,S$GLB,, | Performed by: INTERNAL MEDICINE

## 2022-11-14 NOTE — Clinical Note
Thank you for allowing me to follow-up with Angelina Man for chronic atrial fibrillation. Please see my note for details of this encounter. If you have any questions, please contact me.  Thank you again for allowing to participate in the care of this patient.

## 2022-11-15 ENCOUNTER — OFFICE VISIT (OUTPATIENT)
Dept: OTOLARYNGOLOGY | Facility: CLINIC | Age: 72
End: 2022-11-15
Payer: MEDICARE

## 2022-11-15 VITALS — HEART RATE: 74 BPM | DIASTOLIC BLOOD PRESSURE: 80 MMHG | SYSTOLIC BLOOD PRESSURE: 120 MMHG | TEMPERATURE: 98 F

## 2022-11-15 DIAGNOSIS — J01.90 ACUTE NON-RECURRENT SINUSITIS, UNSPECIFIED LOCATION: Primary | ICD-10-CM

## 2022-11-15 DIAGNOSIS — R09.81 NASAL CONGESTION: ICD-10-CM

## 2022-11-15 PROCEDURE — 1101F PT FALLS ASSESS-DOCD LE1/YR: CPT | Mod: CPTII,S$GLB,, | Performed by: OTOLARYNGOLOGY

## 2022-11-15 PROCEDURE — 3074F PR MOST RECENT SYSTOLIC BLOOD PRESSURE < 130 MM HG: ICD-10-PCS | Mod: CPTII,S$GLB,, | Performed by: OTOLARYNGOLOGY

## 2022-11-15 PROCEDURE — 1126F PR PAIN SEVERITY QUANTIFIED, NO PAIN PRESENT: ICD-10-PCS | Mod: CPTII,S$GLB,, | Performed by: OTOLARYNGOLOGY

## 2022-11-15 PROCEDURE — 3079F DIAST BP 80-89 MM HG: CPT | Mod: CPTII,S$GLB,, | Performed by: OTOLARYNGOLOGY

## 2022-11-15 PROCEDURE — 99204 PR OFFICE/OUTPT VISIT, NEW, LEVL IV, 45-59 MIN: ICD-10-PCS | Mod: S$GLB,,, | Performed by: OTOLARYNGOLOGY

## 2022-11-15 PROCEDURE — 1101F PR PT FALLS ASSESS DOC 0-1 FALLS W/OUT INJ PAST YR: ICD-10-PCS | Mod: CPTII,S$GLB,, | Performed by: OTOLARYNGOLOGY

## 2022-11-15 PROCEDURE — 3288F PR FALLS RISK ASSESSMENT DOCUMENTED: ICD-10-PCS | Mod: CPTII,S$GLB,, | Performed by: OTOLARYNGOLOGY

## 2022-11-15 PROCEDURE — 1157F PR ADVANCE CARE PLAN OR EQUIV PRESENT IN MEDICAL RECORD: ICD-10-PCS | Mod: CPTII,S$GLB,, | Performed by: OTOLARYNGOLOGY

## 2022-11-15 PROCEDURE — 99204 OFFICE O/P NEW MOD 45 MIN: CPT | Mod: S$GLB,,, | Performed by: OTOLARYNGOLOGY

## 2022-11-15 PROCEDURE — 1126F AMNT PAIN NOTED NONE PRSNT: CPT | Mod: CPTII,S$GLB,, | Performed by: OTOLARYNGOLOGY

## 2022-11-15 PROCEDURE — 3288F FALL RISK ASSESSMENT DOCD: CPT | Mod: CPTII,S$GLB,, | Performed by: OTOLARYNGOLOGY

## 2022-11-15 PROCEDURE — 1157F ADVNC CARE PLAN IN RCRD: CPT | Mod: CPTII,S$GLB,, | Performed by: OTOLARYNGOLOGY

## 2022-11-15 PROCEDURE — 1159F MED LIST DOCD IN RCRD: CPT | Mod: CPTII,S$GLB,, | Performed by: OTOLARYNGOLOGY

## 2022-11-15 PROCEDURE — 3079F PR MOST RECENT DIASTOLIC BLOOD PRESSURE 80-89 MM HG: ICD-10-PCS | Mod: CPTII,S$GLB,, | Performed by: OTOLARYNGOLOGY

## 2022-11-15 PROCEDURE — 3074F SYST BP LT 130 MM HG: CPT | Mod: CPTII,S$GLB,, | Performed by: OTOLARYNGOLOGY

## 2022-11-15 PROCEDURE — 1159F PR MEDICATION LIST DOCUMENTED IN MEDICAL RECORD: ICD-10-PCS | Mod: CPTII,S$GLB,, | Performed by: OTOLARYNGOLOGY

## 2022-11-15 RX ORDER — AMOXICILLIN AND CLAVULANATE POTASSIUM 875; 125 MG/1; MG/1
1 TABLET, FILM COATED ORAL 2 TIMES DAILY
Qty: 20 TABLET | Refills: 0 | Status: SHIPPED | OUTPATIENT
Start: 2022-11-15 | End: 2022-11-25

## 2022-11-15 NOTE — PROGRESS NOTES
Ms. Man     There were no vitals filed for this visit.    Chief Complaint:  Sinus infection     HPI:   is a 72-year-old white female who presents with complaints of 2 weeks of facial pressure pain and purulent nasal discharge.  She denies any fever but has been producing greenish-yellow even blood-tinged nasal discharge.  She is not been COVID tested and has not seen her PCP.  She also complains of chest congestion.  She denies any nasal obstruction at this time or of any previous history of recurrent sinus infections.    SNOT22- 59 NOSE- 25    Review of Systems:  Constitutional:   weight loss or weight gain: Negative  Allergy/Immunologic:   Negative  Nasal Congestion/Obstruction:   Positive for nasal congestion  Nosebleeds:   Negative  Sinus infections:   Positive as above  Headache/Facial Pain:   Positive  Snoring/VIDAL:   Negative  Throat: Infections/Pain:   Positive for cough  Hoarseness/Speech Disturbance:   Negative  Trauma Hx:  Negative    Cardiovascular:  M/I Angina: Negative, positive for atrial fibrillation and history of cardioversion.  Hypertension: Negative  Endocrine:    DM/Steroids: Negative  GI:   Dysphagia/Reflux:  Positive  :   GYN Pregnancy: Negative  Renal:   Dialysis: Negative  Lymphatic:   Neck Mass/Lymphadenopathy: Negative  Muscoloskeletal:   Negative  Hematologic:   Bleeding Disorders/Anemia: Negative  Neurologic:    Cranial/Neuralgia: Negative  Pulmonary:   Asthma/SOB/Cough: Negative  Skin Disorders: Negative    Past Medical/Surgical/Family/Social History:    ENT Surgery: Negative  Occupational Exposure: Negative   Problems: Negative  Cancer: Negative    Past Family History:   Family history of Cancer: Negative    Past Social History:   Tobacco: Nonsmoker   Alcohol:  Weekend Social Drinker      Allergies and medications: Reviewed per med card.    Physical Examination:  Ears:   External auditory canals:  Clear   Hearing: Grossly intact   Tympanic Membranes:  Clear  Nose:   External: Normal   Intranasal:  Her septum is straight, turbinates 1 to 2+, nasal airway clear at this time.  Mouth:   Intraorally: Lips, teeth, and gums: Normal   Oropharynx: Normal   Mucosa: Normal   Tongue: Normal  Throat:      Palate: Normal palate with elevation   Tonsils:  Minimal bilaterally.   Posterior Pharynx: Normal  Fiberoptic exam: Not performed  Head/Face:     Inspection: Normal and atraumatic   Palpation/Percussion:  Tender to percussion in the ethmoid and maxillary regions right greater than left.   Facial strength: Normal and symmetric   Salivary glands: Normal  Neck: Supple  Thyroid: No masses  Lymphatics: No nodes  Respiratory:   Effort: Normal  Eyes:   Ocular Mobility: Normal   Vision: Grossly intact  Neuro/Psych:   Cranial Nerves: Grossly Intact   Orientation: Normal   Mood/Affect: Normal      Assessment/Plan:  I have discussed my findings with her in detail as well as my recommendations for treatment.  I have given her literature on saline sinus rinses including issues with distilled water only described this to be used in detail with her.  I have also suggested Flonase nasal spray and I have described this is to be administered as well.  I will prescribe Augmentin 875 p.o. b.i.d. times 10 days for her.  She will continue to pursue seeing her PCP regarding her chest issues and will contact us if this does not alleviate her sinus problems.

## 2022-11-16 ENCOUNTER — OFFICE VISIT (OUTPATIENT)
Dept: OPHTHALMOLOGY | Facility: CLINIC | Age: 72
End: 2022-11-16
Payer: MEDICARE

## 2022-11-16 DIAGNOSIS — H52.7 REFRACTIVE ERROR: ICD-10-CM

## 2022-11-16 DIAGNOSIS — H04.123 DRY EYE SYNDROME OF BOTH EYES: ICD-10-CM

## 2022-11-16 DIAGNOSIS — H43.813 VITREOUS DETACHMENT OF BOTH EYES: ICD-10-CM

## 2022-11-16 DIAGNOSIS — H26.9 CORTICAL CATARACT OF BOTH EYES: ICD-10-CM

## 2022-11-16 DIAGNOSIS — H25.13 NUCLEAR SCLEROSIS OF BOTH EYES: Primary | ICD-10-CM

## 2022-11-16 PROCEDURE — 1160F PR REVIEW ALL MEDS BY PRESCRIBER/CLIN PHARMACIST DOCUMENTED: ICD-10-PCS | Mod: CPTII,S$GLB,, | Performed by: OPHTHALMOLOGY

## 2022-11-16 PROCEDURE — 92136 OPHTHALMIC BIOMETRY: CPT | Mod: LT,S$GLB,, | Performed by: OPHTHALMOLOGY

## 2022-11-16 PROCEDURE — 1157F PR ADVANCE CARE PLAN OR EQUIV PRESENT IN MEDICAL RECORD: ICD-10-PCS | Mod: CPTII,S$GLB,, | Performed by: OPHTHALMOLOGY

## 2022-11-16 PROCEDURE — 99999 PR PBB SHADOW E&M-EST. PATIENT-LVL IV: ICD-10-PCS | Mod: PBBFAC,,, | Performed by: OPHTHALMOLOGY

## 2022-11-16 PROCEDURE — 99999 PR PBB SHADOW E&M-EST. PATIENT-LVL IV: CPT | Mod: PBBFAC,,, | Performed by: OPHTHALMOLOGY

## 2022-11-16 PROCEDURE — 3288F FALL RISK ASSESSMENT DOCD: CPT | Mod: CPTII,S$GLB,, | Performed by: OPHTHALMOLOGY

## 2022-11-16 PROCEDURE — 1101F PR PT FALLS ASSESS DOC 0-1 FALLS W/OUT INJ PAST YR: ICD-10-PCS | Mod: CPTII,S$GLB,, | Performed by: OPHTHALMOLOGY

## 2022-11-16 PROCEDURE — 1159F MED LIST DOCD IN RCRD: CPT | Mod: CPTII,S$GLB,, | Performed by: OPHTHALMOLOGY

## 2022-11-16 PROCEDURE — 92136 BIOMETRY: ICD-10-PCS | Mod: LT,S$GLB,, | Performed by: OPHTHALMOLOGY

## 2022-11-16 PROCEDURE — 1101F PT FALLS ASSESS-DOCD LE1/YR: CPT | Mod: CPTII,S$GLB,, | Performed by: OPHTHALMOLOGY

## 2022-11-16 PROCEDURE — 3288F PR FALLS RISK ASSESSMENT DOCUMENTED: ICD-10-PCS | Mod: CPTII,S$GLB,, | Performed by: OPHTHALMOLOGY

## 2022-11-16 PROCEDURE — 1126F AMNT PAIN NOTED NONE PRSNT: CPT | Mod: CPTII,S$GLB,, | Performed by: OPHTHALMOLOGY

## 2022-11-16 PROCEDURE — 1160F RVW MEDS BY RX/DR IN RCRD: CPT | Mod: CPTII,S$GLB,, | Performed by: OPHTHALMOLOGY

## 2022-11-16 PROCEDURE — 1157F ADVNC CARE PLAN IN RCRD: CPT | Mod: CPTII,S$GLB,, | Performed by: OPHTHALMOLOGY

## 2022-11-16 PROCEDURE — 92004 COMPRE OPH EXAM NEW PT 1/>: CPT | Mod: S$GLB,,, | Performed by: OPHTHALMOLOGY

## 2022-11-16 PROCEDURE — 1159F PR MEDICATION LIST DOCUMENTED IN MEDICAL RECORD: ICD-10-PCS | Mod: CPTII,S$GLB,, | Performed by: OPHTHALMOLOGY

## 2022-11-16 PROCEDURE — 92004 PR EYE EXAM, NEW PATIENT,COMPREHESV: ICD-10-PCS | Mod: S$GLB,,, | Performed by: OPHTHALMOLOGY

## 2022-11-16 PROCEDURE — 1126F PR PAIN SEVERITY QUANTIFIED, NO PAIN PRESENT: ICD-10-PCS | Mod: CPTII,S$GLB,, | Performed by: OPHTHALMOLOGY

## 2022-11-17 ENCOUNTER — PATIENT MESSAGE (OUTPATIENT)
Dept: OPHTHALMOLOGY | Facility: CLINIC | Age: 72
End: 2022-11-17
Payer: MEDICARE

## 2022-11-17 ENCOUNTER — TELEPHONE (OUTPATIENT)
Dept: OPHTHALMOLOGY | Facility: CLINIC | Age: 72
End: 2022-11-17
Payer: MEDICARE

## 2022-11-17 DIAGNOSIS — H25.13 NUCLEAR SCLEROTIC CATARACT OF BOTH EYES: Primary | ICD-10-CM

## 2022-11-17 RX ORDER — KETOROLAC TROMETHAMINE 5 MG/ML
1 SOLUTION OPHTHALMIC 4 TIMES DAILY
Qty: 5 ML | Refills: 1 | Status: SHIPPED | OUTPATIENT
Start: 2022-12-03 | End: 2023-01-03

## 2022-11-17 RX ORDER — OFLOXACIN 3 MG/ML
1 SOLUTION/ DROPS OPHTHALMIC 4 TIMES DAILY
Qty: 5 ML | Refills: 1 | Status: SHIPPED | OUTPATIENT
Start: 2022-12-03 | End: 2022-12-13

## 2022-11-17 RX ORDER — PREDNISOLONE ACETATE 10 MG/ML
1 SUSPENSION/ DROPS OPHTHALMIC 4 TIMES DAILY
Qty: 5 ML | Refills: 1 | Status: SHIPPED | OUTPATIENT
Start: 2022-12-06 | End: 2023-01-05

## 2022-11-17 NOTE — PROGRESS NOTES
Subjective:       Patient ID: Angelina Man is a 72 y.o. female.    Chief Complaint: Cataract (Patient Angelina Man is a 72 year old female.) and Glaucoma Suspect    HPI     Cataract     Additional comments: Patient Angelina Man is a 72 year old female.           Comments    Pt referred by Dr. Diaz for cataract evaluation OU. Pt states that   her VA OS>OD has become progressively worse. Pt states that VA OD is   better for distance and VA OS is better for near. Pt states trouble with   glare and light sensitivity. Pt denies any eye pain.    DLS: 10/04/2022 with Dr. Diaz    Gtts:  1. Restasis BID OU  2. Systane gel qhs OU    POHx:  1. Nuclear sclerosis of both eyes  2. Bilateral dry eyes  3. 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             Assessment:       1. Nuclear sclerosis of both eyes    2. Cortical cataract of both eyes    3. Dry eye syndrome of both eyes    4. Vitreous detachment of both eyes    5. Refractive error          Plan:       Visually significant cataract OU -Pt. Wants Sx.     SONAL-Doing well.  PVD's OU-Stable.  RE-Pt does not want Toric IOL's.      Cataract Surgery Consent: Patient with a visually significant cataract with difficulties of ADLs, reading, driving, night vision, glare (any and all).  Discussed with Patient/Family/Caregiver: options, risks and benefits, expectations of cataract surgery, utilized an eye model with questions and answers to facilitate discussion.  Discussed lens options and patient understands that glasses may be required for optimal vision for distance and/or near vision after cataract surgery.  The Patient/Family/Caregiver  voice good understanding and patient wishes to proceed with surgery.  The patient will likely benefit from surgery and patient signed consent for Left Eye.   Will call Pt to schedule CE OS 1st CNAOTO 21.0,                                              OD 2nd CNAOTO 20.5.  AT's.

## 2022-11-18 ENCOUNTER — TELEPHONE (OUTPATIENT)
Dept: OPHTHALMOLOGY | Facility: CLINIC | Age: 72
End: 2022-11-18
Payer: MEDICARE

## 2022-11-18 DIAGNOSIS — H25.12 NS (NUCLEAR SCLEROSIS), LEFT: Primary | ICD-10-CM

## 2022-11-21 ENCOUNTER — TELEPHONE (OUTPATIENT)
Dept: OPHTHALMOLOGY | Facility: CLINIC | Age: 72
End: 2022-11-21
Payer: MEDICARE

## 2022-11-21 DIAGNOSIS — H25.11 NS (NUCLEAR SCLEROSIS), RIGHT: Primary | ICD-10-CM

## 2022-11-22 ENCOUNTER — OFFICE VISIT (OUTPATIENT)
Dept: URGENT CARE | Facility: CLINIC | Age: 72
End: 2022-11-22
Payer: MEDICARE

## 2022-11-22 VITALS
DIASTOLIC BLOOD PRESSURE: 77 MMHG | HEART RATE: 81 BPM | HEIGHT: 64 IN | SYSTOLIC BLOOD PRESSURE: 110 MMHG | WEIGHT: 100 LBS | BODY MASS INDEX: 17.07 KG/M2 | TEMPERATURE: 97 F | OXYGEN SATURATION: 100 % | RESPIRATION RATE: 17 BRPM

## 2022-11-22 DIAGNOSIS — J06.9 UPPER RESPIRATORY TRACT INFECTION, UNSPECIFIED TYPE: Primary | ICD-10-CM

## 2022-11-22 LAB
CTP QC/QA: YES
SARS-COV-2 AG RESP QL IA.RAPID: NEGATIVE

## 2022-11-22 PROCEDURE — 1159F PR MEDICATION LIST DOCUMENTED IN MEDICAL RECORD: ICD-10-PCS | Mod: CPTII,S$GLB,, | Performed by: FAMILY MEDICINE

## 2022-11-22 PROCEDURE — 3074F PR MOST RECENT SYSTOLIC BLOOD PRESSURE < 130 MM HG: ICD-10-PCS | Mod: CPTII,S$GLB,, | Performed by: FAMILY MEDICINE

## 2022-11-22 PROCEDURE — 99214 OFFICE O/P EST MOD 30 MIN: CPT | Mod: S$GLB,,, | Performed by: FAMILY MEDICINE

## 2022-11-22 PROCEDURE — 87811 SARS-COV-2 COVID19 W/OPTIC: CPT | Mod: QW,S$GLB,, | Performed by: FAMILY MEDICINE

## 2022-11-22 PROCEDURE — 3078F PR MOST RECENT DIASTOLIC BLOOD PRESSURE < 80 MM HG: ICD-10-PCS | Mod: CPTII,S$GLB,, | Performed by: FAMILY MEDICINE

## 2022-11-22 PROCEDURE — 1126F AMNT PAIN NOTED NONE PRSNT: CPT | Mod: CPTII,S$GLB,, | Performed by: FAMILY MEDICINE

## 2022-11-22 PROCEDURE — 87811 SARS CORONAVIRUS 2 ANTIGEN POCT, MANUAL READ: ICD-10-PCS | Mod: QW,S$GLB,, | Performed by: FAMILY MEDICINE

## 2022-11-22 PROCEDURE — 3008F BODY MASS INDEX DOCD: CPT | Mod: CPTII,S$GLB,, | Performed by: FAMILY MEDICINE

## 2022-11-22 PROCEDURE — 3078F DIAST BP <80 MM HG: CPT | Mod: CPTII,S$GLB,, | Performed by: FAMILY MEDICINE

## 2022-11-22 PROCEDURE — 1126F PR PAIN SEVERITY QUANTIFIED, NO PAIN PRESENT: ICD-10-PCS | Mod: CPTII,S$GLB,, | Performed by: FAMILY MEDICINE

## 2022-11-22 PROCEDURE — 1157F PR ADVANCE CARE PLAN OR EQUIV PRESENT IN MEDICAL RECORD: ICD-10-PCS | Mod: CPTII,S$GLB,, | Performed by: FAMILY MEDICINE

## 2022-11-22 PROCEDURE — 99214 PR OFFICE/OUTPT VISIT, EST, LEVL IV, 30-39 MIN: ICD-10-PCS | Mod: S$GLB,,, | Performed by: FAMILY MEDICINE

## 2022-11-22 PROCEDURE — 1159F MED LIST DOCD IN RCRD: CPT | Mod: CPTII,S$GLB,, | Performed by: FAMILY MEDICINE

## 2022-11-22 PROCEDURE — 1157F ADVNC CARE PLAN IN RCRD: CPT | Mod: CPTII,S$GLB,, | Performed by: FAMILY MEDICINE

## 2022-11-22 PROCEDURE — 3008F PR BODY MASS INDEX (BMI) DOCUMENTED: ICD-10-PCS | Mod: CPTII,S$GLB,, | Performed by: FAMILY MEDICINE

## 2022-11-22 PROCEDURE — 3074F SYST BP LT 130 MM HG: CPT | Mod: CPTII,S$GLB,, | Performed by: FAMILY MEDICINE

## 2022-11-22 NOTE — PROGRESS NOTES
"Subjective:       Patient ID: Angelina Man is a 72 y.o. female.    Vitals:  height is 5' 4" (1.626 m) and weight is 45.4 kg (100 lb). Her temperature is 97.4 °F (36.3 °C). Her blood pressure is 110/77 and her pulse is 81. Her respiration is 17 and oxygen saturation is 100%.     Chief Complaint: Cough    This is a 72 y.o. female who presents today with a chief complaint of   Chest congestion. Symptoms for almost 2 weeks. Pt was treated for a sinus infection with antibiotics. Pt states getting a  little better, but still taking Mucinex. Pt questioning how long does she continue the Mucinex. She is scheduled to have cataract surgery soon.    Cough  This is a new problem. The current episode started 1 to 4 weeks ago. The problem has been gradually improving. The problem occurs every few minutes. The cough is Productive of sputum. Nothing aggravates the symptoms. She has tried OTC cough suppressant for the symptoms. The treatment provided mild relief. Her past medical history is significant for bronchitis.     Respiratory:  Positive for cough.      Objective:      Physical Exam   Constitutional: She does not appear ill. No distress. normal  HENT:   Head: Normocephalic and atraumatic.   Nose: Congestion present.   Mouth/Throat: Mucous membranes are moist. No posterior oropharyngeal erythema.   Eyes: Pupils are equal, round, and reactive to light. Extraocular movement intact   Cardiovascular: Normal rate, regular rhythm, normal heart sounds and normal pulses.   Pulmonary/Chest: Effort normal and breath sounds normal.   Abdominal: Normal appearance.   Neurological: She is alert.   Nursing note and vitals reviewed.      Results for orders placed or performed in visit on 11/22/22   SARS Coronavirus 2 Antigen, POCT Manual Read   Result Value Ref Range    SARS Coronavirus 2 Antigen Negative Negative     Acceptable Yes      *Note: Due to a large number of results and/or encounters for the requested time period, " some results have not been displayed. A complete set of results can be found in Results Review.      Assessment:       1. Upper respiratory tract infection, unspecified type          Plan:         Upper respiratory tract infection, unspecified type  -     SARS Coronavirus 2 Antigen, POCT Manual Read     Continue with flonase and muccinex OTC. RTC prn worsening symptoms

## 2022-11-28 ENCOUNTER — PATIENT MESSAGE (OUTPATIENT)
Dept: OPTOMETRY | Facility: CLINIC | Age: 72
End: 2022-11-28
Payer: MEDICARE

## 2022-11-28 ENCOUNTER — PATIENT MESSAGE (OUTPATIENT)
Dept: CARDIOLOGY | Facility: CLINIC | Age: 72
End: 2022-11-28
Payer: MEDICARE

## 2022-11-28 ENCOUNTER — PATIENT MESSAGE (OUTPATIENT)
Dept: SURGERY | Facility: OTHER | Age: 72
End: 2022-11-28
Payer: MEDICARE

## 2022-11-29 ENCOUNTER — TELEPHONE (OUTPATIENT)
Dept: ELECTROPHYSIOLOGY | Facility: CLINIC | Age: 72
End: 2022-11-29
Payer: MEDICARE

## 2022-11-29 NOTE — TELEPHONE ENCOUNTER
Called patient regarding message sent about appointment to discuss ablation with Dr. Bray. Patient scheduled to see Sadaf Garcia NP on 11/30/22. Patient would like to speak with Dr. Bray to revisit need for ablation. She states she was told by Dr. Pereira that she would be better in a regular rhythm. I offered patient a virtual visit with Dr. Bray but patient declined. She wants to see him in person. Patient scheduled for 2/1/23 and placed on the cancellation list. Patient agreed to appointment.

## 2022-11-29 NOTE — TELEPHONE ENCOUNTER
Called patient regarding her message sent about discussing an ablation. Patient did not answer. I left a message with my call back number.

## 2022-11-30 ENCOUNTER — PATIENT MESSAGE (OUTPATIENT)
Dept: SURGERY | Facility: OTHER | Age: 72
End: 2022-11-30
Payer: MEDICARE

## 2022-11-30 ENCOUNTER — PATIENT MESSAGE (OUTPATIENT)
Dept: DERMATOLOGY | Facility: CLINIC | Age: 72
End: 2022-11-30
Payer: MEDICARE

## 2022-12-04 NOTE — H&P
[facility], [place], operated by [facility] ([place])  History & Physical    Subjective:      Chief Complaint/Reason for Admission:     Angelina Man is a 72 y.o. female.    Past Medical History:   Diagnosis Date    Arthritis     Atrial fibrillation     Bronchitis     Cataract     Dry eyes     Glaucoma, suspect - Both Eyes     Hypothyroidism 06/23/2016    Pulmonary hypertension     Rash     Squamous cell carcinoma     in-situ right upper inner arm, right wrist    Status post lumbar surgery 06/23/2016    Stress fracture     Thyroid disease      Past Surgical History:   Procedure Laterality Date    ANGIOPLASTY      CERVICAL FUSION      COLONOSCOPY      COLONOSCOPY N/A 11/14/2017    Procedure: COLONOSCOPY;  Surgeon: Kasi Mercado MD;  [facility]  Service: Endoscopy;  Laterality: N/A;    ESOPHAGOGASTRODUODENOSCOPY N/A 10/10/2019    Procedure: EGD (ESOPHAGOGASTRODUODENOSCOPY);  Surgeon: Rg Milton MD;  [facility]  Service: Endoscopy;  Laterality: N/A;    ESOPHAGOGASTRODUODENOSCOPY N/A 10/6/2021    Procedure: EGD (ESOPHAGOGASTRODUODENOSCOPY);  Surgeon: Rg Milton MD;  [facility]  Service: Endoscopy;  Laterality: N/A;  covid test 10/3 zo, instr emailed/portal -ml    l4-5 mid discectomy Left 03/2017    l4-5 MIS diskectomy Right 05/2016    RIGHT HEART CATHETERIZATION Right 1/27/2022    Procedure: INSERTION, CATHETER, RIGHT HEART;  Surgeon: Satnam Pickard Jr., MD;  Location: [facility] CATH LAB;  Service: Cardiology;  Laterality: Right;    TREATMENT OF CARDIAC ARRHYTHMIA N/A 7/17/2020    Procedure: CARDIOVERSION;  Surgeon: Kwan Bray MD;  Location: [facility] EP LAB;  Service: Cardiology;  Laterality: N/A;  AF,DCCV/SANGITA, ANES, SK, 746    TREATMENT OF CARDIAC ARRHYTHMIA N/A 7/14/2021    Procedure: CARDIOVERSION;  Surgeon: SYDNIE Cedillo MD;  Location: [facility] EP LAB;  Service: Cardiology;  Laterality: N/A;  AF, SANGITA, DCCV, MAC, EH, 3 Prep     Family History   Problem Relation Age  of Onset    Cancer Mother         Lung cancer    Heart failure Father     Colon cancer Father     Hypertension Father     Cataracts Father     Cancer Father 80        colon    No Known Problems Sister     No Known Problems Brother     Melanoma Daughter     Diabetes Son     Heart disease Son     Cancer Son         esophageal cancer    No Known Problems Maternal Aunt     No Known Problems Maternal Uncle     No Known Problems Paternal Aunt     No Known Problems Paternal Uncle     No Known Problems Maternal Grandmother     No Known Problems Maternal Grandfather     No Known Problems Paternal Grandmother     No Known Problems Paternal Grandfather     Amblyopia Neg Hx     Blindness Neg Hx     Glaucoma Neg Hx     Macular degeneration Neg Hx     Retinal detachment Neg Hx     Strabismus Neg Hx     Stroke Neg Hx     Thyroid disease Neg Hx     Breast cancer Neg Hx     Ovarian cancer Neg Hx      Social History     Tobacco Use    Smoking status: Never    Smokeless tobacco: Never   Substance Use Topics    Alcohol use: Yes     Alcohol/week: 4.0 standard drinks     Types: 4 Glasses of wine per week     Comment: 2 glasses of wine on weekends    Drug use: No       No medications prior to admission.     Review of patient's allergies indicates:  No Known Allergies     Review of Systems   Eyes:  Positive for blurred vision.   All other systems reviewed and are negative.    Objective:      Vital Signs (Most Recent)       Vital Signs Range (Last 24H):       Physical Exam  Constitutional:       Appearance: She is well-developed.   HENT:      Head: Normocephalic.   Eyes:      Conjunctiva/sclera: Conjunctivae normal.      Pupils: Pupils are equal, round, and reactive to light.   Cardiovascular:      Rate and Rhythm: Normal rate and regular rhythm.      Heart sounds: Normal heart sounds.   Pulmonary:      Effort: Pulmonary effort is normal.      Breath sounds: Normal breath sounds.   Abdominal:      General: Bowel sounds are normal.       Palpations: Abdomen is soft.   Musculoskeletal:         General: Normal range of motion.      Cervical back: Normal range of motion and neck supple.   Skin:     General: Skin is warm.   Neurological:      Mental Status: She is alert and oriented to person, place, and time.       Data Review:     ECG:     Assessment:      Cataract OS.    Plan:    CE OS.

## 2022-12-06 ENCOUNTER — ANESTHESIA EVENT (OUTPATIENT)
Dept: SURGERY | Facility: OTHER | Age: 72
End: 2022-12-06
Payer: MEDICARE

## 2022-12-06 ENCOUNTER — HOSPITAL ENCOUNTER (OUTPATIENT)
Facility: OTHER | Age: 72
Discharge: HOME | End: 2022-12-06
Attending: OPHTHALMOLOGY | Admitting: OPHTHALMOLOGY
Payer: MEDICARE

## 2022-12-06 ENCOUNTER — ANESTHESIA (OUTPATIENT)
Dept: SURGERY | Facility: OTHER | Age: 72
End: 2022-12-06
Payer: MEDICARE

## 2022-12-06 VITALS
RESPIRATION RATE: 16 BRPM | HEART RATE: 72 BPM | TEMPERATURE: 98 F | BODY MASS INDEX: 17.24 KG/M2 | OXYGEN SATURATION: 100 % | WEIGHT: 101 LBS | HEIGHT: 64 IN | SYSTOLIC BLOOD PRESSURE: 128 MMHG | DIASTOLIC BLOOD PRESSURE: 68 MMHG

## 2022-12-06 DIAGNOSIS — H25.12 NUCLEAR SCLEROTIC CATARACT OF LEFT EYE: Primary | ICD-10-CM

## 2022-12-06 PROCEDURE — 25000003 PHARM REV CODE 250: Performed by: OPHTHALMOLOGY

## 2022-12-06 PROCEDURE — 63600175 PHARM REV CODE 636 W HCPCS: Performed by: NURSE ANESTHETIST, CERTIFIED REGISTERED

## 2022-12-06 PROCEDURE — 63600175 PHARM REV CODE 636 W HCPCS: Performed by: OPHTHALMOLOGY

## 2022-12-06 PROCEDURE — 36000706: Performed by: OPHTHALMOLOGY

## 2022-12-06 PROCEDURE — 71000015 HC POSTOP RECOV 1ST HR: Performed by: OPHTHALMOLOGY

## 2022-12-06 PROCEDURE — 37000009 HC ANESTHESIA EA ADD 15 MINS: Performed by: OPHTHALMOLOGY

## 2022-12-06 PROCEDURE — 37000008 HC ANESTHESIA 1ST 15 MINUTES: Performed by: OPHTHALMOLOGY

## 2022-12-06 PROCEDURE — V2632 POST CHMBR INTRAOCULAR LENS: HCPCS | Performed by: OPHTHALMOLOGY

## 2022-12-06 PROCEDURE — 36000707: Performed by: OPHTHALMOLOGY

## 2022-12-06 PROCEDURE — 66984 PR REMOVAL, CATARACT, W/INSRT INTRAOC LENS, W/O ENDO CYCLO: ICD-10-PCS | Mod: LT,,, | Performed by: OPHTHALMOLOGY

## 2022-12-06 PROCEDURE — 66984 XCAPSL CTRC RMVL W/O ECP: CPT | Mod: LT,,, | Performed by: OPHTHALMOLOGY

## 2022-12-06 DEVICE — LENS CLAREON AUTONOME 21.0D: Type: IMPLANTABLE DEVICE | Site: EYE | Status: FUNCTIONAL

## 2022-12-06 RX ORDER — ACETAMINOPHEN 325 MG/1
650 TABLET ORAL EVERY 4 HOURS PRN
Status: DISCONTINUED | OUTPATIENT
Start: 2022-12-06 | End: 2022-12-06 | Stop reason: HOSPADM

## 2022-12-06 RX ORDER — EPINEPHRINE 1 MG/ML
INJECTION, SOLUTION, CONCENTRATE INTRAVENOUS
Status: DISCONTINUED | OUTPATIENT
Start: 2022-12-06 | End: 2022-12-06 | Stop reason: HOSPADM

## 2022-12-06 RX ORDER — LIDOCAINE HYDROCHLORIDE 40 MG/ML
INJECTION, SOLUTION RETROBULBAR
Status: DISCONTINUED | OUTPATIENT
Start: 2022-12-06 | End: 2022-12-06 | Stop reason: HOSPADM

## 2022-12-06 RX ORDER — CYCLOPENTOLATE HYDROCHLORIDE 10 MG/ML
1 SOLUTION/ DROPS OPHTHALMIC
Status: DISCONTINUED | OUTPATIENT
Start: 2022-12-06 | End: 2022-12-06 | Stop reason: HOSPADM

## 2022-12-06 RX ORDER — PREDNISOLONE ACETATE 10 MG/ML
SUSPENSION/ DROPS OPHTHALMIC
Status: DISCONTINUED | OUTPATIENT
Start: 2022-12-06 | End: 2022-12-06 | Stop reason: HOSPADM

## 2022-12-06 RX ORDER — TROPICAMIDE 10 MG/ML
1 SOLUTION/ DROPS OPHTHALMIC
Status: COMPLETED | OUTPATIENT
Start: 2022-12-06 | End: 2022-12-06

## 2022-12-06 RX ORDER — FENTANYL CITRATE 50 UG/ML
INJECTION, SOLUTION INTRAMUSCULAR; INTRAVENOUS
Status: DISCONTINUED | OUTPATIENT
Start: 2022-12-06 | End: 2022-12-06

## 2022-12-06 RX ORDER — SODIUM CHLORIDE 0.9 % (FLUSH) 0.9 %
10 SYRINGE (ML) INJECTION
Status: DISCONTINUED | OUTPATIENT
Start: 2022-12-06 | End: 2022-12-06 | Stop reason: HOSPADM

## 2022-12-06 RX ORDER — TETRACAINE HYDROCHLORIDE 5 MG/ML
1 SOLUTION OPHTHALMIC
Status: COMPLETED | OUTPATIENT
Start: 2022-12-06 | End: 2022-12-06

## 2022-12-06 RX ORDER — TETRACAINE HYDROCHLORIDE 5 MG/ML
SOLUTION OPHTHALMIC
Status: DISCONTINUED | OUTPATIENT
Start: 2022-12-06 | End: 2022-12-06 | Stop reason: HOSPADM

## 2022-12-06 RX ORDER — MIDAZOLAM HYDROCHLORIDE 1 MG/ML
INJECTION INTRAMUSCULAR; INTRAVENOUS
Status: DISCONTINUED | OUTPATIENT
Start: 2022-12-06 | End: 2022-12-06

## 2022-12-06 RX ORDER — PHENYLEPHRINE HYDROCHLORIDE 25 MG/ML
1 SOLUTION/ DROPS OPHTHALMIC
Status: COMPLETED | OUTPATIENT
Start: 2022-12-06 | End: 2022-12-06

## 2022-12-06 RX ORDER — HYDROCODONE BITARTRATE AND ACETAMINOPHEN 5; 325 MG/1; MG/1
1 TABLET ORAL EVERY 4 HOURS PRN
Status: DISCONTINUED | OUTPATIENT
Start: 2022-12-06 | End: 2022-12-06 | Stop reason: HOSPADM

## 2022-12-06 RX ORDER — OFLOXACIN 3 MG/ML
1 SOLUTION/ DROPS OPHTHALMIC
Status: DISCONTINUED | OUTPATIENT
Start: 2022-12-06 | End: 2022-12-06 | Stop reason: HOSPADM

## 2022-12-06 RX ADMIN — TROPICAMIDE 1 DROP: 10 SOLUTION/ DROPS OPHTHALMIC at 09:12

## 2022-12-06 RX ADMIN — TROPICAMIDE 1 DROP: 10 SOLUTION/ DROPS OPHTHALMIC at 08:12

## 2022-12-06 RX ADMIN — CYCLOPENTOLATE HYDROCHLORIDE 1 DROP: 10 SOLUTION/ DROPS OPHTHALMIC at 09:12

## 2022-12-06 RX ADMIN — OFLOXACIN 1 DROP: 3 SOLUTION OPHTHALMIC at 08:12

## 2022-12-06 RX ADMIN — TETRACAINE HYDROCHLORIDE 1 DROP: 5 SOLUTION OPHTHALMIC at 08:12

## 2022-12-06 RX ADMIN — PHENYLEPHRINE HYDROCHLORIDE 1 DROP: 25 SOLUTION/ DROPS OPHTHALMIC at 09:12

## 2022-12-06 RX ADMIN — OFLOXACIN 1 DROP: 3 SOLUTION OPHTHALMIC at 09:12

## 2022-12-06 RX ADMIN — FENTANYL CITRATE 50 MCG: 50 INJECTION, SOLUTION INTRAMUSCULAR; INTRAVENOUS at 10:12

## 2022-12-06 RX ADMIN — PHENYLEPHRINE HYDROCHLORIDE 1 DROP: 25 SOLUTION/ DROPS OPHTHALMIC at 08:12

## 2022-12-06 RX ADMIN — MIDAZOLAM HYDROCHLORIDE 1 MG: 1 INJECTION, SOLUTION INTRAMUSCULAR; INTRAVENOUS at 10:12

## 2022-12-06 RX ADMIN — TETRACAINE HYDROCHLORIDE 1 DROP: 5 SOLUTION OPHTHALMIC at 09:12

## 2022-12-06 RX ADMIN — CYCLOPENTOLATE HYDROCHLORIDE 1 DROP: 10 SOLUTION/ DROPS OPHTHALMIC at 08:12

## 2022-12-06 NOTE — PLAN OF CARE
Angelina Man has met all discharge criteria from Phase II. Vital Signs are stable, ambulating  without difficulty. Discharge instructions given, patient verbalized understanding. Discharged from facility via wheelchair in stable condition.

## 2022-12-06 NOTE — ANESTHESIA POSTPROCEDURE EVALUATION
Anesthesia Post Evaluation    Patient: Angelina Man    Procedure(s) Performed: Procedure(s) (LRB):  EXTRACTION, CATARACT, WITH IOL INSERTION (Left)    Final Anesthesia Type: MAC      Patient location during evaluation: [facility]  Patient participation: Yes- Able to Participate  Level of consciousness: awake and alert  Post-procedure vital signs: reviewed and stable  Pain management: adequate  Airway patency: patent    PONV status at discharge: No PONV  Anesthetic complications: no      Cardiovascular status: blood pressure returned to baseline  Respiratory status: unassisted  Hydration status: euvolemic  Follow-up not needed.          Vitals Value Taken Time   /80 12/06/22 0922   Temp 36.5 °C (97.7 °F) 12/06/22 0922   Pulse 79 12/06/22 0922   Resp 16 12/06/22 0922   SpO2 99 % 12/06/22 0922         No case tracking events are documented in the log.      Pain/Aliya Score: No data recorded

## 2022-12-06 NOTE — ANESTHESIA PREPROCEDURE EVALUATION
12/06/2022  Angelina Man is a 72 y.o., female.      Pre-op Assessment    I have reviewed the Patient Summary Reports.     I have reviewed the Nursing Notes. I have reviewed the NPO Status.   I have reviewed the Medications.     Review of Systems  Anesthesia Hx:  No problems with previous Anesthesia    Social:  Non-Smoker    EENT/Dental:EENT/Dental Normal   Cardiovascular:   Exercise tolerance: good    Pulmonary:  Pulmonary Normal    Renal/:   Chronic Renal Disease, CKD    Hepatic/GI:   GERD, well controlled    Musculoskeletal:   Arthritis     Neurological:   Neuromuscular Disease,    Endocrine:   Hypothyroidism    Psych:  Psychiatric Normal           Physical Exam  General: Well nourished, Cooperative and Alert    Airway:  Mallampati: II   Mouth Opening: Normal  TM Distance: Normal  Tongue: Normal  Neck ROM: Normal ROM    Dental:  Intact        Anesthesia Plan  Type of Anesthesia, risks & benefits discussed:    Anesthesia Type: MAC  Intra-op Monitoring Plan: Standard ASA Monitors  Post Op Pain Control Plan: multimodal analgesia  Induction:  IV  Informed Consent: Informed consent signed with the Patient and all parties understand the risks and agree with anesthesia plan.  All questions answered.   ASA Score: 3    Ready For Surgery From Anesthesia Perspective.     .

## 2022-12-06 NOTE — BRIEF OP NOTE
[facility] - Surgery ([place])  Brief Operative Note    Surgery Date: 12/6/2022     Surgeon(s) and Role:     * Tone Brown MD - Primary    Assisting Surgeon: None    Pre-op Diagnosis:  NS (nuclear sclerosis), left [H25.12]    Post-op Diagnosis:  Post-Op Diagnosis Codes:     * NS (nuclear sclerosis), left [H25.12]    Procedure(s) (LRB):  EXTRACTION, CATARACT, WITH IOL INSERTION (Left)    Anesthesia: Local MAC    Operative Findings:     Estimated Blood Loss: * No values recorded between 12/6/2022 10:28 AM and 12/6/2022 10:52 AM *         Specimens:   Specimen (24h ago, onward)      None              Discharge Note    OUTCOME: Patient tolerated treatment/procedure well without complication and is now ready for discharge.    DISPOSITION: Home or Self Care    FINAL DIAGNOSIS:  Nuclear sclerotic cataract of left eye    FOLLOWUP: In clinic    DISCHARGE INSTRUCTIONS:    Discharge Procedure Orders   Other restrictions (specify):   Order Comments: No heavy lifting or bending for 1 week.

## 2022-12-06 NOTE — PLAN OF CARE
Dr. Brown notified patient got her eye drops mixed up and has been taking the prednisone drops since 12/3/22 and has not started the ocufloxacin

## 2022-12-06 NOTE — OP NOTE
Operative Date:  12/06/2022    Discharge Date:  12/06/2022    Discharge Patient Home    Report Title: Operative Note      SURGEON: Tone Brown MD     ASSISTANT:     PREOPERATIVE DIAGNOSIS: Visually significant NSC cataract,  Left Eye.    POSTOPERATIVE DIAGNOSIS: Visually-significant NSC cataract,  Left Eye.    PROCEDURE PERFORMED: Phacoemulsification of the cataract with posterior chamber intraocular lens Left Eye.    ANESTHESIA: Topical with MAC     COMPLICATIONS: None    ESTIMATED BLOOD LOSS: Minimal    INDICATIONS FOR PROCEDURE:   The patient is a pleasant 72 year old woman with increasing difficulties with activities of daily living secondary to a dense visually significant cataract in the Left Eye.  Discussions have been carried out with this patient concerning the options to surgery, risks, benefits and expectations.  The patient voiced good understanding and wished to proceed with the above procedure.    PROCEDURE IN DETAIL: The patient was brought to the operating room and received topical anesthetic to the eye and then was prepped and draped in the usual sterile fashion.  Using the Gant ring and the guarded unruly blade set at 0.37 mm, a partial thickness clear cornea incision was made temporally.  The paracentesis site was made at the six o'clock position.  Omidria was injected into the anterior chamber through the paracentesis.  Viscoat was then injected into the anterior chamber.  The eye was then reentered at the primary surgical site with a 2.4 mm keratome followed by continuous capsulotomy, hydrodissection, hydrodelineation and phacoemulsification of the cataract.  Residual cortical material was removed using automated irrigation-aspiration technique.  Healon was injected into the posterior chamber and a CNAOTO 21.0 diopter lens was placed in the bag without difficulty. Residual viscoelastic was removed using automated irrigation-aspiration technique. The eye was re-pressurized using  BSS solution and both the paracentesis site and the primary surgical site were demonstrated to be watertight at the end of the case with Weck--Margarette manipulation.  One drop of Ofloxacin and one drop of Pred acetate 1% was applied to the Left Eye .The eye was closed, patched and a Sargent shield placed.  The patient was taken to the recovery room in good and stable condition.  The patient tolerated the procedure well.  The patient was instructed to refrain from any heavy lifting, bending, stooping or straining activities, discharged home  and to follow-up in the morning for routine postoperative care with Tone Brown MD.

## 2022-12-07 ENCOUNTER — OFFICE VISIT (OUTPATIENT)
Dept: OPHTHALMOLOGY | Facility: CLINIC | Age: 72
End: 2022-12-07
Payer: MEDICARE

## 2022-12-07 DIAGNOSIS — Z98.890 POST-OPERATIVE STATE: Primary | ICD-10-CM

## 2022-12-07 DIAGNOSIS — H25.11 NS (NUCLEAR SCLEROSIS), RIGHT: ICD-10-CM

## 2022-12-07 PROCEDURE — 99999 PR PBB SHADOW E&M-EST. PATIENT-LVL II: CPT | Mod: PBBFAC,,, | Performed by: OPHTHALMOLOGY

## 2022-12-07 PROCEDURE — 1160F PR REVIEW ALL MEDS BY PRESCRIBER/CLIN PHARMACIST DOCUMENTED: ICD-10-PCS | Mod: CPTII,S$GLB,, | Performed by: OPHTHALMOLOGY

## 2022-12-07 PROCEDURE — 99024 PR POST-OP FOLLOW-UP VISIT: ICD-10-PCS | Mod: S$GLB,,, | Performed by: OPHTHALMOLOGY

## 2022-12-07 PROCEDURE — 92136 OPHTHALMIC BIOMETRY: CPT | Mod: 26,RT,S$GLB, | Performed by: OPHTHALMOLOGY

## 2022-12-07 PROCEDURE — 1159F MED LIST DOCD IN RCRD: CPT | Mod: CPTII,S$GLB,, | Performed by: OPHTHALMOLOGY

## 2022-12-07 PROCEDURE — 99024 POSTOP FOLLOW-UP VISIT: CPT | Mod: S$GLB,,, | Performed by: OPHTHALMOLOGY

## 2022-12-07 PROCEDURE — 1160F RVW MEDS BY RX/DR IN RCRD: CPT | Mod: CPTII,S$GLB,, | Performed by: OPHTHALMOLOGY

## 2022-12-07 PROCEDURE — 99999 PR PBB SHADOW E&M-EST. PATIENT-LVL II: ICD-10-PCS | Mod: PBBFAC,,, | Performed by: OPHTHALMOLOGY

## 2022-12-07 PROCEDURE — 92136 PR OPHTHAL BIOMETRY,INTRAOC LENS POW CALC: ICD-10-PCS | Mod: 26,RT,S$GLB, | Performed by: OPHTHALMOLOGY

## 2022-12-07 PROCEDURE — 1157F PR ADVANCE CARE PLAN OR EQUIV PRESENT IN MEDICAL RECORD: ICD-10-PCS | Mod: CPTII,S$GLB,, | Performed by: OPHTHALMOLOGY

## 2022-12-07 PROCEDURE — 1157F ADVNC CARE PLAN IN RCRD: CPT | Mod: CPTII,S$GLB,, | Performed by: OPHTHALMOLOGY

## 2022-12-07 PROCEDURE — 1159F PR MEDICATION LIST DOCUMENTED IN MEDICAL RECORD: ICD-10-PCS | Mod: CPTII,S$GLB,, | Performed by: OPHTHALMOLOGY

## 2022-12-07 NOTE — PROGRESS NOTES
Subjective:       Patient ID: Angelina Man is a 72 y.o. female.    Chief Complaint: Post-op Evaluation (Angelina Man is a 73 y/o female )    HPI     Post-op Evaluation     Additional comments: Angelina Man is a 73 y/o female            Comments    Pt here for 1 day PCIOL OS  Pt state no complaints at this time        Gtts:  Oflox QID OS  Pred QID OS  Keto QID OS          Last edited by Christiana Doran on 12/7/2022  1:28 PM.             Assessment:       1. Post-operative state    2. NS (nuclear sclerosis), right          Plan:       S/p CE OS- Doing well.     Visually significant cataract OD -Pt. Wants Sx.         CPM OS.  Cataract Surgery Consent: Patient with a visually significant cataract with difficulties of ADLs, reading, driving, night vision, glare (any and all).  Discussed with Patient/Family/Caregiver: options, risks and benefits, expectations of cataract surgery, utilized an eye model with questions and answers to facilitate discussion.  Discussed lens options and patient understands that glasses may be required for optimal vision for distance and/or near vision after cataract surgery.  The Patient/Family/Caregiver  voice good understanding and patient wishes to proceed with surgery.  The patient will likely benefit from surgery and patient signed consent for Right Eye.   CE OD 12/20/22.

## 2022-12-11 NOTE — H&P
[facility], [place], operated by [facility] ([place])  History & Physical    Subjective:      Chief Complaint/Reason for Admission:     Angelina Man is a 72 y.o. female.    Past Medical History:   Diagnosis Date    Arthritis     Atrial fibrillation     Bronchitis     Cataract     Dry eyes     Glaucoma, suspect - Both Eyes     Hypothyroidism 06/23/2016    Pulmonary hypertension     Rash     Squamous cell carcinoma     in-situ right upper inner arm, right wrist    Status post lumbar surgery 06/23/2016    Stress fracture     Thyroid disease      Past Surgical History:   Procedure Laterality Date    ANGIOPLASTY      CATARACT EXTRACTION W/  INTRAOCULAR LENS IMPLANT Left 12/6/2022    Procedure: EXTRACTION, CATARACT, WITH IOL INSERTION;  Surgeon: Tone Brown MD;  Location: [facility];  Service: Ophthalmology;  Laterality: Left;    CERVICAL FUSION      COLONOSCOPY      COLONOSCOPY N/A 11/14/2017    Procedure: COLONOSCOPY;  Surgeon: Kasi Mercado MD;  [facility]  Service: Endoscopy;  Laterality: N/A;    ESOPHAGOGASTRODUODENOSCOPY N/A 10/10/2019    Procedure: EGD (ESOPHAGOGASTRODUODENOSCOPY);  Surgeon: Rg Milton MD;  Location: [facility] ENDO ([facility] FLR);  Service: Endoscopy;  Laterality: N/A;    ESOPHAGOGASTRODUODENOSCOPY N/A 10/6/2021    Procedure: EGD (ESOPHAGOGASTRODUODENOSCOPY);  Surgeon: Rg Milton MD;  Location: [facility] ENDO ([facility]);  Service: Endoscopy;  Laterality: N/A;  covid test 10/3 zo, bernice emailed/portal -ml    l4-5 mid discectomy Left 03/2017    l4-5 MIS diskectomy Right 05/2016    RIGHT HEART CATHETERIZATION Right 1/27/2022    Procedure: INSERTION, CATHETER, RIGHT HEART;  Surgeon: Satnam Pickard Jr., MD;  Location: [facility] CATH LAB;  Service: Cardiology;  Laterality: Right;    TREATMENT OF CARDIAC ARRHYTHMIA N/A 7/17/2020    Procedure: CARDIOVERSION;  Surgeon: Kwan Bray MD;  Location: [facility] EP LAB;  Service: Cardiology;  Laterality: N/A;  AF,DCCV/SANGITA, ANES, SK, 746    TREATMENT OF CARDIAC  ARRHYTHMIA N/A 7/14/2021    Procedure: CARDIOVERSION;  Surgeon: SYDNIE Cedillo MD;  Location: [facility] LAB;  Service: Cardiology;  Laterality: N/A;  AF, SANGITA, DCCV, MAC, EH, 3 Prep     Family History   Problem Relation Age of Onset    Cancer Mother         Lung cancer    Heart failure Father     Colon cancer Father     Hypertension Father     Cataracts Father     Cancer Father 80        colon    No Known Problems Sister     No Known Problems Brother     Melanoma Daughter     Diabetes Son     Heart disease Son     Cancer Son         esophageal cancer    No Known Problems Maternal Aunt     No Known Problems Maternal Uncle     No Known Problems Paternal Aunt     No Known Problems Paternal Uncle     No Known Problems Maternal Grandmother     No Known Problems Maternal Grandfather     No Known Problems Paternal Grandmother     No Known Problems Paternal Grandfather     Amblyopia Neg Hx     Blindness Neg Hx     Glaucoma Neg Hx     Macular degeneration Neg Hx     Retinal detachment Neg Hx     Strabismus Neg Hx     Stroke Neg Hx     Thyroid disease Neg Hx     Breast cancer Neg Hx     Ovarian cancer Neg Hx      Social History     Tobacco Use    Smoking status: Never    Smokeless tobacco: Never   Substance Use Topics    Alcohol use: Yes     Alcohol/week: 4.0 standard drinks     Types: 4 Glasses of wine per week     Comment: 2 glasses of wine on weekends    Drug use: No       No medications prior to admission.     Review of patient's allergies indicates:  No Known Allergies     Review of Systems   Eyes:  Positive for blurred vision.   All other systems reviewed and are negative.    Objective:      Vital Signs (Most Recent)       Vital Signs Range (Last 24H):  BP: ()/()   Arterial Line BP: ()/()     Physical Exam  Constitutional:       Appearance: She is well-developed.   HENT:      Head: Normocephalic.   Eyes:      Conjunctiva/sclera: Conjunctivae normal.      Pupils: Pupils are equal, round, and reactive to light.    Cardiovascular:      Rate and Rhythm: Normal rate and regular rhythm.      Heart sounds: Normal heart sounds.   Pulmonary:      Effort: Pulmonary effort is normal.      Breath sounds: Normal breath sounds.   Abdominal:      General: Bowel sounds are normal.      Palpations: Abdomen is soft.   Musculoskeletal:         General: Normal range of motion.      Cervical back: Normal range of motion and neck supple.   Skin:     General: Skin is warm.   Neurological:      Mental Status: She is alert and oriented to person, place, and time.       Data Review:     ECG:     Assessment:      Cataract OD.    Plan:    CE OD.

## 2022-12-12 ENCOUNTER — PATIENT MESSAGE (OUTPATIENT)
Dept: SURGERY | Facility: OTHER | Age: 72
End: 2022-12-12
Payer: MEDICARE

## 2022-12-13 ENCOUNTER — PATIENT MESSAGE (OUTPATIENT)
Dept: OPHTHALMOLOGY | Facility: CLINIC | Age: 72
End: 2022-12-13
Payer: MEDICARE

## 2022-12-13 ENCOUNTER — OFFICE VISIT (OUTPATIENT)
Dept: OPTOMETRY | Facility: CLINIC | Age: 72
End: 2022-12-13
Payer: MEDICARE

## 2022-12-13 DIAGNOSIS — Z98.890 POST-OPERATIVE STATE: Primary | ICD-10-CM

## 2022-12-13 PROCEDURE — 99999 PR PBB SHADOW E&M-EST. PATIENT-LVL II: CPT | Mod: PBBFAC,HCNC,, | Performed by: OPTOMETRIST

## 2022-12-13 PROCEDURE — 1159F MED LIST DOCD IN RCRD: CPT | Mod: HCNC,CPTII,S$GLB, | Performed by: OPTOMETRIST

## 2022-12-13 PROCEDURE — 1159F PR MEDICATION LIST DOCUMENTED IN MEDICAL RECORD: ICD-10-PCS | Mod: HCNC,CPTII,S$GLB, | Performed by: OPTOMETRIST

## 2022-12-13 PROCEDURE — 1157F ADVNC CARE PLAN IN RCRD: CPT | Mod: HCNC,CPTII,S$GLB, | Performed by: OPTOMETRIST

## 2022-12-13 PROCEDURE — 1157F PR ADVANCE CARE PLAN OR EQUIV PRESENT IN MEDICAL RECORD: ICD-10-PCS | Mod: HCNC,CPTII,S$GLB, | Performed by: OPTOMETRIST

## 2022-12-13 PROCEDURE — 1160F RVW MEDS BY RX/DR IN RCRD: CPT | Mod: HCNC,CPTII,S$GLB, | Performed by: OPTOMETRIST

## 2022-12-13 PROCEDURE — 99024 PR POST-OP FOLLOW-UP VISIT: ICD-10-PCS | Mod: HCNC,S$GLB,, | Performed by: OPTOMETRIST

## 2022-12-13 PROCEDURE — 99999 PR PBB SHADOW E&M-EST. PATIENT-LVL II: ICD-10-PCS | Mod: PBBFAC,HCNC,, | Performed by: OPTOMETRIST

## 2022-12-13 PROCEDURE — 1160F PR REVIEW ALL MEDS BY PRESCRIBER/CLIN PHARMACIST DOCUMENTED: ICD-10-PCS | Mod: HCNC,CPTII,S$GLB, | Performed by: OPTOMETRIST

## 2022-12-13 PROCEDURE — 99024 POSTOP FOLLOW-UP VISIT: CPT | Mod: HCNC,S$GLB,, | Performed by: OPTOMETRIST

## 2022-12-13 NOTE — PROGRESS NOTES
"HPI     Post-op Evaluation     Additional comments: Patient Angelina Man is a 72 year old female.           Comments    Pt here for 1 week PCIOL OS post-op visit (Dr. Brown). Pt states that   VA OS is better without glasses than with them on. Pt states that she   feels something in the eye "like veil" in OS a few days ago but it went   away. Pt denies any eye pain, floaters, flashes in VA OS. Reviewed post-op   instructions with pt.    DLS: 12/07/2022 with Dr. Brown  S/p Phaco w/IOL OS: 12/06/2022  *2nd eye PCIOL OD scheduled 12/20/2022.*    Gtts:  1. Pred Forte TID OS  2. Ketorolac TID OS    POHx:  1. Post-operative state, left   2. NS (nuclear sclerosis), right               Last edited by Micaela Champion on 12/13/2022 10:43 AM.            Assessment /Plan     For exam results, see Encounter Report.    Post-operative state      Doing well 1 week post op, cont all drops, no eye pain, iop normal, RTC for next appt as scheduled.                    "

## 2022-12-14 DIAGNOSIS — I49.8 OTHER SPECIFIED CARDIAC ARRHYTHMIAS: Primary | ICD-10-CM

## 2022-12-20 ENCOUNTER — ANESTHESIA (OUTPATIENT)
Dept: SURGERY | Facility: OTHER | Age: 72
End: 2022-12-20
Payer: MEDICARE

## 2022-12-20 ENCOUNTER — ANESTHESIA EVENT (OUTPATIENT)
Dept: SURGERY | Facility: OTHER | Age: 72
End: 2022-12-20
Payer: MEDICARE

## 2022-12-20 ENCOUNTER — HOSPITAL ENCOUNTER (OUTPATIENT)
Facility: OTHER | Age: 72
Discharge: HOME | End: 2022-12-20
Attending: OPHTHALMOLOGY | Admitting: OPHTHALMOLOGY
Payer: MEDICARE

## 2022-12-20 VITALS
TEMPERATURE: 98 F | WEIGHT: 101 LBS | DIASTOLIC BLOOD PRESSURE: 68 MMHG | HEIGHT: 64 IN | BODY MASS INDEX: 17.24 KG/M2 | HEART RATE: 67 BPM | RESPIRATION RATE: 16 BRPM | SYSTOLIC BLOOD PRESSURE: 161 MMHG | OXYGEN SATURATION: 100 %

## 2022-12-20 DIAGNOSIS — H25.11 NUCLEAR SCLEROTIC CATARACT OF RIGHT EYE: Primary | ICD-10-CM

## 2022-12-20 PROCEDURE — V2632 POST CHMBR INTRAOCULAR LENS: HCPCS | Mod: HCNC | Performed by: OPHTHALMOLOGY

## 2022-12-20 PROCEDURE — 25000003 PHARM REV CODE 250: Mod: HCNC | Performed by: OPHTHALMOLOGY

## 2022-12-20 PROCEDURE — 37000008 HC ANESTHESIA 1ST 15 MINUTES: Mod: HCNC | Performed by: OPHTHALMOLOGY

## 2022-12-20 PROCEDURE — 71000015 HC POSTOP RECOV 1ST HR: Mod: HCNC | Performed by: OPHTHALMOLOGY

## 2022-12-20 PROCEDURE — 66984 XCAPSL CTRC RMVL W/O ECP: CPT | Mod: 79,HCNC,RT, | Performed by: OPHTHALMOLOGY

## 2022-12-20 PROCEDURE — 66984 PR REMOVAL, CATARACT, W/INSRT INTRAOC LENS, W/O ENDO CYCLO: ICD-10-PCS | Mod: 79,HCNC,RT, | Performed by: OPHTHALMOLOGY

## 2022-12-20 PROCEDURE — 37000009 HC ANESTHESIA EA ADD 15 MINS: Mod: HCNC | Performed by: OPHTHALMOLOGY

## 2022-12-20 PROCEDURE — 36000707: Mod: HCNC | Performed by: OPHTHALMOLOGY

## 2022-12-20 PROCEDURE — 63600175 PHARM REV CODE 636 W HCPCS: Mod: HCNC | Performed by: OPHTHALMOLOGY

## 2022-12-20 PROCEDURE — 63600175 PHARM REV CODE 636 W HCPCS: Mod: HCNC | Performed by: NURSE ANESTHETIST, CERTIFIED REGISTERED

## 2022-12-20 PROCEDURE — 36000706: Mod: HCNC | Performed by: OPHTHALMOLOGY

## 2022-12-20 DEVICE — LENS CLAREON AUTONOME 20.5D: Type: IMPLANTABLE DEVICE | Site: EYE | Status: FUNCTIONAL

## 2022-12-20 RX ORDER — PHENYLEPHRINE HYDROCHLORIDE 25 MG/ML
1 SOLUTION/ DROPS OPHTHALMIC
Status: COMPLETED | OUTPATIENT
Start: 2022-12-20 | End: 2022-12-20

## 2022-12-20 RX ORDER — CYCLOPENTOLATE HYDROCHLORIDE 10 MG/ML
1 SOLUTION/ DROPS OPHTHALMIC
Status: DISCONTINUED | OUTPATIENT
Start: 2022-12-20 | End: 2022-12-20 | Stop reason: HOSPADM

## 2022-12-20 RX ORDER — LIDOCAINE HYDROCHLORIDE 40 MG/ML
INJECTION, SOLUTION RETROBULBAR
Status: DISCONTINUED | OUTPATIENT
Start: 2022-12-20 | End: 2022-12-20 | Stop reason: HOSPADM

## 2022-12-20 RX ORDER — OFLOXACIN 3 MG/ML
1 SOLUTION/ DROPS OPHTHALMIC
Status: DISCONTINUED | OUTPATIENT
Start: 2022-12-20 | End: 2022-12-20 | Stop reason: HOSPADM

## 2022-12-20 RX ORDER — TETRACAINE HYDROCHLORIDE 5 MG/ML
SOLUTION OPHTHALMIC
Status: DISCONTINUED | OUTPATIENT
Start: 2022-12-20 | End: 2022-12-20 | Stop reason: HOSPADM

## 2022-12-20 RX ORDER — ACETAMINOPHEN 325 MG/1
650 TABLET ORAL EVERY 4 HOURS PRN
Status: DISCONTINUED | OUTPATIENT
Start: 2022-12-20 | End: 2022-12-20 | Stop reason: HOSPADM

## 2022-12-20 RX ORDER — SODIUM CHLORIDE 0.9 % (FLUSH) 0.9 %
10 SYRINGE (ML) INJECTION
Status: DISCONTINUED | OUTPATIENT
Start: 2022-12-20 | End: 2022-12-20 | Stop reason: HOSPADM

## 2022-12-20 RX ORDER — PREDNISOLONE ACETATE 10 MG/ML
SUSPENSION/ DROPS OPHTHALMIC
Status: DISCONTINUED | OUTPATIENT
Start: 2022-12-20 | End: 2022-12-20 | Stop reason: HOSPADM

## 2022-12-20 RX ORDER — TETRACAINE HYDROCHLORIDE 5 MG/ML
1 SOLUTION OPHTHALMIC
Status: COMPLETED | OUTPATIENT
Start: 2022-12-20 | End: 2022-12-20

## 2022-12-20 RX ORDER — HYDROCODONE BITARTRATE AND ACETAMINOPHEN 5; 325 MG/1; MG/1
1 TABLET ORAL EVERY 4 HOURS PRN
Status: DISCONTINUED | OUTPATIENT
Start: 2022-12-20 | End: 2022-12-20 | Stop reason: HOSPADM

## 2022-12-20 RX ORDER — TROPICAMIDE 10 MG/ML
1 SOLUTION/ DROPS OPHTHALMIC
Status: COMPLETED | OUTPATIENT
Start: 2022-12-20 | End: 2022-12-20

## 2022-12-20 RX ORDER — MIDAZOLAM HYDROCHLORIDE 1 MG/ML
INJECTION INTRAMUSCULAR; INTRAVENOUS
Status: DISCONTINUED | OUTPATIENT
Start: 2022-12-20 | End: 2022-12-20

## 2022-12-20 RX ORDER — EPINEPHRINE 1 MG/ML
INJECTION, SOLUTION, CONCENTRATE INTRAVENOUS
Status: DISCONTINUED | OUTPATIENT
Start: 2022-12-20 | End: 2022-12-20 | Stop reason: HOSPADM

## 2022-12-20 RX ORDER — MOXIFLOXACIN 5 MG/ML
SOLUTION/ DROPS OPHTHALMIC
Status: DISCONTINUED | OUTPATIENT
Start: 2022-12-20 | End: 2022-12-20 | Stop reason: HOSPADM

## 2022-12-20 RX ADMIN — CYCLOPENTOLATE HYDROCHLORIDE 1 DROP: 10 SOLUTION/ DROPS OPHTHALMIC at 09:12

## 2022-12-20 RX ADMIN — TETRACAINE HYDROCHLORIDE 1 DROP: 5 SOLUTION OPHTHALMIC at 09:12

## 2022-12-20 RX ADMIN — TROPICAMIDE 1 DROP: 10 SOLUTION/ DROPS OPHTHALMIC at 09:12

## 2022-12-20 RX ADMIN — PHENYLEPHRINE HYDROCHLORIDE 1 DROP: 25 SOLUTION/ DROPS OPHTHALMIC at 09:12

## 2022-12-20 RX ADMIN — MIDAZOLAM HYDROCHLORIDE 2 MG: 1 INJECTION, SOLUTION INTRAMUSCULAR; INTRAVENOUS at 11:12

## 2022-12-20 RX ADMIN — OFLOXACIN 1 DROP: 3 SOLUTION OPHTHALMIC at 09:12

## 2022-12-20 NOTE — ANESTHESIA PREPROCEDURE EVALUATION
12/20/2022  Angelina Man is a 72 y.o., female.      Pre-op Assessment    I have reviewed the Patient Summary Reports.     I have reviewed the Nursing Notes. I have reviewed the NPO Status.   I have reviewed the Medications.     Review of Systems  Anesthesia Hx:  No problems with previous Anesthesia    Social:  Non-Smoker    EENT/Dental:EENT/Dental Normal   Cardiovascular:   Exercise tolerance: good    Pulmonary:  Pulmonary Normal    Renal/:   Chronic Renal Disease, CKD    Hepatic/GI:   GERD, well controlled    Musculoskeletal:   Arthritis     Neurological:   Neuromuscular Disease,    Endocrine:   Hypothyroidism    Psych:  Psychiatric Normal         Physical Exam  General: Well nourished, Cooperative and Alert    Airway:  Mallampati: II   Mouth Opening: Normal  TM Distance: Normal  Tongue: Normal  Neck ROM: Normal ROM    Dental:  Intact        Anesthesia Plan  Type of Anesthesia, risks & benefits discussed:    Anesthesia Type: MAC  Intra-op Monitoring Plan: Standard ASA Monitors  Post Op Pain Control Plan: multimodal analgesia  Induction:  IV  Airway Plan: Video  Informed Consent: Informed consent signed with the Patient and all parties understand the risks and agree with anesthesia plan.  All questions answered.   ASA Score: 3  Day of Surgery Review of History & Physical: H&P Update referred to the surgeon/provider.  Anesthesia Plan Notes: Left eye cataract 2 weeks ago with NAAC    Ready For Surgery From Anesthesia Perspective.     .

## 2022-12-20 NOTE — ANESTHESIA POSTPROCEDURE EVALUATION
Anesthesia Post Evaluation    Patient: Angelina Man    Procedure(s) Performed: Procedure(s) (LRB):  EXTRACTION, CATARACT, WITH IOL INSERTION (Right)    Final Anesthesia Type: MAC      Patient location during evaluation: [facility]  Patient participation: Yes- Able to Participate  Level of consciousness: awake and alert, awake and oriented  Post-procedure vital signs: reviewed and stable  Pain management: adequate  Airway patency: patent    PONV status at discharge: No PONV  Anesthetic complications: no      Cardiovascular status: blood pressure returned to baseline, hemodynamically stable and stable  Respiratory status: spontaneous ventilation, unassisted and room air  Hydration status: euvolemic  Follow-up not needed.          Vitals Value Taken Time   /79 12/20/22 0943   Temp 36.9 °C (98.4 °F) 12/20/22 0943   Pulse 67 12/20/22 0943   Resp 18 12/20/22 0943   SpO2 97 % 12/20/22 0943         No case tracking events are documented in the log.      Pain/Aliya Score: No data recorded

## 2022-12-20 NOTE — OP NOTE
Operative Date:  12/20/2022    Discharge Date:  12/20/2022    Discharge Patient Home    Report Title: Operative Note      SURGEON: Tone Brown MD     ASSISTANT:     PREOPERATIVE DIAGNOSIS: Visually significant NSC cataract,  Right Eye.    POSTOPERATIVE DIAGNOSIS: Visually-significant NSC cataract,  Right Eye.    PROCEDURE PERFORMED: Phacoemulsification of the cataract with posterior chamber intraocular lens Right Eye.    ANESTHESIA: Topical with MAC     COMPLICATIONS: None    ESTIMATED BLOOD LOSS: Minimal    INDICATIONS FOR PROCEDURE:   The patient is a pleasant 72 year old woman with increasing difficulties with activities of daily living secondary to a dense visually significant cataract in the Right Eye.  Discussions have been carried out with this patient concerning the options to surgery, risks, benefits and expectations.  The patient voiced good understanding and wished to proceed with the above procedure.    PROCEDURE IN DETAIL: The patient was brought to the operating room and received topical anesthetic to the eye and then was prepped and draped in the usual sterile fashion.  Using the Gant ring and the guarded unruly blade set at 0.37 mm, a partial thickness clear cornea incision was made temporally.  The paracentesis site was made at the twelve o'clock position.  Omidria was injected into the anterior chamber through the paracentesis.  Viscoat was then injected into the anterior chamber.  The eye was then reentered at the primary surgical site with a 2.4 mm keratome followed by continuous capsulotomy, hydrodissection, hydrodelineation and phacoemulsification of the cataract.  Residual cortical material was removed using automated irrigation-aspiration technique.  Healon was injected into the posterior chamber and a CNAOTO 20.5 diopter lens was placed in the bag without difficulty. Residual viscoelastic was removed using automated irrigation-aspiration technique. The eye was re-pressurized  using BSS solution and both the paracentesis site and the primary surgical site were demonstrated to be watertight at the end of the case with Weck--Margarette manipulation.  One drop of Ofloxacin and one drop of Pred acetate 1% was applied to the Right Eye .The eye was closed, patched and a Sargent shield placed.  The patient was taken to the recovery room in good and stable condition.  The patient tolerated the procedure well.  The patient was instructed to refrain from any heavy lifting, bending, stooping or straining activities, discharged home  and to follow-up in the morning for routine postoperative care with Tone Brown MD.

## 2022-12-20 NOTE — DISCHARGE INSTRUCTIONS
DR. Brown Post-Operative instructions      Start your eye drops as soon as you get home:                                           Ofloxacin (tan ) 1 drop 3 times a day                                           ILevro(gray) 1 drop once a day                                           Durezol (pink) 1 drop 3 times a day          If you were given an eye shield in the operating room, follow the instructions as directed. Remove the eye patch or shield the night of the surgery in order to insert your eye drops. Continue to wear the eye shield at night for 7 days.    You may resume driving on your first post-operative appointment if your vision is clear. Do not wear your eye shield while driving for your postop appointments.    You can resume taking all our medications after surgery.    Do not rub your eye, do not allow any water into the eye and do not wear eye make up for 1 week after surgery.    Take an over the counter pain medication such as Tylenol or Advil as needed for pain    Please Note: Mild discomfort is normal . However, if you have a severe throbbing pain, loss of vision, onset of flashes and/or floaters call Dr. Brown's office. [phone] or proceed to the emergency room.

## 2022-12-20 NOTE — BRIEF OP NOTE
[facility] - Surgery ([place])  Brief Operative Note    Surgery Date: 12/20/2022     Surgeon(s) and Role:     * Tone Brown MD - Primary    Assisting Surgeon: None    Pre-op Diagnosis:  NS (nuclear sclerosis), right [H25.11]    Post-op Diagnosis:  Post-Op Diagnosis Codes:     * NS (nuclear sclerosis), right [H25.11]    Procedure(s) (LRB):  EXTRACTION, CATARACT, WITH IOL INSERTION (Right)    Anesthesia: Local MAC    Operative Findings:     Estimated Blood Loss: * No values recorded between 12/20/2022 11:10 AM and 12/20/2022 11:34 AM *         Specimens:   Specimen (24h ago, onward)      None              Discharge Note    OUTCOME: Patient tolerated treatment/procedure well without complication and is now ready for discharge.    DISPOSITION: Home or Self Care    FINAL DIAGNOSIS:  Nuclear sclerotic cataract of right eye    FOLLOWUP: In clinic    DISCHARGE INSTRUCTIONS:    Discharge Procedure Orders   Other restrictions (specify):   Order Comments: No heavy lifting or bending for 1 week.

## 2022-12-21 ENCOUNTER — OFFICE VISIT (OUTPATIENT)
Dept: OPHTHALMOLOGY | Facility: CLINIC | Age: 72
End: 2022-12-21
Payer: MEDICARE

## 2022-12-21 DIAGNOSIS — Z98.890 POST-OPERATIVE STATE: Primary | ICD-10-CM

## 2022-12-21 PROCEDURE — 1157F ADVNC CARE PLAN IN RCRD: CPT | Mod: HCNC,CPTII,S$GLB, | Performed by: OPHTHALMOLOGY

## 2022-12-21 PROCEDURE — 1101F PR PT FALLS ASSESS DOC 0-1 FALLS W/OUT INJ PAST YR: ICD-10-PCS | Mod: HCNC,CPTII,S$GLB, | Performed by: OPHTHALMOLOGY

## 2022-12-21 PROCEDURE — 1126F AMNT PAIN NOTED NONE PRSNT: CPT | Mod: HCNC,CPTII,S$GLB, | Performed by: OPHTHALMOLOGY

## 2022-12-21 PROCEDURE — 1126F PR PAIN SEVERITY QUANTIFIED, NO PAIN PRESENT: ICD-10-PCS | Mod: HCNC,CPTII,S$GLB, | Performed by: OPHTHALMOLOGY

## 2022-12-21 PROCEDURE — 1160F RVW MEDS BY RX/DR IN RCRD: CPT | Mod: HCNC,CPTII,S$GLB, | Performed by: OPHTHALMOLOGY

## 2022-12-21 PROCEDURE — 1157F PR ADVANCE CARE PLAN OR EQUIV PRESENT IN MEDICAL RECORD: ICD-10-PCS | Mod: HCNC,CPTII,S$GLB, | Performed by: OPHTHALMOLOGY

## 2022-12-21 PROCEDURE — 1160F PR REVIEW ALL MEDS BY PRESCRIBER/CLIN PHARMACIST DOCUMENTED: ICD-10-PCS | Mod: HCNC,CPTII,S$GLB, | Performed by: OPHTHALMOLOGY

## 2022-12-21 PROCEDURE — 1159F MED LIST DOCD IN RCRD: CPT | Mod: HCNC,CPTII,S$GLB, | Performed by: OPHTHALMOLOGY

## 2022-12-21 PROCEDURE — 1101F PT FALLS ASSESS-DOCD LE1/YR: CPT | Mod: HCNC,CPTII,S$GLB, | Performed by: OPHTHALMOLOGY

## 2022-12-21 PROCEDURE — 1159F PR MEDICATION LIST DOCUMENTED IN MEDICAL RECORD: ICD-10-PCS | Mod: HCNC,CPTII,S$GLB, | Performed by: OPHTHALMOLOGY

## 2022-12-21 PROCEDURE — 3288F PR FALLS RISK ASSESSMENT DOCUMENTED: ICD-10-PCS | Mod: HCNC,CPTII,S$GLB, | Performed by: OPHTHALMOLOGY

## 2022-12-21 PROCEDURE — 3288F FALL RISK ASSESSMENT DOCD: CPT | Mod: HCNC,CPTII,S$GLB, | Performed by: OPHTHALMOLOGY

## 2022-12-21 PROCEDURE — 99999 PR PBB SHADOW E&M-EST. PATIENT-LVL III: CPT | Mod: PBBFAC,HCNC,, | Performed by: OPHTHALMOLOGY

## 2022-12-21 PROCEDURE — 99999 PR PBB SHADOW E&M-EST. PATIENT-LVL III: ICD-10-PCS | Mod: PBBFAC,HCNC,, | Performed by: OPHTHALMOLOGY

## 2022-12-21 PROCEDURE — 99024 POSTOP FOLLOW-UP VISIT: CPT | Mod: HCNC,S$GLB,, | Performed by: OPHTHALMOLOGY

## 2022-12-21 PROCEDURE — 99024 PR POST-OP FOLLOW-UP VISIT: ICD-10-PCS | Mod: HCNC,S$GLB,, | Performed by: OPHTHALMOLOGY

## 2022-12-21 NOTE — PROGRESS NOTES
Subjective:       Patient ID: Angelina Man is a 72 y.o. female.    Chief Complaint: Post-op Evaluation    HPI    S/p Phaco w/IOL OD- 12/20/2022     73 y/o female for  1 day post-op of the RT eye. Pt states s/x went well.   She denies pain and discomfort. Reviewed post-op instructions in clinic   today.     Eyemeds  Oflox/PF/Ket QID OD   Last edited by Bhupendra Bose on 12/21/2022  9:40 AM.             Assessment:       1. Post-operative state          Plan:       S/p CE OU- Doing well.       CPM OD.  Taper gtts OS.  RTC 1 wk.

## 2022-12-25 ENCOUNTER — PATIENT MESSAGE (OUTPATIENT)
Dept: PRIMARY CARE CLINIC | Facility: CLINIC | Age: 72
End: 2022-12-25
Payer: MEDICARE

## 2022-12-27 ENCOUNTER — PATIENT MESSAGE (OUTPATIENT)
Dept: PRIMARY CARE CLINIC | Facility: CLINIC | Age: 72
End: 2022-12-27
Payer: MEDICARE

## 2022-12-27 NOTE — TELEPHONE ENCOUNTER
Per Epic it does appear she has several refills which should carry her over until her appt with Dr. Rockwell. Please have pt contact her pharmacy if she has not already done so.     Dr. OLGUIN

## 2022-12-27 NOTE — TELEPHONE ENCOUNTER
Pt states that Dr. Christopher has sent 11 refills on medication but pharmacy has denied refills. LVM to notify pt that her rx has been sent in and refilled.

## 2022-12-28 ENCOUNTER — OFFICE VISIT (OUTPATIENT)
Dept: OPHTHALMOLOGY | Facility: CLINIC | Age: 72
End: 2022-12-28
Payer: MEDICARE

## 2022-12-28 DIAGNOSIS — Z98.890 POST-OPERATIVE STATE: Primary | ICD-10-CM

## 2022-12-28 PROCEDURE — 99024 POSTOP FOLLOW-UP VISIT: CPT | Mod: HCNC,S$GLB,, | Performed by: OPHTHALMOLOGY

## 2022-12-28 PROCEDURE — 1157F ADVNC CARE PLAN IN RCRD: CPT | Mod: HCNC,CPTII,S$GLB, | Performed by: OPHTHALMOLOGY

## 2022-12-28 PROCEDURE — 99999 PR PBB SHADOW E&M-EST. PATIENT-LVL II: CPT | Mod: PBBFAC,HCNC,, | Performed by: OPHTHALMOLOGY

## 2022-12-28 PROCEDURE — 99999 PR PBB SHADOW E&M-EST. PATIENT-LVL II: ICD-10-PCS | Mod: PBBFAC,HCNC,, | Performed by: OPHTHALMOLOGY

## 2022-12-28 PROCEDURE — 1159F MED LIST DOCD IN RCRD: CPT | Mod: HCNC,CPTII,S$GLB, | Performed by: OPHTHALMOLOGY

## 2022-12-28 PROCEDURE — 1160F PR REVIEW ALL MEDS BY PRESCRIBER/CLIN PHARMACIST DOCUMENTED: ICD-10-PCS | Mod: HCNC,CPTII,S$GLB, | Performed by: OPHTHALMOLOGY

## 2022-12-28 PROCEDURE — 1159F PR MEDICATION LIST DOCUMENTED IN MEDICAL RECORD: ICD-10-PCS | Mod: HCNC,CPTII,S$GLB, | Performed by: OPHTHALMOLOGY

## 2022-12-28 PROCEDURE — 1157F PR ADVANCE CARE PLAN OR EQUIV PRESENT IN MEDICAL RECORD: ICD-10-PCS | Mod: HCNC,CPTII,S$GLB, | Performed by: OPHTHALMOLOGY

## 2022-12-28 PROCEDURE — 1160F RVW MEDS BY RX/DR IN RCRD: CPT | Mod: HCNC,CPTII,S$GLB, | Performed by: OPHTHALMOLOGY

## 2022-12-28 PROCEDURE — 99024 PR POST-OP FOLLOW-UP VISIT: ICD-10-PCS | Mod: HCNC,S$GLB,, | Performed by: OPHTHALMOLOGY

## 2022-12-28 NOTE — PROGRESS NOTES
Subjective:       Patient ID: Angelina Man is a 72 y.o. female.    Chief Complaint: Post-op Evaluation (Patient Angelina Man is a 72 year old female.)    HPI     Post-op Evaluation     Additional comments: Patient Angelina Man is a 72 year old female.           Comments    Pt here for 1 week PCIOL OD (2nd eye) post-op visit. Pt states that VA OD   has improved but she has trouble seeing with glasses on and at near. Pt   states that VA OS is still a little blurry. Pt denies any eye pain.   Post-op instructions reviewed with pt.    DLS: 12/21/2022 with Dr. Brown  S/p Phaco w/IOL OD: 12/20/2022   S/p Phaco w/IOL OS: 12/06/2022    Gtts:  1. Pred Forte TID OD and qday OS  2. Ketorolac TID OD and qday OS          Last edited by Micaela Champion on 12/28/2022  1:10 PM.             Assessment:       1. Post-operative state          Plan:       S/p CE OU- Doing well.       Taper gtts OU.  RTC 3 wks.

## 2023-01-03 ENCOUNTER — OFFICE VISIT (OUTPATIENT)
Dept: PRIMARY CARE CLINIC | Facility: CLINIC | Age: 73
End: 2023-01-03
Payer: MEDICARE

## 2023-01-03 VITALS
HEART RATE: 72 BPM | OXYGEN SATURATION: 99 % | WEIGHT: 99.19 LBS | DIASTOLIC BLOOD PRESSURE: 72 MMHG | BODY MASS INDEX: 16.93 KG/M2 | SYSTOLIC BLOOD PRESSURE: 130 MMHG | TEMPERATURE: 98 F | RESPIRATION RATE: 18 BRPM | HEIGHT: 64 IN

## 2023-01-03 DIAGNOSIS — F41.9 ANXIETY: Primary | ICD-10-CM

## 2023-01-03 DIAGNOSIS — R10.9 ABDOMINAL PAIN, UNSPECIFIED ABDOMINAL LOCATION: ICD-10-CM

## 2023-01-03 DIAGNOSIS — M81.0 OSTEOPOROSIS, UNSPECIFIED OSTEOPOROSIS TYPE, UNSPECIFIED PATHOLOGICAL FRACTURE PRESENCE: ICD-10-CM

## 2023-01-03 DIAGNOSIS — Z00.00 ROUTINE GENERAL MEDICAL EXAMINATION AT A HEALTH CARE FACILITY: ICD-10-CM

## 2023-01-03 DIAGNOSIS — Z79.01 ANTICOAGULANT LONG-TERM USE: ICD-10-CM

## 2023-01-03 DIAGNOSIS — Z92.89 HISTORY OF CARDIOVERSION: ICD-10-CM

## 2023-01-03 DIAGNOSIS — R79.0 ABNORMAL LEVEL OF BLOOD MINERAL: ICD-10-CM

## 2023-01-03 DIAGNOSIS — L70.8 OTHER ACNE: ICD-10-CM

## 2023-01-03 DIAGNOSIS — Z12.11 SCREENING FOR COLON CANCER: ICD-10-CM

## 2023-01-03 DIAGNOSIS — N28.1 RENAL CYST, ACQUIRED: ICD-10-CM

## 2023-01-03 DIAGNOSIS — K21.9 GASTROESOPHAGEAL REFLUX DISEASE, UNSPECIFIED WHETHER ESOPHAGITIS PRESENT: ICD-10-CM

## 2023-01-03 DIAGNOSIS — R79.9 ABNORMAL FINDING OF BLOOD CHEMISTRY, UNSPECIFIED: ICD-10-CM

## 2023-01-03 DIAGNOSIS — K58.9 IRRITABLE BOWEL SYNDROME, UNSPECIFIED TYPE: ICD-10-CM

## 2023-01-03 DIAGNOSIS — M54.16 RADICULOPATHY OF LUMBAR REGION: ICD-10-CM

## 2023-01-03 DIAGNOSIS — I48.11 LONGSTANDING PERSISTENT ATRIAL FIBRILLATION: ICD-10-CM

## 2023-01-03 DIAGNOSIS — D25.9 UTERINE LEIOMYOMA, UNSPECIFIED LOCATION: ICD-10-CM

## 2023-01-03 DIAGNOSIS — E03.9 HYPOTHYROIDISM, UNSPECIFIED TYPE: ICD-10-CM

## 2023-01-03 DIAGNOSIS — I50.30 HEART FAILURE WITH PRESERVED EJECTION FRACTION, UNSPECIFIED HF CHRONICITY: ICD-10-CM

## 2023-01-03 PROCEDURE — 3288F FALL RISK ASSESSMENT DOCD: CPT | Mod: HCNC,CPTII,S$GLB, | Performed by: INTERNAL MEDICINE

## 2023-01-03 PROCEDURE — 1157F ADVNC CARE PLAN IN RCRD: CPT | Mod: HCNC,CPTII,S$GLB, | Performed by: INTERNAL MEDICINE

## 2023-01-03 PROCEDURE — 1157F PR ADVANCE CARE PLAN OR EQUIV PRESENT IN MEDICAL RECORD: ICD-10-PCS | Mod: HCNC,CPTII,S$GLB, | Performed by: INTERNAL MEDICINE

## 2023-01-03 PROCEDURE — 1101F PR PT FALLS ASSESS DOC 0-1 FALLS W/OUT INJ PAST YR: ICD-10-PCS | Mod: HCNC,CPTII,S$GLB, | Performed by: INTERNAL MEDICINE

## 2023-01-03 PROCEDURE — 3075F PR MOST RECENT SYSTOLIC BLOOD PRESS GE 130-139MM HG: ICD-10-PCS | Mod: HCNC,CPTII,S$GLB, | Performed by: INTERNAL MEDICINE

## 2023-01-03 PROCEDURE — 1126F PR PAIN SEVERITY QUANTIFIED, NO PAIN PRESENT: ICD-10-PCS | Mod: HCNC,CPTII,S$GLB, | Performed by: INTERNAL MEDICINE

## 2023-01-03 PROCEDURE — 3075F SYST BP GE 130 - 139MM HG: CPT | Mod: HCNC,CPTII,S$GLB, | Performed by: INTERNAL MEDICINE

## 2023-01-03 PROCEDURE — 3288F PR FALLS RISK ASSESSMENT DOCUMENTED: ICD-10-PCS | Mod: HCNC,CPTII,S$GLB, | Performed by: INTERNAL MEDICINE

## 2023-01-03 PROCEDURE — 99999 PR PBB SHADOW E&M-EST. PATIENT-LVL V: ICD-10-PCS | Mod: PBBFAC,HCNC,, | Performed by: INTERNAL MEDICINE

## 2023-01-03 PROCEDURE — 99999 PR PBB SHADOW E&M-EST. PATIENT-LVL V: CPT | Mod: PBBFAC,HCNC,, | Performed by: INTERNAL MEDICINE

## 2023-01-03 PROCEDURE — 1101F PT FALLS ASSESS-DOCD LE1/YR: CPT | Mod: HCNC,CPTII,S$GLB, | Performed by: INTERNAL MEDICINE

## 2023-01-03 PROCEDURE — 1160F RVW MEDS BY RX/DR IN RCRD: CPT | Mod: HCNC,CPTII,S$GLB, | Performed by: INTERNAL MEDICINE

## 2023-01-03 PROCEDURE — 99214 OFFICE O/P EST MOD 30 MIN: CPT | Mod: HCNC,S$GLB,, | Performed by: INTERNAL MEDICINE

## 2023-01-03 PROCEDURE — 3078F PR MOST RECENT DIASTOLIC BLOOD PRESSURE < 80 MM HG: ICD-10-PCS | Mod: HCNC,CPTII,S$GLB, | Performed by: INTERNAL MEDICINE

## 2023-01-03 PROCEDURE — 3078F DIAST BP <80 MM HG: CPT | Mod: HCNC,CPTII,S$GLB, | Performed by: INTERNAL MEDICINE

## 2023-01-03 PROCEDURE — 1159F MED LIST DOCD IN RCRD: CPT | Mod: HCNC,CPTII,S$GLB, | Performed by: INTERNAL MEDICINE

## 2023-01-03 PROCEDURE — 3008F PR BODY MASS INDEX (BMI) DOCUMENTED: ICD-10-PCS | Mod: HCNC,CPTII,S$GLB, | Performed by: INTERNAL MEDICINE

## 2023-01-03 PROCEDURE — 1126F AMNT PAIN NOTED NONE PRSNT: CPT | Mod: HCNC,CPTII,S$GLB, | Performed by: INTERNAL MEDICINE

## 2023-01-03 PROCEDURE — 99214 PR OFFICE/OUTPT VISIT, EST, LEVL IV, 30-39 MIN: ICD-10-PCS | Mod: HCNC,S$GLB,, | Performed by: INTERNAL MEDICINE

## 2023-01-03 PROCEDURE — 1159F PR MEDICATION LIST DOCUMENTED IN MEDICAL RECORD: ICD-10-PCS | Mod: HCNC,CPTII,S$GLB, | Performed by: INTERNAL MEDICINE

## 2023-01-03 PROCEDURE — 3008F BODY MASS INDEX DOCD: CPT | Mod: HCNC,CPTII,S$GLB, | Performed by: INTERNAL MEDICINE

## 2023-01-03 PROCEDURE — 1160F PR REVIEW ALL MEDS BY PRESCRIBER/CLIN PHARMACIST DOCUMENTED: ICD-10-PCS | Mod: HCNC,CPTII,S$GLB, | Performed by: INTERNAL MEDICINE

## 2023-01-03 RX ORDER — LUBIPROSTONE 24 UG/1
24 CAPSULE ORAL DAILY PRN
Qty: 30 CAPSULE | Refills: 6 | Status: SHIPPED | OUTPATIENT
Start: 2023-01-03 | End: 2023-06-21 | Stop reason: SDUPTHER

## 2023-01-03 RX ORDER — TRETINOIN 1 MG/G
CREAM TOPICAL NIGHTLY
Qty: 20 G | Refills: 6 | Status: SHIPPED | OUTPATIENT
Start: 2023-01-03 | End: 2024-01-02

## 2023-01-03 NOTE — PATIENT INSTRUCTIONS
I made a psychiatry referral for you today. Here is a number that YOU will have to call to get an appointment as the psychiatry department do not allow us to schedule the appointment for you.     [facility]: [phone]       =====================    PSYCHIATRIST RECOMMENDATIONS:    Demetrius Nino, MSKAMRON has Adult Group Therapy for patients with anxiety, depression, panic, stress, grief, relationship, emotional, recovering from addictions.  [phone]    APPS:  Kapil Mccullough (virtual only), Jenae Henry Keister runs [facility] (seen by Resident then Jayesh), NP Juan Segundo    GENERAL  Dr Dane Herr  Talk Pixlee, prescribes medication  Dr Ted Bloch, [address] , [address], [phone]  Dr Jaylon Caldera, [address], [phone]  Dr Karla Courtney, [address] , [phone]  [facility] [phone]  - Dr Mccullough, Dr Sadaf Martell, Dr Mcconnell, Dr Krishnan, Dr Gasca, [facility] - Psychiatric Family NP, Elise Leger  NP Maribel Severino on [facility] - Psychiatry/ pharmacologist  Dr Nimesh Rhodes,  womens health  Dr Dino Rodríguez, [facility] Psychology,  [address], [address] , [place] [address], [phone]  Dr Eulalia Soriano   [facility] Psychiatry,  [address], [facility] [address], [phone]  Dr Miranda [phone]  Dr Clarke Haider, [email or web address], [address], [phone]  Dr Zina Blanc, private practice, [employer] Schizophrenics home calls [place] [address]      THERAPIST RECOMMENDATIONS:    [facility] [phone], Demetrius Nino & associates have 2 adult groups for Depressed patients.  Patient can call to make a group therapy screening appointment with him.     [facility] - [phone], uYan Lubin, Alejandra Thrasher, Lavonne Hernandez, Natali Angel [phone]  Alice Barrett, [email or web address]  "[phone] trauma, EMDR & burst tx  Isabella Ortega  Hermelinda Polk, [phone], [email or web address]/, $75/ hour  Barron Silva, psychologist that prescribes medication  [facility] Counselling - [place] [phone](focus on addiction), trauma Lissa Renteria  Sadafalcira Jamaone - Family & Eating   Zuleikarosalio Moes   Karycristiana Garretting  Olga Lidia Labat , psychol Womens health  Shoshana Holligeorge E[facility] Family Counselling ([address]) $30/ session  David Galeano , [address] [facility], [facility] [phone] ( HOLISTIC, good with teenagers)  Nani Burciaga - [facility]  Shannen Hardin - [phone],  [address] [place] [address], [place]  Radha Labat with Pelts (buccal mucosa swab)  [facility]  Flory Jane, PhD - family & play therapist, [phone]  Gómez Harris, marriage & family , Protestant (informative website & blog)  [facility] [employer]pod [phone], Spiritual based counselling  Kei Bennett - couples therapist  Taylor Faith - [facility]  Patel Araujo on Met Donell Santana on [address]  Zuleika Clouatre  Met Donell Gaitan - [phone]  Isabella Willis (ayurvedic) - [phone] (focus on families, trauma)  Kary Mccormick [address] [facility], [phone]  Lior Champion [phone] (focus on addiction), [address] [place] [address]  Chloe Flynn [phone]  Phyllis Ferrari [phone]  [facility], [phone]  Aislinn Stewart (sexual relationship therapist), [address] [facility] [address] [facility] [place] [address], [phone]  Rebecca Hatch  Ryla, [phone], [address] at [facility], [address], Dr Marci Esteban, Social workers, Art therapy,  bodywork therapy, Conscious breathing , Alternative healing, Massage therapists, [email or web address]  Hypnosis - Carolyn Mittal, \[facility] [place] [facility]\""W, [phone],  [address]  Online Tambria Hunt in [facility] - women of color, "superwoman syndrome"       "

## 2023-01-03 NOTE — PROGRESS NOTES
Subjective:      Patient ID: Angelina Man is a 72 y.o. female.    Chief Complaint: Establish Care and Annual Exam    71 yo female with PMH significant for HSV, depression, afib dx in 2020 on eliquis, hypothyroidism, anxiety, IBS, renal cyst, fibroids here for annual.     Anxiety: Continues on wellbutrin and clonazepam. Discussed the need to start weaning down on the clonazepam. Discussed policy on benzodiazepine prescribing and that I normally fill for 3 months' supply. She is interested in trying to wean down on clonazepam and also to see psychiatry.     Fibroids: Hx of uterine fibroids, would like to repeat US transvaginal to check on the size. Denies unusual bleeding, abdominal pain, no post menopausal bleeding.     IBS: Hx of IBS, has tried multiple medications without improvement. She notes that amitiza is only thing that helps her control IBS. Will refill amitiza today.     Hx of renal/hepatic cysts: Requests US abdomen     Denies any chest pain, shortness of breath, nausea vomiting constipation diarrhea, blood in stool, heartburn    Review of Systems   Constitutional:  Negative for chills, fever and weight loss.   HENT:  Negative for congestion, ear pain and sore throat.    Eyes:  Negative for double vision.   Respiratory:  Negative for cough and shortness of breath.    Cardiovascular:  Negative for chest pain, palpitations and leg swelling.   Gastrointestinal:  Negative for abdominal pain, heartburn, nausea and vomiting.   Skin:  Negative for rash.   Neurological:  Negative for dizziness, tingling and headaches.   Psychiatric/Behavioral:  Negative for depression.        Current Outpatient Medications:     acetaminophen (TYLENOL) 500 MG tablet, Take 500 mg by mouth every 6 (six) hours as needed for Pain., Disp: , Rfl:     acyclovir (ZOVIRAX) 400 MG tablet, Take 1 tablet (400 mg total) by mouth 2 (two) times daily. (Patient taking differently: Take 400 mg by mouth Daily.), Disp: 60 tablet, Rfl: 12     ascorbic acid (VITAMIN C) 1000 MG tablet, Take 1,000 mg by mouth once daily. , Disp: , Rfl:     biotin 1 mg tablet, Take 1 mg by mouth 2 (two) times daily. , Disp: , Rfl:     buPROPion (WELLBUTRIN XL) 150 MG TB24 tablet, Take 1 tablet (150 mg total) by mouth once daily., Disp: 90 tablet, Rfl: 2    calcium-vitamin D 500 mg(1,250mg) -200 unit per tablet, Take 1 tablet by mouth 2 (two) times daily with meals. , Disp: , Rfl:     clonazePAM (KLONOPIN) 1 MG tablet, TAKE 1& 1/2 TABLET BY MOUTH NIGHTLY AS NEEDED, Disp: 45 tablet, Rfl: 5    cycloSPORINE (RESTASIS) 0.05 % ophthalmic emulsion, Place 1 drop into both eyes 2 (two) times daily., Disp: 60 each, Rfl: 11    digestive enzymes Tab, Take 1 tablet by mouth once daily. , Disp: , Rfl:     diphenhydrAMINE (BENADRYL) 25 mg capsule, Take 25 mg by mouth nightly as needed. , Disp: , Rfl:     ELIQUIS 5 mg Tab, TAKE 1 TABLET(5 MG) BY MOUTH TWICE DAILY, Disp: 180 tablet, Rfl: 3    fexofenadine (ALLEGRA) 180 MG tablet, Take 180 mg by mouth once daily., Disp: , Rfl:     fluticasone propionate (FLONASE) 50 mcg/actuation nasal spray, 1 spray (50 mcg total) by Each Nostril route once daily., Disp: 16 g, Rfl: 12    furosemide (LASIX) 20 MG tablet, Take 1 tablet (20 mg total) by mouth every other day., Disp: 15 tablet, Rfl: 11    ketorolac 0.5% (ACULAR) 0.5 % Drop, Place 1 drop into the left eye 4 (four) times daily., Disp: 5 mL, Rfl: 1    levothyroxine (SYNTHROID) 137 MCG Tab tablet, Take 1 tablet (137 mcg total) by mouth before breakfast., Disp: 30 tablet, Rfl: 11    metoprolol succinate (TOPROL-XL) 25 MG 24 hr tablet, Take 1 tablet (25 mg total) by mouth once daily., Disp: 30 tablet, Rfl: 11    multivitamin (THERAGRAN) per tablet, Take 1 tablet by mouth once daily. , Disp: , Rfl:     prednisoLONE acetate (PRED FORTE) 1 % DrpS, Place 1 drop into the left eye 4 (four) times daily., Disp: 5 mL, Rfl: 1    zinc 50 mg Tab, Take 50 mg by mouth once daily. , Disp: , Rfl:     lubiprostone  (AMITIZA) 24 MCG Cap, Take 1 capsule (24 mcg total) by mouth daily as needed (IBS symptoms)., Disp: 30 capsule, Rfl: 6    tretinoin (RETIN-A) 0.1 % cream, Apply topically every evening., Disp: 20 g, Rfl: 6    Lab Results   Component Value Date    HGBA1C 5.4 08/24/2015    HGBA1C 5.3 08/13/2014    HGBA1C 5.5 08/08/2013     No results found for: MICALBCREAT  Lab Results   Component Value Date    LDLCALC 111.4 04/13/2022    LDLCALC 126.6 03/02/2022    CHOL 212 (H) 04/13/2022    HDL 64 04/13/2022    TRIG 183 (H) 04/13/2022       Lab Results   Component Value Date     08/25/2022    K 3.9 08/30/2022     08/25/2022    CO2 30 (H) 08/25/2022    GLU 93 08/25/2022    BUN 12 08/25/2022    CREATININE 0.8 08/25/2022    CALCIUM 9.6 08/25/2022    PROT 6.9 04/13/2022    ALBUMIN 3.7 04/13/2022    BILITOT 0.3 04/13/2022    ALKPHOS 84 04/13/2022    AST 28 04/13/2022    ALT 29 04/13/2022    ANIONGAP 11 08/25/2022    ESTGFRAFRICA >60.0 05/25/2022    EGFRNONAA 55.6 (A) 05/25/2022    WBC 5.30 04/13/2022    HGB 14.1 04/13/2022    HGB 13.5 03/02/2022    HCT 42.7 04/13/2022    MCV 99 (H) 04/13/2022     04/13/2022    TSH 2.882 12/22/2021    HEPCAB Negative 09/15/2016       Lab Results   Component Value Date    QCVZHBKF49 923 04/13/2022         Past Medical History:   Diagnosis Date    Arthritis     Atrial fibrillation     Bronchitis     Cataract     Dry eyes     GERD (gastroesophageal reflux disease)     Glaucoma, suspect - Both Eyes     Hypothyroidism 06/23/2016    Pulmonary hypertension     Rash     Renal angiomyolipoma     Renal disorder     Spinal stenosis     Squamous cell carcinoma     in-situ right upper inner arm, right wrist    Status post lumbar surgery 06/23/2016    Stress fracture     Thyroid disease     Venous insufficiency      Past Surgical History:   Procedure Laterality Date    ANGIOPLASTY      CATARACT EXTRACTION W/  INTRAOCULAR LENS IMPLANT Left 12/6/2022    Procedure: EXTRACTION, CATARACT, WITH IOL  INSERTION;  Surgeon: Tone Brown MD;  Location: [facility] OR;  Service: Ophthalmology;  Laterality: Left;    CATARACT EXTRACTION W/  INTRAOCULAR LENS IMPLANT Right 12/20/2022    Procedure: EXTRACTION, CATARACT, WITH IOL INSERTION;  Surgeon: Tone Brown MD;  Location: [facility] OR;  Service: Ophthalmology;  Laterality: Right;    CERVICAL FUSION      COLONOSCOPY      COLONOSCOPY N/A 11/14/2017    Procedure: COLONOSCOPY;  Surgeon: Kasi Mercado MD;  Location: [facility] ENDO (4TH FLR);  Service: Endoscopy;  Laterality: N/A;    ESOPHAGOGASTRODUODENOSCOPY N/A 10/10/2019    Procedure: EGD (ESOPHAGOGASTRODUODENOSCOPY);  Surgeon: Rg Milton MD;  Location: [facility] ENDO (4TH FLR);  Service: Endoscopy;  Laterality: N/A;    ESOPHAGOGASTRODUODENOSCOPY N/A 10/6/2021    Procedure: EGD (ESOPHAGOGASTRODUODENOSCOPY);  Surgeon: Rg Milton MD;  Location: [facility] ENDO (4TH FLR);  Service: Endoscopy;  Laterality: N/A;  covid test 10/3 zo, instr emailed/portal -ml    l4-5 mid discectomy Left 03/2017    l4-5 MIS diskectomy Right 05/2016    RIGHT HEART CATHETERIZATION Right 1/27/2022    Procedure: INSERTION, CATHETER, RIGHT HEART;  Surgeon: Satnam Pickard Jr., MD;  Location: [facility] CATH LAB;  Service: Cardiology;  Laterality: Right;    TREATMENT OF CARDIAC ARRHYTHMIA N/A 7/17/2020    Procedure: CARDIOVERSION;  Surgeon: Kwan Bray MD;  Location: [facility] EP LAB;  Service: Cardiology;  Laterality: N/A;  AF,DCCV/SANGITA, ANES, SK, 746    TREATMENT OF CARDIAC ARRHYTHMIA N/A 7/14/2021    Procedure: CARDIOVERSION;  Surgeon: SYDNIE Cedillo MD;  Location: [facility] EP LAB;  Service: Cardiology;  Laterality: N/A;  AF, SANGITA, DCCV, MAC, EH, 3 Prep     Social History     Social History Narrative    Not on file     Family History   Problem Relation Age of Onset    Cancer Mother         Lung cancer    Heart failure Father     Colon cancer Father     Hypertension Father     Cataracts Father     Cancer Father 80        colon    No Known Problems  "Sister     No Known Problems Brother     Melanoma Daughter     Diabetes Son     Heart disease Son     Cancer Son         esophageal cancer    No Known Problems Maternal Aunt     No Known Problems Maternal Uncle     No Known Problems Paternal Aunt     No Known Problems Paternal Uncle     No Known Problems Maternal Grandmother     No Known Problems Maternal Grandfather     No Known Problems Paternal Grandmother     No Known Problems Paternal Grandfather     Amblyopia Neg Hx     Blindness Neg Hx     Glaucoma Neg Hx     Macular degeneration Neg Hx     Retinal detachment Neg Hx     Strabismus Neg Hx     Stroke Neg Hx     Thyroid disease Neg Hx     Breast cancer Neg Hx     Ovarian cancer Neg Hx      Vitals:    01/03/23 1052   BP: 130/72   Pulse: 72   Resp: 18   Temp: 98 °F (36.7 °C)   SpO2: 99%   Weight: 45 kg (99 lb 3.3 oz)   Height: 5' 4" (1.626 m)   PainSc: 0-No pain     Objective:   Physical Exam  Vitals reviewed.   Constitutional:       Appearance: Normal appearance.   HENT:      Head: Normocephalic.      Right Ear: Tympanic membrane, ear canal and external ear normal.      Left Ear: Tympanic membrane, ear canal and external ear normal.      Nose: Nose normal.      Mouth/Throat:      Mouth: Mucous membranes are moist.      Pharynx: Oropharynx is clear.   Eyes:      Conjunctiva/sclera: Conjunctivae normal.      Pupils: Pupils are equal, round, and reactive to light.   Cardiovascular:      Rate and Rhythm: Normal rate and regular rhythm.      Pulses: Normal pulses.   Pulmonary:      Effort: Pulmonary effort is normal.      Breath sounds: Normal breath sounds.   Abdominal:      General: Abdomen is flat. Bowel sounds are normal.      Palpations: Abdomen is soft.   Musculoskeletal:      Cervical back: Neck supple.   Skin:     General: Skin is warm.   Neurological:      General: No focal deficit present.      Mental Status: She is alert.   Psychiatric:         Mood and Affect: Mood normal.     Assessment:     1. Anxiety  "   2. Renal cyst, acquired    3. Hypothyroidism, unspecified type    4. Gastroesophageal reflux disease, unspecified whether esophagitis present    5. History of cardioversion    6. Longstanding persistent atrial fibrillation    7. Radiculopathy of lumbar region    8. Heart failure with preserved ejection fraction, unspecified HF chronicity    9. Anticoagulant long-term use    10. Irritable bowel syndrome, unspecified type    11. Other acne    12. Screening for colon cancer    13. Routine general medical examination at a health care facility    14. Osteoporosis, unspecified osteoporosis type, unspecified pathological fracture presence    15. Abnormal finding of blood chemistry, unspecified    16. Abnormal level of blood mineral    17. Uterine leiomyoma, unspecified location    18. Abdominal pain, unspecified abdominal location      Plan:     Orders Placed This Encounter    DXA Bone Density Spine And Hip    US Pelvis Comp with Transvag NON-OB (xpd)    US Abdomen Complete    CBC Auto Differential    Comprehensive Metabolic Panel    TSH    Lipid Panel    Vitamin D    Ambulatory referral/consult to Psychiatry    Ambulatory referral/consult to Endo Procedure     lubiprostone (AMITIZA) 24 MCG Cap    tretinoin (RETIN-A) 0.1 % cream       Patient Instructions   I made a psychiatry referral for you today. Here is a number that YOU will have to call to get an appointment as the psychiatry department do not allow us to schedule the appointment for you.     [facility]: [phone]       =====================    PSYCHIATRIST RECOMMENDATIONS:    BRITNEY Danielle has Adult Group Therapy for patients with anxiety, depression, panic, stress, grief, relationship, emotional, recovering from addictions.  [phone]    APPS:  Kapil Mccullough (virtual only), Jenae Henry Keister runs [facility] (seen by Resident then Jayesh), NP Juan Herr  Talk  Razient online, prescribes medication  Dr Ted Bloch, [address] , [address], [phone]  Dr Jaylon Caldera, [address], [phone]  Dr Karla Courtney, [address], [phone]  [facility] [phone]  - Dr Mccullough, Dr Sadaf Martell, Dr Mcconnell, Dr Krishnan, Dr Gasca, [facility] - Psychiatric Family NP, Elise Leger  NP Maribel Severino on [facility] - Psychiatry/ pharmacologist  Dr Nimesh Rhodes,  womens health  Dr Dino Rodríguez, [facility] Psychology,  [address], [address] , [place] ,[place] [address], [phone]  Dr Eulalia Soriano   [facility] Psychiatry,  [address], [place] [address], [phone]  Dr Miranda [phone]  Dr Clarke Haider, [email or web address], [address], [phone]  Dr Zina Blanc, private practice, [employer] Schizophrenics home calls [place] [address]      THERAPIST RECOMMENDATIONS:    [facility] [phone], Demetrius Nino & associates have 2 adult groups for Depressed patients.  Patient can call to make a group therapy screening appointment with him.     [facility] - [phone], Yuan Lubin, Alejandra Thrasher, Lavonne Hernandez, Nicole Breaux,  Natali Townsend [phone]  Alice Barrett, [email or web address] [phone] trauma, EMDR & burst tx  Isabella Polk, [phone], [email or web address]/, $75/ hour  Barron Silva, psychologist that prescribes medication  [facility] - [place] [phone](focus on addiction), trauma Lissa Ellington - Family & Eating   Zuleika Ash   Kary Hearn , psychol Womens health  Shoshana Daileyolia Family Counselling ([address]) $30/ session  David Froylan , [address] [place] [address] [phone] ( HOLISTIC, good with teenagers)  Nani Burciaga - [facility] & [facility] - [phone],  [address], [place] [address] Labat with Pelts  "(buccal mucosa swab)  Phyllis Nicholas - [facility]  Flory Jane, PhD - family & play therapist, [phone]  Gómez Harris, marriage & family , Congregational (informative website & blog)  Denton Bustos [phone], Spiritual based counselling  Kei Bennett - couples therapist  Taylor Paris - [facility]  Patel Araujo on Met Donell Santana on [address]  Zuleika Clouatre  Met Donell Gaitan - [phone]  Isabella Willis (ayurvedic) - [phone] (focus on families, trauma)  Karycristiana Mccormick [address] [facility] [facility], [phone]  Lior Champion [phone] (focus on addiction), [address] [place] [address]  Chloe Flynn [phone]  Phyllis Ferrari [phone]  [facility], [phone]  Aislinn Stewart (sexual relationship therapist), [address] [facility] [address], [phone]  Rebecca Mamie  [place], [phone], [address] at [facility], [address], Dr Marci Esteban, Social workers, Art therapy,  bodywork therapy, Conscious breathing , Alternative healing, Massage therapists, [email or web address]  Hypnosis - Carolyn Mittal, [facility], [phone],  [address] [facility] [facility]  Online Tamlise Guillermo in [facility] - women of color, "superwoman syndrome"       All of your core healthy metrics are met.                            "

## 2023-01-09 ENCOUNTER — HOSPITAL ENCOUNTER (OUTPATIENT)
Dept: RADIOLOGY | Facility: HOSPITAL | Age: 73
Discharge: HOME | End: 2023-01-09
Attending: NURSE PRACTITIONER
Payer: MEDICARE

## 2023-01-09 VITALS — BODY MASS INDEX: 17.16 KG/M2 | WEIGHT: 100 LBS

## 2023-01-09 DIAGNOSIS — Z12.31 ENCOUNTER FOR SCREENING MAMMOGRAM FOR BREAST CANCER: ICD-10-CM

## 2023-01-09 PROCEDURE — 77063 MAMMO DIGITAL SCREENING BILAT WITH TOMO: ICD-10-PCS | Mod: 26,HCNC,, | Performed by: RADIOLOGY

## 2023-01-09 PROCEDURE — 77067 MAMMO DIGITAL SCREENING BILAT WITH TOMO: ICD-10-PCS | Mod: 26,HCNC,, | Performed by: RADIOLOGY

## 2023-01-09 PROCEDURE — 77067 SCR MAMMO BI INCL CAD: CPT | Mod: 26,HCNC,, | Performed by: RADIOLOGY

## 2023-01-09 PROCEDURE — 77067 SCR MAMMO BI INCL CAD: CPT | Mod: TC,HCNC

## 2023-01-09 PROCEDURE — 77063 BREAST TOMOSYNTHESIS BI: CPT | Mod: 26,HCNC,, | Performed by: RADIOLOGY

## 2023-01-09 NOTE — PROGRESS NOTES
Your mammogram was normal.    Let's plan to repeat your mammogram in one year.    As a reminder, a mammogram is a screening test and not perfect. A normal mammogram does not outweigh a palpable mass. If you notice a mass in your breast this needs to be evaluated regardless of your recent mammogram results.

## 2023-01-11 ENCOUNTER — HOSPITAL ENCOUNTER (OUTPATIENT)
Dept: RADIOLOGY | Facility: HOSPITAL | Age: 73
Discharge: HOME | End: 2023-01-11
Attending: INTERNAL MEDICINE
Payer: MEDICARE

## 2023-01-11 DIAGNOSIS — D25.9 UTERINE LEIOMYOMA, UNSPECIFIED LOCATION: ICD-10-CM

## 2023-01-11 PROCEDURE — 76856 US PELVIS COMP WITH TRANSVAG NON-OB (XPD): ICD-10-PCS | Mod: 26,HCNC,, | Performed by: RADIOLOGY

## 2023-01-11 PROCEDURE — 76856 US EXAM PELVIC COMPLETE: CPT | Mod: 26,HCNC,, | Performed by: RADIOLOGY

## 2023-01-11 PROCEDURE — 76830 TRANSVAGINAL US NON-OB: CPT | Mod: 26,HCNC,, | Performed by: RADIOLOGY

## 2023-01-11 PROCEDURE — 76830 US PELVIS COMP WITH TRANSVAG NON-OB (XPD): ICD-10-PCS | Mod: 26,HCNC,, | Performed by: RADIOLOGY

## 2023-01-11 PROCEDURE — 76856 US EXAM PELVIC COMPLETE: CPT | Mod: TC,HCNC

## 2023-01-12 ENCOUNTER — TELEPHONE (OUTPATIENT)
Dept: OPHTHALMOLOGY | Facility: CLINIC | Age: 73
End: 2023-01-12
Payer: MEDICARE

## 2023-01-12 NOTE — TELEPHONE ENCOUNTER
----- Message from Tanisha Cordero sent at 1/12/2023 12:58 PM CST -----  Regarding: Schedule  Pt called about her right having a broken blood vessels with the feeling of something in eye, pt states she has cataract surgery with Dr. Brown in December.     Pts call back: [phone]

## 2023-01-12 NOTE — TELEPHONE ENCOUNTER
Returned call to patient. Pt states blood shot-no pain. I informed her it sounds like she has subconjunctival hem. Which is like a bruise. Usually we suggest to use artifical tears for comfort. I advised her to keep her follow up appointment w/. pt understood. louis

## 2023-01-17 ENCOUNTER — HOSPITAL ENCOUNTER (OUTPATIENT)
Dept: RADIOLOGY | Facility: HOSPITAL | Age: 73
Discharge: HOME | End: 2023-01-17
Attending: INTERNAL MEDICINE
Payer: MEDICARE

## 2023-01-17 DIAGNOSIS — N28.1 RENAL CYST, ACQUIRED: ICD-10-CM

## 2023-01-17 DIAGNOSIS — K58.9 IRRITABLE BOWEL SYNDROME, UNSPECIFIED TYPE: ICD-10-CM

## 2023-01-17 PROCEDURE — 76700 US EXAM ABDOM COMPLETE: CPT | Mod: 26,HCNC,, | Performed by: RADIOLOGY

## 2023-01-17 PROCEDURE — 76700 US EXAM ABDOM COMPLETE: CPT | Mod: TC,HCNC

## 2023-01-17 PROCEDURE — 76700 US ABDOMEN COMPLETE: ICD-10-PCS | Mod: 26,HCNC,, | Performed by: RADIOLOGY

## 2023-01-17 NOTE — PROGRESS NOTES
US abdomen reveals stable right renal lesions, most likely angiomyolipomas and cysts.    Note hepatic cysts as well. We will continue to trend your lesions.

## 2023-01-18 ENCOUNTER — OFFICE VISIT (OUTPATIENT)
Dept: OPHTHALMOLOGY | Facility: CLINIC | Age: 73
End: 2023-01-18
Payer: MEDICARE

## 2023-01-18 DIAGNOSIS — H04.123 DRY EYE SYNDROME OF BOTH EYES: ICD-10-CM

## 2023-01-18 DIAGNOSIS — Z98.890 POST-OPERATIVE STATE: Primary | ICD-10-CM

## 2023-01-18 DIAGNOSIS — H52.7 REFRACTIVE ERROR: ICD-10-CM

## 2023-01-18 PROCEDURE — 99999 PR PBB SHADOW E&M-EST. PATIENT-LVL II: ICD-10-PCS | Mod: PBBFAC,HCNC,, | Performed by: OPHTHALMOLOGY

## 2023-01-18 PROCEDURE — 99024 POSTOP FOLLOW-UP VISIT: CPT | Mod: HCNC,S$GLB,, | Performed by: OPHTHALMOLOGY

## 2023-01-18 PROCEDURE — 99999 PR PBB SHADOW E&M-EST. PATIENT-LVL II: CPT | Mod: PBBFAC,HCNC,, | Performed by: OPHTHALMOLOGY

## 2023-01-18 PROCEDURE — 1157F ADVNC CARE PLAN IN RCRD: CPT | Mod: HCNC,CPTII,S$GLB, | Performed by: OPHTHALMOLOGY

## 2023-01-18 PROCEDURE — 1159F PR MEDICATION LIST DOCUMENTED IN MEDICAL RECORD: ICD-10-PCS | Mod: HCNC,CPTII,S$GLB, | Performed by: OPHTHALMOLOGY

## 2023-01-18 PROCEDURE — 1160F RVW MEDS BY RX/DR IN RCRD: CPT | Mod: HCNC,CPTII,S$GLB, | Performed by: OPHTHALMOLOGY

## 2023-01-18 PROCEDURE — 1160F PR REVIEW ALL MEDS BY PRESCRIBER/CLIN PHARMACIST DOCUMENTED: ICD-10-PCS | Mod: HCNC,CPTII,S$GLB, | Performed by: OPHTHALMOLOGY

## 2023-01-18 PROCEDURE — 99024 PR POST-OP FOLLOW-UP VISIT: ICD-10-PCS | Mod: HCNC,S$GLB,, | Performed by: OPHTHALMOLOGY

## 2023-01-18 PROCEDURE — 1157F PR ADVANCE CARE PLAN OR EQUIV PRESENT IN MEDICAL RECORD: ICD-10-PCS | Mod: HCNC,CPTII,S$GLB, | Performed by: OPHTHALMOLOGY

## 2023-01-18 PROCEDURE — 1159F MED LIST DOCD IN RCRD: CPT | Mod: HCNC,CPTII,S$GLB, | Performed by: OPHTHALMOLOGY

## 2023-01-18 NOTE — PROGRESS NOTES
Subjective:       Patient ID: Angelina Man is a 73 y.o. female.    Chief Complaint: Post-op Evaluation (Patient Angelina Man is a 73 year old female.)    HPI     Post-op Evaluation     Additional comments: Patient Angelina Man is a 73 year old female.           Comments    Pt here for 3 week PCIOL OU post-op visit. Pt states that she has FBS OU   since last visit. Pt states that OD was bloodshot at bottom part of eye   but it has gone away some. Pt states that VA OU is much improved but still   has some trouble seeing with glasses. Pt states that she sees distance   better with OD and near better with OS.    DLS: 12/28/2022 with Dr. Brown  S/p Phaco w/IOL OD: 12/20/2022   S/p Phaco w/IOL OS: 12/06/2022    Gtts: Systane Ultra PRN OU              Last edited by Micaela Champion on 1/18/2023  1:49 PM.             Assessment:       1. Post-operative state    2. Dry eye syndrome of both eyes    3. Refractive error          Plan:       S/p CE OU- Doing well.   SONAL-Stable OU.  RE-Pt wants MRx.      AT's.  Give MRx.  RTC Dr Diaz in 6 mos.

## 2023-01-23 ENCOUNTER — APPOINTMENT (OUTPATIENT)
Dept: RADIOLOGY | Facility: CLINIC | Age: 73
End: 2023-01-23
Attending: INTERNAL MEDICINE
Payer: MEDICARE

## 2023-01-23 DIAGNOSIS — M81.0 OSTEOPOROSIS, UNSPECIFIED OSTEOPOROSIS TYPE, UNSPECIFIED PATHOLOGICAL FRACTURE PRESENCE: ICD-10-CM

## 2023-01-23 PROCEDURE — 77080 DXA BONE DENSITY AXIAL: CPT | Mod: TC,HCNC,PO

## 2023-01-23 PROCEDURE — 77080 DXA BONE DENSITY AXIAL: CPT | Mod: 26,HCNC,, | Performed by: INTERNAL MEDICINE

## 2023-01-23 PROCEDURE — 77080 DEXA BONE DENSITY SPINE HIP: ICD-10-PCS | Mod: 26,HCNC,, | Performed by: INTERNAL MEDICINE

## 2023-01-26 ENCOUNTER — PATIENT MESSAGE (OUTPATIENT)
Dept: PRIMARY CARE CLINIC | Facility: CLINIC | Age: 73
End: 2023-01-26
Payer: MEDICARE

## 2023-01-26 ENCOUNTER — TELEPHONE (OUTPATIENT)
Dept: PRIMARY CARE CLINIC | Facility: CLINIC | Age: 73
End: 2023-01-26
Payer: MEDICARE

## 2023-01-26 NOTE — PROGRESS NOTES
Your Bone density test shows OSTEOPOROSIS.     You are at an increased risk for hip fracture, spinal compression fracture in the future.    Would you mind coming into see me in the office so we can discuss treatment plans?

## 2023-01-26 NOTE — TELEPHONE ENCOUNTER
----- Message from Zina Rockwell MD sent at 1/26/2023  2:20 PM CST -----  Your Bone density test shows OSTEOPOROSIS.     You are at an increased risk for hip fracture, spinal compression fracture in the future.    Would you mind coming into see me in the office so we can discuss treatment plans?

## 2023-01-30 ENCOUNTER — OFFICE VISIT (OUTPATIENT)
Dept: PRIMARY CARE CLINIC | Facility: CLINIC | Age: 73
End: 2023-01-30
Payer: MEDICARE

## 2023-01-30 ENCOUNTER — TELEPHONE (OUTPATIENT)
Dept: ELECTROPHYSIOLOGY | Facility: CLINIC | Age: 73
End: 2023-01-30
Payer: MEDICARE

## 2023-01-30 VITALS
TEMPERATURE: 98 F | RESPIRATION RATE: 18 BRPM | HEART RATE: 68 BPM | BODY MASS INDEX: 17.57 KG/M2 | SYSTOLIC BLOOD PRESSURE: 105 MMHG | WEIGHT: 102.94 LBS | HEIGHT: 64 IN | OXYGEN SATURATION: 99 % | DIASTOLIC BLOOD PRESSURE: 74 MMHG

## 2023-01-30 DIAGNOSIS — Z78.9 INTOLERANCE OF ORAL BISPHOSPHONATE THERAPY: ICD-10-CM

## 2023-01-30 DIAGNOSIS — M81.0 OSTEOPOROSIS, UNSPECIFIED OSTEOPOROSIS TYPE, UNSPECIFIED PATHOLOGICAL FRACTURE PRESENCE: Primary | ICD-10-CM

## 2023-01-30 PROCEDURE — 99499 RISK ADDL DX/OHS AUDIT: ICD-10-PCS | Mod: HCNC,S$GLB,, | Performed by: INTERNAL MEDICINE

## 2023-01-30 PROCEDURE — 1101F PT FALLS ASSESS-DOCD LE1/YR: CPT | Mod: HCNC,CPTII,S$GLB, | Performed by: INTERNAL MEDICINE

## 2023-01-30 PROCEDURE — 99214 PR OFFICE/OUTPT VISIT, EST, LEVL IV, 30-39 MIN: ICD-10-PCS | Mod: HCNC,S$GLB,, | Performed by: INTERNAL MEDICINE

## 2023-01-30 PROCEDURE — 1157F ADVNC CARE PLAN IN RCRD: CPT | Mod: HCNC,CPTII,S$GLB, | Performed by: INTERNAL MEDICINE

## 2023-01-30 PROCEDURE — 3288F PR FALLS RISK ASSESSMENT DOCUMENTED: ICD-10-PCS | Mod: HCNC,CPTII,S$GLB, | Performed by: INTERNAL MEDICINE

## 2023-01-30 PROCEDURE — 3078F PR MOST RECENT DIASTOLIC BLOOD PRESSURE < 80 MM HG: ICD-10-PCS | Mod: HCNC,CPTII,S$GLB, | Performed by: INTERNAL MEDICINE

## 2023-01-30 PROCEDURE — 99214 OFFICE O/P EST MOD 30 MIN: CPT | Mod: HCNC,S$GLB,, | Performed by: INTERNAL MEDICINE

## 2023-01-30 PROCEDURE — 1159F MED LIST DOCD IN RCRD: CPT | Mod: HCNC,CPTII,S$GLB, | Performed by: INTERNAL MEDICINE

## 2023-01-30 PROCEDURE — 1157F PR ADVANCE CARE PLAN OR EQUIV PRESENT IN MEDICAL RECORD: ICD-10-PCS | Mod: HCNC,CPTII,S$GLB, | Performed by: INTERNAL MEDICINE

## 2023-01-30 PROCEDURE — 99999 PR PBB SHADOW E&M-EST. PATIENT-LVL V: CPT | Mod: PBBFAC,HCNC,, | Performed by: INTERNAL MEDICINE

## 2023-01-30 PROCEDURE — 99999 PR PBB SHADOW E&M-EST. PATIENT-LVL V: ICD-10-PCS | Mod: PBBFAC,HCNC,, | Performed by: INTERNAL MEDICINE

## 2023-01-30 PROCEDURE — 1160F RVW MEDS BY RX/DR IN RCRD: CPT | Mod: HCNC,CPTII,S$GLB, | Performed by: INTERNAL MEDICINE

## 2023-01-30 PROCEDURE — 1101F PR PT FALLS ASSESS DOC 0-1 FALLS W/OUT INJ PAST YR: ICD-10-PCS | Mod: HCNC,CPTII,S$GLB, | Performed by: INTERNAL MEDICINE

## 2023-01-30 PROCEDURE — 3074F SYST BP LT 130 MM HG: CPT | Mod: HCNC,CPTII,S$GLB, | Performed by: INTERNAL MEDICINE

## 2023-01-30 PROCEDURE — 3288F FALL RISK ASSESSMENT DOCD: CPT | Mod: HCNC,CPTII,S$GLB, | Performed by: INTERNAL MEDICINE

## 2023-01-30 PROCEDURE — 3078F DIAST BP <80 MM HG: CPT | Mod: HCNC,CPTII,S$GLB, | Performed by: INTERNAL MEDICINE

## 2023-01-30 PROCEDURE — 1126F AMNT PAIN NOTED NONE PRSNT: CPT | Mod: HCNC,CPTII,S$GLB, | Performed by: INTERNAL MEDICINE

## 2023-01-30 PROCEDURE — 1159F PR MEDICATION LIST DOCUMENTED IN MEDICAL RECORD: ICD-10-PCS | Mod: HCNC,CPTII,S$GLB, | Performed by: INTERNAL MEDICINE

## 2023-01-30 PROCEDURE — 99499 UNLISTED E&M SERVICE: CPT | Mod: HCNC,S$GLB,, | Performed by: INTERNAL MEDICINE

## 2023-01-30 PROCEDURE — 1160F PR REVIEW ALL MEDS BY PRESCRIBER/CLIN PHARMACIST DOCUMENTED: ICD-10-PCS | Mod: HCNC,CPTII,S$GLB, | Performed by: INTERNAL MEDICINE

## 2023-01-30 PROCEDURE — 1126F PR PAIN SEVERITY QUANTIFIED, NO PAIN PRESENT: ICD-10-PCS | Mod: HCNC,CPTII,S$GLB, | Performed by: INTERNAL MEDICINE

## 2023-01-30 PROCEDURE — 3074F PR MOST RECENT SYSTOLIC BLOOD PRESSURE < 130 MM HG: ICD-10-PCS | Mod: HCNC,CPTII,S$GLB, | Performed by: INTERNAL MEDICINE

## 2023-01-30 NOTE — PROGRESS NOTES
Subjective:      Patient ID: Angelina Man is a 73 y.o. female.    Chief Complaint: Follow-up      71 yo female with PMH significant for HSV, depression, afib dx in 2020 on eliquis, hypothyroidism, anxiety, IBS, renal cyst, fibroids here for follow up of her DEXA scan results.    Osteoporosis:  Patient presents today to discuss her DEXA scan results.  She does have a history of osteopenia that was seen on previous DEXA scans.  New DEXA scan obtained for this year does reveal osteoporosis at this time with 9% risk of major osteoporotic fracture in the next 10 years. The patient has tried alendronate previously but was unable to tolerate this 2/2 to side effects, most notably severe gastritis/esophagitis. She has been compliant with calcium and vitamin D and is interested in prolia. Warned pt about potential Side effects. Repeat DEXA in 2 years.     Denies any chest pain, shortness of breath, nausea vomiting constipation diarrhea, blood in stool, heartburn    Review of Systems   Constitutional:  Negative for chills, fever and weight loss.   HENT:  Negative for congestion, ear pain and sore throat.    Eyes:  Negative for double vision.   Respiratory:  Negative for cough and shortness of breath.    Cardiovascular:  Negative for chest pain, palpitations and leg swelling.   Gastrointestinal:  Negative for abdominal pain, heartburn, nausea and vomiting.   Skin:  Negative for rash.   Neurological:  Negative for dizziness, tingling and headaches.   Psychiatric/Behavioral:  Negative for depression.        Current Outpatient Medications:     acetaminophen (TYLENOL) 500 MG tablet, Take 500 mg by mouth every 6 (six) hours as needed for Pain., Disp: , Rfl:     acyclovir (ZOVIRAX) 400 MG tablet, Take 1 tablet (400 mg total) by mouth 2 (two) times daily. (Patient taking differently: Take 400 mg by mouth Daily.), Disp: 60 tablet, Rfl: 12    ascorbic acid (VITAMIN C) 1000 MG tablet, Take 1,000 mg by mouth once daily. , Disp: , Rfl:      biotin 1 mg tablet, Take 1 mg by mouth 2 (two) times daily. , Disp: , Rfl:     buPROPion (WELLBUTRIN XL) 150 MG TB24 tablet, Take 1 tablet (150 mg total) by mouth once daily., Disp: 90 tablet, Rfl: 2    calcium-vitamin D 500 mg(1,250mg) -200 unit per tablet, Take 1 tablet by mouth 2 (two) times daily with meals. , Disp: , Rfl:     clonazePAM (KLONOPIN) 1 MG tablet, TAKE 1& 1/2 TABLET BY MOUTH NIGHTLY AS NEEDED, Disp: 45 tablet, Rfl: 5    cycloSPORINE (RESTASIS) 0.05 % ophthalmic emulsion, Place 1 drop into both eyes 2 (two) times daily., Disp: 60 each, Rfl: 11    digestive enzymes Tab, Take 1 tablet by mouth once daily. , Disp: , Rfl:     diphenhydrAMINE (BENADRYL) 25 mg capsule, Take 25 mg by mouth nightly as needed. , Disp: , Rfl:     ELIQUIS 5 mg Tab, TAKE 1 TABLET(5 MG) BY MOUTH TWICE DAILY, Disp: 180 tablet, Rfl: 3    fexofenadine (ALLEGRA) 180 MG tablet, Take 180 mg by mouth once daily., Disp: , Rfl:     fluticasone propionate (FLONASE) 50 mcg/actuation nasal spray, 1 spray (50 mcg total) by Each Nostril route once daily., Disp: 16 g, Rfl: 12    furosemide (LASIX) 20 MG tablet, Take 1 tablet (20 mg total) by mouth every other day., Disp: 15 tablet, Rfl: 11    levothyroxine (SYNTHROID) 137 MCG Tab tablet, Take 1 tablet (137 mcg total) by mouth before breakfast., Disp: 30 tablet, Rfl: 11    lubiprostone (AMITIZA) 24 MCG Cap, Take 1 capsule (24 mcg total) by mouth daily as needed (IBS symptoms)., Disp: 30 capsule, Rfl: 6    metoprolol succinate (TOPROL-XL) 25 MG 24 hr tablet, Take 1 tablet (25 mg total) by mouth once daily., Disp: 30 tablet, Rfl: 11    multivitamin (THERAGRAN) per tablet, Take 1 tablet by mouth once daily. , Disp: , Rfl:     tretinoin (RETIN-A) 0.1 % cream, Apply topically every evening., Disp: 20 g, Rfl: 6    zinc 50 mg Tab, Take 50 mg by mouth once daily. , Disp: , Rfl:     Lab Results   Component Value Date    HGBA1C 5.4 08/24/2015    HGBA1C 5.3 08/13/2014    HGBA1C 5.5 08/08/2013     No  results found for: MICALBCREAT  Lab Results   Component Value Date    LDLCALC 113.8 01/09/2023    LDLCALC 111.4 04/13/2022    CHOL 201 (H) 01/09/2023    HDL 71 01/09/2023    TRIG 81 01/09/2023       Lab Results   Component Value Date     01/09/2023    K 3.4 (L) 01/09/2023     01/09/2023    CO2 29 01/09/2023    GLU 85 01/09/2023    BUN 12 01/09/2023    CREATININE 0.8 01/09/2023    CALCIUM 8.9 01/09/2023    PROT 6.3 01/09/2023    ALBUMIN 3.5 01/09/2023    BILITOT 0.6 01/09/2023    ALKPHOS 80 01/09/2023    AST 22 01/09/2023    ALT 16 01/09/2023    ANIONGAP 10 01/09/2023    ESTGFRAFRICA >60.0 05/25/2022    EGFRNONAA 55.6 (A) 05/25/2022    WBC 4.35 01/09/2023    HGB 12.9 01/09/2023    HGB 14.1 04/13/2022    HCT 40.1 01/09/2023     (H) 01/09/2023     01/09/2023    TSH 1.117 01/09/2023    HEPCAB Negative 09/15/2016       Lab Results   Component Value Date    KIBNEWIA47PN 66 01/09/2023    WAXPWNTV38 923 04/13/2022         Past Medical History:   Diagnosis Date    Arthritis     Atrial fibrillation     Bronchitis     Cataract     Dry eyes     GERD (gastroesophageal reflux disease)     Glaucoma, suspect - Both Eyes     Hypothyroidism 06/23/2016    Pulmonary hypertension     Rash     Renal angiomyolipoma     Renal disorder     Spinal stenosis     Squamous cell carcinoma     in-situ right upper inner arm, right wrist    Status post lumbar surgery 06/23/2016    Stress fracture     Thyroid disease     Venous insufficiency      Past Surgical History:   Procedure Laterality Date    ANGIOPLASTY      CATARACT EXTRACTION W/  INTRAOCULAR LENS IMPLANT Left 12/6/2022    Procedure: EXTRACTION, CATARACT, WITH IOL INSERTION;  Surgeon: Tone Brown MD;  Location: [facility];  Service: Ophthalmology;  Laterality: Left;    CATARACT EXTRACTION W/  INTRAOCULAR LENS IMPLANT Right 12/20/2022    Procedure: EXTRACTION, CATARACT, WITH IOL INSERTION;  Surgeon: Tone Brown MD;  Location: [facility];  Service:  Ophthalmology;  Laterality: Right;    CERVICAL FUSION      COLONOSCOPY      COLONOSCOPY N/A 11/14/2017    Procedure: COLONOSCOPY;  Surgeon: Kasi Mercado MD;  Location: [facility] ENDO (4TH FLR);  Service: Endoscopy;  Laterality: N/A;    ESOPHAGOGASTRODUODENOSCOPY N/A 10/10/2019    Procedure: EGD (ESOPHAGOGASTRODUODENOSCOPY);  Surgeon: Rg Milton MD;  Location: [facility] ENDO (4TH FLR);  Service: Endoscopy;  Laterality: N/A;    ESOPHAGOGASTRODUODENOSCOPY N/A 10/6/2021    Procedure: EGD (ESOPHAGOGASTRODUODENOSCOPY);  Surgeon: Rg Milton MD;  Location: [facility] ENDO (4TH FLR);  Service: Endoscopy;  Laterality: N/A;  covid test 10/3 zo, instr emailed/portal -ml    l4-5 mid discectomy Left 03/2017    l4-5 MIS diskectomy Right 05/2016    RIGHT HEART CATHETERIZATION Right 1/27/2022    Procedure: INSERTION, CATHETER, RIGHT HEART;  Surgeon: Satnam Pickard Jr., MD;  Location: [facility] CATH LAB;  Service: Cardiology;  Laterality: Right;    TREATMENT OF CARDIAC ARRHYTHMIA N/A 7/17/2020    Procedure: CARDIOVERSION;  Surgeon: Kwan Bray MD;  Location: [facility] EP LAB;  Service: Cardiology;  Laterality: N/A;  AF,DCCV/SANGITA, ANES, SK, 746    TREATMENT OF CARDIAC ARRHYTHMIA N/A 7/14/2021    Procedure: CARDIOVERSION;  Surgeon: SYDNIE Cedillo MD;  Location: [facility] EP LAB;  Service: Cardiology;  Laterality: N/A;  AF, SANGITA, DCCV, MAC, EH, 3 Prep     Social History     Social History Narrative    Not on file     Family History   Problem Relation Age of Onset    Cancer Mother         Lung cancer    Heart failure Father     Colon cancer Father     Hypertension Father     Cataracts Father     Cancer Father 80        colon    No Known Problems Sister     No Known Problems Brother     Melanoma Daughter     Diabetes Son     Heart disease Son     Cancer Son         esophageal cancer    No Known Problems Maternal Aunt     No Known Problems Maternal Uncle     No Known Problems Paternal Aunt     No Known Problems Paternal Uncle     No Known  "Problems Maternal Grandmother     No Known Problems Maternal Grandfather     No Known Problems Paternal Grandmother     No Known Problems Paternal Grandfather     Amblyopia Neg Hx     Blindness Neg Hx     Glaucoma Neg Hx     Macular degeneration Neg Hx     Retinal detachment Neg Hx     Strabismus Neg Hx     Stroke Neg Hx     Thyroid disease Neg Hx     Breast cancer Neg Hx     Ovarian cancer Neg Hx      Vitals:    01/30/23 1315   BP: 105/74   Pulse: 68   Resp: 18   Temp: 98 °F (36.7 °C)   SpO2: 99%   Weight: 46.7 kg (102 lb 15.3 oz)   Height: 5' 4" (1.626 m)   PainSc: 0-No pain     Objective:   Physical Exam  Constitutional:       Appearance: Normal appearance.   HENT:      Head: Normocephalic.      Nose: Nose normal.   Neurological:      Mental Status: She is alert and oriented to person, place, and time. Mental status is at baseline.   Psychiatric:         Mood and Affect: Mood normal.         Behavior: Behavior normal.     Assessment:     1. Osteoporosis, unspecified osteoporosis type, unspecified pathological fracture presence    2. Intolerance of oral bisphosphonate therapy      Plan:          Patient Instructions   To help with osteoporosis, I recommend Vit D 1000 u/d and Calcium 100 mg/d.   I also recommend weight bearing exercise.  I would consider starting therapy to strengthen your bones such as Alendronate 70 mg po weekly vs Prolia which is a Q 6 month Injection.  For you, I would consider prolia (denosumab) as a better option given your DEXA scan results.     Prolia tends to be generally well tolerated.   There is no evidence of symptomatic hypocalcemia, osteonecrosis of the jaw (ONJ), or atrial fibrillation, although theoretically this could happen.    Overall, the most common adverse effects (>5 percent and more common than placebo) were back, extremity, and musculoskeletal pain; hypercholesterolemia; and cystitis. If you notice skin reaction after prolia administration, please come and see me so we can " evaluate for infection.     We do not commonly give prolia to patients with preexisting hypercalcemia or abnormal kidney function.    All of your core healthy metrics are met.

## 2023-01-30 NOTE — PATIENT INSTRUCTIONS
To help with osteoporosis, I recommend Vit D 1000 u/d and Calcium 100 mg/d.   I also recommend weight bearing exercise.  I would consider starting therapy to strengthen your bones such as Alendronate 70 mg po weekly vs Prolia which is a Q 6 month Injection.  For you, I would consider prolia (denosumab) as a better option given your DEXA scan results.     Prolia tends to be generally well tolerated.   There is no evidence of symptomatic hypocalcemia, osteonecrosis of the jaw (ONJ), or atrial fibrillation, although theoretically this could happen.    Overall, the most common adverse effects (>5 percent and more common than placebo) were back, extremity, and musculoskeletal pain; hypercholesterolemia; and cystitis. If you notice skin reaction after prolia administration, please come and see me so we can evaluate for infection.     We do not commonly give prolia to patients with preexisting hypercalcemia or abnormal kidney function.

## 2023-01-30 NOTE — TELEPHONE ENCOUNTER
Patient referred back by Dr. Pereira to Dr. Bray. Patient scheduled for 2/1/23. Dr. Pereira and Dr. Bray spoke. There is no need for patient to see Dr. Bray. She will follow with Dr. Pereira. Patient notified and appointment with Dr. Bray for 2/1/23 cancelled. Patient agreed.

## 2023-02-02 ENCOUNTER — OFFICE VISIT (OUTPATIENT)
Dept: CARDIOLOGY | Facility: CLINIC | Age: 73
End: 2023-02-02
Payer: MEDICARE

## 2023-02-02 VITALS
HEIGHT: 64 IN | HEART RATE: 74 BPM | WEIGHT: 100.75 LBS | BODY MASS INDEX: 17.2 KG/M2 | DIASTOLIC BLOOD PRESSURE: 71 MMHG | SYSTOLIC BLOOD PRESSURE: 133 MMHG

## 2023-02-02 DIAGNOSIS — I48.11 LONGSTANDING PERSISTENT ATRIAL FIBRILLATION: ICD-10-CM

## 2023-02-02 DIAGNOSIS — I83.893 VARICOSE VEINS OF BOTH LOWER EXTREMITIES WITH COMPLICATIONS: Primary | ICD-10-CM

## 2023-02-02 DIAGNOSIS — Z92.89 HISTORY OF CARDIOVERSION: ICD-10-CM

## 2023-02-02 DIAGNOSIS — R60.0 EDEMA OF BOTH LEGS: ICD-10-CM

## 2023-02-02 DIAGNOSIS — L97.921 ULCER OF LEFT LOWER EXTREMITY, LIMITED TO BREAKDOWN OF SKIN: ICD-10-CM

## 2023-02-02 DIAGNOSIS — I50.30 HEART FAILURE WITH PRESERVED EJECTION FRACTION, UNSPECIFIED HF CHRONICITY: ICD-10-CM

## 2023-02-02 DIAGNOSIS — I27.9 CHRONIC PULMONARY HEART DISEASE: ICD-10-CM

## 2023-02-02 DIAGNOSIS — I87.2 VENOUS INSUFFICIENCY OF BOTH LOWER EXTREMITIES: ICD-10-CM

## 2023-02-02 PROCEDURE — 3288F FALL RISK ASSESSMENT DOCD: CPT | Mod: HCNC,CPTII,S$GLB, | Performed by: INTERNAL MEDICINE

## 2023-02-02 PROCEDURE — 1101F PR PT FALLS ASSESS DOC 0-1 FALLS W/OUT INJ PAST YR: ICD-10-PCS | Mod: HCNC,CPTII,S$GLB, | Performed by: INTERNAL MEDICINE

## 2023-02-02 PROCEDURE — 3078F DIAST BP <80 MM HG: CPT | Mod: HCNC,CPTII,S$GLB, | Performed by: INTERNAL MEDICINE

## 2023-02-02 PROCEDURE — 99215 PR OFFICE/OUTPT VISIT, EST, LEVL V, 40-54 MIN: ICD-10-PCS | Mod: HCNC,S$GLB,, | Performed by: INTERNAL MEDICINE

## 2023-02-02 PROCEDURE — 1159F MED LIST DOCD IN RCRD: CPT | Mod: HCNC,CPTII,S$GLB, | Performed by: INTERNAL MEDICINE

## 2023-02-02 PROCEDURE — 1126F AMNT PAIN NOTED NONE PRSNT: CPT | Mod: HCNC,CPTII,S$GLB, | Performed by: INTERNAL MEDICINE

## 2023-02-02 PROCEDURE — 1159F PR MEDICATION LIST DOCUMENTED IN MEDICAL RECORD: ICD-10-PCS | Mod: HCNC,CPTII,S$GLB, | Performed by: INTERNAL MEDICINE

## 2023-02-02 PROCEDURE — 3288F PR FALLS RISK ASSESSMENT DOCUMENTED: ICD-10-PCS | Mod: HCNC,CPTII,S$GLB, | Performed by: INTERNAL MEDICINE

## 2023-02-02 PROCEDURE — 3075F SYST BP GE 130 - 139MM HG: CPT | Mod: HCNC,CPTII,S$GLB, | Performed by: INTERNAL MEDICINE

## 2023-02-02 PROCEDURE — 1157F PR ADVANCE CARE PLAN OR EQUIV PRESENT IN MEDICAL RECORD: ICD-10-PCS | Mod: HCNC,CPTII,S$GLB, | Performed by: INTERNAL MEDICINE

## 2023-02-02 PROCEDURE — 1126F PR PAIN SEVERITY QUANTIFIED, NO PAIN PRESENT: ICD-10-PCS | Mod: HCNC,CPTII,S$GLB, | Performed by: INTERNAL MEDICINE

## 2023-02-02 PROCEDURE — 1101F PT FALLS ASSESS-DOCD LE1/YR: CPT | Mod: HCNC,CPTII,S$GLB, | Performed by: INTERNAL MEDICINE

## 2023-02-02 PROCEDURE — 99999 PR PBB SHADOW E&M-EST. PATIENT-LVL IV: CPT | Mod: PBBFAC,HCNC,, | Performed by: INTERNAL MEDICINE

## 2023-02-02 PROCEDURE — 3078F PR MOST RECENT DIASTOLIC BLOOD PRESSURE < 80 MM HG: ICD-10-PCS | Mod: HCNC,CPTII,S$GLB, | Performed by: INTERNAL MEDICINE

## 2023-02-02 PROCEDURE — 99215 OFFICE O/P EST HI 40 MIN: CPT | Mod: HCNC,S$GLB,, | Performed by: INTERNAL MEDICINE

## 2023-02-02 PROCEDURE — 99999 PR PBB SHADOW E&M-EST. PATIENT-LVL IV: ICD-10-PCS | Mod: PBBFAC,HCNC,, | Performed by: INTERNAL MEDICINE

## 2023-02-02 PROCEDURE — 3075F PR MOST RECENT SYSTOLIC BLOOD PRESS GE 130-139MM HG: ICD-10-PCS | Mod: HCNC,CPTII,S$GLB, | Performed by: INTERNAL MEDICINE

## 2023-02-02 PROCEDURE — 1157F ADVNC CARE PLAN IN RCRD: CPT | Mod: HCNC,CPTII,S$GLB, | Performed by: INTERNAL MEDICINE

## 2023-02-02 NOTE — PROGRESS NOTES
[facility]      Chief Complaint   Patient presents with    Varicose Veins    Venous Insufficiency       Patient ID: Angelina Man is a 73 y.o. female with varicose veins, hypothyroidism, anxiety, arthritis, and persistent atrial fibrillation s/p [facility] 17-Jul-2020, who presents for a follow up appointment.  Pertinent history/events are as follows:     -Pt kindly referred by Dr. Pereira for evaluation of varicose veins.      -At our initial clinic visit on 6/22/2021, Mrs. Man reported varicose veins with worsening swelling and discomfort.  No claudication or tissue loss.  Exam shows BLE's with prominent varicose veins with venous stasis dermatitis.  Plan:   Varicose veins of both lower extremities- Check BLE venous reflux study and stephanie study.  If no evidence of severe PAD, start graduated compression hose.  Pt to limit sodium intake to 2,000 mg daily.  Elevate legs when resting.    10/7/2021 clinic visit: Mrs. Man reports continued leg worsening swelling and discomfort, despite wearing graduated compression hose for the past 3 months.  She has no claudication or tissue loss.  Exam shows BLE's with prominent varicose veins with venous stasis dermatitis.  BLE Venous Reflux Study on 7/1/2021 revealed hemodynamically significant venous reflux bilaterally with no DVT.  The right and left SSV's are partially compressible with thickened and hyperechoic walls suggestive of previous or chronic superficial venous thrombosis.  Segmental Pressure Study 7/1/2021 revealed normal STEPHANIE at rest and with exercise bilaterally.    Plan:  BLE Venous insufficiency with pain- Mrs. Man reports continued leg worsening swelling and discomfort, despite wearing graduated compression hose for the past 3 months.  She has no claudication or tissue loss.  Exam shows BLE's with prominent varicose veins with venous stasis dermatitis.  BLE Venous Reflux Study on 7/1/2021 revealed hemodynamically significant venous reflux  bilaterally with no DVT.  The right and left SSV's are partially compressible with thickened and hyperechoic walls suggestive of previous or chronic superficial venous thrombosis.  Segmental Pressure Study 7/1/2021 revealed normal STEPHANIE at rest and with exercise bilaterally.  Given continued symptoms despite conservative therapy with graduated compression hose, will refer for EVLT/sclerotherapy.    12/21/2021 clinic visit: Mrs. Man reports she underwent left leg EVLT. She wants to proceed with right EVLT for which she is planned. She has no other complaints.  Plan:   BLE Venous insufficiency with pain- s/p EVLT of the left LSV on 11/11 with follow up US showing resolution of reflux. Limited edema in right foot/ankle. Planned for right leg EVLT. Following with Dr. Hernandez    -On 1/6/2022, pt underwent Endovenous radiofrequency ablation (EVLT) of the right saphenous vein(s) of the lower extremity.    3/22/2022 clinic visit: Mrs. Man reports significant improvement in BLE edema since undergoing EVLT of the left LSV and right GSV.  She has no lower extremity wound/ulcer.  Plan:   BLE Venous insufficiency with pain- Mrs. Man is now s/p EVLT of the left LSV and right GSV with significant improvement in BLE edema.  Follow up RLE venous ultrasound results are pending.  Continue graduated compression hose.  Continue to limit sodium intake to 2,000 mg daily.  Elevate legs when resting.    Persistent atrial fibrillation- Stable.  Continue current medications.  Exercise Induced Pulmonary HTN- Stable.  Continue current medications.    9/22/2022 clinic visit: Mrs. Man reports hitting her left leg on a pedal at spin class and developing a wound 3 weeks ago.  The wound is being managed by wound care.  She reports no significant LE edema.   Plan:   BLE Venous insufficiency with pain- Mrs. Man is now s/p EVLT of the left LSV and right GSV with significant improvement in BLE edema.  Follow up RLE venous ultrasound results  as above.  Continue graduated compression hose.  Continue to limit sodium intake to 2,000 mg daily.  Elevate legs when resting.    Left leg wound- Appears to be healing appropriately.  Continue wound care.    Persistent atrial fibrillation- Stable.  Continue current medications.  Exercise Induced Pulmonary HTN- Stable.  Continue current medications.    HPI:  Mrs. Man reports doing well with no chest pain or SOB.  LLE wound has completely healed.  She has no significant leg swelling.      Past Medical History:   Diagnosis Date    Arthritis     Atrial fibrillation     Bronchitis     Cataract     Dry eyes     GERD (gastroesophageal reflux disease)     Glaucoma, suspect - Both Eyes     Hypothyroidism 06/23/2016    Pulmonary hypertension     Rash     Renal angiomyolipoma     Renal disorder     Spinal stenosis     Squamous cell carcinoma     in-situ right upper inner arm, right wrist    Status post lumbar surgery 06/23/2016    Stress fracture     Thyroid disease     Venous insufficiency      Past Surgical History:   Procedure Laterality Date    ANGIOPLASTY      CATARACT EXTRACTION W/  INTRAOCULAR LENS IMPLANT Left 12/6/2022    Procedure: EXTRACTION, CATARACT, WITH IOL INSERTION;  Surgeon: Tone Brown MD;  Location: [facility];  Service: Ophthalmology;  Laterality: Left;    CATARACT EXTRACTION W/  INTRAOCULAR LENS IMPLANT Right 12/20/2022    Procedure: EXTRACTION, CATARACT, WITH IOL INSERTION;  Surgeon: Tone Brown MD;  Location: [facility];  Service: Ophthalmology;  Laterality: Right;    CERVICAL FUSION      COLONOSCOPY      COLONOSCOPY N/A 11/14/2017    Procedure: COLONOSCOPY;  Surgeon: Kasi Mercado MD;  Location: [facility] ([address] [place] [address]);  Service: Endoscopy;  Laterality: N/A;    ESOPHAGOGASTRODUODENOSCOPY N/A 10/10/2019    Procedure: EGD (ESOPHAGOGASTRODUODENOSCOPY);  Surgeon: Rg Milton MD;  [facility]  Service: Endoscopy;  Laterality: N/A;    ESOPHAGOGASTRODUODENOSCOPY N/A  10/6/2021    Procedure: EGD (ESOPHAGOGASTRODUODENOSCOPY);  Surgeon: Rg Milton MD;  Location: [facility] ENDO (4TH FLR);  Service: Endoscopy;  Laterality: N/A;  covid test 10/3 [place], [facility] emailed/portal -ml    l4-5 mid discectomy Left 03/2017    l4-5 MIS diskectomy Right 05/2016    RIGHT HEART CATHETERIZATION Right 1/27/2022    Procedure: INSERTION, CATHETER, RIGHT HEART;  Surgeon: Satnam Pickard Jr., MD;  Location: [facility] CATH LAB;  Service: Cardiology;  Laterality: Right;    TREATMENT OF CARDIAC ARRHYTHMIA N/A 7/17/2020    Procedure: CARDIOVERSION;  Surgeon: Kwan Bray MD;  Location: [facility] EP LAB;  Service: Cardiology;  Laterality: N/A;  AF,DCCV/SANGITA, ANES, SK, 746    TREATMENT OF CARDIAC ARRHYTHMIA N/A 7/14/2021    Procedure: CARDIOVERSION;  Surgeon: SYDNIE Cedillo MD;  Location: [facility] EP LAB;  Service: Cardiology;  Laterality: N/A;  AF, SANGITA, DCCV, MAC, EH, 3 Prep     Social History     Socioeconomic History    Marital status: Single   Occupational History     Employer: [employer]   Tobacco Use    Smoking status: Never    Smokeless tobacco: Never   Substance and Sexual Activity    Alcohol use: Yes     Alcohol/week: 4.0 standard drinks     Types: 4 Glasses of wine per week     Comment: 2 glasses of wine on weekends    Drug use: No    Sexual activity: Never   Other Topics Concern    Are you pregnant or think you may be? No    Breast-feeding No     Social Determinants of Health     Financial Resource Strain: Low Risk     Difficulty of Paying Living Expenses: Not very hard   Food Insecurity: No Food Insecurity    Worried About Running Out of Food in the Last Year: Never true    Ran Out of Food in the Last Year: Never true   Transportation Needs: No Transportation Needs    Lack of Transportation (Medical): No    Lack of Transportation (Non-Medical): No   Physical Activity: Sufficiently Active    Days of Exercise per Week: 7 days    Minutes of Exercise per Session: 60 min   Stress: No Stress  Concern Present    Feeling of Stress : Only a little   Social Connections: Unknown    Frequency of Communication with Friends and Family: Once a week    Frequency of Social Gatherings with Friends and Family: Patient refused    Active Member of Clubs or Organizations: No    Attends Club or Organization Meetings: Never    Marital Status:    Housing Stability: Low Risk     Unable to Pay for Housing in the Last Year: No    Number of Places Lived in the Last Year: 1    Unstable Housing in the Last Year: No     Family History   Problem Relation Age of Onset    Cancer Mother         Lung cancer    Heart failure Father     Colon cancer Father     Hypertension Father     Cataracts Father     Cancer Father 80        colon    No Known Problems Sister     No Known Problems Brother     Melanoma Daughter     Diabetes Son     Heart disease Son     Cancer Son         esophageal cancer    No Known Problems Maternal Aunt     No Known Problems Maternal Uncle     No Known Problems Paternal Aunt     No Known Problems Paternal Uncle     No Known Problems Maternal Grandmother     No Known Problems Maternal Grandfather     No Known Problems Paternal Grandmother     No Known Problems Paternal Grandfather     Amblyopia Neg Hx     Blindness Neg Hx     Glaucoma Neg Hx     Macular degeneration Neg Hx     Retinal detachment Neg Hx     Strabismus Neg Hx     Stroke Neg Hx     Thyroid disease Neg Hx     Breast cancer Neg Hx     Ovarian cancer Neg Hx        Review of patient's allergies indicates:  No Known Allergies    Medication List with Changes/Refills   Current Medications    ACETAMINOPHEN (TYLENOL) 500 MG TABLET    Take 500 mg by mouth every 6 (six) hours as needed for Pain.    ACYCLOVIR (ZOVIRAX) 400 MG TABLET    Take 1 tablet (400 mg total) by mouth 2 (two) times daily.    ASCORBIC ACID (VITAMIN C) 1000 MG TABLET    Take 1,000 mg by mouth once daily.     BIOTIN 1 MG TABLET    Take 1 mg by mouth 2 (two) times daily.     BUPROPION  (WELLBUTRIN XL) 150 MG TB24 TABLET    Take 1 tablet (150 mg total) by mouth once daily.    CALCIUM-VITAMIN D 500 MG(1,250MG) -200 UNIT PER TABLET    Take 1 tablet by mouth 2 (two) times daily with meals.     CLONAZEPAM (KLONOPIN) 1 MG TABLET    TAKE 1& 1/2 TABLET BY MOUTH NIGHTLY AS NEEDED    CYCLOSPORINE (RESTASIS) 0.05 % OPHTHALMIC EMULSION    Place 1 drop into both eyes 2 (two) times daily.    DIGESTIVE ENZYMES TAB    Take 1 tablet by mouth once daily.     DIPHENHYDRAMINE (BENADRYL) 25 MG CAPSULE    Take 25 mg by mouth nightly as needed.     ELIQUIS 5 MG TAB    TAKE 1 TABLET(5 MG) BY MOUTH TWICE DAILY    FEXOFENADINE (ALLEGRA) 180 MG TABLET    Take 180 mg by mouth once daily.    FLUTICASONE PROPIONATE (FLONASE) 50 MCG/ACTUATION NASAL SPRAY    1 spray (50 mcg total) by Each Nostril route once daily.    FUROSEMIDE (LASIX) 20 MG TABLET    Take 1 tablet (20 mg total) by mouth every other day.    LEVOTHYROXINE (SYNTHROID) 137 MCG TAB TABLET    Take 1 tablet (137 mcg total) by mouth before breakfast.    LUBIPROSTONE (AMITIZA) 24 MCG CAP    Take 1 capsule (24 mcg total) by mouth daily as needed (IBS symptoms).    METOPROLOL SUCCINATE (TOPROL-XL) 25 MG 24 HR TABLET    Take 1 tablet (25 mg total) by mouth once daily.    MULTIVITAMIN (THERAGRAN) PER TABLET    Take 1 tablet by mouth once daily.     TRETINOIN (RETIN-A) 0.1 % CREAM    Apply topically every evening.    ZINC 50 MG TAB    Take 50 mg by mouth once daily.        Review of Systems  Constitution: Denies chills, fever, and sweats.  HENT: Denies headaches or blurry vision.  Cardiovascular: Denies chest pain or irregular heart beat.  Respiratory: Denies cough or shortness of breath.  Gastrointestinal: Denies abdominal pain, nausea, or vomiting.  Musculoskeletal: Positive or varicose veins with discomfort and swelling.  Neurological: Denies dizziness or focal weakness.  Psychiatric/Behavioral: Normal mental status.  Hematologic/Lymphatic: Denies bleeding problem or easy  "bruising/bleeding.  Skin: Denies rash or suspicious lesions    Physical Examination  /71   Pulse 74   Ht 5' 4" (1.626 m)   Wt 45.7 kg (100 lb 12 oz)   LMP  (LMP Unknown)   BMI 17.29 kg/m²     Constitutional: No acute distress, conversant  HEENT: Sclera anicteric, Pupils equal, round and reactive to light, extraocular motions intact, Oropharynx clear  Neck: No JVD, no carotid bruits  Cardiovascular: irregular rhythm, no murmur, rubs or gallops  Pulmonary: Clear to auscultation bilaterally  Abdominal: Abdomen soft, nontender, nondistended, positive bowel sounds  Extremities: BLE's with prominent varicose veins with venous stasis dermatitis.     Pulses:  Carotid pulses are 2+ on the right side, and 2+ on the left side.  Radial pulses are 2+ on the right side, and 2+ on the left side.   Femoral pulses are 2+ on the right side, and 2+ on the left side.  Popliteal pulses are 2+ on the right side, and 2+ on the left side.   Dorsalis pedis pulses are 2+ on the right side, and 2+ on the left side.   Posterior tibial pulses are 2+ on the right side, and 2+ on the left side.    Skin: No ecchymosis, erythema, or ulcers  Psych: Alert and oriented x 3, appropriate affect  Neuro: CNII-XII intact, no focal deficits    Labs:  Most Recent Data  CBC:   Lab Results   Component Value Date    WBC 4.35 01/09/2023    HGB 12.9 01/09/2023    HCT 40.1 01/09/2023     01/09/2023     (H) 01/09/2023    RDW 13.2 01/09/2023     BMP:   Lab Results   Component Value Date     01/09/2023    K 3.4 (L) 01/09/2023     01/09/2023    CO2 29 01/09/2023    BUN 12 01/09/2023    CREATININE 0.8 01/09/2023    GLU 85 01/09/2023    CALCIUM 8.9 01/09/2023    MG 1.6 12/28/2021     LFTS;   Lab Results   Component Value Date    PROT 6.3 01/09/2023    ALBUMIN 3.5 01/09/2023    BILITOT 0.6 01/09/2023    AST 22 01/09/2023    ALKPHOS 80 01/09/2023    ALT 16 01/09/2023    GGT 52 04/13/2022     COAGS:   Lab Results   Component Value Date "    INR 1.1 07/07/2021     FLP:   Lab Results   Component Value Date    CHOL 201 (H) 01/09/2023    HDL 71 01/09/2023    LDLCALC 113.8 01/09/2023    TRIG 81 01/09/2023    CHOLHDL 35.3 01/09/2023     CARDIAC:   Lab Results   Component Value Date    TROPONINI <0.006 07/16/2020     (H) 12/28/2021     Imaging    BLE Venous Reflux Study 3/22/2022:  The right lesser saphenous vein is partially compressible consistent with SVT.  The contralateral (left) common femoral vein is patent. .  The right greater saphenous vein is occluded consistent with successful prior EVLT.    BLE Venous reflux study 12/14/2021:  There is no evidence of a left lower extremity DVT.  Left GSV occluded post EVLT.    Segmental Pressure Study 7/1/2021:  Normal STEPHANIE bilaterally with unremarkable waveforms at rest.    Normal STEPHANIE with exercise.  The right TBI is 0.51 and the left TBI is 0.40, which is mild to moderately abnormal.    BLE Venous Reflux Study 7/1/2021:  No evidence of lower extremity deep venous thrombosis bilateral.  There is reflux in the right common femoral vein and left external iliac vein.  The right GSV below the level of the knee is partially compressible with thickened and hyperechoic walls suggestive of previous or chronic superficial venous thrombosis.    There is also evidence of superficial venous insufficiency of the right GSV below the knee without dilation of the vessel.  The right SSV is partially compressible with thickened and hyperechoic walls suggestive of previous or chronic superficial venous thrombosis.  The left SSV is partially compressible with thickened and hyperechoic walls suggestive of previous or chronic superficial venous thrombosis.    Assessment/Plan:  Angelina Man is a 73 y.o. female with BLE venous insufficiency, hypothyroidism, anxiety, arthritis, and persistent atrial fibrillation s/p [facility] 17-Jul-2020, who presents for a follow up appointment.     1. BLE Venous insufficiency with pain- Stable.   Mrs. Man is now s/p EVLT of the left LSV and right GSV with significant improvement in BLE edema.  Follow up RLE venous ultrasound results as above.  Continue graduated compression hose.  Continue to limit sodium intake to 2,000 mg daily.  Elevate legs when resting.      2. Left leg wound- Now completely healed.        3. Persistent atrial fibrillation- Stable.  Continue current medications.    4. Exercise Induced Pulmonary HTN- Stable.  Continue current medications.    Follow up in 6 months     Total duration of face to face visit time 30 minutes.  Total time spent counseling greater than fifty percent of total visit time.  Counseling included discussion regarding imaging findings, diagnosis, possibilities, treatment options, risks and benefits.  The patient had many questions regarding the options and long-term effects.    Jeanmarie Cedeño MD, PhD  Interventional Cardiiology

## 2023-02-02 NOTE — PATIENT INSTRUCTIONS
Assessment/Plan:  Angelina Man is a 73 y.o. female with BLE venous insufficiency, hypothyroidism, anxiety, arthritis, and persistent atrial fibrillation s/p [facility] 17-Jul-2020, who presents for a follow up appointment.     1. BLE Venous insufficiency with pain- Stable.  Mrs. Man is now s/p EVLT of the left LSV and right GSV with significant improvement in BLE edema.  Follow up RLE venous ultrasound results as above.  Continue graduated compression hose.  Continue to limit sodium intake to 2,000 mg daily.  Elevate legs when resting.      2. Left leg wound- Now completely healed.        3. Persistent atrial fibrillation- Stable.  Continue current medications.    4. Exercise Induced Pulmonary HTN- Stable.  Continue current medications.    Follow up in 6 months

## 2023-02-09 ENCOUNTER — TELEPHONE (OUTPATIENT)
Dept: PRIMARY CARE CLINIC | Facility: CLINIC | Age: 73
End: 2023-02-09
Payer: MEDICARE

## 2023-02-09 DIAGNOSIS — Z00.00 ENCOUNTER FOR MEDICARE ANNUAL WELLNESS EXAM: ICD-10-CM

## 2023-02-09 NOTE — TELEPHONE ENCOUNTER
----- Message from Maria E Zhong sent at 2/9/2023  1:40 PM CST -----  Contact: Self/[phone]  Pt said that she is calling in regards to needing to get a return call from the nurse pt stated that she received a call about scheduling her Prolia shot but the person that called her has not contacted her back. Please advise

## 2023-02-24 ENCOUNTER — TELEPHONE (OUTPATIENT)
Dept: OPHTHALMOLOGY | Facility: CLINIC | Age: 73
End: 2023-02-24
Payer: MEDICARE

## 2023-02-24 DIAGNOSIS — Z98.890 POST-OPERATIVE STATE: Primary | ICD-10-CM

## 2023-02-24 RX ORDER — PREDNISOLONE ACETATE 10 MG/ML
1 SUSPENSION/ DROPS OPHTHALMIC 4 TIMES DAILY
Qty: 5 ML | Refills: 1 | Status: SHIPPED | OUTPATIENT
Start: 2023-02-24 | End: 2023-03-26

## 2023-02-24 NOTE — TELEPHONE ENCOUNTER
Clinic informed pt that Dr. Brown would like for her to use Pred Forte QID OU x5 days, TID OU x5 days, BID OU x5 days, then qday OU x5 days. Follow up appt scheduled for IOP check 03/27/2022 at 11:00 am.

## 2023-02-27 ENCOUNTER — CLINICAL SUPPORT (OUTPATIENT)
Dept: ENDOSCOPY | Facility: HOSPITAL | Age: 73
End: 2023-02-27
Payer: MEDICARE

## 2023-02-27 ENCOUNTER — TELEPHONE (OUTPATIENT)
Dept: ENDOSCOPY | Facility: HOSPITAL | Age: 73
End: 2023-02-27

## 2023-02-27 VITALS — HEIGHT: 64 IN | WEIGHT: 101 LBS | BODY MASS INDEX: 17.24 KG/M2

## 2023-02-27 DIAGNOSIS — L70.8 OTHER ACNE: ICD-10-CM

## 2023-02-27 DIAGNOSIS — Z80.0 FHX: COLON CANCER: Primary | ICD-10-CM

## 2023-02-27 NOTE — TELEPHONE ENCOUNTER
Patient has an order for a colonoscopy.  Is it ok to hold Eliquis 2 days prior to procedure?  Please advise.  Thank you.    Nazanin

## 2023-03-16 ENCOUNTER — OFFICE VISIT (OUTPATIENT)
Dept: PODIATRY | Facility: CLINIC | Age: 73
End: 2023-03-16
Payer: MEDICARE

## 2023-03-16 VITALS
HEART RATE: 69 BPM | WEIGHT: 101 LBS | HEIGHT: 64 IN | SYSTOLIC BLOOD PRESSURE: 111 MMHG | BODY MASS INDEX: 17.24 KG/M2 | DIASTOLIC BLOOD PRESSURE: 77 MMHG

## 2023-03-16 DIAGNOSIS — M20.42 HAMMERTOE, BILATERAL: Primary | ICD-10-CM

## 2023-03-16 DIAGNOSIS — M79.674 TOE PAIN, BILATERAL: ICD-10-CM

## 2023-03-16 DIAGNOSIS — L90.9 FAT PAD ATROPHY OF FOOT: ICD-10-CM

## 2023-03-16 DIAGNOSIS — M79.671 FOOT PAIN, BILATERAL: ICD-10-CM

## 2023-03-16 DIAGNOSIS — M20.41 HAMMERTOE, BILATERAL: Primary | ICD-10-CM

## 2023-03-16 DIAGNOSIS — L84 CORN OR CALLUS: ICD-10-CM

## 2023-03-16 DIAGNOSIS — M21.622 TAILOR'S BUNIONETTE, BILATERAL: ICD-10-CM

## 2023-03-16 DIAGNOSIS — M21.621 TAILOR'S BUNIONETTE, BILATERAL: ICD-10-CM

## 2023-03-16 DIAGNOSIS — M79.672 FOOT PAIN, BILATERAL: ICD-10-CM

## 2023-03-16 DIAGNOSIS — M79.675 TOE PAIN, BILATERAL: ICD-10-CM

## 2023-03-16 PROCEDURE — 1159F PR MEDICATION LIST DOCUMENTED IN MEDICAL RECORD: ICD-10-PCS | Mod: HCNC,CPTII,S$GLB, | Performed by: PODIATRIST

## 2023-03-16 PROCEDURE — 1159F MED LIST DOCD IN RCRD: CPT | Mod: HCNC,CPTII,S$GLB, | Performed by: PODIATRIST

## 2023-03-16 PROCEDURE — 1157F PR ADVANCE CARE PLAN OR EQUIV PRESENT IN MEDICAL RECORD: ICD-10-PCS | Mod: HCNC,CPTII,S$GLB, | Performed by: PODIATRIST

## 2023-03-16 PROCEDURE — 1126F AMNT PAIN NOTED NONE PRSNT: CPT | Mod: HCNC,CPTII,S$GLB, | Performed by: PODIATRIST

## 2023-03-16 PROCEDURE — 1160F RVW MEDS BY RX/DR IN RCRD: CPT | Mod: HCNC,CPTII,S$GLB, | Performed by: PODIATRIST

## 2023-03-16 PROCEDURE — 1157F ADVNC CARE PLAN IN RCRD: CPT | Mod: HCNC,CPTII,S$GLB, | Performed by: PODIATRIST

## 2023-03-16 PROCEDURE — 3074F SYST BP LT 130 MM HG: CPT | Mod: HCNC,CPTII,S$GLB, | Performed by: PODIATRIST

## 2023-03-16 PROCEDURE — 99203 PR OFFICE/OUTPT VISIT, NEW, LEVL III, 30-44 MIN: ICD-10-PCS | Mod: HCNC,S$GLB,, | Performed by: PODIATRIST

## 2023-03-16 PROCEDURE — 3078F PR MOST RECENT DIASTOLIC BLOOD PRESSURE < 80 MM HG: ICD-10-PCS | Mod: HCNC,CPTII,S$GLB, | Performed by: PODIATRIST

## 2023-03-16 PROCEDURE — 99999 PR PBB SHADOW E&M-EST. PATIENT-LVL IV: CPT | Mod: PBBFAC,HCNC,, | Performed by: PODIATRIST

## 2023-03-16 PROCEDURE — 3074F PR MOST RECENT SYSTOLIC BLOOD PRESSURE < 130 MM HG: ICD-10-PCS | Mod: HCNC,CPTII,S$GLB, | Performed by: PODIATRIST

## 2023-03-16 PROCEDURE — 3078F DIAST BP <80 MM HG: CPT | Mod: HCNC,CPTII,S$GLB, | Performed by: PODIATRIST

## 2023-03-16 PROCEDURE — 1126F PR PAIN SEVERITY QUANTIFIED, NO PAIN PRESENT: ICD-10-PCS | Mod: HCNC,CPTII,S$GLB, | Performed by: PODIATRIST

## 2023-03-16 PROCEDURE — 99203 OFFICE O/P NEW LOW 30 MIN: CPT | Mod: HCNC,S$GLB,, | Performed by: PODIATRIST

## 2023-03-16 PROCEDURE — 99999 PR PBB SHADOW E&M-EST. PATIENT-LVL IV: ICD-10-PCS | Mod: PBBFAC,HCNC,, | Performed by: PODIATRIST

## 2023-03-16 PROCEDURE — 1160F PR REVIEW ALL MEDS BY PRESCRIBER/CLIN PHARMACIST DOCUMENTED: ICD-10-PCS | Mod: HCNC,CPTII,S$GLB, | Performed by: PODIATRIST

## 2023-03-16 RX ORDER — EPINEPHRINE 1 MG/ML
INJECTION, SOLUTION, CONCENTRATE INTRAVENOUS
COMMUNITY
Start: 2022-12-20 | End: 2023-04-05 | Stop reason: ALTCHOICE

## 2023-03-16 RX ORDER — MIDAZOLAM HYDROCHLORIDE 1 MG/ML
INJECTION INTRAMUSCULAR; INTRAVENOUS
COMMUNITY
Start: 2022-12-20 | End: 2023-04-05 | Stop reason: ALTCHOICE

## 2023-03-16 NOTE — PROGRESS NOTES
"Subjective:      Patient ID: Angelina Man is a 73 y.o. female.    Chief Complaint: Foot Pain    Angelina is a 73 y.o. female who presents to the podiatry clinic  with complaint of  bilateral foot and toe pain mostly associated with hard skin and toes that are not straight. Onset of the symptoms was several months ago. Precipitating event: none known. Current symptoms include: ability to bear weight, but with some pain and pain with direct pressure . Aggravating factors: walking and pressure from certain shoes . Symptoms have progressed to a point and plateaued. Patient has had no prior foot problems. Evaluation to date: none. Treatment to date:  previous pedicures where they would trim the calluses but since they are not allowed to do that anymore the calluses/corns have worsened . Patients rates pain 3/10 on pain scale.    Vitals:    03/16/23 1402   BP: 111/77   Pulse: 69   Weight: 45.8 kg (100 lb 15.5 oz)   Height: 5' 4" (1.626 m)   PainSc: 0-No pain       Review of Systems   Constitutional: Negative for chills and fever.   Cardiovascular:  Negative for chest pain, claudication and leg swelling.   Respiratory:  Negative for cough and shortness of breath.    Skin:  Positive for dry skin and suspicious lesions. Negative for nail changes.   Musculoskeletal:         Toe pain, ball of foot pain bilateral    Gastrointestinal:  Negative for nausea and vomiting.   Neurological:  Negative for numbness and paresthesias.   Psychiatric/Behavioral:  Negative for altered mental status.          Objective:      Physical Exam  Vitals reviewed.   Constitutional:       Appearance: She is well-developed.   HENT:      Head: Normocephalic.   Cardiovascular:      Pulses:           Dorsalis pedis pulses are 2+ on the right side and 2+ on the left side.        Posterior tibial pulses are 2+ on the right side and 2+ on the left side.      Comments: CRT < 3 sec to tips of toes. Mild edema noted to b/l LE. Moderate spider veins and " vericosities noted to b/l LEs.     Pulmonary:      Effort: No respiratory distress.   Musculoskeletal:      Comments: Semi-reducible hammertoe contractures noted to toes 2-4 b/l-asymptomatic. HAV, mild, non trackbound noted b/l with mild medial bony prominence at 1st met head--asymptomatic.     Fat pad atrophy plantar met heads 1-5 b/l.     Pain with palpation of hpk lesions b/l toes/foot, see skin section.     Otherwise rectus foot and toe position b/l foot with no major deformities noted. Mild equinus noted b/l ankle with < 10 deg DF noted. MMT 5/5 in DF/PF/Inv/Ev resistance with no reproduction of pain in any direction b/l foot. Passive range of motion of ankle and pedal joints is painless b/l foot/ankle/toes.     Skin:     General: Skin is warm and dry.      Findings: No erythema.      Comments: No open lesions, lacerations or wounds noted. Nails are normotrophic to R 1-5 and L 1-5. Interdigital spaces clean, dry and intact b/l. No erythema noted to b/l foot. Skin texture normal. Pedal hair normal.  Hyperkeratotic lesion noted to medial b/l 2nd toe DIPJ, plantar R 1st met head, medial hallux directly adjacent to toenail b/l. Skin lines present to lesion/s. No signs of deep tissue injury.     Neurological:      Mental Status: She is alert and oriented to person, place, and time.      Sensory: No sensory deficit.      Comments: Light touch, proprioception, and sharp/dull sensation are all intact bilaterally.     Psychiatric:         Behavior: Behavior normal.         Thought Content: Thought content normal.         Judgment: Judgment normal.             Assessment:       Encounter Diagnoses   Name Primary?    Hammertoe, bilateral Yes    Tailor's bunionette, bilateral     Corn or callus     Toe pain, bilateral     Foot pain, bilateral     Fat pad atrophy of foot          Plan:       Angelina was seen today for foot pain.    Diagnoses and all orders for this visit:    Hammertoe, bilateral    Tailor's bunionette,  bilateral    Corn or callus    Toe pain, bilateral    Foot pain, bilateral    Fat pad atrophy of foot      I counseled the patient on her conditions, their implications and medical management.    The affected area was cleansed with an alcohol prep pad. Next, utilizing a 5mm curette, the hyperkeratotic tissues were trimmed from plantar R 1st met head, medial 2nd toe DIPJ b/l, medial hallux adjacent to toenail b/l, down to appropriate level of skin. Care was taken to remove any nucleated core from the center of the lesion. No pinpoint bleeding was encountered. The patient tolerated relief following this procedure.     Discussed regular and routine moisturizer to skin of both feet to help improve dry skin. Advised to apply twice daily until resolution of symptoms. Avoid between toes.     Long discussion with patient regarding appropriate, supportive and comfortable shoes. Recommended supportive style shoe brands with adequate arch supports to alleviate abnormal pressure and improve stability of foot while walking. Avoid flat shoes and barefoot walking as these will exacerbate or worsen symptoms.     Patient is aware that routine trimming of nails or calluses will not be covered service per insurance and he/she will fall under Proc B if this service is desired. Patient verbalized understanding of this. He/She will RTC as needed for Proc B or any other pedal complaint.     RTC 10 weeks Proc B, sooner PRN

## 2023-03-27 ENCOUNTER — OFFICE VISIT (OUTPATIENT)
Dept: OPHTHALMOLOGY | Facility: CLINIC | Age: 73
End: 2023-03-27
Payer: MEDICARE

## 2023-03-27 DIAGNOSIS — H04.123 DRY EYE SYNDROME OF BOTH EYES: ICD-10-CM

## 2023-03-27 DIAGNOSIS — Z98.890 POST-OPERATIVE STATE: Primary | ICD-10-CM

## 2023-03-27 PROCEDURE — 1101F PR PT FALLS ASSESS DOC 0-1 FALLS W/OUT INJ PAST YR: ICD-10-PCS | Mod: HCNC,CPTII,S$GLB, | Performed by: OPHTHALMOLOGY

## 2023-03-27 PROCEDURE — 99024 PR POST-OP FOLLOW-UP VISIT: ICD-10-PCS | Mod: HCNC,S$GLB,, | Performed by: OPHTHALMOLOGY

## 2023-03-27 PROCEDURE — 1159F PR MEDICATION LIST DOCUMENTED IN MEDICAL RECORD: ICD-10-PCS | Mod: HCNC,CPTII,S$GLB, | Performed by: OPHTHALMOLOGY

## 2023-03-27 PROCEDURE — 1157F ADVNC CARE PLAN IN RCRD: CPT | Mod: HCNC,CPTII,S$GLB, | Performed by: OPHTHALMOLOGY

## 2023-03-27 PROCEDURE — 1159F MED LIST DOCD IN RCRD: CPT | Mod: HCNC,CPTII,S$GLB, | Performed by: OPHTHALMOLOGY

## 2023-03-27 PROCEDURE — 3288F FALL RISK ASSESSMENT DOCD: CPT | Mod: HCNC,CPTII,S$GLB, | Performed by: OPHTHALMOLOGY

## 2023-03-27 PROCEDURE — 99024 POSTOP FOLLOW-UP VISIT: CPT | Mod: HCNC,S$GLB,, | Performed by: OPHTHALMOLOGY

## 2023-03-27 PROCEDURE — 1157F PR ADVANCE CARE PLAN OR EQUIV PRESENT IN MEDICAL RECORD: ICD-10-PCS | Mod: HCNC,CPTII,S$GLB, | Performed by: OPHTHALMOLOGY

## 2023-03-27 PROCEDURE — 1101F PT FALLS ASSESS-DOCD LE1/YR: CPT | Mod: HCNC,CPTII,S$GLB, | Performed by: OPHTHALMOLOGY

## 2023-03-27 PROCEDURE — 1126F PR PAIN SEVERITY QUANTIFIED, NO PAIN PRESENT: ICD-10-PCS | Mod: HCNC,CPTII,S$GLB, | Performed by: OPHTHALMOLOGY

## 2023-03-27 PROCEDURE — 3288F PR FALLS RISK ASSESSMENT DOCUMENTED: ICD-10-PCS | Mod: HCNC,CPTII,S$GLB, | Performed by: OPHTHALMOLOGY

## 2023-03-27 PROCEDURE — 1126F AMNT PAIN NOTED NONE PRSNT: CPT | Mod: HCNC,CPTII,S$GLB, | Performed by: OPHTHALMOLOGY

## 2023-03-27 PROCEDURE — 1160F RVW MEDS BY RX/DR IN RCRD: CPT | Mod: HCNC,CPTII,S$GLB, | Performed by: OPHTHALMOLOGY

## 2023-03-27 PROCEDURE — 99999 PR PBB SHADOW E&M-EST. PATIENT-LVL IV: ICD-10-PCS | Mod: PBBFAC,HCNC,, | Performed by: OPHTHALMOLOGY

## 2023-03-27 PROCEDURE — 1160F PR REVIEW ALL MEDS BY PRESCRIBER/CLIN PHARMACIST DOCUMENTED: ICD-10-PCS | Mod: HCNC,CPTII,S$GLB, | Performed by: OPHTHALMOLOGY

## 2023-03-27 PROCEDURE — 99999 PR PBB SHADOW E&M-EST. PATIENT-LVL IV: CPT | Mod: PBBFAC,HCNC,, | Performed by: OPHTHALMOLOGY

## 2023-03-27 NOTE — PROGRESS NOTES
Subjective:       Patient ID: Angelina Man is a 73 y.o. female.    Chief Complaint: Concerns About Ocular Health    HPI    74 Y/o female is here for 3 week post op OU IOP pt states OD has been   doing better pt states no more sharp pain, pt states OU still feels like   something is in them still having dry eyes   Pt denies pain and discomfort   No f/f    Eye med: Systane Ultra PRN OU  Last edited by Rashad Garcia MA on 3/27/2023 11:06 AM.             Assessment:       1. Post-operative state    2. Dry eye syndrome of both eyes          Plan:       S/p CE OU- Doing well.   SONAL-Causing fbs & pain OU. Needs more AT's.      AT's.  RTC Dr Diaz in 4 mos as scheduled.

## 2023-04-05 ENCOUNTER — OFFICE VISIT (OUTPATIENT)
Dept: INTERNAL MEDICINE | Facility: CLINIC | Age: 73
End: 2023-04-05
Payer: MEDICARE

## 2023-04-05 ENCOUNTER — INFUSION (OUTPATIENT)
Dept: INFECTIOUS DISEASES | Facility: HOSPITAL | Age: 73
End: 2023-04-05
Payer: MEDICARE

## 2023-04-05 VITALS
WEIGHT: 101 LBS | DIASTOLIC BLOOD PRESSURE: 84 MMHG | BODY MASS INDEX: 17.24 KG/M2 | SYSTOLIC BLOOD PRESSURE: 132 MMHG | HEIGHT: 64 IN

## 2023-04-05 VITALS
SYSTOLIC BLOOD PRESSURE: 126 MMHG | WEIGHT: 101 LBS | DIASTOLIC BLOOD PRESSURE: 58 MMHG | BODY MASS INDEX: 17.33 KG/M2 | TEMPERATURE: 98 F | HEART RATE: 73 BPM | RESPIRATION RATE: 17 BRPM

## 2023-04-05 DIAGNOSIS — G89.29 CHRONIC PAIN OF LEFT KNEE: ICD-10-CM

## 2023-04-05 DIAGNOSIS — G95.20 UNSPECIFIED CORD COMPRESSION: ICD-10-CM

## 2023-04-05 DIAGNOSIS — M81.0 OSTEOPOROSIS, UNSPECIFIED OSTEOPOROSIS TYPE, UNSPECIFIED PATHOLOGICAL FRACTURE PRESENCE: Primary | ICD-10-CM

## 2023-04-05 DIAGNOSIS — E03.9 HYPOTHYROIDISM, UNSPECIFIED TYPE: ICD-10-CM

## 2023-04-05 DIAGNOSIS — Z00.00 ENCOUNTER FOR MEDICARE ANNUAL WELLNESS EXAM: ICD-10-CM

## 2023-04-05 DIAGNOSIS — I50.32 CHRONIC HEART FAILURE WITH PRESERVED EJECTION FRACTION: ICD-10-CM

## 2023-04-05 DIAGNOSIS — M48.061 SPINAL STENOSIS OF LUMBAR REGION WITHOUT NEUROGENIC CLAUDICATION: ICD-10-CM

## 2023-04-05 DIAGNOSIS — I83.893 VARICOSE VEINS OF BOTH LOWER EXTREMITIES WITH COMPLICATIONS: ICD-10-CM

## 2023-04-05 DIAGNOSIS — M25.562 CHRONIC PAIN OF LEFT KNEE: ICD-10-CM

## 2023-04-05 DIAGNOSIS — Z00.00 ENCOUNTER FOR PREVENTIVE HEALTH EXAMINATION: Primary | ICD-10-CM

## 2023-04-05 DIAGNOSIS — Z79.01 ANTICOAGULANT LONG-TERM USE: ICD-10-CM

## 2023-04-05 DIAGNOSIS — R63.6 UNDERWEIGHT: ICD-10-CM

## 2023-04-05 DIAGNOSIS — I27.20 PULMONARY HYPERTENSION: ICD-10-CM

## 2023-04-05 DIAGNOSIS — M81.0 AGE-RELATED OSTEOPOROSIS WITHOUT CURRENT PATHOLOGICAL FRACTURE: ICD-10-CM

## 2023-04-05 DIAGNOSIS — Z98.1 S/P LUMBAR FUSION: ICD-10-CM

## 2023-04-05 DIAGNOSIS — I48.11 LONGSTANDING PERSISTENT ATRIAL FIBRILLATION: ICD-10-CM

## 2023-04-05 DIAGNOSIS — G89.29 OTHER CHRONIC PAIN: ICD-10-CM

## 2023-04-05 DIAGNOSIS — F41.9 ANXIETY: ICD-10-CM

## 2023-04-05 DIAGNOSIS — M51.36 DDD (DEGENERATIVE DISC DISEASE), LUMBAR: ICD-10-CM

## 2023-04-05 PROCEDURE — 99999 PR PBB SHADOW E&M-EST. PATIENT-LVL V: CPT | Mod: PBBFAC,HCNC,, | Performed by: NURSE PRACTITIONER

## 2023-04-05 PROCEDURE — 3288F FALL RISK ASSESSMENT DOCD: CPT | Mod: HCNC,CPTII,S$GLB, | Performed by: NURSE PRACTITIONER

## 2023-04-05 PROCEDURE — G0439 PR MEDICARE ANNUAL WELLNESS SUBSEQUENT VISIT: ICD-10-PCS | Mod: HCNC,S$GLB,, | Performed by: NURSE PRACTITIONER

## 2023-04-05 PROCEDURE — 1126F PR PAIN SEVERITY QUANTIFIED, NO PAIN PRESENT: ICD-10-PCS | Mod: HCNC,CPTII,S$GLB, | Performed by: NURSE PRACTITIONER

## 2023-04-05 PROCEDURE — 3079F PR MOST RECENT DIASTOLIC BLOOD PRESSURE 80-89 MM HG: ICD-10-PCS | Mod: HCNC,CPTII,S$GLB, | Performed by: NURSE PRACTITIONER

## 2023-04-05 PROCEDURE — 1126F AMNT PAIN NOTED NONE PRSNT: CPT | Mod: HCNC,CPTII,S$GLB, | Performed by: NURSE PRACTITIONER

## 2023-04-05 PROCEDURE — G0439 PPPS, SUBSEQ VISIT: HCPCS | Mod: HCNC,S$GLB,, | Performed by: NURSE PRACTITIONER

## 2023-04-05 PROCEDURE — 96372 THER/PROPH/DIAG INJ SC/IM: CPT | Mod: HCNC

## 2023-04-05 PROCEDURE — 3288F PR FALLS RISK ASSESSMENT DOCUMENTED: ICD-10-PCS | Mod: HCNC,CPTII,S$GLB, | Performed by: NURSE PRACTITIONER

## 2023-04-05 PROCEDURE — 1101F PR PT FALLS ASSESS DOC 0-1 FALLS W/OUT INJ PAST YR: ICD-10-PCS | Mod: HCNC,CPTII,S$GLB, | Performed by: NURSE PRACTITIONER

## 2023-04-05 PROCEDURE — 1160F RVW MEDS BY RX/DR IN RCRD: CPT | Mod: HCNC,CPTII,S$GLB, | Performed by: NURSE PRACTITIONER

## 2023-04-05 PROCEDURE — 3079F DIAST BP 80-89 MM HG: CPT | Mod: HCNC,CPTII,S$GLB, | Performed by: NURSE PRACTITIONER

## 2023-04-05 PROCEDURE — 1101F PT FALLS ASSESS-DOCD LE1/YR: CPT | Mod: HCNC,CPTII,S$GLB, | Performed by: NURSE PRACTITIONER

## 2023-04-05 PROCEDURE — 1159F MED LIST DOCD IN RCRD: CPT | Mod: HCNC,CPTII,S$GLB, | Performed by: NURSE PRACTITIONER

## 2023-04-05 PROCEDURE — 1157F PR ADVANCE CARE PLAN OR EQUIV PRESENT IN MEDICAL RECORD: ICD-10-PCS | Mod: HCNC,CPTII,S$GLB, | Performed by: NURSE PRACTITIONER

## 2023-04-05 PROCEDURE — 1159F PR MEDICATION LIST DOCUMENTED IN MEDICAL RECORD: ICD-10-PCS | Mod: HCNC,CPTII,S$GLB, | Performed by: NURSE PRACTITIONER

## 2023-04-05 PROCEDURE — 1170F PR FUNCTIONAL STATUS ASSESSED: ICD-10-PCS | Mod: HCNC,CPTII,S$GLB, | Performed by: NURSE PRACTITIONER

## 2023-04-05 PROCEDURE — 3075F SYST BP GE 130 - 139MM HG: CPT | Mod: HCNC,CPTII,S$GLB, | Performed by: NURSE PRACTITIONER

## 2023-04-05 PROCEDURE — 1170F FXNL STATUS ASSESSED: CPT | Mod: HCNC,CPTII,S$GLB, | Performed by: NURSE PRACTITIONER

## 2023-04-05 PROCEDURE — 1157F ADVNC CARE PLAN IN RCRD: CPT | Mod: HCNC,CPTII,S$GLB, | Performed by: NURSE PRACTITIONER

## 2023-04-05 PROCEDURE — 99999 PR PBB SHADOW E&M-EST. PATIENT-LVL V: ICD-10-PCS | Mod: PBBFAC,HCNC,, | Performed by: NURSE PRACTITIONER

## 2023-04-05 PROCEDURE — 3075F PR MOST RECENT SYSTOLIC BLOOD PRESS GE 130-139MM HG: ICD-10-PCS | Mod: HCNC,CPTII,S$GLB, | Performed by: NURSE PRACTITIONER

## 2023-04-05 PROCEDURE — 1160F PR REVIEW ALL MEDS BY PRESCRIBER/CLIN PHARMACIST DOCUMENTED: ICD-10-PCS | Mod: HCNC,CPTII,S$GLB, | Performed by: NURSE PRACTITIONER

## 2023-04-05 PROCEDURE — 63600175 PHARM REV CODE 636 W HCPCS: Mod: JZ,JG,HCNC | Performed by: INTERNAL MEDICINE

## 2023-04-05 RX ADMIN — DENOSUMAB 60 MG: 60 INJECTION SUBCUTANEOUS at 02:04

## 2023-04-05 NOTE — PROGRESS NOTES
"  Angelina Man presented for a  Medicare AWV and comprehensive Health Risk Assessment today. The following components were reviewed and updated:    Medical history  Family History  Social history  Allergies and Current Medications  Health Risk Assessment  Health Maintenance  Care Team         ** See Completed Assessments for Annual Wellness Visit within the encounter summary.**         The following assessments were completed:  Living Situation  CAGE  Depression Screening  Timed Get Up and Go  Whisper Test  Cognitive Function Screening      Nutrition Screening  ADL Screening  PAQ Screening  OPIOID Screening: Patient does not have a prescription for narcotics. Patient does not use substance         Vitals:    04/05/23 1508   BP: 132/84   BP Location: Right arm   Weight: 45.8 kg (100 lb 15.5 oz)   Height: 5' 4" (1.626 m)     Body mass index is 17.33 kg/m².  Physical Exam  Vitals and nursing note reviewed.   Constitutional:       Appearance: She is well-developed.   HENT:      Head: Normocephalic.   Cardiovascular:      Rate and Rhythm: Normal rate and regular rhythm.   Pulmonary:      Effort: Pulmonary effort is normal.      Breath sounds: Normal breath sounds.   Abdominal:      General: Bowel sounds are normal.      Palpations: Abdomen is soft.   Musculoskeletal:         General: Normal range of motion.   Skin:     General: Skin is warm and dry.   Neurological:      Mental Status: She is alert and oriented to person, place, and time.      Motor: No abnormal muscle tone.   Psychiatric:         Mood and Affect: Mood normal.             Diagnoses and health risks identified today and associated recommendations/orders:    1. Encounter for preventive health examination  Here for Health Risk Assessment/Annual Wellness Visit.  Health maintenance reviewed and updated. Follow up in one year.     2. Longstanding persistent atrial fibrillation  Chronic, stable on current medications. Followed by PCP, Cardiology.    3. " Anticoagulant long-term use  Chronic apixaban. Followed by PCP, Cardiology.    4. Chronic heart failure with preserved ejection fraction  Chronic, stable on current medications. Followed by PCP, Cardiology.    5. Pulmonary hypertension - per Right Heart Cath 1/27/22.   Chronic, stable on current medications. Followed by PCP, Cardiology.    6. Varicose veins of both lower extremities with complications  Chronic, stable on current medications. Followed by PCP, Cardiology.    7. Anxiety  Chronic, stable. PHQ-2 score 0. Followed by PCP.    8. Hypothyroidism, unspecified type  Chronic, stable on current medication. Followed by PCP.     9. Age-related osteoporosis without current pathological fracture  Chronic, stable on current medication - received 1st Prolia injection. Followed by PCP.     10. Underweight  Chronic, weight stable. Last albumin 3.5. Followed by PCP.    11. Other chronic pain  Chronic, stable on current medications. Followed by PCP, Pain Management..     12. DDD (degenerative disc disease), lumbar  Chronic, stable on current medications. Followed by PCP, Pain Management.     13. Spinal stenosis of lumbar region without neurogenic claudication  Chronic, stable on current medications. Followed by PCP, Pain Managaement     14. S/P lumbar fusion  Followed by PCP, Pain Management.     15. Unspecified cord compression  Chronic, stable on current medications. Followed by PCP, Pain Management.     16. Chronic pain of left knee  Chronic reports knee pain during cycling class. Requesting referral.  - Ambulatory referral/consult to Orthopedics; Future    17. Encounter for Medicare annual wellness exam  - Ambulatory Referral/Consult to Enhanced Annual Wellness Visit (eAWV)      Provided Angelina with a 5-10 year written screening schedule and personal prevention plan. Recommendations were developed using the USPSTF age appropriate recommendations. Education, counseling, and referrals were provided as needed. After  Visit Summary printed and given to patient which includes a list of additional screenings\tests needed.    Follow up in 11 weeks (on 6/21/2023).with PCP    Monique Batista NP

## 2023-04-05 NOTE — PROGRESS NOTES
Arrived for 1st prolia injection.  Prolia 60 mg SQ in left arm. Pt taking Vit D. Pt observed 15 minutes after injection given. Pt discharged in NAD

## 2023-04-05 NOTE — PATIENT INSTRUCTIONS
Counseling and Referral of Other Preventative  (Italic type indicates deductible and co-insurance are waived)    Patient Name: Angelina Man  Today's Date: 4/5/2023    Health Maintenance       Date Due Completion Date    Colorectal Cancer Screening 04/26/2023 (Originally 1950) 11/14/2017    DEXA Scan 01/23/2025 1/23/2023    TETANUS VACCINE 09/26/2027 9/26/2017    Lipid Panel 01/09/2028 1/9/2023        Orders Placed This Encounter   Procedures    Ambulatory referral/consult to Orthopedics     The following information is provided to all patients.  This information is to help you find resources for any of the problems found today that may be affecting your health:                Living healthy guide: www.[facility].[place].gov      Understanding Diabetes: www.diabetes.org      Eating healthy: www.cdc.gov/healthyweight      CDC home safety checklist: www.cdc.gov/steadi/patient.html      Agency on Aging: www.goea.[place].[facility]      Alcoholics anonymous (AA): www.aa.org      Physical Activity: www.donte.nih.gov/kr5agka      Tobacco use: www.quitwithusla.org

## 2023-04-10 ENCOUNTER — OFFICE VISIT (OUTPATIENT)
Dept: DERMATOLOGY | Facility: CLINIC | Age: 73
End: 2023-04-10
Payer: MEDICARE

## 2023-04-10 DIAGNOSIS — Z85.828 PERSONAL HISTORY OF SKIN CANCER: ICD-10-CM

## 2023-04-10 DIAGNOSIS — D48.5 NEOPLASM OF UNCERTAIN BEHAVIOR OF SKIN: ICD-10-CM

## 2023-04-10 DIAGNOSIS — D18.01 CHERRY ANGIOMA: ICD-10-CM

## 2023-04-10 DIAGNOSIS — L82.1 SK (SEBORRHEIC KERATOSIS): ICD-10-CM

## 2023-04-10 DIAGNOSIS — D22.9 NEVUS: ICD-10-CM

## 2023-04-10 DIAGNOSIS — L81.4 LENTIGO: ICD-10-CM

## 2023-04-10 DIAGNOSIS — L57.0 AK (ACTINIC KERATOSIS): Primary | ICD-10-CM

## 2023-04-10 PROCEDURE — 17003 DESTRUCTION, PREMALIGNANT LESIONS; SECOND THROUGH 14 LESIONS: ICD-10-PCS | Mod: HCNC,S$GLB,, | Performed by: DERMATOLOGY

## 2023-04-10 PROCEDURE — 11102 PR TANGENTIAL BIOPSY, SKIN, SINGLE LESION: ICD-10-PCS | Mod: HCNC,S$GLB,, | Performed by: DERMATOLOGY

## 2023-04-10 PROCEDURE — 1160F PR REVIEW ALL MEDS BY PRESCRIBER/CLIN PHARMACIST DOCUMENTED: ICD-10-PCS | Mod: HCNC,CPTII,S$GLB, | Performed by: DERMATOLOGY

## 2023-04-10 PROCEDURE — 99999 PR PBB SHADOW E&M-EST. PATIENT-LVL III: CPT | Mod: PBBFAC,HCNC,, | Performed by: DERMATOLOGY

## 2023-04-10 PROCEDURE — 1126F AMNT PAIN NOTED NONE PRSNT: CPT | Mod: HCNC,CPTII,S$GLB, | Performed by: DERMATOLOGY

## 2023-04-10 PROCEDURE — 3288F PR FALLS RISK ASSESSMENT DOCUMENTED: ICD-10-PCS | Mod: HCNC,CPTII,S$GLB, | Performed by: DERMATOLOGY

## 2023-04-10 PROCEDURE — 1159F MED LIST DOCD IN RCRD: CPT | Mod: HCNC,CPTII,S$GLB, | Performed by: DERMATOLOGY

## 2023-04-10 PROCEDURE — 1157F ADVNC CARE PLAN IN RCRD: CPT | Mod: HCNC,CPTII,S$GLB, | Performed by: DERMATOLOGY

## 2023-04-10 PROCEDURE — 17000 DESTRUCT PREMALG LESION: CPT | Mod: XS,HCNC,S$GLB, | Performed by: DERMATOLOGY

## 2023-04-10 PROCEDURE — 1160F RVW MEDS BY RX/DR IN RCRD: CPT | Mod: HCNC,CPTII,S$GLB, | Performed by: DERMATOLOGY

## 2023-04-10 PROCEDURE — 88305 TISSUE EXAM BY PATHOLOGIST: CPT | Mod: HCNC | Performed by: DERMATOLOGY

## 2023-04-10 PROCEDURE — 1159F PR MEDICATION LIST DOCUMENTED IN MEDICAL RECORD: ICD-10-PCS | Mod: HCNC,CPTII,S$GLB, | Performed by: DERMATOLOGY

## 2023-04-10 PROCEDURE — 88305 TISSUE EXAM BY PATHOLOGIST: CPT | Mod: 26,HCNC,, | Performed by: DERMATOLOGY

## 2023-04-10 PROCEDURE — 99213 OFFICE O/P EST LOW 20 MIN: CPT | Mod: 25,HCNC,S$GLB, | Performed by: DERMATOLOGY

## 2023-04-10 PROCEDURE — 11102 TANGNTL BX SKIN SINGLE LES: CPT | Mod: HCNC,S$GLB,, | Performed by: DERMATOLOGY

## 2023-04-10 PROCEDURE — 3288F FALL RISK ASSESSMENT DOCD: CPT | Mod: HCNC,CPTII,S$GLB, | Performed by: DERMATOLOGY

## 2023-04-10 PROCEDURE — 1157F PR ADVANCE CARE PLAN OR EQUIV PRESENT IN MEDICAL RECORD: ICD-10-PCS | Mod: HCNC,CPTII,S$GLB, | Performed by: DERMATOLOGY

## 2023-04-10 PROCEDURE — 1126F PR PAIN SEVERITY QUANTIFIED, NO PAIN PRESENT: ICD-10-PCS | Mod: HCNC,CPTII,S$GLB, | Performed by: DERMATOLOGY

## 2023-04-10 PROCEDURE — 99213 PR OFFICE/OUTPT VISIT, EST, LEVL III, 20-29 MIN: ICD-10-PCS | Mod: 25,HCNC,S$GLB, | Performed by: DERMATOLOGY

## 2023-04-10 PROCEDURE — 17000 PR DESTRUCTION(LASER SURGERY,CRYOSURGERY,CHEMOSURGERY),PREMALIGNANT LESIONS,FIRST LESION: ICD-10-PCS | Mod: XS,HCNC,S$GLB, | Performed by: DERMATOLOGY

## 2023-04-10 PROCEDURE — 99999 PR PBB SHADOW E&M-EST. PATIENT-LVL III: ICD-10-PCS | Mod: PBBFAC,HCNC,, | Performed by: DERMATOLOGY

## 2023-04-10 PROCEDURE — 1101F PR PT FALLS ASSESS DOC 0-1 FALLS W/OUT INJ PAST YR: ICD-10-PCS | Mod: HCNC,CPTII,S$GLB, | Performed by: DERMATOLOGY

## 2023-04-10 PROCEDURE — 88305 TISSUE EXAM BY PATHOLOGIST: ICD-10-PCS | Mod: 26,HCNC,, | Performed by: DERMATOLOGY

## 2023-04-10 PROCEDURE — 17003 DESTRUCT PREMALG LES 2-14: CPT | Mod: HCNC,S$GLB,, | Performed by: DERMATOLOGY

## 2023-04-10 PROCEDURE — 1101F PT FALLS ASSESS-DOCD LE1/YR: CPT | Mod: HCNC,CPTII,S$GLB, | Performed by: DERMATOLOGY

## 2023-04-10 NOTE — PATIENT INSTRUCTIONS
Shave Biopsy Wound Care    Your doctor has performed a shave biopsy today.  A band aid and vaseline ointment has been placed over the site.  This should remain in place for NO LONGER THAN 48 hours.  It is fine to remove the bandaid after 24 hours, if the area is no longer bleeding. It is recommended that you keep the area dry (do not wet)) for the first 24 hours.  After 24 hours, wash the area with warm soap and water and apply Vaseline jelly.  Many patients prefer to use Neosporin or Bacitracin ointment.  This is acceptable; however, know that you can develop an allergy to this medication even if you have used it safely for years.  It is important to keep the area moist.  Letting it dry out and get air slows healing time, and will worsen the scar.        If you notice increasing redness, tenderness, pain, or yellow drainage at the biopsy site, please notify your doctor.  These are signs of an infection.    If your biopsy site is bleeding, apply firm pressure for 15 minutes straight.  Repeat for another 15 minutes, if it is still bleeding.   If the surgical site continues to bleed, then please contact your doctor.      For [facility] users:   You will receive your biopsy results in [facility] as soon as they are available. Please be assured that your physician/provider will review your results and will then determine what further treatment, evaluation, or planning is required. You should be contacted by your physician's/provider's office within 5 business days of receiving your results; If not, please reach out to directly. This is one more way [employer] putting you first.     [facility] [place] [address]/ [phone] FAX/ [email or web address]

## 2023-04-10 NOTE — PROGRESS NOTES
"  Subjective:      Patient ID:  Angelina Man is a 73 y.o. female who presents for   Chief Complaint   Patient presents with    Skin Check     tbse    Lesion     L arm  L leg      Pt here today for TBSE  This is a high risk patient here to check for the development of new lesions.    H/o NMSC    Patient is here today for a "mole" check.   Pt has a history of  extensive sun exposure in the past.   Pt recalls several blistering sunburns in the past- several   Pt has history of tanning bed use- yes  Pt has  had moles removed in the past- no  Pt has history of melanoma in first degree relatives-  no    Patient with new complaint of lesion(s)  Location: L arm  Duration: 1yr  Symptoms: "razor blades under arm" when first occurred   Relieving factors/Previous treatments: none    Patient with new complaint of lesion(s)  Location: L lower leg  Duration: 3-6mos  Symptoms: "boil"  Relieving factors/Previous treatments: none            Lesion    Review of Systems   Skin:  Positive for daily sunscreen use and activity-related sunscreen use. Negative for recent sunburn.   Hematologic/Lymphatic: Bruises/bleeds easily.     Objective:   Physical Exam   Constitutional: She appears well-developed and well-nourished. No distress.   HENT:   Ears:    Neurological: She is alert and oriented to person, place, and time. She is not disoriented.   Psychiatric: She has a normal mood and affect.   Skin:   Areas Examined (abnormalities noted in diagram):   Scalp / Hair Palpated and Inspected  Head / Face Inspection Performed  Neck Inspection Performed  Chest / Axilla Inspection Performed  Abdomen Inspection Performed  Genitals / Buttocks / Groin Inspection Performed  Back Inspection Performed  RUE Inspected  LUE Inspection Performed  RLE Inspected  LLE Inspection Performed  Nails and Digits Inspection Performed                     Diagram Legend     Erythematous scaling macule/papule c/w actinic keratosis       Vascular papule c/w angioma      " Pigmented verrucoid papule/plaque c/w seborrheic keratosis      Yellow umbilicated papule c/w sebaceous hyperplasia      Irregularly shaped tan macule c/w lentigo     1-2 mm smooth white papules consistent with Milia      Movable subcutaneous cyst with punctum c/w epidermal inclusion cyst      Subcutaneous movable cyst c/w pilar cyst      Firm pink to brown papule c/w dermatofibroma      Pedunculated fleshy papule(s) c/w skin tag(s)      Evenly pigmented macule c/w junctional nevus     Mildly variegated pigmented, slightly irregular-bordered macule c/w mildly atypical nevus      Flesh colored to evenly pigmented papule c/w intradermal nevus       Pink pearly papule/plaque c/w basal cell carcinoma      Erythematous hyperkeratotic cursted plaque c/w SCC      Surgical scar with no sign of skin cancer recurrence      Open and closed comedones      Inflammatory papules and pustules      Verrucoid papule consistent consistent with wart     Erythematous eczematous patches and plaques     Dystrophic onycholytic nail with subungual debris c/w onychomycosis     Umbilicated papule    Erythematous-base heme-crusted tan verrucoid plaque consistent with inflamed seborrheic keratosis     Erythematous Silvery Scaling Plaque c/w Psoriasis     See annotation      Assessment / Plan:      Pathology Orders:       Normal Orders This Visit    Specimen to Pathology, Dermatology     Questions:    Procedure Type: Dermatology and skin neoplasms    Number of Specimens: 1    ------------------------: -------------------------    Spec 1 Procedure: Biopsy    Spec 1 Clinical Impression: r/o SCC    Spec 1 Source: left mid lateral shin    Release to patient:           AK (actinic keratosis)  Cryosurgery Procedure Note    Verbal consent from the patient is obtained including, but not limited to, risk of hypopigmentation/hyperpigmentation, scar, recurrence of lesion. The patient is aware of the precancerous quality and need for treatment of these  lesions. Liquid nitrogen cryosurgery is applied to the 4 actinic keratoses, as detailed in the physical exam, to produce a freeze injury. The patient is aware that blisters may form and is instructed on wound care with gentle cleansing and use of vaseline ointment to keep moist until healed. The patient is supplied a handout on cryosurgery and is instructed to call if lesions do not completely resolve.    Neoplasm of uncertain behavior of skin  Shave biopsy procedure note:    Shave biopsy performed after verbal consent including risk of infection, scar, recurrence, need for additional treatment of site. Area prepped with alcohol, anesthetized with approximately 1.0cc of 1% lidocaine with epinephrine. Lesional tissue shaved with razor blade. Hemostasis achieved with application of aluminum chloride followed by hyfrecation. No complications. Dressing applied. Wound care explained.  If biopsy positive for malignancy, refer to Dr.David Canela for definitive treatment  -     Specimen to Pathology, Dermatology    Lentigo  This is a benign hyperpigmented sun induced lesion. Recommend daily sun protection/avoidance and use of at least SPF 30, broad spectrum sunscreen (OTC drug) will reduce the number of new lesions. Treatment of these benign lesions are considered cosmetic.  The nature of sun-induced photo-aging and skin cancers is discussed.  Sun avoidance, protective clothing, and the use of 30-SPF sunscreens is advised. Observe closely for skin damage/changes, and call if such occurs.    Nevus  Discussed ABCDE's of nevi.  Monitor for new mole or moles that are becoming bigger, darker, irritated, or developing irregular borders. Brochure provided. Instructed patient to observe lesion(s) for changes and follow up in clinic if changes are noted. Patient to monitor skin at home for new or changing lesions.     SK (seborrheic keratosis)  These are benign inherited growths without a malignant potential. Reassurance given to  patient. No treatment is necessary.     Cherry angioma  These are benign vascular lesions that are inherited.  Treatment is not necessary.    Personal history of skin cancer  Total body skin examination performed today including at least 12 points as noted in physical examination. Suspicious lesion/lesions as noted.     Recommend daily sun protection/avoidance, use of at least SPF 30, broad spectrum sunscreen (OTC drug), skin self examinations, and routine physician surveillance to optimize early detection            Follow up in about 1 year (around 4/10/2024) for prn bx report, TBSE.

## 2023-04-13 ENCOUNTER — PATIENT MESSAGE (OUTPATIENT)
Dept: DERMATOLOGY | Facility: CLINIC | Age: 73
End: 2023-04-13
Payer: MEDICARE

## 2023-04-13 ENCOUNTER — HOSPITAL ENCOUNTER (OUTPATIENT)
Dept: RADIOLOGY | Facility: HOSPITAL | Age: 73
Discharge: HOME | End: 2023-04-13
Attending: ORTHOPAEDIC SURGERY
Payer: MEDICARE

## 2023-04-13 ENCOUNTER — OFFICE VISIT (OUTPATIENT)
Dept: ORTHOPEDICS | Facility: CLINIC | Age: 73
End: 2023-04-13
Payer: MEDICARE

## 2023-04-13 ENCOUNTER — TELEPHONE (OUTPATIENT)
Dept: ORTHOPEDICS | Facility: CLINIC | Age: 73
End: 2023-04-13
Payer: MEDICARE

## 2023-04-13 VITALS — WEIGHT: 101 LBS | HEIGHT: 64 IN | BODY MASS INDEX: 17.24 KG/M2

## 2023-04-13 DIAGNOSIS — M25.562 LEFT KNEE PAIN, UNSPECIFIED CHRONICITY: ICD-10-CM

## 2023-04-13 DIAGNOSIS — G89.29 CHRONIC PAIN OF LEFT KNEE: ICD-10-CM

## 2023-04-13 DIAGNOSIS — M25.562 CHRONIC PAIN OF LEFT KNEE: ICD-10-CM

## 2023-04-13 DIAGNOSIS — M25.562 LEFT KNEE PAIN, UNSPECIFIED CHRONICITY: Primary | ICD-10-CM

## 2023-04-13 PROCEDURE — 99203 PR OFFICE/OUTPT VISIT, NEW, LEVL III, 30-44 MIN: ICD-10-PCS | Mod: HCNC,S$GLB,, | Performed by: ORTHOPAEDIC SURGERY

## 2023-04-13 PROCEDURE — 1157F PR ADVANCE CARE PLAN OR EQUIV PRESENT IN MEDICAL RECORD: ICD-10-PCS | Mod: HCNC,CPTII,S$GLB, | Performed by: ORTHOPAEDIC SURGERY

## 2023-04-13 PROCEDURE — 99203 OFFICE O/P NEW LOW 30 MIN: CPT | Mod: HCNC,S$GLB,, | Performed by: ORTHOPAEDIC SURGERY

## 2023-04-13 PROCEDURE — 1160F RVW MEDS BY RX/DR IN RCRD: CPT | Mod: HCNC,CPTII,S$GLB, | Performed by: ORTHOPAEDIC SURGERY

## 2023-04-13 PROCEDURE — 73562 XR KNEE ORTHO LEFT WITH FLEXION: ICD-10-PCS | Mod: 26,HCNC,RT, | Performed by: RADIOLOGY

## 2023-04-13 PROCEDURE — 1159F PR MEDICATION LIST DOCUMENTED IN MEDICAL RECORD: ICD-10-PCS | Mod: HCNC,CPTII,S$GLB, | Performed by: ORTHOPAEDIC SURGERY

## 2023-04-13 PROCEDURE — 99999 PR PBB SHADOW E&M-EST. PATIENT-LVL III: ICD-10-PCS | Mod: PBBFAC,HCNC,, | Performed by: ORTHOPAEDIC SURGERY

## 2023-04-13 PROCEDURE — 73564 X-RAY EXAM KNEE 4 OR MORE: CPT | Mod: TC,HCNC,LT

## 2023-04-13 PROCEDURE — 3288F FALL RISK ASSESSMENT DOCD: CPT | Mod: HCNC,CPTII,S$GLB, | Performed by: ORTHOPAEDIC SURGERY

## 2023-04-13 PROCEDURE — 99999 PR PBB SHADOW E&M-EST. PATIENT-LVL III: CPT | Mod: PBBFAC,HCNC,, | Performed by: ORTHOPAEDIC SURGERY

## 2023-04-13 PROCEDURE — 1159F MED LIST DOCD IN RCRD: CPT | Mod: HCNC,CPTII,S$GLB, | Performed by: ORTHOPAEDIC SURGERY

## 2023-04-13 PROCEDURE — 1160F PR REVIEW ALL MEDS BY PRESCRIBER/CLIN PHARMACIST DOCUMENTED: ICD-10-PCS | Mod: HCNC,CPTII,S$GLB, | Performed by: ORTHOPAEDIC SURGERY

## 2023-04-13 PROCEDURE — 1125F PR PAIN SEVERITY QUANTIFIED, PAIN PRESENT: ICD-10-PCS | Mod: HCNC,CPTII,S$GLB, | Performed by: ORTHOPAEDIC SURGERY

## 2023-04-13 PROCEDURE — 73564 X-RAY EXAM KNEE 4 OR MORE: CPT | Mod: 26,HCNC,LT, | Performed by: RADIOLOGY

## 2023-04-13 PROCEDURE — 73562 X-RAY EXAM OF KNEE 3: CPT | Mod: 26,HCNC,RT, | Performed by: RADIOLOGY

## 2023-04-13 PROCEDURE — 73564 XR KNEE ORTHO LEFT WITH FLEXION: ICD-10-PCS | Mod: 26,HCNC,LT, | Performed by: RADIOLOGY

## 2023-04-13 PROCEDURE — 1101F PR PT FALLS ASSESS DOC 0-1 FALLS W/OUT INJ PAST YR: ICD-10-PCS | Mod: HCNC,CPTII,S$GLB, | Performed by: ORTHOPAEDIC SURGERY

## 2023-04-13 PROCEDURE — 1157F ADVNC CARE PLAN IN RCRD: CPT | Mod: HCNC,CPTII,S$GLB, | Performed by: ORTHOPAEDIC SURGERY

## 2023-04-13 PROCEDURE — 1101F PT FALLS ASSESS-DOCD LE1/YR: CPT | Mod: HCNC,CPTII,S$GLB, | Performed by: ORTHOPAEDIC SURGERY

## 2023-04-13 PROCEDURE — 3288F PR FALLS RISK ASSESSMENT DOCUMENTED: ICD-10-PCS | Mod: HCNC,CPTII,S$GLB, | Performed by: ORTHOPAEDIC SURGERY

## 2023-04-13 PROCEDURE — 1125F AMNT PAIN NOTED PAIN PRSNT: CPT | Mod: HCNC,CPTII,S$GLB, | Performed by: ORTHOPAEDIC SURGERY

## 2023-04-13 NOTE — PROGRESS NOTES
Subjective:   Chief Complaint:   Pain of the Left Knee       Angelina Man is a 73 y.o. female presenting with left knee pain. They have tried tylenol, which did not really help. They have a PMH of Afib and CAD.  They have not had surgery on the left knee before. They are interested in discussing diagnosis today.         Past Medical History:   Diagnosis Date    Arthritis     Atrial fibrillation     Bronchitis     Cataract     Dry eyes     GERD (gastroesophageal reflux disease)     Glaucoma, suspect - Both Eyes     Hypothyroidism 06/23/2016    Pulmonary hypertension     Rash     Renal angiomyolipoma     Renal disorder     Spinal stenosis     Squamous cell carcinoma     in-situ right upper inner arm, right wrist    Status post lumbar surgery 06/23/2016    Stress fracture     Thyroid disease     Venous insufficiency      Past Surgical History:   Procedure Laterality Date    ANGIOPLASTY      CATARACT EXTRACTION W/  INTRAOCULAR LENS IMPLANT Left 12/6/2022    Procedure: EXTRACTION, CATARACT, WITH IOL INSERTION;  Surgeon: Tone Brown MD;  Location: [facility];  Service: Ophthalmology;  Laterality: Left;    CATARACT EXTRACTION W/  INTRAOCULAR LENS IMPLANT Right 12/20/2022    Procedure: EXTRACTION, CATARACT, WITH IOL INSERTION;  Surgeon: oTne Brown MD;  Location: [facility];  Service: Ophthalmology;  Laterality: Right;    CERVICAL FUSION      COLONOSCOPY      COLONOSCOPY N/A 11/14/2017    Procedure: COLONOSCOPY;  Surgeon: Kasi Mercado MD;  Location: [facility] ([address] [place] [address]);  Service: Endoscopy;  Laterality: N/A;    ESOPHAGOGASTRODUODENOSCOPY N/A 10/10/2019    Procedure: EGD (ESOPHAGOGASTRODUODENOSCOPY);  Surgeon: Rg Milton MD;  [facility]  Service: Endoscopy;  Laterality: N/A;    ESOPHAGOGASTRODUODENOSCOPY N/A 10/6/2021    Procedure: EGD (ESOPHAGOGASTRODUODENOSCOPY);  Surgeon: Rg Milton MD;  [facility]  Service: Endoscopy;  Laterality: N/A;  covid test  10/3 [facility] emailed/portal -ml    l4-5 mid discectomy Left 03/2017    l4-5 MIS diskectomy Right 05/2016    RIGHT HEART CATHETERIZATION Right 1/27/2022    Procedure: INSERTION, CATHETER, RIGHT HEART;  Surgeon: Satnam Pickard Jr., MD;  Location: [facility] CATH LAB;  Service: Cardiology;  Laterality: Right;    TREATMENT OF CARDIAC ARRHYTHMIA N/A 7/17/2020    Procedure: CARDIOVERSION;  Surgeon: Kwan Bray MD;  Location: [facility] EP LAB;  Service: Cardiology;  Laterality: N/A;  AF,DCCV/SANGITA, ANES, SK, 746    TREATMENT OF CARDIAC ARRHYTHMIA N/A 7/14/2021    Procedure: CARDIOVERSION;  Surgeon: SYDNIE Cedillo MD;  Location: [facility] EP LAB;  Service: Cardiology;  Laterality: N/A;  AF, SANGITA, DCCV, MAC, EH, 3 Prep     Family History   Problem Relation Age of Onset    Cancer Mother         Lung cancer    Heart failure Father     Colon cancer Father     Hypertension Father     Cataracts Father     Cancer Father 80        colon    No Known Problems Sister     No Known Problems Brother     Melanoma Daughter     Diabetes Son     Heart disease Son     Cancer Son         esophageal cancer    No Known Problems Maternal Aunt     No Known Problems Maternal Uncle     No Known Problems Paternal Aunt     No Known Problems Paternal Uncle     No Known Problems Maternal Grandmother     No Known Problems Maternal Grandfather     No Known Problems Paternal Grandmother     No Known Problems Paternal Grandfather     Amblyopia Neg Hx     Blindness Neg Hx     Glaucoma Neg Hx     Macular degeneration Neg Hx     Retinal detachment Neg Hx     Strabismus Neg Hx     Stroke Neg Hx     Thyroid disease Neg Hx     Breast cancer Neg Hx     Ovarian cancer Neg Hx      Social History     Socioeconomic History    Marital status: Single   Occupational History     Employer: [employer]   Tobacco Use    Smoking status: Never    Smokeless tobacco: Never   Substance and Sexual Activity    Alcohol use: Yes     Alcohol/week: 4.0 standard drinks      Types: 4 Glasses of wine per week     Comment: 2 glasses of wine on weekends    Drug use: No    Sexual activity: Never   Other Topics Concern    Are you pregnant or think you may be? No    Breast-feeding No     Social Determinants of Health     Financial Resource Strain: Low Risk     Difficulty of Paying Living Expenses: Not very hard   Food Insecurity: No Food Insecurity    Worried About Running Out of Food in the Last Year: Never true    Ran Out of Food in the Last Year: Never true   Transportation Needs: No Transportation Needs    Lack of Transportation (Medical): No    Lack of Transportation (Non-Medical): No   Physical Activity: Sufficiently Active    Days of Exercise per Week: 7 days    Minutes of Exercise per Session: 60 min   Stress: No Stress Concern Present    Feeling of Stress : Only a little   Social Connections: Unknown    Frequency of Communication with Friends and Family: Once a week    Frequency of Social Gatherings with Friends and Family: Patient refused    Attends Latter day Services: Never    Active Member of Clubs or Organizations: No    Attends Club or Organization Meetings: Never    Marital Status:    Housing Stability: Low Risk     Unable to Pay for Housing in the Last Year: No    Number of Places Lived in the Last Year: 1    Unstable Housing in the Last Year: No       Current Outpatient Medications   Medication Sig Dispense Refill    acetaminophen (TYLENOL) 500 MG tablet Take 500 mg by mouth every 6 (six) hours as needed for Pain.      acyclovir (ZOVIRAX) 400 MG tablet Take 1 tablet (400 mg total) by mouth 2 (two) times daily. (Patient taking differently: Take 400 mg by mouth Daily.) 60 tablet 12    ascorbic acid (VITAMIN C) 1000 MG tablet Take 1,000 mg by mouth once daily.       biotin 1 mg tablet Take 1 mg by mouth 2 (two) times daily.       buPROPion (WELLBUTRIN XL) 150 MG TB24 tablet Take 1 tablet (150 mg total) by mouth once daily. 90 tablet 2    calcium-vitamin D 500 mg(1,250mg)  "-200 unit per tablet Take 1 tablet by mouth 2 (two) times daily with meals.       clonazePAM (KLONOPIN) 1 MG tablet TAKE 1& 1/2 TABLET BY MOUTH NIGHTLY AS NEEDED 45 tablet 5    cycloSPORINE (RESTASIS) 0.05 % ophthalmic emulsion Place 1 drop into both eyes 2 (two) times daily. 60 each 11    digestive enzymes Tab Take 1 tablet by mouth once daily.       diphenhydrAMINE (BENADRYL) 25 mg capsule Take 25 mg by mouth nightly as needed.       ELIQUIS 5 mg Tab TAKE 1 TABLET(5 MG) BY MOUTH TWICE DAILY 180 tablet 3    fluticasone propionate (FLONASE) 50 mcg/actuation nasal spray 1 spray (50 mcg total) by Each Nostril route once daily. (Patient taking differently: 1 spray by Each Nostril route 2 (two) times daily as needed.) 16 g 12    furosemide (LASIX) 20 MG tablet Take 1 tablet (20 mg total) by mouth every other day. 15 tablet 11    levothyroxine (SYNTHROID) 137 MCG Tab tablet Take 1 tablet (137 mcg total) by mouth before breakfast. 30 tablet 11    lubiprostone (AMITIZA) 24 MCG Cap Take 1 capsule (24 mcg total) by mouth daily as needed (IBS symptoms). (Patient not taking: Reported on 4/5/2023) 30 capsule 6    metoprolol succinate (TOPROL-XL) 25 MG 24 hr tablet Take 1 tablet (25 mg total) by mouth once daily. 30 tablet 11    multivitamin (THERAGRAN) per tablet Take 1 tablet by mouth once daily.       tretinoin (RETIN-A) 0.1 % cream Apply topically every evening. 20 g 6    zinc 50 mg Tab Take 50 mg by mouth once daily.        No current facility-administered medications for this visit.     Review of patient's allergies indicates:  No Known Allergies      Objective:      Vitals:    04/13/23 1428   Weight: 45.8 kg (100 lb 15.5 oz)   Height: 5' 4" (1.626 m)     Physical Exam    LLE:  No gross deformities, wounds  No crepitus, Mild TTP over the lateral knee  Motor intact hip flex, quad, Tib Ant, gastroc, EHL, FHL.  Sensation intact saphenous, sural, deep/superficial peroneal, tibial nerves  Palp DP/PT pulse, BCR      Imaging " Review: Xrays ordered and reviewed for this encounter. Xrays of the knees show no significant arthritis of the knees. There are no acute fractures or dislocations.     Assessment:     Problem Noted   Chronic Pain of Left Knee 4/13/2023       Plan:     Problem List Items Addressed This Visit          Orthopedic    Chronic pain of left knee     Follow up PRN for possible joint injection              No orders of the defined types were placed in this encounter.        The patient's pathophysiology was explained in detail with reference to x-rays, models, other visual aids as appropriate.  Treatment options were discussed in detail.  Questions were invited and answered to the patient's satisfaction.    Rolan Peterson MD  Orthopaedic Surgery

## 2023-04-13 NOTE — TELEPHONE ENCOUNTER
Called and spoke to pt to discuss Xrs prior to appt pt was pleasant and verbalized understandings.

## 2023-04-18 ENCOUNTER — PATIENT MESSAGE (OUTPATIENT)
Dept: DERMATOLOGY | Facility: CLINIC | Age: 73
End: 2023-04-18
Payer: MEDICARE

## 2023-04-18 LAB
FINAL PATHOLOGIC DIAGNOSIS: NORMAL
GROSS: NORMAL
Lab: NORMAL
MICROSCOPIC EXAM: NORMAL

## 2023-04-19 ENCOUNTER — PATIENT MESSAGE (OUTPATIENT)
Dept: DERMATOLOGY | Facility: CLINIC | Age: 73
End: 2023-04-19
Payer: MEDICARE

## 2023-04-19 NOTE — PROGRESS NOTES
Skin, left mid lateral shin, shave biopsy:   -SQUAMOUS CELL CARCINOMA, KERATOACANTHOMA TYPE, EXTENDING TO THE BASE OF THE SPECIMEN     Please schedule for eval for definitive treatment. Thank you

## 2023-04-20 ENCOUNTER — TELEPHONE (OUTPATIENT)
Dept: DERMATOLOGY | Facility: CLINIC | Age: 73
End: 2023-04-20
Payer: MEDICARE

## 2023-04-20 ENCOUNTER — TELEPHONE (OUTPATIENT)
Dept: HEMATOLOGY/ONCOLOGY | Facility: CLINIC | Age: 73
End: 2023-04-20
Payer: MEDICARE

## 2023-04-20 NOTE — TELEPHONE ENCOUNTER
----- Message from Bibi Douglass RN sent at 4/20/2023  7:28 AM CDT -----  See below  Aislinn  ----- Message -----  From: Liza Martínez RN  Sent: 4/20/2023   7:11 AM CDT  To: [facility] Zackary Navigation      ----- Message -----  From: Fozia Garcia MD  Sent: 4/19/2023   4:57 PM CDT  To: Rola Villaseñor Staff    Skin, left mid lateral shin, shave biopsy:   -SQUAMOUS CELL CARCINOMA, KERATOACANTHOMA TYPE, EXTENDING TO THE BASE OF THE SPECIMEN     Please schedule for eval for definitive treatment. Thank you

## 2023-04-21 ENCOUNTER — TELEPHONE (OUTPATIENT)
Dept: ENDOSCOPY | Facility: HOSPITAL | Age: 73
End: 2023-04-21
Payer: MEDICARE

## 2023-04-21 NOTE — TELEPHONE ENCOUNTER
Called & spoke with pt, scheduled to see Dr. Canela on 5-1-23. Offered appt this Monday coming, but pt has to do her colonoscopy prep. Appt details reviewed & confirmed.

## 2023-04-26 ENCOUNTER — ANESTHESIA EVENT (OUTPATIENT)
Dept: ENDOSCOPY | Facility: HOSPITAL | Age: 73
End: 2023-04-26
Payer: MEDICARE

## 2023-04-26 ENCOUNTER — HOSPITAL ENCOUNTER (OUTPATIENT)
Facility: HOSPITAL | Age: 73
Discharge: HOME | End: 2023-04-26
Attending: STUDENT IN AN ORGANIZED HEALTH CARE EDUCATION/TRAINING PROGRAM | Admitting: STUDENT IN AN ORGANIZED HEALTH CARE EDUCATION/TRAINING PROGRAM
Payer: MEDICARE

## 2023-04-26 ENCOUNTER — ANESTHESIA (OUTPATIENT)
Dept: ENDOSCOPY | Facility: HOSPITAL | Age: 73
End: 2023-04-26
Payer: MEDICARE

## 2023-04-26 VITALS
HEART RATE: 70 BPM | OXYGEN SATURATION: 97 % | RESPIRATION RATE: 18 BRPM | BODY MASS INDEX: 17.07 KG/M2 | HEIGHT: 64 IN | TEMPERATURE: 98 F | WEIGHT: 100 LBS | SYSTOLIC BLOOD PRESSURE: 123 MMHG | DIASTOLIC BLOOD PRESSURE: 61 MMHG

## 2023-04-26 DIAGNOSIS — Z12.11 ENCOUNTER FOR SCREENING COLONOSCOPY: ICD-10-CM

## 2023-04-26 DIAGNOSIS — Z80.0 FAMILY HISTORY OF COLON CANCER: Primary | ICD-10-CM

## 2023-04-26 PROCEDURE — 88305 TISSUE EXAM BY PATHOLOGIST: CPT | Mod: HCNC | Performed by: PATHOLOGY

## 2023-04-26 PROCEDURE — 27201089 HC SNARE, DISP (ANY): Mod: HCNC | Performed by: STUDENT IN AN ORGANIZED HEALTH CARE EDUCATION/TRAINING PROGRAM

## 2023-04-26 PROCEDURE — 25000003 PHARM REV CODE 250: Mod: HCNC | Performed by: NURSE ANESTHETIST, CERTIFIED REGISTERED

## 2023-04-26 PROCEDURE — 88305 TISSUE EXAM BY PATHOLOGIST: ICD-10-PCS | Mod: 26,HCNC,, | Performed by: PATHOLOGY

## 2023-04-26 PROCEDURE — 45385 COLONOSCOPY W/LESION REMOVAL: CPT | Mod: PT,HCNC | Performed by: STUDENT IN AN ORGANIZED HEALTH CARE EDUCATION/TRAINING PROGRAM

## 2023-04-26 PROCEDURE — E9220 PRA ENDO ANESTHESIA: ICD-10-PCS | Mod: PT,HCNC,, | Performed by: NURSE ANESTHETIST, CERTIFIED REGISTERED

## 2023-04-26 PROCEDURE — 25000003 PHARM REV CODE 250: Mod: HCNC | Performed by: STUDENT IN AN ORGANIZED HEALTH CARE EDUCATION/TRAINING PROGRAM

## 2023-04-26 PROCEDURE — 37000008 HC ANESTHESIA 1ST 15 MINUTES: Mod: HCNC | Performed by: STUDENT IN AN ORGANIZED HEALTH CARE EDUCATION/TRAINING PROGRAM

## 2023-04-26 PROCEDURE — 45385 COLONOSCOPY W/LESION REMOVAL: CPT | Mod: PT,HCNC,, | Performed by: STUDENT IN AN ORGANIZED HEALTH CARE EDUCATION/TRAINING PROGRAM

## 2023-04-26 PROCEDURE — 37000009 HC ANESTHESIA EA ADD 15 MINS: Mod: HCNC | Performed by: STUDENT IN AN ORGANIZED HEALTH CARE EDUCATION/TRAINING PROGRAM

## 2023-04-26 PROCEDURE — 45385 PR COLONOSCOPY,REMV LESN,SNARE: ICD-10-PCS | Mod: PT,HCNC,, | Performed by: STUDENT IN AN ORGANIZED HEALTH CARE EDUCATION/TRAINING PROGRAM

## 2023-04-26 PROCEDURE — E9220 PRA ENDO ANESTHESIA: HCPCS | Mod: PT,HCNC,, | Performed by: NURSE ANESTHETIST, CERTIFIED REGISTERED

## 2023-04-26 PROCEDURE — 63600175 PHARM REV CODE 636 W HCPCS: Mod: HCNC | Performed by: NURSE ANESTHETIST, CERTIFIED REGISTERED

## 2023-04-26 PROCEDURE — 88305 TISSUE EXAM BY PATHOLOGIST: CPT | Mod: 26,HCNC,, | Performed by: PATHOLOGY

## 2023-04-26 RX ORDER — SODIUM CHLORIDE 9 MG/ML
INJECTION, SOLUTION INTRAVENOUS CONTINUOUS
Status: DISCONTINUED | OUTPATIENT
Start: 2023-04-26 | End: 2023-04-26 | Stop reason: HOSPADM

## 2023-04-26 RX ORDER — PROPOFOL 10 MG/ML
VIAL (ML) INTRAVENOUS CONTINUOUS PRN
Status: DISCONTINUED | OUTPATIENT
Start: 2023-04-26 | End: 2023-04-26

## 2023-04-26 RX ORDER — LIDOCAINE HYDROCHLORIDE 20 MG/ML
INJECTION INTRAVENOUS
Status: DISCONTINUED | OUTPATIENT
Start: 2023-04-26 | End: 2023-04-26

## 2023-04-26 RX ORDER — PROPOFOL 10 MG/ML
VIAL (ML) INTRAVENOUS
Status: DISCONTINUED | OUTPATIENT
Start: 2023-04-26 | End: 2023-04-26

## 2023-04-26 RX ADMIN — PROPOFOL 40 MG: 10 INJECTION, EMULSION INTRAVENOUS at 08:04

## 2023-04-26 RX ADMIN — SODIUM CHLORIDE: 0.9 INJECTION, SOLUTION INTRAVENOUS at 08:04

## 2023-04-26 RX ADMIN — LIDOCAINE HYDROCHLORIDE 20 MG: 20 INJECTION INTRAVENOUS at 08:04

## 2023-04-26 RX ADMIN — PROPOFOL 20 MG: 10 INJECTION, EMULSION INTRAVENOUS at 09:04

## 2023-04-26 RX ADMIN — Medication 150 MCG/KG/MIN: at 08:04

## 2023-04-26 NOTE — H&P
[facility]  Colon and Rectal Surgery    [address]  [place], [place]  Tel: [phone]  Fax: [phone]  [email or web address]   MD Kasi Herrera MD Brian Kann, MD W. Forrest Johnston, MD Matthew Giglia, MD Jennifer Paruch, MD William Kethman, MD Danielle Kay, MD     Patient name: Angelina Man   YOB: 1950   MRN: [id number]  Date of procedure: 04/26/2023    Procedure: Colonoscopy  Indications: Family history of colorectal cancer and Screening for colon cancer    Last colonoscopy: 2017 (Complete)    The patient was informed of the availability of a certified  without charge. A certified  was not necessary for this visit.    Sedation plan: MAC  ASA: ASA 2 - Patient with mild systemic disease with no functional limitations    Review of Systems  See above    Past Medical History:   Diagnosis Date    Arthritis     Atrial fibrillation     Bronchitis     Cataract     Dry eyes     GERD (gastroesophageal reflux disease)     Glaucoma, suspect - Both Eyes     Hypothyroidism 06/23/2016    Pulmonary hypertension     Rash     Renal angiomyolipoma     Renal disorder     Spinal stenosis     Squamous cell carcinoma     in-situ right upper inner arm, right wrist    Status post lumbar surgery 06/23/2016    Stress fracture     Thyroid disease     Venous insufficiency      Past Surgical History:   Procedure Laterality Date    ANGIOPLASTY      CATARACT EXTRACTION W/  INTRAOCULAR LENS IMPLANT Left 12/6/2022    Procedure: EXTRACTION, CATARACT, WITH IOL INSERTION;  Surgeon: Tone Brown MD;  Location: [facility] OR;  Service: Ophthalmology;  Laterality: Left;    CATARACT EXTRACTION W/  INTRAOCULAR LENS IMPLANT Right 12/20/2022    Procedure: EXTRACTION, CATARACT, WITH IOL INSERTION;  Surgeon: Tone Brown MD;  Location: [facility] OR;  Service: Ophthalmology;  Laterality: Right;    CERVICAL FUSION      COLONOSCOPY       COLONOSCOPY N/A 11/14/2017    Procedure: COLONOSCOPY;  Surgeon: Kasi Mercado MD;  Location: [facility] ENDO (4TH FLR);  Service: Endoscopy;  Laterality: N/A;    ESOPHAGOGASTRODUODENOSCOPY N/A 10/10/2019    Procedure: EGD (ESOPHAGOGASTRODUODENOSCOPY);  Surgeon: Rg Milton MD;  Location: [facility] ENDO (4TH FLR);  Service: Endoscopy;  Laterality: N/A;    ESOPHAGOGASTRODUODENOSCOPY N/A 10/6/2021    Procedure: EGD (ESOPHAGOGASTRODUODENOSCOPY);  Surgeon: Rg Milton MD;  Location: [facility] ENDO (4TH FLR);  Service: Endoscopy;  Laterality: N/A;  covid test 10/3 [place], instr emailed/portal -ml    l4-5 mid discectomy Left 03/2017    l4-5 MIS diskectomy Right 05/2016    RIGHT HEART CATHETERIZATION Right 1/27/2022    Procedure: INSERTION, CATHETER, RIGHT HEART;  Surgeon: Satnam Pickard Jr., MD;  Location: [facility] CATH LAB;  Service: Cardiology;  Laterality: Right;    TREATMENT OF CARDIAC ARRHYTHMIA N/A 7/17/2020    Procedure: CARDIOVERSION;  Surgeon: Kwan Bray MD;  Location: [facility] EP LAB;  Service: Cardiology;  Laterality: N/A;  AF,DCCV/SANGITA, ANES, SK, 746    TREATMENT OF CARDIAC ARRHYTHMIA N/A 7/14/2021    Procedure: CARDIOVERSION;  Surgeon: SYDNIE Cedillo MD;  Location: [facility] EP LAB;  Service: Cardiology;  Laterality: N/A;  AF, SANGITA, DCCV, MAC, EH, 3 Prep     Family History   Problem Relation Age of Onset    Cancer Mother         Lung cancer    Heart failure Father     Colon cancer Father     Hypertension Father     Cataracts Father     Cancer Father 80        colon    No Known Problems Sister     No Known Problems Brother     Melanoma Daughter     Diabetes Son     Heart disease Son     Cancer Son         esophageal cancer    No Known Problems Maternal Aunt     No Known Problems Maternal Uncle     No Known Problems Paternal Aunt     No Known Problems Paternal Uncle     No Known Problems Maternal Grandmother     No Known Problems Maternal Grandfather     No Known Problems Paternal Grandmother     No Known Problems Paternal  Grandfather     Amblyopia Neg Hx     Blindness Neg Hx     Glaucoma Neg Hx     Macular degeneration Neg Hx     Retinal detachment Neg Hx     Strabismus Neg Hx     Stroke Neg Hx     Thyroid disease Neg Hx     Breast cancer Neg Hx     Ovarian cancer Neg Hx      Social History     Tobacco Use    Smoking status: Never    Smokeless tobacco: Never   Substance Use Topics    Alcohol use: Yes     Alcohol/week: 4.0 standard drinks     Types: 4 Glasses of wine per week     Comment: 2 glasses of wine on weekends    Drug use: No     Review of patient's allergies indicates:  No Known Allergies    Prior to Admission medications    Medication Sig Start Date End Date Taking? Authorizing Provider   buPROPion (WELLBUTRIN XL) 150 MG TB24 tablet Take 1 tablet (150 mg total) by mouth once daily. 7/28/22  Yes Marli Wilkinson MD   clonazePAM (KLONOPIN) 1 MG tablet TAKE 1& 1/2 TABLET BY MOUTH NIGHTLY AS NEEDED 7/28/22  Yes Marli Wilkinson MD   furosemide (LASIX) 20 MG tablet Take 1 tablet (20 mg total) by mouth every other day. 6/8/22 6/8/23 Yes David Pereira MD   levothyroxine (SYNTHROID) 137 MCG Tab tablet Take 1 tablet (137 mcg total) by mouth before breakfast. 7/28/22  Yes Marli Wilkinson MD   metoprolol succinate (TOPROL-XL) 25 MG 24 hr tablet Take 1 tablet (25 mg total) by mouth once daily. 8/10/22 8/10/23 Yes David Pereira MD   acetaminophen (TYLENOL) 500 MG tablet Take 500 mg by mouth every 6 (six) hours as needed for Pain.    Historical Provider   acyclovir (ZOVIRAX) 400 MG tablet Take 1 tablet (400 mg total) by mouth 2 (two) times daily.  Patient taking differently: Take 400 mg by mouth Daily. 12/22/21   Marli Wilkinson MD   ascorbic acid (VITAMIN C) 1000 MG tablet Take 1,000 mg by mouth once daily.     Historical Provider   biotin 1 mg tablet Take 1 mg by mouth 2 (two) times daily.     Historical Provider   calcium-vitamin D 500 mg(1,250mg) -200 unit per tablet Take 1 tablet by mouth 2  "(two) times daily with meals.     Historical Provider   cycloSPORINE (RESTASIS) 0.05 % ophthalmic emulsion Place 1 drop into both eyes 2 (two) times daily. 12/7/21 4/5/23  Tone Diaz, MARIO   digestive enzymes Tab Take 1 tablet by mouth once daily.     Historical Provider   diphenhydrAMINE (BENADRYL) 25 mg capsule Take 25 mg by mouth nightly as needed.     Historical Provider   ELIQUIS 5 mg Tab TAKE 1 TABLET(5 MG) BY MOUTH TWICE DAILY 10/24/22   David Pereira MD   fluticasone propionate (FLONASE) 50 mcg/actuation nasal spray 1 spray (50 mcg total) by Each Nostril route once daily.  Patient taking differently: 1 spray by Each Nostril route 2 (two) times daily as needed. 12/22/21   Marli Wilkinson MD   lubiprostone (AMITIZA) 24 MCG Cap Take 1 capsule (24 mcg total) by mouth daily as needed (IBS symptoms).  Patient not taking: Reported on 4/5/2023 1/3/23   Zina Rockwell MD   multivitamin (THERAGRAN) per tablet Take 1 tablet by mouth once daily.     Historical Provider   tretinoin (RETIN-A) 0.1 % cream Apply topically every evening. 1/3/23   Zina Rockwell MD   zinc 50 mg Tab Take 50 mg by mouth once daily.     Historical Provider       Physical Examination  /83 (BP Location: Left arm, Patient Position: Lying)   Pulse 74   Temp 97.7 °F (36.5 °C) (Temporal)   Resp 16   Ht 5' 4" (1.626 m)   Wt 45.4 kg (100 lb)   LMP  (LMP Unknown)   SpO2 98%   Breastfeeding No   BMI 17.16 kg/m²      Constitutional: well developed, no cough, no dyspnea, alert, and no acute distress    Head: Normocephalic, no lesions, without obvious abnormality  Eye: Normal external eye, conjunctiva, and lids, PERRL  Cardiovascular: regular rate and regular rhythm  Respiratory: normal air entry  Gastrointestinal: soft, non-tender, without masses or organomegaly  Neurologic: alert, oriented, normal speech, no focal findings or movement disorder noted  Psychiatric: appropriate, normal mood    Plan of Care    It was a pleasure " meeting MsDenver Magda today - we will plan to perform a colonoscopy with monitored anesthesia care. The details of the procedure, the possible need for biopsy or polypectomy and the potential risks including bleeding, perforation, missed polyps were discussed in detail and they consented to undergo the procedure.      Jeanmarie Hathaway MD, FACS - Staff Surgeon  Department of Colon & Rectal Surgery  [facility]

## 2023-04-26 NOTE — TRANSFER OF CARE
"Anesthesia Transfer of Care Note    Patient: Angelina Man    Procedure(s) Performed: Procedure(s) (LRB):  COLONOSCOPY (N/A)    Patient location: PACU    Anesthesia Type: general    Transport from OR: Transported from OR on room air with adequate spontaneous ventilation    Post pain: adequate analgesia    Post assessment: no apparent anesthetic complications and tolerated procedure well    Post vital signs: stable    Level of consciousness: awake    Nausea/Vomiting: no nausea/vomiting    Complications: none    Transfer of care protocol was followed      Last vitals:   Visit Vitals  /61 (BP Location: Left arm, Patient Position: Lying)   Pulse 75   Temp 36.5 °C (97.7 °F) (Temporal)   Resp 18   Ht 5' 4" (1.626 m)   Wt 45.4 kg (100 lb)   LMP  (LMP Unknown)   SpO2 99%   Breastfeeding No   BMI 17.16 kg/m²     "

## 2023-04-26 NOTE — ANESTHESIA PREPROCEDURE EVALUATION
04/26/2023  Angelina Man is a 73 y.o., female.  Past Medical History:   Diagnosis Date    Arthritis     Atrial fibrillation     Bronchitis     Cataract     Dry eyes     GERD (gastroesophageal reflux disease)     Glaucoma, suspect - Both Eyes     Hypothyroidism 06/23/2016    Pulmonary hypertension     Rash     Renal angiomyolipoma     Renal disorder     Spinal stenosis     Squamous cell carcinoma     in-situ right upper inner arm, right wrist    Status post lumbar surgery 06/23/2016    Stress fracture     Thyroid disease     Venous insufficiency      Past Surgical History:   Procedure Laterality Date    ANGIOPLASTY      CATARACT EXTRACTION W/  INTRAOCULAR LENS IMPLANT Left 12/6/2022    Procedure: EXTRACTION, CATARACT, WITH IOL INSERTION;  Surgeon: Tone Brown MD;  Location: [facility];  Service: Ophthalmology;  Laterality: Left;    CATARACT EXTRACTION W/  INTRAOCULAR LENS IMPLANT Right 12/20/2022    Procedure: EXTRACTION, CATARACT, WITH IOL INSERTION;  Surgeon: Tone Brown MD;  Location: [facility];  Service: Ophthalmology;  Laterality: Right;    CERVICAL FUSION      COLONOSCOPY      COLONOSCOPY N/A 11/14/2017    Procedure: COLONOSCOPY;  Surgeon: Kasi Mercado MD;  Location: [facility] (4TH FLR);  Service: Endoscopy;  Laterality: N/A;    ESOPHAGOGASTRODUODENOSCOPY N/A 10/10/2019    Procedure: EGD (ESOPHAGOGASTRODUODENOSCOPY);  Surgeon: Rg Milton MD;  Location: [facility] (4TH FLR);  Service: Endoscopy;  Laterality: N/A;    ESOPHAGOGASTRODUODENOSCOPY N/A 10/6/2021    Procedure: EGD (ESOPHAGOGASTRODUODENOSCOPY);  Surgeon: Rg Milton MD;  Location[facility] (4TH FLR);  Service: Endoscopy;  Laterality: N/A;  covid test 10/3 bernice pathak emailed/portal -ml    l4-5 mid discectomy Left 03/2017    l4-5 MIS diskectomy Right 05/2016    RIGHT HEART  CATHETERIZATION Right 1/27/2022    Procedure: INSERTION, CATHETER, RIGHT HEART;  Surgeon: Satnam Pickard Jr., MD;  Location: [facility] CATH LAB;  Service: Cardiology;  Laterality: Right;    TREATMENT OF CARDIAC ARRHYTHMIA N/A 7/17/2020    Procedure: CARDIOVERSION;  Surgeon: Kwan Bray MD;  Location: [facility] EP LAB;  Service: Cardiology;  Laterality: N/A;  AF,DCCV/SANGITA, ANES, SK, 746    TREATMENT OF CARDIAC ARRHYTHMIA N/A 7/14/2021    Procedure: CARDIOVERSION;  Surgeon: SYDNIE Cedillo MD;  Location: [facility] EP LAB;  Service: Cardiology;  Laterality: N/A;  AF, SANGITA, DCCV, MAC, EH, 3 Prep     Accession #: [id number]  Transthoracic echo (TTE) complete  Order# [id number]  Reading physicians: Rolan Leonard MD; Mandeep Bose MD Ordering physician: David Pereira MD Study date: 11/2/22     Reason for Exam  Priority: Routine  Dx: Heart failure with preserved ejection fraction, unspecified HF chronicity [I50.30 (ICD-10-CM)]; Pulmonary hypertension [I27.20 (ICD-10-CM)]     View Images Vital Vitrea     Show images for Echo Saline Bubble? No  Summary     The left ventricle is normal in size with normal systolic function. The estimated ejection fraction is 60%.   Normal right ventricular size with normal right ventricular systolic function.   Moderate left atrial enlargement.   Mild mitral regurgitation.   Mild tricuspid regurgitation.   Small anterior pericardial effusion.   The estimated PA systolic pressure is 27 mmHg.   Intermediate central venous pressure (8 mmHg).   Atrial fibrillation observed.           Pre-op Assessment    I have reviewed the Patient Summary Reports.     I have reviewed the Nursing Notes. I have reviewed the NPO Status.   I have reviewed the Medications.     Review of Systems  Anesthesia Hx:  No problems with previous Anesthesia  Neg history of prior surgery. Denies Family Hx of Anesthesia complications.   Denies Personal Hx of Anesthesia complications.   Social:  Non-Smoker, No  Alcohol Use    Cardiovascular:   Exercise tolerance: good    Renal/:   Renal cyst   Hepatic/GI:   GERD    Musculoskeletal:   Arthritis     Neurological:   Neuromuscular Disease,    Endocrine:   Hypothyroidism        Physical Exam  General: Well nourished, Alert and Oriented    Airway:  Mallampati: II / II  Mouth Opening: Normal  TM Distance: Normal  Tongue: Normal  Neck ROM: Normal ROM    Dental:  Intact    Chest/Lungs:  Clear to auscultation, Normal Respiratory Rate    Heart:  Rate: Normal  Rhythm: Regular Rhythm  Sounds: Normal    Abdomen:  Normal, Soft, Nontender        Anesthesia Plan  Type of Anesthesia, risks & benefits discussed:    Anesthesia Type: MAC  Intra-op Monitoring Plan: Standard ASA Monitors  Post Op Pain Control Plan:   (medical reason for not using multimodal pain management)  Induction:  IV  Informed Consent: Informed consent signed with the Patient and all parties understand the risks and agree with anesthesia plan.  All questions answered.   ASA Score: 2  Day of Surgery Review of History & Physical: H&P Update referred to the surgeon/provider.    Ready For Surgery From Anesthesia Perspective.     .

## 2023-04-26 NOTE — PROVATION PATIENT INSTRUCTIONS
Discharge Summary/Instructions after an Endoscopic Procedure  Patient Name: Angelina Man  Patient MRN: [id number]  Patient YOB: 1950 Wednesday, April 26, 2023  Jeanmarie Hathaway MD  Dear patient,  As a result of recent federal legislation (The Federal Cures Act), you may   receive lab or pathology results from your procedure in your [facility]   account before your physician is able to contact you. Your physician or   their representative will relay the results to you with their   recommendations at their soonest availability.  Thank you,  RESTRICTIONS:  During your procedure today, you received medications for sedation.  These   medications may affect your judgment, balance and coordination.  Therefore,   for 24 hours, you have the following restrictions:   - DO NOT drive a car, operate machinery, make legal/financial decisions,   sign important papers or drink alcohol.    ACTIVITY:  Today: no heavy lifting, straining or running due to procedural   sedation/anesthesia.  The following day: return to full activity including work.  DIET:  Eat and drink normally unless instructed otherwise.     TREATMENT FOR COMMON SIDE EFFECTS:  - Mild abdominal pain, nausea, belching, bloating or excessive gas:  rest,   eat lightly and use a heating pad.  - Sore Throat: treat with throat lozenges and/or gargle with warm salt   water.  - Because air was used during the procedure, expelling large amounts of air   from your rectum or belching is normal.  - If a bowel prep was taken, you may not have a bowel movement for 1-3 days.    This is normal.  SYMPTOMS TO WATCH FOR AND REPORT TO YOUR PHYSICIAN:  1. Abdominal pain or bloating, other than gas cramps.  2. Chest pain.  3. Back pain.  4. Signs of infection such as: chills or fever occurring within 24 hours   after the procedure.  5. Rectal bleeding, which would show as bright red, maroon, or black stools.   (A tablespoon of blood from the rectum is not serious, especially  if   hemorrhoids are present.)  6. Vomiting.  7. Weakness or dizziness.  GO DIRECTLY TO THE NEAREST EMERGENCY ROOM IF YOU HAVE ANY OF THE FOLLOWING:      Difficulty breathing              Chills and/or fever over 101 F   Persistent vomiting and/or vomiting blood   Severe abdominal pain   Severe chest pain   Black, tarry stools   Bleeding- more than one tablespoon   Any other symptom or condition that you feel may need urgent attention  Your doctor recommends these additional instructions:  If any biopsies were taken, your doctors clinic will contact you in 1 to 2   weeks with any results.  - Discharge patient to home.   - Resume previous diet.   - Continue present medications.   - Await pathology results.   - Repeat colonoscopy for surveillance based on pathology results.   - Return to referring physician as previously scheduled.  For questions, problems or results please call your physician - Jeanmarie Hathaway MD at Work:  [phone].  [facility] EMERGENCY ROOM PHONE NUMBER: [phone]  IF A COMPLICATION OR EMERGENCY SITUATION ARISES AND YOU ARE UNABLE TO REACH   YOUR PHYSICIAN - GO DIRECTLY TO THE EMERGENCY ROOM.  MD Jeanmarie Patricia MD  4/26/2023 9:18:29 AM  This report has been verified and signed electronically.  Dear patient,  As a result of recent federal legislation (The Federal Cures Act), you may   receive lab or pathology results from your procedure in your [facility]   account before your physician is able to contact you. Your physician or   their representative will relay the results to you with their   recommendations at their soonest availability.  Thank you,  PROVATION

## 2023-04-26 NOTE — ANESTHESIA POSTPROCEDURE EVALUATION
Anesthesia Post Evaluation    Patient: Angelina Man    Procedure(s) Performed: Procedure(s) (LRB):  COLONOSCOPY (N/A)    Final Anesthesia Type: general      Patient location during evaluation: GI PACU  Patient participation: Yes- Able to Participate  Level of consciousness: awake and alert  Post-procedure vital signs: reviewed and stable  Pain management: adequate  Airway patency: patent    PONV status at discharge: No PONV  Anesthetic complications: no      Cardiovascular status: blood pressure returned to baseline  Respiratory status: unassisted and spontaneous ventilation  Hydration status: euvolemic  Follow-up not needed.          Vitals Value Taken Time   /61 04/26/23 0955   Temp 36.5 °C (97.7 °F) 04/26/23 0926   Pulse 70 04/26/23 0955   Resp 18 04/26/23 0955   SpO2 97 % 04/26/23 0955         Event Time   Out of Recovery 09:59:59         Pain/Aliya Score: Aliya Score: 10 (4/26/2023  9:42 AM)         Normal rate, regular rhythm.  Heart sounds S1, S2.  No murmurs, rubs or gallops.

## 2023-05-01 ENCOUNTER — OFFICE VISIT (OUTPATIENT)
Dept: SURGERY | Facility: CLINIC | Age: 73
End: 2023-05-01
Payer: MEDICARE

## 2023-05-01 VITALS
SYSTOLIC BLOOD PRESSURE: 128 MMHG | OXYGEN SATURATION: 100 % | WEIGHT: 101.06 LBS | DIASTOLIC BLOOD PRESSURE: 72 MMHG | HEIGHT: 64 IN | BODY MASS INDEX: 17.25 KG/M2 | HEART RATE: 96 BPM

## 2023-05-01 DIAGNOSIS — C44.729 SQUAMOUS CELL CARCINOMA OF SKIN OF LEFT LOWER EXTREMITY: ICD-10-CM

## 2023-05-01 LAB
FINAL PATHOLOGIC DIAGNOSIS: NORMAL
GROSS: NORMAL
Lab: NORMAL

## 2023-05-01 PROCEDURE — 99204 PR OFFICE/OUTPT VISIT, NEW, LEVL IV, 45-59 MIN: ICD-10-PCS | Mod: HCNC,S$GLB,, | Performed by: STUDENT IN AN ORGANIZED HEALTH CARE EDUCATION/TRAINING PROGRAM

## 2023-05-01 PROCEDURE — 3074F PR MOST RECENT SYSTOLIC BLOOD PRESSURE < 130 MM HG: ICD-10-PCS | Mod: HCNC,CPTII,S$GLB, | Performed by: STUDENT IN AN ORGANIZED HEALTH CARE EDUCATION/TRAINING PROGRAM

## 2023-05-01 PROCEDURE — 1126F PR PAIN SEVERITY QUANTIFIED, NO PAIN PRESENT: ICD-10-PCS | Mod: HCNC,CPTII,S$GLB, | Performed by: STUDENT IN AN ORGANIZED HEALTH CARE EDUCATION/TRAINING PROGRAM

## 2023-05-01 PROCEDURE — 1159F MED LIST DOCD IN RCRD: CPT | Mod: HCNC,CPTII,S$GLB, | Performed by: STUDENT IN AN ORGANIZED HEALTH CARE EDUCATION/TRAINING PROGRAM

## 2023-05-01 PROCEDURE — 99204 OFFICE O/P NEW MOD 45 MIN: CPT | Mod: HCNC,S$GLB,, | Performed by: STUDENT IN AN ORGANIZED HEALTH CARE EDUCATION/TRAINING PROGRAM

## 2023-05-01 PROCEDURE — 1159F PR MEDICATION LIST DOCUMENTED IN MEDICAL RECORD: ICD-10-PCS | Mod: HCNC,CPTII,S$GLB, | Performed by: STUDENT IN AN ORGANIZED HEALTH CARE EDUCATION/TRAINING PROGRAM

## 2023-05-01 PROCEDURE — 1160F RVW MEDS BY RX/DR IN RCRD: CPT | Mod: HCNC,CPTII,S$GLB, | Performed by: STUDENT IN AN ORGANIZED HEALTH CARE EDUCATION/TRAINING PROGRAM

## 2023-05-01 PROCEDURE — 3288F FALL RISK ASSESSMENT DOCD: CPT | Mod: HCNC,CPTII,S$GLB, | Performed by: STUDENT IN AN ORGANIZED HEALTH CARE EDUCATION/TRAINING PROGRAM

## 2023-05-01 PROCEDURE — 1157F ADVNC CARE PLAN IN RCRD: CPT | Mod: HCNC,CPTII,S$GLB, | Performed by: STUDENT IN AN ORGANIZED HEALTH CARE EDUCATION/TRAINING PROGRAM

## 2023-05-01 PROCEDURE — 3074F SYST BP LT 130 MM HG: CPT | Mod: HCNC,CPTII,S$GLB, | Performed by: STUDENT IN AN ORGANIZED HEALTH CARE EDUCATION/TRAINING PROGRAM

## 2023-05-01 PROCEDURE — 1101F PR PT FALLS ASSESS DOC 0-1 FALLS W/OUT INJ PAST YR: ICD-10-PCS | Mod: HCNC,CPTII,S$GLB, | Performed by: STUDENT IN AN ORGANIZED HEALTH CARE EDUCATION/TRAINING PROGRAM

## 2023-05-01 PROCEDURE — 1101F PT FALLS ASSESS-DOCD LE1/YR: CPT | Mod: HCNC,CPTII,S$GLB, | Performed by: STUDENT IN AN ORGANIZED HEALTH CARE EDUCATION/TRAINING PROGRAM

## 2023-05-01 PROCEDURE — 3078F DIAST BP <80 MM HG: CPT | Mod: HCNC,CPTII,S$GLB, | Performed by: STUDENT IN AN ORGANIZED HEALTH CARE EDUCATION/TRAINING PROGRAM

## 2023-05-01 PROCEDURE — 99999 PR PBB SHADOW E&M-EST. PATIENT-LVL IV: ICD-10-PCS | Mod: PBBFAC,HCNC,, | Performed by: STUDENT IN AN ORGANIZED HEALTH CARE EDUCATION/TRAINING PROGRAM

## 2023-05-01 PROCEDURE — 3078F PR MOST RECENT DIASTOLIC BLOOD PRESSURE < 80 MM HG: ICD-10-PCS | Mod: HCNC,CPTII,S$GLB, | Performed by: STUDENT IN AN ORGANIZED HEALTH CARE EDUCATION/TRAINING PROGRAM

## 2023-05-01 PROCEDURE — 1160F PR REVIEW ALL MEDS BY PRESCRIBER/CLIN PHARMACIST DOCUMENTED: ICD-10-PCS | Mod: HCNC,CPTII,S$GLB, | Performed by: STUDENT IN AN ORGANIZED HEALTH CARE EDUCATION/TRAINING PROGRAM

## 2023-05-01 PROCEDURE — 1126F AMNT PAIN NOTED NONE PRSNT: CPT | Mod: HCNC,CPTII,S$GLB, | Performed by: STUDENT IN AN ORGANIZED HEALTH CARE EDUCATION/TRAINING PROGRAM

## 2023-05-01 PROCEDURE — 99999 PR PBB SHADOW E&M-EST. PATIENT-LVL IV: CPT | Mod: PBBFAC,HCNC,, | Performed by: STUDENT IN AN ORGANIZED HEALTH CARE EDUCATION/TRAINING PROGRAM

## 2023-05-01 PROCEDURE — 3288F PR FALLS RISK ASSESSMENT DOCUMENTED: ICD-10-PCS | Mod: HCNC,CPTII,S$GLB, | Performed by: STUDENT IN AN ORGANIZED HEALTH CARE EDUCATION/TRAINING PROGRAM

## 2023-05-01 PROCEDURE — 1157F PR ADVANCE CARE PLAN OR EQUIV PRESENT IN MEDICAL RECORD: ICD-10-PCS | Mod: HCNC,CPTII,S$GLB, | Performed by: STUDENT IN AN ORGANIZED HEALTH CARE EDUCATION/TRAINING PROGRAM

## 2023-05-02 DIAGNOSIS — C44.729 SCC (SQUAMOUS CELL CARCINOMA), LEG, LEFT: Primary | ICD-10-CM

## 2023-05-03 ENCOUNTER — PATIENT MESSAGE (OUTPATIENT)
Dept: SURGERY | Facility: CLINIC | Age: 73
End: 2023-05-03
Payer: MEDICARE

## 2023-05-03 NOTE — H&P (VIEW-ONLY)
Surgical Oncology Clinic Note  [facility]       Referring Provider: Dr. Fozia Garcia   PCP: Zina Rockwell MD    Reason For Visit: SCC left lower extremity    History of Present Illness    Angelina Man is a 73 y.o. 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 She has noted the lesion a few months ago and underwent biopsy by Dr. Garcia on 4/10/23 demonstrating the below diagnosis. Otherwise she feels well and states the biopsy site is healing. She denies other lesions or swollen lymph nodes.    Pathology:   Final Pathologic Diagnosis Skin, left mid lateral shin, shave biopsy:   -SQUAMOUS CELL CARCINOMA, KERATOACANTHOMA TYPE, EXTENDING TO THE BASE OF THE SPECIMEN     This lesion is skin cancer. You will be contacted regarding treatment.    Comment: Interp By Gita Maya M.D., Signed on 04/18/2023 at 18:00       Current Outpatient Medications:     acetaminophen (TYLENOL) 500 MG tablet, Take 500 mg by mouth every 6 (six) hours as needed for Pain., Disp: , Rfl:     acyclovir (ZOVIRAX) 400 MG tablet, Take 1 tablet (400 mg total) by mouth 2 (two) times daily. (Patient taking differently: Take 400 mg by mouth Daily.), Disp: 60 tablet, Rfl: 12    ascorbic acid (VITAMIN C) 1000 MG tablet, Take 1,000 mg by mouth once daily. , Disp: , Rfl:     biotin 1 mg tablet, Take 1 mg by mouth 2 (two) times daily. , Disp: , Rfl:     buPROPion (WELLBUTRIN XL) 150 MG TB24 tablet, Take 1 tablet (150 mg total) by mouth once daily., Disp: 90 tablet, Rfl: 2    calcium-vitamin D 500 mg(1,250mg) -200 unit per tablet, Take 1 tablet by mouth 2 (two) times daily with meals. , Disp: , Rfl:     clonazePAM (KLONOPIN) 1 MG tablet, TAKE 1& 1/2 TABLET BY MOUTH NIGHTLY AS NEEDED, Disp: 45 tablet, Rfl: 5    digestive enzymes Tab, Take 1 tablet by mouth once daily. , Disp: , Rfl:     diphenhydrAMINE (BENADRYL) 25 mg capsule, Take 25 mg by mouth  nightly as needed. , Disp: , Rfl:     ELIQUIS 5 mg Tab, TAKE 1 TABLET(5 MG) BY MOUTH TWICE DAILY, Disp: 180 tablet, Rfl: 3    fluticasone propionate (FLONASE) 50 mcg/actuation nasal spray, 1 spray (50 mcg total) by Each Nostril route once daily. (Patient taking differently: 1 spray by Each Nostril route 2 (two) times daily as needed.), Disp: 16 g, Rfl: 12    furosemide (LASIX) 20 MG tablet, Take 1 tablet (20 mg total) by mouth every other day., Disp: 15 tablet, Rfl: 11    levothyroxine (SYNTHROID) 137 MCG Tab tablet, Take 1 tablet (137 mcg total) by mouth before breakfast., Disp: 30 tablet, Rfl: 11    metoprolol succinate (TOPROL-XL) 25 MG 24 hr tablet, Take 1 tablet (25 mg total) by mouth once daily., Disp: 30 tablet, Rfl: 11    multivitamin (THERAGRAN) per tablet, Take 1 tablet by mouth once daily. , Disp: , Rfl:     tretinoin (RETIN-A) 0.1 % cream, Apply topically every evening., Disp: 20 g, Rfl: 6    zinc 50 mg Tab, Take 50 mg by mouth once daily. , Disp: , Rfl:     cycloSPORINE (RESTASIS) 0.05 % ophthalmic emulsion, Place 1 drop into both eyes 2 (two) times daily., Disp: 60 each, Rfl: 11    lubiprostone (AMITIZA) 24 MCG Cap, Take 1 capsule (24 mcg total) by mouth daily as needed (IBS symptoms). (Patient not taking: Reported on 4/5/2023), Disp: 30 capsule, Rfl: 6    Review of patient's allergies indicates:  No Known Allergies    Past Medical History:   Diagnosis Date    Arthritis     Atrial fibrillation     Bronchitis     Cataract     Dry eyes     GERD (gastroesophageal reflux disease)     Glaucoma, suspect - Both Eyes     Hypothyroidism 06/23/2016    Pulmonary hypertension     Rash     Renal angiomyolipoma     Renal disorder     Spinal stenosis     Squamous cell carcinoma     in-situ right upper inner arm, right wrist    Status post lumbar surgery 06/23/2016    Stress fracture     Thyroid disease     Venous insufficiency        Past Surgical History:   Procedure Laterality Date    ANGIOPLASTY      CATARACT  EXTRACTION W/  INTRAOCULAR LENS IMPLANT Left 12/6/2022    Procedure: EXTRACTION, CATARACT, WITH IOL INSERTION;  Surgeon: Tone Brown MD;  Location: [facility];  Service: Ophthalmology;  Laterality: Left;    CATARACT EXTRACTION W/  INTRAOCULAR LENS IMPLANT Right 12/20/2022    Procedure: EXTRACTION, CATARACT, WITH IOL INSERTION;  Surgeon: Tone Brown MD;  Location: [facility] ([facility];  Service: Ophthalmology;  Laterality: Right;    CERVICAL FUSION      COLONOSCOPY      COLONOSCOPY N/A 11/14/2017    Procedure: COLONOSCOPY;  Surgeon: Kasi Mercado MD;  Location: [facility] ENDO (4TH FLR);  Service: Endoscopy;  Laterality: N/A;    COLONOSCOPY N/A 4/26/2023    Procedure: COLONOSCOPY;  Surgeon: Jeanmarie Hathaway MD;  Location: [facility] ENDO (4TH FLR);  Service: Colon and Rectal;  Laterality: N/A;  ok to hold Eliquis 2 days per Dr Pereira  constipation-ext Miralax prep  instr mailed/portal-GT  4/21/23 pre call attempted, no answer    ESOPHAGOGASTRODUODENOSCOPY N/A 10/10/2019    Procedure: EGD (ESOPHAGOGASTRODUODENOSCOPY);  Surgeon: Rg Milton MD;  Location: [facility] ENDO (4TH FLR);  Service: Endoscopy;  Laterality: N/A;    ESOPHAGOGASTRODUODENOSCOPY N/A 10/6/2021    Procedure: EGD (ESOPHAGOGASTRODUODENOSCOPY);  Surgeon: Rg Milton MD;  Location: [facility] ENDO (4TH FLR);  Service: Endoscopy;  Laterality: N/A;  covid test 10/3 [place], instr emailed/portal -ml    l4-5 mid discectomy Left 03/2017    l4-5 MIS diskectomy Right 05/2016    RIGHT HEART CATHETERIZATION Right 1/27/2022    Procedure: INSERTION, CATHETER, RIGHT HEART;  Surgeon: Satnam Pickard Jr., MD;  Location: [facility] CATH LAB;  Service: Cardiology;  Laterality: Right;    TREATMENT OF CARDIAC ARRHYTHMIA N/A 7/17/2020    Procedure: CARDIOVERSION;  Surgeon: Kwan Bray MD;  Location: [facility] EP LAB;  Service: Cardiology;  Laterality: N/A;  AF,DCCV/SANGITA, ANES, SK, 746    TREATMENT OF CARDIAC ARRHYTHMIA N/A 7/14/2021    Procedure: CARDIOVERSION;  Surgeon: SYDNIE Castellanos  MD Nguyen;  Location: [facility] EP LAB;  Service: Cardiology;  Laterality: N/A;  AF, SANGITA, DCCV, MAC, EH, 3 Prep       Family History   Problem Relation Age of Onset    Cancer Mother         Lung cancer    Heart failure Father     Colon cancer Father     Hypertension Father     Cataracts Father     Cancer Father 80        colon    No Known Problems Sister     No Known Problems Brother     Melanoma Daughter     Diabetes Son     Heart disease Son     Cancer Son         esophageal cancer    No Known Problems Maternal Aunt     No Known Problems Maternal Uncle     No Known Problems Paternal Aunt     No Known Problems Paternal Uncle     No Known Problems Maternal Grandmother     No Known Problems Maternal Grandfather     No Known Problems Paternal Grandmother     No Known Problems Paternal Grandfather     Amblyopia Neg Hx     Blindness Neg Hx     Glaucoma Neg Hx     Macular degeneration Neg Hx     Retinal detachment Neg Hx     Strabismus Neg Hx     Stroke Neg Hx     Thyroid disease Neg Hx     Breast cancer Neg Hx     Ovarian cancer Neg Hx        Social History     Socioeconomic History    Marital status: Single   Occupational History     Employer: [employer]   Tobacco Use    Smoking status: Never    Smokeless tobacco: Never   Substance and Sexual Activity    Alcohol use: Yes     Alcohol/week: 4.0 standard drinks     Types: 4 Glasses of wine per week     Comment: 2 glasses of wine on weekends    Drug use: No    Sexual activity: Never   Other Topics Concern    Are you pregnant or think you may be? No    Breast-feeding No     Social Determinants of Health     Financial Resource Strain: Low Risk     Difficulty of Paying Living Expenses: Not very hard   Food Insecurity: No Food Insecurity    Worried About Running Out of Food in the Last Year: Never true    Ran Out of Food in the Last Year: Never true   Transportation Needs: No Transportation Needs    Lack of Transportation (Medical): No    Lack of Transportation  (Non-Medical): No   Physical Activity: Sufficiently Active    Days of Exercise per Week: 7 days    Minutes of Exercise per Session: 60 min   Stress: No Stress Concern Present    Feeling of Stress : Only a little   Social Connections: Unknown    Frequency of Communication with Friends and Family: Once a week    Frequency of Social Gatherings with Friends and Family: Patient refused    Attends Hindu Services: Never    Active Member of Clubs or Organizations: No    Attends Club or Organization Meetings: Never    Marital Status:    Housing Stability: Low Risk     Unable to Pay for Housing in the Last Year: No    Number of Places Lived in the Last Year: 1    Unstable Housing in the Last Year: No       Review of Systems   Constitutional:  Negative for chills, fever and weight loss.   HENT:  Negative for congestion and sore throat.    Eyes:  Negative for blurred vision.   Respiratory:  Negative for cough and shortness of breath.    Cardiovascular:  Negative for chest pain and leg swelling.   Gastrointestinal:  Negative for abdominal pain, constipation, diarrhea, heartburn, nausea and vomiting.   Genitourinary:  Negative for frequency and urgency.   Musculoskeletal:  Negative for back pain and joint pain.   Skin:  Negative for itching and rash.        Left shin lesion   Neurological:  Negative for weakness and headaches.   Psychiatric/Behavioral:  The patient is not nervous/anxious.        Vitals:    05/01/23 1449   BP: 128/72   Pulse: 96     Body mass index is 17.35 kg/m².  ECOG SCORE           Physical Exam  Vitals reviewed.   Constitutional:       General: She is not in acute distress.     Appearance: Normal appearance.   HENT:      Head: Normocephalic and atraumatic.      Mouth/Throat:      Mouth: Mucous membranes are moist.      Pharynx: Oropharynx is clear.   Eyes:      General: No scleral icterus.     Extraocular Movements: Extraocular movements intact.      Conjunctiva/sclera: Conjunctivae normal.    Cardiovascular:      Rate and Rhythm: Normal rate.   Pulmonary:      Effort: Pulmonary effort is normal. No respiratory distress.   Abdominal:      General: Abdomen is flat. There is no distension.   Musculoskeletal:         General: No swelling or tenderness. Normal range of motion.      Cervical back: Normal range of motion and neck supple.   Skin:     General: Skin is warm and dry.      Coloration: Skin is not jaundiced.      Comments: Left pretibial biopsy site, mild surrounding erythema. No residual lesion appreciated. No lymphandenopathy   Neurological:      General: No focal deficit present.      Mental Status: She is alert and oriented to person, place, and time.   Psychiatric:         Mood and Affect: Mood normal.         Behavior: Behavior normal.        Lab Results   Component Value Date    WBC 4.35 01/09/2023    HGB 12.9 01/09/2023    HCT 40.1 01/09/2023     01/09/2023    CHOL 201 (H) 01/09/2023    TRIG 81 01/09/2023    HDL 71 01/09/2023    LDLCALC 113.8 01/09/2023    ALT 16 01/09/2023    AST 22 01/09/2023     01/09/2023    K 3.4 (L) 01/09/2023     01/09/2023    CREATININE 0.8 01/09/2023    BUN 12 01/09/2023    CO2 29 01/09/2023    TSH 1.117 01/09/2023    INR 1.1 07/07/2021    HGBA1C 5.4 08/24/2015         ASSESSMENT & PLAN:  1. Squamous cell carcinoma of skin of left lower extremity       Angelina Man is a 73 y.o. 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  I discussed the pathology report in detail with the patient who asked many good questions, all of which were answered to her satisfaction.  My recommendation was for wide excision with 0.5 cm margins in order to clear any residual disease.  Based on my exam, I believe the skin will come together nicely without need for skin graft or flap.  Informed consent was obtained during today's visit   All risks, benefits, and alternatives were discussed with the patient in detail during the visit. Risks include,  but are not limited to, bleeding, infection, pain scar, damage to surrounding structures, need for further operation, wound infection or dehiscence requiring prolonged wound care, inability to clear cancer or need for re-excision to clear margins, recurrence, blood clot or cardiovascular complications.        Follow-up: Follow up in about 2 weeks (around 5/15/2023) for Surgery.                  Kasi Canela Jr., MD              Surgical Oncology              [facility] [place], [place]              Office: [phone]              Fax: [phone]    Communications: 45 minutes were spent on today's visit in face-to-face and non face-to-face time with the patient. This patient was recently diagnosed with SCC of the left lower extremity and the time was required to provide counseling and guidance regarding their new diagnosis. Time was spent reviewing all outside records and information pertaining to their work-up and formulating a treatment plan in line with standardized guidelines. Additional time was spent communicating with referring physicians and facilities to facilitate the efficient exchange of previous healthcare records and radiographic imaging pertinent to the diagnosis and disease management.

## 2023-05-03 NOTE — PROGRESS NOTES
Surgical Oncology Clinic Note  [facility]       Referring Provider: Dr. Fozia Garcia   PCP: Zina Rockwell MD    Reason For Visit: SCC left lower extremity    History of Present Illness    Angelina Man is a 73 y.o. 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 She has noted the lesion a few months ago and underwent biopsy by Dr. Garcia on 4/10/23 demonstrating the below diagnosis. Otherwise she feels well and states the biopsy site is healing. She denies other lesions or swollen lymph nodes.    Pathology:   Final Pathologic Diagnosis Skin, left mid lateral shin, shave biopsy:   -SQUAMOUS CELL CARCINOMA, KERATOACANTHOMA TYPE, EXTENDING TO THE BASE OF THE SPECIMEN     This lesion is skin cancer. You will be contacted regarding treatment.    Comment: Interp By Gita Maya M.D., Signed on 04/18/2023 at 18:00       Current Outpatient Medications:     acetaminophen (TYLENOL) 500 MG tablet, Take 500 mg by mouth every 6 (six) hours as needed for Pain., Disp: , Rfl:     acyclovir (ZOVIRAX) 400 MG tablet, Take 1 tablet (400 mg total) by mouth 2 (two) times daily. (Patient taking differently: Take 400 mg by mouth Daily.), Disp: 60 tablet, Rfl: 12    ascorbic acid (VITAMIN C) 1000 MG tablet, Take 1,000 mg by mouth once daily. , Disp: , Rfl:     biotin 1 mg tablet, Take 1 mg by mouth 2 (two) times daily. , Disp: , Rfl:     buPROPion (WELLBUTRIN XL) 150 MG TB24 tablet, Take 1 tablet (150 mg total) by mouth once daily., Disp: 90 tablet, Rfl: 2    calcium-vitamin D 500 mg(1,250mg) -200 unit per tablet, Take 1 tablet by mouth 2 (two) times daily with meals. , Disp: , Rfl:     clonazePAM (KLONOPIN) 1 MG tablet, TAKE 1& 1/2 TABLET BY MOUTH NIGHTLY AS NEEDED, Disp: 45 tablet, Rfl: 5    digestive enzymes Tab, Take 1 tablet by mouth once daily. , Disp: , Rfl:     diphenhydrAMINE (BENADRYL) 25 mg capsule, Take 25 mg by mouth  nightly as needed. , Disp: , Rfl:     ELIQUIS 5 mg Tab, TAKE 1 TABLET(5 MG) BY MOUTH TWICE DAILY, Disp: 180 tablet, Rfl: 3    fluticasone propionate (FLONASE) 50 mcg/actuation nasal spray, 1 spray (50 mcg total) by Each Nostril route once daily. (Patient taking differently: 1 spray by Each Nostril route 2 (two) times daily as needed.), Disp: 16 g, Rfl: 12    furosemide (LASIX) 20 MG tablet, Take 1 tablet (20 mg total) by mouth every other day., Disp: 15 tablet, Rfl: 11    levothyroxine (SYNTHROID) 137 MCG Tab tablet, Take 1 tablet (137 mcg total) by mouth before breakfast., Disp: 30 tablet, Rfl: 11    metoprolol succinate (TOPROL-XL) 25 MG 24 hr tablet, Take 1 tablet (25 mg total) by mouth once daily., Disp: 30 tablet, Rfl: 11    multivitamin (THERAGRAN) per tablet, Take 1 tablet by mouth once daily. , Disp: , Rfl:     tretinoin (RETIN-A) 0.1 % cream, Apply topically every evening., Disp: 20 g, Rfl: 6    zinc 50 mg Tab, Take 50 mg by mouth once daily. , Disp: , Rfl:     cycloSPORINE (RESTASIS) 0.05 % ophthalmic emulsion, Place 1 drop into both eyes 2 (two) times daily., Disp: 60 each, Rfl: 11    lubiprostone (AMITIZA) 24 MCG Cap, Take 1 capsule (24 mcg total) by mouth daily as needed (IBS symptoms). (Patient not taking: Reported on 4/5/2023), Disp: 30 capsule, Rfl: 6    Review of patient's allergies indicates:  No Known Allergies    Past Medical History:   Diagnosis Date    Arthritis     Atrial fibrillation     Bronchitis     Cataract     Dry eyes     GERD (gastroesophageal reflux disease)     Glaucoma, suspect - Both Eyes     Hypothyroidism 06/23/2016    Pulmonary hypertension     Rash     Renal angiomyolipoma     Renal disorder     Spinal stenosis     Squamous cell carcinoma     in-situ right upper inner arm, right wrist    Status post lumbar surgery 06/23/2016    Stress fracture     Thyroid disease     Venous insufficiency        Past Surgical History:   Procedure Laterality Date    ANGIOPLASTY      CATARACT  EXTRACTION W/  INTRAOCULAR LENS IMPLANT Left 12/6/2022    Procedure: EXTRACTION, CATARACT, WITH IOL INSERTION;  Surgeon: Tone Brown MD;  Location: [facility] OR;  Service: Ophthalmology;  Laterality: Left;    CATARACT EXTRACTION W/  INTRAOCULAR LENS IMPLANT Right 12/20/2022    Procedure: EXTRACTION, CATARACT, WITH IOL INSERTION;  Surgeon: Tone Brown MD;  Location: [facility] OR;  Service: Ophthalmology;  Laterality: Right;    CERVICAL FUSION      COLONOSCOPY      COLONOSCOPY N/A 11/14/2017    Procedure: COLONOSCOPY;  Surgeon: Kasi Mercado MD;  Location: [facility] ENDO (4TH FLR);  Service: Endoscopy;  Laterality: N/A;    COLONOSCOPY N/A 4/26/2023    Procedure: COLONOSCOPY;  Surgeon: Jeanmarie Hathaway MD;  Location: [facility] ENDO (4TH FLR);  Service: Colon and Rectal;  Laterality: N/A;  ok to hold Eliquis 2 days per Dr Pereira  constipation-ext Miralax prep  instr mailed/portal-GT  4/21/23 pre call attempted, no answer    ESOPHAGOGASTRODUODENOSCOPY N/A 10/10/2019    Procedure: EGD (ESOPHAGOGASTRODUODENOSCOPY);  Surgeon: Rg Milton MD;  Location: [facility] ENDO (4TH FLR);  Service: Endoscopy;  Laterality: N/A;    ESOPHAGOGASTRODUODENOSCOPY N/A 10/6/2021    Procedure: EGD (ESOPHAGOGASTRODUODENOSCOPY);  Surgeon: Rg Milton MD;  Location: [facility] ENDO (4TH FLR);  Service: Endoscopy;  Laterality: N/A;  covid test 10/3 [place], instr emailed/portal -ml    l4-5 mid discectomy Left 03/2017    l4-5 MIS diskectomy Right 05/2016    RIGHT HEART CATHETERIZATION Right 1/27/2022    Procedure: INSERTION, CATHETER, RIGHT HEART;  Surgeon: Satnam Pickard Jr., MD;  Location: [facility] CATH LAB;  Service: Cardiology;  Laterality: Right;    TREATMENT OF CARDIAC ARRHYTHMIA N/A 7/17/2020    Procedure: CARDIOVERSION;  Surgeon: Kwan Bray MD;  Location: [facility] EP LAB;  Service: Cardiology;  Laterality: N/A;  AF,DCCV/SANGITA, ANES, SK, 746    TREATMENT OF CARDIAC ARRHYTHMIA N/A 7/14/2021    Procedure: CARDIOVERSION;  Surgeon: SYDNIE Castellanos  MD Nguyen;  Location: [facility] EP LAB;  Service: Cardiology;  Laterality: N/A;  AF, SANGITA, DCCV, MAC, EH, 3 Prep       Family History   Problem Relation Age of Onset    Cancer Mother         Lung cancer    Heart failure Father     Colon cancer Father     Hypertension Father     Cataracts Father     Cancer Father 80        colon    No Known Problems Sister     No Known Problems Brother     Melanoma Daughter     Diabetes Son     Heart disease Son     Cancer Son         esophageal cancer    No Known Problems Maternal Aunt     No Known Problems Maternal Uncle     No Known Problems Paternal Aunt     No Known Problems Paternal Uncle     No Known Problems Maternal Grandmother     No Known Problems Maternal Grandfather     No Known Problems Paternal Grandmother     No Known Problems Paternal Grandfather     Amblyopia Neg Hx     Blindness Neg Hx     Glaucoma Neg Hx     Macular degeneration Neg Hx     Retinal detachment Neg Hx     Strabismus Neg Hx     Stroke Neg Hx     Thyroid disease Neg Hx     Breast cancer Neg Hx     Ovarian cancer Neg Hx        Social History     Socioeconomic History    Marital status: Single   Occupational History     Employer: [employer]   Tobacco Use    Smoking status: Never    Smokeless tobacco: Never   Substance and Sexual Activity    Alcohol use: Yes     Alcohol/week: 4.0 standard drinks     Types: 4 Glasses of wine per week     Comment: 2 glasses of wine on weekends    Drug use: No    Sexual activity: Never   Other Topics Concern    Are you pregnant or think you may be? No    Breast-feeding No     Social Determinants of Health     Financial Resource Strain: Low Risk     Difficulty of Paying Living Expenses: Not very hard   Food Insecurity: No Food Insecurity    Worried About Running Out of Food in the Last Year: Never true    Ran Out of Food in the Last Year: Never true   Transportation Needs: No Transportation Needs    Lack of Transportation (Medical): No    Lack of Transportation  (Non-Medical): No   Physical Activity: Sufficiently Active    Days of Exercise per Week: 7 days    Minutes of Exercise per Session: 60 min   Stress: No Stress Concern Present    Feeling of Stress : Only a little   Social Connections: Unknown    Frequency of Communication with Friends and Family: Once a week    Frequency of Social Gatherings with Friends and Family: Patient refused    Attends Islam Services: Never    Active Member of Clubs or Organizations: No    Attends Club or Organization Meetings: Never    Marital Status:    Housing Stability: Low Risk     Unable to Pay for Housing in the Last Year: No    Number of Places Lived in the Last Year: 1    Unstable Housing in the Last Year: No       Review of Systems   Constitutional:  Negative for chills, fever and weight loss.   HENT:  Negative for congestion and sore throat.    Eyes:  Negative for blurred vision.   Respiratory:  Negative for cough and shortness of breath.    Cardiovascular:  Negative for chest pain and leg swelling.   Gastrointestinal:  Negative for abdominal pain, constipation, diarrhea, heartburn, nausea and vomiting.   Genitourinary:  Negative for frequency and urgency.   Musculoskeletal:  Negative for back pain and joint pain.   Skin:  Negative for itching and rash.        Left shin lesion   Neurological:  Negative for weakness and headaches.   Psychiatric/Behavioral:  The patient is not nervous/anxious.        Vitals:    05/01/23 1449   BP: 128/72   Pulse: 96     Body mass index is 17.35 kg/m².  ECOG SCORE           Physical Exam  Vitals reviewed.   Constitutional:       General: She is not in acute distress.     Appearance: Normal appearance.   HENT:      Head: Normocephalic and atraumatic.      Mouth/Throat:      Mouth: Mucous membranes are moist.      Pharynx: Oropharynx is clear.   Eyes:      General: No scleral icterus.     Extraocular Movements: Extraocular movements intact.      Conjunctiva/sclera: Conjunctivae normal.    Cardiovascular:      Rate and Rhythm: Normal rate.   Pulmonary:      Effort: Pulmonary effort is normal. No respiratory distress.   Abdominal:      General: Abdomen is flat. There is no distension.   Musculoskeletal:         General: No swelling or tenderness. Normal range of motion.      Cervical back: Normal range of motion and neck supple.   Skin:     General: Skin is warm and dry.      Coloration: Skin is not jaundiced.      Comments: Left pretibial biopsy site, mild surrounding erythema. No residual lesion appreciated. No lymphandenopathy   Neurological:      General: No focal deficit present.      Mental Status: She is alert and oriented to person, place, and time.   Psychiatric:         Mood and Affect: Mood normal.         Behavior: Behavior normal.        Lab Results   Component Value Date    WBC 4.35 01/09/2023    HGB 12.9 01/09/2023    HCT 40.1 01/09/2023     01/09/2023    CHOL 201 (H) 01/09/2023    TRIG 81 01/09/2023    HDL 71 01/09/2023    LDLCALC 113.8 01/09/2023    ALT 16 01/09/2023    AST 22 01/09/2023     01/09/2023    K 3.4 (L) 01/09/2023     01/09/2023    CREATININE 0.8 01/09/2023    BUN 12 01/09/2023    CO2 29 01/09/2023    TSH 1.117 01/09/2023    INR 1.1 07/07/2021    HGBA1C 5.4 08/24/2015         ASSESSMENT & PLAN:  1. Squamous cell carcinoma of skin of left lower extremity       Angelina Man is a 73 y.o. 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  I discussed the pathology report in detail with the patient who asked many good questions, all of which were answered to her satisfaction.  My recommendation was for wide excision with 0.5 cm margins in order to clear any residual disease.  Based on my exam, I believe the skin will come together nicely without need for skin graft or flap.  Informed consent was obtained during today's visit   All risks, benefits, and alternatives were discussed with the patient in detail during the visit. Risks include,  but are not limited to, bleeding, infection, pain scar, damage to surrounding structures, need for further operation, wound infection or dehiscence requiring prolonged wound care, inability to clear cancer or need for re-excision to clear margins, recurrence, blood clot or cardiovascular complications.        Follow-up: Follow up in about 2 weeks (around 5/15/2023) for Surgery.                  Kasi Canela Jr., MD              Surgical Oncology              [facility] [place], [place]              Office: [phone]              Fax: [phone]    Communications: 45 minutes were spent on today's visit in face-to-face and non face-to-face time with the patient. This patient was recently diagnosed with SCC of the left lower extremity and the time was required to provide counseling and guidance regarding their new diagnosis. Time was spent reviewing all outside records and information pertaining to their work-up and formulating a treatment plan in line with standardized guidelines. Additional time was spent communicating with referring physicians and facilities to facilitate the efficient exchange of previous healthcare records and radiographic imaging pertinent to the diagnosis and disease management.

## 2023-05-16 DIAGNOSIS — C44.729 SQUAMOUS CELL CARCINOMA OF SKIN OF LEFT LOWER EXTREMITY: ICD-10-CM

## 2023-05-16 DIAGNOSIS — C44.729 SCC (SQUAMOUS CELL CARCINOMA), LEG, LEFT: Primary | ICD-10-CM

## 2023-05-16 RX ORDER — CEFAZOLIN SODIUM 2 G/50ML
2 SOLUTION INTRAVENOUS
Status: CANCELLED | OUTPATIENT
Start: 2023-05-16

## 2023-05-16 NOTE — PRE-PROCEDURE INSTRUCTIONS
PreOp Instructions given:   - Verbal medication information (what to hold and what to take)   - NPO guidelines   - Arrival place directions given; time to be given the day before procedure by the   Surgeon's Office 0600 [address]C  - Bathing with antibacterial soap   - Don't wear any jewelry or bring any valuables AM of surgery   - No makeup or moisturizer to face   - No perfume/cologne, powder, lotions or aftershave   Pt. verbalized understanding.   Pt denies any h/o Anesthesia/Sedation complications or side effects.

## 2023-05-17 ENCOUNTER — ANESTHESIA EVENT (OUTPATIENT)
Dept: SURGERY | Facility: HOSPITAL | Age: 73
End: 2023-05-17
Payer: MEDICARE

## 2023-05-17 ENCOUNTER — ANESTHESIA (OUTPATIENT)
Dept: SURGERY | Facility: HOSPITAL | Age: 73
End: 2023-05-17
Payer: MEDICARE

## 2023-05-17 ENCOUNTER — HOSPITAL ENCOUNTER (OUTPATIENT)
Facility: HOSPITAL | Age: 73
Discharge: HOME | End: 2023-05-17
Attending: STUDENT IN AN ORGANIZED HEALTH CARE EDUCATION/TRAINING PROGRAM | Admitting: STUDENT IN AN ORGANIZED HEALTH CARE EDUCATION/TRAINING PROGRAM
Payer: MEDICARE

## 2023-05-17 ENCOUNTER — TELEPHONE (OUTPATIENT)
Dept: SURGERY | Facility: CLINIC | Age: 73
End: 2023-05-17
Payer: MEDICARE

## 2023-05-17 VITALS
DIASTOLIC BLOOD PRESSURE: 67 MMHG | OXYGEN SATURATION: 99 % | BODY MASS INDEX: 17.24 KG/M2 | HEART RATE: 62 BPM | HEIGHT: 64 IN | WEIGHT: 101 LBS | TEMPERATURE: 98 F | RESPIRATION RATE: 18 BRPM | SYSTOLIC BLOOD PRESSURE: 142 MMHG

## 2023-05-17 DIAGNOSIS — C44.729 SCC (SQUAMOUS CELL CARCINOMA), LEG, LEFT: ICD-10-CM

## 2023-05-17 DIAGNOSIS — C44.729 SQUAMOUS CELL CARCINOMA OF SKIN OF LEFT LOWER EXTREMITY: Primary | ICD-10-CM

## 2023-05-17 PROCEDURE — 13121 PR RECMPL WND SCALP,EXTR 2.6-7.5 CM: ICD-10-PCS | Mod: ,,, | Performed by: STUDENT IN AN ORGANIZED HEALTH CARE EDUCATION/TRAINING PROGRAM

## 2023-05-17 PROCEDURE — 11604 PR EXC SKIN MALIG 3.1-4 CM TRUNK,ARM,LEG: ICD-10-PCS | Mod: 51,,, | Performed by: STUDENT IN AN ORGANIZED HEALTH CARE EDUCATION/TRAINING PROGRAM

## 2023-05-17 PROCEDURE — 71000044 HC DOSC ROUTINE RECOVERY FIRST HOUR: Performed by: STUDENT IN AN ORGANIZED HEALTH CARE EDUCATION/TRAINING PROGRAM

## 2023-05-17 PROCEDURE — 25000003 PHARM REV CODE 250: Performed by: NURSE ANESTHETIST, CERTIFIED REGISTERED

## 2023-05-17 PROCEDURE — D9220A PRA ANESTHESIA: Mod: ANES,,, | Performed by: ANESTHESIOLOGY

## 2023-05-17 PROCEDURE — 37000009 HC ANESTHESIA EA ADD 15 MINS: Performed by: STUDENT IN AN ORGANIZED HEALTH CARE EDUCATION/TRAINING PROGRAM

## 2023-05-17 PROCEDURE — D9220A PRA ANESTHESIA: ICD-10-PCS | Mod: CRNA,,, | Performed by: NURSE ANESTHETIST, CERTIFIED REGISTERED

## 2023-05-17 PROCEDURE — 36000704 HC OR TIME LEV I 1ST 15 MIN: Performed by: STUDENT IN AN ORGANIZED HEALTH CARE EDUCATION/TRAINING PROGRAM

## 2023-05-17 PROCEDURE — 27201423 OPTIME MED/SURG SUP & DEVICES STERILE SUPPLY: Performed by: STUDENT IN AN ORGANIZED HEALTH CARE EDUCATION/TRAINING PROGRAM

## 2023-05-17 PROCEDURE — 63600175 PHARM REV CODE 636 W HCPCS

## 2023-05-17 PROCEDURE — 25000003 PHARM REV CODE 250

## 2023-05-17 PROCEDURE — 88305 TISSUE EXAM BY PATHOLOGIST: ICD-10-PCS | Mod: 26,,, | Performed by: DERMATOLOGY

## 2023-05-17 PROCEDURE — 88305 TISSUE EXAM BY PATHOLOGIST: CPT | Mod: 26,,, | Performed by: DERMATOLOGY

## 2023-05-17 PROCEDURE — 12002 RPR S/N/AX/GEN/TRNK2.6-7.5CM: CPT | Mod: 51,59,, | Performed by: STUDENT IN AN ORGANIZED HEALTH CARE EDUCATION/TRAINING PROGRAM

## 2023-05-17 PROCEDURE — 88305 TISSUE EXAM BY PATHOLOGIST: CPT | Performed by: DERMATOLOGY

## 2023-05-17 PROCEDURE — 36000705 HC OR TIME LEV I EA ADD 15 MIN: Performed by: STUDENT IN AN ORGANIZED HEALTH CARE EDUCATION/TRAINING PROGRAM

## 2023-05-17 PROCEDURE — D9220A PRA ANESTHESIA: Mod: CRNA,,, | Performed by: NURSE ANESTHETIST, CERTIFIED REGISTERED

## 2023-05-17 PROCEDURE — 37000008 HC ANESTHESIA 1ST 15 MINUTES: Performed by: STUDENT IN AN ORGANIZED HEALTH CARE EDUCATION/TRAINING PROGRAM

## 2023-05-17 PROCEDURE — 71000015 HC POSTOP RECOV 1ST HR: Performed by: STUDENT IN AN ORGANIZED HEALTH CARE EDUCATION/TRAINING PROGRAM

## 2023-05-17 PROCEDURE — 11604 EXC TR-EXT MAL+MARG 3.1-4 CM: CPT | Mod: 51,,, | Performed by: STUDENT IN AN ORGANIZED HEALTH CARE EDUCATION/TRAINING PROGRAM

## 2023-05-17 PROCEDURE — 25000003 PHARM REV CODE 250: Performed by: STUDENT IN AN ORGANIZED HEALTH CARE EDUCATION/TRAINING PROGRAM

## 2023-05-17 PROCEDURE — 13121 CMPLX RPR S/A/L 2.6-7.5 CM: CPT | Mod: ,,, | Performed by: STUDENT IN AN ORGANIZED HEALTH CARE EDUCATION/TRAINING PROGRAM

## 2023-05-17 PROCEDURE — 63600175 PHARM REV CODE 636 W HCPCS: Performed by: NURSE ANESTHETIST, CERTIFIED REGISTERED

## 2023-05-17 PROCEDURE — 12002 PR RESUP NPTERF WND BODY 2.6-7.5 CM: ICD-10-PCS | Mod: 51,59,, | Performed by: STUDENT IN AN ORGANIZED HEALTH CARE EDUCATION/TRAINING PROGRAM

## 2023-05-17 PROCEDURE — D9220A PRA ANESTHESIA: ICD-10-PCS | Mod: ANES,,, | Performed by: ANESTHESIOLOGY

## 2023-05-17 RX ORDER — DEXAMETHASONE SODIUM PHOSPHATE 4 MG/ML
INJECTION, SOLUTION INTRA-ARTICULAR; INTRALESIONAL; INTRAMUSCULAR; INTRAVENOUS; SOFT TISSUE
Status: DISCONTINUED | OUTPATIENT
Start: 2023-05-17 | End: 2023-05-17

## 2023-05-17 RX ORDER — ONDANSETRON 2 MG/ML
INJECTION INTRAMUSCULAR; INTRAVENOUS
Status: DISCONTINUED | OUTPATIENT
Start: 2023-05-17 | End: 2023-05-17

## 2023-05-17 RX ORDER — OXYCODONE HYDROCHLORIDE 5 MG/1
5 TABLET ORAL EVERY 6 HOURS PRN
Qty: 5 TABLET | Refills: 0 | Status: SHIPPED | OUTPATIENT
Start: 2023-05-17 | End: 2023-05-17 | Stop reason: HOSPADM

## 2023-05-17 RX ORDER — LIDOCAINE HYDROCHLORIDE 20 MG/ML
INJECTION INTRAVENOUS
Status: DISCONTINUED | OUTPATIENT
Start: 2023-05-17 | End: 2023-05-17

## 2023-05-17 RX ORDER — OXYCODONE HYDROCHLORIDE 5 MG/1
5 TABLET ORAL EVERY 4 HOURS PRN
Qty: 5 TABLET | Refills: 0 | Status: SHIPPED | OUTPATIENT
Start: 2023-05-17 | End: 2023-06-19

## 2023-05-17 RX ORDER — LIDOCAINE HYDROCHLORIDE 10 MG/ML
INJECTION INFILTRATION; PERINEURAL
Status: DISCONTINUED | OUTPATIENT
Start: 2023-05-17 | End: 2023-05-17 | Stop reason: HOSPADM

## 2023-05-17 RX ORDER — PROPOFOL 10 MG/ML
VIAL (ML) INTRAVENOUS CONTINUOUS PRN
Status: DISCONTINUED | OUTPATIENT
Start: 2023-05-17 | End: 2023-05-17

## 2023-05-17 RX ADMIN — LIDOCAINE HYDROCHLORIDE 60 MG: 20 INJECTION INTRAVENOUS at 08:05

## 2023-05-17 RX ADMIN — PROPOFOL 40 MG: 10 INJECTION, EMULSION INTRAVENOUS at 08:05

## 2023-05-17 RX ADMIN — ONDANSETRON 4 MG: 2 INJECTION INTRAMUSCULAR; INTRAVENOUS at 08:05

## 2023-05-17 RX ADMIN — SODIUM CHLORIDE: 0.9 INJECTION, SOLUTION INTRAVENOUS at 07:05

## 2023-05-17 RX ADMIN — CEFAZOLIN 2 G: 2 INJECTION, POWDER, FOR SOLUTION INTRAMUSCULAR; INTRAVENOUS at 08:05

## 2023-05-17 RX ADMIN — DEXAMETHASONE SODIUM PHOSPHATE 4 MG: 4 INJECTION, SOLUTION INTRAMUSCULAR; INTRAVENOUS at 08:05

## 2023-05-17 RX ADMIN — PROPOFOL 75 MCG/KG/MIN: 10 INJECTION, EMULSION INTRAVENOUS at 08:05

## 2023-05-17 NOTE — BRIEF OP NOTE
[facility] - Surgery (2nd Fl)  Discharge Note    OUTCOME: Patient tolerated treatment/procedure well without complication and is now ready for discharge.    DISPOSITION: Home or Self Care    FINAL DIAGNOSIS:  <principal problem not specified>    FOLLOWUP: In clinic    DISCHARGE INSTRUCTIONS:    Discharge Procedure Orders   Diet Adult Regular     Lifting restrictions   Order Comments: No lifting more than 15lbs for 4 weeks     Notify your health care provider if you experience any of the following:  temperature >100.4     Notify your health care provider if you experience any of the following:  persistent nausea and vomiting or diarrhea     Notify your health care provider if you experience any of the following:  severe uncontrolled pain     Notify your health care provider if you experience any of the following:  redness, tenderness, or signs of infection (pain, swelling, redness, odor or green/yellow discharge around incision site)     Notify your health care provider if you experience any of the following:  difficulty breathing or increased cough

## 2023-05-17 NOTE — ANESTHESIA POSTPROCEDURE EVALUATION
Anesthesia Post Evaluation    Patient: Angelina Man    Procedure(s) Performed: Procedure(s) (LRB):  EXCISION SQUAMOUS CELL CARCINOMA LEFT LEG (Left)  WASHOUT right lower arm and closure (Right)    Final Anesthesia Type: general      Patient location during evaluation: PACU  Patient participation: Yes- Able to Participate  Level of consciousness: awake and alert  Post-procedure vital signs: reviewed and stable  Pain management: adequate  Airway patency: patent    PONV status at discharge: No PONV  Anesthetic complications: no      Cardiovascular status: blood pressure returned to baseline  Respiratory status: room air  Hydration status: euvolemic  Follow-up not needed.          Vitals Value Taken Time   /65 05/17/23 1016   Temp 36.7 °C (98 °F) 05/17/23 0932   Pulse 61 05/17/23 1025   Resp 20 05/17/23 1025   SpO2 95 % 05/17/23 1025   Vitals shown include unvalidated device data.      No case tracking events are documented in the log.      Pain/Aliya Score: Aliya Score: 9 (5/17/2023  9:14 AM)

## 2023-05-17 NOTE — TRANSFER OF CARE
"Anesthesia Transfer of Care Note    Patient: Angelina Man    Procedure(s) Performed: Procedure(s) (LRB):  EXCISION SQUAMOUS CELL CARCINOMA LEFT LEG (Left)  WASHOUT right lower arm and closure (Right)    Patient location: PACU    Anesthesia Type: general    Transport from OR: Transported from OR on room air with adequate spontaneous ventilation    Post pain: adequate analgesia    Post assessment: no apparent anesthetic complications and tolerated procedure well    Post vital signs: stable    Level of consciousness: awake, alert and oriented    Nausea/Vomiting: no nausea/vomiting    Complications: none    Transfer of care protocol was followed      Last vitals:   Visit Vitals  /78 (BP Location: Left arm, Patient Position: Lying)   Pulse 77   Temp 36.6 °C (97.9 °F) (Temporal)   Resp 16   Ht 5' 4" (1.626 m)   Wt 45.8 kg (101 lb)   LMP  (LMP Unknown)   SpO2 100%   Breastfeeding No   BMI 17.34 kg/m²     "

## 2023-05-17 NOTE — INTERVAL H&P NOTE
The patient has been examined and the H&P has been reviewed:    I concur with the findings and changes have been noted since the H&P was written: She fell in the shower this morning and has a full thickness laceration on her left upper extremity. In adddition to the wide excision of her left lower extremity skin cancer, I will washout the right upper extremity laceration and perform a primary closure. The consent has been updated to reflect this change.    Surgery risks, benefits and alternative options discussed and understood by patient/family.      Proceed with wide excision of left lower extremity SCC and washout/closure of right upper extremity laceration           Kasi Canela Jr, MD      Surgical Oncology      5/17/2023, 7:10 AM

## 2023-05-17 NOTE — ANESTHESIA PREPROCEDURE EVALUATION
05/17/2023  Angelina Man is a 73 y.o., female.      Pre-op Assessment    I have reviewed the Patient Summary Reports.     I have reviewed the Nursing Notes. I have reviewed the NPO Status.   I have reviewed the Medications.     Review of Systems  Anesthesia Hx:  No problems with previous Anesthesia  History of prior surgery of interest to airway management or planning: Denies Family Hx of Anesthesia complications.   Denies Personal Hx of Anesthesia complications.   Social:  Non-Smoker, No Alcohol Use    Hematology/Oncology:  Hematology Normal   Oncology Normal     EENT/Dental:EENT/Dental Normal   Cardiovascular:   Exercise tolerance: good Dysrhythmias atrial fibrillation NYHA Classification I    Pulmonary:  Pulmonary Normal    Renal/:   Chronic Renal Disease    Hepatic/GI:   GERD    Musculoskeletal:   Arthritis     Neurological:   Neuromuscular Disease,    Endocrine:   Hypothyroidism    Dermatological:  Skin Normal    Psych:  Psychiatric Normal           Physical Exam  General: Well nourished, Cooperative, Alert and Oriented    Airway:  Mallampati: I   Mouth Opening: Normal  TM Distance: Normal  Tongue: Normal  Neck ROM: Normal ROM    Dental:  Intact    Chest/Lungs:  Normal Respiratory Rate    Heart:  Rate: Normal        Anesthesia Plan  Type of Anesthesia, risks & benefits discussed:    Anesthesia Type: MAC, Gen Natural Airway  Intra-op Monitoring Plan: Standard ASA Monitors  Induction:  IV  Informed Consent: Informed consent signed with the Patient and all parties understand the risks and agree with anesthesia plan.  All questions answered.   ASA Score: 3    Ready For Surgery From Anesthesia Perspective.     .

## 2023-05-17 NOTE — OP NOTE
[facility]  Surgical Oncology  Operative Note       Date of Procedure: 5/17/2023   Time: 0800    Procedure: Procedure(s) (LRB):  EXCISION SQUAMOUS CELL CARCINOMA LEFT LEG (Left)  WASHOUT right lower arm and closure (Right)     Surgeon(s) and Role:     * Kasi Canlea Jr., MD - Primary    Assisting Surgeon: None    Pre-Operative Diagnosis: SCC (squamous cell carcinoma), leg, left [C44.729]  SCC of left lower extremity  Laceration of right upper extremity    Post-Operative Diagnosis:   Same    Pre-Operative Variables:  Stage: Local  Adjacent organ involvement: None  Chemotherapy within 90 Days: No  Radiation Therapy within 90 Days: No    Anesthesia: Local MAC    Procedure:  Wide excision of left lower extremity squamous cell carcinoma  Complex wound closure of left lower extremity excision site  Right upper extremity wound washout and primary closure    Operative Findings:   Dimensions of excised malignant lesion with oncologic margins: 4 x 1.5 cm  Wound Closure: Complex  Dimensions of wound prior to closure: 4 x 2 cm  [place] Lymph Node Biopsy: Not performed not indicated  Resection status: Pending final pathology.  No gross disease remaining post resection.  Right upper arm laceration irrigated and closed primarily         Indications:  Angelina Man is a 73 y.o. year old female with recent diagnosis of left lower extremity squamous cell carcinoma.  She presents today for excision of this lesion.  In the preoperative area, she reported a laceration to her right upper extremity while in the shower this morning.  Upon examination, the laceration was full-thickness to the subcutaneous fat and cleaned.  Following the wide excision of the skin cancer, I will irrigate this wound and perform a primary closure.     Risks and benefits were reviewed including bleeding, infection, pain, scar, damage to surrounding structures, cardiovascular and pulmonary complications, recurrence of cancer or inability to  remove entire cancer, wound infection, wound dehiscence, need for additional procedures, death, and imponderables.  She understands and gave informed consent to proceed.    Procedure in Detail:  After informed consent was obtained, the patient was properly identified and the site was marked in the preoperative holding area. The patient was then taken to the operating room and placed in the supine position. After the uneventful induction of general anesthesia, the patient's left lower extremity and right upper extremity were prepped and draped in the standard surgical fashion. A timeout was performed according to the [facility] guidelines.    Using a ruler, a 0.5 cm margin was measured from the edge of the previous biopsy scar on the left lower extremity in all directions and this was marked with a marking pen. Next, an ellipse was designed in a longitudinal fashion to include the denoted resection dimensions. Excised diameter of the primary lesion including appropriate margins measured 4 x 1.5 cm.  An incision was made to include the entire excised diameter outlined above including the ellipse to facilitate closure. After skin incision was made, Bovie electrocautery was utilized to dissect down to fascia. Once the fascia was identified, the specimen was lifted off of the deep fascia, ensuring to include all skin, dermis and subcutaneous fat. Fascia was preserved in its entirety. Once the specimen was freed, it was oriented using a silk suture with short stitch denoting the superior portion of the specimen and a long stitch denoting the lateral portion of the specimen. This was sent for final pathology. At this time, the left lower extremity surgical site was inspected and hemostasis confirmed.    Attention was then turned to closure of the primary wound which measured 4 x 2 cm. Extensive undermining was required to provide a tension free closure. The wound was closed in 3 separate layers using a  combination of Vicryl interrupted sutures for deep subcutaneous and dermal layers and Nylon suture for skin.    Next the right upper extremity wound was examined.  The wound measured 5 cm in length, appeared clean with no active bleeding.  The laceration was full-thickness and included the skin subcutaneous fat down to fascia.  The wound was irrigated copiously.  A primary closure was performed using interrupted nylon sutures.  The skin came together easily without tension.    4x4s and an island dressing were applied for sterile dressings at both surgical sites. All lap, needle, and sponge counts were correct x2. The patient tolerated the procedure well. There were no complications.       Portions of the record were created with [employer] voice recognition software. This may lead to occasional typographical errors due to the inherent limitations of the software. Read the chart carefully and recognize, using context, where substitutions have occurred. Please do not hesitate to contact me directly if clarification is needed.          Implants: * No implants in log *    Drains: None    Estimated Blood Loss (EBL):   20 mL    Specimens:   Specimen (24h ago, onward)       Start     Ordered    05/17/23 0905  Specimen to Pathology, Surgery General Surgery  Once        Comments: Pre-op Diagnosis: SCC (squamous cell carcinoma), leg, left [C44.729]Procedure(s):EXCISION SQUAMOUS CELL CARCINOMA LEFT LEGWASHOUT right lower arm and closure Number of specimens: 1Name of specimens: 1. Left lower extremity squamous cell; permanent short superior and long lateral     References:    Click here for ordering Quick Tip   Question Answer Comment   Procedure Type: General Surgery    Specimen Class: Routine/Screening    Which provider would you like to cc? SUSANNE ZAMUDIO JR    Release to patient Immediate        05/17/23 0933                            Condition: Stable    Disposition: PACU - hemodynamically  stable.    Attestation: I was present and scrubbed for the entire procedure including all described portions above.           Kasi Canela Jr, MD      Surgical Oncology      5/17/2023, 9:55 AM

## 2023-05-17 NOTE — TELEPHONE ENCOUNTER
----- Message from Victoria Mike sent at 5/17/2023 12:00 PM CDT -----  Regarding: appt access  Contact: [phone]  Pt calling in regards to scheduling f/u, appt due to surgery. Pls call

## 2023-05-17 NOTE — TELEPHONE ENCOUNTER
Returned call to pt and informed pt her post op appt is nacho for 5/29/23 @ 9:30 am. Pt acknowledged appt date & time.

## 2023-05-22 ENCOUNTER — OFFICE VISIT (OUTPATIENT)
Dept: CARDIOLOGY | Facility: CLINIC | Age: 73
End: 2023-05-22
Payer: MEDICARE

## 2023-05-22 VITALS
WEIGHT: 103.81 LBS | BODY MASS INDEX: 17.72 KG/M2 | SYSTOLIC BLOOD PRESSURE: 123 MMHG | HEART RATE: 82 BPM | DIASTOLIC BLOOD PRESSURE: 80 MMHG | HEIGHT: 64 IN

## 2023-05-22 DIAGNOSIS — Z98.1 S/P LUMBAR FUSION: ICD-10-CM

## 2023-05-22 DIAGNOSIS — I87.2 VENOUS INSUFFICIENCY OF BOTH LOWER EXTREMITIES: ICD-10-CM

## 2023-05-22 DIAGNOSIS — K21.9 GASTROESOPHAGEAL REFLUX DISEASE, UNSPECIFIED WHETHER ESOPHAGITIS PRESENT: ICD-10-CM

## 2023-05-22 DIAGNOSIS — I50.32 CHRONIC HEART FAILURE WITH PRESERVED EJECTION FRACTION: ICD-10-CM

## 2023-05-22 DIAGNOSIS — E03.9 HYPOTHYROIDISM, UNSPECIFIED TYPE: ICD-10-CM

## 2023-05-22 DIAGNOSIS — I48.11 LONGSTANDING PERSISTENT ATRIAL FIBRILLATION: Primary | ICD-10-CM

## 2023-05-22 PROCEDURE — 1101F PT FALLS ASSESS-DOCD LE1/YR: CPT | Mod: CPTII,S$GLB,, | Performed by: INTERNAL MEDICINE

## 2023-05-22 PROCEDURE — 1126F AMNT PAIN NOTED NONE PRSNT: CPT | Mod: CPTII,S$GLB,, | Performed by: INTERNAL MEDICINE

## 2023-05-22 PROCEDURE — 3079F DIAST BP 80-89 MM HG: CPT | Mod: CPTII,S$GLB,, | Performed by: INTERNAL MEDICINE

## 2023-05-22 PROCEDURE — 3288F PR FALLS RISK ASSESSMENT DOCUMENTED: ICD-10-PCS | Mod: CPTII,S$GLB,, | Performed by: INTERNAL MEDICINE

## 2023-05-22 PROCEDURE — 1126F PR PAIN SEVERITY QUANTIFIED, NO PAIN PRESENT: ICD-10-PCS | Mod: CPTII,S$GLB,, | Performed by: INTERNAL MEDICINE

## 2023-05-22 PROCEDURE — 99999 PR PBB SHADOW E&M-EST. PATIENT-LVL V: CPT | Mod: PBBFAC,,, | Performed by: INTERNAL MEDICINE

## 2023-05-22 PROCEDURE — 93000 ELECTROCARDIOGRAM COMPLETE: CPT | Mod: ,,, | Performed by: INTERNAL MEDICINE

## 2023-05-22 PROCEDURE — 99214 PR OFFICE/OUTPT VISIT, EST, LEVL IV, 30-39 MIN: ICD-10-PCS | Mod: S$GLB,,, | Performed by: INTERNAL MEDICINE

## 2023-05-22 PROCEDURE — 3074F PR MOST RECENT SYSTOLIC BLOOD PRESSURE < 130 MM HG: ICD-10-PCS | Mod: CPTII,S$GLB,, | Performed by: INTERNAL MEDICINE

## 2023-05-22 PROCEDURE — 99214 OFFICE O/P EST MOD 30 MIN: CPT | Mod: S$GLB,,, | Performed by: INTERNAL MEDICINE

## 2023-05-22 PROCEDURE — 1157F ADVNC CARE PLAN IN RCRD: CPT | Mod: CPTII,S$GLB,, | Performed by: INTERNAL MEDICINE

## 2023-05-22 PROCEDURE — 1157F PR ADVANCE CARE PLAN OR EQUIV PRESENT IN MEDICAL RECORD: ICD-10-PCS | Mod: CPTII,S$GLB,, | Performed by: INTERNAL MEDICINE

## 2023-05-22 PROCEDURE — 93000 EKG 12-LEAD: ICD-10-PCS | Mod: ,,, | Performed by: INTERNAL MEDICINE

## 2023-05-22 PROCEDURE — 3074F SYST BP LT 130 MM HG: CPT | Mod: CPTII,S$GLB,, | Performed by: INTERNAL MEDICINE

## 2023-05-22 PROCEDURE — 1101F PR PT FALLS ASSESS DOC 0-1 FALLS W/OUT INJ PAST YR: ICD-10-PCS | Mod: CPTII,S$GLB,, | Performed by: INTERNAL MEDICINE

## 2023-05-22 PROCEDURE — 99999 PR PBB SHADOW E&M-EST. PATIENT-LVL V: ICD-10-PCS | Mod: PBBFAC,,, | Performed by: INTERNAL MEDICINE

## 2023-05-22 PROCEDURE — 3288F FALL RISK ASSESSMENT DOCD: CPT | Mod: CPTII,S$GLB,, | Performed by: INTERNAL MEDICINE

## 2023-05-22 PROCEDURE — 3079F PR MOST RECENT DIASTOLIC BLOOD PRESSURE 80-89 MM HG: ICD-10-PCS | Mod: CPTII,S$GLB,, | Performed by: INTERNAL MEDICINE

## 2023-05-22 NOTE — PROGRESS NOTES
Chart has been dictated using voice recognition software.  It is not been reviewed carefully for any transcriptional errors due to this technology.   Subjective:   Patient ID:  Angelina Man is a 73 y.o. female who presents for follow-up of Hypertension      HPI: Patient with BLE venous insufficiency s/p bilateral EVLT (left 11-Nov-2021; right 06-Jan-2022), hypothyroidism, anxiety, arthritis, persistent atrial fibrillation s/p DCCV 17-Jul-2020 but with recurrent chronic AF, and HFpEF.      Patient has dyspnea on exertion with mild exercise.  Also gets Sob in car and ask rest.  Starting to feel more tired in the afternoon. Also notes increase swelling in her ankles.  Has chronically used 2 pillows.  No PND. Patient denies any chest discomfort on exertion or at rest.  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 Wears compression stockings and does not see edema.      Past Medical History:   Diagnosis Date    Arthritis     Atrial fibrillation     Bronchitis     Cataract     Dry eyes     GERD (gastroesophageal reflux disease)     Glaucoma, suspect - Both Eyes     Hypothyroidism 06/23/2016    Pulmonary hypertension     Rash     Renal angiomyolipoma     Renal disorder     Spinal stenosis     Squamous cell carcinoma     in-situ right upper inner arm, right wrist    Status post lumbar surgery 06/23/2016    Stress fracture     Thyroid disease     Venous insufficiency        Outpatient Medications Prior to Visit   Medication Sig Dispense Refill    acetaminophen (TYLENOL) 500 MG tablet Take 500 mg by mouth every 6 (six) hours as needed for Pain.      acyclovir (ZOVIRAX) 400 MG tablet Take 1 tablet (400 mg total) by mouth 2 (two) times daily. (Patient taking differently: Take 400 mg by mouth Daily.) 60 tablet 12    ascorbic acid (VITAMIN C) 1000 MG tablet Take 1,000 mg by mouth once daily.       biotin 1 mg tablet Take 1 mg by mouth 2 (two) times daily.        buPROPion (WELLBUTRIN XL) 150 MG TB24 tablet Take 1 tablet (150 mg total) by mouth once daily. 90 tablet 2    calcium-vitamin D 500 mg(1,250mg) -200 unit per tablet Take 1 tablet by mouth 2 (two) times daily with meals.       clonazePAM (KLONOPIN) 1 MG tablet TAKE 1& 1/2 TABLET BY MOUTH NIGHTLY AS NEEDED 45 tablet 5    digestive enzymes Tab Take 1 tablet by mouth once daily.       diphenhydrAMINE (BENADRYL) 25 mg capsule Take 25 mg by mouth nightly as needed.       ELIQUIS 5 mg Tab TAKE 1 TABLET(5 MG) BY MOUTH TWICE DAILY 180 tablet 3    fluticasone propionate (FLONASE) 50 mcg/actuation nasal spray 1 spray (50 mcg total) by Each Nostril route once daily. (Patient taking differently: 1 spray by Each Nostril route 2 (two) times daily as needed.) 16 g 12    furosemide (LASIX) 20 MG tablet Take 1 tablet (20 mg total) by mouth every other day. 15 tablet 11    levothyroxine (SYNTHROID) 137 MCG Tab tablet Take 1 tablet (137 mcg total) by mouth before breakfast. 30 tablet 11    lubiprostone (AMITIZA) 24 MCG Cap Take 1 capsule (24 mcg total) by mouth daily as needed (IBS symptoms). 30 capsule 6    metoprolol succinate (TOPROL-XL) 25 MG 24 hr tablet Take 1 tablet (25 mg total) by mouth once daily. 30 tablet 11    multivitamin (THERAGRAN) per tablet Take 1 tablet by mouth once daily.       oxyCODONE (ROXICODONE) 5 MG immediate release tablet Take 1 tablet (5 mg total) by mouth every 4 (four) hours as needed for Pain. 5 tablet 0    tretinoin (RETIN-A) 0.1 % cream Apply topically every evening. 20 g 6    zinc 50 mg Tab Take 50 mg by mouth once daily.       cycloSPORINE (RESTASIS) 0.05 % ophthalmic emulsion Place 1 drop into both eyes 2 (two) times daily. 60 each 11     No facility-administered medications prior to visit.       Review of Systems   Constitutional: Negative for weight gain and weight loss.   Respiratory:  Negative for hemoptysis.    Hematologic/Lymphatic: Negative for bleeding problem. Does not bruise/bleed easily.  "  Gastrointestinal:  Negative for hematemesis and hematochezia.   Genitourinary:  Negative for hematuria.   Neurological:  Positive for numbness (feet bilaterally) and weakness. Negative for focal weakness.    Objective:   Physical Exam  Constitutional:       Appearance: She is well-developed and underweight.      Comments: /80 (BP Location: Right arm, Patient Position: Sitting)   Pulse 82   Ht 5' 4" (1.626 m)   Wt 47.1 kg (103 lb 13.4 oz)   LMP  (LMP Unknown)   BMI 17.82 kg/m²      Neck:      Vascular: No carotid bruit or JVD.   Cardiovascular:      Rate and Rhythm: Normal rate. Rhythm irregularly irregular.      Pulses: Intact distal pulses.      Heart sounds: Normal heart sounds. No murmur heard.    No friction rub. No gallop.   Pulmonary:      Effort: Pulmonary effort is normal.      Breath sounds: Normal breath sounds. No wheezing or rales.   Abdominal:      General: Bowel sounds are normal. There is no abdominal bruit.      Palpations: Abdomen is soft. There is no hepatomegaly.      Tenderness: There is no abdominal tenderness.   Musculoskeletal:      Cervical back: Neck supple.      Right lower leg: Edema (trace ankle) present.      Left lower leg: Edema (trace ankle) present.      Comments: Multiple varicosities   Skin:     Nails: There is no clubbing.   Neurological:      Mental Status: She is alert and oriented to person, place, and time.         Lab Results   Component Value Date     01/09/2023    K 3.4 (L) 01/09/2023    BUN 12 01/09/2023    CREATININE 0.8 01/09/2023    GLU 85 01/09/2023    HGBA1C 5.4 08/24/2015     (H) 12/28/2021    CHOL 201 (H) 01/09/2023    HDL 71 01/09/2023    LDLCALC 113.8 01/09/2023    TRIG 81 01/09/2023    CHOLHDL 35.3 01/09/2023    HGB 12.9 01/09/2023    HCT 40.1 01/09/2023     01/09/2023    INR 1.1 07/07/2021     ECG (today) showed coarse atrial fibrillation with a controlled ventricular rate of 80 beats per minute.  There was low limb lead voltage " in an otherwise normal electrocardiogram.  Assessment:     1. Longstanding persistent atrial fibrillation    2. Chronic heart failure with preserved ejection fraction    3. Hypothyroidism, unspecified type    4. Venous insufficiency of both lower extremities    5. Gastroesophageal reflux disease, unspecified whether esophagitis present    6. S/P lumbar fusion      Patient has no symptoms of cardiac ischemia, heart failure, or significant arrhythmias.  Her atrial fibrillation rate is controlled although she remains in chronic atrial fibrillation.  At this time, her peripheral edema is negligible.  and she has no symptoms of heart failure at this time.  We will continue her current medications at this time. Unless there are intervening problems, patient should return for re-evaluation in 6 months.     Plan:     Angelina was seen today for hypertension.    Diagnoses and all orders for this visit:    Longstanding persistent atrial fibrillation    Chronic heart failure with preserved ejection fraction    Hypothyroidism, unspecified type    Venous insufficiency of both lower extremities    Gastroesophageal reflux disease, unspecified whether esophagitis present    S/P lumbar fusion          David Pereira MD  Consultative Cardiology

## 2023-05-23 ENCOUNTER — OFFICE VISIT (OUTPATIENT)
Dept: PODIATRY | Facility: CLINIC | Age: 73
End: 2023-05-23
Payer: MEDICARE

## 2023-05-23 VITALS
HEART RATE: 89 BPM | DIASTOLIC BLOOD PRESSURE: 65 MMHG | BODY MASS INDEX: 17.72 KG/M2 | HEIGHT: 64 IN | WEIGHT: 103.81 LBS | SYSTOLIC BLOOD PRESSURE: 110 MMHG

## 2023-05-23 DIAGNOSIS — L84 CORN OR CALLUS: Primary | ICD-10-CM

## 2023-05-23 PROCEDURE — 99999 PR PBB SHADOW E&M-EST. PATIENT-LVL IV: ICD-10-PCS | Mod: PBBFAC,,, | Performed by: PODIATRIST

## 2023-05-23 PROCEDURE — 99999 PR PBB SHADOW E&M-EST. PATIENT-LVL IV: CPT | Mod: PBBFAC,,, | Performed by: PODIATRIST

## 2023-05-23 PROCEDURE — 99499 NO LOS: ICD-10-PCS | Mod: ,,, | Performed by: PODIATRIST

## 2023-05-23 PROCEDURE — 99499 UNLISTED E&M SERVICE: CPT | Mod: ,,, | Performed by: PODIATRIST

## 2023-05-23 PROCEDURE — 17999 UNLISTD PX SKN MUC MEMB SUBQ: CPT | Mod: CSM,S$GLB,, | Performed by: PODIATRIST

## 2023-05-23 PROCEDURE — 17999 PR NON-COVERED FOOT CARE: ICD-10-PCS | Mod: CSM,S$GLB,, | Performed by: PODIATRIST

## 2023-05-23 NOTE — PROGRESS NOTES
Patient presents to the clinic for non-covered routine foot care. Patient is not a high risk foot care patient. Patient understands this is not typically a covered service and patient is aware of responsibility of payment. Pedal pulses are palpable. No know risk factors requiring routine foot care.  Hyperkeratotic lesions noted to toes of both feet. Diagnosis is corn/callus. Calluses/corns were reduced and trimmed bilaterally. Patient tolerated well.    RTC 1-2 months for Proc B, sooner PRN

## 2023-05-29 ENCOUNTER — OFFICE VISIT (OUTPATIENT)
Dept: SURGERY | Facility: CLINIC | Age: 73
End: 2023-05-29
Payer: MEDICARE

## 2023-05-29 VITALS
BODY MASS INDEX: 17.35 KG/M2 | WEIGHT: 101.63 LBS | DIASTOLIC BLOOD PRESSURE: 51 MMHG | HEART RATE: 83 BPM | HEIGHT: 64 IN | OXYGEN SATURATION: 100 % | SYSTOLIC BLOOD PRESSURE: 92 MMHG

## 2023-05-29 DIAGNOSIS — C44.729 SCC (SQUAMOUS CELL CARCINOMA), LEG, LEFT: Primary | ICD-10-CM

## 2023-05-29 PROCEDURE — 1101F PR PT FALLS ASSESS DOC 0-1 FALLS W/OUT INJ PAST YR: ICD-10-PCS | Mod: CPTII,S$GLB,,

## 2023-05-29 PROCEDURE — 3074F PR MOST RECENT SYSTOLIC BLOOD PRESSURE < 130 MM HG: ICD-10-PCS | Mod: CPTII,S$GLB,,

## 2023-05-29 PROCEDURE — 3288F PR FALLS RISK ASSESSMENT DOCUMENTED: ICD-10-PCS | Mod: CPTII,S$GLB,,

## 2023-05-29 PROCEDURE — 3288F FALL RISK ASSESSMENT DOCD: CPT | Mod: CPTII,S$GLB,,

## 2023-05-29 PROCEDURE — 99024 POSTOP FOLLOW-UP VISIT: CPT | Mod: S$GLB,,,

## 2023-05-29 PROCEDURE — 1126F AMNT PAIN NOTED NONE PRSNT: CPT | Mod: CPTII,S$GLB,,

## 2023-05-29 PROCEDURE — 99999 PR PBB SHADOW E&M-EST. PATIENT-LVL IV: ICD-10-PCS | Mod: PBBFAC,,,

## 2023-05-29 PROCEDURE — 1159F MED LIST DOCD IN RCRD: CPT | Mod: CPTII,S$GLB,,

## 2023-05-29 PROCEDURE — 1157F PR ADVANCE CARE PLAN OR EQUIV PRESENT IN MEDICAL RECORD: ICD-10-PCS | Mod: CPTII,S$GLB,,

## 2023-05-29 PROCEDURE — 3078F PR MOST RECENT DIASTOLIC BLOOD PRESSURE < 80 MM HG: ICD-10-PCS | Mod: CPTII,S$GLB,,

## 2023-05-29 PROCEDURE — 99024 PR POST-OP FOLLOW-UP VISIT: ICD-10-PCS | Mod: S$GLB,,,

## 2023-05-29 PROCEDURE — 1126F PR PAIN SEVERITY QUANTIFIED, NO PAIN PRESENT: ICD-10-PCS | Mod: CPTII,S$GLB,,

## 2023-05-29 PROCEDURE — 1159F PR MEDICATION LIST DOCUMENTED IN MEDICAL RECORD: ICD-10-PCS | Mod: CPTII,S$GLB,,

## 2023-05-29 PROCEDURE — 3078F DIAST BP <80 MM HG: CPT | Mod: CPTII,S$GLB,,

## 2023-05-29 PROCEDURE — 3074F SYST BP LT 130 MM HG: CPT | Mod: CPTII,S$GLB,,

## 2023-05-29 PROCEDURE — 1101F PT FALLS ASSESS-DOCD LE1/YR: CPT | Mod: CPTII,S$GLB,,

## 2023-05-29 PROCEDURE — 1157F ADVNC CARE PLAN IN RCRD: CPT | Mod: CPTII,S$GLB,,

## 2023-05-29 PROCEDURE — 99999 PR PBB SHADOW E&M-EST. PATIENT-LVL IV: CPT | Mod: PBBFAC,,,

## 2023-05-29 NOTE — PROGRESS NOTES
"  Post-Op Follow-up Visit:   5/29/2023  Patient ID: Angelina Man is a 73 y.o. female, born 1950    Chief Complaint   Patient presents with    Post-op Evaluation     Wound check      Interval History: Mrs. Man returns to the clinic for a post op evaluation after WLE of the left leg and washout with primary closure of right arm laceration. She states that she is doing well post-op. Denies pain, redness, swelling, fever, chills, drainage, SOB, chest pain.     Physical Exam:  BP (!) 92/51   Pulse 83   Ht 5' 4" (1.626 m)   Wt 46.1 kg (101 lb 10.1 oz)   LMP  (LMP Unknown)   SpO2 100%   BMI 17.45 kg/m²     General:  Non-toxic, ambulatory  Abd:  Soft, non-tender  Incision:  Left leg- CDI sutures in place without erythema or drainage, Right forearm- CDI sutures in place without erythema or drainage    Pathology: Pending      ICD-10-CM ICD-9-CM    1. SCC (squamous cell carcinoma), leg, left  C44.729 173.72       Plan   Mrs. Man returns to the clinic for a post op evaluation after WLE of the left leg and washout with primary closure of right arm laceration. She is doing well. Her incisions are healing nicely. Sutures to remain in place at this time. Her path report is not back at this time. Discussed wound care. Questions were asked and answered to patient's satisfaction.  We discussed the need for continued clinical/radiographic/endoscopic follow-up.     Follow up in about 2 weeks (around 6/12/2023), or if symptoms worsen or fail to improve.    Patient seen in conjunction with Dr. Canela.          ALESSANDRO Polanco, FNP-C  Upper GI / Hepatobiliary Surgical Oncology  [facility] [place], [place]  Office: [phone]  Fax: [phone]       "

## 2023-05-31 LAB
FINAL PATHOLOGIC DIAGNOSIS: NORMAL
GROSS: NORMAL
Lab: NORMAL
MICROSCOPIC EXAM: NORMAL

## 2023-06-08 ENCOUNTER — PATIENT MESSAGE (OUTPATIENT)
Dept: PRIMARY CARE CLINIC | Facility: CLINIC | Age: 73
End: 2023-06-08
Payer: MEDICARE

## 2023-06-08 DIAGNOSIS — B00.9 HSV INFECTION: ICD-10-CM

## 2023-06-12 ENCOUNTER — OFFICE VISIT (OUTPATIENT)
Dept: SURGERY | Facility: CLINIC | Age: 73
End: 2023-06-12
Payer: MEDICARE

## 2023-06-12 VITALS
BODY MASS INDEX: 17.82 KG/M2 | SYSTOLIC BLOOD PRESSURE: 136 MMHG | HEIGHT: 64 IN | WEIGHT: 104.38 LBS | HEART RATE: 88 BPM | DIASTOLIC BLOOD PRESSURE: 78 MMHG | OXYGEN SATURATION: 100 %

## 2023-06-12 DIAGNOSIS — C44.729 SQUAMOUS CELL CARCINOMA OF SKIN OF LEFT LOWER EXTREMITY: Primary | ICD-10-CM

## 2023-06-12 PROCEDURE — 3075F SYST BP GE 130 - 139MM HG: CPT | Mod: CPTII,S$GLB,,

## 2023-06-12 PROCEDURE — 3288F FALL RISK ASSESSMENT DOCD: CPT | Mod: CPTII,S$GLB,,

## 2023-06-12 PROCEDURE — 99999 PR PBB SHADOW E&M-EST. PATIENT-LVL III: CPT | Mod: PBBFAC,,,

## 2023-06-12 PROCEDURE — 1157F PR ADVANCE CARE PLAN OR EQUIV PRESENT IN MEDICAL RECORD: ICD-10-PCS | Mod: CPTII,S$GLB,,

## 2023-06-12 PROCEDURE — 99024 PR POST-OP FOLLOW-UP VISIT: ICD-10-PCS | Mod: S$GLB,,,

## 2023-06-12 PROCEDURE — 99024 POSTOP FOLLOW-UP VISIT: CPT | Mod: S$GLB,,,

## 2023-06-12 PROCEDURE — 3078F PR MOST RECENT DIASTOLIC BLOOD PRESSURE < 80 MM HG: ICD-10-PCS | Mod: CPTII,S$GLB,,

## 2023-06-12 PROCEDURE — 1126F PR PAIN SEVERITY QUANTIFIED, NO PAIN PRESENT: ICD-10-PCS | Mod: CPTII,S$GLB,,

## 2023-06-12 PROCEDURE — 1126F AMNT PAIN NOTED NONE PRSNT: CPT | Mod: CPTII,S$GLB,,

## 2023-06-12 PROCEDURE — 3075F PR MOST RECENT SYSTOLIC BLOOD PRESS GE 130-139MM HG: ICD-10-PCS | Mod: CPTII,S$GLB,,

## 2023-06-12 PROCEDURE — 3288F PR FALLS RISK ASSESSMENT DOCUMENTED: ICD-10-PCS | Mod: CPTII,S$GLB,,

## 2023-06-12 PROCEDURE — 99999 PR PBB SHADOW E&M-EST. PATIENT-LVL III: ICD-10-PCS | Mod: PBBFAC,,,

## 2023-06-12 PROCEDURE — 1157F ADVNC CARE PLAN IN RCRD: CPT | Mod: CPTII,S$GLB,,

## 2023-06-12 PROCEDURE — 1101F PT FALLS ASSESS-DOCD LE1/YR: CPT | Mod: CPTII,S$GLB,,

## 2023-06-12 PROCEDURE — 1101F PR PT FALLS ASSESS DOC 0-1 FALLS W/OUT INJ PAST YR: ICD-10-PCS | Mod: CPTII,S$GLB,,

## 2023-06-12 PROCEDURE — 3078F DIAST BP <80 MM HG: CPT | Mod: CPTII,S$GLB,,

## 2023-06-12 RX ORDER — ACYCLOVIR 400 MG/1
400 TABLET ORAL 2 TIMES DAILY
Qty: 60 TABLET | Refills: 1 | Status: SHIPPED | OUTPATIENT
Start: 2023-06-12 | End: 2023-10-31

## 2023-06-14 NOTE — PROGRESS NOTES
"  Post-Op Follow-up Visit:   6/12/2023  Patient ID: Angelina Man is a 73 y.o. female, born 1950    Chief Complaint   Patient presents with    Wound Check     Interval History:  Mrs. Man returns to the clinic for a post op evaluation after WLE of the left leg and washout with primary closure of right arm laceration. She states that she is doing well post-op. Denies pain, redness, swelling, fever, chills, drainage, SOB, chest pain.     Physical Exam:  /78 (BP Location: Left arm, Patient Position: Sitting)   Pulse 88   Ht 5' 4" (1.626 m)   Wt 47.3 kg (104 lb 6.2 oz)   LMP  (LMP Unknown)   SpO2 100%   BMI 17.92 kg/m²     General:  Non-toxic, ambulatory  Abd:  Soft, non-tender  Incision:  Left leg- CDI sutures in place without erythema or drainage, Right forearm- CDI sutures in place without erythema or drainage    Pathology:  Final Pathologic Diagnosis Skin, left lower leg extremity squamous cell carcinoma, excision:   -RESIDUAL SQUAMOUS CELL CARCINOMA, COMPLETELY EXCISED   -SCAR (POST-SURGICAL)       ICD-10-CM ICD-9-CM    1. Squamous cell carcinoma of skin of left lower extremity  C44.729 173.72         Plan   Mrs. Man returns to the clinic for a post op evaluation after WLE of the left leg and washout with primary closure of right arm laceration. She is doing well. Her incisions are healing nicely. Sutures removed. We discussed her pathology report. Questions were asked and answered to patient's satisfaction.  We discussed the need for continued clinical/radiographic/endoscopic follow-up.     Follow up if symptoms worsen or fail to improve.         ALESSANDRO Polanco, FNP-C  Upper GI / Hepatobiliary Surgical Oncology  [facility] [place], [place]  Office: [phone]  Fax: [phone]       "

## 2023-06-19 ENCOUNTER — OFFICE VISIT (OUTPATIENT)
Dept: UROLOGY | Facility: CLINIC | Age: 73
End: 2023-06-19
Payer: MEDICARE

## 2023-06-19 ENCOUNTER — PATIENT MESSAGE (OUTPATIENT)
Dept: DERMATOLOGY | Facility: CLINIC | Age: 73
End: 2023-06-19
Payer: MEDICARE

## 2023-06-19 ENCOUNTER — HOSPITAL ENCOUNTER (OUTPATIENT)
Dept: RADIOLOGY | Facility: HOSPITAL | Age: 73
Discharge: HOME | End: 2023-06-19
Attending: UROLOGY
Payer: MEDICARE

## 2023-06-19 VITALS
SYSTOLIC BLOOD PRESSURE: 116 MMHG | BODY MASS INDEX: 17.24 KG/M2 | HEIGHT: 64 IN | WEIGHT: 101 LBS | DIASTOLIC BLOOD PRESSURE: 74 MMHG | HEART RATE: 56 BPM

## 2023-06-19 DIAGNOSIS — D17.71 RENAL ANGIOMYOLIPOMA: ICD-10-CM

## 2023-06-19 DIAGNOSIS — N28.1 RENAL CYST, ACQUIRED: ICD-10-CM

## 2023-06-19 DIAGNOSIS — Q61.02 MULTIPLE RENAL CYSTS: ICD-10-CM

## 2023-06-19 DIAGNOSIS — D17.71 ANGIOMYOLIPOMA OF RIGHT KIDNEY: Primary | ICD-10-CM

## 2023-06-19 PROCEDURE — 3078F PR MOST RECENT DIASTOLIC BLOOD PRESSURE < 80 MM HG: ICD-10-PCS | Mod: CPTII,S$GLB,, | Performed by: NURSE PRACTITIONER

## 2023-06-19 PROCEDURE — 99214 PR OFFICE/OUTPT VISIT, EST, LEVL IV, 30-39 MIN: ICD-10-PCS | Mod: S$GLB,,, | Performed by: NURSE PRACTITIONER

## 2023-06-19 PROCEDURE — 99999 PR PBB SHADOW E&M-EST. PATIENT-LVL IV: CPT | Mod: PBBFAC,,, | Performed by: NURSE PRACTITIONER

## 2023-06-19 PROCEDURE — 99999 PR PBB SHADOW E&M-EST. PATIENT-LVL IV: ICD-10-PCS | Mod: PBBFAC,,, | Performed by: NURSE PRACTITIONER

## 2023-06-19 PROCEDURE — 3078F DIAST BP <80 MM HG: CPT | Mod: CPTII,S$GLB,, | Performed by: NURSE PRACTITIONER

## 2023-06-19 PROCEDURE — 76770 US EXAM ABDO BACK WALL COMP: CPT | Mod: 26,,, | Performed by: RADIOLOGY

## 2023-06-19 PROCEDURE — 76770 US EXAM ABDO BACK WALL COMP: CPT | Mod: TC

## 2023-06-19 PROCEDURE — 3074F PR MOST RECENT SYSTOLIC BLOOD PRESSURE < 130 MM HG: ICD-10-PCS | Mod: CPTII,S$GLB,, | Performed by: NURSE PRACTITIONER

## 2023-06-19 PROCEDURE — 3074F SYST BP LT 130 MM HG: CPT | Mod: CPTII,S$GLB,, | Performed by: NURSE PRACTITIONER

## 2023-06-19 PROCEDURE — 1157F ADVNC CARE PLAN IN RCRD: CPT | Mod: CPTII,S$GLB,, | Performed by: NURSE PRACTITIONER

## 2023-06-19 PROCEDURE — 3288F FALL RISK ASSESSMENT DOCD: CPT | Mod: CPTII,S$GLB,, | Performed by: NURSE PRACTITIONER

## 2023-06-19 PROCEDURE — 99214 OFFICE O/P EST MOD 30 MIN: CPT | Mod: S$GLB,,, | Performed by: NURSE PRACTITIONER

## 2023-06-19 PROCEDURE — 1159F PR MEDICATION LIST DOCUMENTED IN MEDICAL RECORD: ICD-10-PCS | Mod: CPTII,S$GLB,, | Performed by: NURSE PRACTITIONER

## 2023-06-19 PROCEDURE — 1126F AMNT PAIN NOTED NONE PRSNT: CPT | Mod: CPTII,S$GLB,, | Performed by: NURSE PRACTITIONER

## 2023-06-19 PROCEDURE — 1157F PR ADVANCE CARE PLAN OR EQUIV PRESENT IN MEDICAL RECORD: ICD-10-PCS | Mod: CPTII,S$GLB,, | Performed by: NURSE PRACTITIONER

## 2023-06-19 PROCEDURE — 1101F PR PT FALLS ASSESS DOC 0-1 FALLS W/OUT INJ PAST YR: ICD-10-PCS | Mod: CPTII,S$GLB,, | Performed by: NURSE PRACTITIONER

## 2023-06-19 PROCEDURE — 3288F PR FALLS RISK ASSESSMENT DOCUMENTED: ICD-10-PCS | Mod: CPTII,S$GLB,, | Performed by: NURSE PRACTITIONER

## 2023-06-19 PROCEDURE — 1101F PT FALLS ASSESS-DOCD LE1/YR: CPT | Mod: CPTII,S$GLB,, | Performed by: NURSE PRACTITIONER

## 2023-06-19 PROCEDURE — 76770 US RETROPERITONEAL COMPLETE: ICD-10-PCS | Mod: 26,,, | Performed by: RADIOLOGY

## 2023-06-19 PROCEDURE — 1160F RVW MEDS BY RX/DR IN RCRD: CPT | Mod: CPTII,S$GLB,, | Performed by: NURSE PRACTITIONER

## 2023-06-19 PROCEDURE — 1126F PR PAIN SEVERITY QUANTIFIED, NO PAIN PRESENT: ICD-10-PCS | Mod: CPTII,S$GLB,, | Performed by: NURSE PRACTITIONER

## 2023-06-19 PROCEDURE — 1160F PR REVIEW ALL MEDS BY PRESCRIBER/CLIN PHARMACIST DOCUMENTED: ICD-10-PCS | Mod: CPTII,S$GLB,, | Performed by: NURSE PRACTITIONER

## 2023-06-19 PROCEDURE — 1159F MED LIST DOCD IN RCRD: CPT | Mod: CPTII,S$GLB,, | Performed by: NURSE PRACTITIONER

## 2023-06-19 NOTE — PROGRESS NOTES
CHIEF COMPLAINT:    Angelina Man is a 73 y.o. female presents today for Renal Cysts and AML    HISTORY OF PRESENTING ILLINESS:    Angelina Man is a 73 y.o. female who is an established patient of our clinic. She has a history of renal cysts and AML's.   Last seen in clinic with Dr. Bryant 03/30/2021 with imaging. This is a new patient to for me. I personally reviewed their recent medical records as well as their outside medical, surgical, family, & social history.     Here today for f/u visit. Renal US done today but still processing.   Did have a Abdominal US done 01/17/2023 showing stable right sides lesions (AML's) < 1cm and stable left sided cysts.     Denies any abdominal or flank pain.  Ok with urination.   Only gets up 1x.    Exercises regularly.       REVIEW OF SYSTEMS:  Review of Systems   Constitutional: Negative.  Negative for chills and fever.   Eyes:  Negative for double vision.   Respiratory:  Negative for cough and shortness of breath.    Cardiovascular:  Negative for chest pain.   Gastrointestinal:  Negative for abdominal pain, constipation, diarrhea, nausea and vomiting.   Genitourinary: Negative.  Negative for dysuria, flank pain and hematuria.        Ok with urination  Nocturia x 1     Neurological:  Negative for dizziness and seizures.   Endo/Heme/Allergies:  Negative for polydipsia.       PATIENT HISTORY:    Past Medical History:   Diagnosis Date    Arthritis     Atrial fibrillation     Bronchitis     Cataract     Dry eyes     GERD (gastroesophageal reflux disease)     Glaucoma, suspect - Both Eyes     Hypothyroidism 06/23/2016    Pulmonary hypertension     Rash     Renal angiomyolipoma     Renal disorder     Spinal stenosis     Squamous cell carcinoma     in-situ right upper inner arm, right wrist    Status post lumbar surgery 06/23/2016    Stress fracture     Thyroid disease     Venous insufficiency        Past Surgical History:   Procedure Laterality Date    ANGIOPLASTY       CATARACT EXTRACTION W/  INTRAOCULAR LENS IMPLANT Left 12/6/2022    Procedure: EXTRACTION, CATARACT, WITH IOL INSERTION;  Surgeon: Tone Brown MD;  Location: [facility] OR;  Service: Ophthalmology;  Laterality: Left;    CATARACT EXTRACTION W/  INTRAOCULAR LENS IMPLANT Right 12/20/2022    Procedure: EXTRACTION, CATARACT, WITH IOL INSERTION;  Surgeon: Tone Brown MD;  Location: [facility] OR;  Service: Ophthalmology;  Laterality: Right;    CERVICAL FUSION      COLONOSCOPY      COLONOSCOPY N/A 11/14/2017    Procedure: COLONOSCOPY;  Surgeon: Kasi Mercado MD;  Location: [facility] ENDO (4TH FLR);  Service: Endoscopy;  Laterality: N/A;    COLONOSCOPY N/A 4/26/2023    Procedure: COLONOSCOPY;  Surgeon: Jeanmarie Hathaway MD;  Location: [facility] ENDO (4TH FLR);  Service: Colon and Rectal;  Laterality: N/A;  ok to hold Eliquis 2 days per Dr Pereira  constipation-ext Miralax prep  instr mailed/portal-GT  4/21/23 pre call attempted, no answer    ESOPHAGOGASTRODUODENOSCOPY N/A 10/10/2019    Procedure: EGD (ESOPHAGOGASTRODUODENOSCOPY);  Surgeon: Rg Milton MD;  Location: [facility] (4TH FLR);  Service: Endoscopy;  Laterality: N/A;    ESOPHAGOGASTRODUODENOSCOPY N/A 10/6/2021    Procedure: EGD (ESOPHAGOGASTRODUODENOSCOPY);  Surgeon: Rg Milton MD;  Location: [facility] (4TH FLR);  Service: Endoscopy;  Laterality: N/A;  covid test 10/3 [place], instr emailed/portal -ml    EXCISION Left 5/17/2023    Procedure: EXCISION SQUAMOUS CELL CARCINOMA LEFT LEG;  Surgeon: Kasi Canela Jr., MD;  Location: [facility] 2ND FLR;  Service: General;  Laterality: Left;    l4-5 mid discectomy Left 03/2017    l4-5 MIS diskectomy Right 05/2016    RIGHT HEART CATHETERIZATION Right 1/27/2022    Procedure: INSERTION, CATHETER, RIGHT HEART;  Surgeon: Satnam Pickard Jr., MD;  Location: [facility] CATH LAB;  Service: Cardiology;  Laterality: Right;    TREATMENT OF CARDIAC ARRHYTHMIA N/A 7/17/2020    Procedure: CARDIOVERSION;  Surgeon: Kwan Bray MD;   Location: [facility] EP LAB;  Service: Cardiology;  Laterality: N/A;  AF,DCCV/SANGITA, ANES, SK, 746    TREATMENT OF CARDIAC ARRHYTHMIA N/A 7/14/2021    Procedure: CARDIOVERSION;  Surgeon: SYDNIE Cedillo MD;  Location: [facility] EP LAB;  Service: Cardiology;  Laterality: N/A;  AF, SANGITA, DCCV, MAC, EH, 3 Prep    WASHOUT Right 5/17/2023    Procedure: WASHOUT right lower arm and closure;  Surgeon: Kasi Canela Jr., MD;  Location: [facility] OR [address] [place] [address];  Service: General;  Laterality: Right;       Family History   Problem Relation Age of Onset    Cancer Mother         Lung cancer    Heart failure Father     Colon cancer Father     Hypertension Father     Cataracts Father     Cancer Father 80        colon    No Known Problems Sister     No Known Problems Brother     Melanoma Daughter     Diabetes Son     Heart disease Son     Cancer Son         esophageal cancer    No Known Problems Maternal Aunt     No Known Problems Maternal Uncle     No Known Problems Paternal Aunt     No Known Problems Paternal Uncle     No Known Problems Maternal Grandmother     No Known Problems Maternal Grandfather     No Known Problems Paternal Grandmother     No Known Problems Paternal Grandfather     Amblyopia Neg Hx     Blindness Neg Hx     Glaucoma Neg Hx     Macular degeneration Neg Hx     Retinal detachment Neg Hx     Strabismus Neg Hx     Stroke Neg Hx     Thyroid disease Neg Hx     Breast cancer Neg Hx     Ovarian cancer Neg Hx        Social History     Socioeconomic History    Marital status: Single   Occupational History     Employer: [email or web address]   Tobacco Use    Smoking status: Never    Smokeless tobacco: Never   Substance and Sexual Activity    Alcohol use: Yes     Alcohol/week: 4.0 standard drinks     Types: 4 Glasses of wine per week     Comment: 2 glasses of wine on weekends    Drug use: No    Sexual activity: Never   Other Topics Concern    Are you pregnant or think you may be? No    Breast-feeding No     Social Determinants of  Health     Financial Resource Strain: Low Risk     Difficulty of Paying Living Expenses: Not very hard   Food Insecurity: No Food Insecurity    Worried About Running Out of Food in the Last Year: Never true    Ran Out of Food in the Last Year: Never true   Transportation Needs: No Transportation Needs    Lack of Transportation (Medical): No    Lack of Transportation (Non-Medical): No   Physical Activity: Sufficiently Active    Days of Exercise per Week: 7 days    Minutes of Exercise per Session: 60 min   Stress: No Stress Concern Present    Feeling of Stress : Only a little   Social Connections: Socially Isolated    Frequency of Communication with Friends and Family: Once a week    Frequency of Social Gatherings with Friends and Family: Once a week    Attends [employer]ist Services: Never    Active Member of Clubs or Organizations: No    Attends Club or Organization Meetings: Never    Marital Status:    Housing Stability: Low Risk     Unable to Pay for Housing in the Last Year: No    Number of Places Lived in the Last Year: 1    Unstable Housing in the Last Year: No       Allergies:  Patient has no known allergies.    Medications:    Current Outpatient Medications:     acetaminophen (TYLENOL) 500 MG tablet, Take 500 mg by mouth every 6 (six) hours as needed for Pain., Disp: , Rfl:     acyclovir (ZOVIRAX) 400 MG tablet, Take 1 tablet (400 mg total) by mouth 2 (two) times daily., Disp: 60 tablet, Rfl: 1    ascorbic acid (VITAMIN C) 1000 MG tablet, Take 1,000 mg by mouth once daily. , Disp: , Rfl:     biotin 1 mg tablet, Take 1 mg by mouth 2 (two) times daily. , Disp: , Rfl:     buPROPion (WELLBUTRIN XL) 150 MG TB24 tablet, Take 1 tablet (150 mg total) by mouth once daily., Disp: 90 tablet, Rfl: 2    calcium-vitamin D 500 mg(1,250mg) -200 unit per tablet, Take 1 tablet by mouth 2 (two) times daily with meals. , Disp: , Rfl:     clonazePAM (KLONOPIN) 1 MG tablet, TAKE 1& 1/2 TABLET BY MOUTH NIGHTLY AS NEEDED, Disp:  45 tablet, Rfl: 5    digestive enzymes Tab, Take 1 tablet by mouth once daily. , Disp: , Rfl:     diphenhydrAMINE (BENADRYL) 25 mg capsule, Take 25 mg by mouth nightly as needed. , Disp: , Rfl:     ELIQUIS 5 mg Tab, TAKE 1 TABLET(5 MG) BY MOUTH TWICE DAILY, Disp: 180 tablet, Rfl: 3    fluticasone propionate (FLONASE) 50 mcg/actuation nasal spray, 1 spray (50 mcg total) by Each Nostril route once daily. (Patient taking differently: 1 spray by Each Nostril route 2 (two) times daily as needed.), Disp: 16 g, Rfl: 12    levothyroxine (SYNTHROID) 137 MCG Tab tablet, Take 1 tablet (137 mcg total) by mouth before breakfast., Disp: 30 tablet, Rfl: 11    lubiprostone (AMITIZA) 24 MCG Cap, Take 1 capsule (24 mcg total) by mouth daily as needed (IBS symptoms)., Disp: 30 capsule, Rfl: 6    metoprolol succinate (TOPROL-XL) 25 MG 24 hr tablet, Take 1 tablet (25 mg total) by mouth once daily., Disp: 30 tablet, Rfl: 11    multivitamin (THERAGRAN) per tablet, Take 1 tablet by mouth once daily. , Disp: , Rfl:     tretinoin (RETIN-A) 0.1 % cream, Apply topically every evening., Disp: 20 g, Rfl: 6    zinc 50 mg Tab, Take 50 mg by mouth once daily. , Disp: , Rfl:     cycloSPORINE (RESTASIS) 0.05 % ophthalmic emulsion, Place 1 drop into both eyes 2 (two) times daily., Disp: 60 each, Rfl: 11    furosemide (LASIX) 20 MG tablet, Take 1 tablet (20 mg total) by mouth every other day. (Patient taking differently: Take 20 mg by mouth every other day. Pt still taking as of 6/12), Disp: 15 tablet, Rfl: 11    oxyCODONE (ROXICODONE) 5 MG immediate release tablet, Take 1 tablet (5 mg total) by mouth every 4 (four) hours as needed for Pain. (Patient not taking: Reported on 6/12/2023), Disp: 5 tablet, Rfl: 0    PHYSICAL EXAMINATION:  Physical Exam  Vitals reviewed.   Constitutional:       General: She is awake. She is not in acute distress.     Appearance: Normal appearance. She is well-developed. She is not toxic-appearing.   HENT:      Head:  Normocephalic and atraumatic.      Right Ear: External ear normal.      Left Ear: External ear normal.      Nose: Nose normal.   Eyes:      General: Lids are normal.         Right eye: No discharge.         Left eye: No discharge.      Conjunctiva/sclera: Conjunctivae normal.   Neck:      Trachea: Trachea normal.   Cardiovascular:      Rate and Rhythm: Normal rate.   Pulmonary:      Effort: Pulmonary effort is normal. No respiratory distress.   Abdominal:      General: There is no distension.      Palpations: Abdomen is soft.      Tenderness: There is no abdominal tenderness. There is no right CVA tenderness, left CVA tenderness, guarding or rebound.   Musculoskeletal:         General: Normal range of motion.      Cervical back: Normal range of motion and neck supple.   Skin:     General: Skin is warm and dry.   Neurological:      General: No focal deficit present.      Mental Status: She is alert and oriented to person, place, and time.   Psychiatric:         Speech: Speech normal.         Behavior: Behavior normal. Behavior is cooperative.         Thought Content: Thought content normal.         Judgment: Judgment normal.         LABS:            Lab Results   Component Value Date    CREATININE 0.8 01/09/2023    EGFRNORACEVR >60.0 01/09/2023             IMPRESSION:    Encounter Diagnoses   Name Primary?    Angiomyolipoma of right kidney Yes    Multiple renal cysts          Assessment:       1. Angiomyolipoma of right kidney    2. Multiple renal cysts        Plan:         I spent 30 minutes with the patient of which more than half was spent in direct consultation with the patient in regards to our treatment and plan.  We addressed the office findings and recent labs.   Education and recommendations of today's plan of care including home remedies and needed follow up with PCP.   We discussed the chief complaint; reviewed the LUTS and the possible contributory factors.   Reassurance no cancer.   Dicussed cysts vs  renal masses; AML's   Recommended lifestyle modifications with a proper, healthy diet, good hydration but during the day. Reducing bladder irritants.   Benefits of regular exercise.  RTC one year with repeat US unless done by PCP

## 2023-06-21 ENCOUNTER — OFFICE VISIT (OUTPATIENT)
Dept: PRIMARY CARE CLINIC | Facility: CLINIC | Age: 73
End: 2023-06-21
Payer: MEDICARE

## 2023-06-21 VITALS
DIASTOLIC BLOOD PRESSURE: 73 MMHG | BODY MASS INDEX: 17.28 KG/M2 | HEIGHT: 64 IN | OXYGEN SATURATION: 99 % | SYSTOLIC BLOOD PRESSURE: 105 MMHG | TEMPERATURE: 98 F | HEART RATE: 75 BPM | RESPIRATION RATE: 17 BRPM | WEIGHT: 101.19 LBS

## 2023-06-21 DIAGNOSIS — K21.9 GASTROESOPHAGEAL REFLUX DISEASE, UNSPECIFIED WHETHER ESOPHAGITIS PRESENT: ICD-10-CM

## 2023-06-21 DIAGNOSIS — C44.729 SQUAMOUS CELL CARCINOMA OF SKIN OF LEFT LOWER EXTREMITY: ICD-10-CM

## 2023-06-21 DIAGNOSIS — Z79.01 ANTICOAGULANT LONG-TERM USE: ICD-10-CM

## 2023-06-21 DIAGNOSIS — K58.9 IRRITABLE BOWEL SYNDROME, UNSPECIFIED TYPE: ICD-10-CM

## 2023-06-21 DIAGNOSIS — F41.9 ANXIETY: Primary | ICD-10-CM

## 2023-06-21 DIAGNOSIS — D17.71 RENAL ANGIOMYOLIPOMA: ICD-10-CM

## 2023-06-21 DIAGNOSIS — Z98.1 S/P LUMBAR FUSION: ICD-10-CM

## 2023-06-21 DIAGNOSIS — E03.9 HYPOTHYROIDISM, UNSPECIFIED TYPE: ICD-10-CM

## 2023-06-21 DIAGNOSIS — Z92.89 HISTORY OF CARDIOVERSION: ICD-10-CM

## 2023-06-21 DIAGNOSIS — I27.20 PULMONARY HYPERTENSION: ICD-10-CM

## 2023-06-21 DIAGNOSIS — I50.32 CHRONIC HEART FAILURE WITH PRESERVED EJECTION FRACTION: ICD-10-CM

## 2023-06-21 PROCEDURE — 1126F AMNT PAIN NOTED NONE PRSNT: CPT | Mod: CPTII,S$GLB,, | Performed by: INTERNAL MEDICINE

## 2023-06-21 PROCEDURE — 1160F PR REVIEW ALL MEDS BY PRESCRIBER/CLIN PHARMACIST DOCUMENTED: ICD-10-PCS | Mod: CPTII,S$GLB,, | Performed by: INTERNAL MEDICINE

## 2023-06-21 PROCEDURE — 3074F SYST BP LT 130 MM HG: CPT | Mod: CPTII,S$GLB,, | Performed by: INTERNAL MEDICINE

## 2023-06-21 PROCEDURE — 1159F PR MEDICATION LIST DOCUMENTED IN MEDICAL RECORD: ICD-10-PCS | Mod: CPTII,S$GLB,, | Performed by: INTERNAL MEDICINE

## 2023-06-21 PROCEDURE — 3288F FALL RISK ASSESSMENT DOCD: CPT | Mod: CPTII,S$GLB,, | Performed by: INTERNAL MEDICINE

## 2023-06-21 PROCEDURE — 3288F PR FALLS RISK ASSESSMENT DOCUMENTED: ICD-10-PCS | Mod: CPTII,S$GLB,, | Performed by: INTERNAL MEDICINE

## 2023-06-21 PROCEDURE — 3078F PR MOST RECENT DIASTOLIC BLOOD PRESSURE < 80 MM HG: ICD-10-PCS | Mod: CPTII,S$GLB,, | Performed by: INTERNAL MEDICINE

## 2023-06-21 PROCEDURE — 3078F DIAST BP <80 MM HG: CPT | Mod: CPTII,S$GLB,, | Performed by: INTERNAL MEDICINE

## 2023-06-21 PROCEDURE — 1160F RVW MEDS BY RX/DR IN RCRD: CPT | Mod: CPTII,S$GLB,, | Performed by: INTERNAL MEDICINE

## 2023-06-21 PROCEDURE — 1157F PR ADVANCE CARE PLAN OR EQUIV PRESENT IN MEDICAL RECORD: ICD-10-PCS | Mod: CPTII,S$GLB,, | Performed by: INTERNAL MEDICINE

## 2023-06-21 PROCEDURE — 99999 PR PBB SHADOW E&M-EST. PATIENT-LVL V: ICD-10-PCS | Mod: PBBFAC,,, | Performed by: INTERNAL MEDICINE

## 2023-06-21 PROCEDURE — 1101F PT FALLS ASSESS-DOCD LE1/YR: CPT | Mod: CPTII,S$GLB,, | Performed by: INTERNAL MEDICINE

## 2023-06-21 PROCEDURE — 1157F ADVNC CARE PLAN IN RCRD: CPT | Mod: CPTII,S$GLB,, | Performed by: INTERNAL MEDICINE

## 2023-06-21 PROCEDURE — 99999 PR PBB SHADOW E&M-EST. PATIENT-LVL V: CPT | Mod: PBBFAC,,, | Performed by: INTERNAL MEDICINE

## 2023-06-21 PROCEDURE — 3074F PR MOST RECENT SYSTOLIC BLOOD PRESSURE < 130 MM HG: ICD-10-PCS | Mod: CPTII,S$GLB,, | Performed by: INTERNAL MEDICINE

## 2023-06-21 PROCEDURE — 99214 OFFICE O/P EST MOD 30 MIN: CPT | Mod: S$GLB,,, | Performed by: INTERNAL MEDICINE

## 2023-06-21 PROCEDURE — 99214 PR OFFICE/OUTPT VISIT, EST, LEVL IV, 30-39 MIN: ICD-10-PCS | Mod: S$GLB,,, | Performed by: INTERNAL MEDICINE

## 2023-06-21 PROCEDURE — 1159F MED LIST DOCD IN RCRD: CPT | Mod: CPTII,S$GLB,, | Performed by: INTERNAL MEDICINE

## 2023-06-21 PROCEDURE — 1101F PR PT FALLS ASSESS DOC 0-1 FALLS W/OUT INJ PAST YR: ICD-10-PCS | Mod: CPTII,S$GLB,, | Performed by: INTERNAL MEDICINE

## 2023-06-21 PROCEDURE — 1126F PR PAIN SEVERITY QUANTIFIED, NO PAIN PRESENT: ICD-10-PCS | Mod: CPTII,S$GLB,, | Performed by: INTERNAL MEDICINE

## 2023-06-21 RX ORDER — BUPROPION HYDROCHLORIDE 150 MG/1
150 TABLET, EXTENDED RELEASE ORAL 2 TIMES DAILY
Qty: 180 TABLET | Refills: 3 | Status: SHIPPED | OUTPATIENT
Start: 2023-06-21 | End: 2024-01-03 | Stop reason: SDUPTHER

## 2023-06-21 RX ORDER — LUBIPROSTONE 24 UG/1
24 CAPSULE ORAL DAILY PRN
Qty: 30 CAPSULE | Refills: 6 | Status: SHIPPED | OUTPATIENT
Start: 2023-06-21 | End: 2024-02-27 | Stop reason: SDUPTHER

## 2023-06-21 RX ORDER — BUPROPION HYDROCHLORIDE 150 MG/1
150 TABLET ORAL DAILY
Qty: 90 TABLET | Refills: 3 | Status: SHIPPED | OUTPATIENT
Start: 2023-06-21 | End: 2023-06-21

## 2023-06-21 RX ORDER — BUPROPION HYDROCHLORIDE 75 MG/1
75 TABLET ORAL 2 TIMES DAILY
Qty: 90 TABLET | Refills: 3 | Status: SHIPPED | OUTPATIENT
Start: 2023-06-21 | End: 2023-06-21

## 2023-06-21 NOTE — PROGRESS NOTES
Subjective:      Patient ID: Angelina Man is a 73 y.o. female.    Chief Complaint: Follow-up      Angelina Man is a 73 y.o. female with PMH significant for HSV, depression, afib dx in 2020 on eliquis, hypothyroidism, anxiety, IBS, GERD, renal cysts, fibroids, BLE venous insuffiencey, osteoporosis on prolia, pulmonary HTN, HFpEF, hx of lumbar fusion, OA, squamous cell carcinoma dx 4/23 s/p excision,  angiomyolipoma of right kidney.  Presenting today for follow up. Last annual 1/3/2023.    Anxiety: Continues on wellbutrin and clonazepam. Discussed the need to start weaning down on the clonazepam. Discussed policy on benzodiazepine prescribing and that I normally fill for 3 months' supply. She is interested in trying to wean down on clonazepam and also to see psychiatry. She has been taking half a tablet in an attempt to wean off of clonazepam.     Discussed the possibility that her wellbutrin may not be getting absorbed. She currently takes wellbutrin 150mg XL. She has been noticing that she is more fatigued and more irritable. She reports that she has noticed an indigested pill in her stool at times. Would change her wellbutrin to SR to see if this would help with absorption. Wellbutrin regimen changed to 150mg SR BID.      Fibroids: Hx of uterine fibroids, would like to repeat US transvaginal to check on the size. Denies unusual bleeding, abdominal pain, no post menopausal bleeding.      IBS: Hx of IBS, has tried multiple medications without improvement. She notes that amitiza is only thing that helps her control IBS. She has tried multiple other medications but has not worked previously. Will refill amitiza today. Would most likley need prior authorization process.     Hx of multiple renal cysts and renal angiomyolipoma: Follows with urology. US ordered for June 2024.     Denies any chest pain, shortness of breath, nausea vomiting constipation diarrhea, blood in stool, heartburn    Review of Systems    Constitutional:  Negative for chills, fever and weight loss.   HENT:  Negative for congestion, ear pain and sore throat.    Eyes:  Negative for double vision.   Respiratory:  Negative for cough and shortness of breath.    Cardiovascular:  Negative for chest pain, palpitations and leg swelling.   Gastrointestinal:  Negative for abdominal pain, heartburn, nausea and vomiting.   Skin:  Negative for rash.   Neurological:  Negative for dizziness, tingling and headaches.   Psychiatric/Behavioral:  Negative for depression.         Current Outpatient Medications:     acetaminophen (TYLENOL) 500 MG tablet, Take 500 mg by mouth every 6 (six) hours as needed for Pain., Disp: , Rfl:     acyclovir (ZOVIRAX) 400 MG tablet, Take 1 tablet (400 mg total) by mouth 2 (two) times daily., Disp: 60 tablet, Rfl: 1    ascorbic acid (VITAMIN C) 1000 MG tablet, Take 1,000 mg by mouth once daily. , Disp: , Rfl:     biotin 1 mg tablet, Take 1 mg by mouth 2 (two) times daily. , Disp: , Rfl:     calcium-vitamin D 500 mg(1,250mg) -200 unit per tablet, Take 1 tablet by mouth 2 (two) times daily with meals. , Disp: , Rfl:     clonazePAM (KLONOPIN) 1 MG tablet, TAKE 1& 1/2 TABLET BY MOUTH NIGHTLY AS NEEDED, Disp: 45 tablet, Rfl: 5    cycloSPORINE (RESTASIS) 0.05 % ophthalmic emulsion, Place 1 drop into both eyes 2 (two) times daily., Disp: 60 each, Rfl: 11    digestive enzymes Tab, Take 1 tablet by mouth once daily. , Disp: , Rfl:     diphenhydrAMINE (BENADRYL) 25 mg capsule, Take 25 mg by mouth nightly as needed. , Disp: , Rfl:     ELIQUIS 5 mg Tab, TAKE 1 TABLET(5 MG) BY MOUTH TWICE DAILY, Disp: 180 tablet, Rfl: 3    fluticasone propionate (FLONASE) 50 mcg/actuation nasal spray, 1 spray (50 mcg total) by Each Nostril route once daily. (Patient taking differently: 1 spray by Each Nostril route 2 (two) times daily as needed.), Disp: 16 g, Rfl: 12    furosemide (LASIX) 20 MG tablet, Take 1 tablet (20 mg total) by mouth every other day. (Patient  taking differently: Take 20 mg by mouth every other day. Pt still taking as of 6/12), Disp: 15 tablet, Rfl: 11    levothyroxine (SYNTHROID) 137 MCG Tab tablet, Take 1 tablet (137 mcg total) by mouth before breakfast., Disp: 30 tablet, Rfl: 11    metoprolol succinate (TOPROL-XL) 25 MG 24 hr tablet, Take 1 tablet (25 mg total) by mouth once daily., Disp: 30 tablet, Rfl: 11    multivitamin (THERAGRAN) per tablet, Take 1 tablet by mouth once daily. , Disp: , Rfl:     tretinoin (RETIN-A) 0.1 % cream, Apply topically every evening., Disp: 20 g, Rfl: 6    zinc 50 mg Tab, Take 50 mg by mouth once daily. , Disp: , Rfl:     buPROPion (WELLBUTRIN SR) 150 MG TBSR 12 hr tablet, Take 1 tablet (150 mg total) by mouth 2 (two) times daily., Disp: 180 tablet, Rfl: 3    lubiprostone (AMITIZA) 24 MCG Cap, Take 1 capsule (24 mcg total) by mouth daily as needed (IBS symptoms)., Disp: 30 capsule, Rfl: 6    Lab Results   Component Value Date    HGBA1C 5.4 08/24/2015    HGBA1C 5.3 08/13/2014    HGBA1C 5.5 08/08/2013     No results found for: MICALBCREAT  Lab Results   Component Value Date    LDLCALC 113.8 01/09/2023    LDLCALC 111.4 04/13/2022    CHOL 201 (H) 01/09/2023    HDL 71 01/09/2023    TRIG 81 01/09/2023       Lab Results   Component Value Date     01/09/2023    K 3.4 (L) 01/09/2023     01/09/2023    CO2 29 01/09/2023    GLU 85 01/09/2023    BUN 12 01/09/2023    CREATININE 0.8 01/09/2023    CALCIUM 8.9 01/09/2023    PROT 6.3 01/09/2023    ALBUMIN 3.5 01/09/2023    BILITOT 0.6 01/09/2023    ALKPHOS 80 01/09/2023    AST 22 01/09/2023    ALT 16 01/09/2023    ANIONGAP 10 01/09/2023    ESTGFRAFRICA >60.0 05/25/2022    EGFRNONAA 55.6 (A) 05/25/2022    WBC 4.35 01/09/2023    HGB 12.9 01/09/2023    HGB 14.1 04/13/2022    HCT 40.1 01/09/2023     (H) 01/09/2023     01/09/2023    TSH 1.117 01/09/2023    HEPCAB Negative 09/15/2016       Lab Results   Component Value Date    SHTIXJCR05ZP 66 01/09/2023    AZERHNVD60 923  04/13/2022         Past Medical History:   Diagnosis Date    Arthritis     Atrial fibrillation     Bronchitis     Cataract     Dry eyes     GERD (gastroesophageal reflux disease)     Glaucoma, suspect - Both Eyes     Hypothyroidism 06/23/2016    Pulmonary hypertension     Rash     Renal angiomyolipoma     Renal disorder     Spinal stenosis     Squamous cell carcinoma     in-situ right upper inner arm, right wrist    Status post lumbar surgery 06/23/2016    Stress fracture     Thyroid disease     Venous insufficiency      Past Surgical History:   Procedure Laterality Date    ANGIOPLASTY      CATARACT EXTRACTION W/  INTRAOCULAR LENS IMPLANT Left 12/6/2022    Procedure: EXTRACTION, CATARACT, WITH IOL INSERTION;  Surgeon: Tone Brown MD;  Location: [facility];  Service: Ophthalmology;  Laterality: Left;    CATARACT EXTRACTION W/  INTRAOCULAR LENS IMPLANT Right 12/20/2022    Procedure: EXTRACTION, CATARACT, WITH IOL INSERTION;  Surgeon: Tone Brown MD;  Location: [facility] OR;  Service: Ophthalmology;  Laterality: Right;    CERVICAL FUSION      COLONOSCOPY      COLONOSCOPY N/A 11/14/2017    Procedure: COLONOSCOPY;  Surgeon: Kasi Mercado MD;  Location: [facility] ENDO (4TH FLR);  Service: Endoscopy;  Laterality: N/A;    COLONOSCOPY N/A 4/26/2023    Procedure: COLONOSCOPY;  Surgeon: Jeanmarie Hathaway MD;  Location: [facility] ENDO (4TH FLR);  Service: Colon and Rectal;  Laterality: N/A;  ok to hold Eliquis 2 days per Dr Pereira  constipation-ext Miralax prep  instr mailed/portal-GT  4/21/23 pre call attempted, no answer    ESOPHAGOGASTRODUODENOSCOPY N/A 10/10/2019    Procedure: EGD (ESOPHAGOGASTRODUODENOSCOPY);  Surgeon: Rg Milton MD;  Location: [facility] (4TH FLR);  Service: Endoscopy;  Laterality: N/A;    ESOPHAGOGASTRODUODENOSCOPY N/A 10/6/2021    Procedure: EGD (ESOPHAGOGASTRODUODENOSCOPY);  Surgeon: Rg Milton MD;  Location: [facility] (4TH FLR);  Service: Endoscopy;  Laterality: N/A;  covid test 10/3  bernice pathak emailed/portal -ml    EXCISION Left 5/17/2023    Procedure: EXCISION SQUAMOUS CELL CARCINOMA LEFT LEG;  Surgeon: Kasi Canela Jr., MD;  [facility];  Service: General;  Laterality: Left;    l4-5 mid discectomy Left 03/2017    l4-5 MIS diskectomy Right 05/2016    RIGHT HEART CATHETERIZATION Right 1/27/2022    Procedure: INSERTION, CATHETER, RIGHT HEART;  Surgeon: Satnam Pickard Jr., MD;  Location: [facility] CATH LAB;  Service: Cardiology;  Laterality: Right;    TREATMENT OF CARDIAC ARRHYTHMIA N/A 7/17/2020    Procedure: CARDIOVERSION;  Surgeon: Kwan Bray MD;  Location: [facility] EP LAB;  Service: Cardiology;  Laterality: N/A;  AF,DCCV/SANGITA, ANES, SK, 746    TREATMENT OF CARDIAC ARRHYTHMIA N/A 7/14/2021    Procedure: CARDIOVERSION;  Surgeon: SYDNIE Cedillo MD;  Location: [facility] EP LAB;  Service: Cardiology;  Laterality: N/A;  AF, SANGITA, DCCV, MAC, EH, 3 Prep    WASHOUT Right 5/17/2023    Procedure: WASHOUT right lower arm and closure;  Surgeon: Kasi Canela Jr., MD;  [facility];  Service: General;  Laterality: Right;     Social History     Social History Narrative    Not on file     Family History   Problem Relation Age of Onset    Cancer Mother         Lung cancer    Heart failure Father     Colon cancer Father     Hypertension Father     Cataracts Father     Cancer Father 80        colon    No Known Problems Sister     No Known Problems Brother     Melanoma Daughter     Diabetes Son     Heart disease Son     Cancer Son         esophageal cancer    No Known Problems Maternal Aunt     No Known Problems Maternal Uncle     No Known Problems Paternal Aunt     No Known Problems Paternal Uncle     No Known Problems Maternal Grandmother     No Known Problems Maternal Grandfather     No Known Problems Paternal Grandmother     No Known Problems Paternal Grandfather     Amblyopia Neg Hx     Blindness Neg Hx     Glaucoma Neg Hx     Macular degeneration Neg Hx     Retinal  "detachment Neg Hx     Strabismus Neg Hx     Stroke Neg Hx     Thyroid disease Neg Hx     Breast cancer Neg Hx     Ovarian cancer Neg Hx      Vitals:    06/21/23 1406   BP: 105/73   Pulse: 75   Resp: 17   Temp: 98 °F (36.7 °C)   SpO2: 99%   Weight: 45.9 kg (101 lb 3.1 oz)   Height: 5' 4" (1.626 m)   PainSc: 0-No pain     Objective:   Physical Exam  Vitals reviewed.   Constitutional:       Appearance: Normal appearance.   HENT:      Head: Normocephalic.   Eyes:      Pupils: Pupils are equal, round, and reactive to light.   Cardiovascular:      Rate and Rhythm: Normal rate.      Pulses: Normal pulses.      Heart sounds: Normal heart sounds.   Pulmonary:      Effort: Pulmonary effort is normal.      Breath sounds: Normal breath sounds.   Abdominal:      General: Bowel sounds are normal.      Palpations: Abdomen is soft.   Musculoskeletal:         General: Normal range of motion.   Skin:     General: Skin is warm.   Neurological:      General: No focal deficit present.      Mental Status: She is alert. Mental status is at baseline.   Psychiatric:         Mood and Affect: Mood normal.     Assessment/Plan     Angelina Man is a 73 y.o.female with:    Anxiety  -     buPROPion (WELLBUTRIN SR) 150 MG TBSR 12 hr tablet; Take 1 tablet (150 mg total) by mouth 2 (two) times daily.  Dispense: 180 tablet; Refill: 3    Chronic heart failure with preserved ejection fraction    Anticoagulant long-term use    Hypothyroidism, unspecified type    Gastroesophageal reflux disease, unspecified whether esophagitis present    Irritable bowel syndrome, unspecified type  -     lubiprostone (AMITIZA) 24 MCG Cap; Take 1 capsule (24 mcg total) by mouth daily as needed (IBS symptoms).  Dispense: 30 capsule; Refill: 6    Renal angiomyolipoma    S/P lumbar fusion    Pulmonary hypertension    History of cardioversion    Squamous cell carcinoma of skin of left lower extremity         Chronic conditions status updated as per HPI.  Other than changes " above, cont current medications and maintain follow up with specialists.  Return to clinic in Follow up in about 1 year (around 6/21/2024).      Zina Rockwell MD  [facility]    There are no Patient Instructions on file for this visit.  All of your core healthy metrics are met.

## 2023-06-22 ENCOUNTER — TELEPHONE (OUTPATIENT)
Dept: DERMATOLOGY | Facility: CLINIC | Age: 73
End: 2023-06-22
Payer: MEDICARE

## 2023-06-22 NOTE — TELEPHONE ENCOUNTER
Spoke with pt. Appt sched. Pt agreed/confirmed.    ----- Message from Fozia Garcia MD sent at 6/21/2023  4:42 PM CDT -----  Regarding: RE: Appt Access  Contact: [phone]  sure  ----- Message -----  From: Omaira Meza LPN  Sent: 6/20/2023  10:05 AM CDT  To: Fozia Garcia MD  Subject: FW: Appt Access                                  July 6, 7:50am?  ----- Message -----  From: Radha Davis MA  Sent: 6/19/2023   4:23 PM CDT  To: Omaira Meza LPN  Subject: FW: Appt Access                                    ----- Message -----  From: Charla Rio  Sent: 6/19/2023  12:29 PM CDT  To: [facility]GUIN Staff  Subject: Appt Access                                      Pt was last seen 04/10/23 and states another smaller lesion has appeared near the area of the last one that was biopsied.  No appts avail.  Please call.

## 2023-06-23 ENCOUNTER — TELEPHONE (OUTPATIENT)
Dept: PHARMACY | Facility: CLINIC | Age: 73
End: 2023-06-23
Payer: MEDICARE

## 2023-06-27 ENCOUNTER — PATIENT MESSAGE (OUTPATIENT)
Dept: PRIMARY CARE CLINIC | Facility: CLINIC | Age: 73
End: 2023-06-27
Payer: MEDICARE

## 2023-07-12 ENCOUNTER — OFFICE VISIT (OUTPATIENT)
Dept: DERMATOLOGY | Facility: CLINIC | Age: 73
End: 2023-07-12
Payer: MEDICARE

## 2023-07-12 DIAGNOSIS — D48.5 NEOPLASM OF UNCERTAIN BEHAVIOR OF SKIN: Primary | ICD-10-CM

## 2023-07-12 PROCEDURE — 99499 NO LOS: ICD-10-PCS | Mod: HCNC,S$GLB,, | Performed by: DERMATOLOGY

## 2023-07-12 PROCEDURE — 99999 PR PBB SHADOW E&M-EST. PATIENT-LVL III: CPT | Mod: PBBFAC,HCNC,, | Performed by: DERMATOLOGY

## 2023-07-12 PROCEDURE — 11102 PR TANGENTIAL BIOPSY, SKIN, SINGLE LESION: ICD-10-PCS | Mod: HCNC,S$GLB,, | Performed by: DERMATOLOGY

## 2023-07-12 PROCEDURE — 88305 TISSUE EXAM BY PATHOLOGIST: ICD-10-PCS | Mod: 26,HCNC,, | Performed by: PATHOLOGY

## 2023-07-12 PROCEDURE — 88305 TISSUE EXAM BY PATHOLOGIST: CPT | Mod: 26,HCNC,, | Performed by: PATHOLOGY

## 2023-07-12 PROCEDURE — 11102 TANGNTL BX SKIN SINGLE LES: CPT | Mod: HCNC,S$GLB,, | Performed by: DERMATOLOGY

## 2023-07-12 PROCEDURE — 99499 UNLISTED E&M SERVICE: CPT | Mod: HCNC,S$GLB,, | Performed by: DERMATOLOGY

## 2023-07-12 PROCEDURE — 88305 TISSUE EXAM BY PATHOLOGIST: CPT | Mod: HCNC | Performed by: PATHOLOGY

## 2023-07-12 PROCEDURE — 99999 PR PBB SHADOW E&M-EST. PATIENT-LVL III: ICD-10-PCS | Mod: PBBFAC,HCNC,, | Performed by: DERMATOLOGY

## 2023-07-12 NOTE — PROGRESS NOTES
Subjective:      Patient ID:  Angelina Man is a 73 y.o. female who presents for   Chief Complaint   Patient presents with    Lesion     L leg      Patient with new complaint of lesion(s)  Location: L leg  Duration: 2-4wks  Symptoms: next to prev skin cancer  Relieving factors/Previous treatments: none         Lesion    Review of Systems   Skin:  Positive for daily sunscreen use and activity-related sunscreen use. Negative for recent sunburn.   Hematologic/Lymphatic: Bruises/bleeds easily.     Objective:   Physical Exam   Constitutional: She appears well-developed and well-nourished. No distress.   Neurological: She is alert and oriented to person, place, and time. She is not disoriented.   Psychiatric: She has a normal mood and affect.   Skin:   Areas Examined (abnormalities noted in diagram):   LLE Inspection Performed            Diagram Legend     Erythematous scaling macule/papule c/w actinic keratosis       Vascular papule c/w angioma      Pigmented verrucoid papule/plaque c/w seborrheic keratosis      Yellow umbilicated papule c/w sebaceous hyperplasia      Irregularly shaped tan macule c/w lentigo     1-2 mm smooth white papules consistent with Milia      Movable subcutaneous cyst with punctum c/w epidermal inclusion cyst      Subcutaneous movable cyst c/w pilar cyst      Firm pink to brown papule c/w dermatofibroma      Pedunculated fleshy papule(s) c/w skin tag(s)      Evenly pigmented macule c/w junctional nevus     Mildly variegated pigmented, slightly irregular-bordered macule c/w mildly atypical nevus      Flesh colored to evenly pigmented papule c/w intradermal nevus       Pink pearly papule/plaque c/w basal cell carcinoma      Erythematous hyperkeratotic cursted plaque c/w SCC      Surgical scar with no sign of skin cancer recurrence      Open and closed comedones      Inflammatory papules and pustules      Verrucoid papule consistent consistent with wart     Erythematous eczematous patches and  plaques     Dystrophic onycholytic nail with subungual debris c/w onychomycosis     Umbilicated papule    Erythematous-base heme-crusted tan verrucoid plaque consistent with inflamed seborrheic keratosis     Erythematous Silvery Scaling Plaque c/w Psoriasis     See annotation      Assessment / Plan:      Pathology Orders:       Normal Orders This Visit    Specimen to Pathology, Dermatology     Questions:    Procedure Type: Dermatology and skin neoplasms    Number of Specimens: 1    ------------------------: -------------------------    Spec 1 Procedure: Biopsy    Spec 1 Clinical Impression: r/o SCC v inflamed keratosis    Spec 1 Source: left lower lateral shin    Release to patient:           Neoplasm of uncertain behavior of skin  Shave biopsy procedure note:    Shave biopsy performed after verbal consent including risk of infection, scar, recurrence, need for additional treatment of site. Area prepped with alcohol, anesthetized with approximately 1.0cc of 1% lidocaine with epinephrine. Lesional tissue shaved with razor blade. Hemostasis achieved with application of aluminum chloride followed by hyfrecation. No complications. Dressing applied. Wound care explained.    If biopsy positive, refer to Dr Canela in surgical oncology for definitive excision.    -     Specimen to Pathology, Dermatology             Follow up for prn bx report.

## 2023-07-12 NOTE — PATIENT INSTRUCTIONS
Shave Biopsy Wound Care    Your doctor has performed a shave biopsy today.  A band aid and vaseline ointment has been placed over the site.  This should remain in place for NO LONGER THAN 48 hours.  It is fine to remove the bandaid after 24 hours, if the area is no longer bleeding. It is recommended that you keep the area dry (do not wet)) for the first 24 hours.  After 24 hours, wash the area with warm soap and water and apply Vaseline jelly.  Many patients prefer to use Neosporin or Bacitracin ointment.  This is acceptable; however, know that you can develop an allergy to this medication even if you have used it safely for years.  It is important to keep the area moist.  Letting it dry out and get air slows healing time, and will worsen the scar.        If you notice increasing redness, tenderness, pain, or yellow drainage at the biopsy site, please notify your doctor.  These are signs of an infection.    If your biopsy site is bleeding, apply firm pressure for 15 minutes straight.  Repeat for another 15 minutes, if it is still bleeding.   If the surgical site continues to bleed, then please contact your doctor.      For [facility] users:   You will receive your biopsy results in [facility] as soon as they are available. Please be assured that your physician/provider will review your results and will then determine what further treatment, evaluation, or planning is required. You should be contacted by your physician's/provider's office within 5 business days of receiving your results; If not, please reach out to directly. This is one more way [facility] is putting you first.     [facility] [place] [address]/ [phone] FAX/ [email or web address]

## 2023-07-14 ENCOUNTER — PATIENT MESSAGE (OUTPATIENT)
Dept: DERMATOLOGY | Facility: CLINIC | Age: 73
End: 2023-07-14
Payer: MEDICARE

## 2023-07-14 LAB
FINAL PATHOLOGIC DIAGNOSIS: NORMAL
GROSS: NORMAL
Lab: NORMAL
MICROSCOPIC EXAM: NORMAL

## 2023-07-17 RX ORDER — FUROSEMIDE 20 MG/1
20 TABLET ORAL EVERY OTHER DAY
Qty: 15 TABLET | Refills: 11 | OUTPATIENT
Start: 2023-07-17 | End: 2024-07-16

## 2023-07-17 NOTE — PROGRESS NOTES
Skin, left lower lateral shin, shave biopsy:   -SQUAMOPROLIFERATIVE LESION, see comment    Pt needs f/u in 2 months to recheck site.  I discussed report with her. She will f/u sooner if  lesion recurs

## 2023-07-18 ENCOUNTER — TELEPHONE (OUTPATIENT)
Dept: PRIMARY CARE CLINIC | Facility: CLINIC | Age: 73
End: 2023-07-18
Payer: MEDICARE

## 2023-07-18 RX ORDER — FUROSEMIDE 20 MG/1
20 TABLET ORAL EVERY OTHER DAY
Qty: 15 TABLET | Refills: 11 | Status: SHIPPED | OUTPATIENT
Start: 2023-07-18 | End: 2024-07-17

## 2023-07-18 NOTE — TELEPHONE ENCOUNTER
Last prolia injection was in April . The next injection is in Oct. She need a crown that may turn into a extraction. Is there any special precautions the dentist need to take if procedure turn into a extraction. Please advise.

## 2023-07-18 NOTE — TELEPHONE ENCOUNTER
Dental office has been informed.    Would recommend that extraction take place near the end of the prolia cycle (for example since she has had prolia in April, would recommend doing it near October as prolia is every 6 months). While theoretical there is a chance of jaw necrosis while on prolia so we try to space out procedures. After the extraction, I would also hold off on the next prolia infusion for 3 months.

## 2023-07-18 NOTE — TELEPHONE ENCOUNTER
----- Message from Isaiah Mcconnell sent at 7/18/2023  3:21 PM CDT -----  Contact: Aide [phone]  Aide from [facility] requesting a call in regards to pt treatment and  on prolia and will like to know if any special instructions.    Please call and advise

## 2023-07-29 ENCOUNTER — PATIENT MESSAGE (OUTPATIENT)
Dept: ELECTROPHYSIOLOGY | Facility: CLINIC | Age: 73
End: 2023-07-29
Payer: MEDICARE

## 2023-07-31 ENCOUNTER — PATIENT MESSAGE (OUTPATIENT)
Dept: ELECTROPHYSIOLOGY | Facility: CLINIC | Age: 73
End: 2023-07-31
Payer: MEDICARE

## 2023-07-31 RX ORDER — METOPROLOL SUCCINATE 25 MG/1
25 TABLET, EXTENDED RELEASE ORAL DAILY
Qty: 30 TABLET | Refills: 6 | Status: SHIPPED | OUTPATIENT
Start: 2023-07-31 | End: 2024-02-28 | Stop reason: SDUPTHER

## 2023-08-01 ENCOUNTER — OFFICE VISIT (OUTPATIENT)
Dept: OPTOMETRY | Facility: CLINIC | Age: 73
End: 2023-08-01
Payer: COMMERCIAL

## 2023-08-01 DIAGNOSIS — Z96.1 PSEUDOPHAKIA OF BOTH EYES: ICD-10-CM

## 2023-08-01 DIAGNOSIS — H40.013 OPEN ANGLE WITH BORDERLINE FINDINGS AND LOW GLAUCOMA RISK IN BOTH EYES: Primary | ICD-10-CM

## 2023-08-01 DIAGNOSIS — H04.123 BILATERAL DRY EYES: ICD-10-CM

## 2023-08-01 PROCEDURE — 99213 PR OFFICE/OUTPT VISIT, EST, LEVL III, 20-29 MIN: ICD-10-PCS | Mod: S$GLB,,, | Performed by: OPTOMETRIST

## 2023-08-01 PROCEDURE — 99999 PR PBB SHADOW E&M-EST. PATIENT-LVL III: CPT | Mod: PBBFAC,,, | Performed by: OPTOMETRIST

## 2023-08-01 PROCEDURE — 99213 OFFICE O/P EST LOW 20 MIN: CPT | Mod: S$GLB,,, | Performed by: OPTOMETRIST

## 2023-08-01 PROCEDURE — 99999 PR PBB SHADOW E&M-EST. PATIENT-LVL III: ICD-10-PCS | Mod: PBBFAC,,, | Performed by: OPTOMETRIST

## 2023-08-01 NOTE — PROGRESS NOTES
HPI    72 y/o female  for 6 month follow up S/p phaco IOL OU   Pt state no complaints at this time   Denies pain, discomfort     S/p Phaco w/IOL OD: 12/20/2022   S/p Phaco w/IOL OS: 12/06/2022     Eye med: Systane Ultra PRN OU, under control with drops  Last edited by Tone Diaz, OD on 8/1/2023  1:27 PM.            Assessment /Plan     For exam results, see Encounter Report.    Open angle with borderline findings and low glaucoma risk in both eyes    Bilateral dry eyes    Pseudophakia of both eyes      Moderate cup, low iop, pachy normal, no treat, cont with yearly eye exams.   Recommend continue artificial tears. 1 drop 2x per day. Chronicity of disease and treatment discussed.   3. Doing well s/p surgery, Monitor condition. Patient to report any changes. RTC 6 mos with refraction

## 2023-08-16 ENCOUNTER — PATIENT MESSAGE (OUTPATIENT)
Dept: OPTOMETRY | Facility: CLINIC | Age: 73
End: 2023-08-16
Payer: MEDICARE

## 2023-08-29 ENCOUNTER — OFFICE VISIT (OUTPATIENT)
Dept: PODIATRY | Facility: CLINIC | Age: 73
End: 2023-08-29
Payer: MEDICARE

## 2023-08-29 VITALS
BODY MASS INDEX: 17.62 KG/M2 | SYSTOLIC BLOOD PRESSURE: 108 MMHG | HEIGHT: 64 IN | DIASTOLIC BLOOD PRESSURE: 73 MMHG | HEART RATE: 78 BPM | WEIGHT: 103.19 LBS

## 2023-08-29 DIAGNOSIS — L84 CORNS/CALLOSITIES: ICD-10-CM

## 2023-08-29 DIAGNOSIS — L60.3 ONYCHODYSTROPHY: Primary | ICD-10-CM

## 2023-08-29 PROCEDURE — 99499 UNLISTED E&M SERVICE: CPT | Mod: HCNC,,, | Performed by: PODIATRIST

## 2023-08-29 PROCEDURE — 99499 NO LOS: ICD-10-PCS | Mod: HCNC,,, | Performed by: PODIATRIST

## 2023-08-29 PROCEDURE — 17999 PR NON-COVERED FOOT CARE: ICD-10-PCS | Mod: CSM,HCNC,S$GLB, | Performed by: PODIATRIST

## 2023-08-29 PROCEDURE — 99999 PR PBB SHADOW E&M-EST. PATIENT-LVL IV: CPT | Mod: PBBFAC,HCNC,, | Performed by: PODIATRIST

## 2023-08-29 PROCEDURE — 17999 UNLISTD PX SKN MUC MEMB SUBQ: CPT | Mod: CSM,HCNC,S$GLB, | Performed by: PODIATRIST

## 2023-08-29 PROCEDURE — 99999 PR PBB SHADOW E&M-EST. PATIENT-LVL IV: ICD-10-PCS | Mod: PBBFAC,HCNC,, | Performed by: PODIATRIST

## 2023-08-29 RX ORDER — CEFAZOLIN 2 G/1
INJECTION, POWDER, FOR SOLUTION INTRAMUSCULAR; INTRAVENOUS
COMMUNITY
Start: 2023-05-17 | End: 2023-09-21

## 2023-08-29 RX ORDER — DENOSUMAB 60 MG/ML
INJECTION SUBCUTANEOUS
COMMUNITY
Start: 2023-04-05 | End: 2023-09-21 | Stop reason: ALTCHOICE

## 2023-08-29 RX ORDER — BUPROPION HYDROCHLORIDE 75 MG/1
TABLET ORAL
COMMUNITY
Start: 2023-06-21 | End: 2023-09-21 | Stop reason: ALTCHOICE

## 2023-08-29 RX ORDER — DEXAMETHASONE SODIUM PHOSPHATE 4 MG/ML
INJECTION, SOLUTION INTRA-ARTICULAR; INTRALESIONAL; INTRAMUSCULAR; INTRAVENOUS; SOFT TISSUE
COMMUNITY
Start: 2023-05-17 | End: 2023-09-21 | Stop reason: ALTCHOICE

## 2023-08-29 RX ORDER — ONDANSETRON 2 MG/ML
INJECTION INTRAMUSCULAR; INTRAVENOUS
COMMUNITY
Start: 2023-05-17 | End: 2023-09-21 | Stop reason: ALTCHOICE

## 2023-08-29 RX ORDER — BUPROPION HYDROCHLORIDE 150 MG/1
TABLET ORAL
COMMUNITY
Start: 2023-06-21 | End: 2023-09-21 | Stop reason: SDUPTHER

## 2023-08-29 RX ORDER — PROPOFOL 10 MG/ML
INJECTION, EMULSION INTRAVENOUS
COMMUNITY
Start: 2023-05-17 | End: 2023-09-21 | Stop reason: ALTCHOICE

## 2023-09-21 ENCOUNTER — OFFICE VISIT (OUTPATIENT)
Dept: CARDIOLOGY | Facility: CLINIC | Age: 73
End: 2023-09-21
Payer: MEDICARE

## 2023-09-21 VITALS
SYSTOLIC BLOOD PRESSURE: 126 MMHG | HEIGHT: 64 IN | HEART RATE: 82 BPM | BODY MASS INDEX: 17.32 KG/M2 | DIASTOLIC BLOOD PRESSURE: 91 MMHG | WEIGHT: 101.44 LBS

## 2023-09-21 DIAGNOSIS — I27.20 PULMONARY HYPERTENSION: ICD-10-CM

## 2023-09-21 DIAGNOSIS — R29.898 LEG HEAVINESS: ICD-10-CM

## 2023-09-21 DIAGNOSIS — R60.0 EDEMA OF BOTH LEGS: ICD-10-CM

## 2023-09-21 DIAGNOSIS — I50.32 CHRONIC HEART FAILURE WITH PRESERVED EJECTION FRACTION: ICD-10-CM

## 2023-09-21 DIAGNOSIS — I83.893 VARICOSE VEINS OF BOTH LOWER EXTREMITIES WITH COMPLICATIONS: ICD-10-CM

## 2023-09-21 DIAGNOSIS — L97.921 ULCER OF LEFT LOWER EXTREMITY, LIMITED TO BREAKDOWN OF SKIN: ICD-10-CM

## 2023-09-21 DIAGNOSIS — I87.2 VENOUS INSUFFICIENCY OF BOTH LOWER EXTREMITIES: Primary | ICD-10-CM

## 2023-09-21 DIAGNOSIS — I48.11 LONGSTANDING PERSISTENT ATRIAL FIBRILLATION: ICD-10-CM

## 2023-09-21 PROCEDURE — 3074F PR MOST RECENT SYSTOLIC BLOOD PRESSURE < 130 MM HG: ICD-10-PCS | Mod: HCNC,CPTII,S$GLB, | Performed by: INTERNAL MEDICINE

## 2023-09-21 PROCEDURE — 99999 PR PBB SHADOW E&M-EST. PATIENT-LVL IV: CPT | Mod: PBBFAC,HCNC,, | Performed by: INTERNAL MEDICINE

## 2023-09-21 PROCEDURE — 1101F PR PT FALLS ASSESS DOC 0-1 FALLS W/OUT INJ PAST YR: ICD-10-PCS | Mod: HCNC,CPTII,S$GLB, | Performed by: INTERNAL MEDICINE

## 2023-09-21 PROCEDURE — 3288F FALL RISK ASSESSMENT DOCD: CPT | Mod: HCNC,CPTII,S$GLB, | Performed by: INTERNAL MEDICINE

## 2023-09-21 PROCEDURE — 1159F MED LIST DOCD IN RCRD: CPT | Mod: HCNC,CPTII,S$GLB, | Performed by: INTERNAL MEDICINE

## 2023-09-21 PROCEDURE — 1159F PR MEDICATION LIST DOCUMENTED IN MEDICAL RECORD: ICD-10-PCS | Mod: HCNC,CPTII,S$GLB, | Performed by: INTERNAL MEDICINE

## 2023-09-21 PROCEDURE — 1126F AMNT PAIN NOTED NONE PRSNT: CPT | Mod: HCNC,CPTII,S$GLB, | Performed by: INTERNAL MEDICINE

## 2023-09-21 PROCEDURE — 1157F ADVNC CARE PLAN IN RCRD: CPT | Mod: HCNC,CPTII,S$GLB, | Performed by: INTERNAL MEDICINE

## 2023-09-21 PROCEDURE — 1101F PT FALLS ASSESS-DOCD LE1/YR: CPT | Mod: HCNC,CPTII,S$GLB, | Performed by: INTERNAL MEDICINE

## 2023-09-21 PROCEDURE — 1126F PR PAIN SEVERITY QUANTIFIED, NO PAIN PRESENT: ICD-10-PCS | Mod: HCNC,CPTII,S$GLB, | Performed by: INTERNAL MEDICINE

## 2023-09-21 PROCEDURE — 3080F DIAST BP >= 90 MM HG: CPT | Mod: HCNC,CPTII,S$GLB, | Performed by: INTERNAL MEDICINE

## 2023-09-21 PROCEDURE — 3080F PR MOST RECENT DIASTOLIC BLOOD PRESSURE >= 90 MM HG: ICD-10-PCS | Mod: HCNC,CPTII,S$GLB, | Performed by: INTERNAL MEDICINE

## 2023-09-21 PROCEDURE — 99999 PR PBB SHADOW E&M-EST. PATIENT-LVL IV: ICD-10-PCS | Mod: PBBFAC,HCNC,, | Performed by: INTERNAL MEDICINE

## 2023-09-21 PROCEDURE — 3288F PR FALLS RISK ASSESSMENT DOCUMENTED: ICD-10-PCS | Mod: HCNC,CPTII,S$GLB, | Performed by: INTERNAL MEDICINE

## 2023-09-21 PROCEDURE — 3074F SYST BP LT 130 MM HG: CPT | Mod: HCNC,CPTII,S$GLB, | Performed by: INTERNAL MEDICINE

## 2023-09-21 PROCEDURE — 1157F PR ADVANCE CARE PLAN OR EQUIV PRESENT IN MEDICAL RECORD: ICD-10-PCS | Mod: HCNC,CPTII,S$GLB, | Performed by: INTERNAL MEDICINE

## 2023-09-21 PROCEDURE — 99215 PR OFFICE/OUTPT VISIT, EST, LEVL V, 40-54 MIN: ICD-10-PCS | Mod: HCNC,S$GLB,, | Performed by: INTERNAL MEDICINE

## 2023-09-21 PROCEDURE — 99215 OFFICE O/P EST HI 40 MIN: CPT | Mod: HCNC,S$GLB,, | Performed by: INTERNAL MEDICINE

## 2023-09-21 NOTE — PATIENT INSTRUCTIONS
Assessment/Plan:  Angelina Man is a 73 y.o. female with venous insufficiency s/p EVLT of the left LSV and right GSV, hypothyroidism, anxiety, arthritis, and persistent atrial [facility], who presents for a follow up appointment.     1. BLE Venous insufficiency with pain- Stable.  Mrs. Man reports legs feeling heavy and tired all the time.  Likely due to venous insufficiency.  Check updated BLE venous reflux study and exercise STEPHANIE study.  Continue graduated compression hose.  Continue to limit sodium intake to 2,000 mg daily.  Elevate legs when resting.      2. Persistent atrial fibrillation- Stable.  Continue current medications.    4. Exercise Induced Pulmonary HTN- Stable.  Continue current medications.    Follow up in 4 months

## 2023-09-21 NOTE — PROGRESS NOTES
[facility]      Chief Complaint   Patient presents with    Varicose veins of both lower extremities with complications       Patient ID: Angelina Man is a 73 y.o. female with venous insufficiency s/p EVLT of the left LSV and right GSV, hypothyroidism, anxiety, arthritis, and persistent atrial fibrillation s/p DCCV 17-Jul-2020, who presents for a follow up appointment.  Pertinent history/events are as follows:     -Pt kindly referred by Dr. Pereira for evaluation of varicose veins.      -At our initial clinic visit on 6/22/2021, Mrs. Man reported varicose veins with worsening swelling and discomfort.  No claudication or tissue loss.  Exam shows BLE's with prominent varicose veins with venous stasis dermatitis.  Plan:   Varicose veins of both lower extremities- Check BLE venous reflux study and stephanie study.  If no evidence of severe PAD, start graduated compression hose.  Pt to limit sodium intake to 2,000 mg daily.  Elevate legs when resting.    10/7/2021 clinic visit: Mrs. Man reports continued leg worsening swelling and discomfort, despite wearing graduated compression hose for the past 3 months.  She has no claudication or tissue loss.  Exam shows BLE's with prominent varicose veins with venous stasis dermatitis.  BLE Venous Reflux Study on 7/1/2021 revealed hemodynamically significant venous reflux bilaterally with no DVT.  The right and left SSV's are partially compressible with thickened and hyperechoic walls suggestive of previous or chronic superficial venous thrombosis.  Segmental Pressure Study 7/1/2021 revealed normal STEPHANIE at rest and with exercise bilaterally.    Plan:  BLE Venous insufficiency with pain- Mrs. Man reports continued leg worsening swelling and discomfort, despite wearing graduated compression hose for the past 3 months.  She has no claudication or tissue loss.  Exam shows BLE's with prominent varicose veins with venous stasis dermatitis.  BLE Venous Reflux Study on  7/1/2021 revealed hemodynamically significant venous reflux bilaterally with no DVT.  The right and left SSV's are partially compressible with thickened and hyperechoic walls suggestive of previous or chronic superficial venous thrombosis.  Segmental Pressure Study 7/1/2021 revealed normal STEPHANIE at rest and with exercise bilaterally.  Given continued symptoms despite conservative therapy with graduated compression hose, will refer for EVLT/sclerotherapy.    12/21/2021 clinic visit: Mrs. Man reports she underwent left leg EVLT. She wants to proceed with right EVLT for which she is planned. She has no other complaints.  Plan:   BLE Venous insufficiency with pain- s/p EVLT of the left LSV on 11/11 with follow up US showing resolution of reflux. Limited edema in right foot/ankle. Planned for right leg EVLT. Following with Dr. Hernandez    -On 1/6/2022, pt underwent Endovenous radiofrequency ablation (EVLT) of the right saphenous vein(s) of the lower extremity.    3/22/2022 clinic visit: Mrs. Man reports significant improvement in BLE edema since undergoing EVLT of the left LSV and right GSV.  She has no lower extremity wound/ulcer.  Plan:   BLE Venous insufficiency with pain- Mrs. Man is now s/p EVLT of the left LSV and right GSV with significant improvement in BLE edema.  Follow up RLE venous ultrasound results are pending.  Continue graduated compression hose.  Continue to limit sodium intake to 2,000 mg daily.  Elevate legs when resting.    Persistent atrial fibrillation- Stable.  Continue current medications.  Exercise Induced Pulmonary HTN- Stable.  Continue current medications.    9/22/2022 clinic visit: Mrs. Man reports hitting her left leg on a pedal at spin class and developing a wound 3 weeks ago.  The wound is being managed by wound care.  She reports no significant LE edema.   Plan:   BLE Venous insufficiency with pain- Mrs. Man is now s/p EVLT of the left LSV and right GSV with significant  "improvement in BLE edema.  Follow up RLE venous ultrasound results as above.  Continue graduated compression hose.  Continue to limit sodium intake to 2,000 mg daily.  Elevate legs when resting.    Left leg wound- Appears to be healing appropriately.  Continue wound care.    Persistent atrial fibrillation- Stable.  Continue current medications.  Exercise Induced Pulmonary HTN- Stable.  Continue current medications.    2/2/2023 clinic visit: Mrs. Man reports doing well with no chest pain or SOB.  LLE wound has completely healed.  She has no significant leg swelling.   Plan:   BLE Venous insufficiency with pain- Stable.  Mrs. Man is now s/p EVLT of the left LSV and right GSV with significant improvement in BLE edema.  Follow up RLE venous ultrasound results as above.  Continue graduated compression hose.  Continue to limit sodium intake to 2,000 mg daily.  Elevate legs when resting.    Left leg wound- Now completely healed.      Persistent atrial fibrillation- Stable.  Continue current medications.  Exercise Induced Pulmonary HTN- Stable.  Continue current medications.    HPI:  Mrs. Man reports legs feel "heavy and tired all the time".  No claudication or tissue loss.  Reports having restless leg syndrome in the past.       Past Medical History:   Diagnosis Date    Arthritis     Atrial fibrillation     Bronchitis     Cataract     Dry eyes     GERD (gastroesophageal reflux disease)     Glaucoma, suspect - Both Eyes     Hypothyroidism 06/23/2016    Pulmonary hypertension     Rash     Renal angiomyolipoma     Renal disorder     Spinal stenosis     Squamous cell carcinoma     in-situ right upper inner arm, right wrist    Status post lumbar surgery 06/23/2016    Stress fracture     Thyroid disease     Venous insufficiency      Past Surgical History:   Procedure Laterality Date    ANGIOPLASTY      CATARACT EXTRACTION W/  INTRAOCULAR LENS IMPLANT Left 12/6/2022    Procedure: EXTRACTION, CATARACT, WITH IOL INSERTION; "  Surgeon: Tone Brown MD;  Location: [facility] OR;  Service: Ophthalmology;  Laterality: Left;    CATARACT EXTRACTION W/  INTRAOCULAR LENS IMPLANT Right 12/20/2022    Procedure: EXTRACTION, CATARACT, WITH IOL INSERTION;  Surgeon: Tone Brown MD;  Location: [facility] OR;  Service: Ophthalmology;  Laterality: Right;    CERVICAL FUSION      COLONOSCOPY      COLONOSCOPY N/A 11/14/2017    Procedure: COLONOSCOPY;  Surgeon: Kasi Mercado MD;  Location: [facility] ENDO (4TH FLR);  Service: Endoscopy;  Laterality: N/A;    COLONOSCOPY N/A 4/26/2023    Procedure: COLONOSCOPY;  Surgeon: Jeanmarie Hathaway MD;  Location: [facility] ENDO (4TH FLR);  Service: Colon and Rectal;  Laterality: N/A;  ok to hold Eliquis 2 days per Dr Pereira  constipation-ext Miralax prep  instr mailed/portal-GT  4/21/23 pre call attempted, no answer    ESOPHAGOGASTRODUODENOSCOPY N/A 10/10/2019    Procedure: EGD (ESOPHAGOGASTRODUODENOSCOPY);  Surgeon: Rg Milton MD;  Location: [facility] ENDO (4TH FLR);  Service: Endoscopy;  Laterality: N/A;    ESOPHAGOGASTRODUODENOSCOPY N/A 10/6/2021    Procedure: EGD (ESOPHAGOGASTRODUODENOSCOPY);  Surgeon: Rg Milton MD;  Location: [facility] (4TH FLR);  Service: Endoscopy;  Laterality: N/A;  covid test 10/3 [place], instr emailed/portal -ml    EXCISION Left 5/17/2023    Procedure: EXCISION SQUAMOUS CELL CARCINOMA LEFT LEG;  Surgeon: Kasi Canela Jr., MD;  Location: [facility] 2ND FLR;  Service: General;  Laterality: Left;    l4-5 mid discectomy Left 03/2017    l4-5 MIS diskectomy Right 05/2016    RIGHT HEART CATHETERIZATION Right 1/27/2022    Procedure: INSERTION, CATHETER, RIGHT HEART;  Surgeon: Satnam Pickard Jr., MD;  Location: [facility] CATH LAB;  Service: Cardiology;  Laterality: Right;    TREATMENT OF CARDIAC ARRHYTHMIA N/A 7/17/2020    Procedure: CARDIOVERSION;  Surgeon: Kwan Bray MD;  Location: [facility] EP LAB;  Service: Cardiology;  Laterality: N/A;  AF,DCCV/SANGITA, ANES, SK, 746    TREATMENT OF CARDIAC  ARRHYTHMIA N/A 7/14/2021    Procedure: CARDIOVERSION;  Surgeon: SYDNIE Cedillo MD;  Location: [facility] EP LAB;  Service: Cardiology;  Laterality: N/A;  AF, SANGITA, DCCV, MAC, EH, 3 Prep    WASHOUT Right 5/17/2023    Procedure: WASHOUT right lower arm and closure;  Surgeon: Kasi Canela Jr., MD;  [facility];  Service: General;  Laterality: Right;     Social History     Socioeconomic History    Marital status: Single   Occupational History     Employer: [employer]   Tobacco Use    Smoking status: Never    Smokeless tobacco: Never   Substance and Sexual Activity    Alcohol use: Yes     Alcohol/week: 4.0 standard drinks of alcohol     Types: 4 Glasses of wine per week     Comment: 2 glasses of wine on weekends    Drug use: No    Sexual activity: Never   Other Topics Concern    Are you pregnant or think you may be? No    Breast-feeding No     Social Determinants of Health     Financial Resource Strain: Low Risk  (9/18/2023)    Overall Financial Resource Strain (CARDIA)     Difficulty of Paying Living Expenses: Not hard at all   Food Insecurity: No Food Insecurity (9/18/2023)    Hunger Vital Sign     Worried About Running Out of Food in the Last Year: Never true     Ran Out of Food in the Last Year: Never true   Transportation Needs: No Transportation Needs (9/18/2023)    PRAPARE - Transportation     Lack of Transportation (Medical): No     Lack of Transportation (Non-Medical): No   Physical Activity: Sufficiently Active (9/18/2023)    Exercise Vital Sign     Days of Exercise per Week: 7 days     Minutes of Exercise per Session: 70 min   Stress: No Stress Concern Present (9/18/2023)    [facility] [employer] - Occupational Stress Questionnaire     Feeling of Stress : Only a little   Recent Concern: Stress - Stress Concern Present (8/26/2023)    [employer] [facility] [facility] [employer] - Occupational Stress Questionnaire     Feeling of Stress : To some extent   Social  Connections: Unknown (9/18/2023)    Social Connection and Isolation Panel [NHANES]     Frequency of Communication with Friends and Family: Once a week     Frequency of Social Gatherings with Friends and Family: Patient refused     Attends Sikh Services: Never     Active Member of Clubs or Organizations: No     Attends Club or Organization Meetings: Never     Marital Status:    Recent Concern: Social Connections - Socially Isolated (8/26/2023)    Social Connection and Isolation Panel [NHANES]     Frequency of Communication with Friends and Family: Once a week     Frequency of Social Gatherings with Friends and Family: Once a week     Attends Sikh Services: Never     Active Member of Clubs or Organizations: No     Attends Club or Organization Meetings: Never     Marital Status:    Housing Stability: Low Risk  (9/18/2023)    Housing Stability Vital Sign     Unable to Pay for Housing in the Last Year: No     Number of Places Lived in the Last Year: 1     Unstable Housing in the Last Year: No     Family History   Problem Relation Age of Onset    Cancer Mother         Lung cancer    Heart failure Father     Colon cancer Father     Hypertension Father     Cataracts Father     Cancer Father 80        colon    No Known Problems Sister     No Known Problems Brother     Melanoma Daughter     Diabetes Son     Heart disease Son     Cancer Son         esophageal cancer    No Known Problems Maternal Aunt     No Known Problems Maternal Uncle     No Known Problems Paternal Aunt     No Known Problems Paternal Uncle     No Known Problems Maternal Grandmother     No Known Problems Maternal Grandfather     No Known Problems Paternal Grandmother     No Known Problems Paternal Grandfather     Amblyopia Neg Hx     Blindness Neg Hx     Glaucoma Neg Hx     Macular degeneration Neg Hx     Retinal detachment Neg Hx     Strabismus Neg Hx     Stroke Neg Hx     Thyroid disease Neg Hx     Breast cancer Neg Hx     Ovarian  cancer Neg Hx        Review of patient's allergies indicates:  No Known Allergies    Medication List with Changes/Refills   Current Medications    ACETAMINOPHEN (TYLENOL) 500 MG TABLET    Take 500 mg by mouth every 6 (six) hours as needed for Pain.    ACYCLOVIR (ZOVIRAX) 400 MG TABLET    Take 1 tablet (400 mg total) by mouth 2 (two) times daily.    ASCORBIC ACID (VITAMIN C) 1000 MG TABLET    Take 1,000 mg by mouth once daily.     BIOTIN 1 MG TABLET    Take 1 mg by mouth 2 (two) times daily.     BUPROPION (WELLBUTRIN SR) 150 MG TBSR 12 HR TABLET    Take 1 tablet (150 mg total) by mouth 2 (two) times daily.    CLONAZEPAM (KLONOPIN) 1 MG TABLET    TAKE 1& 1/2 TABLET BY MOUTH NIGHTLY AS NEEDED    CYCLOSPORINE (RESTASIS) 0.05 % OPHTHALMIC EMULSION    Place 1 drop into both eyes 2 (two) times daily.    DIGESTIVE ENZYMES TAB    Take 1 tablet by mouth once daily.     DIPHENHYDRAMINE (BENADRYL) 25 MG CAPSULE    Take 25 mg by mouth nightly as needed.     ELIQUIS 5 MG TAB    TAKE 1 TABLET(5 MG) BY MOUTH TWICE DAILY    FLUTICASONE PROPIONATE (FLONASE) 50 MCG/ACTUATION NASAL SPRAY    1 spray (50 mcg total) by Each Nostril route once daily.    FUROSEMIDE (LASIX) 20 MG TABLET    Take 1 tablet (20 mg total) by mouth every other day.    LEVOTHYROXINE (SYNTHROID) 137 MCG TAB TABLET    Take 1 tablet (137 mcg total) by mouth before breakfast.    LUBIPROSTONE (AMITIZA) 24 MCG CAP    Take 1 capsule (24 mcg total) by mouth daily as needed (IBS symptoms).    METOPROLOL SUCCINATE (TOPROL-XL) 25 MG 24 HR TABLET    Take 1 tablet (25 mg total) by mouth once daily.    MULTIVITAMIN (THERAGRAN) PER TABLET    Take 1 tablet by mouth once daily.     TRETINOIN (RETIN-A) 0.1 % CREAM    Apply topically every evening.    ZINC 50 MG TAB    Take 50 mg by mouth once daily.    Discontinued Medications    BUPROPION (WELLBUTRIN XL) 150 MG TB24 TABLET    Take by mouth.    BUPROPION (WELLBUTRIN) 75 MG TABLET    Take by mouth.    CALCIUM-VITAMIN D 500  "MG(1,250MG) -200 UNIT PER TABLET    Take 1 tablet by mouth 2 (two) times daily with meals.     CEFAZOLIN 2 GRAM SOLR        DEXAMETHASONE (DECADRON) 4 MG/ML INJECTION        DIPRIVAN 10 MG/ML INFUSION        ONDANSETRON 4 MG/2 ML SOLN        PROLIA 60 MG/ML SYRG           Review of Systems  Constitution: Denies chills, fever, and sweats.  HENT: Denies headaches or blurry vision.  Cardiovascular: Denies chest pain or irregular heart beat.  Respiratory: Denies cough or shortness of breath.  Gastrointestinal: Denies abdominal pain, nausea, or vomiting.  Musculoskeletal: Positive or varicose veins with discomfort and swelling.  Neurological: Denies dizziness or focal weakness.  Psychiatric/Behavioral: Normal mental status.  Hematologic/Lymphatic: Denies bleeding problem or easy bruising/bleeding.  Skin: Denies rash or suspicious lesions    Physical Examination  BP (!) 126/91   Pulse 82   Ht 5' 4" (1.626 m)   Wt 46 kg (101 lb 6.6 oz)   LMP  (LMP Unknown)   BMI 17.41 kg/m²     Constitutional: No acute distress, conversant  HEENT: Sclera anicteric, Pupils equal, round and reactive to light, extraocular motions intact, Oropharynx clear  Neck: No JVD, no carotid bruits  Cardiovascular: irregular rhythm, no murmur, rubs or gallops  Pulmonary: Clear to auscultation bilaterally  Abdominal: Abdomen soft, nontender, nondistended, positive bowel sounds  Extremities: BLE's with prominent varicose veins with venous stasis dermatitis.     Pulses:  Carotid pulses are 2+ on the right side, and 2+ on the left side.  Radial pulses are 2+ on the right side, and 2+ on the left side.   Femoral pulses are 2+ on the right side, and 2+ on the left side.  Popliteal pulses are 2+ on the right side, and 2+ on the left side.   Dorsalis pedis pulses are 2+ on the right side, and 2+ on the left side.   Posterior tibial pulses are 2+ on the right side, and 2+ on the left side.    Skin: No ecchymosis, erythema, or ulcers  Psych: Alert and " oriented x 3, appropriate affect  Neuro: CNII-XII intact, no focal deficits    Labs:  Most Recent Data  CBC:   Lab Results   Component Value Date    WBC 4.35 01/09/2023    HGB 12.9 01/09/2023    HCT 40.1 01/09/2023     01/09/2023     (H) 01/09/2023    RDW 13.2 01/09/2023     BMP:   Lab Results   Component Value Date     01/09/2023    K 3.4 (L) 01/09/2023     01/09/2023    CO2 29 01/09/2023    BUN 12 01/09/2023    CREATININE 0.8 01/09/2023    GLU 85 01/09/2023    CALCIUM 8.9 01/09/2023    MG 1.6 12/28/2021     LFTS;   Lab Results   Component Value Date    PROT 6.3 01/09/2023    ALBUMIN 3.5 01/09/2023    BILITOT 0.6 01/09/2023    AST 22 01/09/2023    ALKPHOS 80 01/09/2023    ALT 16 01/09/2023    GGT 52 04/13/2022     COAGS:   Lab Results   Component Value Date    INR 1.1 07/07/2021     FLP:   Lab Results   Component Value Date    CHOL 201 (H) 01/09/2023    HDL 71 01/09/2023    LDLCALC 113.8 01/09/2023    TRIG 81 01/09/2023    CHOLHDL 35.3 01/09/2023     CARDIAC:   Lab Results   Component Value Date    TROPONINI <0.006 07/16/2020     (H) 12/28/2021     Imaging    BLE Venous Reflux Study 3/22/2022:  The right lesser saphenous vein is partially compressible consistent with SVT.  The contralateral (left) common femoral vein is patent. .  The right greater saphenous vein is occluded consistent with successful prior EVLT.    BLE Venous reflux study 12/14/2021:  There is no evidence of a left lower extremity DVT.  Left GSV occluded post EVLT.    Segmental Pressure Study 7/1/2021:  Normal STEPHANIE bilaterally with unremarkable waveforms at rest.    Normal STEPHANIE with exercise.  The right TBI is 0.51 and the left TBI is 0.40, which is mild to moderately abnormal.    BLE Venous Reflux Study 7/1/2021:  No evidence of lower extremity deep venous thrombosis bilateral.  There is reflux in the right common femoral vein and left external iliac vein.  The right GSV below the level of the knee is partially  compressible with thickened and hyperechoic walls suggestive of previous or chronic superficial venous thrombosis.    There is also evidence of superficial venous insufficiency of the right GSV below the knee without dilation of the vessel.  The right SSV is partially compressible with thickened and hyperechoic walls suggestive of previous or chronic superficial venous thrombosis.  The left SSV is partially compressible with thickened and hyperechoic walls suggestive of previous or chronic superficial venous thrombosis.    Assessment/Plan:  Angelina Man is a 73 y.o. female with venous insufficiency s/p EVLT of the left LSV and right GSV, hypothyroidism, anxiety, arthritis, and persistent [facility], who presents for a follow up appointment.     1. BLE Venous insufficiency with pain- Stable.  Mrs. Man reports legs feeling heavy and tired all the time.  Likely due to venous insufficiency.  Check updated BLE venous reflux study and exercise STEPHANIE study.  Continue graduated compression hose.  Continue to limit sodium intake to 2,000 mg daily.  Elevate legs when resting.      2. Persistent atrial fibrillation- Stable.  Continue current medications.    4. Exercise Induced Pulmonary HTN- Stable.  Continue current medications.    Follow up in 4 months     Total duration of face to face visit time 30 minutes.  Total time spent counseling greater than fifty percent of total visit time.  Counseling included discussion regarding imaging findings, diagnosis, possibilities, treatment options, risks and benefits.  The patient had many questions regarding the options and long-term effects.    Jeanmarie Cedeño MD, PhD  Interventional Cardiiology

## 2023-09-25 ENCOUNTER — OFFICE VISIT (OUTPATIENT)
Dept: DERMATOLOGY | Facility: CLINIC | Age: 73
End: 2023-09-25
Payer: MEDICARE

## 2023-09-25 DIAGNOSIS — D48.5 NEOPLASM OF UNCERTAIN BEHAVIOR OF SKIN: Primary | ICD-10-CM

## 2023-09-25 DIAGNOSIS — C44.729 SQUAMOUS CELL CARCINOMA OF LEFT LOWER LEG: ICD-10-CM

## 2023-09-25 PROCEDURE — 88305 TISSUE EXAM BY PATHOLOGIST: ICD-10-PCS | Mod: 26,HCNC,, | Performed by: PATHOLOGY

## 2023-09-25 PROCEDURE — 1160F PR REVIEW ALL MEDS BY PRESCRIBER/CLIN PHARMACIST DOCUMENTED: ICD-10-PCS | Mod: HCNC,CPTII,S$GLB, | Performed by: DERMATOLOGY

## 2023-09-25 PROCEDURE — 99999 PR PBB SHADOW E&M-EST. PATIENT-LVL IV: CPT | Mod: PBBFAC,HCNC,, | Performed by: DERMATOLOGY

## 2023-09-25 PROCEDURE — 88305 TISSUE EXAM BY PATHOLOGIST: CPT | Mod: HCNC | Performed by: PATHOLOGY

## 2023-09-25 PROCEDURE — 1157F PR ADVANCE CARE PLAN OR EQUIV PRESENT IN MEDICAL RECORD: ICD-10-PCS | Mod: HCNC,CPTII,S$GLB, | Performed by: DERMATOLOGY

## 2023-09-25 PROCEDURE — 3288F FALL RISK ASSESSMENT DOCD: CPT | Mod: HCNC,CPTII,S$GLB, | Performed by: DERMATOLOGY

## 2023-09-25 PROCEDURE — 1159F PR MEDICATION LIST DOCUMENTED IN MEDICAL RECORD: ICD-10-PCS | Mod: HCNC,CPTII,S$GLB, | Performed by: DERMATOLOGY

## 2023-09-25 PROCEDURE — 99999 PR PBB SHADOW E&M-EST. PATIENT-LVL IV: ICD-10-PCS | Mod: PBBFAC,HCNC,, | Performed by: DERMATOLOGY

## 2023-09-25 PROCEDURE — 99214 OFFICE O/P EST MOD 30 MIN: CPT | Mod: 25,HCNC,S$GLB, | Performed by: DERMATOLOGY

## 2023-09-25 PROCEDURE — 1101F PR PT FALLS ASSESS DOC 0-1 FALLS W/OUT INJ PAST YR: ICD-10-PCS | Mod: HCNC,CPTII,S$GLB, | Performed by: DERMATOLOGY

## 2023-09-25 PROCEDURE — 88305 TISSUE EXAM BY PATHOLOGIST: CPT | Mod: 26,HCNC,, | Performed by: PATHOLOGY

## 2023-09-25 PROCEDURE — 1126F AMNT PAIN NOTED NONE PRSNT: CPT | Mod: HCNC,CPTII,S$GLB, | Performed by: DERMATOLOGY

## 2023-09-25 PROCEDURE — 1157F ADVNC CARE PLAN IN RCRD: CPT | Mod: HCNC,CPTII,S$GLB, | Performed by: DERMATOLOGY

## 2023-09-25 PROCEDURE — 1126F PR PAIN SEVERITY QUANTIFIED, NO PAIN PRESENT: ICD-10-PCS | Mod: HCNC,CPTII,S$GLB, | Performed by: DERMATOLOGY

## 2023-09-25 PROCEDURE — 1101F PT FALLS ASSESS-DOCD LE1/YR: CPT | Mod: HCNC,CPTII,S$GLB, | Performed by: DERMATOLOGY

## 2023-09-25 PROCEDURE — 11102 TANGNTL BX SKIN SINGLE LES: CPT | Mod: HCNC,S$GLB,, | Performed by: DERMATOLOGY

## 2023-09-25 PROCEDURE — 3288F PR FALLS RISK ASSESSMENT DOCUMENTED: ICD-10-PCS | Mod: HCNC,CPTII,S$GLB, | Performed by: DERMATOLOGY

## 2023-09-25 PROCEDURE — 1160F RVW MEDS BY RX/DR IN RCRD: CPT | Mod: HCNC,CPTII,S$GLB, | Performed by: DERMATOLOGY

## 2023-09-25 PROCEDURE — 99214 PR OFFICE/OUTPT VISIT, EST, LEVL IV, 30-39 MIN: ICD-10-PCS | Mod: 25,HCNC,S$GLB, | Performed by: DERMATOLOGY

## 2023-09-25 PROCEDURE — 11102 PR TANGENTIAL BIOPSY, SKIN, SINGLE LESION: ICD-10-PCS | Mod: HCNC,S$GLB,, | Performed by: DERMATOLOGY

## 2023-09-25 PROCEDURE — 1159F MED LIST DOCD IN RCRD: CPT | Mod: HCNC,CPTII,S$GLB, | Performed by: DERMATOLOGY

## 2023-09-25 RX ORDER — FLUOROURACIL 50 MG/G
CREAM TOPICAL
Qty: 40 G | Refills: 1 | Status: SHIPPED | OUTPATIENT
Start: 2023-09-25 | End: 2024-01-02

## 2023-09-25 NOTE — PROGRESS NOTES
Subjective:      Patient ID:  Angelina Man is a 73 y.o. female who presents for   Chief Complaint   Patient presents with    Follow-up     L lower lateral shin     Lesion     Left lower leg      Patient is here today for follow up of left lower lateral shin - bx on 7/2023 - squamoproliferative lesion.     Pt is concerned about lesion on left lower leg x 1-2 months. States that scab-like and painful. No prev tx.       Review of Systems   Skin:  Positive for daily sunscreen use and activity-related sunscreen use. Negative for recent sunburn.   Hematologic/Lymphatic: Bruises/bleeds easily (eliquis).       Objective:   Physical Exam   Constitutional: She appears well-developed and well-nourished. No distress.   Neurological: She is alert and oriented to person, place, and time. She is not disoriented.   Psychiatric: She has a normal mood and affect.   Skin:   Areas Examined (abnormalities noted in diagram):   LLE Inspection Performed              Diagram Legend     Erythematous scaling macule/papule c/w actinic keratosis       Vascular papule c/w angioma      Pigmented verrucoid papule/plaque c/w seborrheic keratosis      Yellow umbilicated papule c/w sebaceous hyperplasia      Irregularly shaped tan macule c/w lentigo     1-2 mm smooth white papules consistent with Milia      Movable subcutaneous cyst with punctum c/w epidermal inclusion cyst      Subcutaneous movable cyst c/w pilar cyst      Firm pink to brown papule c/w dermatofibroma      Pedunculated fleshy papule(s) c/w skin tag(s)      Evenly pigmented macule c/w junctional nevus     Mildly variegated pigmented, slightly irregular-bordered macule c/w mildly atypical nevus      Flesh colored to evenly pigmented papule c/w intradermal nevus       Pink pearly papule/plaque c/w basal cell carcinoma      Erythematous hyperkeratotic cursted plaque c/w SCC      Surgical scar with no sign of skin cancer recurrence      Open and closed comedones      Inflammatory  papules and pustules      Verrucoid papule consistent consistent with wart     Erythematous eczematous patches and plaques     Dystrophic onycholytic nail with subungual debris c/w onychomycosis     Umbilicated papule    Erythematous-base heme-crusted tan verrucoid plaque consistent with inflamed seborrheic keratosis     Erythematous Silvery Scaling Plaque c/w Psoriasis     See annotation      Assessment / Plan:      Pathology Orders:       Normal Orders This Visit    Specimen to Pathology, Dermatology     Questions:    Procedure Type: Dermatology and skin neoplasms    Number of Specimens: 1    ------------------------: -------------------------    Spec 1 Procedure: Biopsy    Spec 1 Clinical Impression: r/o SCC    Spec 1 Source: left mid lower shin    Release to patient:           Neoplasm of uncertain behavior of skin  Shave biopsy procedure note:    Shave biopsy performed after verbal consent including risk of infection, scar, recurrence, need for additional treatment of site. Area prepped with alcohol, anesthetized with approximately 1.0cc of 1% lidocaine with epinephrine. Lesional tissue shaved with razor blade. Hemostasis achieved with application of aluminum chloride followed by hyfrecation. No complications. Dressing applied. Wound care explained.    -     Specimen to Pathology, Dermatology    Squamous cell carcinoma of left lower leg  -     fluorouraciL (EFUDEX) 5 % cream; Apply thin film to left lower leg  2times per day for 21 days; d/c if area bleeding or ulcerated; avoid eyes or mouth  Dispense: 40 g; Refill: 1  Efudex/Florouracil treatment: left lower leg 2x per day for 21 days  Discussed to treat as directed and that area will be red, inflamed, and irritated which is a normal reaction.  Medication should be discontinued if area treated is ulcerated or bleeding.  Pt should wait 2 weeks to use medication if areas that are to be treated have also been treated with cryosurgery/freezing. Pt should avoid  medication contacting eyes or mouth and wear sunscreen,  sun protective clothing, hat,  and sunglasses if going to be in the sun while undergoing treatment.  Pt can send photo or call if concerned about reaction.            Follow up for prn bx report.

## 2023-09-25 NOTE — PATIENT INSTRUCTIONS
Efudex/Florouracil treatment: left lower leg 2x per day for 21 days  Discussed to treat as directed and that area will be red, inflamed, and irritated which is a normal reaction.  Medication should be discontinued if area treated is ulcerated or bleeding.  Pt should wait 2 weeks to use medication if areas that are to be treated have also been treated with cryosurgery/freezing. Pt should avoid medication contacting eyes or mouth and wear sunscreen,  sun protective clothing, hat,  and sunglasses if going to be in the sun while undergoing treatment.  Pt can send photo or call if concerned about reaction.

## 2023-09-29 LAB
FINAL PATHOLOGIC DIAGNOSIS: NORMAL
GROSS: NORMAL
Lab: NORMAL
MICROSCOPIC EXAM: NORMAL

## 2023-10-02 ENCOUNTER — HOSPITAL ENCOUNTER (OUTPATIENT)
Dept: CARDIOLOGY | Facility: HOSPITAL | Age: 73
Discharge: HOME | End: 2023-10-02
Attending: INTERNAL MEDICINE
Payer: MEDICARE

## 2023-10-02 DIAGNOSIS — I87.2 VENOUS INSUFFICIENCY OF BOTH LOWER EXTREMITIES: ICD-10-CM

## 2023-10-02 DIAGNOSIS — R29.898 LEG HEAVINESS: ICD-10-CM

## 2023-10-02 LAB
IMMEDIATE ARM BP: 155 MMHG
IMMEDIATE LEFT ABI: 1.19
IMMEDIATE LEFT TIBIAL: 185 MMHG
IMMEDIATE RIGHT ABI: 1.04
IMMEDIATE RIGHT TIBIAL: 161 MMHG
LEFT ABI: 1.24
LEFT ARM BP: 122 MMHG
LEFT DORSALIS PEDIS: 157 MMHG
LEFT GREAT SAPHENOUS DISTAL THIGH DIA: 0.22 CM
LEFT GREAT SAPHENOUS KNEE DIA: 0.2 CM
LEFT GREAT SAPHENOUS PROXIMAL CALF DIA: 0.2 CM
LEFT GREAT SAPHENOUS PROXIMAL CALF REFLUX: 2843 MS
LEFT POSTERIOR TIBIAL: 154 MMHG
LEFT SMALL SAPHENOUS KNEE DIA: 0.34 CM
LEFT SMALL SAPHENOUS SPJ DIA: 0.23 CM
LEFT TBI: 0.6
LEFT TOE PRESSURE: 76 MMHG
RIGHT ABI: 1.12
RIGHT ARM BP: 127 MMHG
RIGHT DORSALIS PEDIS: 114 MMHG
RIGHT GREAT SAPHENOUS DISTAL THIGH DIA: 0.26 CM
RIGHT GREAT SAPHENOUS DISTAL THIGH REFLUX: 1355 MS
RIGHT GREAT SAPHENOUS KNEE DIA: 0.32 CM
RIGHT GREAT SAPHENOUS KNEE REFLUX: 1724 MS
RIGHT GREAT SAPHENOUS PROXIMAL CALF DIA: 0.12 CM
RIGHT POSTERIOR TIBIAL: 142 MMHG
RIGHT SMALL SAPHENOUS KNEE DIA: 0.23 CM
RIGHT SMALL SAPHENOUS SPJ DIA: 0.2 CM
RIGHT TBI: 0.68
RIGHT TOE PRESSURE: 86 MMHG
TREADMILL GRADE: 12 %
TREADMILL SPEED: 2 MPH
TREADMILL TIME: 5 MIN

## 2023-10-02 PROCEDURE — 93924 LWR XTR VASC STDY BILAT: CPT | Mod: 26,HCNC,, | Performed by: INTERNAL MEDICINE

## 2023-10-02 PROCEDURE — 93924 ANKLE BRACHIAL INDICES (ABI): ICD-10-PCS | Mod: 26,HCNC,, | Performed by: INTERNAL MEDICINE

## 2023-10-02 PROCEDURE — 93970 CV US LOWER VENOUS INSUFFICIENCY BILATERAL (CUPID ONLY): ICD-10-PCS | Mod: 26,HCNC,, | Performed by: INTERNAL MEDICINE

## 2023-10-02 PROCEDURE — 93924 LWR XTR VASC STDY BILAT: CPT | Mod: HCNC

## 2023-10-02 PROCEDURE — 93970 EXTREMITY STUDY: CPT | Mod: 26,HCNC,, | Performed by: INTERNAL MEDICINE

## 2023-10-02 PROCEDURE — 93970 EXTREMITY STUDY: CPT | Mod: TC,HCNC

## 2023-10-02 NOTE — PROGRESS NOTES
Discussed result with patient. She will use fluorouracil cream 2x per day for 3 weeks when healed.    Skin, left mid lower shin, shave biopsy:   - Regressing keratoacanthoma.   - Margins are negative in the planes of section.     F/u 4 months.

## 2023-10-06 ENCOUNTER — PATIENT MESSAGE (OUTPATIENT)
Dept: CARDIOLOGY | Facility: CLINIC | Age: 73
End: 2023-10-06
Payer: MEDICARE

## 2023-10-09 ENCOUNTER — PATIENT MESSAGE (OUTPATIENT)
Dept: CARDIOLOGY | Facility: CLINIC | Age: 73
End: 2023-10-09
Payer: MEDICARE

## 2023-10-09 ENCOUNTER — INFUSION (OUTPATIENT)
Dept: INFECTIOUS DISEASES | Facility: HOSPITAL | Age: 73
End: 2023-10-09
Payer: MEDICARE

## 2023-10-09 VITALS
TEMPERATURE: 99 F | DIASTOLIC BLOOD PRESSURE: 61 MMHG | WEIGHT: 101.06 LBS | RESPIRATION RATE: 16 BRPM | HEART RATE: 83 BPM | OXYGEN SATURATION: 99 % | HEIGHT: 64 IN | SYSTOLIC BLOOD PRESSURE: 123 MMHG | BODY MASS INDEX: 17.25 KG/M2

## 2023-10-09 DIAGNOSIS — M81.0 AGE-RELATED OSTEOPOROSIS WITHOUT CURRENT PATHOLOGICAL FRACTURE: Primary | ICD-10-CM

## 2023-10-09 PROCEDURE — 96372 THER/PROPH/DIAG INJ SC/IM: CPT | Mod: HCNC

## 2023-10-09 PROCEDURE — 63600175 PHARM REV CODE 636 W HCPCS: Mod: JZ,JG,HCNC | Performed by: INTERNAL MEDICINE

## 2023-10-09 RX ADMIN — DENOSUMAB 60 MG: 60 INJECTION SUBCUTANEOUS at 01:10

## 2023-10-09 NOTE — PROGRESS NOTES
Patient arrives for Prolia injection - confirms use of calcium and vitamin D supplements and denies dental procedures over past 3 months - administered per guidelines.    Next appt scheduled.    Limited head-to-toe assessment due to privacy issues and visit reason though the opportunity was given for patient to express any concerns

## 2023-10-11 ENCOUNTER — PATIENT MESSAGE (OUTPATIENT)
Dept: CARDIOLOGY | Facility: CLINIC | Age: 73
End: 2023-10-11
Payer: MEDICARE

## 2023-10-23 ENCOUNTER — TELEPHONE (OUTPATIENT)
Dept: OPTOMETRY | Facility: CLINIC | Age: 73
End: 2023-10-23
Payer: MEDICARE

## 2023-10-23 NOTE — TELEPHONE ENCOUNTER
----- Message from Rebecca Salazar sent at 10/23/2023  3:03 PM CDT -----  Contact: pt @ [phone]    ----- Message -----  From: Tete Haq  Sent: 10/23/2023   2:48 PM CDT  To: Joe Man calling regarding Appointment Access  (message) for #pt is calling to get appt for [facility] location please call before making appt, asking for call back

## 2023-10-24 ENCOUNTER — PATIENT MESSAGE (OUTPATIENT)
Dept: CARDIOLOGY | Facility: CLINIC | Age: 73
End: 2023-10-24
Payer: MEDICARE

## 2023-10-25 ENCOUNTER — PATIENT MESSAGE (OUTPATIENT)
Dept: CARDIOLOGY | Facility: CLINIC | Age: 73
End: 2023-10-25
Payer: MEDICARE

## 2023-11-01 ENCOUNTER — PATIENT MESSAGE (OUTPATIENT)
Dept: PRIMARY CARE CLINIC | Facility: CLINIC | Age: 73
End: 2023-11-01
Payer: MEDICARE

## 2023-11-08 ENCOUNTER — OFFICE VISIT (OUTPATIENT)
Dept: INTERNAL MEDICINE | Facility: CLINIC | Age: 73
End: 2023-11-08
Payer: MEDICARE

## 2023-11-08 ENCOUNTER — PATIENT MESSAGE (OUTPATIENT)
Dept: INTERNAL MEDICINE | Facility: CLINIC | Age: 73
End: 2023-11-08

## 2023-11-08 VITALS
HEART RATE: 90 BPM | SYSTOLIC BLOOD PRESSURE: 102 MMHG | BODY MASS INDEX: 17.32 KG/M2 | RESPIRATION RATE: 18 BRPM | HEIGHT: 64 IN | DIASTOLIC BLOOD PRESSURE: 60 MMHG | OXYGEN SATURATION: 99 % | WEIGHT: 101.44 LBS

## 2023-11-08 DIAGNOSIS — G25.81 RLS (RESTLESS LEGS SYNDROME): Primary | ICD-10-CM

## 2023-11-08 DIAGNOSIS — F33.9 EPISODE OF RECURRENT MAJOR DEPRESSIVE DISORDER, UNSPECIFIED DEPRESSION EPISODE SEVERITY: ICD-10-CM

## 2023-11-08 DIAGNOSIS — G47.00 INSOMNIA, UNSPECIFIED TYPE: ICD-10-CM

## 2023-11-08 PROCEDURE — 1125F AMNT PAIN NOTED PAIN PRSNT: CPT | Mod: HCNC,CPTII,S$GLB, | Performed by: INTERNAL MEDICINE

## 2023-11-08 PROCEDURE — 3078F PR MOST RECENT DIASTOLIC BLOOD PRESSURE < 80 MM HG: ICD-10-PCS | Mod: HCNC,CPTII,S$GLB, | Performed by: INTERNAL MEDICINE

## 2023-11-08 PROCEDURE — 3288F PR FALLS RISK ASSESSMENT DOCUMENTED: ICD-10-PCS | Mod: HCNC,CPTII,S$GLB, | Performed by: INTERNAL MEDICINE

## 2023-11-08 PROCEDURE — 3288F FALL RISK ASSESSMENT DOCD: CPT | Mod: HCNC,CPTII,S$GLB, | Performed by: INTERNAL MEDICINE

## 2023-11-08 PROCEDURE — 1101F PR PT FALLS ASSESS DOC 0-1 FALLS W/OUT INJ PAST YR: ICD-10-PCS | Mod: HCNC,CPTII,S$GLB, | Performed by: INTERNAL MEDICINE

## 2023-11-08 PROCEDURE — 1160F PR REVIEW ALL MEDS BY PRESCRIBER/CLIN PHARMACIST DOCUMENTED: ICD-10-PCS | Mod: HCNC,CPTII,S$GLB, | Performed by: INTERNAL MEDICINE

## 2023-11-08 PROCEDURE — 3074F PR MOST RECENT SYSTOLIC BLOOD PRESSURE < 130 MM HG: ICD-10-PCS | Mod: HCNC,CPTII,S$GLB, | Performed by: INTERNAL MEDICINE

## 2023-11-08 PROCEDURE — 1125F PR PAIN SEVERITY QUANTIFIED, PAIN PRESENT: ICD-10-PCS | Mod: HCNC,CPTII,S$GLB, | Performed by: INTERNAL MEDICINE

## 2023-11-08 PROCEDURE — 99999 PR PBB SHADOW E&M-EST. PATIENT-LVL IV: CPT | Mod: PBBFAC,HCNC,, | Performed by: INTERNAL MEDICINE

## 2023-11-08 PROCEDURE — 99214 PR OFFICE/OUTPT VISIT, EST, LEVL IV, 30-39 MIN: ICD-10-PCS | Mod: HCNC,S$GLB,, | Performed by: INTERNAL MEDICINE

## 2023-11-08 PROCEDURE — 3074F SYST BP LT 130 MM HG: CPT | Mod: HCNC,CPTII,S$GLB, | Performed by: INTERNAL MEDICINE

## 2023-11-08 PROCEDURE — 3078F DIAST BP <80 MM HG: CPT | Mod: HCNC,CPTII,S$GLB, | Performed by: INTERNAL MEDICINE

## 2023-11-08 PROCEDURE — 99999 PR PBB SHADOW E&M-EST. PATIENT-LVL IV: ICD-10-PCS | Mod: PBBFAC,HCNC,, | Performed by: INTERNAL MEDICINE

## 2023-11-08 PROCEDURE — 1157F ADVNC CARE PLAN IN RCRD: CPT | Mod: HCNC,CPTII,S$GLB, | Performed by: INTERNAL MEDICINE

## 2023-11-08 PROCEDURE — 1159F PR MEDICATION LIST DOCUMENTED IN MEDICAL RECORD: ICD-10-PCS | Mod: HCNC,CPTII,S$GLB, | Performed by: INTERNAL MEDICINE

## 2023-11-08 PROCEDURE — 1101F PT FALLS ASSESS-DOCD LE1/YR: CPT | Mod: HCNC,CPTII,S$GLB, | Performed by: INTERNAL MEDICINE

## 2023-11-08 PROCEDURE — 1160F RVW MEDS BY RX/DR IN RCRD: CPT | Mod: HCNC,CPTII,S$GLB, | Performed by: INTERNAL MEDICINE

## 2023-11-08 PROCEDURE — 99214 OFFICE O/P EST MOD 30 MIN: CPT | Mod: HCNC,S$GLB,, | Performed by: INTERNAL MEDICINE

## 2023-11-08 PROCEDURE — 1159F MED LIST DOCD IN RCRD: CPT | Mod: HCNC,CPTII,S$GLB, | Performed by: INTERNAL MEDICINE

## 2023-11-08 PROCEDURE — 1157F PR ADVANCE CARE PLAN OR EQUIV PRESENT IN MEDICAL RECORD: ICD-10-PCS | Mod: HCNC,CPTII,S$GLB, | Performed by: INTERNAL MEDICINE

## 2023-11-08 RX ORDER — TRAZODONE HYDROCHLORIDE 50 MG/1
50 TABLET ORAL NIGHTLY PRN
Qty: 30 TABLET | Refills: 0 | Status: SHIPPED | OUTPATIENT
Start: 2023-11-08 | End: 2023-12-19 | Stop reason: SDUPTHER

## 2023-11-08 RX ORDER — PRAMIPEXOLE DIHYDROCHLORIDE 0.25 MG/1
0.25 TABLET ORAL NIGHTLY
Qty: 90 TABLET | Refills: 1 | Status: SHIPPED | OUTPATIENT
Start: 2023-11-08 | End: 2024-01-03 | Stop reason: SDUPTHER

## 2023-11-08 RX ORDER — MIRTAZAPINE 7.5 MG/1
7.5 TABLET, FILM COATED ORAL NIGHTLY
Qty: 90 TABLET | Refills: 1 | Status: SHIPPED | OUTPATIENT
Start: 2023-11-08 | End: 2024-01-03 | Stop reason: SDUPTHER

## 2023-11-08 NOTE — PROGRESS NOTES
Subjective:      Patient ID: Angelina Man is a 73 y.o. female.    Chief Complaint: Insomnia and restless leg    Angelina Man is a 73 y.o. female with PMH significant for HSV, depression, afib dx in 2020 on eliquis, hypothyroidism, anxiety, IBS, GERD, renal cysts, fibroids, BLE venous insuffiencey, osteoporosis on prolia, pulmonary HTN, HFpEF, hx of lumbar fusion, OA, squamous cell carcinoma dx 4/23 s/p excision,  angiomyolipoma of right kidney.  Presenting today for follow up. Last annual 1/3/2023.    RLS: Discussed RLS sx today. Reports that she has been having involuntary jerking of her legs and has also been affecting her sleep. Trial mirapex at this time at low dose to help.     Insomnia: Spent time discussing insomnia today. She reports that she has not been sleeping at all and has been having difficulty falling asleep. Discussed current medications and inquired about wellbutrin administration time to see as wellbutrin late at night could cause issues with staying asleep. We discussed her mood and she does report that she has been noting increased depression and lack of interest. Would be interested in adding mirtazepine qhs to help with insomnia along with mood control. Discussed hypnotic options such as ambien to help sleep, which was discourage for her and also the patient was not interested in ambien. If no improvement with current approach, consider very low dose, infrequent BZD to help manage and/or referral to sleep medicine.     Denies any chest pain, shortness of breath, nausea vomiting constipation diarrhea, blood in stool, heartburn    Review of Systems   Constitutional:  Negative for chills, fever and weight loss.   HENT:  Negative for congestion, ear pain and sore throat.    Eyes:  Negative for double vision.   Respiratory:  Negative for cough and shortness of breath.    Cardiovascular:  Negative for chest pain, palpitations and leg swelling.   Gastrointestinal:  Negative for abdominal pain,  heartburn, nausea and vomiting.   Skin:  Negative for rash.   Neurological:  Negative for dizziness, tingling and headaches.   Psychiatric/Behavioral:  Negative for depression.          Current Outpatient Medications:     acetaminophen (TYLENOL) 500 MG tablet, Take 500 mg by mouth every 6 (six) hours as needed for Pain., Disp: , Rfl:     acyclovir (ZOVIRAX) 400 MG tablet, Take 1 tablet (400 mg total) by mouth 2 (two) times daily., Disp: 180 tablet, Rfl: 0    ascorbic acid (VITAMIN C) 1000 MG tablet, Take 1,000 mg by mouth once daily. , Disp: , Rfl:     biotin 1 mg tablet, Take 1 mg by mouth 2 (two) times daily. , Disp: , Rfl:     buPROPion (WELLBUTRIN SR) 150 MG TBSR 12 hr tablet, Take 1 tablet (150 mg total) by mouth 2 (two) times daily., Disp: 180 tablet, Rfl: 3    cycloSPORINE (RESTASIS) 0.05 % ophthalmic emulsion, Place 1 drop into both eyes 2 (two) times daily. (Patient taking differently: Place 1 drop into both eyes as needed.), Disp: 60 each, Rfl: 11    digestive enzymes Tab, Take 1 tablet by mouth once daily. , Disp: , Rfl:     diphenhydrAMINE (BENADRYL) 25 mg capsule, Take 25 mg by mouth nightly as needed. , Disp: , Rfl:     ELIQUIS 5 mg Tab, TAKE 1 TABLET(5 MG) BY MOUTH TWICE DAILY, Disp: 180 tablet, Rfl: 3    fluorouraciL (EFUDEX) 5 % cream, Apply thin film to left lower leg  2times per day for 21 days; d/c if area bleeding or ulcerated; avoid eyes or mouth, Disp: 40 g, Rfl: 1    fluticasone propionate (FLONASE) 50 mcg/actuation nasal spray, 1 spray (50 mcg total) by Each Nostril route once daily. (Patient taking differently: 1 spray by Each Nostril route 2 (two) times daily as needed.), Disp: 16 g, Rfl: 12    furosemide (LASIX) 20 MG tablet, Take 1 tablet (20 mg total) by mouth every other day., Disp: 15 tablet, Rfl: 11    levothyroxine (SYNTHROID) 137 MCG Tab tablet, Take 1 tablet (137 mcg total) by mouth before breakfast., Disp: 30 tablet, Rfl: 11    lubiprostone (AMITIZA) 24 MCG Cap, Take 1 capsule  "(24 mcg total) by mouth daily as needed (IBS symptoms)., Disp: 30 capsule, Rfl: 6    metoprolol succinate (TOPROL-XL) 25 MG 24 hr tablet, Take 1 tablet (25 mg total) by mouth once daily., Disp: 30 tablet, Rfl: 6    multivitamin (THERAGRAN) per tablet, Take 1 tablet by mouth once daily. , Disp: , Rfl:     tretinoin (RETIN-A) 0.1 % cream, Apply topically every evening., Disp: 20 g, Rfl: 6    zinc 50 mg Tab, Take 50 mg by mouth once daily. , Disp: , Rfl:     mirtazapine (REMERON) 7.5 MG Tab, Take 1 tablet (7.5 mg total) by mouth nightly., Disp: 90 tablet, Rfl: 1    pramipexole (MIRAPEX) 0.25 MG tablet, Take 1 tablet (0.25 mg total) by mouth every evening. For restless legs, Disp: 90 tablet, Rfl: 1    traZODone (DESYREL) 50 MG tablet, Take 1 tablet (50 mg total) by mouth nightly as needed for Insomnia., Disp: 30 tablet, Rfl: 0    Lab Results   Component Value Date    HGBA1C 5.4 08/24/2015    HGBA1C 5.3 08/13/2014    HGBA1C 5.5 08/08/2013     No results found for: "MICALBCREAT"  Lab Results   Component Value Date    LDLCALC 113.8 01/09/2023    LDLCALC 111.4 04/13/2022    CHOL 201 (H) 01/09/2023    HDL 71 01/09/2023    TRIG 81 01/09/2023       Lab Results   Component Value Date     01/09/2023    K 3.4 (L) 01/09/2023     01/09/2023    CO2 29 01/09/2023    GLU 85 01/09/2023    BUN 12 01/09/2023    CREATININE 0.8 01/09/2023    CALCIUM 8.9 01/09/2023    PROT 6.3 01/09/2023    ALBUMIN 3.5 01/09/2023    BILITOT 0.6 01/09/2023    ALKPHOS 80 01/09/2023    AST 22 01/09/2023    ALT 16 01/09/2023    ANIONGAP 10 01/09/2023    ESTGFRAFRICA >60.0 05/25/2022    EGFRNONAA 55.6 (A) 05/25/2022    WBC 4.35 01/09/2023    HGB 12.9 01/09/2023    HGB 14.1 04/13/2022    HCT 40.1 01/09/2023     (H) 01/09/2023     01/09/2023    TSH 1.117 01/09/2023    HEPCAB Negative 09/15/2016       Lab Results   Component Value Date    RCBXNEQB79DI 66 01/09/2023    ALBVBGEF88 923 04/13/2022         Past Medical History:   Diagnosis Date "    Arthritis     Atrial fibrillation     Bronchitis     Cataract     Dry eyes     GERD (gastroesophageal reflux disease)     Glaucoma, suspect - Both Eyes     Hypothyroidism 06/23/2016    Pulmonary hypertension     Rash     Renal angiomyolipoma     Renal disorder     SCC (squamous cell carcinoma) 07/2023    left lower lateral shin    Spinal stenosis     Squamous cell carcinoma     in-situ right upper inner arm, right wrist    Status post lumbar surgery 06/23/2016    Stress fracture     Thyroid disease     Venous insufficiency      Past Surgical History:   Procedure Laterality Date    ANGIOPLASTY      CATARACT EXTRACTION W/  INTRAOCULAR LENS IMPLANT Left 12/6/2022    Procedure: EXTRACTION, CATARACT, WITH IOL INSERTION;  Surgeon: Tone Brown MD;  Location: [facility];  Service: Ophthalmology;  Laterality: Left;    CATARACT EXTRACTION W/  INTRAOCULAR LENS IMPLANT Right 12/20/2022    Procedure: EXTRACTION, CATARACT, WITH IOL INSERTION;  Surgeon: Tone Brown MD;  Location: [facility] OR;  Service: Ophthalmology;  Laterality: Right;    CERVICAL FUSION      COLONOSCOPY      COLONOSCOPY N/A 11/14/2017    Procedure: COLONOSCOPY;  Surgeon: Kasi Mercado MD;  Location: [facility] (4TH FLR);  Service: Endoscopy;  Laterality: N/A;    COLONOSCOPY N/A 4/26/2023    Procedure: COLONOSCOPY;  Surgeon: Jeanmarie Hathaway MD;  Location: [facility] (4TH FLR);  Service: Colon and Rectal;  Laterality: N/A;  ok to hold Eliquis 2 days per Dr Pereira  constipation-ext Miralax prep  instr mailed/portal-GT  4/21/23 pre call attempted, no answer    ESOPHAGOGASTRODUODENOSCOPY N/A 10/10/2019    Procedure: EGD (ESOPHAGOGASTRODUODENOSCOPY);  Surgeon: Rg Milton MD;  Location: [facility] (4TH FLR);  Service: Endoscopy;  Laterality: N/A;    ESOPHAGOGASTRODUODENOSCOPY N/A 10/6/2021    Procedure: EGD (ESOPHAGOGASTRODUODENOSCOPY);  Surgeon: Rg Milton MD;  Location: [facility] (4TH FLR);  Service: Endoscopy;  Laterality: N/A;  covid  test 10/3 zo, instr emailed/portal -ml    EXCISION Left 5/17/2023    Procedure: EXCISION SQUAMOUS CELL CARCINOMA LEFT LEG;  Surgeon: Kasi Canela Jr., MD;  [facility];  Service: General;  Laterality: Left;    l4-5 mid discectomy Left 03/2017    l4-5 MIS diskectomy Right 05/2016    RIGHT HEART CATHETERIZATION Right 1/27/2022    Procedure: INSERTION, CATHETER, RIGHT HEART;  Surgeon: Satnam Pickard Jr., MD;  Location: [facility] CATH LAB;  Service: Cardiology;  Laterality: Right;    TREATMENT OF CARDIAC ARRHYTHMIA N/A 7/17/2020    Procedure: CARDIOVERSION;  Surgeon: Kwan Bray MD;  Location: [facility] EP LAB;  Service: Cardiology;  Laterality: N/A;  AF,DCCV/SANGITA, ANES, SK, 746    TREATMENT OF CARDIAC ARRHYTHMIA N/A 7/14/2021    Procedure: CARDIOVERSION;  Surgeon: SYDNIE Cedillo MD;  Location: [facility] EP LAB;  Service: Cardiology;  Laterality: N/A;  AF, SANGITA, DCCV, MAC, EH, 3 Prep    WASHOUT Right 5/17/2023    Procedure: WASHOUT right lower arm and closure;  Surgeon: Kasi Canela Jr., MD;  [facility];  Service: General;  Laterality: Right;     Social History     Social History Narrative    Not on file     Family History   Problem Relation Age of Onset    Cancer Mother         Lung cancer    Heart failure Father     Colon cancer Father     Hypertension Father     Cataracts Father     Cancer Father 80        colon    No Known Problems Sister     No Known Problems Brother     Melanoma Daughter     Diabetes Son     Heart disease Son     Cancer Son         esophageal cancer    No Known Problems Maternal Aunt     No Known Problems Maternal Uncle     No Known Problems Paternal Aunt     No Known Problems Paternal Uncle     No Known Problems Maternal Grandmother     No Known Problems Maternal Grandfather     No Known Problems Paternal Grandmother     No Known Problems Paternal Grandfather     Amblyopia Neg Hx     Blindness Neg Hx     Glaucoma Neg Hx     Macular degeneration Neg Hx      "Retinal detachment Neg Hx     Strabismus Neg Hx     Stroke Neg Hx     Thyroid disease Neg Hx     Breast cancer Neg Hx     Ovarian cancer Neg Hx      Vitals:    11/08/23 1405   BP: 102/60   Pulse: 90   Resp: 18   SpO2: 99%   Weight: 46 kg (101 lb 6.6 oz)   Height: 5' 4" (1.626 m)   PainSc:   4   PainLoc: Leg     Objective:   Physical Exam  Constitutional:       Appearance: Normal appearance.   HENT:      Head: Normocephalic.      Nose: Nose normal.   Neurological:      Mental Status: She is alert and oriented to person, place, and time. Mental status is at baseline.   Psychiatric:         Mood and Affect: Mood normal.         Behavior: Behavior normal.       Assessment:     1. RLS (restless legs syndrome)    2. Insomnia, unspecified type    3. Episode of recurrent major depressive disorder, unspecified depression episode severity      Plan:     Orders Placed This Encounter    mirtazapine (REMERON) 7.5 MG Tab    traZODone (DESYREL) 50 MG tablet    pramipexole (MIRAPEX) 0.25 MG tablet       There are no Patient Instructions on file for this visit.  All of your core healthy metrics are met.                            "

## 2023-12-07 ENCOUNTER — OFFICE VISIT (OUTPATIENT)
Dept: PODIATRY | Facility: CLINIC | Age: 73
End: 2023-12-07
Payer: MEDICARE

## 2023-12-07 VITALS
HEIGHT: 64 IN | SYSTOLIC BLOOD PRESSURE: 134 MMHG | DIASTOLIC BLOOD PRESSURE: 79 MMHG | HEART RATE: 97 BPM | BODY MASS INDEX: 17.92 KG/M2 | WEIGHT: 104.94 LBS

## 2023-12-07 DIAGNOSIS — L84 CORNS/CALLOSITIES: Primary | ICD-10-CM

## 2023-12-07 PROCEDURE — 99499 UNLISTED E&M SERVICE: CPT | Mod: HCNC,,, | Performed by: PODIATRIST

## 2023-12-07 PROCEDURE — 99999 PR PBB SHADOW E&M-EST. PATIENT-LVL IV: ICD-10-PCS | Mod: PBBFAC,HCNC,, | Performed by: PODIATRIST

## 2023-12-07 PROCEDURE — 99499 NO LOS: ICD-10-PCS | Mod: HCNC,,, | Performed by: PODIATRIST

## 2023-12-07 PROCEDURE — 17999 PR NON-COVERED FOOT CARE: ICD-10-PCS | Mod: CSM,HCNC,S$GLB, | Performed by: PODIATRIST

## 2023-12-07 PROCEDURE — 99999 PR PBB SHADOW E&M-EST. PATIENT-LVL IV: CPT | Mod: PBBFAC,HCNC,, | Performed by: PODIATRIST

## 2023-12-07 PROCEDURE — 17999 UNLISTD PX SKN MUC MEMB SUBQ: CPT | Mod: CSM,HCNC,S$GLB, | Performed by: PODIATRIST

## 2023-12-16 NOTE — PROGRESS NOTES
Chart has been dictated using voice recognition software.  It is not been reviewed carefully for any transcriptional errors due to this technology.   Subjective:   Patient ID:  Angelina Man is a 73 y.o. female who presents for follow-up of No chief complaint on file.      HPI: Patient with BLE venous insufficiency s/p bilateral EVLT (left 11-Nov-2021; right 06-Jan-2022), hypothyroidism, anxiety, arthritis, persistent atrial fibrillation s/p DCCV 17-Jul-2020 but with recurrent chronic AF, and HFpEF.  Patient follows with Dr. Cedeño for her venous insufficiency.       Patient notes that she has more dyspnea when working out than she use. No dyspnea with normal activities Patient denies any chest discomfort on exertion or at rest. Patient denies any palpitations, lightheadedness, or syncope.  Patient denies any dyspnea at rest or on exertion, orthopnea, or PND. No edema when she wears compression stockings     Past Medical History:   Diagnosis Date    Arthritis     Atrial fibrillation     Bronchitis     Cataract     Dry eyes     GERD (gastroesophageal reflux disease)     Glaucoma, suspect - Both Eyes     Hypothyroidism 06/23/2016    Pulmonary hypertension     Rash     Renal angiomyolipoma     Renal disorder     SCC (squamous cell carcinoma) 07/2023    left lower lateral shin    Spinal stenosis     Squamous cell carcinoma     in-situ right upper inner arm, right wrist    Status post lumbar surgery 06/23/2016    Stress fracture     Thyroid disease     Venous insufficiency        Outpatient Medications Prior to Visit   Medication Sig Dispense Refill    acetaminophen (TYLENOL) 500 MG tablet Take 500 mg by mouth every 6 (six) hours as needed for Pain.      acyclovir (ZOVIRAX) 400 MG tablet Take 1 tablet (400 mg total) by mouth 2 (two) times daily. 180 tablet 0    ascorbic acid (VITAMIN C) 1000 MG tablet Take 1,000 mg by mouth once daily.       biotin 1 mg tablet Take 1 mg by mouth 2 (two) times daily.       buPROPion  (WELLBUTRIN SR) 150 MG TBSR 12 hr tablet Take 1 tablet (150 mg total) by mouth 2 (two) times daily. 180 tablet 3    cycloSPORINE (RESTASIS) 0.05 % ophthalmic emulsion Place 1 drop into both eyes 2 (two) times daily. (Patient taking differently: Place 1 drop into both eyes as needed.) 60 each 11    digestive enzymes Tab Take 1 tablet by mouth once daily.       diphenhydrAMINE (BENADRYL) 25 mg capsule Take 25 mg by mouth nightly as needed.       ELIQUIS 5 mg Tab TAKE 1 TABLET(5 MG) BY MOUTH TWICE DAILY 180 tablet 3    fluorouraciL (EFUDEX) 5 % cream Apply thin film to left lower leg  2times per day for 21 days; d/c if area bleeding or ulcerated; avoid eyes or mouth 40 g 1    fluticasone propionate (FLONASE) 50 mcg/actuation nasal spray 1 spray (50 mcg total) by Each Nostril route once daily. (Patient taking differently: 1 spray by Each Nostril route 2 (two) times daily as needed.) 16 g 12    furosemide (LASIX) 20 MG tablet Take 1 tablet (20 mg total) by mouth every other day. 15 tablet 11    levothyroxine (SYNTHROID) 137 MCG Tab tablet Take 1 tablet (137 mcg total) by mouth before breakfast. 30 tablet 11    lubiprostone (AMITIZA) 24 MCG Cap Take 1 capsule (24 mcg total) by mouth daily as needed (IBS symptoms). 30 capsule 6    metoprolol succinate (TOPROL-XL) 25 MG 24 hr tablet Take 1 tablet (25 mg total) by mouth once daily. 30 tablet 6    mirtazapine (REMERON) 7.5 MG Tab Take 1 tablet (7.5 mg total) by mouth nightly. 90 tablet 1    multivitamin (THERAGRAN) per tablet Take 1 tablet by mouth once daily.       pramipexole (MIRAPEX) 0.25 MG tablet Take 1 tablet (0.25 mg total) by mouth every evening. For restless legs 90 tablet 1    traZODone (DESYREL) 50 MG tablet Take 1 tablet (50 mg total) by mouth nightly as needed for Insomnia. 30 tablet 0    tretinoin (RETIN-A) 0.1 % cream Apply topically every evening. 20 g 6    zinc 50 mg Tab Take 50 mg by mouth once daily.        No facility-administered medications prior to  "visit.       Review of Systems   Constitutional: Negative for weight gain and weight loss.   HENT:  Negative for nosebleeds.    Respiratory:  Negative for hemoptysis.    Hematologic/Lymphatic: Does not bruise/bleed easily.   Musculoskeletal:  Positive for back pain and myalgias.   Gastrointestinal:  Negative for hematemesis, hematochezia and melena.   Genitourinary:  Negative for hematuria.   Neurological:  Positive for loss of balance (balance not as good as it use to be). Negative for focal weakness.        Droppings things more often regardless of which hand she uses.   Psychiatric/Behavioral:  The patient has insomnia.       Objective:   Physical Exam  Constitutional:       Comments: /81 (right arm - machine) /76 (left arm - machine)   Pulse 76   Ht 5' 4" (1.626 m)   Wt 46.4 kg (102 lb 4.7 oz)   LMP  (LMP Unknown)   SpO2 98%   BMI 17.56 kg/m²   Repeat blood pressure:  BP: 134/80 (right arm sitting manual)  140/80 (left arm sitting manual)              Lab Results   Component Value Date     01/09/2023    K 3.4 (L) 01/09/2023    BUN 12 01/09/2023    CREATININE 0.8 01/09/2023    EGFRNORACEVR >60.0 01/09/2023    GLU 85 01/09/2023    HGBA1C 5.4 08/24/2015    CHOL 201 (H) 01/09/2023    TRIG 81 01/09/2023    HDL 71 01/09/2023    LDLCALC 113.8 01/09/2023    HGB 12.9 01/09/2023    HCT 40.1 01/09/2023    WBC 4.35 01/09/2023     01/09/2023    INR 1.1 07/07/2021     ECG (today) shows coarse atrial fibrillation with a controlled ventricular response.  There were no other abnormalities noted.  Assessment:     1. Longstanding persistent atrial fibrillation    2. Chronic heart failure with preserved ejection fraction    3. Hypothyroidism, unspecified type    4. Venous insufficiency of both lower extremities    5. Gastroesophageal reflux disease, unspecified whether esophagitis present    6. S/P lumbar fusion      Patient continues to have mild exertional dyspnea symptoms with exercise that " requires a higher heart rate.  While her heart rate is well controlled at rest, there is a possibility the patient's exertional heart rate increases faster than required for the amount of exertion she is doing.  Additionally, i it has never clearly been demonstrated the patient does not have an ischemic component to her cardiac problems.  Therefore, patient will be sent for a exercise stress SPECT test to determine her heart rate response to exercise as well as if there is any ischemia with exercise.  Further decisions concerning her atrial fibrillation and HFpEF will be determined after reviewing the results of this test.    Her venous insufficiency appears to be under control as patient had no edema.  Her GERD and back disease are being treated by other physicians.  However, it should be noted that her back disease is preventing some of the patient's exercise capacity.    Unless there are intervening problems, patient should return for re-evaluation in 6 months.   Plan:     Diagnoses and all orders for this visit:    Longstanding persistent atrial fibrillation    Chronic heart failure with preserved ejection fraction    Hypothyroidism, unspecified type    Venous insufficiency of both lower extremities    Gastroesophageal reflux disease, unspecified whether esophagitis present    S/P lumbar fusion          David Pereira MD  Consultative Cardiology

## 2023-12-18 ENCOUNTER — OFFICE VISIT (OUTPATIENT)
Dept: CARDIOLOGY | Facility: CLINIC | Age: 73
End: 2023-12-18
Payer: MEDICARE

## 2023-12-18 VITALS
SYSTOLIC BLOOD PRESSURE: 128 MMHG | BODY MASS INDEX: 17.47 KG/M2 | OXYGEN SATURATION: 98 % | DIASTOLIC BLOOD PRESSURE: 76 MMHG | HEART RATE: 76 BPM | HEIGHT: 64 IN | WEIGHT: 102.31 LBS

## 2023-12-18 DIAGNOSIS — R07.9 CHEST PAIN, UNSPECIFIED TYPE: ICD-10-CM

## 2023-12-18 DIAGNOSIS — I48.11 LONGSTANDING PERSISTENT ATRIAL FIBRILLATION: Primary | ICD-10-CM

## 2023-12-18 DIAGNOSIS — I50.32 CHRONIC HEART FAILURE WITH PRESERVED EJECTION FRACTION: ICD-10-CM

## 2023-12-18 DIAGNOSIS — E03.9 HYPOTHYROIDISM, UNSPECIFIED TYPE: ICD-10-CM

## 2023-12-18 DIAGNOSIS — K21.9 GASTROESOPHAGEAL REFLUX DISEASE, UNSPECIFIED WHETHER ESOPHAGITIS PRESENT: ICD-10-CM

## 2023-12-18 DIAGNOSIS — Z98.1 S/P LUMBAR FUSION: ICD-10-CM

## 2023-12-18 DIAGNOSIS — I87.2 VENOUS INSUFFICIENCY OF BOTH LOWER EXTREMITIES: ICD-10-CM

## 2023-12-18 PROCEDURE — 99999 PR PBB SHADOW E&M-EST. PATIENT-LVL V: CPT | Mod: PBBFAC,HCNC,, | Performed by: INTERNAL MEDICINE

## 2023-12-18 PROCEDURE — 99214 OFFICE O/P EST MOD 30 MIN: CPT | Mod: HCNC,S$GLB,, | Performed by: INTERNAL MEDICINE

## 2023-12-18 PROCEDURE — 1160F PR REVIEW ALL MEDS BY PRESCRIBER/CLIN PHARMACIST DOCUMENTED: ICD-10-PCS | Mod: HCNC,CPTII,S$GLB, | Performed by: INTERNAL MEDICINE

## 2023-12-18 PROCEDURE — 3078F DIAST BP <80 MM HG: CPT | Mod: HCNC,CPTII,S$GLB, | Performed by: INTERNAL MEDICINE

## 2023-12-18 PROCEDURE — 93005 ELECTROCARDIOGRAM TRACING: CPT | Mod: HCNC,S$GLB,, | Performed by: INTERNAL MEDICINE

## 2023-12-18 PROCEDURE — 1159F MED LIST DOCD IN RCRD: CPT | Mod: HCNC,CPTII,S$GLB, | Performed by: INTERNAL MEDICINE

## 2023-12-18 PROCEDURE — 1101F PR PT FALLS ASSESS DOC 0-1 FALLS W/OUT INJ PAST YR: ICD-10-PCS | Mod: HCNC,CPTII,S$GLB, | Performed by: INTERNAL MEDICINE

## 2023-12-18 PROCEDURE — 93005 EKG 12-LEAD: ICD-10-PCS | Mod: HCNC,S$GLB,, | Performed by: INTERNAL MEDICINE

## 2023-12-18 PROCEDURE — 3078F PR MOST RECENT DIASTOLIC BLOOD PRESSURE < 80 MM HG: ICD-10-PCS | Mod: HCNC,CPTII,S$GLB, | Performed by: INTERNAL MEDICINE

## 2023-12-18 PROCEDURE — 1157F PR ADVANCE CARE PLAN OR EQUIV PRESENT IN MEDICAL RECORD: ICD-10-PCS | Mod: HCNC,CPTII,S$GLB, | Performed by: INTERNAL MEDICINE

## 2023-12-18 PROCEDURE — 3288F FALL RISK ASSESSMENT DOCD: CPT | Mod: HCNC,CPTII,S$GLB, | Performed by: INTERNAL MEDICINE

## 2023-12-18 PROCEDURE — 93010 ELECTROCARDIOGRAM REPORT: CPT | Mod: HCNC,S$GLB,, | Performed by: INTERNAL MEDICINE

## 2023-12-18 PROCEDURE — 3288F PR FALLS RISK ASSESSMENT DOCUMENTED: ICD-10-PCS | Mod: HCNC,CPTII,S$GLB, | Performed by: INTERNAL MEDICINE

## 2023-12-18 PROCEDURE — 1126F AMNT PAIN NOTED NONE PRSNT: CPT | Mod: HCNC,CPTII,S$GLB, | Performed by: INTERNAL MEDICINE

## 2023-12-18 PROCEDURE — 3074F PR MOST RECENT SYSTOLIC BLOOD PRESSURE < 130 MM HG: ICD-10-PCS | Mod: HCNC,CPTII,S$GLB, | Performed by: INTERNAL MEDICINE

## 2023-12-18 PROCEDURE — 1101F PT FALLS ASSESS-DOCD LE1/YR: CPT | Mod: HCNC,CPTII,S$GLB, | Performed by: INTERNAL MEDICINE

## 2023-12-18 PROCEDURE — 1157F ADVNC CARE PLAN IN RCRD: CPT | Mod: HCNC,CPTII,S$GLB, | Performed by: INTERNAL MEDICINE

## 2023-12-18 PROCEDURE — 1159F PR MEDICATION LIST DOCUMENTED IN MEDICAL RECORD: ICD-10-PCS | Mod: HCNC,CPTII,S$GLB, | Performed by: INTERNAL MEDICINE

## 2023-12-18 PROCEDURE — 99999 PR PBB SHADOW E&M-EST. PATIENT-LVL V: ICD-10-PCS | Mod: PBBFAC,HCNC,, | Performed by: INTERNAL MEDICINE

## 2023-12-18 PROCEDURE — 99214 PR OFFICE/OUTPT VISIT, EST, LEVL IV, 30-39 MIN: ICD-10-PCS | Mod: HCNC,S$GLB,, | Performed by: INTERNAL MEDICINE

## 2023-12-18 PROCEDURE — 3074F SYST BP LT 130 MM HG: CPT | Mod: HCNC,CPTII,S$GLB, | Performed by: INTERNAL MEDICINE

## 2023-12-18 PROCEDURE — 1126F PR PAIN SEVERITY QUANTIFIED, NO PAIN PRESENT: ICD-10-PCS | Mod: HCNC,CPTII,S$GLB, | Performed by: INTERNAL MEDICINE

## 2023-12-18 PROCEDURE — 93010 EKG 12-LEAD: ICD-10-PCS | Mod: HCNC,S$GLB,, | Performed by: INTERNAL MEDICINE

## 2023-12-18 PROCEDURE — 1160F RVW MEDS BY RX/DR IN RCRD: CPT | Mod: HCNC,CPTII,S$GLB, | Performed by: INTERNAL MEDICINE

## 2023-12-18 NOTE — Clinical Note
Thank you for allowing me to follow-up with Angelina Man for atrial fibrillation and heart failure with preserved ejection fraction (HFpEF). Please see my note for details of this encounter. If you have any questions, please contact me.  Thank you again for allowing to participate in the care of this patient.

## 2023-12-19 ENCOUNTER — TELEPHONE (OUTPATIENT)
Dept: CARDIOLOGY | Facility: CLINIC | Age: 73
End: 2023-12-19
Payer: MEDICARE

## 2023-12-19 ENCOUNTER — PATIENT MESSAGE (OUTPATIENT)
Dept: INTERNAL MEDICINE | Facility: CLINIC | Age: 73
End: 2023-12-19
Payer: MEDICARE

## 2023-12-19 DIAGNOSIS — R07.9 CHEST PAIN, UNSPECIFIED TYPE: Primary | ICD-10-CM

## 2023-12-19 DIAGNOSIS — G47.00 INSOMNIA, UNSPECIFIED TYPE: ICD-10-CM

## 2023-12-19 RX ORDER — TRAZODONE HYDROCHLORIDE 100 MG/1
100 TABLET ORAL NIGHTLY PRN
Qty: 90 TABLET | Refills: 0 | Status: SHIPPED | OUTPATIENT
Start: 2023-12-19 | End: 2023-12-19

## 2023-12-19 RX ORDER — TRAZODONE HYDROCHLORIDE 50 MG/1
50 TABLET ORAL NIGHTLY PRN
Qty: 90 TABLET | Refills: 1 | Status: SHIPPED | OUTPATIENT
Start: 2023-12-19 | End: 2024-01-03 | Stop reason: SDUPTHER

## 2023-12-26 ENCOUNTER — OFFICE VISIT (OUTPATIENT)
Dept: URGENT CARE | Facility: CLINIC | Age: 73
End: 2023-12-26
Payer: MEDICARE

## 2023-12-26 VITALS
BODY MASS INDEX: 17.51 KG/M2 | WEIGHT: 102 LBS | DIASTOLIC BLOOD PRESSURE: 80 MMHG | SYSTOLIC BLOOD PRESSURE: 129 MMHG | TEMPERATURE: 98 F | RESPIRATION RATE: 16 BRPM | HEART RATE: 81 BPM | OXYGEN SATURATION: 99 %

## 2023-12-26 NOTE — PROGRESS NOTES
"Subjective:      Patient ID: Angelina Man is a 73 y.o. female.    Vitals:  vitals were not taken for this visit.     Chief Complaint: No chief complaint on file.    This is a 73 y.o. female who presents today with a chief complaint of injured R ring finger nail post fall outside when nail hit the concrete x 3 days. Pt has acrylic nails and most of the acrylic has been pulled off nail. Hurts most when pt nail "gets caught on something". No fever, no swelling.    Home tx: bandage    PMH: blood thinners    Nail Problem  This is a new problem. The current episode started in the past 7 days. The problem occurs constantly. The problem has been unchanged. Pertinent negatives include no fever.     Constitution: Negative for fever.    Objective:     Physical Exam    Assessment:     No diagnosis found.    Plan:       There are no diagnoses linked to this encounter.                "

## 2024-01-02 NOTE — PROGRESS NOTES
Pt decided to go have her fake gel nail removed first and will come back after nail removed to have her actual nail examined.

## 2024-01-03 ENCOUNTER — OFFICE VISIT (OUTPATIENT)
Dept: INTERNAL MEDICINE | Facility: CLINIC | Age: 74
End: 2024-01-03
Payer: MEDICARE

## 2024-01-03 VITALS
OXYGEN SATURATION: 100 % | HEART RATE: 67 BPM | SYSTOLIC BLOOD PRESSURE: 130 MMHG | HEIGHT: 64 IN | BODY MASS INDEX: 17.11 KG/M2 | WEIGHT: 100.19 LBS | DIASTOLIC BLOOD PRESSURE: 80 MMHG

## 2024-01-03 DIAGNOSIS — I48.11 LONGSTANDING PERSISTENT ATRIAL FIBRILLATION: ICD-10-CM

## 2024-01-03 DIAGNOSIS — M81.0 OSTEOPOROSIS, UNSPECIFIED OSTEOPOROSIS TYPE, UNSPECIFIED PATHOLOGICAL FRACTURE PRESENCE: ICD-10-CM

## 2024-01-03 DIAGNOSIS — Z00.00 ROUTINE GENERAL MEDICAL EXAMINATION AT A HEALTH CARE FACILITY: Primary | ICD-10-CM

## 2024-01-03 DIAGNOSIS — F33.9 EPISODE OF RECURRENT MAJOR DEPRESSIVE DISORDER, UNSPECIFIED DEPRESSION EPISODE SEVERITY: ICD-10-CM

## 2024-01-03 DIAGNOSIS — R79.0 ABNORMAL LEVEL OF BLOOD MINERAL: ICD-10-CM

## 2024-01-03 DIAGNOSIS — I50.32 CHRONIC HEART FAILURE WITH PRESERVED EJECTION FRACTION: ICD-10-CM

## 2024-01-03 DIAGNOSIS — M54.30 SCIATICA, UNSPECIFIED LATERALITY: ICD-10-CM

## 2024-01-03 DIAGNOSIS — R79.9 ABNORMAL FINDING OF BLOOD CHEMISTRY, UNSPECIFIED: ICD-10-CM

## 2024-01-03 DIAGNOSIS — L97.921 ULCER OF LEFT LOWER EXTREMITY, LIMITED TO BREAKDOWN OF SKIN: ICD-10-CM

## 2024-01-03 DIAGNOSIS — G95.20 UNSPECIFIED CORD COMPRESSION: ICD-10-CM

## 2024-01-03 DIAGNOSIS — G25.81 RLS (RESTLESS LEGS SYNDROME): ICD-10-CM

## 2024-01-03 DIAGNOSIS — E03.9 HYPOTHYROIDISM, UNSPECIFIED TYPE: ICD-10-CM

## 2024-01-03 DIAGNOSIS — B00.9 HSV INFECTION: ICD-10-CM

## 2024-01-03 DIAGNOSIS — J30.2 ACUTE SEASONAL ALLERGIC RHINITIS: ICD-10-CM

## 2024-01-03 DIAGNOSIS — G47.00 INSOMNIA, UNSPECIFIED TYPE: ICD-10-CM

## 2024-01-03 DIAGNOSIS — Z12.31 OTHER SCREENING MAMMOGRAM: ICD-10-CM

## 2024-01-03 DIAGNOSIS — D25.9 UTERINE LEIOMYOMA, UNSPECIFIED LOCATION: ICD-10-CM

## 2024-01-03 DIAGNOSIS — F41.9 ANXIETY: ICD-10-CM

## 2024-01-03 PROCEDURE — 99999 PR PBB SHADOW E&M-EST. PATIENT-LVL V: CPT | Mod: PBBFAC,HCNC,, | Performed by: INTERNAL MEDICINE

## 2024-01-03 PROCEDURE — 1101F PT FALLS ASSESS-DOCD LE1/YR: CPT | Mod: HCNC,CPTII,S$GLB, | Performed by: INTERNAL MEDICINE

## 2024-01-03 PROCEDURE — 1160F RVW MEDS BY RX/DR IN RCRD: CPT | Mod: HCNC,CPTII,S$GLB, | Performed by: INTERNAL MEDICINE

## 2024-01-03 PROCEDURE — 1157F ADVNC CARE PLAN IN RCRD: CPT | Mod: HCNC,CPTII,S$GLB, | Performed by: INTERNAL MEDICINE

## 2024-01-03 PROCEDURE — 3079F DIAST BP 80-89 MM HG: CPT | Mod: HCNC,CPTII,S$GLB, | Performed by: INTERNAL MEDICINE

## 2024-01-03 PROCEDURE — 1125F AMNT PAIN NOTED PAIN PRSNT: CPT | Mod: HCNC,CPTII,S$GLB, | Performed by: INTERNAL MEDICINE

## 2024-01-03 PROCEDURE — 99397 PER PM REEVAL EST PAT 65+ YR: CPT | Mod: HCNC,S$GLB,, | Performed by: INTERNAL MEDICINE

## 2024-01-03 PROCEDURE — 3075F SYST BP GE 130 - 139MM HG: CPT | Mod: HCNC,CPTII,S$GLB, | Performed by: INTERNAL MEDICINE

## 2024-01-03 PROCEDURE — 3288F FALL RISK ASSESSMENT DOCD: CPT | Mod: HCNC,CPTII,S$GLB, | Performed by: INTERNAL MEDICINE

## 2024-01-03 PROCEDURE — 1159F MED LIST DOCD IN RCRD: CPT | Mod: HCNC,CPTII,S$GLB, | Performed by: INTERNAL MEDICINE

## 2024-01-03 RX ORDER — LEVOTHYROXINE SODIUM 137 UG/1
137 TABLET ORAL
Qty: 90 TABLET | Refills: 3 | Status: SHIPPED | OUTPATIENT
Start: 2024-01-03

## 2024-01-03 RX ORDER — FLUTICASONE PROPIONATE 50 MCG
1 SPRAY, SUSPENSION (ML) NASAL 2 TIMES DAILY PRN
Qty: 16 G | Refills: 11 | Status: SHIPPED | OUTPATIENT
Start: 2024-01-03

## 2024-01-03 RX ORDER — BUPROPION HYDROCHLORIDE 150 MG/1
150 TABLET, EXTENDED RELEASE ORAL 2 TIMES DAILY
Qty: 180 TABLET | Refills: 3 | Status: SHIPPED | OUTPATIENT
Start: 2024-01-03 | End: 2025-01-02

## 2024-01-03 RX ORDER — PRAMIPEXOLE DIHYDROCHLORIDE 0.25 MG/1
0.25 TABLET ORAL NIGHTLY
Qty: 90 TABLET | Refills: 3 | Status: SHIPPED | OUTPATIENT
Start: 2024-01-03 | End: 2025-01-02

## 2024-01-03 RX ORDER — MIRTAZAPINE 7.5 MG/1
7.5 TABLET, FILM COATED ORAL NIGHTLY
Qty: 90 TABLET | Refills: 3 | Status: SHIPPED | OUTPATIENT
Start: 2024-01-03

## 2024-01-03 RX ORDER — TRAZODONE HYDROCHLORIDE 50 MG/1
50 TABLET ORAL NIGHTLY PRN
Qty: 90 TABLET | Refills: 3 | Status: SHIPPED | OUTPATIENT
Start: 2024-01-03 | End: 2025-01-02

## 2024-01-03 RX ORDER — METHYLPREDNISOLONE 4 MG/1
TABLET ORAL
Qty: 21 EACH | Refills: 0 | Status: SHIPPED | OUTPATIENT
Start: 2024-01-03 | End: 2024-01-24

## 2024-01-03 RX ORDER — ACYCLOVIR 400 MG/1
400 TABLET ORAL DAILY
Qty: 180 TABLET | Refills: 1 | Status: SHIPPED | OUTPATIENT
Start: 2024-01-03

## 2024-01-03 NOTE — PROGRESS NOTES
Subjective:      Patient ID: Angelina Man is a 73 y.o. female.    Chief Complaint: Annual Exam      Angelina Man is a 73 y.o. female with chronic conditions significant for HSV, MDD, insomnia, RLS, afib dx in 2020 on eliquis, hypothyroidism, anxiety, IBS, renal cyst, fibroids   Presenting today for annual.     Back pain: Discussed back pain today. Reports that the pain starts on her left hip area and radiates down her gluteus. Declines PT at this time, would like referral to pain management. The patient denies concerning sx such as weakness/paraesthesia/numbness/urinary or fecal incontinence.     RLS: Cont mirapex at this time at low dose to help, she has been noticing improvement since being started on the medication.    Insomnia: Doing well with trazodone, continue.     MDD:  Continues on wellbutrin and remeron. Doing well. Remeron also helps with insomnia. Continue current dose, check TSH.     Hypothyroidism: Last year's TSH wnl, obtain new level.    HLD:   The 10-year ASCVD risk score (Karan LOVE, et al., 2019) is: 16.6%    Values used to calculate the score:      Age: 73 years      Sex: Female      Is Non- : No      Diabetic: No      Tobacco smoker: No      Systolic Blood Pressure: 130 mmHg      Is BP treated: No      HDL Cholesterol: 71 mg/dL      Total Cholesterol: 201 mg/dL     Hx of multiple renal cysts and renal angiomyolipoma: Follows with urology. US ordered for June 2024.      Fibroids: Hx of uterine fibroids, would like to repeat US transvaginal to check on the size. Denies unusual bleeding, abdominal pain, no post menopausal bleeding.     Osteoporosis: Cont prolia, next DEXA in 2 years    Denies any chest pain, shortness of breath, nausea vomiting constipation diarrhea, blood in stool, heartburn    Review of Systems   Constitutional:  Negative for chills, fever and weight loss.   HENT:  Negative for congestion, ear pain and sore throat.    Eyes:  Negative for double  vision.   Respiratory:  Negative for cough and shortness of breath.    Cardiovascular:  Negative for chest pain, palpitations and leg swelling.   Gastrointestinal:  Negative for abdominal pain, heartburn, nausea and vomiting.   Skin:  Negative for rash.   Neurological:  Negative for dizziness, tingling and headaches.   Psychiatric/Behavioral:  Negative for depression.           Current Outpatient Medications:     acetaminophen (TYLENOL) 500 MG tablet, Take 500 mg by mouth every 6 (six) hours as needed for Pain., Disp: , Rfl:     ascorbic acid (VITAMIN C) 1000 MG tablet, Take 1,000 mg by mouth once daily. , Disp: , Rfl:     biotin 1 mg tablet, Take 1 mg by mouth 2 (two) times daily. , Disp: , Rfl:     digestive enzymes Tab, Take 1 tablet by mouth once daily. , Disp: , Rfl:     diphenhydrAMINE (BENADRYL) 25 mg capsule, Take 25 mg by mouth nightly as needed. , Disp: , Rfl:     ELIQUIS 5 mg Tab, TAKE 1 TABLET(5 MG) BY MOUTH TWICE DAILY, Disp: 180 tablet, Rfl: 3    furosemide (LASIX) 20 MG tablet, Take 1 tablet (20 mg total) by mouth every other day., Disp: 15 tablet, Rfl: 11    lubiprostone (AMITIZA) 24 MCG Cap, Take 1 capsule (24 mcg total) by mouth daily as needed (IBS symptoms)., Disp: 30 capsule, Rfl: 6    metoprolol succinate (TOPROL-XL) 25 MG 24 hr tablet, Take 1 tablet (25 mg total) by mouth once daily., Disp: 30 tablet, Rfl: 6    multivitamin (THERAGRAN) per tablet, Take 1 tablet by mouth once daily. , Disp: , Rfl:     zinc 50 mg Tab, Take 50 mg by mouth once daily. , Disp: , Rfl:     acyclovir (ZOVIRAX) 400 MG tablet, Take 1 tablet (400 mg total) by mouth once daily., Disp: 180 tablet, Rfl: 1    buPROPion (WELLBUTRIN SR) 150 MG TBSR 12 hr tablet, Take 1 tablet (150 mg total) by mouth 2 (two) times daily., Disp: 180 tablet, Rfl: 3    fluticasone propionate (FLONASE) 50 mcg/actuation nasal spray, 1 spray (50 mcg total) by Each Nostril route 2 (two) times daily as needed for Rhinitis., Disp: 16 g, Rfl: 11     "levothyroxine (SYNTHROID) 137 MCG Tab tablet, Take 1 tablet (137 mcg total) by mouth before breakfast., Disp: 90 tablet, Rfl: 3    methylPREDNISolone (MEDROL DOSEPACK) 4 mg tablet, use as directed, Disp: 21 each, Rfl: 0    mirtazapine (REMERON) 7.5 MG Tab, Take 1 tablet (7.5 mg total) by mouth nightly., Disp: 90 tablet, Rfl: 3    pramipexole (MIRAPEX) 0.25 MG tablet, Take 1 tablet (0.25 mg total) by mouth every evening. For restless legs, Disp: 90 tablet, Rfl: 3    RSVPreF3 antigen-AS01E, PF, (AREXVY, PF,) 120 mcg/0.5 mL SusR vaccine, Inject 0.5 mLs into the muscle once. for 1 dose, Disp: 1 each, Rfl: 0    traZODone (DESYREL) 50 MG tablet, Take 1 tablet (50 mg total) by mouth nightly as needed for Insomnia., Disp: 90 tablet, Rfl: 3    Lab Results   Component Value Date    HGBA1C 5.4 08/24/2015    HGBA1C 5.3 08/13/2014    HGBA1C 5.5 08/08/2013     No results found for: "MICALBCREAT"  Lab Results   Component Value Date    LDLCALC 113.8 01/09/2023    LDLCALC 111.4 04/13/2022    CHOL 201 (H) 01/09/2023    HDL 71 01/09/2023    TRIG 81 01/09/2023       Lab Results   Component Value Date     01/09/2023    K 3.4 (L) 01/09/2023     01/09/2023    CO2 29 01/09/2023    GLU 85 01/09/2023    BUN 12 01/09/2023    CREATININE 0.8 01/09/2023    CALCIUM 8.9 01/09/2023    PROT 6.3 01/09/2023    ALBUMIN 3.5 01/09/2023    BILITOT 0.6 01/09/2023    ALKPHOS 80 01/09/2023    AST 22 01/09/2023    ALT 16 01/09/2023    ANIONGAP 10 01/09/2023    ESTGFRAFRICA >60.0 05/25/2022    EGFRNONAA 55.6 (A) 05/25/2022    WBC 4.35 01/09/2023    HGB 12.9 01/09/2023    HGB 14.1 04/13/2022    HCT 40.1 01/09/2023     (H) 01/09/2023     01/09/2023    TSH 1.117 01/09/2023    HEPCAB Negative 09/15/2016       Lab Results   Component Value Date    BUGAEEKM20FY 66 01/09/2023    EMHUMZHW07 923 04/13/2022         Past Medical History:   Diagnosis Date    Arthritis     Atrial fibrillation     Bronchitis     Cataract     Dry eyes     GERD " (gastroesophageal reflux disease)     Glaucoma, suspect - Both Eyes     Hypothyroidism 06/23/2016    Pulmonary hypertension     Rash     Renal angiomyolipoma     Renal disorder     SCC (squamous cell carcinoma) 07/2023    left lower lateral shin    Spinal stenosis     Squamous cell carcinoma     in-situ right upper inner arm, right wrist    Status post lumbar surgery 06/23/2016    Stress fracture     Thyroid disease     Venous insufficiency      Past Surgical History:   Procedure Laterality Date    ANGIOPLASTY      CATARACT EXTRACTION W/  INTRAOCULAR LENS IMPLANT Left 12/6/2022    Procedure: EXTRACTION, CATARACT, WITH IOL INSERTION;  Surgeon: Tone Brown MD;  Location: [facility] OR;  Service: Ophthalmology;  Laterality: Left;    CATARACT EXTRACTION W/  INTRAOCULAR LENS IMPLANT Right 12/20/2022    Procedure: EXTRACTION, CATARACT, WITH IOL INSERTION;  Surgeon: Tone Brown MD;  Location: [facility] OR;  Service: Ophthalmology;  Laterality: Right;    CERVICAL FUSION      COLONOSCOPY      COLONOSCOPY N/A 11/14/2017    Procedure: COLONOSCOPY;  Surgeon: Kasi Mercado MD;  Location: [facility] ENDO (4TH FLR);  Service: Endoscopy;  Laterality: N/A;    COLONOSCOPY N/A 4/26/2023    Procedure: COLONOSCOPY;  Surgeon: Jeanmarie Hathaway MD;  Location: [facility] ENDO (4TH FLR);  Service: Colon and Rectal;  Laterality: N/A;  ok to hold Eliquis 2 days per Dr Pereira  constipation-ext Miralax prep  instr mailed/portal-GT  4/21/23 pre call attempted, no answer    ESOPHAGOGASTRODUODENOSCOPY N/A 10/10/2019    Procedure: EGD (ESOPHAGOGASTRODUODENOSCOPY);  Surgeon: Rg Milton MD;  Location: [facility] (4TH FLR);  Service: Endoscopy;  Laterality: N/A;    ESOPHAGOGASTRODUODENOSCOPY N/A 10/6/2021    Procedure: EGD (ESOPHAGOGASTRODUODENOSCOPY);  Surgeon: Rg Milton MD;  Location: [facility] (4TH FLR);  Service: Endoscopy;  Laterality: N/A;  covid test 10/3 [place], instr emailed/portal -ml    EXCISION Left 5/17/2023    Procedure:  EXCISION SQUAMOUS CELL CARCINOMA LEFT LEG;  Surgeon: Kasi Canela Jr., MD;  [facility];  Service: General;  Laterality: Left;    l4-5 mid discectomy Left 03/2017    l4-5 MIS diskectomy Right 05/2016    RIGHT HEART CATHETERIZATION Right 1/27/2022    Procedure: INSERTION, CATHETER, RIGHT HEART;  Surgeon: Satnam Pickard Jr., MD;  Location: [facility] CATH LAB;  Service: Cardiology;  Laterality: Right;    TREATMENT OF CARDIAC ARRHYTHMIA N/A 7/17/2020    Procedure: CARDIOVERSION;  Surgeon: Kwan Bray MD;  Location: [facility] EP LAB;  Service: Cardiology;  Laterality: N/A;  AF,DCCV/SANGITA, ANES, SK, 746    TREATMENT OF CARDIAC ARRHYTHMIA N/A 7/14/2021    Procedure: CARDIOVERSION;  Surgeon: SYDNIE Cedillo MD;  Location: [facility] EP LAB;  Service: Cardiology;  Laterality: N/A;  AF, SANGITA, DCCV, MAC, EH, 3 Prep    WASHOUT Right 5/17/2023    Procedure: WASHOUT right lower arm and closure;  Surgeon: Kasi Canela Jr., MD;  Location: [address];  Service: General;  Laterality: Right;     Social History     Social History Narrative    Not on file     Family History   Problem Relation Age of Onset    Cancer Mother         Lung cancer    Heart failure Father     Colon cancer Father     Hypertension Father     Cataracts Father     Cancer Father 80        colon    No Known Problems Sister     No Known Problems Brother     Melanoma Daughter     Diabetes Son     Heart disease Son     Cancer Son         esophageal cancer    No Known Problems Maternal Aunt     No Known Problems Maternal Uncle     No Known Problems Paternal Aunt     No Known Problems Paternal Uncle     No Known Problems Maternal Grandmother     No Known Problems Maternal Grandfather     No Known Problems Paternal Grandmother     No Known Problems Paternal Grandfather     Amblyopia Neg Hx     Blindness Neg Hx     Glaucoma Neg Hx     Macular degeneration Neg Hx     Retinal detachment Neg Hx     Strabismus Neg Hx     Stroke Neg Hx     Thyroid disease Neg  "Hx     Breast cancer Neg Hx     Ovarian cancer Neg Hx      Vitals:    01/03/24 1322   BP: 130/80   Pulse: 67   SpO2: 100%   Weight: 45.5 kg (100 lb 3.2 oz)   Height: 5' 4" (1.626 m)   PainSc:   4   PainLoc: Generalized     Objective:   Physical Exam  Vitals reviewed.   Constitutional:       Appearance: Normal appearance.   HENT:      Head: Normocephalic.      Right Ear: Tympanic membrane, ear canal and external ear normal.      Left Ear: Tympanic membrane, ear canal and external ear normal.      Nose: Nose normal.      Mouth/Throat:      Mouth: Mucous membranes are moist.      Pharynx: Oropharynx is clear.   Eyes:      Conjunctiva/sclera: Conjunctivae normal.      Pupils: Pupils are equal, round, and reactive to light.   Cardiovascular:      Rate and Rhythm: Normal rate and regular rhythm.      Pulses: Normal pulses.   Pulmonary:      Effort: Pulmonary effort is normal.      Breath sounds: Normal breath sounds.   Abdominal:      General: Abdomen is flat. Bowel sounds are normal.      Palpations: Abdomen is soft.   Musculoskeletal:      Cervical back: Neck supple.   Skin:     General: Skin is warm.   Neurological:      General: No focal deficit present.      Mental Status: She is alert.   Psychiatric:         Mood and Affect: Mood normal.       Assessment/Plan     Angelina Man is a 73 y.o.female with:    Routine general medical examination at a health care facility  -     CBC Auto Differential; Future; Expected date: 01/03/2024  -     Comprehensive Metabolic Panel; Future; Expected date: 01/03/2024  -     TSH; Future; Expected date: 01/03/2024  -     Mammo Digital Screening Bilat; Future; Expected date: 01/10/2024  -     Lipid Panel; Future; Expected date: 01/03/2024  -     Vitamin D; Future; Expected date: 01/03/2024    Episode of recurrent major depressive disorder, unspecified depression episode severity  -     mirtazapine (REMERON) 7.5 MG Tab; Take 1 tablet (7.5 mg total) by mouth nightly.  Dispense: 90 " tablet; Refill: 3  -     TSH; Future; Expected date: 01/03/2024    Unspecified cord compression    Chronic heart failure with preserved ejection fraction    Longstanding persistent atrial fibrillation  -     Comprehensive Metabolic Panel; Future; Expected date: 01/03/2024  -     Lipid Panel; Future; Expected date: 01/03/2024    Ulcer of left lower extremity, limited to breakdown of skin    HSV infection  -     acyclovir (ZOVIRAX) 400 MG tablet; Take 1 tablet (400 mg total) by mouth once daily.  Dispense: 180 tablet; Refill: 1    Anxiety  -     buPROPion (WELLBUTRIN SR) 150 MG TBSR 12 hr tablet; Take 1 tablet (150 mg total) by mouth 2 (two) times daily.  Dispense: 180 tablet; Refill: 3  -     TSH; Future; Expected date: 01/03/2024    Acute seasonal allergic rhinitis  -     fluticasone propionate (FLONASE) 50 mcg/actuation nasal spray; 1 spray (50 mcg total) by Each Nostril route 2 (two) times daily as needed for Rhinitis.  Dispense: 16 g; Refill: 11    Hypothyroidism, unspecified type  -     levothyroxine (SYNTHROID) 137 MCG Tab tablet; Take 1 tablet (137 mcg total) by mouth before breakfast.  Dispense: 90 tablet; Refill: 3  -     TSH; Future; Expected date: 01/03/2024    Insomnia, unspecified type  -     mirtazapine (REMERON) 7.5 MG Tab; Take 1 tablet (7.5 mg total) by mouth nightly.  Dispense: 90 tablet; Refill: 3  -     traZODone (DESYREL) 50 MG tablet; Take 1 tablet (50 mg total) by mouth nightly as needed for Insomnia.  Dispense: 90 tablet; Refill: 3    RLS (restless legs syndrome)  -     pramipexole (MIRAPEX) 0.25 MG tablet; Take 1 tablet (0.25 mg total) by mouth every evening. For restless legs  Dispense: 90 tablet; Refill: 3  -     TSH; Future; Expected date: 01/03/2024    Other screening mammogram  -     Mammo Digital Screening Bilat; Future; Expected date: 01/10/2024    Osteoporosis, unspecified osteoporosis type, unspecified pathological fracture presence  -     TSH; Future; Expected date: 01/03/2024  -      Vitamin D; Future; Expected date: 01/03/2024    Abnormal finding of blood chemistry, unspecified  -     CBC Auto Differential; Future; Expected date: 01/03/2024    Abnormal level of blood mineral  -     Lipid Panel; Future; Expected date: 01/03/2024    Uterine leiomyoma, unspecified location  -     US Pelvis Comp with Transvag NON-OB (xpd); Future; Expected date: 01/03/2024    Sciatica, unspecified laterality  -     methylPREDNISolone (MEDROL DOSEPACK) 4 mg tablet; use as directed  Dispense: 21 each; Refill: 0  -     Ambulatory referral/consult to Pain Clinic; Future; Expected date: 01/10/2024         Chronic conditions status updated as per HPI.  Other than changes above, cont current medications and maintain follow up with specialists.  Return to clinic in Follow up in about 1 year (around 1/3/2025).      Zina Rockwell MD  [facility]    Patient Instructions   Labs are fasting. Please do not eat or drink anything other than water for 8-10 hrs prior to your lab work.    12 months for well visit or sooner if needed.   All of your core healthy metrics are met.

## 2024-01-04 ENCOUNTER — HOSPITAL ENCOUNTER (OUTPATIENT)
Dept: CARDIOLOGY | Facility: HOSPITAL | Age: 74
Discharge: HOME | End: 2024-01-04
Attending: INTERNAL MEDICINE
Payer: MEDICARE

## 2024-01-04 VITALS
WEIGHT: 102 LBS | HEIGHT: 64 IN | DIASTOLIC BLOOD PRESSURE: 86 MMHG | BODY MASS INDEX: 17.42 KG/M2 | HEART RATE: 84 BPM | SYSTOLIC BLOOD PRESSURE: 138 MMHG | RESPIRATION RATE: 16 BRPM

## 2024-01-04 DIAGNOSIS — I48.11 LONGSTANDING PERSISTENT ATRIAL FIBRILLATION: ICD-10-CM

## 2024-01-04 DIAGNOSIS — I50.32 CHRONIC HEART FAILURE WITH PRESERVED EJECTION FRACTION: ICD-10-CM

## 2024-01-04 LAB
CV STRESS BASE HR: 63 BPM
DIASTOLIC BLOOD PRESSURE: 90 MMHG
EJECTION FRACTION- HIGH: 65 %
END DIASTOLIC INDEX-HIGH: 153 ML/M2
END DIASTOLIC INDEX-LOW: 93 ML/M2
END SYSTOLIC INDEX-HIGH: 71 ML/M2
END SYSTOLIC INDEX-LOW: 31 ML/M2
NUC REST DIASTOLIC VOLUME INDEX: 52
NUC REST EJECTION FRACTION: 54
NUC REST SYSTOLIC VOLUME INDEX: 24
NUC STRESS DIASTOLIC VOLUME INDEX: 49
NUC STRESS EJECTION FRACTION: 61 %
NUC STRESS SYSTOLIC VOLUME INDEX: 19
OHS CV CPX 1 MINUTE RECOVERY HEART RATE: 109 BPM
OHS CV CPX 85 PERCENT MAX PREDICTED HEART RATE MALE: 123
OHS CV CPX ESTIMATED METS: 9
OHS CV CPX MAX PREDICTED HEART RATE: 145
OHS CV CPX PATIENT IS FEMALE: 1
OHS CV CPX PATIENT IS MALE: 0
OHS CV CPX PEAK DIASTOLIC BLOOD PRESSURE: 79 MMHG
OHS CV CPX PEAK HEAR RATE: 96 BPM
OHS CV CPX PEAK RATE PRESSURE PRODUCT: NORMAL
OHS CV CPX PEAK SYSTOLIC BLOOD PRESSURE: 141 MMHG
OHS CV CPX PERCENT MAX PREDICTED HEART RATE ACHIEVED: 69
OHS CV CPX RATE PRESSURE PRODUCT PRESENTING: 9450
RETIRED EF AND QEF - SEE NOTES: 53 %
STRESS ECHO POST EXERCISE DUR MIN: 6 MINUTES
STRESS ECHO POST EXERCISE DUR SEC: 4 SECONDS
SYSTOLIC BLOOD PRESSURE: 150 MMHG

## 2024-01-04 PROCEDURE — 93016 CV STRESS TEST SUPVJ ONLY: CPT | Mod: HCNC,,, | Performed by: INTERNAL MEDICINE

## 2024-01-04 PROCEDURE — 78452 HT MUSCLE IMAGE SPECT MULT: CPT | Mod: 26,HCNC,, | Performed by: INTERNAL MEDICINE

## 2024-01-04 PROCEDURE — 93017 CV STRESS TEST TRACING ONLY: CPT | Mod: HCNC

## 2024-01-04 PROCEDURE — 93018 CV STRESS TEST I&R ONLY: CPT | Mod: HCNC,,, | Performed by: INTERNAL MEDICINE

## 2024-01-08 ENCOUNTER — TELEPHONE (OUTPATIENT)
Dept: ENDOSCOPY | Facility: HOSPITAL | Age: 74
End: 2024-01-08
Payer: MEDICARE

## 2024-01-08 DIAGNOSIS — Z12.11 ENCOUNTER FOR SCREENING COLONOSCOPY: ICD-10-CM

## 2024-01-08 DIAGNOSIS — Z80.0 FAMILY HISTORY OF COLON CANCER: Primary | ICD-10-CM

## 2024-01-10 ENCOUNTER — LAB VISIT (OUTPATIENT)
Dept: LAB | Facility: HOSPITAL | Age: 74
End: 2024-01-10
Payer: MEDICARE

## 2024-01-10 DIAGNOSIS — M81.0 OSTEOPOROSIS, UNSPECIFIED OSTEOPOROSIS TYPE, UNSPECIFIED PATHOLOGICAL FRACTURE PRESENCE: ICD-10-CM

## 2024-01-10 DIAGNOSIS — F33.9 EPISODE OF RECURRENT MAJOR DEPRESSIVE DISORDER, UNSPECIFIED DEPRESSION EPISODE SEVERITY: ICD-10-CM

## 2024-01-10 DIAGNOSIS — R79.9 ABNORMAL FINDING OF BLOOD CHEMISTRY, UNSPECIFIED: ICD-10-CM

## 2024-01-10 DIAGNOSIS — I48.11 LONGSTANDING PERSISTENT ATRIAL FIBRILLATION: ICD-10-CM

## 2024-01-10 DIAGNOSIS — F41.9 ANXIETY: ICD-10-CM

## 2024-01-10 DIAGNOSIS — Z00.00 ROUTINE GENERAL MEDICAL EXAMINATION AT A HEALTH CARE FACILITY: ICD-10-CM

## 2024-01-10 DIAGNOSIS — R79.0 ABNORMAL LEVEL OF BLOOD MINERAL: ICD-10-CM

## 2024-01-10 DIAGNOSIS — G25.81 RLS (RESTLESS LEGS SYNDROME): ICD-10-CM

## 2024-01-10 DIAGNOSIS — E03.9 HYPOTHYROIDISM, UNSPECIFIED TYPE: ICD-10-CM

## 2024-01-10 LAB
25(OH)D3+25(OH)D2 SERPL-MCNC: 40 NG/ML (ref 30–96)
ALBUMIN SERPL BCP-MCNC: 3.6 G/DL (ref 3.5–5.2)
ALP SERPL-CCNC: 67 U/L (ref 55–135)
ALT SERPL W/O P-5'-P-CCNC: 17 U/L (ref 10–44)
ANION GAP SERPL CALC-SCNC: 12 MMOL/L (ref 8–16)
AST SERPL-CCNC: 18 U/L (ref 10–40)
BASOPHILS # BLD AUTO: 0.06 K/UL (ref 0–0.2)
BASOPHILS NFR BLD: 1.4 % (ref 0–1.9)
BILIRUB SERPL-MCNC: 0.5 MG/DL (ref 0.1–1)
BUN SERPL-MCNC: 15 MG/DL (ref 8–23)
CALCIUM SERPL-MCNC: 8.8 MG/DL (ref 8.7–10.5)
CHLORIDE SERPL-SCNC: 104 MMOL/L (ref 95–110)
CHOLEST SERPL-MCNC: 197 MG/DL (ref 120–199)
CHOLEST/HDLC SERPL: 2.9 {RATIO} (ref 2–5)
CO2 SERPL-SCNC: 26 MMOL/L (ref 23–29)
CREAT SERPL-MCNC: 0.8 MG/DL (ref 0.5–1.4)
DIFFERENTIAL METHOD BLD: ABNORMAL
EOSINOPHIL # BLD AUTO: 0.1 K/UL (ref 0–0.5)
EOSINOPHIL NFR BLD: 2.6 % (ref 0–8)
ERYTHROCYTE [DISTWIDTH] IN BLOOD BY AUTOMATED COUNT: 12.9 % (ref 11.5–14.5)
EST. GFR  (NO RACE VARIABLE): >60 ML/MIN/1.73 M^2
GLUCOSE SERPL-MCNC: 81 MG/DL (ref 70–110)
HCT VFR BLD AUTO: 40.9 % (ref 37–48.5)
HDLC SERPL-MCNC: 68 MG/DL (ref 40–75)
HDLC SERPL: 34.5 % (ref 20–50)
HGB BLD-MCNC: 13.1 G/DL (ref 12–16)
IMM GRANULOCYTES # BLD AUTO: 0 K/UL (ref 0–0.04)
IMM GRANULOCYTES NFR BLD AUTO: 0 % (ref 0–0.5)
LDLC SERPL CALC-MCNC: 111.8 MG/DL (ref 63–159)
LYMPHOCYTES # BLD AUTO: 1.7 K/UL (ref 1–4.8)
LYMPHOCYTES NFR BLD: 39.2 % (ref 18–48)
MCH RBC QN AUTO: 32.8 PG (ref 27–31)
MCHC RBC AUTO-ENTMCNC: 32 G/DL (ref 32–36)
MCV RBC AUTO: 102 FL (ref 82–98)
MONOCYTES # BLD AUTO: 0.4 K/UL (ref 0.3–1)
MONOCYTES NFR BLD: 10.2 % (ref 4–15)
NEUTROPHILS # BLD AUTO: 2 K/UL (ref 1.8–7.7)
NEUTROPHILS NFR BLD: 46.6 % (ref 38–73)
NONHDLC SERPL-MCNC: 129 MG/DL
NRBC BLD-RTO: 0 /100 WBC
PLATELET # BLD AUTO: 247 K/UL (ref 150–450)
PMV BLD AUTO: 10.3 FL (ref 9.2–12.9)
POTASSIUM SERPL-SCNC: 3.9 MMOL/L (ref 3.5–5.1)
PROT SERPL-MCNC: 6.4 G/DL (ref 6–8.4)
RBC # BLD AUTO: 4 M/UL (ref 4–5.4)
SODIUM SERPL-SCNC: 142 MMOL/L (ref 136–145)
TRIGL SERPL-MCNC: 86 MG/DL (ref 30–150)
TSH SERPL DL<=0.005 MIU/L-ACNC: 1.64 UIU/ML (ref 0.4–4)
WBC # BLD AUTO: 4.23 K/UL (ref 3.9–12.7)

## 2024-01-10 PROCEDURE — 82306 VITAMIN D 25 HYDROXY: CPT | Mod: HCNC | Performed by: INTERNAL MEDICINE

## 2024-01-10 PROCEDURE — 84443 ASSAY THYROID STIM HORMONE: CPT | Mod: HCNC | Performed by: INTERNAL MEDICINE

## 2024-01-10 PROCEDURE — 80053 COMPREHEN METABOLIC PANEL: CPT | Mod: HCNC | Performed by: INTERNAL MEDICINE

## 2024-01-10 PROCEDURE — 85025 COMPLETE CBC W/AUTO DIFF WBC: CPT | Mod: HCNC | Performed by: INTERNAL MEDICINE

## 2024-01-10 PROCEDURE — 36415 COLL VENOUS BLD VENIPUNCTURE: CPT | Mod: HCNC | Performed by: INTERNAL MEDICINE

## 2024-01-10 PROCEDURE — 80061 LIPID PANEL: CPT | Mod: HCNC | Performed by: INTERNAL MEDICINE

## 2024-01-11 ENCOUNTER — PATIENT MESSAGE (OUTPATIENT)
Dept: INTERNAL MEDICINE | Facility: CLINIC | Age: 74
End: 2024-01-11
Payer: MEDICARE

## 2024-01-11 NOTE — PROGRESS NOTES
Your blood count (CBC) is stable.    Your electrolytes are unremarkable.    Your kidney (BUN, Creatinine and GFT) function is stable.  Your liver (AST, ALT) function is unremarkable.    Your Cholesterol is NORMAL. Continue to focus on low fat, high fiber foods and aerobic exericse (huffing & puffing) for at least 20 minutes most days of the week.    Your Thyroid numbers are normal.    Your vitamin D levels are sufficient.

## 2024-01-17 ENCOUNTER — PATIENT MESSAGE (OUTPATIENT)
Dept: CARDIOLOGY | Facility: CLINIC | Age: 74
End: 2024-01-17
Payer: MEDICARE

## 2024-01-18 ENCOUNTER — HOSPITAL ENCOUNTER (OUTPATIENT)
Dept: RADIOLOGY | Facility: HOSPITAL | Age: 74
Discharge: HOME | End: 2024-01-18
Attending: INTERNAL MEDICINE
Payer: MEDICARE

## 2024-01-18 DIAGNOSIS — D25.9 UTERINE LEIOMYOMA, UNSPECIFIED LOCATION: ICD-10-CM

## 2024-01-18 PROCEDURE — 76830 TRANSVAGINAL US NON-OB: CPT | Mod: TC,HCNC

## 2024-01-18 PROCEDURE — 76830 TRANSVAGINAL US NON-OB: CPT | Mod: 26,HCNC,, | Performed by: RADIOLOGY

## 2024-01-18 PROCEDURE — 76856 US EXAM PELVIC COMPLETE: CPT | Mod: 26,HCNC,, | Performed by: RADIOLOGY

## 2024-01-22 ENCOUNTER — OFFICE VISIT (OUTPATIENT)
Dept: DERMATOLOGY | Facility: CLINIC | Age: 74
End: 2024-01-22
Payer: MEDICARE

## 2024-01-22 ENCOUNTER — PATIENT MESSAGE (OUTPATIENT)
Dept: INTERNAL MEDICINE | Facility: CLINIC | Age: 74
End: 2024-01-22
Payer: MEDICARE

## 2024-01-22 DIAGNOSIS — D48.5 NEOPLASM OF UNCERTAIN BEHAVIOR OF SKIN: ICD-10-CM

## 2024-01-22 DIAGNOSIS — D69.2 SENILE PURPURA: ICD-10-CM

## 2024-01-22 DIAGNOSIS — C44.729 SCC (SQUAMOUS CELL CARCINOMA), LEG, LEFT: ICD-10-CM

## 2024-01-22 DIAGNOSIS — Z85.828 PERSONAL HISTORY OF SKIN CANCER: Primary | ICD-10-CM

## 2024-01-22 PROCEDURE — 17261 DSTRJ MAL LES T/A/L .6-1.0CM: CPT | Mod: HCNC,S$GLB,, | Performed by: DERMATOLOGY

## 2024-01-22 PROCEDURE — 1101F PT FALLS ASSESS-DOCD LE1/YR: CPT | Mod: HCNC,CPTII,S$GLB, | Performed by: DERMATOLOGY

## 2024-01-22 PROCEDURE — 1157F ADVNC CARE PLAN IN RCRD: CPT | Mod: HCNC,CPTII,S$GLB, | Performed by: DERMATOLOGY

## 2024-01-22 PROCEDURE — 99999 PR PBB SHADOW E&M-EST. PATIENT-LVL III: CPT | Mod: PBBFAC,HCNC,, | Performed by: DERMATOLOGY

## 2024-01-22 PROCEDURE — 3288F FALL RISK ASSESSMENT DOCD: CPT | Mod: HCNC,CPTII,S$GLB, | Performed by: DERMATOLOGY

## 2024-01-22 PROCEDURE — 99213 OFFICE O/P EST LOW 20 MIN: CPT | Mod: 25,HCNC,S$GLB, | Performed by: DERMATOLOGY

## 2024-01-22 PROCEDURE — 1160F RVW MEDS BY RX/DR IN RCRD: CPT | Mod: HCNC,CPTII,S$GLB, | Performed by: DERMATOLOGY

## 2024-01-22 PROCEDURE — 1126F AMNT PAIN NOTED NONE PRSNT: CPT | Mod: HCNC,CPTII,S$GLB, | Performed by: DERMATOLOGY

## 2024-01-22 PROCEDURE — 1159F MED LIST DOCD IN RCRD: CPT | Mod: HCNC,CPTII,S$GLB, | Performed by: DERMATOLOGY

## 2024-01-22 NOTE — PATIENT INSTRUCTIONS
We would like to see you back in the clinic in 4 months.  You will be able to schedule this appointment by calling or by using your [facility] portal 3 months before this time. Because our schedule fills so quickly, please set a reminder in your phone or on your calendar to schedule 3 months before you are due to come in so that we can see you in a timely fashion.  You should also receive a reminder from us in the mail. This will help us ensure we can continue to provide excellent healthcare for you. Thank you.

## 2024-01-22 NOTE — PROGRESS NOTES
Subjective:      Patient ID:  Angelina Man is a 74 y.o. female who presents for   Chief Complaint   Patient presents with    Follow-up     Recheck bx site     History of Present Illness: The patient presents for follow up of skin check.    The patient was last seen on: 9/25/2023 for bx left mid lower shin c/w Regressing keratoacanthoma treated with Fluotouracil cream 2x/day x 3 weeks. Pt states site is still scaly   Final Pathologic Diagnosis       Date                     Value               Ref Range           Status                09/25/2023                                                       Final             1. Skin, left mid lower shin, shave biopsy: - Regressing keratoacanthoma. -   Margins are negative in the planes of section.  T  his lesion is atypical and further treatment may be required. You will be contacted by your provider's office.      Comment:    Interp By Abida Holliday M.D., Signed on 09/29/2023 at 14:01  ----------            Other skin complaints:   Patient with new complaint of lesion(s)  Location: L thigh  Duration: couple weeks  Symptoms: none  Relieving factors/Previous treatments: none    Patient with new complaint of lesion(s)  Location: R index finger  Duration: couple months  Symptoms: none  Relieving factors/Previous treatments: none                Review of Systems   Skin:  Positive for daily sunscreen use and activity-related sunscreen use. Negative for recent sunburn.   Hematologic/Lymphatic: Bruises/bleeds easily (eliquis).       Objective:   Physical Exam   Constitutional: She appears well-developed and well-nourished. No distress.   Neurological: She is alert and oriented to person, place, and time. She is not disoriented.   Psychiatric: She has a normal mood and affect.   Skin:   Areas Examined (abnormalities noted in diagram):   LLE Inspection Performed  Nails and Digits Inspection Performed                Diagram Legend     Erythematous scaling macule/papule c/w  actinic keratosis       Vascular papule c/w angioma      Pigmented verrucoid papule/plaque c/w seborrheic keratosis      Yellow umbilicated papule c/w sebaceous hyperplasia      Irregularly shaped tan macule c/w lentigo     1-2 mm smooth white papules consistent with Milia      Movable subcutaneous cyst with punctum c/w epidermal inclusion cyst      Subcutaneous movable cyst c/w pilar cyst      Firm pink to brown papule c/w dermatofibroma      Pedunculated fleshy papule(s) c/w skin tag(s)      Evenly pigmented macule c/w junctional nevus     Mildly variegated pigmented, slightly irregular-bordered macule c/w mildly atypical nevus      Flesh colored to evenly pigmented papule c/w intradermal nevus       Pink pearly papule/plaque c/w basal cell carcinoma      Erythematous hyperkeratotic cursted plaque c/w SCC      Surgical scar with no sign of skin cancer recurrence      Open and closed comedones      Inflammatory papules and pustules      Verrucoid papule consistent consistent with wart     Erythematous eczematous patches and plaques     Dystrophic onycholytic nail with subungual debris c/w onychomycosis     Umbilicated papule    Erythematous-base heme-crusted tan verrucoid plaque consistent with inflamed seborrheic keratosis     Erythematous Silvery Scaling Plaque c/w Psoriasis     See annotation      Assessment / Plan:        Personal history of skin cancer  SCC x 3 on left lower leg.      SCC (squamous cell carcinoma), leg, left  Here for electrodesiccation and curettage of SCC on the left mid lower shin . bx done on 09/25/2023- s/p efudex and area still scaly:      Electrodessication and Curettage Procedure note:    Verbal consent obtained. Lesional tissue marked and prepped with alcohol. Lesion anesthetized with 1% lidocaine with epinephrine. Curettage and Desiccation x 3 cycles to base. Aluminum chloride for hemostasis. Lesion size after primary curettage: 0.9 cm    Area bandaged and wound care  explained.    F/u 4 months    Senile purpura  Reassurance given to patient. No treatment is necessary.   Treatment of benign, asymptomatic lesions may be considered cosmetic.    Neoplasm of uncertain behavior of skin- finger  Verruca v other  Pt feels it is resolving but still a little tender  Monitor for changes  Recheck at follow up           Follow up in about 4 months (around 5/22/2024) for TBSE. Recheck R index finger

## 2024-01-24 ENCOUNTER — OFFICE VISIT (OUTPATIENT)
Dept: PAIN MEDICINE | Facility: CLINIC | Age: 74
End: 2024-01-24
Payer: MEDICARE

## 2024-01-24 VITALS
OXYGEN SATURATION: 100 % | HEART RATE: 72 BPM | SYSTOLIC BLOOD PRESSURE: 132 MMHG | RESPIRATION RATE: 20 BRPM | WEIGHT: 101.44 LBS | TEMPERATURE: 98 F | BODY MASS INDEX: 17.32 KG/M2 | HEIGHT: 64 IN | DIASTOLIC BLOOD PRESSURE: 82 MMHG

## 2024-01-24 DIAGNOSIS — M54.9 DORSALGIA, UNSPECIFIED: ICD-10-CM

## 2024-01-24 DIAGNOSIS — M54.30 SCIATICA, UNSPECIFIED LATERALITY: ICD-10-CM

## 2024-01-24 DIAGNOSIS — M96.1 POSTLAMINECTOMY SYNDROME OF LUMBAR REGION: ICD-10-CM

## 2024-01-24 DIAGNOSIS — M54.16 LUMBAR RADICULOPATHY: Primary | ICD-10-CM

## 2024-01-24 PROCEDURE — 99204 OFFICE O/P NEW MOD 45 MIN: CPT | Mod: S$GLB,,, | Performed by: ANESTHESIOLOGY

## 2024-01-24 PROCEDURE — 3079F DIAST BP 80-89 MM HG: CPT | Mod: CPTII,S$GLB,, | Performed by: ANESTHESIOLOGY

## 2024-01-24 PROCEDURE — 1101F PT FALLS ASSESS-DOCD LE1/YR: CPT | Mod: CPTII,S$GLB,, | Performed by: ANESTHESIOLOGY

## 2024-01-24 PROCEDURE — 1125F AMNT PAIN NOTED PAIN PRSNT: CPT | Mod: CPTII,S$GLB,, | Performed by: ANESTHESIOLOGY

## 2024-01-24 PROCEDURE — 3288F FALL RISK ASSESSMENT DOCD: CPT | Mod: CPTII,S$GLB,, | Performed by: ANESTHESIOLOGY

## 2024-01-24 PROCEDURE — 3075F SYST BP GE 130 - 139MM HG: CPT | Mod: CPTII,S$GLB,, | Performed by: ANESTHESIOLOGY

## 2024-01-24 PROCEDURE — 99999 PR PBB SHADOW E&M-EST. PATIENT-LVL III: CPT | Mod: PBBFAC,HCNC,, | Performed by: ANESTHESIOLOGY

## 2024-01-24 PROCEDURE — 1157F ADVNC CARE PLAN IN RCRD: CPT | Mod: CPTII,S$GLB,, | Performed by: ANESTHESIOLOGY

## 2024-01-24 NOTE — PROGRESS NOTES
PCP: Zina Rockwell MD    REFERRING PHYSICIAN: Zina Rockwell MD    CHIEF COMPLAINT: Right leg pain    Original HISTORY OF PRESENT ILLNESS: Angelina Man presents to the clinic for the evaluation of the above pain. This has been up and down for the past 5 years or so.     Original Pain Description:  The pain is located in the right buttock and is radiating to the right calf . The pain is described as aching. Exacerbating factors: Bending and Getting out of bed/chair. Mitigating factors heat. Symptoms interfere with daily activity. The patient feels like symptoms have been worsening. Patient reports sometimes stubbing her right foot.    Original PAIN SCORES:  Best: Pain is 4  Worst: Pain is 7  Current: Pain is 5        1/24/2024     1:46 PM   Last 3 PDI Scores   Pain Disability Index (PDI) 19       INTERVAL HISTORY: (Newest visit at the bottom)   Interval History (Date):       6 weeks of Conservative therapy:  PT: 2019 (Must include dates)  Chiro:  HEP: Continuing HEP daily as prescribed at PT above. Also does yoga twice weekly.       Treatments / Medications: (Ice/Heat/NSAIDS/APAP/etc):  Heat - helps  APAP - helps    Interventional Pain Procedures: (Previous injections)  TFESI prior to Lumbar Surgery    Past Medical History:   Diagnosis Date    Arthritis     Atrial fibrillation     Bronchitis     Cataract     Dry eyes     GERD (gastroesophageal reflux disease)     Glaucoma, suspect - Both Eyes     Hypothyroidism 06/23/2016    Pulmonary hypertension     Rash     Renal angiomyolipoma     Renal disorder     SCC (squamous cell carcinoma) 07/2023    left lower lateral shin    SCC (squamous cell carcinoma) 04/2023    L mid lateral shin    SCC (squamous cell carcinoma) 09/2023    L mid lower shin    Spinal stenosis     Squamous cell carcinoma     in-situ right upper inner arm, right wrist    Status post lumbar surgery 06/23/2016    Stress fracture     Thyroid disease     Venous insufficiency      Past Surgical History:    Procedure Laterality Date    ANGIOPLASTY      CATARACT EXTRACTION W/  INTRAOCULAR LENS IMPLANT Left 12/6/2022    Procedure: EXTRACTION, CATARACT, WITH IOL INSERTION;  Surgeon: Tone Brown MD;  Location: [facility] OR;  Service: Ophthalmology;  Laterality: Left;    CATARACT EXTRACTION W/  INTRAOCULAR LENS IMPLANT Right 12/20/2022    Procedure: EXTRACTION, CATARACT, WITH IOL INSERTION;  Surgeon: Tone Brown MD;  Location: [facility] OR;  Service: Ophthalmology;  Laterality: Right;    CERVICAL FUSION      COLONOSCOPY      COLONOSCOPY N/A 11/14/2017    Procedure: COLONOSCOPY;  Surgeon: Kasi Mercado MD;  Location: [facility] ENDO (4TH FLR);  Service: Endoscopy;  Laterality: N/A;    COLONOSCOPY N/A 4/26/2023    Procedure: COLONOSCOPY;  Surgeon: Jeanmarie Hathaway MD;  Location: [facility] ENDO (4TH FLR);  Service: Colon and Rectal;  Laterality: N/A;  ok to hold Eliquis 2 days per Dr Pereira  constipation-ext Miralax prep  instr mailed/portal-GT  4/21/23 pre call attempted, no answer    ESOPHAGOGASTRODUODENOSCOPY N/A 10/10/2019    Procedure: EGD (ESOPHAGOGASTRODUODENOSCOPY);  Surgeon: Rg Milton MD;  Location: [facility] ENDO (4TH FLR);  Service: Endoscopy;  Laterality: N/A;    ESOPHAGOGASTRODUODENOSCOPY N/A 10/6/2021    Procedure: EGD (ESOPHAGOGASTRODUODENOSCOPY);  Surgeon: Rg Milton MD;  Location: [facility] ENDO (4TH FLR);  Service: Endoscopy;  Laterality: N/A;  covid test 10/3 [place], instr emailed/portal -ml    EXCISION Left 5/17/2023    Procedure: EXCISION SQUAMOUS CELL CARCINOMA LEFT LEG;  Surgeon: Kasi Canela Jr., MD;  Location: [facility] 2ND FLR;  Service: General;  Laterality: Left;    l4-5 mid discectomy Left 03/2017    l4-5 MIS diskectomy Right 05/2016    RIGHT HEART CATHETERIZATION Right 1/27/2022    Procedure: INSERTION, CATHETER, RIGHT HEART;  Surgeon: Satnam Pickard Jr., MD;  Location: [facility] CATH LAB;  Service: Cardiology;  Laterality: Right;    TREATMENT OF CARDIAC ARRHYTHMIA N/A 7/17/2020     Procedure: CARDIOVERSION;  Surgeon: Kwan Bray MD;  Location: [facility] EP LAB;  Service: Cardiology;  Laterality: N/A;  AF,DCCV/SANGITA, ANES, SK, 746    TREATMENT OF CARDIAC ARRHYTHMIA N/A 7/14/2021    Procedure: CARDIOVERSION;  Surgeon: SYDNIE Cedillo MD;  Location: [facility] EP LAB;  Service: Cardiology;  Laterality: N/A;  AF, SANGITA, DCCV, MAC, EH, 3 Prep    WASHOUT Right 5/17/2023    Procedure: WASHOUT right lower arm and closure;  Surgeon: Kasi Canela Jr., MD;  [facility];  Service: General;  Laterality: Right;     Social History     Socioeconomic History    Marital status: Single   Occupational History     Employer: [employer]   Tobacco Use    Smoking status: Never     Passive exposure: Never    Smokeless tobacco: Never   Substance and Sexual Activity    Alcohol use: Yes     Alcohol/week: 4.0 standard drinks of alcohol     Types: 4 Glasses of wine per week     Comment: 2 glasses of wine on weekends    Drug use: No    Sexual activity: Never   Other Topics Concern    Are you pregnant or think you may be? No    Breast-feeding No     Social Determinants of Health     Financial Resource Strain: Low Risk  (1/18/2024)    Overall Financial Resource Strain (CARDIA)     Difficulty of Paying Living Expenses: Not very hard   Food Insecurity: No Food Insecurity (1/18/2024)    Hunger Vital Sign     Worried About Running Out of Food in the Last Year: Never true     Ran Out of Food in the Last Year: Never true   Transportation Needs: No Transportation Needs (1/18/2024)    PRAPARE - Transportation     Lack of Transportation (Medical): No     Lack of Transportation (Non-Medical): No   Physical Activity: Sufficiently Active (1/18/2024)    Exercise Vital Sign     Days of Exercise per Week: 7 days     Minutes of Exercise per Session: 60 min   Stress: No Stress Concern Present (1/18/2024)    [employer] - Occupational Stress Questionnaire     Feeling of Stress : Only a little    Recent Concern: Stress - Stress Concern Present (11/6/2023)    [facility] of Occupational [facility] - Occupational Stress Questionnaire     Feeling of Stress : To some extent   Social Connections: Unknown (1/18/2024)    Social Connection and Isolation Panel [NHANES]     Frequency of Communication with Friends and Family: Twice a week     Frequency of Social Gatherings with Friends and Family: Patient declined     Attends Anabaptist Services: Never     Active Member of Clubs or Organizations: No     Attends Club or Organization Meetings: Patient declined     Marital Status:    Recent Concern: Social Connections - Socially Isolated (12/1/2023)    Social Connection and Isolation Panel [NHANES]     Frequency of Communication with Friends and Family: Once a week     Frequency of Social Gatherings with Friends and Family: Once a week     Attends Anabaptist Services: Never     Active Member of Clubs or Organizations: No     Attends Club or Organization Meetings: Never     Marital Status:    Housing Stability: Low Risk  (1/18/2024)    Housing Stability Vital Sign     Unable to Pay for Housing in the Last Year: No     Number of Places Lived in the Last Year: 1     Unstable Housing in the Last Year: No     Family History   Problem Relation Age of Onset    Cancer Mother         Lung cancer    Heart failure Father     Colon cancer Father     Hypertension Father     Cataracts Father     Cancer Father 80        colon    No Known Problems Sister     No Known Problems Brother     Melanoma Daughter     Diabetes Son     Heart disease Son     Cancer Son         esophageal cancer    No Known Problems Maternal Aunt     No Known Problems Maternal Uncle     No Known Problems Paternal Aunt     No Known Problems Paternal Uncle     No Known Problems Maternal Grandmother     No Known Problems Maternal Grandfather     No Known Problems Paternal Grandmother     No Known Problems Paternal Grandfather     Amblyopia Neg Hx      Blindness Neg Hx     Glaucoma Neg Hx     Macular degeneration Neg Hx     Retinal detachment Neg Hx     Strabismus Neg Hx     Stroke Neg Hx     Thyroid disease Neg Hx     Breast cancer Neg Hx     Ovarian cancer Neg Hx        Review of patient's allergies indicates:  No Known Allergies    Current Outpatient Medications   Medication Sig    acetaminophen (TYLENOL) 500 MG tablet Take 500 mg by mouth every 6 (six) hours as needed for Pain.    acyclovir (ZOVIRAX) 400 MG tablet Take 1 tablet (400 mg total) by mouth once daily.    ascorbic acid (VITAMIN C) 1000 MG tablet Take 1,000 mg by mouth once daily.     biotin 1 mg tablet Take 1 mg by mouth 2 (two) times daily.     buPROPion (WELLBUTRIN SR) 150 MG TBSR 12 hr tablet Take 1 tablet (150 mg total) by mouth 2 (two) times daily.    digestive enzymes Tab Take 1 tablet by mouth once daily.     diphenhydrAMINE (BENADRYL) 25 mg capsule Take 25 mg by mouth nightly as needed.     ELIQUIS 5 mg Tab TAKE 1 TABLET(5 MG) BY MOUTH TWICE DAILY    fluticasone propionate (FLONASE) 50 mcg/actuation nasal spray 1 spray (50 mcg total) by Each Nostril route 2 (two) times daily as needed for Rhinitis.    furosemide (LASIX) 20 MG tablet Take 1 tablet (20 mg total) by mouth every other day.    levothyroxine (SYNTHROID) 137 MCG Tab tablet Take 1 tablet (137 mcg total) by mouth before breakfast.    lubiprostone (AMITIZA) 24 MCG Cap Take 1 capsule (24 mcg total) by mouth daily as needed (IBS symptoms).    methylPREDNISolone (MEDROL DOSEPACK) 4 mg tablet use as directed    metoprolol succinate (TOPROL-XL) 25 MG 24 hr tablet Take 1 tablet (25 mg total) by mouth once daily.    mirtazapine (REMERON) 7.5 MG Tab Take 1 tablet (7.5 mg total) by mouth nightly.    multivitamin (THERAGRAN) per tablet Take 1 tablet by mouth once daily.     pramipexole (MIRAPEX) 0.25 MG tablet Take 1 tablet (0.25 mg total) by mouth every evening. For restless legs    traZODone (DESYREL) 50 MG tablet Take 1 tablet (50 mg total)  "by mouth nightly as needed for Insomnia.    zinc 50 mg Tab Take 50 mg by mouth once daily.      No current facility-administered medications for this visit.       ROS:  GENERAL: No fever. No chills. No fatigue. Denies weight loss. Denies weight gain.  HEENT: Denies headaches. Denies vision change. Denies eye pain. Denies double vision. Denies ear pain.   CV: Denies chest pain.   PULM: Denies of shortness of breath.  GI: Denies constipation. No diarrhea. No abdominal pain. Denies nausea. Denies vomiting. No blood in stool.  HEME: Denies bleeding problems.  : Denies urgency. No painful urination. No blood in urine.  MS: Denies joint stiffness. Denies joint swelling.  Denies back pain.  SKIN: Denies rash.   NEURO: Denies seizures. No weakness.  PSYCH:  Denies difficulty sleeping. No anxiety. Denies depression. No suicidal thoughts.       VITALS:   Vitals:    01/24/24 1345   BP: 132/82   Pulse: 72   Resp: 20   Temp: 98.1 °F (36.7 °C)   SpO2: 100%   Weight: 46 kg (101 lb 6.6 oz)   Height: 5' 4" (1.626 m)   PainSc:   5         PHYSICAL EXAM:   GENERAL: Well appearing, in no acute distress, alert and oriented x3.  PSYCH:  Mood and affect appropriate.  SKIN: Skin color, texture, turgor normal, no rashes or lesions.  HEENT:  Normocephalic, atraumatic. Cranial nerves grossly intact.  NECK: No pain to palpation over the cervical paraspinous muscles. No pain to palpation over facets. No pain with neck flexion, extension, or lateral flexion.   PULM: No evidence of respiratory difficulty, symmetric chest rise.  GI:  Non-distended  BACK: Well-healed incisions. No pain to palpation over the spinous processes. No pain to palpation over facet joints. There is pain with palpation over the right SIJ.   EXTREMITIES: No deformities, edema, or skin discoloration.   MUSCULOSKELETAL: Shoulder, hip, and knee provocative maneuvers are negative. No atrophy is noted.  NEURO: Sensation is altered below the knees bilaterally. Partial drop foot " on the right. Bilateral upper and lower extremity coordination and muscle stretch reflexes are physiologic and symmetric. Plantar response are downgoing. Straight leg raising in the supine position is negative to radicular pain.   GAIT: Antalgic. Steppage gait.      LABS:      IMAGING:    CT LUMBAR SPINE WITHOUT CONTRAST     CLINICAL HISTORY:  Evaluate fusion;  Arthrodesis status     TECHNIQUE:  Low-dose axial, sagittal and coronal reformations are obtained through the lumbar spine.  Contrast was not administered.     COMPARISON:  MRI lumbar spine 01/07/2021; CT lumbar spine 03/17/2020     FINDINGS:  Postoperative changes from L4-S1 posterior decompression and instrumented fusion with bilateral vertical rods, pedicle screws, and metallic interbody spacers.  Hardware is intact and unchanged in position without evidence of loosening.  No perihardware fractures.  Unchanged alignment.  No significant osseous bridging.  No definite fluid collections in the operative bed, however evaluation is limited by CT technique and degraded by streak artifact.     T12-L2 grade 1 stepwise retrolisthesis. L4 on L5 grade 1 anterolisthesis. Mild rightward curvature of the lumbar spine.     Diffuse osteopenia.  No fractures or pars defects.  No focal osseous lesions.     Multilevel degenerative disc disease, most advanced at L2-3 and L3-4.  Multilevel facet arthropathy, most advanced at L3-4.     The conus medullaris and cauda equina nerve roots are difficult to visualize.     9 mm cyst in the midpole left kidney, with a 2 mm mural calcification versus adjacent nonobstructing stone.  Paraspinal soft tissues are otherwise unremarkable.     Impression:     1. Postoperative changes from L4-S1 decompressive instrumented fusion.  No hardware complication.  Unchanged alignment.  2. Multilevel degenerative changes, most advanced at L2-3 and L3-4, similar to prior.     Electronically signed by resident: Brigette Khoury  Date:                                             02/22/2022  Time:                                           09:41     Electronically signed by: Ismael Isabel  Date:                                            02/22/2022  Time:                                           14:11    ASSESSMENT: 74 y.o. year old female with pain, consistent with:    Encounter Diagnoses   Name Primary?    Sciatica, unspecified laterality     Lumbar radiculopathy Yes    Postlaminectomy syndrome of lumbar region        DISCUSSION: Angelina Man is a retired  with a history of multiple laminectomies and microdiscectomies with residual surgical bed inflammation causing canal stenosis. She then underwent a right L4/5 L5/S1 TLIF with posterior hardware in 2018. She did very well after surgery but comes to us with increasing pain with bending and into the right leg. She does have a drop foot on the right and reports catching the right foot on the floor constantly. Exam shows pain reproduced with a right straight leg raise.     PLAN:  LMRI W/WO  Follow up to evaluate images and discuss options      I would like to thank Zina Rockwell MD for the opportunity to assist in the care of this patient. We had a very nice visit and I look forward to continuing their care. Please let me know if I can be of further assistance.     Sheree Abbott  01/24/2024

## 2024-01-29 ENCOUNTER — PATIENT MESSAGE (OUTPATIENT)
Dept: INTERNAL MEDICINE | Facility: CLINIC | Age: 74
End: 2024-01-29
Payer: MEDICARE

## 2024-01-29 ENCOUNTER — HOSPITAL ENCOUNTER (OUTPATIENT)
Dept: RADIOLOGY | Facility: HOSPITAL | Age: 74
Discharge: HOME | End: 2024-01-29
Attending: INTERNAL MEDICINE
Payer: MEDICARE

## 2024-01-29 DIAGNOSIS — Z12.31 OTHER SCREENING MAMMOGRAM: ICD-10-CM

## 2024-01-29 DIAGNOSIS — Z00.00 ROUTINE GENERAL MEDICAL EXAMINATION AT A HEALTH CARE FACILITY: ICD-10-CM

## 2024-01-29 PROCEDURE — 77067 SCR MAMMO BI INCL CAD: CPT | Mod: 26,HCNC,, | Performed by: RADIOLOGY

## 2024-01-29 PROCEDURE — 77067 SCR MAMMO BI INCL CAD: CPT | Mod: TC,HCNC

## 2024-01-29 PROCEDURE — 77063 BREAST TOMOSYNTHESIS BI: CPT | Mod: 26,HCNC,, | Performed by: RADIOLOGY

## 2024-01-31 ENCOUNTER — PATIENT MESSAGE (OUTPATIENT)
Dept: INTERNAL MEDICINE | Facility: CLINIC | Age: 74
End: 2024-01-31
Payer: MEDICARE

## 2024-01-31 DIAGNOSIS — D21.9 FIBROID: Primary | ICD-10-CM

## 2024-01-31 NOTE — PROGRESS NOTES
Transvaginal US show calcified fibroids. Would recommend gynecology follow up to see if removal is indicated. Most likely, if the fibroids are not causing problems, you would not have to undergo surgical removal.

## 2024-02-01 NOTE — PROGRESS NOTES
Your mammogram was unremarkable.    As a reminder, a mammogram is a screening test and not perfect. A normal mammogram does not outweigh a palpable mass. If you notice a mass in your breast this needs to be evaluated regardless of your recent mammogram results.

## 2024-02-06 ENCOUNTER — OFFICE VISIT (OUTPATIENT)
Dept: CARDIOLOGY | Facility: CLINIC | Age: 74
End: 2024-02-06
Payer: MEDICARE

## 2024-02-06 VITALS
DIASTOLIC BLOOD PRESSURE: 78 MMHG | HEART RATE: 79 BPM | WEIGHT: 103.38 LBS | HEIGHT: 64 IN | BODY MASS INDEX: 17.65 KG/M2 | SYSTOLIC BLOOD PRESSURE: 134 MMHG

## 2024-02-06 DIAGNOSIS — I50.32 CHRONIC HEART FAILURE WITH PRESERVED EJECTION FRACTION: ICD-10-CM

## 2024-02-06 DIAGNOSIS — I83.893 VARICOSE VEINS OF BOTH LOWER EXTREMITIES WITH COMPLICATIONS: Primary | ICD-10-CM

## 2024-02-06 DIAGNOSIS — I87.2 VENOUS INSUFFICIENCY OF BOTH LOWER EXTREMITIES: ICD-10-CM

## 2024-02-06 DIAGNOSIS — R60.0 EDEMA OF BOTH LEGS: ICD-10-CM

## 2024-02-06 DIAGNOSIS — R29.898 LEG HEAVINESS: ICD-10-CM

## 2024-02-06 DIAGNOSIS — I27.20 PULMONARY HYPERTENSION: ICD-10-CM

## 2024-02-06 DIAGNOSIS — I48.11 LONGSTANDING PERSISTENT ATRIAL FIBRILLATION: ICD-10-CM

## 2024-02-06 PROCEDURE — 3075F SYST BP GE 130 - 139MM HG: CPT | Mod: HCNC,CPTII,S$GLB, | Performed by: INTERNAL MEDICINE

## 2024-02-06 PROCEDURE — 1159F MED LIST DOCD IN RCRD: CPT | Mod: HCNC,CPTII,S$GLB, | Performed by: INTERNAL MEDICINE

## 2024-02-06 PROCEDURE — 1101F PT FALLS ASSESS-DOCD LE1/YR: CPT | Mod: HCNC,CPTII,S$GLB, | Performed by: INTERNAL MEDICINE

## 2024-02-06 PROCEDURE — 1157F ADVNC CARE PLAN IN RCRD: CPT | Mod: HCNC,CPTII,S$GLB, | Performed by: INTERNAL MEDICINE

## 2024-02-06 PROCEDURE — 3288F FALL RISK ASSESSMENT DOCD: CPT | Mod: HCNC,CPTII,S$GLB, | Performed by: INTERNAL MEDICINE

## 2024-02-06 PROCEDURE — 99215 OFFICE O/P EST HI 40 MIN: CPT | Mod: HCNC,S$GLB,, | Performed by: INTERNAL MEDICINE

## 2024-02-06 PROCEDURE — 99999 PR PBB SHADOW E&M-EST. PATIENT-LVL IV: CPT | Mod: PBBFAC,HCNC,, | Performed by: INTERNAL MEDICINE

## 2024-02-06 PROCEDURE — 3078F DIAST BP <80 MM HG: CPT | Mod: HCNC,CPTII,S$GLB, | Performed by: INTERNAL MEDICINE

## 2024-02-06 PROCEDURE — 1125F AMNT PAIN NOTED PAIN PRSNT: CPT | Mod: HCNC,CPTII,S$GLB, | Performed by: INTERNAL MEDICINE

## 2024-02-06 NOTE — PATIENT INSTRUCTIONS
Assessment/Plan:  Angelina Man is a 74y.o. female with venous insufficiency s/p EVLT of the left LSV and right GSV, hypothyroidism, anxiety, arthritis, and persistent atrial fibrillation s/p [facility] 17-Jul-2020, who presents for a follow up appointment.     1. BLE Venous insufficiency with pain- Stable.  Mrs. Man reports legs feeling heavy and tired all the time.  Likely due to venous insufficiency.  Continue graduated compression hose.  Continue to limit sodium intake to 2,000 mg daily.  Elevate legs when resting.      2. Persistent atrial fibrillation- Stable.  Continue current medications.    4. Exercise Induced Pulmonary HTN- Stable.  Continue current medications.    Follow up in 6 months

## 2024-02-06 NOTE — PROGRESS NOTES
[facility]      Chief Complaint   Patient presents with    Venous insufficiency of both lower extremities    Varicose veins of both lower extremities with complications       Patient ID: Angelina Man is a 74 y.o. female with venous insufficiency s/p EVLT of the left LSV and right GSV, hypothyroidism, anxiety, arthritis, and persistent atrial fibrillation s/p DCCV 17-Jul-2020, who presents for a follow up appointment.  Pertinent history/events are as follows:     -Pt kindly referred by Dr. Pereira for evaluation of varicose veins.      -At our initial clinic visit on 6/22/2021, Mrs. Man reported varicose veins with worsening swelling and discomfort.  No claudication or tissue loss.  Exam shows BLE's with prominent varicose veins with venous stasis dermatitis.  Plan:   Varicose veins of both lower extremities- Check BLE venous reflux study and stephanie study.  If no evidence of severe PAD, start graduated compression hose.  Pt to limit sodium intake to 2,000 mg daily.  Elevate legs when resting.    10/7/2021 clinic visit: Mrs. Man reports continued leg worsening swelling and discomfort, despite wearing graduated compression hose for the past 3 months.  She has no claudication or tissue loss.  Exam shows BLE's with prominent varicose veins with venous stasis dermatitis.  BLE Venous Reflux Study on 7/1/2021 revealed hemodynamically significant venous reflux bilaterally with no DVT.  The right and left SSV's are partially compressible with thickened and hyperechoic walls suggestive of previous or chronic superficial venous thrombosis.  Segmental Pressure Study 7/1/2021 revealed normal STEPHANIE at rest and with exercise bilaterally.    Plan:  BLE Venous insufficiency with pain- Mrs. Man reports continued leg worsening swelling and discomfort, despite wearing graduated compression hose for the past 3 months.  She has no claudication or tissue loss.  Exam shows BLE's with prominent varicose veins with venous  stasis dermatitis.  BLE Venous Reflux Study on 7/1/2021 revealed hemodynamically significant venous reflux bilaterally with no DVT.  The right and left SSV's are partially compressible with thickened and hyperechoic walls suggestive of previous or chronic superficial venous thrombosis.  Segmental Pressure Study 7/1/2021 revealed normal STEPHANIE at rest and with exercise bilaterally.  Given continued symptoms despite conservative therapy with graduated compression hose, will refer for EVLT/sclerotherapy.    12/21/2021 clinic visit: Mrs. Man reports she underwent left leg EVLT. She wants to proceed with right EVLT for which she is planned. She has no other complaints.  Plan:   BLE Venous insufficiency with pain- s/p EVLT of the left LSV on 11/11 with follow up US showing resolution of reflux. Limited edema in right foot/ankle. Planned for right leg EVLT. Following with Dr. Hernandez    -On 1/6/2022, pt underwent Endovenous radiofrequency ablation (EVLT) of the right saphenous vein(s) of the lower extremity.    3/22/2022 clinic visit: Mrs. Man reports significant improvement in BLE edema since undergoing EVLT of the left LSV and right GSV.  She has no lower extremity wound/ulcer.  Plan:   BLE Venous insufficiency with pain- Mrs. Man is now s/p EVLT of the left LSV and right GSV with significant improvement in BLE edema.  Follow up RLE venous ultrasound results are pending.  Continue graduated compression hose.  Continue to limit sodium intake to 2,000 mg daily.  Elevate legs when resting.    Persistent atrial fibrillation- Stable.  Continue current medications.  Exercise Induced Pulmonary HTN- Stable.  Continue current medications.    9/22/2022 clinic visit: Mrs. Man reports hitting her left leg on a pedal at spin class and developing a wound 3 weeks ago.  The wound is being managed by wound care.  She reports no significant LE edema.   Plan:   BLE Venous insufficiency with pain- Mrs. Man is now s/p EVLT of the  "left LSV and right GSV with significant improvement in BLE edema.  Follow up RLE venous ultrasound results as above.  Continue graduated compression hose.  Continue to limit sodium intake to 2,000 mg daily.  Elevate legs when resting.    Left leg wound- Appears to be healing appropriately.  Continue wound care.    Persistent atrial fibrillation- Stable.  Continue current medications.  Exercise Induced Pulmonary HTN- Stable.  Continue current medications.    2/2/2023 clinic visit: Mrs. Man reports doing well with no chest pain or SOB.  LLE wound has completely healed.  She has no significant leg swelling.   Plan:   BLE Venous insufficiency with pain- Stable.  Mrs. Man is now s/p EVLT of the left LSV and right GSV with significant improvement in BLE edema.  Follow up RLE venous ultrasound results as above.  Continue graduated compression hose.  Continue to limit sodium intake to 2,000 mg daily.  Elevate legs when resting.    Left leg wound- Now completely healed.      Persistent atrial fibrillation- Stable.  Continue current medications.  Exercise Induced Pulmonary HTN- Stable.  Continue current medications.    9/21/2023 clinic visit: Mrs. Man reports legs feel "heavy and tired all the time".  No claudication or tissue loss.  Reports having restless leg syndrome in the past.   Plan:   BLE Venous insufficiency with pain- Stable.  Mrs. Man reports legs feeling heavy and tired all the time.  Likely due to venous insufficiency.  Check updated BLE venous reflux study and exercise STEPHANIE study.  Continue graduated compression hose.  Continue to limit sodium intake to 2,000 mg daily.  Elevate legs when resting.    ersistent atrial fibrillation- Stable.  Continue current medications.  Exercise Induced Pulmonary HTN- Stable.  Continue current medications.    HPI:  Mrs. Hecard reports no new issues.  She continue to report legs feel "heavy and tired all the time".  No claudication or tissue loss.  Nuclear stress test " on 1/4/2024 revealed no evidence of myocardial ischemia or infarction.     Past Medical History:   Diagnosis Date    Arthritis     Atrial fibrillation     Bronchitis     Cataract     Dry eyes     GERD (gastroesophageal reflux disease)     Glaucoma, suspect - Both Eyes     Hypothyroidism 06/23/2016    Pulmonary hypertension     Rash     Renal angiomyolipoma     Renal disorder     SCC (squamous cell carcinoma) 07/2023    left lower lateral shin    SCC (squamous cell carcinoma) 04/2023    L mid lateral shin    SCC (squamous cell carcinoma) 09/2023    L mid lower shin    Spinal stenosis     Squamous cell carcinoma     in-situ right upper inner arm, right wrist    Status post lumbar surgery 06/23/2016    Stress fracture     Thyroid disease     Venous insufficiency      Past Surgical History:   Procedure Laterality Date    ANGIOPLASTY      CATARACT EXTRACTION W/  INTRAOCULAR LENS IMPLANT Left 12/6/2022    Procedure: EXTRACTION, CATARACT, WITH IOL INSERTION;  Surgeon: Tone Brown MD;  Location: [facility];  Service: Ophthalmology;  Laterality: Left;    CATARACT EXTRACTION W/  INTRAOCULAR LENS IMPLANT Right 12/20/2022    Procedure: EXTRACTION, CATARACT, WITH IOL INSERTION;  Surgeon: Tone Brown MD;  Location: [facility];  Service: Ophthalmology;  Laterality: Right;    CERVICAL FUSION      COLONOSCOPY      COLONOSCOPY N/A 11/14/2017    Procedure: COLONOSCOPY;  Surgeon: Kasi Mercado MD;  Location: [facility] (4TH FLR);  Service: Endoscopy;  Laterality: N/A;    COLONOSCOPY N/A 4/26/2023    Procedure: COLONOSCOPY;  Surgeon: Jeanmarie Hathaway MD;  Location: [facility] (4TH FLR);  Service: Colon and Rectal;  Laterality: N/A;  ok to hold Eliquis 2 days per Dr Pereira  constipation-ext Miralax prep  instr mailed/portal-GT  4/21/23 pre call attempted, no answer    ESOPHAGOGASTRODUODENOSCOPY N/A 10/10/2019    Procedure: EGD (ESOPHAGOGASTRODUODENOSCOPY);  Surgeon: Rg Milton MD;  Location: [facility] ENDO (4TH FLR);   Service: Endoscopy;  Laterality: N/A;    ESOPHAGOGASTRODUODENOSCOPY N/A 10/6/2021    Procedure: EGD (ESOPHAGOGASTRODUODENOSCOPY);  Surgeon: Rg Milton MD;  Location: [facility] ENDO (4TH FLR);  Service: Endoscopy;  Laterality: N/A;  covid test 10/3 [place], instr emailed/portal -ml    EXCISION Left 5/17/2023    Procedure: EXCISION SQUAMOUS CELL CARCINOMA LEFT LEG;  Surgeon: Kasi Canela Jr., MD;  Location: [facility] OR 2ND FLR;  Service: General;  Laterality: Left;    l4-5 mid discectomy Left 03/2017    l4-5 MIS diskectomy Right 05/2016    RIGHT HEART CATHETERIZATION Right 1/27/2022    Procedure: INSERTION, CATHETER, RIGHT HEART;  Surgeon: Satnam Pickard Jr., MD;  Location: [facility] CATH LAB;  Service: Cardiology;  Laterality: Right;    TREATMENT OF CARDIAC ARRHYTHMIA N/A 7/17/2020    Procedure: CARDIOVERSION;  Surgeon: Kwan Bray MD;  Location: [facility] EP LAB;  Service: Cardiology;  Laterality: N/A;  AF,DCCV/SANGITA, ANES, SK, 746    TREATMENT OF CARDIAC ARRHYTHMIA N/A 7/14/2021    Procedure: CARDIOVERSION;  Surgeon: SYDNIE Cedillo MD;  Location: [facility] EP LAB;  Service: Cardiology;  Laterality: N/A;  AF, SANGITA, DCCV, MAC, EH, 3 Prep    WASHOUT Right 5/17/2023    Procedure: WASHOUT right lower arm and closure;  Surgeon: Kasi Canela Jr., MD;  [facility];  Service: General;  Laterality: Right;     Social History     Socioeconomic History    Marital status: Single   Occupational History     Employer: [employer]   Tobacco Use    Smoking status: Never     Passive exposure: Never    Smokeless tobacco: Never   Substance and Sexual Activity    Alcohol use: Yes     Alcohol/week: 4.0 standard drinks of alcohol     Types: 4 Glasses of wine per week     Comment: 2 glasses of wine on weekends    Drug use: No    Sexual activity: Never   Other Topics Concern    Are you pregnant or think you may be? No    Breast-feeding No     Social Determinants of Health     Financial Resource Strain: Low Risk   (1/18/2024)    Overall Financial Resource Strain (CARDIA)     Difficulty of Paying Living Expenses: Not very hard   Food Insecurity: No Food Insecurity (1/18/2024)    Hunger Vital Sign     Worried About Running Out of Food in the Last Year: Never true     Ran Out of Food in the Last Year: Never true   Transportation Needs: No Transportation Needs (1/18/2024)    PRAPARE - Transportation     Lack of Transportation (Medical): No     Lack of Transportation (Non-Medical): No   Physical Activity: Sufficiently Active (1/18/2024)    Exercise Vital Sign     Days of Exercise per Week: 7 days     Minutes of Exercise per Session: 60 min   Stress: No Stress Concern Present (1/18/2024)    [employer] [place] [employer] - Occupational Stress Questionnaire     Feeling of Stress : Only a little   Recent Concern: Stress - Stress Concern Present (11/6/2023)    [employer] [place] [employer] - Occupational Stress Questionnaire     Feeling of Stress : To some extent   Social Connections: Unknown (1/18/2024)    Social Connection and Isolation Panel [NHANES]     Frequency of Communication with Friends and Family: Twice a week     Frequency of Social Gatherings with Friends and Family: Patient declined     Attends Tenriism Services: Never     Active Member of Clubs or Organizations: No     Attends Club or Organization Meetings: Patient declined     Marital Status:    Recent Concern: Social Connections - Socially Isolated (12/1/2023)    Social Connection and Isolation Panel [NHANES]     Frequency of Communication with Friends and Family: Once a week     Frequency of Social Gatherings with Friends and Family: Once a week     Attends Tenriism Services: Never     Active Member of Clubs or Organizations: No     Attends Club or Organization Meetings: Never     Marital Status:    Housing Stability: Low Risk  (1/18/2024)    Housing Stability Vital Sign     Unable to Pay for Housing in the Last Year: No      Number of Places Lived in the Last Year: 1     Unstable Housing in the Last Year: No     Family History   Problem Relation Age of Onset    Cancer Mother         Lung cancer    Heart failure Father     Colon cancer Father     Hypertension Father     Cataracts Father     Cancer Father 80        colon    No Known Problems Sister     No Known Problems Brother     Melanoma Daughter     Diabetes Son     Heart disease Son     Cancer Son         esophageal cancer    No Known Problems Maternal Aunt     No Known Problems Maternal Uncle     No Known Problems Paternal Aunt     No Known Problems Paternal Uncle     No Known Problems Maternal Grandmother     No Known Problems Maternal Grandfather     No Known Problems Paternal Grandmother     No Known Problems Paternal Grandfather     Amblyopia Neg Hx     Blindness Neg Hx     Glaucoma Neg Hx     Macular degeneration Neg Hx     Retinal detachment Neg Hx     Strabismus Neg Hx     Stroke Neg Hx     Thyroid disease Neg Hx     Breast cancer Neg Hx     Ovarian cancer Neg Hx        Review of patient's allergies indicates:  No Known Allergies    Medication List with Changes/Refills   Current Medications    ACETAMINOPHEN (TYLENOL) 500 MG TABLET    Take 500 mg by mouth every 6 (six) hours as needed for Pain.    ACYCLOVIR (ZOVIRAX) 400 MG TABLET    Take 1 tablet (400 mg total) by mouth once daily.    ASCORBIC ACID (VITAMIN C) 1000 MG TABLET    Take 1,000 mg by mouth once daily.     BIOTIN 1 MG TABLET    Take 1 mg by mouth 2 (two) times daily.     BUPROPION (WELLBUTRIN SR) 150 MG TBSR 12 HR TABLET    Take 1 tablet (150 mg total) by mouth 2 (two) times daily.    DIGESTIVE ENZYMES TAB    Take 1 tablet by mouth once daily.     DIPHENHYDRAMINE (BENADRYL) 25 MG CAPSULE    Take 25 mg by mouth nightly as needed.     ELIQUIS 5 MG TAB    TAKE 1 TABLET(5 MG) BY MOUTH TWICE DAILY    FLUTICASONE PROPIONATE (FLONASE) 50 MCG/ACTUATION NASAL SPRAY    1 spray (50 mcg total) by Each Nostril route 2 (two)  "times daily as needed for Rhinitis.    FUROSEMIDE (LASIX) 20 MG TABLET    Take 1 tablet (20 mg total) by mouth every other day.    LEVOTHYROXINE (SYNTHROID) 137 MCG TAB TABLET    Take 1 tablet (137 mcg total) by mouth before breakfast.    LUBIPROSTONE (AMITIZA) 24 MCG CAP    Take 1 capsule (24 mcg total) by mouth daily as needed (IBS symptoms).    METOPROLOL SUCCINATE (TOPROL-XL) 25 MG 24 HR TABLET    Take 1 tablet (25 mg total) by mouth once daily.    MIRTAZAPINE (REMERON) 7.5 MG TAB    Take 1 tablet (7.5 mg total) by mouth nightly.    MULTIVITAMIN (THERAGRAN) PER TABLET    Take 1 tablet by mouth once daily.     PRAMIPEXOLE (MIRAPEX) 0.25 MG TABLET    Take 1 tablet (0.25 mg total) by mouth every evening. For restless legs    TRAZODONE (DESYREL) 50 MG TABLET    Take 1 tablet (50 mg total) by mouth nightly as needed for Insomnia.    ZINC 50 MG TAB    Take 50 mg by mouth once daily.        Review of Systems  Constitution: Denies chills, fever, and sweats.  HENT: Denies headaches or blurry vision.  Cardiovascular: Denies chest pain or irregular heart beat.  Respiratory: Denies cough or shortness of breath.  Gastrointestinal: Denies abdominal pain, nausea, or vomiting.  Musculoskeletal: Positive or varicose veins with discomfort and swelling.  Neurological: Denies dizziness or focal weakness.  Psychiatric/Behavioral: Normal mental status.  Hematologic/Lymphatic: Denies bleeding problem or easy bruising/bleeding.  Skin: Denies rash or suspicious lesions    Physical Examination  /78   Pulse 79   Ht 5' 4" (1.626 m)   Wt 46.9 kg (103 lb 6.3 oz)   LMP  (LMP Unknown)   BMI 17.75 kg/m²     Constitutional: No acute distress, conversant  HEENT: Sclera anicteric, Pupils equal, round and reactive to light, extraocular motions intact, Oropharynx clear  Neck: No JVD, no carotid bruits  Cardiovascular: irregular rhythm, no murmur, rubs or gallops  Pulmonary: Clear to auscultation bilaterally  Abdominal: Abdomen soft, " nontender, nondistended, positive bowel sounds  Extremities: BLE's with prominent varicose veins with venous stasis dermatitis.     Pulses:  Carotid pulses are 2+ on the right side, and 2+ on the left side.  Radial pulses are 2+ on the right side, and 2+ on the left side.   Femoral pulses are 2+ on the right side, and 2+ on the left side.  Popliteal pulses are 2+ on the right side, and 2+ on the left side.   Dorsalis pedis pulses are 2+ on the right side, and 2+ on the left side.   Posterior tibial pulses are 2+ on the right side, and 2+ on the left side.    Skin: No ecchymosis, erythema, or ulcers  Psych: Alert and oriented x 3, appropriate affect  Neuro: CNII-XII intact, no focal deficits    Labs:  Most Recent Data  CBC:   Lab Results   Component Value Date    WBC 4.23 01/10/2024    HGB 13.1 01/10/2024    HCT 40.9 01/10/2024     01/10/2024     (H) 01/10/2024    RDW 12.9 01/10/2024     BMP:   Lab Results   Component Value Date     01/10/2024    K 3.9 01/10/2024     01/10/2024    CO2 26 01/10/2024    BUN 15 01/10/2024    CREATININE 0.8 01/10/2024    GLU 81 01/10/2024    CALCIUM 8.8 01/10/2024    MG 1.6 12/28/2021     LFTS;   Lab Results   Component Value Date    PROT 6.4 01/10/2024    ALBUMIN 3.6 01/10/2024    BILITOT 0.5 01/10/2024    AST 18 01/10/2024    ALKPHOS 67 01/10/2024    ALT 17 01/10/2024    GGT 52 04/13/2022     COAGS:   Lab Results   Component Value Date    INR 1.1 07/07/2021     FLP:   Lab Results   Component Value Date    CHOL 197 01/10/2024    HDL 68 01/10/2024    LDLCALC 111.8 01/10/2024    TRIG 86 01/10/2024    CHOLHDL 34.5 01/10/2024     CARDIAC:   Lab Results   Component Value Date    TROPONINI <0.006 07/16/2020     (H) 12/28/2021     Imaging:    Nuclear Stress Test 1/4/2024:    Normal myocardial perfusion scan. There is no evidence of myocardial ischemia or infarction.    The gated perfusion images showed an ejection fraction of 54% at rest. The gated perfusion  images showed an ejection fraction of 61% post stress. Normal ejection fraction is greater than 53%.    There is normal wall motion at rest and post stress.    LV cavity size is normal at rest and normal at stress.    The ECG portion of the study is negative for ischemia. Sensitivity is reduced secondary to the target heart rate not being achieved.    The patient reported no chest pain during the stress test.    During stress, atrial fibrillation is noted.    The exercise capacity was average.    There are no prior studies for comparison.    BLE Venous Reflux Study 3/22/2022:  The right lesser saphenous vein is partially compressible consistent with SVT.  The contralateral (left) common femoral vein is patent. .  The right greater saphenous vein is occluded consistent with successful prior EVLT.    BLE Venous reflux study 12/14/2021:  There is no evidence of a left lower extremity DVT.  Left GSV occluded post EVLT.    Segmental Pressure Study 7/1/2021:  Normal STEPHANIE bilaterally with unremarkable waveforms at rest.    Normal STEPHANIE with exercise.  The right TBI is 0.51 and the left TBI is 0.40, which is mild to moderately abnormal.    BLE Venous Reflux Study 7/1/2021:  No evidence of lower extremity deep venous thrombosis bilateral.  There is reflux in the right common femoral vein and left external iliac vein.  The right GSV below the level of the knee is partially compressible with thickened and hyperechoic walls suggestive of previous or chronic superficial venous thrombosis.    There is also evidence of superficial venous insufficiency of the right GSV below the knee without dilation of the vessel.  The right SSV is partially compressible with thickened and hyperechoic walls suggestive of previous or chronic superficial venous thrombosis.  The left SSV is partially compressible with thickened and hyperechoic walls suggestive of previous or chronic superficial venous thrombosis.    Assessment/Plan:  Angelina Man is  a 74y.o. female with venous insufficiency s/p EVLT of the left LSV and right GSV, hypothyroidism, anxiety, arthritis, and persistent atrial fibrillation s/p DCCV 17-Jul-2020, who presents for a follow up appointment.     1. BLE Venous insufficiency with pain- Stable.  Mrs. Man reports legs feeling heavy and tired all the time.  Likely due to venous insufficiency.  Continue graduated compression hose.  Continue to limit sodium intake to 2,000 mg daily.  Elevate legs when resting.      2. Persistent atrial fibrillation- Stable.  Continue current medications.    4. Exercise Induced Pulmonary HTN- Stable.  Continue current medications.    Follow up in 6 months     Total duration of face to face visit time 30 minutes.  Total time spent counseling greater than fifty percent of total visit time.  Counseling included discussion regarding imaging findings, diagnosis, possibilities, treatment options, risks and benefits.  The patient had many questions regarding the options and long-term effects.    Jeanmarie Cedeño MD, PhD  Interventional Cardiiology

## 2024-02-14 ENCOUNTER — HOSPITAL ENCOUNTER (OUTPATIENT)
Dept: RADIOLOGY | Facility: HOSPITAL | Age: 74
Discharge: HOME | End: 2024-02-14
Attending: ANESTHESIOLOGY
Payer: MEDICARE

## 2024-02-14 DIAGNOSIS — M54.9 DORSALGIA, UNSPECIFIED: ICD-10-CM

## 2024-02-14 PROCEDURE — 72158 MRI LUMBAR SPINE W/O & W/DYE: CPT | Mod: 26,HCNC,, | Performed by: RADIOLOGY

## 2024-02-14 PROCEDURE — 72158 MRI LUMBAR SPINE W/O & W/DYE: CPT | Mod: TC,HCNC

## 2024-02-14 PROCEDURE — 25500020 PHARM REV CODE 255: Mod: HCNC | Performed by: ANESTHESIOLOGY

## 2024-02-14 PROCEDURE — A9585 GADOBUTROL INJECTION: HCPCS | Mod: HCNC | Performed by: ANESTHESIOLOGY

## 2024-02-14 RX ORDER — GADOBUTROL 604.72 MG/ML
5 INJECTION INTRAVENOUS
Status: COMPLETED | OUTPATIENT
Start: 2024-02-14 | End: 2024-02-14

## 2024-02-14 RX ADMIN — GADOBUTROL 5 ML: 604.72 INJECTION INTRAVENOUS at 03:02

## 2024-02-16 ENCOUNTER — PATIENT MESSAGE (OUTPATIENT)
Dept: INTERNAL MEDICINE | Facility: CLINIC | Age: 74
End: 2024-02-16
Payer: MEDICARE

## 2024-02-22 ENCOUNTER — PATIENT MESSAGE (OUTPATIENT)
Dept: INTERNAL MEDICINE | Facility: CLINIC | Age: 74
End: 2024-02-22
Payer: MEDICARE

## 2024-02-22 DIAGNOSIS — K58.9 IRRITABLE BOWEL SYNDROME, UNSPECIFIED TYPE: ICD-10-CM

## 2024-02-26 ENCOUNTER — TELEPHONE (OUTPATIENT)
Dept: ENDOSCOPY | Facility: HOSPITAL | Age: 74
End: 2024-02-26

## 2024-02-26 ENCOUNTER — OFFICE VISIT (OUTPATIENT)
Dept: PAIN MEDICINE | Facility: CLINIC | Age: 74
End: 2024-02-26
Payer: MEDICARE

## 2024-02-26 ENCOUNTER — CLINICAL SUPPORT (OUTPATIENT)
Dept: ENDOSCOPY | Facility: HOSPITAL | Age: 74
End: 2024-02-26
Attending: STUDENT IN AN ORGANIZED HEALTH CARE EDUCATION/TRAINING PROGRAM
Payer: MEDICARE

## 2024-02-26 VITALS
HEART RATE: 81 BPM | OXYGEN SATURATION: 100 % | WEIGHT: 99 LBS | HEIGHT: 64 IN | SYSTOLIC BLOOD PRESSURE: 111 MMHG | RESPIRATION RATE: 18 BRPM | DIASTOLIC BLOOD PRESSURE: 75 MMHG | BODY MASS INDEX: 16.9 KG/M2

## 2024-02-26 DIAGNOSIS — Z12.11 ENCOUNTER FOR SCREENING COLONOSCOPY: ICD-10-CM

## 2024-02-26 DIAGNOSIS — M48.061 SPINAL STENOSIS OF LUMBAR REGION WITHOUT NEUROGENIC CLAUDICATION: ICD-10-CM

## 2024-02-26 DIAGNOSIS — M54.16 LUMBAR RADICULOPATHY: Primary | ICD-10-CM

## 2024-02-26 DIAGNOSIS — Z80.0 FAMILY HISTORY OF COLON CANCER: ICD-10-CM

## 2024-02-26 PROCEDURE — 99214 OFFICE O/P EST MOD 30 MIN: CPT | Mod: HCNC,S$GLB,, | Performed by: ANESTHESIOLOGY

## 2024-02-26 PROCEDURE — 3288F FALL RISK ASSESSMENT DOCD: CPT | Mod: HCNC,CPTII,S$GLB, | Performed by: ANESTHESIOLOGY

## 2024-02-26 PROCEDURE — 1157F ADVNC CARE PLAN IN RCRD: CPT | Mod: HCNC,CPTII,S$GLB, | Performed by: ANESTHESIOLOGY

## 2024-02-26 PROCEDURE — 1100F PTFALLS ASSESS-DOCD GE2>/YR: CPT | Mod: HCNC,CPTII,S$GLB, | Performed by: ANESTHESIOLOGY

## 2024-02-26 PROCEDURE — 1159F MED LIST DOCD IN RCRD: CPT | Mod: HCNC,CPTII,S$GLB, | Performed by: ANESTHESIOLOGY

## 2024-02-26 PROCEDURE — 1125F AMNT PAIN NOTED PAIN PRSNT: CPT | Mod: HCNC,CPTII,S$GLB, | Performed by: ANESTHESIOLOGY

## 2024-02-26 PROCEDURE — 3074F SYST BP LT 130 MM HG: CPT | Mod: HCNC,CPTII,S$GLB, | Performed by: ANESTHESIOLOGY

## 2024-02-26 PROCEDURE — 3078F DIAST BP <80 MM HG: CPT | Mod: HCNC,CPTII,S$GLB, | Performed by: ANESTHESIOLOGY

## 2024-02-26 PROCEDURE — 99999 PR PBB SHADOW E&M-EST. PATIENT-LVL IV: CPT | Mod: PBBFAC,HCNC,, | Performed by: ANESTHESIOLOGY

## 2024-02-26 RX ORDER — POLYETHYLENE GLYCOL 3350, SODIUM SULFATE ANHYDROUS, SODIUM BICARBONATE, SODIUM CHLORIDE, POTASSIUM CHLORIDE 236; 22.74; 6.74; 5.86; 2.97 G/4L; G/4L; G/4L; G/4L; G/4L
4 POWDER, FOR SOLUTION ORAL ONCE
Qty: 4000 ML | Refills: 0 | Status: SHIPPED | OUTPATIENT
Start: 2024-02-26 | End: 2024-02-26

## 2024-02-26 NOTE — H&P (VIEW-ONLY)
PCP: Zina Rockwell MD    REFERRING PHYSICIAN: No ref. provider found    CHIEF COMPLAINT: Right leg pain    Original HISTORY OF PRESENT ILLNESS: Angelina Man presents to the clinic for the evaluation of the above pain. This has been up and down for the past 5 years or so.     Original Pain Description:  The pain is located in the right buttock and is radiating to the right calf . The pain is described as aching. Exacerbating factors: Bending and Getting out of bed/chair. Mitigating factors heat. Symptoms interfere with daily activity. The patient feels like symptoms have been worsening. Patient reports sometimes stubbing her right foot.    Original PAIN SCORES:  Best: Pain is 4  Worst: Pain is 7  Current: Pain is 5        2/26/2024     2:19 PM   Last 3 PDI Scores   Pain Disability Index (PDI) 7       INTERVAL HISTORY: (Newest visit at the bottom)   Interval History (2/26/24):   Angelina Man returns today for follow-up. Since last seen, they report their pain has been persistant. It continues to be shooting with constant numbness from mid calf throughout her foot. Is not limited in distance with walking. Regularly walks at least a mile per day. She denies new bowel or bladder dysfunction, weakness, or numbness.      6 weeks of Conservative therapy:  PT: 2019 (Must include dates)  Chiro: Yes  HEP: Continuing HEP daily as prescribed at PT above. Also does yoga twice weekly.       Treatments / Medications: (Ice/Heat/NSAIDS/APAP/etc):  Heat - helps  APAP - helps    Interventional Pain Procedures: (Previous injections)  TFESI prior to Lumbar Surgery    Past Medical History:   Diagnosis Date    Arthritis     Atrial fibrillation     Bronchitis     Cataract     Dry eyes     GERD (gastroesophageal reflux disease)     Glaucoma, suspect - Both Eyes     Hypothyroidism 06/23/2016    Pulmonary hypertension     Rash     Renal angiomyolipoma     Renal disorder     SCC (squamous cell carcinoma) 07/2023    left lower lateral  shin    SCC (squamous cell carcinoma) 04/2023    L mid lateral shin    SCC (squamous cell carcinoma) 09/2023    L mid lower shin    Spinal stenosis     Squamous cell carcinoma     in-situ right upper inner arm, right wrist    Status post lumbar surgery 06/23/2016    Stress fracture     Thyroid disease     Venous insufficiency      Past Surgical History:   Procedure Laterality Date    ANGIOPLASTY      CATARACT EXTRACTION W/  INTRAOCULAR LENS IMPLANT Left 12/6/2022    Procedure: EXTRACTION, CATARACT, WITH IOL INSERTION;  Surgeon: Tone Brown MD;  Location: [facility];  Service: Ophthalmology;  Laterality: Left;    CATARACT EXTRACTION W/  INTRAOCULAR LENS IMPLANT Right 12/20/2022    Procedure: EXTRACTION, CATARACT, WITH IOL INSERTION;  Surgeon: Tone Brown MD;  [facility]: [facility] OR;  Service: Ophthalmology;  Laterality: Right;    CERVICAL FUSION      COLONOSCOPY      COLONOSCOPY N/A 11/14/2017    Procedure: COLONOSCOPY;  Surgeon: Kasi Mercado MD;  [facility]  Service: Endoscopy;  Laterality: N/A;    COLONOSCOPY N/A 4/26/2023    Procedure: COLONOSCOPY;  Surgeon: Jeanmarie Hahtaway MD;  [facility]  Service: Colon and Rectal;  Laterality: N/A;  ok to hold Eliquis 2 days per Dr Pereira  constipation-ext Miralax prep  instr mailed/portal-GT  4/21/23 pre call attempted, no answer    ESOPHAGOGASTRODUODENOSCOPY N/A 10/10/2019    Procedure: EGD (ESOPHAGOGASTRODUODENOSCOPY);  Surgeon: Rg Milton MD;  [facility]  Service: Endoscopy;  Laterality: N/A;    ESOPHAGOGASTRODUODENOSCOPY N/A 10/6/2021    Procedure: EGD (ESOPHAGOGASTRODUODENOSCOPY);  Surgeon: Rg Milton MD;  [facility]  Service: Endoscopy;  Laterality: N/A;  covid test 10/3 [place], instr emailed/portal -ml    EXCISION Left 5/17/2023    Procedure: EXCISION SQUAMOUS CELL CARCINOMA LEFT LEG;  Surgeon: Kasi Canela Jr., MD;  Location: [facility] 2ND FLR;  Service:  General;  Laterality: Left;    l4-5 mid discectomy Left 03/2017    l4-5 MIS diskectomy Right 05/2016    RIGHT HEART CATHETERIZATION Right 1/27/2022    Procedure: INSERTION, CATHETER, RIGHT HEART;  Surgeon: Satnam Pickard Jr., MD;  Location: [facility] CATH LAB;  Service: Cardiology;  Laterality: Right;    TREATMENT OF CARDIAC ARRHYTHMIA N/A 7/17/2020    Procedure: CARDIOVERSION;  Surgeon: Kwan Bray MD;  Location: [facility] EP LAB;  Service: Cardiology;  Laterality: N/A;  AF,DCCV/SANGITA, ANES, SK, 746    TREATMENT OF CARDIAC ARRHYTHMIA N/A 7/14/2021    Procedure: CARDIOVERSION;  Surgeon: SYDNIE Cedillo MD;  Location: [facility] EP LAB;  Service: Cardiology;  Laterality: N/A;  AF, SANGITA, DCCV, MAC, EH, 3 Prep    WASHOUT Right 5/17/2023    Procedure: WASHOUT right lower arm and closure;  Surgeon: Kasi Canela Jr., MD;  Location: [facility] OR [address] [place] [address];  Service: General;  Laterality: Right;     Social History     Socioeconomic History    Marital status: Single   Occupational History     Employer: [email or web address]   Tobacco Use    Smoking status: Never     Passive exposure: Never    Smokeless tobacco: Never   Substance and Sexual Activity    Alcohol use: Yes     Alcohol/week: 4.0 standard drinks of alcohol     Types: 4 Glasses of wine per week     Comment: 2 glasses of wine on weekends    Drug use: No    Sexual activity: Never   Other Topics Concern    Are you pregnant or think you may be? No    Breast-feeding No     Social Determinants of Health     Financial Resource Strain: Low Risk  (1/18/2024)    Overall Financial Resource Strain (CARDIA)     Difficulty of Paying Living Expenses: Not very hard   Food Insecurity: No Food Insecurity (1/18/2024)    Hunger Vital Sign     Worried About Running Out of Food in the Last Year: Never true     Ran Out of Food in the Last Year: Never true   Transportation Needs: No Transportation Needs (1/18/2024)    PRAPARE - Transportation     Lack of Transportation (Medical): No     Lack of  Transportation (Non-Medical): No   Physical Activity: Sufficiently Active (1/18/2024)    Exercise Vital Sign     Days of Exercise per Week: 7 days     Minutes of Exercise per Session: 60 min   Stress: No Stress Concern Present (1/18/2024)    [employer] - Occupational Stress Questionnaire     Feeling of Stress : Only a little   Recent Concern: Stress - Stress Concern Present (11/6/2023)    [employer] - Occupational Stress Questionnaire     Feeling of Stress : To some extent   Social Connections: Unknown (1/18/2024)    Social Connection and Isolation Panel [NHANES]     Frequency of Communication with Friends and Family: Twice a week     Frequency of Social Gatherings with Friends and Family: Patient declined     Attends [employer] Services: Never     Active Member of Clubs or Organizations: No     Attends Club or Organization Meetings: Patient declined     Marital Status:    Recent Concern: Social Connections - Socially Isolated (12/1/2023)    Social Connection and Isolation Panel [NHANES]     Frequency of Communication with Friends and Family: Once a week     Frequency of Social Gatherings with Friends and Family: Once a week     Attends [employer]ist Services: Never     Active Member of Clubs or Organizations: No     Attends Club or Organization Meetings: Never     Marital Status:    Housing Stability: Low Risk  (1/18/2024)    Housing Stability Vital Sign     Unable to Pay for Housing in the Last Year: No     Number of Places Lived in the Last Year: 1     Unstable Housing in the Last Year: No     Family History   Problem Relation Age of Onset    Cancer Mother         Lung cancer    Heart failure Father     Colon cancer Father     Hypertension Father     Cataracts Father     Cancer Father 80        colon    No Known Problems Sister     No Known Problems Brother     Melanoma Daughter     Diabetes Son     Heart disease Son     Cancer Son          esophageal cancer    No Known Problems Maternal Aunt     No Known Problems Maternal Uncle     No Known Problems Paternal Aunt     No Known Problems Paternal Uncle     No Known Problems Maternal Grandmother     No Known Problems Maternal Grandfather     No Known Problems Paternal Grandmother     No Known Problems Paternal Grandfather     Amblyopia Neg Hx     Blindness Neg Hx     Glaucoma Neg Hx     Macular degeneration Neg Hx     Retinal detachment Neg Hx     Strabismus Neg Hx     Stroke Neg Hx     Thyroid disease Neg Hx     Breast cancer Neg Hx     Ovarian cancer Neg Hx        Review of patient's allergies indicates:  No Known Allergies    Current Outpatient Medications   Medication Sig    acetaminophen (TYLENOL) 500 MG tablet Take 500 mg by mouth every 6 (six) hours as needed for Pain.    acyclovir (ZOVIRAX) 400 MG tablet Take 1 tablet (400 mg total) by mouth once daily.    ascorbic acid (VITAMIN C) 1000 MG tablet Take 1,000 mg by mouth once daily.     biotin 1 mg tablet Take 1 mg by mouth 2 (two) times daily.     buPROPion (WELLBUTRIN SR) 150 MG TBSR 12 hr tablet Take 1 tablet (150 mg total) by mouth 2 (two) times daily.    digestive enzymes Tab Take 1 tablet by mouth once daily.     ELIQUIS 5 mg Tab TAKE 1 TABLET(5 MG) BY MOUTH TWICE DAILY    fluticasone propionate (FLONASE) 50 mcg/actuation nasal spray 1 spray (50 mcg total) by Each Nostril route 2 (two) times daily as needed for Rhinitis.    furosemide (LASIX) 20 MG tablet Take 1 tablet (20 mg total) by mouth every other day.    levothyroxine (SYNTHROID) 137 MCG Tab tablet Take 1 tablet (137 mcg total) by mouth before breakfast.    lubiprostone (AMITIZA) 24 MCG Cap Take 1 capsule (24 mcg total) by mouth daily as needed (IBS symptoms).    metoprolol succinate (TOPROL-XL) 25 MG 24 hr tablet Take 1 tablet (25 mg total) by mouth once daily.    mirtazapine (REMERON) 7.5 MG Tab Take 1 tablet (7.5 mg total) by mouth nightly.    multivitamin (THERAGRAN) per tablet Take  "1 tablet by mouth once daily.     polyethylene glycol (GOLYTELY) 236-22.74-6.74 -5.86 gram suspension Take 4,000 mLs (4 L total) by mouth once. for 1 dose    pramipexole (MIRAPEX) 0.25 MG tablet Take 1 tablet (0.25 mg total) by mouth every evening. For restless legs    traZODone (DESYREL) 50 MG tablet Take 1 tablet (50 mg total) by mouth nightly as needed for Insomnia.    zinc 50 mg Tab Take 50 mg by mouth once daily.     diphenhydrAMINE (BENADRYL) 25 mg capsule Take 25 mg by mouth nightly as needed.      No current facility-administered medications for this visit.       ROS:  GENERAL: No fever. No chills. No fatigue. Denies weight loss. Denies weight gain.  HEENT: Denies headaches. Denies vision change. Denies eye pain. Denies double vision. Denies ear pain.   CV: Denies chest pain.   PULM: Denies of shortness of breath.  GI: Denies constipation. No diarrhea. No abdominal pain. Denies nausea. Denies vomiting. No blood in stool.  HEME: Denies bleeding problems.  : Denies urgency. No painful urination. No blood in urine.  MS: Denies joint stiffness. Denies joint swelling.  Denies back pain.  SKIN: Denies rash.   NEURO: Denies seizures. No weakness.  PSYCH:  Denies difficulty sleeping. No anxiety. Denies depression. No suicidal thoughts.       VITALS:   Vitals:    02/26/24 1412 02/26/24 1415 02/26/24 1419   BP: 111/75     Pulse: 81     Resp: 18     SpO2: 100%     Weight: 44.9 kg (98 lb 15.8 oz)     Height: 5' 4" (1.626 m)     PainSc:   1   1   1   PainLoc: Back           PHYSICAL EXAM:   GENERAL: Well appearing, in no acute distress, alert and oriented x3.  PSYCH:  Mood and affect appropriate.  SKIN: Skin color, texture, turgor normal, no rashes or lesions.  HEENT:  Normocephalic, atraumatic. Cranial nerves grossly intact.  NECK: No pain to palpation over the cervical paraspinous muscles. No pain to palpation over facets. No pain with neck flexion, extension, or lateral flexion.   PULM: No evidence of respiratory " difficulty, symmetric chest rise.  GI:  Non-distended  BACK: Well-healed incisions. No pain to palpation over the spinous processes. No pain to palpation over facet joints. There is pain with palpation over the right SIJ and piriformis with reproduction of her symptoms down the leg. positive straight leg raise bilaterally  EXTREMITIES: No deformities, edema, or skin discoloration.   MUSCULOSKELETAL: Shoulder, hip, and knee provocative maneuvers are negative. No atrophy is noted.  NEURO: Sensation is altered below the knees bilaterally. Partial drop foot on the right (4/5 strength). Bilateral lower extremity coordination and muscle stretch reflexes are physiologic and symmetric. Plantar response are downgoing.  GAIT: Antalgic. Steppage gait.      LABS:    Chemistry        Component Value Date/Time     01/10/2024 0708    K 3.9 01/10/2024 0708     01/10/2024 0708    CO2 26 01/10/2024 0708    BUN 15 01/10/2024 0708    CREATININE 0.8 01/10/2024 0708    GLU 81 01/10/2024 0708        Component Value Date/Time    CALCIUM 8.8 01/10/2024 0708    ALKPHOS 67 01/10/2024 0708    AST 18 01/10/2024 0708    ALT 17 01/10/2024 0708    BILITOT 0.5 01/10/2024 0708    ESTGFRAFRICA >60.0 05/25/2022 1204    EGFRNONAA 55.6 (A) 05/25/2022 1204            IMAGING:    CT LUMBAR SPINE WITHOUT CONTRAST     CLINICAL HISTORY:  Evaluate fusion;  Arthrodesis status     TECHNIQUE:  Low-dose axial, sagittal and coronal reformations are obtained through the lumbar spine.  Contrast was not administered.     COMPARISON:  MRI lumbar spine 01/07/2021; CT lumbar spine 03/17/2020     FINDINGS:  Postoperative changes from L4-S1 posterior decompression and instrumented fusion with bilateral vertical rods, pedicle screws, and metallic interbody spacers.  Hardware is intact and unchanged in position without evidence of loosening.  No perihardware fractures.  Unchanged alignment.  No significant osseous bridging.  No definite fluid collections in the  operative bed, however evaluation is limited by CT technique and degraded by streak artifact.     T12-L2 grade 1 stepwise retrolisthesis. L4 on L5 grade 1 anterolisthesis. Mild rightward curvature of the lumbar spine.     Diffuse osteopenia.  No fractures or pars defects.  No focal osseous lesions.     Multilevel degenerative disc disease, most advanced at L2-3 and L3-4.  Multilevel facet arthropathy, most advanced at L3-4.     The conus medullaris and cauda equina nerve roots are difficult to visualize.     9 mm cyst in the midpole left kidney, with a 2 mm mural calcification versus adjacent nonobstructing stone.  Paraspinal soft tissues are otherwise unremarkable.     Impression:     1. Postoperative changes from L4-S1 decompressive instrumented fusion.  No hardware complication.  Unchanged alignment.  2. Multilevel degenerative changes, most advanced at L2-3 and L3-4, similar to prior.     Electronically signed by resident: Brigette Khoury  Date:                                            02/22/2022  Time:                                           09:41     Electronically signed by: Ismael Isabel  Date:                                            02/22/2022  Time:                                           14:11    ASSESSMENT: 74 y.o. year old female with pain, consistent with:    Encounter Diagnoses   Name Primary?    Lumbar radiculopathy Yes    Spinal stenosis of lumbar region without neurogenic claudication          DISCUSSION: Angelina Man is a retired  with a history of multiple laminectomies and microdiscectomies with residual surgical bed inflammation causing canal stenosis. She underwent a right L4/5 L5/S1 TLIF with posterior hardware in 2018. She did very well after surgery but comes to us with increasing pain with bending and into the right leg. She does have a drop foot on the right and reports catching the right foot on the floor constantly.    PLAN:  Continue HEP  Holding off on  additional medication due to patient's preference and history of gabapentin adverse effect (cognitive slowing).  Referred to PT to work on foot drop  Schedule for bilateral L3/4 TFESIs - will need clearance to pause Ruchi from Dr. Pereira (at least 3 days).  Follow up 2 weeks after epidural injections      Sheree Abbott  02/26/2024

## 2024-02-26 NOTE — TELEPHONE ENCOUNTER
Spoke to pt to schedule procedure(s) Colonoscopy       Physician to perform procedure(s) Dr. PADMINI Hathaway  Date of Procedure (s) 5/28/24  Arrival Time 6:00 AM  Time of Procedure(s) 7:00 AM   Location of Procedure(s) [place] 4th Floor  Type of Rx Prep sent to patient: PEG  Instructions provided to patient via [facility]    Patient was informed on the following information and verbalized understanding. Screening questionnaire reviewed with patient and complete. If procedure requires anesthesia, a responsible adult needs to be present to accompany the patient home, patient cannot drive after receiving anesthesia. Appointment details are tentative, especially check-in time. Patient will receive a prep-op call 7 days prior to confirm check-in time for procedure. If applicable the patient should contact their pharmacy to verify Rx for procedure prep is ready for pick-up. Patient was advised to call the scheduling department at [phone] if pharmacy states no Rx is available. Patient was advised to call the endoscopy scheduling department if any questions or concerns arise.      [facility] Scheduling Department

## 2024-02-26 NOTE — PROGRESS NOTES
PCP: Zina Rockwell MD    REFERRING PHYSICIAN: No ref. provider found    CHIEF COMPLAINT: Right leg pain    Original HISTORY OF PRESENT ILLNESS: Angelina Man presents to the clinic for the evaluation of the above pain. This has been up and down for the past 5 years or so.     Original Pain Description:  The pain is located in the right buttock and is radiating to the right calf . The pain is described as aching. Exacerbating factors: Bending and Getting out of bed/chair. Mitigating factors heat. Symptoms interfere with daily activity. The patient feels like symptoms have been worsening. Patient reports sometimes stubbing her right foot.    Original PAIN SCORES:  Best: Pain is 4  Worst: Pain is 7  Current: Pain is 5        2/26/2024     2:19 PM   Last 3 PDI Scores   Pain Disability Index (PDI) 7       INTERVAL HISTORY: (Newest visit at the bottom)   Interval History (2/26/24):   Angelina Man returns today for follow-up. Since last seen, they report their pain has been persistant. It continues to be shooting with constant numbness from mid calf throughout her foot. Is not limited in distance with walking. Regularly walks at least a mile per day. She denies new bowel or bladder dysfunction, weakness, or numbness.      6 weeks of Conservative therapy:  PT: 2019 (Must include dates)  Chiro: Yes  HEP: Continuing HEP daily as prescribed at PT above. Also does yoga twice weekly.       Treatments / Medications: (Ice/Heat/NSAIDS/APAP/etc):  Heat - helps  APAP - helps    Interventional Pain Procedures: (Previous injections)  TFESI prior to Lumbar Surgery    Past Medical History:   Diagnosis Date    Arthritis     Atrial fibrillation     Bronchitis     Cataract     Dry eyes     GERD (gastroesophageal reflux disease)     Glaucoma, suspect - Both Eyes     Hypothyroidism 06/23/2016    Pulmonary hypertension     Rash     Renal angiomyolipoma     Renal disorder     SCC (squamous cell carcinoma) 07/2023    left lower lateral  shin    SCC (squamous cell carcinoma) 04/2023    L mid lateral shin    SCC (squamous cell carcinoma) 09/2023    L mid lower shin    Spinal stenosis     Squamous cell carcinoma     in-situ right upper inner arm, right wrist    Status post lumbar surgery 06/23/2016    Stress fracture     Thyroid disease     Venous insufficiency      Past Surgical History:   Procedure Laterality Date    ANGIOPLASTY      CATARACT EXTRACTION W/  INTRAOCULAR LENS IMPLANT Left 12/6/2022    Procedure: EXTRACTION, CATARACT, WITH IOL INSERTION;  Surgeon: Tone Brown MD;  Location: [facility];  Service: Ophthalmology;  Laterality: Left;    CATARACT EXTRACTION W/  INTRAOCULAR LENS IMPLANT Right 12/20/2022    Procedure: EXTRACTION, CATARACT, WITH IOL INSERTION;  Surgeon: Tone Brown MD;  Location: [facility], [place], operated by [facility] OR;  Service: Ophthalmology;  Laterality: Right;    CERVICAL FUSION      COLONOSCOPY      COLONOSCOPY N/A 11/14/2017    Procedure: COLONOSCOPY;  Surgeon: Kasi Mercado MD;  Location: [facility] (4TH FLR);  Service: Endoscopy;  Laterality: N/A;    COLONOSCOPY N/A 4/26/2023    Procedure: COLONOSCOPY;  Surgeon: Jeanmarie Hathaway MD;  Location: [facility] (4TH FLR);  Service: Colon and Rectal;  Laterality: N/A;  ok to hold Eliquis 2 days per Dr Pereira  constipation-ext Miralax prep  instr mailed/portal-GT  4/21/23 pre call attempted, no answer    ESOPHAGOGASTRODUODENOSCOPY N/A 10/10/2019    Procedure: EGD (ESOPHAGOGASTRODUODENOSCOPY);  Surgeon: Rg Milton MD;  Location: [facility] (4TH FLR);  Service: Endoscopy;  Laterality: N/A;    ESOPHAGOGASTRODUODENOSCOPY N/A 10/6/2021    Procedure: EGD (ESOPHAGOGASTRODUODENOSCOPY);  Surgeon: Rg Milton MD;  Location: [facility] (4TH FLR);  Service: Endoscopy;  Laterality: N/A;  covid test 10/3 [place], instr emailed/portal -ml    EXCISION Left 5/17/2023    Procedure: EXCISION SQUAMOUS CELL CARCINOMA LEFT LEG;  Surgeon: Kasi Canela Jr., MD;  Location: [facility] 2ND FLR;  Service:  General;  Laterality: Left;    l4-5 mid discectomy Left 03/2017    l4-5 MIS diskectomy Right 05/2016    RIGHT HEART CATHETERIZATION Right 1/27/2022    Procedure: INSERTION, CATHETER, RIGHT HEART;  Surgeon: Satnam Pickard Jr., MD;  Location: [facility] CATH LAB;  Service: Cardiology;  Laterality: Right;    TREATMENT OF CARDIAC ARRHYTHMIA N/A 7/17/2020    Procedure: CARDIOVERSION;  Surgeon: Kwan Bray MD;  Location: [facility] EP LAB;  Service: Cardiology;  Laterality: N/A;  AF,DCCV/SANGITA, ANES, SK, 746    TREATMENT OF CARDIAC ARRHYTHMIA N/A 7/14/2021    Procedure: CARDIOVERSION;  Surgeon: SYDNIE Cedillo MD;  Location: [facility] EP LAB;  Service: Cardiology;  Laterality: N/A;  AF, SANGITA, DCCV, MAC, EH, 3 Prep    WASHOUT Right 5/17/2023    Procedure: WASHOUT right lower arm and closure;  Surgeon: Kasi Canela Jr., MD;  Location: [facility] OR [address] [place] [address];  Service: General;  Laterality: Right;     Social History     Socioeconomic History    Marital status: Single   Occupational History     Employer: [employer]   Tobacco Use    Smoking status: Never     Passive exposure: Never    Smokeless tobacco: Never   Substance and Sexual Activity    Alcohol use: Yes     Alcohol/week: 4.0 standard drinks of alcohol     Types: 4 Glasses of wine per week     Comment: 2 glasses of wine on weekends    Drug use: No    Sexual activity: Never   Other Topics Concern    Are you pregnant or think you may be? No    Breast-feeding No     Social Determinants of Health     Financial Resource Strain: Low Risk  (1/18/2024)    Overall Financial Resource Strain (CARDIA)     Difficulty of Paying Living Expenses: Not very hard   Food Insecurity: No Food Insecurity (1/18/2024)    Hunger Vital Sign     Worried About Running Out of Food in the Last Year: Never true     Ran Out of Food in the Last Year: Never true   Transportation Needs: No Transportation Needs (1/18/2024)    PRAPARE - Transportation     Lack of Transportation (Medical): No     Lack of  Transportation (Non-Medical): No   Physical Activity: Sufficiently Active (1/18/2024)    Exercise Vital Sign     Days of Exercise per Week: 7 days     Minutes of Exercise per Session: 60 min   Stress: No Stress Concern Present (1/18/2024)    [employer] - Occupational Stress Questionnaire     Feeling of Stress : Only a little   Recent Concern: Stress - Stress Concern Present (11/6/2023)    [employer] - Occupational Stress Questionnaire     Feeling of Stress : To some extent   Social Connections: Unknown (1/18/2024)    Social Connection and Isolation Panel [NHANES]     Frequency of Communication with Friends and Family: Twice a week     Frequency of Social Gatherings with Friends and Family: Patient declined     Attends Gnosticism Services: Never     Active Member of Clubs or Organizations: No     Attends Club or Organization Meetings: Patient declined     Marital Status:    Recent Concern: Social Connections - Socially Isolated (12/1/2023)    Social Connection and Isolation Panel [NHANES]     Frequency of Communication with Friends and Family: Once a week     Frequency of Social Gatherings with Friends and Family: Once a week     Attends Gnosticism Services: Never     Active Member of Clubs or Organizations: No     Attends Club or Organization Meetings: Never     Marital Status:    Housing Stability: Low Risk  (1/18/2024)    Housing Stability Vital Sign     Unable to Pay for Housing in the Last Year: No     Number of Places Lived in the Last Year: 1     Unstable Housing in the Last Year: No     Family History   Problem Relation Age of Onset    Cancer Mother         Lung cancer    Heart failure Father     Colon cancer Father     Hypertension Father     Cataracts Father     Cancer Father 80        colon    No Known Problems Sister     No Known Problems Brother     Melanoma Daughter     Diabetes Son     Heart disease Son     Cancer Son          esophageal cancer    No Known Problems Maternal Aunt     No Known Problems Maternal Uncle     No Known Problems Paternal Aunt     No Known Problems Paternal Uncle     No Known Problems Maternal Grandmother     No Known Problems Maternal Grandfather     No Known Problems Paternal Grandmother     No Known Problems Paternal Grandfather     Amblyopia Neg Hx     Blindness Neg Hx     Glaucoma Neg Hx     Macular degeneration Neg Hx     Retinal detachment Neg Hx     Strabismus Neg Hx     Stroke Neg Hx     Thyroid disease Neg Hx     Breast cancer Neg Hx     Ovarian cancer Neg Hx        Review of patient's allergies indicates:  No Known Allergies    Current Outpatient Medications   Medication Sig    acetaminophen (TYLENOL) 500 MG tablet Take 500 mg by mouth every 6 (six) hours as needed for Pain.    acyclovir (ZOVIRAX) 400 MG tablet Take 1 tablet (400 mg total) by mouth once daily.    ascorbic acid (VITAMIN C) 1000 MG tablet Take 1,000 mg by mouth once daily.     biotin 1 mg tablet Take 1 mg by mouth 2 (two) times daily.     buPROPion (WELLBUTRIN SR) 150 MG TBSR 12 hr tablet Take 1 tablet (150 mg total) by mouth 2 (two) times daily.    digestive enzymes Tab Take 1 tablet by mouth once daily.     ELIQUIS 5 mg Tab TAKE 1 TABLET(5 MG) BY MOUTH TWICE DAILY    fluticasone propionate (FLONASE) 50 mcg/actuation nasal spray 1 spray (50 mcg total) by Each Nostril route 2 (two) times daily as needed for Rhinitis.    furosemide (LASIX) 20 MG tablet Take 1 tablet (20 mg total) by mouth every other day.    levothyroxine (SYNTHROID) 137 MCG Tab tablet Take 1 tablet (137 mcg total) by mouth before breakfast.    lubiprostone (AMITIZA) 24 MCG Cap Take 1 capsule (24 mcg total) by mouth daily as needed (IBS symptoms).    metoprolol succinate (TOPROL-XL) 25 MG 24 hr tablet Take 1 tablet (25 mg total) by mouth once daily.    mirtazapine (REMERON) 7.5 MG Tab Take 1 tablet (7.5 mg total) by mouth nightly.    multivitamin (THERAGRAN) per tablet Take  "1 tablet by mouth once daily.     polyethylene glycol (GOLYTELY) 236-22.74-6.74 -5.86 gram suspension Take 4,000 mLs (4 L total) by mouth once. for 1 dose    pramipexole (MIRAPEX) 0.25 MG tablet Take 1 tablet (0.25 mg total) by mouth every evening. For restless legs    traZODone (DESYREL) 50 MG tablet Take 1 tablet (50 mg total) by mouth nightly as needed for Insomnia.    zinc 50 mg Tab Take 50 mg by mouth once daily.     diphenhydrAMINE (BENADRYL) 25 mg capsule Take 25 mg by mouth nightly as needed.      No current facility-administered medications for this visit.       ROS:  GENERAL: No fever. No chills. No fatigue. Denies weight loss. Denies weight gain.  HEENT: Denies headaches. Denies vision change. Denies eye pain. Denies double vision. Denies ear pain.   CV: Denies chest pain.   PULM: Denies of shortness of breath.  GI: Denies constipation. No diarrhea. No abdominal pain. Denies nausea. Denies vomiting. No blood in stool.  HEME: Denies bleeding problems.  : Denies urgency. No painful urination. No blood in urine.  MS: Denies joint stiffness. Denies joint swelling.  Denies back pain.  SKIN: Denies rash.   NEURO: Denies seizures. No weakness.  PSYCH:  Denies difficulty sleeping. No anxiety. Denies depression. No suicidal thoughts.       VITALS:   Vitals:    02/26/24 1412 02/26/24 1415 02/26/24 1419   BP: 111/75     Pulse: 81     Resp: 18     SpO2: 100%     Weight: 44.9 kg (98 lb 15.8 oz)     Height: 5' 4" (1.626 m)     PainSc:   1   1   1   PainLoc: Back           PHYSICAL EXAM:   GENERAL: Well appearing, in no acute distress, alert and oriented x3.  PSYCH:  Mood and affect appropriate.  SKIN: Skin color, texture, turgor normal, no rashes or lesions.  HEENT:  Normocephalic, atraumatic. Cranial nerves grossly intact.  NECK: No pain to palpation over the cervical paraspinous muscles. No pain to palpation over facets. No pain with neck flexion, extension, or lateral flexion.   PULM: No evidence of respiratory " difficulty, symmetric chest rise.  GI:  Non-distended  BACK: Well-healed incisions. No pain to palpation over the spinous processes. No pain to palpation over facet joints. There is pain with palpation over the right SIJ and piriformis with reproduction of her symptoms down the leg. positive straight leg raise bilaterally  EXTREMITIES: No deformities, edema, or skin discoloration.   MUSCULOSKELETAL: Shoulder, hip, and knee provocative maneuvers are negative. No atrophy is noted.  NEURO: Sensation is altered below the knees bilaterally. Partial drop foot on the right (4/5 strength). Bilateral lower extremity coordination and muscle stretch reflexes are physiologic and symmetric. Plantar response are downgoing.  GAIT: Antalgic. Steppage gait.      LABS:    Chemistry        Component Value Date/Time     01/10/2024 0708    K 3.9 01/10/2024 0708     01/10/2024 0708    CO2 26 01/10/2024 0708    BUN 15 01/10/2024 0708    CREATININE 0.8 01/10/2024 0708    GLU 81 01/10/2024 0708        Component Value Date/Time    CALCIUM 8.8 01/10/2024 0708    ALKPHOS 67 01/10/2024 0708    AST 18 01/10/2024 0708    ALT 17 01/10/2024 0708    BILITOT 0.5 01/10/2024 0708    ESTGFRAFRICA >60.0 05/25/2022 1204    EGFRNONAA 55.6 (A) 05/25/2022 1204            IMAGING:    CT LUMBAR SPINE WITHOUT CONTRAST     CLINICAL HISTORY:  Evaluate fusion;  Arthrodesis status     TECHNIQUE:  Low-dose axial, sagittal and coronal reformations are obtained through the lumbar spine.  Contrast was not administered.     COMPARISON:  MRI lumbar spine 01/07/2021; CT lumbar spine 03/17/2020     FINDINGS:  Postoperative changes from L4-S1 posterior decompression and instrumented fusion with bilateral vertical rods, pedicle screws, and metallic interbody spacers.  Hardware is intact and unchanged in position without evidence of loosening.  No perihardware fractures.  Unchanged alignment.  No significant osseous bridging.  No definite fluid collections in the  operative bed, however evaluation is limited by CT technique and degraded by streak artifact.     T12-L2 grade 1 stepwise retrolisthesis. L4 on L5 grade 1 anterolisthesis. Mild rightward curvature of the lumbar spine.     Diffuse osteopenia.  No fractures or pars defects.  No focal osseous lesions.     Multilevel degenerative disc disease, most advanced at L2-3 and L3-4.  Multilevel facet arthropathy, most advanced at L3-4.     The conus medullaris and cauda equina nerve roots are difficult to visualize.     9 mm cyst in the midpole left kidney, with a 2 mm mural calcification versus adjacent nonobstructing stone.  Paraspinal soft tissues are otherwise unremarkable.     Impression:     1. Postoperative changes from L4-S1 decompressive instrumented fusion.  No hardware complication.  Unchanged alignment.  2. Multilevel degenerative changes, most advanced at L2-3 and L3-4, similar to prior.     Electronically signed by resident: Brigette Khoury  Date:                                            02/22/2022  Time:                                           09:41     Electronically signed by: Ismael Isabel  Date:                                            02/22/2022  Time:                                           14:11    ASSESSMENT: 74 y.o. year old female with pain, consistent with:    Encounter Diagnoses   Name Primary?    Lumbar radiculopathy Yes    Spinal stenosis of lumbar region without neurogenic claudication          DISCUSSION: Angelina Man is a retired  with a history of multiple laminectomies and microdiscectomies with residual surgical bed inflammation causing canal stenosis. She underwent a right L4/5 L5/S1 TLIF with posterior hardware in 2018. She did very well after surgery but comes to us with increasing pain with bending and into the right leg. She does have a drop foot on the right and reports catching the right foot on the floor constantly.    PLAN:  Continue HEP  Holding off on  additional medication due to patient's preference and history of gabapentin adverse effect (cognitive slowing).  Referred to PT to work on foot drop  Schedule for bilateral L3/4 TFESIs - will need clearance to pause Ruchi from Dr. Pereira (at least 3 days).  Follow up 2 weeks after epidural injections      Sheree Abbott  02/26/2024

## 2024-02-27 ENCOUNTER — OFFICE VISIT (OUTPATIENT)
Dept: OPTOMETRY | Facility: CLINIC | Age: 74
End: 2024-02-27
Payer: COMMERCIAL

## 2024-02-27 ENCOUNTER — PATIENT MESSAGE (OUTPATIENT)
Dept: PAIN MEDICINE | Facility: OTHER | Age: 74
End: 2024-02-27
Payer: MEDICARE

## 2024-02-27 ENCOUNTER — PATIENT MESSAGE (OUTPATIENT)
Dept: ELECTROPHYSIOLOGY | Facility: CLINIC | Age: 74
End: 2024-02-27
Payer: MEDICARE

## 2024-02-27 ENCOUNTER — TELEPHONE (OUTPATIENT)
Dept: ENDOSCOPY | Facility: HOSPITAL | Age: 74
End: 2024-02-27
Payer: MEDICARE

## 2024-02-27 DIAGNOSIS — Z96.1 PSEUDOPHAKIA OF BOTH EYES: ICD-10-CM

## 2024-02-27 DIAGNOSIS — H40.013 OPEN ANGLE WITH BORDERLINE FINDINGS AND LOW GLAUCOMA RISK IN BOTH EYES: ICD-10-CM

## 2024-02-27 DIAGNOSIS — H04.123 BILATERAL DRY EYES: ICD-10-CM

## 2024-02-27 DIAGNOSIS — H52.7 REFRACTIVE ERROR: Primary | ICD-10-CM

## 2024-02-27 DIAGNOSIS — M54.16 LUMBAR RADICULOPATHY: Primary | ICD-10-CM

## 2024-02-27 PROCEDURE — 92015 DETERMINE REFRACTIVE STATE: CPT | Mod: S$GLB,,, | Performed by: OPTOMETRIST

## 2024-02-27 PROCEDURE — 99999 PR PBB SHADOW E&M-EST. PATIENT-LVL III: CPT | Mod: PBBFAC,,, | Performed by: OPTOMETRIST

## 2024-02-27 PROCEDURE — 92014 COMPRE OPH EXAM EST PT 1/>: CPT | Mod: S$GLB,,, | Performed by: OPTOMETRIST

## 2024-02-27 RX ORDER — LUBIPROSTONE 24 UG/1
24 CAPSULE ORAL DAILY PRN
Qty: 30 CAPSULE | Refills: 6 | Status: SHIPPED | OUTPATIENT
Start: 2024-02-27

## 2024-02-27 NOTE — TELEPHONE ENCOUNTER
----- Message from Daya Weston RN sent at 2/26/2024  1:32 PM CST -----  Regarding: May 28 Colonoscopy  The patient is currently under an internal cardiologist Dr. David dorsey and requires a blood thinner Eliquis (apixaban) for their upcoming scheduled Colonoscopy on 5/28/24.

## 2024-02-27 NOTE — TELEPHONE ENCOUNTER
Dear Dr Pereira,    Patient has a scheduled procedure Colonoscopy on 5/28/24 and is currently taking a blood thinner prescribed by your office. In order to ensure patient safety, we would like to confirm that the patient can place their blood thinner medication on hold for the procedure. Can he/she discontinue Eliquis (apixaban) for a minimum of 2 days prior to the procedure?     Thank you for your prompt reply.    [facility] Endoscopy Scheduling

## 2024-02-28 DIAGNOSIS — I51.89 DIASTOLIC DYSFUNCTION: Primary | ICD-10-CM

## 2024-02-28 RX ORDER — METOPROLOL SUCCINATE 25 MG/1
25 TABLET, EXTENDED RELEASE ORAL DAILY
Qty: 90 TABLET | Refills: 3 | Status: SHIPPED | OUTPATIENT
Start: 2024-02-28 | End: 2025-02-27

## 2024-02-29 ENCOUNTER — OFFICE VISIT (OUTPATIENT)
Dept: INTERNAL MEDICINE | Facility: CLINIC | Age: 74
End: 2024-02-29
Payer: MEDICARE

## 2024-02-29 VITALS
HEIGHT: 64 IN | SYSTOLIC BLOOD PRESSURE: 108 MMHG | BODY MASS INDEX: 16.86 KG/M2 | HEART RATE: 80 BPM | WEIGHT: 98.75 LBS | DIASTOLIC BLOOD PRESSURE: 80 MMHG

## 2024-02-29 DIAGNOSIS — M81.0 AGE-RELATED OSTEOPOROSIS WITHOUT CURRENT PATHOLOGICAL FRACTURE: ICD-10-CM

## 2024-02-29 DIAGNOSIS — I83.893 VARICOSE VEINS OF BOTH LOWER EXTREMITIES WITH COMPLICATIONS: ICD-10-CM

## 2024-02-29 DIAGNOSIS — Z00.00 ENCOUNTER FOR PREVENTIVE HEALTH EXAMINATION: Primary | ICD-10-CM

## 2024-02-29 DIAGNOSIS — R63.6 UNDERWEIGHT: ICD-10-CM

## 2024-02-29 DIAGNOSIS — F33.9 EPISODE OF RECURRENT MAJOR DEPRESSIVE DISORDER, UNSPECIFIED DEPRESSION EPISODE SEVERITY: ICD-10-CM

## 2024-02-29 DIAGNOSIS — I50.32 CHRONIC HEART FAILURE WITH PRESERVED EJECTION FRACTION: ICD-10-CM

## 2024-02-29 DIAGNOSIS — E03.9 HYPOTHYROIDISM, UNSPECIFIED TYPE: ICD-10-CM

## 2024-02-29 DIAGNOSIS — M48.061 SPINAL STENOSIS OF LUMBAR REGION WITHOUT NEUROGENIC CLAUDICATION: ICD-10-CM

## 2024-02-29 DIAGNOSIS — I27.20 PULMONARY HYPERTENSION: ICD-10-CM

## 2024-02-29 DIAGNOSIS — K21.9 GASTROESOPHAGEAL REFLUX DISEASE, UNSPECIFIED WHETHER ESOPHAGITIS PRESENT: ICD-10-CM

## 2024-02-29 DIAGNOSIS — G89.4 CHRONIC PAIN SYNDROME: ICD-10-CM

## 2024-02-29 DIAGNOSIS — I48.11 LONGSTANDING PERSISTENT ATRIAL FIBRILLATION: ICD-10-CM

## 2024-02-29 DIAGNOSIS — G95.20 UNSPECIFIED CORD COMPRESSION: ICD-10-CM

## 2024-02-29 DIAGNOSIS — D69.2 PURPURA: ICD-10-CM

## 2024-02-29 DIAGNOSIS — F41.9 ANXIETY: ICD-10-CM

## 2024-02-29 PROBLEM — U07.1 COVID: Status: RESOLVED | Noted: 2022-08-01 | Resolved: 2024-02-29

## 2024-02-29 PROCEDURE — 3074F SYST BP LT 130 MM HG: CPT | Mod: CPTII,S$GLB,, | Performed by: NURSE PRACTITIONER

## 2024-02-29 PROCEDURE — 3288F FALL RISK ASSESSMENT DOCD: CPT | Mod: CPTII,S$GLB,, | Performed by: NURSE PRACTITIONER

## 2024-02-29 PROCEDURE — 1170F FXNL STATUS ASSESSED: CPT | Mod: CPTII,S$GLB,, | Performed by: NURSE PRACTITIONER

## 2024-02-29 PROCEDURE — 1126F AMNT PAIN NOTED NONE PRSNT: CPT | Mod: CPTII,S$GLB,, | Performed by: NURSE PRACTITIONER

## 2024-02-29 PROCEDURE — 3079F DIAST BP 80-89 MM HG: CPT | Mod: CPTII,S$GLB,, | Performed by: NURSE PRACTITIONER

## 2024-02-29 PROCEDURE — 99999 PR PBB SHADOW E&M-EST. PATIENT-LVL IV: CPT | Mod: PBBFAC,,, | Performed by: NURSE PRACTITIONER

## 2024-02-29 PROCEDURE — 1157F ADVNC CARE PLAN IN RCRD: CPT | Mod: CPTII,S$GLB,, | Performed by: NURSE PRACTITIONER

## 2024-02-29 PROCEDURE — G0439 PPPS, SUBSEQ VISIT: HCPCS | Mod: S$GLB,,, | Performed by: NURSE PRACTITIONER

## 2024-02-29 PROCEDURE — 1101F PT FALLS ASSESS-DOCD LE1/YR: CPT | Mod: CPTII,S$GLB,, | Performed by: NURSE PRACTITIONER

## 2024-02-29 NOTE — PATIENT INSTRUCTIONS
Counseling and Referral of Other Preventative  (Italic type indicates deductible and co-insurance are waived)    Patient Name: Angelina Man  Today's Date: 2/29/2024    Health Maintenance       Date Due Completion Date    COVID-19 Vaccine (6 - 2023-24 season) 12/13/2023 10/18/2023    DEXA Scan 01/23/2025 1/23/2023    TETANUS VACCINE 09/26/2027 9/26/2017    Lipid Panel 01/10/2029 1/10/2024        No orders of the defined types were placed in this encounter.    The following information is provided to all patients.  This information is to help you find resources for any of the problems found today that may be affecting your health:                  Living healthy guide: www.[facility].[place].gov      Understanding Diabetes: www.diabetes.org      Eating healthy: www.cdc.gov/healthyweight      [place] home safety checklist: www.cdc.gov/steadi/patient.html      Agency on Aging: www.goea.[place].[facility]      Alcoholics anonymous (AA): www.aa.org      Physical Activity: www.donte.nih.gov/nt6xqxj      Tobacco use: www.quitwithusla.org

## 2024-02-29 NOTE — PROGRESS NOTES
"  Angelina Man presented for a  Medicare AWV and comprehensive Health Risk Assessment today. The following components were reviewed and updated:    Medical history  Family History  Social history  Allergies and Current Medications  Health Risk Assessment  Health Maintenance  Care Team         ** See Completed Assessments for Annual Wellness Visit within the encounter summary.**         The following assessments were completed:  Living Situation  CAGE  Depression Screening  Timed Get Up and Go  Whisper Test  Cognitive Function Screening    Nutrition Screening  ADL Screening  PAQ Screening      Opioid documentation:      Patient does not have a current opioid prescription.        Vitals:    02/29/24 1512   BP: 108/80   BP Location: Right arm   Patient Position: Sitting   BP Method: Small (Manual)   Pulse: 80   Weight: 44.8 kg (98 lb 12.3 oz)   Height: 5' 4" (1.626 m)     Body mass index is 16.95 kg/m².  Physical Exam  Vitals and nursing note reviewed.   Constitutional:       Appearance: She is well-developed.   HENT:      Head: Normocephalic.   Cardiovascular:      Rate and Rhythm: Normal rate and regular rhythm.      Heart sounds: Normal heart sounds. No murmur heard.  Pulmonary:      Effort: Pulmonary effort is normal.      Breath sounds: Normal breath sounds.   Abdominal:      General: Bowel sounds are normal.      Palpations: Abdomen is soft.   Musculoskeletal:         General: Normal range of motion.   Neurological:      Mental Status: She is alert and oriented to person, place, and time.      Motor: No abnormal muscle tone.               Diagnoses and health risks identified today and associated recommendations/orders:    1. Encounter for preventive health examination  Here for Health Risk Assessment/Annual Wellness Visit.  Health maintenance reviewed and updated. Follow up in one year.     2. Longstanding persistent atrial fibrillation  Chronic, stable on current medications. Followed by PCP, Cardiology.    3. " Chronic heart failure with preserved ejection fraction  Chronic, stable on current medications. Followed by PCP, Cardiology.    4. Pulmonary hypertension  Chronic, stable on current medications. Followed by PCP, Cardiology.    5. Varicose veins of both lower extremities with complications  Chronic, stable on current medications. Followed by PCP, Cardiology.    6. Episode of recurrent major depressive disorder, unspecified depression episode severity  Chronic, stable on current medications. PHQ-2 score 0.  Followed by PCP.     7. Anxiety  Chronic, stable on current medications. PHQ-2 score 0.  Followed by PCP.     8. Hypothyroidism, unspecified type  Chronic, stable on current medication. Followed by PCP.     9. Age-related osteoporosis without current pathological fracture  Chronic, stable on current medications/Prolia. Followed by PCP.     10. Gastroesophageal reflux disease, unspecified whether esophagitis present  Chronic, stable. Followed by PCP.     11. Chronic pain syndrome  Chronic, stable on current medications/therapies. Followed by Pain Management, PCP.     12. Spinal stenosis of lumbar region without neurogenic claudication  Chronic, stable on current medications/therapies. Followed by Pain Management, PCP.     13. Unspecified cord compression  Chronic, stable on current medications/therapies. Followed by Pain Management, PCP.     14. Underweight  Chronic, stable. Last albumin 3.6. Followed by PCP.    15. Purpura  Chronic, none noted at today's visit. Followed by PCP, Dermatology      Provided Angelina with a 5-10 year written screening schedule and personal prevention plan. Recommendations were developed using the USPSTF age appropriate recommendations. Education, counseling, and referrals were provided as needed. After Visit Summary printed and given to patient which includes a list of additional screenings\tests needed.    Follow up in about 10 months (around 1/4/2025).with PCP    Monique Batista,  NP

## 2024-03-01 ENCOUNTER — TELEPHONE (OUTPATIENT)
Dept: PAIN MEDICINE | Facility: OTHER | Age: 74
End: 2024-03-01
Payer: MEDICARE

## 2024-03-01 NOTE — TELEPHONE ENCOUNTER
----- Message from David Pereira MD sent at 3/1/2024  8:59 AM CST -----  Regarding: RE: Request to hold Eliquis  Patient may discontinue apixaban (Eliquis) 72 hours days prior to the procedure and restart after at least 12 hours post procedure assuming adequate hemostasis has been achieved.   ----- Message -----  From: Verenice Colvin LPN  Sent: 2/27/2024   9:13 AM CST  To: David Pereira MD  Subject: Request to hold Eliquis                          Good morning,    Patient will be scheduled to have a procedure with Dr. Abbott, Bilateral L3/4 Transforaminal Epidural Steroid Injection . Staff is requesting to hold Eliquis for 3 days prior to procedure. Please advise.    Thank you,  Verenice       LE swelling

## 2024-03-03 ENCOUNTER — PATIENT MESSAGE (OUTPATIENT)
Dept: CARDIOLOGY | Facility: CLINIC | Age: 74
End: 2024-03-03
Payer: MEDICARE

## 2024-03-04 ENCOUNTER — PATIENT MESSAGE (OUTPATIENT)
Dept: PAIN MEDICINE | Facility: CLINIC | Age: 74
End: 2024-03-04
Payer: MEDICARE

## 2024-03-05 ENCOUNTER — TELEPHONE (OUTPATIENT)
Dept: PAIN MEDICINE | Facility: CLINIC | Age: 74
End: 2024-03-05
Payer: MEDICARE

## 2024-03-05 ENCOUNTER — PATIENT MESSAGE (OUTPATIENT)
Dept: ADMINISTRATIVE | Facility: OTHER | Age: 74
End: 2024-03-05
Payer: MEDICARE

## 2024-03-05 NOTE — TELEPHONE ENCOUNTER
Pt appointment has been rescheduled with Ruchi Mehta.    ----- Message from Dorothy Moss sent at 3/4/2024 11:36 AM CST -----  Regarding: call back  Type: Patient Call Back    Who called: pt     What is the request in detail: requesting call back to reschedule her upcoming f/u, is busy the week of 3/28    Can the clinic reply by [facility]?no    Would the patient rather a call back or a response via [facility]? call    Best call back number: [phone]     Additional Information:

## 2024-03-05 NOTE — TELEPHONE ENCOUNTER
----- Message from Dorothy Moss sent at 3/4/2024 11:36 AM CST -----  Regarding: call back  Type: Patient Call Back    Who called: pt     What is the request in detail: requesting call back to reschedule her upcoming f/u, is busy the week of 3/28    Can the clinic reply by [facility]?no    Would the patient rather a call back or a response via [facility]? call    Best call back number: [phone]     Additional Information:

## 2024-03-06 ENCOUNTER — OFFICE VISIT (OUTPATIENT)
Dept: OBSTETRICS AND GYNECOLOGY | Facility: CLINIC | Age: 74
End: 2024-03-06
Payer: MEDICARE

## 2024-03-06 ENCOUNTER — PATIENT MESSAGE (OUTPATIENT)
Dept: ADMINISTRATIVE | Facility: OTHER | Age: 74
End: 2024-03-06
Payer: MEDICARE

## 2024-03-06 VITALS
BODY MASS INDEX: 17.08 KG/M2 | DIASTOLIC BLOOD PRESSURE: 84 MMHG | WEIGHT: 100.06 LBS | HEIGHT: 64 IN | SYSTOLIC BLOOD PRESSURE: 127 MMHG

## 2024-03-06 DIAGNOSIS — D21.9 FIBROID: ICD-10-CM

## 2024-03-06 PROCEDURE — 1159F MED LIST DOCD IN RCRD: CPT | Mod: CPTII,S$GLB,, | Performed by: STUDENT IN AN ORGANIZED HEALTH CARE EDUCATION/TRAINING PROGRAM

## 2024-03-06 PROCEDURE — 99999 PR PBB SHADOW E&M-EST. PATIENT-LVL IV: CPT | Mod: PBBFAC,,, | Performed by: STUDENT IN AN ORGANIZED HEALTH CARE EDUCATION/TRAINING PROGRAM

## 2024-03-06 PROCEDURE — 3079F DIAST BP 80-89 MM HG: CPT | Mod: CPTII,S$GLB,, | Performed by: STUDENT IN AN ORGANIZED HEALTH CARE EDUCATION/TRAINING PROGRAM

## 2024-03-06 PROCEDURE — 3288F FALL RISK ASSESSMENT DOCD: CPT | Mod: CPTII,S$GLB,, | Performed by: STUDENT IN AN ORGANIZED HEALTH CARE EDUCATION/TRAINING PROGRAM

## 2024-03-06 PROCEDURE — 1157F ADVNC CARE PLAN IN RCRD: CPT | Mod: CPTII,S$GLB,, | Performed by: STUDENT IN AN ORGANIZED HEALTH CARE EDUCATION/TRAINING PROGRAM

## 2024-03-06 PROCEDURE — 99203 OFFICE O/P NEW LOW 30 MIN: CPT | Mod: S$GLB,,, | Performed by: STUDENT IN AN ORGANIZED HEALTH CARE EDUCATION/TRAINING PROGRAM

## 2024-03-06 PROCEDURE — 1160F RVW MEDS BY RX/DR IN RCRD: CPT | Mod: CPTII,S$GLB,, | Performed by: STUDENT IN AN ORGANIZED HEALTH CARE EDUCATION/TRAINING PROGRAM

## 2024-03-06 PROCEDURE — 1101F PT FALLS ASSESS-DOCD LE1/YR: CPT | Mod: CPTII,S$GLB,, | Performed by: STUDENT IN AN ORGANIZED HEALTH CARE EDUCATION/TRAINING PROGRAM

## 2024-03-06 PROCEDURE — 1126F AMNT PAIN NOTED NONE PRSNT: CPT | Mod: CPTII,S$GLB,, | Performed by: STUDENT IN AN ORGANIZED HEALTH CARE EDUCATION/TRAINING PROGRAM

## 2024-03-06 PROCEDURE — 3074F SYST BP LT 130 MM HG: CPT | Mod: CPTII,S$GLB,, | Performed by: STUDENT IN AN ORGANIZED HEALTH CARE EDUCATION/TRAINING PROGRAM

## 2024-03-06 RX ORDER — POLYETHYLENE GLYCOL-3350 AND ELECTROLYTES 236; 6.74; 5.86; 2.97; 22.74 G/274.31G; G/274.31G; G/274.31G; G/274.31G; G/274.31G
POWDER, FOR SOLUTION ORAL
COMMUNITY
Start: 2024-02-26

## 2024-03-06 NOTE — PROGRESS NOTES
Chief Complaint: Calcified fibroids     HPI:      Angelina Man is a 74 y.o.  who presents today for consultation regarding calcified fibroids on pelvic US. Pt asymptomatic with no complaints today. Denies pelvic pain, pressure, urinary symptoms.    Past Medical History:   Diagnosis Date    Arthritis     Atrial fibrillation     Bronchitis     Cataract     Dry eyes     GERD (gastroesophageal reflux disease)     Glaucoma, suspect - Both Eyes     Hypothyroidism 2016    Pulmonary hypertension     Rash     Renal angiomyolipoma     Renal disorder     SCC (squamous cell carcinoma) 2023    left lower lateral shin    SCC (squamous cell carcinoma) 2023    L mid lateral shin    SCC (squamous cell carcinoma) 2023    L mid lower shin    Spinal stenosis     Squamous cell carcinoma     in-situ right upper inner arm, right wrist    Status post lumbar surgery 2016    Stress fracture     Thyroid disease     Venous insufficiency      Past Surgical History:   Procedure Laterality Date    ANGIOPLASTY      CATARACT EXTRACTION W/  INTRAOCULAR LENS IMPLANT Left 2022    Procedure: EXTRACTION, CATARACT, WITH IOL INSERTION;  Surgeon: Tone Brown MD;  Location: [facility];  Service: Ophthalmology;  Laterality: Left;    CATARACT EXTRACTION W/  INTRAOCULAR LENS IMPLANT Right 2022    Procedure: EXTRACTION, CATARACT, WITH IOL INSERTION;  Surgeon: Tone Brown MD;  Location: [facility];  Service: Ophthalmology;  Laterality: Right;    CERVICAL FUSION      COLONOSCOPY      COLONOSCOPY N/A 2017    Procedure: COLONOSCOPY;  Surgeon: Kasi Mercado MD;  Location: [facility] ([address] [place] [address]);  Service: Endoscopy;  Laterality: N/A;    COLONOSCOPY N/A 2023    Procedure: COLONOSCOPY;  Surgeon: Jeanmarie Hathaway MD;  Location: [facility] ([address] [place] [address]);  Service: Colon and Rectal;  Laterality: N/A;  ok to hold Eliquis 2 days per Dr Pereira  constipation-ext Miralax prep  instr mailed/portal-GT  23 pre  call attempted, no answer    ESOPHAGOGASTRODUODENOSCOPY N/A 10/10/2019    Procedure: EGD (ESOPHAGOGASTRODUODENOSCOPY);  Surgeon: Rg Milton MD;  Location: [facility] ENDO (4TH FLR);  Service: Endoscopy;  Laterality: N/A;    ESOPHAGOGASTRODUODENOSCOPY N/A 10/6/2021    Procedure: EGD (ESOPHAGOGASTRODUODENOSCOPY);  Surgeon: Rg Milton MD;  Location: [facility] ENDO (4TH FLR);  Service: Endoscopy;  Laterality: N/A;  covid test 10/3 mwood, instr emailed/portal -ml    EXCISION Left 5/17/2023    Procedure: EXCISION SQUAMOUS CELL CARCINOMA LEFT LEG;  Surgeon: Kasi Canela Jr., MD;  [facility];  Service: General;  Laterality: Left;    l4-5 mid discectomy Left 03/2017    l4-5 MIS diskectomy Right 05/2016    RIGHT HEART CATHETERIZATION Right 1/27/2022    Procedure: INSERTION, CATHETER, RIGHT HEART;  Surgeon: Satnam Pickard Jr., MD;  Location: [facility] CATH LAB;  Service: Cardiology;  Laterality: Right;    TREATMENT OF CARDIAC ARRHYTHMIA N/A 7/17/2020    Procedure: CARDIOVERSION;  Surgeon: Kwan Bray MD;  Location: [facility] EP LAB;  Service: Cardiology;  Laterality: N/A;  AF,DCCV/SANGITA, ANES, SK, 746    TREATMENT OF CARDIAC ARRHYTHMIA N/A 7/14/2021    Procedure: CARDIOVERSION;  Surgeon: SYDNIE Cedillo MD;  Location: [facility] EP LAB;  Service: Cardiology;  Laterality: N/A;  AF, SANGITA, DCCV, MAC, EH, 3 Prep    WASHOUT Right 5/17/2023    Procedure: WASHOUT right lower arm and closure;  Surgeon: Ksai Canela Jr., MD;  Location: [facility] OR [facility] FLR;  Service: General;  Laterality: Right;     Social History     Socioeconomic History    Marital status: Single   Occupational History     Employer: [employer]   Tobacco Use    Smoking status: Never     Passive exposure: Never    Smokeless tobacco: Never   Substance and Sexual Activity    Alcohol use: Yes     Alcohol/week: 4.0 standard drinks of alcohol     Types: 4 Glasses of wine per week     Comment: 2 glasses of wine on weekends    Drug use: No    Sexual  activity: Never   Other Topics Concern    Are you pregnant or think you may be? No    Breast-feeding No     Social Determinants of Health     Financial Resource Strain: Low Risk  (2/29/2024)    Overall Financial Resource Strain (CARDIA)     Difficulty of Paying Living Expenses: Not very hard   Food Insecurity: No Food Insecurity (2/29/2024)    Hunger Vital Sign     Worried About Running Out of Food in the Last Year: Never true     Ran Out of Food in the Last Year: Never true   Transportation Needs: No Transportation Needs (2/29/2024)    PRAPARE - Transportation     Lack of Transportation (Medical): No     Lack of Transportation (Non-Medical): No   Physical Activity: Sufficiently Active (2/29/2024)    Exercise Vital Sign     Days of Exercise per Week: 7 days     Minutes of Exercise per Session: 60 min   Stress: No Stress Concern Present (2/29/2024)    [employer] [place] [employer] - Occupational Stress Questionnaire     Feeling of Stress : Not at all   Social Connections: Unknown (2/29/2024)    Social Connection and Isolation Panel [NHANES]     Frequency of Communication with Friends and Family: Once a week     Frequency of Social Gatherings with Friends and Family: Patient declined     Attends Mandaeism Services: Never     Active Member of Clubs or Organizations: No     Attends Club or Organization Meetings: Never     Marital Status:    Recent Concern: Social Connections - Socially Isolated (12/1/2023)    Social Connection and Isolation Panel [NHANES]     Frequency of Communication with Friends and Family: Once a week     Frequency of Social Gatherings with Friends and Family: Once a week     Attends Mandaeism Services: Never     Active Member of Clubs or Organizations: No     Attends Club or Organization Meetings: Never     Marital Status:    Housing Stability: Low Risk  (2/29/2024)    Housing Stability Vital Sign     Unable to Pay for Housing in the Last Year: No     Number of Places  "Lived in the Last Year: 1     Unstable Housing in the Last Year: No     Family History   Problem Relation Age of Onset    Cancer Mother         Lung cancer    Heart failure Father     Colon cancer Father     Hypertension Father     Cataracts Father     Cancer Father 80        colon    No Known Problems Sister     No Known Problems Brother     Melanoma Daughter     Diabetes Son     Heart disease Son     Cancer Son         esophageal cancer    No Known Problems Maternal Aunt     No Known Problems Maternal Uncle     No Known Problems Paternal Aunt     No Known Problems Paternal Uncle     No Known Problems Maternal Grandmother     No Known Problems Maternal Grandfather     No Known Problems Paternal Grandmother     No Known Problems Paternal Grandfather     Amblyopia Neg Hx     Blindness Neg Hx     Glaucoma Neg Hx     Macular degeneration Neg Hx     Retinal detachment Neg Hx     Strabismus Neg Hx     Stroke Neg Hx     Thyroid disease Neg Hx     Breast cancer Neg Hx     Ovarian cancer Neg Hx      Review of patient's allergies indicates:  No Known Allergies    Physical Exam:      PHYSICAL EXAM:  /84   Ht 5' 4" (1.626 m)   Wt 45.4 kg (100 lb 1.4 oz)   LMP  (LMP Unknown)   BMI 17.18 kg/m²   Body mass index is 17.18 kg/m².     APPEARANCE: Well nourished, well developed, in no acute distress.  PELVIC:  Defers.    Results:   Pelvic US 1/18/24  FINDINGS:  Uterus:   Size: 6.8 x 4.2 x 3.1 cm   Masses: Calcified intramural fibroid with in the anterior uterine body measuring 1.5 x 1.4 x 1.4 cm.  Additional scattered uterine calcifications may represent involuted fibroids or calcified vasculature.   Endometrium: Normal in this post menopausal patient, measuring 3 mm.     Right ovary:   Not visualized.   Left ovary:   Not visualized.     Free Fluid:   None.     Impression:   Multiple calcified uterine leiomyomas.   Nonvisualization of the bilateral ovaries.    Assessment/Plan:     Fibroid  -     Ambulatory referral/consult " to Gynecology    - deferred pelvic exam today  - reviewed pelvic US from January and also compared to prior, stable 1.5 cm calcified fibroid - discussed with patient finding is benign and stable - if asymptomatic, no treatment or follow-up required, she voiced understanding with no questions/concerns   - encouraged return for well woman exam    Domitila Santamaria MD  Obstetrics and Gynecology  [facility]

## 2024-03-07 ENCOUNTER — CLINICAL SUPPORT (OUTPATIENT)
Dept: REHABILITATION | Facility: HOSPITAL | Age: 74
End: 2024-03-07
Payer: MEDICARE

## 2024-03-07 DIAGNOSIS — M54.16 LUMBAR RADICULOPATHY: ICD-10-CM

## 2024-03-07 DIAGNOSIS — R53.1 DECREASED STRENGTH: ICD-10-CM

## 2024-03-07 DIAGNOSIS — M54.50 LUMBAR PAIN: Primary | ICD-10-CM

## 2024-03-07 PROCEDURE — 97162 PT EVAL MOD COMPLEX 30 MIN: CPT | Mod: HCNC

## 2024-03-07 NOTE — PLAN OF CARE
[facility]   Physical Therapy Initial Evaluation      Name: Angelina Man  Clinic Number: [id number]    Therapy Diagnosis:   Encounter Diagnoses   Name Primary?    Lumbar radiculopathy     Lumbar pain Yes    Decreased strength         Physician: Orlin Barajas MD    Physician Orders: PT Eval and Treat   Medical Diagnosis from Referral:   M54.16 (ICD-10-CM) - Lumbar radiculopathy   M96.1 (ICD-10-CM) - Postlaminectomy syndrome of lumbar region     Evaluation Date: 3/7/2024  Authorization Period Expiration: 2/25/25  Plan of Care Expiration: 4/5/24  Progress Note Due: 4/7/24  Visit # / Visits authorized: 1/ 1   FOTO: 1/ 3    Precautions: Standard and hx of lumbar surgery       Time In: 12:19 pm   Time Out: 12:55 pm   Total Billable Time: 36 minutes    Subjective     Date of onset: chronic     History of current condition - Angelina reports: that 5 years ago she had a lumbar fusion. Pt stated that according to MRI she has a pinched nerve and has scar tissue. Pt stated that she experiences pain in (R) lower back that extends into buttocks and into posterior/lateral (R) LE sometimes to foot. Pt stated that she was also diagnosed with (R) drop foot and would like to get some exercises to strengthen (R) ankle. Pt stated that she is scheduled to get  an epidural on Tuesday. Pt stated that she lives in pain and has been experiencing this pain for a long time, at least a year.  Pt stated that she is not sure how long she has had (R) foot drop, but noticed months ago that when she walks (R) foot would drag and tripped once a few months ago. Pt stated that she also had a fusion on her neck years ago. Pt stated that she is very tight and has been going to yoga.    Falls: pt stated that she tripped a couple of months ago due to (R) foot dragging     Imaging: see imaging section     Prior Therapy: yes for her back and still does some exercises   Social History: Pt stated that she lives alone. Pt stated  "that she has 3 steps to enter her two story home. Pt stated that she feels pain, but does not keep her from going up/down stairs.   Occupation:   Prior Level of Function: chronic pain  Current Level of Function: report of pain/difficulty performing aggravating factors listed below     Pain:  Current 4/10, worst 6/10, best 2/10   Location: (R) lumbar region extending into (R) buttocks and posterior/lateral (R) LE   Description: aching and burning, "inflamed"   Aggravating Factors: working out, walking fast, walking on a incline, stair machine, walking, bending down to  something  Easing Factors: heating pad, Tylenol, hot bath     Patients goals: more mobility, strengthen (R) ankle      Medical History:   Past Medical History:   Diagnosis Date    Arthritis     Atrial fibrillation     Bronchitis     Cataract     Dry eyes     GERD (gastroesophageal reflux disease)     Glaucoma, suspect - Both Eyes     Hypothyroidism 06/23/2016    Pulmonary hypertension     Rash     Renal angiomyolipoma     Renal disorder     SCC (squamous cell carcinoma) 07/2023    left lower lateral shin    SCC (squamous cell carcinoma) 04/2023    L mid lateral shin    SCC (squamous cell carcinoma) 09/2023    L mid lower shin    Spinal stenosis     Squamous cell carcinoma     in-situ right upper inner arm, right wrist    Status post lumbar surgery 06/23/2016    Stress fracture     Thyroid disease     Venous insufficiency        Surgical History:   Angelina Man  has a past surgical history that includes Colonoscopy; Cervical fusion; Colonoscopy (N/A, 11/14/2017); l4-5 MIS diskectomy (Right, 05/2016); l4-5 mid discectomy (Left, 03/2017); Angioplasty; Esophagogastroduodenoscopy (N/A, 10/10/2019); Treatment of cardiac arrhythmia (N/A, 7/17/2020); Treatment of cardiac arrhythmia (N/A, 7/14/2021); Esophagogastroduodenoscopy (N/A, 10/6/2021); Right heart catheterization (Right, 1/27/2022); Cataract extraction w/  intraocular lens " implant (Left, 12/6/2022); Cataract extraction w/  intraocular lens implant (Right, 12/20/2022); Colonoscopy (N/A, 4/26/2023); excision (Left, 5/17/2023); and washout (Right, 5/17/2023).    Medications:   Angelina has a current medication list which includes the following prescription(s): acetaminophen, acyclovir, ascorbic acid (vitamin c), biotin, bupropion, digestive enzymes, eliquis, fluticasone propionate, furosemide, gavilyte-g, levothyroxine, lubiprostone, metoprolol succinate, mirtazapine, multivitamin, pramipexole, trazodone, and zinc.    Allergies:   Review of patient's allergies indicates:  No Known Allergies     Objective      Lumbar AROM: Pain/Dysfunction with Movement:   Flexion 45 degrees  Pain in (R) lumbar/buttocks region   Extension 10 degrees Stretching    Right side bending 15 degrees Pain in (R) lumbar region   Left side bending 15  degrees Pain in (R) lumbar region     Hip Right  Left  Pain/Dysfunction with Movement    AROM MMT AROM MMT != pain  NT = not tested    Flexion WFL 5/5 WFL 5/5    Extension NT NT NT NT Pt performed good bridge, reported pain in (R) lumbar region   Abduction WFL 4+/5 WFL 4+/5    External rotation WFL 4/5! WFL 4/5       Knee Right  Left  Pain/Dysfunction with Movement    AROM MMT AROM MMT    Flexion WFL 5/5 WFL 5/5    Extension WFL 5/5 WFL 5/5      Ankle Right  Left  Pain/Dysfunction with Movement    AROM MMT AROM MMT MMT performed within available ROM   Plantarflexion WFL 4/5 WFL 4/5    Dorsiflexion limited 4/5 WFL 5/5      SLR Test: positive (R), negative (L)    90/90 Hamstring Test: (B) = 30 degrees lacking     Intake Outcome Measure for FOTO Lumbar Survey    Therapist reviewed FOTO scores for Angelina Man on 3/7/2024.   FOTO report - see Media section or FOTO account episode details.    Intake Score: 59         Treatment     Total Treatment time (time-based codes) separate from Evaluation: 6 minutes     Angelina received the treatments listed below:      therapeutic  exercises to develop strength and decreased pain for 6 minutes including:  Sciatic nerve glides 3x10 R   Ankle DF w/ BTB 2x10       Patient Education and Home Exercises     Education provided: pt verbalized understanding of all education provided  - Role of PT  - HEP     Written Home Exercises Provided: yes. Exercises were reviewed and Angelina was able to demonstrate them prior to the end of the session.  Angelina demonstrated good  understanding of the education provided. See EMR under Patient Instructions for exercises provided during therapy sessions.    Assessment     Angelina is a 74 y.o. female referred to outpatient Physical Therapy with a medical diagnosis of lumbar radiculopathy and Postlaminectomy syndrome of lumbar region. Patient presents with report of pain when she performed lumbar flexion and (B) SB AROM, decreased hip and ankle MMT scores, decreased (R) ankle DF AROM, and decreased (B) hamstring flexibility. Pt will benefit from skilled PT.     Patient prognosis is Fair.   Patient will benefit from skilled outpatient Physical Therapy to address the deficits stated above and in the chart below, provide patient /family education, and to maximize patientt's level of independence.     Plan of care discussed with patient: Yes  Patient's spiritual, cultural and educational needs considered and patient is agreeable to the plan of care and goals as stated below:     Anticipated Barriers for therapy: chronicity of pain/symptoms    Medical Necessity is demonstrated by the following  History  Co-morbidities and personal factors that may impact the plan of care [] LOW: no personal factors / co-morbidities  [x] MODERATE: 1-2 personal factors / co-morbidities  [] HIGH: 3+ personal factors / co-morbidities    Moderate / High Support Documentation:   Co-morbidities affecting plan of care: hx of lumbar surgery     Personal Factors:   no deficits     Examination  Body Structures and Functions, activity limitations and  participation restrictions that may impact the plan of care [] LOW: addressing 1-2 elements  [] MODERATE: 3+ elements  [] HIGH: 4+ elements (please support below)    Moderate / High Support Documentation: ROM, strength     Clinical Presentation [] LOW: stable  [x] MODERATE: Evolving  [] HIGH: Unstable     Decision Making/ Complexity Score: moderate       Goals:  Short Term Goals: 1 weeks   Pt will be compliant with HEP to supplement PT with decreasing pain and improving functional mobility    Long Term Goals: 4 weeks   Pt will improve FOTO score to at least 64 in order to demo improved functional mobility  Pt will improve LE MMT scores by at least 1/2 grade where deficits noted in order to improve strength for functional tasks  Pt will report lumbar/(R) LE pain </= 3/10 at worst in order to be able to perform ADLs with less difficulty   Plan     Plan of care Certification: 3/7/2024 to 4/5/24.    Outpatient Physical Therapy 1 times weekly for 4 weeks to include the following interventions: Gait Training, Manual Therapy, Moist Heat/ Ice, Neuromuscular Re-ed, Patient Education, Self Care, Therapeutic Activities, Therapeutic Exercise, and IASTM, dry needling, and modalities prn .     Kendra Humphrey, PT        Physician's Signature: _________________________________________ Date: ________________

## 2024-03-08 ENCOUNTER — PATIENT MESSAGE (OUTPATIENT)
Dept: PAIN MEDICINE | Facility: OTHER | Age: 74
End: 2024-03-08
Payer: MEDICARE

## 2024-03-08 ENCOUNTER — PATIENT MESSAGE (OUTPATIENT)
Dept: ADMINISTRATIVE | Facility: OTHER | Age: 74
End: 2024-03-08
Payer: MEDICARE

## 2024-03-12 ENCOUNTER — HOSPITAL ENCOUNTER (OUTPATIENT)
Facility: OTHER | Age: 74
Discharge: HOME | End: 2024-03-12
Attending: ANESTHESIOLOGY | Admitting: ANESTHESIOLOGY
Payer: MEDICARE

## 2024-03-12 VITALS
HEIGHT: 64 IN | HEART RATE: 80 BPM | WEIGHT: 100 LBS | BODY MASS INDEX: 17.07 KG/M2 | RESPIRATION RATE: 14 BRPM | SYSTOLIC BLOOD PRESSURE: 145 MMHG | OXYGEN SATURATION: 100 % | DIASTOLIC BLOOD PRESSURE: 77 MMHG

## 2024-03-12 DIAGNOSIS — G89.29 CHRONIC PAIN: ICD-10-CM

## 2024-03-12 DIAGNOSIS — M54.16 LUMBAR RADICULOPATHY: Primary | ICD-10-CM

## 2024-03-12 PROCEDURE — 64483 NJX AA&/STRD TFRM EPI L/S 1: CPT | Mod: 50,HCNC,, | Performed by: ANESTHESIOLOGY

## 2024-03-12 PROCEDURE — 25000003 PHARM REV CODE 250: Mod: HCNC | Performed by: STUDENT IN AN ORGANIZED HEALTH CARE EDUCATION/TRAINING PROGRAM

## 2024-03-12 PROCEDURE — 25500020 PHARM REV CODE 255: Mod: HCNC | Performed by: ANESTHESIOLOGY

## 2024-03-12 PROCEDURE — 99152 MOD SED SAME PHYS/QHP 5/>YRS: CPT | Mod: HCNC | Performed by: ANESTHESIOLOGY

## 2024-03-12 PROCEDURE — 25000003 PHARM REV CODE 250: Mod: HCNC | Performed by: ANESTHESIOLOGY

## 2024-03-12 PROCEDURE — 99152 MOD SED SAME PHYS/QHP 5/>YRS: CPT | Mod: ,,, | Performed by: ANESTHESIOLOGY

## 2024-03-12 PROCEDURE — 64483 NJX AA&/STRD TFRM EPI L/S 1: CPT | Mod: 50,HCNC | Performed by: ANESTHESIOLOGY

## 2024-03-12 PROCEDURE — 63600175 PHARM REV CODE 636 W HCPCS: Mod: HCNC | Performed by: ANESTHESIOLOGY

## 2024-03-12 RX ORDER — DEXAMETHASONE SODIUM PHOSPHATE 10 MG/ML
INJECTION INTRAMUSCULAR; INTRAVENOUS
Status: DISCONTINUED | OUTPATIENT
Start: 2024-03-12 | End: 2024-03-12 | Stop reason: HOSPADM

## 2024-03-12 RX ORDER — MIDAZOLAM HYDROCHLORIDE 1 MG/ML
INJECTION INTRAMUSCULAR; INTRAVENOUS
Status: DISCONTINUED | OUTPATIENT
Start: 2024-03-12 | End: 2024-03-12 | Stop reason: HOSPADM

## 2024-03-12 RX ORDER — FENTANYL CITRATE 50 UG/ML
INJECTION, SOLUTION INTRAMUSCULAR; INTRAVENOUS
Status: DISCONTINUED | OUTPATIENT
Start: 2024-03-12 | End: 2024-03-12 | Stop reason: HOSPADM

## 2024-03-12 RX ORDER — LIDOCAINE HYDROCHLORIDE 20 MG/ML
INJECTION, SOLUTION INFILTRATION; PERINEURAL
Status: DISCONTINUED | OUTPATIENT
Start: 2024-03-12 | End: 2024-03-12 | Stop reason: HOSPADM

## 2024-03-12 RX ORDER — SODIUM CHLORIDE 9 MG/ML
INJECTION, SOLUTION INTRAVENOUS CONTINUOUS
Status: DISCONTINUED | OUTPATIENT
Start: 2024-03-12 | End: 2024-03-12 | Stop reason: HOSPADM

## 2024-03-12 RX ORDER — LIDOCAINE HYDROCHLORIDE 10 MG/ML
INJECTION, SOLUTION EPIDURAL; INFILTRATION; INTRACAUDAL; PERINEURAL
Status: DISCONTINUED | OUTPATIENT
Start: 2024-03-12 | End: 2024-03-12 | Stop reason: HOSPADM

## 2024-03-12 NOTE — DISCHARGE SUMMARY
Discharge Note  Short Stay      SUMMARY     Admit Date: 3/12/2024    Attending Physician: Laz Peterson      Discharge Physician: Laz Peterson      Discharge Date: 3/12/2024 2:49 PM    Procedure(s) (LRB):  LUMBAR TRANSFORAMINAL BILATERAL L3/4 *ELIQUIS CLEARANCE IN CHART* (Bilateral)    Final Diagnosis: Lumbar radiculopathy [M54.16]    Disposition: Home or self care    Patient Instructions:   Current Discharge Medication List        CONTINUE these medications which have NOT CHANGED    Details   acetaminophen (TYLENOL) 500 MG tablet Take 500 mg by mouth every 6 (six) hours as needed for Pain.      acyclovir (ZOVIRAX) 400 MG tablet Take 1 tablet (400 mg total) by mouth once daily.  Qty: 180 tablet, Refills: 1    Associated Diagnoses: HSV infection      ascorbic acid (VITAMIN C) 1000 MG tablet Take 1,000 mg by mouth once daily.       biotin 1 mg tablet Take 1 mg by mouth 2 (two) times daily.       buPROPion (WELLBUTRIN SR) 150 MG TBSR 12 hr tablet Take 1 tablet (150 mg total) by mouth 2 (two) times daily.  Qty: 180 tablet, Refills: 3    Associated Diagnoses: Anxiety      digestive enzymes Tab Take 1 tablet by mouth once daily.       ELIQUIS 5 mg Tab TAKE 1 TABLET(5 MG) BY MOUTH TWICE DAILY  Qty: 180 tablet, Refills: 3      fluticasone propionate (FLONASE) 50 mcg/actuation nasal spray 1 spray (50 mcg total) by Each Nostril route 2 (two) times daily as needed for Rhinitis.  Qty: 16 g, Refills: 11    Associated Diagnoses: Acute seasonal allergic rhinitis      furosemide (LASIX) 20 MG tablet Take 1 tablet (20 mg total) by mouth every other day.  Qty: 15 tablet, Refills: 11      GAVILYTE-G 236-22.74-6.74 -5.86 gram suspension SMARTSIG:Milliliter(s) By Mouth      levothyroxine (SYNTHROID) 137 MCG Tab tablet Take 1 tablet (137 mcg total) by mouth before breakfast.  Qty: 90 tablet, Refills: 3    Associated Diagnoses: Hypothyroidism, unspecified type      lubiprostone (AMITIZA) 24 MCG Cap Take 1 capsule (24 mcg total)  by mouth daily as needed (IBS symptoms).  Qty: 30 capsule, Refills: 6    Associated Diagnoses: Irritable bowel syndrome, unspecified type      metoprolol succinate (TOPROL-XL) 25 MG 24 hr tablet Take 1 tablet (25 mg total) by mouth once daily.  Qty: 90 tablet, Refills: 3    Comments: .  Associated Diagnoses: Diastolic dysfunction      mirtazapine (REMERON) 7.5 MG Tab Take 1 tablet (7.5 mg total) by mouth nightly.  Qty: 90 tablet, Refills: 3    Associated Diagnoses: Episode of recurrent major depressive disorder, unspecified depression episode severity; Insomnia, unspecified type      multivitamin (THERAGRAN) per tablet Take 1 tablet by mouth once daily.       pramipexole (MIRAPEX) 0.25 MG tablet Take 1 tablet (0.25 mg total) by mouth every evening. For restless legs  Qty: 90 tablet, Refills: 3    Associated Diagnoses: RLS (restless legs syndrome)      traZODone (DESYREL) 50 MG tablet Take 1 tablet (50 mg total) by mouth nightly as needed for Insomnia.  Qty: 90 tablet, Refills: 3    Associated Diagnoses: Insomnia, unspecified type      zinc 50 mg Tab Take 50 mg by mouth once daily.                  Discharge Diagnosis: Lumbar radiculopathy [M54.16]  Condition on Discharge: Stable with no complications to procedure   Diet on Discharge: Same as before.  Activity: as per instruction sheet.  Discharge to: Home with a responsible adult.  Follow up: 2-4 weeks       Please call my office or pager at [phone] if experienced any weakness or loss of sensation, fever > 101.5, pain uncontrolled with oral medications, persistent nausea/vomiting/or diarrhea, redness or drainage from the incisions, or any other worrisome concerns. If physician on call was not reached or could not communicate with our office for any reason please go to the nearest emergency department

## 2024-03-12 NOTE — OP NOTE
Lumbar Transforaminal Epidural Steroid Injection under Fluoroscopic Guidance    The procedure, risks, benefits, and options were discussed with the patient. There are no contraindications to the procedure. The patent expressed understanding and agreed to the procedure. Informed written consent was obtained prior to the start of the procedure and can be found in the patient's chart.    PATIENT NAME: Angelina Man   MRN: [id number]     DATE OF PROCEDURE: 03/12/2024    PROCEDURE:  Bilateral  L3/4 Lumbar Transforaminal Epidural Steroid Injection under Fluoroscopic Guidance    PRE-OP DIAGNOSIS: Lumbar radiculopathy [M54.16] Lumbar radiculopathy [M54.16]    POST-OP DIAGNOSIS: Same    PHYSICIAN: Sheree Abbott MD    ASSISTANTS: Laz Peterson MD     MEDICATIONS INJECTED: Preservative-free Decadron 10mg with 5cc of Lidocaine 1% MPF     LOCAL ANESTHETIC INJECTED: Xylocaine 2%     SEDATION: Versed 1mg and Fentanyl 25mcg                                                                                                                                                                                     Conscious sedation ordered by M.D. Patient re-evaluation prior to administration of conscious sedation. No changes noted in patient's status from initial evaluation. The patient's vital signs were monitored by RN and patient remained hemodynamically stable throughout the procedure.    Event Time In   Sedation Start 1450   Sedation End 1509       ESTIMATED BLOOD LOSS: None    COMPLICATIONS: None    TECHNIQUE: Time-out was performed to identify the patient and procedure to be performed. With the patient laying in a prone position, the surgical area was prepped and draped in the usual sterile fashion using ChloraPrep and a fenestrated drape.The levels were determined under fluoroscopy guidance. Skin anesthesia was achieved by injecting Lidocaine 2% over the injection sites. The transforaminal spaces were then approached with a 25 gauge,  3.5 inch spinal quinke needle that was introduced under fluoroscopic guidance in the AP and Lateral views. Once the needle tip was in the area of the transforaminal space, and there was no blood, CSF or paraesthesias, contrast dye Omnipaque (300mg/mL) was injected to confirm placement and there was no vascular runoff. Fluoroscopic imaging in the AP and lateral views revealed a clear outline of the spinal nerve with proximal spread of agent through the neural foramen into the epidural space. 3 mL of the medication mixture listed above was injected slowly at each site. Displacement of the radio opaque contrast after injection of the medication confirmed that the medication went into the area of the transforaminal spaces. The needles were removed and bleeding was nil. A sterile dressing was applied. No specimens collected. The patient tolerated the procedure well.       The patient was monitored after the procedure in the recovery area. They were given post-procedure and discharge instructions to follow at home. The patient was discharged in a stable condition.    Laz Peterson MD    I reviewed and edited the fellow's note. I conducted my own interview and physical examination. I agree with the findings. I was present and supervising all critical portions of the procedure.

## 2024-03-12 NOTE — DISCHARGE INSTRUCTIONS

## 2024-03-15 ENCOUNTER — CLINICAL SUPPORT (OUTPATIENT)
Dept: REHABILITATION | Facility: HOSPITAL | Age: 74
End: 2024-03-15
Payer: MEDICARE

## 2024-03-15 DIAGNOSIS — M54.50 LUMBAR PAIN: Primary | ICD-10-CM

## 2024-03-15 DIAGNOSIS — R53.1 DECREASED STRENGTH: ICD-10-CM

## 2024-03-15 PROCEDURE — 97110 THERAPEUTIC EXERCISES: CPT | Mod: HCNC

## 2024-03-15 NOTE — PROGRESS NOTES
"  Physical Therapy Daily Treatment Note     Name: Angelina Man  Clinic Number: [id number]    Therapy Diagnosis:   Encounter Diagnoses   Name Primary?    Lumbar pain Yes    Decreased strength      Physician: Orlin Barajas MD    Visit Date: 3/15/2024    Physician Orders: PT Eval and Treat   Medical Diagnosis from Referral:   M54.16 (ICD-10-CM) - Lumbar radiculopathy   M96.1 (ICD-10-CM) - Postlaminectomy syndrome of lumbar region      Evaluation Date: 3/7/2024  Authorization Period Expiration: 12/31/24  Plan of Care Expiration: 4/5/24  Progress Note Due: 4/7/24  Visit # / Visits authorized: 1/ 1, 1/20  FOTO: 1/ 3    Time In: 7:49 am   Time Out: 8:32 am   Total Billable Time: 43 minutes    Precautions: Standard and hx of lumbar surgery       Subjective     Pt reports: that she has been doing nerve glides and thinks helped. Pt stated that she had injection on Tuesday. Pt stated that she feels discomfort in (B) buttocks but not pain.    She was compliant with home exercise program.  Response to previous treatment: last session was initial evaluation  Functional change: progressing     Pain: pt reported experiencing discomfort in (B) buttocks, but not pain    Objective       Angelina received subjective assessment, education, and therapeutic exercises to develop strength, ROM, flexibility, and decrease pain for 43 minutes including:  Sciatic nerve glides 3x10 B  Ankle DF w/ BTB 3x10   90/90 hamstring stretch 3x30"  B  SL clams 3x10 3" B  Standing hip abduction 3x10 B RTB  Standing hip extension 1x10 L then held, held on (R) LE   Standing DL heel raises 3x10 3"   Standing DL toe raises 3x10         Home Exercises Provided and Patient Education Provided     Education provided:   - HEP - also reviewed how to perform ankle DF with TB at home - pt demo/verbalized understanding  - recommended that pt avoid twisting exercise and full lumbar extensions in yoga - pt verbalized understanding    Written Home Exercises Provided: " yes.  Exercises were reviewed and Angelina was able to demonstrate them prior to the end of the session.  Angelina demonstrated good  understanding of the education provided.     See EMR under Patient Instructions for exercises provided 3/15/2024.      Assessment     Pt reported experiencing discomfort in (R) buttocks when performing hip extension on (L) LE and reported experiencing pain in (R) lumbar region when performing (R) hip extension so additional reps were deferred. Pt was able to tolerate performing all other therex without report of increased pain and reported no increased pain at the end of therapy session.   Angelina Is progressing towards her goals.   Pt prognosis is Fair.     Pt will continue to benefit from skilled outpatient physical therapy to address the deficits listed in the problem list box on initial evaluation, provide pt/family education and to maximize pt's level of independence in the home and community environment.     Pt's spiritual, cultural and educational needs considered and pt agreeable to plan of care and goals.    Anticipated barriers to physical therapy: chronicity of pain/symptoms     Goals:     Short Term Goals: 1 weeks   Pt will be compliant with HEP to supplement PT with decreasing pain and improving functional mobility     Long Term Goals: 4 weeks   Pt will improve FOTO score to at least 64 in order to demo improved functional mobility  Pt will improve LE MMT scores by at least 1/2 grade where deficits noted in order to improve strength for functional tasks  Pt will report lumbar/(R) LE pain </= 3/10 at worst in order to be able to perform ADLs with less difficulty     Plan     Continue per POC, progress as tolerated     Kendra Humphrey, PT

## 2024-03-18 ENCOUNTER — PATIENT MESSAGE (OUTPATIENT)
Dept: PAIN MEDICINE | Facility: CLINIC | Age: 74
End: 2024-03-18
Payer: MEDICARE

## 2024-03-20 ENCOUNTER — TELEPHONE (OUTPATIENT)
Dept: SPINE | Facility: CLINIC | Age: 74
End: 2024-03-20
Payer: MEDICARE

## 2024-03-20 NOTE — TELEPHONE ENCOUNTER
Staff left pt a voice message regards of confirming scheduled appointment on 3/21 with  for 1:45pm .

## 2024-03-21 ENCOUNTER — OFFICE VISIT (OUTPATIENT)
Dept: SPINE | Facility: CLINIC | Age: 74
End: 2024-03-21
Payer: MEDICARE

## 2024-03-21 ENCOUNTER — HOSPITAL ENCOUNTER (OUTPATIENT)
Dept: RADIOLOGY | Facility: OTHER | Age: 74
Discharge: HOME | End: 2024-03-21
Attending: ANESTHESIOLOGY
Payer: MEDICARE

## 2024-03-21 VITALS
HEART RATE: 73 BPM | HEIGHT: 64 IN | RESPIRATION RATE: 12 BRPM | DIASTOLIC BLOOD PRESSURE: 67 MMHG | OXYGEN SATURATION: 100 % | WEIGHT: 100.06 LBS | BODY MASS INDEX: 17.08 KG/M2 | SYSTOLIC BLOOD PRESSURE: 130 MMHG

## 2024-03-21 DIAGNOSIS — Z98.1 HISTORY OF FUSION OF CERVICAL SPINE: ICD-10-CM

## 2024-03-21 DIAGNOSIS — M50.30 DDD (DEGENERATIVE DISC DISEASE), CERVICAL: ICD-10-CM

## 2024-03-21 DIAGNOSIS — M47.812 FACET ARTHRITIS, DEGENERATIVE, CERVICAL SPINE: ICD-10-CM

## 2024-03-21 DIAGNOSIS — M50.30 DDD (DEGENERATIVE DISC DISEASE), CERVICAL: Primary | ICD-10-CM

## 2024-03-21 PROCEDURE — 72052 X-RAY EXAM NECK SPINE 6/>VWS: CPT | Mod: TC,HCNC,FY

## 2024-03-21 PROCEDURE — 99999 PR PBB SHADOW E&M-EST. PATIENT-LVL V: CPT | Mod: PBBFAC,HCNC,, | Performed by: ANESTHESIOLOGY

## 2024-03-21 PROCEDURE — 3078F DIAST BP <80 MM HG: CPT | Mod: HCNC,CPTII,S$GLB, | Performed by: ANESTHESIOLOGY

## 2024-03-21 PROCEDURE — 1159F MED LIST DOCD IN RCRD: CPT | Mod: HCNC,CPTII,S$GLB, | Performed by: ANESTHESIOLOGY

## 2024-03-21 PROCEDURE — 72052 X-RAY EXAM NECK SPINE 6/>VWS: CPT | Mod: 26,HCNC,, | Performed by: RADIOLOGY

## 2024-03-21 PROCEDURE — 3075F SYST BP GE 130 - 139MM HG: CPT | Mod: HCNC,CPTII,S$GLB, | Performed by: ANESTHESIOLOGY

## 2024-03-21 PROCEDURE — 1157F ADVNC CARE PLAN IN RCRD: CPT | Mod: HCNC,CPTII,S$GLB, | Performed by: ANESTHESIOLOGY

## 2024-03-21 PROCEDURE — 1125F AMNT PAIN NOTED PAIN PRSNT: CPT | Mod: HCNC,CPTII,S$GLB, | Performed by: ANESTHESIOLOGY

## 2024-03-21 PROCEDURE — 3288F FALL RISK ASSESSMENT DOCD: CPT | Mod: HCNC,CPTII,S$GLB, | Performed by: ANESTHESIOLOGY

## 2024-03-21 PROCEDURE — 1101F PT FALLS ASSESS-DOCD LE1/YR: CPT | Mod: HCNC,CPTII,S$GLB, | Performed by: ANESTHESIOLOGY

## 2024-03-21 PROCEDURE — 99214 OFFICE O/P EST MOD 30 MIN: CPT | Mod: HCNC,S$GLB,, | Performed by: ANESTHESIOLOGY

## 2024-03-21 NOTE — PROGRESS NOTES
PCP: Zina Rockwell MD    REFERRING PHYSICIAN: No ref. provider found    CHIEF COMPLAINT: Right leg pain    Original HISTORY OF PRESENT ILLNESS: Angelina Man presents to the clinic for the evaluation of the above pain. This has been up and down for the past 5 years or so.     Original Pain Description:  The pain is located in the right buttock and is radiating to the right calf . The pain is described as aching. Exacerbating factors: Bending and Getting out of bed/chair. Mitigating factors heat. Symptoms interfere with daily activity. The patient feels like symptoms have been worsening. Patient reports sometimes stubbing her right foot.    Original PAIN SCORES:  Best: Pain is 4  Worst: Pain is 7  Current: Pain is 5        3/21/2024     1:40 PM   Last 3 PDI Scores   Pain Disability Index (PDI) 30       INTERVAL HISTORY: (Newest visit at the bottom)   Interval History 2/26/24:   Angelina Man returns today for follow-up. Since last seen, they report their pain has been persistant. It continues to be shooting with constant numbness from mid calf throughout her foot. Is not limited in distance with walking. Regularly walks at least a mile per day. She denies new bowel or bladder dysfunction, weakness, or numbness.    Interval History 3/21/24:  Patient presents today for evaluation of neck pain radiating into her head that started earlier this week.  She has seen us in the past for her lumbar radiculopathy.  She received bilateral L3/4 TFESI on 3/12/2024 and reports that her symptoms have improved greatly since then.  She has also been in physical therapy for her back pain and weakness in her right leg, which has also been going very well.  Today, she reports that on 3/18/2024, she began having severe 9/10 neck pain that radiated into her head.  She denies any trauma or inciting event.  Along with the pain, she also experienced muscle spasms and tightness in her neck and shoulders that made it difficult for her to  turn her head left or right.  Over the past few days, the pain has continued to improve, and she reports being a 4/10 today. She has been taking Tylenol arthritis, which she has found very helpful. She denies any numbness, tingling, weakness, bowel/bladder dysfunction, or saddle anesthesia associated with this pain.  Of note, she does have a history of prior C4-6 ACDF with C5 corpectomy in 2010.      6 weeks of Conservative therapy:  PT: Currently doing Physical Therapy, March 2024-present  Chiro: Yes  HEP: Continuing HEP daily as prescribed at PT above. Also does yoga twice weekly.       Treatments / Medications: (Ice/Heat/NSAIDS/APAP/etc):  Heat - helps  APAP - helps    Interventional Pain Procedures: (Previous injections)  Multiple TFESI prior to Lumbar Surgery  3/12/24: Bilateral L3/4 TFESI 75% relief    Past Medical History:   Diagnosis Date    Arthritis     Atrial fibrillation     Bronchitis     Cataract     Dry eyes     GERD (gastroesophageal reflux disease)     Glaucoma, suspect - Both Eyes     Hypothyroidism 06/23/2016    Pulmonary hypertension     Rash     Renal angiomyolipoma     Renal disorder     SCC (squamous cell carcinoma) 07/2023    left lower lateral shin    SCC (squamous cell carcinoma) 04/2023    L mid lateral shin    SCC (squamous cell carcinoma) 09/2023    L mid lower shin    Spinal stenosis     Squamous cell carcinoma     in-situ right upper inner arm, right wrist    Status post lumbar surgery 06/23/2016    Stress fracture     Thyroid disease     Venous insufficiency      Past Surgical History:   Procedure Laterality Date    ANGIOPLASTY      CATARACT EXTRACTION W/  INTRAOCULAR LENS IMPLANT Left 12/6/2022    Procedure: EXTRACTION, CATARACT, WITH IOL INSERTION;  Surgeon: Tone Brown MD;  Location: [facility];  Service: Ophthalmology;  Laterality: Left;    CATARACT EXTRACTION W/  INTRAOCULAR LENS IMPLANT Right 12/20/2022    Procedure: EXTRACTION, CATARACT, WITH IOL INSERTION;  Surgeon:  Tone Brown MD;  Location: [facility] OR;  Service: Ophthalmology;  Laterality: Right;    CERVICAL FUSION      COLONOSCOPY      COLONOSCOPY N/A 11/14/2017    Procedure: COLONOSCOPY;  Surgeon: Kasi Mercado MD;  Location: [facility] ENDO (4TH FLR);  Service: Endoscopy;  Laterality: N/A;    COLONOSCOPY N/A 4/26/2023    Procedure: COLONOSCOPY;  Surgeon: Jeanmarie Hathaway MD;  Location: [facility] ENDO (4TH FLR);  Service: Colon and Rectal;  Laterality: N/A;  ok to hold Eliquis 2 days per Dr Pereira  constipation-ext Miralax prep  instr mailed/portal-GT  4/21/23 pre call attempted, no answer    ESOPHAGOGASTRODUODENOSCOPY N/A 10/10/2019    Procedure: EGD (ESOPHAGOGASTRODUODENOSCOPY);  Surgeon: Rg Milton MD;  Location: [facility] ENDO (4TH FLR);  Service: Endoscopy;  Laterality: N/A;    ESOPHAGOGASTRODUODENOSCOPY N/A 10/6/2021    Procedure: EGD (ESOPHAGOGASTRODUODENOSCOPY);  Surgeon: Rg Milton MD;  Location: [facility] ENDO (4TH FLR);  Service: Endoscopy;  Laterality: N/A;  covid test 10/3 [place], instr emailed/portal -ml    EXCISION Left 5/17/2023    Procedure: EXCISION SQUAMOUS CELL CARCINOMA LEFT LEG;  Surgeon: Kasi Canela Jr., MD;  Location: [facility] 2ND FLR;  Service: General;  Laterality: Left;    l4-5 mid discectomy Left 03/2017    l4-5 MIS diskectomy Right 05/2016    RIGHT HEART CATHETERIZATION Right 1/27/2022    Procedure: INSERTION, CATHETER, RIGHT HEART;  Surgeon: Satnam Pickard Jr., MD;  Location: [facility] CATH LAB;  Service: Cardiology;  Laterality: Right;    TRANSFORAMINAL EPIDURAL INJECTION OF STEROID Bilateral 3/12/2024    Procedure: LUMBAR TRANSFORAMINAL BILATERAL L3/4 *ELIQUIS CLEARANCE IN CHART*;  Surgeon: Sheree Abbott MD;  Location: [facility] PAIN MGT;  Service: Pain Management;  Laterality: Bilateral;  [phone]  2 WK F/U [place]    TREATMENT OF CARDIAC ARRHYTHMIA N/A 7/17/2020    Procedure: CARDIOVERSION;  Surgeon: Kwan Bray MD;  Location: [facility] EP LAB;  Service: Cardiology;  Laterality: N/A;   AF,DCCV/SANGITA, ANES, SK, 746    TREATMENT OF CARDIAC ARRHYTHMIA N/A 7/14/2021    Procedure: CARDIOVERSION;  Surgeon: SYDNIE Cedillo MD;  Location: [facility] EP LAB;  Service: Cardiology;  Laterality: N/A;  AF, SANGITA, DCCV, MAC, EH, 3 Prep    WASHOUT Right 5/17/2023    Procedure: WASHOUT right lower arm and closure;  Surgeon: Kasi Canela Jr., MD;  Location: [facility] OR [address] [place] [address];  Service: General;  Laterality: Right;     Social History     Socioeconomic History    Marital status: Single   Occupational History     Employer: [employer]   Tobacco Use    Smoking status: Never     Passive exposure: Never    Smokeless tobacco: Never   Substance and Sexual Activity    Alcohol use: Yes     Alcohol/week: 4.0 standard drinks of alcohol     Types: 4 Glasses of wine per week     Comment: 2 glasses of wine on weekends    Drug use: No    Sexual activity: Never   Other Topics Concern    Are you pregnant or think you may be? No    Breast-feeding No     Social Determinants of Health     Financial Resource Strain: Low Risk  (2/29/2024)    Overall Financial Resource Strain (CARDIA)     Difficulty of Paying Living Expenses: Not very hard   Food Insecurity: No Food Insecurity (2/29/2024)    Hunger Vital Sign     Worried About Running Out of Food in the Last Year: Never true     Ran Out of Food in the Last Year: Never true   Transportation Needs: No Transportation Needs (2/29/2024)    PRAPARE - Transportation     Lack of Transportation (Medical): No     Lack of Transportation (Non-Medical): No   Physical Activity: Sufficiently Active (2/29/2024)    Exercise Vital Sign     Days of Exercise per Week: 7 days     Minutes of Exercise per Session: 60 min   Stress: No Stress Concern Present (2/29/2024)    [employer] - Occupational Stress Questionnaire     Feeling of Stress : Not at all   Social Connections: Unknown (2/29/2024)    Social Connection and Isolation Panel [NHANES]     Frequency of Communication  with Friends and Family: Once a week     Frequency of Social Gatherings with Friends and Family: Patient declined     Attends Congregational Services: Never     Active Member of Clubs or Organizations: No     Attends Club or Organization Meetings: Never     Marital Status:    Recent Concern: Social Connections - Socially Isolated (12/1/2023)    Social Connection and Isolation Panel [NHANES]     Frequency of Communication with Friends and Family: Once a week     Frequency of Social Gatherings with Friends and Family: Once a week     Attends Congregational Services: Never     Active Member of Clubs or Organizations: No     Attends Club or Organization Meetings: Never     Marital Status:    Housing Stability: Low Risk  (2/29/2024)    Housing Stability Vital Sign     Unable to Pay for Housing in the Last Year: No     Number of Places Lived in the Last Year: 1     Unstable Housing in the Last Year: No     Family History   Problem Relation Age of Onset    Cancer Mother         Lung cancer    Heart failure Father     Colon cancer Father     Hypertension Father     Cataracts Father     Cancer Father 80        colon    No Known Problems Sister     No Known Problems Brother     Melanoma Daughter     Diabetes Son     Heart disease Son     Cancer Son         esophageal cancer    No Known Problems Maternal Aunt     No Known Problems Maternal Uncle     No Known Problems Paternal Aunt     No Known Problems Paternal Uncle     No Known Problems Maternal Grandmother     No Known Problems Maternal Grandfather     No Known Problems Paternal Grandmother     No Known Problems Paternal Grandfather     Amblyopia Neg Hx     Blindness Neg Hx     Glaucoma Neg Hx     Macular degeneration Neg Hx     Retinal detachment Neg Hx     Strabismus Neg Hx     Stroke Neg Hx     Thyroid disease Neg Hx     Breast cancer Neg Hx     Ovarian cancer Neg Hx        Review of patient's allergies indicates:  No Known Allergies    Current Outpatient  Medications   Medication Sig    acetaminophen (TYLENOL) 500 MG tablet Take 500 mg by mouth every 6 (six) hours as needed for Pain.    acyclovir (ZOVIRAX) 400 MG tablet Take 1 tablet (400 mg total) by mouth once daily.    ascorbic acid (VITAMIN C) 1000 MG tablet Take 1,000 mg by mouth once daily.     biotin 1 mg tablet Take 1 mg by mouth 2 (two) times daily.     buPROPion (WELLBUTRIN SR) 150 MG TBSR 12 hr tablet Take 1 tablet (150 mg total) by mouth 2 (two) times daily.    digestive enzymes Tab Take 1 tablet by mouth once daily.     ELIQUIS 5 mg Tab TAKE 1 TABLET(5 MG) BY MOUTH TWICE DAILY    fluticasone propionate (FLONASE) 50 mcg/actuation nasal spray 1 spray (50 mcg total) by Each Nostril route 2 (two) times daily as needed for Rhinitis.    furosemide (LASIX) 20 MG tablet Take 1 tablet (20 mg total) by mouth every other day.    GAVILYTE-G 236-22.74-6.74 -5.86 gram suspension SMARTSIG:Milliliter(s) By Mouth    levothyroxine (SYNTHROID) 137 MCG Tab tablet Take 1 tablet (137 mcg total) by mouth before breakfast.    lubiprostone (AMITIZA) 24 MCG Cap Take 1 capsule (24 mcg total) by mouth daily as needed (IBS symptoms).    metoprolol succinate (TOPROL-XL) 25 MG 24 hr tablet Take 1 tablet (25 mg total) by mouth once daily.    mirtazapine (REMERON) 7.5 MG Tab Take 1 tablet (7.5 mg total) by mouth nightly.    multivitamin (THERAGRAN) per tablet Take 1 tablet by mouth once daily.     pramipexole (MIRAPEX) 0.25 MG tablet Take 1 tablet (0.25 mg total) by mouth every evening. For restless legs    traZODone (DESYREL) 50 MG tablet Take 1 tablet (50 mg total) by mouth nightly as needed for Insomnia.    zinc 50 mg Tab Take 50 mg by mouth once daily.      No current facility-administered medications for this visit.       ROS:  GENERAL: No fever. No chills. No fatigue. Denies weight loss. Denies weight gain.  HEENT: Denies headaches. Denies vision change. Denies eye pain. Denies double vision. Denies ear pain.   CV: Denies chest  "pain.   PULM: Denies of shortness of breath.  GI: Denies constipation. No diarrhea. No abdominal pain. Denies nausea. Denies vomiting. No blood in stool.  HEME: Denies bleeding problems.  : Denies urgency. No painful urination. No blood in urine.  MS: Denies joint stiffness. Denies joint swelling.  Denies back pain.  SKIN: Denies rash.   NEURO: Denies seizures. No weakness.  PSYCH:  Denies difficulty sleeping. No anxiety. Denies depression. No suicidal thoughts.       VITALS:   Vitals:    03/21/24 1340   BP: 130/67   Pulse: 73   Resp: 12   SpO2: 100%   Weight: 45.4 kg (100 lb 1.4 oz)   Height: 5' 4" (1.626 m)   PainSc:   5         PHYSICAL EXAM:   GENERAL: Well appearing, in no acute distress, alert and oriented x3.  PSYCH:  Mood and affect appropriate.  SKIN: Skin color, texture, turgor normal, no rashes or lesions.  HEENT:  Normocephalic, atraumatic. Cranial nerves grossly intact.  NECK:  Range of motion is limited in all planes due to pain and prior surgical changes, most significantly with rotation to the left.  There is muscle spasm noted to the bilateral paraspinal muscles and bilateral trapezius, worst on the left with associated tenderness.  There is tenderness to palpation over the bilateral occipital area, worst on the left.  Emilee's positive on the right.  Spurling's negative bilaterally.  Facet loading positive bilaterally.  PULM: No evidence of respiratory difficulty, symmetric chest rise.  GI:  Non-distended  BACK: ROM limited but within functional limits. No focal tenderness to palpation.   EXTREMITIES: No deformities, edema, or skin discoloration.   MUSCULOSKELETAL: KAVITA/FADIR negative. No atrophy is noted.  NEURO: Sensation is altered below the knees bilaterally. Right ADF 4+/5. Bilateral lower extremity coordination and muscle stretch reflexes are physiologic and symmetric. No clonus.  GAIT: Antalgic.      LABS:    Chemistry        Component Value Date/Time     01/10/2024 0708    K 3.9 " 01/10/2024 0708     01/10/2024 0708    CO2 26 01/10/2024 0708    BUN 15 01/10/2024 0708    CREATININE 0.8 01/10/2024 0708    GLU 81 01/10/2024 0708        Component Value Date/Time    CALCIUM 8.8 01/10/2024 0708    ALKPHOS 67 01/10/2024 0708    AST 18 01/10/2024 0708    ALT 17 01/10/2024 0708    BILITOT 0.5 01/10/2024 0708    ESTGFRAFRICA >60.0 05/25/2022 1204    EGFRNONAA 55.6 (A) 05/25/2022 1204            IMAGING:    EXAMINATION:  CT CERVICAL SPINE WITHOUT CONTRAST - 2/22/22     CLINICAL HISTORY:  Evaluate fusion;Arthrodesis status     TECHNIQUE:  Low dose axial images, sagittal and coronal reformations were performed though the cervical spine.  Contrast was not administered.     COMPARISON:  MRI cervical spine 01/07/2021     CT cervical spine 07/28/2014     FINDINGS:  Postoperative changes from partial C5 corpectomy and reconstruction with an interbody cage and C4-6 anterior plate and screw construct.  Hardware is intact and unchanged in position without evidence of loosening.  No perihardware fractures.  Unchanged alignment.  Some osseous bridging is present.  No prevertebral soft tissue swelling.     Straightening of the cervical lordosis. C2-C4 stepwise grade 1 anterolisthesis. T2-3 grade 1 anterolisthesis.     Diffusely decreased bone mineral density. No fractures or pars defects.  No focal osseous lesions.     Multilevel disc and uncovertebral joint degeneration, most advanced at C6-7.  Multilevel facet arthropathy, most advanced at C2-3.  There are amorphous soft tissue calcifications adjacent to the odontoid process and in the expected location of the posterior longitudinal ligament extending from C2-4.     Visualized structures in the posterior fossa are unremarkable. The cervical spinal cord is difficult to visualize.     Vascular calcifications.  Paraspinal soft tissues are otherwise unremarkable.  Biapical pleuroparenchymal scarring.     Impression:     1. Postoperative changes from C5  corpectomy and reconstruction.  Unchanged alignment.  No hardware complication.  2. Multilevel degenerative changes, most advanced at C2-3 and C6-7, progressed from prior CT.  3. Amorphous soft tissue calcifications adjacent to the odontoid process and in the expected location of the posterior longitudinal ligament, which can be seen with CPPD.     CT LUMBAR SPINE WITHOUT CONTRAST     CLINICAL HISTORY:  Evaluate fusion;  Arthrodesis status     TECHNIQUE:  Low-dose axial, sagittal and coronal reformations are obtained through the lumbar spine.  Contrast was not administered.     COMPARISON:  MRI lumbar spine 01/07/2021; CT lumbar spine 03/17/2020     FINDINGS:  Postoperative changes from L4-S1 posterior decompression and instrumented fusion with bilateral vertical rods, pedicle screws, and metallic interbody spacers.  Hardware is intact and unchanged in position without evidence of loosening.  No perihardware fractures.  Unchanged alignment.  No significant osseous bridging.  No definite fluid collections in the operative bed, however evaluation is limited by CT technique and degraded by streak artifact.     T12-L2 grade 1 stepwise retrolisthesis. L4 on L5 grade 1 anterolisthesis. Mild rightward curvature of the lumbar spine.     Diffuse osteopenia.  No fractures or pars defects.  No focal osseous lesions.     Multilevel degenerative disc disease, most advanced at L2-3 and L3-4.  Multilevel facet arthropathy, most advanced at L3-4.     The conus medullaris and cauda equina nerve roots are difficult to visualize.     9 mm cyst in the midpole left kidney, with a 2 mm mural calcification versus adjacent nonobstructing stone.  Paraspinal soft tissues are otherwise unremarkable.     Impression:     1. Postoperative changes from L4-S1 decompressive instrumented fusion.  No hardware complication.  Unchanged alignment.  2. Multilevel degenerative changes, most advanced at L2-3 and L3-4, similar to prior.     Electronically  signed by resident: Brigette Khoury  Date:                                            02/22/2022  Time:                                           09:41     Electronically signed by: Ismael Isabel  Date:                                            02/22/2022  Time:                                           14:11    ASSESSMENT: 74 y.o. year old female with pain, consistent with:    Encounter Diagnoses   Name Primary?    Facet arthritis, degenerative, cervical spine Yes    DDD (degenerative disc disease), cervical     Chronic pain syndrome     History of fusion of cervical spine        DISCUSSION: Angelina Man is a retired  and  at [employer] with a history of multiple laminectomies and microdiscectomies with residual surgical bed inflammation causing canal stenosis. She underwent a right L4/5 L5/S1 TLIF with posterior hardware in 2018. She she saw us in February for increased low back and right leg pain as well as a right footdrop.  She has been progressing very well with surgery and has had significant improvement in her pain following bilateral L3/4 TFESI on 03/12/2024.  She returned for an exacerbation of neck pain that radiated into her head with associated muscle spasm.  She has been improving with rest and Tylenol.  She does have a history of prior C4-6 ACDF and C5 corpectomy in 2010. CT scan from 2022 did show advanced multilevel degenerative changes including facet arthropathy most pronounced at C2-3.  She does not have any neurologic changes.    PLAN:  Referral placed for physical therapy for her cervical spine.  Xray C-spine today.  We will consider MRI of the C-spine if she fails to improve with conservative measures.    Follow-up 2-3 months or sooner if needed.      Shon Thomas MD  [employer] [facility]

## 2024-03-22 ENCOUNTER — CLINICAL SUPPORT (OUTPATIENT)
Dept: REHABILITATION | Facility: HOSPITAL | Age: 74
End: 2024-03-22
Payer: MEDICARE

## 2024-03-22 DIAGNOSIS — M47.812 FACET ARTHRITIS, DEGENERATIVE, CERVICAL SPINE: ICD-10-CM

## 2024-03-22 DIAGNOSIS — M54.50 LUMBAR PAIN: Primary | ICD-10-CM

## 2024-03-22 DIAGNOSIS — M50.30 DDD (DEGENERATIVE DISC DISEASE), CERVICAL: ICD-10-CM

## 2024-03-22 DIAGNOSIS — R53.1 DECREASED STRENGTH: ICD-10-CM

## 2024-03-22 PROCEDURE — 97110 THERAPEUTIC EXERCISES: CPT | Mod: HCNC

## 2024-03-22 PROCEDURE — 97112 NEUROMUSCULAR REEDUCATION: CPT | Mod: HCNC

## 2024-03-22 NOTE — PLAN OF CARE
[facility]  Physical Therapy Plan of Care Note     Name: Angelina Man  Clinic Number: [id number]    Therapy Diagnosis:   Encounter Diagnoses   Name Primary?    Lumbar pain Yes    Decreased strength      Physician: Orlin Barajas MD    Visit Date: 3/22/2024    Physician Orders: PT Eval and Treat   Medical Diagnosis from Referral:   M54.16 (ICD-10-CM) - Lumbar radiculopathy   M96.1 (ICD-10-CM) - Postlaminectomy syndrome of lumbar region      M50.30 (ICD-10-CM) - DDD (degenerative disc disease), cervical   M47.812 (ICD-10-CM) - Facet arthritis, degenerative, cervical spine     Evaluation Date: 3/7/2024  Authorization Period Expiration: 12/31/24  Plan of Care Expiration: 4/5/24  Progress Note Due: 4/7/24  Visit # / Visits authorized: 1/ 1, 2/20  FOTO: 1/ 3    Precautions: Standard and hx of lumbar surgery, hx of cervical fusion      SUBJECTIVE     Update: Pt stated that she had a cervical fusion in 2010. Pt stated that her neck always hurts, but neck started hurting badly about 4-6 weeks ago. Pt stated that she has pain and definite mobility issues in neck. Pt stated that over this past weekend pain in her neck got really bad and went into her head. Pt stated that she made apt with Dr. Abbott and saw Dr. Abbott this week. Pt stated that mobility is still an issue and she has more difficulty turning to her (L) and has to turn whole body when driving. Pt stated that she has not had headache since this weekend. Pt stated that Dr. Abbott recommended trying PT for her neck     Pain:   1/10 currently, 8/10 at worst, 1/10 at best  Location: (B) neck  Description: aching   Aggravating factors: turning head,moving neck, not sure if using weights in body pump class   Easing Factors: heat, Tylenol, massage    OBJECTIVE     Update:     Cervical AROM: Pain/Dysfunction with Movement:   Flexion 55 degrees     Extension 30 degrees Pain   Right side bending 15 degrees Pain in (R) neck/UT    Left side  bending 15 degrees Stretch in (R) neck/UT   Right rotation 40 degrees Pain in (R) neck    Left rotation 25 degrees Pain in (R) neck        Shoulder Right  Left  Pain/Dysfunction with Movement    AROM MMT AROM MMT    flexion WFL 5/5 WFL 5/5    abduction WFL 5/5 WFL 5/5    Internal rotation WFL 5/5 WFL 5/5    ER at 90° abd WFL NT WFL NT    ER at 0° abd NT 4/5 NT 4/5        Palpation: pt reported tenderness to palpation along (B) UT and medial border of (L) scapula     ASSESSMENT     Update: Dr. Abbott put in new PT orders to evaluate and treat pt's neck. Pt presents with report of pain when performing cervical AROM noted above and decreased (L) cervical rotation AROM compared to (R).  Pt will continue to benefit from skilled PT for lumbar/ LE with the addition of treatment for her neck.     Previous Short Term Goals Status:       Short Term Goals: 1 weeks   Pt will be compliant with HEP to supplement PT with decreasing pain and improving functional mobility - progressing not met     Long Term Goals: 4 weeks   Pt will improve FOTO score to at least 64 in order to demo improved functional mobility - progressing not met  Pt will improve LE MMT scores by at least 1/2 grade where deficits noted in order to improve strength for functional tasks - progressing not met  Pt will report lumbar/(R) LE pain </= 3/10 at worst in order to be able to perform ADLs with less difficulty - progressing not met      New Short Term Goals Status:   continue with all STG and LTG and add NEW LTGs: 4. Pt will perform (L) cervical rotation AROM = (R) cervical rotation AROM in order to be able to turn head when driving with less difficulty  5. Pt will report neck pain </= 4/10 at worst in order to be able to perform ADLs with less difficulty   Long Term Goal Status: continue per initial plan of care and add treatment for neck pain  Reasons for Recertification of Therapy:   evaluation and adding treatment for neck pain      PLAN     Updated  Certification Period: 3/22/24 to 4/19/24   Recommended Treatment Plan: 1 times per week for 4 weeks:  Gait Training, Manual Therapy, Moist Heat/ Ice, Neuromuscular Re-ed, Patient Education, Self Care, Therapeutic Activities, Therapeutic Exercise, and IASTM, dry needling, and modalities prn   Other Recommendations: n/a    Kendra Humphrey, PT

## 2024-03-22 NOTE — PROGRESS NOTES
"  Physical Therapy Daily Treatment Note     Name: Angelina Man  Clinic Number: [id number]    Therapy Diagnosis:   Encounter Diagnoses   Name Primary?    Lumbar pain Yes    Decreased strength      Physician: Orlin Barajas MD    Visit Date: 3/22/2024    Physician Orders: PT Eval and Treat   Medical Diagnosis from Referral:   M54.16 (ICD-10-CM) - Lumbar radiculopathy   M96.1 (ICD-10-CM) - Postlaminectomy syndrome of lumbar region      M50.30 (ICD-10-CM) - DDD (degenerative disc disease), cervical   M47.812 (ICD-10-CM) - Facet arthritis, degenerative, cervical spine     Evaluation Date: 3/7/2024  Authorization Period Expiration: 12/31/24  Plan of Care Expiration: 4/5/24  Progress Note Due: 4/7/24  Visit # / Visits authorized: 1/ 1, 2/20  FOTO: 1/ 3    Time In: 7:49 am   Time Out: 8:30 am   Total Billable Time: 41 minutes    Precautions: Standard and hx of lumbar surgery, hx of cervical fusion    Subjective     Pt reports: that she has been doing some of her HEP and thinks exercises are helping. Pt stated that back pain comes and goes, but when back pain comes is better than what it used to be.   She  partially  compliant with home exercise program.  Response to previous treatment: no adverse effects  Functional change: progressing     Pain: 1/10 (B) neck       Objective         Angelina received subjective assessment, education,objective measurements and therapeutic exercises to develop strength,  flexibility, and decrease pain for 32 minutes including:  (R) UT stretch 3x30"   (R) LS stretch 3x30"   Ankle DF w/ BTB 3x10   Sciatic nerve glides 3x10 B not performed  90/90 hamstring stretch 3x30"  B not performed  SL clams 3x10 3" B not performed   Standing hip abduction 3x10 B RTB not performed   Standing hip extension 1x10 L then held, held on (R) LE not performed   Standing DL heel raises 3x10 3" not performed   Standing DL toe raises 3x10 not performed     Angelina participated in neuromuscular re-education activities to " "improve: Posture and stabilization for 9 minutes. The following activities were included:  Chin tucks 3" 2x10   (B) shoulder ER 2x10 3" RTB   Rows w/scap retraction 2x10 3" GTB     Home Exercises Provided and Patient Education Provided     Education provided:   - HEP   - Recommended that pt not perform lumbar extension exercise in yoga - pt verbalized understanding    Written Home Exercises Provided: yes.  Exercises were reviewed and Angelina was able to demonstrate them prior to the end of the session.  Angelina demonstrated good  understanding of the education provided.     See EMR under Patient Instructions for exercises provided 3/22/2024.      Assessment     See treatment section   Angelina Is progressing  towards her goals.   Pt prognosis is Fair.     Pt will continue to benefit from skilled outpatient physical therapy to address the deficits listed in the problem list box on initial evaluation, provide pt/family education and to maximize pt's level of independence in the home and community environment.     Pt's spiritual, cultural and educational needs considered and pt agreeable to plan of care and goals.    Anticipated barriers to physical therapy: chronicity of pain/symptoms     Goals: see treatment section     Plan     See treatment section     Kendra Humphrey, PT     "

## 2024-03-27 ENCOUNTER — OFFICE VISIT (OUTPATIENT)
Dept: PODIATRY | Facility: CLINIC | Age: 74
End: 2024-03-27
Payer: MEDICARE

## 2024-03-27 VITALS
WEIGHT: 100.06 LBS | BODY MASS INDEX: 17.08 KG/M2 | HEART RATE: 76 BPM | HEIGHT: 64 IN | SYSTOLIC BLOOD PRESSURE: 121 MMHG | DIASTOLIC BLOOD PRESSURE: 76 MMHG

## 2024-03-27 DIAGNOSIS — G47.00 INSOMNIA, UNSPECIFIED TYPE: ICD-10-CM

## 2024-03-27 DIAGNOSIS — L84 CORNS/CALLOSITIES: Primary | ICD-10-CM

## 2024-03-27 PROCEDURE — 99499 UNLISTED E&M SERVICE: CPT | Mod: HCNC,,, | Performed by: PODIATRIST

## 2024-03-27 PROCEDURE — 17999 UNLISTD PX SKN MUC MEMB SUBQ: CPT | Mod: CSM,HCNC,S$GLB, | Performed by: PODIATRIST

## 2024-03-27 PROCEDURE — 99999 PR PBB SHADOW E&M-EST. PATIENT-LVL IV: CPT | Mod: PBBFAC,HCNC,, | Performed by: PODIATRIST

## 2024-03-27 NOTE — TELEPHONE ENCOUNTER
No care due was identified.  [facility] Embedded Care Due Messages. Reference number: [id number].   3/27/2024 4:06:04 PM CDT

## 2024-03-28 RX ORDER — TRAZODONE HYDROCHLORIDE 100 MG/1
100 TABLET ORAL NIGHTLY PRN
Qty: 90 TABLET | Refills: 0 | OUTPATIENT
Start: 2024-03-28

## 2024-03-28 NOTE — TELEPHONE ENCOUNTER
Refill Decision Note   Angelina Magda  is requesting a refill authorization.  Brief Assessment and Rationale for Refill:  Quick Discontinue     Medication Therapy Plan:  Dose decreased to 50 mg nightly as needed; QDC      Comments:     Note composed:4:16 AM 03/28/2024

## 2024-04-02 ENCOUNTER — CLINICAL SUPPORT (OUTPATIENT)
Dept: REHABILITATION | Facility: HOSPITAL | Age: 74
End: 2024-04-02
Attending: ANESTHESIOLOGY
Payer: MEDICARE

## 2024-04-02 DIAGNOSIS — R53.1 DECREASED STRENGTH: ICD-10-CM

## 2024-04-02 DIAGNOSIS — M54.50 LUMBAR PAIN: Primary | ICD-10-CM

## 2024-04-02 PROCEDURE — 97110 THERAPEUTIC EXERCISES: CPT | Mod: HCNC

## 2024-04-02 PROCEDURE — 97112 NEUROMUSCULAR REEDUCATION: CPT | Mod: HCNC

## 2024-04-03 ENCOUNTER — OFFICE VISIT (OUTPATIENT)
Dept: PAIN MEDICINE | Facility: CLINIC | Age: 74
End: 2024-04-03
Payer: MEDICARE

## 2024-04-03 VITALS
DIASTOLIC BLOOD PRESSURE: 68 MMHG | OXYGEN SATURATION: 98 % | BODY MASS INDEX: 16.8 KG/M2 | WEIGHT: 97.88 LBS | HEART RATE: 71 BPM | TEMPERATURE: 98 F | SYSTOLIC BLOOD PRESSURE: 106 MMHG | RESPIRATION RATE: 18 BRPM

## 2024-04-03 DIAGNOSIS — M54.16 LUMBAR RADICULOPATHY: ICD-10-CM

## 2024-04-03 DIAGNOSIS — G57.01 PIRIFORMIS SYNDROME OF RIGHT SIDE: Primary | ICD-10-CM

## 2024-04-03 DIAGNOSIS — G89.4 CHRONIC PAIN SYNDROME: ICD-10-CM

## 2024-04-03 DIAGNOSIS — M48.061 SPINAL STENOSIS OF LUMBAR REGION WITHOUT NEUROGENIC CLAUDICATION: ICD-10-CM

## 2024-04-03 DIAGNOSIS — M96.1 POSTLAMINECTOMY SYNDROME OF LUMBAR REGION: ICD-10-CM

## 2024-04-03 DIAGNOSIS — M51.36 DDD (DEGENERATIVE DISC DISEASE), LUMBAR: ICD-10-CM

## 2024-04-03 PROCEDURE — 3074F SYST BP LT 130 MM HG: CPT | Mod: HCNC,CPTII,S$GLB,

## 2024-04-03 PROCEDURE — 3288F FALL RISK ASSESSMENT DOCD: CPT | Mod: HCNC,CPTII,S$GLB,

## 2024-04-03 PROCEDURE — 1157F ADVNC CARE PLAN IN RCRD: CPT | Mod: HCNC,CPTII,S$GLB,

## 2024-04-03 PROCEDURE — 99999 PR PBB SHADOW E&M-EST. PATIENT-LVL IV: CPT | Mod: PBBFAC,HCNC,,

## 2024-04-03 PROCEDURE — 1101F PT FALLS ASSESS-DOCD LE1/YR: CPT | Mod: HCNC,CPTII,S$GLB,

## 2024-04-03 PROCEDURE — 1159F MED LIST DOCD IN RCRD: CPT | Mod: HCNC,CPTII,S$GLB,

## 2024-04-03 PROCEDURE — 99214 OFFICE O/P EST MOD 30 MIN: CPT | Mod: HCNC,S$GLB,,

## 2024-04-03 PROCEDURE — 1160F RVW MEDS BY RX/DR IN RCRD: CPT | Mod: HCNC,CPTII,S$GLB,

## 2024-04-03 PROCEDURE — 3078F DIAST BP <80 MM HG: CPT | Mod: HCNC,CPTII,S$GLB,

## 2024-04-03 PROCEDURE — 1125F AMNT PAIN NOTED PAIN PRSNT: CPT | Mod: HCNC,CPTII,S$GLB,

## 2024-04-03 RX ORDER — METHOCARBAMOL 500 MG/1
500 TABLET, FILM COATED ORAL 3 TIMES DAILY PRN
Qty: 90 TABLET | Refills: 0 | Status: SHIPPED | OUTPATIENT
Start: 2024-04-03

## 2024-04-03 NOTE — PROGRESS NOTES
PCP: Zina Rockwell MD    REFERRING PHYSICIAN: No ref. provider found    CHIEF COMPLAINT: Right leg pain    Original HISTORY OF PRESENT ILLNESS: Angelina Man presents to the clinic for the evaluation of the above pain. This has been up and down for the past 5 years or so.     Original Pain Description:  The pain is located in the right buttock and is radiating to the right calf . The pain is described as aching. Exacerbating factors: Bending and Getting out of bed/chair. Mitigating factors heat. Symptoms interfere with daily activity. The patient feels like symptoms have been worsening. Patient reports sometimes stubbing her right foot.    Original PAIN SCORES:  Best: Pain is 4  Worst: Pain is 7  Current: Pain is 5        3/21/2024     1:40 PM   Last 3 PDI Scores   Pain Disability Index (PDI) 30       INTERVAL HISTORY: (Newest visit at the bottom)   Interval History 2/26/24:   Angelina Man returns today for follow-up. Since last seen, they report their pain has been persistant. It continues to be shooting with constant numbness from mid calf throughout her foot. Is not limited in distance with walking. Regularly walks at least a mile per day. She denies new bowel or bladder dysfunction, weakness, or numbness.    Interval History 3/21/24:  Patient presents today for evaluation of neck pain radiating into her head that started earlier this week.  She has seen us in the past for her lumbar radiculopathy.  She received bilateral L3/4 TFESI on 3/12/2024 and reports that her symptoms have improved greatly since then.  She has also been in physical therapy for her back pain and weakness in her right leg, which has also been going very well.  Today, she reports that on 3/18/2024, she began having severe 9/10 neck pain that radiated into her head.  She denies any trauma or inciting event.  Along with the pain, she also experienced muscle spasms and tightness in her neck and shoulders that made it difficult for her to  turn her head left or right.  Over the past few days, the pain has continued to improve, and she reports being a 4/10 today. She has been taking Tylenol arthritis, which she has found very helpful. She denies any numbness, tingling, weakness, bowel/bladder dysfunction, or saddle anesthesia associated with this pain.  Of note, she does have a history of prior C4-6 ACDF with C5 corpectomy in 2010.      Interval History 4/3/2024:  Angelina Man presents for follow-up s/p Bilateral L3/4 TFESI on 3/12/2024.  She saw Dr Abbott 9 days s/p procedure on 3/21/2024 and she reported significant improvement in lower back pain at that time. At that visit she was reporting severe neck pain that radiated into the head with associated muscle spasms and tightness. This pain has since resolved, but the limited range of motion continues. She continues to report right lower back pain at waist that radiates to lateral aspect of right thigh. She describes the pain as aching.  Exacerbating factors: exercising, walking and stooping.  The patient continues physical therapy for her lower back and has started for her neck.  She continues 1 hour spin classes followed by 1 hour weightlifting classes three times weekly and 1 hour of cardio followed by an hour of yoga twice weekly. She is currently training for a trip to [facility] in September. The patient denies fever/night sweats, urinary incontinence, bowel incontinence, significant weight changes, significant motor weakness or changes, or loss of sensations. Her pain today is 3/10.      6 weeks of Conservative therapy:  PT: Currently doing Physical Therapy, March 2024-present  Chiro: Yes  HEP: Continuing HEP daily as prescribed at PT above. Also does yoga twice weekly.       Treatments / Medications: (Ice/Heat/NSAIDS/APAP/etc):  Heat - helps  APAP - helps    Interventional Pain Procedures: (Previous injections)  Multiple TFESI prior to Lumbar Surgery  3/12/24: Bilateral L3/4 TFESI 75%  relief    Past Medical History:   Diagnosis Date    Arthritis     Atrial fibrillation     Bronchitis     Cataract     Dry eyes     GERD (gastroesophageal reflux disease)     Glaucoma, suspect - Both Eyes     Hypothyroidism 06/23/2016    Pulmonary hypertension     Rash     Renal angiomyolipoma     Renal disorder     SCC (squamous cell carcinoma) 07/2023    left lower lateral shin    SCC (squamous cell carcinoma) 04/2023    L mid lateral shin    SCC (squamous cell carcinoma) 09/2023    L mid lower shin    Spinal stenosis     Squamous cell carcinoma     in-situ right upper inner arm, right wrist    Status post lumbar surgery 06/23/2016    Stress fracture     Thyroid disease     Venous insufficiency      Past Surgical History:   Procedure Laterality Date    ANGIOPLASTY      CATARACT EXTRACTION W/  INTRAOCULAR LENS IMPLANT Left 12/6/2022    Procedure: EXTRACTION, CATARACT, WITH IOL INSERTION;  Surgeon: Tone Brown MD;  Location: [facility];  Service: Ophthalmology;  Laterality: Left;    CATARACT EXTRACTION W/  INTRAOCULAR LENS IMPLANT Right 12/20/2022    Procedure: EXTRACTION, CATARACT, WITH IOL INSERTION;  Surgeon: Tone Brown MD;  Location: [facility];  Service: Ophthalmology;  Laterality: Right;    CERVICAL FUSION      COLONOSCOPY      COLONOSCOPY N/A 11/14/2017    Procedure: COLONOSCOPY;  Surgeon: Kasi Mercado MD;  Location: [facility] (4TH FLR);  Service: Endoscopy;  Laterality: N/A;    COLONOSCOPY N/A 4/26/2023    Procedure: COLONOSCOPY;  Surgeon: Jeanmarie Hathaway MD;  Location: [facility] (4TH FLR);  Service: Colon and Rectal;  Laterality: N/A;  ok to hold Eliquis 2 days per Dr Pereira  constipation-ext Miralax prep  instr mailed/portal-GT  4/21/23 pre call attempted, no answer    ESOPHAGOGASTRODUODENOSCOPY N/A 10/10/2019    Procedure: EGD (ESOPHAGOGASTRODUODENOSCOPY);  Surgeon: Rg Milton MD;  Location: [facility] (4TH FLR);  Service: Endoscopy;  Laterality: N/A;    ESOPHAGOGASTRODUODENOSCOPY  N/A 10/6/2021    Procedure: EGD (ESOPHAGOGASTRODUODENOSCOPY);  Surgeon: Rg Milton MD;  Location: [facility] ENDO (4TH FLR);  Service: Endoscopy;  Laterality: N/A;  covid test 10/3 [place], instr emailed/portal -ml    EXCISION Left 5/17/2023    Procedure: EXCISION SQUAMOUS CELL CARCINOMA LEFT LEG;  Surgeon: Kasi Canela Jr., MD;  Location: [facility] OR 2ND FLR;  Service: General;  Laterality: Left;    l4-5 mid discectomy Left 03/2017    l4-5 MIS diskectomy Right 05/2016    RIGHT HEART CATHETERIZATION Right 1/27/2022    Procedure: INSERTION, CATHETER, RIGHT HEART;  Surgeon: Satnam Pickard Jr., MD;  Location: [facility] CATH LAB;  Service: Cardiology;  Laterality: Right;    TRANSFORAMINAL EPIDURAL INJECTION OF STEROID Bilateral 3/12/2024    Procedure: LUMBAR TRANSFORAMINAL BILATERAL L3/4 *ELIQUIS CLEARANCE IN CHART*;  Surgeon: Sheree Abbott MD;  Location: [facility] PAIN MGT;  Service: Pain Management;  Laterality: Bilateral;  [phone]  2 WK F/U [place]    TREATMENT OF CARDIAC ARRHYTHMIA N/A 7/17/2020    Procedure: CARDIOVERSION;  Surgeon: Kwan Bray MD;  Location: [facility] EP LAB;  Service: Cardiology;  Laterality: N/A;  AF,DCCV/SANGITA, ANES, SK, 746    TREATMENT OF CARDIAC ARRHYTHMIA N/A 7/14/2021    Procedure: CARDIOVERSION;  Surgeon: SYDNIE Cedillo MD;  Location: [facility] EP LAB;  Service: Cardiology;  Laterality: N/A;  AF, SANGITA, DCCV, MAC, EH, 3 Prep    WASHOUT Right 5/17/2023    Procedure: WASHOUT right lower arm and closure;  Surgeon: Kasi Canela Jr., MD;  Location: [facility] OR [facility] FLR;  Service: General;  Laterality: Right;     Social History     Socioeconomic History    Marital status: Single   Occupational History     Employer: [employer]   Tobacco Use    Smoking status: Never     Passive exposure: Never    Smokeless tobacco: Never   Substance and Sexual Activity    Alcohol use: Yes     Alcohol/week: 4.0 standard drinks of alcohol     Types: 4 Glasses of wine per week     Comment: 2 glasses of wine on  weekends    Drug use: No    Sexual activity: Never   Other Topics Concern    Are you pregnant or think you may be? No    Breast-feeding No     Social Determinants of Health     Financial Resource Strain: Low Risk  (2/29/2024)    Overall Financial Resource Strain (CARDIA)     Difficulty of Paying Living Expenses: Not very hard   Food Insecurity: No Food Insecurity (2/29/2024)    Hunger Vital Sign     Worried About Running Out of Food in the Last Year: Never true     Ran Out of Food in the Last Year: Never true   Transportation Needs: No Transportation Needs (2/29/2024)    PRAPARE - Transportation     Lack of Transportation (Medical): No     Lack of Transportation (Non-Medical): No   Physical Activity: Sufficiently Active (2/29/2024)    Exercise Vital Sign     Days of Exercise per Week: 7 days     Minutes of Exercise per Session: 60 min   Stress: No Stress Concern Present (2/29/2024)    [employer] [place] [employer] - Occupational Stress Questionnaire     Feeling of Stress : Not at all   Social Connections: Unknown (2/29/2024)    Social Connection and Isolation Panel [NHANES]     Frequency of Communication with Friends and Family: Once a week     Frequency of Social Gatherings with Friends and Family: Patient declined     Attends Bahai Services: Never     Active Member of Clubs or Organizations: No     Attends Club or Organization Meetings: Never     Marital Status:    Recent Concern: Social Connections - Socially Isolated (12/1/2023)    Social Connection and Isolation Panel [NHANES]     Frequency of Communication with Friends and Family: Once a week     Frequency of Social Gatherings with Friends and Family: Once a week     Attends Bahai Services: Never     Active Member of Clubs or Organizations: No     Attends Club or Organization Meetings: Never     Marital Status:    Housing Stability: Low Risk  (2/29/2024)    Housing Stability Vital Sign     Unable to Pay for Housing in the  Last Year: No     Number of Places Lived in the Last Year: 1     Unstable Housing in the Last Year: No     Family History   Problem Relation Age of Onset    Cancer Mother         Lung cancer    Heart failure Father     Colon cancer Father     Hypertension Father     Cataracts Father     Cancer Father 80        colon    No Known Problems Sister     No Known Problems Brother     Melanoma Daughter     Diabetes Son     Heart disease Son     Cancer Son         esophageal cancer    No Known Problems Maternal Aunt     No Known Problems Maternal Uncle     No Known Problems Paternal Aunt     No Known Problems Paternal Uncle     No Known Problems Maternal Grandmother     No Known Problems Maternal Grandfather     No Known Problems Paternal Grandmother     No Known Problems Paternal Grandfather     Amblyopia Neg Hx     Blindness Neg Hx     Glaucoma Neg Hx     Macular degeneration Neg Hx     Retinal detachment Neg Hx     Strabismus Neg Hx     Stroke Neg Hx     Thyroid disease Neg Hx     Breast cancer Neg Hx     Ovarian cancer Neg Hx        Review of patient's allergies indicates:  No Known Allergies    Current Outpatient Medications   Medication Sig    acetaminophen (TYLENOL) 500 MG tablet Take 500 mg by mouth every 6 (six) hours as needed for Pain.    acyclovir (ZOVIRAX) 400 MG tablet Take 1 tablet (400 mg total) by mouth once daily.    ascorbic acid (VITAMIN C) 1000 MG tablet Take 1,000 mg by mouth once daily.     biotin 1 mg tablet Take 1 mg by mouth 2 (two) times daily.     buPROPion (WELLBUTRIN SR) 150 MG TBSR 12 hr tablet Take 1 tablet (150 mg total) by mouth 2 (two) times daily.    digestive enzymes Tab Take 1 tablet by mouth once daily.     ELIQUIS 5 mg Tab TAKE 1 TABLET(5 MG) BY MOUTH TWICE DAILY    fluticasone propionate (FLONASE) 50 mcg/actuation nasal spray 1 spray (50 mcg total) by Each Nostril route 2 (two) times daily as needed for Rhinitis.    furosemide (LASIX) 20 MG tablet Take 1 tablet (20 mg total) by mouth  every other day.    GAVILYTE-G 236-22.74-6.74 -5.86 gram suspension SMARTSIG:Milliliter(s) By Mouth    levothyroxine (SYNTHROID) 137 MCG Tab tablet Take 1 tablet (137 mcg total) by mouth before breakfast.    lubiprostone (AMITIZA) 24 MCG Cap Take 1 capsule (24 mcg total) by mouth daily as needed (IBS symptoms).    metoprolol succinate (TOPROL-XL) 25 MG 24 hr tablet Take 1 tablet (25 mg total) by mouth once daily.    mirtazapine (REMERON) 7.5 MG Tab Take 1 tablet (7.5 mg total) by mouth nightly.    multivitamin (THERAGRAN) per tablet Take 1 tablet by mouth once daily.     pramipexole (MIRAPEX) 0.25 MG tablet Take 1 tablet (0.25 mg total) by mouth every evening. For restless legs    traZODone (DESYREL) 50 MG tablet Take 1 tablet (50 mg total) by mouth nightly as needed for Insomnia.    zinc 50 mg Tab Take 50 mg by mouth once daily.      No current facility-administered medications for this visit.       ROS:  GENERAL: No fever. No chills. No fatigue. Denies weight loss. Denies weight gain.  HEENT: Denies headaches. Denies vision change. Denies eye pain. Denies double vision. Denies ear pain.   CV: Denies chest pain.   PULM: Denies of shortness of breath.  GI: Denies constipation. No diarrhea. No abdominal pain. Denies nausea. Denies vomiting. No blood in stool.  HEME: Denies bleeding problems.  : Denies urgency. No painful urination. No blood in urine.  MS: Denies joint stiffness. Denies joint swelling.  Denies back pain.  SKIN: Denies rash.   NEURO: Denies seizures. No weakness.  PSYCH:  Denies difficulty sleeping. No anxiety. Denies depression. No suicidal thoughts.       VITALS:   Vitals:    04/03/24 1507   BP: 106/68   Pulse: 71   Resp: 18   Temp: 98 °F (36.7 °C)   SpO2: 98%   Weight: 44.4 kg (97 lb 14.2 oz)   PainSc:   3   PainLoc: Hip         PHYSICAL EXAM:   GENERAL: Well appearing, in no acute distress, alert and oriented x3.  PSYCH:  Mood and affect appropriate.  SKIN: Skin color, texture, turgor normal, no  rashes or lesions.  HEENT:  Normocephalic, atraumatic. Cranial nerves grossly intact.  NECK:  Range of motion is limited in all planes due to pain and prior surgical changes, most significantly with rotation to the left.  There is muscle spasm noted to the bilateral paraspinal muscles and bilateral trapezius, left>right with associated tenderness.  There is tenderness to palpation over the bilateral occipital area, left > right.  Emilee's positive on the right.  Spurling's negative bilaterally.  Facet loading positive bilaterally left > right.  PULM: No evidence of respiratory difficulty, symmetric chest rise.  GI:  Non-distended  BACK: ROM limited but within functional limits. No focal tenderness to palpation. Tenderness to palpation over right SIJ.  EXTREMITIES: No deformities, edema, or skin discoloration.   MUSCULOSKELETAL: KAVITA/FADIR negative. No atrophy is noted. Maximal tenderness to palpation over Right Piriformis with positive piriformis stretch test.   NEURO: Sensation is altered below the knees bilaterally. Right ADF 4+/5. Bilateral lower extremity coordination and muscle stretch reflexes are physiologic and symmetric. No clonus.  GAIT: Antalgic.      LABS:    Chemistry        Component Value Date/Time     01/10/2024 0708    K 3.9 01/10/2024 0708     01/10/2024 0708    CO2 26 01/10/2024 0708    BUN 15 01/10/2024 0708    CREATININE 0.8 01/10/2024 0708    GLU 81 01/10/2024 0708        Component Value Date/Time    CALCIUM 8.8 01/10/2024 0708    ALKPHOS 67 01/10/2024 0708    AST 18 01/10/2024 0708    ALT 17 01/10/2024 0708    BILITOT 0.5 01/10/2024 0708    ESTGFRAFRICA >60.0 05/25/2022 1204    EGFRNONAA 55.6 (A) 05/25/2022 1204            IMAGING:      EXAMINATION:  XR CERVICAL SPINE 5 VIEW WITH FLEX AND EXT 3/21/2024     CLINICAL HISTORY:  Other cervical disc degeneration, unspecified cervical region     TECHNIQUE:  Five views of the cervical spine plus flexion and extension views were  performed.     COMPARISON:  05/15/2018     FINDINGS:  Patient has had a C5 corpectomy and replacement with cage bridging from C4-C6 alignment is satisfactory.  No instability in flexion and extension.  Degenerative changes seen at C6-C7 level     Impression:     See above        Electronically signed by: Rolan Garcia MD  Date:                                            03/21/2024  Time:                                           15:17      EXAMINATION:  CT CERVICAL SPINE WITHOUT CONTRAST - 2/22/22     CLINICAL HISTORY:  Evaluate fusion;Arthrodesis status     TECHNIQUE:  Low dose axial images, sagittal and coronal reformations were performed though the cervical spine.  Contrast was not administered.     COMPARISON:  MRI cervical spine 01/07/2021     CT cervical spine 07/28/2014     FINDINGS:  Postoperative changes from partial C5 corpectomy and reconstruction with an interbody cage and C4-6 anterior plate and screw construct.  Hardware is intact and unchanged in position without evidence of loosening.  No perihardware fractures.  Unchanged alignment.  Some osseous bridging is present.  No prevertebral soft tissue swelling.     Straightening of the cervical lordosis. C2-C4 stepwise grade 1 anterolisthesis. T2-3 grade 1 anterolisthesis.     Diffusely decreased bone mineral density. No fractures or pars defects.  No focal osseous lesions.     Multilevel disc and uncovertebral joint degeneration, most advanced at C6-7.  Multilevel facet arthropathy, most advanced at C2-3.  There are amorphous soft tissue calcifications adjacent to the odontoid process and in the expected location of the posterior longitudinal ligament extending from C2-4.     Visualized structures in the posterior fossa are unremarkable. The cervical spinal cord is difficult to visualize.     Vascular calcifications.  Paraspinal soft tissues are otherwise unremarkable.  Biapical pleuroparenchymal scarring.     Impression:     1. Postoperative changes  from C5 corpectomy and reconstruction.  Unchanged alignment.  No hardware complication.  2. Multilevel degenerative changes, most advanced at C2-3 and C6-7, progressed from prior CT.  3. Amorphous soft tissue calcifications adjacent to the odontoid process and in the expected location of the posterior longitudinal ligament, which can be seen with CPPD.     CT LUMBAR SPINE WITHOUT CONTRAST     CLINICAL HISTORY:  Evaluate fusion;  Arthrodesis status     TECHNIQUE:  Low-dose axial, sagittal and coronal reformations are obtained through the lumbar spine.  Contrast was not administered.     COMPARISON:  MRI lumbar spine 01/07/2021; CT lumbar spine 03/17/2020     FINDINGS:  Postoperative changes from L4-S1 posterior decompression and instrumented fusion with bilateral vertical rods, pedicle screws, and metallic interbody spacers.  Hardware is intact and unchanged in position without evidence of loosening.  No perihardware fractures.  Unchanged alignment.  No significant osseous bridging.  No definite fluid collections in the operative bed, however evaluation is limited by CT technique and degraded by streak artifact.     T12-L2 grade 1 stepwise retrolisthesis. L4 on L5 grade 1 anterolisthesis. Mild rightward curvature of the lumbar spine.     Diffuse osteopenia.  No fractures or pars defects.  No focal osseous lesions.     Multilevel degenerative disc disease, most advanced at L2-3 and L3-4.  Multilevel facet arthropathy, most advanced at L3-4.     The conus medullaris and cauda equina nerve roots are difficult to visualize.     9 mm cyst in the midpole left kidney, with a 2 mm mural calcification versus adjacent nonobstructing stone.  Paraspinal soft tissues are otherwise unremarkable.     Impression:     1. Postoperative changes from L4-S1 decompressive instrumented fusion.  No hardware complication.  Unchanged alignment.  2. Multilevel degenerative changes, most advanced at L2-3 and L3-4, similar to prior.      Electronically signed by resident: Brigette Khoury  Date:                                            02/22/2022  Time:                                           09:41     Electronically signed by: Ismael Isabel  Date:                                            02/22/2022  Time:                                           14:11    ASSESSMENT: 74 y.o. year old female with pain, consistent with:    Encounter Diagnoses   Name Primary?    Piriformis syndrome of right side Yes    Lumbar radiculopathy     Chronic pain syndrome     Spinal stenosis of lumbar region without neurogenic claudication     Postlaminectomy syndrome of lumbar region     DDD (degenerative disc disease), lumbar          DISCUSSION: Angelina Man is a retired  and  at [employer] with a history of multiple laminectomies and microdiscectomies with residual surgical bed inflammation causing canal stenosis. She underwent a right L4/5 L5/S1 TLIF with posterior hardware in 2018. She she saw us in February for increased low back and right leg pain as well as a right footdrop.  She has been progressing very well with surgery and has had significant improvement in her pain following bilateral L3/4 TFESI on 03/12/2024.  She returned for an exacerbation of neck pain that radiated into her head with associated muscle spasm.  She has been improving with rest and Tylenol.  She does have a history of prior C4-6 ACDF and C5 corpectomy in 2010. CT scan from 2022 did show advanced multilevel degenerative changes including facet arthropathy most pronounced at C2-3.  She does not have any neurologic changes. She continues daily exercise and training for her trip to [facility] in September.     PLAN:  Previous imaging was reviewed and discussed with the patient today.  Pain reproduced with palpation over right Piriformis. Mild tenderness over right SIJ, but without positive provacative maneuvers.  Add Robaxin 500 mg TID PRN  for muscle spasms.  Continue stretching.  Piriformis stretches demonstrated to patient today.  We will consider MRI of the C-spine if she fails to improve with conservative measures.    Patient is already scheduled to f/u with Dr Abbott on 5/22/2024.  RTC sooner if needed.    Ruchi Mehta NP   04/03/2024

## 2024-04-12 NOTE — Clinical Note
Thank you for referring Angelina Man for follow-up of atrial fibrillation/flutter. Please see my note for details of this encounter. If you have any questions, please contact me.  Thank you again for the referral. 
No

## 2024-04-15 ENCOUNTER — INFUSION (OUTPATIENT)
Dept: INFECTIOUS DISEASES | Facility: HOSPITAL | Age: 74
End: 2024-04-15
Payer: MEDICARE

## 2024-04-15 VITALS
BODY MASS INDEX: 16.2 KG/M2 | SYSTOLIC BLOOD PRESSURE: 110 MMHG | TEMPERATURE: 98 F | HEART RATE: 95 BPM | WEIGHT: 97.25 LBS | DIASTOLIC BLOOD PRESSURE: 58 MMHG | HEIGHT: 65 IN | OXYGEN SATURATION: 98 % | RESPIRATION RATE: 20 BRPM

## 2024-04-15 DIAGNOSIS — M81.0 AGE-RELATED OSTEOPOROSIS WITHOUT CURRENT PATHOLOGICAL FRACTURE: Primary | ICD-10-CM

## 2024-04-15 PROCEDURE — 96372 THER/PROPH/DIAG INJ SC/IM: CPT | Mod: HCNC

## 2024-04-15 PROCEDURE — 63600175 PHARM REV CODE 636 W HCPCS: Mod: JZ,JG,HCNC | Performed by: STUDENT IN AN ORGANIZED HEALTH CARE EDUCATION/TRAINING PROGRAM

## 2024-04-15 RX ADMIN — DENOSUMAB 60 MG: 60 INJECTION SUBCUTANEOUS at 03:04

## 2024-04-15 NOTE — PROGRESS NOTES
Patient arrives for Prolia injection - confirms use of calcium and vitamin D supplements. Patient stated she receive clearance from her endodontrics to receive Prolia injection (Dr. Scotty Ramirez - [facility]). Patient denies of pain, and swelling from root canal over a week ago. Patient stated it's healed, and without infections. Injection given SQ  to left upper arm. Patient tolerated injection well, without difficulty.    Return appointment provided to patient.    Limited head-to-toe assessment due to privacy issues and visit reason though the opportunity was given for patient to express any concerns.

## 2024-04-17 ENCOUNTER — CLINICAL SUPPORT (OUTPATIENT)
Dept: REHABILITATION | Facility: HOSPITAL | Age: 74
End: 2024-04-17
Attending: ANESTHESIOLOGY
Payer: MEDICARE

## 2024-04-17 DIAGNOSIS — R53.1 DECREASED STRENGTH: ICD-10-CM

## 2024-04-17 DIAGNOSIS — M54.50 LUMBAR PAIN: Primary | ICD-10-CM

## 2024-04-17 PROCEDURE — 97110 THERAPEUTIC EXERCISES: CPT | Mod: HCNC

## 2024-04-17 NOTE — PROGRESS NOTES
Physical Therapy Daily Treatment Note/Discharge Summary     Name: Angelina Man  Clinic Number: [id number]    Therapy Diagnosis:   Encounter Diagnoses   Name Primary?    Lumbar pain Yes    Decreased strength      Physician: Orlin Barajas MD    Visit Date: 4/17/2024    Physician Orders: PT Eval and Treat   Medical Diagnosis from Referral:   M54.16 (ICD-10-CM) - Lumbar radiculopathy   M96.1 (ICD-10-CM) - Postlaminectomy syndrome of lumbar region      M50.30 (ICD-10-CM) - DDD (degenerative disc disease), cervical   M47.812 (ICD-10-CM) - Facet arthritis, degenerative, cervical spine     Evaluation Date: 3/7/2024  Authorization Period Expiration: 12/31/24  Plan of Care Expiration: 4/5/24, 4/19/24  Progress Note Due: 4/7/24  Visit # / Visits authorized: 1/ 1, 4/20  FOTO: 1/ 3    Time In: 11:33 am   Time Out: 12:12 pm   Total Billable Time: 39 minutes    Precautions: Standard and hx of lumbar surgery, hx of cervical fusion    Subjective     Pt reports: that she is feeling ok, thinks she is better.Pt stated that her neck does not hurt as much and back hurts off and on,but is not bad. Pt stated that she did yoga yesterday and walked up incline and was ok. Pt stated that she plans to be more faithful with HEP. Pt stated that she would like today to be last day of PT.    She was not compliant with home exercise program.  Response to previous treatment: no adverse effects  Functional change: see assessment     Pain: 1/10 (R) lumbar      Objective     Cervical AROM: Pain/Dysfunction with Movement:   Flexion 55 degrees      Extension 40 degrees    Right side bending 20 degrees Stretch in (L) neck/UT    Left side bending 20 degrees Stretch in (R) neck/UT   Right rotation 50 degrees    Left rotation 40 degrees Pain in (L) neck          Shoulder Right   Left   Pain/Dysfunction with Movement     AROM MMT AROM MMT     flexion WFL 5/5 WFL 5/5     abduction WFL 5/5 WFL 5/5     Internal rotation WFL 5/5 WFL 5/5     ER at 90° abd WFL  "NT WFL NT     ER at 0° abd NT 4+/5 NT 4+/5         Lumbar AROM: Pain/Dysfunction with Movement:   Flexion 50 degrees  Pain across lumbar region    Extension 10 degrees Stretching    Right side bending 15 degrees Pain in (R) lumbar region   Left side bending 15  degrees Pain in (R) lumbar region      Hip Right   Left   Pain/Dysfunction with Movement     AROM MMT AROM MMT != pain  NT = not tested    Flexion WFL 5/5 WFL 5/5     Extension NT NT NT NT Pt performed good bridge   Abduction WFL 4+/5 WFL 4+/5     External rotation WFL 4+/5 WFL 4+/5        Knee Right   Left   Pain/Dysfunction with Movement     AROM MMT AROM MMT     Flexion WFL 5/5 WFL 5/5     Extension WFL 5/5 WFL 5/5        Ankle Right   Left   Pain/Dysfunction with Movement     AROM MMT AROM MMT MMT performed within available ROM   Plantarflexion WFL 4/5 WFL 4/5     Dorsiflexion limited 4+/5 WFL 5/5 MMT: performed within available ROM          90/90 Hamstring Test: (L) = 25, (R) = 20 degrees lacking      Angelina received education, subjective assessment/objective measurements and  therapeutic exercises to develop strength and  flexibility for 39 minutes including:    Standing DL heel raises 2x10 3"   Standing DL toe raises 2x10 3"  Standing gastroc stretch 3x30" B  Standing soleus stretch 2x30" B, soleus stretch w/strap R       Home Exercises Provided and Patient Education Provided     Education provided:   - HEP      Written Home Exercises Provided: yes.  Exercises were reviewed and Angelina was able to demonstrate them prior to the end of the session.  Angelina demonstrated good  understanding of the education provided.     See EMR under Patient Instructions for exercises provided 4/17/2024.      Assessment     Updated measurements taken and pt demo improvements in lumbar flexion AROM, (B) hip ER MMT scores, (B) hamstring flexibility, and improvements in cervical extension, (B) SB and (B) rotation AROM compared to prior measurements. Pt stated that she would " like today to be her last day of PT, therefore pt will be discharged.     Pt's spiritual, cultural and educational needs considered and pt agreeable to plan of care and goals.    Anticipated barriers to physical therapy: chronicity of pain/symptoms     Goals:     Short Term Goals: 1 weeks   Pt will be compliant with HEP to supplement PT with decreasing pain and improving functional mobility - Not Met     Long Term Goals: 4 weeks   Pt will improve FOTO score to at least 64 in order to demo improved functional mobility - Not tested  Pt will improve LE MMT scores by at least 1/2 grade where deficits noted in order to improve strength for functional tasks - Partially Met  Pt will report lumbar/(R) LE pain </= 3/10 at worst in order to be able to perform ADLs with less difficulty - Not Met         New Short Term Goals Status:   continue with all STG and LTG and add NEW LTGs: 4. Pt will perform (L) cervical rotation AROM = (R) cervical rotation AROM in order to be able to turn head when driving with less difficulty - Not Met   5. Pt will report neck pain </= 4/10 at worst in order to be able to perform ADLs with less difficulty - Not Met    Plan     Discharge from PT, continue with HEP, follow up with MD tico Humphrey, PT

## 2024-05-06 DIAGNOSIS — G25.81 RLS (RESTLESS LEGS SYNDROME): ICD-10-CM

## 2024-05-06 NOTE — TELEPHONE ENCOUNTER
No care due was identified.  [facility] Embedded Care Due Messages. Reference number: [id number].   5/06/2024 5:16:04 PM CDT

## 2024-05-07 RX ORDER — PRAMIPEXOLE DIHYDROCHLORIDE 0.25 MG/1
TABLET ORAL
Qty: 90 TABLET | Refills: 3 | Status: SHIPPED | OUTPATIENT
Start: 2024-05-07

## 2024-05-20 ENCOUNTER — OFFICE VISIT (OUTPATIENT)
Dept: URGENT CARE | Facility: CLINIC | Age: 74
End: 2024-05-20
Payer: MEDICARE

## 2024-05-20 VITALS
RESPIRATION RATE: 16 BRPM | WEIGHT: 97 LBS | SYSTOLIC BLOOD PRESSURE: 113 MMHG | DIASTOLIC BLOOD PRESSURE: 62 MMHG | TEMPERATURE: 97 F | OXYGEN SATURATION: 100 % | BODY MASS INDEX: 16.14 KG/M2 | HEART RATE: 73 BPM

## 2024-05-20 DIAGNOSIS — S51.812A SKIN TEAR OF FOREARM WITHOUT COMPLICATION, LEFT, INITIAL ENCOUNTER: Primary | ICD-10-CM

## 2024-05-20 PROCEDURE — 99499 UNLISTED E&M SERVICE: CPT | Mod: S$GLB,,, | Performed by: NURSE PRACTITIONER

## 2024-05-20 PROCEDURE — 90714 TD VACC NO PRESV 7 YRS+ IM: CPT | Mod: S$GLB,,, | Performed by: NURSE PRACTITIONER

## 2024-05-20 PROCEDURE — 90471 IMMUNIZATION ADMIN: CPT | Mod: S$GLB,,, | Performed by: NURSE PRACTITIONER

## 2024-05-20 RX ORDER — CEPHALEXIN 500 MG/1
500 CAPSULE ORAL EVERY 8 HOURS
Qty: 15 CAPSULE | Refills: 0 | Status: SHIPPED | OUTPATIENT
Start: 2024-05-20 | End: 2024-05-25

## 2024-05-20 NOTE — PROGRESS NOTES
"Subjective:      Patient ID: Angelina Man is a 74 y.o. female.    Vitals:  weight is 44 kg (97 lb). Her oral temperature is 97.3 °F (36.3 °C). Her blood pressure is 113/62 and her pulse is 73. Her respiration is 16 and oxygen saturation is 100%.     Chief Complaint: skin tear    This is a 74 y.o. female who presents today with a chief complaint of skin tear to L forearm after grazing arm against door while at home today approx 15 minutes prior to arrival. Pt has "very thin skin" and takes blood thinners.    Home tx: bandage    [facility]: last tetanus 2017;  blood thinners    Skin Tear/wound clean     Date/Time: 5/20/2024 2:45 PM     Performed by: Leticia Hanna MA  Authorized by: Carol Ann Ferrell NP  Location: L forearm.  Laceration length: 5 cm  Contamination: The wound is contaminated.  Foreign bodies: no foreign bodies  Tendon involvement: none  Nerve involvement: none  Vascular damage: no  Anesthesia: see MAR for details  Irrigation solution: saline (with chlorohexadine)  Amount of cleaning: standard  Dressing: non-stick sterile dressing           Arm Injury   The incident occurred less than 1 hour ago. The incident occurred at home. Injury mechanism: scrape against door. The pain is present in the right forearm. The pain radiates to the right arm. The pain is at a severity of 3/10. The pain is mild. The pain has been Constant since the incident. Pertinent negatives include no chest pain, muscle weakness, numbness or tingling.       Cardiovascular:  Negative for chest pain.   Skin:  Positive for laceration. Negative for erythema.   Neurological:  Negative for numbness.      Objective:     Physical Exam   Constitutional: She is oriented to person, place, and time. She appears well-developed.   HENT:   Head: Normocephalic and atraumatic. Head is without abrasion, without contusion and without laceration.   Ears:   Right Ear: External ear normal.   Left Ear: External ear normal.   Nose: Nose normal. "   Mouth/Throat: Oropharynx is clear and moist and mucous membranes are normal.   Eyes: Conjunctivae, EOM and lids are normal. Pupils are equal, round, and reactive to light.   Neck: Trachea normal and phonation normal. Neck supple.   Cardiovascular: Normal rate, regular rhythm and normal heart sounds.   Pulmonary/Chest: Effort normal and breath sounds normal. No stridor. No respiratory distress.   Musculoskeletal: Normal range of motion.         General: Normal range of motion.   Neurological: She is alert and oriented to person, place, and time.   Skin: Skin is warm, dry, no rash and no abscessed. Capillary refill takes less than 2 seconds. abrasion (there is a skin tear to left forearm that is 6cm in length, edges approximate well.) No burn, No bruising, No erythema and No ecchymosis   Psychiatric: Her speech is normal and behavior is normal. Judgment and thought content normal.   Nursing note and vitals reviewed.      Assessment:     1. Skin tear of forearm without complication, left, initial encounter        Plan:       Skin tear of forearm without complication, left, initial encounter  -     Skin Tear/wound clean  -     cephALEXin (KEFLEX) 500 MG capsule; Take 1 capsule (500 mg total) by mouth every 8 (eight) hours. for 5 days  Dispense: 15 capsule; Refill: 0  -     diptheria-tetanus toxoids 2-2 Lf unit/0.5 mL injection 0.5 mL  -     Laceration Repair    Other orders  -     Cancel: Laceration Repair      Patient Instructions   Keep area dry and clean.  Dressings daily.  Steri strips will fall off naturally in 5-7 days.  Monitor for signs of infection - if this occurs get rechecked immediately.   Antibiotic as directed with food.   Follow up with PCP.  Return here or go to the ER as needed for worsening symptoms.

## 2024-05-20 NOTE — PATIENT INSTRUCTIONS
Keep area dry and clean.  Dressings daily.  Steri strips will fall off naturally in 5-7 days.  Monitor for signs of infection - if this occurs get rechecked immediately.   Antibiotic as directed with food.   Follow up with PCP.  Return here or go to the ER as needed for worsening symptoms.

## 2024-05-20 NOTE — PROCEDURES
"Laceration Repair    Date/Time: 2024 2:45 PM    Performed by: Carol Ann Ferrell NP  Authorized by: Carol Ann Ferrell NP  Consent Done: Yes  Consent: Verbal consent obtained.  Risks and benefits: risks, benefits and alternatives were discussed  Consent given by: patient  Patient understanding: patient states understanding of the procedure being performed  Patient identity confirmed: , MRN, provided demographic data, name and verbally with patient  Time out: Immediately prior to procedure a "time out" was called to verify the correct patient, procedure, equipment, support staff and site/side marked as required.  Body area: upper extremity  Location details: left lower arm  Laceration length: 6 cm  Foreign bodies: no foreign bodies  Tendon involvement: none  Nerve involvement: none  Vascular damage: no  Irrigation solution: saline  Irrigation method: tap  Amount of cleaning: standard  Debridement: none  Degree of undermining: none  Skin closure: Steri-Strips  Technique: simple  Approximation: close  Approximation difficulty: simple  Dressing: dressing applied  Patient tolerance: Patient tolerated the procedure well with no immediate complications        "

## 2024-05-20 NOTE — PROCEDURES
Skin Tear/wound clean    Date/Time: 5/20/2024 2:45 PM    Performed by: Leticia Hanna MA  Authorized by: Carol Ann Ferrell NP  Location: L forearm.  Laceration length: 5 cm  Contamination: The wound is contaminated.  Foreign bodies: no foreign bodies  Tendon involvement: none  Nerve involvement: none  Vascular damage: no  Anesthesia: see MAR for details  Irrigation solution: saline (with chlorohexadine)  Amount of cleaning: standard  Dressing: non-stick sterile dressing

## 2024-05-21 ENCOUNTER — PATIENT MESSAGE (OUTPATIENT)
Dept: ENDOSCOPY | Facility: HOSPITAL | Age: 74
End: 2024-05-21
Payer: MEDICARE

## 2024-05-22 ENCOUNTER — PATIENT MESSAGE (OUTPATIENT)
Dept: PAIN MEDICINE | Facility: CLINIC | Age: 74
End: 2024-05-22

## 2024-05-22 ENCOUNTER — OFFICE VISIT (OUTPATIENT)
Dept: PAIN MEDICINE | Facility: CLINIC | Age: 74
End: 2024-05-22
Payer: MEDICARE

## 2024-05-22 VITALS
HEIGHT: 65 IN | DIASTOLIC BLOOD PRESSURE: 79 MMHG | BODY MASS INDEX: 16.05 KG/M2 | WEIGHT: 96.31 LBS | SYSTOLIC BLOOD PRESSURE: 116 MMHG | HEART RATE: 76 BPM

## 2024-05-22 DIAGNOSIS — M48.061 SPINAL STENOSIS OF LUMBAR REGION WITHOUT NEUROGENIC CLAUDICATION: ICD-10-CM

## 2024-05-22 DIAGNOSIS — M96.1 POSTLAMINECTOMY SYNDROME OF LUMBAR REGION: ICD-10-CM

## 2024-05-22 DIAGNOSIS — M54.16 LUMBAR RADICULOPATHY: ICD-10-CM

## 2024-05-22 DIAGNOSIS — M47.816 LUMBAR SPONDYLOSIS: Primary | ICD-10-CM

## 2024-05-22 PROCEDURE — 99214 OFFICE O/P EST MOD 30 MIN: CPT | Mod: HCNC,S$GLB,, | Performed by: ANESTHESIOLOGY

## 2024-05-22 PROCEDURE — 1101F PT FALLS ASSESS-DOCD LE1/YR: CPT | Mod: HCNC,CPTII,S$GLB, | Performed by: ANESTHESIOLOGY

## 2024-05-22 PROCEDURE — 1157F ADVNC CARE PLAN IN RCRD: CPT | Mod: HCNC,CPTII,S$GLB, | Performed by: ANESTHESIOLOGY

## 2024-05-22 PROCEDURE — 3074F SYST BP LT 130 MM HG: CPT | Mod: HCNC,CPTII,S$GLB, | Performed by: ANESTHESIOLOGY

## 2024-05-22 PROCEDURE — 3078F DIAST BP <80 MM HG: CPT | Mod: HCNC,CPTII,S$GLB, | Performed by: ANESTHESIOLOGY

## 2024-05-22 PROCEDURE — 1125F AMNT PAIN NOTED PAIN PRSNT: CPT | Mod: HCNC,CPTII,S$GLB, | Performed by: ANESTHESIOLOGY

## 2024-05-22 PROCEDURE — 1159F MED LIST DOCD IN RCRD: CPT | Mod: HCNC,CPTII,S$GLB, | Performed by: ANESTHESIOLOGY

## 2024-05-22 PROCEDURE — 3288F FALL RISK ASSESSMENT DOCD: CPT | Mod: HCNC,CPTII,S$GLB, | Performed by: ANESTHESIOLOGY

## 2024-05-22 PROCEDURE — 99999 PR PBB SHADOW E&M-EST. PATIENT-LVL IV: CPT | Mod: PBBFAC,HCNC,, | Performed by: ANESTHESIOLOGY

## 2024-05-22 NOTE — PROGRESS NOTES
PCP: Zina Rockwell MD    REFERRING PHYSICIAN: No ref. provider found    CHIEF COMPLAINT: Right leg pain    Original HISTORY OF PRESENT ILLNESS: Angelina Man presents to the clinic for the evaluation of the above pain. This has been up and down for the past 5 years or so.     Original Pain Description:  The pain is located in the right buttock and is radiating to the right calf . The pain is described as aching. Exacerbating factors: Bending and Getting out of bed/chair. Mitigating factors heat. Symptoms interfere with daily activity. The patient feels like symptoms have been worsening. Patient reports sometimes stubbing her right foot.    Original PAIN SCORES:  Best: Pain is 4  Worst: Pain is 7  Current: Pain is 5        5/22/2024     3:35 PM   Last 3 PDI Scores   Pain Disability Index (PDI) 6       INTERVAL HISTORY: (Newest visit at the bottom)   Interval History 2/26/24:   Angelina Man returns today for follow-up. Since last seen, they report their pain has been persistant. It continues to be shooting with constant numbness from mid calf throughout her foot. Is not limited in distance with walking. Regularly walks at least a mile per day. She denies new bowel or bladder dysfunction, weakness, or numbness.    Interval History 3/21/24:  Patient presents today for evaluation of neck pain radiating into her head that started earlier this week.  She has seen us in the past for her lumbar radiculopathy.  She received bilateral L3/4 TFESI on 3/12/2024 and reports that her symptoms have improved greatly since then.  She has also been in physical therapy for her back pain and weakness in her right leg, which has also been going very well.  Today, she reports that on 3/18/2024, she began having severe 9/10 neck pain that radiated into her head.  She denies any trauma or inciting event.  Along with the pain, she also experienced muscle spasms and tightness in her neck and shoulders that made it difficult for her to  turn her head left or right.  Over the past few days, the pain has continued to improve, and she reports being a 4/10 today. She has been taking Tylenol arthritis, which she has found very helpful. She denies any numbness, tingling, weakness, bowel/bladder dysfunction, or saddle anesthesia associated with this pain.  Of note, she does have a history of prior C4-6 ACDF with C5 corpectomy in 2010.      Interval History 4/3/2024:  Angelina Man presents for follow-up s/p Bilateral L3/4 TFESI on 3/12/2024.  She saw Dr Abbott 9 days s/p procedure on 3/21/2024 and she reported significant improvement in lower back pain at that time. At that visit she was reporting severe neck pain that radiated into the head with associated muscle spasms and tightness. This pain has since resolved, but the limited range of motion continues. She continues to report right lower back pain at waist that radiates to lateral aspect of right thigh. She describes the pain as aching.  Exacerbating factors: exercising, walking and stooping.  The patient continues physical therapy for her lower back and has started for her neck.  She continues 1 hour spin classes followed by 1 hour weightlifting classes three times weekly and 1 hour of cardio followed by an hour of yoga twice weekly. She is currently training for a trip to [facility] in September. The patient denies fever/night sweats, urinary incontinence, bowel incontinence, significant weight changes, significant motor weakness or changes, or loss of sensations. Her pain today is 3/10.      Interval History 5/22/24:  74 year old male presents in follow up regarding right sided low back pain and associated right leg pain. Patient also has been seen for neck pain. At last visit patient had neck muscle spasms. She finds the robaxin we prescribed did provide good benefit. She has taken 2 doses with good benefit.  Today patient reports that she has 1-2/10 pain that is predominantly localized to the  right side of the low back.  Patient reports that pain previously traveled down the right leg however this has now subsided since getting the epidural steroid injection.  Patient reports that her pain is very well controlled at this time.  Patient reports that she gets really good benefit with physical therapy exercises and she has not been doing the exercises enough.  Patient is planning to go to [facility] in September of 2024  and would like to be in maximal pain relief at the time of this vacation. Patient denies recent falls, trauma, hospitalizations, or infections. Patient has no new onset numbness, tingling, or weakness. Patient denies new onset bowel or bladder incontinence. Patient denies periectal numbness or saddle anesthesia.      6 weeks of Conservative therapy:  PT: Currently doing Physical Therapy, March 2024-present  Chiro: Yes  HEP: Continuing HEP daily as prescribed at PT above. Also does yoga twice weekly.       Treatments / Medications: (Ice/Heat/NSAIDS/APAP/etc):  Heat - helps  APAP - helps    Interventional Pain Procedures: (Previous injections)  Multiple TFESI prior to Lumbar Surgery  3/12/24: Bilateral L3/4 TFESI 75% relief    Past Medical History:   Diagnosis Date    Arthritis     Atrial fibrillation     Bronchitis     Cataract     Dry eyes     GERD (gastroesophageal reflux disease)     Glaucoma, suspect - Both Eyes     Hypothyroidism 06/23/2016    Pulmonary hypertension     Rash     Renal angiomyolipoma     Renal disorder     SCC (squamous cell carcinoma) 07/2023    left lower lateral shin    SCC (squamous cell carcinoma) 04/2023    L mid lateral shin    SCC (squamous cell carcinoma) 09/2023    L mid lower shin    Spinal stenosis     Squamous cell carcinoma     in-situ right upper inner arm, right wrist    Status post lumbar surgery 06/23/2016    Stress fracture     Thyroid disease     Venous insufficiency      Past Surgical History:   Procedure Laterality Date    ANGIOPLASTY      CATARACT  EXTRACTION W/  INTRAOCULAR LENS IMPLANT Left 12/6/2022    Procedure: EXTRACTION, CATARACT, WITH IOL INSERTION;  Surgeon: Tone Brown MD;  Location: [facility] OR;  Service: Ophthalmology;  Laterality: Left;    CATARACT EXTRACTION W/  INTRAOCULAR LENS IMPLANT Right 12/20/2022    Procedure: EXTRACTION, CATARACT, WITH IOL INSERTION;  Surgeon: Tone Brown MD;  Location: [facility] OR;  Service: Ophthalmology;  Laterality: Right;    CERVICAL FUSION      COLONOSCOPY      COLONOSCOPY N/A 11/14/2017    Procedure: COLONOSCOPY;  Surgeon: Kasi Mercado MD;  Location: [facility] ENDO (4TH FLR);  Service: Endoscopy;  Laterality: N/A;    COLONOSCOPY N/A 4/26/2023    Procedure: COLONOSCOPY;  Surgeon: Jeanmarie Hathaway MD;  Location: [facility] ENDO (4TH FLR);  Service: Colon and Rectal;  Laterality: N/A;  ok to hold Eliquis 2 days per Dr Pereira  constipation-ext Miralax prep  instr mailed/portal-GT  4/21/23 pre call attempted, no answer    ESOPHAGOGASTRODUODENOSCOPY N/A 10/10/2019    Procedure: EGD (ESOPHAGOGASTRODUODENOSCOPY);  Surgeon: Rg Milton MD;  Location: [facility] (4TH FLR);  Service: Endoscopy;  Laterality: N/A;    ESOPHAGOGASTRODUODENOSCOPY N/A 10/6/2021    Procedure: EGD (ESOPHAGOGASTRODUODENOSCOPY);  Surgeon: Rg Milton MD;  Location: [facility] (4TH FLR);  Service: Endoscopy;  Laterality: N/A;  covid test 10/3 [place], instr emailed/portal -ml    EXCISION Left 5/17/2023    Procedure: EXCISION SQUAMOUS CELL CARCINOMA LEFT LEG;  Surgeon: Kasi Canela Jr., MD;  Location: [facility] 2ND FLR;  Service: General;  Laterality: Left;    l4-5 mid discectomy Left 03/2017    l4-5 MIS diskectomy Right 05/2016    RIGHT HEART CATHETERIZATION Right 1/27/2022    Procedure: INSERTION, CATHETER, RIGHT HEART;  Surgeon: Satnam Pickard Jr., MD;  Location: [facility] CATH LAB;  Service: Cardiology;  Laterality: Right;    TRANSFORAMINAL EPIDURAL INJECTION OF STEROID Bilateral 3/12/2024    Procedure: LUMBAR TRANSFORAMINAL BILATERAL  L3/4 *ELIQUIS CLEARANCE IN CHART*;  Surgeon: Sheree Abbott MD;  Location: [facility] PAIN MGT;  Service: Pain Management;  Laterality: Bilateral;  [phone]  2 WK F/U [place]    TREATMENT OF CARDIAC ARRHYTHMIA N/A 7/17/2020    Procedure: CARDIOVERSION;  Surgeon: Kwan Bray MD;  Location: [facility] EP LAB;  Service: Cardiology;  Laterality: N/A;  AF,DCCV/SANGITA, ANES, SK, 746    TREATMENT OF CARDIAC ARRHYTHMIA N/A 7/14/2021    Procedure: CARDIOVERSION;  Surgeon: SYDNIE Cedillo MD;  Location: [facility] EP LAB;  Service: Cardiology;  Laterality: N/A;  AF, SANGITA, DCCV, MAC, EH, 3 Prep    WASHOUT Right 5/17/2023    Procedure: WASHOUT right lower arm and closure;  Surgeon: Kasi Canela Jr., MD;  Location: [facility] OR [address] [place] [address];  Service: General;  Laterality: Right;     Social History     Socioeconomic History    Marital status: Single   Occupational History     Employer: [employer]   Tobacco Use    Smoking status: Never     Passive exposure: Never    Smokeless tobacco: Never   Substance and Sexual Activity    Alcohol use: Yes     Alcohol/week: 4.0 standard drinks of alcohol     Types: 4 Glasses of wine per week     Comment: 2 glasses of wine on weekends    Drug use: No    Sexual activity: Never   Other Topics Concern    Are you pregnant or think you may be? No    Breast-feeding No     Social Determinants of Health     Financial Resource Strain: Low Risk  (2/29/2024)    Overall Financial Resource Strain (CARDIA)     Difficulty of Paying Living Expenses: Not very hard   Food Insecurity: No Food Insecurity (2/29/2024)    Hunger Vital Sign     Worried About Running Out of Food in the Last Year: Never true     Ran Out of Food in the Last Year: Never true   Transportation Needs: No Transportation Needs (2/29/2024)    PRAPARE - Transportation     Lack of Transportation (Medical): No     Lack of Transportation (Non-Medical): No   Physical Activity: Sufficiently Active (2/29/2024)    Exercise Vital Sign     Days of Exercise per  Week: 7 days     Minutes of Exercise per Session: 60 min   Stress: No Stress Concern Present (2/29/2024)    [employer] - Occupational Stress Questionnaire     Feeling of Stress : Not at all   Housing Stability: Low Risk  (2/29/2024)    Housing Stability Vital Sign     Unable to Pay for Housing in the Last Year: No     Number of Places Lived in the Last Year: 1     Unstable Housing in the Last Year: No     Family History   Problem Relation Name Age of Onset    Cancer Mother          Lung cancer    Heart failure Father      Colon cancer Father      Hypertension Father      Cataracts Father      Cancer Father  80        colon    No Known Problems Sister      No Known Problems Brother      Melanoma Daughter      Diabetes Son      Heart disease Son      Cancer Son          esophageal cancer    No Known Problems Maternal Aunt      No Known Problems Maternal Uncle      No Known Problems Paternal Aunt      No Known Problems Paternal Uncle      No Known Problems Maternal Grandmother      No Known Problems Maternal Grandfather      No Known Problems Paternal Grandmother      No Known Problems Paternal Grandfather      Amblyopia Neg Hx      Blindness Neg Hx      Glaucoma Neg Hx      Macular degeneration Neg Hx      Retinal detachment Neg Hx      Strabismus Neg Hx      Stroke Neg Hx      Thyroid disease Neg Hx      Breast cancer Neg Hx      Ovarian cancer Neg Hx         Review of patient's allergies indicates:  No Known Allergies    Current Outpatient Medications   Medication Sig    acetaminophen (TYLENOL) 500 MG tablet Take 500 mg by mouth every 6 (six) hours as needed for Pain.    acyclovir (ZOVIRAX) 400 MG tablet Take 1 tablet (400 mg total) by mouth once daily.    ascorbic acid (VITAMIN C) 1000 MG tablet Take 1,000 mg by mouth once daily.     biotin 1 mg tablet Take 1 mg by mouth 2 (two) times daily.     buPROPion (WELLBUTRIN SR) 150 MG TBSR 12 hr tablet Take 1 tablet (150 mg total) by mouth 2  (two) times daily.    cephALEXin (KEFLEX) 500 MG capsule Take 1 capsule (500 mg total) by mouth every 8 (eight) hours. for 5 days    digestive enzymes Tab Take 1 tablet by mouth once daily.     ELIQUIS 5 mg Tab TAKE 1 TABLET(5 MG) BY MOUTH TWICE DAILY    fluticasone propionate (FLONASE) 50 mcg/actuation nasal spray 1 spray (50 mcg total) by Each Nostril route 2 (two) times daily as needed for Rhinitis.    furosemide (LASIX) 20 MG tablet Take 1 tablet (20 mg total) by mouth every other day.    GAVILYTE-G 236-22.74-6.74 -5.86 gram suspension SMARTSIG:Milliliter(s) By Mouth    levothyroxine (SYNTHROID) 137 MCG Tab tablet Take 1 tablet (137 mcg total) by mouth before breakfast.    lubiprostone (AMITIZA) 24 MCG Cap Take 1 capsule (24 mcg total) by mouth daily as needed (IBS symptoms).    methocarbamoL (ROBAXIN) 500 MG Tab Take 1 tablet (500 mg total) by mouth 3 (three) times daily as needed (mus).    metoprolol succinate (TOPROL-XL) 25 MG 24 hr tablet Take 1 tablet (25 mg total) by mouth once daily.    mirtazapine (REMERON) 7.5 MG Tab Take 1 tablet (7.5 mg total) by mouth nightly.    multivitamin (THERAGRAN) per tablet Take 1 tablet by mouth once daily.     pramipexole (MIRAPEX) 0.25 MG tablet TAKE 1 TABLET(0.25 MG) BY MOUTH EVERY EVENING FOR RESTLESS LEGS    traZODone (DESYREL) 50 MG tablet Take 1 tablet (50 mg total) by mouth nightly as needed for Insomnia.    zinc 50 mg Tab Take 50 mg by mouth once daily.      No current facility-administered medications for this visit.       ROS:  GENERAL: No fever. No chills. No fatigue. Denies weight loss. Denies weight gain.  HEENT: Denies headaches. Denies vision change. Denies eye pain. Denies double vision. Denies ear pain.   CV: Denies chest pain.   PULM: Denies of shortness of breath.  GI: Denies constipation. No diarrhea. No abdominal pain. Denies nausea. Denies vomiting. No blood in stool.  HEME: Denies bleeding problems.  : Denies urgency. No painful urination. No blood  "in urine.  MS: Denies joint stiffness. Denies joint swelling.  Denies back pain.  SKIN: Denies rash.   NEURO: Denies seizures. No weakness.  PSYCH:  Denies difficulty sleeping. No anxiety. Denies depression. No suicidal thoughts.       VITALS:   Vitals:    05/22/24 1534   BP: 116/79   Pulse: 76   Weight: 43.7 kg (96 lb 5.5 oz)   Height: 5' 5" (1.651 m)   PainSc:   1   PainLoc: Back           PHYSICAL EXAM:   GENERAL: Well appearing, in no acute distress, alert and oriented x3.  PSYCH:  Mood and affect appropriate.  SKIN: Skin color, texture, turgor normal, no rashes or lesions.  HEENT:  Normocephalic, atraumatic. Cranial nerves grossly intact.  NECK:  Spurling's negative bilaterally.  Facet loading positive bilaterally left > right. Mild tenderness to palpation of cervical paraspinals.   PULM: No evidence of respiratory difficulty, symmetric chest rise.  GI:  Non-distended  BACK: ROM limited but within functional limits. No focal tenderness to palpation. Tenderness to palpation over right SIJ and lumbar paraspinals R>L.  EXTREMITIES: No deformities, edema, or skin discoloration.   MUSCULOSKELETAL: KAVITA/FADIR negative. No atrophy is noted.   NEURO: Sensation is decreased to light touch in lateral lower right leg. Right ADF 4+/5, otherwise 5/5 strength in BLE. Bilateral lower extremity coordination and muscle stretch reflexes are physiologic and symmetric. No clonus.  GAIT: Antalgic. Walks without assistive device      LABS:    Chemistry        Component Value Date/Time     01/10/2024 0708    K 3.9 01/10/2024 0708     01/10/2024 0708    CO2 26 01/10/2024 0708    BUN 15 01/10/2024 0708    CREATININE 0.8 01/10/2024 0708    GLU 81 01/10/2024 0708        Component Value Date/Time    CALCIUM 8.8 01/10/2024 0708    ALKPHOS 67 01/10/2024 0708    AST 18 01/10/2024 0708    ALT 17 01/10/2024 0708    BILITOT 0.5 01/10/2024 0708    ESTGFRAFRICA >60.0 05/25/2022 1204    EGFRNONAA 55.6 (A) 05/25/2022 1204    "         IMAGING:      EXAMINATION:  XR CERVICAL SPINE 5 VIEW WITH FLEX AND EXT 3/21/2024     CLINICAL HISTORY:  Other cervical disc degeneration, unspecified cervical region     TECHNIQUE:  Five views of the cervical spine plus flexion and extension views were performed.     COMPARISON:  05/15/2018     FINDINGS:  Patient has had a C5 corpectomy and replacement with cage bridging from C4-C6 alignment is satisfactory.  No instability in flexion and extension.  Degenerative changes seen at C6-C7 level     Impression:     See above        Electronically signed by: Rolan Garcia MD  Date:                                            03/21/2024  Time:                                           15:17      EXAMINATION:  CT CERVICAL SPINE WITHOUT CONTRAST - 2/22/22     CLINICAL HISTORY:  Evaluate fusion;Arthrodesis status     TECHNIQUE:  Low dose axial images, sagittal and coronal reformations were performed though the cervical spine.  Contrast was not administered.     COMPARISON:  MRI cervical spine 01/07/2021     CT cervical spine 07/28/2014     FINDINGS:  Postoperative changes from partial C5 corpectomy and reconstruction with an interbody cage and C4-6 anterior plate and screw construct.  Hardware is intact and unchanged in position without evidence of loosening.  No perihardware fractures.  Unchanged alignment.  Some osseous bridging is present.  No prevertebral soft tissue swelling.     Straightening of the cervical lordosis. C2-C4 stepwise grade 1 anterolisthesis. T2-3 grade 1 anterolisthesis.     Diffusely decreased bone mineral density. No fractures or pars defects.  No focal osseous lesions.     Multilevel disc and uncovertebral joint degeneration, most advanced at C6-7.  Multilevel facet arthropathy, most advanced at C2-3.  There are amorphous soft tissue calcifications adjacent to the odontoid process and in the expected location of the posterior longitudinal ligament extending from C2-4.     Visualized  structures in the posterior fossa are unremarkable. The cervical spinal cord is difficult to visualize.     Vascular calcifications.  Paraspinal soft tissues are otherwise unremarkable.  Biapical pleuroparenchymal scarring.     Impression:     1. Postoperative changes from C5 corpectomy and reconstruction.  Unchanged alignment.  No hardware complication.  2. Multilevel degenerative changes, most advanced at C2-3 and C6-7, progressed from prior CT.  3. Amorphous soft tissue calcifications adjacent to the odontoid process and in the expected location of the posterior longitudinal ligament, which can be seen with CPPD.     CT LUMBAR SPINE WITHOUT CONTRAST     CLINICAL HISTORY:  Evaluate fusion;  Arthrodesis status     TECHNIQUE:  Low-dose axial, sagittal and coronal reformations are obtained through the lumbar spine.  Contrast was not administered.     COMPARISON:  MRI lumbar spine 01/07/2021; CT lumbar spine 03/17/2020     FINDINGS:  Postoperative changes from L4-S1 posterior decompression and instrumented fusion with bilateral vertical rods, pedicle screws, and metallic interbody spacers.  Hardware is intact and unchanged in position without evidence of loosening.  No perihardware fractures.  Unchanged alignment.  No significant osseous bridging.  No definite fluid collections in the operative bed, however evaluation is limited by CT technique and degraded by streak artifact.     T12-L2 grade 1 stepwise retrolisthesis. L4 on L5 grade 1 anterolisthesis. Mild rightward curvature of the lumbar spine.     Diffuse osteopenia.  No fractures or pars defects.  No focal osseous lesions.     Multilevel degenerative disc disease, most advanced at L2-3 and L3-4.  Multilevel facet arthropathy, most advanced at L3-4.     The conus medullaris and cauda equina nerve roots are difficult to visualize.     9 mm cyst in the midpole left kidney, with a 2 mm mural calcification versus adjacent nonobstructing stone.  Paraspinal soft  tissues are otherwise unremarkable.     Impression:     1. Postoperative changes from L4-S1 decompressive instrumented fusion.  No hardware complication.  Unchanged alignment.  2. Multilevel degenerative changes, most advanced at L2-3 and L3-4, similar to prior.     Electronically signed by resident: Brigette Khoury  Date:                                            02/22/2022  Time:                                           09:41     Electronically signed by: Ismael Isabel  Date:                                            02/22/2022  Time:                                           14:11    ASSESSMENT: 74 y.o. year old female with pain, consistent with:    Encounter Diagnoses   Name Primary?    Lumbar spondylosis Yes    Lumbar radiculopathy     Postlaminectomy syndrome of lumbar region     Spinal stenosis of lumbar region without neurogenic claudication        DISCUSSION: Angelina Man is a retired  and  at [employer] with a history of multiple laminectomies and microdiscectomies with residual surgical bed inflammation causing canal stenosis. She underwent a right L4/5 L5/S1 TLIF with posterior hardware in 2018. She she saw us in February for increased low back and right leg pain as well as a right footdrop.  She has been progressing very well with surgery and has had significant improvement in her pain following bilateral L3/4 TFESI on 03/12/2024.  She returned for an exacerbation of neck pain that radiated into her head with associated muscle spasm.  She has been improving with rest and Tylenol.  She does have a history of prior C4-6 ACDF and C5 corpectomy in 2010. CT scan from 2022 did show advanced multilevel degenerative changes including facet arthropathy most pronounced at C2-3.  She does not have any neurologic changes. She continues daily exercise and reports good relief from this. She is training for her trip to [facility] in September.     PLAN:  Continue  Robaxin 500 mg TID PRN for muscle spasms.  Continue HEP  Excellent benefit with bilateral L3/L4 TF CARISSA.  RTC 3 months (August) prior to trip to [facility]    Sage Modi MD   05/22/2024

## 2024-05-27 ENCOUNTER — ANESTHESIA EVENT (OUTPATIENT)
Dept: ENDOSCOPY | Facility: HOSPITAL | Age: 74
End: 2024-05-27
Payer: MEDICARE

## 2024-05-28 ENCOUNTER — ANESTHESIA (OUTPATIENT)
Dept: ENDOSCOPY | Facility: HOSPITAL | Age: 74
End: 2024-05-28
Payer: MEDICARE

## 2024-05-28 ENCOUNTER — HOSPITAL ENCOUNTER (OUTPATIENT)
Facility: HOSPITAL | Age: 74
Discharge: HOME | End: 2024-05-28
Attending: STUDENT IN AN ORGANIZED HEALTH CARE EDUCATION/TRAINING PROGRAM | Admitting: STUDENT IN AN ORGANIZED HEALTH CARE EDUCATION/TRAINING PROGRAM
Payer: MEDICARE

## 2024-05-28 VITALS
HEART RATE: 80 BPM | DIASTOLIC BLOOD PRESSURE: 75 MMHG | OXYGEN SATURATION: 99 % | BODY MASS INDEX: 16.66 KG/M2 | SYSTOLIC BLOOD PRESSURE: 129 MMHG | WEIGHT: 100 LBS | RESPIRATION RATE: 16 BRPM | HEIGHT: 65 IN | TEMPERATURE: 98 F

## 2024-05-28 DIAGNOSIS — Z12.11 SCREENING FOR MALIGNANT NEOPLASM OF COLON: Primary | ICD-10-CM

## 2024-05-28 DIAGNOSIS — Z12.11 ENCOUNTER FOR SCREENING COLONOSCOPY: ICD-10-CM

## 2024-05-28 PROCEDURE — 45385 COLONOSCOPY W/LESION REMOVAL: CPT | Mod: PT,HCNC | Performed by: STUDENT IN AN ORGANIZED HEALTH CARE EDUCATION/TRAINING PROGRAM

## 2024-05-28 PROCEDURE — 25000003 PHARM REV CODE 250: Mod: HCNC | Performed by: NURSE ANESTHETIST, CERTIFIED REGISTERED

## 2024-05-28 PROCEDURE — E9220 PRA ENDO ANESTHESIA: HCPCS | Mod: HCNC,PT,, | Performed by: NURSE ANESTHETIST, CERTIFIED REGISTERED

## 2024-05-28 PROCEDURE — 37000009 HC ANESTHESIA EA ADD 15 MINS: Mod: HCNC | Performed by: STUDENT IN AN ORGANIZED HEALTH CARE EDUCATION/TRAINING PROGRAM

## 2024-05-28 PROCEDURE — 88305 TISSUE EXAM BY PATHOLOGIST: CPT | Mod: HCNC | Performed by: PATHOLOGY

## 2024-05-28 PROCEDURE — 27201089 HC SNARE, DISP (ANY): Mod: HCNC | Performed by: STUDENT IN AN ORGANIZED HEALTH CARE EDUCATION/TRAINING PROGRAM

## 2024-05-28 PROCEDURE — 88305 TISSUE EXAM BY PATHOLOGIST: CPT | Mod: 26,HCNC,, | Performed by: PATHOLOGY

## 2024-05-28 PROCEDURE — 45385 COLONOSCOPY W/LESION REMOVAL: CPT | Mod: PT,HCNC,, | Performed by: STUDENT IN AN ORGANIZED HEALTH CARE EDUCATION/TRAINING PROGRAM

## 2024-05-28 PROCEDURE — 63600175 PHARM REV CODE 636 W HCPCS: Mod: HCNC | Performed by: NURSE ANESTHETIST, CERTIFIED REGISTERED

## 2024-05-28 PROCEDURE — 37000008 HC ANESTHESIA 1ST 15 MINUTES: Mod: HCNC | Performed by: STUDENT IN AN ORGANIZED HEALTH CARE EDUCATION/TRAINING PROGRAM

## 2024-05-28 PROCEDURE — 27200997: Mod: HCNC | Performed by: STUDENT IN AN ORGANIZED HEALTH CARE EDUCATION/TRAINING PROGRAM

## 2024-05-28 RX ORDER — LIDOCAINE HYDROCHLORIDE 20 MG/ML
INJECTION INTRAVENOUS
Status: DISCONTINUED | OUTPATIENT
Start: 2024-05-28 | End: 2024-05-28

## 2024-05-28 RX ORDER — SODIUM CHLORIDE 9 MG/ML
INJECTION, SOLUTION INTRAVENOUS CONTINUOUS
Status: DISCONTINUED | OUTPATIENT
Start: 2024-05-28 | End: 2024-05-28 | Stop reason: HOSPADM

## 2024-05-28 RX ORDER — PROPOFOL 10 MG/ML
VIAL (ML) INTRAVENOUS
Status: DISCONTINUED | OUTPATIENT
Start: 2024-05-28 | End: 2024-05-28

## 2024-05-28 RX ADMIN — PROPOFOL 50 MG: 10 INJECTION, EMULSION INTRAVENOUS at 07:05

## 2024-05-28 RX ADMIN — SODIUM CHLORIDE: 0.9 INJECTION, SOLUTION INTRAVENOUS at 07:05

## 2024-05-28 RX ADMIN — LIDOCAINE HYDROCHLORIDE 40 MG: 20 INJECTION INTRAVENOUS at 07:05

## 2024-05-28 NOTE — ANESTHESIA PREPROCEDURE EVALUATION
05/28/2024  Angelina Man is a 74 y.o., female.    Patient Active Problem List   Diagnosis    Anxiety    DJD (degenerative joint disease)    Menopause    Spinal stenosis of lumbar region without neurogenic claudication    DDD (degenerative disc disease), lumbar    S/P lumbar fusion    Hypothyroidism    Low back pain    Lumbar disc herniation    Radiculopathy of lumbar region    Chronic pain    Spondylolisthesis at L4-L5 level    Personal history of skin cancer    Gastroesophageal reflux disease    Longstanding persistent atrial fibrillation    Family hx of melanoma    Renal angiomyolipoma    Renal cyst, acquired    Varicose veins of both lower extremities with complications    History of cardioversion    Dysphagia    Venous insufficiency of both lower extremities    Anticoagulant long-term use    Pulmonary hypertension    (HFpEF) heart failure with preserved ejection fraction    Ulcer of left lower extremity, limited to breakdown of skin    Unspecified cord compression    Nuclear sclerotic cataract of left eye    Nuclear sclerotic cataract of right eye    Age-related osteoporosis without current pathological fracture    Chronic pain of left knee    Squamous cell carcinoma of skin of left lower extremity    Leg heaviness    Episode of recurrent major depressive disorder, unspecified depression episode severity    Purpura    Lumbar pain    Decreased strength     Past Medical History:   Diagnosis Date    Arthritis     Atrial fibrillation     Bronchitis     Cataract     Dry eyes     GERD (gastroesophageal reflux disease)     Glaucoma, suspect - Both Eyes     Hypothyroidism 06/23/2016    Pulmonary hypertension     Rash     Renal angiomyolipoma     Renal disorder     SCC (squamous cell carcinoma) 07/2023    left lower lateral shin    SCC (squamous cell carcinoma) 04/2023    L mid lateral shin    SCC (squamous  cell carcinoma) 09/2023    L mid lower shin    Spinal stenosis     Squamous cell carcinoma     in-situ right upper inner arm, right wrist    Status post lumbar surgery 06/23/2016    Stress fracture     Thyroid disease     Venous insufficiency      Past Surgical History:   Procedure Laterality Date    ANGIOPLASTY      CATARACT EXTRACTION W/  INTRAOCULAR LENS IMPLANT Left 12/6/2022    Procedure: EXTRACTION, CATARACT, WITH IOL INSERTION;  Surgeon: Tone Brown MD;  Location: [facility];  Service: Ophthalmology;  Laterality: Left;    CATARACT EXTRACTION W/  INTRAOCULAR LENS IMPLANT Right 12/20/2022    Procedure: EXTRACTION, CATARACT, WITH IOL INSERTION;  Surgeon: Tone Brown MD;  Location: [facility];  Service: Ophthalmology;  Laterality: Right;    CERVICAL FUSION      COLONOSCOPY      COLONOSCOPY N/A 11/14/2017    Procedure: COLONOSCOPY;  Surgeon: Kasi Mercado MD;  Location: [facility] ENDO (4TH FLR);  Service: Endoscopy;  Laterality: N/A;    COLONOSCOPY N/A 4/26/2023    Procedure: COLONOSCOPY;  Surgeon: Jeanmarie Hathaway MD;  Location: [facility] (4TH FLR);  Service: Colon and Rectal;  Laterality: N/A;  ok to hold Eliquis 2 days per Dr Pereira  constipation-ext Miralax prep  instr mailed/portal-GT  4/21/23 pre call attempted, no answer    ESOPHAGOGASTRODUODENOSCOPY N/A 10/10/2019    Procedure: EGD (ESOPHAGOGASTRODUODENOSCOPY);  Surgeon: Rg Milton MD;  Location: [facility] (4TH FLR);  Service: Endoscopy;  Laterality: N/A;    ESOPHAGOGASTRODUODENOSCOPY N/A 10/6/2021    Procedure: EGD (ESOPHAGOGASTRODUODENOSCOPY);  Surgeon: Rg Milton MD;  Location: [facility] (4TH FLR);  Service: Endoscopy;  Laterality: N/A;  covid test 10/3 [place], instr emailed/portal -ml    EXCISION Left 5/17/2023    Procedure: EXCISION SQUAMOUS CELL CARCINOMA LEFT LEG;  Surgeon: Kasi Canela Jr., MD;  Location: [facility] 2ND FLR;  Service: General;  Laterality: Left;    l4-5 mid discectomy Left 03/2017    l4-5 MIS diskectomy  Right 05/2016    RIGHT HEART CATHETERIZATION Right 1/27/2022    Procedure: INSERTION, CATHETER, RIGHT HEART;  Surgeon: Satnam Pickard Jr., MD;  Location: [facility] CATH LAB;  Service: Cardiology;  Laterality: Right;    TRANSFORAMINAL EPIDURAL INJECTION OF STEROID Bilateral 3/12/2024    Procedure: LUMBAR TRANSFORAMINAL BILATERAL L3/4 *ELIQUIS CLEARANCE IN CHART*;  Surgeon: Sheree Abbott MD;  Location: [facility] PAIN MGT;  Service: Pain Management;  Laterality: Bilateral;  [phone]  2 WK F/U [place]    TREATMENT OF CARDIAC ARRHYTHMIA N/A 7/17/2020    Procedure: CARDIOVERSION;  Surgeon: Kwan Bray MD;  Location: [facility] EP LAB;  Service: Cardiology;  Laterality: N/A;  AF,DCCV/SANGITA, ANES, SK, 746    TREATMENT OF CARDIAC ARRHYTHMIA N/A 7/14/2021    Procedure: CARDIOVERSION;  Surgeon: SYDNIE Cedillo MD;  Location: [facility] EP LAB;  Service: Cardiology;  Laterality: N/A;  AF, SANGITA, DCCV, MAC, EH, 3 Prep    WASHOUT Right 5/17/2023    Procedure: WASHOUT right lower arm and closure;  Surgeon: Kasi Canela Jr., MD;  Location: [facility] OR [address] [place] [address];  Service: General;  Laterality: Right;     No echocardiogram results found for the past 12 months    Pre-op Assessment    I have reviewed the Patient Summary Reports.    I have reviewed the NPO Status.   I have reviewed the Medications.     Review of Systems  Anesthesia Hx:  No problems with previous Anesthesia                Social:  Non-Smoker, Social Alcohol Use       Hematology/Oncology:  Hematology Normal   Oncology Normal                                   EENT/Dental:  EENT/Dental Normal           Cardiovascular:         Dysrhythmias atrial fibrillation  CHF                                 Pulmonary:  Pulmonary Normal                       Hepatic/GI:  Hepatic/GI Normal                 Musculoskeletal:  Arthritis               Neurological:    Neuromuscular Disease,                                   Endocrine:   Hypothyroidism          Dermatological:  Skin Normal     Psych:  Psychiatric Normal                    Physical Exam  General: Alert, Oriented, Cooperative and Well nourished    Airway:  Mallampati: II   Mouth Opening: Normal  TM Distance: Normal  Tongue: Normal  Neck ROM: Normal ROM    Dental:  Intact        Anesthesia Plan  Type of Anesthesia, risks & benefits discussed:    Anesthesia Type: Gen Natural Airway  Intra-op Monitoring Plan: Standard ASA Monitors  Post Op Pain Control Plan: multimodal analgesia  Induction:  IV  Informed Consent: Informed consent signed with the Patient and all parties understand the risks and agree with anesthesia plan.  All questions answered.   ASA Score: 2  Day of Surgery Review of History & Physical: H&P Update referred to the surgeon/provider.    Ready For Surgery From Anesthesia Perspective.     .

## 2024-05-28 NOTE — H&P
[facility]  Colon and Rectal Surgery    [address]  [place], [place]  Tel: [phone]  Fax: [phone]  [email or web address]   MD Kasi Herrera MD Brian Kann, MD W. Forrest Johnston, MD Matthew Giglia, MD Jennifer Paruch, MD William Kethman, MD Danielle Kay, MD     Patient name: Angelina Man   YOB: 1950   MRN: [id number]  Date of procedure: 05/28/2024    Procedure: Colonoscopy  Indications: Previous advanced adenomatous polyps, Father with CRC    Last colonoscopy: 4/2023 (Complete)    The patient was informed of the availability of a certified  without charge. A certified  was not necessary for this visit.    Sedation plan: MAC  ASA: ASA 2 - Patient with mild systemic disease with no functional limitations    Review of Systems  See above    Past Medical History:   Diagnosis Date    Arthritis     Atrial fibrillation     Bronchitis     Cataract     Dry eyes     GERD (gastroesophageal reflux disease)     Glaucoma, suspect - Both Eyes     Hypothyroidism 06/23/2016    Pulmonary hypertension     Rash     Renal angiomyolipoma     Renal disorder     SCC (squamous cell carcinoma) 07/2023    left lower lateral shin    SCC (squamous cell carcinoma) 04/2023    L mid lateral shin    SCC (squamous cell carcinoma) 09/2023    L mid lower shin    Spinal stenosis     Squamous cell carcinoma     in-situ right upper inner arm, right wrist    Status post lumbar surgery 06/23/2016    Stress fracture     Thyroid disease     Venous insufficiency      Past Surgical History:   Procedure Laterality Date    ANGIOPLASTY      CATARACT EXTRACTION W/  INTRAOCULAR LENS IMPLANT Left 12/6/2022    Procedure: EXTRACTION, CATARACT, WITH IOL INSERTION;  Surgeon: Tone Brown MD;  Location: [facility];  Service: Ophthalmology;  Laterality: Left;    CATARACT EXTRACTION W/  INTRAOCULAR LENS IMPLANT Right 12/20/2022    Procedure:  EXTRACTION, CATARACT, WITH IOL INSERTION;  Surgeon: Tone Brown MD;  Location: [facility] OR;  Service: Ophthalmology;  Laterality: Right;    CERVICAL FUSION      COLONOSCOPY      COLONOSCOPY N/A 11/14/2017    Procedure: COLONOSCOPY;  Surgeon: Kasi Mercado MD;  Location: [facility] ENDO (4TH FLR);  Service: Endoscopy;  Laterality: N/A;    COLONOSCOPY N/A 4/26/2023    Procedure: COLONOSCOPY;  Surgeon: Jeanmarie Hathaway MD;  Location: [facility] ENDO (4TH FLR);  Service: Colon and Rectal;  Laterality: N/A;  ok to hold Eliquis 2 days per Dr Pereira  constipation-ext Miralax prep  instr mailed/portal-GT  4/21/23 pre call attempted, no answer    ESOPHAGOGASTRODUODENOSCOPY N/A 10/10/2019    Procedure: EGD (ESOPHAGOGASTRODUODENOSCOPY);  Surgeon: Rg Milton MD;  Location: [facility] (4TH FLR);  Service: Endoscopy;  Laterality: N/A;    ESOPHAGOGASTRODUODENOSCOPY N/A 10/6/2021    Procedure: EGD (ESOPHAGOGASTRODUODENOSCOPY);  Surgeon: Rg Milton MD;  Location: [facility] (4TH FLR);  Service: Endoscopy;  Laterality: N/A;  covid test 10/3 [place], instr emailed/portal -ml    EXCISION Left 5/17/2023    Procedure: EXCISION SQUAMOUS CELL CARCINOMA LEFT LEG;  Surgeon: Kasi Canela Jr., MD;  Location: [facility] 2ND FLR;  Service: General;  Laterality: Left;    l4-5 mid discectomy Left 03/2017    l4-5 MIS diskectomy Right 05/2016    RIGHT HEART CATHETERIZATION Right 1/27/2022    Procedure: INSERTION, CATHETER, RIGHT HEART;  Surgeon: Satnam Pickard Jr., MD;  Location: [facility] CATH LAB;  Service: Cardiology;  Laterality: Right;    TRANSFORAMINAL EPIDURAL INJECTION OF STEROID Bilateral 3/12/2024    Procedure: LUMBAR TRANSFORAMINAL BILATERAL L3/4 *ELIQUIS CLEARANCE IN CHART*;  Surgeon: Sheree Abbott MD;  Location: [facility] PAIN MGT;  Service: Pain Management;  Laterality: Bilateral;  [phone]  2 WK F/U [place]    TREATMENT OF CARDIAC ARRHYTHMIA N/A 7/17/2020    Procedure: CARDIOVERSION;  Surgeon: Kwan Bray MD;  Location: [facility]  EP LAB;  Service: Cardiology;  Laterality: N/A;  AF,DCCV/SANGITA, ANES, SK, 746    TREATMENT OF CARDIAC ARRHYTHMIA N/A 7/14/2021    Procedure: CARDIOVERSION;  Surgeon: SYDNIE Cedillo MD;  Location: [facility] EP LAB;  Service: Cardiology;  Laterality: N/A;  AF, SANGITA, DCCV, MAC, EH, 3 Prep    WASHOUT Right 5/17/2023    Procedure: WASHOUT right lower arm and closure;  Surgeon: Kasi Canela Jr., MD;  Location: [facility] OR [address] [place] [address];  Service: General;  Laterality: Right;     Family History   Problem Relation Name Age of Onset    Cancer Mother          Lung cancer    Heart failure Father      Colon cancer Father      Hypertension Father      Cataracts Father      Cancer Father  80        colon    No Known Problems Sister      No Known Problems Brother      Melanoma Daughter      Diabetes Son      Heart disease Son      Cancer Son          esophageal cancer    No Known Problems Maternal Aunt      No Known Problems Maternal Uncle      No Known Problems Paternal Aunt      No Known Problems Paternal Uncle      No Known Problems Maternal Grandmother      No Known Problems Maternal Grandfather      No Known Problems Paternal Grandmother      No Known Problems Paternal Grandfather      Amblyopia Neg Hx      Blindness Neg Hx      Glaucoma Neg Hx      Macular degeneration Neg Hx      Retinal detachment Neg Hx      Strabismus Neg Hx      Stroke Neg Hx      Thyroid disease Neg Hx      Breast cancer Neg Hx      Ovarian cancer Neg Hx       Social History     Tobacco Use    Smoking status: Never     Passive exposure: Never    Smokeless tobacco: Never   Substance Use Topics    Alcohol use: Yes     Alcohol/week: 4.0 standard drinks of alcohol     Types: 4 Glasses of wine per week     Comment: 2 glasses of wine on weekends    Drug use: No     Review of patient's allergies indicates:  No Known Allergies    Prior to Admission medications    Medication Sig Start Date End Date Taking? Authorizing Provider   acetaminophen (TYLENOL) 500 MG tablet Take  500 mg by mouth every 6 (six) hours as needed for Pain.   Yes Provider, Historical   ascorbic acid (VITAMIN C) 1000 MG tablet Take 1,000 mg by mouth once daily.    Yes Provider, Historical   biotin 1 mg tablet Take 1 mg by mouth 2 (two) times daily.    Yes Provider, Historical   buPROPion (WELLBUTRIN SR) 150 MG TBSR 12 hr tablet Take 1 tablet (150 mg total) by mouth 2 (two) times daily. 1/3/24 1/2/25 Yes Zina Rockwell MD   furosemide (LASIX) 20 MG tablet Take 1 tablet (20 mg total) by mouth every other day. 7/18/23 7/17/24 Yes David Pereira MD   GAVILYTE-G 236-22.74-6.74 -5.86 gram suspension SMARTSIG:Milliliter(s) By Mouth 2/26/24  Yes Provider, Historical   levothyroxine (SYNTHROID) 137 MCG Tab tablet Take 1 tablet (137 mcg total) by mouth before breakfast. 1/3/24  Yes Zina Rockwell MD   methocarbamoL (ROBAXIN) 500 MG Tab Take 1 tablet (500 mg total) by mouth 3 (three) times daily as needed (mus). 4/3/24  Yes Ruchi Mehta, AFUA   metoprolol succinate (TOPROL-XL) 25 MG 24 hr tablet Take 1 tablet (25 mg total) by mouth once daily. 2/28/24 2/27/25 Yes Kwan Bray MD   pramipexole (MIRAPEX) 0.25 MG tablet TAKE 1 TABLET(0.25 MG) BY MOUTH EVERY EVENING FOR RESTLESS LEGS 5/7/24  Yes Austen Forde MD   zinc 50 mg Tab Take 50 mg by mouth once daily.    Yes Provider, Historical   acyclovir (ZOVIRAX) 400 MG tablet Take 1 tablet (400 mg total) by mouth once daily. 1/3/24   Zina Rockwell MD   digestive enzymes Tab Take 1 tablet by mouth once daily.     Provider, Historical   ELIQUIS 5 mg Tab TAKE 1 TABLET(5 MG) BY MOUTH TWICE DAILY 10/24/23   David Pereira MD   fluticasone propionate (FLONASE) 50 mcg/actuation nasal spray 1 spray (50 mcg total) by Each Nostril route 2 (two) times daily as needed for Rhinitis. 1/3/24   Zina Rockwell MD   lubiprostone (AMITIZA) 24 MCG Cap Take 1 capsule (24 mcg total) by mouth daily as needed (IBS symptoms). 2/27/24   Zina Rockwell MD   mirtazapine (REMERON) 7.5 MG Tab Take 1 tablet  "(7.5 mg total) by mouth nightly. 1/3/24   Zina Rockwell MD   multivitamin (THERAGRAN) per tablet Take 1 tablet by mouth once daily.     Provider, Historical   traZODone (DESYREL) 50 MG tablet Take 1 tablet (50 mg total) by mouth nightly as needed for Insomnia. 1/3/24 1/2/25  Zina Rockwell MD       Physical Examination  /78 (BP Location: Left arm, Patient Position: Lying)   Pulse 78   Temp 98.2 °F (36.8 °C) (Temporal)   Resp 16   Ht 5' 5" (1.651 m)   Wt 45.4 kg (100 lb)   LMP  (LMP Unknown)   SpO2 100%   Breastfeeding No   BMI 16.64 kg/m²      Constitutional: well developed, no cough, no dyspnea, alert, and no acute distress    Head: Normocephalic, no lesions, without obvious abnormality  Eye: Normal external eye, conjunctiva, and lids, PERRL  Cardiovascular: regular rate and regular rhythm  Respiratory: normal air entry  Gastrointestinal: soft, non-tender, without masses or organomegaly  Neurologic: alert, oriented, normal speech, no focal findings or movement disorder noted  Psychiatric: appropriate, normal mood    Plan of Care    It was a pleasure meeting Ms. Man today - we will plan to perform a colonoscopy with monitored anesthesia care. The details of the procedure, the possible need for biopsy or polypectomy and the potential risks including bleeding, perforation, missed polyps were discussed in detail and they consented to undergo the procedure.      Jeanmarie Hathaway MD, FACS, FASCRS  Department of Colon & Rectal Surgery  [facility]    "

## 2024-05-28 NOTE — TRANSFER OF CARE
"Anesthesia Transfer of Care Note    Patient: Angelina Man    Procedure(s) Performed: Procedure(s) (LRB):  COLONOSCOPY (N/A)    Patient location: GI    Anesthesia Type: MAC and general    Transport from OR: Transported from OR on room air with adequate spontaneous ventilation    Post pain: adequate analgesia    Post assessment: no apparent anesthetic complications and tolerated procedure well    Post vital signs: stable    Level of consciousness: awake and alert    Nausea/Vomiting: no nausea/vomiting    Complications: none    Transfer of care protocol was followed      Last vitals: Visit Vitals  /78 (BP Location: Left arm, Patient Position: Lying)   Pulse 78   Temp 36.8 °C (98.2 °F) (Temporal)   Resp 16   Ht 5' 5" (1.651 m)   Wt 45.4 kg (100 lb)   LMP  (LMP Unknown)   SpO2 100%   Breastfeeding No   BMI 16.64 kg/m²     "

## 2024-05-28 NOTE — ANESTHESIA POSTPROCEDURE EVALUATION
Anesthesia Post Evaluation    Patient: Angelina Man    Procedure(s) Performed: Procedure(s) (LRB):  COLONOSCOPY (N/A)    Final Anesthesia Type: general      Patient location during evaluation: GI PACU  Patient participation: Yes- Able to Participate  Level of consciousness: awake and alert  Post-procedure vital signs: reviewed and stable  Pain management: adequate  Airway patency: patent  VIDAL mitigation strategies: Multimodal analgesia  PONV status at discharge: No PONV  Anesthetic complications: no      Cardiovascular status: stable  Respiratory status: unassisted and spontaneous ventilation  Hydration status: euvolemic  Follow-up not needed.              Vitals Value Taken Time   /75 05/28/24 0816   Temp 36.7 °C (98 °F) 05/28/24 0739   Pulse 80 05/28/24 0816   Resp 16 05/28/24 0816   SpO2 99 % 05/28/24 0816         No case tracking events are documented in the log.      Pain/Aliya Score: Aliya Score: 10 (5/28/2024  7:49 AM)

## 2024-05-28 NOTE — PROVATION PATIENT INSTRUCTIONS
Discharge Summary/Instructions after an Endoscopic Procedure  Patient Name: Angelina Man  Patient MRN: [id number]  Patient YOB: 1950  Tuesday, May 28, 2024  Jeanmarie Hathaway MD  Dear patient,  As a result of recent federal legislation (The Federal Cures Act), you may   receive lab or pathology results from your procedure in your [facility]   account before your physician is able to contact you. Your physician or   their representative will relay the results to you with their   recommendations at their soonest availability.  Thank you,  RESTRICTIONS:  During your procedure today, you received medications for sedation.  These   medications may affect your judgment, balance and coordination.  Therefore,   for 24 hours, you have the following restrictions:   - DO NOT drive a car, operate machinery, make legal/financial decisions,   sign important papers or drink alcohol.    ACTIVITY:  Today: no heavy lifting, straining or running due to procedural   sedation/anesthesia.  The following day: return to full activity including work.  DIET:  Eat and drink normally unless instructed otherwise.     TREATMENT FOR COMMON SIDE EFFECTS:  - Mild abdominal pain, nausea, belching, bloating or excessive gas:  rest,   eat lightly and use a heating pad.  - Sore Throat: treat with throat lozenges and/or gargle with warm salt   water.  - Because air was used during the procedure, expelling large amounts of air   from your rectum or belching is normal.  - If a bowel prep was taken, you may not have a bowel movement for 1-3 days.    This is normal.  SYMPTOMS TO WATCH FOR AND REPORT TO YOUR PHYSICIAN:  1. Abdominal pain or bloating, other than gas cramps.  2. Chest pain.  3. Back pain.  4. Signs of infection such as: chills or fever occurring within 24 hours   after the procedure.  5. Rectal bleeding, which would show as bright red, maroon, or black stools.   (A tablespoon of blood from the rectum is not serious, especially if    hemorrhoids are present.)  6. Vomiting.  7. Weakness or dizziness.  GO DIRECTLY TO THE NEAREST EMERGENCY ROOM IF YOU HAVE ANY OF THE FOLLOWING:      Difficulty breathing              Chills and/or fever over 101 F   Persistent vomiting and/or vomiting blood   Severe abdominal pain   Severe chest pain   Black, tarry stools   Bleeding- more than one tablespoon   Any other symptom or condition that you feel may need urgent attention  Your doctor recommends these additional instructions:  If any biopsies were taken, your doctors clinic will contact you in 1 to 2   weeks with any results.  - Discharge patient to home.   - Resume previous diet.   - Continue present medications.   - Await pathology results.   - Repeat colonoscopy for surveillance based on pathology results.   - Return to referring physician as previously scheduled.  For questions, problems or results please call your physician - Jeanmarie Hathaway MD at Work:  [phone].  [facility] EMERGENCY ROOM PHONE NUMBER: [phone]  IF A COMPLICATION OR EMERGENCY SITUATION ARISES AND YOU ARE UNABLE TO REACH   YOUR PHYSICIAN - GO DIRECTLY TO THE EMERGENCY ROOM.  MD Jeanmarie Patricia MD  5/28/2024 7:36:18 AM  This report has been verified and signed electronically.  Dear patient,  As a result of recent federal legislation (The Federal Cures Act), you may   receive lab or pathology results from your procedure in your [facility]   account before your physician is able to contact you. Your physician or   their representative will relay the results to you with their   recommendations at their soonest availability.  Thank you,  PROVATION

## 2024-05-30 ENCOUNTER — OFFICE VISIT (OUTPATIENT)
Dept: URGENT CARE | Facility: CLINIC | Age: 74
End: 2024-05-30
Payer: MEDICARE

## 2024-05-30 VITALS
OXYGEN SATURATION: 97 % | WEIGHT: 100.06 LBS | TEMPERATURE: 98 F | SYSTOLIC BLOOD PRESSURE: 120 MMHG | HEART RATE: 97 BPM | DIASTOLIC BLOOD PRESSURE: 76 MMHG | HEIGHT: 65 IN | BODY MASS INDEX: 16.67 KG/M2 | RESPIRATION RATE: 18 BRPM

## 2024-05-30 DIAGNOSIS — Z51.89 VISIT FOR WOUND CHECK: Primary | ICD-10-CM

## 2024-05-30 LAB
FINAL PATHOLOGIC DIAGNOSIS: NORMAL
GROSS: NORMAL
Lab: NORMAL

## 2024-05-30 PROCEDURE — 99499 UNLISTED E&M SERVICE: CPT | Mod: S$GLB,,, | Performed by: PHYSICIAN ASSISTANT

## 2024-05-30 NOTE — PROGRESS NOTES
"Subjective:      Patient ID: Angelina Man is a 74 y.o. female.    Vitals:  height is 5' 5" (1.651 m) and weight is 45.4 kg (100 lb 1.4 oz). Her oral temperature is 98.4 °F (36.9 °C). Her blood pressure is 120/76 and her pulse is 97. Her respiration is 18 and oxygen saturation is 97%.     Chief Complaint: Wound Check    Pt presents she would like to make sure her left arm is healing correctly with the bandage strips. Pt cause skin tear on left posterior lower arm when accidentally striking a doorframe. Injury closed with steri-strips 10 days ago but Pt is concerned that strips haven't fallen off after one week.  No pain, redness, swelling, or other complaints    Wound Check  She was originally treated 10 to 14 days ago. There has been no drainage from the wound. There is no redness present. There is no swelling present. There is no pain present.       Constitution: Negative for chills, sweating, fatigue and fever.   HENT:  Negative for congestion and sore throat.    Neck: Negative for neck pain and neck stiffness.   Cardiovascular:  Negative for chest pain, leg swelling and palpitations.   Eyes:  Negative for eye itching, eye pain and eye redness.   Respiratory:  Negative for cough, sputum production and shortness of breath.    Gastrointestinal:  Negative for abdominal pain, nausea, vomiting and diarrhea.   Genitourinary:  Negative for dysuria, frequency and urgency.   Musculoskeletal:  Negative for pain and joint swelling.   Skin:  Positive for laceration. Negative for color change and rash.   Neurological:  Negative for dizziness, headaches, disorientation, altered mental status, numbness and tingling.   Psychiatric/Behavioral:  Negative for altered mental status and disorientation.       Objective:     Physical Exam   Constitutional: She is oriented to person, place, and time. She appears well-developed.  Non-toxic appearance. She does not appear ill. No distress.   HENT:   Head: Normocephalic and atraumatic. "   Ears:   Right Ear: External ear normal.   Left Ear: External ear normal.   Eyes: Conjunctivae are normal. Right eye exhibits no discharge. Left eye exhibits no discharge. No scleral icterus.   Pulmonary/Chest: Effort normal. No stridor. No respiratory distress. She has no wheezes.   Musculoskeletal:        Arms:    Neurological: She is alert and oriented to person, place, and time.   Skin: Skin is not diaphoretic.   Psychiatric: Her behavior is normal. Judgment and thought content normal.   Nursing note and vitals reviewed.      Assessment:     1. Visit for wound check        Plan:       Visit for wound check    Used forceps to raised edges of ends of Steri-Strips stuck to skin.  Middle portions of Steri-Strips over laceration are still intact.  Healing well.  Discussed home care, follow up precautions.

## 2024-05-30 NOTE — PATIENT INSTRUCTIONS
- wash gently daily with soap and water.     - watch for signs of infection including increased redness, swelling, discharge, increased pain, red streaking, fever.  Seek medical attention immediately if these occur.    - Follow up with your PCP or specialty clinic as directed in the next 1-2 weeks if not improved or as needed.  You can call [phone] to schedule an appointment with the appropriate provider.    - Go to the ER or seek medical attention immediately if you develop new or worsening symptoms.     - You must understand that you have received an Urgent Care treatment only and that you may be released before all of your medical problems are known or treated.   - You, the patient, will arrange for follow up care as instructed.   - If your condition worsens or fails to improve we recommend that you receive another evaluation at the ER immediately or contact your PCP to discuss your concerns or return here.

## 2024-06-05 ENCOUNTER — OFFICE VISIT (OUTPATIENT)
Dept: DERMATOLOGY | Facility: CLINIC | Age: 74
End: 2024-06-05
Payer: MEDICARE

## 2024-06-05 DIAGNOSIS — D18.01 CHERRY ANGIOMA: ICD-10-CM

## 2024-06-05 DIAGNOSIS — D22.9 NEVUS: ICD-10-CM

## 2024-06-05 DIAGNOSIS — Z85.828 PERSONAL HISTORY OF SKIN CANCER: ICD-10-CM

## 2024-06-05 DIAGNOSIS — L64.9 ANDROGENETIC ALOPECIA: ICD-10-CM

## 2024-06-05 DIAGNOSIS — L82.1 SK (SEBORRHEIC KERATOSIS): ICD-10-CM

## 2024-06-05 DIAGNOSIS — L21.9 SEBORRHEIC DERMATITIS, UNSPECIFIED: ICD-10-CM

## 2024-06-05 DIAGNOSIS — L30.4 INTERTRIGO: ICD-10-CM

## 2024-06-05 DIAGNOSIS — L81.4 LENTIGO: Primary | ICD-10-CM

## 2024-06-05 PROCEDURE — 1160F RVW MEDS BY RX/DR IN RCRD: CPT | Mod: HCNC,CPTII,S$GLB, | Performed by: DERMATOLOGY

## 2024-06-05 PROCEDURE — 99999 PR PBB SHADOW E&M-EST. PATIENT-LVL IV: CPT | Mod: PBBFAC,HCNC,, | Performed by: DERMATOLOGY

## 2024-06-05 PROCEDURE — 1157F ADVNC CARE PLAN IN RCRD: CPT | Mod: HCNC,CPTII,S$GLB, | Performed by: DERMATOLOGY

## 2024-06-05 PROCEDURE — 1126F AMNT PAIN NOTED NONE PRSNT: CPT | Mod: HCNC,CPTII,S$GLB, | Performed by: DERMATOLOGY

## 2024-06-05 PROCEDURE — 1101F PT FALLS ASSESS-DOCD LE1/YR: CPT | Mod: HCNC,CPTII,S$GLB, | Performed by: DERMATOLOGY

## 2024-06-05 PROCEDURE — 1159F MED LIST DOCD IN RCRD: CPT | Mod: HCNC,CPTII,S$GLB, | Performed by: DERMATOLOGY

## 2024-06-05 PROCEDURE — 99214 OFFICE O/P EST MOD 30 MIN: CPT | Mod: HCNC,S$GLB,, | Performed by: DERMATOLOGY

## 2024-06-05 PROCEDURE — 3288F FALL RISK ASSESSMENT DOCD: CPT | Mod: HCNC,CPTII,S$GLB, | Performed by: DERMATOLOGY

## 2024-06-05 PROCEDURE — G2211 COMPLEX E/M VISIT ADD ON: HCPCS | Mod: HCNC,S$GLB,, | Performed by: DERMATOLOGY

## 2024-06-05 RX ORDER — KETOCONAZOLE 20 MG/G
CREAM TOPICAL
Qty: 60 G | Refills: 3 | Status: SHIPPED | OUTPATIENT
Start: 2024-06-05

## 2024-06-05 NOTE — PATIENT INSTRUCTIONS
There are multiple over the counter hair supplements, few of which have proven efficacy in controlled clinical trials. However, anecdotal reports have indicated a benefit for these vitamins.  Handout reviewing active ingredients, allergies, and proper use were provided for Nutrafol and Viviscal. The patient can elect to take at his/her discretion. If taking biotin, patient should refrain from taking it 1 week before lab work.     Rosemary oil 3x per week  topically to scalp     Zenegen shampoo can by purchased online    Pumpkin seed oil 400 mg daily     Consider oral minoxidil rx : or spironolactone     Discussed benefits and risks of therapy including but not limited to breakthrough bleeding, breast tenderness, and elevated potassium levels which may give symptoms of fatigue, palpitations, and nausea. If patient runs low blood pressure, this could give symptoms of low blood pressure including lightheadedness. Patient should limit potassium intake - avoid potassium supplements or salt substitutes, limit bananas, coconut water and citrus fruits. Pregnancy must be avoided while taking spironolactone.  If high dose or prolonged use of NSAIDS are needed , be sure to stay well hydrated.      Discussed side affects of  minoxidil:  Side effects that you should report to your doctor or health care professional as soon as possible:  chest pain, fast or irregular heartbeat, palpitations  difficulty breathing  dizziness or fainting spells  redness, blistering, peeling or loosening of the skin, including inside the mouth  skin rash or itching  stiff or swollen joints  sudden weight gain  swelling of the feet or legs  unusual weakness  Side effects that usually do not require medical attention (report to your doctor or health care professional if they continue or are bothersome):  headache  unusual hair growth, on the face, arm, and back    Handout given in AVS, patient allowed to ask questions and voiced understanding.    We  would like to see you back in the clinic in 12 months.  You will be able to schedule this appointment by calling or by using your [facility] portal 3 months before this time. Because our schedule fills so quickly, please set a reminder in your phone or on your calendar to schedule 3 months before you are due to come in so that we can see you in a timely fashion.  You should also receive a reminder from us in the mail. This will help us ensure we can continue to provide excellent healthcare for you. Thank you.

## 2024-06-05 NOTE — Clinical Note
Hi- just heads up.  Pt concerned about her hair loss.  I recommended  a very conservative plan for her.  However if she wants to be more aggressive, next steps would be minoxidil 1/4 tablet 2.5 mg or spironolactone 50mg ( really 100 is best but not sure she would tolerate)  .  She is a thin female on BP meds already so would only proceed if this wont affect her from a cardiac standpoint. She plans to ask you at her next appt. Thanks, Fozia

## 2024-06-05 NOTE — PROGRESS NOTES
"  Subjective:      Patient ID:  Angelina Man is a 74 y.o. female who presents for   Chief Complaint   Patient presents with    Skin Check     TBSE    Follow-up     Recheck R index      Pt here today for TBSE  This is a high risk patient here to check for the development of new lesions.    H/o NMSC    Patient is here today for a "mole" check.   Pt has a history of  extensive sun exposure in the past.   Pt recalls several blistering sunburns in the past- several   Pt has history of tanning bed use- yes  Pt has  had moles removed in the past- no  Pt has history of melanoma in first degree relatives-  no    Pt also here for recheck - R index finger - pt feels the lesion is improved.      C/o hairloss for several years.  Progressing and qould like treatment   C/o rash under L arm. No tx  C./o scaly on face               Review of Systems   Skin:  Positive for daily sunscreen use and activity-related sunscreen use. Negative for recent sunburn.   Hematologic/Lymphatic: Bruises/bleeds easily (eliquis).       Objective:   Physical Exam   Constitutional: She appears well-developed and well-nourished. No distress.   HENT:   Ears:    Neurological: She is alert and oriented to person, place, and time. She is not disoriented.   Psychiatric: She has a normal mood and affect.   Skin:   Areas Examined (abnormalities noted in diagram):   Scalp / Hair Palpated and Inspected  Head / Face Inspection Performed  Neck Inspection Performed  Chest / Axilla Inspection Performed  Abdomen Inspection Performed  Genitals / Buttocks / Groin Inspection Performed  Back Inspection Performed  RUE Inspected  LUE Inspection Performed  RLE Inspected  LLE Inspection Performed  Nails and Digits Inspection Performed                         Diagram Legend     Erythematous scaling macule/papule c/w actinic keratosis       Vascular papule c/w angioma      Pigmented verrucoid papule/plaque c/w seborrheic keratosis      Yellow umbilicated papule c/w " sebaceous hyperplasia      Irregularly shaped tan macule c/w lentigo     1-2 mm smooth white papules consistent with Milia      Movable subcutaneous cyst with punctum c/w epidermal inclusion cyst      Subcutaneous movable cyst c/w pilar cyst      Firm pink to brown papule c/w dermatofibroma      Pedunculated fleshy papule(s) c/w skin tag(s)      Evenly pigmented macule c/w junctional nevus     Mildly variegated pigmented, slightly irregular-bordered macule c/w mildly atypical nevus      Flesh colored to evenly pigmented papule c/w intradermal nevus       Pink pearly papule/plaque c/w basal cell carcinoma      Erythematous hyperkeratotic cursted plaque c/w SCC      Surgical scar with no sign of skin cancer recurrence      Open and closed comedones      Inflammatory papules and pustules      Verrucoid papule consistent consistent with wart     Erythematous eczematous patches and plaques     Dystrophic onycholytic nail with subungual debris c/w onychomycosis     Umbilicated papule    Erythematous-base heme-crusted tan verrucoid plaque consistent with inflamed seborrheic keratosis     Erythematous Silvery Scaling Plaque c/w Psoriasis     See annotation      Assessment / Plan:        Lentigo  This is a benign hyperpigmented sun induced lesion. Recommend daily sun protection/avoidance and use of at least SPF 30, broad spectrum sunscreen (OTC drug) will reduce the number of new lesions. Treatment of these benign lesions are considered cosmetic.  The nature of sun-induced photo-aging and skin cancers is discussed.  Sun avoidance, protective clothing, and the use of 30-SPF sunscreens is advised. Observe closely for skin damage/changes, and call if such occurs.    SK (seborrheic keratosis)  These are benign inherited growths without a malignant potential. Reassurance given to patient. No treatment is necessary.     Nevus  Discussed ABCDE's of nevi.  Monitor for new mole or moles that are becoming bigger, darker, irritated, or  developing irregular borders. Brochure provided. Instructed patient to observe lesion(s) for changes and follow up in clinic if changes are noted. Patient to monitor skin at home for new or changing lesions.     Cherry angioma  These are benign vascular lesions that are inherited.  Treatment is not necessary.    Seborrheic dermatitis, unspecified- face   -     ketoconazole (NIZORAL) 2 % cream; aaa bid prn flare under arm and qhs to areas on face  Dispense: 60 g; Refill: 3    Intertrigo- l AXILLA   -     ketoconazole (NIZORAL) 2 % cream; aaa bid prn flare under arm and qhs to areas on face  Dispense: 60 g; Refill: 3    Personal history of skin cancer  Pt with history of non melanoma skin cancer  Total body skin examination performed today including at least 12 points as noted in physical examination. No suspicious lesions noted.Monitor for new or changing lesions as discussed such as pink scaly patches that are occasionally tender or not healing, 'pimples' that never go away, lesions that are not healing or bleeding.     ANDROGENETIC ALOPECIA  There are multiple over the counter hair supplements, few of which have proven efficacy in controlled clinical trials. However, anecdotal reports have indicated a benefit for these vitamins.  Handout reviewing active ingredients, allergies, and proper use were provided for Nutrafol and Viviscal. The patient can elect to take at his/her discretion. If taking biotin, patient should refrain from taking it 1 week before lab work.     Rosemary oil 3x per week  topically to scalp     Zenegen shampoo can by purchased online    Pumpkin seed oil 400 mg daily     Consider oral minoxidil rx : or spironolactone     Discussed benefits and risks of therapy including but not limited to breakthrough bleeding, breast tenderness, and elevated potassium levels which may give symptoms of fatigue, palpitations, and nausea. If patient runs low blood pressure, this could give symptoms of low blood  pressure including lightheadedness. Patient should limit potassium intake - avoid potassium supplements or salt substitutes, limit bananas, coconut water and citrus fruits. Pregnancy must be avoided while taking spironolactone.  If high dose or prolonged use of NSAIDS are needed , be sure to stay well hydrated.      Discussed side affects of  minoxidil:  Side effects that you should report to your doctor or health care professional as soon as possible:  chest pain, fast or irregular heartbeat, palpitations  difficulty breathing  dizziness or fainting spells  redness, blistering, peeling or loosening of the skin, including inside the mouth  skin rash or itching  stiff or swollen joints  sudden weight gain  swelling of the feet or legs  unusual weakness  Side effects that usually do not require medical attention (report to your doctor or health care professional if they continue or are bothersome):  headache  unusual hair growth, on the face, arm, and back    Handout given in AVS, patient allowed to ask questions and voiced understanding.           Follow up in about 1 year (around 6/5/2025) for sooner for hairloss follow up if needed, TBSE.

## 2024-06-11 ENCOUNTER — OFFICE VISIT (OUTPATIENT)
Dept: PODIATRY | Facility: CLINIC | Age: 74
End: 2024-06-11
Payer: MEDICARE

## 2024-06-11 VITALS
HEIGHT: 65 IN | BODY MASS INDEX: 16.66 KG/M2 | SYSTOLIC BLOOD PRESSURE: 111 MMHG | DIASTOLIC BLOOD PRESSURE: 75 MMHG | HEART RATE: 105 BPM | WEIGHT: 100 LBS

## 2024-06-11 DIAGNOSIS — L84 CORNS/CALLOSITIES: Primary | ICD-10-CM

## 2024-06-11 PROCEDURE — 99999 PR PBB SHADOW E&M-EST. PATIENT-LVL IV: CPT | Mod: PBBFAC,HCNC,, | Performed by: PODIATRIST

## 2024-06-11 PROCEDURE — 99499 UNLISTED E&M SERVICE: CPT | Mod: HCNC,,, | Performed by: PODIATRIST

## 2024-06-11 PROCEDURE — 17999 UNLISTD PX SKN MUC MEMB SUBQ: CPT | Mod: CSM,HCNC,S$GLB, | Performed by: PODIATRIST

## 2024-06-11 RX ORDER — DEXAMETHASONE SODIUM PHOSPHATE 10 MG/ML
INJECTION INTRAMUSCULAR; INTRAVENOUS
COMMUNITY
Start: 2024-03-12

## 2024-06-11 RX ORDER — IOHEXOL 300 MG/ML
INJECTION, SOLUTION INTRAVENOUS
COMMUNITY
Start: 2024-03-12

## 2024-06-11 RX ORDER — DICLOFENAC SODIUM 10 MG/G
2 GEL TOPICAL 3 TIMES DAILY
Qty: 100 G | Refills: 3 | Status: SHIPPED | OUTPATIENT
Start: 2024-06-11

## 2024-06-11 RX ORDER — FENTANYL CITRATE 50 UG/ML
INJECTION, SOLUTION INTRAMUSCULAR; INTRAVENOUS
COMMUNITY
Start: 2024-03-12

## 2024-06-11 RX ORDER — MIDAZOLAM HYDROCHLORIDE 1 MG/ML
INJECTION INTRAMUSCULAR; INTRAVENOUS
COMMUNITY
Start: 2024-03-12

## 2024-06-11 RX ORDER — GADOBUTROL 604.72 MG/ML
INJECTION INTRAVENOUS
COMMUNITY
Start: 2024-02-14

## 2024-06-13 NOTE — TELEPHONE ENCOUNTER
----- Message from Brooke Moncada sent at 2/24/2023  3:16 PM CST -----  Regarding: FW: Schedule    ----- Message -----  From: Tanisha Cordero  Sent: 2/24/2023   3:03 PM CST  To: [facility] Triage  Subject: Schedule                                         Pt called about having pain in right eye pt also states she has the feeling of something in eye constantly. Pt was on her back doing piliates when she felt pain. Pt left a message yesterday for Dr. Brown with no response from office.     Requesting call back: [phone]          
Clinic returned phone call to pt in response to in basket msg. Pt states that she gets a sharp pain OD when she turn her head to the left, eye pain lasts for a moment. Pt states that her eyes are bloodshot red and she has some trouble with light sensitivity. Pt states that she uses Systane BID OU with minimal relief. Clinic informed pt that Dr. Brown will be notified and that clinic will follow up with a phone call. Pt noted understanding.  
Detail Level: Simple
Show Aperture Variable?: Yes
Consent: The patient's consent was obtained including but not limited to risks of crusting, scabbing, blistering, scarring, darker or lighter pigmentary change, recurrence, incomplete removal and infection.
Number Of Freeze-Thaw Cycles: 2 freeze-thaw cycles
Post-Care Instructions: I reviewed with the patient in detail post-care instructions. Patient is to wear sunprotection, and avoid picking at any of the treated lesions. Pt may apply Vaseline to crusted or scabbing areas.
Duration Of Freeze Thaw-Cycle (Seconds): 5
Render Note In Bullet Format When Appropriate: No

## 2024-06-16 NOTE — PROGRESS NOTES
General Cardiology Clinic Note  Reason for Visit: 6 month follow up   Last Clinic Visit: 12/18/23   General Cardiologist: Dr. Pereira    HPI:     Angelina Man is a 74 y.o. , who presents for follow up of     PROBLEM LIST:  HFpEF  Hypothyroidism   Persistent atrial fibrillation  Varicose veins  BLE venous insufficiency     -s/p bilateral EVLT (left 11-Nov-2021; right 06-Jan-2022)       Interval HPI:   Is still experiencing dyspnea with exercise that is unchanged from six months ago. She denies worsening of symptoms and states they occur occasionally, not every day when she exercises. She states the shortness of breath is also present when she gets in her car to drive. She exercises daily for 2 hours, alternating between spinning class, cardio, yoga with no complaints of chest pain, chest tightness. She states her legs have been feeling heavier especially during the summer. She wears her compression stockings daily. Does not check BP at home, BP readings have always been in normal range. She denies chest pain/pressure/tightness/discomfort, dyspnea on exertion, orthopnea, PND, palpitations, syncope or claudication.     Last visit with Dr. Pereira: 12/18/23   Patient notes that she has more dyspnea when working out than she use. No dyspnea with normal activities Patient denies any chest discomfort on exertion or at rest. Patient denies any palpitations, lightheadedness, or syncope.  Patient denies any dyspnea at rest or on exertion, orthopnea, or PND. No edema when she wears compression stockings     HPI 05/22/23 (Dr. Pereira)  Patient with BLE venous insufficiency s/p bilateral EVLT (left 11-Nov-2021; right 06-Jan-2022), hypothyroidism, anxiety, arthritis, persistent atrial fibrillation s/p DCCV 17-Jul-2020 but with recurrent chronic AF, and HFpEF.       Patient has dyspnea on exertion with mild exercise.  Also gets Sob in car and ask rest.  Starting to feel more tired in the afternoon. Also notes increase  swelling in her ankles.  Has chronically used 2 pillows.  No PND. Patient denies any chest discomfort on exertion or at rest.  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 Wears compression stockings and does not see edema.     HPI 11/14/22 (Dr. Pereira)  Patient has had symptoms of congestion, greenish mucus tinged with blood, particularly from the right nostril, and some chest congestion.  Patient denies any chest discomfort on exertion or at rest.  Patient denies any dyspnea at rest or on exertion, orthopnea, or PND. Will get edema if she does not wear her compression stockings. Patient denies any palpitations or syncope. Has had decreased appetite.      Her echocardiogram 5 months ago shows slightly improved left ventricular ejection fraction but more importantly a decrease in pulmonary pressures such that she no longer has evidence on echo of pulmonary hypertension.      HPI 06/08/22 (Dr. Pereira)  Patient has intermittent dyspnea, but ot consistent.  Slightly better with the furosemide.  Has increased exercise tolerance. Patient denies any chest discomfort on exertion or at rest. No edema, orthopnea or PND. Patient denies any palpitations or syncope. Has some orthostatic lightheadedness.  Has some positional vertigo.    HPI 05/13/22 (Dr. Pereira)  Has shortness of breath that comes and goes, sometimes when she is working out.  Unpredictable, but worse in the heat.  Has been there from before onset of atrial fibrillation.  The patient was sent for an echocardiogram by cardiac electrophysiology.  There was mild pulmonary hypertension on the echocardiogram the patient was subsequently sent for a right heart catheterization.  The right heart catheterization showed normal resting hemodynamics but an increase in wedge pressure and pulmonary artery pressure with exertion.  Patient was therefore classified as WHO Group 2 pulmonary hypertension and was started on  spironolactone.  The patient has not noticed any significant difference since starting on spironolactone.     Patient denies any dyspnea at rest or on exertion, orthopnea, or PND. Has edema but relieved with compression stockings. Patient denies any palpitations or syncope. Has unpredictable occasions of lightheadedness, sometimes orthostatic in origin    HPI 11/03/21 (Dr. Pereira)  Patient with hypothyroidism, anxiety, arthritis, and persistent atrial fibrillation s/p DCCV 17-Jul-2020.  Patient found to be in atrial fibrillation on last clinic visit (21-Jun-2021) and referred for DCCV, which occurred on 17-Jul-2021 and given a trial of propafenone, but went back into atrial fibrillation and Propafenone was stopped.     Patient notes that she will have more dyspnea when stresses.  Was walking in the [place] a week ago without any dyspnea but noted shortness of breath in the car returning to [facility] [place] Otherwise,  Patient denies any dyspnea at rest or on exertion, orthopnea, or PND. Less edema since using compression stockings.  Patient to get ELCV in 1 week. Patient denies any palpitations, lightheadedness, or syncope. Patient denies any chest discomfort on exertion or at rest.      Patient has had COVID-19 vaccination, including booster shot.     ROS:    Review of Systems   Constitutional:  Negative for chills, fever, malaise/fatigue and weight loss.   HENT: Negative.     Eyes: Negative.    Respiratory:  Positive for shortness of breath. Negative for cough and wheezing.    Cardiovascular:  Positive for leg swelling (venous insufficiency). Negative for chest pain, palpitations, orthopnea and claudication.   Gastrointestinal: Negative.    Genitourinary: Negative.    Musculoskeletal: Negative.    Skin: Negative.    Neurological: Negative.    Endo/Heme/Allergies:  Bruises/bleeds easily.   Psychiatric/Behavioral: Negative.       PMH:     Past Medical History:   Diagnosis Date    Arthritis      Atrial fibrillation     Bronchitis     Cataract     Dry eyes     GERD (gastroesophageal reflux disease)     Glaucoma, suspect - Both Eyes     Hypothyroidism 06/23/2016    Pulmonary hypertension     Rash     Renal angiomyolipoma     Renal disorder     SCC (squamous cell carcinoma) 07/2023    left lower lateral shin    SCC (squamous cell carcinoma) 04/2023    L mid lateral shin    SCC (squamous cell carcinoma) 09/2023    L mid lower shin    Spinal stenosis     Squamous cell carcinoma     in-situ right upper inner arm, right wrist    Status post lumbar surgery 06/23/2016    Stress fracture     Thyroid disease     Venous insufficiency      Past Surgical History:   Procedure Laterality Date    ANGIOPLASTY      CATARACT EXTRACTION W/  INTRAOCULAR LENS IMPLANT Left 12/6/2022    Procedure: EXTRACTION, CATARACT, WITH IOL INSERTION;  Surgeon: Tone Brown MD;  Location: [facility] OR;  Service: Ophthalmology;  Laterality: Left;    CATARACT EXTRACTION W/  INTRAOCULAR LENS IMPLANT Right 12/20/2022    Procedure: EXTRACTION, CATARACT, WITH IOL INSERTION;  Surgeon: Tone Brown MD;  Location: [facility];  Service: Ophthalmology;  Laterality: Right;    CERVICAL FUSION      COLONOSCOPY      COLONOSCOPY N/A 11/14/2017    Procedure: COLONOSCOPY;  Surgeon: Kasi Mercado MD;  Location: [facility] (4TH FLR);  Service: Endoscopy;  Laterality: N/A;    COLONOSCOPY N/A 4/26/2023    Procedure: COLONOSCOPY;  Surgeon: Jeanmarie Hathaway MD;  Location: [facility] (4TH FLR);  Service: Colon and Rectal;  Laterality: N/A;  ok to hold Eliquis 2 days per Dr Pereira  constipation-ext Miralax prep  instr mailed/portal-GT  4/21/23 pre call attempted, no answer    COLONOSCOPY N/A 5/28/2024    Procedure: COLONOSCOPY;  Surgeon: Jeanmarie Hathaway MD;  Location: [facility] (4TH FLR);  Service: Endoscopy;  Laterality: N/A;  referral Dr. Hathaway. Annual Colonoscopy for High Risk. Golytely prep instr. to portal Pending Eliquis Hold from Dr. Hernandez  Randall. PMH Afib.EC  ok to hold Eliquis 2 days per Dr Pereira-GT  5/21- precall confirmed; instructions re-sent via portal, aware of holding eliquis     ESOPHAGOGASTRODUODENOSCOPY N/A 10/10/2019    Procedure: EGD (ESOPHAGOGASTRODUODENOSCOPY);  Surgeon: Rg Milton MD;  Location: [facility] ENDO (4TH FLR);  Service: Endoscopy;  Laterality: N/A;    ESOPHAGOGASTRODUODENOSCOPY N/A 10/6/2021    Procedure: EGD (ESOPHAGOGASTRODUODENOSCOPY);  Surgeon: Rg Milton MD;  Location: [facility] ENDO (4TH FLR);  Service: Endoscopy;  Laterality: N/A;  covid test 10/3 [place], instr emailed/portal -ml    EXCISION Left 5/17/2023    Procedure: EXCISION SQUAMOUS CELL CARCINOMA LEFT LEG;  Surgeon: Kasi Canela Jr., MD;  Location: [facility] OR 2ND FLR;  Service: General;  Laterality: Left;    l4-5 mid discectomy Left 03/2017    l4-5 MIS diskectomy Right 05/2016    RIGHT HEART CATHETERIZATION Right 1/27/2022    Procedure: INSERTION, CATHETER, RIGHT HEART;  Surgeon: Satnam Pickard Jr., MD;  Location: [facility] CATH LAB;  Service: Cardiology;  Laterality: Right;    TRANSFORAMINAL EPIDURAL INJECTION OF STEROID Bilateral 3/12/2024    Procedure: LUMBAR TRANSFORAMINAL BILATERAL L3/4 *ELIQUIS CLEARANCE IN CHART*;  Surgeon: Sheree Abbott MD;  Location: [facility] [place] PAIN MGT;  Service: Pain Management;  Laterality: Bilateral;  [phone]  2 WK F/U [place]    TREATMENT OF CARDIAC ARRHYTHMIA N/A 7/17/2020    Procedure: CARDIOVERSION;  Surgeon: Kwan Bray MD;  Location: [facility] EP LAB;  Service: Cardiology;  Laterality: N/A;  AF,DCCV/SANGITA, ANES, SK, 746    TREATMENT OF CARDIAC ARRHYTHMIA N/A 7/14/2021    Procedure: CARDIOVERSION;  Surgeon: SYDNIE Cedillo MD;  Location: [facility] EP LAB;  Service: Cardiology;  Laterality: N/A;  AF, SANGITA, DCCV, MAC, EH, 3 Prep    WASHOUT Right 5/17/2023    Procedure: WASHOUT right lower arm and closure;  Surgeon: Kasi Canela Jr., MD;  [facility];  Service: General;  Laterality: Right;     Allergies:   Review of  patient's allergies indicates:  No Known Allergies  Medications:     Current Outpatient Medications on File Prior to Visit   Medication Sig Dispense Refill    acetaminophen (TYLENOL) 500 MG tablet Take 500 mg by mouth every 6 (six) hours as needed for Pain.      acyclovir (ZOVIRAX) 400 MG tablet Take 1 tablet (400 mg total) by mouth once daily. 180 tablet 1    ascorbic acid (VITAMIN C) 1000 MG tablet Take 1,000 mg by mouth once daily.       biotin 1 mg tablet Take 1 mg by mouth 2 (two) times daily.       buPROPion (WELLBUTRIN SR) 150 MG TBSR 12 hr tablet Take 1 tablet (150 mg total) by mouth 2 (two) times daily. 180 tablet 3    dexamethasone sodium phosp/PF (DEXAMETHASONE SODIUM PHOS, PF,) 10 mg/mL Soln       diclofenac sodium (VOLTAREN) 1 % Gel Apply 2 g topically 3 (three) times daily. Apply to the area of toe pain 2-3x per day or night as needed 100 g 3    digestive enzymes Tab Take 1 tablet by mouth once daily.       ELIQUIS 5 mg Tab TAKE 1 TABLET(5 MG) BY MOUTH TWICE DAILY 180 tablet 3    fentaNYL (SUBLIMAZE) 50 mcg/mL injection       fluticasone propionate (FLONASE) 50 mcg/actuation nasal spray 1 spray (50 mcg total) by Each Nostril route 2 (two) times daily as needed for Rhinitis. 16 g 11    furosemide (LASIX) 20 MG tablet Take 1 tablet (20 mg total) by mouth every other day. 15 tablet 11    GADAVIST 1 mmol/mL (604.72 mg/mL) Soln injection       GAVILYTE-G 236-22.74-6.74 -5.86 gram suspension SMARTSIG:Milliliter(s) By Mouth      ketoconazole (NIZORAL) 2 % cream aaa bid prn flare under arm and qhs to areas on face 60 g 3    levothyroxine (SYNTHROID) 137 MCG Tab tablet Take 1 tablet (137 mcg total) by mouth before breakfast. 90 tablet 3    lubiprostone (AMITIZA) 24 MCG Cap Take 1 capsule (24 mcg total) by mouth daily as needed (IBS symptoms). 30 capsule 6    methocarbamoL (ROBAXIN) 500 MG Tab Take 1 tablet (500 mg total) by mouth 3 (three) times daily as needed (mus). 90 tablet 0    metoprolol succinate  (TOPROL-XL) 25 MG 24 hr tablet Take 1 tablet (25 mg total) by mouth once daily. 90 tablet 3    midazolam, PF, (VERSED) 1 mg/mL Soln injection       mirtazapine (REMERON) 7.5 MG Tab Take 1 tablet (7.5 mg total) by mouth nightly. 90 tablet 3    multivitamin (THERAGRAN) per tablet Take 1 tablet by mouth once daily.       OMNIPAQUE 300 300 mg iodine/mL injection       pramipexole (MIRAPEX) 0.25 MG tablet TAKE 1 TABLET(0.25 MG) BY MOUTH EVERY EVENING FOR RESTLESS LEGS 90 tablet 3    traZODone (DESYREL) 50 MG tablet Take 1 tablet (50 mg total) by mouth nightly as needed for Insomnia. 90 tablet 3    zinc 50 mg Tab Take 50 mg by mouth once daily.        No current facility-administered medications on file prior to visit.     Social History:     Social History     Tobacco Use    Smoking status: Never     Passive exposure: Never    Smokeless tobacco: Never   Substance Use Topics    Alcohol use: Yes     Alcohol/week: 4.0 standard drinks of alcohol     Types: 4 Glasses of wine per week     Comment: 2 glasses of wine on weekends     Family History:     Family History   Problem Relation Name Age of Onset    Cancer Mother          Lung cancer    Heart failure Father      Colon cancer Father      Hypertension Father      Cataracts Father      Cancer Father  80        colon    No Known Problems Sister      No Known Problems Brother      Melanoma Daughter      Diabetes Son      Heart disease Son      Cancer Son          esophageal cancer    No Known Problems Maternal Aunt      No Known Problems Maternal Uncle      No Known Problems Paternal Aunt      No Known Problems Paternal Uncle      No Known Problems Maternal Grandmother      No Known Problems Maternal Grandfather      No Known Problems Paternal Grandmother      No Known Problems Paternal Grandfather      Amblyopia Neg Hx      Blindness Neg Hx      Glaucoma Neg Hx      Macular degeneration Neg Hx      Retinal detachment Neg Hx      Strabismus Neg Hx      Stroke Neg Hx       "Thyroid disease Neg Hx      Breast cancer Neg Hx      Ovarian cancer Neg Hx       Physical Exam:   /68   Pulse 79   Ht 5' 5" (1.651 m)   Wt 42.6 kg (94 lb)   LMP  (LMP Unknown)   SpO2 100%   BMI 15.64 kg/m²      Physical Exam  Constitutional:       General: She is not in acute distress.     Appearance: Normal appearance. She is not ill-appearing.   HENT:      Head: Normocephalic and atraumatic.      Nose: Nose normal.      Mouth/Throat:      Mouth: Mucous membranes are moist.      Pharynx: Oropharynx is clear. No oropharyngeal exudate or posterior oropharyngeal erythema.   Eyes:      General:         Right eye: No discharge.         Left eye: No discharge.      Extraocular Movements: Extraocular movements intact.      Conjunctiva/sclera: Conjunctivae normal.   Neck:      Vascular: No carotid bruit.   Cardiovascular:      Rate and Rhythm: Rhythm irregularly irregular.      Pulses:           Carotid pulses are 2+ on the right side and 2+ on the left side.       Radial pulses are 2+ on the right side and 2+ on the left side.        Dorsalis pedis pulses are 2+ on the right side and 2+ on the left side.        Posterior tibial pulses are 2+ on the right side and 2+ on the left side.      Heart sounds: Normal heart sounds. No murmur heard.     No friction rub. No gallop.   Pulmonary:      Effort: Pulmonary effort is normal. No respiratory distress.      Breath sounds: Normal breath sounds. No stridor. No wheezing or rales.   Musculoskeletal:         General: No swelling. Normal range of motion.      Cervical back: Normal range of motion. No rigidity or tenderness.      Right lower leg: No edema.      Left lower leg: No edema.   Skin:     General: Skin is warm and dry.      Coloration: Skin is not jaundiced.      Findings: Bruising (on anticoagulant) present.   Neurological:      General: No focal deficit present.      Mental Status: She is alert and oriented to person, place, and time. Mental status is at " baseline.   Psychiatric:         Mood and Affect: Mood normal.         Behavior: Behavior normal.         Thought Content: Thought content normal.        Labs:     Blood Tests:  Lab Results   Component Value Date     (H) 12/28/2021     01/10/2024    K 3.9 01/10/2024     01/10/2024    CO2 26 01/10/2024    BUN 15 01/10/2024    CREATININE 0.8 01/10/2024    GLU 81 01/10/2024    HGBA1C 5.4 08/24/2015    MG 1.6 12/28/2021    AST 18 01/10/2024    ALT 17 01/10/2024    ALBUMIN 3.6 01/10/2024    PROT 6.4 01/10/2024    BILITOT 0.5 01/10/2024    WBC 4.23 01/10/2024    HGB 13.1 01/10/2024    HCT 40.9 01/10/2024     (H) 01/10/2024     01/10/2024    INR 1.1 07/07/2021    TSH 1.643 01/10/2024       Lab Results   Component Value Date    CHOL 197 01/10/2024    HDL 68 01/10/2024    TRIG 86 01/10/2024       Lab Results   Component Value Date    LDLCALC 111.8 01/10/2024       Lab Results   Component Value Date    TSH 1.643 01/10/2024       Lab Results   Component Value Date    HGBA1C 5.4 08/24/2015     Imaging:     Echocardiogram  TTE 11/02/22  The left ventricle is normal in size with normal systolic function. The estimated ejection fraction is 60%.  Normal right ventricular size with normal right ventricular systolic function.  Moderate left atrial enlargement.  Mild mitral regurgitation.  Mild tricuspid regurgitation.  Small anterior pericardial effusion.  The estimated PA systolic pressure is 27 mmHg.  Intermediate central venous pressure (8 mmHg).  Atrial fibrillation observed.    TTE 11/22/21  The left ventricle is normal in size with low normal systolic function. The estimated ejection fraction is 50%.  Normal right ventricular size with low normal right ventricular systolic function.  Biatrial enlargement.  Mild mitral regurgitation.  Mild tricuspid regurgitation.  The estimated PA systolic pressure is 44 mmHg.  There is pulmonary hypertension.  Elevated central venous pressure (15  mmHg).  Trivial circumferential pericardial effusion.  Atrial fibrillation observed.    Stress testing  Nuclear stress test 01/04/24    Normal myocardial perfusion scan. There is no evidence of myocardial ischemia or infarction.    The gated perfusion images showed an ejection fraction of 54% at rest. The gated perfusion images showed an ejection fraction of 61% post stress. Normal ejection fraction is greater than 53%.    There is normal wall motion at rest and post stress.    LV cavity size is normal at rest and normal at stress.    The ECG portion of the study is negative for ischemia. Sensitivity is reduced secondary to the target heart rate not being achieved.    The patient reported no chest pain during the stress test.    During stress, atrial fibrillation is noted.    The exercise capacity was average.    There are no prior studies for comparison.    Cath Lab  [facility] 01/27/22  At rest:                Right atrial pressure is normal.                Pulmonary capillary wedge pressure is normal.                Pulmonary artery pressure is normal.                Pulmonary vascular resistance is normal.                Thermodilution cardiac output and index is normal.     With exercise: there is a significant rise in pulmonary capillary wedge pressure                Pulmonary capillary wedge pressure rises significantly and is moderately elevated.                PA pressure is mildly elevated.                Thermodilution CO increases significantly.                Pulmonary vascular resistance is normal.     Closely after exercise:                Pulmonary artery pressure is mildly elevated.                Pulmonary capillary wedge pressure is mildly elevated.     FINAL DIAGNOSIS:                WHO Group 2 pulmonary hypertension    Other  CV Exercise STEPHANIE w Toe Tracings 10/02/23    Normal resting ABIs bilaterally.    Exercise ABIs are within normal limits bilaterally.    TBIs are suggestive of mild bilateral lower  extremity arterial disease.    Digit waveforms are severely dampened at all digits bilaterally.    CV US Lower Extremity Veins Bilateral 10/02/23    There is no evidence of a right lower extremity DVT.    The right common femoral vein has reflux.    The right greater saphenous vein has reflux.    There is no evidence of a left lower extremity DVT.    The left greater saphenous vein has reflux    US Lower Extremity Veins Insufficiency Right 22  The right lesser saphenous vein is partially compressible consistent with SVT.  The contralateral (left) common femoral vein is patent. .  The right greater saphenous vein is occluded consistent with successful prior EVLT.    Holter monitor 24 hr   Baseline rhythm was coarse atrial fibrillation with narrow QRS complexes and an average heart rate of 79 bpm.  There were no reported symptoms.  There were very rare PVCs with an overall PVC burden of 0%. There was one run of nonsustained VT lasting 12 beats.  There was an asymptomatic pause lasting 2.3 seconds while in atrial fibrillation, occurring during sleep.  The patient remained in atrial fibrillation for the duration of study.    EK24: Atrial fibrillation 63 bpm (Personally reviewed)    Assessment:     1. Longstanding persistent atrial fibrillation    2. Varicose veins of both lower extremities with complications    3. History of cardioversion    4. Venous insufficiency of both lower extremities    5. Chronic heart failure with preserved ejection fraction    6. Shortness of breath      Plan:     Longstanding persistent atrial fibrillation  History of cardioversion  Chronic. Stable.  Continue Eliquis 5 mg BID   Continue metoprolol 25 mg daily   Followed by EP with Dr. Bray     Varicose veins of both lower extremities with complications  Venous insufficiency of both lower extremities  Followed by Dr. Hampton  Continue compression stockings   Continue to limit sodium intake to 2,000 mg daily  Elevate  legs    Chronic heart failure with preserved ejection fraction  Stable. EF 54%   Continue Furosemide 20 mg every other day   Continue metoprolol 25 mg daily     Shortness of breath   Patient still experiencing shortness of breath with exercise, has not worsened and appears to be stable. Nuclear stress test unremarkable   CPX study placed to rule out exercised induced asthma or pulm etiology       Follow up in 6 months     Signed:  Sharon Lagos, PA-C [facility]     6/17/2024 5:20 PM    Follow-up:     Future Appointments   Date Time Provider Department Center   6/19/2024  5:00 PM [facility] OIC-US1 MASTER [facility] ULTR [facility]   6/24/2024  2:20 PM Soha Aaron NP [facility] UROLOG [facility]   7/3/2024  2:15 PM CPX [facility] ECHOSTR [facility]   7/23/2024  2:00 PM John Jeffers DPM [facility] POD [facility]   8/22/2024  2:30 PM Sheree Abbott MD [facility]   8/27/2024  2:00 PM Jeanmarie Cedeño MD PhD [facility] PERVASC [facility]   10/17/2024  1:15 PM INJECTION [facility] AMB INF [facility]

## 2024-06-17 ENCOUNTER — OFFICE VISIT (OUTPATIENT)
Dept: CARDIOLOGY | Facility: CLINIC | Age: 74
End: 2024-06-17
Payer: MEDICARE

## 2024-06-17 VITALS
HEIGHT: 65 IN | OXYGEN SATURATION: 100 % | SYSTOLIC BLOOD PRESSURE: 117 MMHG | DIASTOLIC BLOOD PRESSURE: 68 MMHG | HEART RATE: 79 BPM | BODY MASS INDEX: 15.66 KG/M2 | WEIGHT: 94 LBS

## 2024-06-17 DIAGNOSIS — I87.2 VENOUS INSUFFICIENCY OF BOTH LOWER EXTREMITIES: ICD-10-CM

## 2024-06-17 DIAGNOSIS — I83.893 VARICOSE VEINS OF BOTH LOWER EXTREMITIES WITH COMPLICATIONS: ICD-10-CM

## 2024-06-17 DIAGNOSIS — R06.02 SHORTNESS OF BREATH: ICD-10-CM

## 2024-06-17 DIAGNOSIS — Z92.89 HISTORY OF CARDIOVERSION: ICD-10-CM

## 2024-06-17 DIAGNOSIS — I50.32 CHRONIC HEART FAILURE WITH PRESERVED EJECTION FRACTION: ICD-10-CM

## 2024-06-17 DIAGNOSIS — I48.11 LONGSTANDING PERSISTENT ATRIAL FIBRILLATION: Primary | ICD-10-CM

## 2024-06-17 PROCEDURE — 1159F MED LIST DOCD IN RCRD: CPT | Mod: HCNC,CPTII,S$GLB, | Performed by: INTERNAL MEDICINE

## 2024-06-17 PROCEDURE — 3074F SYST BP LT 130 MM HG: CPT | Mod: HCNC,CPTII,S$GLB, | Performed by: INTERNAL MEDICINE

## 2024-06-17 PROCEDURE — 1160F RVW MEDS BY RX/DR IN RCRD: CPT | Mod: HCNC,CPTII,S$GLB, | Performed by: INTERNAL MEDICINE

## 2024-06-17 PROCEDURE — 1157F ADVNC CARE PLAN IN RCRD: CPT | Mod: HCNC,CPTII,S$GLB, | Performed by: INTERNAL MEDICINE

## 2024-06-17 PROCEDURE — 1101F PT FALLS ASSESS-DOCD LE1/YR: CPT | Mod: HCNC,CPTII,S$GLB, | Performed by: INTERNAL MEDICINE

## 2024-06-17 PROCEDURE — 99214 OFFICE O/P EST MOD 30 MIN: CPT | Mod: HCNC,S$GLB,, | Performed by: INTERNAL MEDICINE

## 2024-06-17 PROCEDURE — 99999 PR PBB SHADOW E&M-EST. PATIENT-LVL V: CPT | Mod: PBBFAC,HCNC,, | Performed by: INTERNAL MEDICINE

## 2024-06-17 PROCEDURE — 3078F DIAST BP <80 MM HG: CPT | Mod: HCNC,CPTII,S$GLB, | Performed by: INTERNAL MEDICINE

## 2024-06-17 PROCEDURE — 3288F FALL RISK ASSESSMENT DOCD: CPT | Mod: HCNC,CPTII,S$GLB, | Performed by: INTERNAL MEDICINE

## 2024-06-19 ENCOUNTER — HOSPITAL ENCOUNTER (OUTPATIENT)
Dept: RADIOLOGY | Facility: HOSPITAL | Age: 74
Discharge: HOME | End: 2024-06-19
Attending: NURSE PRACTITIONER
Payer: MEDICARE

## 2024-06-19 DIAGNOSIS — Q61.02 MULTIPLE RENAL CYSTS: ICD-10-CM

## 2024-06-19 DIAGNOSIS — D17.71 ANGIOMYOLIPOMA OF RIGHT KIDNEY: ICD-10-CM

## 2024-06-19 PROCEDURE — 76775 US EXAM ABDO BACK WALL LIM: CPT | Mod: 26,HCNC,, | Performed by: RADIOLOGY

## 2024-06-19 PROCEDURE — 76775 US EXAM ABDO BACK WALL LIM: CPT | Mod: TC,HCNC

## 2024-06-23 DIAGNOSIS — G47.00 INSOMNIA, UNSPECIFIED TYPE: ICD-10-CM

## 2024-06-23 RX ORDER — TRAZODONE HYDROCHLORIDE 50 MG/1
50 TABLET ORAL NIGHTLY PRN
Qty: 90 TABLET | Refills: 1 | Status: SHIPPED | OUTPATIENT
Start: 2024-06-23

## 2024-06-23 NOTE — TELEPHONE ENCOUNTER
No care due was identified.  [facility] Embedded Care Due Messages. Reference number: [id number].   6/23/2024 11:24:11 AM CDT

## 2024-06-23 NOTE — TELEPHONE ENCOUNTER
Refill Decision Note   Angelina Magda  is requesting a refill authorization.  Brief Assessment and Rationale for Refill:  Approve     Medication Therapy Plan:         Comments:     Note composed:1:28 PM 06/23/2024

## 2024-06-24 ENCOUNTER — OFFICE VISIT (OUTPATIENT)
Dept: UROLOGY | Facility: CLINIC | Age: 74
End: 2024-06-24
Payer: MEDICARE

## 2024-06-24 VITALS
HEIGHT: 65 IN | SYSTOLIC BLOOD PRESSURE: 108 MMHG | WEIGHT: 97 LBS | DIASTOLIC BLOOD PRESSURE: 71 MMHG | BODY MASS INDEX: 16.16 KG/M2 | HEART RATE: 79 BPM

## 2024-06-24 DIAGNOSIS — D17.71 ANGIOMYOLIPOMA OF RIGHT KIDNEY: Primary | ICD-10-CM

## 2024-06-24 DIAGNOSIS — N28.1 BILATERAL RENAL CYSTS: ICD-10-CM

## 2024-06-24 LAB
BILIRUB SERPL-MCNC: NORMAL MG/DL
BLOOD URINE, POC: NORMAL
CLARITY, POC UA: CLEAR
COLOR, POC UA: YELLOW
GLUCOSE UR QL STRIP: NORMAL
KETONES UR QL STRIP: NORMAL
LEUKOCYTE ESTERASE URINE, POC: NORMAL
NITRITE, POC UA: NORMAL
PH, POC UA: 5
PROTEIN, POC: NORMAL
SPECIFIC GRAVITY, POC UA: 1.02
UROBILINOGEN, POC UA: NORMAL

## 2024-06-24 PROCEDURE — 81002 URINALYSIS NONAUTO W/O SCOPE: CPT | Mod: HCNC,S$GLB,, | Performed by: NURSE PRACTITIONER

## 2024-06-24 PROCEDURE — 1159F MED LIST DOCD IN RCRD: CPT | Mod: HCNC,CPTII,S$GLB, | Performed by: NURSE PRACTITIONER

## 2024-06-24 PROCEDURE — 1126F AMNT PAIN NOTED NONE PRSNT: CPT | Mod: HCNC,CPTII,S$GLB, | Performed by: NURSE PRACTITIONER

## 2024-06-24 PROCEDURE — 1101F PT FALLS ASSESS-DOCD LE1/YR: CPT | Mod: HCNC,CPTII,S$GLB, | Performed by: NURSE PRACTITIONER

## 2024-06-24 PROCEDURE — 3288F FALL RISK ASSESSMENT DOCD: CPT | Mod: HCNC,CPTII,S$GLB, | Performed by: NURSE PRACTITIONER

## 2024-06-24 PROCEDURE — 3074F SYST BP LT 130 MM HG: CPT | Mod: HCNC,CPTII,S$GLB, | Performed by: NURSE PRACTITIONER

## 2024-06-24 PROCEDURE — 99999 PR PBB SHADOW E&M-EST. PATIENT-LVL IV: CPT | Mod: PBBFAC,HCNC,, | Performed by: NURSE PRACTITIONER

## 2024-06-24 PROCEDURE — 1157F ADVNC CARE PLAN IN RCRD: CPT | Mod: HCNC,CPTII,S$GLB, | Performed by: NURSE PRACTITIONER

## 2024-06-24 PROCEDURE — 1160F RVW MEDS BY RX/DR IN RCRD: CPT | Mod: HCNC,CPTII,S$GLB, | Performed by: NURSE PRACTITIONER

## 2024-06-24 PROCEDURE — 3078F DIAST BP <80 MM HG: CPT | Mod: HCNC,CPTII,S$GLB, | Performed by: NURSE PRACTITIONER

## 2024-06-24 PROCEDURE — 99214 OFFICE O/P EST MOD 30 MIN: CPT | Mod: HCNC,S$GLB,, | Performed by: NURSE PRACTITIONER

## 2024-06-24 NOTE — PROGRESS NOTES
CHIEF COMPLAINT:        HISTORY OF PRESENTING ILLINESS:    Angelina Man is a 74 y.o. female who is an established patient of our clinic. She has a history of renal cysts and AML's.   Was seen in clinic with Dr. Bryant 03/30/2021 with imaging.   Last clinic visit was  06/19/2023    Here today for f/u visit with imaging.   Voices no complaints.   Ok with urination  FOS is good for her. No straining or leaking.   Has never seen blood in her urine.  No abdominal or flank pain.     Exercises regularly; attempting to drink more water.   Planning trip to [facility] in Sept.         Kidney US done 06/19/2024:   FINDINGS:  Right kidney: The right kidney measures 10.5 cm. No cortical thinning. No loss of corticomedullary distinction. Resistive index measures 0.72.  Middle renal cyst measures 0.8 x 0.4 x 0.7 cm.  Two hyperechoic foci measure 0.8 cm and 0.6 cm (previously 0.8 cm and 0.5 cm).  No renal stone. No hydronephrosis.     Left kidney: The left kidney measures 10.7 cm. No cortical thinning. No loss of corticomedullary distinction. Resistive index measures 0.71.  Upper pole nonobstructive stone measure 0.3 cm.  Two simple renal cysts measure 1.7 cm and 1.4 cm (previously measure 1.5 cm and 0.9 cm).  No hydronephrosis.     Splenic RI: 0.68     Impression:     Right subcentimeter hyperechoic foci, likely angiomyolipoma.     Bilateral renal cysts.     Punctate nonobstructing left renal calculus.            REVIEW OF SYSTEMS:  Review of Systems   Constitutional: Negative.  Negative for chills and fever.   Eyes:  Negative for double vision.   Respiratory:  Negative for cough and shortness of breath.    Cardiovascular:  Negative for chest pain and palpitations.   Gastrointestinal:  Negative for abdominal pain, constipation, diarrhea, nausea and vomiting.   Genitourinary:  Negative for dysuria, flank pain, frequency and hematuria.        See HPI   Musculoskeletal:  Negative for falls.   Neurological:  Negative for dizziness  and seizures.   Endo/Heme/Allergies:  Negative for polydipsia.         PATIENT HISTORY:    Past Medical History:   Diagnosis Date    Arthritis     Atrial fibrillation     Bronchitis     Cataract     Dry eyes     GERD (gastroesophageal reflux disease)     Glaucoma, suspect - Both Eyes     Hypothyroidism 06/23/2016    Pulmonary hypertension     Rash     Renal angiomyolipoma     Renal disorder     SCC (squamous cell carcinoma) 07/2023    left lower lateral shin    SCC (squamous cell carcinoma) 04/2023    L mid lateral shin    SCC (squamous cell carcinoma) 09/2023    L mid lower shin    Spinal stenosis     Squamous cell carcinoma     in-situ right upper inner arm, right wrist    Status post lumbar surgery 06/23/2016    Stress fracture     Thyroid disease     Venous insufficiency        Past Surgical History:   Procedure Laterality Date    ANGIOPLASTY      CATARACT EXTRACTION W/  INTRAOCULAR LENS IMPLANT Left 12/6/2022    Procedure: EXTRACTION, CATARACT, WITH IOL INSERTION;  Surgeon: Tone Brown MD;  Location: [facility];  Service: Ophthalmology;  Laterality: Left;    CATARACT EXTRACTION W/  INTRAOCULAR LENS IMPLANT Right 12/20/2022    Procedure: EXTRACTION, CATARACT, WITH IOL INSERTION;  Surgeon: Tone Brown MD;  Location: [facility];  Service: Ophthalmology;  Laterality: Right;    CERVICAL FUSION      COLONOSCOPY      COLONOSCOPY N/A 11/14/2017    Procedure: COLONOSCOPY;  Surgeon: Kasi Mercado MD;  [facility]  Service: Endoscopy;  Laterality: N/A;    COLONOSCOPY N/A 4/26/2023    Procedure: COLONOSCOPY;  Surgeon: Jeanmarie Hathaway MD;  [facility] ([facility]);  Service: Colon and Rectal;  Laterality: N/A;  ok to hold Eliquis 2 days per Dr Pereira  constipation-ext Miralax prep  instr mailed/portal-GT  4/21/23 pre call attempted, no answer    COLONOSCOPY N/A 5/28/2024    Procedure: COLONOSCOPY;  Surgeon: Jeanmarie Hathaway MD;  Location: [facility] ENDO (4TH FLR);  Service:  Endoscopy;  Laterality: N/A;  referral Dr. Hathaway. Annual Colonoscopy for High Risk. Golytely prep instr. to portal Pending Eliquis Hold from Dr. David Pereira. [facility] Afib.EC  ok to hold Eliquis 2 days per Dr Pereira-GT  5/21- precall confirmed; instructions re-sent via portal, aware of holding eliquis     ESOPHAGOGASTRODUODENOSCOPY N/A 10/10/2019    Procedure: EGD (ESOPHAGOGASTRODUODENOSCOPY);  Surgeon: Rg Milton MD;  Location: [facility] (4TH FLR);  Service: Endoscopy;  Laterality: N/A;    ESOPHAGOGASTRODUODENOSCOPY N/A 10/6/2021    Procedure: EGD (ESOPHAGOGASTRODUODENOSCOPY);  Surgeon: Rg Milton MD;  Location: [facility] (4TH FLR);  Service: Endoscopy;  Laterality: N/A;  covid test 10/3 [place], instr emailed/portal -ml    EXCISION Left 5/17/2023    Procedure: EXCISION SQUAMOUS CELL CARCINOMA LEFT LEG;  Surgeon: Kasi Canela Jr., MD;  Location: [facility] OR 2ND FLR;  Service: General;  Laterality: Left;    l4-5 mid discectomy Left 03/2017    l4-5 MIS diskectomy Right 05/2016    RIGHT HEART CATHETERIZATION Right 1/27/2022    Procedure: INSERTION, CATHETER, RIGHT HEART;  Surgeon: Satnam Pickard Jr., MD;  Location: [facility] CATH LAB;  Service: Cardiology;  Laterality: Right;    TRANSFORAMINAL EPIDURAL INJECTION OF STEROID Bilateral 3/12/2024    Procedure: LUMBAR TRANSFORAMINAL BILATERAL L3/4 *ELIQUIS CLEARANCE IN CHART*;  Surgeon: Sheree Abbott MD;  Location: [facility] PAIN MGT;  Service: Pain Management;  Laterality: Bilateral;  [phone]  2 WK F/U [place]    TREATMENT OF CARDIAC ARRHYTHMIA N/A 7/17/2020    Procedure: CARDIOVERSION;  Surgeon: Kwan Bray MD;  Location: [facility] EP LAB;  Service: Cardiology;  Laterality: N/A;  AF,DCCV/SANGITA, ANES, SK, 746    TREATMENT OF CARDIAC ARRHYTHMIA N/A 7/14/2021    Procedure: CARDIOVERSION;  Surgeon: SYDNIE Cedillo MD;  Location: [facility] EP LAB;  Service: Cardiology;  Laterality: N/A;  AF, SANGITA, DCCV, MAC, EH, 3 Prep    WASHOUT Right 5/17/2023    Procedure: WASHOUT right  lower arm and closure;  Surgeon: Kasi Canela Jr., MD;  Location: [facility] OR [address] [place] [address];  Service: General;  Laterality: Right;       Family History   Problem Relation Name Age of Onset    Cancer Mother          Lung cancer    Heart failure Father      Colon cancer Father      Hypertension Father      Cataracts Father      Cancer Father  80        colon    No Known Problems Sister      No Known Problems Brother      Melanoma Daughter      Diabetes Son      Heart disease Son      Cancer Son          esophageal cancer    No Known Problems Maternal Aunt      No Known Problems Maternal Uncle      No Known Problems Paternal Aunt      No Known Problems Paternal Uncle      No Known Problems Maternal Grandmother      No Known Problems Maternal Grandfather      No Known Problems Paternal Grandmother      No Known Problems Paternal Grandfather      Amblyopia Neg Hx      Blindness Neg Hx      Glaucoma Neg Hx      Macular degeneration Neg Hx      Retinal detachment Neg Hx      Strabismus Neg Hx      Stroke Neg Hx      Thyroid disease Neg Hx      Breast cancer Neg Hx      Ovarian cancer Neg Hx         Social History     Socioeconomic History    Marital status: Single   Occupational History     Employer: [employer]   Tobacco Use    Smoking status: Never     Passive exposure: Never    Smokeless tobacco: Never   Substance and Sexual Activity    Alcohol use: Yes     Alcohol/week: 4.0 standard drinks of alcohol     Types: 4 Glasses of wine per week     Comment: 2 glasses of wine on weekends    Drug use: No    Sexual activity: Never   Other Topics Concern    Are you pregnant or think you may be? No    Breast-feeding No     Social Determinants of Health     Financial Resource Strain: Low Risk  (2/29/2024)    Overall Financial Resource Strain (CARDIA)     Difficulty of Paying Living Expenses: Not very hard   Food Insecurity: No Food Insecurity (2/29/2024)    Hunger Vital Sign     Worried About Running Out of Food in the Last  Year: Never true     Ran Out of Food in the Last Year: Never true   Transportation Needs: No Transportation Needs (2/29/2024)    PRAPARE - Transportation     Lack of Transportation (Medical): No     Lack of Transportation (Non-Medical): No   Physical Activity: Sufficiently Active (2/29/2024)    Exercise Vital Sign     Days of Exercise per Week: 7 days     Minutes of Exercise per Session: 60 min   Stress: No Stress Concern Present (2/29/2024)    [place] [place] [employer] - Occupational Stress Questionnaire     Feeling of Stress : Not at all   Housing Stability: Low Risk  (2/29/2024)    Housing Stability Vital Sign     Unable to Pay for Housing in the Last Year: No     Number of Places Lived in the Last Year: 1     Unstable Housing in the Last Year: No       Allergies:  Patient has no known allergies.    Medications:    Current Outpatient Medications:     acetaminophen (TYLENOL) 500 MG tablet, Take 500 mg by mouth every 6 (six) hours as needed for Pain., Disp: , Rfl:     acyclovir (ZOVIRAX) 400 MG tablet, Take 1 tablet (400 mg total) by mouth once daily., Disp: 180 tablet, Rfl: 1    ascorbic acid (VITAMIN C) 1000 MG tablet, Take 1,000 mg by mouth once daily. , Disp: , Rfl:     biotin 1 mg tablet, Take 1 mg by mouth 2 (two) times daily. , Disp: , Rfl:     buPROPion (WELLBUTRIN SR) 150 MG TBSR 12 hr tablet, Take 1 tablet (150 mg total) by mouth 2 (two) times daily., Disp: 180 tablet, Rfl: 3    dexamethasone sodium phosp/PF (DEXAMETHASONE SODIUM PHOS, PF,) 10 mg/mL Soln, , Disp: , Rfl:     diclofenac sodium (VOLTAREN) 1 % Gel, Apply 2 g topically 3 (three) times daily. Apply to the area of toe pain 2-3x per day or night as needed, Disp: 100 g, Rfl: 3    digestive enzymes Tab, Take 1 tablet by mouth once daily. , Disp: , Rfl:     ELIQUIS 5 mg Tab, TAKE 1 TABLET(5 MG) BY MOUTH TWICE DAILY, Disp: 180 tablet, Rfl: 3    fentaNYL (SUBLIMAZE) 50 mcg/mL injection, , Disp: , Rfl:     fluticasone propionate  (FLONASE) 50 mcg/actuation nasal spray, 1 spray (50 mcg total) by Each Nostril route 2 (two) times daily as needed for Rhinitis., Disp: 16 g, Rfl: 11    furosemide (LASIX) 20 MG tablet, Take 1 tablet (20 mg total) by mouth every other day., Disp: 15 tablet, Rfl: 11    GADAVIST 1 mmol/mL (604.72 mg/mL) Soln injection, , Disp: , Rfl:     GAVILYTE-G 236-22.74-6.74 -5.86 gram suspension, SMARTSIG:Milliliter(s) By Mouth, Disp: , Rfl:     ketoconazole (NIZORAL) 2 % cream, aaa bid prn flare under arm and qhs to areas on face, Disp: 60 g, Rfl: 3    levothyroxine (SYNTHROID) 137 MCG Tab tablet, Take 1 tablet (137 mcg total) by mouth before breakfast., Disp: 90 tablet, Rfl: 3    lubiprostone (AMITIZA) 24 MCG Cap, Take 1 capsule (24 mcg total) by mouth daily as needed (IBS symptoms)., Disp: 30 capsule, Rfl: 6    methocarbamoL (ROBAXIN) 500 MG Tab, Take 1 tablet (500 mg total) by mouth 3 (three) times daily as needed (mus)., Disp: 90 tablet, Rfl: 0    metoprolol succinate (TOPROL-XL) 25 MG 24 hr tablet, Take 1 tablet (25 mg total) by mouth once daily., Disp: 90 tablet, Rfl: 3    midazolam, PF, (VERSED) 1 mg/mL Soln injection, , Disp: , Rfl:     mirtazapine (REMERON) 7.5 MG Tab, Take 1 tablet (7.5 mg total) by mouth nightly., Disp: 90 tablet, Rfl: 3    multivitamin (THERAGRAN) per tablet, Take 1 tablet by mouth once daily. , Disp: , Rfl:     OMNIPAQUE 300 300 mg iodine/mL injection, , Disp: , Rfl:     pramipexole (MIRAPEX) 0.25 MG tablet, TAKE 1 TABLET(0.25 MG) BY MOUTH EVERY EVENING FOR RESTLESS LEGS, Disp: 90 tablet, Rfl: 3    traZODone (DESYREL) 50 MG tablet, TAKE 1 TABLET(50 MG) BY MOUTH EVERY NIGHT AS NEEDED FOR INSOMNIA, Disp: 90 tablet, Rfl: 1    zinc 50 mg Tab, Take 50 mg by mouth once daily. , Disp: , Rfl:     PHYSICAL EXAMINATION:  Physical Exam  Vitals reviewed.   Constitutional:       General: She is awake. She is not in acute distress.     Appearance: Normal appearance. She is well-developed. She is not  toxic-appearing.   HENT:      Head: Normocephalic and atraumatic.      Right Ear: External ear normal.      Left Ear: External ear normal.      Nose: Nose normal.   Eyes:      General: Lids are normal.         Right eye: No discharge.         Left eye: No discharge.      Conjunctiva/sclera: Conjunctivae normal.   Neck:      Trachea: Trachea normal.   Cardiovascular:      Rate and Rhythm: Normal rate.   Pulmonary:      Effort: Pulmonary effort is normal. No respiratory distress.   Abdominal:      General: Abdomen is flat. There is no distension.      Palpations: Abdomen is soft.      Tenderness: There is no abdominal tenderness. There is no right CVA tenderness, left CVA tenderness, guarding or rebound.   Musculoskeletal:         General: Normal range of motion.      Cervical back: Normal range of motion and neck supple.   Skin:     General: Skin is warm and dry.   Neurological:      General: No focal deficit present.      Mental Status: She is alert and oriented to person, place, and time.   Psychiatric:         Speech: Speech normal.         Behavior: Behavior normal. Behavior is cooperative.         Thought Content: Thought content normal.         Judgment: Judgment normal.           LABS:      In office UA today was clear of active infection and blood.         Lab Results   Component Value Date    CREATININE 0.8 01/10/2024    EGFRNORACEVR >60.0 01/10/2024               IMPRESSION:    Encounter Diagnoses   Name Primary?    Angiomyolipoma of right kidney Yes    Bilateral renal cysts          Assessment:       1. Angiomyolipoma of right kidney    2. Bilateral renal cysts        Plan:         I spent 30 minutes with the patient of which more than half was spent in direct consultation with the patient in regards to our treatment and plan.  We addressed the office findings and recent labs; any possible need to go the ER today.   Education and recommendations of today's plan of care including home remedies and needed  follow up with PCP.   We discussed the chief complaints; reviewed the imaging. Discussed AML and cysts. And management.   Reassurance no infection or visible blood seen in today's urine sample  Reviewed management;Recommended lifestyle modifications with a proper, healthy diet, good hydration but during the day. Reducing bladder irritants.   Benefits of regular exercise   US of kidneys one year

## 2024-07-01 RX ORDER — FUROSEMIDE 20 MG/1
TABLET ORAL
Qty: 15 TABLET | Refills: 11 | Status: SHIPPED | OUTPATIENT
Start: 2024-07-01

## 2024-07-03 ENCOUNTER — HOSPITAL ENCOUNTER (OUTPATIENT)
Dept: CARDIOLOGY | Facility: HOSPITAL | Age: 74
Discharge: HOME | End: 2024-07-03
Payer: MEDICARE

## 2024-07-03 VITALS
HEIGHT: 65 IN | WEIGHT: 99 LBS | DIASTOLIC BLOOD PRESSURE: 94 MMHG | BODY MASS INDEX: 16.5 KG/M2 | HEART RATE: 65 BPM | SYSTOLIC BLOOD PRESSURE: 119 MMHG

## 2024-07-03 DIAGNOSIS — R06.02 SHORTNESS OF BREATH: ICD-10-CM

## 2024-07-03 LAB
CV STRESS BASE HR: 65 BPM
DIASTOLIC BLOOD PRESSURE: 94 MMHG
OHS CV CPX 1 MINUTE RECOVERY HEART RATE: 106 BPM
OHS CV CPX 85 PERCENT MAX PREDICTED HEART RATE MALE: 122
OHS CV CPX ANAEROBIC THRESHOLD DIASTOLIC BLOOD PRESSURE: 74 MMHG
OHS CV CPX ANAEROBIC THRESHOLD HEART RATE: 112
OHS CV CPX ANAEROBIC THRESHOLD RATE PRESSURE PRODUCT: NORMAL
OHS CV CPX ANAEROBIC THRESHOLD SYSTOLIC BLOOD PRESSURE: 113
OHS CV CPX DATA GRADE - AT: 12.4
OHS CV CPX DATA GRADE - PEAK: 14.4
OHS CV CPX DATA O2 SAT - PEAK: 98
OHS CV CPX DATA O2 SAT - REST: 98
OHS CV CPX DATA SPEED - AT: 3.4
OHS CV CPX DATA SPEED - PEAK: 3.8
OHS CV CPX DATA TIME - AT: 6.02
OHS CV CPX DATA TIME - PEAK: 6.77
OHS CV CPX DATA VE/VCO2 - AT: 43
OHS CV CPX DATA VE/VCO2 - PEAK: 44
OHS CV CPX DATA VE/VO2 - AT: 34
OHS CV CPX DATA VE/VO2 - PEAK: 44
OHS CV CPX DATA VO2 - AT: 17.1
OHS CV CPX DATA VO2 - PEAK: 22.8
OHS CV CPX DATA VO2 - REST: 4.7
OHS CV CPX FEV1/FVC: 0.86
OHS CV CPX FORCED EXPIRATORY VOLUME: 2.05
OHS CV CPX FORCED VITAL CAPACITY (FVC): 2.38
OHS CV CPX HIGHEST VO: 25.1
OHS CV CPX MAX PREDICTED HEART RATE: 144
OHS CV CPX MAXIMAL VOLUNTARY VENTILATION (MVV) PREDICTED: 82
OHS CV CPX MAXIMAL VOLUNTARY VENTILATION (MVV): 66
OHS CV CPX MAXIUMUM EXERCISE VENTILATION (VE MAX): 37.1
OHS CV CPX PATIENT AGE: 76
OHS CV CPX PATIENT HEIGHT IN: 65
OHS CV CPX PATIENT IS FEMALE AGE 11-19: 0
OHS CV CPX PATIENT IS FEMALE AGE GREATER THAN 19: 1
OHS CV CPX PATIENT IS FEMALE AGE LESS THAN 11: 0
OHS CV CPX PATIENT IS FEMALE: 1
OHS CV CPX PATIENT IS MALE AGE 11-25: 0
OHS CV CPX PATIENT IS MALE AGE GREATER THAN 25: 0
OHS CV CPX PATIENT IS MALE AGE LESS THAN 11: 0
OHS CV CPX PATIENT IS MALE GREATER THAN 18: 0
OHS CV CPX PATIENT IS MALE LESS THAN OR EQUAL TO 18: 0
OHS CV CPX PATIENT IS MALE: 0
OHS CV CPX PATIENT WEIGHT RETURNED IN OZ: 1584
OHS CV CPX PEAK DIASTOLIC BLOOD PRESSURE: 69 MMHG
OHS CV CPX PEAK HEAR RATE: 116 BPM
OHS CV CPX PEAK RATE PRESSURE PRODUCT: NORMAL
OHS CV CPX PEAK SYSTOLIC BLOOD PRESSURE: 105 MMHG
OHS CV CPX PERCENT BODY FAT: 8.3
OHS CV CPX PERCENT MAX PREDICTED HEART RATE ACHIEVED: 83
OHS CV CPX PREDICTED VO2: 25.1 ML/KG/MIN
OHS CV CPX RATE PRESSURE PRODUCT PRESENTING: 7735
OHS CV CPX REST PET CO2: 22
OHS CV CPX VE/VCO2 SLOPE: 39.4
STRESS ECHO POST EXERCISE DUR MIN: 6 MINUTES
STRESS ECHO POST EXERCISE DUR SEC: 46 SECONDS
SYSTOLIC BLOOD PRESSURE: 119 MMHG

## 2024-07-03 PROCEDURE — 94621 CARDIOPULM EXERCISE TESTING: CPT | Mod: 26,,, | Performed by: INTERNAL MEDICINE

## 2024-07-03 PROCEDURE — 94621 CARDIOPULM EXERCISE TESTING: CPT

## 2024-07-09 ENCOUNTER — PATIENT MESSAGE (OUTPATIENT)
Dept: CARDIOLOGY | Facility: CLINIC | Age: 74
End: 2024-07-09
Payer: MEDICARE

## 2024-07-11 ENCOUNTER — PATIENT MESSAGE (OUTPATIENT)
Dept: CARDIOLOGY | Facility: CLINIC | Age: 74
End: 2024-07-11
Payer: MEDICARE

## 2024-07-19 ENCOUNTER — PATIENT MESSAGE (OUTPATIENT)
Dept: PODIATRY | Facility: CLINIC | Age: 74
End: 2024-07-19
Payer: MEDICARE

## 2024-07-23 ENCOUNTER — OFFICE VISIT (OUTPATIENT)
Dept: PODIATRY | Facility: CLINIC | Age: 74
End: 2024-07-23
Payer: MEDICARE

## 2024-07-23 DIAGNOSIS — L84 CORNS/CALLOSITIES: Primary | ICD-10-CM

## 2024-07-23 PROCEDURE — 99499 UNLISTED E&M SERVICE: CPT | Mod: HCNC,,, | Performed by: PODIATRIST

## 2024-07-23 PROCEDURE — 17999 UNLISTD PX SKN MUC MEMB SUBQ: CPT | Mod: CSM,HCNC,S$GLB, | Performed by: PODIATRIST

## 2024-07-23 NOTE — PROGRESS NOTES
Pt presents for routine non-covered foot care. Pt does not have high risk feet, pedal pulses are palpable. Calluses are thick and painful. Diagnosis is corns or calluses. Calluses debrided to healthy tissue without incident and pt tolerated well. RTC prn as PROC B.

## 2024-08-16 ENCOUNTER — PATIENT MESSAGE (OUTPATIENT)
Dept: CARDIOLOGY | Facility: CLINIC | Age: 74
End: 2024-08-16
Payer: MEDICARE

## 2024-08-22 ENCOUNTER — OFFICE VISIT (OUTPATIENT)
Dept: SPINE | Facility: CLINIC | Age: 74
End: 2024-08-22
Payer: MEDICARE

## 2024-08-22 VITALS
DIASTOLIC BLOOD PRESSURE: 65 MMHG | BODY MASS INDEX: 16.5 KG/M2 | HEIGHT: 65 IN | RESPIRATION RATE: 18 BRPM | WEIGHT: 99 LBS | SYSTOLIC BLOOD PRESSURE: 117 MMHG | HEART RATE: 83 BPM

## 2024-08-22 DIAGNOSIS — M79.18 MYOFASCIAL PAIN: Primary | ICD-10-CM

## 2024-08-22 DIAGNOSIS — M48.062 SPINAL STENOSIS OF LUMBAR REGION WITH NEUROGENIC CLAUDICATION: ICD-10-CM

## 2024-08-22 PROCEDURE — 3078F DIAST BP <80 MM HG: CPT | Mod: HCNC,CPTII,S$GLB, | Performed by: ANESTHESIOLOGY

## 2024-08-22 PROCEDURE — 1101F PT FALLS ASSESS-DOCD LE1/YR: CPT | Mod: HCNC,CPTII,S$GLB, | Performed by: ANESTHESIOLOGY

## 2024-08-22 PROCEDURE — 1159F MED LIST DOCD IN RCRD: CPT | Mod: HCNC,CPTII,S$GLB, | Performed by: ANESTHESIOLOGY

## 2024-08-22 PROCEDURE — 3074F SYST BP LT 130 MM HG: CPT | Mod: HCNC,CPTII,S$GLB, | Performed by: ANESTHESIOLOGY

## 2024-08-22 PROCEDURE — 99999 PR PBB SHADOW E&M-EST. PATIENT-LVL IV: CPT | Mod: PBBFAC,HCNC,, | Performed by: ANESTHESIOLOGY

## 2024-08-22 PROCEDURE — 1125F AMNT PAIN NOTED PAIN PRSNT: CPT | Mod: HCNC,CPTII,S$GLB, | Performed by: ANESTHESIOLOGY

## 2024-08-22 PROCEDURE — 1157F ADVNC CARE PLAN IN RCRD: CPT | Mod: HCNC,CPTII,S$GLB, | Performed by: ANESTHESIOLOGY

## 2024-08-22 PROCEDURE — 99214 OFFICE O/P EST MOD 30 MIN: CPT | Mod: HCNC,S$GLB,, | Performed by: ANESTHESIOLOGY

## 2024-08-22 PROCEDURE — 3288F FALL RISK ASSESSMENT DOCD: CPT | Mod: HCNC,CPTII,S$GLB, | Performed by: ANESTHESIOLOGY

## 2024-08-22 NOTE — PROGRESS NOTES
PCP: Zina Rockwell MD    REFERRING PHYSICIAN: No ref. provider found    CHIEF COMPLAINT: Right leg pain    Original HISTORY OF PRESENT ILLNESS: Angelina Man presents to the clinic for the evaluation of the above pain. This has been up and down for the past 5 years or so.     Original Pain Description:  The pain is located in the right buttock and is radiating to the right calf . The pain is described as aching. Exacerbating factors: Bending and Getting out of bed/chair. Mitigating factors heat. Symptoms interfere with daily activity. The patient feels like symptoms have been worsening. Patient reports sometimes stubbing her right foot.    Original PAIN SCORES:  Best: Pain is 4  Worst: Pain is 7  Current: Pain is 5        5/22/2024     3:35 PM   Last 3 PDI Scores   Pain Disability Index (PDI) 6       INTERVAL HISTORY: (Newest visit at the bottom)   Interval History 2/26/24:   Angelina Man returns today for follow-up. Since last seen, they report their pain has been persistant. It continues to be shooting with constant numbness from mid calf throughout her foot. Is not limited in distance with walking. Regularly walks at least a mile per day. She denies new bowel or bladder dysfunction, weakness, or numbness.    Interval History 3/21/24:  Patient presents today for evaluation of neck pain radiating into her head that started earlier this week.  She has seen us in the past for her lumbar radiculopathy.  She received bilateral L3/4 TFESI on 3/12/2024 and reports that her symptoms have improved greatly since then.  She has also been in physical therapy for her back pain and weakness in her right leg, which has also been going very well.  Today, she reports that on 3/18/2024, she began having severe 9/10 neck pain that radiated into her head.  She denies any trauma or inciting event.  Along with the pain, she also experienced muscle spasms and tightness in her neck and shoulders that made it difficult for her to  turn her head left or right.  Over the past few days, the pain has continued to improve, and she reports being a 4/10 today. She has been taking Tylenol arthritis, which she has found very helpful. She denies any numbness, tingling, weakness, bowel/bladder dysfunction, or saddle anesthesia associated with this pain.  Of note, she does have a history of prior C4-6 ACDF with C5 corpectomy in 2010.      Interval History 4/3/2024:  Angelina Man presents for follow-up s/p Bilateral L3/4 TFESI on 3/12/2024.  She saw Dr Abbott 9 days s/p procedure on 3/21/2024 and she reported significant improvement in lower back pain at that time. At that visit she was reporting severe neck pain that radiated into the head with associated muscle spasms and tightness. This pain has since resolved, but the limited range of motion continues. She continues to report right lower back pain at waist that radiates to lateral aspect of right thigh. She describes the pain as aching.  Exacerbating factors: exercising, walking and stooping.  The patient continues physical therapy for her lower back and has started for her neck.  She continues 1 hour spin classes followed by 1 hour weightlifting classes three times weekly and 1 hour of cardio followed by an hour of yoga twice weekly. She is currently training for a trip to [facility] in September. The patient denies fever/night sweats, urinary incontinence, bowel incontinence, significant weight changes, significant motor weakness or changes, or loss of sensations. Her pain today is 3/10.      Interval History 5/22/24:  74 year old male presents in follow up regarding right sided low back pain and associated right leg pain. Patient also has been seen for neck pain. At last visit patient had neck muscle spasms. She finds the robaxin we prescribed did provide good benefit. She has taken 2 doses with good benefit.  Today patient reports that she has 1-2/10 pain that is predominantly localized to the  right side of the low back.  Patient reports that pain previously traveled down the right leg however this has now subsided since getting the epidural steroid injection.  Patient reports that her pain is very well controlled at this time.  Patient reports that she gets really good benefit with physical therapy exercises and she has not been doing the exercises enough.  Patient is planning to go to [facility] in September of 2024  and would like to be in maximal pain relief at the time of this vacation. Patient denies recent falls, trauma, hospitalizations, or infections. Patient has no new onset numbness, tingling, or weakness. Patient denies new onset bowel or bladder incontinence. Patient denies periectal numbness or saddle anesthesia.      Interval History 8/22/24:  Angelina Man presents to clinic for follow up before her trip to [facility]. At our last visit we were discussing her neck, which at this point, she finds to be manageable. Today, her main issue is the low back and the right leg. She notes that possibly when she over does it, her neck and low back start bothering her. They are non-radiating. When this occurs she takes the Robaxen we prescribed and she feels like that controls it. On exam the right buttock does have some tenderness to palpation, which is likely why the muscle relaxors are helping.       6 weeks of Conservative therapy:  PT: Currently doing Physical Therapy, March 2024-present  Chiro: Yes  HEP: Continuing HEP daily as prescribed at PT above. Also does yoga twice weekly.       Treatments / Medications: (Ice/Heat/NSAIDS/APAP/etc):  Heat - helps  APAP - helps    Interventional Pain Procedures: (Previous injections)  Multiple TFESI prior to Lumbar Surgery  3/12/24: Bilateral L3/4 TFESI 75% relief    Past Medical History:   Diagnosis Date    Arthritis     Atrial fibrillation     Bronchitis     Cataract     Dry eyes     GERD (gastroesophageal reflux disease)     Glaucoma, suspect - Both Eyes      Hypothyroidism 06/23/2016    Pulmonary hypertension     Rash     Renal angiomyolipoma     Renal disorder     SCC (squamous cell carcinoma) 07/2023    left lower lateral shin    SCC (squamous cell carcinoma) 04/2023    L mid lateral shin    SCC (squamous cell carcinoma) 09/2023    L mid lower shin    Spinal stenosis     Squamous cell carcinoma     in-situ right upper inner arm, right wrist    Status post lumbar surgery 06/23/2016    Stress fracture     Thyroid disease     Venous insufficiency      Past Surgical History:   Procedure Laterality Date    ANGIOPLASTY      CATARACT EXTRACTION W/  INTRAOCULAR LENS IMPLANT Left 12/6/2022    Procedure: EXTRACTION, CATARACT, WITH IOL INSERTION;  Surgeon: Tone Brown MD;  Location: [facility];  Service: Ophthalmology;  Laterality: Left;    CATARACT EXTRACTION W/  INTRAOCULAR LENS IMPLANT Right 12/20/2022    Procedure: EXTRACTION, CATARACT, WITH IOL INSERTION;  Surgeon: Tone Brown MD;  Location: [facility] OR;  Service: Ophthalmology;  Laterality: Right;    CERVICAL FUSION      COLONOSCOPY      COLONOSCOPY N/A 11/14/2017    Procedure: COLONOSCOPY;  Surgeon: Kasi Mercado MD;  Location: [facility] (4TH FLR);  Service: Endoscopy;  Laterality: N/A;    COLONOSCOPY N/A 4/26/2023    Procedure: COLONOSCOPY;  Surgeon: Jeanmarie Hathaway MD;  Location: [facility] (4TH FLR);  Service: Colon and Rectal;  Laterality: N/A;  ok to hold Eliquis 2 days per Dr Pereira  constipation-ext Miralax prep  instr mailed/portal-GT  4/21/23 pre call attempted, no answer    COLONOSCOPY N/A 5/28/2024    Procedure: COLONOSCOPY;  Surgeon: Jeanmarie Hathaway MD;  [facility]  Service: Endoscopy;  Laterality: N/A;  referral Dr. Hathaway. Annual Colonoscopy for High Risk. Golytely prep instr. to portal Pending Eliquis Hold from Dr. David Pereira. [facility] Afib.EC  ok to hold Eliquis 2 days per Dr Pereira-GT  5/21- precall confirmed; instructions re-sent via portal, aware of holding  eliquis     ESOPHAGOGASTRODUODENOSCOPY N/A 10/10/2019    Procedure: EGD (ESOPHAGOGASTRODUODENOSCOPY);  Surgeon: Rg Milton MD;  Location: [facility] ENDO (4TH FLR);  Service: Endoscopy;  Laterality: N/A;    ESOPHAGOGASTRODUODENOSCOPY N/A 10/6/2021    Procedure: EGD (ESOPHAGOGASTRODUODENOSCOPY);  Surgeon: Rg Milton MD;  Location: [facility] ENDO (4TH FLR);  Service: Endoscopy;  Laterality: N/A;  covid test 10/3 [place], instr emailed/portal -ml    EXCISION Left 5/17/2023    Procedure: EXCISION SQUAMOUS CELL CARCINOMA LEFT LEG;  Surgeon: Kasi Canela Jr., MD;  [facility];  Service: General;  Laterality: Left;    l4-5 mid discectomy Left 03/2017    l4-5 MIS diskectomy Right 05/2016    RIGHT HEART CATHETERIZATION Right 1/27/2022    Procedure: INSERTION, CATHETER, RIGHT HEART;  Surgeon: Satnam Pickard Jr., MD;  Location: [facility] CATH LAB;  Service: Cardiology;  Laterality: Right;    TRANSFORAMINAL EPIDURAL INJECTION OF STEROID Bilateral 3/12/2024    Procedure: LUMBAR TRANSFORAMINAL BILATERAL L3/4 *ELIQUIS CLEARANCE IN CHART*;  Surgeon: Sheree Abbott MD;  Location: [facility] PAIN MGT;  Service: Pain Management;  Laterality: Bilateral;  [phone]  2 WK F/U [place]    TREATMENT OF CARDIAC ARRHYTHMIA N/A 7/17/2020    Procedure: CARDIOVERSION;  Surgeon: Kwan Bray MD;  Location: [facility] EP LAB;  Service: Cardiology;  Laterality: N/A;  AF,DCCV/SANGITA, ANES, SK, 746    TREATMENT OF CARDIAC ARRHYTHMIA N/A 7/14/2021    Procedure: CARDIOVERSION;  Surgeon: SYDNIE Cedillo MD;  Location: [facility] EP LAB;  Service: Cardiology;  Laterality: N/A;  AF, SANGITA, DCCV, MAC, EH, 3 Prep    WASHOUT Right 5/17/2023    Procedure: WASHOUT right lower arm and closure;  Surgeon: Kasi Canela Jr., MD;  Location: [facility] OR [facility] FLR;  Service: General;  Laterality: Right;     Social History     Socioeconomic History    Marital status: Single   Occupational History     Employer: [employer]   Tobacco Use    Smoking status:  Never     Passive exposure: Never    Smokeless tobacco: Never   Substance and Sexual Activity    Alcohol use: Yes     Alcohol/week: 4.0 standard drinks of alcohol     Types: 4 Glasses of wine per week     Comment: 2 glasses of wine on weekends    Drug use: No    Sexual activity: Never   Other Topics Concern    Are you pregnant or think you may be? No    Breast-feeding No     Social Determinants of Health     Financial Resource Strain: Low Risk  (2/29/2024)    Overall Financial Resource Strain (CARDIA)     Difficulty of Paying Living Expenses: Not very hard   Food Insecurity: No Food Insecurity (2/29/2024)    Hunger Vital Sign     Worried About Running Out of Food in the Last Year: Never true     Ran Out of Food in the Last Year: Never true   Transportation Needs: No Transportation Needs (2/29/2024)    PRAPARE - Transportation     Lack of Transportation (Medical): No     Lack of Transportation (Non-Medical): No   Physical Activity: Sufficiently Active (2/29/2024)    Exercise Vital Sign     Days of Exercise per Week: 7 days     Minutes of Exercise per Session: 60 min   Stress: No Stress Concern Present (2/29/2024)    [employer] - Occupational Stress Questionnaire     Feeling of Stress : Not at all   Housing Stability: Low Risk  (2/29/2024)    Housing Stability Vital Sign     Unable to Pay for Housing in the Last Year: No     Number of Places Lived in the Last Year: 1     Unstable Housing in the Last Year: No     Family History   Problem Relation Name Age of Onset    Cancer Mother          Lung cancer    Heart failure Father      Colon cancer Father      Hypertension Father      Cataracts Father      Cancer Father  80        colon    No Known Problems Sister      No Known Problems Brother      Melanoma Daughter      Diabetes Son      Heart disease Son      Cancer Son          esophageal cancer    No Known Problems Maternal Aunt      No Known Problems Maternal Uncle      No Known Problems  Paternal Aunt      No Known Problems Paternal Uncle      No Known Problems Maternal Grandmother      No Known Problems Maternal Grandfather      No Known Problems Paternal Grandmother      No Known Problems Paternal Grandfather      Amblyopia Neg Hx      Blindness Neg Hx      Glaucoma Neg Hx      Macular degeneration Neg Hx      Retinal detachment Neg Hx      Strabismus Neg Hx      Stroke Neg Hx      Thyroid disease Neg Hx      Breast cancer Neg Hx      Ovarian cancer Neg Hx         Review of patient's allergies indicates:  No Known Allergies    Current Outpatient Medications   Medication Sig    acetaminophen (TYLENOL) 500 MG tablet Take 500 mg by mouth every 6 (six) hours as needed for Pain.    acyclovir (ZOVIRAX) 400 MG tablet Take 1 tablet (400 mg total) by mouth once daily.    ascorbic acid (VITAMIN C) 1000 MG tablet Take 1,000 mg by mouth once daily.     biotin 1 mg tablet Take 1 mg by mouth 2 (two) times daily.     buPROPion (WELLBUTRIN SR) 150 MG TBSR 12 hr tablet Take 1 tablet (150 mg total) by mouth 2 (two) times daily.    dexamethasone sodium phosp/PF (DEXAMETHASONE SODIUM PHOS, PF,) 10 mg/mL Soln     digestive enzymes Tab Take 1 tablet by mouth once daily.     ELIQUIS 5 mg Tab TAKE 1 TABLET(5 MG) BY MOUTH TWICE DAILY    fentaNYL (SUBLIMAZE) 50 mcg/mL injection     fluticasone propionate (FLONASE) 50 mcg/actuation nasal spray 1 spray (50 mcg total) by Each Nostril route 2 (two) times daily as needed for Rhinitis.    furosemide (LASIX) 20 MG tablet TAKE 1 TABLET(20 MG) BY MOUTH EVERY OTHER DAY    levothyroxine (SYNTHROID) 137 MCG Tab tablet Take 1 tablet (137 mcg total) by mouth before breakfast.    lubiprostone (AMITIZA) 24 MCG Cap Take 1 capsule (24 mcg total) by mouth daily as needed (IBS symptoms).    methocarbamoL (ROBAXIN) 500 MG Tab Take 1 tablet (500 mg total) by mouth 3 (three) times daily as needed (mus).    metoprolol succinate (TOPROL-XL) 25 MG 24 hr tablet Take 1 tablet (25 mg total) by mouth  "once daily.    multivitamin (THERAGRAN) per tablet Take 1 tablet by mouth once daily.     pramipexole (MIRAPEX) 0.25 MG tablet TAKE 1 TABLET(0.25 MG) BY MOUTH EVERY EVENING FOR RESTLESS LEGS    traZODone (DESYREL) 50 MG tablet TAKE 1 TABLET(50 MG) BY MOUTH EVERY NIGHT AS NEEDED FOR INSOMNIA    zinc 50 mg Tab Take 50 mg by mouth once daily.     diclofenac sodium (VOLTAREN) 1 % Gel Apply 2 g topically 3 (three) times daily. Apply to the area of toe pain 2-3x per day or night as needed (Patient not taking: Reported on 8/22/2024)    GADAVIST 1 mmol/mL (604.72 mg/mL) Soln injection     GAVILYTE-G 236-22.74-6.74 -5.86 gram suspension SMARTSIG:Milliliter(s) By Mouth    ketoconazole (NIZORAL) 2 % cream aaa bid prn flare under arm and qhs to areas on face    midazolam, PF, (VERSED) 1 mg/mL Soln injection     mirtazapine (REMERON) 7.5 MG Tab Take 1 tablet (7.5 mg total) by mouth nightly.    OMNIPAQUE 300 300 mg iodine/mL injection      No current facility-administered medications for this visit.       ROS:  GENERAL: No fever. No chills. No fatigue. Denies weight loss. Denies weight gain.  HEENT: Denies headaches. Denies vision change. Denies eye pain. Denies double vision. Denies ear pain.   CV: Denies chest pain.   PULM: Denies of shortness of breath.  GI: Denies constipation. No diarrhea. No abdominal pain. Denies nausea. Denies vomiting. No blood in stool.  HEME: Denies bleeding problems.  : Denies urgency. No painful urination. No blood in urine.  MS: Denies joint stiffness. Denies joint swelling.  Denies back pain.  SKIN: Denies rash.   NEURO: Denies seizures. No weakness.  PSYCH:  Denies difficulty sleeping. No anxiety. Denies depression. No suicidal thoughts.       VITALS:   Vitals:    08/22/24 1405   BP: 117/65   Pulse: 83   Resp: 18   Weight: 44.9 kg (98 lb 15.8 oz)   Height: 5' 5" (1.651 m)   PainSc:   2   PainLoc: Hip           PHYSICAL EXAM:   GENERAL: Well appearing, in no acute distress, alert and oriented " x3.  PSYCH:  Mood and affect appropriate.  SKIN: Skin color, texture, turgor normal, no rashes or lesions.  HEENT:  Normocephalic, atraumatic. Cranial nerves grossly intact.  NECK:  Spurling's negative bilaterally.    PULM: No evidence of respiratory difficulty, symmetric chest rise.  GI:  Non-distended  BACK: No focal tenderness to palpation. Tenderness to palpation over right buttock musculature.   EXTREMITIES: No deformities, edema, or skin discoloration.   MUSCULOSKELETAL: KAVITA/FADIR negative. No atrophy is noted.   NEURO: Bilateral lower extremity coordination and muscle stretch reflexes are physiologic and symmetric. No clonus.  GAIT: Antalgic. Walks without assistive device      LABS:    Chemistry        Component Value Date/Time     01/10/2024 0708    K 3.9 01/10/2024 0708     01/10/2024 0708    CO2 26 01/10/2024 0708    BUN 15 01/10/2024 0708    CREATININE 0.8 01/10/2024 0708    GLU 81 01/10/2024 0708        Component Value Date/Time    CALCIUM 8.8 01/10/2024 0708    ALKPHOS 67 01/10/2024 0708    AST 18 01/10/2024 0708    ALT 17 01/10/2024 0708    BILITOT 0.5 01/10/2024 0708    ESTGFRAFRICA >60.0 05/25/2022 1204    EGFRNONAA 55.6 (A) 05/25/2022 1204            IMAGING:      EXAMINATION:  XR CERVICAL SPINE 5 VIEW WITH FLEX AND EXT 3/21/2024     CLINICAL HISTORY:  Other cervical disc degeneration, unspecified cervical region     TECHNIQUE:  Five views of the cervical spine plus flexion and extension views were performed.     COMPARISON:  05/15/2018     FINDINGS:  Patient has had a C5 corpectomy and replacement with cage bridging from C4-C6 alignment is satisfactory.  No instability in flexion and extension.  Degenerative changes seen at C6-C7 level     Impression:     See above        Electronically signed by: Rolan Garcia MD  Date:                                            03/21/2024  Time:                                           15:17      EXAMINATION:  CT CERVICAL SPINE WITHOUT  CONTRAST - 2/22/22     CLINICAL HISTORY:  Evaluate fusion;Arthrodesis status     TECHNIQUE:  Low dose axial images, sagittal and coronal reformations were performed though the cervical spine.  Contrast was not administered.     COMPARISON:  MRI cervical spine 01/07/2021     CT cervical spine 07/28/2014     FINDINGS:  Postoperative changes from partial C5 corpectomy and reconstruction with an interbody cage and C4-6 anterior plate and screw construct.  Hardware is intact and unchanged in position without evidence of loosening.  No perihardware fractures.  Unchanged alignment.  Some osseous bridging is present.  No prevertebral soft tissue swelling.     Straightening of the cervical lordosis. C2-C4 stepwise grade 1 anterolisthesis. T2-3 grade 1 anterolisthesis.     Diffusely decreased bone mineral density. No fractures or pars defects.  No focal osseous lesions.     Multilevel disc and uncovertebral joint degeneration, most advanced at C6-7.  Multilevel facet arthropathy, most advanced at C2-3.  There are amorphous soft tissue calcifications adjacent to the odontoid process and in the expected location of the posterior longitudinal ligament extending from C2-4.     Visualized structures in the posterior fossa are unremarkable. The cervical spinal cord is difficult to visualize.     Vascular calcifications.  Paraspinal soft tissues are otherwise unremarkable.  Biapical pleuroparenchymal scarring.     Impression:     1. Postoperative changes from C5 corpectomy and reconstruction.  Unchanged alignment.  No hardware complication.  2. Multilevel degenerative changes, most advanced at C2-3 and C6-7, progressed from prior CT.  3. Amorphous soft tissue calcifications adjacent to the odontoid process and in the expected location of the posterior longitudinal ligament, which can be seen with CPPD.     CT LUMBAR SPINE WITHOUT CONTRAST     CLINICAL HISTORY:  Evaluate fusion;  Arthrodesis status     TECHNIQUE:  Low-dose axial,  sagittal and coronal reformations are obtained through the lumbar spine.  Contrast was not administered.     COMPARISON:  MRI lumbar spine 01/07/2021; CT lumbar spine 03/17/2020     FINDINGS:  Postoperative changes from L4-S1 posterior decompression and instrumented fusion with bilateral vertical rods, pedicle screws, and metallic interbody spacers.  Hardware is intact and unchanged in position without evidence of loosening.  No perihardware fractures.  Unchanged alignment.  No significant osseous bridging.  No definite fluid collections in the operative bed, however evaluation is limited by CT technique and degraded by streak artifact.     T12-L2 grade 1 stepwise retrolisthesis. L4 on L5 grade 1 anterolisthesis. Mild rightward curvature of the lumbar spine.     Diffuse osteopenia.  No fractures or pars defects.  No focal osseous lesions.     Multilevel degenerative disc disease, most advanced at L2-3 and L3-4.  Multilevel facet arthropathy, most advanced at L3-4.     The conus medullaris and cauda equina nerve roots are difficult to visualize.     9 mm cyst in the midpole left kidney, with a 2 mm mural calcification versus adjacent nonobstructing stone.  Paraspinal soft tissues are otherwise unremarkable.     Impression:     1. Postoperative changes from L4-S1 decompressive instrumented fusion.  No hardware complication.  Unchanged alignment.  2. Multilevel degenerative changes, most advanced at L2-3 and L3-4, similar to prior.     Electronically signed by resident: Brigette Khoury  Date:                                            02/22/2022  Time:                                           09:41     Electronically signed by: Ismael Isabel  Date:                                            02/22/2022  Time:                                           14:11    ASSESSMENT: 74 y.o. year old female with pain, consistent with:    No diagnosis found.      DISCUSSION: Angelina Man is a retired  and   at [employer] with a history of multiple laminectomies and microdiscectomies with residual surgical bed inflammation causing canal stenosis. She underwent a right L4/5 L5/S1 TLIF with posterior hardware in 2018. She she saw us in February for increased low back and right leg pain as well as a right footdrop.  She has been progressing very well with surgery and has had significant improvement in her pain following bilateral L3/4 TFESI on 03/12/2024.  She does have a history of prior C4-6 ACDF and C5 corpectomy in 2010. CT scan from 2022 did show advanced multilevel degenerative changes including facet arthropathy most pronounced at C2-3.  She does not have any neurologic changes. She continues daily exercise and reports good relief from this. She is training for her trip to [place] in September. Her pain is well controlled with Robaxin.     PLAN:  Continue HEP  Continue Robaxin 500 mg TID PRN for muscle spasms.  Follow up as needed    Briana Yi MD   08/22/2024

## 2024-08-26 ENCOUNTER — PATIENT MESSAGE (OUTPATIENT)
Dept: SPINE | Facility: CLINIC | Age: 74
End: 2024-08-26
Payer: MEDICARE

## 2024-08-26 DIAGNOSIS — G57.01 PIRIFORMIS SYNDROME OF RIGHT SIDE: ICD-10-CM

## 2024-08-26 RX ORDER — METHOCARBAMOL 500 MG/1
500 TABLET, FILM COATED ORAL 3 TIMES DAILY PRN
Qty: 90 TABLET | Refills: 2 | Status: SHIPPED | OUTPATIENT
Start: 2024-08-26

## 2024-08-27 ENCOUNTER — OFFICE VISIT (OUTPATIENT)
Dept: CARDIOLOGY | Facility: CLINIC | Age: 74
End: 2024-08-27
Payer: MEDICARE

## 2024-08-27 VITALS
HEIGHT: 64 IN | WEIGHT: 99.19 LBS | HEART RATE: 83 BPM | SYSTOLIC BLOOD PRESSURE: 110 MMHG | BODY MASS INDEX: 16.93 KG/M2 | DIASTOLIC BLOOD PRESSURE: 63 MMHG

## 2024-08-27 DIAGNOSIS — I27.20 PULMONARY HYPERTENSION: ICD-10-CM

## 2024-08-27 DIAGNOSIS — I50.30 HEART FAILURE WITH PRESERVED EJECTION FRACTION, UNSPECIFIED HF CHRONICITY: ICD-10-CM

## 2024-08-27 DIAGNOSIS — I48.11 LONGSTANDING PERSISTENT ATRIAL FIBRILLATION: ICD-10-CM

## 2024-08-27 DIAGNOSIS — I87.2 VENOUS INSUFFICIENCY OF BOTH LOWER EXTREMITIES: Primary | ICD-10-CM

## 2024-08-27 DIAGNOSIS — I83.893 VARICOSE VEINS OF BOTH LOWER EXTREMITIES WITH COMPLICATIONS: ICD-10-CM

## 2024-08-27 DIAGNOSIS — R29.898 LEG HEAVINESS: ICD-10-CM

## 2024-08-27 DIAGNOSIS — R20.2 PINS AND NEEDLES SENSATION: ICD-10-CM

## 2024-08-27 PROCEDURE — 99999 PR PBB SHADOW E&M-EST. PATIENT-LVL V: CPT | Mod: PBBFAC,HCNC,, | Performed by: INTERNAL MEDICINE

## 2024-08-27 PROCEDURE — 3078F DIAST BP <80 MM HG: CPT | Mod: HCNC,CPTII,S$GLB, | Performed by: INTERNAL MEDICINE

## 2024-08-27 PROCEDURE — 1157F ADVNC CARE PLAN IN RCRD: CPT | Mod: HCNC,CPTII,S$GLB, | Performed by: INTERNAL MEDICINE

## 2024-08-27 PROCEDURE — 1159F MED LIST DOCD IN RCRD: CPT | Mod: HCNC,CPTII,S$GLB, | Performed by: INTERNAL MEDICINE

## 2024-08-27 PROCEDURE — 99214 OFFICE O/P EST MOD 30 MIN: CPT | Mod: HCNC,S$GLB,, | Performed by: INTERNAL MEDICINE

## 2024-08-27 PROCEDURE — 1101F PT FALLS ASSESS-DOCD LE1/YR: CPT | Mod: HCNC,CPTII,S$GLB, | Performed by: INTERNAL MEDICINE

## 2024-08-27 PROCEDURE — 1125F AMNT PAIN NOTED PAIN PRSNT: CPT | Mod: HCNC,CPTII,S$GLB, | Performed by: INTERNAL MEDICINE

## 2024-08-27 PROCEDURE — 3288F FALL RISK ASSESSMENT DOCD: CPT | Mod: HCNC,CPTII,S$GLB, | Performed by: INTERNAL MEDICINE

## 2024-08-27 PROCEDURE — 3074F SYST BP LT 130 MM HG: CPT | Mod: HCNC,CPTII,S$GLB, | Performed by: INTERNAL MEDICINE

## 2024-08-27 NOTE — PROGRESS NOTES
[facility]      Chief Complaint   Patient presents with    Varicose veins of both lower extremities with complications    Venous insufficiency of both lower extremities       Patient ID: Angelina Man is a 74 y.o. female with venous insufficiency s/p EVLT of the left LSV and right GSV, hypothyroidism, anxiety, arthritis, and persistent atrial fibrillation s/p DCCV 17-Jul-2020, who presents for a follow up appointment.  Pertinent history/events are as follows:     -Pt kindly referred by Dr. Pereira for evaluation of varicose veins.      -At our initial clinic visit on 6/22/2021, Mrs. Man reported varicose veins with worsening swelling and discomfort.  No claudication or tissue loss.  Exam shows BLE's with prominent varicose veins with venous stasis dermatitis.  Plan:   Varicose veins of both lower extremities- Check BLE venous reflux study and stephanie study.  If no evidence of severe PAD, start graduated compression hose.  Pt to limit sodium intake to 2,000 mg daily.  Elevate legs when resting.    10/7/2021 clinic visit: Mrs. Man reports continued leg worsening swelling and discomfort, despite wearing graduated compression hose for the past 3 months.  She has no claudication or tissue loss.  Exam shows BLE's with prominent varicose veins with venous stasis dermatitis.  BLE Venous Reflux Study on 7/1/2021 revealed hemodynamically significant venous reflux bilaterally with no DVT.  The right and left SSV's are partially compressible with thickened and hyperechoic walls suggestive of previous or chronic superficial venous thrombosis.  Segmental Pressure Study 7/1/2021 revealed normal STEPHANIE at rest and with exercise bilaterally.    Plan:  BLE Venous insufficiency with pain- Mrs. Man reports continued leg worsening swelling and discomfort, despite wearing graduated compression hose for the past 3 months.  She has no claudication or tissue loss.  Exam shows BLE's with prominent varicose veins with venous  stasis dermatitis.  BLE Venous Reflux Study on 7/1/2021 revealed hemodynamically significant venous reflux bilaterally with no DVT.  The right and left SSV's are partially compressible with thickened and hyperechoic walls suggestive of previous or chronic superficial venous thrombosis.  Segmental Pressure Study 7/1/2021 revealed normal STEPHANIE at rest and with exercise bilaterally.  Given continued symptoms despite conservative therapy with graduated compression hose, will refer for EVLT/sclerotherapy.    12/21/2021 clinic visit: Mrs. Man reports she underwent left leg EVLT. She wants to proceed with right EVLT for which she is planned. She has no other complaints.  Plan:   BLE Venous insufficiency with pain- s/p EVLT of the left LSV on 11/11 with follow up US showing resolution of reflux. Limited edema in right foot/ankle. Planned for right leg EVLT. Following with Dr. Hernandez    -On 1/6/2022, pt underwent Endovenous radiofrequency ablation (EVLT) of the right saphenous vein(s) of the lower extremity.    3/22/2022 clinic visit: Mrs. Man reports significant improvement in BLE edema since undergoing EVLT of the left LSV and right GSV.  She has no lower extremity wound/ulcer.  Plan:   BLE Venous insufficiency with pain- Mrs. Man is now s/p EVLT of the left LSV and right GSV with significant improvement in BLE edema.  Follow up RLE venous ultrasound results are pending.  Continue graduated compression hose.  Continue to limit sodium intake to 2,000 mg daily.  Elevate legs when resting.    Persistent atrial fibrillation- Stable.  Continue current medications.  Exercise Induced Pulmonary HTN- Stable.  Continue current medications.    9/22/2022 clinic visit: Mrs. Man reports hitting her left leg on a pedal at spin class and developing a wound 3 weeks ago.  The wound is being managed by wound care.  She reports no significant LE edema.   Plan:   BLE Venous insufficiency with pain- Mrs. Man is now s/p EVLT of the  "left LSV and right GSV with significant improvement in BLE edema.  Follow up RLE venous ultrasound results as above.  Continue graduated compression hose.  Continue to limit sodium intake to 2,000 mg daily.  Elevate legs when resting.    Left leg wound- Appears to be healing appropriately.  Continue wound care.    Persistent atrial fibrillation- Stable.  Continue current medications.  Exercise Induced Pulmonary HTN- Stable.  Continue current medications.    2/2/2023 clinic visit: Mrs. Man reports doing well with no chest pain or SOB.  LLE wound has completely healed.  She has no significant leg swelling.   Plan:   BLE Venous insufficiency with pain- Stable.  Mrs. Man is now s/p EVLT of the left LSV and right GSV with significant improvement in BLE edema.  Follow up RLE venous ultrasound results as above.  Continue graduated compression hose.  Continue to limit sodium intake to 2,000 mg daily.  Elevate legs when resting.    Left leg wound- Now completely healed.      Persistent atrial fibrillation- Stable.  Continue current medications.  Exercise Induced Pulmonary HTN- Stable.  Continue current medications.    9/21/2023 clinic visit: Mrs. Man reports legs feel "heavy and tired all the time".  No claudication or tissue loss.  Reports having restless leg syndrome in the past.   Plan:   BLE Venous insufficiency with pain- Stable.  Mrs. Man reports legs feeling heavy and tired all the time.  Likely due to venous insufficiency.  Check updated BLE venous reflux study and exercise STEPHANIE study.  Continue graduated compression hose.  Continue to limit sodium intake to 2,000 mg daily.  Elevate legs when resting.    ersistent atrial fibrillation- Stable.  Continue current medications.  Exercise Induced Pulmonary HTN- Stable.  Continue current medications.    2/6/2024 clinic visit: Mrs. Man reports no new issues.  She continue to report legs feel "heavy and tired all the time".  No claudication or tissue loss.  " Nuclear stress test on 1/4/2024 revealed no evidence of myocardial ischemia or infarction.   Plan:  BLE Venous insufficiency with pain- Stable. Mrs. Man reports legs feeling heavy and tired all the time.  Likely due to venous insufficiency.  Continue graduated compression hose.  Continue to limit sodium intake to 2,000 mg daily.  Elevate legs when resting.    Persistent atrial fibrillation- Stable.  Continue current medications.  Exercise Induced Pulmonary HTN- Stable.  Continue current medications.    HPI:  Mrs. Man reports pins and needles sensation in both legs which started several months ago.  She has no claudication symptoms or tissue loss.      Past Medical History:   Diagnosis Date    Arthritis     Atrial fibrillation     Bronchitis     Cataract     Dry eyes     GERD (gastroesophageal reflux disease)     Glaucoma, suspect - Both Eyes     Hypothyroidism 06/23/2016    Pulmonary hypertension     Rash     Renal angiomyolipoma     Renal disorder     SCC (squamous cell carcinoma) 07/2023    left lower lateral shin    SCC (squamous cell carcinoma) 04/2023    L mid lateral shin    SCC (squamous cell carcinoma) 09/2023    L mid lower shin    Spinal stenosis     Squamous cell carcinoma     in-situ right upper inner arm, right wrist    Status post lumbar surgery 06/23/2016    Stress fracture     Thyroid disease     Venous insufficiency      Past Surgical History:   Procedure Laterality Date    ANGIOPLASTY      CATARACT EXTRACTION W/  INTRAOCULAR LENS IMPLANT Left 12/6/2022    Procedure: EXTRACTION, CATARACT, WITH IOL INSERTION;  Surgeon: Tone Brown MD;  Location: [facility] OR;  Service: Ophthalmology;  Laterality: Left;    CATARACT EXTRACTION W/  INTRAOCULAR LENS IMPLANT Right 12/20/2022    Procedure: EXTRACTION, CATARACT, WITH IOL INSERTION;  Surgeon: Tone Brown MD;  Location: [facility] OR;  Service: Ophthalmology;  Laterality: Right;    CERVICAL FUSION      COLONOSCOPY      COLONOSCOPY N/A  11/14/2017    Procedure: COLONOSCOPY;  Surgeon: Kasi Mercado MD;  Location: [facility] (4TH FLR);  Service: Endoscopy;  Laterality: N/A;    COLONOSCOPY N/A 4/26/2023    Procedure: COLONOSCOPY;  Surgeon: Jeanmarie Hathaway MD;  Location: [facility] (4TH FLR);  Service: Colon and Rectal;  Laterality: N/A;  ok to hold Eliquis 2 days per Dr Pereira  constipation-ext Miralax prep  instr mailed/portal-GT  4/21/23 pre call attempted, no answer    COLONOSCOPY N/A 5/28/2024    Procedure: COLONOSCOPY;  Surgeon: Jeanmarie Hathaway MD;  Location: [facility] (4TH FLR);  Service: Endoscopy;  Laterality: N/A;  referral Dr. Hathaway. Annual Colonoscopy for High Risk. Golytely prep instr. to portal Pending Eliquis Hold from Dr. David Pereira. [facility] [email or web address]  ok to hold Eliquis 2 days per Dr Pereira-GT  5/21- precall confirmed; instructions re-sent via portal, aware of holding eliquis     ESOPHAGOGASTRODUODENOSCOPY N/A 10/10/2019    Procedure: EGD (ESOPHAGOGASTRODUODENOSCOPY);  Surgeon: Rg Milton MD;  Location: [facility] (4TH FLR);  Service: Endoscopy;  Laterality: N/A;    ESOPHAGOGASTRODUODENOSCOPY N/A 10/6/2021    Procedure: EGD (ESOPHAGOGASTRODUODENOSCOPY);  Surgeon: Rg Milton MD;  Location: [facility] (4TH FLR);  Service: Endoscopy;  Laterality: N/A;  covid test 10/3 [place], instr emailed/portal -ml    EXCISION Left 5/17/2023    Procedure: EXCISION SQUAMOUS CELL CARCINOMA LEFT LEG;  Surgeon: Kasi Canela Jr., MD;  [facility];  Service: General;  Laterality: Left;    l4-5 mid discectomy Left 03/2017    l4-5 MIS diskectomy Right 05/2016    RIGHT HEART CATHETERIZATION Right 1/27/2022    Procedure: INSERTION, CATHETER, RIGHT HEART;  Surgeon: Satnam Pickard Jr., MD;  Location: [facility] CATH LAB;  Service: Cardiology;  Laterality: Right;    TRANSFORAMINAL EPIDURAL INJECTION OF STEROID Bilateral 3/12/2024    Procedure: LUMBAR TRANSFORAMINAL BILATERAL L3/4 *ELIQUIS CLEARANCE IN CHART*;  Surgeon: Sheree Abbott  MD SHARIFA;  Location: [facility] PAIN MGT;  Service: Pain Management;  Laterality: Bilateral;  [phone]  2 WK F/U [place]    TREATMENT OF CARDIAC ARRHYTHMIA N/A 7/17/2020    Procedure: CARDIOVERSION;  Surgeon: Kwan Bray MD;  Location: [facility] EP LAB;  Service: Cardiology;  Laterality: N/A;  AF,DCCV/SANGITA, ANES, SK, 746    TREATMENT OF CARDIAC ARRHYTHMIA N/A 7/14/2021    Procedure: CARDIOVERSION;  Surgeon: SYDNIE Cedillo MD;  Location: [facility] EP LAB;  Service: Cardiology;  Laterality: N/A;  AF, SANGITA, DCCV, MAC, EH, 3 Prep    WASHOUT Right 5/17/2023    Procedure: WASHOUT right lower arm and closure;  Surgeon: Kasi Canela Jr., MD;  Location: [facility] OR [address] [place] [address];  Service: General;  Laterality: Right;     Social History     Socioeconomic History    Marital status: Single   Occupational History     Employer: [email or web address]   Tobacco Use    Smoking status: Never     Passive exposure: Never    Smokeless tobacco: Never   Substance and Sexual Activity    Alcohol use: Yes     Alcohol/week: 4.0 standard drinks of alcohol     Types: 4 Glasses of wine per week     Comment: 2 glasses of wine on weekends    Drug use: No    Sexual activity: Never   Other Topics Concern    Are you pregnant or think you may be? No    Breast-feeding No     Social Determinants of Health     Financial Resource Strain: Low Risk  (2/29/2024)    Overall Financial Resource Strain (CARDIA)     Difficulty of Paying Living Expenses: Not very hard   Food Insecurity: No Food Insecurity (2/29/2024)    Hunger Vital Sign     Worried About Running Out of Food in the Last Year: Never true     Ran Out of Food in the Last Year: Never true   Transportation Needs: No Transportation Needs (2/29/2024)    PRAPARE - Transportation     Lack of Transportation (Medical): No     Lack of Transportation (Non-Medical): No   Physical Activity: Sufficiently Active (2/29/2024)    Exercise Vital Sign     Days of Exercise per Week: 7 days     Minutes of Exercise per Session: 60 min    Stress: No Stress Concern Present (2/29/2024)    [employer] - Occupational Stress Questionnaire     Feeling of Stress : Not at all   Housing Stability: Low Risk  (2/29/2024)    Housing Stability Vital Sign     Unable to Pay for Housing in the Last Year: No     Number of Places Lived in the Last Year: 1     Unstable Housing in the Last Year: No     Family History   Problem Relation Name Age of Onset    Cancer Mother          Lung cancer    Heart failure Father      Colon cancer Father      Hypertension Father      Cataracts Father      Cancer Father  80        colon    No Known Problems Sister      No Known Problems Brother      Melanoma Daughter      Diabetes Son      Heart disease Son      Cancer Son          esophageal cancer    No Known Problems Maternal Aunt      No Known Problems Maternal Uncle      No Known Problems Paternal Aunt      No Known Problems Paternal Uncle      No Known Problems Maternal Grandmother      No Known Problems Maternal Grandfather      No Known Problems Paternal Grandmother      No Known Problems Paternal Grandfather      Amblyopia Neg Hx      Blindness Neg Hx      Glaucoma Neg Hx      Macular degeneration Neg Hx      Retinal detachment Neg Hx      Strabismus Neg Hx      Stroke Neg Hx      Thyroid disease Neg Hx      Breast cancer Neg Hx      Ovarian cancer Neg Hx         Review of patient's allergies indicates:  No Known Allergies    Medication List with Changes/Refills   Current Medications    ACETAMINOPHEN (TYLENOL) 500 MG TABLET    Take 500 mg by mouth every 6 (six) hours as needed for Pain.    ACYCLOVIR (ZOVIRAX) 400 MG TABLET    Take 1 tablet (400 mg total) by mouth once daily.    ASCORBIC ACID (VITAMIN C) 1000 MG TABLET    Take 1,000 mg by mouth once daily.     BIOTIN 1 MG TABLET    Take 1 mg by mouth 2 (two) times daily.     BUPROPION (WELLBUTRIN SR) 150 MG TBSR 12 HR TABLET    Take 1 tablet (150 mg total) by mouth 2 (two) times daily.     DEXAMETHASONE SODIUM PHOSP/PF (DEXAMETHASONE SODIUM PHOS, PF,) 10 MG/ML SOLN        DICLOFENAC SODIUM (VOLTAREN) 1 % GEL    Apply 2 g topically 3 (three) times daily. Apply to the area of toe pain 2-3x per day or night as needed    DIGESTIVE ENZYMES TAB    Take 1 tablet by mouth once daily.     ELIQUIS 5 MG TAB    TAKE 1 TABLET(5 MG) BY MOUTH TWICE DAILY    FENTANYL (SUBLIMAZE) 50 MCG/ML INJECTION        FLUTICASONE PROPIONATE (FLONASE) 50 MCG/ACTUATION NASAL SPRAY    1 spray (50 mcg total) by Each Nostril route 2 (two) times daily as needed for Rhinitis.    FUROSEMIDE (LASIX) 20 MG TABLET    TAKE 1 TABLET(20 MG) BY MOUTH EVERY OTHER DAY    GADAVIST 1 MMOL/ML (604.72 MG/ML) SOLN INJECTION        GAVILYTE-G 236-22.74-6.74 -5.86 GRAM SUSPENSION    SMARTSIG:Milliliter(s) By Mouth    KETOCONAZOLE (NIZORAL) 2 % CREAM    aaa bid prn flare under arm and qhs to areas on face    LEVOTHYROXINE (SYNTHROID) 137 MCG TAB TABLET    Take 1 tablet (137 mcg total) by mouth before breakfast.    LUBIPROSTONE (AMITIZA) 24 MCG CAP    Take 1 capsule (24 mcg total) by mouth daily as needed (IBS symptoms).    METHOCARBAMOL (ROBAXIN) 500 MG TAB    Take 1 tablet (500 mg total) by mouth 3 (three) times daily as needed (mus).    METOPROLOL SUCCINATE (TOPROL-XL) 25 MG 24 HR TABLET    Take 1 tablet (25 mg total) by mouth once daily.    MIDAZOLAM, PF, (VERSED) 1 MG/ML SOLN INJECTION        MIRTAZAPINE (REMERON) 7.5 MG TAB    Take 1 tablet (7.5 mg total) by mouth nightly.    MULTIVITAMIN (THERAGRAN) PER TABLET    Take 1 tablet by mouth once daily.     OMNIPAQUE 300 300 MG IODINE/ML INJECTION        PRAMIPEXOLE (MIRAPEX) 0.25 MG TABLET    TAKE 1 TABLET(0.25 MG) BY MOUTH EVERY EVENING FOR RESTLESS LEGS    TRAZODONE (DESYREL) 50 MG TABLET    TAKE 1 TABLET(50 MG) BY MOUTH EVERY NIGHT AS NEEDED FOR INSOMNIA    ZINC 50 MG TAB    Take 50 mg by mouth once daily.        Review of Systems  Constitution: Denies chills, fever, and sweats.  HENT: Denies  "headaches or blurry vision.  Cardiovascular: Denies chest pain or irregular heart beat.  Respiratory: Denies cough or shortness of breath.  Gastrointestinal: Denies abdominal pain, nausea, or vomiting.  Musculoskeletal: Positive or varicose veins with discomfort and swelling.  Neurological: Denies dizziness or focal weakness.  Psychiatric/Behavioral: Normal mental status.  Hematologic/Lymphatic: Denies bleeding problem or easy bruising/bleeding.  Skin: Denies rash or suspicious lesions    Physical Examination  /63   Pulse 83   Ht 5' 4" (1.626 m)   Wt 45 kg (99 lb 3.3 oz)   LMP  (LMP Unknown)   BMI 17.03 kg/m²     Constitutional: No acute distress, conversant  HEENT: Sclera anicteric, Pupils equal, round and reactive to light, extraocular motions intact, Oropharynx clear  Neck: No JVD, no carotid bruits  Cardiovascular: irregular rhythm, no murmur, rubs or gallops  Pulmonary: Clear to auscultation bilaterally  Abdominal: Abdomen soft, nontender, nondistended, positive bowel sounds  Extremities: BLE's with prominent varicose veins with venous stasis dermatitis.     Pulses:  Carotid pulses are 2+ on the right side, and 2+ on the left side.  Radial pulses are 2+ on the right side, and 2+ on the left side.   Femoral pulses are 2+ on the right side, and 2+ on the left side.  Popliteal pulses are 2+ on the right side, and 2+ on the left side.   Dorsalis pedis pulses are 2+ on the right side, and 2+ on the left side.   Posterior tibial pulses are 2+ on the right side, and 2+ on the left side.    Skin: No ecchymosis, erythema, or ulcers  Psych: Alert and oriented x 3, appropriate affect  Neuro: CNII-XII intact, no focal deficits    Labs:  Most Recent Data  CBC:   Lab Results   Component Value Date    WBC 4.23 01/10/2024    HGB 13.1 01/10/2024    HCT 40.9 01/10/2024     01/10/2024     (H) 01/10/2024    RDW 12.9 01/10/2024     BMP:   Lab Results   Component Value Date     01/10/2024    K 3.9 " 01/10/2024     01/10/2024    CO2 26 01/10/2024    BUN 15 01/10/2024    CREATININE 0.8 01/10/2024    GLU 81 01/10/2024    CALCIUM 8.8 01/10/2024    MG 1.6 12/28/2021     LFTS;   Lab Results   Component Value Date    PROT 6.4 01/10/2024    ALBUMIN 3.6 01/10/2024    BILITOT 0.5 01/10/2024    AST 18 01/10/2024    ALKPHOS 67 01/10/2024    ALT 17 01/10/2024    GGT 52 04/13/2022     COAGS:   Lab Results   Component Value Date    INR 1.1 07/07/2021     FLP:   Lab Results   Component Value Date    CHOL 197 01/10/2024    HDL 68 01/10/2024    LDLCALC 111.8 01/10/2024    TRIG 86 01/10/2024    CHOLHDL 34.5 01/10/2024     CARDIAC:   Lab Results   Component Value Date    TROPONINI <0.006 07/16/2020     (H) 12/28/2021     Imaging:    Nuclear Stress Test 1/4/2024:    Normal myocardial perfusion scan. There is no evidence of myocardial ischemia or infarction.    The gated perfusion images showed an ejection fraction of 54% at rest. The gated perfusion images showed an ejection fraction of 61% post stress. Normal ejection fraction is greater than 53%.    There is normal wall motion at rest and post stress.    LV cavity size is normal at rest and normal at stress.    The ECG portion of the study is negative for ischemia. Sensitivity is reduced secondary to the target heart rate not being achieved.    The patient reported no chest pain during the stress test.    During stress, atrial fibrillation is noted.    The exercise capacity was average.    There are no prior studies for comparison.    BLE Venous Reflux Study 3/22/2022:  The right lesser saphenous vein is partially compressible consistent with SVT.  The contralateral (left) common femoral vein is patent. .  The right greater saphenous vein is occluded consistent with successful prior EVLT.    BLE Venous reflux study 12/14/2021:  There is no evidence of a left lower extremity DVT.  Left GSV occluded post EVLT.    Segmental Pressure Study 7/1/2021:  Normal STEPHANIE  bilaterally with unremarkable waveforms at rest.    Normal STEPHANIE with exercise.  The right TBI is 0.51 and the left TBI is 0.40, which is mild to moderately abnormal.    BLE Venous Reflux Study 7/1/2021:  No evidence of lower extremity deep venous thrombosis bilateral.  There is reflux in the right common femoral vein and left external iliac vein.  The right GSV below the level of the knee is partially compressible with thickened and hyperechoic walls suggestive of previous or chronic superficial venous thrombosis.    There is also evidence of superficial venous insufficiency of the right GSV below the knee without dilation of the vessel.  The right SSV is partially compressible with thickened and hyperechoic walls suggestive of previous or chronic superficial venous thrombosis.  The left SSV is partially compressible with thickened and hyperechoic walls suggestive of previous or chronic superficial venous thrombosis.    Assessment/Plan:  Angelina Man is a 74y.o. female with venous insufficiency s/p EVLT of the left LSV and right GSV, hypothyroidism, anxiety, arthritis, and persistent atrial fibrillation s/p DCCV 17-Jul-2020, who presents for a follow up appointment.     1. BLE Venous insufficiency with pain- Stable. Mrs. Man reports legs feeling heavy and tired all the time.  Likely due to venous insufficiency.  Continue graduated compression hose.  Continue to limit sodium intake to 2,000 mg daily.  Elevate legs when resting.      2. Persistent atrial fibrillation- Stable.  Continue current medications.    3. BLE Pins/[place] Sensation- Likely neuropathic in origin.  Refer to Neurology for evaluation.  Check exercise STEPHANIE: study and BLE arterial ultrasound to rule out flow limiting PAD.     4. Exercise Induced Pulmonary HTN- Stable.  Continue current medications.    Follow up in 6 months     Total duration of face to face visit time 30 minutes.  Total time spent counseling greater than fifty percent of total  visit time.  Counseling included discussion regarding imaging findings, diagnosis, possibilities, treatment options, risks and benefits.  The patient had many questions regarding the options and long-term effects.    Jeanmarie Cedeño MD, PhD  Interventional Cardiiology

## 2024-08-27 NOTE — PATIENT INSTRUCTIONS
Assessment/Plan:  Angelina Man is a 74y.o. female with venous insufficiency s/p EVLT of the left LSV and right GSV, hypothyroidism, anxiety, arthritis, and persistent atrial fibrillation s/p [facility] 17-Jul-2020, who presents for a follow up appointment.     1. BLE Venous insufficiency with pain- Stable. Mrs. Man reports legs feeling heavy and tired all the time.  Likely due to venous insufficiency.  Continue graduated compression hose.  Continue to limit sodium intake to 2,000 mg daily.  Elevate legs when resting.      2. Persistent atrial fibrillation- Stable.  Continue current medications.    3. BLE Pins/[place] Sensation- Likely neuropathic in origin.  Refer to Neurology for evaluation.  Check exercise STEPHANIE: study and BLE arterial ultrasound to rule out flow limiting PAD.     4. Exercise Induced Pulmonary HTN- Stable.  Continue current medications.    Follow up in 6 months

## 2024-08-29 ENCOUNTER — PATIENT MESSAGE (OUTPATIENT)
Dept: PODIATRY | Facility: CLINIC | Age: 74
End: 2024-08-29
Payer: MEDICARE

## 2024-09-04 ENCOUNTER — OFFICE VISIT (OUTPATIENT)
Dept: PODIATRY | Facility: CLINIC | Age: 74
End: 2024-09-04
Payer: MEDICARE

## 2024-09-04 DIAGNOSIS — L84 CORNS/CALLOSITIES: Primary | ICD-10-CM

## 2024-09-06 ENCOUNTER — HOSPITAL ENCOUNTER (OUTPATIENT)
Dept: CARDIOLOGY | Facility: HOSPITAL | Age: 74
Discharge: HOME | End: 2024-09-06
Attending: INTERNAL MEDICINE
Payer: MEDICARE

## 2024-09-06 DIAGNOSIS — R29.898 LEG HEAVINESS: ICD-10-CM

## 2024-09-06 LAB
IMMEDIATE ARM BP: 149 MMHG
IMMEDIATE LEFT ABI: 1.28
IMMEDIATE LEFT TIBIAL: 191 MMHG
IMMEDIATE RIGHT ABI: 1.07
IMMEDIATE RIGHT TIBIAL: 160 MMHG
LEFT ABI: 1.25
LEFT ANT TIBIAL SYS PSV: 41 CM/S
LEFT ARM BP: 138 MMHG
LEFT CFA PSV: 70 CM/S
LEFT DORSALIS PEDIS: 147 MMHG
LEFT EXTERNAL ILIAC PSV: 73 CM/S
LEFT PERONEAL SYS PSV: 39 CM/S
LEFT POPLITEAL PSV: 35 CM/S
LEFT POST TIBIAL SYS PSV: 36 CM/S
LEFT POSTERIOR TIBIAL: 172 MMHG
LEFT PROFUNDA SYS PSV: 48 CM/S
LEFT SUPER FEMORAL DIST SYS PSV: 54 CM/S
LEFT SUPER FEMORAL MID SYS PSV: 74 CM/S
LEFT SUPER FEMORAL OSTIAL SYS PSV: 59 CM/S
LEFT SUPER FEMORAL PROX SYS PSV: 57 CM/S
LEFT TIB/PER TRUNK SYS PSV: 47 CM/S
OHS CV LEFT LOWER EXTREMITY ABI (NO CALC): 1.25
OHS CV RIGHT ABI LOWER EXTREMITY (NO CALC): 1.15
RIGHT ABI: 1.15
RIGHT ANT TIBIAL SYS PSV: 32 CM/S
RIGHT ARM BP: 136 MMHG
RIGHT CFA PSV: 62 CM/S
RIGHT DORSALIS PEDIS: 142 MMHG
RIGHT EXTERNAL ILLIAC PSV: 64 CM/S
RIGHT PERONEAL SYS PSV: 25 CM/S
RIGHT POPLITEAL PSV: 43 CM/S
RIGHT POST TIBIAL SYS PSV: 25 CM/S
RIGHT POSTERIOR TIBIAL: 159 MMHG
RIGHT PROFUNDA SYS PSV: 32 CM/S
RIGHT SUPER FEMORAL DIST SYS PSV: 62 CM/S
RIGHT SUPER FEMORAL MID SYS PSV: 70 CM/S
RIGHT SUPER FEMORAL OSTIAL SYS PSV: 49 CM/S
RIGHT SUPER FEMORAL PROX SYS PSV: 49 CM/S
RIGHT TIB/PER TRUNK SYS PSV: 39 CM/S
TREADMILL GRADE: 12 %
TREADMILL SPEED: 2 MPH
TREADMILL TIME: 5 MIN

## 2024-09-06 PROCEDURE — 93924 LWR XTR VASC STDY BILAT: CPT | Mod: 26,HCNC,, | Performed by: INTERNAL MEDICINE

## 2024-09-06 PROCEDURE — 93924 LWR XTR VASC STDY BILAT: CPT | Mod: HCNC

## 2024-09-06 PROCEDURE — 93925 LOWER EXTREMITY STUDY: CPT | Mod: HCNC

## 2024-09-06 PROCEDURE — 93925 LOWER EXTREMITY STUDY: CPT | Mod: 26,HCNC,, | Performed by: INTERNAL MEDICINE

## 2024-09-09 LAB
LEFT ANT TIBIAL SYS PSV: 41 CM/S
LEFT CFA PSV: 70 CM/S
LEFT EXTERNAL ILIAC PSV: 73 CM/S
LEFT PERONEAL SYS PSV: 39 CM/S
LEFT POPLITEAL PSV: 35 CM/S
LEFT POST TIBIAL SYS PSV: 36 CM/S
LEFT PROFUNDA SYS PSV: 48 CM/S
LEFT SUPER FEMORAL DIST SYS PSV: 54 CM/S
LEFT SUPER FEMORAL MID SYS PSV: 74 CM/S
LEFT SUPER FEMORAL OSTIAL SYS PSV: 59 CM/S
LEFT SUPER FEMORAL PROX SYS PSV: 57 CM/S
LEFT TIB/PER TRUNK SYS PSV: 47 CM/S
OHS CV LEFT LOWER EXTREMITY ABI (NO CALC): 1.25
OHS CV RIGHT ABI LOWER EXTREMITY (NO CALC): 1.15
RIGHT ANT TIBIAL SYS PSV: 32 CM/S
RIGHT CFA PSV: 62 CM/S
RIGHT EXTERNAL ILLIAC PSV: 64 CM/S
RIGHT PERONEAL SYS PSV: 25 CM/S
RIGHT POPLITEAL PSV: 43 CM/S
RIGHT POST TIBIAL SYS PSV: 25 CM/S
RIGHT PROFUNDA SYS PSV: 32 CM/S
RIGHT SUPER FEMORAL DIST SYS PSV: 62 CM/S
RIGHT SUPER FEMORAL MID SYS PSV: 70 CM/S
RIGHT SUPER FEMORAL OSTIAL SYS PSV: 49 CM/S
RIGHT SUPER FEMORAL PROX SYS PSV: 49 CM/S
RIGHT TIB/PER TRUNK SYS PSV: 39 CM/S

## 2024-09-12 ENCOUNTER — PATIENT MESSAGE (OUTPATIENT)
Dept: CARDIOLOGY | Facility: CLINIC | Age: 74
End: 2024-09-12
Payer: MEDICARE

## 2024-09-24 ENCOUNTER — PATIENT MESSAGE (OUTPATIENT)
Dept: CARDIOLOGY | Facility: CLINIC | Age: 74
End: 2024-09-24
Payer: MEDICARE

## 2024-09-24 ENCOUNTER — HOSPITAL ENCOUNTER (EMERGENCY)
Facility: HOSPITAL | Age: 74
Discharge: HOME | End: 2024-09-25
Attending: EMERGENCY MEDICINE
Payer: MEDICARE

## 2024-09-24 DIAGNOSIS — R60.0 LOWER EXTREMITY EDEMA: Primary | ICD-10-CM

## 2024-09-24 DIAGNOSIS — R60.9 EDEMA: ICD-10-CM

## 2024-09-24 LAB
ALBUMIN SERPL BCP-MCNC: 3.3 G/DL (ref 3.5–5.2)
ALP SERPL-CCNC: 127 U/L (ref 55–135)
ALT SERPL W/O P-5'-P-CCNC: 29 U/L (ref 10–44)
ANION GAP SERPL CALC-SCNC: 10 MMOL/L (ref 8–16)
AST SERPL-CCNC: 32 U/L (ref 10–40)
BASOPHILS # BLD AUTO: 0.05 K/UL (ref 0–0.2)
BASOPHILS NFR BLD: 0.8 % (ref 0–1.9)
BILIRUB SERPL-MCNC: 0.3 MG/DL (ref 0.1–1)
BNP SERPL-MCNC: 402 PG/ML (ref 0–99)
BUN SERPL-MCNC: 14 MG/DL (ref 8–23)
CALCIUM SERPL-MCNC: 8.6 MG/DL (ref 8.7–10.5)
CHLORIDE SERPL-SCNC: 105 MMOL/L (ref 95–110)
CO2 SERPL-SCNC: 29 MMOL/L (ref 23–29)
CREAT SERPL-MCNC: 1 MG/DL (ref 0.5–1.4)
DIFFERENTIAL METHOD BLD: ABNORMAL
EOSINOPHIL # BLD AUTO: 0.1 K/UL (ref 0–0.5)
EOSINOPHIL NFR BLD: 1.2 % (ref 0–8)
ERYTHROCYTE [DISTWIDTH] IN BLOOD BY AUTOMATED COUNT: 13 % (ref 11.5–14.5)
EST. GFR  (NO RACE VARIABLE): 58.4 ML/MIN/1.73 M^2
GLUCOSE SERPL-MCNC: 91 MG/DL (ref 70–110)
HCT VFR BLD AUTO: 35.9 % (ref 37–48.5)
HGB BLD-MCNC: 12.1 G/DL (ref 12–16)
IMM GRANULOCYTES # BLD AUTO: 0.07 K/UL (ref 0–0.04)
IMM GRANULOCYTES NFR BLD AUTO: 1.1 % (ref 0–0.5)
LYMPHOCYTES # BLD AUTO: 1.1 K/UL (ref 1–4.8)
LYMPHOCYTES NFR BLD: 16.4 % (ref 18–48)
MCH RBC QN AUTO: 33.8 PG (ref 27–31)
MCHC RBC AUTO-ENTMCNC: 33.7 G/DL (ref 32–36)
MCV RBC AUTO: 100 FL (ref 82–98)
MONOCYTES # BLD AUTO: 0.6 K/UL (ref 0.3–1)
MONOCYTES NFR BLD: 8.3 % (ref 4–15)
NEUTROPHILS # BLD AUTO: 4.8 K/UL (ref 1.8–7.7)
NEUTROPHILS NFR BLD: 72.2 % (ref 38–73)
NRBC BLD-RTO: 0 /100 WBC
PLATELET # BLD AUTO: 231 K/UL (ref 150–450)
PMV BLD AUTO: 9.5 FL (ref 9.2–12.9)
POTASSIUM SERPL-SCNC: 3.4 MMOL/L (ref 3.5–5.1)
PROT SERPL-MCNC: 6.2 G/DL (ref 6–8.4)
RBC # BLD AUTO: 3.58 M/UL (ref 4–5.4)
SODIUM SERPL-SCNC: 144 MMOL/L (ref 136–145)
TROPONIN I SERPL DL<=0.01 NG/ML-MCNC: <0.006 NG/ML (ref 0–0.03)
WBC # BLD AUTO: 6.6 K/UL (ref 3.9–12.7)

## 2024-09-24 PROCEDURE — 84484 ASSAY OF TROPONIN QUANT: CPT | Mod: HCNC

## 2024-09-24 PROCEDURE — 83880 ASSAY OF NATRIURETIC PEPTIDE: CPT | Mod: HCNC

## 2024-09-24 PROCEDURE — 99285 EMERGENCY DEPT VISIT HI MDM: CPT | Mod: 25,HCNC

## 2024-09-24 PROCEDURE — 85025 COMPLETE CBC W/AUTO DIFF WBC: CPT | Mod: HCNC

## 2024-09-24 PROCEDURE — 80053 COMPREHEN METABOLIC PANEL: CPT | Mod: HCNC

## 2024-09-25 ENCOUNTER — PATIENT MESSAGE (OUTPATIENT)
Dept: CARDIOLOGY | Facility: CLINIC | Age: 74
End: 2024-09-25

## 2024-09-25 VITALS
WEIGHT: 105 LBS | DIASTOLIC BLOOD PRESSURE: 78 MMHG | SYSTOLIC BLOOD PRESSURE: 138 MMHG | HEART RATE: 72 BPM | RESPIRATION RATE: 16 BRPM | HEIGHT: 64 IN | OXYGEN SATURATION: 100 % | TEMPERATURE: 98 F | BODY MASS INDEX: 17.93 KG/M2

## 2024-09-25 LAB
OHS QRS DURATION: 68 MS
OHS QRS DURATION: 70 MS
OHS QTC CALCULATION: 443 MS
OHS QTC CALCULATION: 446 MS

## 2024-09-25 PROCEDURE — 93005 ELECTROCARDIOGRAM TRACING: CPT | Mod: HCNC

## 2024-09-25 PROCEDURE — 93010 ELECTROCARDIOGRAM REPORT: CPT | Mod: HCNC,,, | Performed by: INTERNAL MEDICINE

## 2024-09-25 RX ORDER — FUROSEMIDE 20 MG/1
20 TABLET ORAL DAILY
Qty: 7 TABLET | Refills: 0 | Status: SHIPPED | OUTPATIENT
Start: 2024-09-25 | End: 2024-10-02 | Stop reason: SDUPTHER

## 2024-09-25 NOTE — ED TRIAGE NOTES
Angelina Man, a 74 y.o. female presents to the ED w/ complaint of bilateral leg edema. Pt states she took her lasix as prescribed, flew on a plane for 12+ hours, denies SOB and CP. Pt states she called her PCP to ask if she should go up on her lasix, they told her to come here.     Triage note:  Chief Complaint   Patient presents with    Leg Swelling     Sunday plane ride, now ankles more swollen     Review of patient's allergies indicates:  No Known Allergies  Past Medical History:   Diagnosis Date    Arthritis     Atrial fibrillation     Bronchitis     Cataract     Dry eyes     GERD (gastroesophageal reflux disease)     Glaucoma, suspect - Both Eyes     Hypothyroidism 06/23/2016    Pulmonary hypertension     Rash     Renal angiomyolipoma     Renal disorder     SCC (squamous cell carcinoma) 07/2023    left lower lateral shin    SCC (squamous cell carcinoma) 04/2023    L mid lateral shin    SCC (squamous cell carcinoma) 09/2023    L mid lower shin    Spinal stenosis     Squamous cell carcinoma     in-situ right upper inner arm, right wrist    Status post lumbar surgery 06/23/2016    Stress fracture     Thyroid disease     Venous insufficiency

## 2024-09-25 NOTE — DISCHARGE INSTRUCTIONS
As discussed no blood clots in your legs.  The swelling in your ankles is likely due to prolonged sitting on the travel.  However your heart lab, BNP is mildly elevated from 2 years ago.  I do recommend taking your Lasix daily over the next week.  However follow up with your cardiologist regarding today's ED visit as you will need a follow up echo.     Ultrasound shows  Impression:  No evidence of deep venous thrombosis in either lower extremity.   Small, nonocclusive echogenic focus adjacent to the wall of the right common femoral vein, nonspecific but possibly a small focus of calcification related to prior ablation procedure.  Differential would include calcified valve leaflet.    Right popliteal fossa cyst measuring 4.7 x 0.6 x 3.1 cm

## 2024-09-25 NOTE — ED PROVIDER NOTES
Encounter Date: 9/24/2024       History     Chief Complaint   Patient presents with    Leg Swelling     Sunday plane ride, now ankles more swollen     74-year-old female with a history of atrial fibrillation (on Eliquis), gastroesophageal reflux disease (GERD), hypothyroidism, pulmonary hypertension, and venous insufficiency presents to the emergency department with concerns of bilateral ankle swelling. The patient reports returning from [facility] on Sunday, where she spent over 14 hours on an airplane. She states that she sometimes experiences swelling in her lower extremities, but it appears more severe this morning. She has noticed that the edema decreases with ambulation. The patient denies any pain in the lower extremities, as well as any history of falls, numbness, or paresthesias.        Review of patient's allergies indicates:  No Known Allergies  Past Medical History:   Diagnosis Date    Arthritis     Atrial fibrillation     Bronchitis     Cataract     Dry eyes     GERD (gastroesophageal reflux disease)     Glaucoma, suspect - Both Eyes     Hypothyroidism 06/23/2016    Pulmonary hypertension     Rash     Renal angiomyolipoma     Renal disorder     SCC (squamous cell carcinoma) 07/2023    left lower lateral shin    SCC (squamous cell carcinoma) 04/2023    L mid lateral shin    SCC (squamous cell carcinoma) 09/2023    L mid lower shin    Spinal stenosis     Squamous cell carcinoma     in-situ right upper inner arm, right wrist    Status post lumbar surgery 06/23/2016    Stress fracture     Thyroid disease     Venous insufficiency      Past Surgical History:   Procedure Laterality Date    ANGIOPLASTY      CATARACT EXTRACTION W/  INTRAOCULAR LENS IMPLANT Left 12/6/2022    Procedure: EXTRACTION, CATARACT, WITH IOL INSERTION;  Surgeon: Tone Brown MD;  Location: [facility];  Service: Ophthalmology;  Laterality: Left;    CATARACT EXTRACTION W/  INTRAOCULAR LENS IMPLANT Right 12/20/2022    Procedure:  EXTRACTION, CATARACT, WITH IOL INSERTION;  Surgeon: Tone Brown MD;  Location: [facility];  Service: Ophthalmology;  Laterality: Right;    CERVICAL FUSION      COLONOSCOPY      COLONOSCOPY N/A 11/14/2017    Procedure: COLONOSCOPY;  Surgeon: Kasi Mercado MD;  Location: [facility] (4TH FLR);  Service: Endoscopy;  Laterality: N/A;    COLONOSCOPY N/A 4/26/2023    Procedure: COLONOSCOPY;  Surgeon: Jeanmarie Hathaway MD;  Location: [facility] (4TH FLR);  Service: Colon and Rectal;  Laterality: N/A;  ok to hold Eliquis 2 days per Dr Pereira  constipation-ext Miralax prep  instr mailed/portal-GT  4/21/23 pre call attempted, no answer    COLONOSCOPY N/A 5/28/2024    Procedure: COLONOSCOPY;  Surgeon: Jeanmarie Hathaway MD;  Location: [facility] (4TH FLR);  Service: Endoscopy;  Laterality: N/A;  referral Dr. Hathaway. Annual Colonoscopy for High Risk. Golytely prep instr. to portal Pending Eliquis Hold from Dr. David Pereira. [facility] Afib.EC  ok to hold Eliquis 2 days per Dr Pereira-GT  5/21- precall confirmed; instructions re-sent via portal, aware of holding eliquis     ESOPHAGOGASTRODUODENOSCOPY N/A 10/10/2019    Procedure: EGD (ESOPHAGOGASTRODUODENOSCOPY);  Surgeon: Rg Milton MD;  Location: [facility] (4TH FLR);  Service: Endoscopy;  Laterality: N/A;    ESOPHAGOGASTRODUODENOSCOPY N/A 10/6/2021    Procedure: EGD (ESOPHAGOGASTRODUODENOSCOPY);  Surgeon: Rg Milton MD;  Location: [facility] (4TH FLR);  Service: Endoscopy;  Laterality: N/A;  covid test 10/3 [place], instr emailed/portal -ml    EXCISION Left 5/17/2023    Procedure: EXCISION SQUAMOUS CELL CARCINOMA LEFT LEG;  Surgeon: Kasi Canela Jr., MD;  Location: [facility] 2ND FLR;  Service: General;  Laterality: Left;    l4-5 mid discectomy Left 03/2017    l4-5 MIS diskectomy Right 05/2016    RIGHT HEART CATHETERIZATION Right 1/27/2022    Procedure: INSERTION, CATHETER, RIGHT HEART;  Surgeon: Satnam Pickard Jr., MD;  Location: [facility] CATH LAB;  Service:  Cardiology;  Laterality: Right;    TRANSFORAMINAL EPIDURAL INJECTION OF STEROID Bilateral 3/12/2024    Procedure: LUMBAR TRANSFORAMINAL BILATERAL L3/4 *ELIQUIS CLEARANCE IN CHART*;  Surgeon: Sheree Abbott MD;  Location: [facility] PAIN MGT;  Service: Pain Management;  Laterality: Bilateral;  [phone]  2 WK F/U [place]    TREATMENT OF CARDIAC ARRHYTHMIA N/A 7/17/2020    Procedure: CARDIOVERSION;  Surgeon: Kwan Bray MD;  Location: [facility] EP LAB;  Service: Cardiology;  Laterality: N/A;  AF,DCCV/SANGITA, ANES, SK, 746    TREATMENT OF CARDIAC ARRHYTHMIA N/A 7/14/2021    Procedure: CARDIOVERSION;  Surgeon: SYDNIE Cedillo MD;  Location: [facility] EP LAB;  Service: Cardiology;  Laterality: N/A;  AF, SANGITA, DCCV, MAC, EH, 3 Prep    WASHOUT Right 5/17/2023    Procedure: WASHOUT right lower arm and closure;  Surgeon: Kasi Canela Jr., MD;  Location: [facility] OR [address] [place] [address];  Service: General;  Laterality: Right;     Family History   Problem Relation Name Age of Onset    Cancer Mother          Lung cancer    Heart failure Father      Colon cancer Father      Hypertension Father      Cataracts Father      Cancer Father  80        colon    No Known Problems Sister      No Known Problems Brother      Melanoma Daughter      Diabetes Son      Heart disease Son      Cancer Son          esophageal cancer    No Known Problems Maternal Aunt      No Known Problems Maternal Uncle      No Known Problems Paternal Aunt      No Known Problems Paternal Uncle      No Known Problems Maternal Grandmother      No Known Problems Maternal Grandfather      No Known Problems Paternal Grandmother      No Known Problems Paternal Grandfather      Amblyopia Neg Hx      Blindness Neg Hx      Glaucoma Neg Hx      Macular degeneration Neg Hx      Retinal detachment Neg Hx      Strabismus Neg Hx      Stroke Neg Hx      Thyroid disease Neg Hx      Breast cancer Neg Hx      Ovarian cancer Neg Hx       Social History     Tobacco Use    Smoking status: Never     Passive  exposure: Never    Smokeless tobacco: Never   Substance Use Topics    Alcohol use: Yes     Alcohol/week: 4.0 standard drinks of alcohol     Types: 4 Glasses of wine per week     Comment: 2 glasses of wine on weekends    Drug use: No     Review of Systems  See HPI  Physical Exam     Initial Vitals [09/24/24 1837]   BP Pulse Resp Temp SpO2   (!) 152/80 97 20 98.3 °F (36.8 °C) 100 %      MAP       --         Physical Exam    Vitals reviewed.  Constitutional: She appears well-developed and well-nourished.   HENT:   Head: Normocephalic and atraumatic.   Eyes: Conjunctivae and EOM are normal.   Neck:   Normal range of motion.  Cardiovascular:  Normal rate.           Pulmonary/Chest: Breath sounds normal. No respiratory distress. She has no wheezes. She has no rales.   Abdominal: Abdomen is soft. She exhibits no distension.   Musculoskeletal:         General: Edema present. No tenderness (Posterior calf tenderness). Normal range of motion.      Cervical back: Normal range of motion.      Comments: 1+ pitting edema of the distal lower extremities L> R  No erythema or induration of the lower extremities     Neurological: She is alert and oriented to person, place, and time.   Skin: Skin is warm and dry.   Psychiatric: She has a normal mood and affect. Thought content normal.         ED Course   Procedures  Labs Reviewed   CBC W/ AUTO DIFFERENTIAL - Abnormal       Result Value    WBC 6.60      RBC 3.58 (*)     Hemoglobin 12.1      Hematocrit 35.9 (*)      (*)     MCH 33.8 (*)     MCHC 33.7      RDW 13.0      Platelets 231      MPV 9.5      Immature Granulocytes 1.1 (*)     Gran # (ANC) 4.8      Immature Grans (Abs) 0.07 (*)     Lymph # 1.1      Mono # 0.6      Eos # 0.1      Baso # 0.05      nRBC 0      Gran % 72.2      Lymph % 16.4 (*)     Mono % 8.3      Eosinophil % 1.2      Basophil % 0.8      Differential Method Automated     COMPREHENSIVE METABOLIC PANEL - Abnormal    Sodium 144      Potassium 3.4 (*)      Chloride 105      CO2 29      Glucose 91      BUN 14      Creatinine 1.0      Calcium 8.6 (*)     Total Protein 6.2      Albumin 3.3 (*)     Total Bilirubin 0.3      Alkaline Phosphatase 127      AST 32      ALT 29      eGFR 58.4 (*)     Anion Gap 10     B-TYPE NATRIURETIC PEPTIDE - Abnormal     (*)    TROPONIN I    Troponin I <0.006            Imaging Results              US Lower Extremity Veins Bilateral (Final result)  Result time 09/25/24 00:28:51      Final result by Kasi Bear MD (09/25/24 00:28:51)                   Impression:      No evidence of deep venous thrombosis in either lower extremity.    Small, nonocclusive echogenic focus adjacent to the wall of the right common femoral vein, nonspecific but possibly a small focus of calcification related to prior ablation procedure.  Differential would include calcified valve leaflet.    Right popliteal fossa cyst.    Electronically signed by resident: Elvis Apodaca  Date:    09/25/2024  Time:    00:10    Electronically signed by: Kasi Bear  Date:    09/25/2024  Time:    00:28               Narrative:    EXAMINATION:  US LOWER EXTREMITY VEINS BILATERAL    CLINICAL HISTORY:  Edema, unspecified    TECHNIQUE:  Duplex and color flow Doppler and dynamic compression was performed of the bilateral lower extremity veins was performed.    COMPARISON:  No relevant priors.    FINDINGS:  Right thigh veins: The common femoral, femoral, and popliteal veins are patent and free of thrombus.  Note is made of a small, nonocclusive echogenic focus adjacent to the wall of the right common femoral vein.  The veins are normally compressible and have normal phasic flow and augmentation response.  History of great saphenous vein ablation.    Right calf veins: The visualized calf veins are patent.    Left thigh veins: The common femoral, femoral, and popliteal veins are patent and free of thrombus. The veins are normally compressible and have normal phasic flow  and augmentation response.  History of great saphenous vein ablation.    Left calf veins: The visualized calf veins are patent.    Miscellaneous: Right popliteal fossa cyst measuring 4.7 x 0.6 x 3.1 cm                                       Medications - No data to display  Medical Decision Making  74-year-old female presents emergency department due to subjective edema of the lower extremities bilaterally.  Lower extremity ultrasound performed negative for DVT discussed incidental findings see imaging.  Patient with upward trending BNP not hypoxic edema is minimal.  Patient currently taking Lasix every other day recommended 7 day course daily and follow up with cardiologist as she will need a echo.  No increased oxygen requirements.  No evidence of renal failure or hepatic failure.    Patient  discharged home.    Amount and/or Complexity of Data Reviewed  Labs: ordered.  ECG/medicine tests: ordered.     Details: Rate controlled AFib 82 beats per minute.                                      Clinical Impression:  Final diagnoses:  [R60.0] Lower extremity edema (Primary)  [R60.9] Edema          ED Disposition Condition    Discharge Stable          ED Prescriptions       Medication Sig Dispense Start Date End Date Auth. Provider    furosemide (LASIX) 20 MG tablet Take 1 tablet (20 mg total) by mouth once daily. for 7 days 7 tablet 9/25/2024 10/2/2024 Yesi Foley PA-C          Follow-up Information       Follow up With Specialties Details Why Contact Info    Jeanmarie Cedeño MD PhD Interventional Cardiology, Cardiology, Vascular Medicine Schedule an appointment as soon as possible for a visit   [address] [facility] [id number]  [phone]               Yesi Foley PA-C  09/25/24 0126

## 2024-09-26 ENCOUNTER — TELEPHONE (OUTPATIENT)
Dept: ADMINISTRATIVE | Facility: CLINIC | Age: 74
End: 2024-09-26
Payer: MEDICARE

## 2024-09-26 ENCOUNTER — PATIENT MESSAGE (OUTPATIENT)
Dept: CARDIOLOGY | Facility: CLINIC | Age: 74
End: 2024-09-26
Payer: MEDICARE

## 2024-09-26 NOTE — PROGRESS NOTES
Spoke to patient today for Post ED Tracker Assessment. Pt stated she has sent messages through the portal to Cardiology clinic and she wanted to ensure that clinic received them because she is needing to have an ECHO scheduled after most recent ED visit. I informed her clinic staff may be in clinic and will respond as soon as they are able. I have also sent a secured message to Dr. Pereira's clinic staff notifying them of patient's request. Pt had no other needs at this time. Closing encounter.

## 2024-10-02 ENCOUNTER — LAB VISIT (OUTPATIENT)
Dept: LAB | Facility: HOSPITAL | Age: 74
End: 2024-10-02
Payer: MEDICARE

## 2024-10-02 ENCOUNTER — OFFICE VISIT (OUTPATIENT)
Dept: CARDIOLOGY | Facility: CLINIC | Age: 74
End: 2024-10-02
Payer: MEDICARE

## 2024-10-02 VITALS
SYSTOLIC BLOOD PRESSURE: 154 MMHG | BODY MASS INDEX: 17.39 KG/M2 | OXYGEN SATURATION: 98 % | DIASTOLIC BLOOD PRESSURE: 88 MMHG | WEIGHT: 101.88 LBS | HEART RATE: 80 BPM | HEIGHT: 64 IN

## 2024-10-02 DIAGNOSIS — I48.11 LONGSTANDING PERSISTENT ATRIAL FIBRILLATION: ICD-10-CM

## 2024-10-02 DIAGNOSIS — I50.32 CHRONIC HEART FAILURE WITH PRESERVED EJECTION FRACTION: ICD-10-CM

## 2024-10-02 DIAGNOSIS — I87.2 VENOUS INSUFFICIENCY OF BOTH LOWER EXTREMITIES: ICD-10-CM

## 2024-10-02 DIAGNOSIS — I50.32 CHRONIC HEART FAILURE WITH PRESERVED EJECTION FRACTION: Primary | ICD-10-CM

## 2024-10-02 DIAGNOSIS — I27.20 PULMONARY HYPERTENSION: ICD-10-CM

## 2024-10-02 DIAGNOSIS — Z92.89 HISTORY OF CARDIOVERSION: ICD-10-CM

## 2024-10-02 LAB
ANION GAP SERPL CALC-SCNC: 9 MMOL/L (ref 8–16)
BUN SERPL-MCNC: 10 MG/DL (ref 8–23)
CALCIUM SERPL-MCNC: 9.4 MG/DL (ref 8.7–10.5)
CHLORIDE SERPL-SCNC: 103 MMOL/L (ref 95–110)
CO2 SERPL-SCNC: 29 MMOL/L (ref 23–29)
CREAT SERPL-MCNC: 0.8 MG/DL (ref 0.5–1.4)
EST. GFR  (NO RACE VARIABLE): >60 ML/MIN/1.73 M^2
GLUCOSE SERPL-MCNC: 102 MG/DL (ref 70–110)
POTASSIUM SERPL-SCNC: 3.3 MMOL/L (ref 3.5–5.1)
SODIUM SERPL-SCNC: 141 MMOL/L (ref 136–145)

## 2024-10-02 PROCEDURE — 3288F FALL RISK ASSESSMENT DOCD: CPT | Mod: HCNC,CPTII,S$GLB, | Performed by: PHYSICIAN ASSISTANT

## 2024-10-02 PROCEDURE — 1126F AMNT PAIN NOTED NONE PRSNT: CPT | Mod: HCNC,CPTII,S$GLB, | Performed by: PHYSICIAN ASSISTANT

## 2024-10-02 PROCEDURE — 36415 COLL VENOUS BLD VENIPUNCTURE: CPT | Mod: HCNC | Performed by: PHYSICIAN ASSISTANT

## 2024-10-02 PROCEDURE — 1157F ADVNC CARE PLAN IN RCRD: CPT | Mod: HCNC,CPTII,S$GLB, | Performed by: PHYSICIAN ASSISTANT

## 2024-10-02 PROCEDURE — 3077F SYST BP >= 140 MM HG: CPT | Mod: HCNC,CPTII,S$GLB, | Performed by: PHYSICIAN ASSISTANT

## 2024-10-02 PROCEDURE — 1101F PT FALLS ASSESS-DOCD LE1/YR: CPT | Mod: HCNC,CPTII,S$GLB, | Performed by: PHYSICIAN ASSISTANT

## 2024-10-02 PROCEDURE — 99999 PR PBB SHADOW E&M-EST. PATIENT-LVL V: CPT | Mod: PBBFAC,HCNC,, | Performed by: PHYSICIAN ASSISTANT

## 2024-10-02 PROCEDURE — 99214 OFFICE O/P EST MOD 30 MIN: CPT | Mod: HCNC,S$GLB,, | Performed by: PHYSICIAN ASSISTANT

## 2024-10-02 PROCEDURE — 3079F DIAST BP 80-89 MM HG: CPT | Mod: HCNC,CPTII,S$GLB, | Performed by: PHYSICIAN ASSISTANT

## 2024-10-02 PROCEDURE — 80048 BASIC METABOLIC PNL TOTAL CA: CPT | Mod: HCNC | Performed by: PHYSICIAN ASSISTANT

## 2024-10-02 PROCEDURE — 1159F MED LIST DOCD IN RCRD: CPT | Mod: HCNC,CPTII,S$GLB, | Performed by: PHYSICIAN ASSISTANT

## 2024-10-02 PROCEDURE — 1160F RVW MEDS BY RX/DR IN RCRD: CPT | Mod: HCNC,CPTII,S$GLB, | Performed by: PHYSICIAN ASSISTANT

## 2024-10-02 RX ORDER — FUROSEMIDE 20 MG/1
20 TABLET ORAL DAILY
Qty: 90 TABLET | Refills: 3 | Status: SHIPPED | OUTPATIENT
Start: 2024-10-02 | End: 2025-09-27

## 2024-10-02 NOTE — PROGRESS NOTES
General Cardiology Clinic Note  Reason for Visit: ER follow up  Last Clinic Visit: 6/17/2024 with Reina Madrid PA-C  General Cardiologist: Dr. Pereira    HPI:   Angelina Man is a 74 y.o. female who presents for ER follow up.     Problems:  HFpEF  Hypothyroidism   Persistent atrial fibrillation  Varicose veins  BLE venous insufficiency     -s/p bilateral EVLT (left 11-Nov-2021; right 06-Jan-2022)       Interval HPI:   Pt presented to the ER 9/24/24 with leg swelling. [facility], which involved long plane rides. Lower extremity ultrasound negative for DVT. . She was advised to start taking Lasix daily instead of every other day.     She presents for follow up. The daily Lasix has helped; her left leg is no longer swollen but she still has edema in the right leg. She wears compression socks every day. She has chronic JENNINGS with exercise, but not change in this or decline in functional capacity. She denies orthopnea and PND. Weight is stable. She denies CP, palpitations, syncope. She still exercises regularly. [facility], she has not been exercising as much as usual, although still exercising at least an hour a day. She typically does 2 hours.     6/17/2024 HPI (Reina Madrid PA-C)  Is still experiencing dyspnea with exercise that is unchanged from six months ago. She denies worsening of symptoms and states they occur occasionally, not every day when she exercises. She states the shortness of breath is also present when she gets in her car to drive. She exercises daily for 2 hours, alternating between spinning class, cardio, yoga with no complaints of chest pain, chest tightness. She states her legs have been feeling heavier especially during the summer. She wears her compression stockings daily. Does not check BP at home, BP readings have always been in normal range. She denies chest pain/pressure/tightness/discomfort, dyspnea on exertion, orthopnea,  PND, palpitations, syncope or claudication.    ROS:      Review of Systems   Constitutional: Positive for malaise/fatigue. Negative for diaphoresis, weight gain and weight loss.   HENT:  Negative for nosebleeds.    Eyes:  Negative for vision loss in left eye, vision loss in right eye and visual disturbance.   Cardiovascular:  Positive for dyspnea on exertion and leg swelling. Negative for chest pain, claudication, irregular heartbeat, near-syncope, orthopnea, palpitations, paroxysmal nocturnal dyspnea and syncope.   Respiratory:  Negative for cough, shortness of breath, sleep disturbances due to breathing, snoring and wheezing.    Hematologic/Lymphatic: Negative for bleeding problem. Does not bruise/bleed easily.   Skin:  Negative for poor wound healing and rash.   Musculoskeletal:  Negative for muscle cramps and myalgias.   Gastrointestinal:  Negative for bloating, abdominal pain, diarrhea, heartburn, melena, nausea and vomiting.   Genitourinary:  Negative for hematuria and nocturia.   Neurological:  Negative for brief paralysis, dizziness, headaches, light-headedness, numbness and weakness.   Psychiatric/Behavioral:  Negative for depression.    Allergic/Immunologic: Negative for hives.       PMH:     Past Medical History:   Diagnosis Date    Arthritis     Atrial fibrillation     Bronchitis     Cataract     Dry eyes     GERD (gastroesophageal reflux disease)     Glaucoma, suspect - Both Eyes     Hypothyroidism 06/23/2016    Pulmonary hypertension     Rash     Renal angiomyolipoma     Renal disorder     SCC (squamous cell carcinoma) 07/2023    left lower lateral shin    SCC (squamous cell carcinoma) 04/2023    L mid lateral shin    SCC (squamous cell carcinoma) 09/2023    L mid lower shin    Spinal stenosis     Squamous cell carcinoma     in-situ right upper inner arm, right wrist    Status post lumbar surgery 06/23/2016    Stress fracture     Thyroid disease     Venous insufficiency      Past Surgical History:    Procedure Laterality Date    ANGIOPLASTY      CATARACT EXTRACTION W/  INTRAOCULAR LENS IMPLANT Left 12/6/2022    Procedure: EXTRACTION, CATARACT, WITH IOL INSERTION;  Surgeon: Tone Brown MD;  Location: [facility] OR;  Service: Ophthalmology;  Laterality: Left;    CATARACT EXTRACTION W/  INTRAOCULAR LENS IMPLANT Right 12/20/2022    Procedure: EXTRACTION, CATARACT, WITH IOL INSERTION;  Surgeon: Tone Brown MD;  Location: [facility] OR;  Service: Ophthalmology;  Laterality: Right;    CERVICAL FUSION      COLONOSCOPY      COLONOSCOPY N/A 11/14/2017    Procedure: COLONOSCOPY;  Surgeon: Kasi Mercado MD;  Location: [facility] (4TH FLR);  Service: Endoscopy;  Laterality: N/A;    COLONOSCOPY N/A 4/26/2023    Procedure: COLONOSCOPY;  Surgeon: Jeanmarie Hathaway MD;  Location: [facility] (4TH FLR);  Service: Colon and Rectal;  Laterality: N/A;  ok to hold Eliquis 2 days per Dr Pereira  constipation-ext Miralax prep  instr mailed/portal-GT  4/21/23 pre call attempted, no answer    COLONOSCOPY N/A 5/28/2024    Procedure: COLONOSCOPY;  Surgeon: Jeanmarie Hathaway MD;  Location: [facility] (4TH FLR);  Service: Endoscopy;  Laterality: N/A;  referral Dr. Hathaway. Annual Colonoscopy for High Risk. Golytely prep instr. to portal Pending Eliquis Hold from Dr. David Pereira. [facility] [email or web address]  ok to hold Eliquis 2 days per Dr Pereira-GT  5/21- precall confirmed; instructions re-sent via portal, aware of holding eliquis     ESOPHAGOGASTRODUODENOSCOPY N/A 10/10/2019    Procedure: EGD (ESOPHAGOGASTRODUODENOSCOPY);  Surgeon: Rg Milton MD;  Location: [facility] (4TH FLR);  Service: Endoscopy;  Laterality: N/A;    ESOPHAGOGASTRODUODENOSCOPY N/A 10/6/2021    Procedure: EGD (ESOPHAGOGASTRODUODENOSCOPY);  Surgeon: Rg Milton MD;  Location: [facility] (4TH FLR);  Service: Endoscopy;  Laterality: N/A;  covid test 10/3 zo, instr emailed/portal -ml    EXCISION Left 5/17/2023    Procedure: EXCISION SQUAMOUS CELL CARCINOMA LEFT LEG;   Surgeon: Kasi Canela Jr., MD;  Location: [address];  Service: General;  Laterality: Left;    l4-5 mid discectomy Left 03/2017    l4-5 MIS diskectomy Right 05/2016    RIGHT HEART CATHETERIZATION Right 1/27/2022    Procedure: INSERTION, CATHETER, RIGHT HEART;  Surgeon: Satnam Pickard Jr., MD;  Location: [facility] CATH LAB;  Service: Cardiology;  Laterality: Right;    TRANSFORAMINAL EPIDURAL INJECTION OF STEROID Bilateral 3/12/2024    Procedure: LUMBAR TRANSFORAMINAL BILATERAL L3/4 *ELIQUIS CLEARANCE IN CHART*;  Surgeon: Sheree Abbott MD;  Location: [facility] PAIN MGT;  Service: Pain Management;  Laterality: Bilateral;  [phone]  2 WK F/U [place]    TREATMENT OF CARDIAC ARRHYTHMIA N/A 7/17/2020    Procedure: CARDIOVERSION;  Surgeon: Kwan Bray MD;  Location: [facility] EP LAB;  Service: Cardiology;  Laterality: N/A;  AF,DCCV/SANGITA, ANES, SK, 746    TREATMENT OF CARDIAC ARRHYTHMIA N/A 7/14/2021    Procedure: CARDIOVERSION;  Surgeon: SYDNIE Cedillo MD;  Location: [facility] EP LAB;  Service: Cardiology;  Laterality: N/A;  AF, SANGITA, DCCV, MAC, EH, 3 Prep    WASHOUT Right 5/17/2023    Procedure: WASHOUT right lower arm and closure;  Surgeon: Kasi Canela Jr., MD;  Location: [address];  Service: General;  Laterality: Right;     Allergies:   Review of patient's allergies indicates:  No Known Allergies  Medications:     Current Outpatient Medications on File Prior to Visit   Medication Sig Dispense Refill    acetaminophen (TYLENOL) 500 MG tablet Take 500 mg by mouth every 6 (six) hours as needed for Pain.      acyclovir (ZOVIRAX) 400 MG tablet Take 1 tablet (400 mg total) by mouth once daily. 180 tablet 1    ascorbic acid (VITAMIN C) 1000 MG tablet Take 1,000 mg by mouth once daily.       biotin 1 mg tablet Take 1 mg by mouth 2 (two) times daily.       buPROPion (WELLBUTRIN SR) 150 MG TBSR 12 hr tablet Take 1 tablet (150 mg total) by mouth 2 (two) times daily. 180 tablet 3    dexamethasone sodium phosp/PF  (DEXAMETHASONE SODIUM PHOS, PF,) 10 mg/mL Soln  (Patient not taking: Reported on 8/27/2024)      diclofenac sodium (VOLTAREN) 1 % Gel Apply 2 g topically 3 (three) times daily. Apply to the area of toe pain 2-3x per day or night as needed (Patient not taking: Reported on 8/22/2024) 100 g 3    digestive enzymes Tab Take 1 tablet by mouth once daily.       ELIQUIS 5 mg Tab TAKE 1 TABLET(5 MG) BY MOUTH TWICE DAILY 180 tablet 3    fentaNYL (SUBLIMAZE) 50 mcg/mL injection  (Patient not taking: Reported on 8/27/2024)      fluticasone propionate (FLONASE) 50 mcg/actuation nasal spray 1 spray (50 mcg total) by Each Nostril route 2 (two) times daily as needed for Rhinitis. 16 g 11    furosemide (LASIX) 20 MG tablet TAKE 1 TABLET(20 MG) BY MOUTH EVERY OTHER DAY 15 tablet 11    furosemide (LASIX) 20 MG tablet Take 1 tablet (20 mg total) by mouth once daily. for 7 days 7 tablet 0    GADAVIST 1 mmol/mL (604.72 mg/mL) Soln injection  (Patient not taking: Reported on 8/27/2024)      GAVILYTE-G 236-22.74-6.74 -5.86 gram suspension SMARTSIG:Milliliter(s) By Mouth      influenza, adjuvanted, (FLUAD TRIV 2024-25,65Y UP,,PF,) 45 mcg/0.5 mL IM vaccine (> or = 64 yo) Inject into the muscle. 0.5 mL 0    ketoconazole (NIZORAL) 2 % cream aaa bid prn flare under arm and qhs to areas on face 60 g 3    levothyroxine (SYNTHROID) 137 MCG Tab tablet Take 1 tablet (137 mcg total) by mouth before breakfast. 90 tablet 3    lubiprostone (AMITIZA) 24 MCG Cap Take 1 capsule (24 mcg total) by mouth daily as needed (IBS symptoms). 30 capsule 6    methocarbamoL (ROBAXIN) 500 MG Tab Take 1 tablet (500 mg total) by mouth 3 (three) times daily as needed (mus). 90 tablet 2    metoprolol succinate (TOPROL-XL) 25 MG 24 hr tablet Take 1 tablet (25 mg total) by mouth once daily. 90 tablet 3    midazolam, PF, (VERSED) 1 mg/mL Soln injection  (Patient not taking: Reported on 8/27/2024)      mirtazapine (REMERON) 7.5 MG Tab Take 1 tablet (7.5 mg total) by mouth  "nightly. 90 tablet 3    multivitamin (THERAGRAN) per tablet Take 1 tablet by mouth once daily.       OMNIPAQUE 300 300 mg iodine/mL injection  (Patient not taking: Reported on 8/27/2024)      pramipexole (MIRAPEX) 0.25 MG tablet TAKE 1 TABLET(0.25 MG) BY MOUTH EVERY EVENING FOR RESTLESS LEGS 90 tablet 3    traZODone (DESYREL) 50 MG tablet TAKE 1 TABLET(50 MG) BY MOUTH EVERY NIGHT AS NEEDED FOR INSOMNIA 90 tablet 1    zinc 50 mg Tab Take 50 mg by mouth once daily.        No current facility-administered medications on file prior to visit.     Social History:     Social History     Tobacco Use    Smoking status: Never     Passive exposure: Never    Smokeless tobacco: Never   Substance Use Topics    Alcohol use: Yes     Alcohol/week: 4.0 standard drinks of alcohol     Types: 4 Glasses of wine per week     Comment: 2 glasses of wine on weekends     Family History:     Family History   Problem Relation Name Age of Onset    Cancer Mother          Lung cancer    Heart failure Father      Colon cancer Father      Hypertension Father      Cataracts Father      Cancer Father  80        colon    No Known Problems Sister      No Known Problems Brother      Melanoma Daughter      Diabetes Son      Heart disease Son      Cancer Son          esophageal cancer    No Known Problems Maternal Aunt      No Known Problems Maternal Uncle      No Known Problems Paternal Aunt      No Known Problems Paternal Uncle      No Known Problems Maternal Grandmother      No Known Problems Maternal Grandfather      No Known Problems Paternal Grandmother      No Known Problems Paternal Grandfather      Amblyopia Neg Hx      Blindness Neg Hx      Glaucoma Neg Hx      Macular degeneration Neg Hx      Retinal detachment Neg Hx      Strabismus Neg Hx      Stroke Neg Hx      Thyroid disease Neg Hx      Breast cancer Neg Hx      Ovarian cancer Neg Hx       Physical Exam:   BP (!) 154/88 (Patient Position: Sitting)   Pulse 80   Ht 5' 4" (1.626 m)   Wt " 46.2 kg (101 lb 13.6 oz)   LMP  (LMP Unknown)   SpO2 98%   BMI 17.48 kg/m²        Physical Exam  Vitals and nursing note reviewed.   Constitutional:       General: She is not in acute distress.     Appearance: Normal appearance. She is not ill-appearing.   HENT:      Head: Normocephalic and atraumatic.   Eyes:      General: No scleral icterus.     Conjunctiva/sclera: Conjunctivae normal.   Neck:      Thyroid: No thyromegaly.      Vascular: No carotid bruit or JVD.   Cardiovascular:      Rate and Rhythm: Normal rate. Rhythm irregular.      Pulses: Normal pulses.      Heart sounds: Normal heart sounds. No murmur heard.     No friction rub. No gallop.   Pulmonary:      Effort: Pulmonary effort is normal.      Breath sounds: Normal breath sounds. No wheezing, rhonchi or rales.   Chest:      Chest wall: No tenderness.   Abdominal:      General: Bowel sounds are normal. There is no distension.      Palpations: Abdomen is soft.      Tenderness: There is no abdominal tenderness.   Musculoskeletal:         General: No swelling.      Cervical back: Neck supple.      Right lower le+ Pitting Edema (just to ankle) present.      Left lower leg: No edema.      Comments: Hyperpigmentation of skin on lower extremities. Venous stasis dermatitis.    Skin:     General: Skin is warm and dry.      Coloration: Skin is not pale.      Findings: No erythema or rash.      Nails: There is no clubbing.   Neurological:      Mental Status: She is alert and oriented to person, place, and time. Mental status is at baseline.   Psychiatric:         Mood and Affect: Mood and affect normal.         Behavior: Behavior normal.          Labs:     Lab Results   Component Value Date     2024    K 3.4 (L) 2024     2024    CO2 29 2024    BUN 14 2024    CREATININE 1.0 2024    ANIONGAP 10 2024     Lab Results   Component Value Date    HGBA1C 5.4 2015     Lab Results   Component Value Date    BNP  402 (H) 09/24/2024     (H) 12/28/2021     (H) 07/16/2020    Lab Results   Component Value Date    WBC 6.60 09/24/2024    HGB 12.1 09/24/2024    HCT 35.9 (L) 09/24/2024     09/24/2024    GRAN 4.8 09/24/2024    GRAN 72.2 09/24/2024     Lab Results   Component Value Date    CHOL 197 01/10/2024    HDL 68 01/10/2024    LDLCALC 111.8 01/10/2024    TRIG 86 01/10/2024          Imaging:   Echocardiograms:   TTE 11/2/22  The left ventricle is normal in size with normal systolic function. The estimated ejection fraction is 60%.  Normal right ventricular size with normal right ventricular systolic function.  Moderate left atrial enlargement.  Mild mitral regurgitation.  Mild tricuspid regurgitation.  Small anterior pericardial effusion.  The estimated PA systolic pressure is 27 mmHg.  Intermediate central venous pressure (8 mmHg).  Atrial fibrillation observed.    TTE 11/22/2021  The left ventricle is normal in size with low normal systolic function. The estimated ejection fraction is 50%.  Normal right ventricular size with low normal right ventricular systolic function.  Biatrial enlargement.  Mild mitral regurgitation.  Mild tricuspid regurgitation.  The estimated PA systolic pressure is 44 mmHg.  There is pulmonary hypertension.  Elevated central venous pressure (15 mmHg).  Trivial circumferential pericardial effusion.  Atrial fibrillation observed.    Stress Tests:   CPX Study 7/3/24    The patient exercised for 6 minutes and 46 seconds on a high ramp protocol, achieving a peak heart rate of 116 bpm, which is 83% of the age predicted maximum heart rate.    Atrial fibrillation was present at baseline. There were no arrhythmias during stress.    There was no ST segment deviation noted during stress.    Normal functional status associated with a normal breathing reserve, normal oxygen stauration and a normal AT. No clinically significant functional impairment.    RER was <1. The patient stopped exercising  due to back pain.    The peak VO2 was 22.8 ml/kg/min which is 91% of predicted equating to a functional capacity of 6.51 METS indicating normal functional status.    Exercise SPECT Stress Test 1/4/24    Normal myocardial perfusion scan. There is no evidence of myocardial ischemia or infarction.    The gated perfusion images showed an ejection fraction of 54% at rest. The gated perfusion images showed an ejection fraction of 61% post stress. Normal ejection fraction is greater than 53%.    There is normal wall motion at rest and post stress.    LV cavity size is normal at rest and normal at stress.    The ECG portion of the study is negative for ischemia. Sensitivity is reduced secondary to the target heart rate not being achieved.    The patient reported no chest pain during the stress test.    During stress, atrial fibrillation is noted.    The exercise capacity was average.    There are no prior studies for comparison.    Caths:       Other:  Exercise ABIs 9/6/24    Normal resting ABIs bilaterally.    Exercise ABIs are within normal limits bilaterally.    PVR waveforms are mildly dampened at the low thigh level bilaterally,    PVR waveforms are mildly dampened at the ankle level bilaterally.    Lower extremity arterial ultrasound 9/6/24    Right resting STEPHANIE 1.15, is normal.    Left resting STEPHANIE 1.25, is normal.    Duplex ultrasound shows bilateral lower extremity arteries with no hemodynamically significant stenosis.    Exercise ABIs 10/2/23    Normal resting ABIs bilaterally.    Exercise ABIs are within normal limits bilaterally.    TBIs are suggestive of mild bilateral lower extremity arterial disease.    Digit waveforms are severely dampened at all digits bilaterally.    Venous insufficiency ultrasound 10/2/2023    There is no evidence of a right lower extremity DVT.    The right common femoral vein has reflux.    The right greater saphenous vein has reflux.    There is no evidence of a left lower extremity  DVT.    The left greater saphenous vein has reflux.    24 Hour Holter Monitor 2/14/2022  Baseline rhythm was coarse atrial fibrillation with narrow QRS complexes and an average heart rate of 79 bpm.  There were no reported symptoms.  There were very rare PVCs with an overall PVC burden of 0%. There was one run of nonsustained VT lasting 12 beats.  There was an asymptomatic pause lasting 2.3 seconds while in atrial fibrillation, occurring during sleep.  The patient remained in atrial fibrillation for the duration of study.      Assessment:     1. Chronic heart failure with preserved ejection fraction    2. Venous insufficiency of both lower extremities    3. Longstanding persistent atrial fibrillation    4. History of cardioversion    5. Pulmonary hypertension      Plan:     HFpEF  She had minimal dependent edema on exam likely due to venous insufficiency. No JVD or rales. Chronic, stable NYHA Class 2 symptoms. However, her BNP was elevated in the ER, up to 400. Will obtain updated echo.   Continue Lasix 20 mg daily. Obtain BMP. She may need to start potassium.     Venous insufficiency  Continue daily compression stockings.   Following with Dr. Cedeño    Chronic AF   Asymptomatic. Continue Eliquis 5 mg bid for CVA prophylaxis.     Pulmonary HTN  WHO Group 2.         Follow up with Dr. Pereira as scheduled.     Signed:  Casandra Valentine PA-C  Cardiology   [phone] - General

## 2024-10-03 RX ORDER — POTASSIUM CHLORIDE 20 MEQ/1
20 TABLET, EXTENDED RELEASE ORAL DAILY
Qty: 90 TABLET | Refills: 3 | Status: SHIPPED | OUTPATIENT
Start: 2024-10-03 | End: 2025-09-28

## 2024-10-07 ENCOUNTER — HOSPITAL ENCOUNTER (OUTPATIENT)
Dept: CARDIOLOGY | Facility: HOSPITAL | Age: 74
Discharge: HOME | End: 2024-10-07
Attending: PHYSICIAN ASSISTANT
Payer: MEDICARE

## 2024-10-07 VITALS
HEART RATE: 80 BPM | SYSTOLIC BLOOD PRESSURE: 154 MMHG | DIASTOLIC BLOOD PRESSURE: 88 MMHG | WEIGHT: 101.44 LBS | HEIGHT: 64 IN | BODY MASS INDEX: 17.32 KG/M2

## 2024-10-07 DIAGNOSIS — I50.32 CHRONIC HEART FAILURE WITH PRESERVED EJECTION FRACTION: ICD-10-CM

## 2024-10-07 LAB
ASCENDING AORTA: 3.2 CM
AV AREA BY CONTINUOUS VTI: 1.8 CM2
AV INDEX (PROSTH): 0.66
AV LVOT MEAN GRADIENT: 1 MMHG
AV LVOT PEAK GRADIENT: 2 MMHG
AV MEAN GRADIENT: 3.5 MMHG
AV PEAK GRADIENT: 5.8 MMHG
AV VALVE AREA BY VELOCITY RATIO: 1.7 CM²
AV VALVE AREA: 1.9 CM2
AV VELOCITY RATIO: 0.58
BSA FOR ECHO PROCEDURE: 1.44 M2
CV ECHO LV RWT: 0.46 CM
DOP CALC AO PEAK VEL: 1.2 M/S
DOP CALC AO VTI: 25.1 CM
DOP CALC LVOT AREA: 2.8 CM2
DOP CALC LVOT DIAMETER: 1.9 CM
DOP CALC LVOT PEAK VEL: 0.7 M/S
DOP CALC LVOT STROKE VOLUME: 47 CM3
DOP CALCLVOT PEAK VEL VTI: 16.6 CM
ECHO EF ESTIMATED: 52 %
ECHO LV POSTERIOR WALL: 0.9 CM (ref 0.6–1.1)
FRACTIONAL SHORTENING: 25.6 % (ref 28–44)
INTERVENTRICULAR SEPTUM: 0.7 CM (ref 0.6–1.1)
IVC DIAMETER: 2.05 CM
IVRT: 79.92 MS
LA MAJOR: 5.12 CM
LA MINOR: 5.43 CM
LA WIDTH: 4.65 CM
LEFT ATRIUM SIZE: 4.08 CM
LEFT ATRIUM VOLUME INDEX MOD: 46.6 ML/M2
LEFT ATRIUM VOLUME INDEX: 58.2 ML/M2
LEFT ATRIUM VOLUME MOD: 67.97 ML
LEFT ATRIUM VOLUME: 84.99 CM3
LEFT INTERNAL DIMENSION IN SYSTOLE: 2.9 CM (ref 2.1–4)
LEFT VENTRICLE DIASTOLIC VOLUME INDEX: 43.99 ML/M2
LEFT VENTRICLE DIASTOLIC VOLUME: 64.23 ML
LEFT VENTRICLE MASS INDEX: 61.4 G/M2
LEFT VENTRICLE SYSTOLIC VOLUME INDEX: 21.1 ML/M2
LEFT VENTRICLE SYSTOLIC VOLUME: 30.86 ML
LEFT VENTRICULAR INTERNAL DIMENSION IN DIASTOLE: 3.9 CM (ref 3.5–6)
LEFT VENTRICULAR MASS: 89.7 G
OHS CV RV/LV RATIO: 0.87 CM
PISA TR MAX VEL: 2.3 M/S
PULM VEIN S/D RATIO: 1.68
PV PEAK D VEL: 0.28 M/S
PV PEAK S VEL: 0.47 M/S
RA MAJOR: 5.77 CM
RA PRESSURE ESTIMATED: 15 MMHG
RA WIDTH: 4.02 CM
RIGHT ATRIAL AREA: 19.6 CM2
RIGHT VENTRICLE DIASTOLIC BASEL DIMENSION: 3.4 CM
RV TB RVSP: 17 MMHG
RV TISSUE DOPPLER FREE WALL SYSTOLIC VELOCITY 1 (APICAL 4 CHAMBER VIEW): 10.64 CM/S
SINUS: 2.73 CM
STJ: 2.44 CM
TDI LATERAL: 0.15 M/S
TDI SEPTAL: 0.09 M/S
TDI: 0.12 M/S
TR MAX PG: 21 MMHG
TRICUSPID ANNULAR PLANE SYSTOLIC EXCURSION: 1.46 CM
TV PEAK GRADIENT: 21 MMHG
TV REST PULMONARY ARTERY PRESSURE: 36 MMHG
Z-SCORE OF LEFT VENTRICULAR DIMENSION IN END DIASTOLE: -1.19
Z-SCORE OF LEFT VENTRICULAR DIMENSION IN END SYSTOLE: 0.48

## 2024-10-07 PROCEDURE — 93306 TTE W/DOPPLER COMPLETE: CPT | Mod: HCNC

## 2024-10-07 PROCEDURE — 93306 TTE W/DOPPLER COMPLETE: CPT | Mod: 26,HCNC,, | Performed by: INTERNAL MEDICINE

## 2024-10-09 ENCOUNTER — TELEPHONE (OUTPATIENT)
Dept: CARDIOLOGY | Facility: CLINIC | Age: 74
End: 2024-10-09
Payer: MEDICARE

## 2024-10-09 NOTE — TELEPHONE ENCOUNTER
Called patient and discussed echo results. She states she swelling has gotten much better.     ----- Message from Massimo Keith sent at 10/9/2024  3:32 PM CDT -----    ----- Message -----  From: Alda Calderon  Sent: 10/9/2024   3:24 PM CDT  To: [facility] Guajardo Staff    Patient is returning a missed phone call regarding echo results. She can be reached at [phone].    Thank you

## 2024-10-17 ENCOUNTER — INFUSION (OUTPATIENT)
Dept: INFECTIOUS DISEASES | Facility: HOSPITAL | Age: 74
End: 2024-10-17
Payer: MEDICARE

## 2024-10-17 VITALS
BODY MASS INDEX: 16.83 KG/M2 | SYSTOLIC BLOOD PRESSURE: 117 MMHG | DIASTOLIC BLOOD PRESSURE: 55 MMHG | OXYGEN SATURATION: 100 % | WEIGHT: 98.56 LBS | TEMPERATURE: 98 F | HEART RATE: 70 BPM | RESPIRATION RATE: 18 BRPM | HEIGHT: 64 IN

## 2024-10-17 DIAGNOSIS — M81.0 AGE-RELATED OSTEOPOROSIS WITHOUT CURRENT PATHOLOGICAL FRACTURE: Primary | ICD-10-CM

## 2024-10-17 PROCEDURE — 96372 THER/PROPH/DIAG INJ SC/IM: CPT | Mod: HCNC

## 2024-10-17 PROCEDURE — 63600175 PHARM REV CODE 636 W HCPCS: Mod: JZ,JG,HCNC | Performed by: INTERNAL MEDICINE

## 2024-10-17 RX ADMIN — DENOSUMAB 60 MG: 60 INJECTION SUBCUTANEOUS at 01:10

## 2024-10-17 NOTE — PROGRESS NOTES
Patient received Prolia injection - confirms use of calcium and vitamin D supplements and denies dental procedures over past 3 months - administered per guidelines. Patient tolerated injection well, without difficulty.    Next appointment scheduled and patient made aware.    Limited head-to-toe assessment due to privacy issues and visit reason though the opportunity was given for patient to express any concerns.

## 2024-10-28 RX ORDER — APIXABAN 5 MG/1
TABLET, FILM COATED ORAL
Qty: 180 TABLET | Refills: 3 | Status: SHIPPED | OUTPATIENT
Start: 2024-10-28

## 2024-11-11 NOTE — PROGRESS NOTES
Outpatient Neurology Consultation    Requesting physician: Dr. Cedeño    Impression:  Glove-stocking pattern sensory disturbance: given normal DPNCheck, small fiber neuropathy is in the differential.  She also has signs of cervical myelopathy but I want to exclude concomitant PNS pathology.  Cervical spondylosis/stenosis s/p fusion with exam c/w mild cervical myelopathy: last MRI showed mod-severe stenosis at C6-7 (below fusion).  Will repeat imaging.  Her intermittent R footdrop may be cervical in localization  RLS: controlled on pramipexole  Lumbosacral spondylosis/stenosis s/p decompression without clear evidence of radiculopathy on exam    Plan:  HgA1c, B12, MMA, folate, cryoglobulinemia, SS-A, SS-B, TSH  DPNCheck today (normal)  MRI c-spine  Skin biopsy  Defer EMG/NCV for now but reconsider after above  I will arrange f/u after above    Problem List Items Addressed This Visit          Unprioritized    Pins and needles sensation    Relevant Orders    Hemoglobin A1C    Vitamin B12    METHYLMALONIC ACID, SERUM    FOLATE    CRYOGLOBULIN    Sjogrens syndrome-A extractable nuclear antibody    Sjogrens syndrome-B extractable nuclear antibody    TSH    MRI Cervical Spine Without Contrast     Other Visit Diagnoses       Numbness and tingling    -  Primary    Relevant Orders    Hemoglobin A1C    Vitamin B12    METHYLMALONIC ACID, SERUM    FOLATE    CRYOGLOBULIN    Sjogrens syndrome-A extractable nuclear antibody    Sjogrens syndrome-B extractable nuclear antibody    TSH    MRI Cervical Spine Without Contrast    Cervical spondylosis        Relevant Orders    MRI Cervical Spine Without Contrast    Abnormal finding of blood chemistry, unspecified        Relevant Orders    Hemoglobin A1C            CC: BLE numbness/tingling    HPI:  75 y/o WF referred for evaluation of B foot numbness. She reports years of BLE numbness/tingling and occasional hand symptoms.  Fingers get purple in cold.  Her mother had Raynaud's.   She  reports intermittent RLE foot dragging.     Not a daily ETOH drinker.     Endorses dry eyes, dry mouth (since furosemide), constipation, + bloating, + orthostasis, decreased sweating    Past Medical History:   Diagnosis Date    Arthritis     Atrial fibrillation     Bronchitis     Cataract     Dry eyes     GERD (gastroesophageal reflux disease)     Glaucoma, suspect - Both Eyes     Hypothyroidism 06/23/2016    Pulmonary hypertension     Rash     Renal angiomyolipoma     Renal disorder     SCC (squamous cell carcinoma) 07/2023    left lower lateral shin    SCC (squamous cell carcinoma) 04/2023    L mid lateral shin    SCC (squamous cell carcinoma) 09/2023    L mid lower shin    Spinal stenosis     Squamous cell carcinoma     in-situ right upper inner arm, right wrist    Status post lumbar surgery 06/23/2016    Stress fracture     Thyroid disease     Venous insufficiency       Past Surgical History:   Procedure Laterality Date    ANGIOPLASTY      CATARACT EXTRACTION W/  INTRAOCULAR LENS IMPLANT Left 12/6/2022    Procedure: EXTRACTION, CATARACT, WITH IOL INSERTION;  Surgeon: Tone Brown MD;  Location: [facility];  Service: Ophthalmology;  Laterality: Left;    CATARACT EXTRACTION W/  INTRAOCULAR LENS IMPLANT Right 12/20/2022    Procedure: EXTRACTION, CATARACT, WITH IOL INSERTION;  Surgeon: Tone Brown MD;  Location: [facility];  Service: Ophthalmology;  Laterality: Right;    CERVICAL FUSION      COLONOSCOPY      COLONOSCOPY N/A 11/14/2017    Procedure: COLONOSCOPY;  Surgeon: Kasi Mercado MD;  Location: [facility] ([address] [place] [address]);  Service: Endoscopy;  Laterality: N/A;    COLONOSCOPY N/A 4/26/2023    Procedure: COLONOSCOPY;  Surgeon: Jeanmarie Hathaway MD;  Location: [facility] ENDO (4TH FLR);  Service: Colon and Rectal;  Laterality: N/A;  ok to hold Eliquis 2 days per Dr Pereira  constipation-ext Miralax prep  instr mailed/portal-GT  4/21/23 pre call attempted, no answer    COLONOSCOPY N/A 5/28/2024    Procedure:  COLONOSCOPY;  Surgeon: Jeanmarie Hathaway MD;  Location: [facility] (4TH FLR);  Service: Endoscopy;  Laterality: N/A;  referral Dr. Hathaway. Annual Colonoscopy for High Risk. Golytely prep instr. to portal Pending Eliquis Hold from Dr. David Pereira. [facility] Afib.EC  ok to hold Eliquis 2 days per Dr Pereira-GT  5/21- precall confirmed; instructions re-sent via portal, aware of holding eliquis     ESOPHAGOGASTRODUODENOSCOPY N/A 10/10/2019    Procedure: EGD (ESOPHAGOGASTRODUODENOSCOPY);  Surgeon: Rg Milton MD;  Location: [facility] ENDO (4TH FLR);  Service: Endoscopy;  Laterality: N/A;    ESOPHAGOGASTRODUODENOSCOPY N/A 10/6/2021    Procedure: EGD (ESOPHAGOGASTRODUODENOSCOPY);  Surgeon: Rg Milton MD;  Location: [facility] (4TH FLR);  Service: Endoscopy;  Laterality: N/A;  covid test 10/3 zo, instr emailed/portal -    EXCISION Left 5/17/2023    Procedure: EXCISION SQUAMOUS CELL CARCINOMA LEFT LEG;  Surgeon: Kasi Canela Jr., MD;  Location: [facility] OR 2ND FLR;  Service: General;  Laterality: Left;    l4-5 mid discectomy Left 03/2017    l4-5 MIS diskectomy Right 05/2016    RIGHT HEART CATHETERIZATION Right 1/27/2022    Procedure: INSERTION, CATHETER, RIGHT HEART;  Surgeon: Satnam Pickard Jr., MD;  Location: [facility] CATH LAB;  Service: Cardiology;  Laterality: Right;    TRANSFORAMINAL EPIDURAL INJECTION OF STEROID Bilateral 3/12/2024    Procedure: LUMBAR TRANSFORAMINAL BILATERAL L3/4 *ELIQUIS CLEARANCE IN CHART*;  Surgeon: Sheree Abbott MD;  Location: [facility] PAIN MGT;  Service: Pain Management;  Laterality: Bilateral;  [phone]  2 WK F/U [place]    TREATMENT OF CARDIAC ARRHYTHMIA N/A 7/17/2020    Procedure: CARDIOVERSION;  Surgeon: Kwan Bray MD;  Location: [facility] EP LAB;  Service: Cardiology;  Laterality: N/A;  AF,DCCV/SANGITA, ANES, SK, 746    TREATMENT OF CARDIAC ARRHYTHMIA N/A 7/14/2021    Procedure: CARDIOVERSION;  Surgeon: SYDNIE Cedillo MD;  Location: [facility] EP LAB;  Service: Cardiology;  Laterality: N/A;   AF, SANGITA, DCCV, MAC, EH, 3 Prep    WASHOUT Right 5/17/2023    Procedure: WASHOUT right lower arm and closure;  Surgeon: Kasi Canela Jr., MD;  Location: [facility] OR [address] [place] [address];  Service: General;  Laterality: Right;      Outpatient Medications Marked as Taking for the 11/12/24 encounter (Office Visit) with Malvin Bautista MD   Medication Sig Dispense Refill    acetaminophen (TYLENOL) 500 MG tablet Take 500 mg by mouth every 6 (six) hours as needed for Pain.      acyclovir (ZOVIRAX) 400 MG tablet Take 1 tablet (400 mg total) by mouth once daily. 180 tablet 1    ascorbic acid (VITAMIN C) 1000 MG tablet Take 1,000 mg by mouth once daily.       biotin 1 mg tablet Take 1 mg by mouth 2 (two) times daily.       buPROPion (WELLBUTRIN SR) 150 MG TBSR 12 hr tablet Take 1 tablet (150 mg total) by mouth 2 (two) times daily. 180 tablet 3    digestive enzymes Tab Take 1 tablet by mouth once daily.       ELIQUIS 5 mg Tab TAKE 1 TABLET(5 MG) BY MOUTH TWICE DAILY 180 tablet 3    fluticasone propionate (FLONASE) 50 mcg/actuation nasal spray 1 spray (50 mcg total) by Each Nostril route 2 (two) times daily as needed for Rhinitis. 16 g 11    furosemide (LASIX) 20 MG tablet Take 1 tablet (20 mg total) by mouth once daily. 90 tablet 3    GAVILYTE-G 236-22.74-6.74 -5.86 gram suspension SMARTSIG:Milliliter(s) By Mouth      influenza, adjuvanted, (FLUAD TRIV 2024-25,65Y UP,,PF,) 45 mcg/0.5 mL IM vaccine (> or = 66 yo) Inject into the muscle. 0.5 mL 0    ketoconazole (NIZORAL) 2 % cream aaa bid prn flare under arm and qhs to areas on face 60 g 3    levothyroxine (SYNTHROID) 137 MCG Tab tablet Take 1 tablet (137 mcg total) by mouth before breakfast. 90 tablet 3    lubiprostone (AMITIZA) 24 MCG Cap Take 1 capsule (24 mcg total) by mouth daily as needed (IBS symptoms). 30 capsule 6    methocarbamoL (ROBAXIN) 500 MG Tab Take 1 tablet (500 mg total) by mouth 3 (three) times daily as needed (mus). 90 tablet 2    metoprolol succinate (TOPROL-XL)  25 MG 24 hr tablet Take 1 tablet (25 mg total) by mouth once daily. 90 tablet 3    mirtazapine (REMERON) 7.5 MG Tab Take 1 tablet (7.5 mg total) by mouth nightly. 90 tablet 3    multivitamin (THERAGRAN) per tablet Take 1 tablet by mouth once daily.       potassium chloride SA (K-DUR,KLOR-CON) 20 MEQ tablet Take 1 tablet (20 mEq total) by mouth once daily. 90 tablet 3    pramipexole (MIRAPEX) 0.25 MG tablet TAKE 1 TABLET(0.25 MG) BY MOUTH EVERY EVENING FOR RESTLESS LEGS 90 tablet 3    traZODone (DESYREL) 50 MG tablet TAKE 1 TABLET(50 MG) BY MOUTH EVERY NIGHT AS NEEDED FOR INSOMNIA 90 tablet 1    zinc 50 mg Tab Take 50 mg by mouth once daily.         Review of patient's allergies indicates:  No Known Allergies   Family History   Problem Relation Name Age of Onset    Cancer Mother          Lung cancer    Heart failure Father      Colon cancer Father      Hypertension Father      Cataracts Father      Cancer Father  80        colon    No Known Problems Sister      No Known Problems Brother      Melanoma Daughter      Diabetes Son      Heart disease Son      Cancer Son          esophageal cancer    No Known Problems Maternal Aunt      No Known Problems Maternal Uncle      No Known Problems Paternal Aunt      No Known Problems Paternal Uncle      No Known Problems Maternal Grandmother      No Known Problems Maternal Grandfather      No Known Problems Paternal Grandmother      No Known Problems Paternal Grandfather      Amblyopia Neg Hx      Blindness Neg Hx      Glaucoma Neg Hx      Macular degeneration Neg Hx      Retinal detachment Neg Hx      Strabismus Neg Hx      Stroke Neg Hx      Thyroid disease Neg Hx      Breast cancer Neg Hx      Ovarian cancer Neg Hx        Social History     Socioeconomic History    Marital status: Single   Occupational History     [employer]er: [employer]   Tobacco Use    Smoking status: Never     Passive exposure: Never    Smokeless tobacco: Never   Substance and Sexual Activity     Alcohol use: Yes     Alcohol/week: 4.0 standard drinks of alcohol     Types: 4 Glasses of wine per week     Comment: 2 glasses of wine on weekends    Drug use: No    Sexual activity: Never   Other Topics Concern    Are you pregnant or think you may be? No    Breast-feeding No     Social Drivers of Health     Financial Resource Strain: Low Risk  (2/29/2024)    Overall Financial Resource Strain (CARDIA)     Difficulty of Paying Living Expenses: Not very hard   Food Insecurity: No Food Insecurity (2/29/2024)    Hunger Vital Sign     Worried About Running Out of Food in the Last Year: Never true     Ran Out of Food in the Last Year: Never true   Transportation Needs: No Transportation Needs (2/29/2024)    PRAPARE - Transportation     Lack of Transportation (Medical): No     Lack of Transportation (Non-Medical): No   Physical Activity: Sufficiently Active (2/29/2024)    Exercise Vital Sign     Days of Exercise per Week: 7 days     Minutes of Exercise per Session: 60 min   Stress: No Stress Concern Present (2/29/2024)    [place] [employer] - Occupational Stress Questionnaire     Feeling of Stress : Not at all   Housing Stability: Low Risk  (2/29/2024)    Housing Stability Vital Sign     Unable to Pay for Housing in the Last Year: No     Number of Places Lived in the Last Year: 1     Unstable Housing in the Last Year: No       /71 (BP Location: Left arm, Patient Position: Sitting)   Pulse 75   LMP  (LMP Unknown)    Thin female  Extremities: no edema; dusky/purple feet    Mental status:   Awake, alert and appropriately oriented   Normal recent and remote memory   Normal attention and concentration   Normal speech and language   Normal fund of knowledge   No extinction  Cranial nerves:   Normal funduscopic - discs sharp   PERRLA   EOMF without nystagmus   VFF   Normal facial sensation   Normal facial movements   Intact hearing bilaterally   Palate elevates symmetrically   Normal SCM and  trapezius strength   Tongue midline  Motor:   No pronator drift   Normal FF movements bilaterally   Normal muscle tone, bulk and power   No abnormal movements  Sensory   Intact to LT, position  Glove-stocking PP, temperature  DTRs   2++ and symmetric   No clonus   Plantar responses are silent bilaterally  Coordination   Intact to FNF, RAH, and HTS  Gait   Normal base and gait   Normal toe, heel   No Romberg    DPNCheck R sural nerve: 8uV and 41 m/sec (IMP: normal)    Data Reviewed:  Lab Results   Component Value Date    TSH 1.643 01/10/2024     Lab Results   Component Value Date    IHGBLYUX51 923 04/13/2022     Lab Results   Component Value Date    HGBA1C 5.4 08/24/2015         Malvin Bautista MD

## 2024-11-12 ENCOUNTER — OFFICE VISIT (OUTPATIENT)
Dept: NEUROLOGY | Facility: CLINIC | Age: 74
End: 2024-11-12
Payer: MEDICARE

## 2024-11-12 ENCOUNTER — LAB VISIT (OUTPATIENT)
Dept: LAB | Facility: HOSPITAL | Age: 74
End: 2024-11-12
Attending: PSYCHIATRY & NEUROLOGY
Payer: MEDICARE

## 2024-11-12 VITALS — HEART RATE: 75 BPM | SYSTOLIC BLOOD PRESSURE: 108 MMHG | DIASTOLIC BLOOD PRESSURE: 71 MMHG

## 2024-11-12 DIAGNOSIS — R20.2 PINS AND NEEDLES SENSATION: ICD-10-CM

## 2024-11-12 DIAGNOSIS — R20.2 NUMBNESS AND TINGLING: ICD-10-CM

## 2024-11-12 DIAGNOSIS — R20.0 NUMBNESS AND TINGLING: Primary | ICD-10-CM

## 2024-11-12 DIAGNOSIS — R20.2 NUMBNESS AND TINGLING: Primary | ICD-10-CM

## 2024-11-12 DIAGNOSIS — R20.0 NUMBNESS AND TINGLING: ICD-10-CM

## 2024-11-12 DIAGNOSIS — R79.9 ABNORMAL FINDING OF BLOOD CHEMISTRY, UNSPECIFIED: ICD-10-CM

## 2024-11-12 DIAGNOSIS — M47.812 CERVICAL SPONDYLOSIS: ICD-10-CM

## 2024-11-12 LAB
ESTIMATED AVG GLUCOSE: 108 MG/DL (ref 68–131)
FOLATE SERPL-MCNC: 9.6 NG/ML (ref 4–24)
HBA1C MFR BLD: 5.4 % (ref 4–5.6)
TSH SERPL DL<=0.005 MIU/L-ACNC: 0.94 UIU/ML (ref 0.4–4)
VIT B12 SERPL-MCNC: 570 PG/ML (ref 210–950)

## 2024-11-12 PROCEDURE — 99205 OFFICE O/P NEW HI 60 MIN: CPT | Mod: 25,S$GLB,, | Performed by: PSYCHIATRY & NEUROLOGY

## 2024-11-12 PROCEDURE — 86235 NUCLEAR ANTIGEN ANTIBODY: CPT | Mod: 59 | Performed by: PSYCHIATRY & NEUROLOGY

## 2024-11-12 PROCEDURE — 83921 ORGANIC ACID SINGLE QUANT: CPT | Performed by: PSYCHIATRY & NEUROLOGY

## 2024-11-12 PROCEDURE — 3078F DIAST BP <80 MM HG: CPT | Mod: CPTII,S$GLB,, | Performed by: PSYCHIATRY & NEUROLOGY

## 2024-11-12 PROCEDURE — 1159F MED LIST DOCD IN RCRD: CPT | Mod: CPTII,S$GLB,, | Performed by: PSYCHIATRY & NEUROLOGY

## 2024-11-12 PROCEDURE — 3074F SYST BP LT 130 MM HG: CPT | Mod: CPTII,S$GLB,, | Performed by: PSYCHIATRY & NEUROLOGY

## 2024-11-12 PROCEDURE — 99999 PR PBB SHADOW E&M-EST. PATIENT-LVL IV: CPT | Mod: PBBFAC,HCNC,, | Performed by: PSYCHIATRY & NEUROLOGY

## 2024-11-12 PROCEDURE — 84443 ASSAY THYROID STIM HORMONE: CPT | Performed by: PSYCHIATRY & NEUROLOGY

## 2024-11-12 PROCEDURE — 86235 NUCLEAR ANTIGEN ANTIBODY: CPT | Performed by: PSYCHIATRY & NEUROLOGY

## 2024-11-12 PROCEDURE — 1126F AMNT PAIN NOTED NONE PRSNT: CPT | Mod: CPTII,S$GLB,, | Performed by: PSYCHIATRY & NEUROLOGY

## 2024-11-12 PROCEDURE — 1101F PT FALLS ASSESS-DOCD LE1/YR: CPT | Mod: CPTII,S$GLB,, | Performed by: PSYCHIATRY & NEUROLOGY

## 2024-11-12 PROCEDURE — 1157F ADVNC CARE PLAN IN RCRD: CPT | Mod: CPTII,S$GLB,, | Performed by: PSYCHIATRY & NEUROLOGY

## 2024-11-12 PROCEDURE — 82607 VITAMIN B-12: CPT | Performed by: PSYCHIATRY & NEUROLOGY

## 2024-11-12 PROCEDURE — 82595 ASSAY OF CRYOGLOBULIN: CPT | Performed by: PSYCHIATRY & NEUROLOGY

## 2024-11-12 PROCEDURE — 82746 ASSAY OF FOLIC ACID SERUM: CPT | Performed by: PSYCHIATRY & NEUROLOGY

## 2024-11-12 PROCEDURE — 36415 COLL VENOUS BLD VENIPUNCTURE: CPT | Performed by: PSYCHIATRY & NEUROLOGY

## 2024-11-12 PROCEDURE — 95905 MOTOR &/ SENS NRVE CNDJ TEST: CPT | Mod: GZ,S$GLB,, | Performed by: PSYCHIATRY & NEUROLOGY

## 2024-11-12 PROCEDURE — 83036 HEMOGLOBIN GLYCOSYLATED A1C: CPT | Performed by: PSYCHIATRY & NEUROLOGY

## 2024-11-12 PROCEDURE — 3288F FALL RISK ASSESSMENT DOCD: CPT | Mod: CPTII,S$GLB,, | Performed by: PSYCHIATRY & NEUROLOGY

## 2024-11-13 LAB
ANTI-SSA ANTIBODY: 0.04 RATIO (ref 0–0.99)
ANTI-SSA INTERPRETATION: NEGATIVE
ANTI-SSB ANTIBODY: 0.04 RATIO (ref 0–0.99)
ANTI-SSB INTERPRETATION: NEGATIVE

## 2024-11-17 LAB — METHYLMALONATE SERPL-SCNC: 0.16 UMOL/L

## 2024-11-20 ENCOUNTER — PATIENT MESSAGE (OUTPATIENT)
Dept: NEUROLOGY | Facility: CLINIC | Age: 74
End: 2024-11-20
Payer: MEDICARE

## 2024-11-20 ENCOUNTER — TELEPHONE (OUTPATIENT)
Dept: NEUROLOGY | Facility: CLINIC | Age: 74
End: 2024-11-20
Payer: MEDICARE

## 2024-11-20 NOTE — TELEPHONE ENCOUNTER
----- Message from ANTOINE Shin sent at 11/20/2024 10:20 AM CST -----  Regarding: RE: Another skin biopsy  Adding Eliseo and Marquis because we have spoken about the orders. Mona and Dr. Hardy use the order form, to the vendor, as the orders, negating the need for Epic orders. I thought it should be in Epic, as well, for billing, resulting, and best practice. Even if we transcribe from paper order. Not sure the operational decision and whether we would need it added in Epic or if we'd modify the skin bx order derm uses.     We can ask Dr. Hardy if he wants you to complete the ones waiting for Damian.   Marquis- what do you think? EK did not seem to be in a hurry.    Eliseo- want to stay the course and just use paper order form? Can we bill for the procedure without a procedural referral/order?    Aleida Ramos  ----- Message -----  From: Melissa Craig NP  Sent: 11/20/2024   9:59 AM CST  To: Aleida Escoto RN  Subject: RE: Another skin biopsy                          If I have the space I don't mind doing the ones for Dr Hardy until Avery gets in and trained. As long as Dr Hardy is aware they are coming back to him for follow up. I don't clinically take care of neuropathy patient long term :)   Yes for the one offs ordered by other providers e.g. Michele and ones that get referred in for just the biopsy  I still don't have clarification on the orders for the procedure. I'm assuming the referring provider is putting those in. I have the procedure codes for billing.    Melissa  ----- Message -----  From: Aleida Escoto RN  Sent: 11/20/2024   9:10 AM CST  To: Melissa Craig NP  Subject: RE: Another skin biopsy                          We have 4 more waiting that I am aware of. However, they will wait for Damian to be trained to do them. He will not start Neuromuscular clinic for several months but Dr. Hardy is aware these patients are waiting. My thought is you could do the one offs  ordered by other providers e.g. Michele and ones that get referred in for just the biopsy.  What are your thoughts about doing them? Would you prefer not to?    Aleida Ramos  ----- Message -----  From: Melissa Craig NP  Sent: 11/20/2024   7:46 AM CST  To: Aleida Escoto RN  Subject: RE: Another skin biopsy                          Good morning,     How many do you anticipate needing done?  ----- Message -----  From: Aleida Escoto RN  Sent: 11/14/2024   1:22 PM CST  To: Melissa Craig NP; #  Subject: FW: Another skin biopsy                          Melissa,     Do you want to do a certain number skin biopsies in a day? Are you willing to do this one 12/2 as well?  ----- Message -----  From: Malvin Bautista MD  Sent: 11/12/2024   3:28 PM CST  To: Aleida Escoto RN  Subject: Another skin biopsy

## 2024-11-21 NOTE — TELEPHONE ENCOUNTER
It is always good to have help and backup.  I can do 1 or 2 biopsies during December with Damian to get him started in January or February.

## 2024-11-22 ENCOUNTER — PATIENT MESSAGE (OUTPATIENT)
Dept: PODIATRY | Facility: CLINIC | Age: 74
End: 2024-11-22
Payer: MEDICARE

## 2024-11-22 LAB — CRYOGLOB SER QL: NORMAL

## 2024-11-25 ENCOUNTER — OFFICE VISIT (OUTPATIENT)
Dept: PODIATRY | Facility: CLINIC | Age: 74
End: 2024-11-25
Payer: MEDICARE

## 2024-11-25 DIAGNOSIS — L84 CORNS/CALLOSITIES: Primary | ICD-10-CM

## 2024-11-25 PROCEDURE — 17999 UNLISTD PX SKN MUC MEMB SUBQ: CPT | Mod: CSM,,, | Performed by: PODIATRIST

## 2024-11-25 PROCEDURE — 99499 UNLISTED E&M SERVICE: CPT | Mod: ,,, | Performed by: PODIATRIST

## 2024-11-27 NOTE — PROGRESS NOTES
"  Physical Therapy Daily Treatment Note     Name: Angelina Man  Clinic Number: [id number]    Therapy Diagnosis:   Encounter Diagnoses   Name Primary?    Lumbar pain Yes    Decreased strength      Physician: Sheree Abbott MD    Visit Date: 4/2/2024    Physician Orders: PT Eval and Treat   Medical Diagnosis from Referral:   M54.16 (ICD-10-CM) - Lumbar radiculopathy   M96.1 (ICD-10-CM) - Postlaminectomy syndrome of lumbar region      M50.30 (ICD-10-CM) - DDD (degenerative disc disease), cervical   M47.812 (ICD-10-CM) - Facet arthritis, degenerative, cervical spine     Evaluation Date: 3/7/2024  Authorization Period Expiration: 12/31/24  Plan of Care Expiration: 4/5/24  Progress Note Due: 4/7/24  Visit # / Visits authorized: 1/ 1, 3/20  FOTO: 1/ 3    Time In: 9:03 am   Time Out: 9:41 am   Total Billable Time: 38 minutes    Precautions: Standard and hx of lumbar surgery, hx of cervical fusion    Subjective     Pt reports: that her back is feeling better today. Pt stated that her neck is not hurting when she is not moving it, but still does not feel like she has limited ROM turning to the (L). Pt stated that she feels like turning her neck to the (R) is better.   She was not compliant with home exercise program.  Response to previous treatment: no adverse effects  Functional change: progressing     Pain: 1/10 (R) lumbar region       Objective       Angelina received subjective assessment and  therapeutic exercises to develop strength and  flexibility for 30 minutes including:  (B) UT stretch 3x30"   (B) LS stretch 3x30"   Ankle DF w/ BTB 3x10   Sciatic nerve glides 3x10 B not performed  90/90 hamstring stretch 3x30"    SL clams 3x10 3" B not performed   Standing hip abduction 3x10 B RTB   Standing hip extension 1x10  not performed   Standing DL heel raises 3x10 3"   Standing DL toe raises 3x10 3"    Angelina participated in neuromuscular re-education activities to improve: Posture and stabilization for 8 minutes. The " Patient enrolled for mail out event monitor   "following activities were included:  Chin tucks 3" 2x10   (B) shoulder ER 2x10 3" RTB   Rows w/scap retraction 2x10 3" GTB       Home Exercises Provided and Patient Education Provided     Education provided:   - HEP     Written Home Exercises Provided: Patient instructed to cont prior HEP.    Angelina demonstrated good  understanding of the education provided.     See EMR under Patient Instructions for exercises provided prior visit.      Assessment     Pt tolerated therapy session without any adverse reactions.   Angelina Is progressing towards her goals.   Pt prognosis is Fair.     Pt will continue to benefit from skilled outpatient physical therapy to address the deficits listed in the problem list box on initial evaluation, provide pt/family education and to maximize pt's level of independence in the home and community environment.     Pt's spiritual, cultural and educational needs considered and pt agreeable to plan of care and goals.    Anticipated barriers to physical therapy: chronicity of pain/symptoms     Goals:     Short Term Goals: 1 weeks   Pt will be compliant with HEP to supplement PT with decreasing pain and improving functional mobility - progressing not met     Long Term Goals: 4 weeks   Pt will improve FOTO score to at least 64 in order to demo improved functional mobility - progressing not met  Pt will improve LE MMT scores by at least 1/2 grade where deficits noted in order to improve strength for functional tasks - progressing not met  Pt will report lumbar/(R) LE pain </= 3/10 at worst in order to be able to perform ADLs with less difficulty - progressing not met        New Short Term Goals Status:   continue with all STG and LTG and add NEW LTGs: 4. Pt will perform (L) cervical rotation AROM = (R) cervical rotation AROM in order to be able to turn head when driving with less difficulty  5. Pt will report neck pain </= 4/10 at worst in order to be able to perform ADLs with less difficulty     Plan "     Continue per POC, progress as tolerated     Kendra Humphrey, PT

## 2024-12-02 ENCOUNTER — OFFICE VISIT (OUTPATIENT)
Dept: NEUROLOGY | Facility: CLINIC | Age: 74
End: 2024-12-02
Payer: MEDICARE

## 2024-12-02 VITALS
HEIGHT: 64 IN | SYSTOLIC BLOOD PRESSURE: 109 MMHG | DIASTOLIC BLOOD PRESSURE: 70 MMHG | WEIGHT: 97 LBS | HEART RATE: 78 BPM | BODY MASS INDEX: 16.56 KG/M2

## 2024-12-02 DIAGNOSIS — G62.9 NEUROPATHY: Primary | ICD-10-CM

## 2024-12-02 PROCEDURE — 99499 UNLISTED E&M SERVICE: CPT | Mod: S$GLB,,, | Performed by: NURSE PRACTITIONER

## 2024-12-02 PROCEDURE — 11105 PUNCH BX SKIN EA SEP/ADDL: CPT | Mod: S$GLB,,, | Performed by: NURSE PRACTITIONER

## 2024-12-02 PROCEDURE — 11104 PUNCH BX SKIN SINGLE LESION: CPT | Mod: S$GLB,,, | Performed by: NURSE PRACTITIONER

## 2024-12-02 PROCEDURE — 99999 PR PBB SHADOW E&M-EST. PATIENT-LVL IV: CPT | Mod: PBBFAC,,, | Performed by: NURSE PRACTITIONER

## 2024-12-03 ENCOUNTER — HOSPITAL ENCOUNTER (OUTPATIENT)
Dept: RADIOLOGY | Facility: HOSPITAL | Age: 74
Discharge: HOME | End: 2024-12-03
Attending: PSYCHIATRY & NEUROLOGY
Payer: MEDICARE

## 2024-12-03 ENCOUNTER — PATIENT MESSAGE (OUTPATIENT)
Dept: NEUROLOGY | Facility: CLINIC | Age: 74
End: 2024-12-03
Payer: MEDICARE

## 2024-12-03 DIAGNOSIS — R20.2 PINS AND NEEDLES SENSATION: ICD-10-CM

## 2024-12-03 DIAGNOSIS — M47.812 CERVICAL SPONDYLOSIS: ICD-10-CM

## 2024-12-03 DIAGNOSIS — R20.0 NUMBNESS AND TINGLING: ICD-10-CM

## 2024-12-03 DIAGNOSIS — R20.2 NUMBNESS AND TINGLING: ICD-10-CM

## 2024-12-03 PROCEDURE — 72141 MRI NECK SPINE W/O DYE: CPT | Mod: 26,,, | Performed by: RADIOLOGY

## 2024-12-03 PROCEDURE — 72141 MRI NECK SPINE W/O DYE: CPT | Mod: TC

## 2024-12-11 NOTE — PROGRESS NOTES
[facility]  Vascular Neurology Neurology     PATIENT: Angelina Man  MRN: [id number]  : 1950       PRE-OP DIAGNOSIS: Peripheral nerve disease  POST-OP DIAGNOSIS: Same   PROCEDURE: skin punch biopsy     Performing Provider: Melissa FUNEZC  Supervising Physician (if applicable): Dr. Mazin Aguilera     PROCEDURE:   _  Shave Biopsy  X  Punch Biopsy  _  Incisional Biopsy     DESCRIPTION:  - Punch Size: 3 mm  - Number of Punch Biopsies: 3  -Side: Right  -Location:  + CPT 86786 (punch biopsy, 20 cm distal from the iliac spine ) 1st procedure  + CPT 56581 (punch biopsy, each additional lesion, 10 cm proximal to the lateral malleolus) 2nd procedure  +CPT 19097 (punch biopsy, each additional lesion, dorsum if the foot, over the extensor digitorum brevis muscle)3rd procedure    Risks of the procedure including local bleeding and risk of infection were both explained to the patient , I answered all the patient's questions, and informed consent was obtained.     Two patient identifiers were confirmed with the patient prior to performing this procedure. A time out to determine correct patient and and agreement on procedure performed was conducted prior to the procedure.     The sites were marked .The area surrounding the skin lesion was prepared and draped in the usual sterile manner.1% lidocaine HCl (10mg/mL) was injected in biopsy locations. The lesion was removed in the usual manner by punch biopsy method noted above. Three full thickness cylindrical samples of the skin were removed distal from iliac spine, proximal to the lateral malleolus , and dorsum of the foot. The samples were then placed into Zamboni solution which was marked to designate sites.     The intent of the biopsy is to remove a sample of a cutaneous lesion to assess the health and density of small sensory nerve fibers in the skin .      Closure:       _  Monsel's for hemostasis                _  Suture   X Simple closure  _ None      Follow-up: The patient tolerated the procedure well without complications. Hemostasis was assured, Standard post-procedure care is explained and return precautions are given.     Pathology/biopsy specimens were sent directly to Pathology Lab for processing. Pathology was not sent via in-house [facility] laboratory. Pathology results will be returned within 4-6 weeks and scanned into [facility] Electronic Medical Record.      Collaborating Physician, Dr Aguilera, was available during today's encounter. Any change to plan along with cosign to appear in the EMR    BINA Arauz  Department of Vascular Neurology  [facility]  [phone]    This note is dictated on M*Modal Fluency Direct word recognition program. There are word recognition mistakes that are occasionally missed on review

## 2024-12-18 ENCOUNTER — PATIENT MESSAGE (OUTPATIENT)
Dept: NEUROLOGY | Facility: CLINIC | Age: 74
End: 2024-12-18
Payer: MEDICARE

## 2025-01-07 ENCOUNTER — OFFICE VISIT (OUTPATIENT)
Dept: INTERNAL MEDICINE | Facility: CLINIC | Age: 75
End: 2025-01-07
Payer: MEDICARE

## 2025-01-07 VITALS
DIASTOLIC BLOOD PRESSURE: 66 MMHG | BODY MASS INDEX: 16.76 KG/M2 | WEIGHT: 98.19 LBS | HEIGHT: 64 IN | SYSTOLIC BLOOD PRESSURE: 96 MMHG | HEART RATE: 69 BPM | OXYGEN SATURATION: 96 %

## 2025-01-07 DIAGNOSIS — I27.20 PULMONARY HYPERTENSION: ICD-10-CM

## 2025-01-07 DIAGNOSIS — I50.32 CHRONIC HEART FAILURE WITH PRESERVED EJECTION FRACTION: ICD-10-CM

## 2025-01-07 DIAGNOSIS — F41.9 ANXIETY: ICD-10-CM

## 2025-01-07 DIAGNOSIS — G95.20 UNSPECIFIED CORD COMPRESSION: ICD-10-CM

## 2025-01-07 DIAGNOSIS — L97.921 ULCER OF LEFT LOWER EXTREMITY, LIMITED TO BREAKDOWN OF SKIN: ICD-10-CM

## 2025-01-07 DIAGNOSIS — D17.71 RENAL ANGIOMYOLIPOMA: ICD-10-CM

## 2025-01-07 DIAGNOSIS — Z00.00 ROUTINE GENERAL MEDICAL EXAMINATION AT A HEALTH CARE FACILITY: Primary | ICD-10-CM

## 2025-01-07 DIAGNOSIS — G47.00 INSOMNIA, UNSPECIFIED TYPE: ICD-10-CM

## 2025-01-07 DIAGNOSIS — B00.9 HSV INFECTION: ICD-10-CM

## 2025-01-07 DIAGNOSIS — E03.9 HYPOTHYROIDISM, UNSPECIFIED TYPE: ICD-10-CM

## 2025-01-07 DIAGNOSIS — Z12.31 OTHER SCREENING MAMMOGRAM: ICD-10-CM

## 2025-01-07 DIAGNOSIS — G25.81 RLS (RESTLESS LEGS SYNDROME): ICD-10-CM

## 2025-01-07 DIAGNOSIS — I48.11 LONGSTANDING PERSISTENT ATRIAL FIBRILLATION: ICD-10-CM

## 2025-01-07 DIAGNOSIS — D25.9 UTERINE LEIOMYOMA, UNSPECIFIED LOCATION: ICD-10-CM

## 2025-01-07 DIAGNOSIS — K58.9 IRRITABLE BOWEL SYNDROME, UNSPECIFIED TYPE: ICD-10-CM

## 2025-01-07 DIAGNOSIS — F33.9 EPISODE OF RECURRENT MAJOR DEPRESSIVE DISORDER, UNSPECIFIED DEPRESSION EPISODE SEVERITY: ICD-10-CM

## 2025-01-07 DIAGNOSIS — M81.0 OSTEOPOROSIS, UNSPECIFIED OSTEOPOROSIS TYPE, UNSPECIFIED PATHOLOGICAL FRACTURE PRESENCE: ICD-10-CM

## 2025-01-07 DIAGNOSIS — G57.01 PIRIFORMIS SYNDROME OF RIGHT SIDE: ICD-10-CM

## 2025-01-07 DIAGNOSIS — R79.9 ABNORMAL FINDING OF BLOOD CHEMISTRY, UNSPECIFIED: ICD-10-CM

## 2025-01-07 PROCEDURE — 99999 PR PBB SHADOW E&M-EST. PATIENT-LVL V: CPT | Mod: PBBFAC,HCNC,, | Performed by: INTERNAL MEDICINE

## 2025-01-07 RX ORDER — TRAZODONE HYDROCHLORIDE 50 MG/1
50 TABLET ORAL NIGHTLY PRN
Qty: 90 TABLET | Refills: 3 | Status: SHIPPED | OUTPATIENT
Start: 2025-01-07

## 2025-01-07 RX ORDER — PRAMIPEXOLE DIHYDROCHLORIDE 0.25 MG/1
0.25 TABLET ORAL NIGHTLY
Qty: 90 TABLET | Refills: 3 | Status: SHIPPED | OUTPATIENT
Start: 2025-01-07

## 2025-01-07 RX ORDER — METHOCARBAMOL 500 MG/1
500 TABLET, FILM COATED ORAL 3 TIMES DAILY PRN
Qty: 90 TABLET | Refills: 2 | Status: SHIPPED | OUTPATIENT
Start: 2025-01-07

## 2025-01-07 RX ORDER — LEVOTHYROXINE SODIUM 137 UG/1
137 TABLET ORAL
Qty: 90 TABLET | Refills: 3 | Status: SHIPPED | OUTPATIENT
Start: 2025-01-07

## 2025-01-07 RX ORDER — MIRTAZAPINE 7.5 MG/1
7.5 TABLET, FILM COATED ORAL NIGHTLY
Qty: 90 TABLET | Refills: 3 | Status: SHIPPED | OUTPATIENT
Start: 2025-01-07

## 2025-01-07 RX ORDER — ACYCLOVIR 400 MG/1
400 TABLET ORAL DAILY
Qty: 180 TABLET | Refills: 1 | Status: SHIPPED | OUTPATIENT
Start: 2025-01-07

## 2025-01-07 RX ORDER — BUPROPION HYDROCHLORIDE 150 MG/1
150 TABLET, EXTENDED RELEASE ORAL 2 TIMES DAILY
Qty: 180 TABLET | Refills: 3 | Status: SHIPPED | OUTPATIENT
Start: 2025-01-07 | End: 2026-01-07

## 2025-01-07 RX ORDER — LUBIPROSTONE 24 UG/1
24 CAPSULE ORAL DAILY PRN
Qty: 30 CAPSULE | Refills: 6 | Status: SHIPPED | OUTPATIENT
Start: 2025-01-07

## 2025-01-07 NOTE — PROGRESS NOTES
Subjective:      Patient ID: Angelina Man is a 74 y.o. female.    Chief Complaint: Annual Exam      Angelina Man is a 74 y.o. female with chronic conditions significant for HSV, MDD, insomnia, RLS, afib dx in 2020 on eliquis, hypothyroidism, anxiety, IBS, renal cyst, fibroids   Presenting today for follow up / annual. Date of last annual is 1/3/2024     RLS: Cont mirapex at this time at low dose to help, she has been noticing improvement since being started on the medication.     Insomnia: Doing well with trazodone, continue.      MDD:  Continues on wellbutrin and remeron. Doing well. Remeron also helps with insomnia. Continue current dose, check TSH.      Hypothyroidism: Last year's TSH wnl, obtain new level.     HLD:   The 10-year ASCVD risk score (Karan LOVE, et al., 2019) is: 10.7%    Values used to calculate the score:      Age: 74 years      Sex: Female      Is Non- : No      Diabetic: No      Tobacco smoker: No      Systolic Blood Pressure: 96 mmHg      Is BP treated: No      HDL Cholesterol: 68 mg/dL      Total Cholesterol: 197 mg/dL      Hx of multiple renal cysts and renal angiomyolipoma: Follows with urology. US ordered for June 2025.      Fibroids: Hx of uterine fibroids, would like to repeat US transvaginal to check on the size. Denies unusual bleeding, abdominal pain, no post menopausal bleeding.      Osteoporosis: Cont prolia, DEXA ordered for this year     The patient consents verbally to being recorded for [employer] ambient service today.     History of Present Illness    CHIEF COMPLAINT:  Ms. Man presents today for chronic pain follow-up.    CHRONIC PAIN AND MUSCULOSKELETAL:  She reports persistent chronic pain despite previous interventions including physical therapy and injections, which were ineffective. MRI showed a pinched nerve in her back. She experiences difficulty with movement, describing stiff gait and inability to bend over. She also reports sciatic  pain. She has severe stenosis at C6 and C7 and takes anti-spasm medication at night. She expresses concerns about potential surgery due to her age and bleeding risk, and is apprehensive about recovery given her previous back surgery experience. She denies any fractures or significant injuries from this incident.    NEUROLOGICAL:  She reports no sensation in her feet and ankles. Neurological workup showed mild neuropathy. She takes pramipexole for restless legs.    GYNECOLOGICAL:  She has a history of uterine fibroids documented on ultrasound and reports persistent bloating.    CURRENT MEDICATIONS:  She takes trazodone, pramipexole, potassium, mirtazapine, metoprolol, Robaxin (methocarbamol), amitriptyline, levothyroxine, Lasix, Eliquis, Wellbutrin (bupropion), acyclovir, and Prolia.      ROS:  General: -fever, -chills, -fatigue, -weight gain, -weight loss  Eyes: -vision changes, -redness, -discharge  ENT: -ear pain, -nasal congestion, -sore throat  Cardiovascular: -chest pain, -palpitations, -lower extremity edema  Respiratory: -cough, -shortness of breath  Gastrointestinal: -abdominal pain, -nausea, -vomiting, -diarrhea, -constipation, -blood in stool, +bloating  Genitourinary: -dysuria, -hematuria, -frequency  Musculoskeletal: +joint pain, -muscle pain  Skin: -rash, -lesion  Neurological: -headache, -dizziness, -numbness, -tingling  Psychiatric: -anxiety, -depression, -sleep difficulty            Current Outpatient Medications:     acetaminophen (TYLENOL) 500 MG tablet, Take 500 mg by mouth every 6 (six) hours as needed for Pain., Disp: , Rfl:     ascorbic acid (VITAMIN C) 1000 MG tablet, Take 1,000 mg by mouth once daily. , Disp: , Rfl:     biotin 1 mg tablet, Take 1 mg by mouth 2 (two) times daily. , Disp: , Rfl:     digestive enzymes Tab, Take 1 tablet by mouth once daily. , Disp: , Rfl:     ELIQUIS 5 mg Tab, TAKE 1 TABLET(5 MG) BY MOUTH TWICE DAILY, Disp: 180 tablet, Rfl: 3    fluticasone propionate (FLONASE)  50 mcg/actuation nasal spray, 1 spray (50 mcg total) by Each Nostril route 2 (two) times daily as needed for Rhinitis., Disp: 16 g, Rfl: 11    furosemide (LASIX) 20 MG tablet, Take 1 tablet (20 mg total) by mouth once daily., Disp: 90 tablet, Rfl: 3    ketoconazole (NIZORAL) 2 % cream, aaa bid prn flare under arm and qhs to areas on face, Disp: 60 g, Rfl: 3    metoprolol succinate (TOPROL-XL) 25 MG 24 hr tablet, Take 1 tablet (25 mg total) by mouth once daily., Disp: 90 tablet, Rfl: 3    multivitamin (THERAGRAN) per tablet, Take 1 tablet by mouth once daily. , Disp: , Rfl:     potassium chloride SA (K-DUR,KLOR-CON) 20 MEQ tablet, Take 1 tablet (20 mEq total) by mouth once daily., Disp: 90 tablet, Rfl: 3    zinc 50 mg Tab, Take 50 mg by mouth once daily. , Disp: , Rfl:     acyclovir (ZOVIRAX) 400 MG tablet, Take 1 tablet (400 mg total) by mouth once daily., Disp: 180 tablet, Rfl: 1    buPROPion (WELLBUTRIN SR) 150 MG TBSR 12 hr tablet, Take 1 tablet (150 mg total) by mouth 2 (two) times daily., Disp: 180 tablet, Rfl: 3    GAVILYTE-G 236-22.74-6.74 -5.86 gram suspension, SMARTSIG:Milliliter(s) By Mouth (Patient not taking: Reported on 1/7/2025), Disp: , Rfl:     influenza, adjuvanted, (FLUAD TRIV 2024-25,65Y UP,,PF,) 45 mcg/0.5 mL IM vaccine (> or = 64 yo), Inject into the muscle., Disp: 0.5 mL, Rfl: 0    levothyroxine (SYNTHROID) 137 MCG Tab tablet, Take 1 tablet (137 mcg total) by mouth before breakfast., Disp: 90 tablet, Rfl: 3    lubiprostone (AMITIZA) 24 MCG Cap, Take 1 capsule (24 mcg total) by mouth daily as needed (IBS symptoms)., Disp: 30 capsule, Rfl: 6    methocarbamoL (ROBAXIN) 500 MG Tab, Take 1 tablet (500 mg total) by mouth 3 (three) times daily as needed (mus)., Disp: 90 tablet, Rfl: 2    mirtazapine (REMERON) 7.5 MG Tab, Take 1 tablet (7.5 mg total) by mouth nightly., Disp: 90 tablet, Rfl: 3    pramipexole (MIRAPEX) 0.25 MG tablet, Take 1 tablet (0.25 mg total) by mouth every evening., Disp: 90  "tablet, Rfl: 3    traZODone (DESYREL) 50 MG tablet, Take 1 tablet (50 mg total) by mouth nightly as needed for Insomnia., Disp: 90 tablet, Rfl: 3    Lab Results   Component Value Date    HGBA1C 5.4 11/12/2024    HGBA1C 5.4 08/24/2015    HGBA1C 5.3 08/13/2014     No results found for: "MICALBCREAT"  Lab Results   Component Value Date    LDLCALC 111.8 01/10/2024    LDLCALC 113.8 01/09/2023    CHOL 197 01/10/2024    HDL 68 01/10/2024    TRIG 86 01/10/2024       Lab Results   Component Value Date     10/02/2024    K 3.3 (L) 10/02/2024     10/02/2024    CO2 29 10/02/2024     10/02/2024    BUN 10 10/02/2024    CREATININE 0.8 10/02/2024    CALCIUM 9.4 10/02/2024    PROT 6.2 09/24/2024    ALBUMIN 3.3 (L) 09/24/2024    BILITOT 0.3 09/24/2024    ALKPHOS 127 09/24/2024    AST 32 09/24/2024    ALT 29 09/24/2024    ANIONGAP 9 10/02/2024    ESTGFRAFRICA >60.0 05/25/2022    EGFRNONAA 55.6 (A) 05/25/2022    WBC 6.60 09/24/2024    HGB 12.1 09/24/2024    HGB 13.1 01/10/2024    HCT 35.9 (L) 09/24/2024     (H) 09/24/2024     09/24/2024    TSH 0.945 11/12/2024    HEPCAB Negative 09/15/2016       Lab Results   Component Value Date    KRYHCOOA73FX 40 01/10/2024    ETWSUJJK15DC 66 01/09/2023    MQSMNZCW19 570 11/12/2024         Past Medical History:   Diagnosis Date    Arthritis     Atrial fibrillation     Bronchitis     Cataract     Dry eyes     GERD (gastroesophageal reflux disease)     Glaucoma, suspect - Both Eyes     Hypothyroidism 06/23/2016    Pulmonary hypertension     Rash     Renal angiomyolipoma     Renal disorder     SCC (squamous cell carcinoma) 07/2023    left lower lateral shin    SCC (squamous cell carcinoma) 04/2023    L mid lateral shin    SCC (squamous cell carcinoma) 09/2023    L mid lower shin    Spinal stenosis     Squamous cell carcinoma     in-situ right upper inner arm, right wrist    Status post lumbar surgery 06/23/2016    Stress fracture     Thyroid disease     Venous " insufficiency      Past Surgical History:   Procedure Laterality Date    ANGIOPLASTY      CATARACT EXTRACTION W/  INTRAOCULAR LENS IMPLANT Left 12/6/2022    Procedure: EXTRACTION, CATARACT, WITH IOL INSERTION;  Surgeon: Tone Brown MD;  Location: [facility] OR;  Service: Ophthalmology;  Laterality: Left;    CATARACT EXTRACTION W/  INTRAOCULAR LENS IMPLANT Right 12/20/2022    Procedure: EXTRACTION, CATARACT, WITH IOL INSERTION;  Surgeon: Tone Brown MD;  Location: [facility] OR;  Service: Ophthalmology;  Laterality: Right;    CERVICAL FUSION      COLONOSCOPY      COLONOSCOPY N/A 11/14/2017    Procedure: COLONOSCOPY;  Surgeon: Kasi Mercado MD;  Location: [facility] (4TH FLR);  Service: Endoscopy;  Laterality: N/A;    COLONOSCOPY N/A 4/26/2023    Procedure: COLONOSCOPY;  Surgeon: Jeanmarie Hathaway MD;  Location: [facility] (4TH FLR);  Service: Colon and Rectal;  Laterality: N/A;  ok to hold Eliquis 2 days per Dr Pereira  constipation-ext Miralax prep  instr mailed/portal-GT  4/21/23 pre call attempted, no answer    COLONOSCOPY N/A 5/28/2024    Procedure: COLONOSCOPY;  Surgeon: Jeanmarie Hathaway MD;  Location: [facility] (4TH FLR);  Service: Endoscopy;  Laterality: N/A;  referral Dr. Hathaway. Annual Colonoscopy for High Risk. Golytely prep instr. to portal Pending Eliquis Hold from Dr. David Pereira. [facility] Afib.EC  ok to hold Eliquis 2 days per Dr Pereira-GT  5/21- precall confirmed; instructions re-sent via portal, aware of holding eliquis     ESOPHAGOGASTRODUODENOSCOPY N/A 10/10/2019    Procedure: EGD (ESOPHAGOGASTRODUODENOSCOPY);  Surgeon: Rg Milton MD;  Location: [facility] (4TH FLR);  Service: Endoscopy;  Laterality: N/A;    ESOPHAGOGASTRODUODENOSCOPY N/A 10/6/2021    Procedure: EGD (ESOPHAGOGASTRODUODENOSCOPY);  Surgeon: Rg Milton MD;  Location: [facility] (4TH FLR);  Service: Endoscopy;  Laterality: N/A;  covid test 10/3 [place], instr emailed/portal -ml    EXCISION Left 5/17/2023    Procedure:  EXCISION SQUAMOUS CELL CARCINOMA LEFT LEG;  Surgeon: Kasi Canela Jr., MD;  Location: [facility] OR [address] [place] [address];  Service: General;  Laterality: Left;    l4-5 mid discectomy Left 03/2017    l4-5 MIS diskectomy Right 05/2016    RIGHT HEART CATHETERIZATION Right 1/27/2022    Procedure: INSERTION, CATHETER, RIGHT HEART;  Surgeon: Satnam Pickard Jr., MD;  Location: [facility] CATH LAB;  Service: Cardiology;  Laterality: Right;    TRANSFORAMINAL EPIDURAL INJECTION OF STEROID Bilateral 3/12/2024    Procedure: LUMBAR TRANSFORAMINAL BILATERAL L3/4 *ELIQUIS CLEARANCE IN CHART*;  Surgeon: Sheree Abbott MD;  Location: [facility] PAIN MGT;  Service: Pain Management;  Laterality: Bilateral;  [phone]  2 WK F/U [place]    TREATMENT OF CARDIAC ARRHYTHMIA N/A 7/17/2020    Procedure: CARDIOVERSION;  Surgeon: Kwan Bray MD;  Location: [facility] EP LAB;  Service: Cardiology;  Laterality: N/A;  AF,DCCV/SANGITA, ANES, SK, 746    TREATMENT OF CARDIAC ARRHYTHMIA N/A 7/14/2021    Procedure: CARDIOVERSION;  Surgeon: SYDNIE Cedillo MD;  Location: [facility] EP LAB;  Service: Cardiology;  Laterality: N/A;  AF, SANGITA, DCCV, MAC, EH, 3 Prep    WASHOUT Right 5/17/2023    Procedure: WASHOUT right lower arm and closure;  Surgeon: Kasi Canela Jr., MD;  Location: [facility] OR [address] [place] [address];  Service: General;  Laterality: Right;     Social History     Social History Narrative    Not on file     Family History   Problem Relation Name Age of Onset    Cancer Mother          Lung cancer    Heart failure Father      Colon cancer Father      Hypertension Father      Cataracts Father      Cancer Father  80        colon    No Known Problems Sister      No Known Problems Brother      Melanoma Daughter      Diabetes Son      Heart disease Son      Cancer Son          esophageal cancer    No Known Problems Maternal Aunt      No Known Problems Maternal Uncle      No Known Problems Paternal Aunt      No Known Problems Paternal Uncle      No Known Problems Maternal Grandmother      No  "Known Problems Maternal Grandfather      No Known Problems Paternal Grandmother      No Known Problems Paternal Grandfather      Amblyopia Neg Hx      Blindness Neg Hx      Glaucoma Neg Hx      Macular degeneration Neg Hx      Retinal detachment Neg Hx      Strabismus Neg Hx      Stroke Neg Hx      Thyroid disease Neg Hx      Breast cancer Neg Hx      Ovarian cancer Neg Hx       Vitals:    01/07/25 0830   BP: 96/66   Pulse: 69   SpO2: 96%   Weight: 44.6 kg (98 lb 3.4 oz)   Height: 5' 4" (1.626 m)   PainSc:   4   PainLoc: Generalized     Objective:   Physical Exam    General: No acute distress. Well-developed. Well-nourished.  Eyes: EOMI. Sclerae anicteric.  HENT: Normocephalic. Atraumatic. Nares patent. Moist oral mucosa.  Ears: Bilateral TMs clear. Bilateral EACs clear.  Cardiovascular: Regular rate. Regular rhythm. No murmurs. No rubs. No gallops. Normal S1, S2.  Respiratory: Normal respiratory effort. Clear to auscultation bilaterally. No rales. No rhonchi. No wheezing.  Abdomen: Soft. Non-tender. Non-distended. Normoactive bowel sounds.  Musculoskeletal: No  obvious deformity.  Extremities: No lower extremity edema.  Neurological: Alert & oriented x3. No slurred speech. Normal gait.  Psychiatric: Normal mood. Normal affect. Good insight. Good judgment.  Skin: Warm. Dry. No rash.        Assessment/Plan     Assessment & Plan    - Reviewed MRI from December showing significant C6-C7 stenosis  - Considered patient's age (74), bleeding risk, and osteoporosis in surgical decision-making process  - Discussed mammogram guidelines for patients over 75, noting low breast cancer risk (1.34% until age 85)  - Evaluated need for annual pelvic ultrasound given history of fibroids and patient preference  - Assessed medication regimen and renewed prescriptions as appropriate    BACK PAIN AND STENOSIS:  - Refilled methocarbamol (Robaxin) for muscle relaxation.  - Ms. Man reports persistent pain and difficulty walking and " bending over.  - MRI revealed a pinched nerve in the back.  - Noted that the patient has been to pain management and received physical therapy and injections without improvement.  - Explained stenosis as narrowing of the disk, potentially causing nerve compression.  - Referred the patient to a surgeon for consultation regarding C6-C7 stenosis.  - Advised to follow up with Dr. ADAME regarding neck MRI findings and potential surgical referral.  - MRI shows significant narrowing at C6 and C7, with severe stenosis below the fusion.  - Confirmed severe stenosis at C6 and C7.  - Prescribed anti-spasm medication for neck, to be taken at night.  - Noted that the patient has a history of back surgery with fusion.    SCIATICA:  - Ms. Man reports sciatic pain.  - Noted that the patient received injections for sciatic pain, but they were not effective.  - Ms. Man reports sciatic pain on the right side.    NEUROPATHY:  - Ms. Man reports loss of feeling in feet and ankles.  - Noted that a neurologist performed workup including biopsy, finding mild neuropathy.    DEPRESSION:  - Continued wellbutrin, provided 90 tablets with 3 refills.  - Continued mirtazapine, provided 90 tablets with 3 refills.    OSTEOPOROSIS:  - Ordered DEXA scan (bone density) to be completed  - Noted that the patient is taking Prolia for osteoporosis treatment.    RESTLESS LEG SYNDROME:  - Continued pramipexole 0.25mg, provided 90 tablets with 3 refills.    THYROID DISORDER:  - Continued levothyroxine.    FIBROIDS:  - Ordered pelvic ultrasound with transvaginal component.  - Ms. Man reports persistent bloating.  - Previous ultrasound showed fibroids.    ANTICOAGULATION:  - Noted that the patient is prescribed Eliquis (apixaban) for anticoagulation.    BREAST CANCER SCREENING:  - Discussed risks and benefits of continuing mammograms after age 75.            Routine general medical examination at a health care facility  -     Vitamin D; Future; Expected  date: 01/07/2025  -     CBC Auto Differential; Future; Expected date: 01/07/2025  -     Comprehensive Metabolic Panel; Future; Expected date: 01/07/2025  -     TSH; Future; Expected date: 01/07/2025  -     Lipid Panel; Future; Expected date: 01/07/2025    Unspecified cord compression    Pulmonary hypertension  -     CBC Auto Differential; Future; Expected date: 01/07/2025  -     TSH; Future; Expected date: 01/07/2025    Longstanding persistent atrial fibrillation    Ulcer of left lower extremity, limited to breakdown of skin    Osteoporosis, unspecified osteoporosis type, unspecified pathological fracture presence  -     DXA Bone Density Axial Skeleton 1 or more sites; Future; Expected date: 01/25/2025  -     Vitamin D; Future; Expected date: 01/07/2025    Other screening mammogram  -     Mammo Digital Screening Bilat w/ Mando; Future; Expected date: 01/30/2025    Anxiety  -     TSH; Future; Expected date: 01/07/2025  -     buPROPion (WELLBUTRIN SR) 150 MG TBSR 12 hr tablet; Take 1 tablet (150 mg total) by mouth 2 (two) times daily.  Dispense: 180 tablet; Refill: 3    Chronic heart failure with preserved ejection fraction    Renal angiomyolipoma    Uterine leiomyoma, unspecified location  -     US Pelvis Comp with Transvag NON-OB (xpd); Future; Expected date: 01/07/2025    Abnormal finding of blood chemistry, unspecified  -     CBC Auto Differential; Future; Expected date: 01/07/2025  -     Lipid Panel; Future; Expected date: 01/07/2025    Insomnia, unspecified type  -     traZODone (DESYREL) 50 MG tablet; Take 1 tablet (50 mg total) by mouth nightly as needed for Insomnia.  Dispense: 90 tablet; Refill: 3  -     mirtazapine (REMERON) 7.5 MG Tab; Take 1 tablet (7.5 mg total) by mouth nightly.  Dispense: 90 tablet; Refill: 3    RLS (restless legs syndrome)  -     pramipexole (MIRAPEX) 0.25 MG tablet; Take 1 tablet (0.25 mg total) by mouth every evening.  Dispense: 90 tablet; Refill: 3    Episode of recurrent major  depressive disorder, unspecified depression episode severity  -     mirtazapine (REMERON) 7.5 MG Tab; Take 1 tablet (7.5 mg total) by mouth nightly.  Dispense: 90 tablet; Refill: 3    Piriformis syndrome of right side  -     methocarbamoL (ROBAXIN) 500 MG Tab; Take 1 tablet (500 mg total) by mouth 3 (three) times daily as needed (mus).  Dispense: 90 tablet; Refill: 2    Irritable bowel syndrome, unspecified type  -     lubiprostone (AMITIZA) 24 MCG Cap; Take 1 capsule (24 mcg total) by mouth daily as needed (IBS symptoms).  Dispense: 30 capsule; Refill: 6    Hypothyroidism, unspecified type  -     levothyroxine (SYNTHROID) 137 MCG Tab tablet; Take 1 tablet (137 mcg total) by mouth before breakfast.  Dispense: 90 tablet; Refill: 3    HSV infection  -     acyclovir (ZOVIRAX) 400 MG tablet; Take 1 tablet (400 mg total) by mouth once daily.  Dispense: 180 tablet; Refill: 1        Chronic conditions status updated as per HPI.  Other than changes above, cont current medications and maintain follow up with specialists.  Return to clinic in Follow up in about 1 year (around 1/7/2026).      Zina Rockwell MD  [facility], [place]    Total time spent on this encounter: 42 minutes. This includes face to face time and non-face to face time preparing to see the patient (eg, review of tests), obtaining and/or reviewing separately obtained history, documenting clinical information in the electronic or other health record, independently interpreting results and communicating results to the patient/family/caregiver, or care coordinator.    This note was generated with the assistance of ambient listening technology. Verbal consent was obtained by the patient and accompanying visitor(s) for the recording of patient appointment to facilitate this note. I attest to having reviewed and edited the generated note for accuracy, though some syntax or spelling errors may persist. Please contact the author of this note for any clarification.        There are no Patient Instructions on file for this visit.  All of your core healthy metrics are met.

## 2025-01-08 ENCOUNTER — LAB VISIT (OUTPATIENT)
Dept: LAB | Facility: HOSPITAL | Age: 75
End: 2025-01-08
Payer: MEDICARE

## 2025-01-08 DIAGNOSIS — M81.0 OSTEOPOROSIS, UNSPECIFIED OSTEOPOROSIS TYPE, UNSPECIFIED PATHOLOGICAL FRACTURE PRESENCE: ICD-10-CM

## 2025-01-08 DIAGNOSIS — R79.9 ABNORMAL FINDING OF BLOOD CHEMISTRY, UNSPECIFIED: ICD-10-CM

## 2025-01-08 DIAGNOSIS — I27.20 PULMONARY HYPERTENSION: ICD-10-CM

## 2025-01-08 DIAGNOSIS — Z00.00 ROUTINE GENERAL MEDICAL EXAMINATION AT A HEALTH CARE FACILITY: ICD-10-CM

## 2025-01-08 DIAGNOSIS — F41.9 ANXIETY: ICD-10-CM

## 2025-01-08 LAB
25(OH)D3+25(OH)D2 SERPL-MCNC: 42 NG/ML (ref 30–96)
ALBUMIN SERPL BCP-MCNC: 3.7 G/DL (ref 3.5–5.2)
ALP SERPL-CCNC: 69 U/L (ref 40–150)
ALT SERPL W/O P-5'-P-CCNC: 15 U/L (ref 10–44)
ANION GAP SERPL CALC-SCNC: 9 MMOL/L (ref 8–16)
AST SERPL-CCNC: 17 U/L (ref 10–40)
BASOPHILS # BLD AUTO: 0.05 K/UL (ref 0–0.2)
BASOPHILS NFR BLD: 1 % (ref 0–1.9)
BILIRUB SERPL-MCNC: 0.5 MG/DL (ref 0.1–1)
BUN SERPL-MCNC: 13 MG/DL (ref 8–23)
CALCIUM SERPL-MCNC: 8.8 MG/DL (ref 8.7–10.5)
CHLORIDE SERPL-SCNC: 106 MMOL/L (ref 95–110)
CHOLEST SERPL-MCNC: 200 MG/DL (ref 120–199)
CHOLEST/HDLC SERPL: 3.4 {RATIO} (ref 2–5)
CO2 SERPL-SCNC: 27 MMOL/L (ref 23–29)
CREAT SERPL-MCNC: 0.8 MG/DL (ref 0.5–1.4)
DIFFERENTIAL METHOD BLD: ABNORMAL
EOSINOPHIL # BLD AUTO: 0.1 K/UL (ref 0–0.5)
EOSINOPHIL NFR BLD: 2.4 % (ref 0–8)
ERYTHROCYTE [DISTWIDTH] IN BLOOD BY AUTOMATED COUNT: 13.1 % (ref 11.5–14.5)
EST. GFR  (NO RACE VARIABLE): >60 ML/MIN/1.73 M^2
GLUCOSE SERPL-MCNC: 84 MG/DL (ref 70–110)
HCT VFR BLD AUTO: 38.9 % (ref 37–48.5)
HDLC SERPL-MCNC: 58 MG/DL (ref 40–75)
HDLC SERPL: 29 % (ref 20–50)
HGB BLD-MCNC: 12.7 G/DL (ref 12–16)
IMM GRANULOCYTES # BLD AUTO: 0.01 K/UL (ref 0–0.04)
IMM GRANULOCYTES NFR BLD AUTO: 0.2 % (ref 0–0.5)
LDLC SERPL CALC-MCNC: 122.2 MG/DL (ref 63–159)
LYMPHOCYTES # BLD AUTO: 1.9 K/UL (ref 1–4.8)
LYMPHOCYTES NFR BLD: 37.5 % (ref 18–48)
MCH RBC QN AUTO: 32.6 PG (ref 27–31)
MCHC RBC AUTO-ENTMCNC: 32.6 G/DL (ref 32–36)
MCV RBC AUTO: 100 FL (ref 82–98)
MONOCYTES # BLD AUTO: 0.4 K/UL (ref 0.3–1)
MONOCYTES NFR BLD: 7.7 % (ref 4–15)
NEUTROPHILS # BLD AUTO: 2.6 K/UL (ref 1.8–7.7)
NEUTROPHILS NFR BLD: 51.2 % (ref 38–73)
NONHDLC SERPL-MCNC: 142 MG/DL
NRBC BLD-RTO: 0 /100 WBC
PLATELET # BLD AUTO: 249 K/UL (ref 150–450)
PMV BLD AUTO: 10.2 FL (ref 9.2–12.9)
POTASSIUM SERPL-SCNC: 3.9 MMOL/L (ref 3.5–5.1)
PROT SERPL-MCNC: 7 G/DL (ref 6–8.4)
RBC # BLD AUTO: 3.89 M/UL (ref 4–5.4)
SODIUM SERPL-SCNC: 142 MMOL/L (ref 136–145)
TRIGL SERPL-MCNC: 99 MG/DL (ref 30–150)
TSH SERPL DL<=0.005 MIU/L-ACNC: 1.49 UIU/ML (ref 0.4–4)
WBC # BLD AUTO: 5.04 K/UL (ref 3.9–12.7)

## 2025-01-08 PROCEDURE — 80061 LIPID PANEL: CPT | Performed by: INTERNAL MEDICINE

## 2025-01-08 PROCEDURE — 84443 ASSAY THYROID STIM HORMONE: CPT | Performed by: INTERNAL MEDICINE

## 2025-01-08 PROCEDURE — 82306 VITAMIN D 25 HYDROXY: CPT | Performed by: INTERNAL MEDICINE

## 2025-01-08 PROCEDURE — 85025 COMPLETE CBC W/AUTO DIFF WBC: CPT | Performed by: INTERNAL MEDICINE

## 2025-01-08 PROCEDURE — 80053 COMPREHEN METABOLIC PANEL: CPT | Performed by: INTERNAL MEDICINE

## 2025-01-08 PROCEDURE — 36415 COLL VENOUS BLD VENIPUNCTURE: CPT | Performed by: INTERNAL MEDICINE

## 2025-01-09 DIAGNOSIS — E03.9 HYPOTHYROIDISM, UNSPECIFIED TYPE: ICD-10-CM

## 2025-01-09 RX ORDER — LEVOTHYROXINE SODIUM 137 UG/1
137 TABLET ORAL
Qty: 90 TABLET | Refills: 3 | OUTPATIENT
Start: 2025-01-09

## 2025-01-09 NOTE — TELEPHONE ENCOUNTER
Refill Decision Note  Quick DC. Request already responded to by other means (e.g. phone or fax)    Angelina Man  is requesting a refill authorization.  Brief Assessment and Rationale for Refill:  Quick Discontinue     Medication Therapy Plan:       Medication Reconciliation Completed: No   Comments:           Note composed:12:36 PM 01/09/2025

## 2025-01-09 NOTE — TELEPHONE ENCOUNTER
No care due was identified.  [facility] Embedded Care Due Messages. Reference number: [id number].   1/09/2025 11:50:04 AM CST

## 2025-01-23 ENCOUNTER — TELEPHONE (OUTPATIENT)
Dept: NEUROLOGY | Facility: CLINIC | Age: 75
End: 2025-01-23
Payer: MEDICARE

## 2025-01-23 NOTE — TELEPHONE ENCOUNTER
----- Message from Jean sent at 1/22/2025  3:41 PM CST -----  Regarding: Appt change / r/s  Contact: [phone]  ELSA LIU calling regarding Patient Advice (message) for # pt is calling to let office know she does not want a VV she wants to r/s appt to the next avail in person visit pls advise

## 2025-01-24 ENCOUNTER — OFFICE VISIT (OUTPATIENT)
Dept: NEUROLOGY | Facility: CLINIC | Age: 75
End: 2025-01-24
Payer: MEDICARE

## 2025-01-24 VITALS
BODY MASS INDEX: 17.24 KG/M2 | SYSTOLIC BLOOD PRESSURE: 120 MMHG | HEIGHT: 64 IN | HEART RATE: 67 BPM | DIASTOLIC BLOOD PRESSURE: 80 MMHG | WEIGHT: 101 LBS

## 2025-01-24 DIAGNOSIS — M54.17 LUMBOSACRAL RADICULOPATHY: ICD-10-CM

## 2025-01-24 DIAGNOSIS — M47.812 CERVICAL SPONDYLOSIS: ICD-10-CM

## 2025-01-24 DIAGNOSIS — M48.02 CERVICAL SPINAL STENOSIS: ICD-10-CM

## 2025-01-24 DIAGNOSIS — G62.9 SMALL FIBER NEUROPATHY: Primary | ICD-10-CM

## 2025-01-24 DIAGNOSIS — G47.00 INSOMNIA, UNSPECIFIED TYPE: ICD-10-CM

## 2025-01-24 PROCEDURE — 3074F SYST BP LT 130 MM HG: CPT | Mod: CPTII,S$GLB,, | Performed by: PSYCHIATRY & NEUROLOGY

## 2025-01-24 PROCEDURE — 3288F FALL RISK ASSESSMENT DOCD: CPT | Mod: CPTII,S$GLB,, | Performed by: PSYCHIATRY & NEUROLOGY

## 2025-01-24 PROCEDURE — 1125F AMNT PAIN NOTED PAIN PRSNT: CPT | Mod: CPTII,S$GLB,, | Performed by: PSYCHIATRY & NEUROLOGY

## 2025-01-24 PROCEDURE — 99999 PR PBB SHADOW E&M-EST. PATIENT-LVL IV: CPT | Mod: PBBFAC,,, | Performed by: PSYCHIATRY & NEUROLOGY

## 2025-01-24 PROCEDURE — 99215 OFFICE O/P EST HI 40 MIN: CPT | Mod: S$GLB,,, | Performed by: PSYCHIATRY & NEUROLOGY

## 2025-01-24 PROCEDURE — 1101F PT FALLS ASSESS-DOCD LE1/YR: CPT | Mod: CPTII,S$GLB,, | Performed by: PSYCHIATRY & NEUROLOGY

## 2025-01-24 PROCEDURE — 1159F MED LIST DOCD IN RCRD: CPT | Mod: CPTII,S$GLB,, | Performed by: PSYCHIATRY & NEUROLOGY

## 2025-01-24 PROCEDURE — 3079F DIAST BP 80-89 MM HG: CPT | Mod: CPTII,S$GLB,, | Performed by: PSYCHIATRY & NEUROLOGY

## 2025-01-24 PROCEDURE — 1160F RVW MEDS BY RX/DR IN RCRD: CPT | Mod: CPTII,S$GLB,, | Performed by: PSYCHIATRY & NEUROLOGY

## 2025-01-24 PROCEDURE — 1157F ADVNC CARE PLAN IN RCRD: CPT | Mod: CPTII,S$GLB,, | Performed by: PSYCHIATRY & NEUROLOGY

## 2025-01-24 RX ORDER — TRAZODONE HYDROCHLORIDE 50 MG/1
50-100 TABLET ORAL NIGHTLY
Qty: 180 TABLET | Refills: 3 | Status: SHIPPED | OUTPATIENT
Start: 2025-01-24

## 2025-01-24 NOTE — PROGRESS NOTES
"Neurology Clinic Follow-Up Note    Impression:  Likely small fiber neuropathy  Cervical spondylosis/stenosis s/p fusion with exam c/w mild cervical myelopathy: MRIs show fairly stable mod-severe stenosis at C6-7 (below fusion).  Lumbosacral spondylosis/stenosis s/p decompression with sciatica by history but without clear evidence of radiculopathy on exam  Intermittent R footdrop:  this can be cervical or LS in localization  RLS: controlled on pramipexole    Plan:  Increase trazodone to 100mg qhs for sciatica; she will message me in a few weeks with an update  Hepatitis, HIV, Lyme, REMY, RF, CXR  We will defer EMG for e/o R LS radic (as opposed to cervical footdrop) for now as it won't  unless she progresses  RTC 6 months      Problem List Items Addressed This Visit          1 - High    Small fiber neuropathy - Primary    Relevant Orders    Hepatitis B Surface Antigen    Hepatitis B Core Antibody, Total    HIV 1/2 Ag/Ab (4th Gen)    LYME DISEASE ANTIBODY BY EIA    REMY    RHEUMATOID FACTOR    X-Ray Chest PA And Lateral       Unprioritized    Cervical spinal stenosis    Cervical spondylosis     Other Visit Diagnoses       Insomnia, unspecified type        Relevant Medications    traZODone (DESYREL) 50 MG tablet    Lumbosacral radiculopathy                Patient returns for follow-up of above.  HgA1c, B12, MMA, folate, cryoglobulinemia, SS-A, SS-B, TSH were nl.  MRI c-spine showed only slight progression of chronic stenosis    Skin biopsy revealed low nl ENFD "suspicious for early or mild SFN."    She endorses daily RLE sciatica.  She does not want to try gabapentin.  She tried PT in the past.    Running history:  CC: BLE numbness/tingling    HPI:  75 y/o WF referred for evaluation of B foot numbness. She reports years of BLE numbness/tingling and occasional hand symptoms.  Fingers get purple in cold.  Her mother had Raynaud's.   She reports intermittent RLE foot dragging.     Not a daily ETOH drinker. "     Endorses dry eyes, dry mouth (since furosemide), constipation, + bloating, + orthostasis, decreased sweating      Outpatient Medications Marked as Taking for the 1/24/25 encounter (Office Visit) with Malvin Bautista MD   Medication Sig Dispense Refill    acetaminophen (TYLENOL) 500 MG tablet Take 500 mg by mouth every 6 (six) hours as needed for Pain.      acyclovir (ZOVIRAX) 400 MG tablet Take 1 tablet (400 mg total) by mouth once daily. 180 tablet 1    ascorbic acid (VITAMIN C) 1000 MG tablet Take 1,000 mg by mouth once daily.       biotin 1 mg tablet Take 1 mg by mouth 2 (two) times daily.       buPROPion (WELLBUTRIN SR) 150 MG TBSR 12 hr tablet Take 1 tablet (150 mg total) by mouth 2 (two) times daily. 180 tablet 3    digestive enzymes Tab Take 1 tablet by mouth once daily.       ELIQUIS 5 mg Tab TAKE 1 TABLET(5 MG) BY MOUTH TWICE DAILY 180 tablet 3    fluticasone propionate (FLONASE) 50 mcg/actuation nasal spray 1 spray (50 mcg total) by Each Nostril route 2 (two) times daily as needed for Rhinitis. 16 g 11    furosemide (LASIX) 20 MG tablet Take 1 tablet (20 mg total) by mouth once daily. 90 tablet 3    ketoconazole (NIZORAL) 2 % cream aaa bid prn flare under arm and qhs to areas on face 60 g 3    levothyroxine (SYNTHROID) 137 MCG Tab tablet Take 1 tablet (137 mcg total) by mouth before breakfast. 90 tablet 3    lubiprostone (AMITIZA) 24 MCG Cap Take 1 capsule (24 mcg total) by mouth daily as needed (IBS symptoms). 30 capsule 6    methocarbamoL (ROBAXIN) 500 MG Tab Take 1 tablet (500 mg total) by mouth 3 (three) times daily as needed (mus). 90 tablet 2    metoprolol succinate (TOPROL-XL) 25 MG 24 hr tablet Take 1 tablet (25 mg total) by mouth once daily. 90 tablet 3    mirtazapine (REMERON) 7.5 MG Tab Take 1 tablet (7.5 mg total) by mouth nightly. 90 tablet 3    multivitamin (THERAGRAN) per tablet Take 1 tablet by mouth once daily.       potassium chloride SA (K-DUR,KLOR-CON) 20 MEQ tablet Take 1  "tablet (20 mEq total) by mouth once daily. 90 tablet 3    pramipexole (MIRAPEX) 0.25 MG tablet Take 1 tablet (0.25 mg total) by mouth every evening. 90 tablet 3    zinc 50 mg Tab Take 50 mg by mouth once daily.       [DISCONTINUED] traZODone (DESYREL) 50 MG tablet Take 1 tablet (50 mg total) by mouth nightly as needed for Insomnia. 90 tablet 3       /80   Pulse 67   Ht 5' 4" (1.626 m)   Wt 45.8 kg (100 lb 15.5 oz)   LMP  (LMP Unknown)   BMI 17.33 kg/m²   Well-developed, well nourished.  Awake, alert and oriented.  Face symmetric. Language is normal.      Motor:   Normal muscle tone, bulk and power except 5- R TA   No abnormal movements  Sensory (NT today; prior exam)   Intact to LT, position  Glove-stocking PP, temperature  DTRs (NT today; prior exam)   2++ and symmetric   No clonus   Plantar responses are silent bilaterally  Gait   Normal base and gait    64 mins chart review, face to face, documentation    Malvin Bautista MD    "

## 2025-01-27 ENCOUNTER — OFFICE VISIT (OUTPATIENT)
Dept: CARDIOLOGY | Facility: CLINIC | Age: 75
End: 2025-01-27
Payer: MEDICARE

## 2025-01-27 ENCOUNTER — LAB VISIT (OUTPATIENT)
Dept: LAB | Facility: HOSPITAL | Age: 75
End: 2025-01-27
Attending: PSYCHIATRY & NEUROLOGY
Payer: MEDICARE

## 2025-01-27 ENCOUNTER — OFFICE VISIT (OUTPATIENT)
Dept: PODIATRY | Facility: CLINIC | Age: 75
End: 2025-01-27
Payer: MEDICARE

## 2025-01-27 VITALS
SYSTOLIC BLOOD PRESSURE: 109 MMHG | BODY MASS INDEX: 16.83 KG/M2 | WEIGHT: 98.56 LBS | HEIGHT: 64 IN | HEART RATE: 83 BPM | DIASTOLIC BLOOD PRESSURE: 70 MMHG | OXYGEN SATURATION: 99 %

## 2025-01-27 DIAGNOSIS — Z98.1 S/P LUMBAR FUSION: ICD-10-CM

## 2025-01-27 DIAGNOSIS — L84 CORNS/CALLOSITIES: Primary | ICD-10-CM

## 2025-01-27 DIAGNOSIS — K21.9 GASTROESOPHAGEAL REFLUX DISEASE, UNSPECIFIED WHETHER ESOPHAGITIS PRESENT: ICD-10-CM

## 2025-01-27 DIAGNOSIS — I87.2 VENOUS INSUFFICIENCY OF BOTH LOWER EXTREMITIES: ICD-10-CM

## 2025-01-27 DIAGNOSIS — I48.11 LONGSTANDING PERSISTENT ATRIAL FIBRILLATION: Primary | ICD-10-CM

## 2025-01-27 DIAGNOSIS — I50.32 CHRONIC HEART FAILURE WITH PRESERVED EJECTION FRACTION: ICD-10-CM

## 2025-01-27 DIAGNOSIS — E03.9 HYPOTHYROIDISM, UNSPECIFIED TYPE: ICD-10-CM

## 2025-01-27 DIAGNOSIS — G62.9 SMALL FIBER NEUROPATHY: ICD-10-CM

## 2025-01-27 LAB
HBV CORE AB SERPL QL IA: NORMAL
HBV SURFACE AG SERPL QL IA: NORMAL
HIV 1+2 AB+HIV1 P24 AG SERPL QL IA: NORMAL
RHEUMATOID FACT SERPL-ACNC: <13 IU/ML (ref 0–15)

## 2025-01-27 PROCEDURE — 3074F SYST BP LT 130 MM HG: CPT | Mod: CPTII,S$GLB,, | Performed by: INTERNAL MEDICINE

## 2025-01-27 PROCEDURE — 1157F ADVNC CARE PLAN IN RCRD: CPT | Mod: CPTII,S$GLB,, | Performed by: INTERNAL MEDICINE

## 2025-01-27 PROCEDURE — 36415 COLL VENOUS BLD VENIPUNCTURE: CPT | Performed by: PSYCHIATRY & NEUROLOGY

## 2025-01-27 PROCEDURE — 99499 UNLISTED E&M SERVICE: CPT | Mod: ,,, | Performed by: PODIATRIST

## 2025-01-27 PROCEDURE — 3078F DIAST BP <80 MM HG: CPT | Mod: CPTII,S$GLB,, | Performed by: INTERNAL MEDICINE

## 2025-01-27 PROCEDURE — 87340 HEPATITIS B SURFACE AG IA: CPT | Performed by: PSYCHIATRY & NEUROLOGY

## 2025-01-27 PROCEDURE — 99999 PR PBB SHADOW E&M-EST. PATIENT-LVL III: CPT | Mod: PBBFAC,,, | Performed by: PODIATRIST

## 2025-01-27 PROCEDURE — 1126F AMNT PAIN NOTED NONE PRSNT: CPT | Mod: CPTII,S$GLB,, | Performed by: INTERNAL MEDICINE

## 2025-01-27 PROCEDURE — 1160F RVW MEDS BY RX/DR IN RCRD: CPT | Mod: CPTII,S$GLB,, | Performed by: INTERNAL MEDICINE

## 2025-01-27 PROCEDURE — 86431 RHEUMATOID FACTOR QUANT: CPT | Performed by: PSYCHIATRY & NEUROLOGY

## 2025-01-27 PROCEDURE — 17999 UNLISTD PX SKN MUC MEMB SUBQ: CPT | Mod: CSM,,, | Performed by: PODIATRIST

## 2025-01-27 PROCEDURE — 99999 PR PBB SHADOW E&M-EST. PATIENT-LVL V: CPT | Mod: PBBFAC,,, | Performed by: INTERNAL MEDICINE

## 2025-01-27 PROCEDURE — 99214 OFFICE O/P EST MOD 30 MIN: CPT | Mod: S$GLB,,, | Performed by: INTERNAL MEDICINE

## 2025-01-27 PROCEDURE — 1101F PT FALLS ASSESS-DOCD LE1/YR: CPT | Mod: CPTII,S$GLB,, | Performed by: INTERNAL MEDICINE

## 2025-01-27 PROCEDURE — 87389 HIV-1 AG W/HIV-1&-2 AB AG IA: CPT | Performed by: PSYCHIATRY & NEUROLOGY

## 2025-01-27 PROCEDURE — 86618 LYME DISEASE ANTIBODY: CPT | Performed by: PSYCHIATRY & NEUROLOGY

## 2025-01-27 PROCEDURE — 1159F MED LIST DOCD IN RCRD: CPT | Mod: CPTII,S$GLB,, | Performed by: INTERNAL MEDICINE

## 2025-01-27 PROCEDURE — 3288F FALL RISK ASSESSMENT DOCD: CPT | Mod: CPTII,S$GLB,, | Performed by: INTERNAL MEDICINE

## 2025-01-27 PROCEDURE — 86038 ANTINUCLEAR ANTIBODIES: CPT | Performed by: PSYCHIATRY & NEUROLOGY

## 2025-01-27 PROCEDURE — 86704 HEP B CORE ANTIBODY TOTAL: CPT | Performed by: PSYCHIATRY & NEUROLOGY

## 2025-01-27 NOTE — PROGRESS NOTES
Chart has been dictated using voice recognition software.  It is not been reviewed carefully for any transcriptional errors due to this technology.   Subjective:   Patient ID:  Angelina Man is a 75 y.o. female who presents for follow-up of Follow-up (6 months)      HPI: Patient with BLE venous insufficiency s/p bilateral EVLT (left 11-Nov-2021; right 06-Jan-2022), hypothyroidism, anxiety, arthritis, persistent atrial fibrillation s/p DCCV 17-Jul-2020 but with recurrent chronic AF, and HFpEF. .      Patient has slightly more dyspnea than previously but able to go up a flight of steps without problems.  Notes feeling like she cannot get her breath after getting into bed but then is OK within  a minute.  Patient denies any palpitations, lightheadedness, or syncope. Patient denies any chest discomfort on exertion or at rest.  Patient denies any dyspnea at rest, orthopnea, or PND.  Has edema being managed by Vascular Medicine (Dr KAMRON Cedeño).    Past Medical History:   Diagnosis Date    Arthritis     Atrial fibrillation     Bronchitis     Cataract     Dry eyes     GERD (gastroesophageal reflux disease)     Glaucoma, suspect - Both Eyes     Hypothyroidism 06/23/2016    Pulmonary hypertension     Rash     Renal angiomyolipoma     Renal disorder     SCC (squamous cell carcinoma) 07/2023    left lower lateral shin    SCC (squamous cell carcinoma) 04/2023    L mid lateral shin    SCC (squamous cell carcinoma) 09/2023    L mid lower shin    Spinal stenosis     Squamous cell carcinoma     in-situ right upper inner arm, right wrist    Status post lumbar surgery 06/23/2016    Stress fracture     Thyroid disease     Venous insufficiency        Outpatient Medications Prior to Visit   Medication Sig Dispense Refill    acetaminophen (TYLENOL) 500 MG tablet Take 500 mg by mouth every 6 (six) hours as needed for Pain.      acyclovir (ZOVIRAX) 400 MG tablet Take 1 tablet (400 mg total) by mouth once daily. 180 tablet 1    ascorbic  acid (VITAMIN C) 1000 MG tablet Take 1,000 mg by mouth once daily.       biotin 1 mg tablet Take 1 mg by mouth 2 (two) times daily.       buPROPion (WELLBUTRIN SR) 150 MG TBSR 12 hr tablet Take 1 tablet (150 mg total) by mouth 2 (two) times daily. 180 tablet 3    digestive enzymes Tab Take 1 tablet by mouth once daily.       ELIQUIS 5 mg Tab TAKE 1 TABLET(5 MG) BY MOUTH TWICE DAILY 180 tablet 3    fluticasone propionate (FLONASE) 50 mcg/actuation nasal spray 1 spray (50 mcg total) by Each Nostril route 2 (two) times daily as needed for Rhinitis. 16 g 11    furosemide (LASIX) 20 MG tablet Take 1 tablet (20 mg total) by mouth once daily. 90 tablet 3    GAVILYTE-G 236-22.74-6.74 -5.86 gram suspension       influenza, adjuvanted, (FLUAD TRIV 2024-25,65Y UP,,PF,) 45 mcg/0.5 mL IM vaccine (> or = 64 yo) Inject into the muscle. 0.5 mL 0    ketoconazole (NIZORAL) 2 % cream aaa bid prn flare under arm and qhs to areas on face 60 g 3    levothyroxine (SYNTHROID) 137 MCG Tab tablet Take 1 tablet (137 mcg total) by mouth before breakfast. 90 tablet 3    lubiprostone (AMITIZA) 24 MCG Cap Take 1 capsule (24 mcg total) by mouth daily as needed (IBS symptoms). 30 capsule 6    methocarbamoL (ROBAXIN) 500 MG Tab Take 1 tablet (500 mg total) by mouth 3 (three) times daily as needed (mus). 90 tablet 2    metoprolol succinate (TOPROL-XL) 25 MG 24 hr tablet Take 1 tablet (25 mg total) by mouth once daily. 90 tablet 3    mirtazapine (REMERON) 7.5 MG Tab Take 1 tablet (7.5 mg total) by mouth nightly. 90 tablet 3    multivitamin (THERAGRAN) per tablet Take 1 tablet by mouth once daily.       potassium chloride SA (K-DUR,KLOR-CON) 20 MEQ tablet Take 1 tablet (20 mEq total) by mouth once daily. 90 tablet 3    pramipexole (MIRAPEX) 0.25 MG tablet Take 1 tablet (0.25 mg total) by mouth every evening. 90 tablet 3    traZODone (DESYREL) 50 MG tablet Take 1-2 tablets ( mg total) by mouth every evening. 180 tablet 3    zinc 50 mg Tab Take 50  "mg by mouth once daily.        No facility-administered medications prior to visit.       ROS - No change from prior visit in neurologic, respiratory, endocrine, GI, hematologic, or constitutional complaints   Objective:   Physical Exam  Constitutional:       Appearance: She is well-developed and underweight.      Comments: /70 (Patient Position: Sitting)   Pulse 83   Ht 5' 4" (1.626 m)   Wt 44.7 kg (98 lb 8.7 oz)   LMP  (LMP Unknown)   SpO2 99%   BMI 16.92 kg/m²      Neck:      Vascular: No carotid bruit or JVD.   Cardiovascular:      Rate and Rhythm: Normal rate. Rhythm irregularly irregular.      Pulses: Intact distal pulses.      Heart sounds: Normal heart sounds. No murmur heard.     No friction rub. No gallop.   Pulmonary:      Effort: Pulmonary effort is normal.      Breath sounds: Normal breath sounds. No wheezing or rales.   Abdominal:      General: Bowel sounds are normal. There is no abdominal bruit.      Palpations: Abdomen is soft. There is no hepatomegaly.      Tenderness: There is no abdominal tenderness.   Musculoskeletal:      Cervical back: Neck supple.      Right lower leg: No edema.      Left lower leg: No edema.   Skin:     Nails: There is no clubbing.   Neurological:      Mental Status: She is alert and oriented to person, place, and time.           Lab Results   Component Value Date     01/08/2025    K 3.9 01/08/2025    BUN 13 01/08/2025    CREATININE 0.8 01/08/2025    EGFRNORACEVR >60.0 01/08/2025    GLU 84 01/08/2025    HGBA1C 5.4 11/12/2024    CHOL 200 (H) 01/08/2025    TRIG 99 01/08/2025    HDL 58 01/08/2025    LDLCALC 122.2 01/08/2025     (H) 09/24/2024    HGB 12.7 01/08/2025    HCT 38.9 01/08/2025    WBC 5.04 01/08/2025     01/08/2025    INR 1.1 07/07/2021       Assessment:     1. Longstanding persistent atrial fibrillation    2. Chronic heart failure with preserved ejection fraction    3. Hypothyroidism, unspecified type    4. Venous insufficiency of both " lower extremities    5. Gastroesophageal reflux disease, unspecified whether esophagitis present    6. S/P lumbar fusion      The patient has no symptoms of ischemia or arrhythmia.  She has mild dyspnea on exertion which may be slightly worse than previously but does not appear to be limiting the patient's ability to do things.  She does have notable fatigue and orthostatic vertiginous symptoms that makes her unsteady when she 1st stands up.  However, the patient is not having falls.    At this time, no change in her medications are needed.  Given the patient's increasing fatigue, it may be worthwhile to reconsider ablation for her atrial fibrillation, particularly as there is a new device available that may be safer for her ablation.  I will discuss with Cardiac electrophysiology and get back to the patient with a decision.    Unless there are intervening problems, patient should return for re-evaluation in 6 months.           Plan:     Angelina was seen today for follow-up.    Diagnoses and all orders for this visit:    Longstanding persistent atrial fibrillation    Chronic heart failure with preserved ejection fraction    Hypothyroidism, unspecified type    Venous insufficiency of both lower extremities    Gastroesophageal reflux disease, unspecified whether esophagitis present    S/P lumbar fusion          David Pereira MD  Consultative Cardiology

## 2025-01-27 NOTE — Clinical Note
Thank you for allowing me to follow-up with Angelina Man for atrial fibrillation. Please see my note for details of this encounter. If you have any questions, please contact me.  Thank you again for allowing to participate in the care of this patient.  Detail Level: Zone Initiate Treatment: Moducare daily

## 2025-01-28 ENCOUNTER — HOSPITAL ENCOUNTER (OUTPATIENT)
Dept: RADIOLOGY | Facility: CLINIC | Age: 75
Discharge: HOME | End: 2025-01-28
Attending: INTERNAL MEDICINE
Payer: MEDICARE

## 2025-01-28 ENCOUNTER — HOSPITAL ENCOUNTER (OUTPATIENT)
Dept: RADIOLOGY | Facility: HOSPITAL | Age: 75
Discharge: HOME | End: 2025-01-28
Attending: PSYCHIATRY & NEUROLOGY
Payer: MEDICARE

## 2025-01-28 DIAGNOSIS — G62.9 SMALL FIBER NEUROPATHY: ICD-10-CM

## 2025-01-28 DIAGNOSIS — M81.0 OSTEOPOROSIS, UNSPECIFIED OSTEOPOROSIS TYPE, UNSPECIFIED PATHOLOGICAL FRACTURE PRESENCE: ICD-10-CM

## 2025-01-28 LAB — ANA SER QL IF: NORMAL

## 2025-01-28 PROCEDURE — 71046 X-RAY EXAM CHEST 2 VIEWS: CPT | Mod: TC

## 2025-01-28 PROCEDURE — 77080 DXA BONE DENSITY AXIAL: CPT | Mod: TC

## 2025-01-28 PROCEDURE — 71046 X-RAY EXAM CHEST 2 VIEWS: CPT | Mod: 26,,, | Performed by: RADIOLOGY

## 2025-01-28 PROCEDURE — 77080 DXA BONE DENSITY AXIAL: CPT | Mod: 26,,, | Performed by: INTERNAL MEDICINE

## 2025-01-30 ENCOUNTER — PATIENT MESSAGE (OUTPATIENT)
Dept: NEUROLOGY | Facility: CLINIC | Age: 75
End: 2025-01-30
Payer: MEDICARE

## 2025-01-30 LAB — B BURGDOR AB SER IA-ACNC: 0.16 INDEX VALUE

## 2025-02-03 ENCOUNTER — HOSPITAL ENCOUNTER (OUTPATIENT)
Dept: RADIOLOGY | Facility: HOSPITAL | Age: 75
Discharge: HOME | End: 2025-02-03
Attending: INTERNAL MEDICINE
Payer: MEDICARE

## 2025-02-03 DIAGNOSIS — Z12.31 OTHER SCREENING MAMMOGRAM: ICD-10-CM

## 2025-02-03 DIAGNOSIS — D25.9 UTERINE LEIOMYOMA, UNSPECIFIED LOCATION: ICD-10-CM

## 2025-02-03 PROCEDURE — 76856 US EXAM PELVIC COMPLETE: CPT | Mod: 26,,, | Performed by: RADIOLOGY

## 2025-02-03 PROCEDURE — 77067 SCR MAMMO BI INCL CAD: CPT | Mod: 26,,, | Performed by: RADIOLOGY

## 2025-02-03 PROCEDURE — 77063 BREAST TOMOSYNTHESIS BI: CPT | Mod: 26,,, | Performed by: RADIOLOGY

## 2025-02-03 PROCEDURE — 76830 TRANSVAGINAL US NON-OB: CPT | Mod: TC

## 2025-02-03 PROCEDURE — 77063 BREAST TOMOSYNTHESIS BI: CPT | Mod: TC

## 2025-02-03 PROCEDURE — 76830 TRANSVAGINAL US NON-OB: CPT | Mod: 26,,, | Performed by: RADIOLOGY

## 2025-02-06 DIAGNOSIS — G47.00 INSOMNIA, UNSPECIFIED TYPE: ICD-10-CM

## 2025-02-06 DIAGNOSIS — F33.9 EPISODE OF RECURRENT MAJOR DEPRESSIVE DISORDER, UNSPECIFIED DEPRESSION EPISODE SEVERITY: ICD-10-CM

## 2025-02-06 DIAGNOSIS — G25.81 RLS (RESTLESS LEGS SYNDROME): ICD-10-CM

## 2025-02-06 RX ORDER — MIRTAZAPINE 7.5 MG/1
TABLET, FILM COATED ORAL
Qty: 90 TABLET | Refills: 3 | Status: SHIPPED | OUTPATIENT
Start: 2025-02-06

## 2025-02-06 NOTE — TELEPHONE ENCOUNTER
No care due was identified.  [facility] Embedded Care Due Messages. Reference number: [id number].   2/06/2025 8:31:33 AM CST

## 2025-02-07 NOTE — TELEPHONE ENCOUNTER
Refill Routing Note   Medication(s) are not appropriate for processing by [facility] for the following reason(s):        Outside of protocol    [facility] action(s):  Route  Approve      Medication Therapy Plan: PRAMIPEXOLE-OOP      Appointments  past 12m or future 3m with PCP    Date Provider   Last Visit   1/7/2025 Zina Rockwell MD   Next Visit   Visit date not found Zina Rockwell MD   ED visits in past 90 days: 0        Note composed:10:42 PM 02/06/2025

## 2025-02-10 RX ORDER — PRAMIPEXOLE DIHYDROCHLORIDE 0.25 MG/1
TABLET ORAL
Qty: 90 TABLET | Refills: 1 | Status: SHIPPED | OUTPATIENT
Start: 2025-02-10

## 2025-02-26 ENCOUNTER — PATIENT MESSAGE (OUTPATIENT)
Dept: CARDIOLOGY | Facility: CLINIC | Age: 75
End: 2025-02-26
Payer: MEDICARE

## 2025-02-26 ENCOUNTER — PATIENT MESSAGE (OUTPATIENT)
Dept: ELECTROPHYSIOLOGY | Facility: CLINIC | Age: 75
End: 2025-02-26
Payer: MEDICARE

## 2025-02-26 DIAGNOSIS — I51.89 DIASTOLIC DYSFUNCTION: ICD-10-CM

## 2025-02-26 DIAGNOSIS — I51.89 DIASTOLIC DYSFUNCTION: Primary | ICD-10-CM

## 2025-02-26 DIAGNOSIS — Z71.9 HEALTH EDUCATION/COUNSELING: ICD-10-CM

## 2025-02-26 RX ORDER — METOPROLOL SUCCINATE 25 MG/1
TABLET, EXTENDED RELEASE ORAL
Qty: 90 TABLET | Refills: 3 | OUTPATIENT
Start: 2025-02-26

## 2025-02-26 RX ORDER — METOPROLOL SUCCINATE 25 MG/1
25 TABLET, EXTENDED RELEASE ORAL DAILY
Qty: 90 TABLET | Refills: 3 | Status: SHIPPED | OUTPATIENT
Start: 2025-02-26 | End: 2026-02-26

## 2025-03-03 ENCOUNTER — OFFICE VISIT (OUTPATIENT)
Dept: CARDIOLOGY | Facility: CLINIC | Age: 75
End: 2025-03-03
Payer: MEDICARE

## 2025-03-03 VITALS
WEIGHT: 99.19 LBS | HEART RATE: 74 BPM | DIASTOLIC BLOOD PRESSURE: 72 MMHG | SYSTOLIC BLOOD PRESSURE: 121 MMHG | HEIGHT: 64 IN | BODY MASS INDEX: 16.93 KG/M2

## 2025-03-03 DIAGNOSIS — I27.20 PULMONARY HYPERTENSION: ICD-10-CM

## 2025-03-03 DIAGNOSIS — I87.2 VENOUS INSUFFICIENCY OF BOTH LOWER EXTREMITIES: ICD-10-CM

## 2025-03-03 DIAGNOSIS — I50.30 HEART FAILURE WITH PRESERVED EJECTION FRACTION, UNSPECIFIED HF CHRONICITY: Primary | ICD-10-CM

## 2025-03-03 DIAGNOSIS — I48.11 LONGSTANDING PERSISTENT ATRIAL FIBRILLATION: ICD-10-CM

## 2025-03-03 DIAGNOSIS — I83.893 VARICOSE VEINS OF BOTH LOWER EXTREMITIES WITH COMPLICATIONS: ICD-10-CM

## 2025-03-03 PROCEDURE — 3288F FALL RISK ASSESSMENT DOCD: CPT | Mod: HCNC,CPTII,S$GLB, | Performed by: INTERNAL MEDICINE

## 2025-03-03 PROCEDURE — 1101F PT FALLS ASSESS-DOCD LE1/YR: CPT | Mod: HCNC,CPTII,S$GLB, | Performed by: INTERNAL MEDICINE

## 2025-03-03 PROCEDURE — 99999 PR PBB SHADOW E&M-EST. PATIENT-LVL IV: CPT | Mod: PBBFAC,HCNC,, | Performed by: INTERNAL MEDICINE

## 2025-03-03 PROCEDURE — 3074F SYST BP LT 130 MM HG: CPT | Mod: HCNC,CPTII,S$GLB, | Performed by: INTERNAL MEDICINE

## 2025-03-03 PROCEDURE — 1125F AMNT PAIN NOTED PAIN PRSNT: CPT | Mod: HCNC,CPTII,S$GLB, | Performed by: INTERNAL MEDICINE

## 2025-03-03 PROCEDURE — 99212 OFFICE O/P EST SF 10 MIN: CPT | Mod: HCNC,S$GLB,, | Performed by: INTERNAL MEDICINE

## 2025-03-03 PROCEDURE — 1157F ADVNC CARE PLAN IN RCRD: CPT | Mod: HCNC,CPTII,S$GLB, | Performed by: INTERNAL MEDICINE

## 2025-03-03 PROCEDURE — 1159F MED LIST DOCD IN RCRD: CPT | Mod: HCNC,CPTII,S$GLB, | Performed by: INTERNAL MEDICINE

## 2025-03-03 PROCEDURE — 3078F DIAST BP <80 MM HG: CPT | Mod: HCNC,CPTII,S$GLB, | Performed by: INTERNAL MEDICINE

## 2025-03-03 NOTE — PROGRESS NOTES
[facility]      Chief Complaint   Patient presents with    Venous insufficiency of both lower extremities       Patient ID: Angelina Man is a 75 y.o. female with venous insufficiency s/p EVLT of the left LSV and right GSV, hypothyroidism, anxiety, arthritis, and persistent atrial fibrillation s/p DCCV 17-Jul-2020, who presents for a follow up appointment.  Pertinent history/events are as follows:     -Pt kindly referred by Dr. Pereira for evaluation of varicose veins.      -At our initial clinic visit on 6/22/2021, Mrs. Man reported varicose veins with worsening swelling and discomfort.  No claudication or tissue loss.  Exam shows BLE's with prominent varicose veins with venous stasis dermatitis.  Plan:   Varicose veins of both lower extremities- Check BLE venous reflux study and stephanie study.  If no evidence of severe PAD, start graduated compression hose.  Pt to limit sodium intake to 2,000 mg daily.  Elevate legs when resting.    10/7/2021 clinic visit: Mrs. Man reports continued leg worsening swelling and discomfort, despite wearing graduated compression hose for the past 3 months.  She has no claudication or tissue loss.  Exam shows BLE's with prominent varicose veins with venous stasis dermatitis.  BLE Venous Reflux Study on 7/1/2021 revealed hemodynamically significant venous reflux bilaterally with no DVT.  The right and left SSV's are partially compressible with thickened and hyperechoic walls suggestive of previous or chronic superficial venous thrombosis.  Segmental Pressure Study 7/1/2021 revealed normal STEPHANIE at rest and with exercise bilaterally.    Plan:  BLE Venous insufficiency with pain- Mrs. Man reports continued leg worsening swelling and discomfort, despite wearing graduated compression hose for the past 3 months.  She has no claudication or tissue loss.  Exam shows BLE's with prominent varicose veins with venous stasis dermatitis.  BLE Venous Reflux Study on 7/1/2021  revealed hemodynamically significant venous reflux bilaterally with no DVT.  The right and left SSV's are partially compressible with thickened and hyperechoic walls suggestive of previous or chronic superficial venous thrombosis.  Segmental Pressure Study 7/1/2021 revealed normal STEPHANIE at rest and with exercise bilaterally.  Given continued symptoms despite conservative therapy with graduated compression hose, will refer for EVLT/sclerotherapy.    12/21/2021 clinic visit: Mrs. Man reports she underwent left leg EVLT. She wants to proceed with right EVLT for which she is planned. She has no other complaints.  Plan:   BLE Venous insufficiency with pain- s/p EVLT of the left LSV on 11/11 with follow up US showing resolution of reflux. Limited edema in right foot/ankle. Planned for right leg EVLT. Following with Dr. Hernandez    -On 1/6/2022, pt underwent Endovenous radiofrequency ablation (EVLT) of the right saphenous vein(s) of the lower extremity.    3/22/2022 clinic visit: Mrs. Man reports significant improvement in BLE edema since undergoing EVLT of the left LSV and right GSV.  She has no lower extremity wound/ulcer.  Plan:   BLE Venous insufficiency with pain- Mrs. Man is now s/p EVLT of the left LSV and right GSV with significant improvement in BLE edema.  Follow up RLE venous ultrasound results are pending.  Continue graduated compression hose.  Continue to limit sodium intake to 2,000 mg daily.  Elevate legs when resting.    Persistent atrial fibrillation- Stable.  Continue current medications.  Exercise Induced Pulmonary HTN- Stable.  Continue current medications.    9/22/2022 clinic visit: Mrs. Man reports hitting her left leg on a pedal at spin class and developing a wound 3 weeks ago.  The wound is being managed by wound care.  She reports no significant LE edema.   Plan:   BLE Venous insufficiency with pain- Mrs. Man is now s/p EVLT of the left LSV and right GSV with significant improvement in  "BLE edema.  Follow up RLE venous ultrasound results as above.  Continue graduated compression hose.  Continue to limit sodium intake to 2,000 mg daily.  Elevate legs when resting.    Left leg wound- Appears to be healing appropriately.  Continue wound care.    Persistent atrial fibrillation- Stable.  Continue current medications.  Exercise Induced Pulmonary HTN- Stable.  Continue current medications.    2/2/2023 clinic visit: Mrs. Man reports doing well with no chest pain or SOB.  LLE wound has completely healed.  She has no significant leg swelling.   Plan:   BLE Venous insufficiency with pain- Stable.  Mrs. Man is now s/p EVLT of the left LSV and right GSV with significant improvement in BLE edema.  Follow up RLE venous ultrasound results as above.  Continue graduated compression hose.  Continue to limit sodium intake to 2,000 mg daily.  Elevate legs when resting.    Left leg wound- Now completely healed.      Persistent atrial fibrillation- Stable.  Continue current medications.  Exercise Induced Pulmonary HTN- Stable.  Continue current medications.    9/21/2023 clinic visit: Mrs. Man reports legs feel "heavy and tired all the time".  No claudication or tissue loss.  Reports having restless leg syndrome in the past.   Plan:   BLE Venous insufficiency with pain- Stable.  Mrs. Man reports legs feeling heavy and tired all the time.  Likely due to venous insufficiency.  Check updated BLE venous reflux study and exercise STEPHANIE study.  Continue graduated compression hose.  Continue to limit sodium intake to 2,000 mg daily.  Elevate legs when resting.    ersistent atrial fibrillation- Stable.  Continue current medications.  Exercise Induced Pulmonary HTN- Stable.  Continue current medications.    2/6/2024 clinic visit: Mrs. Man reports no new issues.  She continue to report legs feel "heavy and tired all the time".  No claudication or tissue loss.  Nuclear stress test on 1/4/2024 revealed no evidence of " myocardial ischemia or infarction.   Plan:  BLE Venous insufficiency with pain- Stable. Mrs. Man reports legs feeling heavy and tired all the time.  Likely due to venous insufficiency.  Continue graduated compression hose.  Continue to limit sodium intake to 2,000 mg daily.  Elevate legs when resting.    Persistent atrial fibrillation- Stable.  Continue current medications.  Exercise Induced Pulmonary HTN- Stable.  Continue current medications.    8/27/2024 clinic visit: Mrs. Man reports pins and needles sensation in both legs which started several months ago.  She has no claudication symptoms or tissue loss.    Plan:  BLE Venous insufficiency with pain- Stable. Mrs. Man reports legs feeling heavy and tired all the time.  Likely due to venous insufficiency.  Continue graduated compression hose.  Continue to limit sodium intake to 2,000 mg daily.  Elevate legs when resting.    Persistent atrial fibrillation- Stable.  Continue current medications.  BLE Pins/[place] Sensation- Likely neuropathic in origin.  Refer to Neurology for evaluation.  Check exercise STEPHANIE: study and BLE arterial ultrasound to rule out flow limiting PAD.   Exercise Induced Pulmonary HTN- Stable.  Continue current medications.    HPI:  Mrs. Man reports continued pins and needles sensation in both legs as reported at clinic visit on 8/27/2024.  She has been evaluated by Neurology for this issue. And diagnosed with likely small fiber neuropathy. STEPHANIE Study on 9/6/2024 showed Normal resting and exercise ABIs bilaterally.  BLE Arterial Ultrasound on 9/6/2024 revealed bilateral lower extremity arteries with no hemodynamically significant stenosis.    Past Medical History:   Diagnosis Date    Arthritis     Atrial fibrillation     Bronchitis     Cataract     Dry eyes     GERD (gastroesophageal reflux disease)     Glaucoma, suspect - Both Eyes     Hypothyroidism 06/23/2016    Pulmonary hypertension     Rash     Renal angiomyolipoma     Renal  disorder     SCC (squamous cell carcinoma) 07/2023    left lower lateral shin    SCC (squamous cell carcinoma) 04/2023    L mid lateral shin    SCC (squamous cell carcinoma) 09/2023    L mid lower shin    Spinal stenosis     Squamous cell carcinoma     in-situ right upper inner arm, right wrist    Status post lumbar surgery 06/23/2016    Stress fracture     Thyroid disease     Venous insufficiency      Past Surgical History:   Procedure Laterality Date    ANGIOPLASTY      CATARACT EXTRACTION W/  INTRAOCULAR LENS IMPLANT Left 12/6/2022    Procedure: EXTRACTION, CATARACT, WITH IOL INSERTION;  Surgeon: Tone Brown MD;  Location: [facility];  Service: Ophthalmology;  Laterality: Left;    CATARACT EXTRACTION W/  INTRAOCULAR LENS IMPLANT Right 12/20/2022    Procedure: EXTRACTION, CATARACT, WITH IOL INSERTION;  Surgeon: Tone Brown MD;  Location: [facility];  Service: Ophthalmology;  Laterality: Right;    CERVICAL FUSION      COLONOSCOPY      COLONOSCOPY N/A 11/14/2017    Procedure: COLONOSCOPY;  Surgeon: Kasi Mercado MD;  [facility]  Service: Endoscopy;  Laterality: N/A;    COLONOSCOPY N/A 4/26/2023    Procedure: COLONOSCOPY;  Surgeon: Jeanmarie Hathaway MD;  Location: [facility] (4TH FLR);  Service: Colon and Rectal;  Laterality: N/A;  ok to hold Eliquis 2 days per Dr Pereira  constipation-ext Miralax prep  instr mailed/portal-GT  4/21/23 pre call attempted, no answer    COLONOSCOPY N/A 5/28/2024    Procedure: COLONOSCOPY;  Surgeon: Jeanmarie Hathaway MD;  [facility]  Service: Endoscopy;  Laterality: N/A;  referral Dr. Hathaway. Annual Colonoscopy for High Risk. Golytely prep instr. to portal Pending Eliquis Hold from Dr. David Pereira. [facility] [email or web address]  ok to hold Eliquis 2 days per Dr Pereira-GT  5/21- precall confirmed; instructions re-sent via portal, aware of holding eliquis     ESOPHAGOGASTRODUODENOSCOPY N/A 10/10/2019    Procedure: EGD (ESOPHAGOGASTRODUODENOSCOPY);   Surgeon: Rg Milton MD;  Location: [facility] ENDO (4TH FLR);  Service: Endoscopy;  Laterality: N/A;    ESOPHAGOGASTRODUODENOSCOPY N/A 10/6/2021    Procedure: EGD (ESOPHAGOGASTRODUODENOSCOPY);  Surgeon: Rg Milton MD;  Location: [facility] ENDO (4TH FLR);  Service: Endoscopy;  Laterality: N/A;  covid test 10/3 [place], instr emailed/portal -ml    EXCISION Left 5/17/2023    Procedure: EXCISION SQUAMOUS CELL CARCINOMA LEFT LEG;  Surgeon: Kasi Canela Jr., MD;  [facility];  Service: General;  Laterality: Left;    l4-5 mid discectomy Left 03/2017    l4-5 MIS diskectomy Right 05/2016    RIGHT HEART CATHETERIZATION Right 1/27/2022    Procedure: INSERTION, CATHETER, RIGHT HEART;  Surgeon: Satnam Pickard Jr., MD;  Location: [facility] CATH LAB;  Service: Cardiology;  Laterality: Right;    TRANSFORAMINAL EPIDURAL INJECTION OF STEROID Bilateral 3/12/2024    Procedure: LUMBAR TRANSFORAMINAL BILATERAL L3/4 *ELIQUIS CLEARANCE IN CHART*;  Surgeon: Sheree Abbott MD;  Location: [facility] PAIN MGT;  Service: Pain Management;  Laterality: Bilateral;  [phone]  2 WK F/U [place]    TREATMENT OF CARDIAC ARRHYTHMIA N/A 7/17/2020    Procedure: CARDIOVERSION;  Surgeon: Kwan Bray MD;  Location: [facility] EP LAB;  Service: Cardiology;  Laterality: N/A;  AF,DCCV/SANGITA, ANES, SK, 746    TREATMENT OF CARDIAC ARRHYTHMIA N/A 7/14/2021    Procedure: CARDIOVERSION;  Surgeon: SYDNIE Cedillo MD;  Location: [facility] EP LAB;  Service: Cardiology;  Laterality: N/A;  AF, SANGITA, DCCV, MAC, EH, 3 Prep    WASHOUT Right 5/17/2023    Procedure: WASHOUT right lower arm and closure;  Surgeon: Kasi Canela Jr., MD;  [facility];  Service: General;  Laterality: Right;     Social History     Socioeconomic History    Marital status: Single   Occupational History     Employer: [employer]   Tobacco Use    Smoking status: Never     Passive exposure: Never    Smokeless tobacco: Never   Substance and Sexual Activity    Alcohol  use: Yes     Alcohol/week: 4.0 standard drinks of alcohol     Types: 4 Glasses of wine per week     Comment: 2 glasses of wine on weekends    Drug use: No    Sexual activity: Never   Other Topics Concern    Are you pregnant or think you may be? No    Breast-feeding No     Social Drivers of Health     Financial Resource Strain: Low Risk  (2/29/2024)    Overall Financial Resource Strain (CARDIA)     Difficulty of Paying Living Expenses: Not very hard   Food Insecurity: No Food Insecurity (2/29/2024)    Hunger Vital Sign     Worried About Running Out of Food in the Last Year: Never true     Ran Out of Food in the Last Year: Never true   Transportation Needs: No Transportation Needs (2/29/2024)    PRAPARE - Transportation     Lack of Transportation (Medical): No     Lack of Transportation (Non-Medical): No   Physical Activity: Sufficiently Active (2/29/2024)    Exercise Vital Sign     Days of Exercise per Week: 7 days     Minutes of Exercise per Session: 60 min   Stress: No Stress Concern Present (2/29/2024)    [employer] - Occupational Stress Questionnaire     Feeling of Stress : Not at all   Housing Stability: Low Risk  (2/29/2024)    Housing Stability Vital Sign     Unable to Pay for Housing in the Last Year: No     Number of Places Lived in the Last Year: 1     Unstable Housing in the Last Year: No     Family History   Problem Relation Name Age of Onset    Cancer Mother          Lung cancer    Heart failure Father      Colon cancer Father      Hypertension Father      Cataracts Father      Cancer Father  80        colon    No Known Problems Sister      No Known Problems Brother      Melanoma Daughter      Diabetes Son      Heart disease Son      Cancer Son          esophageal cancer    No Known Problems Maternal Aunt      No Known Problems Maternal Uncle      No Known Problems Paternal Aunt      No Known Problems Paternal Uncle      No Known Problems Maternal Grandmother      No Known  Problems Maternal Grandfather      No Known Problems Paternal Grandmother      No Known Problems Paternal Grandfather      Amblyopia Neg Hx      Blindness Neg Hx      Glaucoma Neg Hx      Macular degeneration Neg Hx      Retinal detachment Neg Hx      Strabismus Neg Hx      Stroke Neg Hx      Thyroid disease Neg Hx      Breast cancer Neg Hx      Ovarian cancer Neg Hx         Review of patient's allergies indicates:  No Known Allergies    Medication List with Changes/Refills   Current Medications    ACETAMINOPHEN (TYLENOL) 500 MG TABLET    Take 500 mg by mouth every 6 (six) hours as needed for Pain.    ACYCLOVIR (ZOVIRAX) 400 MG TABLET    Take 1 tablet (400 mg total) by mouth once daily.    ASCORBIC ACID (VITAMIN C) 1000 MG TABLET    Take 1,000 mg by mouth once daily.     BIOTIN 1 MG TABLET    Take 1 mg by mouth 2 (two) times daily.     BUPROPION (WELLBUTRIN SR) 150 MG TBSR 12 HR TABLET    Take 1 tablet (150 mg total) by mouth 2 (two) times daily.    DIGESTIVE ENZYMES TAB    Take 1 tablet by mouth once daily.     ELIQUIS 5 MG TAB    TAKE 1 TABLET(5 MG) BY MOUTH TWICE DAILY    FLUTICASONE PROPIONATE (FLONASE) 50 MCG/ACTUATION NASAL SPRAY    1 spray (50 mcg total) by Each Nostril route 2 (two) times daily as needed for Rhinitis.    FUROSEMIDE (LASIX) 20 MG TABLET    Take 1 tablet (20 mg total) by mouth once daily.    GAVILYTE-G [id number] -5.86 GRAM SUSPENSION        KETOCONAZOLE (NIZORAL) 2 % CREAM    aaa bid prn flare under arm and qhs to areas on face    LEVOTHYROXINE (SYNTHROID) 137 MCG TAB TABLET    Take 1 tablet (137 mcg total) by mouth before breakfast.    LUBIPROSTONE (AMITIZA) 24 MCG CAP    Take 1 capsule (24 mcg total) by mouth daily as needed (IBS symptoms).    METHOCARBAMOL (ROBAXIN) 500 MG TAB    Take 1 tablet (500 mg total) by mouth 3 (three) times daily as needed (mus).    METOPROLOL SUCCINATE (TOPROL-XL) 25 MG 24 HR TABLET    Take 1 tablet (25 mg total) by mouth once daily.    MIRTAZAPINE (REMERON)  "7.5 MG TAB    TAKE 1 TABLET(7.5 MG) BY MOUTH EVERY NIGHT    MULTIVITAMIN (THERAGRAN) PER TABLET    Take 1 tablet by mouth once daily.     POTASSIUM CHLORIDE SA (K-DUR,KLOR-CON) 20 MEQ TABLET    Take 1 tablet (20 mEq total) by mouth once daily.    PRAMIPEXOLE (MIRAPEX) 0.25 MG TABLET    TAKE 1 TABLET(0.25 MG) BY MOUTH EVERY EVENING FOR RESTLESS LEGS    TRAZODONE (DESYREL) 50 MG TABLET    Take 1-2 tablets ( mg total) by mouth every evening.    ZINC 50 MG TAB    Take 50 mg by mouth once daily.    Discontinued Medications    INFLUENZA, ADJUVANTED, (FLUAD TRIV 2024-25,65Y UP,,PF,) 45 MCG/0.5 ML IM VACCINE (> OR = 66 YO)    Inject into the muscle.       Review of Systems  Constitution: Denies chills, fever, and sweats.  HENT: Denies headaches or blurry vision.  Cardiovascular: Denies chest pain or irregular heart beat.  Respiratory: Denies cough or shortness of breath.  Gastrointestinal: Denies abdominal pain, nausea, or vomiting.  Musculoskeletal: Positive or varicose veins with discomfort and swelling.  Neurological: Denies dizziness or focal weakness.  Psychiatric/Behavioral: Normal mental status.  Hematologic/Lymphatic: Denies bleeding problem or easy bruising/bleeding.  Skin: Denies rash or suspicious lesions    Physical Examination  /72 (BP Location: Left arm, Patient Position: Sitting)   Pulse 74   Ht 5' 4" (1.626 m)   Wt 45 kg (99 lb 3.3 oz)   LMP  (LMP Unknown)   BMI 17.03 kg/m²     Constitutional: No acute distress, conversant  HEENT: Sclera anicteric, Pupils equal, round and reactive to light, extraocular motions intact, Oropharynx clear  Neck: No JVD, no carotid bruits  Cardiovascular: irregular rhythm, no murmur, rubs or gallops  Pulmonary: Clear to auscultation bilaterally  Abdominal: Abdomen soft, nontender, nondistended, positive bowel sounds  Extremities: BLE's with prominent varicose veins with venous stasis dermatitis.     Pulses:  Carotid pulses are 2+ on the right side, and 2+ on the " left side.  Radial pulses are 2+ on the right side, and 2+ on the left side.   Femoral pulses are 2+ on the right side, and 2+ on the left side.  Popliteal pulses are 2+ on the right side, and 2+ on the left side.   Dorsalis pedis pulses are 2+ on the right side, and 2+ on the left side.   Posterior tibial pulses are 2+ on the right side, and 2+ on the left side.    Skin: No ecchymosis, erythema, or ulcers  Psych: Alert and oriented x 3, appropriate affect  Neuro: CNII-XII intact, no focal deficits    Labs:  Most Recent Data  CBC:   Lab Results   Component Value Date    WBC 5.04 01/08/2025    HGB 12.7 01/08/2025    HCT 38.9 01/08/2025     01/08/2025     (H) 01/08/2025    RDW 13.1 01/08/2025     BMP:   Lab Results   Component Value Date     01/08/2025    K 3.9 01/08/2025     01/08/2025    CO2 27 01/08/2025    BUN 13 01/08/2025    CREATININE 0.8 01/08/2025    GLU 84 01/08/2025    CALCIUM 8.8 01/08/2025    MG 1.6 12/28/2021     LFTS;   Lab Results   Component Value Date    PROT 7.0 01/08/2025    ALBUMIN 3.7 01/08/2025    BILITOT 0.5 01/08/2025    AST 17 01/08/2025    ALKPHOS 69 01/08/2025    ALT 15 01/08/2025    GGT 52 04/13/2022     COAGS:   Lab Results   Component Value Date    INR 1.1 07/07/2021     FLP:   Lab Results   Component Value Date    CHOL 200 (H) 01/08/2025    HDL 58 01/08/2025    LDLCALC 122.2 01/08/2025    TRIG 99 01/08/2025    CHOLHDL 29.0 01/08/2025     CARDIAC:   Lab Results   Component Value Date    TROPONINI <0.006 09/24/2024     (H) 09/24/2024     Imaging:    Echo 10/7/2024:    Left Ventricle: The left ventricle is normal in size. Normal wall thickness. There is concentric remodeling. There is low normal systolic function with a visually estimated ejection fraction of 50 - 55%. Unable to assess diastolic function due to atrial fibrillation.    Right Ventricle: Normal right ventricular cavity size. Wall thickness is normal. Systolic function is mildly reduced.     Biatrial enlargement    Patent foramen ovale visualized with predominant left to right shunting indicated by color flow Doppler.    Mitral Valve: There is mild to moderate regurgitation.    Tricuspid Valve: There is mild regurgitation.    Pulmonary Artery: The estimated pulmonary artery systolic pressure is 36 mmHg.    IVC/SVC: Elevated venous pressure at 15 mmHg.    Pericardium: There is a small effusion. No indication of cardiac tamponade.    STEPHANIE Study 9/6/2024:    Normal resting ABIs bilaterally.    Exercise ABIs are within normal limits bilaterally.    PVR waveforms are mildly dampened at the low thigh level bilaterally,    PVR waveforms are mildly dampened at the ankle level bilaterally.    BLE Arterial Ultrasound 9/6/2024:    Right resting STEPHANIE 1.15, is normal.    Left resting STEPHANIE 1.25, is normal.    Duplex ultrasound shows bilateral lower extremity arteries with no hemodynamically significant stenosis.    Nuclear Stress Test 1/4/2024:    Normal myocardial perfusion scan. There is no evidence of myocardial ischemia or infarction.    The gated perfusion images showed an ejection fraction of 54% at rest. The gated perfusion images showed an ejection fraction of 61% post stress. Normal ejection fraction is greater than 53%.    There is normal wall motion at rest and post stress.    LV cavity size is normal at rest and normal at stress.    The ECG portion of the study is negative for ischemia. Sensitivity is reduced secondary to the target heart rate not being achieved.    The patient reported no chest pain during the stress test.    During stress, atrial fibrillation is noted.    The exercise capacity was average.    There are no prior studies for comparison.    BLE Venous Reflux Study 3/22/2022:  The right lesser saphenous vein is partially compressible consistent with SVT.  The contralateral (left) common femoral vein is patent. .  The right greater saphenous vein is occluded consistent with successful prior  EVLT.    BLE Venous reflux study 12/14/2021:  There is no evidence of a left lower extremity DVT.  Left GSV occluded post EVLT.    Segmental Pressure Study 7/1/2021:  Normal STEPHANIE bilaterally with unremarkable waveforms at rest.    Normal STEPHANIE with exercise.  The right TBI is 0.51 and the left TBI is 0.40, which is mild to moderately abnormal.    BLE Venous Reflux Study 7/1/2021:  No evidence of lower extremity deep venous thrombosis bilateral.  There is reflux in the right common femoral vein and left external iliac vein.  The right GSV below the level of the knee is partially compressible with thickened and hyperechoic walls suggestive of previous or chronic superficial venous thrombosis.    There is also evidence of superficial venous insufficiency of the right GSV below the knee without dilation of the vessel.  The right SSV is partially compressible with thickened and hyperechoic walls suggestive of previous or chronic superficial venous thrombosis.  The left SSV is partially compressible with thickened and hyperechoic walls suggestive of previous or chronic superficial venous thrombosis.    Assessment/Plan:  Angelina Man is a 75y.o. female with venous insufficiency s/p EVLT of the left LSV and right GSV, hypothyroidism, anxiety, arthritis, and persistent atrial fibrillation s/p DCCV 17-Jul-2020, who presents for a follow up appointment.     1. BLE Venous insufficiency with pain- Stable. Mrs. Man reports legs feeling heavy and tired all the time.  Likely due to venous insufficiency.  Continue graduated compression hose.  Continue to limit sodium intake to 2,000 mg daily.  Elevate legs when resting.      2. Persistent atrial fibrillation- Stable.  Continue current medications.    3. BLE Pins/[place] Sensation- Likely neuropathic in origin.  Refer to Neurology for evaluation.  Check exercise STEPHANIE: study and BLE arterial ultrasound to rule out flow limiting PAD.     4. Exercise Induced Pulmonary HTN- Stable.   Continue current medications.    Follow up in 6 months     Total duration of face to face visit time 12 minutes.  Total time spent counseling greater than fifty percent of total visit time.  Counseling included discussion regarding imaging findings, diagnosis, possibilities, treatment options, risks and benefits.  The patient had many questions regarding the options and long-term effects.    Jeanmarie Cedeño MD, PhD  Interventional Cardiiology

## 2025-03-03 NOTE — PATIENT INSTRUCTIONS
Assessment/Plan:  Angelina Man is a 75y.o. female with venous insufficiency s/p EVLT of the left LSV and right GSV, hypothyroidism, anxiety, arthritis, and persistent atrial [facility], who presents for a follow up appointment.     1. BLE Venous insufficiency with pain- Stable. Mrs. Man reports legs feeling heavy and tired all the time.  Likely due to venous insufficiency.  Continue graduated compression hose.  Continue to limit sodium intake to 2,000 mg daily.  Elevate legs when resting.      2. Persistent atrial fibrillation- Stable.  Continue current medications.    3. BLE Pins/[place] Sensation- Likely neuropathic in origin.  Refer to Neurology for evaluation.  Check exercise STEPHANIE: study and BLE arterial ultrasound to rule out flow limiting PAD.     4. Exercise Induced Pulmonary HTN- Stable.  Continue current medications.    Follow up in 6 months

## 2025-03-08 ENCOUNTER — PATIENT MESSAGE (OUTPATIENT)
Dept: INTERNAL MEDICINE | Facility: CLINIC | Age: 75
End: 2025-03-08
Payer: MEDICARE

## 2025-03-10 ENCOUNTER — OFFICE VISIT (OUTPATIENT)
Dept: INTERNAL MEDICINE | Facility: CLINIC | Age: 75
End: 2025-03-10
Payer: MEDICARE

## 2025-03-10 VITALS
HEIGHT: 64 IN | WEIGHT: 98.56 LBS | OXYGEN SATURATION: 100 % | BODY MASS INDEX: 16.83 KG/M2 | HEART RATE: 72 BPM | DIASTOLIC BLOOD PRESSURE: 64 MMHG | SYSTOLIC BLOOD PRESSURE: 107 MMHG

## 2025-03-10 DIAGNOSIS — I48.11 LONGSTANDING PERSISTENT ATRIAL FIBRILLATION: ICD-10-CM

## 2025-03-10 DIAGNOSIS — N30.90 CYSTITIS: ICD-10-CM

## 2025-03-10 DIAGNOSIS — R39.89 SUSPECTED UTI: Primary | ICD-10-CM

## 2025-03-10 DIAGNOSIS — I50.32 CHRONIC HEART FAILURE WITH PRESERVED EJECTION FRACTION: ICD-10-CM

## 2025-03-10 DIAGNOSIS — Z79.01 ANTICOAGULANT LONG-TERM USE: ICD-10-CM

## 2025-03-10 LAB
BACTERIA #/AREA URNS AUTO: ABNORMAL /HPF
BILIRUB SERPL-MCNC: NORMAL MG/DL
BILIRUB UR QL STRIP: NEGATIVE
BLOOD URINE, POC: NORMAL
CLARITY UR REFRACT.AUTO: ABNORMAL
CLARITY, POC UA: NORMAL
COLOR UR AUTO: ABNORMAL
COLOR, POC UA: YELLOW
GLUCOSE UR QL STRIP: NEGATIVE
GLUCOSE UR QL STRIP: NORMAL
HGB UR QL STRIP: ABNORMAL
HYALINE CASTS UR QL AUTO: 193 /LPF
KETONES UR QL STRIP: NEGATIVE
KETONES UR QL STRIP: NORMAL
LEUKOCYTE ESTERASE UR QL STRIP: ABNORMAL
LEUKOCYTE ESTERASE URINE, POC: NORMAL
MICROSCOPIC COMMENT: ABNORMAL
NITRITE UR QL STRIP: POSITIVE
NITRITE, POC UA: POSITIVE
PH UR STRIP: 6 [PH] (ref 5–8)
PH, POC UA: 5
PROT UR QL STRIP: ABNORMAL
PROTEIN, POC: NORMAL
RBC #/AREA URNS AUTO: 21 /HPF (ref 0–4)
SP GR UR STRIP: 1.03 (ref 1–1.03)
SPECIFIC GRAVITY, POC UA: 1.02
URN SPEC COLLECT METH UR: ABNORMAL
UROBILINOGEN, POC UA: 0.2
WBC #/AREA URNS AUTO: >100 /HPF (ref 0–5)
WBC CLUMPS UR QL AUTO: ABNORMAL

## 2025-03-10 PROCEDURE — 81002 URINALYSIS NONAUTO W/O SCOPE: CPT | Mod: HCNC,S$GLB,,

## 2025-03-10 PROCEDURE — 1101F PT FALLS ASSESS-DOCD LE1/YR: CPT | Mod: HCNC,CPTII,S$GLB,

## 2025-03-10 PROCEDURE — 81001 URINALYSIS AUTO W/SCOPE: CPT | Mod: HCNC

## 2025-03-10 PROCEDURE — 1160F RVW MEDS BY RX/DR IN RCRD: CPT | Mod: HCNC,CPTII,S$GLB,

## 2025-03-10 PROCEDURE — 99999 PR PBB SHADOW E&M-EST. PATIENT-LVL IV: CPT | Mod: PBBFAC,HCNC,,

## 2025-03-10 PROCEDURE — 1126F AMNT PAIN NOTED NONE PRSNT: CPT | Mod: HCNC,CPTII,S$GLB,

## 2025-03-10 PROCEDURE — 1159F MED LIST DOCD IN RCRD: CPT | Mod: HCNC,CPTII,S$GLB,

## 2025-03-10 PROCEDURE — 99214 OFFICE O/P EST MOD 30 MIN: CPT | Mod: HCNC,S$GLB,,

## 2025-03-10 PROCEDURE — 3288F FALL RISK ASSESSMENT DOCD: CPT | Mod: HCNC,CPTII,S$GLB,

## 2025-03-10 PROCEDURE — 87088 URINE BACTERIA CULTURE: CPT | Mod: HCNC

## 2025-03-10 PROCEDURE — 1157F ADVNC CARE PLAN IN RCRD: CPT | Mod: HCNC,CPTII,S$GLB,

## 2025-03-10 PROCEDURE — 87086 URINE CULTURE/COLONY COUNT: CPT | Mod: HCNC

## 2025-03-10 PROCEDURE — 87186 SC STD MICRODIL/AGAR DIL: CPT | Mod: HCNC

## 2025-03-10 PROCEDURE — 3074F SYST BP LT 130 MM HG: CPT | Mod: HCNC,CPTII,S$GLB,

## 2025-03-10 PROCEDURE — 3078F DIAST BP <80 MM HG: CPT | Mod: HCNC,CPTII,S$GLB,

## 2025-03-10 RX ORDER — NITROFURANTOIN 25; 75 MG/1; MG/1
100 CAPSULE ORAL 2 TIMES DAILY
Qty: 10 CAPSULE | Refills: 0 | Status: SHIPPED | OUTPATIENT
Start: 2025-03-10 | End: 2025-03-15

## 2025-03-10 NOTE — PROGRESS NOTES
"  HPI     Chief Complaint:  Chief Complaint   Patient presents with    Cystitis       Angelina Man is a 75 y.o. female with multiple medical diagnoses as listed in the medical history and problem list that presents for   Chief Complaint   Patient presents with    Cystitis   .   Patient is not known to me with her last appointment in this department on 1/7/2025.      HPI    Incomplete bladder emptying, urgency, frequency started about 1 week ago. PMH of UTI, without symptoms so typically doesn't have dysuria. Denies dysuria but does report "heaviness". Normal fluid intake. Frequency has improved. No fever. Chronic back pain but not changes. Otherwise feeling well.     Since starting lasix will have urgency (increased to twice daily in September of 2024)      Other concerns below  Assessment & Plan       1. Suspected UTI  +Nitrites will treat for suspected UTI  Macrobid x 5 days  Lifestyle changes to help prevent UTI:    Increase water intake and decrease caffeine or carbonated beverages.   Probiotics daily    -     POCT URINE DIPSTICK WITHOUT MICROSCOPE  -     Urinalysis, Reflex to Urine Culture Urine, Clean Catch; Future; Expected date: 03/10/2025    2. Longstanding persistent atrial fibrillation  Assessment & Plan:  Irregular rhythm. Eliquis. The current medical regimen is effective;  continue present plan and medications.        3. Chronic heart failure with preserved ejection fraction  Assessment & Plan:  Results for orders placed during the hospital encounter of 10/07/24    Echo    Interpretation Summary    Left Ventricle: The left ventricle is normal in size. Normal wall thickness. There is concentric remodeling. There is low normal systolic function with a visually estimated ejection fraction of 50 - 55%. Unable to assess diastolic function due to atrial fibrillation.    Right Ventricle: Normal right ventricular cavity size. Wall thickness is normal. Systolic function is mildly reduced.    Biatrial " enlargement    Patent foramen ovale visualized with predominant left to right shunting indicated by color flow Doppler.    Mitral Valve: There is mild to moderate regurgitation.    Tricuspid Valve: There is mild regurgitation.    Pulmonary Artery: The estimated pulmonary artery systolic pressure is 36 mmHg.    IVC/SVC: Elevated venous pressure at 15 mmHg.    Pericardium: There is a small effusion. No indication of cardiac tamponade.    Stable, asymptomatic chronic condition.  Will continue to maximize risk factor reduction and adjust medication as needed        4. Anticoagulant long-term use  Assessment & Plan:  Stable, asymptomatic chronic condition.  Will continue to maximize risk factor reduction and adjust medication as needed. Eliquis. Denies hematuria. Chronic constipation. No blood        5. Cystitis  See above  -     nitrofurantoin, macrocrystal-monohydrate, (MACROBID) 100 MG capsule; Take 1 capsule (100 mg total) by mouth 2 (two) times daily. for 5 days  Dispense: 10 capsule; Refill: 0          --------------------------------------------      Health Maintenance:  Health Maintenance         Date Due Completion Date    DEXA Scan 01/28/2027 1/28/2025    Colorectal Cancer Screening 05/28/2027 5/28/2024    Lipid Panel 03/06/2030 3/6/2025    TETANUS VACCINE 05/20/2034 5/20/2024            Health maintenance reviewed    Follow Up:  No follow-ups on file.    Exam     Review of Systems:  (as noted above)  Review of Systems    Physical Exam:   Physical Exam  Constitutional:       General: She is not in acute distress.     Appearance: Normal appearance. She is not toxic-appearing.   HENT:      Head: Normocephalic and atraumatic.      Mouth/Throat:      Mouth: Mucous membranes are moist.      Pharynx: No oropharyngeal exudate or posterior oropharyngeal erythema.   Eyes:      General: No scleral icterus.     Conjunctiva/sclera: Conjunctivae normal.   Cardiovascular:      Rate and Rhythm: Normal rate. Rhythm irregular.  "     Heart sounds: No murmur heard.  Pulmonary:      Effort: Respiratory distress present.      Breath sounds: Normal breath sounds.   Abdominal:      General: Bowel sounds are normal. There is no distension.      Palpations: Abdomen is soft. There is no mass.      Tenderness: There is abdominal tenderness in the suprapubic area. There is no right CVA tenderness, left CVA tenderness or guarding. Negative signs include Kaplan's sign.      Hernia: No hernia is present.   Musculoskeletal:      Cervical back: Normal range of motion. No rigidity.      Right lower leg: No edema.      Left lower leg: No edema.   Lymphadenopathy:      Cervical: No cervical adenopathy.   Skin:     Capillary Refill: Capillary refill takes less than 2 seconds.      Findings: Rash (hyperpigmentation lower legs) present. No bruising.   Neurological:      General: No focal deficit present.      Mental Status: She is alert and oriented to person, place, and time.   Psychiatric:         Mood and Affect: Mood normal.       Vitals:    03/10/25 1317   BP: 107/64   BP Location: Left arm   Patient Position: Sitting   Pulse: 72   SpO2: 100%   Weight: 44.7 kg (98 lb 8.7 oz)   Height: 5' 4" (1.626 m)      Body mass index is 16.92 kg/m².    Lab Results   Component Value Date    WBC 8.06 03/06/2025    HGB 13.3 03/06/2025    HCT 41.3 03/06/2025     03/06/2025    CHOL 210 (H) 03/06/2025    TRIG 160 (H) 03/06/2025    HDL 57 03/06/2025    ALT 16 03/06/2025    AST 22 03/06/2025     03/06/2025    K 3.9 03/06/2025     03/06/2025    CREATININE 0.8 03/06/2025    BUN 10 03/06/2025    CO2 29 03/06/2025    TSH 1.488 01/08/2025    INR 1.1 07/07/2021    HGBA1C 5.2 03/06/2025       The 10-year ASCVD risk score (Karan LOVE, et al., 2019) is: 14.4%    Values used to calculate the score:      Age: 75 years      Sex: Female      Is Non- : No      Diabetic: No      Tobacco smoker: No      Systolic Blood Pressure: 107 mmHg      Is BP " treated: No      HDL Cholesterol: 57 mg/dL      Total Cholesterol: 210 mg/dL    (Imaging have been independently reviewed)    History     Past Medical History:  Past Medical History:   Diagnosis Date    Arthritis     Atrial fibrillation     Bronchitis     Cataract     Dry eyes     GERD (gastroesophageal reflux disease)     Glaucoma, suspect - Both Eyes     Hypothyroidism 06/23/2016    Pulmonary hypertension     Rash     Renal angiomyolipoma     Renal disorder     SCC (squamous cell carcinoma) 07/2023    left lower lateral shin    SCC (squamous cell carcinoma) 04/2023    L mid lateral shin    SCC (squamous cell carcinoma) 09/2023    L mid lower shin    Spinal stenosis     Squamous cell carcinoma     in-situ right upper inner arm, right wrist    Status post lumbar surgery 06/23/2016    Stress fracture     Thyroid disease     Venous insufficiency        Past Surgical History:  Past Surgical History:   Procedure Laterality Date    ANGIOPLASTY      CATARACT EXTRACTION W/  INTRAOCULAR LENS IMPLANT Left 12/6/2022    Procedure: EXTRACTION, CATARACT, WITH IOL INSERTION;  Surgeon: Tone Brown MD;  Location: [facility];  Service: Ophthalmology;  Laterality: Left;    CATARACT EXTRACTION W/  INTRAOCULAR LENS IMPLANT Right 12/20/2022    Procedure: EXTRACTION, CATARACT, WITH IOL INSERTION;  Surgeon: Tone Brown MD;  Location: [facility];  Service: Ophthalmology;  Laterality: Right;    CERVICAL FUSION      COLONOSCOPY      COLONOSCOPY N/A 11/14/2017    Procedure: COLONOSCOPY;  Surgeon: Kasi Mercado MD;  Location: [facility] ([address] [place] [address]);  Service: Endoscopy;  Laterality: N/A;    COLONOSCOPY N/A 4/26/2023    Procedure: COLONOSCOPY;  Surgeon: Jeanmarie Hathaway MD;  [facility]  Service: Colon and Rectal;  Laterality: N/A;  ok to hold Eliquis 2 days per Dr Pereira  constipation-ext Miralax prep  instr mailed/portal-GT  4/21/23 pre call attempted, no answer    COLONOSCOPY N/A 5/28/2024    Procedure:  COLONOSCOPY;  Surgeon: Jeanmarie Hathaway MD;  Location: [facility] (4TH FLR);  Service: Endoscopy;  Laterality: N/A;  referral Dr. Hathaway. Annual Colonoscopy for High Risk. Golytely prep instr. to portal Pending Eliquis Hold from Dr. David Pereira. [facility] [email or web address]  ok to hold Eliquis 2 days per Dr Pereira-GT  5/21- precall confirmed; instructions re-sent via portal, aware of holding eliquis     ESOPHAGOGASTRODUODENOSCOPY N/A 10/10/2019    Procedure: EGD (ESOPHAGOGASTRODUODENOSCOPY);  Surgeon: Rg Milton MD;  Location: [facility] ENDO (4TH FLR);  Service: Endoscopy;  Laterality: N/A;    ESOPHAGOGASTRODUODENOSCOPY N/A 10/6/2021    Procedure: EGD (ESOPHAGOGASTRODUODENOSCOPY);  Surgeon: Rg Milton MD;  Location: [facility] (4TH FLR);  Service: Endoscopy;  Laterality: N/A;  covid test 10/3 zo, instr emailed/portal -    EXCISION Left 5/17/2023    Procedure: EXCISION SQUAMOUS CELL CARCINOMA LEFT LEG;  Surgeon: Kasi Canela Jr., MD;  Location: [facility] OR 2ND FLR;  Service: General;  Laterality: Left;    l4-5 mid discectomy Left 03/2017    l4-5 MIS diskectomy Right 05/2016    RIGHT HEART CATHETERIZATION Right 1/27/2022    Procedure: INSERTION, CATHETER, RIGHT HEART;  Surgeon: Satnam Pickard Jr., MD;  Location: [facility] CATH LAB;  Service: Cardiology;  Laterality: Right;    TRANSFORAMINAL EPIDURAL INJECTION OF STEROID Bilateral 3/12/2024    Procedure: LUMBAR TRANSFORAMINAL BILATERAL L3/4 *ELIQUIS CLEARANCE IN CHART*;  Surgeon: Sheree Abbott MD;  Location: [facility] PAIN MGT;  Service: Pain Management;  Laterality: Bilateral;  [phone]  2 WK F/U [place]    TREATMENT OF CARDIAC ARRHYTHMIA N/A 7/17/2020    Procedure: CARDIOVERSION;  Surgeon: Kwan Bray MD;  Location: [facility] EP LAB;  Service: Cardiology;  Laterality: N/A;  AF,DCCV/SANGITA, ANES, SK, 746    TREATMENT OF CARDIAC ARRHYTHMIA N/A 7/14/2021    Procedure: CARDIOVERSION;  Surgeon: SYDNIE Cedillo MD;  Location: [facility] EP LAB;  Service: Cardiology;  Laterality: N/A;   AF, SANGITA, DCCV, MAC, EH, 3 Prep    WASHOUT Right 5/17/2023    Procedure: WASHOUT right lower arm and closure;  Surgeon: Kasi Canela Jr., MD;  Location: [facility] OR [address] [place] [address];  Service: General;  Laterality: Right;       Social History:  Social History[1]    Family History:  Family History   Problem Relation Name Age of Onset    Cancer Mother          Lung cancer    Heart failure Father      Colon cancer Father      Hypertension Father      Cataracts Father      Cancer Father  80        colon    No Known Problems Sister      No Known Problems Brother      Melanoma Daughter      Diabetes Son      Heart disease Son      Cancer Son          esophageal cancer    No Known Problems Maternal Aunt      No Known Problems Maternal Uncle      No Known Problems Paternal Aunt      No Known Problems Paternal Uncle      No Known Problems Maternal Grandmother      No Known Problems Maternal Grandfather      No Known Problems Paternal Grandmother      No Known Problems Paternal Grandfather      Amblyopia Neg Hx      Blindness Neg Hx      Glaucoma Neg Hx      Macular degeneration Neg Hx      Retinal detachment Neg Hx      Strabismus Neg Hx      Stroke Neg Hx      Thyroid disease Neg Hx      Breast cancer Neg Hx      Ovarian cancer Neg Hx         Allergies and Medications: (updated and reviewed)  Review of patient's allergies indicates:  No Known Allergies  Current Medications[2]    Patient Care Team:  Zina Rockwell MD as PCP - General (Internal Medicine)  Hannah Rivera PA-C as Physician Assistant (Surgery)  Vinay Erwin MD as Consulting Physician (Vascular Surgery)  Tone Diaz OD as Consulting Physician (Optometry)  Jeanmarie Cedeño MD PhD as Consulting Physician (Interventional Cardiology)  David Pereira MD as Consulting Physician (Cardiology)  Taylor Bryant MD as Consulting Physician (Urology)  Satnam Pickard Jr., MD as Consulting Physician (Transplant)  Kwan Bray MD as Consulting  Physician (Electrophysiology)  Marilou Shannon PA-C (Neurosurgery)  Rg Hernandez MD as Consulting Physician (Cardiology)  Sadaf Garcia NP as Nurse Practitioner (Cardiology)  Tone Brown MD as Consulting Physician (Ophthalmology)  Casey Lott DPM (Inactive) as Consulting Physician (Podiatry)  Jeanmarie Cedeño MD PhD as Consulting Physician (Interventional Cardiology)  Wali Terrell III, MD as Consulting Physician (Otolaryngology)  Soha Aaron NP as Nurse Practitioner (Urology)  Jeanmarie Hathaway MD as Consulting Physician (Colon and Rectal Surgery)  Sheree Abbott MD as Consulting Physician (Pain Medicine)  Fozia Garcia MD as Consulting Physician (Dermatology)         - The patient is given an After Visit Summary that lists all medications with directions, allergies, education, orders placed during this encounter and follow-up instructions.      - I have reviewed the patient's medical information including past medical, family, and social history sections including the medications and allergies.      - We discussed the patient's current medications.     This note was created by combination of typed  and MModal dictation.  Transcription errors may be present.  If there are any questions, please contact me.                 [1]   Social History  Socioeconomic History    Marital status: Single   Occupational History     Employer: [email or web address]   Tobacco Use    Smoking status: Never     Passive exposure: Never    Smokeless tobacco: Never   Substance and Sexual Activity    Alcohol use: Yes     Alcohol/week: 4.0 standard drinks of alcohol     Types: 4 Glasses of wine per week     Comment: 2 glasses of wine on weekends    Drug use: No    Sexual activity: Never   Other Topics Concern    Are you pregnant or think you may be? No    Breast-feeding No     Social Drivers of Health     Financial Resource Strain: Low Risk  (3/4/2025)    Overall Financial  Resource Strain (CARDIA)     Difficulty of Paying Living Expenses: Not very hard   Food Insecurity: No Food Insecurity (3/4/2025)    Hunger Vital Sign     Worried About Running Out of Food in the Last Year: Never true     Ran Out of Food in the Last Year: Never true   Transportation Needs: No Transportation Needs (3/4/2025)    PRAPARE - Transportation     Lack of Transportation (Medical): No     Lack of Transportation (Non-Medical): No   Physical Activity: Sufficiently Active (3/4/2025)    Exercise Vital Sign     Days of Exercise per Week: 7 days     Minutes of Exercise per Session: 50 min   Stress: No Stress Concern Present (3/4/2025)    [employer] - Occupational Stress Questionnaire     Feeling of Stress : Only a little   Housing Stability: Low Risk  (3/4/2025)    Housing Stability Vital Sign     Unable to Pay for Housing in the Last Year: No     Number of Times Moved in the Last Year: 0     Homeless in the Last Year: No   [2]   Current Outpatient Medications   Medication Sig Dispense Refill    acetaminophen (TYLENOL) 500 MG tablet Take 500 mg by mouth every 6 (six) hours as needed for Pain.      acyclovir (ZOVIRAX) 400 MG tablet Take 1 tablet (400 mg total) by mouth once daily. 180 tablet 1    ascorbic acid (VITAMIN C) 1000 MG tablet Take 1,000 mg by mouth once daily.       biotin 1 mg tablet Take 1 mg by mouth 2 (two) times daily.       buPROPion (WELLBUTRIN SR) 150 MG TBSR 12 hr tablet Take 1 tablet (150 mg total) by mouth 2 (two) times daily. 180 tablet 3    digestive enzymes Tab Take 1 tablet by mouth once daily.       ELIQUIS 5 mg Tab TAKE 1 TABLET(5 MG) BY MOUTH TWICE DAILY 180 tablet 3    fluticasone propionate (FLONASE) 50 mcg/actuation nasal spray 1 spray (50 mcg total) by Each Nostril route 2 (two) times daily as needed for Rhinitis. 16 g 11    furosemide (LASIX) 20 MG tablet Take 1 tablet (20 mg total) by mouth once daily. 90 tablet 3    GAVILYTE-G 236-22.74-6.74 -5.86  gram suspension       ketoconazole (NIZORAL) 2 % cream aaa bid prn flare under arm and qhs to areas on face 60 g 3    levothyroxine (SYNTHROID) 137 MCG Tab tablet Take 1 tablet (137 mcg total) by mouth before breakfast. 90 tablet 3    lubiprostone (AMITIZA) 24 MCG Cap Take 1 capsule (24 mcg total) by mouth daily as needed (IBS symptoms). 30 capsule 6    methocarbamoL (ROBAXIN) 500 MG Tab Take 1 tablet (500 mg total) by mouth 3 (three) times daily as needed (mus). 90 tablet 2    metoprolol succinate (TOPROL-XL) 25 MG 24 hr tablet Take 1 tablet (25 mg total) by mouth once daily. 90 tablet 3    mirtazapine (REMERON) 7.5 MG Tab TAKE 1 TABLET(7.5 MG) BY MOUTH EVERY NIGHT 90 tablet 3    multivitamin (THERAGRAN) per tablet Take 1 tablet by mouth once daily.       potassium chloride SA (K-DUR,KLOR-CON) 20 MEQ tablet Take 1 tablet (20 mEq total) by mouth once daily. 90 tablet 3    pramipexole (MIRAPEX) 0.25 MG tablet TAKE 1 TABLET(0.25 MG) BY MOUTH EVERY EVENING FOR RESTLESS LEGS 90 tablet 1    traZODone (DESYREL) 50 MG tablet Take 1-2 tablets ( mg total) by mouth every evening. 180 tablet 3    zinc 50 mg Tab Take 50 mg by mouth once daily.       nitrofurantoin, macrocrystal-monohydrate, (MACROBID) 100 MG capsule Take 1 capsule (100 mg total) by mouth 2 (two) times daily. for 5 days 10 capsule 0     No current facility-administered medications for this visit.

## 2025-03-10 NOTE — ASSESSMENT & PLAN NOTE
Stable, asymptomatic chronic condition.  Will continue to maximize risk factor reduction and adjust medication as needed. Eliquis. Denies hematuria. Chronic constipation. No blood

## 2025-03-10 NOTE — ASSESSMENT & PLAN NOTE
Irregular rhythm. Eliquis. The current medical regimen is effective;  continue present plan and medications.

## 2025-03-10 NOTE — PATIENT INSTRUCTIONS
Lifestyle changes to help prevent UTI:    Increase water intake and decrease caffeine or carbonated beverages.   Probiotics daily    Complete Macrobid twice daily x 5 days

## 2025-03-10 NOTE — ASSESSMENT & PLAN NOTE
Results for orders placed during the hospital encounter of 10/07/24    Echo    Interpretation Summary    Left Ventricle: The left ventricle is normal in size. Normal wall thickness. There is concentric remodeling. There is low normal systolic function with a visually estimated ejection fraction of 50 - 55%. Unable to assess diastolic function due to atrial fibrillation.    Right Ventricle: Normal right ventricular cavity size. Wall thickness is normal. Systolic function is mildly reduced.    Biatrial enlargement    Patent foramen ovale visualized with predominant left to right shunting indicated by color flow Doppler.    Mitral Valve: There is mild to moderate regurgitation.    Tricuspid Valve: There is mild regurgitation.    Pulmonary Artery: The estimated pulmonary artery systolic pressure is 36 mmHg.    IVC/SVC: Elevated venous pressure at 15 mmHg.    Pericardium: There is a small effusion. No indication of cardiac tamponade.    Stable, asymptomatic chronic condition.  Will continue to maximize risk factor reduction and adjust medication as needed

## 2025-03-10 NOTE — TELEPHONE ENCOUNTER
Called pt   Spoke with pt and offered appt for today for possible bladder infection.   Appt scheduled for this afternoon.

## 2025-03-11 ENCOUNTER — RESULTS FOLLOW-UP (OUTPATIENT)
Dept: INTERNAL MEDICINE | Facility: CLINIC | Age: 75
End: 2025-03-11

## 2025-03-11 ENCOUNTER — OFFICE VISIT (OUTPATIENT)
Dept: OPTOMETRY | Facility: CLINIC | Age: 75
End: 2025-03-11
Payer: COMMERCIAL

## 2025-03-11 DIAGNOSIS — H40.013 OPEN ANGLE WITH BORDERLINE FINDINGS AND LOW GLAUCOMA RISK IN BOTH EYES: ICD-10-CM

## 2025-03-11 DIAGNOSIS — Z96.1 PSEUDOPHAKIA OF BOTH EYES: ICD-10-CM

## 2025-03-11 DIAGNOSIS — H52.7 REFRACTIVE ERROR: Primary | ICD-10-CM

## 2025-03-11 DIAGNOSIS — H04.123 BILATERAL DRY EYES: ICD-10-CM

## 2025-03-11 PROBLEM — H25.11 NUCLEAR SCLEROTIC CATARACT OF RIGHT EYE: Status: RESOLVED | Noted: 2022-12-20 | Resolved: 2025-03-11

## 2025-03-11 PROBLEM — H25.12 NUCLEAR SCLEROTIC CATARACT OF LEFT EYE: Status: RESOLVED | Noted: 2022-12-06 | Resolved: 2025-03-11

## 2025-03-11 PROCEDURE — 92015 DETERMINE REFRACTIVE STATE: CPT | Mod: S$GLB,,, | Performed by: OPTOMETRIST

## 2025-03-11 PROCEDURE — 99999 PR PBB SHADOW E&M-EST. PATIENT-LVL II: CPT | Mod: PBBFAC,,, | Performed by: OPTOMETRIST

## 2025-03-11 PROCEDURE — 92014 COMPRE OPH EXAM EST PT 1/>: CPT | Mod: S$GLB,,, | Performed by: OPTOMETRIST

## 2025-03-11 NOTE — PROGRESS NOTES
URIEL    ARI: 2/27/2024  Chief complaint (CC): 76 yo F presents today for routine eye exam. Pt   states she have not been happy with her glasses. Not sure if it's her   vision or the progressive lenses.   Glasses? +  Contacts? -  H/o eye surgery, injections or laser: PC IOL OU  H/o eye injury: -  Known eye conditions?    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 -  Eye gtts? AT's gtts      (-) Flashes (-)  Floaters (-) Mucous   (-)  Tearing (-) Itching (-) Burning   (-) Headaches (-) Eye Pain/discomfort (-) Irritation   (-)  Redness (-) Double vision (-) Blurry vision    Diabetic? -  A1c? -     Last edited by Tessy Castanon MA on 3/11/2025  2:00 PM.            Assessment /Plan     For exam results, see Encounter Report.    Refractive error    Open angle with borderline findings and low glaucoma risk in both eyes    Bilateral dry eyes    Pseudophakia of both eyes      New Spectacle Rx given, discussed different options for glasses. RTC 1 year routine eye exam.  2. Moderate stable cup, low iop, pachy normal, no treat, cont with yearly eye exams.  3. Recommend continue artificial tears. 1 drop 2x per day. Chronicity of disease and treatment discussed.     4. Monitor condition. Patient to report any changes. RTC 1 year recheck.

## 2025-03-13 ENCOUNTER — PATIENT MESSAGE (OUTPATIENT)
Dept: INTERNAL MEDICINE | Facility: CLINIC | Age: 75
End: 2025-03-13
Payer: MEDICARE

## 2025-03-13 DIAGNOSIS — R39.89 SUSPECTED UTI: Primary | ICD-10-CM

## 2025-03-13 LAB — BACTERIA UR CULT: ABNORMAL

## 2025-03-14 DIAGNOSIS — N30.90 CYSTITIS: Primary | ICD-10-CM

## 2025-03-19 ENCOUNTER — LAB VISIT (OUTPATIENT)
Dept: LAB | Facility: OTHER | Age: 75
End: 2025-03-19
Payer: MEDICARE

## 2025-03-19 DIAGNOSIS — N30.90 CYSTITIS: ICD-10-CM

## 2025-03-19 PROCEDURE — 87086 URINE CULTURE/COLONY COUNT: CPT

## 2025-03-20 LAB — BACTERIA UR CULT: NORMAL

## 2025-03-21 ENCOUNTER — RESULTS FOLLOW-UP (OUTPATIENT)
Dept: INTERNAL MEDICINE | Facility: CLINIC | Age: 75
End: 2025-03-21

## 2025-03-27 ENCOUNTER — OFFICE VISIT (OUTPATIENT)
Dept: ELECTROPHYSIOLOGY | Facility: CLINIC | Age: 75
End: 2025-03-27
Payer: MEDICARE

## 2025-03-27 ENCOUNTER — HOSPITAL ENCOUNTER (OUTPATIENT)
Dept: CARDIOLOGY | Facility: CLINIC | Age: 75
Discharge: HOME | End: 2025-03-27
Payer: MEDICARE

## 2025-03-27 VITALS
WEIGHT: 99.88 LBS | BODY MASS INDEX: 17.05 KG/M2 | DIASTOLIC BLOOD PRESSURE: 81 MMHG | SYSTOLIC BLOOD PRESSURE: 113 MMHG | HEART RATE: 85 BPM | HEIGHT: 64 IN

## 2025-03-27 DIAGNOSIS — I48.11 LONGSTANDING PERSISTENT ATRIAL FIBRILLATION: Primary | ICD-10-CM

## 2025-03-27 DIAGNOSIS — Z79.01 ANTICOAGULANT LONG-TERM USE: ICD-10-CM

## 2025-03-27 DIAGNOSIS — I50.32 CHRONIC HEART FAILURE WITH PRESERVED EJECTION FRACTION: ICD-10-CM

## 2025-03-27 DIAGNOSIS — I51.89 DIASTOLIC DYSFUNCTION: ICD-10-CM

## 2025-03-27 LAB
OHS QRS DURATION: 78 MS
OHS QTC CALCULATION: 440 MS

## 2025-03-27 PROCEDURE — 3074F SYST BP LT 130 MM HG: CPT | Mod: CPTII,S$GLB,, | Performed by: NURSE PRACTITIONER

## 2025-03-27 PROCEDURE — 93005 ELECTROCARDIOGRAM TRACING: CPT | Mod: S$GLB,,, | Performed by: INTERNAL MEDICINE

## 2025-03-27 PROCEDURE — 99999 PR PBB SHADOW E&M-EST. PATIENT-LVL IV: CPT | Mod: PBBFAC,,, | Performed by: NURSE PRACTITIONER

## 2025-03-27 PROCEDURE — 99213 OFFICE O/P EST LOW 20 MIN: CPT | Mod: S$GLB,,, | Performed by: NURSE PRACTITIONER

## 2025-03-27 PROCEDURE — 93010 ELECTROCARDIOGRAM REPORT: CPT | Mod: S$GLB,,, | Performed by: INTERNAL MEDICINE

## 2025-03-27 PROCEDURE — 1101F PT FALLS ASSESS-DOCD LE1/YR: CPT | Mod: CPTII,S$GLB,, | Performed by: NURSE PRACTITIONER

## 2025-03-27 PROCEDURE — 3288F FALL RISK ASSESSMENT DOCD: CPT | Mod: CPTII,S$GLB,, | Performed by: NURSE PRACTITIONER

## 2025-03-27 PROCEDURE — 1157F ADVNC CARE PLAN IN RCRD: CPT | Mod: CPTII,S$GLB,, | Performed by: NURSE PRACTITIONER

## 2025-03-27 PROCEDURE — 1159F MED LIST DOCD IN RCRD: CPT | Mod: CPTII,S$GLB,, | Performed by: NURSE PRACTITIONER

## 2025-03-27 PROCEDURE — 1160F RVW MEDS BY RX/DR IN RCRD: CPT | Mod: CPTII,S$GLB,, | Performed by: NURSE PRACTITIONER

## 2025-03-27 PROCEDURE — 3079F DIAST BP 80-89 MM HG: CPT | Mod: CPTII,S$GLB,, | Performed by: NURSE PRACTITIONER

## 2025-03-27 PROCEDURE — 1125F AMNT PAIN NOTED PAIN PRSNT: CPT | Mod: CPTII,S$GLB,, | Performed by: NURSE PRACTITIONER

## 2025-03-27 NOTE — PROGRESS NOTES
"    Electrophysiology Clinic Progress Note     EP Attending: Dr. Kwan Bray   PCP - Zina Rockwell MD  Cardiology: Dr. Pereira    Patient was last seen in clinic on 3/16/2022 by Dr. Bray.    HPI:     Patient ID:     Angelina Man is a 75 y.o. female with past medical history of: atrial fibrillation, anxiety, hypothyroidism  who presents for follow up of Atrial Fibrillation      Background:    Presented to dentist's office for a crown. Sent to ED for elevated HR. Noted to be in atrial fibrillation with RVR. She was not symptomatic.  SANGITA/DCCV 7/17/20. Placed on Eliquis and metoprolol.     Echo 7/17/20 normal biventricular structure and function, mild prolapse of the posterior mitral valve leaflet.  Thinks she feels "ok." Feels like she may be more fatigued.  Significant exercise (spinning classes, walks or does stairmaster daily, Pilates, body-pumping). Notes rare shortness of breath with significant exertion, not new, improved with singulair.  Notes shortness of breath with anxiety. Her son passed away last year. Has other significant stressors.     CHADSVASc is 2, on Eliquis 5 mg BID.     SANGITA/DCCV 7/17/20  Developed recurrence.  Offered trial of Flecainide + DCCV. She deferred.  his is unchanged since 9/20. "I can't say I am feeling bad."  It remained unclear as to whether she had symptoms.  DCCV 7/14/21. Started Propafenone 3 days prior. Maintained nsr for 2 days. Did not note symptomatic benefit. Noted recurrence via Apple Watch.  We elected for rate control strategy.     Noted to have Pulm Htn by echo.  Referred to Dr. Pickard. RHC revealed normal PA pressures at baseline, increased with exercise.     3/16/2022: Reports occasional dyspnea. Question raised as to whether this may be related to AF.  Holter 2/14/22 AF with average ventricular response 79 bpm, 2.3 second pause while sleeping.   Longstanding persistent atrial fibrillation.  She has developed dyspnea since I last saw her. Of note, when she was " persistently out of rhythm in the past she did not have symptoms, and it is less likely to subsequently develop symptoms (generally the progression is from symptomatic to asymptomatic, not reversed).  One could could a rhythm control strategy and assess for symptomatic benefit, but given her longstanding persistent nature this would require either RFA (multiple likely, with success rates of 30-40%) or Hybrid surgical approach. Other option would be PPM + AVN ablation and assess for symptomatic benefit. Given the lower likelihood of significant symptomatic benefit and the fact that this would render her dependent on PPM, I would defer.  Continue with rate control strategy.  She has questions as to whether she truly has Pulmonary Htn, and what this means. I asked her to speak with Dr. Pickard in regard to this.  PRN f/u with EP       Update (3/27/2025):     Patient has not seen us in over 3 years. Here today for follow up regarding her longstanding persistent AF.     Today she tells me she feels good. She tells me overall she has been stable regarding her AF and her heart rate has been controlled. She has fatigue and some days are worse than others. She tells me her fatigue is OK today.     I have personally reviewed the patient's EKG today, which shows AF at 85 bpm.     Review of Systems:   Review of Systems   Constitutional: Positive for malaise/fatigue. Negative for chills and fever.   HENT:  Negative for nosebleeds.    Cardiovascular:  Negative for chest pain, dyspnea on exertion, near-syncope, palpitations and syncope.   Respiratory:  Negative for shortness of breath.    Hematologic/Lymphatic: Bruises/bleeds easily.   Gastrointestinal:  Negative for hematochezia and melena.   Genitourinary:  Negative for hematuria.   Neurological:  Positive for dizziness (with positional changes) and light-headedness (with positional changes).   Psychiatric/Behavioral:  Negative for altered mental status.        History:  "    Social History     Tobacco Use    Smoking status: Never     Passive exposure: Never    Smokeless tobacco: Never   Substance Use Topics    Alcohol use: Yes     Alcohol/week: 4.0 standard drinks of alcohol     Types: 4 Glasses of wine per week     Comment: 2 glasses of wine on weekends     Family History   Problem Relation Name Age of Onset    Cancer Mother          Lung cancer    Heart failure Father      Colon cancer Father      Hypertension Father      Cataracts Father      Cancer Father  80        colon    No Known Problems Sister      No Known Problems Brother      Melanoma Daughter      Diabetes Son      Heart disease Son      Cancer Son          esophageal cancer    No Known Problems Maternal Aunt      No Known Problems Maternal Uncle      No Known Problems Paternal Aunt      No Known Problems Paternal Uncle      No Known Problems Maternal Grandmother      No Known Problems Maternal Grandfather      No Known Problems Paternal Grandmother      No Known Problems Paternal Grandfather      Amblyopia Neg Hx      Blindness Neg Hx      Glaucoma Neg Hx      Macular degeneration Neg Hx      Retinal detachment Neg Hx      Strabismus Neg Hx      Stroke Neg Hx      Thyroid disease Neg Hx      Breast cancer Neg Hx      Ovarian cancer Neg Hx         Meds:   Review of patient's allergies indicates:  No Known Allergies  Current Medications[1]    Objective:   /81 (Patient Position: Sitting)   Pulse 85   Ht 5' 4" (1.626 m)   Wt 45.3 kg (99 lb 13.9 oz)   LMP  (LMP Unknown)   BMI 17.14 kg/m²     Physical Exam  Vitals and nursing note reviewed.   Constitutional:       General: She is not in acute distress.     Appearance: Normal appearance.   HENT:      Head: Normocephalic.   Eyes:      Extraocular Movements: Extraocular movements intact.   Cardiovascular:      Rate and Rhythm: Normal rate. Rhythm irregular.   Pulmonary:      Effort: Pulmonary effort is normal. No respiratory distress.   Musculoskeletal:         " General: Normal range of motion.      Cervical back: Normal range of motion.   Neurological:      General: No focal deficit present.      Mental Status: She is alert.   Psychiatric:         Mood and Affect: Mood normal.         Behavior: Behavior normal.         Thought Content: Thought content normal.         Judgment: Judgment normal.       Labs:     Lab Results   Component Value Date     03/06/2025    K 3.9 03/06/2025     03/06/2025    CO2 29 03/06/2025    BUN 10 03/06/2025    CREATININE 0.8 03/06/2025    MG 1.6 12/28/2021    TSH 1.488 01/08/2025    ALT 16 03/06/2025    AST 22 03/06/2025       Lab Results   Component Value Date    HGBA1C 5.2 03/06/2025    Lab Results   Component Value Date    WBC 8.06 03/06/2025    HGB 13.3 03/06/2025    HCT 41.3 03/06/2025     03/06/2025    GRAN 5.4 03/06/2025    GRAN 67.1 03/06/2025    INR 1.1 07/07/2021    APTT 27.8 07/07/2021                Cardiovascular Imaging:     Echo:   EF   Date Value Ref Range Status   11/02/2022 60 % Final   11/22/2021 50 % Final     Nuc Stress EF   Date Value Ref Range Status   01/04/2024 61 % Final     Nuc Rest EF   Date Value Ref Range Status   01/04/2024 54  Final     Echo  Result Date: 10/7/2024    Left Ventricle: The left ventricle is normal in size. Normal wall   thickness. There is concentric remodeling. There is low normal systolic   function with a visually estimated ejection fraction of 50 - 55%. Unable   to assess diastolic function due to atrial fibrillation.    Right Ventricle: Normal right ventricular cavity size. Wall thickness   is normal. Systolic function is mildly reduced.    Biatrial enlargement    Patent foramen ovale visualized with predominant left to right shunting   indicated by color flow Doppler.    Mitral Valve: There is mild to moderate regurgitation.    Tricuspid Valve: There is mild regurgitation.    Pulmonary Artery: The estimated pulmonary artery systolic pressure is   36 mmHg.    IVC/SVC: Elevated  venous pressure at 15 mmHg.    Pericardium: There is a small effusion. No indication of cardiac   tamponade.        Stress test:   1/4/2024 Nuclear stress test:    Normal myocardial perfusion scan. There is no evidence of myocardial ischemia or infarction.    The gated perfusion images showed an ejection fraction of 54% at rest. The gated perfusion images showed an ejection fraction of 61% post stress. Normal ejection fraction is greater than 53%.    There is normal wall motion at rest and post stress.    LV cavity size is normal at rest and normal at stress.    The ECG portion of the study is negative for ischemia. Sensitivity is reduced secondary to the target heart rate not being achieved.    The patient reported no chest pain during the stress test.    During stress, atrial fibrillation is noted.    The exercise capacity was average.    There are no prior studies for comparison.    Assessment & Plan:     In summary,  Angelina Man is a 75 y.o. female with past medical history of: atrial fibrillation, anxiety, hypothyroidism  who presents for follow up of Atrial Fibrillation.     Ms. Man is doing well from an arrhythmia standpoint. Her AF has been persistent and she has been on rate control therapy for years. She reports fatigue that is worse on some days compared to others. Offered Holter monitor to assess average heart rate and she declines. She does not feel that is the issue. She will let me know is she sees any fast heart rate and will consider wearing a monitor at that time. Will plan to continue her metoprolol 25 mg daily. I also told her she can take metoprolol at bedtime to see if that improves her daytime fatigue. Will plan to follow up once a year, sooner if needed.     Signed:  ALESSANDRO Rodgers, FNP-C  Cardiology Electrophysiology NP   [facility]    *A copy of this note has been sent to Dr. Bray*    Follow up in about 1 year (around 3/27/2026).           [1]   Current  Outpatient Medications:     acetaminophen (TYLENOL) 500 MG tablet, Take 500 mg by mouth every 6 (six) hours as needed for Pain., Disp: , Rfl:     acyclovir (ZOVIRAX) 400 MG tablet, Take 1 tablet (400 mg total) by mouth once daily., Disp: 180 tablet, Rfl: 1    ascorbic acid (VITAMIN C) 1000 MG tablet, Take 1,000 mg by mouth once daily. , Disp: , Rfl:     biotin 1 mg tablet, Take 1 mg by mouth 2 (two) times daily. , Disp: , Rfl:     buPROPion (WELLBUTRIN SR) 150 MG TBSR 12 hr tablet, Take 1 tablet (150 mg total) by mouth 2 (two) times daily., Disp: 180 tablet, Rfl: 3    digestive enzymes Tab, Take 1 tablet by mouth once daily. , Disp: , Rfl:     ELIQUIS 5 mg Tab, TAKE 1 TABLET(5 MG) BY MOUTH TWICE DAILY, Disp: 180 tablet, Rfl: 3    fluticasone propionate (FLONASE) 50 mcg/actuation nasal spray, 1 spray (50 mcg total) by Each Nostril route 2 (two) times daily as needed for Rhinitis., Disp: 16 g, Rfl: 11    furosemide (LASIX) 20 MG tablet, Take 1 tablet (20 mg total) by mouth once daily., Disp: 90 tablet, Rfl: 3    ketoconazole (NIZORAL) 2 % cream, aaa bid prn flare under arm and qhs to areas on face, Disp: 60 g, Rfl: 3    levothyroxine (SYNTHROID) 137 MCG Tab tablet, Take 1 tablet (137 mcg total) by mouth before breakfast., Disp: 90 tablet, Rfl: 3    lubiprostone (AMITIZA) 24 MCG Cap, Take 1 capsule (24 mcg total) by mouth daily as needed (IBS symptoms)., Disp: 30 capsule, Rfl: 6    methocarbamoL (ROBAXIN) 500 MG Tab, Take 1 tablet (500 mg total) by mouth 3 (three) times daily as needed (mus)., Disp: 90 tablet, Rfl: 2    metoprolol succinate (TOPROL-XL) 25 MG 24 hr tablet, Take 1 tablet (25 mg total) by mouth once daily., Disp: 90 tablet, Rfl: 3    mirtazapine (REMERON) 7.5 MG Tab, TAKE 1 TABLET(7.5 MG) BY MOUTH EVERY NIGHT, Disp: 90 tablet, Rfl: 3    multivitamin (THERAGRAN) per tablet, Take 1 tablet by mouth once daily. , Disp: , Rfl:     potassium chloride SA (K-DUR,KLOR-CON) 20 MEQ tablet, Take 1 tablet (20 mEq  total) by mouth once daily., Disp: 90 tablet, Rfl: 3    pramipexole (MIRAPEX) 0.25 MG tablet, TAKE 1 TABLET(0.25 MG) BY MOUTH EVERY EVENING FOR RESTLESS LEGS, Disp: 90 tablet, Rfl: 1    traZODone (DESYREL) 50 MG tablet, Take 1-2 tablets ( mg total) by mouth every evening., Disp: 180 tablet, Rfl: 3    zinc 50 mg Tab, Take 50 mg by mouth once daily. , Disp: , Rfl:     GAVILYTE-G 236-22.74-6.74 -5.86 gram suspension, , Disp: , Rfl:

## 2025-04-20 ENCOUNTER — PATIENT OUTREACH (OUTPATIENT)
Facility: OTHER | Age: 75
End: 2025-04-20
Payer: MEDICARE

## 2025-04-20 ENCOUNTER — HOSPITAL ENCOUNTER (EMERGENCY)
Facility: HOSPITAL | Age: 75
Discharge: HOME | End: 2025-04-21
Attending: EMERGENCY MEDICINE
Payer: MEDICARE

## 2025-04-20 ENCOUNTER — PATIENT MESSAGE (OUTPATIENT)
Dept: INTERNAL MEDICINE | Facility: CLINIC | Age: 75
End: 2025-04-20
Payer: MEDICARE

## 2025-04-20 ENCOUNTER — OFFICE VISIT (OUTPATIENT)
Dept: URGENT CARE | Facility: CLINIC | Age: 75
End: 2025-04-20
Payer: MEDICARE

## 2025-04-20 ENCOUNTER — NURSE TRIAGE (OUTPATIENT)
Dept: ADMINISTRATIVE | Facility: CLINIC | Age: 75
End: 2025-04-20
Payer: MEDICARE

## 2025-04-20 ENCOUNTER — OCHSNER VIRTUAL EMERGENCY DEPARTMENT (OUTPATIENT)
Facility: CLINIC | Age: 75
End: 2025-04-20
Payer: MEDICARE

## 2025-04-20 VITALS
HEART RATE: 82 BPM | OXYGEN SATURATION: 96 % | SYSTOLIC BLOOD PRESSURE: 116 MMHG | WEIGHT: 99 LBS | DIASTOLIC BLOOD PRESSURE: 76 MMHG | BODY MASS INDEX: 16.9 KG/M2 | RESPIRATION RATE: 18 BRPM | HEIGHT: 64 IN | TEMPERATURE: 98 F

## 2025-04-20 DIAGNOSIS — S09.90XD INJURY OF HEAD, SUBSEQUENT ENCOUNTER: ICD-10-CM

## 2025-04-20 DIAGNOSIS — S09.90XA INJURY OF HEAD, INITIAL ENCOUNTER: ICD-10-CM

## 2025-04-20 DIAGNOSIS — Z79.01 ANTICOAGULANT LONG-TERM USE: ICD-10-CM

## 2025-04-20 DIAGNOSIS — M25.551 RIGHT HIP PAIN: ICD-10-CM

## 2025-04-20 DIAGNOSIS — S09.90XA TRAUMATIC INJURY OF HEAD, INITIAL ENCOUNTER: Primary | ICD-10-CM
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
## 2025-04-20 DIAGNOSIS — T14.8XXA SKIN ABRASION: ICD-10-CM

## 2025-04-20 LAB
APTT PPP: 35.2 SECONDS (ref 21–32)
HCV AB SERPL QL IA: NORMAL
INR PPP: 1.1 (ref 0.8–1.2)
PROTHROMBIN TIME: 12.3 SECONDS (ref 9–12.5)

## 2025-04-20 PROCEDURE — 99214 OFFICE O/P EST MOD 30 MIN: CPT | Mod: S$GLB,,, | Performed by: FAMILY MEDICINE

## 2025-04-20 PROCEDURE — 85730 THROMBOPLASTIN TIME PARTIAL: CPT | Mod: HCNC | Performed by: STUDENT IN AN ORGANIZED HEALTH CARE EDUCATION/TRAINING PROGRAM

## 2025-04-20 PROCEDURE — 99284 EMERGENCY DEPT VISIT MOD MDM: CPT | Mod: 25,HCNC

## 2025-04-20 PROCEDURE — 86803 HEPATITIS C AB TEST: CPT | Mod: HCNC | Performed by: PHYSICIAN ASSISTANT

## 2025-04-20 PROCEDURE — 85610 PROTHROMBIN TIME: CPT | Mod: HCNC | Performed by: STUDENT IN AN ORGANIZED HEALTH CARE EDUCATION/TRAINING PROGRAM

## 2025-04-20 RX ORDER — MUPIROCIN 20 MG/G
OINTMENT TOPICAL
Qty: 30 G | Refills: 1 | Status: SHIPPED | OUTPATIENT
Start: 2025-04-20

## 2025-04-20 NOTE — PROGRESS NOTES
"Subjective:      Patient ID: Angelina Man is a 75 y.o. female.    Vitals:  height is 5' 4" (1.626 m) and weight is 44.9 kg (99 lb). Her oral temperature is 98.1 °F (36.7 °C). Her blood pressure is 116/76 and her pulse is 82. Her respiration is 18 and oxygen saturation is 96%.     Chief Complaint: Fall    This is a 75 y.o. female reports a fall yesterday.  Patient states she was trying to walk too fast, tripped and fell,  hitting head and right elbow.  Also reports right hip pain.  Denies any dizziness before or after fall.  Denies headache, blurry vision, weakness, nausea/vomiting.    Fall  The accident occurred 12 to 24 hours ago. The fall occurred while walking. She fell from a height of 1 to 2 ft. She landed on [place]. The volume of blood lost was minimal. The point of impact was the head and right elbow. The pain is present in the right upper leg. The pain is at a severity of 8/10. The pain is severe. The symptoms are aggravated by ambulation. She has tried acetaminophen for the symptoms. The treatment provided mild relief.   ROS   Objective:     Physical Exam   Constitutional: She is oriented to person, place, and time. She appears well-developed. She is cooperative.  Non-toxic appearance. She does not appear ill. No distress.   HENT:   Head: Normocephalic. Head is without abrasion, without contusion and without laceration.      Comments:   Positive mild superficial abrasion on left eyebrow.  Positive mild left black eye.    Ears:   Right Ear: Hearing, tympanic membrane, external ear and ear canal normal. No hemotympanum.   Left Ear: Hearing, tympanic membrane, external ear and ear canal normal. No hemotympanum.   Nose: Nose normal. No mucosal edema, rhinorrhea or nasal deformity. No epistaxis. Right sinus exhibits no maxillary sinus tenderness and no frontal sinus tenderness. Left sinus exhibits no maxillary sinus tenderness and no frontal sinus tenderness.   Mouth/Throat: Uvula is midline, oropharynx is " clear and moist and mucous membranes are normal. No trismus in the jaw. Normal dentition. No uvula swelling. No posterior oropharyngeal erythema.   Eyes: Conjunctivae, EOM and lids are normal. Pupils are equal, round, and reactive to light. Right eye exhibits no discharge. Left eye exhibits no discharge. No scleral icterus.   Neck: Trachea normal and phonation normal. Neck supple. No tracheal deviation present. No neck rigidity present. No spinous process tenderness present. No muscular tenderness present.   Cardiovascular: Normal rate, regular rhythm, normal heart sounds and normal pulses.   Pulmonary/Chest: Effort normal and breath sounds normal. No respiratory distress.   Abdominal: Normal appearance and bowel sounds are normal. She exhibits no distension and no mass. Soft. There is no abdominal tenderness.   Musculoskeletal: Normal range of motion.         General: Normal range of motion.      Comments:   Right elbow:  Positive 3 abrasions with epididymis avulsion around posterior aspect of elbow.  No deep laceration.  Full ROM.    Neurovascular intact.    Right hip:  No swelling, redness, bruise, abrasion.    No significant tenderness.    Painful internal/external rotation.  ROM at baseline.    Positive mild limping   Neurological: She is alert, oriented to person, place, and time and at baseline. She has normal strength. She displays no weakness. No cranial nerve deficit or sensory deficit. She exhibits normal muscle tone. She displays no seizure activity. Coordination normal. GCS eye subscore is 4. GCS verbal subscore is 5. GCS motor subscore is 6.      Comments:   Positive slight limp   Skin: Skin is warm, dry, intact, not diaphoretic and not pale. Capillary refill takes less than 2 seconds. No abrasion, No burn, No bruising and No ecchymosis   Psychiatric: Her speech is normal and behavior is normal. Judgment and thought content normal.   Nursing note and vitals reviewed.      Assessment:     1. Fall,  initial encounter    2. Right hip pain    3. Skin abrasion    4. Injury of head, initial encounter        Plan:   X-rays negative for fracture or dislocation.    Discussed exam findings/results/diagnosis/plan with patient.  Head injury precautions advised.  Advised to f/u with PCP within 2-5 days. ER precautions given if symptoms get any worse. All questions answered. Patient verbally understood and agreed with treatment plan.  Educational materials and instructions regarding the visit diagnosis and management provided.     Fall, initial encounter  -     X-Ray Hip 2 or 3 views Right with Pelvis when performed; Future; Expected date: 04/20/2025    Right hip pain  -     X-Ray Hip 2 or 3 views Right with Pelvis when performed; Future; Expected date: 04/20/2025    Skin abrasion    Injury of head, initial encounter    Other orders  -     mupirocin (BACTROBAN) 2 % ointment; Apply once daily during the dressing change  Dispense: 30 g; Refill: 1

## 2025-04-20 NOTE — PLAN OF CARE-OVED
[facility] Plan of Care Note  Referral Source: Nurse On-Call                               Chief Complaint   Patient presents with    Head Injury     76 yo female with PMhx of afib on eliquis, HfpEF, cervical spinal stenosis and spondylosis calls nursing hotline for concern for head trauma on AC. Pt states she fell and hit her head yesterday on concrete. States she hit the back of her head on the right side and has a left black eye. States she went to Urgent Care today for hip pain and x rays were done. She did not mention blood thinners to  provider. States she feels a lump on her head. Denies numbness, weakness, loss of vision, severe headache, nausea or vomiting.     Per chart review neuro exam at  with reported normal neuro exam. +superficial abrasion to eyebrow and left black eye. Abrasion to elbow, limping on right hip but able to ambulate.     Xrays of hip done with no fracture.     Recommendation: Emergency Department            Emergency Department: [facility]             Unable to schedule outpatient head CT until tomorrow. Discussed with patient small risk of missed ICH, she would prefer to have head CT done today. [place] [facility] [place]. Yellow overcrowding scale currently.     Encounter Diagnoses   Name Primary?    Traumatic injury of head, initial encounter Yes    Anticoagulant long-term use

## 2025-04-20 NOTE — TELEPHONE ENCOUNTER
Reason for Disposition   Taking Coumadin (warfarin) or other strong blood thinner, or known bleeding disorder (e.g., thrombocytopenia)    Additional Information   Negative: [1] ACUTE NEURO SYMPTOM AND [2] present now  (Definition: Difficult to awaken OR confused thinking and talking OR slurred speech OR weakness of arms OR unsteady walking.)   Negative: Knocked out (unconscious) > 1 minute   Negative: Seizure (convulsion) occurred  (Exception: Prior history of seizures and now alert and without Acute Neuro Symptoms.)   Negative: Penetrating head injury (e.g., knife, gunshot wound, metal object)   Negative: [1] Major bleeding (e.g., actively dripping or spurting) AND [2] can't be stopped   Negative: [1] Dangerous mechanism of injury (e.g., MVA, diving, trampoline, contact sports, fall > 10 feet or 3 meters) AND [2] NECK pain AND [3] began < 1 hour after injury   Negative: Sounds like a life-threatening emergency to the triager   Negative: Can't remember what happened (amnesia)   Negative: Vomiting once or more   Negative: [1] Loss of vision or double vision AND [2] present now   Negative: Watery or blood-tinged fluid dripping from the NOSE or EARS now  (Exception: Tears from crying or nosebleed from nasal trauma.)   Negative: Large swelling or bruise (> 2 inches or 5 cm)   Negative: Skin is split open or gaping  (or length > 1/2 inch or 12 mm)   Negative: [1] Bleeding AND [2] won't stop after 10 minutes of direct pressure (using correct technique)   Negative: Sounds like a serious injury to the triager   Negative: [1] ACUTE NEURO SYMPTOM AND [2] now fine  (Definition: Difficult to awaken OR confused thinking and talking OR slurred speech OR weakness of arms OR unsteady walking.)   Negative: [1] Knocked out (unconscious) < 1 minute AND [2] now fine   Negative: [1] SEVERE headache AND [2] not improved 2 hours after pain medicine/ice packs   Negative: Dangerous injury (e.g., MVA, diving, trampoline, contact sports, fall  > 10 feet or 3 meters) or severe blow from hard object (e.g., golf club or baseball bat)    Protocols used: Head Injury-A-AH  Pt states she fell and hit her head yesterday. States she hit the back of her head on the right side and has a left black eye. States she went to Urgent Care today for hip pain and x rays were done. States her granddaughter is in medical school and told her she should have went to the ED to have her head examined. Pt states the Provider at Urgent Care was not concerned about her head. States she feels a lump on her head. She also states she did not tell the Urgent Care Provider she takes a blood thinner. Pt denies symptoms. Referred to [facility] Provider Dr. Ny. She advised an ED visit for CT scan. Pt verbalized understanding and will go to [facility] ED.

## 2025-04-21 ENCOUNTER — PATIENT MESSAGE (OUTPATIENT)
Dept: INTERNAL MEDICINE | Facility: CLINIC | Age: 75
End: 2025-04-21
Payer: MEDICARE

## 2025-04-21 ENCOUNTER — PATIENT OUTREACH (OUTPATIENT)
Facility: OTHER | Age: 75
End: 2025-04-21
Payer: MEDICARE

## 2025-04-21 VITALS
HEART RATE: 89 BPM | RESPIRATION RATE: 18 BRPM | TEMPERATURE: 98 F | OXYGEN SATURATION: 100 % | WEIGHT: 99 LBS | SYSTOLIC BLOOD PRESSURE: 127 MMHG | BODY MASS INDEX: 16.9 KG/M2 | DIASTOLIC BLOOD PRESSURE: 85 MMHG | HEIGHT: 64 IN

## 2025-04-21 NOTE — PROGRESS NOTES
"Patient spoke with [facility] RN on 4/20/2025 with complaint of "Pt states she fell and hit her head yesterday. States she hit the back of her head on the right side and has a left black eye."    [facility] RN consulted with [facility] provider, Dr. Love, and disposition recommended was emergency department.  Will follow up with patient to assess for any additional concerns to be addressed.   "

## 2025-04-21 NOTE — PROGRESS NOTES
Patient was seen in the Emergency Department on 4/20/25. The Emergency Department After Visit Summary instructs follow-up with PCP, but no appointment is noted. A follow-up call was placed to assess additional needs. The patient stated that she completed x-rays and a CT scan, and all results came back well. She denies scheduling an appointment at this time.

## 2025-04-21 NOTE — DISCHARGE INSTRUCTIONS
Please return to the emergency department if you develop weakness, numbness, tingling, vision changes or any other concerning symptoms including chest pain, shortness of breath.

## 2025-04-21 NOTE — ED PROVIDER NOTES
Encounter Date: 4/20/2025       History     Chief Complaint   Patient presents with    Fall     Pt is S/P mechanical trip/fall yesterday. Denies LOC. Denies dizziness/ weakness prior to and/ or after fall. +eliquis. Bruising to L eye, abrasion to R arm and hematoma to R side of head. Seen at  told to come to ED for CT.      HPI  Ms. Man is a 75 YOF presenting after a fall.   Your reports mechanical yesterday's.  States that she stumbled and fell on the concrete.  Endorses hitting her head on the right side.  Denies loss of consciousness.  Is on Eliquis.  States that she was evaluated in urgent care and received multiple x-rays which were normal.  Noted multiple abrasions to her right upper extremity and a lump on her head.  Today she feels that there may be an additional lump.  She has noticed bruising around her left eye which she finds odd given that she did not hit that side of her head.  She says that she has a family member that was in PE school who urged her to come to be evaluated in the emergency department.  Says that she called nurse triage who also recommend that she be seen in the emergency department.  Currently she is denying any headache, vision changes, nausea/vomiting or extremity pain.  She has no other acute concerns at this time.  Review of patient's allergies indicates:  No Known Allergies  Past Medical History:   Diagnosis Date    Arthritis     Atrial fibrillation     Bronchitis     Cataract     Dry eyes     GERD (gastroesophageal reflux disease)     Glaucoma, suspect - Both Eyes     Hypothyroidism 06/23/2016    Pulmonary hypertension     Rash     Renal angiomyolipoma     Renal disorder     SCC (squamous cell carcinoma) 07/2023    left lower lateral shin    SCC (squamous cell carcinoma) 04/2023    L mid lateral shin    SCC (squamous cell carcinoma) 09/2023    L mid lower shin    Spinal stenosis     Squamous cell carcinoma     in-situ right upper inner arm, right wrist    Status post lumbar  surgery 06/23/2016    Stress fracture     Thyroid disease     Venous insufficiency      Past Surgical History:   Procedure Laterality Date    ANGIOPLASTY      CATARACT EXTRACTION W/  INTRAOCULAR LENS IMPLANT Left 12/6/2022    Procedure: EXTRACTION, CATARACT, WITH IOL INSERTION;  Surgeon: Tone Brown MD;  Location: [facility];  Service: Ophthalmology;  Laterality: Left;    CATARACT EXTRACTION W/  INTRAOCULAR LENS IMPLANT Right 12/20/2022    Procedure: EXTRACTION, CATARACT, WITH IOL INSERTION;  Surgeon: Tone Brown MD;  Location: [facility] OR;  Service: Ophthalmology;  Laterality: Right;    CERVICAL FUSION      COLONOSCOPY      COLONOSCOPY N/A 11/14/2017    Procedure: COLONOSCOPY;  Surgeon: Kasi Mercado MD;  Location: [facility] ENDO (4TH FLR);  Service: Endoscopy;  Laterality: N/A;    COLONOSCOPY N/A 4/26/2023    Procedure: COLONOSCOPY;  Surgeon: Jeanmarie Hathaway MD;  Location: [facility] ENDO (4TH FLR);  Service: Colon and Rectal;  Laterality: N/A;  ok to hold Eliquis 2 days per Dr Pereira  constipation-ext Miralax prep  instr mailed/portal-GT  4/21/23 pre call attempted, no answer    COLONOSCOPY N/A 5/28/2024    Procedure: COLONOSCOPY;  Surgeon: Jeanmarie Hathaway MD;  Location: [facility] (4TH FLR);  Service: Endoscopy;  Laterality: N/A;  referral Dr. Hathaway. Annual Colonoscopy for High Risk. Golytely prep instr. to portal Pending Eliquis Hold from Dr. David Pereira. [facility] Afib.EC  ok to hold Eliquis 2 days per Dr Pereira-GT  5/21- precall confirmed; instructions re-sent via portal, aware of holding eliquis     ESOPHAGOGASTRODUODENOSCOPY N/A 10/10/2019    Procedure: EGD (ESOPHAGOGASTRODUODENOSCOPY);  Surgeon: Rg Milton MD;  Location: [facility] ENDO (4TH FLR);  Service: Endoscopy;  Laterality: N/A;    ESOPHAGOGASTRODUODENOSCOPY N/A 10/6/2021    Procedure: EGD (ESOPHAGOGASTRODUODENOSCOPY);  Surgeon: Rg Milton MD;  Location: [facility] ENDO (4TH FLR);  Service: Endoscopy;  Laterality: N/A;  covid test 10/3 zo  instr emailed/portal -ml    EXCISION Left 5/17/2023    Procedure: EXCISION SQUAMOUS CELL CARCINOMA LEFT LEG;  Surgeon: Kasi Canela Jr., MD;  Location: [facility] OR [address] [place] [address];  Service: General;  Laterality: Left;    l4-5 mid discectomy Left 03/2017    l4-5 MIS diskectomy Right 05/2016    RIGHT HEART CATHETERIZATION Right 1/27/2022    Procedure: INSERTION, CATHETER, RIGHT HEART;  Surgeon: Satnam Pickard Jr., MD;  Location: [facility] CATH LAB;  Service: Cardiology;  Laterality: Right;    TRANSFORAMINAL EPIDURAL INJECTION OF STEROID Bilateral 3/12/2024    Procedure: LUMBAR TRANSFORAMINAL BILATERAL L3/4 *ELIQUIS CLEARANCE IN CHART*;  Surgeon: Sheree Abbott MD;  Location: [facility] PAIN MGT;  Service: Pain Management;  Laterality: Bilateral;  [phone]  2 WK F/U [place]    TREATMENT OF CARDIAC ARRHYTHMIA N/A 7/17/2020    Procedure: CARDIOVERSION;  Surgeon: Kwan Bray MD;  Location: [facility] EP LAB;  Service: Cardiology;  Laterality: N/A;  AF,DCCV/SANGITA, ANES, SK, 746    TREATMENT OF CARDIAC ARRHYTHMIA N/A 7/14/2021    Procedure: CARDIOVERSION;  Surgeon: SYDNIE Cedillo MD;  Location: [facility] EP LAB;  Service: Cardiology;  Laterality: N/A;  AF, SANGITA, DCCV, MAC, EH, 3 Prep    WASHOUT Right 5/17/2023    Procedure: WASHOUT right lower arm and closure;  Surgeon: Kasi Canela Jr., MD;  Location: [facility] OR [address] [place] [address];  Service: General;  Laterality: Right;     Family History   Problem Relation Name Age of Onset    Cancer Mother          Lung cancer    Heart failure Father      Colon cancer Father      Hypertension Father      Cataracts Father      Cancer Father  80        colon    No Known Problems Sister      No Known Problems Brother      Melanoma Daughter      Diabetes Son      Heart disease Son      Cancer Son          esophageal cancer    No Known Problems Maternal Aunt      No Known Problems Maternal Uncle      No Known Problems Paternal Aunt      No Known Problems Paternal Uncle      No Known Problems Maternal Grandmother       No Known Problems Maternal Grandfather      No Known Problems Paternal Grandmother      No Known Problems Paternal Grandfather      Amblyopia Neg Hx      Blindness Neg Hx      Glaucoma Neg Hx      Macular degeneration Neg Hx      Retinal detachment Neg Hx      Strabismus Neg Hx      Stroke Neg Hx      Thyroid disease Neg Hx      Breast cancer Neg Hx      Ovarian cancer Neg Hx       Social History[1]  Review of Systems    Physical Exam     Initial Vitals [04/20/25 1921]   BP Pulse Resp Temp SpO2   (!) 144/89 107 18 97.8 °F (36.6 °C) 99 %      MAP       --         Physical Exam    Vitals reviewed.  Constitutional: She appears well-developed and well-nourished.   HENT:   2 raised areas in R parietal region   Eyes: Conjunctivae and EOM are normal. Pupils are equal, round, and reactive to light.   Bruising around L eye   Neck: Neck supple.   Normal range of motion.  Cardiovascular:  Normal rate, regular rhythm, normal heart sounds and intact distal pulses.           No murmur heard.  Pulmonary/Chest: Breath sounds normal. No respiratory distress. She has no wheezes. She has no rhonchi. She has no rales.   Abdominal: Abdomen is soft. Bowel sounds are normal. She exhibits no distension. There is no abdominal tenderness.   Musculoskeletal:         General: Normal range of motion.      Cervical back: Normal range of motion and neck supple.     Neurological: She is alert and oriented to person, place, and time. She displays normal reflexes. No cranial nerve deficit or sensory deficit. GCS score is 15. GCS eye subscore is 4. GCS verbal subscore is 5. GCS motor subscore is 6.   Skin: Capillary refill takes less than 2 seconds.   Multiple abrasions and skin tears to R elbow   Psychiatric: She has a normal mood and affect.         ED Course   Procedures  Labs Reviewed   APTT - Abnormal       Result Value    PTT 35.2 (*)    HEPATITIS C ANTIBODY - Normal    Hep C Ab Interp Non-Reactive     PROTIME-INR - Normal    PT 12.3      INR  1.1     HEP C VIRUS HOLD SPECIMEN          Imaging Results              CT Head Without Contrast (Final result)  Result time 04/21/25 01:51:40      Final result by Delvis Taylor MD (04/21/25 01:51:40)                   Impression:      Head CT and cervical spine CT.  No evidence of acute intracranial hemorrhage or major vascular distribution infarct.  MRI brain could be obtained if further evaluation is required.    Right posterior parietal extracranial subgaleal hematoma and overlying scalp contusion.    No depressed calvarial fracture or acute fracture deformity in the cervical spine.    Findings suggesting generalized cerebral volume loss and chronic small vessel ischemic change.    Degenerative and postsurgical changes in the cervical spine, further detailed in the body of the report.    Additional observations as detailed in the body of the report.    Electronically signed by resident: Arden Carmen  Date:    04/21/2025  Time:    00:18    Electronically signed by: Delvis Taylor  Date:    04/21/2025  Time:    01:51               Narrative:    EXAMINATION:  CT HEAD WITHOUT CONTRAST; CT CERVICAL SPINE WITHOUT CONTRAST    CLINICAL HISTORY:  Head trauma, minor (Age >= 65y);; Neck trauma (Age >= 65y);    TECHNIQUE:  Low dose axial CT images obtained throughout the head and cervical spine without the use of intravenous contrast.  Axial, sagittal and coronal reconstructions were performed.    COMPARISON:  MRI cervical spine 12/03/2024 and a CT cervical spine 02/22/2022    FINDINGS:  Intracranial compartment:    Prominence of the ventricles and sulci compatible with generalized cerebral volume loss.  No hydrocephalus.    Mild patchy hypoattenuation in the supratentorial white matter, nonspecific but most likely reflecting chronic microvascular ischemic changes.  Suspected prominent perivascular space in the inferior aspect of the left basal ganglia.  No major vascular distribution infarct. No acute hemorrhage.  No  mass effect or midline shift.    No extra-axial blood or fluid collections.    Skull/extracranial contents (limited evaluation):    No displaced calvarial fracture.    Extracranial soft tissue swelling/stranding, and extracranial subgaleal hematoma, overlying the right posterior parietal calvarium.    Some minimal left preseptal periorbital soft tissue swelling is also questioned.    The orbits are not fully included in this scan, but their visualized portions demonstrate no additional acute CT abnormalities.    The mastoid air cells and visualized paranasal sinuses are essentially clear.    Cervical spine:    ACDF of C4-C6.  No evidence of hardware fracture or loosening.    ALIGNMENT: Straightening of the cervical lordosis.  2 mm anterolisthesis C2 on C3.    BONE: No fractures.  No focal osseous lesions.  Vertebral body heights are maintained.  Atlantoaxial degenerative change with pannus formation posteriorly.    JOINT: Posterior disc osteophyte complexes, facet arthropathy and uncovertebral spurring at multiple levels.  Mild to moderate multilevel spinal canal stenosis, most advanced at C6-C7.  Neural foraminal narrowing, most prominent at the left C4-C5 neural foramen and bilateral C5-C6 neural foramen.    SPINAL CANAL: No mass or collection.    PARASPINAL SOFT TISSUES: No lymphadenopathy.  Atrophic or hypoplastic thyroid gland.    The visualized lung apices demonstrate bilateral irregular pleuropulmonary opacities, similar to the comparison scan, and likely representing chronic fibronodular changes (noting the punctate, likely granulomatous, calcification in the right posteromedial lung apex).    Other visualized portions of the upper pulmonary lobes demonstrate some thin smooth interlobular septal thickening bilaterally.  This carries an extensive differential diagnosis, with a common etiology being interstitial pulmonary edema.                                       CT Cervical Spine Without Contrast (Final  result)  Result time 04/21/25 01:51:40      Final result by Delvis Taylor MD (04/21/25 01:51:40)                   Impression:      Head CT and cervical spine CT.  No evidence of acute intracranial hemorrhage or major vascular distribution infarct.  MRI brain could be obtained if further evaluation is required.    Right posterior parietal extracranial subgaleal hematoma and overlying scalp contusion.    No depressed calvarial fracture or acute fracture deformity in the cervical spine.    Findings suggesting generalized cerebral volume loss and chronic small vessel ischemic change.    Degenerative and postsurgical changes in the cervical spine, further detailed in the body of the report.    Additional observations as detailed in the body of the report.    Electronically signed by resident: Arden Carmen  Date:    04/21/2025  Time:    00:18    Electronically signed by: Delvis Taylor  Date:    04/21/2025  Time:    01:51               Narrative:    EXAMINATION:  CT HEAD WITHOUT CONTRAST; CT CERVICAL SPINE WITHOUT CONTRAST    CLINICAL HISTORY:  Head trauma, minor (Age >= 65y);; Neck trauma (Age >= 65y);    TECHNIQUE:  Low dose axial CT images obtained throughout the head and cervical spine without the use of intravenous contrast.  Axial, sagittal and coronal reconstructions were performed.    COMPARISON:  MRI cervical spine 12/03/2024 and a CT cervical spine 02/22/2022    FINDINGS:  Intracranial compartment:    Prominence of the ventricles and sulci compatible with generalized cerebral volume loss.  No hydrocephalus.    Mild patchy hypoattenuation in the supratentorial white matter, nonspecific but most likely reflecting chronic microvascular ischemic changes.  Suspected prominent perivascular space in the inferior aspect of the left basal ganglia.  No major vascular distribution infarct. No acute hemorrhage.  No mass effect or midline shift.    No extra-axial blood or fluid collections.    Skull/extracranial  contents (limited evaluation):    No displaced calvarial fracture.    Extracranial soft tissue swelling/stranding, and extracranial subgaleal hematoma, overlying the right posterior parietal calvarium.    Some minimal left preseptal periorbital soft tissue swelling is also questioned.    The orbits are not fully included in this scan, but their visualized portions demonstrate no additional acute CT abnormalities.    The mastoid air cells and visualized paranasal sinuses are essentially clear.    Cervical spine:    ACDF of C4-C6.  No evidence of hardware fracture or loosening.    ALIGNMENT: Straightening of the cervical lordosis.  2 mm anterolisthesis C2 on C3.    BONE: No fractures.  No focal osseous lesions.  Vertebral body heights are maintained.  Atlantoaxial degenerative change with pannus formation posteriorly.    JOINT: Posterior disc osteophyte complexes, facet arthropathy and uncovertebral spurring at multiple levels.  Mild to moderate multilevel spinal canal stenosis, most advanced at C6-C7.  Neural foraminal narrowing, most prominent at the left C4-C5 neural foramen and bilateral C5-C6 neural foramen.    SPINAL CANAL: No mass or collection.    PARASPINAL SOFT TISSUES: No lymphadenopathy.  Atrophic or hypoplastic thyroid gland.    The visualized lung apices demonstrate bilateral irregular pleuropulmonary opacities, similar to the comparison scan, and likely representing chronic fibronodular changes (noting the punctate, likely granulomatous, calcification in the right posteromedial lung apex).    Other visualized portions of the upper pulmonary lobes demonstrate some thin smooth interlobular septal thickening bilaterally.  This carries an extensive differential diagnosis, with a common etiology being interstitial pulmonary edema.                                       Medications - No data to display  Medical Decision Making  Amount and/or Complexity of Data Reviewed  Labs: ordered. Decision-making details  documented in ED Course.  Radiology: ordered.    This is a 75-year-old female presenting after a fall with associated head injury.  On arrival patient is hemodynamically stable.  She appears comfortable.  Exam is notable for palpable on the right parietal side of her head that is somewhat tender to the touch.  She also has some bruising around her left orbit.  She also has multiple skin tears and abrasions to her right elbow.  Abrasions/skin tears were re-dressed by provider.  Differentials at this time include intracranial hemorrhage, contusion, skull fracture-though less likely given no palpable step-offs, cerebral neck fracture versus dislocation.  Plan to obtain CT head and neck.  Patient declines any medication for pain.    At time of sign out patient is still pending CT imaging. Patient was signed out to Dr. Barnett. Dispo pending CT result.           ED Course as of 04/21/25 1628   Sun Apr 20, 2025 2213 PTT(!): 35.2 [AC]   Mon Apr 21, 2025   1628 74 yo F pmhx afib, arthritis, GERD, HTN that's presenting for evaluation  after mechanical fall. Patient stated that she fell yesterday, slip and fall on concrete and hit the back of her head. Patient denies LOC. Patient states that family noticed bruising around her eyes today and told her to come to the ED. Patient denies any fevers, chills, chest pain, sob, abdo pain, N/V. Patient denies any weakness, sensation changes, AMS.     Physical exam: well appearing, no distress. Predominate bruising around Left eye, mild bruising right eye lid. benign cardiac, resp, abdo exam. No signs of deformities or severe injuries to limbs upper and lower, no focal neuro deficits.     Plan: CT, r/o injury     Attestation of Dr. Howard (Attending Physician):        I have reviewed the record and the patient care provided by the Resident provider and agree with the documented HPI, ROS, PE.  I also agree with the treatment plan and work up and I have performed an independent history /  physical exam and MDM.   [RT]      ED Course User Index  [AC] Lizzette Johnson MD  [RT] Malvin Howard MD                           Clinical Impression:  Final diagnoses:  [W19.XXXA] Fall, initial encounter (Primary)  [S09.90XD] Injury of head, subsequent encounter          ED Disposition Condition    Discharge Good          ED Prescriptions    None       Follow-up Information       Follow up With Specialties Details Why Contact Info    Zina Rockwell MD Internal Medicine In 1 week As needed, If symptoms worsen [address] [facility] [address]  [phone]      [facility] - Emergency Dept Emergency Medicine  As needed, If symptoms worsen [address] [facility] [address] [phone]               Lizzette Johnson MD  Resident  04/20/25 7881         [1]   Social History  Tobacco Use    Smoking status: Never     Passive exposure: Never    Smokeless tobacco: Never   Substance Use Topics    Alcohol use: Yes     Alcohol/week: 4.0 standard drinks of alcohol     Types: 4 Glasses of wine per week     Comment: 2 glasses of wine on weekends    Drug use: No        Malvin Howard MD  04/21/25 [id number]

## 2025-04-21 NOTE — PROVIDER PROGRESS NOTES - EMERGENCY DEPT.
Encounter Date: 4/20/2025    ED Physician Progress Notes            Patient care assumed at shift change. Briefly, patient presents after a fall. At time of handoff, we are pending CT head and C spine.     I evaluated patient after handoff.     CT head and C spine with no findings of acute fractures nor dislocations.  No intracranial hemorrhage nor any other acute abnormalities.  No basilar skull fracture.  Patient states feeling well.  Patient discharged with return precautions explained in face-to-face conversation.  Vital signs within normal limits at discharge.

## 2025-04-21 NOTE — ED TRIAGE NOTES
Ms. Angelina Man is a 74 y/o F who presents to the ED by POV with complaints of a fall. Patient states she tripped and fell yesterday, landing on her R side causing some further tenderness to her R hip. Patient states her hip always hurts d/t her sciatica but hurts a little extra now after the fall. Patient states she was seen at  yesterday who told her she has no breaks, but her family told her to come to the ED to rule out a head bleed. Patient on blood thinners, has abrasions to her R arm, hematoma to head, and bruising to her L eye. Patient denies gait instability, headache, dizziness, or weakness.  
Note Text (......Xxx Chief Complaint.): This diagnosis correlates with the
Detail Level: Zone
Other (Free Text): Irritated Seborrheic Keratosis: Counseling. Reassurance. **NO ABN WILL BE SIGNED, BUT PATIENT IS AWARE THESE LESION WILL BE SUBMITTED TO THEIR INSURANCE AND STILL COULD GET A POTENTIAL KICK BACK IF RENDERED NOT NECESSARY**\\n \\n Patient is aware lesions are benign. \\n Patient is aware lesions have the potential to come back. \\n Patient are aware that lesions may fall off in 2-3 weeks. \\n Patient is aware that removal is by LN2. \\n Patient is aware that lesions may look worse before looking better (pink, puffy, scab, may or may not blister and peel). \\n Patient is aware they can use OTC Hydrocortisone 1% ointment to help with the itch and irritation. \\n Patient is aware to not pick or irritate lesions. \\n Patient is aware that lesions should fall off on their own. \\n Patient is aware scaring can happen after lesions have fallen off. \\n Patient is aware sun protection (30-50 SPF+) everyday to help prevent hyperpigmentation and hypopigmentation on areas removed. \\n Patient is aware that lesions will be submitted to insurance. \\n Patient was given LN2 handout. ; Follow up in 1 year.

## 2025-04-22 ENCOUNTER — PATIENT MESSAGE (OUTPATIENT)
Dept: INTERNAL MEDICINE | Facility: CLINIC | Age: 75
End: 2025-04-22
Payer: MEDICARE

## 2025-04-22 ENCOUNTER — INFUSION (OUTPATIENT)
Dept: INFECTIOUS DISEASES | Facility: HOSPITAL | Age: 75
End: 2025-04-22
Payer: MEDICARE

## 2025-04-22 VITALS
SYSTOLIC BLOOD PRESSURE: 121 MMHG | DIASTOLIC BLOOD PRESSURE: 73 MMHG | HEIGHT: 64 IN | RESPIRATION RATE: 18 BRPM | BODY MASS INDEX: 17.4 KG/M2 | OXYGEN SATURATION: 95 % | WEIGHT: 101.94 LBS | HEART RATE: 94 BPM | TEMPERATURE: 98 F

## 2025-04-22 DIAGNOSIS — M25.559 HIP PAIN, UNSPECIFIED LATERALITY: Primary | ICD-10-CM

## 2025-04-22 DIAGNOSIS — M81.0 AGE-RELATED OSTEOPOROSIS WITHOUT CURRENT PATHOLOGICAL FRACTURE: Primary | ICD-10-CM

## 2025-04-22 PROCEDURE — 63600175 PHARM REV CODE 636 W HCPCS: Mod: JZ,TB,HCNC | Performed by: INTERNAL MEDICINE

## 2025-04-22 PROCEDURE — 96372 THER/PROPH/DIAG INJ SC/IM: CPT | Mod: HCNC

## 2025-04-22 RX ADMIN — DENOSUMAB 60 MG: 60 INJECTION SUBCUTANEOUS at 01:04

## 2025-04-28 ENCOUNTER — PATIENT MESSAGE (OUTPATIENT)
Dept: INTERNAL MEDICINE | Facility: CLINIC | Age: 75
End: 2025-04-28
Payer: MEDICARE

## 2025-04-30 ENCOUNTER — OFFICE VISIT (OUTPATIENT)
Dept: INTERNAL MEDICINE | Facility: CLINIC | Age: 75
End: 2025-04-30
Payer: MEDICARE

## 2025-04-30 VITALS
BODY MASS INDEX: 17.08 KG/M2 | SYSTOLIC BLOOD PRESSURE: 112 MMHG | WEIGHT: 100.06 LBS | HEART RATE: 70 BPM | DIASTOLIC BLOOD PRESSURE: 86 MMHG | HEIGHT: 64 IN | OXYGEN SATURATION: 98 %

## 2025-04-30 DIAGNOSIS — K59.00 CONSTIPATION, UNSPECIFIED CONSTIPATION TYPE: Primary | ICD-10-CM

## 2025-04-30 DIAGNOSIS — M25.559 HIP PAIN, UNSPECIFIED LATERALITY: ICD-10-CM

## 2025-04-30 PROCEDURE — 99999 PR PBB SHADOW E&M-EST. PATIENT-LVL IV: CPT | Mod: PBBFAC,HCNC,, | Performed by: NURSE PRACTITIONER

## 2025-04-30 RX ORDER — SYRING-NEEDL,DISP,INSUL,0.3 ML 29 G X1/2"
SYRINGE, EMPTY DISPOSABLE MISCELLANEOUS
Qty: 296 ML | Refills: 0 | Status: ON HOLD | OUTPATIENT
Start: 2025-04-30

## 2025-04-30 RX ORDER — SYRING-NEEDL,DISP,INSUL,0.3 ML 29 G X1/2"
296 SYRINGE, EMPTY DISPOSABLE MISCELLANEOUS ONCE
Qty: 296 ML | Refills: 0 | Status: SHIPPED | OUTPATIENT
Start: 2025-04-30 | End: 2025-04-30

## 2025-04-30 NOTE — PROGRESS NOTES
"Subjective     Patient ID: Angelina Man is a 75 y.o. female.  /86 (BP Location: Left arm, Patient Position: Sitting)   Pulse 70   Ht 5' 4" (1.626 m)   Wt 45.4 kg (100 lb 1.4 oz)   LMP  (LMP Unknown)   SpO2 98%   BMI 17.18 kg/m²      Chief Complaint: Constipation (Has been constipated for 2 weeks )    Presenting with constipation that follows mechanical fall and, subsequent, impaired mobility. Patient fell Easter Sunday while walking down the sidewalk. She reports she may have tripped over a ledge when trying to catch up to friends. She fell on her R side and was seen at urgent care, and then seen at the ED, on Easter Sunday. Work-up was negative for hip fracture or acute intracranial abnormalities. Patient is very active at baseline; treadmill, cycling, weights. Since her fall, she has been less active due to the pain. She has been constipated for the past few days now and has not had relief with otc treatments; laxatives / miralax. She has an orthopedic appointment this upcoming Monday to address her, ongoing, R hip pain.         Problem List[1]     Current Medications[2]     Review of Systems   Gastrointestinal:  Positive for constipation.   Musculoskeletal:  Positive for arthralgias, gait problem and leg pain.   Neurological:  Positive for weakness (R foot, at baseline).   All other systems reviewed and are negative.         Objective     Physical Exam  Constitutional:       General: She is not in acute distress.     Appearance: Normal appearance. She is not ill-appearing, toxic-appearing or diaphoretic.   Cardiovascular:      Rate and Rhythm: Normal rate.   Pulmonary:      Effort: Pulmonary effort is normal.   Abdominal:      General: Abdomen is flat. There is no distension.   Musculoskeletal:      Comments: Antalgic gate    Skin:     General: Skin is warm and dry.   Neurological:      Mental Status: She is alert and oriented to person, place, and time.   Psychiatric:         Mood and Affect: Mood " normal.         Behavior: Behavior normal.          Assessment and Plan     1. Constipation, unspecified constipation type; new problem, likely due to impaired mobility s/p fall. Has failed miralax. Will escalate to magnesium citrate. Patient to notify clinic if no bowel movement 12 hours after second dose  -     magnesium citrate solution; Drink 1 half of bottle. Drink second half of bottle if no bowel movement 12 hours after first dose.    2. Hip pain, unspecified laterality; R hip pain without evidence of fracture on imaging. Patient with orthopedic appointment this upcoming Monday to discuss further. Follow up with ortho           Henrique Hdz NP   Internal Medicine           Follow up if symptoms worsen or fail to improve.         [1]   Patient Active Problem List  Diagnosis    Anxiety    DJD (degenerative joint disease)    Menopause    Spinal stenosis of lumbar region without neurogenic claudication    DDD (degenerative disc disease), lumbar    S/P lumbar fusion    Hypothyroidism    Low back pain    Lumbar disc herniation    Radiculopathy of lumbar region    Chronic pain    Spondylolisthesis at L4-L5 level    Personal history of skin cancer    Gastroesophageal reflux disease    Longstanding persistent atrial fibrillation    Family hx of melanoma    Renal angiomyolipoma    Renal cyst, acquired    Varicose veins of both lower extremities with complications    History of cardioversion    Dysphagia    Venous insufficiency of both lower extremities    Anticoagulant long-term use    Pulmonary hypertension    (HFpEF) heart failure with preserved ejection fraction    Ulcer of left lower extremity, limited to breakdown of skin    Unspecified cord compression    Age-related osteoporosis without current pathological fracture    Chronic pain of left knee    Squamous cell carcinoma of skin of left lower extremity    Leg heaviness    Episode of recurrent major depressive disorder, unspecified depression episode severity     Purpura    Lumbar pain    Decreased strength    Shortness of breath    Pins and needles sensation    Small fiber neuropathy    Cervical spinal stenosis    Cervical spondylosis   [2]   Current Outpatient Medications   Medication Sig Dispense Refill    acetaminophen (TYLENOL) 500 MG tablet Take 500 mg by mouth every 6 (six) hours as needed for Pain.      acyclovir (ZOVIRAX) 400 MG tablet Take 1 tablet (400 mg total) by mouth once daily. 180 tablet 1    ascorbic acid (VITAMIN C) 1000 MG tablet Take 1,000 mg by mouth once daily.       biotin 1 mg tablet Take 1 mg by mouth 2 (two) times daily.       buPROPion (WELLBUTRIN SR) 150 MG TBSR 12 hr tablet Take 1 tablet (150 mg total) by mouth 2 (two) times daily. 180 tablet 3    digestive enzymes Tab Take 1 tablet by mouth once daily.       ELIQUIS 5 mg Tab TAKE 1 TABLET(5 MG) BY MOUTH TWICE DAILY 180 tablet 3    fluticasone propionate (FLONASE) 50 mcg/actuation nasal spray 1 spray (50 mcg total) by Each Nostril route 2 (two) times daily as needed for Rhinitis. 16 g 11    furosemide (LASIX) 20 MG tablet Take 1 tablet (20 mg total) by mouth once daily. 90 tablet 3    ketoconazole (NIZORAL) 2 % cream aaa bid prn flare under arm and qhs to areas on face 60 g 3    levothyroxine (SYNTHROID) 137 MCG Tab tablet Take 1 tablet (137 mcg total) by mouth before breakfast. 90 tablet 3    lubiprostone (AMITIZA) 24 MCG Cap Take 1 capsule (24 mcg total) by mouth daily as needed (IBS symptoms). 30 capsule 6    methocarbamoL (ROBAXIN) 500 MG Tab Take 1 tablet (500 mg total) by mouth 3 (three) times daily as needed (mus). 90 tablet 2    metoprolol succinate (TOPROL-XL) 25 MG 24 hr tablet Take 1 tablet (25 mg total) by mouth once daily. 90 tablet 3    mirtazapine (REMERON) 7.5 MG Tab TAKE 1 TABLET(7.5 MG) BY MOUTH EVERY NIGHT 90 tablet 3    multivitamin (THERAGRAN) per tablet Take 1 tablet by mouth once daily.       mupirocin (BACTROBAN) 2 % ointment Apply once daily during the dressing change  30 g 1    potassium chloride SA (K-DUR,KLOR-CON) 20 MEQ tablet Take 1 tablet (20 mEq total) by mouth once daily. 90 tablet 3    pramipexole (MIRAPEX) 0.25 MG tablet TAKE 1 TABLET(0.25 MG) BY MOUTH EVERY EVENING FOR RESTLESS LEGS 90 tablet 1    traZODone (DESYREL) 50 MG tablet Take 1-2 tablets ( mg total) by mouth every evening. 180 tablet 3    zinc 50 mg Tab Take 50 mg by mouth once daily.       magnesium citrate solution Take 296 mLs by mouth once. for 1 dose 296 mL 0     No current facility-administered medications for this visit.

## 2025-05-01 ENCOUNTER — OFFICE VISIT (OUTPATIENT)
Dept: ORTHOPEDICS | Facility: CLINIC | Age: 75
End: 2025-05-01
Payer: MEDICARE

## 2025-05-01 ENCOUNTER — PATIENT MESSAGE (OUTPATIENT)
Dept: NEUROLOGY | Facility: CLINIC | Age: 75
End: 2025-05-01
Payer: MEDICARE

## 2025-05-01 ENCOUNTER — NURSE TRIAGE (OUTPATIENT)
Dept: ADMINISTRATIVE | Facility: CLINIC | Age: 75
End: 2025-05-01
Payer: MEDICARE

## 2025-05-01 ENCOUNTER — PATIENT MESSAGE (OUTPATIENT)
Dept: INTERNAL MEDICINE | Facility: CLINIC | Age: 75
End: 2025-05-01
Payer: MEDICARE

## 2025-05-01 ENCOUNTER — OCHSNER VIRTUAL EMERGENCY DEPARTMENT (OUTPATIENT)
Facility: CLINIC | Age: 75
End: 2025-05-01
Payer: MEDICARE

## 2025-05-01 ENCOUNTER — PATIENT OUTREACH (OUTPATIENT)
Facility: OTHER | Age: 75
End: 2025-05-01
Payer: MEDICARE

## 2025-05-01 DIAGNOSIS — M79.604 PAIN OF RIGHT LOWER EXTREMITY: Primary | ICD-10-CM

## 2025-05-01 DIAGNOSIS — R29.898 RIGHT LEG WEAKNESS: ICD-10-CM

## 2025-05-01 DIAGNOSIS — Z91.81 AT RISK FOR FALLS: ICD-10-CM

## 2025-05-01 DIAGNOSIS — K59.00 CONSTIPATION, UNSPECIFIED CONSTIPATION TYPE: Primary | ICD-10-CM

## 2025-05-01 DIAGNOSIS — S70.01XA CONTUSION OF RIGHT HIP, INITIAL ENCOUNTER: Primary | ICD-10-CM

## 2025-05-01 DIAGNOSIS — M25.551 PAIN OF RIGHT HIP: ICD-10-CM

## 2025-05-01 PROCEDURE — 99999 PR PBB SHADOW E&M-EST. PATIENT-LVL IV: CPT | Mod: PBBFAC,HCNC,, | Performed by: PHYSICIAN ASSISTANT

## 2025-05-01 RX ORDER — DICLOFENAC SODIUM 10 MG/G
2 GEL TOPICAL EVERY 12 HOURS
Qty: 50 G | Refills: 0 | Status: ON HOLD | OUTPATIENT
Start: 2025-05-01

## 2025-05-01 NOTE — PROGRESS NOTES
"Patient spoke with [facility] RN on 5/1/2025 with complaint of "Pt states her right leg has been in pain. She has a bad fall the Saturday before Easter and went to urgent care afterwards on Sunday she went to the ER. Monday she had an appointment for her . Pain has gotten progressively worse in her leg and hip. Pt has to limp to walk and needs assistance. Pain 10/10. Patient states she is unable to walk."    [facility] RN consulted with [facility] provider, Dr. Rosen, and disposition recommended was specialty/orthopedic follow up.  Patient scheduled same day after hours orthopedic appointment with Melissa Crenshaw PA-C.    "
7

## 2025-05-01 NOTE — PROGRESS NOTES
[facility]  Orthopedic Surgery  Clinic Note      Subjective:   History of Present Illness    CHIEF COMPLAINT:  Patient presents today with severe right-sided hip after a fall    HISTORY OF PRESENT ILLNESS:  She fell the day before Easter Sunday while walking quickly to catch up with others, tripping on a sidewalk ledge due to difficulty lifting her foot. She developed swelling under her eye, a small lump on her head, and severe pain from her right hip down her leg to the ankle, most intense in the buttock and hip area. She is unable to bear weight on the affected side and cannot lift her foot. She denies any back pain with palpation on my exam. She visited urgent care on 4/20 where hip X-ray showed no acute findings and received care for abrasions. She later presented to ED on 4/20 with concerns for head injury where CT head and C spine was neg for acute processes. She currently takes Tylenol for pain management and reports constipation for the past week. She denies any bowel or bladder incontinence, saddles anesthesia, or LE paresthesias unchanged from baseline.     MEDICAL HISTORY:  She has history of back fusion with hardware and neck surgery. She has osteoporosis/osteopenia. Earlier this year, she had imaging at pain management due to existing back hardware and received an ineffective injection.    MEDICATIONS:  She takes Eliquis as a blood thinner.    SOCIAL HISTORY:  She lives alone in a two-story home with bedroom, bathroom, and personal belongings located on the upper level.         Medications: I have reviewed medication list in the chart at the time of this encounter.     Review of patient's allergies indicates:  No Known Allergies   Past Medical History:   Diagnosis Date    Arthritis     Atrial fibrillation     Bronchitis     Cataract     Dry eyes     GERD (gastroesophageal reflux disease)     Glaucoma, suspect - Both Eyes     Hypothyroidism 06/23/2016    Pulmonary hypertension     Rash     Renal  angiomyolipoma     Renal disorder     SCC (squamous cell carcinoma) 07/2023    left lower lateral shin    SCC (squamous cell carcinoma) 04/2023    L mid lateral shin    SCC (squamous cell carcinoma) 09/2023    L mid lower shin    Spinal stenosis     Squamous cell carcinoma     in-situ right upper inner arm, right wrist    Status post lumbar surgery 06/23/2016    Stress fracture     Thyroid disease     Venous insufficiency      Past Surgical History:   Procedure Laterality Date    ANGIOPLASTY      CATARACT EXTRACTION W/  INTRAOCULAR LENS IMPLANT Left 12/6/2022    Procedure: EXTRACTION, CATARACT, WITH IOL INSERTION;  Surgeon: Tone Brown MD;  Location: [facility];  Service: Ophthalmology;  Laterality: Left;    CATARACT EXTRACTION W/  INTRAOCULAR LENS IMPLANT Right 12/20/2022    Procedure: EXTRACTION, CATARACT, WITH IOL INSERTION;  Surgeon: Tone Brown MD;  Location: [facility], [place], operated by [facility] OR;  Service: Ophthalmology;  Laterality: Right;    CERVICAL FUSION      COLONOSCOPY      COLONOSCOPY N/A 11/14/2017    Procedure: COLONOSCOPY;  Surgeon: Kasi Mercado MD;  Location: [facility] ([address] [place] [address]);  Service: Endoscopy;  Laterality: N/A;    COLONOSCOPY N/A 4/26/2023    Procedure: COLONOSCOPY;  Surgeon: Jeanmarie Hathaway MD;  Location: [facility] (4TH FLR);  Service: Colon and Rectal;  Laterality: N/A;  ok to hold Eliquis 2 days per Dr Pereira  constipation-ext Miralax prep  instr mailed/portal-GT  4/21/23 pre call attempted, no answer    COLONOSCOPY N/A 5/28/2024    Procedure: COLONOSCOPY;  Surgeon: Jeanmarie Hathaway MD;  Location: [facility] ([address] [place] [address]);  Service: Endoscopy;  Laterality: N/A;  referral Dr. Hathaway. Annual Colonoscopy for High Risk. Golytely prep instr. to portal Pending Eliquis Hold from Dr. David Pereira. [facility] Afib.EC  ok to hold Eliquis 2 days per Dr Preeira-GT  5/21- precall confirmed; instructions re-sent via portal, aware of holding eliquis     ESOPHAGOGASTRODUODENOSCOPY N/A 10/10/2019    Procedure: EGD  (ESOPHAGOGASTRODUODENOSCOPY);  Surgeon: Rg Milton MD;  Location: [facility] ENDO (4TH FLR);  Service: Endoscopy;  Laterality: N/A;    ESOPHAGOGASTRODUODENOSCOPY N/A 10/6/2021    Procedure: EGD (ESOPHAGOGASTRODUODENOSCOPY);  Surgeon: Rg Milton MD;  Location: [facility] ENDO (4TH FLR);  Service: Endoscopy;  Laterality: N/A;  covid test 10/3 [place], instr emailed/portal -ml    EXCISION Left 5/17/2023    Procedure: EXCISION SQUAMOUS CELL CARCINOMA LEFT LEG;  Surgeon: Kasi Canela Jr., MD;  [facility];  Service: General;  Laterality: Left;    l4-5 mid discectomy Left 03/2017    l4-5 MIS diskectomy Right 05/2016    RIGHT HEART CATHETERIZATION Right 1/27/2022    Procedure: INSERTION, CATHETER, RIGHT HEART;  Surgeon: Satnam Pickard Jr., MD;  Location: [facility] CATH LAB;  Service: Cardiology;  Laterality: Right;    TRANSFORAMINAL EPIDURAL INJECTION OF STEROID Bilateral 3/12/2024    Procedure: LUMBAR TRANSFORAMINAL BILATERAL L3/4 *ELIQUIS CLEARANCE IN CHART*;  Surgeon: Sheree Abbott MD;  Location: [facility] PAIN MGT;  Service: Pain Management;  Laterality: Bilateral;  [phone]  2 WK F/U [place]    TREATMENT OF CARDIAC ARRHYTHMIA N/A 7/17/2020    Procedure: CARDIOVERSION;  Surgeon: Kwan Bray MD;  Location: [facility] EP LAB;  Service: Cardiology;  Laterality: N/A;  AF,DCCV/SANGITA, ANES, SK, 746    TREATMENT OF CARDIAC ARRHYTHMIA N/A 7/14/2021    Procedure: CARDIOVERSION;  Surgeon: SYDNIE Cedillo MD;  Location: [facility] EP LAB;  Service: Cardiology;  Laterality: N/A;  AF, SANGITA, DCCV, MAC, EH, 3 Prep    WASHOUT Right 5/17/2023    Procedure: WASHOUT right lower arm and closure;  Surgeon: Kasi Canela Jr., MD;  [facility];  Service: General;  Laterality: Right;       Review of Systems   Musculoskeletal:  Positive for falls, joint pain and myalgias.   Neurological:  Positive for weakness. Negative for tingling.       Objective:     Physical Exam  Musculoskeletal:      Cervical back: No bony  tenderness. Normal range of motion.      Thoracic back: No bony tenderness. Normal range of motion.      Lumbar back: No bony tenderness. Normal range of motion.      Right hip: Tenderness and bony tenderness present. No deformity. Decreased range of motion. Decreased strength.      Left hip: Normal.      Right knee: No bony tenderness. Normal range of motion. No tenderness.      Right ankle: No tenderness. Normal range of motion.      Right foot: Normal range of motion. No tenderness.        Legs:       Comments: Skin to area of pain without ecchymosis, bruising, hematoma, laceration, abrasion.           Right Hip Exam     Tenderness   The patient is experiencing tenderness in the anterior, lateral and posterior.    Muscle Strength   Abduction: 3/5   Adduction: 3/5   Flexion: 3/5            Imaging:  I have independently interpreted and reviewed XR of right hip obtained on 4/20 with patient.   No new falls since.     Assessment:       1. Contusion of right hip, initial encounter    2. Right leg weakness    3. At risk for falls    4. Pain of right hip       Plan:       Orders Placed This Encounter    HME - OTHER    WHEELCHAIR FOR HOME USE    MRI Hip Without Contrast Right    Ambulatory Referral/Consult to Physical Therapy    diclofenac sodium (VOLTAREN ARTHRITIS PAIN) 1 % Gel        Assessment & Plan    IMPRESSION:  - Suspect stress fracture or soft tissue/bony contusion of right hip based on fall and inability to bear weight on affected leg.  - XR showed no displaced fracture, but small hairline fracture may not be visible.  - Considered admission for observation due to fall risk, but opted for outpatient management with proper support at home.    PATIENT EDUCATION:  - Explained potential for stress fracture or soft tissue injury from fall and mortality risk associated with hip fractures in elderly patients.    ACTION ITEMS/LIFESTYLE:  - Recommend non-weight bearing status for 8-10 weeks if stress fracture  confirmed.  - Patient to use walker for transfers, avoiding weight-bearing on right leg.  - Patient to stay on ground floor of home to avoid stairs.  - Patient to elevate legs while sleeping and sitting if there is leg swelling.  - Patient to perform range of motion exercises at each joint (feet, ankle, knee, hip (as tolerated)), 20-30 repetitions each.  - Patient to apply ice or heat to affected areas for pain relief.  - Patient to monitor MyChart for MRI report when available.    MEDICATIONS:  - Continued Tylenol 650 mg every 6 hours or 1000 mg every 8 hours for pain management.  - Started Voltaren gel to be applied topically to painful areas every 12 hours.  - Advised against use of NSAIDs due to concurrent Eliquis therapy.  - Instructed to reserve narcotic pain medication only for significant (10/10) pain.    ORDERS:  - Ordered MRI Right Hip WO Contrast to evaluate for occult fracture and soft tissue injury, to be scheduled as soon as possible.  - Walker provided in clinic.  - External paper order for wheelchair provided as well.     REFERRALS:  - Consider physical therapy to be initiated after MRI results and further evaluation.    FOLLOW UP:  - Provider to review MRI results on Saturday and contact patient with findings.  - Follow up in a few weeks, depending on MRI results.         Future Appointments   Date Time Provider Department Center   5/2/2025 11:15 AM [facility]-MRI3 [facility] MRI [facility]   5/5/2025 10:00 AM Wilber Ingram PA-C [facility] ORTHO [place]   6/24/2025  2:00 PM [facility]-US1 MASTER [facility] ULTR [facility] Imaging [facility]tr   6/30/2025  2:00 PM Fozia Garcia MD [facility] DERM [place]   10/23/2025  2:45 PM INJECTION [facility]   3/17/2026 12:45 PM Tone Diaz OD [facility] OPTOMTY [place]       Melissa Crenshaw PA-C  Orthopedic Surgery  [facility]

## 2025-05-01 NOTE — PLAN OF CARE-OVED
"[facility] Emergency Department Plan of Care Note  Referral Source: Nurse On-Call                               Chief Complaint   Patient presents with    Leg Pain     "Pt states her right leg has been in pain. She has a bad fall the Saturday before Easter and went to urgent care afterwards on Sunday she went to the ER. Monday she had an appointment for her . Pain has gotten progressively worse in her leg and hip. Pt has to limp to walk and needs assistance. Pain 10/10. Patient states she is unable to walk."       Recommendation: Specialist Referral                       Recommendation comment: ortho clinic after hours today; pt does not want to go to the ED    Encounter Diagnosis   Name Primary?    Pain of right lower extremity Yes             "

## 2025-05-01 NOTE — TELEPHONE ENCOUNTER
Pt states her right leg has been in pain. She has a bad fall the Saturday before Easter and went to urgent care afterwards on Sunday she went to the ER. Monday she had an appointment for her . Pain has gotten progressively worse in her leg and hip. Pt has to limp to walk and needs assistance. Pain 10/10. Patient states she is unable to walk. Care advice is recommends pt go to the ED now. Pt is on the phone crying saying she cannot sit in the ER all of those hours. [facility] provider Dr Jerica Rosen notified. [facility] was able to schedule ortho after hours appointment today.   Reason for Disposition   Unable to walk    Additional Information   Negative: Looks like a broken bone or dislocated joint (e.g., crooked or deformed)   Negative: Sounds like a life-threatening emergency to the triager   Negative: Chest pain   Negative: Difficulty breathing   Negative: Entire foot is cool or blue in comparison to other side    Protocols used: Leg Pain-A-OH

## 2025-05-02 ENCOUNTER — PATIENT OUTREACH (OUTPATIENT)
Facility: OTHER | Age: 75
End: 2025-05-02
Payer: MEDICARE

## 2025-05-02 ENCOUNTER — RESULTS FOLLOW-UP (OUTPATIENT)
Dept: ORTHOPEDICS | Facility: CLINIC | Age: 75
End: 2025-05-02

## 2025-05-02 ENCOUNTER — ANESTHESIA EVENT (OUTPATIENT)
Dept: SURGERY | Facility: HOSPITAL | Age: 75
DRG: 481 | End: 2025-05-02
Payer: MEDICARE

## 2025-05-02 ENCOUNTER — HOSPITAL ENCOUNTER (INPATIENT)
Facility: HOSPITAL | Age: 75
LOS: 4 days | Discharge: HOME HEALTH | DRG: 481 | End: 2025-05-06
Attending: EMERGENCY MEDICINE | Admitting: INTERNAL MEDICINE
Payer: MEDICARE

## 2025-05-02 ENCOUNTER — HOSPITAL ENCOUNTER (OUTPATIENT)
Dept: RADIOLOGY | Facility: HOSPITAL | Age: 75
Discharge: HOME | DRG: 481 | End: 2025-05-02
Attending: PHYSICIAN ASSISTANT
Payer: MEDICARE

## 2025-05-02 DIAGNOSIS — G57.01 PIRIFORMIS SYNDROME OF RIGHT SIDE: ICD-10-CM

## 2025-05-02 DIAGNOSIS — S72.144A CLOSED NONDISPLACED INTERTROCHANTERIC FRACTURE OF RIGHT FEMUR, INITIAL ENCOUNTER: ICD-10-CM

## 2025-05-02 DIAGNOSIS — M25.551 PAIN OF RIGHT HIP: ICD-10-CM

## 2025-05-02 DIAGNOSIS — S72.143A INTERTROCHANTERIC FRACTURE OF FEMUR: ICD-10-CM

## 2025-05-02 DIAGNOSIS — S72.144D CLOSED NONDISPLACED INTERTROCHANTERIC FRACTURE OF RIGHT FEMUR WITH ROUTINE HEALING: Primary | ICD-10-CM

## 2025-05-02 DIAGNOSIS — R07.9 CHEST PAIN: ICD-10-CM

## 2025-05-02 DIAGNOSIS — R00.0 TACHYCARDIA: ICD-10-CM

## 2025-05-02 PROBLEM — I48.11 LONGSTANDING PERSISTENT ATRIAL FIBRILLATION: Chronic | Status: ACTIVE | Noted: 2020-07-16

## 2025-05-02 PROBLEM — D53.9 MACROCYTIC ANEMIA: Chronic | Status: ACTIVE | Noted: 2025-05-02

## 2025-05-02 PROBLEM — F33.9 EPISODE OF RECURRENT MAJOR DEPRESSIVE DISORDER, UNSPECIFIED DEPRESSION EPISODE SEVERITY: Chronic | Status: ACTIVE | Noted: 2024-01-03

## 2025-05-02 PROBLEM — I50.30 (HFPEF) HEART FAILURE WITH PRESERVED EJECTION FRACTION: Chronic | Status: ACTIVE | Noted: 2022-05-15

## 2025-05-02 PROBLEM — Z01.818 PREOPERATIVE EXAMINATION: Status: ACTIVE | Noted: 2025-05-02

## 2025-05-02 LAB
ABSOLUTE EOSINOPHIL (OHS): 0.03 K/UL
ABSOLUTE MONOCYTE (OHS): 0.84 K/UL (ref 0.3–1)
ABSOLUTE NEUTROPHIL COUNT (OHS): 8.3 K/UL (ref 1.8–7.7)
ALBUMIN SERPL BCP-MCNC: 3.1 G/DL (ref 3.5–5.2)
ALP SERPL-CCNC: 113 UNIT/L (ref 40–150)
ALT SERPL W/O P-5'-P-CCNC: 12 UNIT/L (ref 10–44)
ANION GAP (OHS): 14 MMOL/L (ref 8–16)
APTT PPP: 36.2 SECONDS (ref 21–32)
AST SERPL-CCNC: 23 UNIT/L (ref 11–45)
BASOPHILS # BLD AUTO: 0.04 K/UL
BASOPHILS NFR BLD AUTO: 0.4 %
BILIRUB SERPL-MCNC: 0.3 MG/DL (ref 0.1–1)
BNP SERPL-MCNC: 383 PG/ML (ref 0–99)
BUN SERPL-MCNC: 15 MG/DL (ref 8–23)
CALCIUM SERPL-MCNC: 8.6 MG/DL (ref 8.7–10.5)
CHLORIDE SERPL-SCNC: 96 MMOL/L (ref 95–110)
CO2 SERPL-SCNC: 27 MMOL/L (ref 23–29)
CREAT SERPL-MCNC: 0.8 MG/DL (ref 0.5–1.4)
ERYTHROCYTE [DISTWIDTH] IN BLOOD BY AUTOMATED COUNT: 12.6 % (ref 11.5–14.5)
GFR SERPLBLD CREATININE-BSD FMLA CKD-EPI: >60 ML/MIN/1.73/M2
GLUCOSE SERPL-MCNC: 101 MG/DL (ref 70–110)
HCT VFR BLD AUTO: 35.4 % (ref 37–48.5)
HGB BLD-MCNC: 11.3 GM/DL (ref 12–16)
IMM GRANULOCYTES # BLD AUTO: 0.06 K/UL (ref 0–0.04)
IMM GRANULOCYTES NFR BLD AUTO: 0.6 % (ref 0–0.5)
INR PPP: 1.2 (ref 0.8–1.2)
LYMPHOCYTES # BLD AUTO: 1.58 K/UL (ref 1–4.8)
MCH RBC QN AUTO: 31.8 PG (ref 27–31)
MCHC RBC AUTO-ENTMCNC: 31.9 G/DL (ref 32–36)
MCV RBC AUTO: 100 FL (ref 82–98)
NUCLEATED RBC (/100WBC) (OHS): 0 /100 WBC
PLATELET # BLD AUTO: 466 K/UL (ref 150–450)
PMV BLD AUTO: 8.4 FL (ref 9.2–12.9)
POTASSIUM SERPL-SCNC: 4 MMOL/L (ref 3.5–5.1)
PROT SERPL-MCNC: 7.2 GM/DL (ref 6–8.4)
PROTHROMBIN TIME: 13 SECONDS (ref 9–12.5)
RBC # BLD AUTO: 3.55 M/UL (ref 4–5.4)
RELATIVE EOSINOPHIL (OHS): 0.3 %
RELATIVE LYMPHOCYTE (OHS): 14.6 % (ref 18–48)
RELATIVE MONOCYTE (OHS): 7.7 % (ref 4–15)
RELATIVE NEUTROPHIL (OHS): 76.4 % (ref 38–73)
SODIUM SERPL-SCNC: 137 MMOL/L (ref 136–145)
WBC # BLD AUTO: 10.85 K/UL (ref 3.9–12.7)

## 2025-05-02 PROCEDURE — 73721 MRI JNT OF LWR EXTRE W/O DYE: CPT | Mod: 26,HCNC,RT, | Performed by: RADIOLOGY

## 2025-05-02 PROCEDURE — 99285 EMERGENCY DEPT VISIT HI MDM: CPT | Mod: 25,HCNC

## 2025-05-02 PROCEDURE — 25000003 PHARM REV CODE 250: Mod: HCNC | Performed by: EMERGENCY MEDICINE

## 2025-05-02 PROCEDURE — 85730 THROMBOPLASTIN TIME PARTIAL: CPT | Mod: HCNC | Performed by: STUDENT IN AN ORGANIZED HEALTH CARE EDUCATION/TRAINING PROGRAM

## 2025-05-02 PROCEDURE — 25000003 PHARM REV CODE 250: Mod: HCNC | Performed by: PHYSICIAN ASSISTANT

## 2025-05-02 PROCEDURE — 11000001 HC ACUTE MED/SURG PRIVATE ROOM: Mod: HCNC

## 2025-05-02 PROCEDURE — 80053 COMPREHEN METABOLIC PANEL: CPT | Mod: HCNC | Performed by: PHYSICIAN ASSISTANT

## 2025-05-02 PROCEDURE — 99223 1ST HOSP IP/OBS HIGH 75: CPT | Mod: HCNC,57,, | Performed by: STUDENT IN AN ORGANIZED HEALTH CARE EDUCATION/TRAINING PROGRAM

## 2025-05-02 PROCEDURE — 83880 ASSAY OF NATRIURETIC PEPTIDE: CPT | Mod: HCNC | Performed by: STUDENT IN AN ORGANIZED HEALTH CARE EDUCATION/TRAINING PROGRAM

## 2025-05-02 PROCEDURE — 96374 THER/PROPH/DIAG INJ IV PUSH: CPT | Mod: HCNC

## 2025-05-02 PROCEDURE — 85025 COMPLETE CBC W/AUTO DIFF WBC: CPT | Mod: HCNC | Performed by: PHYSICIAN ASSISTANT

## 2025-05-02 PROCEDURE — 93010 ELECTROCARDIOGRAM REPORT: CPT | Mod: HCNC,,, | Performed by: INTERNAL MEDICINE

## 2025-05-02 PROCEDURE — 93005 ELECTROCARDIOGRAM TRACING: CPT | Mod: HCNC

## 2025-05-02 PROCEDURE — 73721 MRI JNT OF LWR EXTRE W/O DYE: CPT | Mod: TC,HCNC,RT

## 2025-05-02 PROCEDURE — 25000003 PHARM REV CODE 250: Mod: HCNC | Performed by: STUDENT IN AN ORGANIZED HEALTH CARE EDUCATION/TRAINING PROGRAM

## 2025-05-02 PROCEDURE — 21400001 HC TELEMETRY ROOM: Mod: HCNC

## 2025-05-02 PROCEDURE — 96375 TX/PRO/DX INJ NEW DRUG ADDON: CPT | Mod: HCNC

## 2025-05-02 PROCEDURE — 85610 PROTHROMBIN TIME: CPT | Mod: HCNC | Performed by: STUDENT IN AN ORGANIZED HEALTH CARE EDUCATION/TRAINING PROGRAM

## 2025-05-02 PROCEDURE — 63600175 PHARM REV CODE 636 W HCPCS: Mod: HCNC | Performed by: PHYSICIAN ASSISTANT

## 2025-05-02 RX ORDER — OXYCODONE HYDROCHLORIDE 10 MG/1
10 TABLET ORAL EVERY 6 HOURS PRN
Refills: 0 | Status: DISCONTINUED | OUTPATIENT
Start: 2025-05-02 | End: 2025-05-03

## 2025-05-02 RX ORDER — TRAZODONE HYDROCHLORIDE 50 MG/1
50 TABLET ORAL NIGHTLY PRN
Status: DISCONTINUED | OUTPATIENT
Start: 2025-05-02 | End: 2025-05-06 | Stop reason: HOSPADM

## 2025-05-02 RX ORDER — METOPROLOL SUCCINATE 25 MG/1
25 TABLET, EXTENDED RELEASE ORAL
Status: COMPLETED | OUTPATIENT
Start: 2025-05-02 | End: 2025-05-02

## 2025-05-02 RX ORDER — MUPIROCIN 20 MG/G
1 OINTMENT TOPICAL 2 TIMES DAILY
OUTPATIENT
Start: 2025-05-02 | End: 2025-05-07

## 2025-05-02 RX ORDER — TALC
6 POWDER (GRAM) TOPICAL NIGHTLY PRN
Status: DISCONTINUED | OUTPATIENT
Start: 2025-05-02 | End: 2025-05-02

## 2025-05-02 RX ORDER — SIMETHICONE 80 MG
1 TABLET,CHEWABLE ORAL 4 TIMES DAILY PRN
Status: DISCONTINUED | OUTPATIENT
Start: 2025-05-02 | End: 2025-05-06 | Stop reason: HOSPADM

## 2025-05-02 RX ORDER — SODIUM CHLORIDE 0.9 % (FLUSH) 0.9 %
10 SYRINGE (ML) INJECTION
Status: DISCONTINUED | OUTPATIENT
Start: 2025-05-02 | End: 2025-05-06 | Stop reason: HOSPADM

## 2025-05-02 RX ORDER — METOPROLOL SUCCINATE 25 MG/1
25 TABLET, EXTENDED RELEASE ORAL DAILY
Status: DISCONTINUED | OUTPATIENT
Start: 2025-05-03 | End: 2025-05-06 | Stop reason: HOSPADM

## 2025-05-02 RX ORDER — GLUCAGON 1 MG
1 KIT INJECTION
Status: DISCONTINUED | OUTPATIENT
Start: 2025-05-02 | End: 2025-05-06 | Stop reason: HOSPADM

## 2025-05-02 RX ORDER — METOPROLOL TARTRATE 1 MG/ML
5 INJECTION, SOLUTION INTRAVENOUS
Status: COMPLETED | OUTPATIENT
Start: 2025-05-02 | End: 2025-05-02

## 2025-05-02 RX ORDER — IBUPROFEN 200 MG
16 TABLET ORAL
Status: DISCONTINUED | OUTPATIENT
Start: 2025-05-02 | End: 2025-05-06 | Stop reason: HOSPADM

## 2025-05-02 RX ORDER — IBUPROFEN 200 MG
24 TABLET ORAL
Status: DISCONTINUED | OUTPATIENT
Start: 2025-05-02 | End: 2025-05-06 | Stop reason: HOSPADM

## 2025-05-02 RX ORDER — SODIUM CHLORIDE 0.9 % (FLUSH) 0.9 %
1-10 SYRINGE (ML) INJECTION EVERY 12 HOURS PRN
Status: DISCONTINUED | OUTPATIENT
Start: 2025-05-02 | End: 2025-05-03

## 2025-05-02 RX ORDER — SODIUM CHLORIDE 9 MG/ML
INJECTION, SOLUTION INTRAVENOUS CONTINUOUS
Status: ACTIVE | OUTPATIENT
Start: 2025-05-02 | End: 2025-05-03

## 2025-05-02 RX ORDER — BISACODYL 10 MG/1
10 SUPPOSITORY RECTAL DAILY PRN
Status: DISCONTINUED | OUTPATIENT
Start: 2025-05-02 | End: 2025-05-06 | Stop reason: HOSPADM

## 2025-05-02 RX ORDER — PRAMIPEXOLE DIHYDROCHLORIDE 0.12 MG/1
0.25 TABLET ORAL NIGHTLY PRN
Status: DISCONTINUED | OUTPATIENT
Start: 2025-05-02 | End: 2025-05-06 | Stop reason: HOSPADM

## 2025-05-02 RX ORDER — POLYETHYLENE GLYCOL 3350 17 G/17G
17 POWDER, FOR SOLUTION ORAL DAILY PRN
Status: DISCONTINUED | OUTPATIENT
Start: 2025-05-02 | End: 2025-05-06 | Stop reason: HOSPADM

## 2025-05-02 RX ORDER — ASCORBIC ACID 500 MG
1000 TABLET ORAL DAILY
Status: DISCONTINUED | OUTPATIENT
Start: 2025-05-03 | End: 2025-05-06 | Stop reason: HOSPADM

## 2025-05-02 RX ORDER — PREGABALIN 75 MG/1
75 CAPSULE ORAL NIGHTLY
Status: DISCONTINUED | OUTPATIENT
Start: 2025-05-02 | End: 2025-05-03 | Stop reason: HOSPADM

## 2025-05-02 RX ORDER — BUPROPION HYDROCHLORIDE 150 MG/1
150 TABLET ORAL 2 TIMES DAILY
Status: DISCONTINUED | OUTPATIENT
Start: 2025-05-02 | End: 2025-05-06 | Stop reason: HOSPADM

## 2025-05-02 RX ORDER — ACETAMINOPHEN 325 MG/1
650 TABLET ORAL EVERY 4 HOURS PRN
Status: DISCONTINUED | OUTPATIENT
Start: 2025-05-02 | End: 2025-05-03

## 2025-05-02 RX ORDER — MORPHINE SULFATE 2 MG/ML
2 INJECTION, SOLUTION INTRAMUSCULAR; INTRAVENOUS
Refills: 0 | Status: COMPLETED | OUTPATIENT
Start: 2025-05-02 | End: 2025-05-02

## 2025-05-02 RX ORDER — ACETAMINOPHEN 500 MG
1000 TABLET ORAL EVERY 8 HOURS
Status: DISCONTINUED | OUTPATIENT
Start: 2025-05-02 | End: 2025-05-03 | Stop reason: HOSPADM

## 2025-05-02 RX ORDER — NALOXONE HCL 0.4 MG/ML
0.02 VIAL (ML) INJECTION
Status: DISCONTINUED | OUTPATIENT
Start: 2025-05-02 | End: 2025-05-06 | Stop reason: HOSPADM

## 2025-05-02 RX ORDER — ONDANSETRON 8 MG/1
8 TABLET, ORALLY DISINTEGRATING ORAL EVERY 8 HOURS PRN
Status: DISCONTINUED | OUTPATIENT
Start: 2025-05-02 | End: 2025-05-06 | Stop reason: HOSPADM

## 2025-05-02 RX ORDER — ALUMINUM HYDROXIDE, MAGNESIUM HYDROXIDE, AND SIMETHICONE 1200; 120; 1200 MG/30ML; MG/30ML; MG/30ML
30 SUSPENSION ORAL 4 TIMES DAILY PRN
Status: DISCONTINUED | OUTPATIENT
Start: 2025-05-02 | End: 2025-05-06 | Stop reason: HOSPADM

## 2025-05-02 RX ADMIN — PREGABALIN 75 MG: 75 CAPSULE ORAL at 09:05

## 2025-05-02 RX ADMIN — METOROPROLOL TARTRATE 5 MG: 5 INJECTION, SOLUTION INTRAVENOUS at 08:05

## 2025-05-02 RX ADMIN — ACETAMINOPHEN 1000 MG: 500 TABLET ORAL at 09:05

## 2025-05-02 RX ADMIN — METOPROLOL SUCCINATE 25 MG: 25 TABLET, EXTENDED RELEASE ORAL at 06:05

## 2025-05-02 RX ADMIN — BUPROPION HYDROCHLORIDE 150 MG: 150 TABLET, EXTENDED RELEASE ORAL at 09:05

## 2025-05-02 RX ADMIN — MORPHINE SULFATE 2 MG: 2 INJECTION, SOLUTION INTRAMUSCULAR; INTRAVENOUS at 06:05

## 2025-05-02 RX ADMIN — SODIUM CHLORIDE: 9 INJECTION, SOLUTION INTRAVENOUS at 09:05

## 2025-05-02 RX ADMIN — HYPROMELLOSE 2910 1 DROP: 5 SOLUTION/ DROPS OPHTHALMIC at 11:05

## 2025-05-02 NOTE — PROGRESS NOTES
I called patient with her results. We discussed findings. Findings were also discussed with on call Ortho MD. We recommend that patient report to the emergency department for emergent evaluation and likely surgery.

## 2025-05-02 NOTE — PROGRESS NOTES
Patient seen by Melissa Crenshaw PA-C at [facility] Orthopedic After Hours clinic on 5/1/2025.  Spoke with patient to assess for any additional concerns/needs to be addressed.  Patient stated she was still in pain but has MRI scheduled for today and Melissa Crenshaw PA-C will follow up to review/discuss results and next treatment plan.  Patient has physical therapy appointment scheduled also.      Patient denied no other needs/concerns to be addressed at this time.

## 2025-05-02 NOTE — ED PROVIDER NOTES
Encounter Date: 5/2/2025       History     Chief Complaint   Patient presents with    MD Referral     Sent by ortho after hour clinic for surgical consult of fractured femur. Had fall on easter weekend.      75-year-old female with below past medical history presents to the emergency department for orthopedic evaluation.  She fell on 04/20/2025.  She was evaluated at urgent care as well as in the emergency department at the time.  States that she was ambulatory initially after her fall.  States that she went to a few spinning classes as well.  After her 1st spinning class, her hip pain seemed to improve.  After a few more spin classes, her pain started to worsen.  She did not feel like she could bear any weight on her leg yesterday.  She was seen in orthopedic clinic.  She received a call today telling her to come to the emergency department due to an intertrochanteric fracture.  She denies ongoing headaches since the fall.  States that she has been taking Tylenol to help with pain. She was given a walker yesterday. She denies other worsening or alleviating factors.       Review of patient's allergies indicates:  No Known Allergies  Past Medical History:   Diagnosis Date    Arthritis     Atrial fibrillation     Bronchitis     Cataract     Dry eyes     GERD (gastroesophageal reflux disease)     Glaucoma, suspect - Both Eyes     Hypothyroidism 06/23/2016    Pulmonary hypertension     Rash     Renal angiomyolipoma     Renal disorder     SCC (squamous cell carcinoma) 07/2023    left lower lateral shin    SCC (squamous cell carcinoma) 04/2023    L mid lateral shin    SCC (squamous cell carcinoma) 09/2023    L mid lower shin    Spinal stenosis     Squamous cell carcinoma     in-situ right upper inner arm, right wrist    Status post lumbar surgery 06/23/2016    Stress fracture     Thyroid disease     Venous insufficiency      Past Surgical History:   Procedure Laterality Date    ANGIOPLASTY      CATARACT EXTRACTION W/   INTRAOCULAR LENS IMPLANT Left 12/6/2022    Procedure: EXTRACTION, CATARACT, WITH IOL INSERTION;  Surgeon: Tone Brown MD;  Location: [facility] OR;  Service: Ophthalmology;  Laterality: Left;    CATARACT EXTRACTION W/  INTRAOCULAR LENS IMPLANT Right 12/20/2022    Procedure: EXTRACTION, CATARACT, WITH IOL INSERTION;  Surgeon: Tone Brown MD;  Location: [facility] OR;  Service: Ophthalmology;  Laterality: Right;    CERVICAL FUSION      COLONOSCOPY      COLONOSCOPY N/A 11/14/2017    Procedure: COLONOSCOPY;  Surgeon: Kasi Mercado MD;  Location: [facility] (4TH FLR);  Service: Endoscopy;  Laterality: N/A;    COLONOSCOPY N/A 4/26/2023    Procedure: COLONOSCOPY;  Surgeon: Jeanmarie Hathaway MD;  Location: [facility] (4TH FLR);  Service: Colon and Rectal;  Laterality: N/A;  ok to hold Eliquis 2 days per Dr Pereira  constipation-ext Miralax prep  instr mailed/portal-GT  4/21/23 pre call attempted, no answer    COLONOSCOPY N/A 5/28/2024    Procedure: COLONOSCOPY;  Surgeon: Jeanmarie Hathaway MD;  Location: [facility] (4TH FLR);  Service: Endoscopy;  Laterality: N/A;  referral Dr. Hathaway. Annual Colonoscopy for High Risk. Golytely prep instr. to portal Pending Eliquis Hold from Dr. David Pereira. [facility] Afib.EC  ok to hold Eliquis 2 days per Dr Pereira-GT  5/21- precall confirmed; instructions re-sent via portal, aware of holding eliquis     ESOPHAGOGASTRODUODENOSCOPY N/A 10/10/2019    Procedure: EGD (ESOPHAGOGASTRODUODENOSCOPY);  Surgeon: Rg Milton MD;  Location: [facility] (4TH FLR);  Service: Endoscopy;  Laterality: N/A;    ESOPHAGOGASTRODUODENOSCOPY N/A 10/6/2021    Procedure: EGD (ESOPHAGOGASTRODUODENOSCOPY);  Surgeon: Rg Milton MD;  Location: [facility] (4TH FLR);  Service: Endoscopy;  Laterality: N/A;  covid test 10/3 [place], instr emailed/portal -ml    EXCISION Left 5/17/2023    Procedure: EXCISION SQUAMOUS CELL CARCINOMA LEFT LEG;  Surgeon: Kasi Canela Jr., MD;  Location: [facility] OR [address] [place] [address];  Service:  General;  Laterality: Left;    l4-5 mid discectomy Left 03/2017    l4-5 MIS diskectomy Right 05/2016    RIGHT HEART CATHETERIZATION Right 1/27/2022    Procedure: INSERTION, CATHETER, RIGHT HEART;  Surgeon: Satnam Pickard Jr., MD;  Location: [facility] CATH LAB;  Service: Cardiology;  Laterality: Right;    TRANSFORAMINAL EPIDURAL INJECTION OF STEROID Bilateral 3/12/2024    Procedure: LUMBAR TRANSFORAMINAL BILATERAL L3/4 *ELIQUIS CLEARANCE IN CHART*;  Surgeon: Sheree Abbott MD;  Location: [facility] PAIN MGT;  Service: Pain Management;  Laterality: Bilateral;  [phone]  2 WK F/U [place]    TREATMENT OF CARDIAC ARRHYTHMIA N/A 7/17/2020    Procedure: CARDIOVERSION;  Surgeon: Kwan Bray MD;  Location: [facility] EP LAB;  Service: Cardiology;  Laterality: N/A;  AF,DCCV/SANGITA, ANES, SK, 746    TREATMENT OF CARDIAC ARRHYTHMIA N/A 7/14/2021    Procedure: CARDIOVERSION;  Surgeon: SYDNIE Cedillo MD;  Location: [facility] EP LAB;  Service: Cardiology;  Laterality: N/A;  AF, SANGITA, DCCV, MAC, EH, 3 Prep    WASHOUT Right 5/17/2023    Procedure: WASHOUT right lower arm and closure;  Surgeon: Kasi Canela Jr., MD;  Location: [address];  Service: General;  Laterality: Right;     Family History   Problem Relation Name Age of Onset    Cancer Mother          Lung cancer    Heart failure Father      Colon cancer Father      Hypertension Father      Cataracts Father      Cancer Father  80        colon    No Known Problems Sister      No Known Problems Brother      Melanoma Daughter      Diabetes Son      Heart disease Son      Cancer Son          esophageal cancer    No Known Problems Maternal Aunt      No Known Problems Maternal Uncle      No Known Problems Paternal Aunt      No Known Problems Paternal Uncle      No Known Problems Maternal Grandmother      No Known Problems Maternal Grandfather      No Known Problems Paternal Grandmother      No Known Problems Paternal Grandfather      Amblyopia Neg Hx      Blindness Neg Hx       Glaucoma Neg Hx      Macular degeneration Neg Hx      Retinal detachment Neg Hx      Strabismus Neg Hx      Stroke Neg Hx      Thyroid disease Neg Hx      Breast cancer Neg Hx      Ovarian cancer Neg Hx       Social History[1]  Review of Systems   Musculoskeletal:  Positive for arthralgias.       Physical Exam     Initial Vitals [05/02/25 1747]   BP Pulse Resp Temp SpO2   (!) 140/100 (!) 135 20 98.3 °F (36.8 °C) 96 %      MAP       --         Physical Exam    Vitals reviewed.  Constitutional: She appears well-developed and well-nourished. She is not diaphoretic. No distress.   HENT:   Head: Normocephalic and atraumatic. Mouth/Throat: Oropharynx is clear and moist.   Eyes: Conjunctivae and EOM are normal. Pupils are equal, round, and reactive to light.   Neck: Neck supple.   Normal range of motion.  Cardiovascular:  Normal heart sounds and intact distal pulses. An irregularly irregular rhythm present.   Tachycardia present.   Exam reveals no gallop and no friction rub.       No murmur heard.  Pulmonary/Chest: Breath sounds normal. She has no wheezes. She has no rhonchi. She has no rales.   Abdominal: Abdomen is soft. Bowel sounds are normal. There is no abdominal tenderness. There is no rebound and no guarding.   Musculoskeletal:         General: Tenderness present. Normal range of motion.      Cervical back: Normal range of motion and neck supple.      Comments: Right lateral hip      Neurological: She is alert and oriented to person, place, and time. She has normal strength. No cranial nerve deficit or sensory deficit. GCS score is 15. GCS eye subscore is 4. GCS verbal subscore is 5. GCS motor subscore is 6.   Skin: Skin is warm and dry. Capillary refill takes less than 2 seconds.   Psychiatric: She has a normal mood and affect. Her behavior is normal. Judgment and thought content normal.         ED Course   Procedures  Labs Reviewed   COMPREHENSIVE METABOLIC PANEL - Abnormal       Result Value    Sodium 137       Potassium 4.0      Chloride 96      CO2 27      Glucose 101      BUN 15      Creatinine 0.8      Calcium 8.6 (*)     Protein Total 7.2      Albumin 3.1 (*)     Bilirubin Total 0.3            AST 23      ALT 12      Anion Gap 14      eGFR >60     CBC WITH DIFFERENTIAL - Abnormal    WBC 10.85      RBC 3.55 (*)     HGB 11.3 (*)     HCT 35.4 (*)      (*)     MCH 31.8 (*)     MCHC 31.9 (*)     RDW 12.6      Platelet Count 466 (*)     MPV 8.4 (*)     Nucleated RBC 0      Neut % 76.4 (*)     Lymph % 14.6 (*)     Mono % 7.7      Eos % 0.3      Basophil % 0.4      Imm Grans % 0.6 (*)     Neut # 8.30 (*)     Lymph # 1.58      Mono # 0.84      Eos # 0.03      Baso # 0.04      Imm Grans # 0.06 (*)    CBC W/ AUTO DIFFERENTIAL    Narrative:     The following orders were created for panel order CBC auto differential.  Procedure                               Abnormality         Status                     ---------                               -----------         ------                     CBC with Differential[[id number]]       Abnormal            Final result                 Please view results for these tests on the individual orders.     EKG Readings: (Independently Interpreted)   Initial Reading: No STEMI. Heart Rate: 121.   A fib with RVR        Imaging Results              X-Ray Pelvis Routine AP (In process)    Procedure changed from X-Ray Hip 2 or 3 views Right with Pelvis when performed                    X-Ray Femur 2 View Right (In process)                      Medications   morphine injection 2 mg (2 mg Intravenous Given 5/2/25 1859)   metoprolol succinate (TOPROL-XL) 24 hr tablet 25 mg (25 mg Oral Given 5/2/25 1859)   metoprolol injection 5 mg (5 mg Intravenous Given 5/2/25 2034)     Medical Decision Making  Emergent evaluation of a 75 y.o. female presenting to the emergency department complaining of right hip pain. Sent in for admission for ortho consult and intervention. Patient is afebrile,  hemodynamically stable, and non toxic appearing.   Will order labs, consult orthopedics, give analgesia.     Patient presenting for orthopedic consult for intertrochanteric fracture diagnosed on MRI performed yesterday.  No severe metabolic or hematologic derangements noted on labs.  Patient was in atrial fibrillation with RVR upon arrival.  States that she is asymptomatic.  No chest pain or shortness of breath.  No recent illnesses.  She was given her home dose of metoprolol.  A dose of IV metoprolol was also ordered, although patient's heart rate improved to the low 100s before it was given. Nurse instructed to give metoprolol if rate increases to 110s or above.   Will admit to medicine for further management. Patient agreeable. All questions answered.   The patient's history, physical exam, and plan of care was discussed with and agreed upon with my supervising physician.       Amount and/or Complexity of Data Reviewed  Labs: ordered. Decision-making details documented in ED Course.    Risk  Prescription drug management.  Decision regarding hospitalization.               ED Course as of 05/02/25 2043   Fri May 02, 2025   1928 WBC: 10.85 [JM]   1928 Hemoglobin(!): 11.3 [JM]   1928 Hematocrit(!): 35.4 [JM]   1928 Platelet Count(!): 466 [JM]   1928 BUN: 15 [JM]   1928 Creatinine: 0.8 [JM]      ED Course User Index  [JM] Gricel Antony PA-C                           Clinical Impression:  Final diagnoses:  [R00.0] Tachycardia  [S72.143A] Intertrochanteric fracture of femur (Primary)          ED Disposition Condition    Admit                   [1]   Social History  Tobacco Use    Smoking status: Never     Passive exposure: Never    Smokeless tobacco: Never   Substance Use Topics    Alcohol use: Yes     Alcohol/week: 4.0 standard drinks of alcohol     Types: 4 Glasses of wine per week     Comment: 2 glasses of wine on weekends    Drug use: No        Gricel Antony PA-C  05/02/25 2043

## 2025-05-02 NOTE — ED TRIAGE NOTES
Pt comes from home with news of a right femur fracture after doing an MRI yesterday. Pt had fallen on Easter, hurts her right leg, left eye, and right elbow. Pt is on blood thinners, but a CT scan of the head showed no bleeding.

## 2025-05-03 ENCOUNTER — ANESTHESIA (OUTPATIENT)
Dept: SURGERY | Facility: HOSPITAL | Age: 75
DRG: 481 | End: 2025-05-03
Payer: MEDICARE

## 2025-05-03 PROBLEM — D69.2 PURPURA: Status: RESOLVED | Noted: 2024-02-29 | Resolved: 2025-05-03

## 2025-05-03 PROBLEM — Z92.89 HISTORY OF CARDIOVERSION: Status: RESOLVED | Noted: 2021-08-12 | Resolved: 2025-05-03

## 2025-05-03 PROBLEM — R13.10 DYSPHAGIA: Status: RESOLVED | Noted: 2021-10-06 | Resolved: 2025-05-03

## 2025-05-03 PROBLEM — S72.144D CLOSED NONDISPLACED INTERTROCHANTERIC FRACTURE OF RIGHT FEMUR WITH ROUTINE HEALING: Status: ACTIVE | Noted: 2025-05-02

## 2025-05-03 PROBLEM — R29.898 LEG HEAVINESS: Status: RESOLVED | Noted: 2023-09-21 | Resolved: 2025-05-03

## 2025-05-03 PROBLEM — Z01.810 PREOP CARDIOVASCULAR EXAM: Status: RESOLVED | Noted: 2025-05-03 | Resolved: 2025-05-03

## 2025-05-03 PROBLEM — Z01.810 PREOP CARDIOVASCULAR EXAM: Status: ACTIVE | Noted: 2025-05-03

## 2025-05-03 PROBLEM — B30.9 ACUTE VIRAL CONJUNCTIVITIS OF BOTH EYES: Status: ACTIVE | Noted: 2025-05-03

## 2025-05-03 PROBLEM — R06.02 SHORTNESS OF BREATH: Status: RESOLVED | Noted: 2024-06-17 | Resolved: 2025-05-03

## 2025-05-03 PROBLEM — Z01.818 PREOPERATIVE EXAMINATION: Status: RESOLVED | Noted: 2025-05-02 | Resolved: 2025-05-03

## 2025-05-03 PROBLEM — R20.2 PINS AND NEEDLES SENSATION: Status: RESOLVED | Noted: 2024-08-27 | Resolved: 2025-05-03

## 2025-05-03 PROBLEM — N30.00 ACUTE CYSTITIS WITHOUT HEMATURIA: Status: ACTIVE | Noted: 2025-05-03

## 2025-05-03 LAB
ABSOLUTE EOSINOPHIL (OHS): 0.08 K/UL
ABSOLUTE EOSINOPHIL (OHS): 0.11 K/UL
ABSOLUTE MONOCYTE (OHS): 0.79 K/UL (ref 0.3–1)
ABSOLUTE MONOCYTE (OHS): 0.81 K/UL (ref 0.3–1)
ABSOLUTE NEUTROPHIL COUNT (OHS): 5.18 K/UL (ref 1.8–7.7)
ABSOLUTE NEUTROPHIL COUNT (OHS): 9.89 K/UL (ref 1.8–7.7)
ANION GAP (OHS): 9 MMOL/L (ref 8–16)
BASOPHILS # BLD AUTO: 0.05 K/UL
BASOPHILS # BLD AUTO: 0.06 K/UL
BASOPHILS NFR BLD AUTO: 0.5 %
BASOPHILS NFR BLD AUTO: 0.6 %
BILIRUB UR QL STRIP.AUTO: NEGATIVE
BUN SERPL-MCNC: 16 MG/DL (ref 8–23)
CALCIUM SERPL-MCNC: 7.9 MG/DL (ref 8.7–10.5)
CHLORIDE SERPL-SCNC: 103 MMOL/L (ref 95–110)
CLARITY UR: ABNORMAL
CO2 SERPL-SCNC: 26 MMOL/L (ref 23–29)
COLOR UR AUTO: YELLOW
CREAT SERPL-MCNC: 0.7 MG/DL (ref 0.5–1.4)
ERYTHROCYTE [DISTWIDTH] IN BLOOD BY AUTOMATED COUNT: 12.7 % (ref 11.5–14.5)
ERYTHROCYTE [DISTWIDTH] IN BLOOD BY AUTOMATED COUNT: 12.8 % (ref 11.5–14.5)
GFR SERPLBLD CREATININE-BSD FMLA CKD-EPI: >60 ML/MIN/1.73/M2
GLUCOSE SERPL-MCNC: 83 MG/DL (ref 70–110)
GLUCOSE UR QL STRIP: NEGATIVE
HCT VFR BLD AUTO: 32.4 % (ref 37–48.5)
HCT VFR BLD AUTO: 36.6 % (ref 37–48.5)
HGB BLD-MCNC: 10.3 GM/DL (ref 12–16)
HGB BLD-MCNC: 11.4 GM/DL (ref 12–16)
HGB UR QL STRIP: ABNORMAL
HOLD SPECIMEN: NORMAL
IMM GRANULOCYTES # BLD AUTO: 0.05 K/UL (ref 0–0.04)
IMM GRANULOCYTES # BLD AUTO: 0.13 K/UL (ref 0–0.04)
IMM GRANULOCYTES NFR BLD AUTO: 0.6 % (ref 0–0.5)
IMM GRANULOCYTES NFR BLD AUTO: 1 % (ref 0–0.5)
INDIRECT COOMBS: NORMAL
KETONES UR QL STRIP: ABNORMAL
LEUKOCYTE ESTERASE UR QL STRIP: ABNORMAL
LYMPHOCYTES # BLD AUTO: 1.75 K/UL (ref 1–4.8)
LYMPHOCYTES # BLD AUTO: 1.91 K/UL (ref 1–4.8)
MAGNESIUM SERPL-MCNC: 2.3 MG/DL (ref 1.6–2.6)
MCH RBC QN AUTO: 31.7 PG (ref 27–31)
MCH RBC QN AUTO: 32.1 PG (ref 27–31)
MCHC RBC AUTO-ENTMCNC: 31.1 G/DL (ref 32–36)
MCHC RBC AUTO-ENTMCNC: 31.8 G/DL (ref 32–36)
MCV RBC AUTO: 101 FL (ref 82–98)
MCV RBC AUTO: 102 FL (ref 82–98)
MICROSCOPIC COMMENT: ABNORMAL
NITRITE UR QL STRIP: POSITIVE
NUCLEATED RBC (/100WBC) (OHS): 0 /100 WBC
NUCLEATED RBC (/100WBC) (OHS): 0 /100 WBC
OHS QRS DURATION: 62 MS
OHS QRS DURATION: 66 MS
OHS QTC CALCULATION: 454 MS
OHS QTC CALCULATION: 485 MS
PH UR STRIP: 6 [PH]
PHOSPHATE SERPL-MCNC: 3.1 MG/DL (ref 2.7–4.5)
PLATELET # BLD AUTO: 420 K/UL (ref 150–450)
PLATELET # BLD AUTO: 439 K/UL (ref 150–450)
PMV BLD AUTO: 8.3 FL (ref 9.2–12.9)
PMV BLD AUTO: 8.5 FL (ref 9.2–12.9)
POTASSIUM SERPL-SCNC: 3.8 MMOL/L (ref 3.5–5.1)
PROT UR QL STRIP: ABNORMAL
RBC # BLD AUTO: 3.21 M/UL (ref 4–5.4)
RBC # BLD AUTO: 3.6 M/UL (ref 4–5.4)
RBC #/AREA URNS AUTO: 20 /HPF (ref 0–4)
RELATIVE EOSINOPHIL (OHS): 0.6 %
RELATIVE EOSINOPHIL (OHS): 1.4 %
RELATIVE LYMPHOCYTE (OHS): 14.8 % (ref 18–48)
RELATIVE LYMPHOCYTE (OHS): 22.1 % (ref 18–48)
RELATIVE MONOCYTE (OHS): 10 % (ref 4–15)
RELATIVE MONOCYTE (OHS): 6.3 % (ref 4–15)
RELATIVE NEUTROPHIL (OHS): 65.3 % (ref 38–73)
RELATIVE NEUTROPHIL (OHS): 76.8 % (ref 38–73)
RH BLD: NORMAL
SODIUM SERPL-SCNC: 138 MMOL/L (ref 136–145)
SP GR UR STRIP: 1.03
SPECIMEN OUTDATE: NORMAL
UROBILINOGEN UR STRIP-ACNC: NEGATIVE EU/DL
WBC # BLD AUTO: 12.88 K/UL (ref 3.9–12.7)
WBC # BLD AUTO: 7.93 K/UL (ref 3.9–12.7)
WBC #/AREA URNS AUTO: >100 /HPF (ref 0–5)
WBC CLUMPS UR QL AUTO: ABNORMAL

## 2025-05-03 PROCEDURE — 25000003 PHARM REV CODE 250: Mod: HCNC | Performed by: NURSE ANESTHETIST, CERTIFIED REGISTERED

## 2025-05-03 PROCEDURE — 71000015 HC POSTOP RECOV 1ST HR: Mod: HCNC | Performed by: STUDENT IN AN ORGANIZED HEALTH CARE EDUCATION/TRAINING PROGRAM

## 2025-05-03 PROCEDURE — 63600175 PHARM REV CODE 636 W HCPCS: Mod: HCNC | Performed by: STUDENT IN AN ORGANIZED HEALTH CARE EDUCATION/TRAINING PROGRAM

## 2025-05-03 PROCEDURE — 99222 1ST HOSP IP/OBS MODERATE 55: CPT | Mod: HCNC,,, | Performed by: INTERNAL MEDICINE

## 2025-05-03 PROCEDURE — 81001 URINALYSIS AUTO W/SCOPE: CPT | Mod: HCNC

## 2025-05-03 PROCEDURE — C1769 GUIDE WIRE: HCPCS | Mod: HCNC | Performed by: STUDENT IN AN ORGANIZED HEALTH CARE EDUCATION/TRAINING PROGRAM

## 2025-05-03 PROCEDURE — 36000711: Mod: HCNC | Performed by: STUDENT IN AN ORGANIZED HEALTH CARE EDUCATION/TRAINING PROGRAM

## 2025-05-03 PROCEDURE — 99900035 HC TECH TIME PER 15 MIN (STAT): Mod: HCNC

## 2025-05-03 PROCEDURE — 97162 PT EVAL MOD COMPLEX 30 MIN: CPT | Mod: HCNC

## 2025-05-03 PROCEDURE — 36415 COLL VENOUS BLD VENIPUNCTURE: CPT | Mod: HCNC | Performed by: STUDENT IN AN ORGANIZED HEALTH CARE EDUCATION/TRAINING PROGRAM

## 2025-05-03 PROCEDURE — 85025 COMPLETE CBC W/AUTO DIFF WBC: CPT | Mod: HCNC | Performed by: STUDENT IN AN ORGANIZED HEALTH CARE EDUCATION/TRAINING PROGRAM

## 2025-05-03 PROCEDURE — 63600175 PHARM REV CODE 636 W HCPCS: Mod: HCNC

## 2025-05-03 PROCEDURE — 25000003 PHARM REV CODE 250: Mod: HCNC | Performed by: STUDENT IN AN ORGANIZED HEALTH CARE EDUCATION/TRAINING PROGRAM

## 2025-05-03 PROCEDURE — 27201423 OPTIME MED/SURG SUP & DEVICES STERILE SUPPLY: Mod: HCNC | Performed by: STUDENT IN AN ORGANIZED HEALTH CARE EDUCATION/TRAINING PROGRAM

## 2025-05-03 PROCEDURE — 64474 LWR XTR FSCL PLN BLK UNI NFS: CPT | Mod: 59,HCNC,RT, | Performed by: STUDENT IN AN ORGANIZED HEALTH CARE EDUCATION/TRAINING PROGRAM

## 2025-05-03 PROCEDURE — 82310 ASSAY OF CALCIUM: CPT | Mod: HCNC | Performed by: STUDENT IN AN ORGANIZED HEALTH CARE EDUCATION/TRAINING PROGRAM

## 2025-05-03 PROCEDURE — 21400001 HC TELEMETRY ROOM: Mod: HCNC

## 2025-05-03 PROCEDURE — 37000008 HC ANESTHESIA 1ST 15 MINUTES: Mod: HCNC | Performed by: STUDENT IN AN ORGANIZED HEALTH CARE EDUCATION/TRAINING PROGRAM

## 2025-05-03 PROCEDURE — 25000003 PHARM REV CODE 250: Mod: HCNC

## 2025-05-03 PROCEDURE — 37000009 HC ANESTHESIA EA ADD 15 MINS: Mod: HCNC | Performed by: STUDENT IN AN ORGANIZED HEALTH CARE EDUCATION/TRAINING PROGRAM

## 2025-05-03 PROCEDURE — 0QS606Z REPOSITION RIGHT UPPER FEMUR WITH INTRAMEDULLARY INTERNAL FIXATION DEVICE, OPEN APPROACH: ICD-10-PCS | Performed by: STUDENT IN AN ORGANIZED HEALTH CARE EDUCATION/TRAINING PROGRAM

## 2025-05-03 PROCEDURE — 97530 THERAPEUTIC ACTIVITIES: CPT | Mod: HCNC

## 2025-05-03 PROCEDURE — 97116 GAIT TRAINING THERAPY: CPT | Mod: HCNC

## 2025-05-03 PROCEDURE — 71000033 HC RECOVERY, INTIAL HOUR: Mod: HCNC | Performed by: STUDENT IN AN ORGANIZED HEALTH CARE EDUCATION/TRAINING PROGRAM

## 2025-05-03 PROCEDURE — 63600175 PHARM REV CODE 636 W HCPCS: Mod: HCNC | Performed by: NURSE ANESTHETIST, CERTIFIED REGISTERED

## 2025-05-03 PROCEDURE — 36000710: Mod: HCNC | Performed by: STUDENT IN AN ORGANIZED HEALTH CARE EDUCATION/TRAINING PROGRAM

## 2025-05-03 PROCEDURE — 87088 URINE BACTERIA CULTURE: CPT | Mod: HCNC

## 2025-05-03 PROCEDURE — 97165 OT EVAL LOW COMPLEX 30 MIN: CPT | Mod: HCNC

## 2025-05-03 PROCEDURE — 86850 RBC ANTIBODY SCREEN: CPT | Mod: HCNC | Performed by: INTERNAL MEDICINE

## 2025-05-03 PROCEDURE — 83735 ASSAY OF MAGNESIUM: CPT | Mod: HCNC | Performed by: STUDENT IN AN ORGANIZED HEALTH CARE EDUCATION/TRAINING PROGRAM

## 2025-05-03 PROCEDURE — 27245 TREAT THIGH FRACTURE: CPT | Mod: HCNC,RT,, | Performed by: STUDENT IN AN ORGANIZED HEALTH CARE EDUCATION/TRAINING PROGRAM

## 2025-05-03 PROCEDURE — 84100 ASSAY OF PHOSPHORUS: CPT | Mod: HCNC | Performed by: STUDENT IN AN ORGANIZED HEALTH CARE EDUCATION/TRAINING PROGRAM

## 2025-05-03 PROCEDURE — C1713 ANCHOR/SCREW BN/BN,TIS/BN: HCPCS | Mod: HCNC | Performed by: STUDENT IN AN ORGANIZED HEALTH CARE EDUCATION/TRAINING PROGRAM

## 2025-05-03 DEVICE — LAG SCREW
Type: IMPLANTABLE DEVICE | Site: HIP | Status: FUNCTIONAL
Brand: GAMMA

## 2025-05-03 DEVICE — SCREW LOCKING BONE T2 5X32MM: Type: IMPLANTABLE DEVICE | Site: HIP | Status: FUNCTIONAL

## 2025-05-03 DEVICE — TROCHANTERIC NAIL
Type: IMPLANTABLE DEVICE | Site: HIP | Status: FUNCTIONAL
Brand: GAMMA

## 2025-05-03 RX ORDER — ACETAMINOPHEN 500 MG
1000 TABLET ORAL EVERY 6 HOURS
Status: COMPLETED | OUTPATIENT
Start: 2025-05-03 | End: 2025-05-05

## 2025-05-03 RX ORDER — DEXAMETHASONE SODIUM PHOSPHATE 4 MG/ML
INJECTION, SOLUTION INTRA-ARTICULAR; INTRALESIONAL; INTRAMUSCULAR; INTRAVENOUS; SOFT TISSUE
Status: DISCONTINUED | OUTPATIENT
Start: 2025-05-03 | End: 2025-05-03

## 2025-05-03 RX ORDER — EPHEDRINE SULFATE 50 MG/ML
INJECTION, SOLUTION INTRAVENOUS
Status: DISCONTINUED | OUTPATIENT
Start: 2025-05-03 | End: 2025-05-03

## 2025-05-03 RX ORDER — BUPIVACAINE HYDROCHLORIDE 2.5 MG/ML
INJECTION, SOLUTION EPIDURAL; INFILTRATION; INTRACAUDAL; PERINEURAL
Status: DISCONTINUED | OUTPATIENT
Start: 2025-05-03 | End: 2025-05-03

## 2025-05-03 RX ORDER — ONDANSETRON HYDROCHLORIDE 2 MG/ML
INJECTION, SOLUTION INTRAVENOUS
Status: DISCONTINUED | OUTPATIENT
Start: 2025-05-03 | End: 2025-05-03

## 2025-05-03 RX ORDER — METHOCARBAMOL 500 MG/1
500 TABLET, FILM COATED ORAL EVERY 6 HOURS
Status: DISCONTINUED | OUTPATIENT
Start: 2025-05-03 | End: 2025-05-05

## 2025-05-03 RX ORDER — FENTANYL CITRATE 50 UG/ML
INJECTION, SOLUTION INTRAMUSCULAR; INTRAVENOUS
Status: COMPLETED
Start: 2025-05-03 | End: 2025-05-03

## 2025-05-03 RX ORDER — OXYCODONE HYDROCHLORIDE 5 MG/1
5 TABLET ORAL
Refills: 0 | Status: DISCONTINUED | OUTPATIENT
Start: 2025-05-03 | End: 2025-05-03

## 2025-05-03 RX ORDER — LIDOCAINE HYDROCHLORIDE 20 MG/ML
INJECTION, SOLUTION EPIDURAL; INFILTRATION; INTRACAUDAL; PERINEURAL
Status: DISCONTINUED | OUTPATIENT
Start: 2025-05-03 | End: 2025-05-03

## 2025-05-03 RX ORDER — MUPIROCIN 20 MG/G
OINTMENT TOPICAL
Status: DISCONTINUED | OUTPATIENT
Start: 2025-05-03 | End: 2025-05-03 | Stop reason: HOSPADM

## 2025-05-03 RX ORDER — SODIUM CHLORIDE 0.9 % (FLUSH) 0.9 %
10 SYRINGE (ML) INJECTION
Status: DISCONTINUED | OUTPATIENT
Start: 2025-05-03 | End: 2025-05-03

## 2025-05-03 RX ORDER — ROPIVACAINE HYDROCHLORIDE 5 MG/ML
INJECTION, SOLUTION EPIDURAL; INFILTRATION; PERINEURAL
Status: COMPLETED
Start: 2025-05-03 | End: 2025-05-03

## 2025-05-03 RX ORDER — LIDOCAINE HYDROCHLORIDE 10 MG/ML
1 INJECTION, SOLUTION EPIDURAL; INFILTRATION; INTRACAUDAL; PERINEURAL
Status: DISCONTINUED | OUTPATIENT
Start: 2025-05-03 | End: 2025-05-03 | Stop reason: HOSPADM

## 2025-05-03 RX ORDER — PROPOFOL 10 MG/ML
VIAL (ML) INTRAVENOUS
Status: DISCONTINUED | OUTPATIENT
Start: 2025-05-03 | End: 2025-05-03

## 2025-05-03 RX ORDER — FENTANYL CITRATE 50 UG/ML
25 INJECTION, SOLUTION INTRAMUSCULAR; INTRAVENOUS EVERY 5 MIN PRN
Status: DISCONTINUED | OUTPATIENT
Start: 2025-05-03 | End: 2025-05-03 | Stop reason: HOSPADM

## 2025-05-03 RX ORDER — FENTANYL CITRATE 50 UG/ML
INJECTION, SOLUTION INTRAMUSCULAR; INTRAVENOUS
Status: DISCONTINUED | OUTPATIENT
Start: 2025-05-03 | End: 2025-05-03

## 2025-05-03 RX ORDER — OXYCODONE HYDROCHLORIDE 5 MG/1
5 TABLET ORAL
Refills: 0 | Status: DISCONTINUED | OUTPATIENT
Start: 2025-05-03 | End: 2025-05-05

## 2025-05-03 RX ORDER — ROCURONIUM BROMIDE 10 MG/ML
INJECTION, SOLUTION INTRAVENOUS
Status: DISCONTINUED | OUTPATIENT
Start: 2025-05-03 | End: 2025-05-03

## 2025-05-03 RX ORDER — AMOXICILLIN 250 MG
1 CAPSULE ORAL DAILY
Status: DISCONTINUED | OUTPATIENT
Start: 2025-05-04 | End: 2025-05-04

## 2025-05-03 RX ORDER — PHENYLEPHRINE HCL IN 0.9% NACL 1 MG/10 ML
SYRINGE (ML) INTRAVENOUS
Status: DISCONTINUED | OUTPATIENT
Start: 2025-05-03 | End: 2025-05-03

## 2025-05-03 RX ORDER — DROPERIDOL 2.5 MG/ML
0.62 INJECTION, SOLUTION INTRAMUSCULAR; INTRAVENOUS ONCE AS NEEDED
Status: DISCONTINUED | OUTPATIENT
Start: 2025-05-03 | End: 2025-05-03 | Stop reason: HOSPADM

## 2025-05-03 RX ORDER — POLYETHYLENE GLYCOL 3350 17 G/17G
17 POWDER, FOR SOLUTION ORAL DAILY
Status: DISCONTINUED | OUTPATIENT
Start: 2025-05-03 | End: 2025-05-06 | Stop reason: HOSPADM

## 2025-05-03 RX ORDER — AMOXICILLIN 250 MG
1 CAPSULE ORAL 2 TIMES DAILY
Status: DISCONTINUED | OUTPATIENT
Start: 2025-05-03 | End: 2025-05-06 | Stop reason: HOSPADM

## 2025-05-03 RX ORDER — SODIUM CHLORIDE 0.9 % (FLUSH) 0.9 %
10 SYRINGE (ML) INJECTION
Status: DISCONTINUED | OUTPATIENT
Start: 2025-05-03 | End: 2025-05-03 | Stop reason: HOSPADM

## 2025-05-03 RX ORDER — MUPIROCIN 20 MG/G
1 OINTMENT TOPICAL
Status: DISCONTINUED | OUTPATIENT
Start: 2025-05-03 | End: 2025-05-03

## 2025-05-03 RX ORDER — CEFTRIAXONE 1 G/1
1 INJECTION, POWDER, FOR SOLUTION INTRAMUSCULAR; INTRAVENOUS
Status: COMPLETED | OUTPATIENT
Start: 2025-05-04 | End: 2025-05-06

## 2025-05-03 RX ORDER — CEFAZOLIN 2 G/1
2 INJECTION, POWDER, FOR SOLUTION INTRAMUSCULAR; INTRAVENOUS
Status: COMPLETED | OUTPATIENT
Start: 2025-05-03 | End: 2025-05-04

## 2025-05-03 RX ORDER — GLUCAGON 1 MG
1 KIT INJECTION
Status: DISCONTINUED | OUTPATIENT
Start: 2025-05-03 | End: 2025-05-03

## 2025-05-03 RX ORDER — OXYCODONE HYDROCHLORIDE 10 MG/1
10 TABLET ORAL
Refills: 0 | Status: DISCONTINUED | OUTPATIENT
Start: 2025-05-03 | End: 2025-05-03

## 2025-05-03 RX ORDER — METHOCARBAMOL 500 MG/1
500 TABLET, FILM COATED ORAL EVERY 6 HOURS PRN
Status: DISCONTINUED | OUTPATIENT
Start: 2025-05-03 | End: 2025-05-03

## 2025-05-03 RX ORDER — CEFAZOLIN 2 G/1
2 INJECTION, POWDER, FOR SOLUTION INTRAMUSCULAR; INTRAVENOUS
Status: COMPLETED | OUTPATIENT
Start: 2025-05-03 | End: 2025-05-03

## 2025-05-03 RX ORDER — ROPIVACAINE HYDROCHLORIDE 2 MG/ML
INJECTION, SOLUTION EPIDURAL; INFILTRATION; PERINEURAL CONTINUOUS
Status: DISCONTINUED | OUTPATIENT
Start: 2025-05-03 | End: 2025-05-06

## 2025-05-03 RX ADMIN — BUPROPION HYDROCHLORIDE 150 MG: 150 TABLET, EXTENDED RELEASE ORAL at 09:05

## 2025-05-03 RX ADMIN — EPHEDRINE SULFATE 5 MG: 50 INJECTION INTRAVENOUS at 07:05

## 2025-05-03 RX ADMIN — ROCURONIUM BROMIDE 50 MG: 10 INJECTION, SOLUTION INTRAVENOUS at 07:05

## 2025-05-03 RX ADMIN — MUPIROCIN: 20 OINTMENT TOPICAL at 06:05

## 2025-05-03 RX ADMIN — CEFAZOLIN 2 G: 330 INJECTION, POWDER, FOR SOLUTION INTRAMUSCULAR; INTRAVENOUS at 08:05

## 2025-05-03 RX ADMIN — APIXABAN 5 MG: 5 TABLET, FILM COATED ORAL at 10:05

## 2025-05-03 RX ADMIN — Medication 100 MCG: at 07:05

## 2025-05-03 RX ADMIN — FENTANYL CITRATE 25 MCG: 50 INJECTION INTRAMUSCULAR; INTRAVENOUS at 10:05

## 2025-05-03 RX ADMIN — ROCURONIUM BROMIDE 10 MG: 10 INJECTION, SOLUTION INTRAVENOUS at 08:05

## 2025-05-03 RX ADMIN — LEVOTHYROXINE SODIUM 137 MCG: 25 TABLET ORAL at 06:05

## 2025-05-03 RX ADMIN — SENNOSIDES AND DOCUSATE SODIUM 1 TABLET: 50; 8.6 TABLET ORAL at 09:05

## 2025-05-03 RX ADMIN — BUPROPION HYDROCHLORIDE 150 MG: 150 TABLET, EXTENDED RELEASE ORAL at 10:05

## 2025-05-03 RX ADMIN — ROCURONIUM BROMIDE 20 MG: 10 INJECTION, SOLUTION INTRAVENOUS at 08:05

## 2025-05-03 RX ADMIN — ROPIVACAINE HYDROCHLORIDE: 5 INJECTION EPIDURAL; INFILTRATION; PERINEURAL at 07:05

## 2025-05-03 RX ADMIN — FENTANYL CITRATE 50 MCG: 50 INJECTION INTRAMUSCULAR; INTRAVENOUS at 07:05

## 2025-05-03 RX ADMIN — EPHEDRINE SULFATE 5 MG: 50 INJECTION INTRAVENOUS at 08:05

## 2025-05-03 RX ADMIN — ONDANSETRON 4 MG: 2 INJECTION INTRAMUSCULAR; INTRAVENOUS at 09:05

## 2025-05-03 RX ADMIN — METOPROLOL SUCCINATE 25 MG: 25 TABLET, EXTENDED RELEASE ORAL at 10:05

## 2025-05-03 RX ADMIN — OXYCODONE HYDROCHLORIDE 10 MG: 10 TABLET ORAL at 01:05

## 2025-05-03 RX ADMIN — THERA TABS 1 TABLET: TAB at 10:05

## 2025-05-03 RX ADMIN — SUGAMMADEX 200 MG: 100 INJECTION, SOLUTION INTRAVENOUS at 09:05

## 2025-05-03 RX ADMIN — Medication: at 09:05

## 2025-05-03 RX ADMIN — PROPOFOL 10 MG: 10 INJECTION, EMULSION INTRAVENOUS at 09:05

## 2025-05-03 RX ADMIN — Medication: at 10:05

## 2025-05-03 RX ADMIN — ACETAMINOPHEN 1000 MG: 500 TABLET ORAL at 06:05

## 2025-05-03 RX ADMIN — PROPOFOL 20 MG: 10 INJECTION, EMULSION INTRAVENOUS at 09:05

## 2025-05-03 RX ADMIN — PROPOFOL 20 MG: 10 INJECTION, EMULSION INTRAVENOUS at 08:05

## 2025-05-03 RX ADMIN — BUPIVACAINE HYDROCHLORIDE 40 ML: 2.5 INJECTION, SOLUTION EPIDURAL; INFILTRATION; INTRACAUDAL; PERINEURAL at 09:05

## 2025-05-03 RX ADMIN — SODIUM CHLORIDE, SODIUM GLUCONATE, SODIUM ACETATE, POTASSIUM CHLORIDE, MAGNESIUM CHLORIDE, SODIUM PHOSPHATE, DIBASIC, AND POTASSIUM PHOSPHATE: .53; .5; .37; .037; .03; .012; .00082 INJECTION, SOLUTION INTRAVENOUS at 08:05

## 2025-05-03 RX ADMIN — APIXABAN 5 MG: 5 TABLET, FILM COATED ORAL at 09:05

## 2025-05-03 RX ADMIN — SODIUM CHLORIDE: 0.9 INJECTION, SOLUTION INTRAVENOUS at 07:05

## 2025-05-03 RX ADMIN — METHOCARBAMOL 500 MG: 500 TABLET ORAL at 07:05

## 2025-05-03 RX ADMIN — LIDOCAINE HYDROCHLORIDE 60 MG: 20 INJECTION, SOLUTION EPIDURAL; INFILTRATION; INTRACAUDAL at 07:05

## 2025-05-03 RX ADMIN — DEXAMETHASONE SODIUM PHOSPHATE 4 MG: 4 INJECTION, SOLUTION INTRAMUSCULAR; INTRAVENOUS at 07:05

## 2025-05-03 RX ADMIN — PROPOFOL 100 MG: 10 INJECTION, EMULSION INTRAVENOUS at 07:05

## 2025-05-03 RX ADMIN — CEFAZOLIN 2 G: 2 INJECTION, POWDER, FOR SOLUTION INTRAMUSCULAR; INTRAVENOUS at 05:05

## 2025-05-03 RX ADMIN — ACETAMINOPHEN 1000 MG: 500 TABLET ORAL at 10:05

## 2025-05-03 RX ADMIN — OXYCODONE HYDROCHLORIDE AND ACETAMINOPHEN 1000 MG: 500 TABLET ORAL at 10:05

## 2025-05-03 RX ADMIN — ACETAMINOPHEN 1000 MG: 500 TABLET ORAL at 05:05

## 2025-05-03 NOTE — ED NOTES
Telemetry Verification   Patient placed on Telemetry Box  Verified with War Room  Box # [id number]   Monitor Tech    Rate    Rhythm

## 2025-05-03 NOTE — PT/OT/SLP EVAL
"Physical Therapy Evaluation    Patient Name:  Angelina Man   MRN:  [id number]    Recommendations:     Discharge Recommendations: Low Intensity Therapy   Discharge Equipment Recommendations: none   Barriers to discharge: None    Assessment:     Angelina Man is a 75 y.o. female admitted with a medical diagnosis of Closed nondisplaced intertrochanteric fracture of right femur with routine healing.  She presents with the following impairments/functional limitations: weakness, impaired endurance, impaired functional mobility, gait instability, impaired balance, decreased lower extremity function, pain, decreased safety awareness, edema, impaired skin, orthopedic precautions Patient pleasantly agreeable to therapy eval with support from daughter. Tolerating bed mobility, transfers to bedside chair. Below her actively independent functional baseline, as expected s/p IM pratik R femur. Patient will continue to benefit from skilled PT during this admit to address BLE strength and endurance deficits, and maximize independence with functional mobility.    Rehab Prognosis: Good; patient would benefit from acute skilled PT services to address these deficits and reach maximum level of function.    Recent Surgery: Procedure(s) (LRB):  INSERTION, INTRAMEDULLARY PRATIK, FEMUR - right selina (Right) * Day of Surgery *    Plan:     During this hospitalization, patient to be seen daily to address the identified rehab impairments via gait training, therapeutic activities, therapeutic exercises, neuromuscular re-education and progress toward the following goals:    Plan of Care Expires:  06/02/25    Subjective     Chief Complaint: "I've only needed this walker over the last few days"  Patient/Family Comments/goals: return [facility]  Pain/Comfort:  Pain Rating 1:  (lightly increased pain with mobility)  Location - Side 1: Right  Location - Orientation 1: generalized  Location 1: leg  Pain Addressed 1: Pre-medicate for activity, " Distraction  Pain Rating Post-Intervention 1:  (lightly increased pain with mobility)    Patients cultural, spiritual, [employer] conflicts given the current situation: no    Living Environment:  Patient lives alone in 2 story condo with 3 QUE.  Primary bed + bathroom on second floor, able to sleep on sofa on first floor with access to half-bath.  Prior to admission, patients level of function was independence, drives and participates in multiple exercise classes weekly. Equipment used at home: walker, rolling (since the fall on Easter 2025).  DME owned (not currently used): none.  Upon discharge, patient will have assistance from family as needed.    Objective:     Communicated with RN prior to session.  Patient found HOB elevated with telemetry, perineural catheter, FCD  upon PT entry to room.    General Precautions: Standard, fall  Orthopedic Precautions:RLE weight bearing as tolerated   Braces: N/A  Respiratory Status: Room air    Exams:  Cognitive Exam:  Patient is oriented to Person, Place, Time, and Situation  Gross Motor Coordination:  WFL  Postural Exam:  Patient presented with the following abnormalities:    -       No postural abnormalities identified  RLE ROM: limited hip flex and knee ext, lacking ankle DF (reports this is baseline and cause of other falls in the past)  RLE Strength: hip and knee 2+/5, ankle DF 3-/5  LLE ROM: WFL  LLE Strength: WFL  Lacking light touch sensation in medial R thigh, knee, calf    Functional Mobility:  Bed Mobility:     Scooting: contact guard assistance  Supine to Sit: contact guard assistance  Transfers:     Sit to Stand:  minimum assistance with rolling walker  Gait: 2 ft with RW and mod x2  Shortened step length, antalgic gait pattern, R knee buckling with OT blocking for safety during L limb advancement, assist with AD management, poor floor clearance  Balance:   Sitting: CGA  Standing: mod x2      AM-PAC 6 CLICK MOBILITY  Total Score:14       Treatment &  Education:  Patient educated on calling for assistance for any needs to improve overall safety awareness.  Patient educated on role of PT in acute care, PT POC, and PT goals.  Patient required co-evaluation with OT for highest quality care d/t decreased activity tolerance and increased level of assistance necessary.  Patient educated on importance of OOB activity to promote overall endurance.    Patient left up in chair with all lines intact, call button in reach, nursing notified, and daughter present.    GOALS:   Multidisciplinary Problems       Physical Therapy Goals          Problem: Physical Therapy    Goal Priority Disciplines Outcome Interventions   Physical Therapy Goal     PT, PT/OT Progressing    Description: Goals to be met by: 25     Patient will increase functional independence with mobility by performin. Supine to sit with Modified [place]  2. Sit to supine with Modified [place]  3. Sit to stand transfer with Modified [place]  4. Bed to chair transfer with Supervision using Rolling Walker  5. Gait  x 150 feet with Supervision using Rolling Walker.   6. Lower extremity exercise program x15 reps per handout, with assistance as needed                         DME Justifications:  No DME recommended requiring DME justifications    History:     Past Medical History:   Diagnosis Date    Arthritis     Atrial fibrillation     Bronchitis     Cataract     Dry eyes     GERD (gastroesophageal reflux disease)     Glaucoma, suspect - Both Eyes     Hypothyroidism 2016    Pulmonary hypertension     Rash     Renal angiomyolipoma     Renal disorder     SCC (squamous cell carcinoma) 2023    left lower lateral shin    SCC (squamous cell carcinoma) 2023    L mid lateral shin    SCC (squamous cell carcinoma) 2023    L mid lower shin    Spinal stenosis     Squamous cell carcinoma     in-situ right upper inner arm, right wrist    Status post lumbar surgery 2016    Stress fracture      Thyroid disease     Venous insufficiency        Past Surgical History:   Procedure Laterality Date    ANGIOPLASTY      CATARACT EXTRACTION W/  INTRAOCULAR LENS IMPLANT Left 12/6/2022    Procedure: EXTRACTION, CATARACT, WITH IOL INSERTION;  Surgeon: Tone Brown MD;  Location: [facility] OR;  Service: Ophthalmology;  Laterality: Left;    CATARACT EXTRACTION W/  INTRAOCULAR LENS IMPLANT Right 12/20/2022    Procedure: EXTRACTION, CATARACT, WITH IOL INSERTION;  Surgeon: Tone Brown MD;  Location: [facility] OR;  Service: Ophthalmology;  Laterality: Right;    CERVICAL FUSION      COLONOSCOPY      COLONOSCOPY N/A 11/14/2017    Procedure: COLONOSCOPY;  Surgeon: Kasi Mercado MD;  Location: [facility] ENDO (4TH FLR);  Service: Endoscopy;  Laterality: N/A;    COLONOSCOPY N/A 4/26/2023    Procedure: COLONOSCOPY;  Surgeon: Jeanmarie Hathaway MD;  Location: [facility] (4TH FLR);  Service: Colon and Rectal;  Laterality: N/A;  ok to hold Eliquis 2 days per Dr Pereira  constipation-ext Miralax prep  instr mailed/portal-GT  4/21/23 pre call attempted, no answer    COLONOSCOPY N/A 5/28/2024    Procedure: COLONOSCOPY;  Surgeon: Jeanmarie Hathaway MD;  Location: [facility] (4TH FLR);  Service: Endoscopy;  Laterality: N/A;  referral Dr. Hathaway. Annual Colonoscopy for High Risk. Golytely prep instr. to portal Pending Eliquis Hold from Dr. David Pereira. [facility] Afib.EC  ok to hold Eliquis 2 days per Dr Pereira-GT  5/21- precall confirmed; instructions re-sent via portal, aware of holding eliquis     ESOPHAGOGASTRODUODENOSCOPY N/A 10/10/2019    Procedure: EGD (ESOPHAGOGASTRODUODENOSCOPY);  Surgeon: Rg Milton MD;  Location: [facility] ENDO (4TH FLR);  Service: Endoscopy;  Laterality: N/A;    ESOPHAGOGASTRODUODENOSCOPY N/A 10/6/2021    Procedure: EGD (ESOPHAGOGASTRODUODENOSCOPY);  Surgeon: Rg Milton MD;  Location: [facility] ENDO (4TH FLR);  Service: Endoscopy;  Laterality: N/A;  covid test 10/3 [place], instr emailed/portal -     EXCISION Left 5/17/2023    Procedure: EXCISION SQUAMOUS CELL CARCINOMA LEFT LEG;  Surgeon: Kasi Canela Jr., MD;  [facility];  Service: General;  Laterality: Left;    l4-5 mid discectomy Left 03/2017    l4-5 MIS diskectomy Right 05/2016    RIGHT HEART CATHETERIZATION Right 1/27/2022    Procedure: INSERTION, CATHETER, RIGHT HEART;  Surgeon: Satnam Pickard Jr., MD;  Location: [facility] CATH LAB;  Service: Cardiology;  Laterality: Right;    TRANSFORAMINAL EPIDURAL INJECTION OF STEROID Bilateral 3/12/2024    Procedure: LUMBAR TRANSFORAMINAL BILATERAL L3/4 *ELIQUIS CLEARANCE IN CHART*;  Surgeon: Sheree Abbott MD;  Location: [facility] PAIN MGT;  Service: Pain Management;  Laterality: Bilateral;  [phone]  2 WK F/U [place]    TREATMENT OF CARDIAC ARRHYTHMIA N/A 7/17/2020    Procedure: CARDIOVERSION;  Surgeon: Kwan Bray MD;  Location: [facility] EP LAB;  Service: Cardiology;  Laterality: N/A;  AF,DCCV/SANGITA, ANES, SK, 746    TREATMENT OF CARDIAC ARRHYTHMIA N/A 7/14/2021    Procedure: CARDIOVERSION;  Surgeon: SYDNIE Cedillo MD;  Location: [facility] EP LAB;  Service: Cardiology;  Laterality: N/A;  AF, SANGITA, DCCV, MAC, EH, 3 Prep    WASHOUT Right 5/17/2023    Procedure: WASHOUT right lower arm and closure;  Surgeon: Kasi Canela Jr., MD;  Location: [facility] OR [facility] FLR;  Service: General;  Laterality: Right;       Time Tracking:     PT Received On: 05/03/25  PT Start Time: 1440     PT Stop Time: 1515  PT Total Time (min): 35 min     Billable Minutes: Evaluation 10 and Gait Training 25 05/03/2025

## 2025-05-03 NOTE — HPI
"Angelina Man is a pleasant 75 y.o. female with permanent AFib, HFpEF, hypothyroidism presenting with hip fracture.     On Easter, she reports that she drank a martini with family, then had an accidental fall onto her right hip.  She did hit her head and had an occipital hematoma, left eye bruising, and right arm trauma.  She noted right hip pain particularly with ambulation, and was evaluated at an urgent care where hip x-ray showed no obvious fracture.  She then went to an ED for CT scans of her head, which reportedly showed no bleed.  She has noted difficulty ambulating due to the hip pain, however has still been able to do so.  She has even gone to a few of her usual spin classes, which reportedly helped her hip pain.  On Thursday, after span, she could barely walk, prompting presentation for further evaluation.  MRI was performed, showing intertrochanteric femur fracture.     She is very active at baseline, and goes to the gym every day.  She she goes to spin classes multiple times per week and lifts weights.  She lives in a condo and walks up stairs without difficulty. She performs all ADLs independently.  She reports chronic lower extremity edema improved with compression stockings, but denies any chest pain, shortness for breath, or palpitations.  She is compliant with all medications, including her metoprolol and Eliquis for AFib.    Of note, she reports chronic dry eyes with intermittent "crusting" for which she takes Systane. She has noted increased itching and crusting to the eyes since presentation to the ED.   "

## 2025-05-03 NOTE — SUBJECTIVE & OBJECTIVE
Past Medical History:   Diagnosis Date    Arthritis     Atrial fibrillation     Bronchitis     Cataract     Dry eyes     GERD (gastroesophageal reflux disease)     Glaucoma, suspect - Both Eyes     Hypothyroidism 06/23/2016    Pulmonary hypertension     Rash     Renal angiomyolipoma     Renal disorder     SCC (squamous cell carcinoma) 07/2023    left lower lateral shin    SCC (squamous cell carcinoma) 04/2023    L mid lateral shin    SCC (squamous cell carcinoma) 09/2023    L mid lower shin    Spinal stenosis     Squamous cell carcinoma     in-situ right upper inner arm, right wrist    Status post lumbar surgery 06/23/2016    Stress fracture     Thyroid disease     Venous insufficiency        Past Surgical History:   Procedure Laterality Date    ANGIOPLASTY      CATARACT EXTRACTION W/  INTRAOCULAR LENS IMPLANT Left 12/6/2022    Procedure: EXTRACTION, CATARACT, WITH IOL INSERTION;  Surgeon: Tone Brown MD;  Location: [facility];  Service: Ophthalmology;  Laterality: Left;    CATARACT EXTRACTION W/  INTRAOCULAR LENS IMPLANT Right 12/20/2022    Procedure: EXTRACTION, CATARACT, WITH IOL INSERTION;  Surgeon: Tone Brown MD;  Location: [facility] OR;  Service: Ophthalmology;  Laterality: Right;    CERVICAL FUSION      COLONOSCOPY      COLONOSCOPY N/A 11/14/2017    Procedure: COLONOSCOPY;  Surgeon: Kasi Mercado MD;  Location: [facility] (4TH FLR);  Service: Endoscopy;  Laterality: N/A;    COLONOSCOPY N/A 4/26/2023    Procedure: COLONOSCOPY;  Surgeon: Jeanmarie Hathaway MD;  Location: [facility] (4TH FLR);  Service: Colon and Rectal;  Laterality: N/A;  ok to hold Eliquis 2 days per Dr Pereira  constipation-ext Miralax prep  instr mailed/portal-GT  4/21/23 pre call attempted, no answer    COLONOSCOPY N/A 5/28/2024    Procedure: COLONOSCOPY;  Surgeon: Jeanmarie Hathaway MD;  Location: [facility] (4TH FLR);  Service: Endoscopy;  Laterality: N/A;  referral Dr. Hathaway. Annual Colonoscopy for High Risk. Golytely  prep instr. to portal Pending Eliquis Hold from Dr. David Pereira. [facility] Afib.EC  ok to hold Eliquis 2 days per Dr Pereira-GT  5/21- precall confirmed; instructions re-sent via portal, aware of holding eliquis     ESOPHAGOGASTRODUODENOSCOPY N/A 10/10/2019    Procedure: EGD (ESOPHAGOGASTRODUODENOSCOPY);  Surgeon: Rg Milton MD;  Location: [facility] ENDO (4TH FLR);  Service: Endoscopy;  Laterality: N/A;    ESOPHAGOGASTRODUODENOSCOPY N/A 10/6/2021    Procedure: EGD (ESOPHAGOGASTRODUODENOSCOPY);  Surgeon: Rg Milton MD;  Location: [facility] ENDO (4TH FLR);  Service: Endoscopy;  Laterality: N/A;  covid test 10/3 [place], instr emailed/portal -ml    EXCISION Left 5/17/2023    Procedure: EXCISION SQUAMOUS CELL CARCINOMA LEFT LEG;  Surgeon: Kasi Canela Jr., MD;  Location: [facility] OR 2ND FLR;  Service: General;  Laterality: Left;    l4-5 mid discectomy Left 03/2017    l4-5 MIS diskectomy Right 05/2016    RIGHT HEART CATHETERIZATION Right 1/27/2022    Procedure: INSERTION, CATHETER, RIGHT HEART;  Surgeon: Satnam Pickard Jr., MD;  Location: [facility] CATH LAB;  Service: Cardiology;  Laterality: Right;    TRANSFORAMINAL EPIDURAL INJECTION OF STEROID Bilateral 3/12/2024    Procedure: LUMBAR TRANSFORAMINAL BILATERAL L3/4 *ELIQUIS CLEARANCE IN CHART*;  Surgeon: Sheree Abbott MD;  Location: [facility] PAIN MGT;  Service: Pain Management;  Laterality: Bilateral;  [phone]  2 WK F/U [place]    TREATMENT OF CARDIAC ARRHYTHMIA N/A 7/17/2020    Procedure: CARDIOVERSION;  Surgeon: Kwan Bray MD;  Location: [facility] EP LAB;  Service: Cardiology;  Laterality: N/A;  AF,DCCV/SANGITA, ANES, SK, 746    TREATMENT OF CARDIAC ARRHYTHMIA N/A 7/14/2021    Procedure: CARDIOVERSION;  Surgeon: SYDNIE Cedillo MD;  Location: [facility] EP LAB;  Service: Cardiology;  Laterality: N/A;  AF, SANGITA, DCCV, MAC, EH, 3 Prep    WASHOUT Right 5/17/2023    Procedure: WASHOUT right lower arm and closure;  Surgeon: Kasi Canela Jr., MD;  Location: [facility] OR [address] [place] [address];  Service:  General;  Laterality: Right;       Review of patient's allergies indicates:  No Known Allergies    No current facility-administered medications on file prior to encounter.     Current Outpatient Medications on File Prior to Encounter   Medication Sig    ELIQUIS 5 mg Tab TAKE 1 TABLET(5 MG) BY MOUTH TWICE DAILY    furosemide (LASIX) 20 MG tablet Take 1 tablet (20 mg total) by mouth once daily.    acetaminophen (TYLENOL) 500 MG tablet Take 500 mg by mouth every 6 (six) hours as needed for Pain.    acyclovir (ZOVIRAX) 400 MG tablet Take 1 tablet (400 mg total) by mouth once daily.    ascorbic acid (VITAMIN C) 1000 MG tablet Take 1,000 mg by mouth once daily.     biotin 1 mg tablet Take 1 mg by mouth 2 (two) times daily.     buPROPion (WELLBUTRIN SR) 150 MG TBSR 12 hr tablet Take 1 tablet (150 mg total) by mouth 2 (two) times daily.    diclofenac sodium (VOLTAREN ARTHRITIS PAIN) 1 % Gel Apply 2 g topically every 12 (twelve) hours.    digestive enzymes Tab Take 1 tablet by mouth once daily.     fluticasone propionate (FLONASE) 50 mcg/actuation nasal spray 1 spray (50 mcg total) by Each Nostril route 2 (two) times daily as needed for Rhinitis.    ketoconazole (NIZORAL) 2 % cream aaa bid prn flare under arm and qhs to areas on face    lactulose (CHRONULAC) 20 gram/30 mL Soln Take 30mL twice daily (every 12 hours) until bowel movement    levothyroxine (SYNTHROID) 137 MCG Tab tablet Take 1 tablet (137 mcg total) by mouth before breakfast.    lubiprostone (AMITIZA) 24 MCG Cap Take 1 capsule (24 mcg total) by mouth daily as needed (IBS symptoms).    magnesium citrate solution Drink 1 half of bottle. Drink second half of bottle if no bowel movement 12 hours after first dose.    methocarbamoL (ROBAXIN) 500 MG Tab Take 1 tablet (500 mg total) by mouth 3 (three) times daily as needed (mus).    metoprolol succinate (TOPROL-XL) 25 MG 24 hr tablet Take 1 tablet (25 mg total) by mouth once daily.    mirtazapine (REMERON) 7.5 MG Tab  TAKE 1 TABLET(7.5 MG) BY MOUTH EVERY NIGHT    multivitamin (THERAGRAN) per tablet Take 1 tablet by mouth once daily.     mupirocin (BACTROBAN) 2 % ointment Apply once daily during the dressing change    potassium chloride SA (K-DUR,KLOR-CON) 20 MEQ tablet Take 1 tablet (20 mEq total) by mouth once daily.    pramipexole (MIRAPEX) 0.25 MG tablet TAKE 1 TABLET(0.25 MG) BY MOUTH EVERY EVENING FOR RESTLESS LEGS    traZODone (DESYREL) 50 MG tablet Take 1-2 tablets ( mg total) by mouth every evening.    zinc 50 mg Tab Take 50 mg by mouth once daily.      Family History       Problem Relation (Age of Onset)    Cancer Mother, Father (80), Son    Cataracts Father    Colon cancer Father    Diabetes Son    Heart disease Son    Heart failure Father    Hypertension Father    Melanoma Daughter    No Known Problems Sister, Brother, Maternal Aunt, Maternal Uncle, Paternal Aunt, Paternal Uncle, Maternal Grandmother, Maternal Grandfather, Paternal Grandmother, Paternal Grandfather          Tobacco Use    Smoking status: Never     Passive exposure: Never    Smokeless tobacco: Never   Substance and Sexual Activity    Alcohol use: Yes     Alcohol/week: 4.0 standard drinks of alcohol     Types: 4 Glasses of wine per week     Comment: 2 glasses of wine on weekends    Drug use: No    Sexual activity: Never     Review of Systems   All other systems reviewed and are negative.    Objective:     Vital Signs (Most Recent):  Temp: 98.2 °F (36.8 °C) (05/02/25 2221)  Pulse: 109 (05/02/25 2221)  Resp: 16 (05/02/25 2221)  BP: (!) 145/70 (05/02/25 2221)  SpO2: 99 % (05/02/25 2221) Vital Signs (24h Range):  Temp:  [98.2 °F (36.8 °C)-98.3 °F (36.8 °C)] 98.2 °F (36.8 °C)  Pulse:  [] 109  Resp:  [12-24] 16  SpO2:  [91 %-100 %] 99 %  BP: (122-156)/() 145/70     Weight: 43.2 kg (95 lb 3.8 oz)  Body mass index is 16.35 kg/m².     Physical Exam  Vitals and nursing note reviewed.   Constitutional:       General: She is not in acute  distress.     Appearance: She is well-developed. She is not diaphoretic.      Comments: Thin but not in acute distress, pleasant    HENT:      Head: Normocephalic.      Comments: Mild ecchymoses around L eye   Eyes:      General: No scleral icterus.     Comments: Bilateral conjunctival injection, mild drainage with crusting bilaterally    Neck:      Vascular: No JVD.   Cardiovascular:      Rate and Rhythm: Normal rate. Rhythm irregular.   Pulmonary:      Effort: Pulmonary effort is normal. No respiratory distress.   Abdominal:      General: There is no distension.      Tenderness: There is no abdominal tenderness.   Musculoskeletal:      Right lower leg: Edema present.      Left lower leg: Edema present.      Comments: Mild LE edema bilaterally    Skin:     Coloration: Skin is not jaundiced or pale.   Neurological:      Mental Status: She is alert and oriented to person, place, and time.      Motor: No weakness or abnormal muscle tone.   Psychiatric:         Mood and Affect: Mood normal.         Behavior: Behavior normal.                Significant Labs: All pertinent labs within the past 24 hours have been reviewed.  CBC:   Recent Labs   Lab 05/02/25  1841   WBC 10.85   HGB 11.3*   HCT 35.4*   *     CMP:   Recent Labs   Lab 05/02/25  1841      K 4.0   CL 96   CO2 27      BUN 15   CREATININE 0.8   CALCIUM 8.6*   PROT 7.2   ALBUMIN 3.1*   BILITOT 0.3   ALKPHOS 113   AST 23   ALT 12   ANIONGAP 14       Significant Imaging: I have reviewed all pertinent imaging results/findings within the past 24 hours.

## 2025-05-03 NOTE — ASSESSMENT & PLAN NOTE
Patient with known diastolic CHF, with last LVEF 55% on TTE 5/2023. Currently without evidence of volume overload on exam, and not in acute exacerbation. BNP pending, although expect to be somewhat elevated due to AFib with RVR. Will continue home GDMT with Metoprolol.  Appropriate diet ordered. Monitor volume status.

## 2025-05-03 NOTE — SUBJECTIVE & OBJECTIVE
Past Medical History:   Diagnosis Date    Arthritis     Atrial fibrillation     Bronchitis     Cataract     Dry eyes     GERD (gastroesophageal reflux disease)     Glaucoma, suspect - Both Eyes     Hypothyroidism 06/23/2016    Pulmonary hypertension     Rash     Renal angiomyolipoma     Renal disorder     SCC (squamous cell carcinoma) 07/2023    left lower lateral shin    SCC (squamous cell carcinoma) 04/2023    L mid lateral shin    SCC (squamous cell carcinoma) 09/2023    L mid lower shin    Spinal stenosis     Squamous cell carcinoma     in-situ right upper inner arm, right wrist    Status post lumbar surgery 06/23/2016    Stress fracture     Thyroid disease     Venous insufficiency        Past Surgical History:   Procedure Laterality Date    ANGIOPLASTY      CATARACT EXTRACTION W/  INTRAOCULAR LENS IMPLANT Left 12/6/2022    Procedure: EXTRACTION, CATARACT, WITH IOL INSERTION;  Surgeon: Tone Brown MD;  Location: [facility];  Service: Ophthalmology;  Laterality: Left;    CATARACT EXTRACTION W/  INTRAOCULAR LENS IMPLANT Right 12/20/2022    Procedure: EXTRACTION, CATARACT, WITH IOL INSERTION;  Surgeon: Tone Brown MD;  Location: [facility] OR;  Service: Ophthalmology;  Laterality: Right;    CERVICAL FUSION      COLONOSCOPY      COLONOSCOPY N/A 11/14/2017    Procedure: COLONOSCOPY;  Surgeon: Kasi Mercado MD;  Location: [facility] (4TH FLR);  Service: Endoscopy;  Laterality: N/A;    COLONOSCOPY N/A 4/26/2023    Procedure: COLONOSCOPY;  Surgeon: Jeanmarie Hathaway MD;  Location: [facility] (4TH FLR);  Service: Colon and Rectal;  Laterality: N/A;  ok to hold Eliquis 2 days per Dr Pereira  constipation-ext Miralax prep  instr mailed/portal-GT  4/21/23 pre call attempted, no answer    COLONOSCOPY N/A 5/28/2024    Procedure: COLONOSCOPY;  Surgeon: Jeanmarie Hathaway MD;  Location: [facility] (4TH FLR);  Service: Endoscopy;  Laterality: N/A;  referral Dr. Hathaway. Annual Colonoscopy for High Risk. Golytely  prep instr. to portal Pending Eliquis Hold from Dr. David Pereira. [facility] Afib.EC  ok to hold Eliquis 2 days per Dr Pereira-GT  5/21- precall confirmed; instructions re-sent via portal, aware of holding eliquis     ESOPHAGOGASTRODUODENOSCOPY N/A 10/10/2019    Procedure: EGD (ESOPHAGOGASTRODUODENOSCOPY);  Surgeon: Rg Milton MD;  Location: [facility] ENDO (4TH FLR);  Service: Endoscopy;  Laterality: N/A;    ESOPHAGOGASTRODUODENOSCOPY N/A 10/6/2021    Procedure: EGD (ESOPHAGOGASTRODUODENOSCOPY);  Surgeon: Rg Milton MD;  Location: [facility] ENDO (4TH FLR);  Service: Endoscopy;  Laterality: N/A;  covid test 10/3 [place], instr emailed/portal -ml    EXCISION Left 5/17/2023    Procedure: EXCISION SQUAMOUS CELL CARCINOMA LEFT LEG;  Surgeon: Kasi Canela Jr., MD;  Location: [facility] OR 2ND FLR;  Service: General;  Laterality: Left;    l4-5 mid discectomy Left 03/2017    l4-5 MIS diskectomy Right 05/2016    RIGHT HEART CATHETERIZATION Right 1/27/2022    Procedure: INSERTION, CATHETER, RIGHT HEART;  Surgeon: Satnam Pickard Jr., MD;  Location: [facility] CATH LAB;  Service: Cardiology;  Laterality: Right;    TRANSFORAMINAL EPIDURAL INJECTION OF STEROID Bilateral 3/12/2024    Procedure: LUMBAR TRANSFORAMINAL BILATERAL L3/4 *ELIQUIS CLEARANCE IN CHART*;  Surgeon: Sheree Abbott MD;  Location: [facility] [place] PAIN MGT;  Service: Pain Management;  Laterality: Bilateral;  [phone]  2 WK F/U [place]    TREATMENT OF CARDIAC ARRHYTHMIA N/A 7/17/2020    Procedure: CARDIOVERSION;  Surgeon: Kwan Bray MD;  Location: [facility] EP LAB;  Service: Cardiology;  Laterality: N/A;  AF,DCCV/SANGITA, ANES, SK, 746    TREATMENT OF CARDIAC ARRHYTHMIA N/A 7/14/2021    Procedure: CARDIOVERSION;  Surgeon: SYDNIE Cedillo MD;  Location: [facility] EP LAB;  Service: Cardiology;  Laterality: N/A;  AF, SANGITA, DCCV, MAC, EH, 3 Prep    WASHOUT Right 5/17/2023    Procedure: WASHOUT right lower arm and closure;  Surgeon: Kasi Canela Jr., MD;  Location: [facility] OR [address] [place] [address];  Service:  General;  Laterality: Right;       Review of patient's allergies indicates:  No Known Allergies    Current Facility-Administered Medications   Medication    0.9% NaCl infusion    acetaminophen tablet 1,000 mg    acetaminophen tablet 650 mg    aluminum-magnesium hydroxide-simethicone 200-200-20 mg/5 mL suspension 30 mL    [START ON 5/3/2025] ascorbic acid (vitamin C) tablet 1,000 mg    bisacodyL suppository 10 mg    buPROPion TB24 tablet 150 mg    glucagon (human recombinant) injection 1 mg    glucose chewable tablet 16 g    glucose chewable tablet 24 g    [START ON 5/3/2025] levothyroxine tablet 137 mcg    [START ON 5/3/2025] metoprolol succinate (TOPROL-XL) 24 hr tablet 25 mg    [START ON 5/3/2025] multivitamin tablet    naloxone 0.4 mg/mL injection 0.02 mg    ondansetron disintegrating tablet 8 mg    polyethylene glycol packet 17 g    pramipexole tablet 0.25 mg    pregabalin capsule 75 mg    simethicone chewable tablet 80 mg    sodium chloride 0.9% flush 1-10 mL    sodium chloride 0.9% flush 10 mL    traZODone tablet 50 mg     Current Outpatient Medications   Medication Sig    ELIQUIS 5 mg Tab TAKE 1 TABLET(5 MG) BY MOUTH TWICE DAILY    furosemide (LASIX) 20 MG tablet Take 1 tablet (20 mg total) by mouth once daily.    acetaminophen (TYLENOL) 500 MG tablet Take 500 mg by mouth every 6 (six) hours as needed for Pain.    acyclovir (ZOVIRAX) 400 MG tablet Take 1 tablet (400 mg total) by mouth once daily.    ascorbic acid (VITAMIN C) 1000 MG tablet Take 1,000 mg by mouth once daily.     biotin 1 mg tablet Take 1 mg by mouth 2 (two) times daily.     buPROPion (WELLBUTRIN SR) 150 MG TBSR 12 hr tablet Take 1 tablet (150 mg total) by mouth 2 (two) times daily.    diclofenac sodium (VOLTAREN ARTHRITIS PAIN) 1 % Gel Apply 2 g topically every 12 (twelve) hours.    digestive enzymes Tab Take 1 tablet by mouth once daily.     fluticasone propionate (FLONASE) 50 mcg/actuation nasal spray 1 spray (50 mcg total) by Each Nostril  route 2 (two) times daily as needed for Rhinitis.    ketoconazole (NIZORAL) 2 % cream aaa bid prn flare under arm and qhs to areas on face    lactulose (CHRONULAC) 20 gram/30 mL Soln Take 30mL twice daily (every 12 hours) until bowel movement    levothyroxine (SYNTHROID) 137 MCG Tab tablet Take 1 tablet (137 mcg total) by mouth before breakfast.    lubiprostone (AMITIZA) 24 MCG Cap Take 1 capsule (24 mcg total) by mouth daily as needed (IBS symptoms).    magnesium citrate solution Drink 1 half of bottle. Drink second half of bottle if no bowel movement 12 hours after first dose.    methocarbamoL (ROBAXIN) 500 MG Tab Take 1 tablet (500 mg total) by mouth 3 (three) times daily as needed (mus).    metoprolol succinate (TOPROL-XL) 25 MG 24 hr tablet Take 1 tablet (25 mg total) by mouth once daily.    mirtazapine (REMERON) 7.5 MG Tab TAKE 1 TABLET(7.5 MG) BY MOUTH EVERY NIGHT    multivitamin (THERAGRAN) per tablet Take 1 tablet by mouth once daily.     mupirocin (BACTROBAN) 2 % ointment Apply once daily during the dressing change    potassium chloride SA (K-DUR,KLOR-CON) 20 MEQ tablet Take 1 tablet (20 mEq total) by mouth once daily.    pramipexole (MIRAPEX) 0.25 MG tablet TAKE 1 TABLET(0.25 MG) BY MOUTH EVERY EVENING FOR RESTLESS LEGS    traZODone (DESYREL) 50 MG tablet Take 1-2 tablets ( mg total) by mouth every evening.    zinc 50 mg Tab Take 50 mg by mouth once daily.      Family History       Problem Relation (Age of Onset)    Cancer Mother, Father (80), Son    Cataracts Father    Colon cancer Father    Diabetes Son    Heart disease Son    Heart failure Father    Hypertension Father    Melanoma Daughter    No Known Problems Sister, Brother, Maternal Aunt, Maternal Uncle, Paternal Aunt, Paternal Uncle, Maternal Grandmother, Maternal Grandfather, Paternal Grandmother, Paternal Grandfather          Tobacco Use    Smoking status: Never     Passive exposure: Never    Smokeless tobacco: Never   Substance and Sexual  "Activity    Alcohol use: Yes     Alcohol/week: 4.0 standard drinks of alcohol     Types: 4 Glasses of wine per week     Comment: 2 glasses of wine on weekends    Drug use: No    Sexual activity: Never     ROS  Constitutional: negative for fevers  Eyes: negative visual changes  ENT: negative for hearing loss  Respiratory: negative for dyspnea  Cardiovascular: negative for chest pain  Gastrointestinal: negative for abdominal pain  Genitourinary: negative for dysuria  Neurological: negative for headaches  Behavioral/Psych: negative for hallucinations  Endocrine: negative for temperature intolerance    Objective:     Vital Signs (Most Recent):  Temp: 98.3 °F (36.8 °C) (05/02/25 1747)  Pulse: 108 (05/02/25 2130)  Resp: 20 (05/02/25 2130)  BP: 129/65 (05/02/25 2130)  SpO2: 98 % (05/02/25 2130) Vital Signs (24h Range):  Temp:  [98.3 °F (36.8 °C)] 98.3 °F (36.8 °C)  Pulse:  [107-135] 108  Resp:  [12-24] 20  SpO2:  [91 %-100 %] 98 %  BP: (122-156)/() 129/65     Weight: 45.4 kg (100 lb)  Height: 5' 4" (162.6 cm)  Body mass index is 17.16 kg/m².    No intake or output data in the 24 hours ending 05/02/25 2146     Ortho/SPM Exam  General:  no acute distress, appears stated age   Neuro: alert and oriented x3  Psych: normal mood  Head: normocephalic, atraumatic.  Eyes: no scleral icterus  Mouth: moist mucous membranes  CV: extremities warm and well perfused  Pulm: breathing comfortably, equal chest rise bilat  Skin: clean, dry, intact (any exceptions noted in below musculoskeletal exam)    MSK:    RUE:  - Well healing, scabbed, superficial abrasions over elbow  - No swelling  - No ecchymosis, erythema, or signs of cellulitis  - NonTTP throughout  - AROM and PROM of the shoulder, elbow, wrist, and hand intact without pain  - Axillary/AIN/PIN/Radial/Median/Ulnar Nerves assessed in isolation without deficit  - SILT throughout  - Compartments soft  - Radial artery palpated   - Capillary Refill <3s    LUE:  - Skin intact " throughout, no open wounds  - No swelling  - No ecchymosis, erythema, or signs of cellulitis  - NonTTP throughout  - AROM and PROM of the shoulder, elbow, wrist, and hand intact without pain  - Axillary/AIN/PIN/Radial/Median/Ulnar Nerves assessed in isolation without deficit  - SILT throughout  - Compartments soft  - Radial artery palpated   - Capillary Refill <3s    RLE:  - Skin intact throughout, no scars present  - No swelling  - No ecchymosis, erythema, or signs of cellulitis  - TTP at hip/proximal femur  - No tenderness to palpation of middle or distal femur  - No tenderness to palpation of proximal, middle, or distal aspects of tibia or fibula  - No tenderness to palpation of foot  - Pain with any ROM at hip  - Full painless ROM of knee and ankle  - Compartments soft  - SILT Sa/Mendez/DP/SP/T  - Motor intact EHL/FHL/TA/Gastroc  - DP palpated  - Brisk capillary refill      LLE:  - Skin intact throughout, no open wounds  - No swelling  - No ecchymosis, erythema, or signs of cellulitis  - NonTTP throughout  - AROM and PROM of the hip, knee, ankle, and foot intact without pain  - TA/EHL/Gastroc/FHL assessed in isolation without deficit  - SILT throughout  - Compartments soft  - DP palpated  - Capillary Refill <3s  - Negative Log roll  - Negative [facility]       Significant Labs: All pertinent labs within the past 24 hours have been reviewed.    Significant Imaging: I have reviewed all pertinent imaging results/findings.  MRI right hip shows a nondisplaced intertrochanteric fracture.

## 2025-05-03 NOTE — SUBJECTIVE & OBJECTIVE
Interval History: Patient admitted yesterday after found to have a closed nondisplaced right intertrochanteric fracture on MRI imaging. Patient had fall on Easter weekend and had X-rays done at that time of right hip that were negative but has had progressive right hip pain since then. Patient seen in Ortho clinic on 5/1 and MRI of pelvis ordered to evaluate pain and done on 5/2 and revealed closed nondisplaced right femur intertrochanteric fracture and Ortho clinic called yesterday, 5/2 and told patient to go to ED for admission. Patient was seen and evaluated by Orthopedic surgery who recommended operative repair of fracture. Patient was taken to OR today and underwent right hip cephalomedullary nail fixation by Dr. Aleida Pires. Post-op, patient weight bear as tolerated to the right lower extremity as per Orthopedics recommendation. Patient resumed on her home Apixiban 5 mg po BID that she takes for long term anticoagulation for her PAF so no other DVT prophylaxis required post-op. Perineural pain catheter placed by Anesthesia Pain Service with continuous infusion of Ropivacaine to help with pain control post-op and Anesthesia Pain Service managing while patient in the hospital. Patient placed on multimodal pain management post-op with Tylenol 1000 mg po every 6 hours post-op and will continue. PT/OT consulted post-op. I met with patient and her daughter in room after surgery. Patient reports 2/10 pain to right hip currently. Patient complaining of redness to her eyes and appears she has a viral conjunctivitis to both eyes and artifical tears ordered to help with dryness. Patient advised not to rub her eyes. Labs reviewed. U/A on admit positive for UTI so will start patient on IV Ceftriaxone to treat and urine culture sent and will need to follow-up results. Patient reports she has been having urinary frequency over the past week and though she did have a UTI as similar to previous UTI symptoms. Hgb stable at  11.4 and was 11.3 yesterday. Creatinine normal at 0.7.     Review of Systems   Constitutional:  Negative for fever.   Respiratory:  Negative for cough and shortness of breath.    Cardiovascular:  Negative for chest pain.   Gastrointestinal:  Negative for nausea.   Musculoskeletal:  Positive for arthralgias (Right hip).   Psychiatric/Behavioral:  Negative for agitation and confusion.      Objective:     Vital Signs (Most Recent):  Temp: 97.6 °F (36.4 °C) (05/03/25 1127)  Pulse: 105 (05/03/25 1127)  Resp: 18 (05/03/25 1325)  BP: 135/63 (05/03/25 1127)  SpO2: 96 % (05/03/25 1030) on room air Vital Signs (24h Range):  Temp:  [97 °F (36.1 °C)-98.3 °F (36.8 °C)] 97.6 °F (36.4 °C)  Pulse:  [] 105  Resp:  [12-24] 18  SpO2:  [91 %-100 %] 96 %  BP: ()/() 135/63     Weight: 43.2 kg (95 lb 3.8 oz)  Body mass index is 16.35 kg/m².    Intake/Output Summary (Last 24 hours) at 5/3/2025 1431  Last data filed at 5/3/2025 0919  Gross per 24 hour   Intake 1300 ml   Output 360 ml   Net 940 ml         Physical Exam  Vitals and nursing note reviewed.   Constitutional:       General: She is awake. She is not in acute distress.     Appearance: Normal appearance. She is underweight. She is not ill-appearing.      Comments: Frial, elderly female in no distress. Daughter at bedside. Patient appears very comfortable on exam.    Eyes:      Conjunctiva/sclera: Conjunctivae normal.   Cardiovascular:      Rate and Rhythm: Normal rate and regular rhythm.      Heart sounds: Normal heart sounds. No murmur heard.  Pulmonary:      Effort: Pulmonary effort is normal. No respiratory distress.      Breath sounds: Normal breath sounds. No wheezing.   Abdominal:      General: Abdomen is flat. Bowel sounds are normal. There is no distension.      Palpations: Abdomen is soft.      Tenderness: There is no abdominal tenderness. There is no guarding.   Musculoskeletal:      Right lower leg: No edema.      Left lower leg: No edema.   Skin:      General: Skin is warm.      Findings: No erythema.      Comments: Surgical dressing noted on right hip and lateral thigh. Dressing is clean and dry and intact.   Neurological:      Mental Status: She is alert and oriented to person, place, and time.   Psychiatric:         Mood and Affect: Mood normal.         Behavior: Behavior normal. Behavior is cooperative.         Thought Content: Thought content normal.         Judgment: Judgment normal.               Significant Labs: CBC:   Recent Labs   Lab 05/02/25 1841 05/03/25  0445 05/03/25  1007   WBC 10.85 7.93 12.88*   HGB 11.3* 10.3* 11.4*   HCT 35.4* 32.4* 36.6*   * 420 439     CMP:   Recent Labs   Lab 05/02/25 1841 05/03/25  0445    138   K 4.0 3.8   CL 96 103   CO2 27 26    83   BUN 15 16   CREATININE 0.8 0.7   CALCIUM 8.6* 7.9*   PROT 7.2  --    ALBUMIN 3.1*  --    BILITOT 0.3  --    ALKPHOS 113  --    AST 23  --    ALT 12  --    ANIONGAP 14 9     Magnesium:   Recent Labs   Lab 05/03/25  0445   MG 2.3       Significant Imaging: I have reviewed all pertinent imaging results/findings within the past 24 hours.

## 2025-05-03 NOTE — NURSING
Pt admitted to unit. VS stable. Pt AAOx4; able to answer all admissions questions appropriately. 4 eyes check done with MARY Dong.  Preventative mepilex placed to coccyx; ext cath in place. IS at bedside & SCD in place. Call light within reach. Will continue plan of care.

## 2025-05-03 NOTE — ASSESSMENT & PLAN NOTE
Present on admit. U/A on admit positive for UTI so patient started on IV Ceftriaxone to treat and urine culture sent and will need to follow-up results. Patient reports she has been having urinary frequency over the past week and though she did have a UTI as similar to previous UTI symptoms. No systemic signs of infection.

## 2025-05-03 NOTE — BRIEF OP NOTE
Hemal Hanson - Surgery ([facility])  Brief Operative Note    SUMMARY     Surgery Date: 5/3/2025     Surgeons and Role:     * Aleida Pires MD - Primary     * Phi Leon MD - Resident - Assisting     * MINE Middleton MD - Resident - Assisting     * CHARU Servin MD - Resident - Assisting        Pre-op Diagnosis:  Closed nondisplaced intertrochanteric fracture of right femur, initial encounter [S72.144A]    Post-op Diagnosis:  Post-Op Diagnosis Codes:     * Closed nondisplaced intertrochanteric fracture of right femur, initial encounter [S72.144A]    Procedure(s) (LRB):  INSERTION, INTRAMEDULLARY BERNADINE, FEMUR - right selina (Right)    Anesthesia: Choice    Implants:  Implant Name Type Inv. Item Serial No.  Lot No. LRB No. Used Action   NAIL TROCHNTRC 125D [id number] - [id number]  NAIL TROCHNTRC 125D [id number]  [employer]. [id number] Right 1 Implanted   SCREW GAMMA LAG STRL 10.5X85MM - [id number]  SCREW GAMMA LAG STRL 10.5X85MM  [employer]. [id number] Right 1 Implanted       Operative Findings: Non displaced right intertrochanteric femur fracture    Estimated Blood Loss: 100 mL         Specimens:   Specimen (24h ago, onward)      None          * No specimens in log *    [id number]

## 2025-05-03 NOTE — PT/OT/SLP EVAL
Occupational Therapy   Co-Evaluation    Name: Agnelina Man  MRN: [id number]  Admitting Diagnosis: Closed nondisplaced intertrochanteric fracture of right femur with routine healing  Recent Surgery: Procedure(s) (LRB):  INSERTION, INTRAMEDULLARY BERNADINE, FEMUR - right selina (Right) * Day of Surgery *    Recommendations:     Discharge Recommendations: Low Intensity Therapy  Discharge Equipment Recommendations:  walker, rolling  Barriers to discharge:  None    Assessment:     Angelina Man is a 75 y.o. female with a medical diagnosis of Closed nondisplaced intertrochanteric fracture of right femur with routine healing.  She presents semi-reclined in bed dozing upon arrival with dtr present. Pt with good tolerance to session focused on bed mobility and transfers. Most ADL training deferred in favor of early mobility this date.     Deficits currently limit indep with ADLs, ADL t/fs, and functional mobility. Performance deficits affecting function: weakness, impaired endurance, impaired self care skills, decreased lower extremity function, impaired functional mobility, gait instability, decreased safety awareness, impaired balance, pain.      Rehab Prognosis: Good; patient would benefit from acute skilled OT services to address these deficits and reach maximum level of function.       This date, pt benefits from co-treatment with PT to accommodate pt's current activity tolerance and progression with therapy with goal to address pt goals as safely and effectively as possible.      Plan:     Patient to be seen daily to address the above listed problems via self-care/home management, community/work re-entry, therapeutic activities, therapeutic exercises, neuromuscular re-education, sensory integration  Plan of Care Expires: 05/17/25  Plan of Care Reviewed with: patient, daughter    Subjective     Chief Complaint: pain in R hip   Patient/Family Comments/goals: return home     Occupational Profile:  Pt lives alone and resides  in a 2 story condo with 3 steps to enter. Pt has access to a Tub/Shower combo and Walk-in shower, prefers using tub. Bed and bath on the second floor, has access to toilet and couch on first floor. Prior to admit, pt was Independent using no equipment with ADLs at baseline. Pt was active at community level, and is currently retired. 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 Family live in [facility].   Equipment Used at Home: none  Assistance upon Discharge: family PRN    Pain/Comfort:  Pain Rating 1:  (minimal pain in R hip reported with WB & mobility, not described or quantified)    Patients cultural, spiritual, Spiritism conflicts given the current situation: no    Objective:     Communicated with: RN prior to session.  Patient found HOB elevated with perineural catheter, telemetry upon OT entry to room.    General Precautions: Standard, fall  Orthopedic Precautions: RLE weight bearing as tolerated  Braces: N/A  Respiratory Status: Room air    Occupational Performance:    Bed Mobility:    Patient completed Supine to Sit with contact guard assistance  Patient completed seated scoot at EOB with CGA.     Functional Mobility/Transfers:  Patient completed Sit <> Stand Transfer with minimum assistance  with  rolling walker   Patient completed Bed <> Chair Transfer using Step Transfer technique with moderate assistance and of 2 persons with rolling walker, R knee buckling multiple times during transfer     Activities of Daily Living:  Lower Body Dressing: dependence to don sock to RLE     Cognitive/Visual Perceptual:  Cognitive/Psychosocial Skills:     -       Oriented to: Person, Place, Time, and Situation   -       Follows Commands/attention:Follows two-step commands  -       Safety awareness/insight to disability: intact   -       Mood/Affect/Coping skills/emotional control: Appropriate to situation and Pleasant    Physical Exam:  Balance: -       fair with  static & dynamic activities   Upper Extremity Range of Motion:     -       Right Upper Extremity: WFL  -       Left Upper Extremity: WFL  Upper Extremity Strength:    -       Right Upper Extremity: WFL  -       Left Upper Extremity: WFL   Strength:    -       Right Upper Extremity: WFL  -       Left Upper Extremity: WFL    AMPAC 6 Click ADL:  AMPAC Total Score: 17    Treatment & Education:  No obvious changes in vital signs noted during session and pt tolerated session well. Pt and dtr educated on: Role of OT, OT plan of care, ADLs, bed mobility, transfer training, assistive device, general discharge plan , and safety with current level of function. Pt and dtr verbalized understanding all education.     Patient left up in chair with all lines intact, call button in reach, RN notified, and dtr present    GOALS:   Multidisciplinary Problems       Occupational Therapy Goals          Problem: Occupational Therapy    Goal Priority Disciplines Outcome Interventions   Occupational Therapy Goal     OT, PT/OT Progressing    Description: Goals to be met by: 5/17/25     Patient will increase functional independence with ADLs by performing:    LE Dressing with Modified [place].  Grooming while standing at sink with Modified [place].  Toileting from toilet with Modified [place] for hygiene and clothing management.   Toilet transfer to toilet with Modified [place].                         DME Justifications:   Angelina's mobility limitation cannot be sufficiently resolved by the use of a cane. Her functional mobility deficit can be sufficiently resolved with the use of a Rolling Walker. Patient's mobility limitation significantly impairs their ability to participate in one of more activities of daily living.  The use of a RW will significantly improve the patient's ability to participate in MRADLS and the patient will use it on regular basis in the home.    History:     Past Medical History:   Diagnosis Date     Arthritis     Atrial fibrillation     Bronchitis     Cataract     Dry eyes     GERD (gastroesophageal reflux disease)     Glaucoma, suspect - Both Eyes     Hypothyroidism 06/23/2016    Pulmonary hypertension     Rash     Renal angiomyolipoma     Renal disorder     SCC (squamous cell carcinoma) 07/2023    left lower lateral shin    SCC (squamous cell carcinoma) 04/2023    L mid lateral shin    SCC (squamous cell carcinoma) 09/2023    L mid lower shin    Spinal stenosis     Squamous cell carcinoma     in-situ right upper inner arm, right wrist    Status post lumbar surgery 06/23/2016    Stress fracture     Thyroid disease     Venous insufficiency          Past Surgical History:   Procedure Laterality Date    ANGIOPLASTY      CATARACT EXTRACTION W/  INTRAOCULAR LENS IMPLANT Left 12/6/2022    Procedure: EXTRACTION, CATARACT, WITH IOL INSERTION;  Surgeon: Tone Brown MD;  Location: [facility];  Service: Ophthalmology;  Laterality: Left;    CATARACT EXTRACTION W/  INTRAOCULAR LENS IMPLANT Right 12/20/2022    Procedure: EXTRACTION, CATARACT, WITH IOL INSERTION;  Surgeon: Tone Brown MD;  Location: [facility];  Service: Ophthalmology;  Laterality: Right;    CERVICAL FUSION      COLONOSCOPY      COLONOSCOPY N/A 11/14/2017    Procedure: COLONOSCOPY;  Surgeon: Kasi Mercado MD;  Location: [facility] (4TH FLR);  Service: Endoscopy;  Laterality: N/A;    COLONOSCOPY N/A 4/26/2023    Procedure: COLONOSCOPY;  Surgeon: Jeanmarie Hathaway MD;  Location: [facility] (4TH FLR);  Service: Colon and Rectal;  Laterality: N/A;  ok to hold Eliquis 2 days per Dr Pereira  constipation-ext Miralax prep  instr mailed/portal-GT  4/21/23 pre call attempted, no answer    COLONOSCOPY N/A 5/28/2024    Procedure: COLONOSCOPY;  Surgeon: Jeanmarie Hathaway MD;  Location: [facility] (4TH FLR);  Service: Endoscopy;  Laterality: N/A;  referral Dr. Hathaway. Annual Colonoscopy for High Risk. Golytely prep instr. to portal Pending Eliquis  Hold from Dr. David Pereira. [facility] Afib.EC  ok to hold Eliquis 2 days per Dr Pereira-GT  5/21- precall confirmed; instructions re-sent via portal, aware of holding eliquis     ESOPHAGOGASTRODUODENOSCOPY N/A 10/10/2019    Procedure: EGD (ESOPHAGOGASTRODUODENOSCOPY);  Surgeon: Rg Milton MD;  Location: [facility] ENDO (4TH FLR);  Service: Endoscopy;  Laterality: N/A;    ESOPHAGOGASTRODUODENOSCOPY N/A 10/6/2021    Procedure: EGD (ESOPHAGOGASTRODUODENOSCOPY);  Surgeon: Rg Milton MD;  Location: [facility] ENDO (4TH FLR);  Service: Endoscopy;  Laterality: N/A;  covid test 10/3 [place], instr emailed/portal -ml    EXCISION Left 5/17/2023    Procedure: EXCISION SQUAMOUS CELL CARCINOMA LEFT LEG;  Surgeon: Kasi Canela Jr., MD;  Location: [facility] OR 2ND FLR;  Service: General;  Laterality: Left;    l4-5 mid discectomy Left 03/2017    l4-5 MIS diskectomy Right 05/2016    RIGHT HEART CATHETERIZATION Right 1/27/2022    Procedure: INSERTION, CATHETER, RIGHT HEART;  Surgeon: Satnam Pickard Jr., MD;  Location: [facility] CATH LAB;  Service: Cardiology;  Laterality: Right;    TRANSFORAMINAL EPIDURAL INJECTION OF STEROID Bilateral 3/12/2024    Procedure: LUMBAR TRANSFORAMINAL BILATERAL L3/4 *ELIQUIS CLEARANCE IN CHART*;  Surgeon: Sheree Abbott MD;  Location: [facility] PAIN MGT;  Service: Pain Management;  Laterality: Bilateral;  [phone]  2 WK F/U [place]    TREATMENT OF CARDIAC ARRHYTHMIA N/A 7/17/2020    Procedure: CARDIOVERSION;  Surgeon: Kwan Bray MD;  Location: [facility] EP LAB;  Service: Cardiology;  Laterality: N/A;  AF,DCCV/SANGITA, ANES, SK, 746    TREATMENT OF CARDIAC ARRHYTHMIA N/A 7/14/2021    Procedure: CARDIOVERSION;  Surgeon: SYDNIE Cedillo MD;  Location: [facility] EP LAB;  Service: Cardiology;  Laterality: N/A;  AF, SANGITA, DCCV, MAC, EH, 3 Prep    WASHOUT Right 5/17/2023    Procedure: WASHOUT right lower arm and closure;  Surgeon: Kasi Canela Jr., MD;  Location: [facility] OR [address] [place] [address];  Service: General;  Laterality: Right;       Time  Tracking:     OT Date of Treatment: 05/03/25  OT Start Time: 1440  OT Stop Time: 1513  OT Total Time (min): 33 min    Billable Minutes:Evaluation 10  Therapeutic Activity 23    5/3/2025

## 2025-05-03 NOTE — ASSESSMENT & PLAN NOTE
Patient has Diastolic (HFpEF) heart failure that is Chronic. On presentation their CHF was well compensated. Most recent BNP and echo results are listed below.  Recent Labs     05/02/25  2145   *     Latest ECHO  Results for orders placed during the hospital encounter of 10/07/24    Echo    Interpretation Summary    Left Ventricle: The left ventricle is normal in size. Normal wall thickness. There is concentric remodeling. There is low normal systolic function with a visually estimated ejection fraction of 50 - 55%. Unable to assess diastolic function due to atrial fibrillation.    Right Ventricle: Normal right ventricular cavity size. Wall thickness is normal. Systolic function is mildly reduced.    Biatrial enlargement    Patent foramen ovale visualized with predominant left to right shunting indicated by color flow Doppler.    Mitral Valve: There is mild to moderate regurgitation.    Tricuspid Valve: There is mild regurgitation.    Pulmonary Artery: The estimated pulmonary artery systolic pressure is 36 mmHg.    IVC/SVC: Elevated venous pressure at 15 mmHg.    Pericardium: There is a small effusion. No indication of cardiac tamponade.    Current Heart Failure Medications  metoprolol succinate (TOPROL-XL) 24 hr tablet 25 mg, Daily, Oral    Plan  - Monitor strict I&Os and daily weights.    - Monitor on telemetry.  - Fluid restriction of 1.5 L/day.   - Cardiology has been consulted.  - The patient's volume status is at their baseline.

## 2025-05-03 NOTE — SUBJECTIVE & OBJECTIVE
Past Medical History:   Diagnosis Date    Arthritis     Atrial fibrillation     Bronchitis     Cataract     Dry eyes     GERD (gastroesophageal reflux disease)     Glaucoma, suspect - Both Eyes     Hypothyroidism 06/23/2016    Pulmonary hypertension     Rash     Renal angiomyolipoma     Renal disorder     SCC (squamous cell carcinoma) 07/2023    left lower lateral shin    SCC (squamous cell carcinoma) 04/2023    L mid lateral shin    SCC (squamous cell carcinoma) 09/2023    L mid lower shin    Spinal stenosis     Squamous cell carcinoma     in-situ right upper inner arm, right wrist    Status post lumbar surgery 06/23/2016    Stress fracture     Thyroid disease     Venous insufficiency        Past Surgical History:   Procedure Laterality Date    ANGIOPLASTY      CATARACT EXTRACTION W/  INTRAOCULAR LENS IMPLANT Left 12/6/2022    Procedure: EXTRACTION, CATARACT, WITH IOL INSERTION;  Surgeon: Tone Brown MD;  Location: [facility];  Service: Ophthalmology;  Laterality: Left;    CATARACT EXTRACTION W/  INTRAOCULAR LENS IMPLANT Right 12/20/2022    Procedure: EXTRACTION, CATARACT, WITH IOL INSERTION;  Surgeon: Tone Brown MD;  Location: [facility] OR;  Service: Ophthalmology;  Laterality: Right;    CERVICAL FUSION      COLONOSCOPY      COLONOSCOPY N/A 11/14/2017    Procedure: COLONOSCOPY;  Surgeon: Kasi Mercado MD;  Location: [facility] (4TH FLR);  Service: Endoscopy;  Laterality: N/A;    COLONOSCOPY N/A 4/26/2023    Procedure: COLONOSCOPY;  Surgeon: Jeanmarie Hathaway MD;  Location: [facility] (4TH FLR);  Service: Colon and Rectal;  Laterality: N/A;  ok to hold Eliquis 2 days per Dr Pereira  constipation-ext Miralax prep  instr mailed/portal-GT  4/21/23 pre call attempted, no answer    COLONOSCOPY N/A 5/28/2024    Procedure: COLONOSCOPY;  Surgeon: Jeanmarie Hathaway MD;  Location: [facility] (4TH FLR);  Service: Endoscopy;  Laterality: N/A;  referral Dr. Hathaway. Annual Colonoscopy for High Risk. Golytely  prep instr. to portal Pending Eliquis Hold from Dr. David Pereira. [facility] Afib.EC  ok to hold Eliquis 2 days per Dr Pereira-GT  5/21- precall confirmed; instructions re-sent via portal, aware of holding eliquis     ESOPHAGOGASTRODUODENOSCOPY N/A 10/10/2019    Procedure: EGD (ESOPHAGOGASTRODUODENOSCOPY);  Surgeon: Rg Milton MD;  Location: [facility] ENDO (4TH FLR);  Service: Endoscopy;  Laterality: N/A;    ESOPHAGOGASTRODUODENOSCOPY N/A 10/6/2021    Procedure: EGD (ESOPHAGOGASTRODUODENOSCOPY);  Surgeon: Rg Milton MD;  Location: [facility] ENDO (4TH FLR);  Service: Endoscopy;  Laterality: N/A;  covid test 10/3 [place], instr emailed/portal -ml    EXCISION Left 5/17/2023    Procedure: EXCISION SQUAMOUS CELL CARCINOMA LEFT LEG;  Surgeon: Kasi Canela Jr., MD;  Location: [facility] OR 2ND FLR;  Service: General;  Laterality: Left;    l4-5 mid discectomy Left 03/2017    l4-5 MIS diskectomy Right 05/2016    RIGHT HEART CATHETERIZATION Right 1/27/2022    Procedure: INSERTION, CATHETER, RIGHT HEART;  Surgeon: Satnam Pickard Jr., MD;  Location: [facility] CATH LAB;  Service: Cardiology;  Laterality: Right;    TRANSFORAMINAL EPIDURAL INJECTION OF STEROID Bilateral 3/12/2024    Procedure: LUMBAR TRANSFORAMINAL BILATERAL L3/4 *ELIQUIS CLEARANCE IN CHART*;  Surgeon: Sheree Abbott MD;  Location: [facility]GT;  Service: Pain Management;  Laterality: Bilateral;  [phone]  2 WK F/U [place]    TREATMENT OF CARDIAC ARRHYTHMIA N/A 7/17/2020    Procedure: CARDIOVERSION;  Surgeon: Kwan Bray MD;  Location: [facility] EP LAB;  Service: Cardiology;  Laterality: N/A;  AF,DCCV/SANGITA, ANES, SK, 746    TREATMENT OF CARDIAC ARRHYTHMIA N/A 7/14/2021    Procedure: CARDIOVERSION;  Surgeon: SYDNIE Cedillo MD;  Location: [facility] EP LAB;  Service: Cardiology;  Laterality: N/A;  AF, SANGITA, DCCV, MAC, EH, 3 Prep    WASHOUT Right 5/17/2023    Procedure: WASHOUT right lower arm and closure;  Surgeon: Kasi Canela Jr., MD;  Location: [facility] OR [address] [place] [address];  Service:  General;  Laterality: Right;       Review of patient's allergies indicates:  No Known Allergies    No current facility-administered medications on file prior to encounter.     Current Outpatient Medications on File Prior to Encounter   Medication Sig    ELIQUIS 5 mg Tab TAKE 1 TABLET(5 MG) BY MOUTH TWICE DAILY    furosemide (LASIX) 20 MG tablet Take 1 tablet (20 mg total) by mouth once daily.    acetaminophen (TYLENOL) 500 MG tablet Take 500 mg by mouth every 6 (six) hours as needed for Pain.    acyclovir (ZOVIRAX) 400 MG tablet Take 1 tablet (400 mg total) by mouth once daily.    ascorbic acid (VITAMIN C) 1000 MG tablet Take 1,000 mg by mouth once daily.     biotin 1 mg tablet Take 1 mg by mouth 2 (two) times daily.     buPROPion (WELLBUTRIN SR) 150 MG TBSR 12 hr tablet Take 1 tablet (150 mg total) by mouth 2 (two) times daily.    diclofenac sodium (VOLTAREN ARTHRITIS PAIN) 1 % Gel Apply 2 g topically every 12 (twelve) hours.    digestive enzymes Tab Take 1 tablet by mouth once daily.     fluticasone propionate (FLONASE) 50 mcg/actuation nasal spray 1 spray (50 mcg total) by Each Nostril route 2 (two) times daily as needed for Rhinitis.    ketoconazole (NIZORAL) 2 % cream aaa bid prn flare under arm and qhs to areas on face    lactulose (CHRONULAC) 20 gram/30 mL Soln Take 30mL twice daily (every 12 hours) until bowel movement    levothyroxine (SYNTHROID) 137 MCG Tab tablet Take 1 tablet (137 mcg total) by mouth before breakfast.    lubiprostone (AMITIZA) 24 MCG Cap Take 1 capsule (24 mcg total) by mouth daily as needed (IBS symptoms).    magnesium citrate solution Drink 1 half of bottle. Drink second half of bottle if no bowel movement 12 hours after first dose.    methocarbamoL (ROBAXIN) 500 MG Tab Take 1 tablet (500 mg total) by mouth 3 (three) times daily as needed (mus).    metoprolol succinate (TOPROL-XL) 25 MG 24 hr tablet Take 1 tablet (25 mg total) by mouth once daily.    mirtazapine (REMERON) 7.5 MG Tab  TAKE 1 TABLET(7.5 MG) BY MOUTH EVERY NIGHT    multivitamin (THERAGRAN) per tablet Take 1 tablet by mouth once daily.     mupirocin (BACTROBAN) 2 % ointment Apply once daily during the dressing change    potassium chloride SA (K-DUR,KLOR-CON) 20 MEQ tablet Take 1 tablet (20 mEq total) by mouth once daily.    pramipexole (MIRAPEX) 0.25 MG tablet TAKE 1 TABLET(0.25 MG) BY MOUTH EVERY EVENING FOR RESTLESS LEGS    traZODone (DESYREL) 50 MG tablet Take 1-2 tablets ( mg total) by mouth every evening.    zinc 50 mg Tab Take 50 mg by mouth once daily.      Family History       Problem Relation (Age of Onset)    Cancer Mother, Father (80), Son    Cataracts Father    Colon cancer Father    Diabetes Son    Heart disease Son    Heart failure Father    Hypertension Father    Melanoma Daughter    No Known Problems Sister, Brother, Maternal Aunt, Maternal Uncle, Paternal Aunt, Paternal Uncle, Maternal Grandmother, Maternal Grandfather, Paternal Grandmother, Paternal Grandfather          Tobacco Use    Smoking status: Never     Passive exposure: Never    Smokeless tobacco: Never   Substance and Sexual Activity    Alcohol use: Yes     Alcohol/week: 4.0 standard drinks of alcohol     Types: 4 Glasses of wine per week     Comment: 2 glasses of wine on weekends    Drug use: No    Sexual activity: Never     Review of Systems   Constitutional: Negative.   HENT: Negative.     Cardiovascular:  Negative for chest pain, claudication, cyanosis, dyspnea on exertion, irregular heartbeat, leg swelling, near-syncope, orthopnea, palpitations, paroxysmal nocturnal dyspnea and syncope.   Respiratory: Negative.     Endocrine: Negative.    Musculoskeletal:  Positive for joint pain.   Gastrointestinal: Negative.    Genitourinary: Negative.    Neurological: Negative.      Objective:     Vital Signs (Most Recent):  Temp: 98 °F (36.7 °C) (05/03/25 0029)  Pulse: 89 (05/03/25 0029)  Resp: 18 (05/03/25 0029)  BP: (!) 96/58 (05/03/25 0029)  SpO2: (!) 94  % (05/03/25 0029) Vital Signs (24h Range):  Temp:  [98 °F (36.7 °C)-98.3 °F (36.8 °C)] 98 °F (36.7 °C)  Pulse:  [] 89  Resp:  [12-24] 18  SpO2:  [91 %-100 %] 94 %  BP: ()/() 96/58     Weight: 43.2 kg (95 lb 3.8 oz)  Body mass index is 16.35 kg/m².    SpO2: (!) 94 %       No intake or output data in the 24 hours ending 05/03/25 0055    Lines/Drains/Airways       Drain  Duration             Female External Urinary Catheter w/ Suction 05/02/25 2135 <1 day              Peripheral Intravenous Line  Duration                  Peripheral IV - Single Lumen 05/02/25 1842 20 G Anterior;Proximal;Right Forearm <1 day                     Physical Exam  Vitals and nursing note reviewed.   Constitutional:       Appearance: Normal appearance.   HENT:      Head: Normocephalic and atraumatic.   Eyes:      Extraocular Movements: Extraocular movements intact.      Pupils: Pupils are equal, round, and reactive to light.   Neck:      Vascular: No carotid bruit.   Cardiovascular:      Rate and Rhythm: Normal rate. Rhythm irregular.      Pulses: Normal pulses.      Heart sounds: Normal heart sounds. No murmur heard.     No friction rub. No gallop.   Pulmonary:      Effort: Pulmonary effort is normal.      Breath sounds: Normal breath sounds.   Abdominal:      General: Abdomen is flat. Bowel sounds are normal. There is no distension.      Palpations: Abdomen is soft. There is no mass.      Tenderness: There is no abdominal tenderness.   Musculoskeletal:         General: Tenderness (Unable to move rigth leg due to pain in her hip) present. No swelling. Normal range of motion.      Cervical back: Normal range of motion.      Right lower leg: Edema (Trace) present.      Left lower leg: Edema (Trace) present.   Skin:     General: Skin is warm.      Capillary Refill: Capillary refill takes less than 2 seconds.   Neurological:      General: No focal deficit present.      Mental Status: She is alert and oriented to person, place,  and time.          Significant Labs:     Recent Labs   Lab 05/02/25  1841   WBC 10.85   HGB 11.3*   HCT 35.4*   *       Recent Labs   Lab 05/02/25  1841      K 4.0   CL 96   CO2 27   BUN 15   CREATININE 0.8   CALCIUM 8.6*       Recent Labs   Lab 05/02/25  1841   ALKPHOS 113   BILITOT 0.3   PROT 7.2   ALT 12   AST 23     Lab Results   Component Value Date    CHOL 210 (H) 03/06/2025    HDL 57 03/06/2025    LDLCALC 121.0 03/06/2025    TRIG 160 (H) 03/06/2025     Lab Results   Component Value Date    HGBA1C 5.2 03/06/2025       Lab Results   Component Value Date     (H) 05/02/2025     (H) 09/24/2024     (H) 12/28/2021     (H) 07/16/2020     Significant Imaging:     EKG 03/27/25  - Afib rate controlled 85bpm    TTE 10/07/25    Left Ventricle: The left ventricle is normal in size. Normal wall thickness. There is concentric remodeling. There is low normal systolic function with a visually estimated ejection fraction of 50 - 55%. Unable to assess diastolic function due to atrial fibrillation.    Right Ventricle: Normal right ventricular cavity size. Wall thickness is normal. Systolic function is mildly reduced.    Biatrial enlargement    Patent foramen ovale visualized with predominant left to right shunting indicated by color flow Doppler.    Mitral Valve: There is mild to moderate regurgitation.    Tricuspid Valve: There is mild regurgitation.    Pulmonary Artery: The estimated pulmonary artery systolic pressure is 36 mmHg.    IVC/SVC: Elevated venous pressure at 15 mmHg.    Pericardium: There is a small effusion. No indication of cardiac tamponade.    CPX study 07/03/24    The patient exercised for 6 minutes and 46 seconds on a high ramp protocol, achieving a peak heart rate of 116 bpm, which is 83% of the age predicted maximum heart rate.    Atrial fibrillation was present at baseline. There were no arrhythmias during stress.    There was no ST segment deviation noted during  stress.    Normal functional status associated with a normal breathing reserve, normal oxygen stauration and a normal AT. No clinically significant functional impairment.    RER was <1. The patient stopped exercising due to back pain.    The peak VO2 was 22.8 ml/kg/min which is 91% of predicted equating to a functional capacity of 6.51 METS indicating normal functional status.     SPECT 01/04/24    Normal myocardial perfusion scan. There is no evidence of myocardial ischemia or infarction.    The gated perfusion images showed an ejection fraction of 54% at rest. The gated perfusion images showed an ejection fraction of 61% post stress. Normal ejection fraction is greater than 53%.    There is normal wall motion at rest and post stress.    LV cavity size is normal at rest and normal at stress.    The ECG portion of the study is negative for ischemia. Sensitivity is reduced secondary to the target heart rate not being achieved.    The patient reported no chest pain during the stress test.    During stress, atrial fibrillation is noted.    The exercise capacity was average.    There are no prior studies for comparison.    STEPHANIE 10/02/23    Normal resting ABIs bilaterally.    Exercise ABIs are within normal limits bilaterally.    TBIs are suggestive of mild bilateral lower extremity arterial disease.    Digit waveforms are severely dampened at all digits bilaterally.    LE venous US 10/02/23       There is no evidence of a right lower extremity DVT.    The right common femoral vein has reflux.    The right greater saphenous vein has reflux.    There is no evidence of a left lower extremity DVT.    The left greater saphenous vein has reflux.     RHC 01/27/22  At rest:                Right atrial pressure is normal.                Pulmonary capillary wedge pressure is normal.                Pulmonary artery pressure is normal.                Pulmonary vascular resistance is normal.                Thermodilution cardiac  output and index is normal.     With exercise: there is a significant rise in pulmonary capillary wedge pressure                Pulmonary capillary wedge pressure rises significantly and is moderately elevated.                PA pressure is mildly elevated.                Thermodilution CO increases significantly.                Pulmonary vascular resistance is normal.     Closely after exercise:                Pulmonary artery pressure is mildly elevated.                Pulmonary capillary wedge pressure is mildly elevated.     FINAL DIAGNOSIS:                WHO Group 2 pulmonary hypertension.

## 2025-05-03 NOTE — ASSESSMENT & PLAN NOTE
Patient has persistent (7 days or more) atrial fibrillation. Patient is currently in atrial fibrillation. Initially in RVR, improved with PO Metoprolol. [id number] Score: 3. The patients heart rate in the last 24 hours is as follows:  Pulse  Min: 80  Max: 135     Antiarrhythmics  metoprolol succinate (TOPROL-XL) 24 hr tablet 25 mg, Daily, Oral    Anticoagulants  apixaban tablet 5 mg, 2 times daily, Oral    Plan  - Replete lytes with a goal of K>4, Mg >2  - Patient's afib is currently controlled on home po Metoprolol and will continue.  - Home Apixiban held pre-op for her long term anticoagulation for stroke prevention but resumed post-op at 5 mg po BID.

## 2025-05-03 NOTE — HPI
74yo F patient with permanent AFib CHADSVASC 2, HFpEF, exercise induced pulmonary hypertension WHO group II, venous insufficiency s/p EVLT of the left LSV and right GSV, hypothyroidism who was admitted to [facility] on 05/02/25 with intertrochanteric femur fracture. Cardiology consulted for cardiac optimization prior to planned hip surgery.      Patient is very active, and she fell while being on the treadmill. After the fall she did continue with normal activities, including a spinning class. Even though she does not monitor heart rate due to known Afib, she is able to do a class of spinning. She walks upstairs without difficulty in her condo and performs ADLs independently.      Cardiac-wise she has HFpEF on furosemide 20mg qd, Afib on eliquis 5mg bid and metoprolol succiante 25mg qd, and chronic LE edema managed with compression stockings. She follows with EP (Dr. Bray- last appt 03/2025), general cardiology (Dr. Pereira - last appt 01/27/25), and vascular cardiology (Dr. Hampton - last appt 03/2025). She is stable from cardiac standpoint and as optimized as she can be.

## 2025-05-03 NOTE — ASSESSMENT & PLAN NOTE
Patient admitted after found to have a closed nondisplaced right intertrochanteric fracture on MRI imaging. Patient had fall on Easter weekend and had X-rays done at that time of right hip that were negative but has had progressive right hip pain since then. Patient seen in Ortho clinic on 5/1 and MRI of pelvis ordered to evaluate pain and done on 5/2 and revealed closed nondisplaced right femur intertrochanteric fracture and Ortho clinic called yesterday, 5/2 and told patient to go to ED for admission. Patient was seen and evaluated by Orthopedic surgery who recommended operative repair of fracture.   - Patient was taken to OR on 5/3/2025 and underwent right hip cephalomedullary nail fixation by Dr. Aleida Pires.   - Post-op, patient weight bear as tolerated to the right lower extremity as per Orthopedics recommendation.   - Patient resumed on her home Apixiban 5 mg po BID that she takes for long term anticoagulation for her PAF so no other DVT prophylaxis required post-op.   - Perineural pain catheter placed by Anesthesia Pain Service with continuous infusion of Ropivacaine to help with pain control post-op and Anesthesia Pain Service managing while patient in the hospital.   - Patient placed on multimodal pain management post-op with Tylenol 1000 mg po every 6 hours post-op and will continue.   - PT/OT consulted post-op.  - Ortho following and managing surgical site.  - Surgical bandage to remain in place until Ortho clinic follow-up. Patient will need staples removed at approximately 2 weeks. Removal may be performed at a rehab facility, by a PCP or by myself in the Orthopedic clinic.  - Follow up with Dr. Pires in Ortho clinic in 6 weeks with an AP pelvis and right hip x-ray.  - Antibiotics: 24 hours of perioperative antibiotics.    [Time Spent: ___ minutes] : I have spent [unfilled] minutes of time on the encounter. [>50% of the face to face encounter time was spent on counseling and/or coordination of care for ___] : Greater than 50% of the face to face encounter time was spent on counseling and/or coordination of care for [unfilled]

## 2025-05-03 NOTE — ASSESSMENT & PLAN NOTE
Revised Cardiac Risk Index   High risk surgery: No  History of ischemic heart disease: No  History of congestive heart failure: Yes  History of stroke: No  Insulin dependent diabetes: No  Cr > 2: No  1 point, class II risk, 6.0% risk of cardiac event 2014 ACC/AHA Perioperative Guidelines  Known or risk factors for CAD: Yes  High risk of MACE: No  Functional capacity < 4 METs: No, proceed without further testing     Patient has no active cardiac condition (ACS/USA, decompenstated CHF, significant arrhythmias or severe valvular disease) and can easily achieve 4 METS.  As such, pt does not require further cardiac evaluation prior to undergoing surgery.      - Patient should remain on beta-blockers throughout the entire todd-procedure time period.    - Eliquis therapy can be held but should be restarted as soon as safely possible.    - Continue furosemide 20mg qd and assess need for additional doses after surgery

## 2025-05-03 NOTE — PROVIDER PROGRESS NOTES - EMERGENCY DEPT.
EKG interpretation by ED attending physician:  A-fib with RVR, rate 121, non-specific ST changes, no ST elevations or acute ischemia, normal intervals.  Compared with prior EKG dated 03/2025, rate has increased.

## 2025-05-03 NOTE — PLAN OF CARE
PT Evaluation completed 5/3/2025   PT goals and POC established.   Low intensity physical therapy recommendation post-acutely.    Problem: Physical Therapy  Goal: Physical Therapy Goal  Description: Goals to be met by: 25     Patient will increase functional independence with mobility by performin. Supine to sit with Modified [place]  2. Sit to supine with Modified [place]  3. Sit to stand transfer with Modified [place]  4. Bed to chair transfer with Supervision using Rolling Walker  5. Gait  x 150 feet with Supervision using Rolling Walker.   6. Lower extremity exercise program x15 reps per handout, with assistance as needed    Outcome: Progressing

## 2025-05-03 NOTE — CONSULTS
[facility] - Emergency Dept  Orthopedics  Consult Note    Patient Name: Angelina Man  MRN: [id number]  Admission Date: 5/2/2025  Hospital Length of Stay: 0 days  Attending Provider: Rebekah Schumacher MD  Primary Care Provider: Zina Rockwell MD    Patient information was obtained from patient, relative(s), and ER records.     Inpatient consult to Orthopedic Surgery  Consult performed by: Isha Oliveira MD  Consult ordered by: Gricel Antony PA-C        Subjective:     Principal Problem:Nondisplaced intertrochanteric fracture of right femur, initial encounter for closed fracture    Chief Complaint:   Chief Complaint   Patient presents with    MD Referral     Sent by ortho after hour clinic for surgical consult of fractured femur. Had fall on easter weekend.         HPI: Angelina Man is a 75 y.o. female with PMH of lumbar spinal stenosis s/p lumbar fusion, GERD, BLE venous insufficiency, and squamous cell carcinoma presenting with right hip pain.  Patient reportedly fell the day before Easter, tripping over a sidewalk.  Experienced  immediate pain radiating down her right leg, and has been unable to bear weight on her RLE.  She reports she hit her head and denies LOC.  Also denies any numbness/paresthesias or any other musculoskeletal pains.    She visited urgent care on 4/20 where hip X-ray showed no acute findings and received care for abrasions. She later presented to ED on 4/20 with concerns for head injury where CT head and C spine was neg for acute processes.She subsequently presented to Ortho clinic on 5/1, where an MRI was ordered, showing a non displaced right IT fx .  Lives alone at home and ambulates independently at baseline.  Does not smoke. Patient takes Eliquis 5mg.         Past Medical History:   Diagnosis Date    Arthritis     Atrial fibrillation     Bronchitis     Cataract     Dry eyes     GERD (gastroesophageal reflux disease)     Glaucoma, suspect - Both Eyes     Hypothyroidism 06/23/2016     Pulmonary hypertension     Rash     Renal angiomyolipoma     Renal disorder     SCC (squamous cell carcinoma) 07/2023    left lower lateral shin    SCC (squamous cell carcinoma) 04/2023    L mid lateral shin    SCC (squamous cell carcinoma) 09/2023    L mid lower shin    Spinal stenosis     Squamous cell carcinoma     in-situ right upper inner arm, right wrist    Status post lumbar surgery 06/23/2016    Stress fracture     Thyroid disease     Venous insufficiency        Past Surgical History:   Procedure Laterality Date    ANGIOPLASTY      CATARACT EXTRACTION W/  INTRAOCULAR LENS IMPLANT Left 12/6/2022    Procedure: EXTRACTION, CATARACT, WITH IOL INSERTION;  Surgeon: Tone Brown MD;  Location: [facility];  Service: Ophthalmology;  Laterality: Left;    CATARACT EXTRACTION W/  INTRAOCULAR LENS IMPLANT Right 12/20/2022    Procedure: EXTRACTION, CATARACT, WITH IOL INSERTION;  Surgeon: Tone Brown MD;  Location: [facility] OR;  Service: Ophthalmology;  Laterality: Right;    CERVICAL FUSION      COLONOSCOPY      COLONOSCOPY N/A 11/14/2017    Procedure: COLONOSCOPY;  Surgeon: Kasi Mercado MD;  Location: [facility] (4TH FLR);  Service: Endoscopy;  Laterality: N/A;    COLONOSCOPY N/A 4/26/2023    Procedure: COLONOSCOPY;  Surgeon: Jeanmarie Hathaway MD;  Location: [facility] (4TH FLR);  Service: Colon and Rectal;  Laterality: N/A;  ok to hold Eliquis 2 days per Dr Pereira  constipation-ext Miralax prep  instr mailed/portal-GT  4/21/23 pre call attempted, no answer    COLONOSCOPY N/A 5/28/2024    Procedure: COLONOSCOPY;  Surgeon: Jeanmarie Hathaway MD;  [facility]  Service: Endoscopy;  Laterality: N/A;  referral Dr. Hathaway. Annual Colonoscopy for High Risk. Golytely prep instr. to portal Pending Eliquis Hold from Dr. David Pereira. [facility] Afib.EC  ok to hold Eliquis 2 days per Dr Pereira-GT  5/21- precall confirmed; instructions re-sent via portal, aware of holding eliquis      ESOPHAGOGASTRODUODENOSCOPY N/A 10/10/2019    Procedure: EGD (ESOPHAGOGASTRODUODENOSCOPY);  Surgeon: Rg Milton MD;  Location: [facility] ENDO (4TH FLR);  Service: Endoscopy;  Laterality: N/A;    ESOPHAGOGASTRODUODENOSCOPY N/A 10/6/2021    Procedure: EGD (ESOPHAGOGASTRODUODENOSCOPY);  Surgeon: Rg Milton MD;  Location: [facility] ENDO (4TH FLR);  Service: Endoscopy;  Laterality: N/A;  covid test 10/3 [place], instr emailed/portal -ml    EXCISION Left 5/17/2023    Procedure: EXCISION SQUAMOUS CELL CARCINOMA LEFT LEG;  Surgeon: Kasi Canela Jr., MD;  [facility];  Service: General;  Laterality: Left;    l4-5 mid discectomy Left 03/2017    l4-5 MIS diskectomy Right 05/2016    RIGHT HEART CATHETERIZATION Right 1/27/2022    Procedure: INSERTION, CATHETER, RIGHT HEART;  Surgeon: Satnam Pickard Jr., MD;  Location: [facility] CATH LAB;  Service: Cardiology;  Laterality: Right;    TRANSFORAMINAL EPIDURAL INJECTION OF STEROID Bilateral 3/12/2024    Procedure: LUMBAR TRANSFORAMINAL BILATERAL L3/4 *ELIQUIS CLEARANCE IN CHART*;  Surgeon: Sheree Abbott MD;  Location: [facility] PAIN MGT;  Service: Pain Management;  Laterality: Bilateral;  [phone]  2 WK F/U [place]    TREATMENT OF CARDIAC ARRHYTHMIA N/A 7/17/2020    Procedure: CARDIOVERSION;  Surgeon: Kwan Bray MD;  Location: [facility] EP LAB;  Service: Cardiology;  Laterality: N/A;  AF,DCCV/SANGITA, ANES, SK, 746    TREATMENT OF CARDIAC ARRHYTHMIA N/A 7/14/2021    Procedure: CARDIOVERSION;  Surgeon: SYDNIE Cedillo MD;  Location: [facility] EP LAB;  Service: Cardiology;  Laterality: N/A;  AF, SANGITA, DCCV, MAC, EH, 3 Prep    WASHOUT Right 5/17/2023    Procedure: WASHOUT right lower arm and closure;  Surgeon: Kasi Canela Jr., MD;  [facility];  Service: General;  Laterality: Right;       Review of patient's allergies indicates:  No Known Allergies    Current Facility-Administered Medications   Medication    0.9% NaCl infusion    acetaminophen tablet 1,000 mg     acetaminophen tablet 650 mg    aluminum-magnesium hydroxide-simethicone 200-200-20 mg/5 mL suspension 30 mL    [START ON 5/3/2025] ascorbic acid (vitamin C) tablet 1,000 mg    bisacodyL suppository 10 mg    buPROPion TB24 tablet 150 mg    glucagon (human recombinant) injection 1 mg    glucose chewable tablet 16 g    glucose chewable tablet 24 g    [START ON 5/3/2025] levothyroxine tablet 137 mcg    [START ON 5/3/2025] metoprolol succinate (TOPROL-XL) 24 hr tablet 25 mg    [START ON 5/3/2025] multivitamin tablet    naloxone 0.4 mg/mL injection 0.02 mg    ondansetron disintegrating tablet 8 mg    polyethylene glycol packet 17 g    pramipexole tablet 0.25 mg    pregabalin capsule 75 mg    simethicone chewable tablet 80 mg    sodium chloride 0.9% flush 1-10 mL    sodium chloride 0.9% flush 10 mL    traZODone tablet 50 mg     Current Outpatient Medications   Medication Sig    ELIQUIS 5 mg Tab TAKE 1 TABLET(5 MG) BY MOUTH TWICE DAILY    furosemide (LASIX) 20 MG tablet Take 1 tablet (20 mg total) by mouth once daily.    acetaminophen (TYLENOL) 500 MG tablet Take 500 mg by mouth every 6 (six) hours as needed for Pain.    acyclovir (ZOVIRAX) 400 MG tablet Take 1 tablet (400 mg total) by mouth once daily.    ascorbic acid (VITAMIN C) 1000 MG tablet Take 1,000 mg by mouth once daily.     biotin 1 mg tablet Take 1 mg by mouth 2 (two) times daily.     buPROPion (WELLBUTRIN SR) 150 MG TBSR 12 hr tablet Take 1 tablet (150 mg total) by mouth 2 (two) times daily.    diclofenac sodium (VOLTAREN ARTHRITIS PAIN) 1 % Gel Apply 2 g topically every 12 (twelve) hours.    digestive enzymes Tab Take 1 tablet by mouth once daily.     fluticasone propionate (FLONASE) 50 mcg/actuation nasal spray 1 spray (50 mcg total) by Each Nostril route 2 (two) times daily as needed for Rhinitis.    ketoconazole (NIZORAL) 2 % cream aaa bid prn flare under arm and qhs to areas on face    lactulose (CHRONULAC) 20 gram/30 mL Soln Take 30mL twice daily  (every 12 hours) until bowel movement    levothyroxine (SYNTHROID) 137 MCG Tab tablet Take 1 tablet (137 mcg total) by mouth before breakfast.    lubiprostone (AMITIZA) 24 MCG Cap Take 1 capsule (24 mcg total) by mouth daily as needed (IBS symptoms).    magnesium citrate solution Drink 1 half of bottle. Drink second half of bottle if no bowel movement 12 hours after first dose.    methocarbamoL (ROBAXIN) 500 MG Tab Take 1 tablet (500 mg total) by mouth 3 (three) times daily as needed (mus).    metoprolol succinate (TOPROL-XL) 25 MG 24 hr tablet Take 1 tablet (25 mg total) by mouth once daily.    mirtazapine (REMERON) 7.5 MG Tab TAKE 1 TABLET(7.5 MG) BY MOUTH EVERY NIGHT    multivitamin (THERAGRAN) per tablet Take 1 tablet by mouth once daily.     mupirocin (BACTROBAN) 2 % ointment Apply once daily during the dressing change    potassium chloride SA (K-DUR,KLOR-CON) 20 MEQ tablet Take 1 tablet (20 mEq total) by mouth once daily.    pramipexole (MIRAPEX) 0.25 MG tablet TAKE 1 TABLET(0.25 MG) BY MOUTH EVERY EVENING FOR RESTLESS LEGS    traZODone (DESYREL) 50 MG tablet Take 1-2 tablets ( mg total) by mouth every evening.    zinc 50 mg Tab Take 50 mg by mouth once daily.      Family History       Problem Relation (Age of Onset)    Cancer Mother, Father (80), Son    Cataracts Father    Colon cancer Father    Diabetes Son    Heart disease Son    Heart failure Father    Hypertension Father    Melanoma Daughter    No Known Problems Sister, Brother, Maternal Aunt, Maternal Uncle, Paternal Aunt, Paternal Uncle, Maternal Grandmother, Maternal Grandfather, Paternal Grandmother, Paternal Grandfather          Tobacco Use    Smoking status: Never     Passive exposure: Never    Smokeless tobacco: Never   Substance and Sexual Activity    Alcohol use: Yes     Alcohol/week: 4.0 standard drinks of alcohol     Types: 4 Glasses of wine per week     Comment: 2 glasses of wine on weekends    Drug use: No    Sexual activity: Never  "    ROS  Constitutional: negative for fevers  Eyes: negative visual changes  ENT: negative for hearing loss  Respiratory: negative for dyspnea  Cardiovascular: negative for chest pain  Gastrointestinal: negative for abdominal pain  Genitourinary: negative for dysuria  Neurological: negative for headaches  Behavioral/Psych: negative for hallucinations  Endocrine: negative for temperature intolerance    Objective:     Vital Signs (Most Recent):  Temp: 98.3 °F (36.8 °C) (05/02/25 1747)  Pulse: 108 (05/02/25 2130)  Resp: 20 (05/02/25 2130)  BP: 129/65 (05/02/25 2130)  SpO2: 98 % (05/02/25 2130) Vital Signs (24h Range):  Temp:  [98.3 °F (36.8 °C)] 98.3 °F (36.8 °C)  Pulse:  [107-135] 108  Resp:  [12-24] 20  SpO2:  [91 %-100 %] 98 %  BP: (122-156)/() 129/65     Weight: 45.4 kg (100 lb)  Height: 5' 4" (162.6 cm)  Body mass index is 17.16 kg/m².    No intake or output data in the 24 hours ending 05/02/25 2146     Ortho/SPM Exam  General:  no acute distress, appears stated age   Neuro: alert and oriented x3  Psych: normal mood  Head: normocephalic, atraumatic.  Eyes: no scleral icterus  Mouth: moist mucous membranes  CV: extremities warm and well perfused  Pulm: breathing comfortably, equal chest rise bilat  Skin: clean, dry, intact (any exceptions noted in below musculoskeletal exam)    MSK:    RUE:  - Well healing, scabbed, superficial abrasions over elbow  - No swelling  - No ecchymosis, erythema, or signs of cellulitis  - NonTTP throughout  - AROM and PROM of the shoulder, elbow, wrist, and hand intact without pain  - Axillary/AIN/PIN/Radial/Median/Ulnar Nerves assessed in isolation without deficit  - SILT throughout  - Compartments soft  - Radial artery palpated   - Capillary Refill <3s    LUE:  - Skin intact throughout, no open wounds  - No swelling  - No ecchymosis, erythema, or signs of cellulitis  - NonTTP throughout  - AROM and PROM of the shoulder, elbow, wrist, and hand intact without pain  - " Axillary/AIN/PIN/Radial/Median/Ulnar Nerves assessed in isolation without deficit  - SILT throughout  - Compartments soft  - Radial artery palpated   - Capillary Refill <3s    RLE:  - Skin intact throughout, no scars present  - No swelling  - No ecchymosis, erythema, or signs of cellulitis  - TTP at hip/proximal femur  - No tenderness to palpation of middle or distal femur  - No tenderness to palpation of proximal, middle, or distal aspects of tibia or fibula  - No tenderness to palpation of foot  - Pain with any ROM at hip  - Full painless ROM of knee and ankle  - Compartments soft  - SILT Sa/Mendez/DP/SP/T  - Motor intact EHL/FHL/TA/Gastroc  - DP palpated  - Brisk capillary refill      LLE:  - Skin intact throughout, no open wounds  - No swelling  - No ecchymosis, erythema, or signs of cellulitis  - NonTTP throughout  - AROM and PROM of the hip, knee, ankle, and foot intact without pain  - TA/EHL/Gastroc/FHL assessed in isolation without deficit  - SILT throughout  - Compartments soft  - DP palpated  - Capillary Refill <3s  - Negative Log roll  - Negative [facility]       Significant Labs: All pertinent labs within the past 24 hours have been reviewed.    Significant Imaging: I have reviewed all pertinent imaging results/findings.  MRI right hip shows a nondisplaced intertrochanteric fracture.   Assessment/Plan:     * Nondisplaced intertrochanteric fracture of right femur, initial encounter for closed fracture  Angelina Man is a 75 y.o. female with right intertrochanteric femur fracture, closed, NVI. They take Eliquis 5mg anticoagulation at home. They required no ambulatory assistive devices prior to this injury and lived a very active lifestyle.      -Admitted to medicine hip fracture service for pre-operative clearance and medical evaluation  -To OR 5/2 for operative fixation of right IT femur fracture  -Pt marked, booked, and consented for surgery  -DVT PPx: Hold anticoagulation  -Abx: Preop abx ordered  -Labs:  Albumin 3.1, Glucose 101, Hemoglobin 11.3, Hematocrit 35.4, Platelets 466, White blood cell count 10.85. Other lab results pending.  -Bed rest, parra, NPO at midnight      Patient was explained in detail the severity of the injury that was suffered. Patient was explained the risks/benefits/and alternatives to operative management in detail including infection, bleeding, pain, nerve and vascular damage,  rotational deformities and they express full understanding.  We discussed the 30% national first year mortality rate with hip fractures, the need for early mobilization, and the expected rehab course.  Patient expresses full understanding of the condition and expresses that they want to proceed with surgery. The patient is admitted to the medicine hip fracture service for optimization of medical comorbidities. Will plan for OR tomorrow, 5/2. No guarantees were made, informed consent was obtained. All questions were answered to patient's and family's satisfaction.             Isha Oliveiar MD  Orthopedics  [facility] Dept

## 2025-05-03 NOTE — PLAN OF CARE
Problem: Occupational Therapy  Goal: Occupational Therapy Goal  Description: Goals to be met by: 5/17/25     Patient will increase functional independence with ADLs by performing:    LE Dressing with Modified [place].  Grooming while standing at sink with Modified [place].  Toileting from toilet with Modified [place] for hygiene and clothing management.   Toilet transfer to toilet with Modified [place].    Outcome: Progressing     OT Eval completed.

## 2025-05-03 NOTE — ASSESSMENT & PLAN NOTE
Presents with R pain after a fall on 4/20, and outpatient MRI showed R nondisplaced intertrochanteric hip fx. Orthopedics consulted; plan for operative intervention.   -Will place on hip fracture pathway and continue supportive care with bed rest and pain control.  -Hold anticoagulation, and will make NPO at midnight.   -Will benefit from postoperative PT/OT evaluation     -Patient undergoing non-emergent non-cardiac surgery.  -Patient does not have active cardiac problems, although initially in AFib with RVR.   -Patient undergoing intermediate risk surgery.  -Functional Status:  Patient has functional METs > or = 4    Revised cardiac risk index (RCRI) score is 1.         Other Issues:       Patient on long term anticoagulation: Yes      Recent coronary stenting: No    Patient with acceptable cardiac risk with (RCRI score of 1) going for intermediate risk surgery. No absolute contraindications to the proposed surgery at this time.   -intermediate risk of post-op pulmonary complications.  intermediate risk of post-op delirium.   - Will consult Cardiology for medical optimization prior to surgery given initial AFib/RVR. BNP pending.   - Reverse Warfarin with FFP as ordered before surgery as this surgery is urgent.  - Resume full anticoagulation Apixiban at home dosing on POD # 1 or when okay with primary surgical team.

## 2025-05-03 NOTE — TRANSFER OF CARE
"Anesthesia Transfer of Care Note    Patient: Angelina Man    Procedure(s) Performed: Procedure(s) (LRB):  INSERTION, INTRAMEDULLARY BERNADINE, FEMUR - right selina (Right)    Patient location: PACU    Anesthesia Type: general    Transport from OR: Transported from OR on 100% O2 by closed face mask with adequate spontaneous ventilation    Post pain: adequate analgesia    Post assessment: no apparent anesthetic complications and tolerated procedure well    Post vital signs: stable    Level of consciousness: awake, alert and oriented    Nausea/Vomiting: no nausea/vomiting    Complications: none    Transfer of care protocol was followed      Last vitals: Visit Vitals  /77 (BP Location: Right arm, Patient Position: Lying)   Pulse 92   Temp 36.1 °C (97 °F) (Temporal)   Resp 17   Ht 5' 4" (1.626 m)   Wt 43.2 kg (95 lb 3.8 oz)   LMP  (LMP Unknown)   SpO2 100%   Breastfeeding No   BMI 16.35 kg/m²     "

## 2025-05-03 NOTE — ASSESSMENT & PLAN NOTE
Patient has persistent (7 days or more) atrial fibrillation. Patient is currently in atrial fibrillation. Initially in RVR, improved with PO Metoprolol. [id number] Score: 3. The patients heart rate in the last 24 hours is as follows:  Pulse  Min: 98  Max: 135     Antiarrhythmics  metoprolol succinate (TOPROL-XL) 24 hr tablet 25 mg, Daily, Oral    Anticoagulants   Holding home Eliquis for surgery     Plan  - Replete lytes with a goal of K>4, Mg >2  - Patient's afib is currently controlled after PO metoprolol

## 2025-05-03 NOTE — NURSING TRANSFER
Nursing Transfer Note      5/3/2025   10:38 AM    Nurse giving handoff:[place]   Nurse receiving handoff:[place]     Reason patient is being transferred: post op    Transfer To: [facility]    Transfer via bed     Transfer with cardiac monitoring, 02 2 liters NC     Transported by Pt transport     Transfer Vital Signs: See Flowsheet     Telemetry: Afib 96  Order for Tele Monitor? Yes    Additional Lines: Rivera Catheter    Medicines sent: none    Any special needs or follow-up needed: none    Patient belongings transferred with patient: Yes    Chart send with patient: Yes    Notified: daughter    Patient reassessed at: 5/3/2025 1030  1  Upon arrival to floor: cardiac monitor applied, patient oriented to room, call bell in reach, and bed in lowest position

## 2025-05-03 NOTE — ASSESSMENT & PLAN NOTE
Present on admit. Patient complaining of redness to her eyes and appears she has a viral conjunctivitis to both eyes and artifical tears ordered to help with dryness. Patient advised not to rub her eyes.

## 2025-05-03 NOTE — PROGRESS NOTES
[facility] - Surgery  Orthopedics  Progress Note    Patient Name: Angelina Man  MRN: [id number]  Admission Date: 5/2/2025  Hospital Length of Stay: 1 days  Attending Provider: Rebekah Schumacher MD  Primary Care Provider: Zina Rockwell MD  Follow-up For: Procedure(s) (LRB):  INSERTION, INTRAMEDULLARY BERNADINE, FEMUR - right selina (Right)    Post-Operative Day: * Day of Surgery *  Subjective:     Principal Problem:Nondisplaced intertrochanteric fracture of right femur, initial encounter for closed fracture    Principal Orthopedic Problem: same as above pending OR today    Interval History: Patient seen and examined at bedside. NAEON. Patient doing well. Pain well controlled. Hgb 10.3. Pending OR today.     Review of patient's allergies indicates:  No Known Allergies    Current Facility-Administered Medications   Medication    acetaminophen tablet 1,000 mg    acetaminophen tablet 650 mg    aluminum-magnesium hydroxide-simethicone 200-200-20 mg/5 mL suspension 30 mL    artificial tears 0.5 % ophthalmic solution 1 drop    ascorbic acid (vitamin C) tablet 1,000 mg    bisacodyL suppository 10 mg    buPROPion TB24 tablet 150 mg    ceFAZolin 2 g    glucagon (human recombinant) injection 1 mg    glucose chewable tablet 16 g    glucose chewable tablet 24 g    levothyroxine tablet 137 mcg    metoprolol succinate (TOPROL-XL) 24 hr tablet 25 mg    multivitamin tablet    mupirocin 2 % ointment    naloxone 0.4 mg/mL injection 0.02 mg    ondansetron disintegrating tablet 8 mg    oxyCODONE immediate release tablet Tab 10 mg    polyethylene glycol packet 17 g    pramipexole tablet 0.25 mg    pregabalin capsule 75 mg    simethicone chewable tablet 80 mg    sodium chloride 0.9% flush 1-10 mL    sodium chloride 0.9% flush 10 mL    sodium chloride 0.9% flush 10 mL    traZODone tablet 50 mg     Objective:     Vital Signs (Most Recent):  Temp: 97.7 °F (36.5 °C) (05/03/25 0459)  Pulse: 89 (05/03/25 0459)  Resp: 18 (05/03/25 0459)  BP: 119/69  "(05/03/25 0459)  SpO2: 98 % (05/03/25 0459) Vital Signs (24h Range):  Temp:  [97.7 °F (36.5 °C)-98.3 °F (36.8 °C)] 97.7 °F (36.5 °C)  Pulse:  [] 89  Resp:  [12-24] 18  SpO2:  [91 %-100 %] 98 %  BP: ()/() 119/69     Weight: 43.2 kg (95 lb 3.8 oz)  Height: 5' 4" (162.6 cm)  Body mass index is 16.35 kg/m².    No intake or output data in the 24 hours ending 05/03/25 0626     Ortho/SPM Exam  RLE:  - Skin intact throughout, no scars present  - No swelling  - No ecchymosis, erythema, or signs of cellulitis  - TTP at hip/proximal femur  - No tenderness to palpation of middle or distal femur  - No tenderness to palpation of proximal, middle, or distal aspects of tibia or fibula  - No tenderness to palpation of foot  - Pain with any ROM at hip  - Full painless ROM of knee and ankle  - Compartments soft  - SILT Sa/Mendez/DP/SP/T  - Motor intact EHL/FHL/TA/Gastroc  - DP palpated  - Brisk capillary refill       Significant Labs: All pertinent labs within the past 24 hours have been reviewed.    Significant Imaging: I have reviewed and interpreted all pertinent imaging results/findings.  Assessment/Plan:     * Nondisplaced intertrochanteric fracture of right femur, initial encounter for closed fracture  Angelina Man is a 75 y.o. female with right intertrochanteric femur fracture, closed, NVI. They take Eliquis 5mg anticoagulation at home. They required no ambulatory assistive devices prior to this injury and lived a very active lifestyle.      -Admitted to medicine hip fracture service for pre-operative clearance and medical evaluation  -To OR 5/2 for operative fixation of right IT femur fracture  -Pt marked, booked, and consented for surgery  -DVT PPx: Hold anticoagulation  -Abx: Preop abx ordered  -Labs: Hgb 10.3.  -Bed rest, parra, NPO now      Patient was explained in detail the severity of the injury that was suffered. Patient was explained the risks/benefits/and alternatives to operative management in " detail including infection, bleeding, pain, nerve and vascular damage,  rotational deformities and they express full understanding.  We discussed the 30% national first year mortality rate with hip fractures, the need for early mobilization, and the expected rehab course.  Patient expresses full understanding of the condition and expresses that they want to proceed with surgery. The patient is admitted to the medicine hip fracture service for optimization of medical comorbidities. Will plan for OR today. No guarantees were made, informed consent was obtained. All questions were answered to patient's and family's satisfaction.             MINE Middleton MD  Orthopedics  [facility] - Surgery

## 2025-05-03 NOTE — ASSESSMENT & PLAN NOTE
Angelina Man is a 75 y.o. female with right intertrochanteric femur fracture, closed, NVI. They take Eliquis 5mg anticoagulation at home. They required no ambulatory assistive devices prior to this injury and lived a very active lifestyle.      -Admitted to medicine hip fracture service for pre-operative clearance and medical evaluation  -To OR 5/2 for operative fixation of right IT femur fracture  -Pt marked, booked, and consented for surgery  -DVT PPx: Hold anticoagulation  -Abx: Preop abx ordered  -Labs: Hgb 10.3.  -Bed rest, parra, NPO now      Patient was explained in detail the severity of the injury that was suffered. Patient was explained the risks/benefits/and alternatives to operative management in detail including infection, bleeding, pain, nerve and vascular damage,  rotational deformities and they express full understanding.  We discussed the 30% national first year mortality rate with hip fractures, the need for early mobilization, and the expected rehab course.  Patient expresses full understanding of the condition and expresses that they want to proceed with surgery. The patient is admitted to the medicine hip fracture service for optimization of medical comorbidities. Will plan for OR today. No guarantees were made, informed consent was obtained. All questions were answered to patient's and family's satisfaction.

## 2025-05-03 NOTE — ANESTHESIA PROCEDURE NOTES
Peripheral Block    Patient location during procedure: OR   Block not for primary anesthetic.  Reason for block: at surgeon's request and post-op pain management   Post-op Pain Location: right leg pain   Start time: 5/3/2025 9:25 AM  Timeout: 5/3/2025 9:25 AM   End time: 5/3/2025 9:40 AM    Staffing  Authorizing Provider: Christoph Richardson MD  Performing Provider: Bryce Altman MD    Staffing  Performed by: Bryce Altman MD  Authorized by: Christoph Richardson MD    Preanesthetic Checklist  Completed: patient identified, IV checked, site marked, risks and benefits discussed, surgical consent, monitors and equipment checked, pre-op evaluation and timeout performed  Peripheral Block  Patient position: supine  Prep: ChloraPrep and site prepped and draped  Patient monitoring: heart rate, cardiac monitor, continuous pulse ox, continuous capnometry and frequent blood pressure checks  Block type: fascia iliaca  Laterality: right  Injection technique: continuous  Needle  Needle type: Tuohy   Needle gauge: 17 G  Needle length: 3.5 in  Needle localization: anatomical landmarks and ultrasound guidance  Catheter type: spring wound  Catheter size: 19 G  Test dose: lidocaine 1.5% with Epi 1-to-200,000 and negative   -ultrasound image captured on disc.  Assessment  Injection assessment: negative aspiration, negative parasthesia and local visualized surrounding nerve  Paresthesia pain: none  Heart rate change: no  Slow fractionated injection: yes  Pain Tolerance: comfortable throughout block and no complaints  Medications:    Medications: bupivacaine (pf) (MARCAINE) injection 0.25% - Perineural   40 mL - 5/3/2025 9:37:00 AM    Additional Notes  VSS.  CRNA monitoring vitals throughout procedure and patient extubated at end of procedure.  Patient tolerated procedure well. 40 mL 0.25% bupivacaine injected at site prior to catheter insertion. Catheter secured with dermabond, steri strips and Tegaderms.

## 2025-05-03 NOTE — ANESTHESIA PREPROCEDURE EVALUATION
[facility]  Anesthesia Pre-Operative Evaluation         Patient Name: Angelina Man  YOB: 1950  MRN: [id number]    SUBJECTIVE:     Pre-operative evaluation for Procedure(s) (LRB):  INSERTION, INTRAMEDULLARY BERNADINE, FEMUR - right selina (Right)     05/02/2025    Angelina Man is a 75 y.o. female w/ a significant PMHx of A fib on Eliquis, HFpEF, hx of lumbar spinal surgery, cervical stenosis, PFO, mild-moderate MR, mild GERD, pulm HTN with exercise, hypothyroidism with recent fall found to have R intertrochanteric femur fracture.     Initially found to be in A fib RVR but was given home metoprolol dose in ER with improvement in HR.     With recent nagging cough, no runny nose or fever. Has seasonal allergies.     Reports SOB intermittently when exercising.     Patient now presents for the above procedure(s).    RHC 2022  RHC 01/27/22  At rest:                Right atrial pressure is normal.                Pulmonary capillary wedge pressure is normal.                Pulmonary artery pressure is normal.                Pulmonary vascular resistance is normal.                Thermodilution cardiac output and index is normal.     With exercise: there is a significant rise in pulmonary capillary wedge pressure                Pulmonary capillary wedge pressure rises significantly and is moderately elevated.                PA pressure is mildly elevated.                Thermodilution CO increases significantly.                Pulmonary vascular resistance is normal.     Closely after exercise:                Pulmonary artery pressure is mildly elevated.                Pulmonary capillary wedge pressure is mildly elevated.     FINAL DIAGNOSIS:                WHO Group 2 pulmonary hypertension.       TTE 2024    Left Ventricle: The left ventricle is normal in size. Normal wall thickness. There is concentric remodeling. There is low normal systolic function with a visually estimated ejection  fraction of 50 - 55%. Unable to assess diastolic function due to atrial fibrillation.    Right Ventricle: Normal right ventricular cavity size. Wall thickness is normal. Systolic function is mildly reduced.    Biatrial enlargement    Patent foramen ovale visualized with predominant left to right shunting indicated by color flow Doppler.    Mitral Valve: There is mild to moderate regurgitation.    Tricuspid Valve: There is mild regurgitation.    Pulmonary Artery: The estimated pulmonary artery systolic pressure is 36 mmHg.    IVC/SVC: Elevated venous pressure at 15 mmHg.    Pericardium: There is a small effusion. No indication of cardiac tamponade.      LDA:        Peripheral IV - Single Lumen 05/02/25 1842 20 G Anterior;Proximal;Right Forearm (Active)   Site Assessment Clean;Dry;Intact 05/02/25 1842   Extremity Assessment Distal to IV No abnormal discoloration;No redness;No swelling 05/02/25 1842   Line Status Blood return noted 05/02/25 1842   Dressing Status Clean;Dry;Intact 05/02/25 1842   Dressing Intervention First dressing 05/02/25 1842   Reason Not Rotated Not due 05/02/25 1842   Number of days: 0       Prev airway: None documented.    Drips: None documented.      Problem List[1]    Review of patient's allergies indicates:  No Known Allergies    Current Inpatient Medications:      Medications Ordered Prior to Encounter[2]    Past Surgical History:   Procedure Laterality Date    ANGIOPLASTY      CATARACT EXTRACTION W/  INTRAOCULAR LENS IMPLANT Left 12/6/2022    Procedure: EXTRACTION, CATARACT, WITH IOL INSERTION;  Surgeon: Tone Brown MD;  Location: [facility], operated by [facility] OR;  Service: Ophthalmology;  Laterality: Left;    CATARACT EXTRACTION W/  INTRAOCULAR LENS IMPLANT Right 12/20/2022    Procedure: EXTRACTION, CATARACT, WITH IOL INSERTION;  Surgeon: Tone Brown MD;  Location: [facility], operated by [facility] OR;  Service: Ophthalmology;  Laterality: Right;    CERVICAL FUSION      COLONOSCOPY      COLONOSCOPY N/A 11/14/2017     Procedure: COLONOSCOPY;  Surgeon: Kasi Mercado MD;  [facility]  Service: Endoscopy;  Laterality: N/A;    COLONOSCOPY N/A 4/26/2023    Procedure: COLONOSCOPY;  Surgeon: Jeanmarie Hathaway MD;  [facility]  Service: Colon and Rectal;  Laterality: N/A;  ok to hold Eliquis 2 days per Dr Pereira  constipation-ext Miralax prep  instr mailed/portal-GT  4/21/23 pre call attempted, no answer    COLONOSCOPY N/A 5/28/2024    Procedure: COLONOSCOPY;  Surgeon: Jeanmarie Hathaway MD;  Location: [facility] (4TH FLR);  Service: Endoscopy;  Laterality: N/A;  referral Dr. Hathaway. Annual Colonoscopy for High Risk. Golytely prep instr. to portal Pending Eliquis Hold from Dr. David Pereira. [facility] Afib.EC  ok to hold Eliquis 2 days per Dr Pereira-GT  5/21- precall confirmed; instructions re-sent via portal, aware of holding eliquis     ESOPHAGOGASTRODUODENOSCOPY N/A 10/10/2019    Procedure: EGD (ESOPHAGOGASTRODUODENOSCOPY);  Surgeon: Rg Milton MD;  [facility]  Service: Endoscopy;  Laterality: N/A;    ESOPHAGOGASTRODUODENOSCOPY N/A 10/6/2021    Procedure: EGD (ESOPHAGOGASTRODUODENOSCOPY);  Surgeon: Rg Milton MD;  [facility]  Service: Endoscopy;  Laterality: N/A;  covid test 10/3 [place], instr emailed/portal -    EXCISION Left 5/17/2023    Procedure: EXCISION SQUAMOUS CELL CARCINOMA LEFT LEG;  Surgeon: Kasi Canela Jr., MD;  [facility];  Service: General;  Laterality: Left;    l4-5 mid discectomy Left 03/2017    l4-5 MIS diskectomy Right 05/2016    RIGHT HEART CATHETERIZATION Right 1/27/2022    Procedure: INSERTION, CATHETER, RIGHT HEART;  Surgeon: Satnam Pickard Jr., MD;  Location: [facility] CATH LAB;  Service: Cardiology;  Laterality: Right;    TRANSFORAMINAL EPIDURAL INJECTION OF STEROID Bilateral 3/12/2024    Procedure: LUMBAR TRANSFORAMINAL BILATERAL L3/4 *ELIQUIS CLEARANCE IN CHART*;  Surgeon: Sheree Abbott MD;   Location: [facility] PAIN MGT;  Service: Pain Management;  Laterality: Bilateral;  [phone]  2 WK F/U [place]    TREATMENT OF CARDIAC ARRHYTHMIA N/A 7/17/2020    Procedure: CARDIOVERSION;  Surgeon: Kwan Bray MD;  Location: [facility] EP LAB;  Service: Cardiology;  Laterality: N/A;  AF,DCCV/SANGITA, ANES, SK, 746    TREATMENT OF CARDIAC ARRHYTHMIA N/A 7/14/2021    Procedure: CARDIOVERSION;  Surgeon: SYDNIE Cedillo MD;  Location: [facility] EP LAB;  Service: Cardiology;  Laterality: N/A;  AF, SANGITA, DCCV, MAC, EH, 3 Prep    WASHOUT Right 5/17/2023    Procedure: WASHOUT right lower arm and closure;  Surgeon: Kasi Canela Jr., MD;  Location: [facility] OR [address] [place] [address];  Service: General;  Laterality: Right;       Social History[3]    OBJECTIVE:     Vital Signs Range (Last 24H):  Temp:  [36.8 °C (98.3 °F)]   Pulse:  [108-135]   Resp:  [12-20]   BP: (130-156)/()   SpO2:  [91 %-98 %]       Significant Labs:  Lab Results   Component Value Date    WBC 10.85 05/02/2025    HGB 11.3 (L) 05/02/2025    HCT 35.4 (L) 05/02/2025     (H) 05/02/2025    CHOL 210 (H) 03/06/2025    TRIG 160 (H) 03/06/2025    HDL 57 03/06/2025    ALT 12 05/02/2025    AST 23 05/02/2025     05/02/2025    K 4.0 05/02/2025    CL 96 05/02/2025    CREATININE 0.8 05/02/2025    BUN 15 05/02/2025    CO2 27 05/02/2025    TSH 1.488 01/08/2025    INR 1.1 04/20/2025    HGBA1C 5.2 03/06/2025       Diagnostic Studies: No relevant studies.    EKG:   Results for orders placed or performed in visit on 09/25/24   EKG 12-lead    Collection Time: 09/25/24 12:52 AM   Result Value Ref Range    QRS Duration 70 ms    OHS QTC Calculation 443 ms    Narrative    Test Reason :     Vent. Rate : 082 BPM     Atrial Rate : 000 BPM     P-R Int : 000 ms          QRS Dur : 070 ms      QT Int : 380 ms       P-R-T Axes : 000 050 026 degrees     QTc Int : 443 ms    Atrial fibrillation  Low voltage QRS  Nonspecific ST and/or T wave abnormalities  Abnormal ECG  When compared with ECG of 25-SEP-2024  00:49,  No significant change was found  Confirmed by Abena Brandon MD ([id number]) on 9/25/2024 8:54:44 AM    Referred By: [email or web address]   SELF           Confirmed By:Abena Brandon MD       2D ECHO:  TTE:  Results for orders placed or performed during the hospital encounter of 10/07/24   Echo   Result Value Ref Range    LV Diastolic Volume 64.23 mL    Echo EF Estimated 52 %    LV Systolic Volume 30.86 mL    IVS 0.7 0.6 - 1.1 cm    LVIDd 3.9 3.5 - 6.0 cm    LVIDs 2.9 2.1 - 4.0 cm    LVOT diameter 1.9 cm    PW 0.9 0.6 - 1.1 cm    IVRT 79.92 ms    AV LVOT peak gradient 2 mmHg    LVOT mn grad 1 mmHg    LVOT peak jn 0.7 m/s    LVOT peak VTI 16.6 cm    RV- nunez basal diam 3.4 cm    RV S' 10.64 cm/s    LA size 4.08 cm    Left Atrium Major Axis 5.12 cm    Left Atrium Minor Axis 5.43 cm    LA Vol (MOD) 67.97 mL    RA Major Axis 5.77 cm    RA Area 19.6 cm2    AV valve area 1.9 cm2    AV area by cont VTI 1.8 cm2    AV peak gradient 5.8 mmHg    AV mean gradient 3.5 mmHg    Ao peak jn 1.2 m/s    Ao VTI 25.1 cm    TDI LATERAL 0.15 m/s    TDI SEPTAL 0.09 m/s    TV peak gradient 21 mmHg    TR Max Jn 2.30 m/s    Ascending aorta 3.20 cm    STJ 2.44 cm    PV Peak D Jn 0.28 m/s    PV Peak S Jn 0.47 m/s    IVC diameter 2.05 cm    Sinus 2.73 cm    RA Width 4.02 cm    TAPSE 1.46 cm    LA WIDTH 4.65 cm    BSA 1.44 m2    LVOT stroke volume 47.0 cm3    LV Systolic Volume Index 21.1 mL/m2    LV Diastolic Volume Index 43.99 mL/m2    LVOT area 2.8 cm2    FS 25.6 (A) 28 - 44 %    Left Ventricle Relative Wall Thickness 0.46 cm    LV mass 89.7 g    LV Mass Index 61.4 g/m2    RUBY 58.2 mL/m2    LA Vol 84.99 cm3    Pulm vein S/D ratio 1.68     RV/LV Ratio 0.87 cm    RUBY (MOD) 46.6 mL/m2    AV Velocity Ratio 0.58     AV index (prosthetic) 0.66     ZACH by Velocity Ratio 1.7 cm²    Triscuspid Valve Regurgitation Peak Gradient 21 mmHg    Mean e' 0.12 m/s    ZLVIDS 0.48     ZLVIDD -1.19     TV resting pulmonary artery pressure 36 mmHg    RV TB RVSP 17 mmHg     Est. RA pres 15 mmHg    Narrative      Left Ventricle: The left ventricle is normal in size. Normal wall   thickness. There is concentric remodeling. There is low normal systolic   function with a visually estimated ejection fraction of 50 - 55%. Unable   to assess diastolic function due to atrial fibrillation.    Right Ventricle: Normal right ventricular cavity size. Wall thickness   is normal. Systolic function is mildly reduced.    Biatrial enlargement    Patent foramen ovale visualized with predominant left to right shunting   indicated by color flow Doppler.    Mitral Valve: There is mild to moderate regurgitation.    Tricuspid Valve: There is mild regurgitation.    Pulmonary Artery: The estimated pulmonary artery systolic pressure is   36 mmHg.    IVC/SVC: Elevated venous pressure at 15 mmHg.    Pericardium: There is a small effusion. No indication of cardiac   tamponade.         SANGITA:  No results found. However, due to the size of the patient record, not all encounters were searched. Please check Results Review for a complete set of results.    ASSESSMENT/PLAN:         Pre-op Assessment    I have reviewed the Patient Summary Reports.     I have reviewed the Nursing Notes. I have reviewed the NPO Status.   I have reviewed the Medications.     Review of Systems  Anesthesia Hx:  No problems with previous Anesthesia   History of prior surgery of interest to airway management or planning:          Denies Family Hx of Anesthesia complications.    Denies Personal Hx of Anesthesia complications.                    Social:  Non-Smoker       Hematology/Oncology:    Oncology Normal    -- Anemia:                                  EENT/Dental:  EENT/Dental Normal           Cardiovascular:         Dysrhythmias atrial fibrillation  CHF     JENNINGS    Patient on beta blockers Beta blocker taken in last 24 hours      Shortness of Breath                      Atrial Fibrillation     Pulmonary:      Shortness of breath                   Renal/:  Renal/ Normal         Kidney Function/Disease             Hepatic/GI:     GERD         Gerd          Musculoskeletal:  Arthritis   radiculopathy     Arthritis     Spine Disorders: lumbar Chronic Pain           Neurological:  Neurology Normal              Arthritis                         Neuromuscular Disease   Endocrine:   Hypothyroidism       Hypothyroidism          Psych:    depression                Physical Exam  General: Cooperative, Alert, Oriented and Cachexia    Airway:  Mallampati: II   Mouth Opening: Normal  TM Distance: Normal  Tongue: Normal  Neck ROM: Normal ROM    Dental:  Periodontal disease    Chest/Lungs:  Normal Respiratory Rate    Heart:  Rate: Normal  Rhythm: Irregularly Irregular  A fib      Anesthesia Plan  Type of Anesthesia, risks & benefits discussed:    Anesthesia Type: Gen ETT, Regional  Intra-op Monitoring Plan: Standard ASA Monitors  Post Op Pain Control Plan: multimodal analgesia, IV/PO Opioids PRN and peripheral nerve block  Induction:  IV  Airway Plan: Video, Post-Induction  Informed Consent: Informed consent signed with the Patient and all parties understand the risks and agree with anesthesia plan.  All questions answered.   ASA Score: 3  Day of Surgery Review of History & Physical: H&P Update referred to the surgeon/provider.  Anesthesia Plan Notes: Will get type & screen in OR since patient is anticoagulated    Ready For Surgery From Anesthesia Perspective.     .           [1]   Patient Active Problem List  Diagnosis    Anxiety    DJD (degenerative joint disease)    Menopause    Spinal stenosis of lumbar region without neurogenic claudication    DDD (degenerative disc disease), lumbar    S/P lumbar fusion    Hypothyroidism    Low back pain    Lumbar disc herniation    Radiculopathy of lumbar region    Chronic pain    Spondylolisthesis at L4-L5 level    Personal history of skin cancer    Gastroesophageal reflux disease    Longstanding persistent atrial  fibrillation    Family hx of melanoma    Renal angiomyolipoma    Renal cyst, acquired    Varicose veins of both lower extremities with complications    History of cardioversion    Dysphagia    Venous insufficiency of both lower extremities    Anticoagulant long-term use    Pulmonary hypertension    (HFpEF) heart failure with preserved ejection fraction    Ulcer of left lower extremity, limited to breakdown of skin    Unspecified cord compression    Age-related osteoporosis without current pathological fracture    Chronic pain of left knee    Squamous cell carcinoma of skin of left lower extremity    Leg heaviness    Episode of recurrent major depressive disorder, unspecified depression episode severity    Purpura    Lumbar pain    Decreased strength    Shortness of breath    Pins and needles sensation    Small fiber neuropathy    Cervical spinal stenosis    Cervical spondylosis   [2]   No current facility-administered medications on file prior to encounter.     Current Outpatient Medications on File Prior to Encounter   Medication Sig Dispense Refill    ELIQUIS 5 mg Tab TAKE 1 TABLET(5 MG) BY MOUTH TWICE DAILY 180 tablet 3    furosemide (LASIX) 20 MG tablet Take 1 tablet (20 mg total) by mouth once daily. 90 tablet 3    acetaminophen (TYLENOL) 500 MG tablet Take 500 mg by mouth every 6 (six) hours as needed for Pain.      acyclovir (ZOVIRAX) 400 MG tablet Take 1 tablet (400 mg total) by mouth once daily. 180 tablet 1    ascorbic acid (VITAMIN C) 1000 MG tablet Take 1,000 mg by mouth once daily.       biotin 1 mg tablet Take 1 mg by mouth 2 (two) times daily.       buPROPion (WELLBUTRIN SR) 150 MG TBSR 12 hr tablet Take 1 tablet (150 mg total) by mouth 2 (two) times daily. 180 tablet 3    diclofenac sodium (VOLTAREN ARTHRITIS PAIN) 1 % Gel Apply 2 g topically every 12 (twelve) hours. 50 g 0    digestive enzymes Tab Take 1 tablet by mouth once daily.       fluticasone propionate (FLONASE) 50 mcg/actuation nasal spray  1 spray (50 mcg total) by Each Nostril route 2 (two) times daily as needed for Rhinitis. 16 g 11    ketoconazole (NIZORAL) 2 % cream aaa bid prn flare under arm and qhs to areas on face 60 g 3    lactulose (CHRONULAC) 20 gram/30 mL Soln Take 30mL twice daily (every 12 hours) until bowel movement 180 mL 0    levothyroxine (SYNTHROID) 137 MCG Tab tablet Take 1 tablet (137 mcg total) by mouth before breakfast. 90 tablet 3    lubiprostone (AMITIZA) 24 MCG Cap Take 1 capsule (24 mcg total) by mouth daily as needed (IBS symptoms). 30 capsule 6    magnesium citrate solution Drink 1 half of bottle. Drink second half of bottle if no bowel movement 12 hours after first dose. 296 mL 0    methocarbamoL (ROBAXIN) 500 MG Tab Take 1 tablet (500 mg total) by mouth 3 (three) times daily as needed (mus). 90 tablet 2    metoprolol succinate (TOPROL-XL) 25 MG 24 hr tablet Take 1 tablet (25 mg total) by mouth once daily. 90 tablet 3    mirtazapine (REMERON) 7.5 MG Tab TAKE 1 TABLET(7.5 MG) BY MOUTH EVERY NIGHT 90 tablet 3    multivitamin (THERAGRAN) per tablet Take 1 tablet by mouth once daily.       mupirocin (BACTROBAN) 2 % ointment Apply once daily during the dressing change 30 g 1    potassium chloride SA (K-DUR,KLOR-CON) 20 MEQ tablet Take 1 tablet (20 mEq total) by mouth once daily. 90 tablet 3    pramipexole (MIRAPEX) 0.25 MG tablet TAKE 1 TABLET(0.25 MG) BY MOUTH EVERY EVENING FOR RESTLESS LEGS 90 tablet 1    traZODone (DESYREL) 50 MG tablet Take 1-2 tablets ( mg total) by mouth every evening. 180 tablet 3    zinc 50 mg Tab Take 50 mg by mouth once daily.      [3]   Social History  Socioeconomic History    Marital status: Single   Occupational History     Employer: [employer]   Tobacco Use    Smoking status: Never     Passive exposure: Never    Smokeless tobacco: Never   Substance and Sexual Activity    Alcohol use: Yes     Alcohol/week: 4.0 standard drinks of alcohol     Types: 4 Glasses of wine per week      Comment: 2 glasses of wine on weekends    Drug use: No    Sexual activity: Never   Other Topics Concern    Are you pregnant or think you may be? No    Breast-feeding No     Social Drivers of Health     Financial Resource Strain: Low Risk  (3/4/2025)    Overall Financial Resource Strain (CARDIA)     Difficulty of Paying Living Expenses: Not very hard   Food Insecurity: No Food Insecurity (3/4/2025)    Hunger Vital Sign     Worried About Running Out of Food in the Last Year: Never true     Ran Out of Food in the Last Year: Never true   Transportation Needs: No Transportation Needs (3/4/2025)    PRAPARE - Transportation     Lack of Transportation (Medical): No     Lack of Transportation (Non-Medical): No   Physical Activity: Sufficiently Active (3/4/2025)    Exercise Vital Sign     Days of Exercise per Week: 7 days     Minutes of Exercise per Session: 50 min   Stress: No Stress Concern Present (3/4/2025)    [employer] - Occupational Stress Questionnaire     Feeling of Stress : Only a little   Housing Stability: Low Risk  (3/4/2025)    Housing Stability Vital Sign     Unable to Pay for Housing in the Last Year: No     Number of Times Moved in the Last Year: 0     Homeless in the Last Year: No

## 2025-05-03 NOTE — HPI
Angelina Man is a 75 y.o. female with PMH of lumbar spinal stenosis s/p lumbar fusion, GERD, BLE venous insufficiency, and squamous cell carcinoma presenting with right hip pain.  Patient reportedly fell the day before Easter, tripping over a sidewalk.  Experienced  immediate pain radiating down her right leg, and has been unable to bear weight on her RLE.  She reports she hit her head and denies LOC.  Also denies any numbness/paresthesias or any other musculoskeletal pains.    She visited urgent care on 4/20 where hip X-ray showed no acute findings and received care for abrasions. She later presented to ED on 4/20 with concerns for head injury where CT head and C spine was neg for acute processes.She subsequently presented to Ortho clinic on 5/1, where an MRI was ordered, showing a non displaced right IT fx .  Lives alone at home and ambulates independently at baseline.  Does not smoke. Patient takes Eliquis 5mg.

## 2025-05-03 NOTE — ANESTHESIA PROCEDURE NOTES
Intubation    Date/Time: 5/3/2025 7:50 AM    Performed by: Codey Barfield CRNA  Authorized by: Sukhdeep Raza DO    Intubation:     Induction:  Intravenous    Intubated:  Postinduction    Mask Ventilation:  Easy mask    Attempted By:  CRNA    Method of Intubation:  Video laryngoscopy    Blade:  Gandara 3    Laryngeal View Grade: Grade I - full view of cords      Difficult Airway Encountered?: No      Complications:  None    Airway Device:  Oral endotracheal tube    Airway Device Size:  7.0    Style/Cuff Inflation:  Cuffed    Inflation Amount (mL):  5    Tube secured:  21    Secured at:  The lips    Placement Verified By:  Capnometry and Revisualization with laryngoscopy    Complicating Factors:  None    Findings Post-Intubation:  BS equal bilateral and atraumatic/condition of teeth unchanged

## 2025-05-03 NOTE — ASSESSMENT & PLAN NOTE
Anemia is likely due to chronic disease. Most recent hemoglobin and hematocrit are listed below. Recent B12 and folate WNL.  Recent Labs     05/02/25  1841   HGB 11.3*   HCT 35.4*     Plan  - Monitor serial CBC: Daily  - Transfuse PRBC if patient becomes hemodynamically unstable, symptomatic or H/H drops below 7/21.  - Patient has not received any PRBC transfusions to date

## 2025-05-03 NOTE — ASSESSMENT & PLAN NOTE
Controlled. Anemia is likely due to chronic disease. Most recent hemoglobin and hematocrit are listed below. Recent B12 and folate levels normal.   Recent Labs     05/02/25  1841 05/03/25  0445 05/03/25  1007   HGB 11.3* 10.3* 11.4*   HCT 35.4* 32.4* 36.6*     Plan  - Monitor serial CBC: Daily  - Transfuse PRBC if patient becomes hemodynamically unstable, symptomatic or H/H drops below 7/21.  - Patient has not received any PRBC transfusions to date.  - Anemia is stable.

## 2025-05-03 NOTE — HOSPITAL COURSE
Patient admitted after found to have a closed nondisplaced right intertrochanteric fracture on MRI imaging. Patient had fall around East and has had progressive right hip pain since then with negative X-rays of right hip so seen in Ortho clinic on 5/1 and MRI of pelvis done and revealed closed nondisplaced right femur intertrochanteric fracture and called yesterday, 5/2 and told to go to ED fro admission. Patient admitted to [facility] Medicine Team H: Hip Fracture team and started on Hip Fracture Pathway with Orthopedic surgery consult for for right closed intertrochanteric femur fracture. Patient was seen and evaluated by Orthopedic surgery who recommended operative repair of fracture. Patient was medically optimized prior to surgery and was taken to OR after optimization on 5/3/2025. Patient underwent right hip cephalomedullary nail fixation by Dr. Aleida Pires. Post-op patient weight bear as tolerated to the right lower extremity as per Orthopedics recommendation. Patient resumed on her home Apixiban 5 mg po BID that she takes for long term anticoagulation for her PAF so no other DVT prophylaxis required post-op.  Perineural pain catheter placed by Anesthesia Pain Service with continuous infusion of Ropivacaine to help with pain control post-op and Anesthesia Pain Service managing while patient in the hospital. Patient placed on multimodal pain management post-op with Tylenol 1000 mg po every 6 hours and Robaxin 500 mg po every 6 hours post-op and will continue. PT/OT consulted post-op and recommending low intensity on discharge. Patient placed on IV Ceftriaxone 1 gram daily post-op to treat UTI found on admit on 5/4. Final urine culture returned on 5/5 grew 10,000 - 49,999 cfu/ml Klebsiella pneumoniae sensitive to Ceftriaxone and will continue Ceftriaxone to treat on 5/5 and plan 3 day course and last dose on 5/6. Patient ambulated multiple times in hallway with rolling walker with therapy on 5/5. Patient  doing well on 5/6 and discharged in good condition to home with HH PT/OT arranged prior to discharge. No DME needed on discharge as patient has DME at home. Surgical bandage to remain in place to right hip until Ortho clinic follow-up. Perineural catheter removed by Anesthesia prior to discharge on 5/6. Patient completed 3 day course of IV Ceftriaxone on 5/6 for her UTI found on admit so no further antibiotics needed on discharge.

## 2025-05-03 NOTE — OP NOTE
OPERATIVE REPORT    DATE OF PROCEDURE/OPERATION: 05/03/2025     SURGEON:  Aleida Pires MD     ASSISTANT:    Residents-   MD CHARU Milton MD J. Heath Wilder, MD     PREOPERATIVE DIAGNOSES:    Right nondisplaced Intertrochanteric femur fracture     POSTOPERATIVE DIAGNOSES:    Same     PROCEDURES PERFORMED:    Fixation of Right  Intertrochanteric femur fracture using an intramedullary implant (CPT 21737)     COMPLICATIONS:    None.      ESTIMATED BLOOD LOSS:    50cc     IMPLANTS USED:    Rohit Gamma 10 x170mm     SURGICAL INDICATION:    Reina is a 75-year-old female with atrial fibrillation, heart failure, and hypothyroidism.  She had a ground level fall on April 20th.  She hit her head and sustained an occipital hematoma.  Afterwards she was unable to bear full weight.  She presented to an ER where x-rays of the right hip did not demonstrate any fractures.  She was discharged to home.  Since then she has been in severe pain.  She presented to the orthopedic clinic for follow up yesterday.  MRI of the right hip was performed which demonstrated a nondisplaced intertrochanteric femur fracture.  She was sent to the ER.    She is very active at baseline.  She goes to the gym daily and uses the treadmill, spins and lifts weights.  She takes Eliquis for AFib.    We discussed risks, benefits and alternatives.  We discussed the potential complications related to this injury which include, but are not limited to, infection, nonunion, malunion, wound complications, stiffness, post-traumatic osteoarthritis, injury to surrounding structures, chronic pain, blood clot, loss of limb, loss of life. Informed consent was obtained.     DESCRIPTION OF PROCEDURE:    The patient was brought to the operative suite.  Adequate anesthesia was administered.  The surgical site marking was confirmed to be correct.  The patient was positioned in the supine position on a fracture table. Fluoroscopy was brought into  evaluate the fracture reduction. A combination of traction, rotation and abduction/adduction was performed to obtain a closed reduction. The operative extremity was then pre-scrubbed with chlorhexidine and alcohol. The surgical site was then sterilely prepped with ChloraPrep and draped in a sterile fashion.  A time-out was performed to confirm administration of prophylactic antibiotics, correct surgical site, surgical and anesthetic team members, expected surgical time and blood loss, availability of surgical equipment, and availability of blood products.  At this point, I commenced the surgical procedure.      Fluoroscopy was used to identify the level of the greater trochanter. A small incision was made proximal to the greater trochanter through skin and subcutaneous tissue. The glutea fascia was incised sharply. A drill tipped guidewire was then used to find the start point and was buried to the level of the lesser trochanter. The opening reamer and soft tissue protector were then used to ream the tip of the greater trochanter. The nail was then inserted and gently tapped to the appropriate level.  The fit was very tight so we placed a guide pratik and reamed to 11.5 mm.  The nail was then reinserted and seated more easily.  The lag screw guidewire was then placed using the jig. Multiple Xrs were performed to ensure correct anterior to posterior positioning as well as appropriate tip-apex distance. The lag screw was then drilled for and placed. Xrs confirmed appropriate placement and depth. The set screw was then tightened. The interlock was then placed using the jig. Final Xrs confirmed appropriate placement of the implant. Wounds were irrigated and closed with 2-0 monocryl and staples. Dressings were applied.      POSTOPERATIVE PLAN:    1. Weight bearing: Weight bearing as tolerated.  2. Antibiotics: 24 hours of perioperative antibiotics.  3. DVT Prophylaxis:  May resume Eliquis.  4. Patient will need staples  removed at approximately 2 weeks. Removal may be performed at a rehab facility, by a PCP or by myself in the Orthopedic clinic.  5. Follow up with me in 6 weeks with an AP pelvis and right hip XR.

## 2025-05-03 NOTE — PROGRESS NOTES
"[facility] Medicine  Progress Note    Patient Name: Angelina Man  MRN: [id number]  Patient Class: IP- Inpatient   Admission Date: 5/2/2025  Length of Stay: 1 days  Attending Physician: Rebekah Schumacher MD  Primary Care Provider: Zina Rockwell MD        Subjective     Principal Problem:Closed nondisplaced intertrochanteric fracture of right femur with routine healing        HPI:  Angelina Man is a pleasant 75 y.o. female with permanent AFib, HFpEF, hypothyroidism presenting with hip fracture.     On Easter, she reports that she drank a martini with family, then had an accidental fall onto her right hip.  She did hit her head and had an occipital hematoma, left eye bruising, and right arm trauma.  She noted right hip pain particularly with ambulation, and was evaluated at an urgent care where hip x-ray showed no obvious fracture.  She then went to an ED for CT scans of her head, which reportedly showed no bleed.  She has noted difficulty ambulating due to the hip pain, however has still been able to do so.  She has even gone to a few of her usual spin classes, which reportedly helped her hip pain.  On Thursday, after span, she could barely walk, prompting presentation for further evaluation.  MRI was performed, showing intertrochanteric femur fracture.     She is very active at baseline, and goes to the gym every day.  She she goes to spin classes multiple times per week and lifts weights.  She lives in a condo and walks up stairs without difficulty. She performs all ADLs independently.  She reports chronic lower extremity edema improved with compression stockings, but denies any chest pain, shortness for breath, or palpitations.  She is compliant with all medications, including her metoprolol and Eliquis for AFib.    Of note, she reports chronic dry eyes with intermittent "crusting" for which she takes Systane. She has noted increased itching and crusting to the eyes since presentation to " the ED.     Overview/Hospital Course:  Patient admitted after found to have a closed nondisplaced right intertrochanteric fracture on MRI imaging. Patient had fall around Easter and has had progressive right hip pain since then with negative X-rays of right hip so seen in Ortho clinic on 5/1 and MRI of pelvis done and revealed closed nondisplaced right femur intertrochanteric fracture and called yesterday, 5/2 and told to go to ED fro admission. Patient admitted to [facility] Medicine Team H: Hip Fracture team and started on Hip Fracture Pathway with Orthopedic surgery consult for for right closed intertrochanteric femur fracture. Patient was seen and evaluated by Orthopedic surgery who recommended operative repair of fracture. Patient was medically optimized prior to surgery and was taken to OR after optimization on 5/3/2025. Patient underwent right hip cephalomedullary nail fixation by Dr. Aleida Pires. Post-op patient WBAT to the right lower extremity as per Orthopedics recommendation. Patient resumed on her home Apixiban 5 mg po BID that she takes for long term anticoagulation for her PAF so no other DVT prophylaxis required post-op.  Perineural pain catheter placed by Anesthesia Pain Service with continuous infusion of Ropivacaine to help with pain control post-op and Anesthesia Pain Service managing while patient in the hospital. Patient placed on multimodal pain management post-op with Tylenol 1000 mg po every 6 hours post-op and will continue. PT/OT consulted post-op.      Interval History: Patient admitted yesterday after found to have a closed nondisplaced right intertrochanteric fracture on MRI imaging. Patient had fall on Easter weekend and had X-rays done at that time of right hip that were negative but has had progressive right hip pain since then. Patient seen in Ortho clinic on 5/1 and MRI of pelvis ordered to evaluate pain and done on 5/2 and revealed closed nondisplaced right femur  intertrochanteric fracture and Ortho clinic called yesterday, 5/2 and told patient to go to ED for admission. Patient was seen and evaluated by Orthopedic surgery who recommended operative repair of fracture. Patient was taken to OR today and underwent right hip cephalomedullary nail fixation by Dr. Aleida Pires. Post-op, patient weight bear as tolerated to the right lower extremity as per Orthopedics recommendation. Patient resumed on her home Apixiban 5 mg po BID that she takes for long term anticoagulation for her PAF so no other DVT prophylaxis required post-op. Perineural pain catheter placed by Anesthesia Pain Service with continuous infusion of Ropivacaine to help with pain control post-op and Anesthesia Pain Service managing while patient in the hospital. Patient placed on multimodal pain management post-op with Tylenol 1000 mg po every 6 hours post-op and will continue. PT/OT consulted post-op. I met with patient and her daughter in room after surgery. Patient reports 2/10 pain to right hip currently. Patient complaining of redness to her eyes and appears she has a viral conjunctivitis to both eyes and artifical tears ordered to help with dryness. Patient advised not to rub her eyes. Labs reviewed. U/A on admit positive for UTI so will start patient on IV Ceftriaxone to treat and urine culture sent and will need to follow-up results. Patient reports she has been having urinary frequency over the past week and though she did have a UTI as similar to previous UTI symptoms. Hgb stable at 11.4 and was 11.3 yesterday. Creatinine normal at 0.7.     Review of Systems   Constitutional:  Negative for fever.   Respiratory:  Negative for cough and shortness of breath.    Cardiovascular:  Negative for chest pain.   Gastrointestinal:  Negative for nausea.   Musculoskeletal:  Positive for arthralgias (Right hip).   Psychiatric/Behavioral:  Negative for agitation and confusion.      Objective:     Vital Signs (Most  Recent):  Temp: 97.6 °F (36.4 °C) (05/03/25 1127)  Pulse: 105 (05/03/25 1127)  Resp: 18 (05/03/25 1325)  BP: 135/63 (05/03/25 1127)  SpO2: 96 % (05/03/25 1030) on room air Vital Signs (24h Range):  Temp:  [97 °F (36.1 °C)-98.3 °F (36.8 °C)] 97.6 °F (36.4 °C)  Pulse:  [] 105  Resp:  [12-24] 18  SpO2:  [91 %-100 %] 96 %  BP: ()/() 135/63     Weight: 43.2 kg (95 lb 3.8 oz)  Body mass index is 16.35 kg/m².    Intake/Output Summary (Last 24 hours) at 5/3/2025 1431  Last data filed at 5/3/2025 0919  Gross per 24 hour   Intake 1300 ml   Output 360 ml   Net 940 ml         Physical Exam  Vitals and nursing note reviewed.   Constitutional:       General: She is awake. She is not in acute distress.     Appearance: Normal appearance. She is underweight. She is not ill-appearing.      Comments: Frial, elderly female in no distress. Daughter at bedside. Patient appears very comfortable on exam.    Eyes:      Conjunctiva/sclera: Conjunctivae normal.   Cardiovascular:      Rate and Rhythm: Normal rate and regular rhythm.      Heart sounds: Normal heart sounds. No murmur heard.  Pulmonary:      Effort: Pulmonary effort is normal. No respiratory distress.      Breath sounds: Normal breath sounds. No wheezing.   Abdominal:      General: Abdomen is flat. Bowel sounds are normal. There is no distension.      Palpations: Abdomen is soft.      Tenderness: There is no abdominal tenderness. There is no guarding.   Musculoskeletal:      Right lower leg: No edema.      Left lower leg: No edema.   Skin:     General: Skin is warm.      Findings: No erythema.      Comments: Surgical dressing noted on right hip and lateral thigh. Dressing is clean and dry and intact.   Neurological:      Mental Status: She is alert and oriented to person, place, and time.   Psychiatric:         Mood and Affect: Mood normal.         Behavior: Behavior normal. Behavior is cooperative.         Thought Content: Thought content normal.          Judgment: Judgment normal.               Significant Labs: CBC:   Recent Labs   Lab 05/02/25  1841 05/03/25  0445 05/03/25  1007   WBC 10.85 7.93 12.88*   HGB 11.3* 10.3* 11.4*   HCT 35.4* 32.4* 36.6*   * 420 439     CMP:   Recent Labs   Lab 05/02/25  1841 05/03/25  0445    138   K 4.0 3.8   CL 96 103   CO2 27 26    83   BUN 15 16   CREATININE 0.8 0.7   CALCIUM 8.6* 7.9*   PROT 7.2  --    ALBUMIN 3.1*  --    BILITOT 0.3  --    ALKPHOS 113  --    AST 23  --    ALT 12  --    ANIONGAP 14 9     Magnesium:   Recent Labs   Lab 05/03/25 0445   MG 2.3       Significant Imaging: I have reviewed all pertinent imaging results/findings within the past 24 hours.      Assessment & Plan  Closed nondisplaced intertrochanteric fracture of right femur with routine healing s/p IM nail on 5/3/2025  Patient admitted after found to have a closed nondisplaced right intertrochanteric fracture on MRI imaging. Patient had fall on Easter weekend and had X-rays done at that time of right hip that were negative but has had progressive right hip pain since then. Patient seen in Ortho clinic on 5/1 and MRI of pelvis ordered to evaluate pain and done on 5/2 and revealed closed nondisplaced right femur intertrochanteric fracture and Ortho clinic called yesterday, 5/2 and told patient to go to ED for admission. Patient was seen and evaluated by Orthopedic surgery who recommended operative repair of fracture.   - Patient was taken to OR on 5/3/2025 and underwent right hip cephalomedullary nail fixation by Dr. Aleida Pires.   - Post-op, patient weight bear as tolerated to the right lower extremity as per Orthopedics recommendation.   - Patient resumed on her home Apixiban 5 mg po BID that she takes for long term anticoagulation for her PAF so no other DVT prophylaxis required post-op.   - Perineural pain catheter placed by Anesthesia Pain Service with continuous infusion of Ropivacaine to help with pain control post-op and  Anesthesia Pain Service managing while patient in the hospital.   - Patient placed on multimodal pain management post-op with Tylenol 1000 mg po every 6 hours post-op and will continue.   - PT/OT consulted post-op.  - Ortho following and managing surgical site.  - Surgical bandage to remain in place until Ortho clinic follow-up. Patient will need staples removed at approximately 2 weeks. Removal may be performed at a rehab facility, by a PCP or by myself in the Orthopedic clinic.  - Follow up with Dr. Pires in Ortho clinic in 6 weeks with an AP pelvis and right hip x-ray.  - Antibiotics: 24 hours of perioperative antibiotics.   Longstanding persistent atrial fibrillation  Anticoagulant long-term use  Patient has persistent (7 days or more) atrial fibrillation. Patient is currently in atrial fibrillation. Initially in RVR, improved with PO Metoprolol. [id number] Score: 3. The patients heart rate in the last 24 hours is as follows:  Pulse  Min: 80  Max: 135     Antiarrhythmics  metoprolol succinate (TOPROL-XL) 24 hr tablet 25 mg, Daily, Oral    Anticoagulants  apixaban tablet 5 mg, 2 times daily, Oral    Plan  - Replete lytes with a goal of K>4, Mg >2  - Patient's afib is currently controlled on home po Metoprolol and will continue.  - Home Apixiban held pre-op for her long term anticoagulation for stroke prevention but resumed post-op at 5 mg po BID.   Acute cystitis without hematuria  Present on admit. U/A on admit positive for UTI so patient started on IV Ceftriaxone to treat and urine culture sent and will need to follow-up results. Patient reports she has been having urinary frequency over the past week and though she did have a UTI as similar to previous UTI symptoms. No systemic signs of infection.    Acute viral conjunctivitis of both eyes  Present on admit. Patient complaining of redness to her eyes and appears she has a viral conjunctivitis to both eyes and artifical tears ordered to help with dryness.  Patient advised not to rub her eyes.   Pulmonary hypertension  Chronic condition and stable on room air. Monitor.     (HFpEF) heart failure with preserved ejection fraction  Patient has Diastolic (HFpEF) heart failure that is Chronic. On presentation their CHF was well compensated. Most recent BNP and echo results are listed below.  Recent Labs     05/02/25  2145   *     Latest ECHO  Results for orders placed during the hospital encounter of 10/07/24    Echo    Interpretation Summary    Left Ventricle: The left ventricle is normal in size. Normal wall thickness. There is concentric remodeling. There is low normal systolic function with a visually estimated ejection fraction of 50 - 55%. Unable to assess diastolic function due to atrial fibrillation.    Right Ventricle: Normal right ventricular cavity size. Wall thickness is normal. Systolic function is mildly reduced.    Biatrial enlargement    Patent foramen ovale visualized with predominant left to right shunting indicated by color flow Doppler.    Mitral Valve: There is mild to moderate regurgitation.    Tricuspid Valve: There is mild regurgitation.    Pulmonary Artery: The estimated pulmonary artery systolic pressure is 36 mmHg.    IVC/SVC: Elevated venous pressure at 15 mmHg.    Pericardium: There is a small effusion. No indication of cardiac tamponade.    Current Heart Failure Medications  metoprolol succinate (TOPROL-XL) 24 hr tablet 25 mg, Daily, Oral    Plan  - Monitor strict I&Os and daily weights.    - Monitor on telemetry.  - Fluid restriction of 1.5 L/day.   - Cardiology has been consulted.  - The patient's volume status is at their baseline.    Macrocytic anemia  Controlled. Anemia is likely due to chronic disease. Most recent hemoglobin and hematocrit are listed below. Recent B12 and folate levels normal.   Recent Labs     05/02/25  1841 05/03/25  0445 05/03/25  1007   HGB 11.3* 10.3* 11.4*   HCT 35.4* 32.4* 36.6*     Plan  - Monitor serial  CBC: Daily  - Transfuse PRBC if patient becomes hemodynamically unstable, symptomatic or H/H drops below 7/21.  - Patient has not received any PRBC transfusions to date.  - Anemia is stable.   Acquired hypothyroidism  Chronic and controlled. Continue home Synthroid to treat.     BMI less than 19,adult  Patient with BMI 16.35 on admit. Nutrition consult.     VTE Risk Mitigation (From admission, onward)           Ordered     apixaban tablet 5 mg  2 times daily         05/03/25 0905     IP VTE HIGH RISK PATIENT  Once         05/03/25 0605     Place sequential compression device  Until discontinued         05/03/25 0605     Reason for No Pharmacological VTE Prophylaxis  Once        Question:  Reasons:  Answer:  Physician Provided (leave comment)  Comment:  surgery    05/02/25 2103                    Discharge Planning   IRAJ: 5/6/2025     Code Status: Full Code       Rebekah Schumacher MD  Department of Hospital Medicine   [facility] - Surgery

## 2025-05-03 NOTE — SUBJECTIVE & OBJECTIVE
"Principal Problem:Nondisplaced intertrochanteric fracture of right femur, initial encounter for closed fracture    Principal Orthopedic Problem: same as above pending OR today    Interval History: Patient seen and examined at bedside. NAEON. Patient doing well. Pain well controlled. Hgb 10.3. Pending OR today.     Review of patient's allergies indicates:  No Known Allergies    Current Facility-Administered Medications   Medication    acetaminophen tablet 1,000 mg    acetaminophen tablet 650 mg    aluminum-magnesium hydroxide-simethicone 200-200-20 mg/5 mL suspension 30 mL    artificial tears 0.5 % ophthalmic solution 1 drop    ascorbic acid (vitamin C) tablet 1,000 mg    bisacodyL suppository 10 mg    buPROPion TB24 tablet 150 mg    ceFAZolin 2 g    glucagon (human recombinant) injection 1 mg    glucose chewable tablet 16 g    glucose chewable tablet 24 g    levothyroxine tablet 137 mcg    metoprolol succinate (TOPROL-XL) 24 hr tablet 25 mg    multivitamin tablet    mupirocin 2 % ointment    naloxone 0.4 mg/mL injection 0.02 mg    ondansetron disintegrating tablet 8 mg    oxyCODONE immediate release tablet Tab 10 mg    polyethylene glycol packet 17 g    pramipexole tablet 0.25 mg    pregabalin capsule 75 mg    simethicone chewable tablet 80 mg    sodium chloride 0.9% flush 1-10 mL    sodium chloride 0.9% flush 10 mL    sodium chloride 0.9% flush 10 mL    traZODone tablet 50 mg     Objective:     Vital Signs (Most Recent):  Temp: 97.7 °F (36.5 °C) (05/03/25 0459)  Pulse: 89 (05/03/25 0459)  Resp: 18 (05/03/25 0459)  BP: 119/69 (05/03/25 0459)  SpO2: 98 % (05/03/25 0459) Vital Signs (24h Range):  Temp:  [97.7 °F (36.5 °C)-98.3 °F (36.8 °C)] 97.7 °F (36.5 °C)  Pulse:  [] 89  Resp:  [12-24] 18  SpO2:  [91 %-100 %] 98 %  BP: ()/() 119/69     Weight: 43.2 kg (95 lb 3.8 oz)  Height: 5' 4" (162.6 cm)  Body mass index is 16.35 kg/m².    No intake or output data in the 24 hours ending 05/03/25 0626   "   Ortho/SPM Exam  RLE:  - Skin intact throughout, no scars present  - No swelling  - No ecchymosis, erythema, or signs of cellulitis  - TTP at hip/proximal femur  - No tenderness to palpation of middle or distal femur  - No tenderness to palpation of proximal, middle, or distal aspects of tibia or fibula  - No tenderness to palpation of foot  - Pain with any ROM at hip  - Full painless ROM of knee and ankle  - Compartments soft  - SILT Sa/Mendez/DP/SP/T  - Motor intact EHL/FHL/TA/Gastroc  - DP palpated  - Brisk capillary refill       Significant Labs: All pertinent labs within the past 24 hours have been reviewed.    Significant Imaging: I have reviewed and interpreted all pertinent imaging results/findings.

## 2025-05-03 NOTE — H&P
[facility]  History & Physical    Patient Name: Angelina Man  MRN: [id number]  Patient Class: IP- Inpatient  Admission Date: 5/2/2025  Attending Physician: Rebekah Schumacher MD   Primary Care Provider: Zina Rockwell MD         Patient information was obtained from patient and past medical records.     Subjective:     Principal Problem:Nondisplaced intertrochanteric fracture of right femur, initial encounter for closed fracture    Chief Complaint:   Chief Complaint   Patient presents with    MD Referral     Sent by ortho after hour clinic for surgical consult of fractured femur. Had fall on easter weekend.         HPI: Angelina Man is a pleasant 75 y.o. female with permanent AFib, HFpEF, hypothyroidism presenting with hip fracture.     On Easter, she reports that she drank a martini with family, then had an accidental fall onto her right hip.  She did hit her head and had an occipital hematoma, left eye bruising, and right arm trauma.  She noted right hip pain particularly with ambulation, and was evaluated at an urgent care where hip x-ray showed no obvious fracture.  She then went to an ED for CT scans of her head, which reportedly showed no bleed.  She has noted difficulty ambulating due to the hip pain, however has still been able to do so.  She has even gone to a few of her usual spin classes, which reportedly helped her hip pain.  On Thursday, after span, she could barely walk, prompting presentation for further evaluation.  MRI was performed, showing intertrochanteric femur fracture.     She is very active at baseline, and goes to the gym every day.  She she goes to spin classes multiple times per week and lifts weights.  She lives in a condo and walks up stairs without difficulty. She performs all ADLs independently.  She reports chronic lower extremity edema improved with compression stockings, but denies any chest pain, shortness for breath, or palpitations.  She is  "compliant with all medications, including her metoprolol and Eliquis for AFib.    Of note, she reports chronic dry eyes with intermittent "crusting" for which she takes Systane. She has noted increased itching and crusting to the eyes since presentation to the ED.     Past Medical History:   Diagnosis Date    Arthritis     Atrial fibrillation     Bronchitis     Cataract     Dry eyes     GERD (gastroesophageal reflux disease)     Glaucoma, suspect - Both Eyes     Hypothyroidism 06/23/2016    Pulmonary hypertension     Rash     Renal angiomyolipoma     Renal disorder     SCC (squamous cell carcinoma) 07/2023    left lower lateral shin    SCC (squamous cell carcinoma) 04/2023    L mid lateral shin    SCC (squamous cell carcinoma) 09/2023    L mid lower shin    Spinal stenosis     Squamous cell carcinoma     in-situ right upper inner arm, right wrist    Status post lumbar surgery 06/23/2016    Stress fracture     Thyroid disease     Venous insufficiency        Past Surgical History:   Procedure Laterality Date    ANGIOPLASTY      CATARACT EXTRACTION W/  INTRAOCULAR LENS IMPLANT Left 12/6/2022    Procedure: EXTRACTION, CATARACT, WITH IOL INSERTION;  Surgeon: Tone Brown MD;  Location: [facility];  Service: Ophthalmology;  Laterality: Left;    CATARACT EXTRACTION W/  INTRAOCULAR LENS IMPLANT Right 12/20/2022    Procedure: EXTRACTION, CATARACT, WITH IOL INSERTION;  Surgeon: Tone Brown MD;  Location: [facility];  Service: Ophthalmology;  Laterality: Right;    CERVICAL FUSION      COLONOSCOPY      COLONOSCOPY N/A 11/14/2017    Procedure: COLONOSCOPY;  Surgeon: Kasi Mercado MD;  [facility]  Service: Endoscopy;  Laterality: N/A;    COLONOSCOPY N/A 4/26/2023    Procedure: COLONOSCOPY;  Surgeon: Jeanmarie Hathaway MD;  Location: [facility] ENDO (4TH FLR);  Service: Colon and Rectal;  Laterality: N/A;  ok to hold Eliquis 2 days per Dr Pereira  constipation-ext Miralax prep  instr " mailed/portal-GT  4/21/23 pre call attempted, no answer    COLONOSCOPY N/A 5/28/2024    Procedure: COLONOSCOPY;  Surgeon: Jeanmarie Hathaway MD;  Location: [facility] (4TH FLR);  Service: Endoscopy;  Laterality: N/A;  referral Dr. Hathaway. Annual Colonoscopy for High Risk. Golytely prep instr. to portal Pending Eliquis Hold from Dr. David Pereira. [facility] Afib.EC  ok to hold Eliquis 2 days per Dr Pereira-GT  5/21- precall confirmed; instructions re-sent via portal, aware of holding eliquis     ESOPHAGOGASTRODUODENOSCOPY N/A 10/10/2019    Procedure: EGD (ESOPHAGOGASTRODUODENOSCOPY);  Surgeon: Rg Milton MD;  Location: [facility] (4TH FLR);  Service: Endoscopy;  Laterality: N/A;    ESOPHAGOGASTRODUODENOSCOPY N/A 10/6/2021    Procedure: EGD (ESOPHAGOGASTRODUODENOSCOPY);  Surgeon: Rg Milton MD;  Location: [facility] (4TH FLR);  Service: Endoscopy;  Laterality: N/A;  covid test 10/3 [place], instr emailed/portal -ml    EXCISION Left 5/17/2023    Procedure: EXCISION SQUAMOUS CELL CARCINOMA LEFT LEG;  Surgeon: Kasi Canela Jr., MD;  [facility];  Service: General;  Laterality: Left;    l4-5 mid discectomy Left 03/2017    l4-5 MIS diskectomy Right 05/2016    RIGHT HEART CATHETERIZATION Right 1/27/2022    Procedure: INSERTION, CATHETER, RIGHT HEART;  Surgeon: Satnam Pickard Jr., MD;  Location: [facility] CATH LAB;  Service: Cardiology;  Laterality: Right;    TRANSFORAMINAL EPIDURAL INJECTION OF STEROID Bilateral 3/12/2024    Procedure: LUMBAR TRANSFORAMINAL BILATERAL L3/4 *ELIQUIS CLEARANCE IN CHART*;  Surgeon: Sheree Abbott MD;  Location: [facility] PAIN MGT;  Service: Pain Management;  Laterality: Bilateral;  [phone]  2 WK F/U [place]    TREATMENT OF CARDIAC ARRHYTHMIA N/A 7/17/2020    Procedure: CARDIOVERSION;  Surgeon: Kwan Bray MD;  Location: [facility] EP LAB;  Service: Cardiology;  Laterality: N/A;  AF,DCCV/SANGITA, ANES, SK, 746    TREATMENT OF CARDIAC ARRHYTHMIA N/A 7/14/2021    Procedure:  CARDIOVERSION;  Surgeon: SYDNIE Cedillo MD;  Location: [facility] EP LAB;  Service: Cardiology;  Laterality: N/A;  AF, SANGITA, DCCV, MAC, EH, 3 Prep    WASHOUT Right 5/17/2023    Procedure: WASHOUT right lower arm and closure;  Surgeon: Kasi Canela Jr., MD;  [facility];  Service: General;  Laterality: Right;       Review of patient's allergies indicates:  No Known Allergies    No current facility-administered medications on file prior to encounter.     Current Outpatient Medications on File Prior to Encounter   Medication Sig    ELIQUIS 5 mg Tab TAKE 1 TABLET(5 MG) BY MOUTH TWICE DAILY    furosemide (LASIX) 20 MG tablet Take 1 tablet (20 mg total) by mouth once daily.    acetaminophen (TYLENOL) 500 MG tablet Take 500 mg by mouth every 6 (six) hours as needed for Pain.    acyclovir (ZOVIRAX) 400 MG tablet Take 1 tablet (400 mg total) by mouth once daily.    ascorbic acid (VITAMIN C) 1000 MG tablet Take 1,000 mg by mouth once daily.     biotin 1 mg tablet Take 1 mg by mouth 2 (two) times daily.     buPROPion (WELLBUTRIN SR) 150 MG TBSR 12 hr tablet Take 1 tablet (150 mg total) by mouth 2 (two) times daily.    diclofenac sodium (VOLTAREN ARTHRITIS PAIN) 1 % Gel Apply 2 g topically every 12 (twelve) hours.    digestive enzymes Tab Take 1 tablet by mouth once daily.     fluticasone propionate (FLONASE) 50 mcg/actuation nasal spray 1 spray (50 mcg total) by Each Nostril route 2 (two) times daily as needed for Rhinitis.    ketoconazole (NIZORAL) 2 % cream aaa bid prn flare under arm and qhs to areas on face    lactulose (CHRONULAC) 20 gram/30 mL Soln Take 30mL twice daily (every 12 hours) until bowel movement    levothyroxine (SYNTHROID) 137 MCG Tab tablet Take 1 tablet (137 mcg total) by mouth before breakfast.    lubiprostone (AMITIZA) 24 MCG Cap Take 1 capsule (24 mcg total) by mouth daily as needed (IBS symptoms).    magnesium citrate solution Drink 1 half of bottle. Drink second half of bottle if no  bowel movement 12 hours after first dose.    methocarbamoL (ROBAXIN) 500 MG Tab Take 1 tablet (500 mg total) by mouth 3 (three) times daily as needed (mus).    metoprolol succinate (TOPROL-XL) 25 MG 24 hr tablet Take 1 tablet (25 mg total) by mouth once daily.    mirtazapine (REMERON) 7.5 MG Tab TAKE 1 TABLET(7.5 MG) BY MOUTH EVERY NIGHT    multivitamin (THERAGRAN) per tablet Take 1 tablet by mouth once daily.     mupirocin (BACTROBAN) 2 % ointment Apply once daily during the dressing change    potassium chloride SA (K-DUR,KLOR-CON) 20 MEQ tablet Take 1 tablet (20 mEq total) by mouth once daily.    pramipexole (MIRAPEX) 0.25 MG tablet TAKE 1 TABLET(0.25 MG) BY MOUTH EVERY EVENING FOR RESTLESS LEGS    traZODone (DESYREL) 50 MG tablet Take 1-2 tablets ( mg total) by mouth every evening.    zinc 50 mg Tab Take 50 mg by mouth once daily.      Family History       Problem Relation (Age of Onset)    Cancer Mother, Father (80), Son    Cataracts Father    Colon cancer Father    Diabetes Son    Heart disease Son    Heart failure Father    Hypertension Father    Melanoma Daughter    No Known Problems Sister, Brother, Maternal Aunt, Maternal Uncle, Paternal Aunt, Paternal Uncle, Maternal Grandmother, Maternal Grandfather, Paternal Grandmother, Paternal Grandfather          Tobacco Use    Smoking status: Never     Passive exposure: Never    Smokeless tobacco: Never   Substance and Sexual Activity    Alcohol use: Yes     Alcohol/week: 4.0 standard drinks of alcohol     Types: 4 Glasses of wine per week     Comment: 2 glasses of wine on weekends    Drug use: No    Sexual activity: Never     Review of Systems   All other systems reviewed and are negative.    Objective:     Vital Signs (Most Recent):  Temp: 98.2 °F (36.8 °C) (05/02/25 2221)  Pulse: 109 (05/02/25 2221)  Resp: 16 (05/02/25 2221)  BP: (!) 145/70 (05/02/25 2221)  SpO2: 99 % (05/02/25 2221) Vital Signs (24h Range):  Temp:  [98.2 °F (36.8 °C)-98.3 °F (36.8 °C)]  98.2 °F (36.8 °C)  Pulse:  [] 109  Resp:  [12-24] 16  SpO2:  [91 %-100 %] 99 %  BP: (122-156)/() 145/70     Weight: 43.2 kg (95 lb 3.8 oz)  Body mass index is 16.35 kg/m².     Physical Exam  Vitals and nursing note reviewed.   Constitutional:       General: She is not in acute distress.     Appearance: She is well-developed. She is not diaphoretic.      Comments: Thin but not in acute distress, pleasant    HENT:      Head: Normocephalic.      Comments: Mild ecchymoses around L eye   Eyes:      General: No scleral icterus.     Comments: Bilateral conjunctival injection, mild drainage with crusting bilaterally    Neck:      Vascular: No JVD.   Cardiovascular:      Rate and Rhythm: Normal rate. Rhythm irregular.   Pulmonary:      Effort: Pulmonary effort is normal. No respiratory distress.   Abdominal:      General: There is no distension.      Tenderness: There is no abdominal tenderness.   Musculoskeletal:      Right lower leg: Edema present.      Left lower leg: Edema present.      Comments: Mild LE edema bilaterally    Skin:     Coloration: Skin is not jaundiced or pale.   Neurological:      Mental Status: She is alert and oriented to person, place, and time.      Motor: No weakness or abnormal muscle tone.   Psychiatric:         Mood and Affect: Mood normal.         Behavior: Behavior normal.                Significant Labs: All pertinent labs within the past 24 hours have been reviewed.  CBC:   Recent Labs   Lab 05/02/25  1841   WBC 10.85   HGB 11.3*   HCT 35.4*   *     CMP:   Recent Labs   Lab 05/02/25  1841      K 4.0   CL 96   CO2 27      BUN 15   CREATININE 0.8   CALCIUM 8.6*   PROT 7.2   ALBUMIN 3.1*   BILITOT 0.3   ALKPHOS 113   AST 23   ALT 12   ANIONGAP 14       Significant Imaging: I have reviewed all pertinent imaging results/findings within the past 24 hours.  Assessment/Plan:     Assessment & Plan  Nondisplaced intertrochanteric fracture of right femur, initial  encounter for closed fracture  Preoperative examination  Presents with R pain after a fall on 4/20, and outpatient MRI showed R nondisplaced intertrochanteric hip fx. Orthopedics consulted; plan for operative intervention.   -Will place on hip fracture pathway and continue supportive care with bed rest and pain control.  -Hold anticoagulation, and will make NPO at midnight.   -Will benefit from postoperative PT/OT evaluation     -Patient undergoing non-emergent non-cardiac surgery.  -Patient does not have active cardiac problems, although initially in AFib with RVR.   -Patient undergoing intermediate risk surgery.  -Functional Status:  Patient has functional METs > or = 4    Revised cardiac risk index (RCRI) score is 1.         Other Issues:       Patient on long term anticoagulation: Yes      Recent coronary stenting: No    Patient with acceptable cardiac risk with (RCRI score of 1) going for intermediate risk surgery. No absolute contraindications to the proposed surgery at this time.   -intermediate risk of post-op pulmonary complications.  intermediate risk of post-op delirium.   - Will consult Cardiology for medical optimization prior to surgery given initial AFib/RVR. BNP pending.   - Reverse Warfarin with FFP as ordered before surgery as this surgery is urgent.  - Resume full anticoagulation Apixiban at home dosing on POD # 1 or when okay with primary surgical team.   Longstanding persistent atrial fibrillation  Patient has persistent (7 days or more) atrial fibrillation. Patient is currently in atrial fibrillation. Initially in RVR, improved with PO Metoprolol. [id number] Score: 3. The patients heart rate in the last 24 hours is as follows:  Pulse  Min: 98  Max: 135     Antiarrhythmics  metoprolol succinate (TOPROL-XL) 24 hr tablet 25 mg, Daily, Oral    Anticoagulants   Holding home Eliquis for surgery     Plan  - Replete lytes with a goal of K>4, Mg >2  - Patient's afib is currently controlled after PO  metoprolol      Hypothyroidism  Recent TSH WNL. Continue home Synthroid.     Pulmonary hypertension  Currently stable on room air. Monitor.     (HFpEF) heart failure with preserved ejection fraction  Patient with known diastolic CHF, with last LVEF 55% on TTE 5/2023. Currently without evidence of volume overload on exam, and not in acute exacerbation. BNP pending, although expect to be somewhat elevated due to AFib with RVR. Will continue home GDMT with Metoprolol.  Appropriate diet ordered. Monitor volume status.         Macrocytic anemia  Anemia is likely due to chronic disease. Most recent hemoglobin and hematocrit are listed below. Recent B12 and folate WNL.  Recent Labs     05/02/25  1841   HGB 11.3*   HCT 35.4*     Plan  - Monitor serial CBC: Daily  - Transfuse PRBC if patient becomes hemodynamically unstable, symptomatic or H/H drops below 7/21.  - Patient has not received any PRBC transfusions to date  BMI less than 19,adult  Consider RD consultation for supplementation.    VTE Risk Mitigation (From admission, onward)           Ordered     IP VTE HIGH RISK PATIENT  Once         05/02/25 2103     Place sequential compression device  Until discontinued         05/02/25 2103     Reason for No Pharmacological VTE Prophylaxis  Once        Question:  Reasons:  Answer:  Physician Provided (leave comment)  Comment:  surgery    05/02/25 2103                                    Kelton Wall MD  Department of Hospital Medicine  [facility] - Surgery

## 2025-05-03 NOTE — PLAN OF CARE
Problem: Adult Inpatient Plan of Care  Goal: Plan of Care Review  Outcome: Progressing  Goal: Patient-Specific Goal (Individualized)  Outcome: Progressing  Goal: Absence of Hospital-Acquired Illness or Injury  Outcome: Progressing  Goal: Optimal Comfort and Wellbeing  Outcome: Progressing  Goal: Readiness for Transition of Care  Outcome: Progressing     Problem: Hip Fracture Medical Management  Goal: Baseline Cognitive Function Maintained  Outcome: Progressing  Goal: Fracture Stability  Outcome: Progressing

## 2025-05-03 NOTE — ASSESSMENT & PLAN NOTE
Patient has persistent (7 days or more) atrial fibrillation. Patient is currently in atrial fibrillation. Initially in RVR, improved with PO Metoprolol. [id number] Score: 3. The patients heart rate in the last 24 hours is as follows:  Pulse  Min: 80  Max: 135     Antiarrhythmics  metoprolol succinate (TOPROL-XL) 24 hr tablet 25 mg, Daily, Oral    Anticoagulants  apixaban tablet 5 mg, 2 times daily, Oral    Plan  - Replete lytes with a goal of K>4, Mg >2  - Patient's afib is currently controlled on home po Metoprolol and will continue.  - Home Apixiban held pre-op for her long term anticoagulation for stroke prevention but resumed post-op at 5 mg po BID.

## 2025-05-03 NOTE — CONSULTS
[facility] - [facility]  Cardiology  Consult Note    Patient Name: Angelina Man  MRN: [id number]  Admission Date: 5/2/2025  Hospital Length of Stay: 1 days  Code Status: Prior   Attending Provider: Rebekah Schumacher MD   Consulting Provider: Irvin Segura MD  Primary Care Physician: Zina Rockwell MD  Principal Problem:Nondisplaced intertrochanteric fracture of right femur, initial encounter for closed fracture    Patient information was obtained from patient and ER records.     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 Cardiology consulted for cardiac optimization prior to planned hip surgery.      Patient is very active, and she fell while being on the treadmill. After the fall she did continue with normal activities, including a spinning class. Even though she does not monitor heart rate due to known Afib, she is able to do a class of spinning. She walks upstairs without difficulty in her condo and performs ADLs independently.      Cardiac-wise she has HFpEF on furosemide 20mg qd, Afib on eliquis 5mg bid and metoprolol succiante 25mg qd, and chronic LE edema managed with compression stockings. She follows with EP (Dr. Bray- last appt 03/2025), general cardiology (Dr. Pereira - last appt 01/27/25), and vascular cardiology (Dr. Hampton - last appt 03/2025). She is stable from cardiac standpoint and as optimized as she can be.       Past Medical History:   Diagnosis Date    Arthritis     Atrial fibrillation     Bronchitis     Cataract     Dry eyes      GERD (gastroesophageal reflux disease)     Glaucoma, suspect - Both Eyes     Hypothyroidism 06/23/2016    Pulmonary hypertension     Rash     Renal angiomyolipoma     Renal disorder     SCC (squamous cell carcinoma) 07/2023    left lower lateral shin    SCC (squamous cell carcinoma) 04/2023    L mid lateral shin    SCC (squamous cell carcinoma) 09/2023    L mid lower shin    Spinal stenosis     Squamous cell carcinoma     in-situ right upper inner arm, right wrist    Status post lumbar surgery 06/23/2016    Stress fracture     Thyroid disease     Venous insufficiency        Past Surgical History:   Procedure Laterality Date    ANGIOPLASTY      CATARACT EXTRACTION W/  INTRAOCULAR LENS IMPLANT Left 12/6/2022    Procedure: EXTRACTION, CATARACT, WITH IOL INSERTION;  Surgeon: Tone Brown MD;  Location: [facility];  Service: Ophthalmology;  Laterality: Left;    CATARACT EXTRACTION W/  INTRAOCULAR LENS IMPLANT Right 12/20/2022    Procedure: EXTRACTION, CATARACT, WITH IOL INSERTION;  Surgeon: Tone Brown MD;  Location: [facility];  Service: Ophthalmology;  Laterality: Right;    CERVICAL FUSION      COLONOSCOPY      COLONOSCOPY N/A 11/14/2017    Procedure: COLONOSCOPY;  Surgeon: Kasi Mercado MD;  Location: [facility] ([address] [place] [address]);  Service: Endoscopy;  Laterality: N/A;    COLONOSCOPY N/A 4/26/2023    Procedure: COLONOSCOPY;  Surgeon: Jeanmarie Hathaway MD;  Location: [facility] (4TH FLR);  Service: Colon and Rectal;  Laterality: N/A;  ok to hold Eliquis 2 days per Dr Pereira  constipation-ext Miralax prep  instr mailed/portal-GT  4/21/23 pre call attempted, no answer    COLONOSCOPY N/A 5/28/2024    Procedure: COLONOSCOPY;  Surgeon: Jeanmarie Hathaway MD;  Location: [facility] ([address] [place] [address]);  Service: Endoscopy;  Laterality: N/A;  referral Dr. Hathaway. Annual Colonoscopy for High Risk. Golytely prep instr. to portal Pending Eliquis Hold from Dr. David Pereira. [facility] Afib.  ok to hold Eliquis 2 days per   [place]-GT  5/21- precall confirmed; instructions re-sent via portal, aware of holding eliquis     ESOPHAGOGASTRODUODENOSCOPY N/A 10/10/2019    Procedure: EGD (ESOPHAGOGASTRODUODENOSCOPY);  Surgeon: Rg Milton MD;  Location: [facility] ENDO (4TH FLR);  Service: Endoscopy;  Laterality: N/A;    ESOPHAGOGASTRODUODENOSCOPY N/A 10/6/2021    Procedure: EGD (ESOPHAGOGASTRODUODENOSCOPY);  Surgeon: Rg Milton MD;  Location: [facility] ENDO (4TH FLR);  Service: Endoscopy;  Laterality: N/A;  covid test 10/3 [place], instr emailed/portal -ml    EXCISION Left 5/17/2023    Procedure: EXCISION SQUAMOUS CELL CARCINOMA LEFT LEG;  Surgeon: Kasi Canela Jr., MD;  Location: [facility] OR 2ND FLR;  Service: General;  Laterality: Left;    l4-5 mid discectomy Left 03/2017    l4-5 MIS diskectomy Right 05/2016    RIGHT HEART CATHETERIZATION Right 1/27/2022    Procedure: INSERTION, CATHETER, RIGHT HEART;  Surgeon: Satnam Pickard Jr., MD;  Location: [facility] CATH LAB;  Service: Cardiology;  Laterality: Right;    TRANSFORAMINAL EPIDURAL INJECTION OF STEROID Bilateral 3/12/2024    Procedure: LUMBAR TRANSFORAMINAL BILATERAL L3/4 *ELIQUIS CLEARANCE IN CHART*;  Surgeon: Sheree Abbott MD;  Location: [facility] PAIN MGT;  Service: Pain Management;  Laterality: Bilateral;  [phone]  2 WK F/U [place]    TREATMENT OF CARDIAC ARRHYTHMIA N/A 7/17/2020    Procedure: CARDIOVERSION;  Surgeon: Kwan Bray MD;  Location: [facility] EP LAB;  Service: Cardiology;  Laterality: N/A;  AF,DCCV/SANGITA, ANES, SK, 746    TREATMENT OF CARDIAC ARRHYTHMIA N/A 7/14/2021    Procedure: CARDIOVERSION;  Surgeon: SYDNIE Cedillo MD;  Location: [facility] EP LAB;  Service: Cardiology;  Laterality: N/A;  AF, SANGITA, DCCV, MAC, EH, 3 Prep    WASHOUT Right 5/17/2023    Procedure: WASHOUT right lower arm and closure;  Surgeon: Kasi Canela Jr., MD;  Location: [facility] OR 2ND FLR;  Service: General;  Laterality: Right;       Review of patient's allergies indicates:  No Known Allergies    No current  facility-administered medications on file prior to encounter.     Current Outpatient Medications on File Prior to Encounter   Medication Sig    ELIQUIS 5 mg Tab TAKE 1 TABLET(5 MG) BY MOUTH TWICE DAILY    furosemide (LASIX) 20 MG tablet Take 1 tablet (20 mg total) by mouth once daily.    acetaminophen (TYLENOL) 500 MG tablet Take 500 mg by mouth every 6 (six) hours as needed for Pain.    acyclovir (ZOVIRAX) 400 MG tablet Take 1 tablet (400 mg total) by mouth once daily.    ascorbic acid (VITAMIN C) 1000 MG tablet Take 1,000 mg by mouth once daily.     biotin 1 mg tablet Take 1 mg by mouth 2 (two) times daily.     buPROPion (WELLBUTRIN SR) 150 MG TBSR 12 hr tablet Take 1 tablet (150 mg total) by mouth 2 (two) times daily.    diclofenac sodium (VOLTAREN ARTHRITIS PAIN) 1 % Gel Apply 2 g topically every 12 (twelve) hours.    digestive enzymes Tab Take 1 tablet by mouth once daily.     fluticasone propionate (FLONASE) 50 mcg/actuation nasal spray 1 spray (50 mcg total) by Each Nostril route 2 (two) times daily as needed for Rhinitis.    ketoconazole (NIZORAL) 2 % cream aaa bid prn flare under arm and qhs to areas on face    lactulose (CHRONULAC) 20 gram/30 mL Soln Take 30mL twice daily (every 12 hours) until bowel movement    levothyroxine (SYNTHROID) 137 MCG Tab tablet Take 1 tablet (137 mcg total) by mouth before breakfast.    lubiprostone (AMITIZA) 24 MCG Cap Take 1 capsule (24 mcg total) by mouth daily as needed (IBS symptoms).    magnesium citrate solution Drink 1 half of bottle. Drink second half of bottle if no bowel movement 12 hours after first dose.    methocarbamoL (ROBAXIN) 500 MG Tab Take 1 tablet (500 mg total) by mouth 3 (three) times daily as needed (mus).    metoprolol succinate (TOPROL-XL) 25 MG 24 hr tablet Take 1 tablet (25 mg total) by mouth once daily.    mirtazapine (REMERON) 7.5 MG Tab TAKE 1 TABLET(7.5 MG) BY MOUTH EVERY NIGHT    multivitamin (THERAGRAN) per tablet Take 1 tablet by mouth once  daily.     mupirocin (BACTROBAN) 2 % ointment Apply once daily during the dressing change    potassium chloride SA (K-DUR,KLOR-CON) 20 MEQ tablet Take 1 tablet (20 mEq total) by mouth once daily.    pramipexole (MIRAPEX) 0.25 MG tablet TAKE 1 TABLET(0.25 MG) BY MOUTH EVERY EVENING FOR RESTLESS LEGS    traZODone (DESYREL) 50 MG tablet Take 1-2 tablets ( mg total) by mouth every evening.    zinc 50 mg Tab Take 50 mg by mouth once daily.      Family History       Problem Relation (Age of Onset)    Cancer Mother, Father (80), Son    Cataracts Father    Colon cancer Father    Diabetes Son    Heart disease Son    Heart failure Father    Hypertension Father    Melanoma Daughter    No Known Problems Sister, Brother, Maternal Aunt, Maternal Uncle, Paternal Aunt, Paternal Uncle, Maternal Grandmother, Maternal Grandfather, Paternal Grandmother, Paternal Grandfather          Tobacco Use    Smoking status: Never     Passive exposure: Never    Smokeless tobacco: Never   Substance and Sexual Activity    Alcohol use: Yes     Alcohol/week: 4.0 standard drinks of alcohol     Types: 4 Glasses of wine per week     Comment: 2 glasses of wine on weekends    Drug use: No    Sexual activity: Never     Review of Systems   Constitutional: Negative.   HENT: Negative.     Cardiovascular:  Negative for chest pain, claudication, cyanosis, dyspnea on exertion, irregular heartbeat, leg swelling, near-syncope, orthopnea, palpitations, paroxysmal nocturnal dyspnea and syncope.   Respiratory: Negative.     Endocrine: Negative.    Musculoskeletal:  Positive for joint pain.   Gastrointestinal: Negative.    Genitourinary: Negative.    Neurological: Negative.      Objective:     Vital Signs (Most Recent):  Temp: 98 °F (36.7 °C) (05/03/25 0029)  Pulse: 89 (05/03/25 0029)  Resp: 18 (05/03/25 0029)  BP: (!) 96/58 (05/03/25 0029)  SpO2: (!) 94 % (05/03/25 0029) Vital Signs (24h Range):  Temp:  [98 °F (36.7 °C)-98.3 °F (36.8 °C)] 98 °F (36.7 °C)  Pulse:   [] 89  Resp:  [12-24] 18  SpO2:  [91 %-100 %] 94 %  BP: ()/() 96/58     Weight: 43.2 kg (95 lb 3.8 oz)  Body mass index is 16.35 kg/m².    SpO2: (!) 94 %       No intake or output data in the 24 hours ending 05/03/25 0055    Lines/Drains/Airways       Drain  Duration             Female External Urinary Catheter w/ Suction 05/02/25 2135 <1 day              Peripheral Intravenous Line  Duration                  Peripheral IV - Single Lumen 05/02/25 1842 20 G Anterior;Proximal;Right Forearm <1 day                     Physical Exam  Vitals and nursing note reviewed.   Constitutional:       Appearance: Normal appearance.   HENT:      Head: Normocephalic and atraumatic.   Eyes:      Extraocular Movements: Extraocular movements intact.      Pupils: Pupils are equal, round, and reactive to light.   Neck:      Vascular: No carotid bruit.   Cardiovascular:      Rate and Rhythm: Normal rate. Rhythm irregular.      Pulses: Normal pulses.      Heart sounds: Normal heart sounds. No murmur heard.     No friction rub. No gallop.   Pulmonary:      Effort: Pulmonary effort is normal.      Breath sounds: Normal breath sounds.   Abdominal:      General: Abdomen is flat. Bowel sounds are normal. There is no distension.      Palpations: Abdomen is soft. There is no mass.      Tenderness: There is no abdominal tenderness.   Musculoskeletal:         General: Tenderness (Unable to move rigth leg due to pain in her hip) present. No swelling. Normal range of motion.      Cervical back: Normal range of motion.      Right lower leg: Edema (Trace) present.      Left lower leg: Edema (Trace) present.   Skin:     General: Skin is warm.      Capillary Refill: Capillary refill takes less than 2 seconds.   Neurological:      General: No focal deficit present.      Mental Status: She is alert and oriented to person, place, and time.          Significant Labs:     Recent Labs   Lab 05/02/25  1841   WBC 10.85   HGB 11.3*   HCT 35.4*    *       Recent Labs   Lab 05/02/25  1841      K 4.0   CL 96   CO2 27   BUN 15   CREATININE 0.8   CALCIUM 8.6*       Recent Labs   Lab 05/02/25  1841   ALKPHOS 113   BILITOT 0.3   PROT 7.2   ALT 12   AST 23     Lab Results   Component Value Date    CHOL 210 (H) 03/06/2025    HDL 57 03/06/2025    LDLCALC 121.0 03/06/2025    TRIG 160 (H) 03/06/2025     Lab Results   Component Value Date    HGBA1C 5.2 03/06/2025       Lab Results   Component Value Date     (H) 05/02/2025     (H) 09/24/2024     (H) 12/28/2021     (H) 07/16/2020     Significant Imaging:     EKG 03/27/25  - Afib rate controlled 85bpm    TTE 10/07/25    Left Ventricle: The left ventricle is normal in size. Normal wall thickness. There is concentric remodeling. There is low normal systolic function with a visually estimated ejection fraction of 50 - 55%. Unable to assess diastolic function due to atrial fibrillation.    Right Ventricle: Normal right ventricular cavity size. Wall thickness is normal. Systolic function is mildly reduced.    Biatrial enlargement    Patent foramen ovale visualized with predominant left to right shunting indicated by color flow Doppler.    Mitral Valve: There is mild to moderate regurgitation.    Tricuspid Valve: There is mild regurgitation.    Pulmonary Artery: The estimated pulmonary artery systolic pressure is 36 mmHg.    IVC/SVC: Elevated venous pressure at 15 mmHg.    Pericardium: There is a small effusion. No indication of cardiac tamponade.    CPX study 07/03/24    The patient exercised for 6 minutes and 46 seconds on a high ramp protocol, achieving a peak heart rate of 116 bpm, which is 83% of the age predicted maximum heart rate.    Atrial fibrillation was present at baseline. There were no arrhythmias during stress.    There was no ST segment deviation noted during stress.    Normal functional status associated with a normal breathing reserve, normal oxygen stauration and a  normal AT. No clinically significant functional impairment.    RER was <1. The patient stopped exercising due to back pain.    The peak VO2 was 22.8 ml/kg/min which is 91% of predicted equating to a functional capacity of 6.51 METS indicating normal functional status.     SPECT 01/04/24    Normal myocardial perfusion scan. There is no evidence of myocardial ischemia or infarction.    The gated perfusion images showed an ejection fraction of 54% at rest. The gated perfusion images showed an ejection fraction of 61% post stress. Normal ejection fraction is greater than 53%.    There is normal wall motion at rest and post stress.    LV cavity size is normal at rest and normal at stress.    The ECG portion of the study is negative for ischemia. Sensitivity is reduced secondary to the target heart rate not being achieved.    The patient reported no chest pain during the stress test.    During stress, atrial fibrillation is noted.    The exercise capacity was average.    There are no prior studies for comparison.    STEPHANIE 10/02/23    Normal resting ABIs bilaterally.    Exercise ABIs are within normal limits bilaterally.    TBIs are suggestive of mild bilateral lower extremity arterial disease.    Digit waveforms are severely dampened at all digits bilaterally.    LE venous US 10/02/23       There is no evidence of a right lower extremity DVT.    The right common femoral vein has reflux.    The right greater saphenous vein has reflux.    There is no evidence of a left lower extremity DVT.    The left greater saphenous vein has reflux.     RHC 01/27/22  At rest:                Right atrial pressure is normal.                Pulmonary capillary wedge pressure is normal.                Pulmonary artery pressure is normal.                Pulmonary vascular resistance is normal.                Thermodilution cardiac output and index is normal.     With exercise: there is a significant rise in pulmonary capillary wedge pressure                 Pulmonary capillary wedge pressure rises significantly and is moderately elevated.                PA pressure is mildly elevated.                Thermodilution CO increases significantly.                Pulmonary vascular resistance is normal.     Closely after exercise:                Pulmonary artery pressure is mildly elevated.                Pulmonary capillary wedge pressure is mildly elevated.     FINAL DIAGNOSIS:                WHO Group 2 pulmonary hypertension.    Assessment and Plan:     Preop cardiovascular exam  Revised Cardiac Risk Index   High risk surgery: No  History of ischemic heart disease: No  History of congestive heart failure: Yes  History of stroke: No  Insulin dependent diabetes: No  Cr > 2: No  1 point, class II risk, 6.0% risk of cardiac event 2014 ACC/AHA Perioperative Guidelines  Known or risk factors for CAD: Yes  High risk of MACE: No  Functional capacity < 4 METs: No, proceed without further testing     Patient has no active cardiac condition (ACS/USA, decompenstated CHF, significant arrhythmias or severe valvular disease) and can easily achieve 4 METS.  As such, pt does not require further cardiac evaluation prior to undergoing surgery.      - Patient should remain on beta-blockers throughout the entire todd-procedure time period.    - Eliquis therapy can be held but should be restarted as soon as safely possible.    - Continue furosemide 20mg qd and assess need for additional doses after surgery        VTE Risk Mitigation (From admission, onward)           Ordered     IP VTE HIGH RISK PATIENT  Once         05/02/25 2103     Place sequential compression device  Until discontinued         05/02/25 2103     Reason for No Pharmacological VTE Prophylaxis  Once        Question:  Reasons:  Answer:  Physician Provided (leave comment)  Comment:  surgery    05/02/25 2103                    Thank you for your consult.     Irvin Segura MD  Cardiology   [facility] -  Surgery

## 2025-05-04 PROBLEM — Z80.8 FAMILY HX OF MELANOMA: Status: RESOLVED | Noted: 2021-02-22 | Resolved: 2025-05-04

## 2025-05-04 PROBLEM — D62 ACUTE BLOOD LOSS ANEMIA: Status: ACTIVE | Noted: 2025-05-04

## 2025-05-04 PROBLEM — G95.20 UNSPECIFIED CORD COMPRESSION: Status: RESOLVED | Noted: 2022-10-31 | Resolved: 2025-05-04

## 2025-05-04 PROBLEM — Z85.828 PERSONAL HISTORY OF SKIN CANCER: Status: RESOLVED | Noted: 2018-06-11 | Resolved: 2025-05-04

## 2025-05-04 LAB
ABSOLUTE EOSINOPHIL (OHS): 0.06 K/UL
ABSOLUTE MONOCYTE (OHS): 0.76 K/UL (ref 0.3–1)
ABSOLUTE NEUTROPHIL COUNT (OHS): 6.79 K/UL (ref 1.8–7.7)
ANION GAP (OHS): 10 MMOL/L (ref 8–16)
BASOPHILS # BLD AUTO: 0.03 K/UL
BASOPHILS NFR BLD AUTO: 0.3 %
BUN SERPL-MCNC: 11 MG/DL (ref 8–23)
CALCIUM SERPL-MCNC: 7.7 MG/DL (ref 8.7–10.5)
CHLORIDE SERPL-SCNC: 104 MMOL/L (ref 95–110)
CO2 SERPL-SCNC: 24 MMOL/L (ref 23–29)
CREAT SERPL-MCNC: 0.7 MG/DL (ref 0.5–1.4)
ERYTHROCYTE [DISTWIDTH] IN BLOOD BY AUTOMATED COUNT: 13 % (ref 11.5–14.5)
GFR SERPLBLD CREATININE-BSD FMLA CKD-EPI: >60 ML/MIN/1.73/M2
GLUCOSE SERPL-MCNC: 100 MG/DL (ref 70–110)
HCT VFR BLD AUTO: 31.6 % (ref 37–48.5)
HGB BLD-MCNC: 9.7 GM/DL (ref 12–16)
IMM GRANULOCYTES # BLD AUTO: 0.06 K/UL (ref 0–0.04)
IMM GRANULOCYTES NFR BLD AUTO: 0.6 % (ref 0–0.5)
LYMPHOCYTES # BLD AUTO: 1.73 K/UL (ref 1–4.8)
MAGNESIUM SERPL-MCNC: 2.4 MG/DL (ref 1.6–2.6)
MCH RBC QN AUTO: 31.8 PG (ref 27–31)
MCHC RBC AUTO-ENTMCNC: 30.7 G/DL (ref 32–36)
MCV RBC AUTO: 104 FL (ref 82–98)
NUCLEATED RBC (/100WBC) (OHS): 0 /100 WBC
PHOSPHATE SERPL-MCNC: 2.9 MG/DL (ref 2.7–4.5)
PLATELET # BLD AUTO: 379 K/UL (ref 150–450)
PMV BLD AUTO: 8.8 FL (ref 9.2–12.9)
POTASSIUM SERPL-SCNC: 3.7 MMOL/L (ref 3.5–5.1)
RBC # BLD AUTO: 3.05 M/UL (ref 4–5.4)
RELATIVE EOSINOPHIL (OHS): 0.6 %
RELATIVE LYMPHOCYTE (OHS): 18.3 % (ref 18–48)
RELATIVE MONOCYTE (OHS): 8.1 % (ref 4–15)
RELATIVE NEUTROPHIL (OHS): 72.1 % (ref 38–73)
SODIUM SERPL-SCNC: 138 MMOL/L (ref 136–145)
WBC # BLD AUTO: 9.43 K/UL (ref 3.9–12.7)

## 2025-05-04 PROCEDURE — 97112 NEUROMUSCULAR REEDUCATION: CPT | Mod: HCNC,CQ

## 2025-05-04 PROCEDURE — 97530 THERAPEUTIC ACTIVITIES: CPT | Mod: HCNC

## 2025-05-04 PROCEDURE — 63600175 PHARM REV CODE 636 W HCPCS: Mod: HCNC | Performed by: STUDENT IN AN ORGANIZED HEALTH CARE EDUCATION/TRAINING PROGRAM

## 2025-05-04 PROCEDURE — 51798 US URINE CAPACITY MEASURE: CPT | Mod: HCNC

## 2025-05-04 PROCEDURE — 25000003 PHARM REV CODE 250: Mod: HCNC | Performed by: STUDENT IN AN ORGANIZED HEALTH CARE EDUCATION/TRAINING PROGRAM

## 2025-05-04 PROCEDURE — 84100 ASSAY OF PHOSPHORUS: CPT | Mod: HCNC | Performed by: STUDENT IN AN ORGANIZED HEALTH CARE EDUCATION/TRAINING PROGRAM

## 2025-05-04 PROCEDURE — 25000003 PHARM REV CODE 250: Mod: HCNC

## 2025-05-04 PROCEDURE — 63600175 PHARM REV CODE 636 W HCPCS: Mod: HCNC | Performed by: INTERNAL MEDICINE

## 2025-05-04 PROCEDURE — 85025 COMPLETE CBC W/AUTO DIFF WBC: CPT | Mod: HCNC | Performed by: STUDENT IN AN ORGANIZED HEALTH CARE EDUCATION/TRAINING PROGRAM

## 2025-05-04 PROCEDURE — 80048 BASIC METABOLIC PNL TOTAL CA: CPT | Mod: HCNC | Performed by: STUDENT IN AN ORGANIZED HEALTH CARE EDUCATION/TRAINING PROGRAM

## 2025-05-04 PROCEDURE — 36415 COLL VENOUS BLD VENIPUNCTURE: CPT | Mod: HCNC | Performed by: STUDENT IN AN ORGANIZED HEALTH CARE EDUCATION/TRAINING PROGRAM

## 2025-05-04 PROCEDURE — 97535 SELF CARE MNGMENT TRAINING: CPT | Mod: HCNC

## 2025-05-04 PROCEDURE — 97116 GAIT TRAINING THERAPY: CPT | Mod: HCNC,CQ

## 2025-05-04 PROCEDURE — 21400001 HC TELEMETRY ROOM: Mod: HCNC

## 2025-05-04 PROCEDURE — 83735 ASSAY OF MAGNESIUM: CPT | Mod: HCNC | Performed by: STUDENT IN AN ORGANIZED HEALTH CARE EDUCATION/TRAINING PROGRAM

## 2025-05-04 RX ORDER — ALUMINUM HYDROXIDE, MAGNESIUM HYDROXIDE, AND SIMETHICONE 2400; 240; 2400 MG/30ML; MG/30ML; MG/30ML
30 SUSPENSION ORAL EVERY 6 HOURS PRN
Status: DISCONTINUED | OUTPATIENT
Start: 2025-05-04 | End: 2025-05-04

## 2025-05-04 RX ADMIN — THERA TABS 1 TABLET: TAB at 08:05

## 2025-05-04 RX ADMIN — OXYCODONE HYDROCHLORIDE AND ACETAMINOPHEN 1000 MG: 500 TABLET ORAL at 08:05

## 2025-05-04 RX ADMIN — METOPROLOL SUCCINATE 25 MG: 25 TABLET, EXTENDED RELEASE ORAL at 08:05

## 2025-05-04 RX ADMIN — ACETAMINOPHEN 1000 MG: 500 TABLET ORAL at 11:05

## 2025-05-04 RX ADMIN — BUPROPION HYDROCHLORIDE 150 MG: 150 TABLET, EXTENDED RELEASE ORAL at 10:05

## 2025-05-04 RX ADMIN — APIXABAN 5 MG: 5 TABLET, FILM COATED ORAL at 08:05

## 2025-05-04 RX ADMIN — BUPROPION HYDROCHLORIDE 150 MG: 150 TABLET, EXTENDED RELEASE ORAL at 08:05

## 2025-05-04 RX ADMIN — APIXABAN 5 MG: 5 TABLET, FILM COATED ORAL at 10:05

## 2025-05-04 RX ADMIN — ACETAMINOPHEN 1000 MG: 500 TABLET ORAL at 05:05

## 2025-05-04 RX ADMIN — METHOCARBAMOL 500 MG: 500 TABLET ORAL at 11:05

## 2025-05-04 RX ADMIN — CEFAZOLIN 2 G: 2 INJECTION, POWDER, FOR SOLUTION INTRAMUSCULAR; INTRAVENOUS at 01:05

## 2025-05-04 RX ADMIN — CEFTRIAXONE 1 G: 1 INJECTION, POWDER, FOR SOLUTION INTRAMUSCULAR; INTRAVENOUS at 08:05

## 2025-05-04 RX ADMIN — SENNOSIDES AND DOCUSATE SODIUM 1 TABLET: 50; 8.6 TABLET ORAL at 08:05

## 2025-05-04 RX ADMIN — SENNOSIDES AND DOCUSATE SODIUM 1 TABLET: 50; 8.6 TABLET ORAL at 10:05

## 2025-05-04 RX ADMIN — ACETAMINOPHEN 1000 MG: 500 TABLET ORAL at 06:05

## 2025-05-04 RX ADMIN — METHOCARBAMOL 500 MG: 500 TABLET ORAL at 05:05

## 2025-05-04 RX ADMIN — LEVOTHYROXINE SODIUM 137 MCG: 25 TABLET ORAL at 05:05

## 2025-05-04 RX ADMIN — ACETAMINOPHEN 1000 MG: 500 TABLET ORAL at 12:05

## 2025-05-04 RX ADMIN — METHOCARBAMOL 500 MG: 500 TABLET ORAL at 12:05

## 2025-05-04 RX ADMIN — POLYETHYLENE GLYCOL 3350 17 G: 17 POWDER, FOR SOLUTION ORAL at 08:05

## 2025-05-04 NOTE — ASSESSMENT & PLAN NOTE
Patient has persistent (7 days or more) atrial fibrillation. Patient is currently in atrial fibrillation. Initially in RVR, improved with PO Metoprolol. [id number] Score: 3. The patients heart rate in the last 24 hours is as follows:  Pulse  Min: 88  Max: 122     Antiarrhythmics  metoprolol succinate (TOPROL-XL) 24 hr tablet 25 mg, Daily, Oral    Anticoagulants  apixaban tablet 5 mg, 2 times daily, Oral    Plan  - Replete lytes with a goal of K>4, Mg >2  - Patient's afib is currently controlled on home po Metoprolol and will continue.  - Home Apixiban held pre-op for her long term anticoagulation for stroke prevention but resumed post-op at 5 mg po BID.

## 2025-05-04 NOTE — ASSESSMENT & PLAN NOTE
Patient admitted after found to have a closed nondisplaced right intertrochanteric fracture on MRI imaging. Patient had fall on Easter weekend and had X-rays done at that time of right hip that were negative but has had progressive right hip pain since then. Patient seen in Ortho clinic on 5/1 and MRI of pelvis ordered to evaluate pain and done on 5/2 and revealed closed nondisplaced right femur intertrochanteric fracture and Ortho clinic called yesterday, 5/2 and told patient to go to ED for admission. Patient was seen and evaluated by Orthopedic surgery who recommended operative repair of fracture.   - Patient was taken to OR on 5/3/2025 and underwent right hip cephalomedullary nail fixation by Dr. Aleida Pires.   - Post-op, patient weight bear as tolerated to the right lower extremity as per Orthopedics recommendation.   - Patient resumed on her home Apixiban 5 mg po BID that she takes for long term anticoagulation for her PAF so no other DVT prophylaxis required post-op.   - Perineural pain catheter placed by Anesthesia Pain Service with continuous infusion of Ropivacaine to help with pain control post-op and Anesthesia Pain Service managing while patient in the hospital.   - Continue on multimodal pain management post-op with Tylenol 1000 mg po every 6 hours + Robaxin 500 mg po every 6 hours post-op. Pain controlled to right hip.   - PT/OT consulted post-op and patient doing well and recommending low intensity on discharge when ready. Plan for HH PT/OT on discharge when medically ready.   - Ortho following and managing surgical site.  - Surgical bandage to remain in place until Ortho clinic follow-up. Patient will need staples removed at approximately 2 weeks. Removal may be performed at a rehab facility, by a PCP or by myself in the Orthopedic clinic.  - Follow up with Dr. Pires in Ortho clinic in 6 weeks with an AP pelvis and right hip x-ray.

## 2025-05-04 NOTE — PT/OT/SLP PROGRESS
Occupational Therapy   Treatment    Co-treatment with PT due to pt's having required significant (A) during previous session and to have 2 skilled therapists present to ensure her safety during mobility     Name: Angelina Man  MRN: [id number]  Admitting Diagnosis:  Closed nondisplaced intertrochanteric fracture of right femur with routine healing  1 Day Post-Op    Recommendations:     Discharge Recommendations: Low Intensity Therapy  Discharge Equipment Recommendations:  none  Barriers to discharge:  None    Assessment:     Angelina Man is a 75 y.o. female with a medical diagnosis of Closed nondisplaced intertrochanteric fracture of right femur with routine healing.  Pt tolerated session well and without incident and demonstrated much improved activity tolerance.  She continues to require (A) with ADLs and mobility.  She presents with the following.  Performance deficits affecting function are weakness, impaired endurance, impaired self care skills, impaired functional mobility, gait instability, impaired balance, pain, orthopedic precautions, decreased ROM, decreased lower extremity function.     Rehab Prognosis:  Good; patient would benefit from acute skilled OT services to address these deficits and reach maximum level of function.       Plan:     Patient to be seen daily to address the above listed problems via self-care/home management, therapeutic activities, therapeutic exercises  Plan of Care Expires: 05/17/25  Plan of Care Reviewed with: patient    Subjective     Chief Complaint: R hip pain  Patient/Family Comments/goals: to get stronger  Pain/Comfort:  Pain Rating 1: 4/10 (with certain movements; less pain at rest)  Location - Side 1: Right  Location - Orientation 1: generalized  Location 1: hip  Pain Addressed 1: Pre-medicate for activity, Reposition, Distraction, Nurse notified  Pain Rating Post-Intervention 1: other (see comments) (unchanged)    Objective:     Communicated with: nurse and PTA prior  to session.  Patient found up in chair with FCD, telemetry, perineural catheter upon OT entry to room.    General Precautions: Standard, fall    Orthopedic Precautions:RLE weight bearing as tolerated  Braces: N/A  Respiratory Status: Room air     Occupational Performance:     Bed Mobility:    N/A due to pt's sitting in bedside chair at beginning and end of session     Functional Mobility/Transfers:  Patient completed Sit <> Stand Transfer from bedside chair and from toilet x 1 trial each with minimum assistance with rolling walker and with grab bar at the toilet  Patient completed Toilet Transfer Step Transfer technique with minimum assistance with rolling walker and grab bar  Functional Mobility: Pt ambulated ~70 ft total with CGA with RW and chair follow, requiring cueing for technique and sequencing.  She had no LOB or reports of dizziness.      Activities of Daily Living:  Grooming: CGA for standing balance to wash her face, wash her hands, and perform oral care at the sink   Upper Body Dressing: minimum assistance to don/doff back gown as a robe and manage lines while seated   Toileting: contact guard assistance for standing balance and gown management to perform hygiene while standing.  She was unable to have a BM.       [facility] 6 Click ADL: 18    Treatment & Education:  Pt edu on role of OT, POC, her RLE WBAT precautions, safety when performing self care tasks, benefit of performing OOB activity, and safety when performing functional transfers and functional mobility.  - White board updated  - Self care tasks completed-- as noted above      Patient left up in chair with all lines intact, call button in reach, and nurse notified    GOALS:   Multidisciplinary Problems       Occupational Therapy Goals          Problem: Occupational Therapy    Goal Priority Disciplines Outcome Interventions   Occupational Therapy Goal     OT, PT/OT Progressing    Description: Goals to be met by: 5/17/25     Patient will increase  functional independence with ADLs by performing:    LE Dressing with Modified [place].  Grooming while standing at sink with Modified [place].  Toileting from toilet with Modified [place] for hygiene and clothing management.   Toilet transfer to toilet with Modified [place].                         DME Justifications:  No DME recommended requiring DME justifications    Time Tracking:     OT Date of Treatment: 05/04/25  OT Start Time: 0943  OT Stop Time: 1021  OT Total Time (min): 38 min    Billable Minutes:Self Care/Home Management 23 min  Therapeutic Activity 15 min    OT/JULIOCESAR: OT          5/4/2025

## 2025-05-04 NOTE — PLAN OF CARE
Problem: Adult Inpatient Plan of Care  Goal: Plan of Care Review  Outcome: Progressing  Goal: Patient-Specific Goal (Individualized)  Outcome: Progressing  Goal: Absence of Hospital-Acquired Illness or Injury  Outcome: Progressing  Goal: Optimal Comfort and Wellbeing  Outcome: Progressing  Goal: Readiness for Transition of Care  Outcome: Progressing     Problem: Hip Fracture Medical Management  Goal: Optimal Coping with Change in Health Status  Outcome: Progressing  Goal: Absence of Bleeding  Outcome: Progressing  Goal: Effective Bowel Elimination  Outcome: Progressing  Goal: Baseline Cognitive Function Maintained  Outcome: Progressing  Goal: Absence of Embolism  Outcome: Progressing  Goal: Fracture Stability  Outcome: Progressing  Goal: Optimal Functional Performance  Outcome: Progressing  Goal: Pain Control and Function  Outcome: Progressing  Goal: Effective Urinary Elimination  Outcome: Progressing     Problem: Surgery Nonspecified  Goal: Absence of Bleeding  Outcome: Progressing  Goal: Effective Bowel Elimination  Outcome: Progressing  Goal: Fluid and Electrolyte Balance  Outcome: Progressing  Goal: Blood Glucose Level Within Targeted Range  Outcome: Progressing  Goal: Absence of Infection Signs and Symptoms  Outcome: Progressing  Goal: Anesthesia/Sedation Recovery  Outcome: Progressing  Goal: Optimal Pain Control and Function  Outcome: Progressing  Goal: Nausea and Vomiting Relief  Outcome: Progressing  Goal: Effective Urinary Elimination  Outcome: Progressing  Goal: Effective Oxygenation and Ventilation  Outcome: Progressing     Problem: Anesthesia/Analgesia, Neuraxial  Goal: Safe, Effective Infusion Delivery  Outcome: Progressing  Goal: Absence of Infection Signs and Symptoms  Outcome: Progressing  Goal: Nausea and Vomiting Relief  Outcome: Progressing  Goal: Effective Pain Control  Outcome: Progressing  Goal: Effective Oxygenation and Ventilation  Outcome: Progressing  Goal: Baseline Motor  Function  Outcome: Progressing  Goal: Effective Urinary Elimination  Outcome: Progressing     Problem: Fall Injury Risk  Goal: Absence of Fall and Fall-Related Injury  Outcome: Progressing     Problem: Skin Injury Risk Increased  Goal: Skin Health and Integrity  Outcome: Progressing     Problem: Infection  Goal: Absence of Infection Signs and Symptoms  Outcome: Progressing     Problem: Wound  Goal: Optimal Coping  Outcome: Progressing  Goal: Optimal Functional Ability  Outcome: Progressing  Goal: Absence of Infection Signs and Symptoms  Outcome: Progressing  Goal: Improved Oral Intake  Outcome: Progressing  Goal: Optimal Pain Control and Function  Outcome: Progressing  Goal: Skin Health and Integrity  Outcome: Progressing  Goal: Optimal Wound Healing  Outcome: Progressing

## 2025-05-04 NOTE — PLAN OF CARE
Problem: Adult Inpatient Plan of Care  Goal: Plan of Care Review  Outcome: Progressing  Goal: Patient-Specific Goal (Individualized)  Outcome: Progressing  Goal: Absence of Hospital-Acquired Illness or Injury  Outcome: Progressing  Goal: Optimal Comfort and Wellbeing  Outcome: Progressing  Goal: Readiness for Transition of Care  Outcome: Progressing     Problem: Hip Fracture Medical Management  Goal: Optimal Coping with Change in Health Status  Outcome: Progressing

## 2025-05-04 NOTE — ANESTHESIA POST-OP PAIN MANAGEMENT
Acute Pain Service Progress Note    Angelina Man is a 75 y.o., female, [id number].    Surgery:  INSERTION, INTRAMEDULLARY BERNADINE, FEMUR     Post Op Day #: 1    Catheter type: L SIFI PNC    Infusion type: 0.2% ropivacaine 15 mL q3h    Problem List:    Active Hospital Problems    Diagnosis  POA    *Closed nondisplaced intertrochanteric fracture of right femur with routine healing s/p IM nail on 5/3/2025 [S72.144D]  Not Applicable    Acute cystitis without hematuria [N30.00]  Yes    Acute viral conjunctivitis of both eyes [B30.9]  Yes    Macrocytic anemia [D53.9]  Yes     Chronic    BMI less than 19,adult [Z68.1]  Not Applicable     Chronic    (HFpEF) heart failure with preserved ejection fraction [I50.30]  Yes     Chronic    Pulmonary hypertension [I27.20]  Yes     Chronic    Anticoagulant long-term use [Z79.01]  Not Applicable    Longstanding persistent atrial fibrillation [I48.11]  Yes     Chronic    Acquired hypothyroidism [E03.9]  Yes      Resolved Hospital Problems    Diagnosis Date Resolved POA    Preop cardiovascular exam [Z01.810] 05/03/2025 Not Applicable    Preoperative examination [Z01.818] 05/03/2025 Not Applicable       Subjective:     General appearance of alert, oriented, no complaints   Pain with rest: 4    Numbers   Pain with movement: 6    Numbers   Side Effects    1. Pruritis No    2. Nausea No    3. Motor Blockade No, 1=Ability to bend knees and ankles    4. Sedation No, 1=awake and alert    Objective:        Catheter site clean, dry, intact      Vitals   Vitals:    05/04/25 0715   BP:    Pulse: (!) 122   Resp:    Temp:         Labs    Admission on 05/02/2025   Component Date Value Ref Range Status    QRS Duration 05/02/2025 66  ms Final    [facility]HS QTC Calculation 05/02/2025 454  ms Final    Sodium 05/02/2025 137  136 - 145 mmol/L Final    Potassium 05/02/2025 4.0  3.5 - 5.1 mmol/L Final    Chloride 05/02/2025 96  95 - 110 mmol/L Final    CO2 05/02/2025 27  23 - 29 mmol/L Final    Glucose 05/02/2025 101   70 - 110 mg/dL Final    BUN 05/02/2025 15  8 - 23 mg/dL Final    Creatinine 05/02/2025 0.8  0.5 - 1.4 mg/dL Final    Calcium 05/02/2025 8.6 (L)  8.7 - 10.5 mg/dL Final    Protein Total 05/02/2025 7.2  6.0 - 8.4 gm/dL Final    Albumin 05/02/2025 3.1 (L)  3.5 - 5.2 g/dL Final    Bilirubin Total 05/02/2025 0.3  0.1 - 1.0 mg/dL Final    ALP 05/02/2025 113  40 - 150 unit/L Final    AST 05/02/2025 23  11 - 45 unit/L Final    ALT 05/02/2025 12  10 - 44 unit/L Final    Anion Gap 05/02/2025 14  8 - 16 mmol/L Final    eGFR 05/02/2025 >60  >60 mL/min/1.73/m2 Final    WBC 05/02/2025 10.85  3.90 - 12.70 K/uL Final    RBC 05/02/2025 3.55 (L)  4.00 - 5.40 M/uL Final    HGB 05/02/2025 11.3 (L)  12.0 - 16.0 gm/dL Final    HCT 05/02/2025 35.4 (L)  37.0 - 48.5 % Final    MCV 05/02/2025 100 (H)  82 - 98 fL Final    MCH 05/02/2025 31.8 (H)  27.0 - 31.0 pg Final    MCHC 05/02/2025 31.9 (L)  32.0 - 36.0 g/dL Final    RDW 05/02/2025 12.6  11.5 - 14.5 % Final    Platelet Count 05/02/2025 466 (H)  150 - 450 K/uL Final    MPV 05/02/2025 8.4 (L)  9.2 - 12.9 fL Final    Nucleated RBC 05/02/2025 0  <=0 /100 WBC Final    Neut % 05/02/2025 76.4 (H)  38 - 73 % Final    Lymph % 05/02/2025 14.6 (L)  18 - 48 % Final    Mono % 05/02/2025 7.7  4 - 15 % Final    Eos % 05/02/2025 0.3  <=8 % Final    Basophil % 05/02/2025 0.4  <=1.9 % Final    Imm Grans % 05/02/2025 0.6 (H)  0.0 - 0.5 % Final    Neut # 05/02/2025 8.30 (H)  1.8 - 7.7 K/uL Final    Lymph # 05/02/2025 1.58  1 - 4.8 K/uL Final    Mono # 05/02/2025 0.84  0.3 - 1 K/uL Final    Eos # 05/02/2025 0.03  <=0.5 K/uL Final    Baso # 05/02/2025 0.04  <=0.2 K/uL Final    Imm Grans # 05/02/2025 0.06 (H)  0.00 - 0.04 K/uL Final    PT 05/02/2025 13.0 (H)  9.0 - 12.5 seconds Final    INR 05/02/2025 1.2  0.8 - 1.2 Final    PTT 05/02/2025 36.2 (H)  21.0 - 32.0 seconds Final    BNP 05/02/2025 383 (H)  0 - 99 pg/mL Final    Sodium 05/03/2025 138  136 - 145 mmol/L Final    Potassium 05/03/2025 3.8  3.5 - 5.1  mmol/L Final    Chloride 05/03/2025 103  95 - 110 mmol/L Final    CO2 05/03/2025 26  23 - 29 mmol/L Final    Glucose 05/03/2025 83  70 - 110 mg/dL Final    BUN 05/03/2025 16  8 - 23 mg/dL Final    Creatinine 05/03/2025 0.7  0.5 - 1.4 mg/dL Final    Calcium 05/03/2025 7.9 (L)  8.7 - 10.5 mg/dL Final    Anion Gap 05/03/2025 9  8 - 16 mmol/L Final    eGFR 05/03/2025 >60  >60 mL/min/1.73/m2 Final    Magnesium  05/03/2025 2.3  1.6 - 2.6 mg/dL Final    Phosphorus Level 05/03/2025 3.1  2.7 - 4.5 mg/dL Final    WBC 05/03/2025 7.93  3.90 - 12.70 K/uL Final    RBC 05/03/2025 3.21 (L)  4.00 - 5.40 M/uL Final    HGB 05/03/2025 10.3 (L)  12.0 - 16.0 gm/dL Final    HCT 05/03/2025 32.4 (L)  37.0 - 48.5 % Final    MCV 05/03/2025 101 (H)  82 - 98 fL Final    MCH 05/03/2025 32.1 (H)  27.0 - 31.0 pg Final    MCHC 05/03/2025 31.8 (L)  32.0 - 36.0 g/dL Final    RDW 05/03/2025 12.7  11.5 - 14.5 % Final    Platelet Count 05/03/2025 420  150 - 450 K/uL Final    MPV 05/03/2025 8.3 (L)  9.2 - 12.9 fL Final    Nucleated RBC 05/03/2025 0  <=0 /100 WBC Final    Neut % 05/03/2025 65.3  38 - 73 % Final    Lymph % 05/03/2025 22.1  18 - 48 % Final    Mono % 05/03/2025 10.0  4 - 15 % Final    Eos % 05/03/2025 1.4  <=8 % Final    Basophil % 05/03/2025 0.6  <=1.9 % Final    Imm Grans % 05/03/2025 0.6 (H)  0.0 - 0.5 % Final    Neut # 05/03/2025 5.18  1.8 - 7.7 K/uL Final    Lymph # 05/03/2025 1.75  1 - 4.8 K/uL Final    Mono # 05/03/2025 0.79  0.3 - 1 K/uL Final    Eos # 05/03/2025 0.11  <=0.5 K/uL Final    Baso # 05/03/2025 0.05  <=0.2 K/uL Final    Imm Grans # 05/03/2025 0.05 (H)  0.00 - 0.04 K/uL Final    Specimen Outdate 05/03/2025 05/06/2025 23:59   Final    Group & Rh 05/03/2025 B POS   Final    Indirect Zackery 05/03/2025 NEG   Final    Color,  05/03/2025 Yellow  Straw, Carmen, Yellow, Light-Orange Final    Appearance,  05/03/2025 Cloudy (A)  Clear Final    pH,  05/03/2025 6.0  5.0 - 8.0 Final    Spec Grav  05/03/2025 1.030  1.005 - 1.030  Final    Protein, UA 05/03/2025 1+ (A)  Negative Final    Glucose, UA 05/03/2025 Negative  Negative Final    Ketones, UA 05/03/2025 1+ (A)  Negative Final    Bilirubin, UA 05/03/2025 Negative  Negative Final    Blood, UA 05/03/2025 2+ (A)  Negative Final    Nitrites, UA 05/03/2025 Positive (A)  Negative Final    Urobilinogen, UA 05/03/2025 Negative  <2.0 EU/dL Final    Leukocyte Esterase, UA 05/03/2025 3+ (A)  Negative Final    QRS Duration 05/02/2025 62  ms Final    OHS QTC Calculation 05/02/2025 485  ms Final    WBC 05/03/2025 12.88 (H)  3.90 - 12.70 K/uL Final    RBC 05/03/2025 3.60 (L)  4.00 - 5.40 M/uL Final    HGB 05/03/2025 11.4 (L)  12.0 - 16.0 gm/dL Final    HCT 05/03/2025 36.6 (L)  37.0 - 48.5 % Final    MCV 05/03/2025 102 (H)  82 - 98 fL Final    MCH 05/03/2025 31.7 (H)  27.0 - 31.0 pg Final    MCHC 05/03/2025 31.1 (L)  32.0 - 36.0 g/dL Final    RDW 05/03/2025 12.8  11.5 - 14.5 % Final    Platelet Count 05/03/2025 439  150 - 450 K/uL Final    MPV 05/03/2025 8.5 (L)  9.2 - 12.9 fL Final    Nucleated RBC 05/03/2025 0  <=0 /100 WBC Final    Neut % 05/03/2025 76.8 (H)  38 - 73 % Final    Lymph % 05/03/2025 14.8 (L)  18 - 48 % Final    Mono % 05/03/2025 6.3  4 - 15 % Final    Eos % 05/03/2025 0.6  <=8 % Final    Basophil % 05/03/2025 0.5  <=1.9 % Final    Imm Grans % 05/03/2025 1.0 (H)  0.0 - 0.5 % Final    Neut # 05/03/2025 9.89 (H)  1.8 - 7.7 K/uL Final    Lymph # 05/03/2025 1.91  1 - 4.8 K/uL Final    Mono # 05/03/2025 0.81  0.3 - 1 K/uL Final    Eos # 05/03/2025 0.08  <=0.5 K/uL Final    Baso # 05/03/2025 0.06  <=0.2 K/uL Final    Imm Grans # 05/03/2025 0.13 (H)  0.00 - 0.04 K/uL Final    Extra Tube 05/03/2025 Hold for add-ons.   Final    RBC, UA 05/03/2025 20 (H)  0 - 4 /HPF Final    WBC, UA 05/03/2025 >100 (H)  0 - 5 /HPF Final    WBC Clumps, UA 05/03/2025 Moderate (A)  None, Rare Final    Microscopic Comment 05/03/2025    Final    Sodium 05/04/2025 138  136 - 145 mmol/L Final    Potassium 05/04/2025 3.7   3.5 - 5.1 mmol/L Final    Chloride 05/04/2025 104  95 - 110 mmol/L Final    CO2 05/04/2025 24  23 - 29 mmol/L Final    Glucose 05/04/2025 100  70 - 110 mg/dL Final    BUN 05/04/2025 11  8 - 23 mg/dL Final    Creatinine 05/04/2025 0.7  0.5 - 1.4 mg/dL Final    Calcium 05/04/2025 7.7 (L)  8.7 - 10.5 mg/dL Final    Anion Gap 05/04/2025 10  8 - 16 mmol/L Final    eGFR 05/04/2025 >60  >60 mL/min/1.73/m2 Final    Magnesium  05/04/2025 2.4  1.6 - 2.6 mg/dL Final    Phosphorus Level 05/04/2025 2.9  2.7 - 4.5 mg/dL Final    WBC 05/04/2025 9.43  3.90 - 12.70 K/uL Final    RBC 05/04/2025 3.05 (L)  4.00 - 5.40 M/uL Final    HGB 05/04/2025 9.7 (L)  12.0 - 16.0 gm/dL Final    HCT 05/04/2025 31.6 (L)  37.0 - 48.5 % Final    MCV 05/04/2025 104 (H)  82 - 98 fL Final    MCH 05/04/2025 31.8 (H)  27.0 - 31.0 pg Final    MCHC 05/04/2025 30.7 (L)  32.0 - 36.0 g/dL Final    RDW 05/04/2025 13.0  11.5 - 14.5 % Final    Platelet Count 05/04/2025 379  150 - 450 K/uL Final    MPV 05/04/2025 8.8 (L)  9.2 - 12.9 fL Final    Nucleated RBC 05/04/2025 0  <=0 /100 WBC Final    Neut % 05/04/2025 72.1  38 - 73 % Final    Lymph % 05/04/2025 18.3  18 - 48 % Final    Mono % 05/04/2025 8.1  4 - 15 % Final    Eos % 05/04/2025 0.6  <=8 % Final    Basophil % 05/04/2025 0.3  <=1.9 % Final    Imm Grans % 05/04/2025 0.6 (H)  0.0 - 0.5 % Final    Neut # 05/04/2025 6.79  1.8 - 7.7 K/uL Final    Lymph # 05/04/2025 1.73  1 - 4.8 K/uL Final    Mono # 05/04/2025 0.76  0.3 - 1 K/uL Final    Eos # 05/04/2025 0.06  <=0.5 K/uL Final    Baso # 05/04/2025 0.03  <=0.2 K/uL Final    Imm Grans # 05/04/2025 0.06 (H)  0.00 - 0.04 K/uL Final        Meds Current Medications[1]     Anticoagulant dose Eliquis at 5 mg BID.    Assessment:  This is a 75-year-old female who is s/p left femur IM pratik insertion. She is doing well with her PNC. Overnight she had some increased pain that improved with a clinician bolus and robaxin administration.     Plan:  Continue L SIFI PNC at 15 mL  q3hr  Continue scheduled multimodals:  Tylenol 1000 mg q6h for 8 doses then spaced to q8h  Robacin 500 mg q6hr  PRN 10 mg oxycodone 10 mg discontinued and replaced with 5 mg oxycodone for moderate and severe pain.     Case discussed with staff, Dr. Richardson; final recommendations per attestation above.     Thank you for your consult and allowing us to participate in the care of this patient. We will continue to follow along. Please call the Acute Pain Service/Anesthesia if you have any questions or concerns.    Bryce Altman MD   [facility], PGY-2               [1]   Current Facility-Administered Medications   Medication Dose Route Frequency Provider Last Rate Last Admin    acetaminophen tablet 1,000 mg  1,000 mg Oral Q6H Kelton Wall MD   1,000 mg at 05/04/25 0547    aluminum-magnesium hydroxide-simethicone 200-200-20 mg/5 mL suspension 30 mL  30 mL Oral QID PRN Kelton Wall MD        apixaban tablet 5 mg  5 mg Oral BID Phi Leon MD   5 mg at 05/03/25 2133    artificial tears 0.5 % ophthalmic solution 1 drop  1 drop Both Eyes PRN Kelton Wall MD   1 drop at 05/02/25 2347    ascorbic acid (vitamin C) tablet 1,000 mg  1,000 mg Oral Daily Kelton Wall MD   1,000 mg at 05/03/25 1051    bisacodyL suppository 10 mg  10 mg Rectal Daily PRN Kelton Wall MD        buPROPion TB24 tablet 150 mg  150 mg Oral BID Kelton Wall MD   150 mg at 05/03/25 2132    cefTRIAXone injection 1 g  1 g Intravenous Q24H Rebekah Schumacher MD        glucagon (human recombinant) injection 1 mg  1 mg Intramuscular PRN Kelton Wall MD        glucose chewable tablet 16 g  16 g Oral PRN Kelton Wall MD        glucose chewable tablet 24 g  24 g Oral PRN Kelton Wall MD        levothyroxine tablet 137 mcg  137 mcg Oral Before breakfast Kelton Wall MD   137 mcg at 05/04/25 0547    methocarbamoL tablet 500 mg   500 mg Oral Q6H Virgen He MD   500 mg at 05/04/25 0547    metoprolol succinate (TOPROL-XL) 24 hr tablet 25 mg  25 mg Oral Daily Kelton Wall MD   25 mg at 05/03/25 1051    multivitamin tablet  1 tablet Oral Daily Kelton Wall MD   1 tablet at 05/03/25 1052    naloxone 0.4 mg/mL injection 0.02 mg  0.02 mg Intravenous PRN Kelton Wall MD        ondansetron disintegrating tablet 8 mg  8 mg Oral Q8H PRN Kelton Wall MD        oxyCODONE immediate release tablet 5 mg  5 mg Oral Q3H PRN Virgen He MD        And    oxyCODONE immediate release tablet 5 mg  5 mg Oral Q3H PRN Virgen He MD        polyethylene glycol packet 17 g  17 g Oral Daily PRN Kelton Wall MD        polyethylene glycol packet 17 g  17 g Oral Daily Kelton Wall MD        pramipexole tablet 0.25 mg  0.25 mg Oral Nightly PRN Kelton Wall MD        ropivacaine 0.2% Perineural Pump infusion 500 ML   Perineural Continuous Christoph Richardson MD   New Bag at 05/03/25 1002    senna-docusate 8.6-50 mg per tablet 1 tablet  1 tablet Oral BID Kelton Wall MD   1 tablet at 05/03/25 2132    senna-docusate 8.6-50 mg per tablet 1 tablet  1 tablet Oral Daily Virgen He MD        simethicone chewable tablet 80 mg  1 tablet Oral QID PRN Kelton Wall MD        sodium chloride 0.9% flush 10 mL  10 mL Intravenous PRN Kelton Wall MD        traZODone tablet 50 mg  50 mg Oral Nightly PRN Kelton Wall MD

## 2025-05-04 NOTE — CARE UPDATE
Called to bedside for breakthrough pain that begins in hip area and radiates down leg. Gave clinician bolus 15cc 0.2% ropivacaine through PNC. Adjusted pain medications as well. Encouraged to call back if pain worsens.    Virgen He MD  Anesthesiology PGY3

## 2025-05-04 NOTE — SUBJECTIVE & OBJECTIVE
Principal Problem:Closed nondisplaced intertrochanteric fracture of right femur with routine healing    Principal Orthopedic Problem: same as above s/p R short IMN 5/3    Interval History: Patient seen and examined at bedside. YASMANY. Patient doing well. Pain well controlled. Hgb 9.7. Seen by PT yesterday, recs low intensity therapy.     Review of patient's allergies indicates:  No Known Allergies    Current Facility-Administered Medications   Medication    acetaminophen tablet 1,000 mg    aluminum-magnesium hydroxide-simethicone 200-200-20 mg/5 mL suspension 30 mL    apixaban tablet 5 mg    artificial tears 0.5 % ophthalmic solution 1 drop    ascorbic acid (vitamin C) tablet 1,000 mg    bisacodyL suppository 10 mg    buPROPion TB24 tablet 150 mg    cefTRIAXone injection 1 g    glucagon (human recombinant) injection 1 mg    glucose chewable tablet 16 g    glucose chewable tablet 24 g    levothyroxine tablet 137 mcg    methocarbamoL tablet 500 mg    metoprolol succinate (TOPROL-XL) 24 hr tablet 25 mg    multivitamin tablet    naloxone 0.4 mg/mL injection 0.02 mg    ondansetron disintegrating tablet 8 mg    oxyCODONE immediate release tablet 5 mg    And    oxyCODONE immediate release tablet 5 mg    polyethylene glycol packet 17 g    polyethylene glycol packet 17 g    pramipexole tablet 0.25 mg    ropivacaine 0.2% Perineural Pump infusion 500 ML    senna-docusate 8.6-50 mg per tablet 1 tablet    senna-docusate 8.6-50 mg per tablet 1 tablet    simethicone chewable tablet 80 mg    sodium chloride 0.9% flush 10 mL    traZODone tablet 50 mg     Objective:     Vital Signs (Most Recent):  Temp: 98.1 °F (36.7 °C) (05/04/25 0459)  Pulse: (!) 122 (05/04/25 0715)  Resp: 18 (05/04/25 0459)  BP: 113/65 (05/04/25 0459)  SpO2: 99 % (05/04/25 0459) Vital Signs (24h Range):  Temp:  [97 °F (36.1 °C)-98.3 °F (36.8 °C)] 98.1 °F (36.7 °C)  Pulse:  [] 122  Resp:  [13-18] 18  SpO2:  [96 %-100 %] 99 %  BP: (109-135)/(63-91) 113/65  "    Weight: 43.2 kg (95 lb 3.8 oz)  Height: 5' 4" (162.6 cm)  Body mass index is 16.35 kg/m².      Intake/Output Summary (Last 24 hours) at 5/4/2025 0775  Last data filed at 5/4/2025 0585  Gross per 24 hour   Intake 1300 ml   Output 1060 ml   Net 240 ml        Ortho/SPM Exam  RLE  - Dressing c/d/i  - Quad and hip flexor intact  - Compartments soft and compressible  - ROM full (eversion,inversion,plantar/dorsiflexion)  - TA/EHL/Gastroc/FHL assessed in isolation without deficit  - SILT throughout  - DP and PT palpated  2+  - Capillary Refill <3s        Significant Labs: All pertinent labs within the past 24 hours have been reviewed.    Significant Imaging: I have reviewed and interpreted all pertinent imaging results/findings.  "

## 2025-05-04 NOTE — ASSESSMENT & PLAN NOTE
Patient has persistent (7 days or more) atrial fibrillation. Patient is currently in atrial fibrillation. Initially in RVR, improved with PO Metoprolol. [id number] Score: 3. The patients heart rate in the last 24 hours is as follows:  Pulse  Min: 88  Max: 122     Antiarrhythmics  metoprolol succinate (TOPROL-XL) 24 hr tablet 25 mg, Daily, Oral    Anticoagulants  apixaban tablet 5 mg, 2 times daily, Oral    Plan  - Replete lytes with a goal of K>4, Mg >2  - Patient's afib is currently controlled on home po Metoprolol and will continue.  - Home Apixiban held pre-op for her long term anticoagulation for stroke prevention but resumed post-op at 5 mg po BID.

## 2025-05-04 NOTE — ASSESSMENT & PLAN NOTE
Angelina Man is a 75 y.o. female with right intertrochanteric femur fracture, closed, NVI. They take Eliquis 5mg anticoagulation at home. They required no ambulatory assistive devices prior to this injury and lived a very active lifestyle.      Now s/p R short IMN 5/4    Pain multimodal  PT/OT WBAT RLE  Hgb 9.7  Dc aida today     Dispo: low intensity therapy

## 2025-05-04 NOTE — ASSESSMENT & PLAN NOTE
- Much improved on 5/4.   - Present on admit. Patient complaining of redness to her eyes and appears she has a viral conjunctivitis to both eyes and artifical tears ordered to help with dryness. Patient advised not to rub her eyes.

## 2025-05-04 NOTE — CARE UPDATE
"RAPID RESPONSE NURSE CHART REVIEW        Chart Reviewed: 05/04/2025, 10:42 AM    MRN: [id number]  Bed: 505/505 A    Dx: Closed nondisplaced intertrochanteric fracture of right femur with routine healing    Angelina Man has a past medical history of Arthritis, Atrial fibrillation, Bronchitis, Cataract, Dry eyes, GERD (gastroesophageal reflux disease), Glaucoma, suspect - Both Eyes, Hypothyroidism, Pulmonary hypertension, Rash, Renal angiomyolipoma, Renal disorder, SCC (squamous cell carcinoma), SCC (squamous cell carcinoma), SCC (squamous cell carcinoma), Spinal stenosis, Squamous cell carcinoma, Status post lumbar surgery, Stress fracture, Thyroid disease, and Venous insufficiency.    Last VS: BP 92/67   Pulse (!) 118   Temp 97.8 °F (36.6 °C) (Axillary)   Resp 20   Ht 5' 4" (1.626 m)   Wt 43.2 kg (95 lb 3.8 oz)   LMP  (LMP Unknown)   SpO2 95%   Breastfeeding No   BMI 16.35 kg/m²     24H Vital Sign Range:  Temp:  [97.6 °F (36.4 °C)-98.3 °F (36.8 °C)]   Pulse:  []   Resp:  [17-20]   BP: ()/(63-77)   SpO2:  [95 %-99 %]     Level of Consciousness (AVPU): alert    Recent Labs     05/03/25  0445 05/03/25  1007 05/04/25  0609   WBC 7.93 12.88* 9.43   HGB 10.3* 11.4* 9.7*   HCT 32.4* 36.6* 31.6*    439 379       Recent Labs     05/02/25  1841 05/03/25  0445 05/04/25  0609    138 138   K 4.0 3.8 3.7   CL 96 103 104   CO2 27 26 24   BUN 15 16 11   CREATININE 0.8 0.7 0.7    83 100   PHOS  --  3.1 2.9   MG  --  2.3 2.4      OXYGEN:  Flow (L/min) (Oxygen Therapy): 2      MEWS score: 3    Rounding completed with charge nurse Bob for mews alert reports PMHx of chronic AF rate controlled to 90s-110s with a soft but stable BP.  Pt in no acute distress at this time. No additional concerns verbalized at this time. Instructed to call [phone] for further concerns or assistance.    Kasi Solorzano RN      '  "

## 2025-05-04 NOTE — SUBJECTIVE & OBJECTIVE
Interval History: Patent worked with therapy today and did well and recommending home health PT/OT on discharge when medically ready. Patient states pain in right hip is 3/10 this am. Patient reports soreness in right thigh area and related to muscle soreness with surgery and on scheduled Robaxin to help. Will plan on home health PT/OT on discharged when medically ready. Urine culture from admit is pending. Will continue IV Ceftriaxone to treat UTI. Hgb decreased to 9.7 today from 11.4 yesterday and expected blood loss from surgery. No clinical signs of bleeding and will monitor. Patient with low BP 92/67 this am but asymptomatic and last BP reading improved to 106/67. I encouraged patient to drink more oral fluids today to help with her BP as she reports not drinking much fluids such surgery.     Review of Systems   Constitutional:  Negative for fever.   Respiratory:  Negative for cough and shortness of breath.    Cardiovascular:  Negative for chest pain.   Gastrointestinal:  Negative for nausea.   Musculoskeletal:  Positive for arthralgias (Right hip).   Psychiatric/Behavioral:  Negative for agitation and confusion.      Objective:     Vital Signs (Most Recent):  Temp: 98.4 °F (36.9 °C) (05/04/25 1156)  Pulse: 107 (05/04/25 1156)  Resp: 16 (05/04/25 1156)  BP: 106/67 (05/04/25 1156)  SpO2: 99 % (05/04/25 1156) on room air  Vital Signs (24h Range):  Temp:  [97.8 °F (36.6 °C)-98.4 °F (36.9 °C)] 98.4 °F (36.9 °C)  Pulse:  [] 107  Resp:  [16-20] 16  SpO2:  [95 %-99 %] 99 %  BP: ()/(64-77) 106/67     Weight: 43.2 kg (95 lb 3.8 oz)  Body mass index is 16.35 kg/m².    Intake/Output Summary (Last 24 hours) at 5/4/2025 1341  Last data filed at 5/4/2025 1159  Gross per 24 hour   Intake --   Output 900 ml   Net -900 ml         Physical Exam  Vitals and nursing note reviewed.   Constitutional:       General: She is awake. She is not in acute distress.     Appearance: Normal appearance. She is underweight. She is  not ill-appearing.      Comments: Frial, elderly female in no distress. Daughter at bedside. Patient appears very comfortable on exam.    Eyes:      Conjunctiva/sclera: Conjunctivae normal.   Cardiovascular:      Rate and Rhythm: Normal rate and regular rhythm.      Heart sounds: Normal heart sounds. No murmur heard.  Pulmonary:      Effort: Pulmonary effort is normal. No respiratory distress.      Breath sounds: Normal breath sounds. No wheezing.   Abdominal:      General: Abdomen is flat. Bowel sounds are normal. There is no distension.      Palpations: Abdomen is soft.      Tenderness: There is no abdominal tenderness. There is no guarding.   Musculoskeletal:      Right lower leg: No edema.      Left lower leg: No edema.   Skin:     General: Skin is warm.      Findings: No erythema.      Comments: Surgical dressing noted on right hip and lateral thigh. Dressing is clean and dry and intact.   Neurological:      Mental Status: She is alert and oriented to person, place, and time.   Psychiatric:         Mood and Affect: Mood normal.         Behavior: Behavior normal. Behavior is cooperative.         Thought Content: Thought content normal.         Judgment: Judgment normal.               Significant Labs: BMP:   Recent Labs   Lab 05/04/25  0609         K 3.7      CO2 24   BUN 11   CREATININE 0.7   CALCIUM 7.7*   MG 2.4     CBC:   Recent Labs   Lab 05/03/25  0445 05/03/25  1007 05/04/25  0609   WBC 7.93 12.88* 9.43   HGB 10.3* 11.4* 9.7*   HCT 32.4* 36.6* 31.6*    439 379     Magnesium:   Recent Labs   Lab 05/03/25  0445 05/04/25  0609   MG 2.3 2.4       Significant Imaging: I have reviewed all pertinent imaging results/findings within the past 24 hours.

## 2025-05-04 NOTE — ASSESSMENT & PLAN NOTE
- Anemia worsening on 5/4. Hgb down to 9.7 on 5/4 from 1.4 on 5/3. Expected blood loss related to surgery. No clinical signs of bleeding. Vital signs stable. Continue to monitor Hgb levels daily.   - Anemia is likely due to chronic disease. Most recent hemoglobin and hematocrit are listed below. Recent B12 and folate levels normal.   Recent Labs     05/03/25  0445 05/03/25  1007 05/04/25  0609   HGB 10.3* 11.4* 9.7*   HCT 32.4* 36.6* 31.6*     Plan  - Monitor serial CBC: Daily  - Transfuse PRBC if patient becomes hemodynamically unstable, symptomatic or H/H drops below 7/21.  - Patient has not received any PRBC transfusions to date.

## 2025-05-04 NOTE — PROGRESS NOTES
[facility]  Orthopedics  Progress Note    Patient Name: Angelina Man  MRN: [id number]  Admission Date: 5/2/2025  Hospital Length of Stay: 2 days  Attending Provider: Rebekah Schumacher MD  Primary Care Provider: Zina Rockwell MD  Follow-up For: Procedure(s) (LRB):  INSERTION, INTRAMEDULLARY BERNADINE, FEMUR - right selina (Right)    Post-Operative Day: 1 Day Post-Op  Subjective:     Principal Problem:Closed nondisplaced intertrochanteric fracture of right femur with routine healing    Principal Orthopedic Problem: same as above s/p R short IMN 5/3    Interval History: Patient seen and examined at bedside. NAEON. Patient doing well. Pain well controlled. Hgb 9.7. Seen by PT yesterday, recs low intensity therapy.     Review of patient's allergies indicates:  No Known Allergies    Current Facility-Administered Medications   Medication    acetaminophen tablet 1,000 mg    aluminum-magnesium hydroxide-simethicone 200-200-20 mg/5 mL suspension 30 mL    apixaban tablet 5 mg    artificial tears 0.5 % ophthalmic solution 1 drop    ascorbic acid (vitamin C) tablet 1,000 mg    bisacodyL suppository 10 mg    buPROPion TB24 tablet 150 mg    cefTRIAXone injection 1 g    glucagon (human recombinant) injection 1 mg    glucose chewable tablet 16 g    glucose chewable tablet 24 g    levothyroxine tablet 137 mcg    methocarbamoL tablet 500 mg    metoprolol succinate (TOPROL-XL) 24 hr tablet 25 mg    multivitamin tablet    naloxone 0.4 mg/mL injection 0.02 mg    ondansetron disintegrating tablet 8 mg    oxyCODONE immediate release tablet 5 mg    And    oxyCODONE immediate release tablet 5 mg    polyethylene glycol packet 17 g    polyethylene glycol packet 17 g    pramipexole tablet 0.25 mg    ropivacaine 0.2% Perineural Pump infusion 500 ML    senna-docusate 8.6-50 mg per tablet 1 tablet    senna-docusate 8.6-50 mg per tablet 1 tablet    simethicone chewable tablet 80 mg    sodium chloride 0.9% flush 10 mL    traZODone tablet 50  "mg     Objective:     Vital Signs (Most Recent):  Temp: 98.1 °F (36.7 °C) (05/04/25 0459)  Pulse: (!) 122 (05/04/25 0715)  Resp: 18 (05/04/25 0459)  BP: 113/65 (05/04/25 0459)  SpO2: 99 % (05/04/25 0459) Vital Signs (24h Range):  Temp:  [97 °F (36.1 °C)-98.3 °F (36.8 °C)] 98.1 °F (36.7 °C)  Pulse:  [] 122  Resp:  [13-18] 18  SpO2:  [96 %-100 %] 99 %  BP: (109-135)/(63-91) 113/65     Weight: 43.2 kg (95 lb 3.8 oz)  Height: 5' 4" (162.6 cm)  Body mass index is 16.35 kg/m².      Intake/Output Summary (Last 24 hours) at 5/4/2025 0740  Last data filed at 5/4/2025 0555  Gross per 24 hour   Intake 1300 ml   Output 1060 ml   Net 240 ml        Ortho/SPM Exam  RLE  - Dressing c/d/i  - Quad and hip flexor intact  - Compartments soft and compressible  - ROM full (eversion,inversion,plantar/dorsiflexion)  - TA/EHL/Gastroc/FHL assessed in isolation without deficit  - SILT throughout  - DP and PT palpated  2+  - Capillary Refill <3s        Significant Labs: All pertinent labs within the past 24 hours have been reviewed.    Significant Imaging: I have reviewed and interpreted all pertinent imaging results/findings.  Assessment/Plan:     * Closed nondisplaced intertrochanteric fracture of right femur with routine healing s/p IM nail on 5/3/2025  Angelina Man is a 75 y.o. female with right intertrochanteric femur fracture, closed, NVI. They take Eliquis 5mg anticoagulation at home. They required no ambulatory assistive devices prior to this injury and lived a very active lifestyle.      Now s/p R short IMN 5/4    Pain multimodal  PT/OT WBAT RLE  Hgb 9.7  Dc aida today     Dispo: low intensity therapy             MINE Middleton MD  Orthopedics  [facility] - Surgery    "

## 2025-05-04 NOTE — PROGRESS NOTES
"[facility] Medicine  Progress Note    Patient Name: Angelina Man  MRN: [id number]  Patient Class: IP- Inpatient   Admission Date: 5/2/2025  Length of Stay: 2 days  Attending Physician: Rebekah Schumacher MD  Primary Care Provider: Zina Rockwell MD        Subjective     Principal Problem:Closed nondisplaced intertrochanteric fracture of right femur with routine healing        HPI:  Angelina Man is a pleasant 75 y.o. female with permanent AFib, HFpEF, hypothyroidism presenting with hip fracture.     On Easter, she reports that she drank a martini with family, then had an accidental fall onto her right hip.  She did hit her head and had an occipital hematoma, left eye bruising, and right arm trauma.  She noted right hip pain particularly with ambulation, and was evaluated at an urgent care where hip x-ray showed no obvious fracture.  She then went to an ED for CT scans of her head, which reportedly showed no bleed.  She has noted difficulty ambulating due to the hip pain, however has still been able to do so.  She has even gone to a few of her usual spin classes, which reportedly helped her hip pain.  On Thursday, after span, she could barely walk, prompting presentation for further evaluation.  MRI was performed, showing intertrochanteric femur fracture.     She is very active at baseline, and goes to the gym every day.  She she goes to spin classes multiple times per week and lifts weights.  She lives in a condo and walks up stairs without difficulty. She performs all ADLs independently.  She reports chronic lower extremity edema improved with compression stockings, but denies any chest pain, shortness for breath, or palpitations.  She is compliant with all medications, including her metoprolol and Eliquis for AFib.    Of note, she reports chronic dry eyes with intermittent "crusting" for which she takes Systane. She has noted increased itching and crusting to the eyes since presentation to " the ED.     Overview/Hospital Course:  Patient admitted after found to have a closed nondisplaced right intertrochanteric fracture on MRI imaging. Patient had fall around [place] and has had progressive right hip pain since then with negative X-rays of right hip so seen in Ortho clinic on 5/1 and MRI of pelvis done and revealed closed nondisplaced right femur intertrochanteric fracture and called yesterday, 5/2 and told to go to ED fro admission. Patient admitted to [facility] Medicine Team H: Hip Fracture team and started on Hip Fracture Pathway with Orthopedic surgery consult for for right closed intertrochanteric femur fracture. Patient was seen and evaluated by Orthopedic surgery who recommended operative repair of fracture. Patient was medically optimized prior to surgery and was taken to OR after optimization on 5/3/2025. Patient underwent right hip cephalomedullary nail fixation by Dr. Aleida Pires. Post-op patient WBAT to the right lower extremity as per Orthopedics recommendation. Patient resumed on her home Apixiban 5 mg po BID that she takes for long term anticoagulation for her PAF so no other DVT prophylaxis required post-op.  Perineural pain catheter placed by Anesthesia Pain Service with continuous infusion of Ropivacaine to help with pain control post-op and Anesthesia Pain Service managing while patient in the hospital. Patient placed on multimodal pain management post-op with Tylenol 1000 mg po every 6 hours and Robaxin 500 mg po every 6 hours post-op and will continue. PT/OT consulted post-op. Patient placed on IV Ceftriaxone 1 gram daily post-op to treat UTI found on admit.       Interval History: Patent worked with therapy today and did well and recommending home health PT/OT on discharge when medically ready. Patient states pain in right hip is 3/10 this am. Patient reports soreness in right thigh area and related to muscle soreness with surgery and on scheduled Robaxin to help. Will plan on  home health PT/OT on discharged when medically ready. Urine culture from admit is pending. Will continue IV Ceftriaxone to treat UTI. Hgb decreased to 9.7 today from 11.4 yesterday and expected blood loss from surgery. No clinical signs of bleeding and will monitor. Patient with low BP 92/67 this am but asymptomatic and last BP reading improved to 106/67. I encouraged patient to drink more oral fluids today to help with her BP as she reports not drinking much fluids such surgery.     Review of Systems   Constitutional:  Negative for fever.   Respiratory:  Negative for cough and shortness of breath.    Cardiovascular:  Negative for chest pain.   Gastrointestinal:  Negative for nausea.   Musculoskeletal:  Positive for arthralgias (Right hip).   Psychiatric/Behavioral:  Negative for agitation and confusion.      Objective:     Vital Signs (Most Recent):  Temp: 98.4 °F (36.9 °C) (05/04/25 1156)  Pulse: 107 (05/04/25 1156)  Resp: 16 (05/04/25 1156)  BP: 106/67 (05/04/25 1156)  SpO2: 99 % (05/04/25 1156) on room air  Vital Signs (24h Range):  Temp:  [97.8 °F (36.6 °C)-98.4 °F (36.9 °C)] 98.4 °F (36.9 °C)  Pulse:  [] 107  Resp:  [16-20] 16  SpO2:  [95 %-99 %] 99 %  BP: ()/(64-77) 106/67     Weight: 43.2 kg (95 lb 3.8 oz)  Body mass index is 16.35 kg/m².    Intake/Output Summary (Last 24 hours) at 5/4/2025 1341  Last data filed at 5/4/2025 1159  Gross per 24 hour   Intake --   Output 900 ml   Net -900 ml         Physical Exam  Vitals and nursing note reviewed.   Constitutional:       General: She is awake. She is not in acute distress.     Appearance: Normal appearance. She is underweight. She is not ill-appearing.      Comments: Frial, elderly female in no distress. Daughter at bedside. Patient appears very comfortable on exam.    Eyes:      Conjunctiva/sclera: Conjunctivae normal.   Cardiovascular:      Rate and Rhythm: Normal rate and irregularly irregular rhythm.     Heart sounds: Normal heart sounds. No  murmur heard.  Pulmonary:      Effort: Pulmonary effort is normal. No respiratory distress.      Breath sounds: Normal breath sounds. No wheezing.   Abdominal:      General: Abdomen is flat. Bowel sounds are normal. There is no distension.      Palpations: Abdomen is soft.      Tenderness: There is no abdominal tenderness. There is no guarding.   Musculoskeletal:      Right lower leg: No edema.      Left lower leg: No edema.   Skin:     General: Skin is warm.      Findings: No erythema.      Comments: Surgical dressing noted on right hip and lateral thigh. Dressing is clean and dry and intact.   Neurological:      Mental Status: She is alert and oriented to person, place, and time.   Psychiatric:         Mood and Affect: Mood normal.         Behavior: Behavior normal. Behavior is cooperative.         Thought Content: Thought content normal.         Judgment: Judgment normal.               Significant Labs: BMP:   Recent Labs   Lab 05/04/25  0609         K 3.7      CO2 24   BUN 11   CREATININE 0.7   CALCIUM 7.7*   MG 2.4     CBC:   Recent Labs   Lab 05/03/25  0445 05/03/25  1007 05/04/25  0609   WBC 7.93 12.88* 9.43   HGB 10.3* 11.4* 9.7*   HCT 32.4* 36.6* 31.6*    439 [id number]     Magnesium:   Recent Labs   Lab 05/03/25  0445 05/04/25  0609   MG 2.3 2.4       Significant Imaging: I have reviewed all pertinent imaging results/findings within the past 24 hours.      Assessment & Plan  Closed nondisplaced intertrochanteric fracture of right femur with routine healing s/p IM nail on 5/3/2025  Patient admitted after found to have a closed nondisplaced right intertrochanteric fracture on MRI imaging. Patient had fall on Easter weekend and had X-rays done at that time of right hip that were negative but has had progressive right hip pain since then. Patient seen in Ortho clinic on 5/1 and MRI of pelvis ordered to evaluate pain and done on 5/2 and revealed closed nondisplaced right femur  intertrochanteric fracture and Ortho clinic called yesterday, 5/2 and told patient to go to ED for admission. Patient was seen and evaluated by Orthopedic surgery who recommended operative repair of fracture.   - Patient was taken to OR on 5/3/2025 and underwent right hip cephalomedullary nail fixation by Dr. Aleida Pires.   - Post-op, patient weight bear as tolerated to the right lower extremity as per Orthopedics recommendation.   - Patient resumed on her home Apixiban 5 mg po BID that she takes for long term anticoagulation for her PAF so no other DVT prophylaxis required post-op.   - Perineural pain catheter placed by Anesthesia Pain Service with continuous infusion of Ropivacaine to help with pain control post-op and Anesthesia Pain Service managing while patient in the hospital.   - Continue on multimodal pain management post-op with Tylenol 1000 mg po every 6 hours + Robaxin 500 mg po every 6 hours post-op. Pain controlled to right hip.   - PT/OT consulted post-op and patient doing well and recommending low intensity on discharge when ready. Plan for HH PT/OT on discharge when medically ready.   - Ortho following and managing surgical site.  - Surgical bandage to remain in place until Ortho clinic follow-up. Patient will need staples removed at approximately 2 weeks. Removal may be performed at a rehab facility, by a PCP or by myself in the Orthopedic clinic.  - Follow up with Dr. Pires in Ortho clinic in 6 weeks with an AP pelvis and right hip x-ray.    Longstanding persistent atrial fibrillation  Anticoagulant long-term use  Patient has persistent (7 days or more) atrial fibrillation. Patient is currently in atrial fibrillation. Initially in RVR, improved with PO Metoprolol. [id number] Score: 3. The patients heart rate in the last 24 hours is as follows:  Pulse  Min: 88  Max: 122     Antiarrhythmics  metoprolol succinate (TOPROL-XL) 24 hr tablet 25 mg, Daily, Oral    Anticoagulants  apixaban tablet 5 mg, 2  times daily, Oral    Plan  - Replete lytes with a goal of K>4, Mg >2  - Patient's afib is currently controlled on home po Metoprolol and will continue.  - Home Apixiban held pre-op for her long term anticoagulation for stroke prevention but resumed post-op at 5 mg po BID.   Macrocytic anemia  Acute blood loss anemia  - Anemia worsening on 5/4. Hgb down to 9.7 on 5/4 from 1.4 on 5/3. Expected blood loss related to surgery. No clinical signs of bleeding. Vital signs stable. Continue to monitor Hgb levels daily.   - Anemia is likely due to chronic disease. Most recent hemoglobin and hematocrit are listed below. Recent B12 and folate levels normal.   Recent Labs     05/03/25  0445 05/03/25  1007 05/04/25  0609   HGB 10.3* 11.4* 9.7*   HCT 32.4* 36.6* 31.6*     Plan  - Monitor serial CBC: Daily  - Transfuse PRBC if patient becomes hemodynamically unstable, symptomatic or H/H drops below 7/21.  - Patient has not received any PRBC transfusions to date.    Acute cystitis without hematuria  Present on admit. U/A on admit positive for UTI so patient started on IV Ceftriaxone to treat on 5/4 and urine culture sent and will need to follow-up results. Patient reports she has been having urinary frequency over the past week and though she did have a UTI as similar to previous UTI symptoms. No systemic signs of infection.    Acute viral conjunctivitis of both eyes  - Much improved on 5/4.   - Present on admit. Patient complaining of redness to her eyes and appears she has a viral conjunctivitis to both eyes and artifical tears ordered to help with dryness. Patient advised not to rub her eyes.   Pulmonary hypertension  Chronic condition and stable on room air. Monitor.     (HFpEF) heart failure with preserved ejection fraction  Patient has Diastolic (HFpEF) heart failure that is Chronic. On presentation their CHF was well compensated. Most recent BNP and echo results are listed below.  Recent Labs     05/02/25  2145   *      Latest ECHO  Results for orders placed during the hospital encounter of 10/07/24    Echo    Interpretation Summary    Left Ventricle: The left ventricle is normal in size. Normal wall thickness. There is concentric remodeling. There is low normal systolic function with a visually estimated ejection fraction of 50 - 55%. Unable to assess diastolic function due to atrial fibrillation.    Right Ventricle: Normal right ventricular cavity size. Wall thickness is normal. Systolic function is mildly reduced.    Biatrial enlargement    Patent foramen ovale visualized with predominant left to right shunting indicated by color flow Doppler.    Mitral Valve: There is mild to moderate regurgitation.    Tricuspid Valve: There is mild regurgitation.    Pulmonary Artery: The estimated pulmonary artery systolic pressure is 36 mmHg.    IVC/SVC: Elevated venous pressure at 15 mmHg.    Pericardium: There is a small effusion. No indication of cardiac tamponade.    Current Heart Failure Medications  metoprolol succinate (TOPROL-XL) 24 hr tablet 25 mg, Daily, Oral    Plan  - Monitor strict I&Os and daily weights.    - Monitor on telemetry.  - Fluid restriction of 1.5 L/day.   - Cardiology has been consulted.  - The patient's volume status is at their baseline.    Acquired hypothyroidism  Chronic and controlled. Continue home Synthroid to treat.     BMI less than 19,adult  Patient with BMI 16.35 on admit. Nutrition consult.     VTE Risk Mitigation (From admission, onward)           Ordered     apixaban tablet 5 mg  2 times daily         05/03/25 0905     IP VTE HIGH RISK PATIENT  Once         05/03/25 0605     Place sequential compression device  Until discontinued         05/03/25 0605     Reason for No Pharmacological VTE Prophylaxis  Once        Question:  Reasons:  Answer:  Physician Provided (leave comment)  Comment:  surgery    05/02/25 2103                    Discharge Planning   IRAJ: 5/6/2025     Code Status: Full Code        Rebekah Schumacher MD  Department of [facility] Medicine   [facility] - Surgery

## 2025-05-04 NOTE — ASSESSMENT & PLAN NOTE
Present on admit. U/A on admit positive for UTI so patient started on IV Ceftriaxone to treat on 5/4 and urine culture sent and will need to follow-up results. Patient reports she has been having urinary frequency over the past week and though she did have a UTI as similar to previous UTI symptoms. No systemic signs of infection.

## 2025-05-04 NOTE — PT/OT/SLP PROGRESS
Physical Therapy Treatment    Patient Name:  Angelina Man   MRN:  [id number]    Recommendations:     Discharge Recommendations: Low Intensity Therapy  Discharge Equipment Recommendations: none  Barriers to discharge: None    Assessment:     Angelina Man is a 75 y.o. female admitted with a medical diagnosis of Closed nondisplaced intertrochanteric fracture of right femur with routine healing.  She presents with the following impairments/functional limitations: weakness, impaired self care skills, decreased safety awareness, decreased ROM, impaired endurance, impaired functional mobility, pain, gait instability, decreased lower extremity function, impaired cardiopulmonary response to activity, orthopedic precautions. Pt was agreeable and motivated to work with PT today. Pt required increased time during ambulation due to decreased raine, gait speed, and step length, antalgic and unsteady gait. Pt would also benefit from seeing PT/OT separately. Pt would continue to benefit from skilled acute PT in order to address current deficits and progress functional mobility.     Co-treatment performed due to patient's multiple deficits requiring two skilled therapists to appropriately and safely assess patient's strength and endurance while facilitating functional tasks in addition to accommodating for patient's activity tolerance.      Rehab Prognosis: Good; patient would benefit from acute skilled PT services to address these deficits and reach maximum level of function.    Recent Surgery: Procedure(s) (LRB):  INSERTION, INTRAMEDULLARY BERNADINE, FEMUR - right selina (Right) 1 Day Post-Op    Plan:     During this hospitalization, patient to be seen daily to address the identified rehab impairments via gait training, therapeutic activities, therapeutic exercises, neuromuscular re-education and progress toward the following goals:    Plan of Care Expires:  06/02/25    Subjective     Chief Complaint: RLE pain  Patient/Family  Comments/goals: To get better and stronger  Pain/Comfort:  Pain Rating 1: 4/10  Location - Side 1: Right  Location - Orientation 1: generalized  Location 1: hip  Pain Addressed 1: Pre-medicate for activity, Reposition, Distraction, Nurse notified      Objective:     Communicated with RN prior to session.  Patient found up in chair with FCD, telemetry, perineural catheter upon PT entry to room.     General Precautions: Standard, fall  Orthopedic Precautions: RLE weight bearing as tolerated  Braces: N/A  Respiratory Status: Room air     Functional Mobility:  Transfers:     Sit to Stand from chair with arms:  minimum assistance with rolling walker  Sit to stand from toliet: Terrance with RW  Cueing for hand placement and sequencing   Increased time required to perform with least amount of assistance given    Gait: 70ft CGA with RW  Deviations: decreased WB through RLE, decreased step length and raine, unsteady and antalgic gait  Cueing for hand placement and sequencing     Balance:   Static Sit: NORMAL; SupV  Dynamic Sit: NORMAL; SBA  Static Stand: NORMAL; SBA  Dynamic Stand: NORMAL; SBA; ADLs performed at bathroom sink       AM-PAC 6 CLICK MOBILITY  Turning over in bed (including adjusting bedclothes, sheets and blankets)?: 3  Sitting down on and standing up from a chair with arms (e.g., wheelchair, bedside commode, etc.): 3  Moving from lying on back to sitting on the side of the bed?: 3  Moving to and from a bed to a chair (including a wheelchair)?: 3  Need to walk in hospital room?: 2  Climbing 3-5 steps with a railing?: 1  Basic Mobility Total Score: 15       Treatment & Education:  Educated pt on PT role/POC  Educated pt on importance of OOB activity and daily ambulation  Time provided for active listening, education, counseling and discussion of health disposition in regards to patient's current status   Questions/concerns addressed within PTA scope of practice. Answered to pt satisfaction, no further  questions  White board updated accordingly   RN aware of patient's mobility needs and status  Gait belt utilized during session for patient safety  Bedside table in front of patient and area set up for function, convenience, and safety   ADLs performed at sink     Patient left up in chair with all lines intact and call button in reach..    GOALS:   Multidisciplinary Problems       Physical Therapy Goals          Problem: Physical Therapy    Goal Priority Disciplines Outcome Interventions   Physical Therapy Goal     PT, PT/OT Progressing    Description: Goals to be met by: 25     Patient will increase functional independence with mobility by performin. Supine to sit with Modified George  2. Sit to supine with Modified George  3. Sit to stand transfer with Modified George  4. Bed to chair transfer with Supervision using Rolling Walker  5. Gait  x 150 feet with Supervision using Rolling Walker.   6. Lower extremity exercise program x15 reps per handout, with assistance as needed                         DME Justifications:  No DME recommended requiring DME justifications    Time Tracking:     PT Received On: 25  PT Start Time: 0943     PT Stop Time: 1021  PT Total Time (min): 38 min     Billable Minutes: Gait Training 23 and Neuromuscular Re-education 15    Treatment Type: Treatment  PT/PTA: PTA     Number of PTA visits since last PT visit: 2025

## 2025-05-05 PROBLEM — L97.921 ULCER OF LEFT LOWER EXTREMITY, LIMITED TO BREAKDOWN OF SKIN: Status: RESOLVED | Noted: 2022-09-22 | Resolved: 2025-05-05

## 2025-05-05 PROBLEM — A49.8 KLEBSIELLA PNEUMONIAE INFECTION: Status: ACTIVE | Noted: 2025-05-05

## 2025-05-05 LAB
ABSOLUTE EOSINOPHIL (OHS): 0.16 K/UL
ABSOLUTE MONOCYTE (OHS): 0.64 K/UL (ref 0.3–1)
ABSOLUTE NEUTROPHIL COUNT (OHS): 7.25 K/UL (ref 1.8–7.7)
ANION GAP (OHS): 9 MMOL/L (ref 8–16)
BACTERIA UR CULT: ABNORMAL
BASOPHILS # BLD AUTO: 0.05 K/UL
BASOPHILS NFR BLD AUTO: 0.5 %
BUN SERPL-MCNC: 13 MG/DL (ref 8–23)
CALCIUM SERPL-MCNC: 7.8 MG/DL (ref 8.7–10.5)
CHLORIDE SERPL-SCNC: 104 MMOL/L (ref 95–110)
CO2 SERPL-SCNC: 25 MMOL/L (ref 23–29)
CREAT SERPL-MCNC: 0.7 MG/DL (ref 0.5–1.4)
ERYTHROCYTE [DISTWIDTH] IN BLOOD BY AUTOMATED COUNT: 13.2 % (ref 11.5–14.5)
GFR SERPLBLD CREATININE-BSD FMLA CKD-EPI: >60 ML/MIN/1.73/M2
GLUCOSE SERPL-MCNC: 90 MG/DL (ref 70–110)
HCT VFR BLD AUTO: 27.9 % (ref 37–48.5)
HGB BLD-MCNC: 8.8 GM/DL (ref 12–16)
IMM GRANULOCYTES # BLD AUTO: 0.06 K/UL (ref 0–0.04)
IMM GRANULOCYTES NFR BLD AUTO: 0.6 % (ref 0–0.5)
LYMPHOCYTES # BLD AUTO: 1.69 K/UL (ref 1–4.8)
MAGNESIUM SERPL-MCNC: 2 MG/DL (ref 1.6–2.6)
MCH RBC QN AUTO: 31.9 PG (ref 27–31)
MCHC RBC AUTO-ENTMCNC: 31.5 G/DL (ref 32–36)
MCV RBC AUTO: 101 FL (ref 82–98)
NUCLEATED RBC (/100WBC) (OHS): 0 /100 WBC
PHOSPHATE SERPL-MCNC: 2.9 MG/DL (ref 2.7–4.5)
PLATELET # BLD AUTO: 383 K/UL (ref 150–450)
PMV BLD AUTO: 8.4 FL (ref 9.2–12.9)
POTASSIUM SERPL-SCNC: 3.9 MMOL/L (ref 3.5–5.1)
RBC # BLD AUTO: 2.76 M/UL (ref 4–5.4)
RELATIVE EOSINOPHIL (OHS): 1.6 %
RELATIVE LYMPHOCYTE (OHS): 17.2 % (ref 18–48)
RELATIVE MONOCYTE (OHS): 6.5 % (ref 4–15)
RELATIVE NEUTROPHIL (OHS): 73.6 % (ref 38–73)
SODIUM SERPL-SCNC: 138 MMOL/L (ref 136–145)
WBC # BLD AUTO: 9.85 K/UL (ref 3.9–12.7)

## 2025-05-05 PROCEDURE — 84100 ASSAY OF PHOSPHORUS: CPT | Mod: HCNC | Performed by: STUDENT IN AN ORGANIZED HEALTH CARE EDUCATION/TRAINING PROGRAM

## 2025-05-05 PROCEDURE — 25000003 PHARM REV CODE 250: Mod: HCNC

## 2025-05-05 PROCEDURE — 97110 THERAPEUTIC EXERCISES: CPT | Mod: HCNC,CQ

## 2025-05-05 PROCEDURE — 21400001 HC TELEMETRY ROOM: Mod: HCNC

## 2025-05-05 PROCEDURE — 63600175 PHARM REV CODE 636 W HCPCS: Mod: HCNC | Performed by: INTERNAL MEDICINE

## 2025-05-05 PROCEDURE — 97530 THERAPEUTIC ACTIVITIES: CPT | Mod: HCNC

## 2025-05-05 PROCEDURE — 36415 COLL VENOUS BLD VENIPUNCTURE: CPT | Mod: HCNC | Performed by: STUDENT IN AN ORGANIZED HEALTH CARE EDUCATION/TRAINING PROGRAM

## 2025-05-05 PROCEDURE — 25000003 PHARM REV CODE 250: Mod: HCNC | Performed by: STUDENT IN AN ORGANIZED HEALTH CARE EDUCATION/TRAINING PROGRAM

## 2025-05-05 PROCEDURE — 83735 ASSAY OF MAGNESIUM: CPT | Mod: HCNC | Performed by: STUDENT IN AN ORGANIZED HEALTH CARE EDUCATION/TRAINING PROGRAM

## 2025-05-05 PROCEDURE — 80048 BASIC METABOLIC PNL TOTAL CA: CPT | Mod: HCNC | Performed by: STUDENT IN AN ORGANIZED HEALTH CARE EDUCATION/TRAINING PROGRAM

## 2025-05-05 PROCEDURE — 97116 GAIT TRAINING THERAPY: CPT | Mod: HCNC,CQ

## 2025-05-05 PROCEDURE — 85025 COMPLETE CBC W/AUTO DIFF WBC: CPT | Mod: HCNC | Performed by: STUDENT IN AN ORGANIZED HEALTH CARE EDUCATION/TRAINING PROGRAM

## 2025-05-05 PROCEDURE — 99231 SBSQ HOSP IP/OBS SF/LOW 25: CPT | Mod: HCNC,,, | Performed by: STUDENT IN AN ORGANIZED HEALTH CARE EDUCATION/TRAINING PROGRAM

## 2025-05-05 RX ORDER — ACETAMINOPHEN 500 MG
1000 TABLET ORAL EVERY 8 HOURS
Status: DISCONTINUED | OUTPATIENT
Start: 2025-05-05 | End: 2025-05-06 | Stop reason: HOSPADM

## 2025-05-05 RX ORDER — METHOCARBAMOL 500 MG/1
500 TABLET, FILM COATED ORAL EVERY 8 HOURS
Status: COMPLETED | OUTPATIENT
Start: 2025-05-05 | End: 2025-05-06

## 2025-05-05 RX ADMIN — OXYCODONE HYDROCHLORIDE AND ACETAMINOPHEN 1000 MG: 500 TABLET ORAL at 08:05

## 2025-05-05 RX ADMIN — PRAMIPEXOLE DIHYDROCHLORIDE 0.25 MG: 0.12 TABLET ORAL at 11:05

## 2025-05-05 RX ADMIN — APIXABAN 5 MG: 5 TABLET, FILM COATED ORAL at 09:05

## 2025-05-05 RX ADMIN — BUPROPION HYDROCHLORIDE 150 MG: 150 TABLET, EXTENDED RELEASE ORAL at 08:05

## 2025-05-05 RX ADMIN — TRAZODONE HYDROCHLORIDE 50 MG: 50 TABLET ORAL at 09:05

## 2025-05-05 RX ADMIN — LEVOTHYROXINE SODIUM 137 MCG: 25 TABLET ORAL at 05:05

## 2025-05-05 RX ADMIN — METHOCARBAMOL 500 MG: 500 TABLET ORAL at 01:05

## 2025-05-05 RX ADMIN — METHOCARBAMOL 500 MG: 500 TABLET ORAL at 05:05

## 2025-05-05 RX ADMIN — SENNOSIDES AND DOCUSATE SODIUM 1 TABLET: 50; 8.6 TABLET ORAL at 09:05

## 2025-05-05 RX ADMIN — SENNOSIDES AND DOCUSATE SODIUM 1 TABLET: 50; 8.6 TABLET ORAL at 08:05

## 2025-05-05 RX ADMIN — ACETAMINOPHEN 1000 MG: 500 TABLET ORAL at 09:05

## 2025-05-05 RX ADMIN — ACETAMINOPHEN 1000 MG: 500 TABLET ORAL at 05:05

## 2025-05-05 RX ADMIN — TRAZODONE HYDROCHLORIDE 50 MG: 50 TABLET ORAL at 12:05

## 2025-05-05 RX ADMIN — ACETAMINOPHEN 1000 MG: 500 TABLET ORAL at 01:05

## 2025-05-05 RX ADMIN — POLYETHYLENE GLYCOL 3350 17 G: 17 POWDER, FOR SOLUTION ORAL at 08:05

## 2025-05-05 RX ADMIN — SODIUM CHLORIDE 500 ML: 0.9 INJECTION, SOLUTION INTRAVENOUS at 01:05

## 2025-05-05 RX ADMIN — ACETAMINOPHEN 1000 MG: 500 TABLET ORAL at 12:05

## 2025-05-05 RX ADMIN — APIXABAN 5 MG: 5 TABLET, FILM COATED ORAL at 08:05

## 2025-05-05 RX ADMIN — METOPROLOL SUCCINATE 25 MG: 25 TABLET, EXTENDED RELEASE ORAL at 04:05

## 2025-05-05 RX ADMIN — CEFTRIAXONE 1 G: 1 INJECTION, POWDER, FOR SOLUTION INTRAMUSCULAR; INTRAVENOUS at 08:05

## 2025-05-05 RX ADMIN — THERA TABS 1 TABLET: TAB at 08:05

## 2025-05-05 RX ADMIN — METHOCARBAMOL 500 MG: 500 TABLET ORAL at 12:05

## 2025-05-05 RX ADMIN — BUPROPION HYDROCHLORIDE 150 MG: 150 TABLET, EXTENDED RELEASE ORAL at 09:05

## 2025-05-05 RX ADMIN — PRAMIPEXOLE DIHYDROCHLORIDE 0.25 MG: 0.12 TABLET ORAL at 12:05

## 2025-05-05 RX ADMIN — METHOCARBAMOL 500 MG: 500 TABLET ORAL at 09:05

## 2025-05-05 NOTE — ASSESSMENT & PLAN NOTE
Angelina Man is a 75 y.o. female with right intertrochanteric femur fracture, closed, NVI. They take Eliquis 5mg anticoagulation at home. They required no ambulatory assistive devices prior to this injury and lived a very active lifestyle.      Now s/p R short IMN 5/3/25.     Pain multimodal pain control limiting narcotics   PT/OT: WBAT RLE  Hgb 8.8  Eliquis 5 BID     Dispo:  Stable for discharge from orthopedic surgery perspective.

## 2025-05-05 NOTE — ANESTHESIA POST-OP PAIN MANAGEMENT
Acute Pain Service Progress Note    Angelina Man is a 75 y.o., female, [id number].    Surgery:  INSERTION, INTRAMEDULLARY BERNADINE, FEMUR     Post Op Day #: 2    Catheter type: L SIFI PNC    Infusion type: 0.2% ropivacaine 15 mL q3h    Problem List:    Active Hospital Problems    Diagnosis  POA    *Closed nondisplaced intertrochanteric fracture of right femur with routine healing s/p IM nail on 5/3/2025 [S72.144D]  Not Applicable    Acute blood loss anemia [D62]  No    Acute cystitis without hematuria [N30.00]  Yes    Acute viral conjunctivitis of both eyes [B30.9]  Yes    Macrocytic anemia [D53.9]  Yes     Chronic    BMI less than 19,adult [Z68.1]  Not Applicable     Chronic    (HFpEF) heart failure with preserved ejection fraction [I50.30]  Yes     Chronic    Pulmonary hypertension [I27.20]  Yes     Chronic    Anticoagulant long-term use [Z79.01]  Not Applicable    Longstanding persistent atrial fibrillation [I48.11]  Yes     Chronic    Acquired hypothyroidism [E03.9]  Yes      Resolved Hospital Problems    Diagnosis Date Resolved POA    Preop cardiovascular exam [Z01.810] 05/03/2025 Not Applicable    Preoperative examination [Z01.818] 05/03/2025 Not Applicable       Subjective:     General appearance of alert, oriented, no complaints   Pain with rest: 1    Numbers   Pain with movement: 3    Numbers   Side Effects    1. Pruritis No    2. Nausea No    3. Motor Blockade No, 1=Ability to bend knees and ankles    4. Sedation No, 1=awake and alert    Objective:        Catheter site clean, dry, intact      Vitals   Vitals:    05/05/25 0749   BP:    Pulse: 94   Resp:    Temp:         Labs    Admission on 05/02/2025   Component Date Value Ref Range Status    QRS Duration 05/02/2025 66  ms Final    [facility]HS QTC Calculation 05/02/2025 454  ms Final    Sodium 05/02/2025 137  136 - 145 mmol/L Final    Potassium 05/02/2025 4.0  3.5 - 5.1 mmol/L Final    Chloride 05/02/2025 96  95 - 110 mmol/L Final    CO2 05/02/2025 27  23 - 29 mmol/L  Final    Glucose 05/02/2025 101  70 - 110 mg/dL Final    BUN 05/02/2025 15  8 - 23 mg/dL Final    Creatinine 05/02/2025 0.8  0.5 - 1.4 mg/dL Final    Calcium 05/02/2025 8.6 (L)  8.7 - 10.5 mg/dL Final    Protein Total 05/02/2025 7.2  6.0 - 8.4 gm/dL Final    Albumin 05/02/2025 3.1 (L)  3.5 - 5.2 g/dL Final    Bilirubin Total 05/02/2025 0.3  0.1 - 1.0 mg/dL Final    ALP 05/02/2025 113  40 - 150 unit/L Final    AST 05/02/2025 23  11 - 45 unit/L Final    ALT 05/02/2025 12  10 - 44 unit/L Final    Anion Gap 05/02/2025 14  8 - 16 mmol/L Final    eGFR 05/02/2025 >60  >60 mL/min/1.73/m2 Final    WBC 05/02/2025 10.85  3.90 - 12.70 K/uL Final    RBC 05/02/2025 3.55 (L)  4.00 - 5.40 M/uL Final    HGB 05/02/2025 11.3 (L)  12.0 - 16.0 gm/dL Final    HCT 05/02/2025 35.4 (L)  37.0 - 48.5 % Final    MCV 05/02/2025 100 (H)  82 - 98 fL Final    MCH 05/02/2025 31.8 (H)  27.0 - 31.0 pg Final    MCHC 05/02/2025 31.9 (L)  32.0 - 36.0 g/dL Final    RDW 05/02/2025 12.6  11.5 - 14.5 % Final    Platelet Count 05/02/2025 466 (H)  150 - 450 K/uL Final    MPV 05/02/2025 8.4 (L)  9.2 - 12.9 fL Final    Nucleated RBC 05/02/2025 0  <=0 /100 WBC Final    Neut % 05/02/2025 76.4 (H)  38 - 73 % Final    Lymph % 05/02/2025 14.6 (L)  18 - 48 % Final    Mono % 05/02/2025 7.7  4 - 15 % Final    Eos % 05/02/2025 0.3  <=8 % Final    Basophil % 05/02/2025 0.4  <=1.9 % Final    Imm Grans % 05/02/2025 0.6 (H)  0.0 - 0.5 % Final    Neut # 05/02/2025 8.30 (H)  1.8 - 7.7 K/uL Final    Lymph # 05/02/2025 1.58  1 - 4.8 K/uL Final    Mono # 05/02/2025 0.84  0.3 - 1 K/uL Final    Eos # 05/02/2025 0.03  <=0.5 K/uL Final    Baso # 05/02/2025 0.04  <=0.2 K/uL Final    Imm Grans # 05/02/2025 0.06 (H)  0.00 - 0.04 K/uL Final    PT 05/02/2025 13.0 (H)  9.0 - 12.5 seconds Final    INR 05/02/2025 1.2  0.8 - 1.2 Final    PTT 05/02/2025 36.2 (H)  21.0 - 32.0 seconds Final    BNP 05/02/2025 383 (H)  0 - 99 pg/mL Final    Sodium 05/03/2025 138  136 - 145 mmol/L Final     Potassium 05/03/2025 3.8  3.5 - 5.1 mmol/L Final    Chloride 05/03/2025 103  95 - 110 mmol/L Final    CO2 05/03/2025 26  23 - 29 mmol/L Final    Glucose 05/03/2025 83  70 - 110 mg/dL Final    BUN 05/03/2025 16  8 - 23 mg/dL Final    Creatinine 05/03/2025 0.7  0.5 - 1.4 mg/dL Final    Calcium 05/03/2025 7.9 (L)  8.7 - 10.5 mg/dL Final    Anion Gap 05/03/2025 9  8 - 16 mmol/L Final    eGFR 05/03/2025 >60  >60 mL/min/1.73/m2 Final    Magnesium  05/03/2025 2.3  1.6 - 2.6 mg/dL Final    Phosphorus Level 05/03/2025 3.1  2.7 - 4.5 mg/dL Final    WBC 05/03/2025 7.93  3.90 - 12.70 K/uL Final    RBC 05/03/2025 3.21 (L)  4.00 - 5.40 M/uL Final    HGB 05/03/2025 10.3 (L)  12.0 - 16.0 gm/dL Final    HCT 05/03/2025 32.4 (L)  37.0 - 48.5 % Final    MCV 05/03/2025 101 (H)  82 - 98 fL Final    MCH 05/03/2025 32.1 (H)  27.0 - 31.0 pg Final    MCHC 05/03/2025 31.8 (L)  32.0 - 36.0 g/dL Final    RDW 05/03/2025 12.7  11.5 - 14.5 % Final    Platelet Count 05/03/2025 420  150 - 450 K/uL Final    MPV 05/03/2025 8.3 (L)  9.2 - 12.9 fL Final    Nucleated RBC 05/03/2025 0  <=0 /100 WBC Final    Neut % 05/03/2025 65.3  38 - 73 % Final    Lymph % 05/03/2025 22.1  18 - 48 % Final    Mono % 05/03/2025 10.0  4 - 15 % Final    Eos % 05/03/2025 1.4  <=8 % Final    Basophil % 05/03/2025 0.6  <=1.9 % Final    Imm Grans % 05/03/2025 0.6 (H)  0.0 - 0.5 % Final    Neut # 05/03/2025 5.18  1.8 - 7.7 K/uL Final    Lymph # 05/03/2025 1.75  1 - 4.8 K/uL Final    Mono # 05/03/2025 0.79  0.3 - 1 K/uL Final    Eos # 05/03/2025 0.11  <=0.5 K/uL Final    Baso # 05/03/2025 0.05  <=0.2 K/uL Final    Imm Grans # 05/03/2025 0.05 (H)  0.00 - 0.04 K/uL Final    Specimen Outdate 05/03/2025 05/06/2025 23:59   Final    Group & Rh 05/03/2025 B POS   Final    Indirect Zackery 05/03/2025 NEG   Final    Color, UA 05/03/2025 Yellow  Straw, Carmen, Yellow, Light-Orange Final    Appearance,  05/03/2025 Cloudy (A)  Clear Final    pH,  05/03/2025 6.0  5.0 - 8.0 Final    Spec Grav  UA 05/03/2025 1.030  1.005 - 1.030 Final    Protein, UA 05/03/2025 1+ (A)  Negative Final    Glucose, UA 05/03/2025 Negative  Negative Final    Ketones, UA 05/03/2025 1+ (A)  Negative Final    Bilirubin, UA 05/03/2025 Negative  Negative Final    Blood, UA 05/03/2025 2+ (A)  Negative Final    Nitrites, UA 05/03/2025 Positive (A)  Negative Final    Urobilinogen, UA 05/03/2025 Negative  <2.0 EU/dL Final    Leukocyte Esterase, UA 05/03/2025 3+ (A)  Negative Final    QRS Duration 05/02/2025 62  ms Final    OHS QTC Calculation 05/02/2025 485  ms Final    WBC 05/03/2025 12.88 (H)  3.90 - 12.70 K/uL Final    RBC 05/03/2025 3.60 (L)  4.00 - 5.40 M/uL Final    HGB 05/03/2025 11.4 (L)  12.0 - 16.0 gm/dL Final    HCT 05/03/2025 36.6 (L)  37.0 - 48.5 % Final    MCV 05/03/2025 102 (H)  82 - 98 fL Final    MCH 05/03/2025 31.7 (H)  27.0 - 31.0 pg Final    MCHC 05/03/2025 31.1 (L)  32.0 - 36.0 g/dL Final    RDW 05/03/2025 12.8  11.5 - 14.5 % Final    Platelet Count 05/03/2025 439  150 - 450 K/uL Final    MPV 05/03/2025 8.5 (L)  9.2 - 12.9 fL Final    Nucleated RBC 05/03/2025 0  <=0 /100 WBC Final    Neut % 05/03/2025 76.8 (H)  38 - 73 % Final    Lymph % 05/03/2025 14.8 (L)  18 - 48 % Final    Mono % 05/03/2025 6.3  4 - 15 % Final    Eos % 05/03/2025 0.6  <=8 % Final    Basophil % 05/03/2025 0.5  <=1.9 % Final    Imm Grans % 05/03/2025 1.0 (H)  0.0 - 0.5 % Final    Neut # 05/03/2025 9.89 (H)  1.8 - 7.7 K/uL Final    Lymph # 05/03/2025 1.91  1 - 4.8 K/uL Final    Mono # 05/03/2025 0.81  0.3 - 1 K/uL Final    Eos # 05/03/2025 0.08  <=0.5 K/uL Final    Baso # 05/03/2025 0.06  <=0.2 K/uL Final    Imm Grans # 05/03/2025 0.13 (H)  0.00 - 0.04 K/uL Final    Extra Tube 05/03/2025 Hold for add-ons.   Final    RBC, UA 05/03/2025 20 (H)  0 - 4 /HPF Final    WBC, UA 05/03/2025 >100 (H)  0 - 5 /HPF Final    WBC Clumps, UA 05/03/2025 Moderate (A)  None, Rare Final    Microscopic Comment 05/03/2025    Final    Urine Culture 05/03/2025 10,000 -  49,999 cfu/ml Gram-negative Rods (A)   Preliminary    Sodium 05/04/2025 138  136 - 145 mmol/L Final    Potassium 05/04/2025 3.7  3.5 - 5.1 mmol/L Final    Chloride 05/04/2025 104  95 - 110 mmol/L Final    CO2 05/04/2025 24  23 - 29 mmol/L Final    Glucose 05/04/2025 100  70 - 110 mg/dL Final    BUN 05/04/2025 11  8 - 23 mg/dL Final    Creatinine 05/04/2025 0.7  0.5 - 1.4 mg/dL Final    Calcium 05/04/2025 7.7 (L)  8.7 - 10.5 mg/dL Final    Anion Gap 05/04/2025 10  8 - 16 mmol/L Final    eGFR 05/04/2025 >60  >60 mL/min/1.73/m2 Final    Magnesium  05/04/2025 2.4  1.6 - 2.6 mg/dL Final    Phosphorus Level 05/04/2025 2.9  2.7 - 4.5 mg/dL Final    WBC 05/04/2025 9.43  3.90 - 12.70 K/uL Final    RBC 05/04/2025 3.05 (L)  4.00 - 5.40 M/uL Final    HGB 05/04/2025 9.7 (L)  12.0 - 16.0 gm/dL Final    HCT 05/04/2025 31.6 (L)  37.0 - 48.5 % Final    MCV 05/04/2025 104 (H)  82 - 98 fL Final    MCH 05/04/2025 31.8 (H)  27.0 - 31.0 pg Final    MCHC 05/04/2025 30.7 (L)  32.0 - 36.0 g/dL Final    RDW 05/04/2025 13.0  11.5 - 14.5 % Final    Platelet Count 05/04/2025 379  150 - 450 K/uL Final    MPV 05/04/2025 8.8 (L)  9.2 - 12.9 fL Final    Nucleated RBC 05/04/2025 0  <=0 /100 WBC Final    Neut % 05/04/2025 72.1  38 - 73 % Final    Lymph % 05/04/2025 18.3  18 - 48 % Final    Mono % 05/04/2025 8.1  4 - 15 % Final    Eos % 05/04/2025 0.6  <=8 % Final    Basophil % 05/04/2025 0.3  <=1.9 % Final    Imm Grans % 05/04/2025 0.6 (H)  0.0 - 0.5 % Final    Neut # 05/04/2025 6.79  1.8 - 7.7 K/uL Final    Lymph # 05/04/2025 1.73  1 - 4.8 K/uL Final    Mono # 05/04/2025 0.76  0.3 - 1 K/uL Final    Eos # 05/04/2025 0.06  <=0.5 K/uL Final    Baso # 05/04/2025 0.03  <=0.2 K/uL Final    Imm Grans # 05/04/2025 0.06 (H)  0.00 - 0.04 K/uL Final    Sodium 05/05/2025 138  136 - 145 mmol/L Final    Potassium 05/05/2025 3.9  3.5 - 5.1 mmol/L Final    Chloride 05/05/2025 104  95 - 110 mmol/L Final    CO2 05/05/2025 25  23 - 29 mmol/L Final    Glucose  05/05/2025 90  70 - 110 mg/dL Final    BUN 05/05/2025 13  8 - 23 mg/dL Final    Creatinine 05/05/2025 0.7  0.5 - 1.4 mg/dL Final    Calcium 05/05/2025 7.8 (L)  8.7 - 10.5 mg/dL Final    Anion Gap 05/05/2025 9  8 - 16 mmol/L Final    eGFR 05/05/2025 >60  >60 mL/min/1.73/m2 Final    Magnesium  05/05/2025 2.0  1.6 - 2.6 mg/dL Final    Phosphorus Level 05/05/2025 2.9  2.7 - 4.5 mg/dL Final    WBC 05/05/2025 9.85  3.90 - 12.70 K/uL Final    RBC 05/05/2025 2.76 (L)  4.00 - 5.40 M/uL Final    HGB 05/05/2025 8.8 (L)  12.0 - 16.0 gm/dL Final    HCT 05/05/2025 27.9 (L)  37.0 - 48.5 % Final    MCV 05/05/2025 101 (H)  82 - 98 fL Final    MCH 05/05/2025 31.9 (H)  27.0 - 31.0 pg Final    MCHC 05/05/2025 31.5 (L)  32.0 - 36.0 g/dL Final    RDW 05/05/2025 13.2  11.5 - 14.5 % Final    Platelet Count 05/05/2025 383  150 - 450 K/uL Final    MPV 05/05/2025 8.4 (L)  9.2 - 12.9 fL Final    Nucleated RBC 05/05/2025 0  <=0 /100 WBC Final    Neut % 05/05/2025 73.6 (H)  38 - 73 % Final    Lymph % 05/05/2025 17.2 (L)  18 - 48 % Final    Mono % 05/05/2025 6.5  4 - 15 % Final    Eos % 05/05/2025 1.6  <=8 % Final    Basophil % 05/05/2025 0.5  <=1.9 % Final    Imm Grans % 05/05/2025 0.6 (H)  0.0 - 0.5 % Final    Neut # 05/05/2025 7.25  1.8 - 7.7 K/uL Final    Lymph # 05/05/2025 1.69  1 - 4.8 K/uL Final    Mono # 05/05/2025 0.64  0.3 - 1 K/uL Final    Eos # 05/05/2025 0.16  <=0.5 K/uL Final    Baso # 05/05/2025 0.05  <=0.2 K/uL Final    Imm Grans # 05/05/2025 0.06 (H)  0.00 - 0.04 K/uL Final        Meds Current Medications[1]     Anticoagulant dose Eliquis at 5 mg BID.    Assessment:  This is a 75-year-old female who is s/p left femur IM pratik insertion. Patient well with her PNC, expressed zero pain with rest and improvement in pain with movement in comparison to yesterday. Patient eager to be discharged. Discussed with patient deescalation of multimodal pain regimen. Patient agreed.       Plan:  Continue L SIFI PNC at 15 mL q3hr  Adjustments to  scheduled multimodals as follows:  Tylenol 1000 mg q8h   Robaxin 500 mg q8h     Thank you for your consult and allowing us to participate in the care of this patient. We will continue to follow along. Please call the Acute Pain Service/Anesthesia if you have any questions or concerns.    Chaya Hearn MD, PGY3/CA-2  Anesthesiology Department   [facility]                         [1]   Current Facility-Administered Medications   Medication Dose Route Frequency Provider Last Rate Last Admin    acetaminophen tablet 1,000 mg  1,000 mg Oral Q8H Chaya Hearn MD        aluminum-magnesium hydroxide-simethicone 200-200-20 mg/5 mL suspension 30 mL  30 mL Oral QID PRN Kelton Wall MD        apixaban tablet 5 mg  5 mg Oral BID Phi Leon MD   5 mg at 05/04/25 2223    artificial tears 0.5 % ophthalmic solution 1 drop  1 drop Both Eyes PRN Kelton Wall MD   1 drop at 05/02/25 2347    ascorbic acid (vitamin C) tablet 1,000 mg  1,000 mg Oral Daily Kelton Wall MD   1,000 mg at 05/04/25 0817    bisacodyL suppository 10 mg  10 mg Rectal Daily PRN Kelton Wall MD        buPROPion TB24 tablet 150 mg  150 mg Oral BID Kelton Wall MD   150 mg at 05/04/25 2223    cefTRIAXone injection 1 g  1 g Intravenous Q24H Rebekah Schumacher MD   1 g at 05/04/25 0820    glucagon (human recombinant) injection 1 mg  1 mg Intramuscular PRN Kelton Wall MD        glucose chewable tablet 16 g  16 g Oral PRN Kelton Wall MD        glucose chewable tablet 24 g  24 g Oral PRN Kelton Wall MD        levothyroxine tablet 137 mcg  137 mcg Oral Before breakfast Kelton Wall MD   137 mcg at 05/05/25 0529    methocarbamoL tablet 500 mg  500 mg Oral Q8H Chaya Hearn MD        metoprolol succinate (TOPROL-XL) 24 hr tablet 25 mg  25 mg Oral Daily Kelton Wall MD   25 mg at 05/04/25 0818    multivitamin tablet  1 tablet Oral  Daily Kelton Wall MD   1 tablet at 05/04/25 0817    naloxone 0.4 mg/mL injection 0.02 mg  0.02 mg Intravenous PRN Kelton Wall MD        ondansetron disintegrating tablet 8 mg  8 mg Oral Q8H PRN Kelton Wall MD        polyethylene glycol packet 17 g  17 g Oral Daily PRN Kelton Wall MD        polyethylene glycol packet 17 g  17 g Oral Daily Kelton Wall MD   17 g at 05/04/25 0818    pramipexole tablet 0.25 mg  0.25 mg Oral Nightly PRN Kelton Wall MD   0.25 mg at 05/05/25 0026    ropivacaine 0.2% Perineural Pump infusion 500 ML   Perineural Continuous Christoph Richardson MD   New Bag at 05/03/25 1002    senna-docusate 8.6-50 mg per tablet 1 tablet  1 tablet Oral BID Kelton Wall MD   1 tablet at 05/04/25 2223    simethicone chewable tablet 80 mg  1 tablet Oral QID PRN Kelton Wall MD        sodium chloride 0.9% flush 10 mL  10 mL Intravenous PRN Kelton Wall MD        traZODone tablet 50 mg  50 mg Oral Nightly PRN Kelton Wall MD   50 mg at 05/05/25 0026

## 2025-05-05 NOTE — ASSESSMENT & PLAN NOTE
Patient has persistent (7 days or more) atrial fibrillation. Patient is currently in atrial fibrillation. Initially in RVR, improved with PO Metoprolol. [id number] Score: 3. The patients heart rate in the last 24 hours is as follows:  Pulse  Min: 80  Max: 115     Antiarrhythmics  metoprolol succinate (TOPROL-XL) 24 hr tablet 25 mg, Daily, Oral    Anticoagulants  apixaban tablet 5 mg, 2 times daily, Oral    Plan  - Replete lytes with a goal of K>4, Mg >2  - Patient's afib is currently controlled on home po Metoprolol and will continue.  - Home Apixiban held pre-op for her long term anticoagulation for stroke prevention but resumed post-op at 5 mg po BID.

## 2025-05-05 NOTE — PLAN OF CARE
[facility] - Surgery  Initial Discharge Assessment       Primary Care Provider: Zina Rockwell MD    Admission Diagnosis: Tachycardia [R00.0]  Chest pain [R07.9]  Closed nondisplaced intertrochanteric fracture of right femur, initial encounter [S72.144A]  Intertrochanteric fracture of femur [S72.143A]    Admission Date: 5/2/2025  Expected Discharge Date: 5/6/2025         Payor: HU[employer] MANAGED MEDICARE / Plan: [employer] MEDICARE HMO / Product Type: Capitation /     Extended Emergency Contact Information  Primary Emergency Contact: Sobeida Milton  Address: [address] [place] [address] [place]  Mobile Phone: [phone]  Relation: Daughter  Secondary Emergency Contact: AIDAN DAMON  Mobile Phone: [phone]  Relation: Friend  Preferred language: English   needed? No    Discharge Plan A: Home with family, Home Health         [id number] - [place] - [facility] [address] AT [facility] [address]  [facility] [address] [place] [address]  Phone: [phone] Fax: [phone]      Initial Assessment (most recent)       Adult Discharge Assessment - 05/05/25 1535          Discharge Assessment    Assessment Type Discharge Planning Assessment     Confirmed/corrected address, phone number and insurance Yes     Confirmed Demographics Correct on Facesheet     Source of Information patient     Does patient/caregiver understand observation status Yes     Communicated IRAJ with patient/caregiver Yes     People in Home alone     Facility Arrived From: Home     Do you expect to return to your current living situation? Yes     Do you have help at home or someone to help you manage your care at home? Yes     Who are your caregiver(s) and their phone number(s)? Sobeida Milton (Daughter) [phone]     Prior to hospitilization cognitive status: Alert/Oriented     Current cognitive status: Alert/Oriented     Walking or Climbing Stairs Difficulty no     Dressing/Bathing  Difficulty no     Equipment Currently Used at Home walker, rolling     Readmission within 30 days? No     Patient currently being followed by outpatient case management? No     Do you currently have service(s) that help you manage your care at home? No     Do you take prescription medications? Yes     Do you have prescription coverage? Yes     Do you have any problems affording any of your prescribed medications? No     Is the patient taking medications as prescribed? yes     Who is going to help you get home at discharge? Daughter or friend     How do you get to doctors appointments? family or friend will provide     Are you on dialysis? No     Do you take coumadin? No     Discharge Plan A Home with family;Home Health        Physical Activity    On average, how many days per week do you engage in moderate to strenuous exercise (like a brisk walk)? 5 days     On average, how many minutes do you engage in exercise at this level? 30 min        Financial Resource Strain    How hard is it for you to pay for the very basics like food, housing, medical care, and heating? Not hard at all        Housing Stability    In the last 12 months, was there a time when you were not able to pay the mortgage or rent on time? No     At any time in the past 12 months, were you homeless or living in a shelter (including now)? No        Transportation Needs    In the past 12 months, has lack of transportation kept you from medical appointments or from getting medications? No     In the past 12 months, has lack of transportation kept you from meetings, work, or from getting things needed for daily living? No        Food Insecurity    Within the past 12 months, you worried that your food would run out before you got the money to buy more. Never true     Within the past 12 months, the food you bought just didn't last and you didn't have money to get more. Never true        Social Isolation    How often do you feel lonely or isolated from  those around you?  Never        Alcohol Use    Q1: How often do you have a drink containing alcohol? Never     Q2: How many drinks containing alcohol do you have on a typical day when you are drinking? Patient does not drink     Q3: How often do you have six or more drinks on one occasion? Never        Health Literacy    How often do you need to have someone help you when you read instructions, pamphlets, or other written material from your doctor or pharmacy? Never                   Spoke with patient at bedside to complete d/c planning assessment. Patient lives alone in a two-story condo with 3 steps to enter. Her master is on the second level of home. Family has moved a bed to downstairs so patient can be more comfortable at home. Patient accepted by [facility] for post-acute care. IRAJ 5/6/25. Discharge Plan A and Plan B have been determined by review of patient's clinical status, future medical and therapeutic needs, and coverage/benefits for post-acute care in coordination with multidisciplinary team members.        Verenice ROMERO  Case Management  [facility]  [phone]

## 2025-05-05 NOTE — PROGRESS NOTES
"[facility]  Orthopedics  Progress Note    Patient Name: Angelina Man  MRN: [id number]  Admission Date: 5/2/2025  Hospital Length of Stay: 3 days  Attending Provider: Rebekah Schumacher MD  Primary Care Provider: Zina Rockwell MD  Follow-up For: Procedure(s) (LRB):  INSERTION, INTRAMEDULLARY BERNADINE, FEMUR - right selina (Right)    Post-Operative Day: 2 Days Post-Op  Subjective:     Principal Problem:Closed nondisplaced intertrochanteric fracture of right femur with routine healing    Principal Orthopedic Problem: same as above s/p R short IMN 5/3    Interval History:  Patient is seen and examined at the bedside.  No acute events overnight.  Afebrile, hypotensive as low as 84/50 overnight but remained asymptomatic. "70ft CGA with RW", yesterday per PT note.  PT recommending home health therapy.  Urinating without difficulty.  Has had a bowel movement since surgery.  Tolerating PO.    Review of patient's allergies indicates:  No Known Allergies    Current Facility-Administered Medications   Medication    acetaminophen tablet 1,000 mg    aluminum-magnesium hydroxide-simethicone 200-200-20 mg/5 mL suspension 30 mL    apixaban tablet 5 mg    artificial tears 0.5 % ophthalmic solution 1 drop    ascorbic acid (vitamin C) tablet 1,000 mg    bisacodyL suppository 10 mg    buPROPion TB24 tablet 150 mg    cefTRIAXone injection 1 g    glucagon (human recombinant) injection 1 mg    glucose chewable tablet 16 g    glucose chewable tablet 24 g    levothyroxine tablet 137 mcg    methocarbamoL tablet 500 mg    metoprolol succinate (TOPROL-XL) 24 hr tablet 25 mg    multivitamin tablet    naloxone 0.4 mg/mL injection 0.02 mg    ondansetron disintegrating tablet 8 mg    polyethylene glycol packet 17 g    polyethylene glycol packet 17 g    pramipexole tablet 0.25 mg    ropivacaine 0.2% Perineural Pump infusion 500 ML    senna-docusate 8.6-50 mg per tablet 1 tablet    simethicone chewable tablet 80 mg    sodium chloride 0.9% " "flush 10 mL    traZODone tablet 50 mg     Objective:     Vital Signs (Most Recent):  Temp: 98.5 °F (36.9 °C) (05/05/25 0718)  Pulse: 94 (05/05/25 0749)  Resp: 18 (05/05/25 0718)  BP: (!) 91/58 (05/05/25 0718)  SpO2: 100 % (05/05/25 0718) Vital Signs (24h Range):  Temp:  [97.1 °F (36.2 °C)-98.5 °F (36.9 °C)] 98.5 °F (36.9 °C)  Pulse:  [] 94  Resp:  [15-18] 18  SpO2:  [97 %-100 %] 100 %  BP: ()/(50-77) 91/58     Weight: 43.2 kg (95 lb 3.8 oz)  Height: 5' 4" (162.6 cm)  Body mass index is 16.35 kg/m².      Intake/Output Summary (Last 24 hours) at 5/5/2025 1048  Last data filed at 5/5/2025 0843  Gross per 24 hour   Intake 120 ml   Output 202 ml   Net -82 ml        Ortho/SPM Exam  RLE  - Dressing c/d/i  - Quad and hip flexor intact  - Compartments soft and compressible  - ROM full (eversion,inversion,plantar/dorsiflexion)  - TA/EHL/Gastroc/FHL assessed in isolation without deficit  - SILT throughout  - DP and PT palpated  2+  - Capillary Refill <3s        Significant Labs: All pertinent labs within the past 24 hours have been reviewed.    Significant Imaging: I have reviewed and interpreted all pertinent imaging results/findings.  Assessment/Plan:     * Closed nondisplaced intertrochanteric fracture of right femur with routine healing s/p IM nail on 5/3/2025  Angelina Man is a 75 y.o. female with right intertrochanteric femur fracture, closed, NVI. They take Eliquis 5mg anticoagulation at home. They required no ambulatory assistive devices prior to this injury and lived a very active lifestyle.      Now s/p R short IMN 5/3/25.     Pain multimodal pain control limiting narcotics   PT/OT: WBAT RLE  Hgb 8.8  Eliquis 5 BID     Dispo:  Stable for discharge from orthopedic surgery perspective.            KAMRON Daugherty MD  Orthopedics  [facility] - Surgery    "

## 2025-05-05 NOTE — ASSESSMENT & PLAN NOTE
Patient has Diastolic (HFpEF) heart failure that is Chronic. On presentation their CHF was well compensated. Most recent BNP and echo results are listed below.  Recent Labs     05/02/25  2145   *     Latest ECHO  Results for orders placed during the hospital encounter of 10/07/24    Echo    Interpretation Summary    Left Ventricle: The left ventricle is normal in size. Normal wall thickness. There is concentric remodeling. There is low normal systolic function with a visually estimated ejection fraction of 50 - 55%. Unable to assess diastolic function due to atrial fibrillation.    Right Ventricle: Normal right ventricular cavity size. Wall thickness is normal. Systolic function is mildly reduced.    Biatrial enlargement    Patent foramen ovale visualized with predominant left to right shunting indicated by color flow Doppler.    Mitral Valve: There is mild to moderate regurgitation.    Tricuspid Valve: There is mild regurgitation.    Pulmonary Artery: The estimated pulmonary artery systolic pressure is 36 mmHg.    IVC/SVC: Elevated venous pressure at 15 mmHg.    Pericardium: There is a small effusion. No indication of cardiac tamponade.    Current Heart Failure Medications  metoprolol succinate (TOPROL-XL) 24 hr tablet 25 mg, Daily, Oral    Plan  - Cardiology has been consulted.  - The patient's volume status is at their baseline.

## 2025-05-05 NOTE — ASSESSMENT & PLAN NOTE
- Anemia worsening and down to 8.8 on 5/5 from 9.7 on 5/4. Expected blood loss related to surgery. No clinical signs of bleeding. Vital signs stable. Continue to monitor Hgb levels daily. No indication for need for blood transfusion.  - Anemia worsening on 5/4. Hgb down to 9.7 on 5/4 from 1.4 on 5/3. Expected blood loss related to surgery. No clinical signs of bleeding. Vital signs stable. Continue to monitor Hgb levels daily. No indication for need for blood transfusion.  - Anemia is likely due to chronic disease. Most recent hemoglobin and hematocrit are listed below. Recent B12 and folate levels normal.   Recent Labs     05/03/25  1007 05/04/25  0609 05/05/25  0458   HGB 11.4* 9.7* 8.8*   HCT 36.6* 31.6* 27.9*     Plan  - Monitor serial CBC: Daily  - Transfuse PRBC if patient becomes hemodynamically unstable, symptomatic or H/H drops below 7/21.  - Patient has not received any PRBC transfusions to date.

## 2025-05-05 NOTE — SUBJECTIVE & OBJECTIVE
"Principal Problem:Closed nondisplaced intertrochanteric fracture of right femur with routine healing    Principal Orthopedic Problem: same as above s/p R short IMN 5/3    Interval History:  Patient is seen and examined at the bedside.  No acute events overnight.  Afebrile, hypotensive as low as 84/50 overnight but remained asymptomatic. "70ft CGA with RW", yesterday per PT note.  PT recommending home health therapy.  Urinating without difficulty.  Has had a bowel movement since surgery.  Tolerating PO.    Review of patient's allergies indicates:  No Known Allergies    Current Facility-Administered Medications   Medication    acetaminophen tablet 1,000 mg    aluminum-magnesium hydroxide-simethicone 200-200-20 mg/5 mL suspension 30 mL    apixaban tablet 5 mg    artificial tears 0.5 % ophthalmic solution 1 drop    ascorbic acid (vitamin C) tablet 1,000 mg    bisacodyL suppository 10 mg    buPROPion TB24 tablet 150 mg    cefTRIAXone injection 1 g    glucagon (human recombinant) injection 1 mg    glucose chewable tablet 16 g    glucose chewable tablet 24 g    levothyroxine tablet 137 mcg    methocarbamoL tablet 500 mg    metoprolol succinate (TOPROL-XL) 24 hr tablet 25 mg    multivitamin tablet    naloxone 0.4 mg/mL injection 0.02 mg    ondansetron disintegrating tablet 8 mg    polyethylene glycol packet 17 g    polyethylene glycol packet 17 g    pramipexole tablet 0.25 mg    ropivacaine 0.2% Perineural Pump infusion 500 ML    senna-docusate 8.6-50 mg per tablet 1 tablet    simethicone chewable tablet 80 mg    sodium chloride 0.9% flush 10 mL    traZODone tablet 50 mg     Objective:     Vital Signs (Most Recent):  Temp: 98.5 °F (36.9 °C) (05/05/25 0718)  Pulse: 94 (05/05/25 0749)  Resp: 18 (05/05/25 0718)  BP: (!) 91/58 (05/05/25 0718)  SpO2: 100 % (05/05/25 0718) Vital Signs (24h Range):  Temp:  [97.1 °F (36.2 °C)-98.5 °F (36.9 °C)] 98.5 °F (36.9 °C)  Pulse:  [] 94  Resp:  [15-18] 18  SpO2:  [97 %-100 %] 100 " "%  BP: ()/(50-77) 91/58     Weight: 43.2 kg (95 lb 3.8 oz)  Height: 5' 4" (162.6 cm)  Body mass index is 16.35 kg/m².      Intake/Output Summary (Last 24 hours) at 5/5/2025 1048  Last data filed at 5/5/2025 0843  Gross per 24 hour   Intake 120 ml   Output 202 ml   Net -82 ml        Ortho/SPM Exam  RLE  - Dressing c/d/i  - Quad and hip flexor intact  - Compartments soft and compressible  - ROM full (eversion,inversion,plantar/dorsiflexion)  - TA/EHL/Gastroc/FHL assessed in isolation without deficit  - SILT throughout  - DP and PT palpated  2+  - Capillary Refill <3s        Significant Labs: All pertinent labs within the past 24 hours have been reviewed.    Significant Imaging: I have reviewed and interpreted all pertinent imaging results/findings.  "

## 2025-05-05 NOTE — ASSESSMENT & PLAN NOTE
Patient has persistent (7 days or more) atrial fibrillation. Patient is currently in atrial fibrillation. Initially in RVR, improved with PO Metoprolol. [id number] Score: 3. The patients heart rate in the last 24 hours is as follows:  Pulse  Min: 80  Max: 115     Antiarrhythmics  metoprolol succinate (TOPROL-XL) 24 hr tablet 25 mg, Daily, Oral    Anticoagulants  apixaban tablet 5 mg, 2 times daily, Oral    Plan  - Replete lytes with a goal of K>4, Mg >2  - Patient's afib is currently controlled on home po Metoprolol and will continue.  - Home Apixiban held pre-op for her long term anticoagulation for stroke prevention but resumed post-op at 5 mg po BID.

## 2025-05-05 NOTE — PT/OT/SLP PROGRESS
"Occupational Therapy   Treatment    Name: Angelina Man  MRN: [id number]  Admitting Diagnosis:  Closed nondisplaced intertrochanteric fracture of right femur with routine healing  2 Days Post-Op    Recommendations:     Discharge Recommendations: Low Intensity Therapy  Discharge Equipment Recommendations:  none  Barriers to discharge:  None    Assessment:     Angelina Man is a 75 y.o. female with a medical diagnosis of Closed nondisplaced intertrochanteric fracture of right femur with routine healing.Performance deficits affecting function are weakness, impaired self care skills, decreased safety awareness, impaired functional mobility, impaired endurance, pain, gait instability, decreased lower extremity function, orthopedic precautions. Pt agreeable to therapy and tolerated well. Session focused on improving pt's safety within home environment and community mobility by having pt perform dual-task performance tasks during mobility. Pt required cues on how to push RW while performing mobility. Pt remains limited in ADLs, functional mobility, and functional transfers.  Patient continues to demonstrate the need for low intensity therapy on a scheduled basis exhibited by decreased independence with self-care and functional mobility    Rehab Prognosis:  Fair; patient would benefit from acute skilled OT services to address these deficits and reach maximum level of function.       Plan:     Patient to be seen daily to address the above listed problems via self-care/home management, therapeutic activities, therapeutic exercises, neuromuscular re-education  Plan of Care Expires: 05/17/25  Plan of Care Reviewed with: patient    Subjective     Chief Complaint: none  Patient/Family Comments/goals: "Thank you for explaining everything to me."   Pain/Comfort:  Pain Rating 1: 0/10  Pain Rating Post-Intervention 1: 0/10    Objective:     Communicated with: Nurse prior to session.  Patient found up in chair with telemetry, " perineural catheter upon OT entry to room.    General Precautions: Standard, fall    Orthopedic Precautions:RLE weight bearing as tolerated  Braces: N/A  Respiratory Status: Room air     Occupational Performance:     Bed Mobility:    N/A 2/2 UIC upon OT arrival     Functional Mobility/Transfers:  Patient completed Sit <> Stand Transfer with contact guard assistance  with  rolling walker   Functional Mobility: Pt engaged in functional mobility throughout hospital room and hallway ~ 170 ft with RW and  CGA to maximize functional endurance and standing balance required for home/community mobility and occupational engagement. Pt with 2 instances of R knee blocking but pt able to self-correct balance using UE.   Task graded to challenge pt to participate in dual-task performance activity  having pt scan hallways and read the different room numbers/ items in the esposito while maintaining balance during mobility modeling community mobility and safety in home environment. Pt with good performance and demonstrated good carryover of education/goals of task required for IADL performance upon return home     Activities of Daily Living:  Pt declined, stating she had toileted 30 min prior and performed ADLs in AM      [facility] 6 Click ADL: 18    Treatment & Education:  -Education on energy conservation and task modification to maximize safety and (I) during ADLs and mobility  -Education on importance of OOB activity to improve overall activity tolerance and promote recovery  -Pt educated to call for assistance and to transfer with hospital staff only  -Education on W?B precautions. Pt able to recall precautions without verbal cues  -Provided education regarding role of OT, POC, & discharge recommendations with pt verbalizing understanding.  Pt had no further questions & when asked whether there were any concerns pt reported none.      Patient left up in chair with all lines intact, call button in reach, and nurse notified    GOALS:    Multidisciplinary Problems       Occupational Therapy Goals          Problem: Occupational Therapy    Goal Priority Disciplines Outcome Interventions   Occupational Therapy Goal     OT, PT/OT Progressing    Description: Goals to be met by: 5/17/25     Patient will increase functional independence with ADLs by performing:    LE Dressing with Modified [place].  Grooming while standing at sink with Modified [place].  Toileting from toilet with Modified [place] for hygiene and clothing management.   Toilet transfer to toilet with Modified [place].                         Time Tracking:     OT Date of Treatment: 05/05/25  OT Start Time: 1313  OT Stop Time: 1344  OT Total Time (min): 31 min    Billable Minutes:Therapeutic Activity 31 min    OT/JULIOCESAR: OT          5/5/2025

## 2025-05-05 NOTE — SUBJECTIVE & OBJECTIVE
Interval History: PT/OT consulted and working with patient and recommending low intensity on discharge. Patient placed on IV Ceftriaxone 1 gram daily post-op to treat UTI found on admit on 5/4. Final urine culture returned today with 10,000 - 49,999 cfu/ml Klebsiella pneumoniae sensitive to Ceftriaxone and will continue Ceftriaxone to treat and plan 3 day course and last dose on 5/6. Patient ambulated multiple times in hallway today with rolling walker with therapy. Plan home discharge with  PT/OT tomorrow and patient agreeable to plan. Patient had good bowel movement yesterday. Labs reviewed. Hgb 8.8 today and down from 9.7 yesterday with no clinical signs of bleeding. Vital signs stable. No indication for blood transfusion. Creatinine stable at 0.7.     Review of Systems   Constitutional:  Negative for fever.   Respiratory:  Negative for cough and shortness of breath.    Cardiovascular:  Negative for chest pain.   Gastrointestinal:  Negative for nausea.   Musculoskeletal:  Positive for arthralgias (Right hip).   Psychiatric/Behavioral:  Negative for agitation and confusion.      Objective:     Vital Signs (Most Recent):  Temp: 98.7 °F (37.1 °C) (05/05/25 1545)  Pulse: 107 (05/05/25 1545)  Resp: 17 (05/05/25 1545)  BP: 111/70 (05/05/25 1545)  SpO2: 100 % (05/05/25 1545) on room air Vital Signs (24h Range):  Temp:  [97.1 °F (36.2 °C)-98.7 °F (37.1 °C)] 98.7 °F (37.1 °C)  Pulse:  [] 107  Resp:  [15-18] 17  SpO2:  [97 %-100 %] 100 %  BP: ()/(50-70) 111/70     Weight: 43.2 kg (95 lb 3.8 oz)  Body mass index is 16.35 kg/m².    Intake/Output Summary (Last 24 hours) at 5/5/2025 1830  Last data filed at 5/5/2025 0843  Gross per 24 hour   Intake 120 ml   Output --   Net 120 ml         Physical Exam  Vitals and nursing note reviewed.   Constitutional:       General: She is awake. She is not in acute distress.     Appearance: Normal appearance. She is underweight. She is not ill-appearing.      Comments: Lakisha,  elderly female in no distress. Daughter at bedside. Patient appears very comfortable on exam.    Cardiovascular:      Rate and Rhythm: Normal rate and regular rhythm.      Heart sounds: Normal heart sounds. No murmur heard.  Pulmonary:      Effort: Pulmonary effort is normal. No respiratory distress.      Breath sounds: Normal breath sounds. No wheezing.   Abdominal:      General: Abdomen is flat. Bowel sounds are normal. There is no distension.      Palpations: Abdomen is soft.      Tenderness: There is no abdominal tenderness.   Musculoskeletal:      Right lower leg: No edema.      Left lower leg: No edema.   Skin:     General: Skin is warm.      Findings: No erythema.      Comments: Surgical dressing noted on right hip and lateral thigh. Dressing is clean and dry and intact.   Neurological:      Mental Status: She is alert and oriented to person, place, and time.   Psychiatric:         Mood and Affect: Mood normal.         Behavior: Behavior normal. Behavior is cooperative.         Thought Content: Thought content normal.         Judgment: Judgment normal.               Significant Labs: BMP:   Recent Labs   Lab 05/05/25  0458   GLU 90      K 3.9      CO2 25   BUN 13   CREATININE 0.7   CALCIUM 7.8*   MG 2.0     CBC:   Recent Labs   Lab 05/04/25  0609 05/05/25  0458   WBC 9.43 9.85   HGB 9.7* 8.8*   HCT 31.6* 27.9*    383     Magnesium:   Recent Labs   Lab 05/04/25  0609 05/05/25  0458   MG 2.4 2.0       Significant Imaging: I have reviewed all pertinent imaging results/findings within the past 24 hours.

## 2025-05-05 NOTE — PLAN OF CARE
05/05/25 0922   Post-Acute Status   Post-Acute Authorization Home Health   Home Health Status Referrals Sent   Discharge Plan   Discharge Plan A Home Health     Referral sent to [facility]  for Post-acute needs.    Verenice ROMERO  Case Management  [facility]  [phone]

## 2025-05-05 NOTE — PT/OT/SLP PROGRESS
"Physical Therapy Treatment    Patient Name:  Angelina Man   MRN:  [id number]    Recommendations:     Discharge Recommendations: Low Intensity Therapy  Discharge Equipment Recommendations: none  Barriers to discharge: None    Assessment:     Angelina Man is a 75 y.o. female admitted with a medical diagnosis of Closed nondisplaced intertrochanteric fracture of right femur with routine healing.  She presents with the following impairments/functional limitations: weakness, impaired self care skills, decreased safety awareness, impaired functional mobility, impaired endurance, pain, gait instability, decreased lower extremity function, orthopedic precautions . Pt was highly motivated to work with PT today, and reports decreased pain. Pt also reports she was able to get up during the night with nursing and her sister to use the restroom. Pt required increased time during ambulation due to decreased raine, gait speed, and step length, antalgic and unsteady gait. Pt would continue to benefit from skilled acute PT in order to address current deficits and progress functional mobility.     Rehab Prognosis: Good; patient would benefit from acute skilled PT services to address these deficits and reach maximum level of function.    Recent Surgery: Procedure(s) (LRB):  INSERTION, INTRAMEDULLARY BERNADINE, FEMUR - right selina (Right) 2 Days Post-Op    Plan:     During this hospitalization, patient to be seen daily to address the identified rehab impairments via gait training, therapeutic activities, therapeutic exercises, neuromuscular re-education and progress toward the following goals:    Plan of Care Expires:  06/02/25    Subjective     Chief Complaint: RLE pain  Patient/Family Comments/goals: "Pain is still there but decreased."  Pain/Comfort:  Pain Rating 1: 0/10  Location - Side 1: Right  Location - Orientation 1: generalized  Location 1: hip  Pain Addressed 1: Pre-medicate for activity, Reposition, " Distraction      Objective:     Communicated with RN prior to session.  Patient found up in chair with FCD, telemetry, perineural catheter upon PT entry to room.     General Precautions: Standard, fall  Orthopedic Precautions: RLE weight bearing as tolerated  Braces: N/A  Respiratory Status: Room air     Functional Mobility:  Transfers:     Sit to Stand:  contact guard assistance with rolling walker  Cueing for hand placement and sequencing   Increased time required to perform with least amount of assistance given    Gait: 75ft CGA-SBA with RW  Deviations: decreased WB through RLE, decreased step length and raine, unsteady and antalgic gait  Cueing for hand placement and sequencing     Balance:  Static Sit: NORMAL; SupV  Dynamic Sit: NORMAL; SBA  Static Stand: NORMAL; SBA  Dynamic Stand: NORMAL; SBA    TherEx performed up in chair:    Martina  LAQs  Ankle pumps  Heel Raises  Toe Raises      AM-PAC 6 CLICK MOBILITY  Turning over in bed (including adjusting bedclothes, sheets and blankets)?: 3  Sitting down on and standing up from a chair with arms (e.g., wheelchair, bedside commode, etc.): 3  Moving from lying on back to sitting on the side of the bed?: 3  Moving to and from a bed to a chair (including a wheelchair)?: 3  Need to walk in hospital room?: 2  Climbing 3-5 steps with a railing?: 1  Basic Mobility Total Score: 15       Treatment & Education:  Educated pt on PT role/POC  Educated pt on importance of OOB activity and daily ambulation  Time provided for active listening, education, counseling and discussion of health disposition in regards to patient's current status   Questions/concerns addressed within PTA scope of practice. Answered to pt satisfaction, no further questions  White board updated accordingly   RN aware of patient's mobility needs and status  Gait belt utilized during session for patient safety  Bedside table in front of patient and area set up for function, convenience, and safety      Patient left up in chair with all lines intact, call button in reach, and RN notified..    GOALS:   Multidisciplinary Problems       Physical Therapy Goals          Problem: Physical Therapy    Goal Priority Disciplines Outcome Interventions   Physical Therapy Goal     PT, PT/OT Progressing    Description: Goals to be met by: 25     Patient will increase functional independence with mobility by performin. Supine to sit with Modified [place]  2. Sit to supine with Modified [place]  3. Sit to stand transfer with Modified [place]  4. Bed to chair transfer with Supervision using Rolling Walker  5. Gait  x 150 feet with Supervision using Rolling Walker.   6. Lower extremity exercise program x15 reps per handout, with assistance as needed                         DME Justifications:  No DME recommended requiring DME justifications    Time Tracking:     PT Received On: 25  PT Start Time: 1021     PT Stop Time: 1047  PT Total Time (min): 26 min     Billable Minutes: Gait Training 18 and Therapeutic Exercise 8    Treatment Type: Treatment  PT/PTA: PTA     Number of PTA visits since last PT visit: 2     2025

## 2025-05-05 NOTE — PROGRESS NOTES
"[facility] Medicine  Progress Note    Patient Name: Angelina Man  MRN: [id number]  Patient Class: IP- Inpatient   Admission Date: 5/2/2025  Length of Stay: 3 days  Attending Physician: Rebekah Schumacher MD  Primary Care Provider: Zina Rockwell MD        Subjective     Principal Problem:Closed nondisplaced intertrochanteric fracture of right femur with routine healing        HPI:  Angelina Man is a pleasant 75 y.o. female with permanent AFib, HFpEF, hypothyroidism presenting with hip fracture.     On Easter, she reports that she drank a martini with family, then had an accidental fall onto her right hip.  She did hit her head and had an occipital hematoma, left eye bruising, and right arm trauma.  She noted right hip pain particularly with ambulation, and was evaluated at an urgent care where hip x-ray showed no obvious fracture.  She then went to an ED for CT scans of her head, which reportedly showed no bleed.  She has noted difficulty ambulating due to the hip pain, however has still been able to do so.  She has even gone to a few of her usual spin classes, which reportedly helped her hip pain.  On Thursday, after span, she could barely walk, prompting presentation for further evaluation.  MRI was performed, showing intertrochanteric femur fracture.     She is very active at baseline, and goes to the gym every day.  She she goes to spin classes multiple times per week and lifts weights.  She lives in a condo and walks up stairs without difficulty. She performs all ADLs independently.  She reports chronic lower extremity edema improved with compression stockings, but denies any chest pain, shortness for breath, or palpitations.  She is compliant with all medications, including her metoprolol and Eliquis for AFib.    Of note, she reports chronic dry eyes with intermittent "crusting" for which she takes Systane. She has noted increased itching and crusting to the eyes since presentation to " the ED.     Overview/Hospital Course:  Patient admitted after found to have a closed nondisplaced right intertrochanteric fracture on MRI imaging. Patient had fall around [place] and has had progressive right hip pain since then with negative X-rays of right hip so seen in Ortho clinic on 5/1 and MRI of pelvis done and revealed closed nondisplaced right femur intertrochanteric fracture and called yesterday, 5/2 and told to go to ED fro admission. Patient admitted to [facility] Medicine Team H: Hip Fracture team and started on Hip Fracture Pathway with Orthopedic surgery consult for for right closed intertrochanteric femur fracture. Patient was seen and evaluated by Orthopedic surgery who recommended operative repair of fracture. Patient was medically optimized prior to surgery and was taken to OR after optimization on 5/3/2025. Patient underwent right hip cephalomedullary nail fixation by Dr. Aleida Pires. Post-op patient weight bear as tolerated to the right lower extremity as per Orthopedics recommendation. Patient resumed on her home Apixiban 5 mg po BID that she takes for long term anticoagulation for her PAF so no other DVT prophylaxis required post-op.  Perineural pain catheter placed by Anesthesia Pain Service with continuous infusion of Ropivacaine to help with pain control post-op and Anesthesia Pain Service managing while patient in the hospital. Patient placed on multimodal pain management post-op with Tylenol 1000 mg po every 6 hours and Robaxin 500 mg po every 6 hours post-op and will continue. PT/OT consulted post-op and recommending low intensity on discharge. Patient placed on IV Ceftriaxone 1 gram daily post-op to treat UTI found on admit on 5/4. Final urine culture returned on 5/5 grew 10,000 - 49,999 cfu/ml Klebsiella pneumoniae sensitive to Ceftriaxone and will continue Ceftriaxone to treat on 5/5 and plan 3 day course and last dose on 5/6. Patient ambulated multiple times in hallway with  rolling walker with therapy on 5/5. Plan home discharge with HH PT/OT on 5/6 and patient agreeable to plan.       Interval History: PT/OT consulted and working with patient and recommending low intensity on discharge. Patient placed on IV Ceftriaxone 1 gram daily post-op to treat UTI found on admit on 5/4. Final urine culture returned today with 10,000 - 49,999 cfu/ml Klebsiella pneumoniae sensitive to Ceftriaxone and will continue Ceftriaxone to treat and plan 3 day course and last dose on 5/6. Patient ambulated multiple times in hallway today with rolling walker with therapy. Plan home discharge with HH PT/OT tomorrow and patient agreeable to plan. Patient had good bowel movement yesterday. Labs reviewed. Hgb 8.8 today and down from 9.7 yesterday with no clinical signs of bleeding. Vital signs stable. No indication for blood transfusion. Creatinine stable at 0.7.     Review of Systems   Constitutional:  Negative for fever.   Respiratory:  Negative for cough and shortness of breath.    Cardiovascular:  Negative for chest pain.   Gastrointestinal:  Negative for nausea.   Musculoskeletal:  Positive for arthralgias (Right hip).   Psychiatric/Behavioral:  Negative for agitation and confusion.      Objective:     Vital Signs (Most Recent):  Temp: 98.7 °F (37.1 °C) (05/05/25 1545)  Pulse: 107 (05/05/25 1545)  Resp: 17 (05/05/25 1545)  BP: 111/70 (05/05/25 1545)  SpO2: 100 % (05/05/25 1545) on room air Vital Signs (24h Range):  Temp:  [97.1 °F (36.2 °C)-98.7 °F (37.1 °C)] 98.7 °F (37.1 °C)  Pulse:  [] 107  Resp:  [15-18] 17  SpO2:  [97 %-100 %] 100 %  BP: ()/(50-70) 111/70     Weight: 43.2 kg (95 lb 3.8 oz)  Body mass index is 16.35 kg/m².    Intake/Output Summary (Last 24 hours) at 5/5/2025 1830  Last data filed at 5/5/2025 0843  Gross per 24 hour   Intake 120 ml   Output --   Net 120 ml         Physical Exam  Vitals and nursing note reviewed.   Constitutional:       General: She is awake. She is not in acute  distress.     Appearance: Normal appearance. She is underweight. She is not ill-appearing.      Comments: Frial, elderly female in no distress. Patient sitting up in bedside chair on rounds. Patient appears very comfortable on exam.    Cardiovascular:      Rate and Rhythm: Normal rate and regular rhythm.      Heart sounds: Normal heart sounds. No murmur heard.  Pulmonary:      Effort: Pulmonary effort is normal. No respiratory distress.      Breath sounds: Normal breath sounds. No wheezing.   Abdominal:      General: Abdomen is flat. Bowel sounds are normal. There is no distension.      Palpations: Abdomen is soft.      Tenderness: There is no abdominal tenderness.   Musculoskeletal:      Right lower leg: No edema.      Left lower leg: No edema.   Skin:     General: Skin is warm.      Findings: No erythema.      Comments: Surgical dressing noted on right hip and lateral thigh. Dressing is clean and dry and intact.   Neurological:      Mental Status: She is alert and oriented to person, place, and time.   Psychiatric:         Mood and Affect: Mood normal.         Behavior: Behavior normal. Behavior is cooperative.         Thought Content: Thought content normal.         Judgment: Judgment normal.               Significant Labs: BMP:   Recent Labs   Lab 05/05/25  0458   GLU 90      K 3.9      CO2 25   BUN 13   CREATININE 0.7   CALCIUM 7.8*   MG 2.0     CBC:   Recent Labs   Lab 05/04/25  0609 05/05/25  0458   WBC 9.43 9.85   HGB 9.7* 8.8*   HCT 31.6* 27.9*    383     Magnesium:   Recent Labs   Lab 05/04/25  0609 05/05/25  0458   MG 2.4 2.0       Significant Imaging: I have reviewed all pertinent imaging results/findings within the past 24 hours.      Assessment & Plan  Closed nondisplaced intertrochanteric fracture of right femur with routine healing s/p IM nail on 5/3/2025  Patient admitted after found to have a closed nondisplaced right intertrochanteric fracture on MRI imaging. Patient had fall on  Easter weekend and had X-rays done at that time of right hip that were negative but has had progressive right hip pain since then. Patient seen in Ortho clinic on 5/1 and MRI of pelvis ordered to evaluate pain and done on 5/2 and revealed closed nondisplaced right femur intertrochanteric fracture and Ortho clinic called yesterday, 5/2 and told patient to go to ED for admission. Patient was seen and evaluated by Orthopedic surgery who recommended operative repair of fracture.   - Patient was taken to OR on 5/3/2025 and underwent right hip cephalomedullary nail fixation by Dr. Aleida Pires.   - Post-op, patient weight bear as tolerated to the right lower extremity as per Orthopedics recommendation.   - Patient resumed on her home Apixiban 5 mg po BID that she takes for long term anticoagulation for her PAF so no other DVT prophylaxis required post-op.   - Perineural pain catheter placed by Anesthesia Pain Service with continuous infusion of Ropivacaine to help with pain control post-op and Anesthesia Pain Service managing while patient in the hospital. Anesthesia plans to remove PNC tomorrow, 5/6.   - Continue on multimodal pain management post-op with Tylenol 1000 mg po every 6 hours + Robaxin 500 mg po every 6 hours post-op. Pain controlled to right hip.   - PT/OT consulted post-op and patient doing well and recommending low intensity on discharge when ready. Plan for HH PT/OT on discharge when medically ready and anticipate discharge on 5/6 if Hgb stable.   - Ortho following and managing surgical site.  - Surgical bandage to remain in place until Ortho clinic follow-up. Patient will need staples removed at approximately 2 weeks. Removal may be performed at a rehab facility, by a PCP or by myself in the Orthopedic clinic.  - Follow up with Dr. Pires in Ortho clinic in 6 weeks with an AP pelvis and right hip x-ray.    Longstanding persistent atrial fibrillation  Anticoagulant long-term use  Patient has persistent (7  days or more) atrial fibrillation. Patient is currently in atrial fibrillation. Initially in RVR, improved with PO Metoprolol. [id number] Score: 3. The patients heart rate in the last 24 hours is as follows:  Pulse  Min: 80  Max: 115     Antiarrhythmics  metoprolol succinate (TOPROL-XL) 24 hr tablet 25 mg, Daily, Oral    Anticoagulants  apixaban tablet 5 mg, 2 times daily, Oral    Plan  - Replete lytes with a goal of K>4, Mg >2  - Patient's afib is currently controlled on home po Metoprolol and will continue.  - Home Apixiban held pre-op for her long term anticoagulation for stroke prevention but resumed post-op at 5 mg po BID.   Macrocytic anemia  Acute blood loss anemia  - Anemia worsening and down to 8.8 on 5/5 from 9.7 on 5/4. Expected blood loss related to surgery. No clinical signs of bleeding. Vital signs stable. Continue to monitor Hgb levels daily. No indication for need for blood transfusion.  - Anemia worsening on 5/4. Hgb down to 9.7 on 5/4 from 1.4 on 5/3. Expected blood loss related to surgery. No clinical signs of bleeding. Vital signs stable. Continue to monitor Hgb levels daily. No indication for need for blood transfusion.  - Anemia is likely due to chronic disease. Most recent hemoglobin and hematocrit are listed below. Recent B12 and folate levels normal.   Recent Labs     05/03/25  1007 05/04/25  0609 05/05/25  0458   HGB 11.4* 9.7* 8.8*   HCT 36.6* 31.6* 27.9*     Plan  - Monitor serial CBC: Daily  - Transfuse PRBC if patient becomes hemodynamically unstable, symptomatic or H/H drops below 7/21.  - Patient has not received any PRBC transfusions to date.    Acute cystitis without hematuria  Klebsiella pneumoniae infection  - Present on admit. U/A on admit positive for UTI so patient started on IV Ceftriaxone to treat on 5/4 and urine culture sent and will need to follow-up results. Patient reports she has been having urinary frequency over the past week and though she did have a UTI as similar  to previous UTI symptoms. No systemic signs of infection.  - Final urine culture returned on 5/5 with 10,000 - 49,999 cfu/ml Klebsiella pneumoniae sensitive to Ceftriaxone and will continue Ceftriaxone to treat and plan 3 day course and last dose on 5/6.   Acute viral conjunctivitis of both eyes  - Much improved on 5/4 and 5/5.   - Present on admit. Patient complaining of redness to her eyes and appears she has a viral conjunctivitis to both eyes and artifical tears ordered to help with dryness. Patient advised not to rub her eyes.   Pulmonary hypertension  Chronic condition and stable on room air. Monitor.     (HFpEF) heart failure with preserved ejection fraction  Patient has Diastolic (HFpEF) heart failure that is Chronic. On presentation their CHF was well compensated. Most recent BNP and echo results are listed below.  Recent Labs     05/02/25  2145   *     Latest ECHO  Results for orders placed during the hospital encounter of 10/07/24    Echo    Interpretation Summary    Left Ventricle: The left ventricle is normal in size. Normal wall thickness. There is concentric remodeling. There is low normal systolic function with a visually estimated ejection fraction of 50 - 55%. Unable to assess diastolic function due to atrial fibrillation.    Right Ventricle: Normal right ventricular cavity size. Wall thickness is normal. Systolic function is mildly reduced.    Biatrial enlargement    Patent foramen ovale visualized with predominant left to right shunting indicated by color flow Doppler.    Mitral Valve: There is mild to moderate regurgitation.    Tricuspid Valve: There is mild regurgitation.    Pulmonary Artery: The estimated pulmonary artery systolic pressure is 36 mmHg.    IVC/SVC: Elevated venous pressure at 15 mmHg.    Pericardium: There is a small effusion. No indication of cardiac tamponade.    Current Heart Failure Medications  metoprolol succinate (TOPROL-XL) 24 hr tablet 25 mg, Daily,  Oral    Plan  - Cardiology has been consulted.  - The patient's volume status is at their baseline.    Acquired hypothyroidism  Chronic and controlled. Continue home Synthroid to treat.     BMI less than 19,adult  Patient with BMI 16.35 on admit. Nutrition consulted. Boost supplements with meals.     VTE Risk Mitigation (From admission, onward)           Ordered     apixaban tablet 5 mg  2 times daily         05/03/25 0905     IP VTE HIGH RISK PATIENT  Once         05/03/25 0605     Place sequential compression device  Until discontinued         05/03/25 0605     Reason for No Pharmacological VTE Prophylaxis  Once        Question:  Reasons:  Answer:  Physician Provided (leave comment)  Comment:  surgery    05/02/25 2103                    Discharge Planning   IRAJ: 5/6/2025     Code Status: Full Code   Discharge Plan A: Home with family, Home Health. Plan home discharge on 5/6 if Hgb stable.        Rebekah Schumacher MD  Department of Hospital Medicine   [facility] - Surgery

## 2025-05-05 NOTE — ASSESSMENT & PLAN NOTE
- Present on admit. U/A on admit positive for UTI so patient started on IV Ceftriaxone to treat on 5/4 and urine culture sent and will need to follow-up results. Patient reports she has been having urinary frequency over the past week and though she did have a UTI as similar to previous UTI symptoms. No systemic signs of infection.  - Final urine culture returned on 5/5 with 10,000 - 49,999 cfu/ml Klebsiella pneumoniae sensitive to Ceftriaxone and will continue Ceftriaxone to treat and plan 3 day course and last dose on 5/6.

## 2025-05-05 NOTE — ASSESSMENT & PLAN NOTE
- Much improved on 5/4 and 5/5.   - Present on admit. Patient complaining of redness to her eyes and appears she has a viral conjunctivitis to both eyes and artifical tears ordered to help with dryness. Patient advised not to rub her eyes.

## 2025-05-05 NOTE — ASSESSMENT & PLAN NOTE
Patient admitted after found to have a closed nondisplaced right intertrochanteric fracture on MRI imaging. Patient had fall on Easter weekend and had X-rays done at that time of right hip that were negative but has had progressive right hip pain since then. Patient seen in Ortho clinic on 5/1 and MRI of pelvis ordered to evaluate pain and done on 5/2 and revealed closed nondisplaced right femur intertrochanteric fracture and Ortho clinic called yesterday, 5/2 and told patient to go to ED for admission. Patient was seen and evaluated by Orthopedic surgery who recommended operative repair of fracture.   - Patient was taken to OR on 5/3/2025 and underwent right hip cephalomedullary nail fixation by Dr. Aleida Pires.   - Post-op, patient weight bear as tolerated to the right lower extremity as per Orthopedics recommendation.   - Patient resumed on her home Apixiban 5 mg po BID that she takes for long term anticoagulation for her PAF so no other DVT prophylaxis required post-op.   - Perineural pain catheter placed by Anesthesia Pain Service with continuous infusion of Ropivacaine to help with pain control post-op and Anesthesia Pain Service managing while patient in the hospital. Anesthesia plans to remove PNC tomorrow, 5/6.   - Continue on multimodal pain management post-op with Tylenol 1000 mg po every 6 hours + Robaxin 500 mg po every 6 hours post-op. Pain controlled to right hip.   - PT/OT consulted post-op and patient doing well and recommending low intensity on discharge when ready. Plan for HH PT/OT on discharge when medically ready and anticipate discharge on 5/6 if Hgb stable.   - Ortho following and managing surgical site.  - Surgical bandage to remain in place until Ortho clinic follow-up. Patient will need staples removed at approximately 2 weeks. Removal may be performed at a rehab facility, by a PCP or by myself in the Orthopedic clinic.  - Follow up with Dr. Pires in Ortho clinic in 6 weeks with an AP  pelvis and right hip x-ray.

## 2025-05-06 VITALS
TEMPERATURE: 98 F | WEIGHT: 95.25 LBS | OXYGEN SATURATION: 100 % | DIASTOLIC BLOOD PRESSURE: 51 MMHG | HEIGHT: 64 IN | HEART RATE: 79 BPM | SYSTOLIC BLOOD PRESSURE: 93 MMHG | RESPIRATION RATE: 20 BRPM | BODY MASS INDEX: 16.26 KG/M2

## 2025-05-06 PROBLEM — N30.00 ACUTE CYSTITIS WITHOUT HEMATURIA: Status: RESOLVED | Noted: 2025-05-03 | Resolved: 2025-05-06

## 2025-05-06 PROBLEM — A49.8 KLEBSIELLA PNEUMONIAE INFECTION: Status: RESOLVED | Noted: 2025-05-05 | Resolved: 2025-05-06

## 2025-05-06 LAB
ABSOLUTE EOSINOPHIL (OHS): 0.2 K/UL
ABSOLUTE MONOCYTE (OHS): 0.7 K/UL (ref 0.3–1)
ABSOLUTE NEUTROPHIL COUNT (OHS): 6.26 K/UL (ref 1.8–7.7)
ANION GAP (OHS): 8 MMOL/L (ref 8–16)
BASOPHILS # BLD AUTO: 0.03 K/UL
BASOPHILS NFR BLD AUTO: 0.3 %
BUN SERPL-MCNC: 12 MG/DL (ref 8–23)
CALCIUM SERPL-MCNC: 7.8 MG/DL (ref 8.7–10.5)
CHLORIDE SERPL-SCNC: 101 MMOL/L (ref 95–110)
CO2 SERPL-SCNC: 27 MMOL/L (ref 23–29)
CREAT SERPL-MCNC: 0.6 MG/DL (ref 0.5–1.4)
ERYTHROCYTE [DISTWIDTH] IN BLOOD BY AUTOMATED COUNT: 13.2 % (ref 11.5–14.5)
GFR SERPLBLD CREATININE-BSD FMLA CKD-EPI: >60 ML/MIN/1.73/M2
GLUCOSE SERPL-MCNC: 104 MG/DL (ref 70–110)
HCT VFR BLD AUTO: 27.2 % (ref 37–48.5)
HGB BLD-MCNC: 8.6 GM/DL (ref 12–16)
IMM GRANULOCYTES # BLD AUTO: 0.06 K/UL (ref 0–0.04)
IMM GRANULOCYTES NFR BLD AUTO: 0.7 % (ref 0–0.5)
LYMPHOCYTES # BLD AUTO: 1.68 K/UL (ref 1–4.8)
MAGNESIUM SERPL-MCNC: 1.8 MG/DL (ref 1.6–2.6)
MCH RBC QN AUTO: 32.1 PG (ref 27–31)
MCHC RBC AUTO-ENTMCNC: 31.6 G/DL (ref 32–36)
MCV RBC AUTO: 102 FL (ref 82–98)
NUCLEATED RBC (/100WBC) (OHS): 0 /100 WBC
PHOSPHATE SERPL-MCNC: 3.1 MG/DL (ref 2.7–4.5)
PLATELET # BLD AUTO: 389 K/UL (ref 150–450)
PMV BLD AUTO: 8.5 FL (ref 9.2–12.9)
POTASSIUM SERPL-SCNC: 3.9 MMOL/L (ref 3.5–5.1)
RBC # BLD AUTO: 2.68 M/UL (ref 4–5.4)
RELATIVE EOSINOPHIL (OHS): 2.2 %
RELATIVE LYMPHOCYTE (OHS): 18.8 % (ref 18–48)
RELATIVE MONOCYTE (OHS): 7.8 % (ref 4–15)
RELATIVE NEUTROPHIL (OHS): 70.2 % (ref 38–73)
SODIUM SERPL-SCNC: 136 MMOL/L (ref 136–145)
WBC # BLD AUTO: 8.93 K/UL (ref 3.9–12.7)

## 2025-05-06 PROCEDURE — 85025 COMPLETE CBC W/AUTO DIFF WBC: CPT | Mod: HCNC | Performed by: STUDENT IN AN ORGANIZED HEALTH CARE EDUCATION/TRAINING PROGRAM

## 2025-05-06 PROCEDURE — 84100 ASSAY OF PHOSPHORUS: CPT | Mod: HCNC | Performed by: STUDENT IN AN ORGANIZED HEALTH CARE EDUCATION/TRAINING PROGRAM

## 2025-05-06 PROCEDURE — 25000003 PHARM REV CODE 250: Mod: HCNC

## 2025-05-06 PROCEDURE — 99231 SBSQ HOSP IP/OBS SF/LOW 25: CPT | Mod: HCNC,,, | Performed by: STUDENT IN AN ORGANIZED HEALTH CARE EDUCATION/TRAINING PROGRAM

## 2025-05-06 PROCEDURE — 63600175 PHARM REV CODE 636 W HCPCS: Mod: HCNC | Performed by: INTERNAL MEDICINE

## 2025-05-06 PROCEDURE — 80048 BASIC METABOLIC PNL TOTAL CA: CPT | Mod: HCNC | Performed by: STUDENT IN AN ORGANIZED HEALTH CARE EDUCATION/TRAINING PROGRAM

## 2025-05-06 PROCEDURE — 83735 ASSAY OF MAGNESIUM: CPT | Mod: HCNC | Performed by: STUDENT IN AN ORGANIZED HEALTH CARE EDUCATION/TRAINING PROGRAM

## 2025-05-06 PROCEDURE — 25000003 PHARM REV CODE 250: Mod: HCNC | Performed by: STUDENT IN AN ORGANIZED HEALTH CARE EDUCATION/TRAINING PROGRAM

## 2025-05-06 PROCEDURE — 97535 SELF CARE MNGMENT TRAINING: CPT | Mod: HCNC

## 2025-05-06 PROCEDURE — 36415 COLL VENOUS BLD VENIPUNCTURE: CPT | Mod: HCNC | Performed by: STUDENT IN AN ORGANIZED HEALTH CARE EDUCATION/TRAINING PROGRAM

## 2025-05-06 RX ORDER — ACETAMINOPHEN 500 MG
1000 TABLET ORAL EVERY 8 HOURS
COMMUNITY
Start: 2025-05-06 | End: 2025-05-16

## 2025-05-06 RX ORDER — METHOCARBAMOL 500 MG/1
500 TABLET, FILM COATED ORAL 3 TIMES DAILY PRN
Qty: 20 TABLET | Refills: 0 | Status: SHIPPED | OUTPATIENT
Start: 2025-05-06

## 2025-05-06 RX ADMIN — OXYCODONE HYDROCHLORIDE AND ACETAMINOPHEN 1000 MG: 500 TABLET ORAL at 08:05

## 2025-05-06 RX ADMIN — LEVOTHYROXINE SODIUM 137 MCG: 25 TABLET ORAL at 05:05

## 2025-05-06 RX ADMIN — BUPROPION HYDROCHLORIDE 150 MG: 150 TABLET, EXTENDED RELEASE ORAL at 08:05

## 2025-05-06 RX ADMIN — THERA TABS 1 TABLET: TAB at 08:05

## 2025-05-06 RX ADMIN — SENNOSIDES AND DOCUSATE SODIUM 1 TABLET: 50; 8.6 TABLET ORAL at 08:05

## 2025-05-06 RX ADMIN — METOPROLOL SUCCINATE 25 MG: 25 TABLET, EXTENDED RELEASE ORAL at 08:05

## 2025-05-06 RX ADMIN — ACETAMINOPHEN 1000 MG: 500 TABLET ORAL at 05:05

## 2025-05-06 RX ADMIN — APIXABAN 5 MG: 5 TABLET, FILM COATED ORAL at 08:05

## 2025-05-06 RX ADMIN — CEFTRIAXONE 1 G: 1 INJECTION, POWDER, FOR SOLUTION INTRAMUSCULAR; INTRAVENOUS at 08:05

## 2025-05-06 RX ADMIN — METHOCARBAMOL 500 MG: 500 TABLET ORAL at 05:05

## 2025-05-06 RX ADMIN — POLYETHYLENE GLYCOL 3350 17 G: 17 POWDER, FOR SOLUTION ORAL at 08:05

## 2025-05-06 NOTE — PLAN OF CARE
[facility] HOME HEALTH ORDERS  FACE TO FACE ENCOUNTER    Patient Name: Angelina Man  YOB: 1950    PCP: Zina Rockwell MD   PCP Address: [address] [facility] [address]  PCP Phone Number: [phone]  PCP Fax: [phone]    Encounter Date: 05/06/2025    Discharging Team: [facility]    Admit to [facility]    Diagnoses:  Active Hospital Problems    Diagnosis  POA    *Closed nondisplaced intertrochanteric fracture of right femur with routine healing s/p IM nail on 5/3/2025 [S72.144D]  Not Applicable     Priority: 1 - High    Longstanding persistent atrial fibrillation [I48.11]  Yes     Priority: 2      Chronic    (HFpEF) heart failure with preserved ejection fraction [I50.30]  Yes     Priority: 3      Chronic    Pulmonary hypertension [I27.20]  Yes     Priority: 4      Chronic    BMI less than 19,adult [Z68.1]  Not Applicable     Priority: 5      Chronic    Macrocytic anemia [D53.9]  Yes     Priority: 6      Chronic    Acquired hypothyroidism [E03.9]  Yes     Priority: 7     Acute viral conjunctivitis of both eyes [B30.9]  Yes     Priority: 8     Acute blood loss anemia [D62]  No    Anticoagulant long-term use [Z79.01]  Not Applicable      Resolved Hospital Problems    Diagnosis Date Resolved POA    Acute cystitis without hematuria [N30.00] 05/06/2025 Yes     Priority: 2     Klebsiella pneumoniae infection [A49.8] 05/06/2025 Yes    Preop cardiovascular exam [Z01.810] 05/03/2025 Not Applicable    Preoperative examination [Z01.818] 05/03/2025 Not Applicable       Future Appointments   Date Time Provider Department Center   5/19/2025  9:45 AM Aleida Pires MD [facility] ORTHO [place] [facility]   6/16/2025  9:45 AM Aleida Pires MD [facility] ORTHO [facility]   6/24/2025  2:00 PM [facility]IC-US1 [facility]   6/30/2025  2:00 PM Fozia Garcia MD [facility] DERM [place]   10/23/2025  2:45 PM INJECTION [facility] AMB INF [facility]   3/17/2026 12:45 PM [place],  Tone OTOOLE OD [facility] OPTOMTY [place]       I have seen and examined this patient face to face today. My clinical findings that support the need for the home health skilled services and home bound status are the following:  Weakness/numbness causing balance and gait disturbance due to hip fracture, surgery and Weakness/Debility making it taxing to leave home.  Requiring assistive device to leave home due to unsteady gait caused by hip fracture, surgery and Weakness/Debility.    Allergies:Review of patient's allergies indicates:  No Known Allergies    Diet: regular diet    Activities: activity as tolerated    Weight bearing: Weight bear as tolerated to right lower extremity    Nursing:   SN to complete comprehensive assessment including routine vital signs. Instruct on disease process and s/s of complications to report to MD. Review/verify medication list sent home with the patient at time of discharge  and instruct patient/caregiver as needed. Frequency may be adjusted depending on start of care date.      Notify MD if SBP > 160 or < 90; DBP > 90 or < 50; HR > 120 or < 50; Temp > 101    Ok to schedule additional visits based on staff availability and patient request on consecutive days within the home health episode.     When multiple disciplines ordered:     Start of Care occurs on Sunday - Wednesday schedule remaining discipline evaluations as ordered on separate consecutive days following the start of care.     Thursday SOC -schedule subsequent evaluations Friday and Monday the following week.      Friday - Saturday SOC - schedule subsequent discipline evaluations on consecutive days starting Monday of the following week.     For all post-discharge communication and subsequent orders please contact patient's primary care physician. If unable to reach primary care physician or do not receive response within 30 minutes, please contact [facility] for clinical staff order clarification.      CONSULTS:     Physical Therapy to evaluate and treat. Evaluate for home safety and equipment needs; Establish/upgrade home exercise program. Perform / instruct on therapeutic exercises, gait training, transfer training, and Range of Motion.  Occupational Therapy to evaluate and treat. Evaluate home environment for safety and equipment needs. Perform/Instruct on transfers, ADL training, ROM, and therapeutic exercises.      WOUND CARE ORDERS  Keep surgical dressings in place that was applied post-op and leave on right hip and lateral thigh and do not remove until Orthopedic clinic follow-up. Assess surgical dressing with each treatment. Call MD if any drainage reaches border to border of dressing horizontally, signs or symptoms of infection, temp >101 F, induration, swelling or redness.      Medications: Review discharge medications with patient and family and provide education.         Medication List        CHANGE how you take these medications      acetaminophen 500 MG tablet  Commonly known as: TYLENOL  Take 2 tablets (1,000 mg total) by mouth every 8 (eight) hours. for 10 days  What changed:   how much to take  when to take this  reasons to take this            CONTINUE taking these medications      acyclovir 400 MG tablet  Commonly known as: ZOVIRAX  Take 1 tablet (400 mg total) by mouth once daily.     ascorbic acid (vitamin C) 1000 MG tablet  Commonly known as: VITAMIN C  Take 1,000 mg by mouth once daily.     biotin 1 mg tablet  Take 1 mg by mouth 2 (two) times daily.     buPROPion 150 MG TBSR 12 hr tablet  Commonly known as: WELLBUTRIN SR  Take 1 tablet (150 mg total) by mouth 2 (two) times daily.     diclofenac sodium 1 % Gel  Commonly known as: VOLTAREN ARTHRITIS PAIN  Apply 2 g topically every 12 (twelve) hours.     digestive enzymes Tab  Take 1 tablet by mouth once daily.     ELIQUIS 5 mg Tab  Generic drug: apixaban  TAKE 1 TABLET(5 MG) BY MOUTH TWICE DAILY     fluticasone propionate 50 mcg/actuation nasal  spray  Commonly known as: FLONASE  1 spray (50 mcg total) by Each Nostril route 2 (two) times daily as needed for Rhinitis.     furosemide 20 MG tablet  Commonly known as: LASIX  Take 1 tablet (20 mg total) by mouth once daily.     ketoconazole 2 % cream  Commonly known as: NIZORAL  aaa bid prn flare under arm and qhs to areas on face     lactulose 20 gram/30 mL Soln  Commonly known as: CHRONULAC  Take 30mL twice daily (every 12 hours) until bowel movement     levothyroxine 137 MCG Tab tablet  Commonly known as: SYNTHROID  Take 1 tablet (137 mcg total) by mouth before breakfast.     lubiprostone 24 MCG Cap  Commonly known as: AMITIZA  Take 1 capsule (24 mcg total) by mouth daily as needed (IBS symptoms).     magnesium citrate solution  Drink 1 half of bottle. Drink second half of bottle if no bowel movement 12 hours after first dose.     methocarbamoL 500 MG Tab  Commonly known as: Robaxin  Take 1 tablet (500 mg total) by mouth 3 (three) times daily as needed (mus).     metoprolol succinate 25 MG 24 hr tablet  Commonly known as: TOPROL-XL  Take 1 tablet (25 mg total) by mouth once daily.     mirtazapine 7.5 MG Tab  Commonly known as: REMERON  TAKE 1 TABLET(7.5 MG) BY MOUTH EVERY NIGHT     multivitamin per tablet  Commonly known as: THERAGRAN  Take 1 tablet by mouth once daily.     mupirocin 2 % ointment  Commonly known as: BACTROBAN  Apply once daily during the dressing change     potassium chloride SA 20 MEQ tablet  Commonly known as: K-DUR,KLOR-CON  Take 1 tablet (20 mEq total) by mouth once daily.     pramipexole 0.25 MG tablet  Commonly known as: MIRAPEX  TAKE 1 TABLET(0.25 MG) BY MOUTH EVERY EVENING FOR RESTLESS LEGS     traZODone 50 MG tablet  Commonly known as: DESYREL  Take 1-2 tablets ( mg total) by mouth every evening.     zinc 50 mg Tab  Take 50 mg by mouth once daily.                I certify that this patient is confined to her home and needs intermittent skilled nursing care, physical therapy and  occupational therapy.      ELLY DANIELS MD  Attending Staff Physician   [facility]  Pager: [phone]  Spectralink: [phone]

## 2025-05-06 NOTE — PLAN OF CARE
[facility]  Discharge Final Note    Primary Care Provider: Zina Rockwell MD    Expected Discharge Date: 5/6/2025    Final Discharge Note (most recent)       Final Note - 05/06/25 1403          Final Note    Assessment Type Final Discharge Note     Anticipated Discharge Disposition Home-Health Care [facility]     What phone number can be called within the next 1-3 days to see how you are doing after discharge? --   [phone]       Post-Acute Status    Post-Acute Authorization Home Health     Home Health Status Set-up Complete/Auth obtained                     Important Message from Medicare  Important Message from Medicare regarding Discharge Appeal Rights: Given to patient/caregiver, Signed/date by patient/caregiver, Explained to patient/caregiver     Date IMM was signed: 05/06/25  Time IMM was signed: 0748      Future Appointments   Date Time Provider Department Center   5/19/2025  9:45 AM Aleida Pires MD [facility] ORTHO [place] [facility] [place]   6/16/2025  9:45 AM Aleida Pires MD [facility]   6/24/2025  2:00 PM [facility]   6/30/2025  2:00 PM Fozia Garcia MD [facility] DERM [place]   10/23/2025  2:45 PM INJECTION [facility]B [facility]   3/17/2026 12:45 PM Tone Diaz OD [facility] OPTOMTY [place]     Patient discharged home to care of [facility] and [facility] on 5/6/25.    Verenice Huynh RNCM  Case Management  [facility]  [phone]

## 2025-05-06 NOTE — PT/OT/SLP PROGRESS
Occupational Therapy   Treatment    Name: Angelina Man  MRN: [id number]  Admitting Diagnosis:  Closed nondisplaced intertrochanteric fracture of right femur with routine healing  3 Days Post-Op    Recommendations:     Discharge Recommendations: Low Intensity Therapy  Discharge Equipment Recommendations:  hip kit  Barriers to discharge:  None    Assessment:     Angelina Man is a 75 y.o. female with a medical diagnosis of Closed nondisplaced intertrochanteric fracture of right femur with routine healing.  She presents with the following performance deficits affecting function are weakness, impaired endurance, impaired self care skills, impaired functional mobility, gait instability, impaired balance, decreased coordination, decreased lower extremity function, decreased safety awareness, pain, orthopedic precautions.  Patient continues to demonstrate the need for low intensity therapy on a scheduled basis exhibited by decreased independence with self-care and functional mobility     Rehab Prognosis:  Good; patient would benefit from acute skilled OT services to address these deficits and reach maximum level of function.       Plan:     Patient to be seen daily to address the above listed problems via self-care/home management, therapeutic activities, therapeutic exercises, neuromuscular re-education  Plan of Care Expires: 05/17/25  Plan of Care Reviewed with: patient, daughter, son    Subjective     Chief Complaint: R LE pain during LBD  Patient/Family Comments/goals: to d/c home  Pain/Comfort:  Pain Rating 1: other (see comments) (unrated)  Location - Side 1: Right  Location - Orientation 1: generalized  Location 1: hip  Pain Addressed 1: Pre-medicate for activity, Reposition, Distraction  Pain Rating Post-Intervention 1: other (see comments) (unrated)    Objective:     Communicated with: RN prior to session.  Patient found ambulatory in room/esposito with Other (comments) (no lines attached) upon OT entry to  room.    General Precautions: Standard, fall    Orthopedic Precautions:RLE weight bearing as tolerated  Braces: N/A  Respiratory Status: Room air     Occupational Performance:     Functional Mobility/Transfers:  Patient completed Sit <> Stand Transfer with stand by assistance  with  rolling walker   Functional Mobility: Pt engaged in functional mobility to simulate household/community distances 10 ft with SBA and RW in order to maximize functional endurance and standing balance required for engagement in occupations of choice   No LOB or SOB noted  Cues for sequencing    Activities of Daily Living:  Lower Body Dressing: moderate assistance required to don R sock; Mod (A) to thread b/l feet through pants with pt able to manage above knees, hips, and rear in standing with RW  Education provided on lower body dressing strategies post surgery  Education provided on adaptive equipment use (I.e., hip kit) to assist with lower body dressing      [facility] 6 Click ADL: 19    Treatment & Education:  -Pt educated to dress surgical site first and further dressing techniques s/p surgery  -Pt educated on hand placement for transfers  -Pt educated on RW placement for ADLs  -Pt educated on proper foot wear s/p surgery  -Pt educated on positioning of sx LE during performance of functional activities vs rest/sleep  -Pt educated on weightbearing precautions with pt verbalizing correctly  -Pt educated to call for assistance and to transfer with hospital staff only  -Pt educated on role of OT and plan of care s/p surgery, white board updated     Patient left up in chair with all lines intact, call button in reach, and family present    GOALS:   Multidisciplinary Problems       Occupational Therapy Goals          Problem: Occupational Therapy    Goal Priority Disciplines Outcome Interventions   Occupational Therapy Goal     OT, PT/OT Progressing    Description: Goals to be met by: 5/17/25     Patient will increase functional independence  with ADLs by performing:    LE Dressing with Modified [place].  Grooming while standing at sink with Modified [place].  Toileting from toilet with Modified [place] for hygiene and clothing management.   Toilet transfer to toilet with Modified [place].                         DME Justifications:   Angelina requires a commode for home use because she is confined to a single room.    Time Tracking:     OT Date of Treatment: 05/06/25  OT Start Time: 1208  OT Stop Time: 1220  OT Total Time (min): 12 min    Billable Minutes:Self Care/Home Management 12    OT/JULIOCESAR: OT          5/6/2025

## 2025-05-06 NOTE — ASSESSMENT & PLAN NOTE
Patient with BMI 16.35 on admit. Nutrition consulted. Boost supplements with meals.     Acute cystitis without hematuria  Klebsiella pneumoniae infection  Resolved on discharge.   - Present on admit. U/A on admit positive for UTI so patient started on IV Ceftriaxone to treat on 5/4 and urine culture sent and will need to follow-up results. Patient reports she has been having urinary frequency over the past week and though she did have a UTI as similar to previous UTI symptoms. No systemic signs of infection.  - Final urine culture returned on 5/5 with 10,000 - 49,999 cfu/ml Klebsiella pneumoniae sensitive to Ceftriaxone and will continue Ceftriaxone to treat and plan 3 day course and last dose on 5/6. Patient completed 3 day course of IV Ceftriaxone in hospital so no further antibiotics needed on discharge.

## 2025-05-06 NOTE — CARE UPDATE
Pt comfortable. Dressing for right SIFI CDI. Catheter discontinued, blue tip intact. Pt tolerated well.

## 2025-05-06 NOTE — ASSESSMENT & PLAN NOTE
Patient has persistent (7 days or more) atrial fibrillation. Patient is currently in atrial fibrillation. Initially in RVR, improved with PO Metoprolol. [id number] Score: 3. The patients heart rate in the last 24 hours is as follows:  Pulse  Min: 77  Max: 91     Antiarrhythmics   metoprolol succinate (TOPROL-XL) 24 hr tablet 25 mg, Daily, Oral     Anticoagulants   apixaban tablet 5 mg, 2 times daily, Oral       Plan  - Replete lytes with a goal of K>4, Mg >2  - Patient's afib is currently controlled on home po Metoprolol and will continue on discharge.  - Home Apixiban held pre-op for her long term anticoagulation for stroke prevention but resumed post-op at 5 mg po BID and continue on discharge.

## 2025-05-06 NOTE — ASSESSMENT & PLAN NOTE
Patient has persistent (7 days or more) atrial fibrillation. Patient is currently in atrial fibrillation. Initially in RVR, improved with PO Metoprolol. [id number] Score: 3. The patients heart rate in the last 24 hours is as follows:  Pulse  Min: 77  Max: 91     Antiarrhythmics   metoprolol succinate (TOPROL-XL) 24 hr tablet 25 mg, Daily, Oral     Anticoagulants   apixaban tablet 5 mg, 2 times daily, Oral       Plan  - Replete lytes with a goal of K>4, Mg >2  - Patient's afib is currently controlled on home po Metoprolol and will continue on discharge.  - Home Apixiban held pre-op for her long term anticoagulation for stroke prevention but resumed post-op at 5 mg po BID and continue on discharge.

## 2025-05-06 NOTE — DISCHARGE SUMMARY
"[facility] Medicine  Discharge Summary      Patient Name: Angelina Man  MRN: [id number]  RUSSEL: [id number]  Patient Class: IP- Inpatient  Admission Date: 5/2/2025  Hospital Length of Stay: 4 days  Discharge Date and Time: 5/6/2025  1:30 PM  Attending Physician: Rebekah Schumacher MD   Discharging Provider: Rebekah Schumacher MD  Primary Care Provider: Zina Rockwell MD  [facility] Medicine Team: [facility] Rebekah Schumacher MD  Primary Care Team: [facility]    HPI:   Angelina Man is a pleasant 75 y.o. female with permanent AFib, HFpEF, hypothyroidism presenting with hip fracture.     On Easter, she reports that she drank a martini with family, then had an accidental fall onto her right hip.  She did hit her head and had an occipital hematoma, left eye bruising, and right arm trauma.  She noted right hip pain particularly with ambulation, and was evaluated at an urgent care where hip x-ray showed no obvious fracture.  She then went to an ED for CT scans of her head, which reportedly showed no bleed.  She has noted difficulty ambulating due to the hip pain, however has still been able to do so.  She has even gone to a few of her usual spin classes, which reportedly helped her hip pain.  On Thursday, after span, she could barely walk, prompting presentation for further evaluation.  MRI was performed, showing intertrochanteric femur fracture.     She is very active at baseline, and goes to the gym every day.  She she goes to spin classes multiple times per week and lifts weights.  She lives in a condo and walks up stairs without difficulty. She performs all ADLs independently.  She reports chronic lower extremity edema improved with compression stockings, but denies any chest pain, shortness for breath, or palpitations.  She is compliant with all medications, including her metoprolol and Eliquis for AFib.    Of note, she reports chronic dry eyes with intermittent "crusting" for which she " takes Systane. She has noted increased itching and crusting to the eyes since presentation to the ED.       5/3/2025  Procedure(s) (LRB):  INSERTION, INTRAMEDULLARY BERNADINE, FEMUR - right selina (Right)    Surgeon(s):  lAeida Pires MD Taylor, Spencer, MD Volkman, T. Kramer, MD Wilder, J. Heath, MD      Hospital Course:   Patient admitted after found to have a closed nondisplaced right intertrochanteric fracture on MRI imaging. Patient had fall around [facility] and has had progressive right hip pain since then with negative X-rays of right hip so seen in Ortho clinic on 5/1 and MRI of pelvis done and revealed closed nondisplaced right femur intertrochanteric fracture and called yesterday, 5/2 and told to go to ED fro admission. Patient admitted to [facility] Medicine Team H: Hip Fracture team and started on Hip Fracture Pathway with Orthopedic surgery consult for for right closed intertrochanteric femur fracture. Patient was seen and evaluated by Orthopedic surgery who recommended operative repair of fracture. Patient was medically optimized prior to surgery and was taken to OR after optimization on 5/3/2025. Patient underwent right hip cephalomedullary nail fixation by Dr. Aleida Pires. Post-op patient weight bear as tolerated to the right lower extremity as per Orthopedics recommendation. Patient resumed on her home Apixiban 5 mg po BID that she takes for long term anticoagulation for her PAF so no other DVT prophylaxis required post-op.  Perineural pain catheter placed by Anesthesia Pain Service with continuous infusion of Ropivacaine to help with pain control post-op and Anesthesia Pain Service managing while patient in the hospital. Patient placed on multimodal pain management post-op with Tylenol 1000 mg po every 6 hours and Robaxin 500 mg po every 6 hours post-op and will continue. PT/OT consulted post-op and recommending low intensity on discharge. Patient placed on IV Ceftriaxone 1 gram daily post-op to  treat UTI found on admit on 5/4. Final urine culture returned on 5/5 grew 10,000 - 49,999 cfu/ml Klebsiella pneumoniae sensitive to Ceftriaxone and will continue Ceftriaxone to treat on 5/5 and plan 3 day course and last dose on 5/6. Patient ambulated multiple times in hallway with rolling walker with therapy on 5/5. Patient doing well on 5/6 and discharged in good condition to home with HH PT/OT arranged prior to discharge. No DME needed on discharge as patient has DME at home. Surgical bandage to remain in place to right hip until Ortho clinic follow-up. Perineural catheter removed by Anesthesia prior to discharge on 5/6. Patient completed 3 day course of IV Ceftriaxone on 5/6 for her UTI found on admit so no further antibiotics needed on discharge.        Goals of Care Treatment Preferences:  Code Status: Full Code    Living Will: Yes              SDOH Screening:  The patient was screened for utility difficulties, food insecurity, transport difficulties, housing insecurity, and interpersonal safety and there were no concerns identified this admission.     Consults:   Consults (From admission, onward)          Status Ordering Provider     Inpatient consult to Cardiology  Once        Provider:  (Not yet assigned)    Completed MEL LOAIZA     Inpatient consult to Orthopedic Surgery  Once        Provider:  (Not yet assigned)    Completed RAD NIEVES            Assessment & Plan  Closed nondisplaced intertrochanteric fracture of right femur with routine healing s/p IM nail on 5/3/2025  Patient admitted after found to have a closed nondisplaced right intertrochanteric fracture on MRI imaging. Patient had fall on Easter weekend and had X-rays done at that time of right hip that were negative but has had progressive right hip pain since then. Patient seen in Ortho clinic on 5/1 and MRI of pelvis ordered to evaluate pain and done on 5/2 and revealed closed nondisplaced right femur intertrochanteric  fracture and Ortho clinic called yesterday, 5/2 and told patient to go to ED for admission. Patient was seen and evaluated by Orthopedic surgery who recommended operative repair of fracture.   - Patient was taken to OR on 5/3/2025 and underwent right hip cephalomedullary nail fixation by Dr. Aleida Pires.   - Post-op, patient weight bear as tolerated to the right lower extremity as per Orthopedics recommendation and continue on discharge.   - Patient resumed on her home Apixiban 5 mg po BID that she takes for long term anticoagulation for her PAF so no other DVT prophylaxis required post-op and to continue on discharge.   - Perineural pain catheter removed by Anesthesia on 5/6.   - Continue on multimodal pain management post-op with Tylenol 1000 mg po every 9 hours + Robaxin 500 mg po every 6 hours prn post-op on discharge. Pain controlled to right hip on discharge.   - PT/OT consulted post-op and patient doing well and recommending low intensity on discharge when ready. Patient doing well on 5/6 and discharged in good condition to home with  PT/OT arranged prior to discharge. No DME needed on discharge as patient has DME at home.  - Ortho following and managing surgical site.  - Surgical bandage to remain in place until Ortho clinic follow-up. Patient will need staples removed at approximately 2 weeks. Removal may be performed at a rehab facility, by a PCP or by myself in the Orthopedic clinic.  - Follow up with Dr. Pires in Ortho clinic in 6 weeks with an AP pelvis and right hip x-ray.    Longstanding persistent atrial fibrillation  Anticoagulant long-term use  Patient has persistent (7 days or more) atrial fibrillation. Patient is currently in atrial fibrillation. Initially in RVR, improved with PO Metoprolol. [id number] Score: 3. The patients heart rate in the last 24 hours is as follows:  Pulse  Min: 77  Max: 91     Antiarrhythmics   metoprolol succinate (TOPROL-XL) 24 hr tablet 25 mg, Daily, Oral      Anticoagulants   apixaban tablet 5 mg, 2 times daily, Oral       Plan  - Replete lytes with a goal of K>4, Mg >2  - Patient's afib is currently controlled on home po Metoprolol and will continue on discharge.  - Home Apixiban held pre-op for her long term anticoagulation for stroke prevention but resumed post-op at 5 mg po BID and continue on discharge.   Macrocytic anemia  Acute blood loss anemia  - Anemia stable on discharge. Hgb 8.6 on 5/6.   - Anemia worsening and down to 8.8 on 5/5 from 9.7 on 5/4. Expected blood loss related to surgery. No clinical signs of bleeding. Vital signs stable. Continue to monitor Hgb levels daily. No indication for need for blood transfusion.  - Anemia worsening on 5/4. Hgb down to 9.7 on 5/4 from 1.4 on 5/3. Expected blood loss related to surgery. No clinical signs of bleeding. Vital signs stable. Continue to monitor Hgb levels daily. No indication for need for blood transfusion.  - Anemia is likely due to chronic disease. Most recent hemoglobin and hematocrit are listed below. Recent B12 and folate levels normal.   Recent Labs     05/04/25  0609 05/05/25  0458 05/06/25  0414   HGB 9.7* 8.8* 8.6*   HCT 31.6* 27.9* 27.2*     Plan  - Patient did not receive any PRBC transfusions in hospital.     Acute viral conjunctivitis of both eyes (Resolved: 5/7/2025)  - Much improved on 5/4 and 5/5. Resolved on 5/6.   - Present on admit. Patient complaining of redness to her eyes and appears she has a viral conjunctivitis to both eyes and artifical tears ordered to help with dryness. Patient advised not to rub her eyes.   Pulmonary hypertension  Chronic condition and stable on room air.     (HFpEF) heart failure with preserved ejection fraction  Patient has Diastolic (HFpEF) heart failure that is Chronic. On presentation their CHF was well compensated.     Latest ECHO  Results for orders placed during the hospital encounter of 10/07/24    Echo    Interpretation Summary    Left Ventricle: The  left ventricle is normal in size. Normal wall thickness. There is concentric remodeling. There is low normal systolic function with a visually estimated ejection fraction of 50 - 55%. Unable to assess diastolic function due to atrial fibrillation.    Right Ventricle: Normal right ventricular cavity size. Wall thickness is normal. Systolic function is mildly reduced.    Biatrial enlargement    Patent foramen ovale visualized with predominant left to right shunting indicated by color flow Doppler.    Mitral Valve: There is mild to moderate regurgitation.    Tricuspid Valve: There is mild regurgitation.    Pulmonary Artery: The estimated pulmonary artery systolic pressure is 36 mmHg.    IVC/SVC: Elevated venous pressure at 15 mmHg.    Pericardium: There is a small effusion. No indication of cardiac tamponade.    Current Heart Failure Medications   metoprolol succinate (TOPROL-XL) 24 hr tablet 25 mg, Daily, Oral     Plan  - Cardiology has been consulted.  - The patient's volume status is at their baseline.  - Continue Toprol XL on discharge.     Acquired hypothyroidism  Chronic and controlled. Continue home Synthroid to treat on discharge.     BMI less than 19,adult  Patient with BMI 16.35 on admit. Nutrition consulted. Boost supplements with meals.     Acute cystitis without hematuria  Klebsiella pneumoniae infection  Resolved on discharge.   - Present on admit. U/A on admit positive for UTI so patient started on IV Ceftriaxone to treat on 5/4 and urine culture sent and will need to follow-up results. Patient reports she has been having urinary frequency over the past week and though she did have a UTI as similar to previous UTI symptoms. No systemic signs of infection.  - Final urine culture returned on 5/5 with 10,000 - 49,999 cfu/ml Klebsiella pneumoniae sensitive to Ceftriaxone and will continue Ceftriaxone to treat and plan 3 day course and last dose on 5/6. Patient completed 3 day course of IV Ceftriaxone in  hospital so no further antibiotics needed on discharge.      Final Active Diagnoses:    Diagnosis Date Noted POA    PRINCIPAL PROBLEM:  Closed nondisplaced intertrochanteric fracture of right femur with routine healing s/p IM nail on 5/3/2025 [S72.144D] 05/02/2025 Not Applicable    Longstanding persistent atrial fibrillation [I48.11] 07/16/2020 Yes     Chronic    (HFpEF) heart failure with preserved ejection fraction [I50.30] 05/15/2022 Yes     Chronic    Pulmonary hypertension [I27.20]  Yes     Chronic    BMI less than 19,adult [Z68.1] 05/02/2025 Not Applicable     Chronic    Macrocytic anemia [D53.9] 05/02/2025 Yes     Chronic    Acquired hypothyroidism [E03.9] 06/23/2016 Yes    Acute viral conjunctivitis of both eyes [B30.9] 05/03/2025 Yes    Acute blood loss anemia [D62] 05/04/2025 No    Anticoagulant long-term use [Z79.01] 11/03/2021 Not Applicable      Problems Resolved During this Admission:    Diagnosis Date Noted Date Resolved POA    Acute cystitis without hematuria [N30.00] 05/03/2025 05/06/2025 Yes    Klebsiella pneumoniae infection [A49.8] 05/05/2025 05/06/2025 Yes    Preop cardiovascular exam [Z01.810] 05/03/2025 05/03/2025 Not Applicable    Preoperative examination [Z01.818] 05/02/2025 05/03/2025 Not Applicable       Discharged Condition: good    Disposition: Home-Health Care c    Follow Up:  Future Appointments   Date Time Provider Department Center   5/19/2025  9:45 AM Aleida Pires MD [facility]   6/16/2025  9:45 AM Aleida Pires MD [facility] ORTHO [facility]   6/24/2025  2:00 PM NOM OIC-US1 MASTER NOM ULTR [facility]   6/30/2025  2:00 PM Fozia Garcia MD [facility] DERM [place]   10/23/2025  2:45 PM INJECTION [facility] AMB INF [facility]   3/17/2026 12:45 PM Tone Diaz OD [facility] OPTOMTY [place]        Contact information for after-discharge care       Home Medical Care       [facility] .    Service: Home Rehabilitation  Contact  information:  [address], [address]  [facility] [place] [address]  [phone]                                 Patient Instructions:      Ambulatory referral/consult to Orthopedics   Standing Status: Future   Referral Priority: Routine Referral Type: Consultation   Requested Specialty: Orthopedic Surgery   Number of Visits Requested: 1     Ambulatory referral/consult to Orthopedics Fracture Care   Standing Status: Future   Referral Priority: Routine Referral Type: Consultation   Requested Specialty: Orthopedic Surgery   Number of Visits Requested: 1     Diet Adult Regular     Leave dressing on - Keep it clean, dry, and intact until clinic visit     Notify your health care provider if you experience any of the following:  temperature >100.4     Notify your health care provider if you experience any of the following:  persistent nausea and vomiting or diarrhea     Notify your health care provider if you experience any of the following:  severe uncontrolled pain     Notify your health care provider if you experience any of the following:  redness, tenderness, or signs of infection (pain, swelling, redness, odor or green/yellow discharge around incision site)     Notify your health care provider if you experience any of the following:  difficulty breathing or increased cough     Notify your health care provider if you experience any of the following:  severe persistent headache     Notify your health care provider if you experience any of the following:  worsening rash     Notify your health care provider if you experience any of the following:  persistent dizziness, light-headedness, or visual disturbances     Notify your health care provider if you experience any of the following:  increased confusion or weakness     Sponge bath only until clinic visit     Ice to affected area   Order Comments: Right hip and thigh to help with pain as needed     No driving until:   Order Comments: Until cleared by Orthopedics      Weight bearing restrictions (specify):   Order Comments: Weight bear as tolerated to right lower extremity       Significant Diagnostic Studies: Labs: BMP:   Recent Labs   Lab 05/05/25  0458 05/06/25  0414   GLU 90 104    136   K 3.9 3.9    101   CO2 25 27   BUN 13 12   CREATININE 0.7 0.6   CALCIUM 7.8* 7.8*   MG 2.0 1.8    and CBC   Recent Labs   Lab 05/05/25  0458 05/06/25  0414   WBC 9.85 8.93   HGB 8.8* 8.6*   HCT 27.9* 27.2*    389       Pending Diagnostic Studies:       None           Medications:  Reconciled Home Medications:      Medication List        CHANGE how you take these medications      acetaminophen 500 MG tablet  Commonly known as: TYLENOL  Take 2 tablets (1,000 mg total) by mouth every 8 (eight) hours. for 10 days  What changed:   how much to take  when to take this  reasons to take this     methocarbamoL 500 MG Tab  Commonly known as: Robaxin  Take 1 tablet (500 mg total) by mouth 3 (three) times daily as needed (Muscle spasms or pain).  What changed: reasons to take this            CONTINUE taking these medications      acyclovir 400 MG tablet  Commonly known as: ZOVIRAX  Take 1 tablet (400 mg total) by mouth once daily.     ascorbic acid (vitamin C) 1000 MG tablet  Commonly known as: VITAMIN C  Take 1,000 mg by mouth once daily.     biotin 1 mg tablet  Take 1 mg by mouth 2 (two) times daily.     buPROPion 150 MG TBSR 12 hr tablet  Commonly known as: WELLBUTRIN SR  Take 1 tablet (150 mg total) by mouth 2 (two) times daily.     diclofenac sodium 1 % Gel  Commonly known as: VOLTAREN ARTHRITIS PAIN  Apply 2 g topically every 12 (twelve) hours.     digestive enzymes Tab  Take 1 tablet by mouth once daily.     ELIQUIS 5 mg Tab  Generic drug: apixaban  TAKE 1 TABLET(5 MG) BY MOUTH TWICE DAILY     fluticasone propionate 50 mcg/actuation nasal spray  Commonly known as: FLONASE  1 spray (50 mcg total) by Each Nostril route 2 (two) times daily as needed for Rhinitis.     furosemide  20 MG tablet  Commonly known as: LASIX  Take 1 tablet (20 mg total) by mouth once daily.     ketoconazole 2 % cream  Commonly known as: NIZORAL  aaa bid prn flare under arm and qhs to areas on face     lactulose 20 gram/30 mL Soln  Commonly known as: CHRONULAC  Take 30mL twice daily (every 12 hours) until bowel movement     levothyroxine 137 MCG Tab tablet  Commonly known as: SYNTHROID  Take 1 tablet (137 mcg total) by mouth before breakfast.     lubiprostone 24 MCG Cap  Commonly known as: AMITIZA  Take 1 capsule (24 mcg total) by mouth daily as needed (IBS symptoms).     magnesium citrate solution  Drink 1 half of bottle. Drink second half of bottle if no bowel movement 12 hours after first dose.     metoprolol succinate 25 MG 24 hr tablet  Commonly known as: TOPROL-XL  Take 1 tablet (25 mg total) by mouth once daily.     mirtazapine 7.5 MG Tab  Commonly known as: REMERON  TAKE 1 TABLET(7.5 MG) BY MOUTH EVERY NIGHT     multivitamin per tablet  Commonly known as: THERAGRAN  Take 1 tablet by mouth once daily.     mupirocin 2 % ointment  Commonly known as: BACTROBAN  Apply once daily during the dressing change     potassium chloride SA 20 MEQ tablet  Commonly known as: K-DUR,KLOR-CON  Take 1 tablet (20 mEq total) by mouth once daily.     pramipexole 0.25 MG tablet  Commonly known as: MIRAPEX  TAKE 1 TABLET(0.25 MG) BY MOUTH EVERY EVENING FOR RESTLESS LEGS     traZODone 50 MG tablet  Commonly known as: DESYREL  Take 1-2 tablets ( mg total) by mouth every evening.     zinc 50 mg Tab  Take 50 mg by mouth once daily.              Indwelling Lines/Drains at time of discharge:   Lines/Drains/Airways       None                   32 minutes of time spent on discharge, including examining the patient, providing discharge instructions, arranging follow-up and documentation.            Rebekah Schumacher MD  Department of [facility] Medicine  [facility] - Surgery

## 2025-05-06 NOTE — ASSESSMENT & PLAN NOTE
Patient has Diastolic (HFpEF) heart failure that is Chronic. On presentation their CHF was well compensated.     Latest ECHO  Results for orders placed during the hospital encounter of 10/07/24    Echo    Interpretation Summary    Left Ventricle: The left ventricle is normal in size. Normal wall thickness. There is concentric remodeling. There is low normal systolic function with a visually estimated ejection fraction of 50 - 55%. Unable to assess diastolic function due to atrial fibrillation.    Right Ventricle: Normal right ventricular cavity size. Wall thickness is normal. Systolic function is mildly reduced.    Biatrial enlargement    Patent foramen ovale visualized with predominant left to right shunting indicated by color flow Doppler.    Mitral Valve: There is mild to moderate regurgitation.    Tricuspid Valve: There is mild regurgitation.    Pulmonary Artery: The estimated pulmonary artery systolic pressure is 36 mmHg.    IVC/SVC: Elevated venous pressure at 15 mmHg.    Pericardium: There is a small effusion. No indication of cardiac tamponade.    Current Heart Failure Medications   metoprolol succinate (TOPROL-XL) 24 hr tablet 25 mg, Daily, Oral     Plan  - Cardiology has been consulted.  - The patient's volume status is at their baseline.  - Continue Toprol XL on discharge.

## 2025-05-06 NOTE — ANESTHESIA POST-OP PAIN MANAGEMENT
Acute Pain Service Progress Note    Angelina Man is a 75 y.o., female, [id number].    Surgery:  INSERTION, INTRAMEDULLARY PRATIK, FEMUR     Post Op Day #: 3    Catheter type: L SIFI PNC    Infusion type: 0.2% ropivacaine 15 mL q3h    Problem List:    Active Hospital Problems    Diagnosis  POA    *Closed nondisplaced intertrochanteric fracture of right femur with routine healing s/p IM nail on 5/3/2025 [S72.144D]  Not Applicable    Klebsiella pneumoniae infection [A49.8]  Yes    Acute blood loss anemia [D62]  No    Acute cystitis without hematuria [N30.00]  Yes    Acute viral conjunctivitis of both eyes [B30.9]  Yes    Macrocytic anemia [D53.9]  Yes     Chronic    BMI less than 19,adult [Z68.1]  Not Applicable     Chronic    (HFpEF) heart failure with preserved ejection fraction [I50.30]  Yes     Chronic    Pulmonary hypertension [I27.20]  Yes     Chronic    Anticoagulant long-term use [Z79.01]  Not Applicable    Longstanding persistent atrial fibrillation [I48.11]  Yes     Chronic    Acquired hypothyroidism [E03.9]  Yes      Resolved Hospital Problems    Diagnosis Date Resolved POA    Preop cardiovascular exam [Z01.810] 05/03/2025 Not Applicable    Preoperative examination [Z01.818] 05/03/2025 Not Applicable       Subjective:     General appearance of alert, oriented, no complaints   Pain with rest: 1    Numbers   Pain with movement: 2    Numbers   Side Effects    1. Pruritis No    2. Nausea No    3. Motor Blockade No, 1=Ability to bend knees and ankles    4. Sedation No, 1=awake and alert    Objective:        Catheter site clean, dry, intact      Vitals   Vitals:    05/06/25 0659   BP:    Pulse: 81   Resp:    Temp:         Labs    No results displayed because visit has over 200 results.           Meds Current Medications[1]     Anticoagulant dose Eliquis at 5 mg BID.    Assessment:  This is a 75-year-old female who is s/p left femur IM pratik insertion. Patient well with her PNC, expressed zero pain with rest and  continued improvement in pain with movement in comparison to previous post operative days.     Plan:  Paused L SIFI PNC at 15 mL q3hr this AM, will pull if tolerated  2.   Tylenol 1000 mg q8h       Thank you for your consult and allowing us to participate in the care of this patient. We will continue to follow along. Please call the Acute Pain Service/Anesthesia if you have any questions or concerns.    Chaya Hearn MD, PGY3/CA-2  Anesthesiology Department   [facility]                         [1]   Current Facility-Administered Medications   Medication Dose Route Frequency Provider Last Rate Last Admin    acetaminophen tablet 1,000 mg  1,000 mg Oral Q8H Chaya Hearn MD   1,000 mg at 05/06/25 0506    aluminum-magnesium hydroxide-simethicone 200-200-20 mg/5 mL suspension 30 mL  30 mL Oral QID PRN Kelton Wall MD        apixaban tablet 5 mg  5 mg Oral BID Phi Leon MD   5 mg at 05/05/25 2104    artificial tears 0.5 % ophthalmic solution 1 drop  1 drop Both Eyes PRN Kelton Wall MD   1 drop at 05/02/25 2347    ascorbic acid (vitamin C) tablet 1,000 mg  1,000 mg Oral Daily Kelton Wall MD   1,000 mg at 05/05/25 0841    bisacodyL suppository 10 mg  10 mg Rectal Daily PRN Kelton Wall MD        buPROPion TB24 tablet 150 mg  150 mg Oral BID Kelton Wall MD   150 mg at 05/05/25 2104    cefTRIAXone injection 1 g  1 g Intravenous Q24H Rebkeah Schumacher MD   1 g at 05/05/25 0845    glucagon (human recombinant) injection 1 mg  1 mg Intramuscular PRN Kelton Wall MD        glucose chewable tablet 16 g  16 g Oral PRN Kelton Wall MD        glucose chewable tablet 24 g  24 g Oral PRN Kelton Wall MD        levothyroxine tablet 137 mcg  137 mcg Oral Before breakfast Kelton Wall MD   137 mcg at 05/06/25 0506    metoprolol succinate (TOPROL-XL) 24 hr tablet 25 mg  25 mg Oral Daily Kelton Wall  MD Katie   25 mg at 05/05/25 1610    multivitamin tablet  1 tablet Oral Daily Kelton Wall MD   1 tablet at 05/05/25 0841    naloxone 0.4 mg/mL injection 0.02 mg  0.02 mg Intravenous PRN Kelton Wall MD        ondansetron disintegrating tablet 8 mg  8 mg Oral Q8H PRN Kelton Wall MD        polyethylene glycol packet 17 g  17 g Oral Daily PRN Kelton Wall MD        polyethylene glycol packet 17 g  17 g Oral Daily Kelton Wall MD   17 g at 05/05/25 0841    pramipexole tablet 0.25 mg  0.25 mg Oral Nightly PRN Kelton Wall MD   0.25 mg at 05/05/25 2309    ropivacaine 0.2% Perineural Pump infusion 500 ML   Perineural Continuous Christoph Richardson MD   New Bag at 05/03/25 1002    senna-docusate 8.6-50 mg per tablet 1 tablet  1 tablet Oral BID Kelton Wall MD   1 tablet at 05/05/25 2104    simethicone chewable tablet 80 mg  1 tablet Oral QID PRN Kelton Wall MD        sodium chloride 0.9% flush 10 mL  10 mL Intravenous PRN Kelton Wall MD        traZODone tablet 50 mg  50 mg Oral Nightly PRN Kelton Wall MD   50 mg at 05/05/25 2107

## 2025-05-06 NOTE — ADDENDUM NOTE
Addendum  created 05/06/25 0908 by Sharmila Bell MD    Charge Capture section accepted, Cosign clinical note with attestation

## 2025-05-06 NOTE — ASSESSMENT & PLAN NOTE
Patient admitted after found to have a closed nondisplaced right intertrochanteric fracture on MRI imaging. Patient had fall on Easter weekend and had X-rays done at that time of right hip that were negative but has had progressive right hip pain since then. Patient seen in Ortho clinic on 5/1 and MRI of pelvis ordered to evaluate pain and done on 5/2 and revealed closed nondisplaced right femur intertrochanteric fracture and Ortho clinic called yesterday, 5/2 and told patient to go to ED for admission. Patient was seen and evaluated by Orthopedic surgery who recommended operative repair of fracture.   - Patient was taken to OR on 5/3/2025 and underwent right hip cephalomedullary nail fixation by Dr. Aleida Pires.   - Post-op, patient weight bear as tolerated to the right lower extremity as per Orthopedics recommendation and continue on discharge.   - Patient resumed on her home Apixiban 5 mg po BID that she takes for long term anticoagulation for her PAF so no other DVT prophylaxis required post-op and to continue on discharge.   - Perineural pain catheter removed by Anesthesia on 5/6.   - Continue on multimodal pain management post-op with Tylenol 1000 mg po every 9 hours + Robaxin 500 mg po every 6 hours prn post-op on discharge. Pain controlled to right hip on discharge.   - PT/OT consulted post-op and patient doing well and recommending low intensity on discharge when ready. Patient doing well on 5/6 and discharged in good condition to home with  PT/OT arranged prior to discharge. No DME needed on discharge as patient has DME at home.  - Ortho following and managing surgical site.  - Surgical bandage to remain in place until Ortho clinic follow-up. Patient will need staples removed at approximately 2 weeks. Removal may be performed at a rehab facility, by a PCP or by myself in the Orthopedic clinic.  - Follow up with Dr. Pires in Ortho clinic in 6 weeks with an AP pelvis and right hip x-ray.

## 2025-05-06 NOTE — PLAN OF CARE
05/06/25 0748   Medicare Message   Important Message from Medicare regarding Discharge Appeal Rights Given to patient/caregiver;Signed/date by patient/caregiver;Explained to patient/caregiver   Date IMM was signed 05/06/25   Time IMM was signed 0748     IMM form provided to patient and signed on the above date and time.    Verenice Huynh RNCM  Case Management  [facility]  [phone]

## 2025-05-06 NOTE — ASSESSMENT & PLAN NOTE
- Anemia stable on discharge. Hgb 8.6 on 5/6.   - Anemia worsening and down to 8.8 on 5/5 from 9.7 on 5/4. Expected blood loss related to surgery. No clinical signs of bleeding. Vital signs stable. Continue to monitor Hgb levels daily. No indication for need for blood transfusion.  - Anemia worsening on 5/4. Hgb down to 9.7 on 5/4 from 1.4 on 5/3. Expected blood loss related to surgery. No clinical signs of bleeding. Vital signs stable. Continue to monitor Hgb levels daily. No indication for need for blood transfusion.  - Anemia is likely due to chronic disease. Most recent hemoglobin and hematocrit are listed below. Recent B12 and folate levels normal.   Recent Labs     05/04/25  0609 05/05/25  0458 05/06/25  0414   HGB 9.7* 8.8* 8.6*   HCT 31.6* 27.9* 27.2*     Plan  - Patient did not receive any PRBC transfusions in hospital.

## 2025-05-07 ENCOUNTER — PATIENT OUTREACH (OUTPATIENT)
Dept: ADMINISTRATIVE | Facility: CLINIC | Age: 75
End: 2025-05-07
Payer: MEDICARE

## 2025-05-07 ENCOUNTER — TELEPHONE (OUTPATIENT)
Dept: ORTHOPEDICS | Facility: CLINIC | Age: 75
End: 2025-05-07
Payer: MEDICARE

## 2025-05-07 PROBLEM — B30.9 ACUTE VIRAL CONJUNCTIVITIS OF BOTH EYES: Status: RESOLVED | Noted: 2025-05-03 | Resolved: 2025-05-07

## 2025-05-07 NOTE — PROGRESS NOTES
Spoke with patient Angelina Man . Patient has a [facility] appointment on 05/13/2025.

## 2025-05-07 NOTE — TELEPHONE ENCOUNTER
Post-op call made to patient. She stated she's doing well. Patient is aware of her 2 week post-op appointment with Dr Pires

## 2025-05-09 ENCOUNTER — PATIENT MESSAGE (OUTPATIENT)
Dept: RESEARCH | Facility: HOSPITAL | Age: 75
End: 2025-05-09
Payer: MEDICARE

## 2025-05-12 ENCOUNTER — TELEPHONE (OUTPATIENT)
Dept: RESEARCH | Facility: HOSPITAL | Age: 75
End: 2025-05-12
Payer: MEDICARE

## 2025-05-12 ENCOUNTER — OFFICE VISIT (OUTPATIENT)
Dept: INTERNAL MEDICINE | Facility: CLINIC | Age: 75
End: 2025-05-12
Payer: MEDICARE

## 2025-05-12 VITALS
DIASTOLIC BLOOD PRESSURE: 72 MMHG | SYSTOLIC BLOOD PRESSURE: 108 MMHG | HEART RATE: 50 BPM | HEIGHT: 64 IN | WEIGHT: 95.25 LBS | OXYGEN SATURATION: 82 % | BODY MASS INDEX: 16.26 KG/M2

## 2025-05-12 DIAGNOSIS — Z09 HOSPITAL DISCHARGE FOLLOW-UP: Primary | ICD-10-CM

## 2025-05-12 DIAGNOSIS — K59.00 CONSTIPATION, UNSPECIFIED CONSTIPATION TYPE: ICD-10-CM

## 2025-05-12 DIAGNOSIS — M81.0 OSTEOPOROSIS, UNSPECIFIED OSTEOPOROSIS TYPE, UNSPECIFIED PATHOLOGICAL FRACTURE PRESENCE: ICD-10-CM

## 2025-05-12 DIAGNOSIS — Z79.01 ANTICOAGULANT LONG-TERM USE: ICD-10-CM

## 2025-05-12 DIAGNOSIS — S72.044D CLOSED NONDISPLACED BASICERVICAL FRACTURE OF RIGHT FEMUR WITH ROUTINE HEALING, SUBSEQUENT ENCOUNTER: ICD-10-CM

## 2025-05-12 DIAGNOSIS — J98.11 ATELECTASIS: ICD-10-CM

## 2025-05-12 DIAGNOSIS — G25.81 RLS (RESTLESS LEGS SYNDROME): ICD-10-CM

## 2025-05-12 PROCEDURE — 1111F DSCHRG MED/CURRENT MED MERGE: CPT | Mod: CPTII,HCNC,S$GLB, | Performed by: INTERNAL MEDICINE

## 2025-05-12 PROCEDURE — 1157F ADVNC CARE PLAN IN RCRD: CPT | Mod: CPTII,HCNC,S$GLB, | Performed by: INTERNAL MEDICINE

## 2025-05-12 PROCEDURE — 1159F MED LIST DOCD IN RCRD: CPT | Mod: CPTII,HCNC,S$GLB, | Performed by: INTERNAL MEDICINE

## 2025-05-12 PROCEDURE — 3074F SYST BP LT 130 MM HG: CPT | Mod: CPTII,HCNC,S$GLB, | Performed by: INTERNAL MEDICINE

## 2025-05-12 PROCEDURE — 3078F DIAST BP <80 MM HG: CPT | Mod: CPTII,HCNC,S$GLB, | Performed by: INTERNAL MEDICINE

## 2025-05-12 PROCEDURE — 1160F RVW MEDS BY RX/DR IN RCRD: CPT | Mod: CPTII,HCNC,S$GLB, | Performed by: INTERNAL MEDICINE

## 2025-05-12 PROCEDURE — 3288F FALL RISK ASSESSMENT DOCD: CPT | Mod: CPTII,HCNC,S$GLB, | Performed by: INTERNAL MEDICINE

## 2025-05-12 PROCEDURE — 99999 PR PBB SHADOW E&M-EST. PATIENT-LVL III: CPT | Mod: PBBFAC,HCNC,, | Performed by: INTERNAL MEDICINE

## 2025-05-12 PROCEDURE — 1126F AMNT PAIN NOTED NONE PRSNT: CPT | Mod: CPTII,HCNC,S$GLB, | Performed by: INTERNAL MEDICINE

## 2025-05-12 PROCEDURE — 1100F PTFALLS ASSESS-DOCD GE2>/YR: CPT | Mod: CPTII,HCNC,S$GLB, | Performed by: INTERNAL MEDICINE

## 2025-05-12 PROCEDURE — 99495 TRANSJ CARE MGMT MOD F2F 14D: CPT | Mod: HCNC,S$GLB,, | Performed by: INTERNAL MEDICINE

## 2025-05-12 RX ORDER — PRAMIPEXOLE DIHYDROCHLORIDE 0.25 MG/1
0.5 TABLET ORAL NIGHTLY
Qty: 180 TABLET | Refills: 1 | Status: SHIPPED | OUTPATIENT
Start: 2025-05-12

## 2025-05-12 NOTE — PROGRESS NOTES
Subjective:      Patient ID: Angelina Man is a 75 y.o. female.    Chief Complaint: Hospital Follow Up (Broken R leg 5/2/25)      Here today to follow up on recent hospital visit. The patient was seen in the hospital on 5/2/2025 and was discharged on 5/6/2025.    Past Medical History:   Diagnosis Date    Arthritis     Atrial fibrillation     Bronchitis     Cataract     Dry eyes     GERD (gastroesophageal reflux disease)     Glaucoma, suspect - Both Eyes     Hypothyroidism 06/23/2016    Pulmonary hypertension     Rash     Renal angiomyolipoma     Renal disorder     SCC (squamous cell carcinoma) 07/2023    left lower lateral shin    SCC (squamous cell carcinoma) 04/2023    L mid lateral shin    SCC (squamous cell carcinoma) 09/2023    L mid lower shin    Spinal stenosis     Squamous cell carcinoma     in-situ right upper inner arm, right wrist    Status post lumbar surgery 06/23/2016    Stress fracture     Thyroid disease     Venous insufficiency      Past Surgical History:   Procedure Laterality Date    ANGIOPLASTY      CATARACT EXTRACTION W/  INTRAOCULAR LENS IMPLANT Left 12/6/2022    Procedure: EXTRACTION, CATARACT, WITH IOL INSERTION;  Surgeon: Tone Brown MD;  Location: [facility];  Service: Ophthalmology;  Laterality: Left;    CATARACT EXTRACTION W/  INTRAOCULAR LENS IMPLANT Right 12/20/2022    Procedure: EXTRACTION, CATARACT, WITH IOL INSERTION;  Surgeon: Tone Brown MD;  Location: [facility];  Service: Ophthalmology;  Laterality: Right;    CERVICAL FUSION      COLONOSCOPY      COLONOSCOPY N/A 11/14/2017    Procedure: COLONOSCOPY;  Surgeon: Kasi Mercado MD;  Location: [facility] ([address] [place] [address]);  Service: Endoscopy;  Laterality: N/A;    COLONOSCOPY N/A 4/26/2023    Procedure: COLONOSCOPY;  Surgeon: Jeanmarie Hathaway MD;  [facility]  Service: Colon and Rectal;  Laterality: N/A;  ok to hold Eliquis 2 days per Dr Pereira  constipation-ext Miralax prep  instr  mailed/portal-GT  4/21/23 pre call attempted, no answer    COLONOSCOPY N/A 5/28/2024    Procedure: COLONOSCOPY;  Surgeon: Jeanmarie Hathaway MD;  Location: [facility] (4TH FLR);  Service: Endoscopy;  Laterality: N/A;  referral Dr. Hathaway. Annual Colonoscopy for High Risk. Golytely prep instr. to portal Pending Eliquis Hold from Dr. David Pereira. [facility] Afib.EC  ok to hold Eliquis 2 days per Dr Pereira-GT  5/21- precall confirmed; instructions re-sent via portal, aware of holding eliquis     ESOPHAGOGASTRODUODENOSCOPY N/A 10/10/2019    Procedure: EGD (ESOPHAGOGASTRODUODENOSCOPY);  Surgeon: Rg Milton MD;  Location: [facility] (4TH FLR);  Service: Endoscopy;  Laterality: N/A;    ESOPHAGOGASTRODUODENOSCOPY N/A 10/6/2021    Procedure: EGD (ESOPHAGOGASTRODUODENOSCOPY);  Surgeon: Rg Milton MD;  [facility]  Service: Endoscopy;  Laterality: N/A;  covid test 10/3 [place], instr emailed/portal -ml    EXCISION Left 5/17/2023    Procedure: EXCISION SQUAMOUS CELL CARCINOMA LEFT LEG;  Surgeon: Kasi Canela Jr., MD;  Location: [facility] OR [address] [place] [address];  Service: General;  Laterality: Left;    INTRAMEDULLARY RODDING OF FEMUR Right 5/3/2025    Procedure: INSERTION, INTRAMEDULLARY BERNADINE, FEMUR - right selina;  Surgeon: Aleida Pires MD;  Location: [facility] OR [address] [place] [address];  Service: Orthopedics;  Laterality: Right;    l4-5 mid discectomy Left 03/2017    l4-5 MIS diskectomy Right 05/2016    RIGHT HEART CATHETERIZATION Right 1/27/2022    Procedure: INSERTION, CATHETER, RIGHT HEART;  Surgeon: Satnam Pickard Jr., MD;  Location: [facility] CATH LAB;  Service: Cardiology;  Laterality: Right;    TRANSFORAMINAL EPIDURAL INJECTION OF STEROID Bilateral 3/12/2024    Procedure: LUMBAR TRANSFORAMINAL BILATERAL L3/4 *ELIQUIS CLEARANCE IN CHART*;  Surgeon: Sheree Abbott MD;  Location: [facility] PAIN MGT;  Service: Pain Management;  Laterality: Bilateral;  [phone]  2 WK F/U [place]    TREATMENT OF CARDIAC ARRHYTHMIA N/A 7/17/2020     "Procedure: CARDIOVERSION;  Surgeon: Kwan Bray MD;  Location: [facility] EP LAB;  Service: Cardiology;  Laterality: N/A;  AF,DCCV/SANGITA, ANES, SK, 746    TREATMENT OF CARDIAC ARRHYTHMIA N/A 7/14/2021    Procedure: CARDIOVERSION;  Surgeon: SYDNIE Cedillo MD;  Location: [facility] EP LAB;  Service: Cardiology;  Laterality: N/A;  AF, SANGITA, DCCV, MAC, EH, 3 Prep    WASHOUT Right 5/17/2023    Procedure: WASHOUT right lower arm and closure;  Surgeon: Kasi Canela Jr., MD;  [facility];  Service: General;  Laterality: Right;       ED/hospital course:  "Angelina Man is a pleasant 75 y.o. female with permanent AFib, HFpEF, hypothyroidism presenting with hip fracture.      On Easter, she reports that she drank a martini with family, then had an accidental fall onto her right hip.  She did hit her head and had an occipital hematoma, left eye bruising, and right arm trauma.  She noted right hip pain particularly with ambulation, and was evaluated at an urgent care where hip x-ray showed no obvious fracture.  She then went to an ED for CT scans of her head, which reportedly showed no bleed.  She has noted difficulty ambulating due to the hip pain, however has still been able to do so.  She has even gone to a few of her usual spin classes, which reportedly helped her hip pain.  On Thursday, after span, she could barely walk, prompting presentation for further evaluation.  MRI was performed, showing intertrochanteric femur fracture.      She is very active at baseline, and goes to the gym every day.  She she goes to spin classes multiple times per week and lifts weights.  She lives in a condo and walks up stairs without difficulty. She performs all ADLs independently.  She reports chronic lower extremity edema improved with compression stockings, but denies any chest pain, shortness for breath, or palpitations.  She is compliant with all medications, including her metoprolol and Eliquis for AFib.     Of note, she " "reports chronic dry eyes with intermittent "crusting" for which she takes Systane. She has noted increased itching and crusting to the eyes since presentation to the ED.     Patient admitted after found to have a closed nondisplaced right intertrochanteric fracture on MRI imaging. Patient had fall around Easter and has had progressive right hip pain since then with negative X-rays of right hip so seen in Ortho clinic on 5/1 and MRI of pelvis done and revealed closed nondisplaced right femur intertrochanteric fracture and called yesterday, 5/2 and told to go to ED fro admission. Patient admitted to [facility] Medicine Team H: Hip Fracture team and started on Hip Fracture Pathway with Orthopedic surgery consult for for right closed intertrochanteric femur fracture. Patient was seen and evaluated by Orthopedic surgery who recommended operative repair of fracture. Patient was medically optimized prior to surgery and was taken to OR after optimization on 5/3/2025. Patient underwent right hip cephalomedullary nail fixation by Dr. Aleida Pires. Post-op patient weight bear as tolerated to the right lower extremity as per Orthopedics recommendation. Patient resumed on her home Apixiban 5 mg po BID that she takes for long term anticoagulation for her PAF so no other DVT prophylaxis required post-op.  Perineural pain catheter placed by Anesthesia Pain Service with continuous infusion of Ropivacaine to help with pain control post-op and Anesthesia Pain Service managing while patient in the hospital. Patient placed on multimodal pain management post-op with Tylenol 1000 mg po every 6 hours and Robaxin 500 mg po every 6 hours post-op and will continue. PT/OT consulted post-op and recommending low intensity on discharge. Patient placed on IV Ceftriaxone 1 gram daily post-op to treat UTI found on admit on 5/4. Final urine culture returned on 5/5 grew 10,000 - 49,999 cfu/ml Klebsiella pneumoniae sensitive to Ceftriaxone and will " "continue Ceftriaxone to treat on 5/5 and plan 3 day course and last dose on 5/6. Patient ambulated multiple times in hallway with rolling walker with therapy on 5/5. Patient doing well on 5/6 and discharged in good condition to home with  PT/OT arranged prior to discharge. No DME needed on discharge as patient has DME at home. Surgical bandage to remain in place to right hip until Ortho clinic follow-up. Perineural catheter removed by Anesthesia prior to discharge on 5/6. Patient completed 3 day course of IV Ceftriaxone on 5/6 for her UTI found on admit so no further antibiotics needed on discharge."          Follow up labs/tests/specialists needed - orthopedics    ------------    History of Present Illness    CHIEF COMPLAINT:  Ms. Man presents today for follow-up after a fall and subsequent leg surgery    HISTORY OF PRESENT ILLNESS:  She sustained a fall injury the day before Easter, resulting in a head injury with subsequent lump formation and black eye. She denies loss of consciousness. She presented to urgent care the following day for hip x-ray due to osteoporosis concerns, followed by Emergency Department evaluation including head exam. Several days later, she discovered a leg fracture when she became unable to ambulate.    CURRENT SYMPTOMS:  She reports pain when sitting, particularly with getting down into chairs and finding comfortable positions. She experiences chest pain with deep inspiration. She has had constipation for 1-2 weeks. She reports restless leg symptoms exclusively at night that interfere with sleep, though daytime naps are unaffected.    CURRENT MEDICATIONS:  She takes Eliquis, Lasix, Robaxin (3 times daily with minimal symptom relief), Tylenol for pain control, and restless leg medication 0.25mg.    Visit today is associated with current or anticipated ongoing medical care related to this patient's single serious condition/complex condition of HSV, MDD, insomnia, RLS, afib dx in 2020 " "on eliquis, hypothyroidism, anxiety, IBS, renal cyst, fibroids . The patient will return to see me as these issues will be followed longitudinally.    ROS:  Cardiovascular: +lower extremity edema  Respiratory: +pain with respiration  Gastrointestinal: +constipation  Musculoskeletal: +limb pain  Neurological: +restless legs  Psychiatric: +sleep difficulty         Denies any chest pain, shortness of breath, nausea vomiting constipation diarrhea, blood in stool, heartburn        Current Medications[1]    Lab Results   Component Value Date    HGBA1C 5.2 03/06/2025    HGBA1C 5.4 11/12/2024    HGBA1C 5.4 08/24/2015     No results found for: "MICALBCREAT"  Lab Results   Component Value Date    LDLCALC 121.0 03/06/2025    LDLCALC 122.2 01/08/2025    CHOL 210 (H) 03/06/2025    HDL 57 03/06/2025    TRIG 160 (H) 03/06/2025       Lab Results   Component Value Date     05/06/2025    K 3.9 05/06/2025     05/06/2025    CO2 27 05/06/2025     05/06/2025    BUN 12 05/06/2025    CREATININE 0.6 05/06/2025    CALCIUM 7.8 (L) 05/06/2025    PROT 7.2 05/02/2025    ALBUMIN 3.1 (L) 05/02/2025    BILITOT 0.3 05/02/2025    ALKPHOS 113 05/02/2025    AST 23 05/02/2025    ALT 12 05/02/2025    ANIONGAP 8 05/06/2025    ESTGFRAFRICA >60.0 05/25/2022    EGFRNONAA 55.6 (A) 05/25/2022    WBC 8.93 05/06/2025    HGB 8.6 (L) 05/06/2025    HGB 8.8 (L) 05/05/2025    HCT 27.2 (L) 05/06/2025     (H) 05/06/2025     05/06/2025    TSH 1.488 01/08/2025    HEPCAB Non-Reactive 04/20/2025       Lab Results   Component Value Date    GSETSBXT43AS 42 01/08/2025    VORYQXSG04AX 40 01/10/2024    CADDZIMQ20 1077 (H) 03/06/2025       Social History     Social History Narrative    Not on file     Family History   Problem Relation Name Age of Onset    Cancer Mother          Lung cancer    Heart failure Father      Colon cancer Father      Hypertension Father      Cataracts Father      Cancer Father  80        colon    No Known Problems Sister " "     No Known Problems Brother      Melanoma Daughter      Diabetes Son      Heart disease Son      Cancer Son          esophageal cancer    No Known Problems Maternal Aunt      No Known Problems Maternal Uncle      No Known Problems Paternal Aunt      No Known Problems Paternal Uncle      No Known Problems Maternal Grandmother      No Known Problems Maternal Grandfather      No Known Problems Paternal Grandmother      No Known Problems Paternal Grandfather      Amblyopia Neg Hx      Blindness Neg Hx      Glaucoma Neg Hx      Macular degeneration Neg Hx      Retinal detachment Neg Hx      Strabismus Neg Hx      Stroke Neg Hx      Thyroid disease Neg Hx      Breast cancer Neg Hx      Ovarian cancer Neg Hx       Vitals:    05/12/25 0932   BP: 108/72   Pulse: (!) 50   SpO2: (!) 82%   Weight: 43.2 kg (95 lb 3.8 oz)   Height: 5' 4" (1.626 m)   PainSc: 0-No pain     Objective:   Physical Exam  Vitals reviewed.   Constitutional:       Appearance: Normal appearance.   HENT:      Head: Normocephalic.      Right Ear: External ear normal.      Left Ear: External ear normal.      Mouth/Throat:      Mouth: Mucous membranes are moist.      Pharynx: Oropharynx is clear.   Eyes:      Conjunctiva/sclera: Conjunctivae normal.      Pupils: Pupils are equal, round, and reactive to light.   Cardiovascular:      Rate and Rhythm: Normal rate and regular rhythm.      Pulses: Normal pulses.   Pulmonary:      Effort: Pulmonary effort is normal.      Breath sounds: Normal breath sounds.   Abdominal:      General: Abdomen is flat. Bowel sounds are normal.      Palpations: Abdomen is soft.   Musculoskeletal:      Cervical back: Neck supple.   Skin:     General: Skin is warm.   Neurological:      General: No focal deficit present.      Mental Status: She is alert.   Psychiatric:         Mood and Affect: Mood normal.       Assessment/Plan     Angelina Man is a 75 y.o.female with:        Assessment & Plan    - Assessed post-op recovery from leg " fracture.  - Evaluated pain management needs, considering high pain tolerance and history of A-fib limiting NSAID use.  - Reviewed current medication regimen, including Eliquis and Lasix, for appropriateness.  - Surgical site healing progression     PLAN SUMMARY:  - Continue Eliquis at current dosage  - Increase Ropinirole from 0.25 mg to 0.5 mg daily  - Continue Lasix at current dosage, with option for additional dose if needed  - Take up to 3 tablets (0.25 mg each) of pain medication as needed  - Continue using Miralax for constipation management  - Perform deep breathing exercises or use incentive spirometer for lung re-expansion  - Consider using ace bandages for leg wrapping if comfortable  - Follow up with surgeon next Monday as scheduled    ## FALL AND HEAD INJURY:  - Ms. Man had a bad fall on [place] while walking with daughter and granddaughter  - ER exam showed no significant findings for the head injury.    ## OSTEOPOROSIS:  - Ms. Man has osteoporosis and was concerned about hip fracture after the fall.  - Ms. Man went to urgent care the next day for hip evaluation; x-ray confirmed no fractures in the hip area.    ## CHEST PAIN ON BREATHING:  - Ms. Man reports pain when taking deep breaths, likely due to aeration issues post-surgery.  - Discussed importance of deep breathing exercises for lung re-expansion after surgery.  - Recommend using incentive spirometer to improve lung aeration and reduce chest pain.    ## RESTLESS LEGS SYNDROME:  - Ms. Man experiences restless legs syndrome primarily at night, affecting sleep quality.  - Assessment indicates an increase in medication dosage is needed.  - Increased Ropinirole from 0.25 mg to 0.5 mg Daily.  - Instructed to take up to 3 tablets (0.25 mg each) as needed.    ## CONSTIPATION:  - Ms. Man reports constipation but has started having more frequent bowel movements than before.  - Currently using Miralax for management.  - Advised being  vigilant with constipation management due to reduced mobility post-surgery.    ## ANTICOAGULANT USE:  - Ms. Man is on Eliquis, an anticoagulant, and has been compliant with the regimen, taking it twice daily as prescribed.  - Emphasized the importance of continuing Eliquis to prevent stroke risk.  - Explained that expected post-op swelling and bruising may be increased due to Eliquis use.    ## INTERMITTENT LEG SWELLING  - Educated on using weight as an indicator for fluid retention and appropriate Lasix use; patient to monitor weight daily, noting 3-5 pound fluctuations.  - Continued Lasix at current dosage, with option to take an additional dose 6 hours after morning dose if weight increases by 3-5 lbs and discomfort persists.           Hospital discharge follow-up    Closed nondisplaced basicervical fracture of right femur with routine healing, subsequent encounter    RLS (restless legs syndrome)  -     pramipexole (MIRAPEX) 0.25 MG tablet; Take 2 tablets (0.5 mg total) by mouth every evening.  Dispense: 180 tablet; Refill: 1    Atelectasis    Anticoagulant long-term use    Constipation, unspecified constipation type    Osteoporosis, unspecified osteoporosis type, unspecified pathological fracture presence                 This note was generated with the assistance of ambient listening technology. Verbal consent was obtained by the patient and accompanying visitor(s) for the recording of patient appointment to facilitate this note. I attest to having reviewed and edited the generated note for accuracy, though some syntax or spelling errors may persist. Please contact the author of this note for any clarification.          Chronic conditions status updated as per HPI.  Other than changes above, cont current medications and maintain follow up with specialists.     Zina Rockwell MD  [facility]    Total time spent on this encounter: 34 minutes. This includes face to face time and non-face to face time  preparing to see the patient (eg, review of tests), obtaining and/or reviewing separately obtained history, documenting clinical information in the electronic or other health record, independently interpreting results and communicating results to the patient/family/caregiver, or care coordinator.    There are no Patient Instructions on file for this visit.                   [1]   Current Outpatient Medications:     acetaminophen (TYLENOL) 500 MG tablet, Take 2 tablets (1,000 mg total) by mouth every 8 (eight) hours. for 10 days, Disp: , Rfl:     acyclovir (ZOVIRAX) 400 MG tablet, Take 1 tablet (400 mg total) by mouth once daily., Disp: 180 tablet, Rfl: 1    ascorbic acid (VITAMIN C) 1000 MG tablet, Take 1,000 mg by mouth once daily. , Disp: , Rfl:     biotin 1 mg tablet, Take 1 mg by mouth 2 (two) times daily. , Disp: , Rfl:     buPROPion (WELLBUTRIN SR) 150 MG TBSR 12 hr tablet, Take 1 tablet (150 mg total) by mouth 2 (two) times daily., Disp: 180 tablet, Rfl: 3    digestive enzymes Tab, Take 1 tablet by mouth once daily. , Disp: , Rfl:     ELIQUIS 5 mg Tab, TAKE 1 TABLET(5 MG) BY MOUTH TWICE DAILY, Disp: 180 tablet, Rfl: 3    fluticasone propionate (FLONASE) 50 mcg/actuation nasal spray, 1 spray (50 mcg total) by Each Nostril route 2 (two) times daily as needed for Rhinitis., Disp: 16 g, Rfl: 11    furosemide (LASIX) 20 MG tablet, Take 1 tablet (20 mg total) by mouth once daily., Disp: 90 tablet, Rfl: 3    ketoconazole (NIZORAL) 2 % cream, aaa bid prn flare under arm and qhs to areas on face, Disp: 60 g, Rfl: 3    lactulose (CHRONULAC) 20 gram/30 mL Soln, Take 30mL twice daily (every 12 hours) until bowel movement, Disp: 180 mL, Rfl: 0    levothyroxine (SYNTHROID) 137 MCG Tab tablet, Take 1 tablet (137 mcg total) by mouth before breakfast., Disp: 90 tablet, Rfl: 3    lubiprostone (AMITIZA) 24 MCG Cap, Take 1 capsule (24 mcg total) by mouth daily as needed (IBS symptoms)., Disp: 30 capsule, Rfl: 6    methocarbamoL  (ROBAXIN) 500 MG Tab, Take 1 tablet (500 mg total) by mouth 3 (three) times daily as needed (Muscle spasms or pain)., Disp: 20 tablet, Rfl: 0    metoprolol succinate (TOPROL-XL) 25 MG 24 hr tablet, Take 1 tablet (25 mg total) by mouth once daily., Disp: 90 tablet, Rfl: 3    mirtazapine (REMERON) 7.5 MG Tab, TAKE 1 TABLET(7.5 MG) BY MOUTH EVERY NIGHT, Disp: 90 tablet, Rfl: 3    multivitamin (THERAGRAN) per tablet, Take 1 tablet by mouth once daily. , Disp: , Rfl:     potassium chloride SA (K-DUR,KLOR-CON) 20 MEQ tablet, Take 1 tablet (20 mEq total) by mouth once daily., Disp: 90 tablet, Rfl: 3    traZODone (DESYREL) 50 MG tablet, Take 1-2 tablets ( mg total) by mouth every evening., Disp: 180 tablet, Rfl: 3    zinc 50 mg Tab, Take 50 mg by mouth once daily. , Disp: , Rfl:     pramipexole (MIRAPEX) 0.25 MG tablet, Take 2 tablets (0.5 mg total) by mouth every evening., Disp: 180 tablet, Rfl: 1

## 2025-05-12 NOTE — TELEPHONE ENCOUNTER
Study title: ARELY EPPERSON  IRB #: [id number]      Patient called to discuss participation into the ARELY EPPERSON study. Explained overview of study. Patient expressed interest and is willing to see us on  5/19/25 at 11:30 AM. Patient voiced understanding.  Reminder email will be sent about appointment.

## 2025-05-13 NOTE — PROGRESS NOTES
Orthopedic Trauma Surgery Progress Note    Patient Problem(s): Right nondisplaced Intertrochanteric femur fracture   Date of Injury: 4/20/25  Mechanism of Injury: GLF  Treatment: 5/3/25 Fixation of right intertrochanteric femur fracture using an intramedullary implant     Interval Present History: Angelina is doing well. She continues to have pain and soreness over the right trochanteric region but she has no groin pain. Pain is improving. She is walking with a cane.  She is at home and lives independently.  She is getting home therapy.    Imaging: No new today.     PE: Incisions have healed. Staples removed.     Assessment and Plan: Angelina's incision looks good. I will see her back in 4 weeks with Xrs. I have put in an order for outpatient PT for when home therapy ends.

## 2025-05-16 ENCOUNTER — TELEPHONE (OUTPATIENT)
Dept: ORTHOPEDICS | Facility: CLINIC | Age: 75
End: 2025-05-16
Payer: MEDICARE

## 2025-05-19 ENCOUNTER — RESEARCH ENCOUNTER (OUTPATIENT)
Dept: RESEARCH | Facility: HOSPITAL | Age: 75
End: 2025-05-19
Payer: MEDICARE

## 2025-05-19 ENCOUNTER — OFFICE VISIT (OUTPATIENT)
Dept: ORTHOPEDICS | Facility: CLINIC | Age: 75
End: 2025-05-19
Payer: MEDICARE

## 2025-05-19 DIAGNOSIS — S72.144A NONDISPLACED INTERTROCHANTERIC FRACTURE OF RIGHT FEMUR, INITIAL ENCOUNTER FOR CLOSED FRACTURE: Primary | ICD-10-CM

## 2025-05-19 PROCEDURE — 1157F ADVNC CARE PLAN IN RCRD: CPT | Mod: CPTII,HCNC,S$GLB, | Performed by: STUDENT IN AN ORGANIZED HEALTH CARE EDUCATION/TRAINING PROGRAM

## 2025-05-19 PROCEDURE — 1125F AMNT PAIN NOTED PAIN PRSNT: CPT | Mod: CPTII,HCNC,S$GLB, | Performed by: STUDENT IN AN ORGANIZED HEALTH CARE EDUCATION/TRAINING PROGRAM

## 2025-05-19 PROCEDURE — 1160F RVW MEDS BY RX/DR IN RCRD: CPT | Mod: CPTII,HCNC,S$GLB, | Performed by: STUDENT IN AN ORGANIZED HEALTH CARE EDUCATION/TRAINING PROGRAM

## 2025-05-19 PROCEDURE — 99024 POSTOP FOLLOW-UP VISIT: CPT | Mod: HCNC,S$GLB,, | Performed by: STUDENT IN AN ORGANIZED HEALTH CARE EDUCATION/TRAINING PROGRAM

## 2025-05-19 PROCEDURE — 1159F MED LIST DOCD IN RCRD: CPT | Mod: CPTII,HCNC,S$GLB, | Performed by: STUDENT IN AN ORGANIZED HEALTH CARE EDUCATION/TRAINING PROGRAM

## 2025-05-19 PROCEDURE — 99999 PR PBB SHADOW E&M-EST. PATIENT-LVL III: CPT | Mod: PBBFAC,HCNC,, | Performed by: STUDENT IN AN ORGANIZED HEALTH CARE EDUCATION/TRAINING PROGRAM

## 2025-05-19 NOTE — PROGRESS NOTES
Study Title: ZHOU: Real-world Evidence to Advance Multi-Cancer Early Detection Health Equity (REACH/Galleri-Medicare study)    Protocol IRB #: [id number]     Sponsor: [employer]    : Yobani Valerio MD    Patient eligibility was checked prior to enrollment in the study. Patient met the following inclusion and exclusion criteria:     INCLUSION CRITERIA  Participant age is 50 years or older with Medicare coverage at the time of signing the Informed Consent form  Participant is eligible to receive the Galleri test, based on a determination by the study investigator or designee  Participant is capable of giving signed Informed Consent that is legally effective (consent provided by LAR is not permitted)  Participant is able to comprehend and respond to questions in participant questionnaires.    EXCLUSION CRITERIA  Participant having had a previous Galleri test not associated with this study  Participant is undergoing or referred for diagnostic evaluation due to clinical suspicion for cancer  Participant has a personal history of invasive or hematologic malignancy, diagnosed within the last 3 years prior to expected enrollment date or diagnosed greater than 3 years prior to expected enrollment date and never treated  Participant has had definitive treatment for invasive or hematologic malignancy within the 3 years prior to expected enrollment date  Participant is not able to comply with protocol procedures  Participant is not a current patient at a participating center  Participant is currently enrolled or was previously enrolled in another [facility]-sponsored study  Participant is current or previous employee/contractor of [employer]  Participant is currently pregnant (by participant's self-report of pregnancy status)    DOCUMENTATION OF INFORMED CONSENT    Prior to the Informed Consent (IC) being signed, or any study protocol required data collection, testing, procedure, or intervention being performed, the  following was done and/or discussed:  Patient was given a copy of the IC for review   Purpose of the study and qualifications to participate   Study design, Follow up schedule, and tests or procedures done at each visit  Confidentiality and HIPAA Authorization for Release of Medical Records for the research trial/ subject's rights/research related injury  Risk, Benefits, Alternative Treatments, Compensation and Costs  Participation in the research trial is voluntary and patient may withdraw at anytime  Contact information for study related questions    Patient verbalizes understanding of the above: Yes  Contact information for CRC and PI given to patient: Yes  Patient able to adequately summarize: the purpose of the study, the risks associated with the study, and all procedures, testing, and follow-ups associated with the study: Yes    Patient signed the informed consent form for the research study with an IRB approval date of 02 JULY 2024. Each page of the consent form was reviewed with patient and all questions answered satisfactorily. Patient received a copy of the consent form. The original consent was scanned into electronic medical records.    INSURANCE VERIFICATION    Thoroughly discussed with patient that the study team does not expect them to be billed for this test. However, by participating in this trial, we cannot guarantee that they will not receive a bill, as cost ultimately depends on the details of their Medicare coverage. Patient voiced understanding.      BLOOD DRAW    Following IC being signed and prior to blood draw, patient completed all baseline/pretest questionnaires. The following specimens were collected from the pt at the time of this encounter via peripheral blood draw.    Blood draw location: Right Arm  Needle used: 21 gauge butterfly needle  Blood draw amount: 20ml  Blood draw time: 12:15 PM   Blood Drawn by Piero España

## 2025-05-30 ENCOUNTER — TELEPHONE (OUTPATIENT)
Dept: RESEARCH | Facility: HOSPITAL | Age: 75
End: 2025-05-30
Payer: MEDICARE

## 2025-05-30 NOTE — TELEPHONE ENCOUNTER
Study Title: ZHOU: Real-world Evidence to Advance Multi-Cancer Early Detection Health Equity (ZHOU/Teena-Medicare study)  Protocol IRB #: [id number]  IRB Approval Date: 02 July 2024  Sponsor: [employer]LY  : Yobani Valerio MD     [facility] ID: [id number]     Results of the [facility] Multi Cancer Early Detection test and were reviewed by PI Dr Valerio. Patient was called and notified of test results, there was no signal detected.     Patient reminded, this was a screening test, so we still highly encourage them to continue other normal health screenings  and to continue to adhere to guideline-recommended cancer screenings.     Patient was asked about completing follow-up questionnaire online and was agreeable.

## 2025-06-03 ENCOUNTER — CLINICAL SUPPORT (OUTPATIENT)
Dept: REHABILITATION | Facility: HOSPITAL | Age: 75
End: 2025-06-03
Payer: MEDICARE

## 2025-06-03 DIAGNOSIS — Z74.09 IMPAIRED FUNCTIONAL MOBILITY, BALANCE, GAIT, AND ENDURANCE: Primary | ICD-10-CM

## 2025-06-03 PROCEDURE — 97112 NEUROMUSCULAR REEDUCATION: CPT

## 2025-06-05 DIAGNOSIS — S72.144A NONDISPLACED INTERTROCHANTERIC FRACTURE OF RIGHT FEMUR, INITIAL ENCOUNTER FOR CLOSED FRACTURE: Primary | ICD-10-CM

## 2025-06-05 PROBLEM — Z74.09 IMPAIRED FUNCTIONAL MOBILITY, BALANCE, GAIT, AND ENDURANCE: Status: ACTIVE | Noted: 2025-06-05

## 2025-06-10 ENCOUNTER — CLINICAL SUPPORT (OUTPATIENT)
Dept: REHABILITATION | Facility: HOSPITAL | Age: 75
End: 2025-06-10
Payer: MEDICARE

## 2025-06-10 ENCOUNTER — PATIENT MESSAGE (OUTPATIENT)
Dept: ORTHOPEDICS | Facility: CLINIC | Age: 75
End: 2025-06-10
Payer: MEDICARE

## 2025-06-10 DIAGNOSIS — Z74.09 IMPAIRED FUNCTIONAL MOBILITY, BALANCE, GAIT, AND ENDURANCE: Primary | ICD-10-CM

## 2025-06-10 PROCEDURE — 97112 NEUROMUSCULAR REEDUCATION: CPT

## 2025-06-10 NOTE — PROGRESS NOTES
Orthopedic Trauma Surgery Progress Note    Patient Problem(s): Right nondisplaced Intertrochanteric femur fracture   Date of Injury: 4/20/25  Mechanism of Injury: GLF  Treatment: 5/3/25 Fixation of right intertrochanteric femur fracture using an intramedullary implant     Interval Present History: Angelina continues to have pain and soreness over the right trochanteric region but she has no groin pain. She is no longer using a cane. She has been trying to get back to long walks and yoga but is struggling. She walks for 15 minutes and then developed bilateral lower calf pain and soreness. She had bilateral lower extremity US last week to look for a blood clot and they were unremarkable. She saw her PCP 1 mo ago and her swelling was treated with lasix. She is doing outpatient PT.     Imaging: XRs demonstrate maintenance of fracture reduction. The hardware remains intact with any loosening, shifting or breakage. There are no interval changes relative to the intraoperative fluoroscopy images.     PE: Walks without assistive devices. Mild edema in both lower extremities, left worse than right. The swelling is apparent below the knee bilaterally but not above. Nontender in bilateral calf musculature.    Assessment and Plan: Angelina's pain is improving in her hip and her Xrs look good. Unfortunately she is limited by bilateral lower extremity edema and calf pain. I encouraged her to see her PCP for re-evaluation, as these symptoms can be a sign of cardiac, kidney or vascular issues. I have reached out to her PCP as well to let her know. I will see Angelina back for re-evaluation in 6 weeks.

## 2025-06-11 ENCOUNTER — PATIENT MESSAGE (OUTPATIENT)
Dept: CARDIOLOGY | Facility: CLINIC | Age: 75
End: 2025-06-11
Payer: MEDICARE

## 2025-06-11 ENCOUNTER — HOSPITAL ENCOUNTER (OUTPATIENT)
Dept: VASCULAR SURGERY | Facility: CLINIC | Age: 75
Discharge: HOME | End: 2025-06-11
Attending: PHYSICIAN ASSISTANT
Payer: MEDICARE

## 2025-06-11 DIAGNOSIS — R60.9 SWELLING: ICD-10-CM

## 2025-06-11 DIAGNOSIS — S72.144A NONDISPLACED INTERTROCHANTERIC FRACTURE OF RIGHT FEMUR, INITIAL ENCOUNTER FOR CLOSED FRACTURE: Primary | ICD-10-CM

## 2025-06-12 ENCOUNTER — CLINICAL SUPPORT (OUTPATIENT)
Dept: REHABILITATION | Facility: HOSPITAL | Age: 75
End: 2025-06-12
Payer: MEDICARE

## 2025-06-12 ENCOUNTER — RESULTS FOLLOW-UP (OUTPATIENT)
Dept: ORTHOPEDICS | Facility: CLINIC | Age: 75
End: 2025-06-12

## 2025-06-12 DIAGNOSIS — Z74.09 IMPAIRED FUNCTIONAL MOBILITY, BALANCE, GAIT, AND ENDURANCE: Primary | ICD-10-CM

## 2025-06-12 PROCEDURE — 97112 NEUROMUSCULAR REEDUCATION: CPT

## 2025-06-16 ENCOUNTER — HOSPITAL ENCOUNTER (OUTPATIENT)
Dept: RADIOLOGY | Facility: HOSPITAL | Age: 75
Discharge: HOME | End: 2025-06-16
Attending: STUDENT IN AN ORGANIZED HEALTH CARE EDUCATION/TRAINING PROGRAM
Payer: MEDICARE

## 2025-06-16 ENCOUNTER — OFFICE VISIT (OUTPATIENT)
Dept: ORTHOPEDICS | Facility: CLINIC | Age: 75
End: 2025-06-16
Payer: MEDICARE

## 2025-06-16 DIAGNOSIS — S72.144A CLOSED NONDISPLACED INTERTROCHANTERIC FRACTURE OF RIGHT FEMUR, INITIAL ENCOUNTER: ICD-10-CM

## 2025-06-16 DIAGNOSIS — S72.144A NONDISPLACED INTERTROCHANTERIC FRACTURE OF RIGHT FEMUR, INITIAL ENCOUNTER FOR CLOSED FRACTURE: ICD-10-CM

## 2025-06-16 DIAGNOSIS — G25.81 RLS (RESTLESS LEGS SYNDROME): ICD-10-CM

## 2025-06-16 PROCEDURE — 1125F AMNT PAIN NOTED PAIN PRSNT: CPT | Mod: CPTII,HCNC,S$GLB, | Performed by: STUDENT IN AN ORGANIZED HEALTH CARE EDUCATION/TRAINING PROGRAM

## 2025-06-16 PROCEDURE — 1159F MED LIST DOCD IN RCRD: CPT | Mod: CPTII,HCNC,S$GLB, | Performed by: STUDENT IN AN ORGANIZED HEALTH CARE EDUCATION/TRAINING PROGRAM

## 2025-06-16 PROCEDURE — 99024 POSTOP FOLLOW-UP VISIT: CPT | Mod: HCNC,S$GLB,, | Performed by: STUDENT IN AN ORGANIZED HEALTH CARE EDUCATION/TRAINING PROGRAM

## 2025-06-16 PROCEDURE — 99999 PR PBB SHADOW E&M-EST. PATIENT-LVL III: CPT | Mod: PBBFAC,HCNC,, | Performed by: STUDENT IN AN ORGANIZED HEALTH CARE EDUCATION/TRAINING PROGRAM

## 2025-06-16 PROCEDURE — 73502 X-RAY EXAM HIP UNI 2-3 VIEWS: CPT | Mod: 26,HCNC,RT, | Performed by: RADIOLOGY

## 2025-06-16 PROCEDURE — 73502 X-RAY EXAM HIP UNI 2-3 VIEWS: CPT | Mod: TC,HCNC,RT

## 2025-06-16 PROCEDURE — 1157F ADVNC CARE PLAN IN RCRD: CPT | Mod: CPTII,HCNC,S$GLB, | Performed by: STUDENT IN AN ORGANIZED HEALTH CARE EDUCATION/TRAINING PROGRAM

## 2025-06-16 RX ORDER — PRAMIPEXOLE DIHYDROCHLORIDE 0.25 MG/1
0.5 TABLET ORAL NIGHTLY
Qty: 180 TABLET | Refills: 1 | Status: CANCELLED | OUTPATIENT
Start: 2025-06-16

## 2025-06-16 NOTE — TELEPHONE ENCOUNTER
No care due was identified.  [facility] Embedded Care Due Messages. Reference number: [id number].   6/16/2025 12:17:51 PM CDT

## 2025-06-16 NOTE — TELEPHONE ENCOUNTER
Refill Routing Note   Medication(s) are not appropriate for processing by [facility] for the following reason(s):      : Dose increase to Mirapex 0.5mg daily by PCP (05/12/25)    [facility] action(s):  Defer             Appointments  past 12m or future 3m with PCP    Date Provider   Last Visit   5/12/2025 Zina Rockwell MD   Next Visit   Visit date not found Zina Rockwell MD   ED visits in past 90 days: 1        Note composed:2:22 PM 06/16/2025

## 2025-06-16 NOTE — TELEPHONE ENCOUNTER
Informed pt that Mirapex was sent in on 5/12/25 for a 6 month supply and most likely the script is on hold for her right now at the [facility]. Pt will call [facility] for a refill.

## 2025-06-16 NOTE — TELEPHONE ENCOUNTER
Refill Encounter    PCP Visits: Recent Visits  Date Type Provider Dept   05/12/25 Office Visit Zina Rockwell MD [facility]   03/10/25 Office Visit Emily Stein NP [facility]   01/07/25 Office Visit Zina Rockwell MD [facility]   Showing recent visits within past 360 days and meeting all other requirements  Future Appointments  No visits were found meeting these conditions.  Showing future appointments within next 720 days and meeting all other requirements      Last 3 Blood Pressure:   BP Readings from Last 3 Encounters:   05/12/25 108/72   05/06/25 (!) 93/51   04/30/25 112/86     Preferred Pharmacy:   [id number] - [place] - [facility] [address]  [place]  [facility] [place] [id number]  Phone: [phone] Fax: [phone]    Requested RX:  Requested Prescriptions     Pending Prescriptions Disp Refills    pramipexole (MIRAPEX) 0.25 MG tablet 180 tablet 1     Sig: Take 2 tablets (0.5 mg total) by mouth every evening.      RX Route: Normal  Allergies confirmed

## 2025-06-17 ENCOUNTER — CLINICAL SUPPORT (OUTPATIENT)
Dept: REHABILITATION | Facility: HOSPITAL | Age: 75
End: 2025-06-17
Payer: MEDICARE

## 2025-06-17 DIAGNOSIS — Z74.09 IMPAIRED FUNCTIONAL MOBILITY, BALANCE, GAIT, AND ENDURANCE: Primary | ICD-10-CM

## 2025-06-17 PROCEDURE — 97530 THERAPEUTIC ACTIVITIES: CPT

## 2025-06-18 ENCOUNTER — PATIENT MESSAGE (OUTPATIENT)
Dept: INTERNAL MEDICINE | Facility: CLINIC | Age: 75
End: 2025-06-18
Payer: MEDICARE

## 2025-06-18 DIAGNOSIS — R60.0 LOCALIZED EDEMA: ICD-10-CM

## 2025-06-18 DIAGNOSIS — M79.89 SWELLING OF LOWER EXTREMITY: Primary | ICD-10-CM

## 2025-06-19 ENCOUNTER — CLINICAL SUPPORT (OUTPATIENT)
Dept: REHABILITATION | Facility: HOSPITAL | Age: 75
End: 2025-06-19
Payer: MEDICARE

## 2025-06-19 ENCOUNTER — HOSPITAL ENCOUNTER (OUTPATIENT)
Dept: CARDIOLOGY | Facility: HOSPITAL | Age: 75
Discharge: HOME | End: 2025-06-19
Attending: INTERNAL MEDICINE
Payer: MEDICARE

## 2025-06-19 DIAGNOSIS — M79.89 SWELLING OF LOWER EXTREMITY: ICD-10-CM

## 2025-06-19 DIAGNOSIS — R60.0 LOCALIZED EDEMA: ICD-10-CM

## 2025-06-19 DIAGNOSIS — Z74.09 IMPAIRED FUNCTIONAL MOBILITY, BALANCE, GAIT, AND ENDURANCE: Primary | ICD-10-CM

## 2025-06-19 DIAGNOSIS — R22.43 LOCALIZED SWELLING OF BOTH LOWER LEGS: Primary | ICD-10-CM

## 2025-06-19 PROCEDURE — 93970 EXTREMITY STUDY: CPT | Mod: HCNC

## 2025-06-19 PROCEDURE — 97530 THERAPEUTIC ACTIVITIES: CPT | Mod: CQ

## 2025-06-19 PROCEDURE — 97112 NEUROMUSCULAR REEDUCATION: CPT | Mod: CQ

## 2025-06-19 NOTE — PROGRESS NOTES
"  Outpatient Rehab    Physical Therapy Visit    Patient Name: Angelina Man  MRN: [id number]  YOB: 1950  Encounter Date: 6/19/2025    Therapy Diagnosis:   Encounter Diagnosis   Name Primary?    Impaired functional mobility, balance, gait, and endurance Yes     Physician: Aleida Pires MD    Physician Orders: Eval and Treat  Medical Diagnosis: Nondisplaced intertrochanteric fracture of right femur, initial encounter for closed fracture  Surgical Diagnosis: Not applicable for this Episode   Surgical Date: Not applicable for this Episode  Days Since Last Surgery: Not applicable for this Episode    Visit # / Visits Authorized:  4 / 20  Insurance Authorization Period: 6/5/2025 to 9/30/2025  Date of Evaluation: 6/3/2025  Plan of Care Certification: 6/5/2025 to 8/14/2025      PT/PTA: PTA   Number of PTA visits since last PT visit:1  Time In: 1115   Time Out: 1210  Total Time (in minutes): 55   Total Billable Time (in minutes): 27    FOTO:  Intake Score:  %  Survey Score 2:  %  Survey Score 3:  %    Precautions:       Subjective   would like to walk her stairs again at normal speed.  Pain reported as 3/10.      Objective            Treatment:  Balance/Neuromuscular Re-Education  NMR 1: Education on hep, poc, functional anatomy, and activity modification/load progressions  NMR 2: PPT 5 sec hold x 20 reps  NMR 3: PPT + glut bridge + iso hip abd: 3 sec hold 3x10 with 5 sec hold  NMR 4: sidelying clams with 3 lbs: 2x15 with 5 sec hold  Therapeutic Activity  TA 1: runner step ups, 4" step: 2x10  TA 2: step downs, 4" step: 4x5  TA 3: lateral walks, yellow loop band: 2x2.5 min  TA 4: dynamic marching with 3 sec holds in // bars    Time Entry(in minutes):  Neuromuscular Re-Education Time Entry: 25  Therapeutic Activity Time Entry: 35    Assessment & Plan   Assessment: Patient tolerated treatment progression very well. Treatment focused on hip stabilization and RLE strengthening. Will progress as tolerated.    "     The patient will continue to benefit from skilled outpatient physical therapy in order to address the deficits listed in the problem list on the initial evaluation, provide patient and family education, and maximize the patients level of independence in the home and community environments.     The patient's spiritual, cultural, and educational needs were considered, and the patient is agreeable to the plan of care and goals.           Plan: Will continue per POC towards treatment goals. PT/PTA met face to face to discuss patient's treatment plan and progress towards established goals. Patient will be seen by physical therapist every sixth visit and minimally once per month.    Goals:   Active       Ambulation/movement       Patient will ambulate at least 300 feet without need for assisive device for return to baseline       Start:  06/03/25    Expected End:  08/14/25            Patient will ascend at least 7 steps with alternating gait pattern for activities of daily living        Start:  06/03/25    Expected End:  08/14/25            Patient will descend at least 7 steps with alternating gait pattern for activities of daily living        Start:  06/03/25    Expected End:  08/14/25               Pain       Patient will report pain of < 3/10 demonstrating a reduction of overall pain       Start:  06/03/25    Expected End:  08/14/25            Patient will report a 2 point reduction in pain while performing standing/walking tasks       Start:  06/03/25    Expected End:  08/14/25               Strength       Patient will achieve right hip flexion strength of 4/5       Start:  06/03/25    Expected End:  08/14/25            Patient will achieve right hip abduction strength of 4/5       Start:  06/03/25    Expected End:  08/14/25                Jose Bhandari, PTA

## 2025-06-20 ENCOUNTER — LAB VISIT (OUTPATIENT)
Dept: LAB | Facility: HOSPITAL | Age: 75
End: 2025-06-20
Payer: MEDICARE

## 2025-06-20 DIAGNOSIS — R60.0 LOCALIZED EDEMA: ICD-10-CM

## 2025-06-20 LAB
ANION GAP (OHS): 9 MMOL/L (ref 8–16)
BUN SERPL-MCNC: 9 MG/DL (ref 8–23)
CALCIUM SERPL-MCNC: 8 MG/DL (ref 8.7–10.5)
CHLORIDE SERPL-SCNC: 104 MMOL/L (ref 95–110)
CO2 SERPL-SCNC: 28 MMOL/L (ref 23–29)
CREAT SERPL-MCNC: 0.6 MG/DL (ref 0.5–1.4)
GFR SERPLBLD CREATININE-BSD FMLA CKD-EPI: >60 ML/MIN/1.73/M2
GLUCOSE SERPL-MCNC: 83 MG/DL (ref 70–110)
POTASSIUM SERPL-SCNC: 2.9 MMOL/L (ref 3.5–5.1)
SODIUM SERPL-SCNC: 141 MMOL/L (ref 136–145)

## 2025-06-20 PROCEDURE — 80048 BASIC METABOLIC PNL TOTAL CA: CPT | Mod: HCNC

## 2025-06-20 PROCEDURE — 36415 COLL VENOUS BLD VENIPUNCTURE: CPT | Mod: HCNC

## 2025-06-23 ENCOUNTER — RESULTS FOLLOW-UP (OUTPATIENT)
Dept: INTERNAL MEDICINE | Facility: CLINIC | Age: 75
End: 2025-06-23

## 2025-06-23 ENCOUNTER — PATIENT MESSAGE (OUTPATIENT)
Dept: INTERNAL MEDICINE | Facility: CLINIC | Age: 75
End: 2025-06-23
Payer: MEDICARE

## 2025-06-23 DIAGNOSIS — E87.6 HYPOKALEMIA: Primary | ICD-10-CM

## 2025-06-24 ENCOUNTER — HOSPITAL ENCOUNTER (OUTPATIENT)
Dept: RADIOLOGY | Facility: HOSPITAL | Age: 75
Discharge: HOME | End: 2025-06-24
Attending: NURSE PRACTITIONER
Payer: MEDICARE

## 2025-06-24 ENCOUNTER — CLINICAL SUPPORT (OUTPATIENT)
Dept: REHABILITATION | Facility: HOSPITAL | Age: 75
End: 2025-06-24
Payer: MEDICARE

## 2025-06-24 DIAGNOSIS — N28.1 BILATERAL RENAL CYSTS: ICD-10-CM

## 2025-06-24 DIAGNOSIS — Z74.09 IMPAIRED FUNCTIONAL MOBILITY, BALANCE, GAIT, AND ENDURANCE: Primary | ICD-10-CM

## 2025-06-24 DIAGNOSIS — D17.71 ANGIOMYOLIPOMA OF RIGHT KIDNEY: ICD-10-CM

## 2025-06-24 PROCEDURE — 76775 US EXAM ABDO BACK WALL LIM: CPT | Mod: TC,HCNC

## 2025-06-24 PROCEDURE — 97112 NEUROMUSCULAR REEDUCATION: CPT | Mod: CQ

## 2025-06-24 PROCEDURE — 76775 US EXAM ABDO BACK WALL LIM: CPT | Mod: 26,HCNC,, | Performed by: RADIOLOGY

## 2025-06-24 PROCEDURE — 97530 THERAPEUTIC ACTIVITIES: CPT | Mod: CQ

## 2025-06-24 NOTE — PROGRESS NOTES
"  Outpatient Rehab    Physical Therapy Visit    Patient Name: Angelina Man  MRN: [id number]  YOB: 1950  Encounter Date: 6/12/2025    Therapy Diagnosis:   Encounter Diagnosis   Name Primary?    Impaired functional mobility, balance, gait, and endurance Yes     Physician: Aleida Pires MD    Physician Orders: Eval and Treat  Medical Diagnosis: Nondisplaced intertrochanteric fracture of right femur, initial encounter for closed fracture  Surgical Diagnosis: Not applicable for this Episode   Surgical Date: Not applicable for this Episode  Days Since Last Surgery: Not applicable for this Episode    Visit # / Visits Authorized:  2 / 20  Insurance Authorization Period: 6/5/2025 to 9/30/2025  Date of Evaluation: 6/3/2025  Plan of Care Certification: 6/5/2025 to 8/14/2025      PT/PTA: PT   Number of PTA visits since last PT visit:0  Time In: 1000   Time Out: 1053  Total Time (in minutes): 53   Total Billable Time (in minutes): 23    FOTO:  Intake Score:  %  Survey Score 2:  %  Survey Score 3:  %    Precautions:       Subjective   still worried about the swelling in her legs.  Pain reported as 3/10.      Objective            Treatment:  Balance/Neuromuscular Re-Education  NMR 1: Education on hep, poc, functional anatomy, and activity modification/load progressions  NMR 2: PPT 5 sec hold x 20 reps  NMR 3: PPT + glut bridge + iso hip abd: 3 sec hold 3x10 with 5 sec hold  NMR 4: sidelying clams with 3 lbs: 2x15 with 5 sec hold  NMR 5: shuttle - B leg press, 62.5 lbs: 2x10; shuttle - U leg press: 25 lbs LLE 3x10, 37.5 lbs RLE 3x10  Therapeutic Activity  TA 2: step downs, 4" step: 2x10  TA 3: lateral walks, yellow loop band: 2x2.5 min  TA 4: dynamic marching with 3 sec holds in // bars    Time Entry(in minutes):  Neuromuscular Re-Education Time Entry: 30  Therapeutic Activity Time Entry: 23    Assessment & Plan   Assessment: Continue to progress well with interventions. No adverse reactions. Limited by pain and " heaviness with standing tasks. Will continue to functional movement patterns in upcoming sessions       The patient will continue to benefit from skilled outpatient physical therapy in order to address the deficits listed in the problem list on the initial evaluation, provide patient and family education, and maximize the patients level of independence in the home and community environments.     The patient's spiritual, cultural, and educational needs were considered, and the patient is agreeable to the plan of care and goals.           Plan:      Goals:   Active       Ambulation/movement       Patient will ambulate at least 300 feet without need for assisive device for return to baseline (Ongoing)       Start:  06/03/25    Expected End:  08/14/25            Patient will ascend at least 7 steps with alternating gait pattern for activities of daily living  (Ongoing)       Start:  06/03/25    Expected End:  08/14/25            Patient will descend at least 7 steps with alternating gait pattern for activities of daily living  (Ongoing)       Start:  06/03/25    Expected End:  08/14/25               Pain       Patient will report pain of < 3/10 demonstrating a reduction of overall pain (Ongoing)       Start:  06/03/25    Expected End:  08/14/25            Patient will report a 2 point reduction in pain while performing standing/walking tasks (Ongoing)       Start:  06/03/25    Expected End:  08/14/25               Strength       Patient will achieve right hip flexion strength of 4/5 (Ongoing)       Start:  06/03/25    Expected End:  08/14/25            Patient will achieve right hip abduction strength of 4/5 (Ongoing)       Start:  06/03/25    Expected End:  08/14/25                Luisa Crabtree PT

## 2025-06-24 NOTE — PROGRESS NOTES
Outpatient Rehab    Physical Therapy Visit    Patient Name: Angelina Man  MRN: [id number]  YOB: 1950  Encounter Date: 6/10/2025    Therapy Diagnosis:   Encounter Diagnosis   Name Primary?    Impaired functional mobility, balance, gait, and endurance Yes     Physician: Aleida Pires MD    Physician Orders: Eval and Treat  Medical Diagnosis: Nondisplaced intertrochanteric fracture of right femur, initial encounter for closed fracture  Surgical Diagnosis: Not applicable for this Episode   Surgical Date: Not applicable for this Episode  Days Since Last Surgery: Not applicable for this Episode    Visit # / Visits Authorized:  1 / 20  Insurance Authorization Period: 6/5/2025 to 9/30/2025  Date of Evaluation: 6/3/2025  Plan of Care Certification: 6/5/2025 to 8/14/2025      PT/PTA: PT   Number of PTA visits since last PT visit:0  Time In: 1400   Time Out: 1500  Total Time (in minutes): 60   Total Billable Time (in minutes): 18    FOTO:  Intake Score:  %  Survey Score 2:  %  Survey Score 3:  %    Precautions:       Subjective   patient reports that she is doing okay but her biggest complaint is that her legs feel so heavy and she is concerned about them.         Objective            Treatment:  Balance/Neuromuscular Re-Education  NMR 1: Education on hep, poc, functional anatomy, and activity modification/load progressions  NMR 2: PPT 5 sec hold x 20 reps  NMR 3: PPT + glut bridge + iso hip abd: 3 sec hold 3x10 with 5 sec hold  NMR 4: sidelying clams with 3 lbs: 2x15 with 5 sec hold  NMR 5: shuttle - B leg press, 62.5 lbs: 2x10; shuttle - U leg press: 25 lbs LLE 3x10, 37.5 lbs RLE 3x10  Therapeutic Activity  TA 3: lateral walks, yellow loop band: 2x2.5 min  TA 4: dynamic marching with 3 sec holds in // bars    Time Entry(in minutes):  Neuromuscular Re-Education Time Entry: 30  Therapeutic Activity Time Entry: 30    Assessment & Plan   Assessment: Interventions focusing on gross mobility and strength. Good  tolerance to exercise interventions and will take slow progress to ensure no increase in symptoms.        The patient will continue to benefit from skilled outpatient physical therapy in order to address the deficits listed in the problem list on the initial evaluation, provide patient and family education, and maximize the patients level of independence in the home and community environments.     The patient's spiritual, cultural, and educational needs were considered, and the patient is agreeable to the plan of care and goals.           Plan:      Goals:   Active       Ambulation/movement       Patient will ambulate at least 300 feet without need for assisive device for return to baseline (Ongoing)       Start:  06/03/25    Expected End:  08/14/25            Patient will ascend at least 7 steps with alternating gait pattern for activities of daily living  (Ongoing)       Start:  06/03/25    Expected End:  08/14/25            Patient will descend at least 7 steps with alternating gait pattern for activities of daily living  (Ongoing)       Start:  06/03/25    Expected End:  08/14/25               Pain       Patient will report pain of < 3/10 demonstrating a reduction of overall pain (Ongoing)       Start:  06/03/25    Expected End:  08/14/25            Patient will report a 2 point reduction in pain while performing standing/walking tasks (Ongoing)       Start:  06/03/25    Expected End:  08/14/25               Strength       Patient will achieve right hip flexion strength of 4/5 (Ongoing)       Start:  06/03/25    Expected End:  08/14/25            Patient will achieve right hip abduction strength of 4/5 (Ongoing)       Start:  06/03/25    Expected End:  08/14/25                Luisa Crabtree PT

## 2025-06-24 NOTE — PROGRESS NOTES
"  Outpatient Rehab    Physical Therapy Visit    Patient Name: Angelina Man  MRN: [id number]  YOB: 1950  Encounter Date: 6/24/2025    Therapy Diagnosis:   Encounter Diagnosis   Name Primary?    Impaired functional mobility, balance, gait, and endurance Yes     Physician: Aleida Pires MD    Physician Orders: Eval and Treat  Medical Diagnosis: Nondisplaced intertrochanteric fracture of right femur, initial encounter for closed fracture  Surgical Diagnosis: Not applicable for this Episode   Surgical Date: Not applicable for this Episode  Days Since Last Surgery: Not applicable for this Episode    Visit # / Visits Authorized:  5 / 20  Insurance Authorization Period: 6/5/2025 to 9/30/2025  Date of Evaluation: 6/3/2025  Plan of Care Certification: 6/5/2025 to 8/14/2025      PT/PTA:     Number of PTA visits since last PT visit:   Time In: 1105   Time Out: 1205  Total Time (in minutes): 60   Total Billable Time (in minutes): 60    FOTO:  Intake Score:  %  Survey Score 2:  %  Survey Score 3:  %    Precautions:       Subjective   feels that she benefited from previous visit.  Pain reported as 3/10.      Objective            Treatment:  Balance/Neuromuscular Re-Education  NMR 1: Education on hep, poc, functional anatomy, and activity modification/load progressions  NMR 3: PPT + glut bridge + iso hip abd: 3 sec hold 3x10 with 5 sec hold  NMR 5: shuttle - B leg press, 62.5 lbs: 2x10; shuttle - U leg press: 25 lbs LLE 3x10, 37.5 lbs RLE 3x10  Therapeutic Activity  TA 1: runner step ups, 4" step: 3x10  TA 2: step downs, 4" step: 2x10  TA 3: lateral walks, yellow loop band: 2x2.5 min  TA 4: dynamic marching with 3 sec holds in // bars    Time Entry(in minutes):  Neuromuscular Re-Education Time Entry: 30  Therapeutic Activity Time Entry: 30    Assessment & Plan   Assessment:         The patient will continue to benefit from skilled outpatient physical therapy in order to address the deficits listed in the problem " list on the initial evaluation, provide patient and family education, and maximize the patients level of independence in the home and community environments.     The patient's spiritual, cultural, and educational needs were considered, and the patient is agreeable to the plan of care and goals.           Plan:      Goals:   Active       Ambulation/movement       Patient will ambulate at least 300 feet without need for assisive device for return to baseline       Start:  06/03/25    Expected End:  08/14/25            Patient will ascend at least 7 steps with alternating gait pattern for activities of daily living        Start:  06/03/25    Expected End:  08/14/25            Patient will descend at least 7 steps with alternating gait pattern for activities of daily living        Start:  06/03/25    Expected End:  08/14/25               Pain       Patient will report pain of < 3/10 demonstrating a reduction of overall pain       Start:  06/03/25    Expected End:  08/14/25            Patient will report a 2 point reduction in pain while performing standing/walking tasks       Start:  06/03/25    Expected End:  08/14/25               Strength       Patient will achieve right hip flexion strength of 4/5       Start:  06/03/25    Expected End:  08/14/25            Patient will achieve right hip abduction strength of 4/5       Start:  06/03/25    Expected End:  08/14/25                Jose Bhandari, PTA

## 2025-06-25 ENCOUNTER — LAB VISIT (OUTPATIENT)
Dept: LAB | Facility: HOSPITAL | Age: 75
End: 2025-06-25
Payer: MEDICARE

## 2025-06-25 ENCOUNTER — RESULTS FOLLOW-UP (OUTPATIENT)
Dept: UROLOGY | Facility: CLINIC | Age: 75
End: 2025-06-25

## 2025-06-25 DIAGNOSIS — E87.6 HYPOKALEMIA: ICD-10-CM

## 2025-06-25 LAB — POTASSIUM SERPL-SCNC: 3.6 MMOL/L (ref 3.5–5.1)

## 2025-06-25 PROCEDURE — 36415 COLL VENOUS BLD VENIPUNCTURE: CPT | Mod: HCNC

## 2025-06-25 PROCEDURE — 84132 ASSAY OF SERUM POTASSIUM: CPT | Mod: HCNC

## 2025-06-26 ENCOUNTER — HOSPITAL ENCOUNTER (OUTPATIENT)
Dept: CARDIOLOGY | Facility: HOSPITAL | Age: 75
Discharge: HOME | End: 2025-06-26
Attending: INTERNAL MEDICINE
Payer: MEDICARE

## 2025-06-26 ENCOUNTER — RESULTS FOLLOW-UP (OUTPATIENT)
Dept: INTERNAL MEDICINE | Facility: CLINIC | Age: 75
End: 2025-06-26

## 2025-06-26 ENCOUNTER — CLINICAL SUPPORT (OUTPATIENT)
Dept: REHABILITATION | Facility: HOSPITAL | Age: 75
End: 2025-06-26
Payer: MEDICARE

## 2025-06-26 VITALS
WEIGHT: 94.81 LBS | HEART RATE: 50 BPM | DIASTOLIC BLOOD PRESSURE: 72 MMHG | HEIGHT: 63 IN | BODY MASS INDEX: 16.8 KG/M2 | SYSTOLIC BLOOD PRESSURE: 108 MMHG

## 2025-06-26 DIAGNOSIS — Z74.09 IMPAIRED FUNCTIONAL MOBILITY, BALANCE, GAIT, AND ENDURANCE: Primary | ICD-10-CM

## 2025-06-26 DIAGNOSIS — R22.43 LOCALIZED SWELLING OF BOTH LOWER LEGS: ICD-10-CM

## 2025-06-26 LAB
AORTIC SIZE INDEX (SOV): 1.8 CM/M2
AORTIC SIZE INDEX: 2.1 CM/M2
ASCENDING AORTA: 2.9 CM
AV AREA BY CONTINUOUS VTI: 1.3 CM2
AV INDEX (PROSTH): 0.51
AV LVOT MEAN GRADIENT: 1 MMHG
AV LVOT PEAK GRADIENT: 1 MMHG
AV MEAN GRADIENT: 3 MMHG
AV PEAK GRADIENT: 5 MMHG
AV VALVE AREA BY VELOCITY RATIO: 1.2 CM²
AV VALVE AREA: 1.3 CM2
AV VELOCITY RATIO: 0.45
BSA FOR ECHO PROCEDURE: 1.38 M2
CV ECHO LV RWT: 0.28 CM
DOP CALC AO PEAK VEL: 1.1 M/S
DOP CALC AO VTI: 22.8 CM
DOP CALC LVOT AREA: 2.5 CM2
DOP CALC LVOT DIAMETER: 1.8 CM
DOP CALC LVOT PEAK VEL: 0.5 M/S
DOP CALC LVOT STROKE VOLUME: 29.5 CM3
DOP CALCLVOT PEAK VEL VTI: 11.6 CM
E WAVE DECELERATION TIME: 157 MS
E/A RATIO: 1.04
E/E' RATIO: 14 M/S
ECHO EF ESTIMATED: 58 %
ECHO LV POSTERIOR WALL: 0.6 CM (ref 0.6–1.1)
FRACTIONAL SHORTENING: 30.2 % (ref 28–44)
INTERVENTRICULAR SEPTUM: 0.6 CM (ref 0.6–1.1)
IVC DIAMETER: 2.61 CM
IVRT: 74 MS
LA MAJOR: 5.6 CM
LA MINOR: 5.1 CM
LA WIDTH: 4.8 CM
LEFT ATRIUM SIZE: 4.4 CM
LEFT ATRIUM VOLUME INDEX MOD: 53 ML/M2
LEFT ATRIUM VOLUME INDEX: 68 ML/M2
LEFT ATRIUM VOLUME MOD: 75 ML
LEFT ATRIUM VOLUME: 96 CM3
LEFT INTERNAL DIMENSION IN SYSTOLE: 3 CM (ref 2.1–4)
LEFT VENTRICLE DIASTOLIC VOLUME INDEX: 59.57 ML/M2
LEFT VENTRICLE DIASTOLIC VOLUME: 84 ML
LEFT VENTRICLE MASS INDEX: 51.7 G/M2
LEFT VENTRICLE SYSTOLIC VOLUME INDEX: 24.8 ML/M2
LEFT VENTRICLE SYSTOLIC VOLUME: 35 ML
LEFT VENTRICULAR INTERNAL DIMENSION IN DIASTOLE: 4.3 CM (ref 3.5–6)
LEFT VENTRICULAR MASS: 72.9 G
LV LATERAL E/E' RATIO: 12.1
LV SEPTAL E/E' RATIO: 15.6
MV A" WAVE DURATION": 108.47 MS
MV PEAK A VEL: 1.05 M/S
MV PEAK E VEL: 1.09 M/S
OHS CV RV/LV RATIO: 0.77 CM
PISA TR MAX VEL: 3 M/S
PULM VEIN A" WAVE DURATION": 108.47 MS
PULM VEIN S/D RATIO: 0.49
PULMONIC VEIN PEAK A VELOCITY: 0.2 M/S
PV PEAK D VEL: 0.49 M/S
PV PEAK S VEL: 0.24 M/S
RA MAJOR: 5.55 CM
RA PRESSURE ESTIMATED: 15 MMHG
RA WIDTH: 4 CM
RIGHT ATRIAL AREA: 19 CM2
RIGHT VENTRICLE DIASTOLIC BASEL DIMENSION: 3.3 CM
RV TB RVSP: 18 MMHG
RV TISSUE DOPPLER FREE WALL SYSTOLIC VELOCITY 1 (APICAL 4 CHAMBER VIEW): 12 CM/S
SINUS: 2.5 CM
STJ: 2.4 CM
TDI LATERAL: 0.09 M/S
TDI SEPTAL: 0.07 M/S
TDI: 0.08 M/S
TRICUSPID ANNULAR PLANE SYSTOLIC EXCURSION: 1.8 CM
TV PEAK GRADIENT: 36 MMHG
TV REST PULMONARY ARTERY PRESSURE: 51 MMHG
Z-SCORE OF LEFT VENTRICULAR DIMENSION IN END DIASTOLE: -0.12
Z-SCORE OF LEFT VENTRICULAR DIMENSION IN END SYSTOLE: 0.82

## 2025-06-26 PROCEDURE — 97112 NEUROMUSCULAR REEDUCATION: CPT | Mod: CQ

## 2025-06-26 PROCEDURE — 93306 TTE W/DOPPLER COMPLETE: CPT | Mod: HCNC

## 2025-06-26 PROCEDURE — 97530 THERAPEUTIC ACTIVITIES: CPT | Mod: CQ

## 2025-06-26 PROCEDURE — 93306 TTE W/DOPPLER COMPLETE: CPT | Mod: 26,HCNC,, | Performed by: INTERNAL MEDICINE

## 2025-06-26 NOTE — PROGRESS NOTES
"  Outpatient Rehab    Physical Therapy Visit    Patient Name: Angelina Man  MRN: [id number]  YOB: 1950  Encounter Date: 6/17/2025    Therapy Diagnosis:   Encounter Diagnosis   Name Primary?    Impaired functional mobility, balance, gait, and endurance Yes     Physician: Aleida Pires MD    Physician Orders: Eval and Treat  Medical Diagnosis: Nondisplaced intertrochanteric fracture of right femur, initial encounter for closed fracture  Surgical Diagnosis: Not applicable for this Episode   Surgical Date: Not applicable for this Episode  Days Since Last Surgery: Not applicable for this Episode    Visit # / Visits Authorized:  3 / 20  Insurance Authorization Period: 6/5/2025 to 9/30/2025  Date of Evaluation: 6/3/2025  Plan of Care Certification: 6/5/2025 to 8/14/2025      PT/PTA: PT   Number of PTA visits since last PT visit:0  Time In: 1400   Time Out: 1500  Total Time (in minutes): 60   Total Billable Time (in minutes): 15    FOTO:  Intake Score:  %  Survey Score 2:  %  Survey Score 3:  %    Precautions:       Subjective   no new complaints.  Pain reported as 2/10. Patient's main complaint with bilateral lower leg numbness that has been most recent and getting this followed up via cardiology.     Objective            Treatment:  Balance/Neuromuscular Re-Education  NMR 1: Education on hep, poc, functional anatomy, and activity modification/load progressions  NMR 2: PPT 5 sec hold x 15 reps  NMR 3: PPT + glut bridge + iso hip abd, green tb: 3 sec hold 3x10 with 5 sec hold  NMR 4: sidelying clams with 3 lbs: 2x15 with 5 sec hold  NMR 5: shuttle - B leg press, 62.5 lbs: 2x10; shuttle - U leg press: 25 lbs LLE 3x10, 37.5 lbs RLE 3x10  Therapeutic Activity  TA 1: runner step ups, 4" step: 3x10  TA 2: step downs, 4" step: 3x10  TA 3: lateral walks, yellow loop band: 2x2.5 min  TA 4: dynamic marching with 3 sec holds in // bars    Time Entry(in minutes):  Neuromuscular Re-Education Time Entry: " 28  Therapeutic Activity Time Entry: 27    Assessment & Plan   Assessment: Able to progress with ambulating without assisive device and making good progress. Continue to progress with load and intensity and she performs with good challenge.        The patient will continue to benefit from skilled outpatient physical therapy in order to address the deficits listed in the problem list on the initial evaluation, provide patient and family education, and maximize the patients level of independence in the home and community environments.     The patient's spiritual, cultural, and educational needs were considered, and the patient is agreeable to the plan of care and goals.           Plan:      Goals:   Active       Ambulation/movement       Patient will ambulate at least 300 feet without need for assisive device for return to baseline (Ongoing)       Start:  06/03/25    Expected End:  08/14/25            Patient will ascend at least 7 steps with alternating gait pattern for activities of daily living  (Ongoing)       Start:  06/03/25    Expected End:  08/14/25            Patient will descend at least 7 steps with alternating gait pattern for activities of daily living  (Ongoing)       Start:  06/03/25    Expected End:  08/14/25               Pain       Patient will report pain of < 3/10 demonstrating a reduction of overall pain (Ongoing)       Start:  06/03/25    Expected End:  08/14/25            Patient will report a 2 point reduction in pain while performing standing/walking tasks (Ongoing)       Start:  06/03/25    Expected End:  08/14/25               Strength       Patient will achieve right hip flexion strength of 4/5 (Ongoing)       Start:  06/03/25    Expected End:  08/14/25            Patient will achieve right hip abduction strength of 4/5 (Ongoing)       Start:  06/03/25    Expected End:  08/14/25                Luisa Crabtree PT

## 2025-06-26 NOTE — PROGRESS NOTES
"  Outpatient Rehab    Physical Therapy Visit    Patient Name: Angelina Man  MRN: [id number]  YOB: 1950  Encounter Date: 6/26/2025    Therapy Diagnosis:   Encounter Diagnosis   Name Primary?    Impaired functional mobility, balance, gait, and endurance Yes     Physician: Aleida Pires MD    Physician Orders: Eval and Treat  Medical Diagnosis: Nondisplaced intertrochanteric fracture of right femur, initial encounter for closed fracture  Surgical Diagnosis: Not applicable for this Episode   Surgical Date: Not applicable for this Episode  Days Since Last Surgery: Not applicable for this Episode    Visit # / Visits Authorized:  6 / 20  Insurance Authorization Period: 6/5/2025 to 9/30/2025  Date of Evaluation: 6/3/2025  Plan of Care Certification: 6/5/2025 to 8/14/2025      PT/PTA: PTA   Number of PTA visits since last PT visit:3  Time In: 1340   Time Out: 1435  Total Time (in minutes): 55   Total Billable Time (in minutes): 55    FOTO:  Intake Score:  %  Survey Score 2:  %  Survey Score 3:  %    Precautions:       Subjective   feeling a little sore from the previous treatment visit.  Pain reported as 2/10.      Objective            Treatment:  Balance/Neuromuscular Re-Education  NMR 2: PPT 5 sec hold x 15 reps  NMR 3: PPT + glut bridge + iso hip abd, green tb: 3 sec hold 3x10 with 5 sec hold  NMR 5: shuttle - B leg press, 62.5 lbs: 2x10; shuttle - U leg press: 25 lbs LLE 3x10, 37.5 lbs RLE 3x10  Therapeutic Activity  TA 1: runner step ups, 4" step: 3x10  TA 2: step downs, 4" step: 3x10  TA 3: lateral walks, yellow loop band: 2x2.5 min    Time Entry(in minutes):  Neuromuscular Re-Education Time Entry: 28  Therapeutic Activity Time Entry: 27    Assessment & Plan   Assessment:         The patient will continue to benefit from skilled outpatient physical therapy in order to address the deficits listed in the problem list on the initial evaluation, provide patient and family education, and maximize the " patients level of independence in the home and community environments.     The patient's spiritual, cultural, and educational needs were considered, and the patient is agreeable to the plan of care and goals.           Plan: Will continue per POC towards treatment goals. PT/PTA met face to face to discuss patient's treatment plan and progress towards established goals. Patient will be seen by physical therapist every sixth visit and minimally once per month.    Goals:   Active       Ambulation/movement       Patient will ambulate at least 300 feet without need for assisive device for return to baseline (Ongoing)       Start:  06/03/25    Expected End:  08/14/25            Patient will ascend at least 7 steps with alternating gait pattern for activities of daily living  (Ongoing)       Start:  06/03/25    Expected End:  08/14/25            Patient will descend at least 7 steps with alternating gait pattern for activities of daily living  (Ongoing)       Start:  06/03/25    Expected End:  08/14/25               Pain       Patient will report pain of < 3/10 demonstrating a reduction of overall pain (Ongoing)       Start:  06/03/25    Expected End:  08/14/25            Patient will report a 2 point reduction in pain while performing standing/walking tasks (Ongoing)       Start:  06/03/25    Expected End:  08/14/25               Strength       Patient will achieve right hip flexion strength of 4/5 (Ongoing)       Start:  06/03/25    Expected End:  08/14/25            Patient will achieve right hip abduction strength of 4/5 (Ongoing)       Start:  06/03/25    Expected End:  08/14/25                Jose Bhandari, PTA

## 2025-06-30 ENCOUNTER — OFFICE VISIT (OUTPATIENT)
Dept: DERMATOLOGY | Facility: CLINIC | Age: 75
End: 2025-06-30
Payer: MEDICARE

## 2025-06-30 ENCOUNTER — RESULTS FOLLOW-UP (OUTPATIENT)
Dept: INTERNAL MEDICINE | Facility: CLINIC | Age: 75
End: 2025-06-30

## 2025-06-30 DIAGNOSIS — Z85.828 PERSONAL HISTORY OF SKIN CANCER: ICD-10-CM

## 2025-06-30 DIAGNOSIS — L64.9 ANDROGENETIC ALOPECIA: ICD-10-CM

## 2025-06-30 DIAGNOSIS — L82.1 SK (SEBORRHEIC KERATOSIS): ICD-10-CM

## 2025-06-30 DIAGNOSIS — I87.8 VENOUS STASIS: ICD-10-CM

## 2025-06-30 DIAGNOSIS — L81.4 LENTIGO: Primary | ICD-10-CM

## 2025-06-30 DIAGNOSIS — D18.01 CHERRY ANGIOMA: ICD-10-CM

## 2025-06-30 DIAGNOSIS — D22.9 NEVUS: ICD-10-CM

## 2025-06-30 PROCEDURE — 1125F AMNT PAIN NOTED PAIN PRSNT: CPT | Mod: CPTII,HCNC,S$GLB, | Performed by: DERMATOLOGY

## 2025-06-30 PROCEDURE — 1159F MED LIST DOCD IN RCRD: CPT | Mod: CPTII,HCNC,S$GLB, | Performed by: DERMATOLOGY

## 2025-06-30 PROCEDURE — 99213 OFFICE O/P EST LOW 20 MIN: CPT | Mod: HCNC,S$GLB,, | Performed by: DERMATOLOGY

## 2025-06-30 PROCEDURE — 1157F ADVNC CARE PLAN IN RCRD: CPT | Mod: CPTII,HCNC,S$GLB, | Performed by: DERMATOLOGY

## 2025-06-30 PROCEDURE — 3288F FALL RISK ASSESSMENT DOCD: CPT | Mod: CPTII,HCNC,S$GLB, | Performed by: DERMATOLOGY

## 2025-06-30 PROCEDURE — 1160F RVW MEDS BY RX/DR IN RCRD: CPT | Mod: CPTII,HCNC,S$GLB, | Performed by: DERMATOLOGY

## 2025-06-30 PROCEDURE — 1100F PTFALLS ASSESS-DOCD GE2>/YR: CPT | Mod: CPTII,HCNC,S$GLB, | Performed by: DERMATOLOGY

## 2025-06-30 PROCEDURE — 99999 PR PBB SHADOW E&M-EST. PATIENT-LVL III: CPT | Mod: PBBFAC,HCNC,, | Performed by: DERMATOLOGY

## 2025-06-30 NOTE — PATIENT INSTRUCTIONS
Rogaine- can use 5% to affected area 1x per day. Must use consistently and if you stop using, the hair it stimulated to grow may fall out over time. Generic acceptable    There are multiple over the counter hair supplements, few of which have proven efficacy in controlled clinical trials. However, anecdotal reports have indicated a benefit for these vitamins.  Handout reviewing active ingredients, allergies, and proper use were provided for Nutrafol and Viviscal. The patient can elect to take at his/her discretion. If taking biotin, patient should refrain from taking it 1 week before lab work.     Rosemary oil 3x per week topically to scalp     Zenegen shampoo     Pumpkin seed oil 400 mg daily - orally (1000 mg every other day )    We would like to see you back in the clinic in 12 months.  You will be able to schedule this appointment by calling or by using your [facility] portal 3 months before this time. Because our schedule fills so quickly, please set a reminder in your phone or on your calendar to schedule 3 months before you are due to come in so that we can see you in a timely fashion.  You should also receive a reminder from us in the mail. This will help us ensure we can continue to provide excellent healthcare for you. Thank you.      
Yes

## 2025-06-30 NOTE — PROGRESS NOTES
"  Subjective:      Patient ID:  Angelina Man is a 75 y.o. female who presents for   Chief Complaint   Patient presents with    Skin Check     Tbse and hair loss     Pt here today for TBSE and hair loss.   This is a high risk patient here to check for the development of new lesions.    H/o NMSC    Patient is here today for a "mole" check.   Pt has a history of  extensive sun exposure in the past.   Pt recalls several blistering sunburns in the past- several   Pt has history of tanning bed use- yes  Pt has  had moles removed in the past- no  Pt has history of melanoma in first degree relatives-  no    Patient with new area of concern:   Location: Both legs- lumps- itches  Previous treatments: cortisone ; has swelling in both legs ; worse since her femur surgery.                 Review of Systems   Skin:  Positive for daily sunscreen use, activity-related sunscreen use and wears hat. Negative for recent sunburn.   Hematologic/Lymphatic: Adenopathy: blood thinners. Bruises/bleeds easily.       Objective:   Physical Exam   Constitutional: She appears well-developed and well-nourished. No distress.   HENT:   Ears:    Neurological: She is alert and oriented to person, place, and time. She is not disoriented.   Psychiatric: She has a normal mood and affect.   Skin:   Areas Examined (abnormalities noted in diagram):   Scalp / Hair Palpated and Inspected  Head / Face Inspection Performed  Neck Inspection Performed  Chest / Axilla Inspection Performed  Abdomen Inspection Performed  Genitals / Buttocks / Groin Inspection Performed  Back Inspection Performed  RUE Inspected  LUE Inspection Performed  RLE Inspected  LLE Inspection Performed  Nails and Digits Inspection Performed                         Diagram Legend     Erythematous scaling macule/papule c/w actinic keratosis       Vascular papule c/w angioma      Pigmented verrucoid papule/plaque c/w seborrheic keratosis      Yellow umbilicated papule c/w sebaceous " hyperplasia      Irregularly shaped tan macule c/w lentigo     1-2 mm smooth white papules consistent with Milia      Movable subcutaneous cyst with punctum c/w epidermal inclusion cyst      Subcutaneous movable cyst c/w pilar cyst      Firm pink to brown papule c/w dermatofibroma      Pedunculated fleshy papule(s) c/w skin tag(s)      Evenly pigmented macule c/w junctional nevus     Mildly variegated pigmented, slightly irregular-bordered macule c/w mildly atypical nevus      Flesh colored to evenly pigmented papule c/w intradermal nevus       Pink pearly papule/plaque c/w basal cell carcinoma      Erythematous hyperkeratotic cursted plaque c/w SCC      Surgical scar with no sign of skin cancer recurrence      Open and closed comedones      Inflammatory papules and pustules      Verrucoid papule consistent consistent with wart     Erythematous eczematous patches and plaques     Dystrophic onycholytic nail with subungual debris c/w onychomycosis     Umbilicated papule    Erythematous-base heme-crusted tan verrucoid plaque consistent with inflamed seborrheic keratosis     Erythematous Silvery Scaling Plaque c/w Psoriasis     See annotation      Assessment / Plan:        Lentigo  This is a benign hyperpigmented sun induced lesion. Recommend daily sun protection/avoidance and use of at least SPF 30, broad spectrum sunscreen (OTC drug) will reduce the number of new lesions. Treatment of these benign lesions are considered cosmetic.  The nature of sun-induced photo-aging and skin cancers is discussed.  Sun avoidance, protective clothing, and the use of 30-SPF sunscreens is advised. Observe closely for skin damage/changes, and call if such occurs.    Cherry angioma  These are benign vascular lesions that are inherited.  Treatment is not necessary.    SK (seborrheic keratosis)  These are benign inherited growths without a malignant potential. Reassurance given to patient. No treatment is necessary.     Nevus  Discussed  ABCDE's of nevi.  Monitor for new mole or moles that are becoming bigger, darker, irritated, or developing irregular borders. Brochure provided. Instructed patient to observe lesion(s) for changes and follow up in clinic if changes are noted. Patient to monitor skin at home for new or changing lesions.       Androgenetic alopecia- bc of medical co-morbidities will hold off on spironolactone or minoxidil for now   Rogaine- can use 5% to affected area 1x per day. Must use consistently and if you stop using, the hair it stimulated to grow may fall out over time. Generic acceptable    There are multiple over the counter hair supplements, few of which have proven efficacy in controlled clinical trials. However, anecdotal reports have indicated a benefit for these vitamins.  Handout reviewing active ingredients, allergies, and proper use were provided for Nutrafol and Viviscal. The patient can elect to take at his/her discretion. If taking biotin, patient should refrain from taking it 1 week before lab work.     Rosemary oil 3x per week topically to scalp     Zenegen shampoo     Pumpkin seed oil 400 mg daily - orally (1000 mg every other day )    Personal history of skin cancer  Pt with history of non melanoma skin cancer  Total body skin examination performed today including at least 12 points as noted in physical examination. No suspicious lesions noted.Monitor for new or changing lesions as discussed such as pink scaly patches that are occasionally tender or not healing, 'pimples' that never go away, lesions that are not healing or bleeding.     Venous stasis/edema bullae  Will need to be managed by PCP  Could consider Vasculera but  underlying etiology needs to be determined  May benefit from compression hose and lymphedema clinic compression Physical therapy  Can use cerave anti itch lotion for pruritus               Follow up in about 1 year (around 6/30/2026) for TBSE.

## 2025-06-30 NOTE — Clinical Note
Pt seeing you thurs.  I defer to you on managing her edema but pt wanted me to let you know we talked about a few things - see my note  Thanks, Fozia

## 2025-07-01 ENCOUNTER — CLINICAL SUPPORT (OUTPATIENT)
Dept: REHABILITATION | Facility: HOSPITAL | Age: 75
End: 2025-07-01
Payer: MEDICARE

## 2025-07-01 DIAGNOSIS — Z74.09 IMPAIRED FUNCTIONAL MOBILITY, BALANCE, GAIT, AND ENDURANCE: Primary | ICD-10-CM

## 2025-07-01 PROCEDURE — 97112 NEUROMUSCULAR REEDUCATION: CPT | Mod: CQ

## 2025-07-01 PROCEDURE — 97530 THERAPEUTIC ACTIVITIES: CPT | Mod: CQ

## 2025-07-01 NOTE — PROGRESS NOTES
"  Outpatient Rehab    Physical Therapy Visit    Patient Name: Angelina Man  MRN: [id number]  YOB: 1950  Encounter Date: 7/1/2025    Therapy Diagnosis:   Encounter Diagnosis   Name Primary?    Impaired functional mobility, balance, gait, and endurance Yes     Physician: Aleida Pires MD    Physician Orders: Eval and Treat  Medical Diagnosis: Nondisplaced intertrochanteric fracture of right femur, initial encounter for closed fracture  Surgical Diagnosis: Not applicable for this Episode   Surgical Date: Not applicable for this Episode  Days Since Last Surgery: Not applicable for this Episode    Visit # / Visits Authorized:  7 / 20  Insurance Authorization Period: 6/5/2025 to 9/30/2025  Date of Evaluation: 6/3/2025  Plan of Care Certification: 6/5/2025 to 8/14/2025      PT/PTA:     Number of PTA visits since last PT visit:   Time In: 1305   Time Out: 1405  Total Time (in minutes): 60   Total Billable Time (in minutes): 60    FOTO:  Intake Score:  %  Survey Score 2:  %  Survey Score 3:  %    Precautions:       Subjective   able to go down the stairs with improved control.  Pain reported as 1/10.      Objective            Treatment:  Balance/Neuromuscular Re-Education  NMR 3: PPT + glut bridge + iso hip abd, green tb: 3 sec hold 3x10 with 5 sec hold  NMR 4: sidelying clams with 3 lbs: 2x15 with 5 sec hold (change to 2 lbs next visit for RLE)  NMR 5: shuttle - B leg press, 62.5 lbs: 2x10; shuttle - U leg press: 37.5 lbs LLE 3x10, 50 lbs RLE 3x10  Therapeutic Activity  TA 1: runner step ups, 6" step: 3x10  TA 2: step downs, 4" step: 3x10  TA 3: lateral walks, yellow loop band: 2x2.5 min  TA 4: dynamic marching with 3 sec holds in // bars    Time Entry(in minutes):  Neuromuscular Re-Education Time Entry: 30  Therapeutic Activity Time Entry: 30    Assessment & Plan   Assessment: Able to progress with ambulating without assisive device and making good progress. Continue to progress with load and intensity " and she performs with good challenge. Patient would benefit from strengthening vestibular system in order to promote balance and increase gait stability.        The patient will continue to benefit from skilled outpatient physical therapy in order to address the deficits listed in the problem list on the initial evaluation, provide patient and family education, and maximize the patients level of independence in the home and community environments.     The patient's spiritual, cultural, and educational needs were considered, and the patient is agreeable to the plan of care and goals.           Plan: Will continue per POC towards treatment goals. PT/PTA met face to face to discuss patient's treatment plan and progress towards established goals. Patient will be seen by physical therapist every sixth visit and minimally once per month.    Goals:   Active       Ambulation/movement       Patient will ambulate at least 300 feet without need for assisive device for return to baseline (Ongoing)       Start:  06/03/25    Expected End:  08/14/25            Patient will ascend at least 7 steps with alternating gait pattern for activities of daily living  (Ongoing)       Start:  06/03/25    Expected End:  08/14/25            Patient will descend at least 7 steps with alternating gait pattern for activities of daily living  (Ongoing)       Start:  06/03/25    Expected End:  08/14/25               Pain       Patient will report pain of < 3/10 demonstrating a reduction of overall pain (Ongoing)       Start:  06/03/25    Expected End:  08/14/25            Patient will report a 2 point reduction in pain while performing standing/walking tasks (Ongoing)       Start:  06/03/25    Expected End:  08/14/25               Strength       Patient will achieve right hip flexion strength of 4/5 (Ongoing)       Start:  06/03/25    Expected End:  08/14/25            Patient will achieve right hip abduction strength of 4/5 (Ongoing)       Start:   06/03/25    Expected End:  08/14/25                Jose Bhandari, PTA

## 2025-07-03 ENCOUNTER — OFFICE VISIT (OUTPATIENT)
Dept: INTERNAL MEDICINE | Facility: CLINIC | Age: 75
End: 2025-07-03
Payer: MEDICARE

## 2025-07-03 VITALS
HEART RATE: 79 BPM | HEIGHT: 63 IN | OXYGEN SATURATION: 100 % | BODY MASS INDEX: 17.85 KG/M2 | DIASTOLIC BLOOD PRESSURE: 60 MMHG | WEIGHT: 100.75 LBS | SYSTOLIC BLOOD PRESSURE: 114 MMHG

## 2025-07-03 DIAGNOSIS — I50.32 CHRONIC HEART FAILURE WITH PRESERVED EJECTION FRACTION: Chronic | ICD-10-CM

## 2025-07-03 DIAGNOSIS — M79.89 SWELLING OF LOWER EXTREMITY: ICD-10-CM

## 2025-07-03 DIAGNOSIS — I89.0 LYMPHEDEMA: Primary | ICD-10-CM

## 2025-07-03 DIAGNOSIS — R60.0 LOCALIZED EDEMA: ICD-10-CM

## 2025-07-03 DIAGNOSIS — I82.4Y1 ACUTE DEEP VEIN THROMBOSIS (DVT) OF PROXIMAL VEIN OF RIGHT LOWER EXTREMITY: ICD-10-CM

## 2025-07-03 DIAGNOSIS — I48.11 LONGSTANDING PERSISTENT ATRIAL FIBRILLATION: Chronic | ICD-10-CM

## 2025-07-03 PROCEDURE — 99215 OFFICE O/P EST HI 40 MIN: CPT | Mod: HCNC,S$GLB,, | Performed by: INTERNAL MEDICINE

## 2025-07-03 PROCEDURE — 1159F MED LIST DOCD IN RCRD: CPT | Mod: CPTII,HCNC,S$GLB, | Performed by: INTERNAL MEDICINE

## 2025-07-03 PROCEDURE — G2211 COMPLEX E/M VISIT ADD ON: HCPCS | Mod: HCNC,S$GLB,, | Performed by: INTERNAL MEDICINE

## 2025-07-03 PROCEDURE — 1101F PT FALLS ASSESS-DOCD LE1/YR: CPT | Mod: CPTII,HCNC,S$GLB, | Performed by: INTERNAL MEDICINE

## 2025-07-03 PROCEDURE — 1126F AMNT PAIN NOTED NONE PRSNT: CPT | Mod: CPTII,HCNC,S$GLB, | Performed by: INTERNAL MEDICINE

## 2025-07-03 PROCEDURE — 3078F DIAST BP <80 MM HG: CPT | Mod: CPTII,HCNC,S$GLB, | Performed by: INTERNAL MEDICINE

## 2025-07-03 PROCEDURE — 3288F FALL RISK ASSESSMENT DOCD: CPT | Mod: CPTII,HCNC,S$GLB, | Performed by: INTERNAL MEDICINE

## 2025-07-03 PROCEDURE — 1157F ADVNC CARE PLAN IN RCRD: CPT | Mod: CPTII,HCNC,S$GLB, | Performed by: INTERNAL MEDICINE

## 2025-07-03 PROCEDURE — 99999 PR PBB SHADOW E&M-EST. PATIENT-LVL V: CPT | Mod: PBBFAC,HCNC,, | Performed by: INTERNAL MEDICINE

## 2025-07-03 PROCEDURE — 3074F SYST BP LT 130 MM HG: CPT | Mod: CPTII,HCNC,S$GLB, | Performed by: INTERNAL MEDICINE

## 2025-07-03 RX ORDER — FUROSEMIDE 20 MG/1
TABLET ORAL
Qty: 180 TABLET | Refills: 3 | Status: SHIPPED | OUTPATIENT
Start: 2025-07-03 | End: 2026-06-28

## 2025-07-03 NOTE — PATIENT INSTRUCTIONS
High-Potassium Foods    High-potassium fruits:  Apricots  Bananas  Cantaloupe  Dried fruit  Honeydew melon  Kiwi  Krishna  Nectarines  Oranges and orange juice  Papaya  Pomegranate and pomegranate juice  Prunes and prune juice  Pumpkin  Raisins      High-potassium vegetables:  [place] squash, butternut squash, [place] squash  Avocado  Artichoke  Beets  Baked beans, black beans, refried beans  Broccoli (cooked)  [place] sprouts  Kohlrabi  Lentils  Okra  Onions (fried)  Parsnips  Potatoes (white and sweet)  Rutabagas  Spinach (cooked)  Tomatoes, tomato sauce, and tomato paste  Vegetable juice      Other high-potassium foods:  Bran products  Chocolate  Coconut  Creamed soups  French fries  Granola  Ice cream  Milk (buttermilk, chocolate, eggnog evaporated, malted, soy and milkshakes)  Miso  Molasses  Nuts  Peanut butter  Potato chips  Salt substitutes  Seeds  Tofu  Yogurt

## 2025-07-03 NOTE — PROGRESS NOTES
Subjective:      Patient ID: Angelina Man is a 75 y.o. female.    Chief Complaint: Results (Swollen Legs, Echo Results)      Angelina aMn is a 75 y.o. female with chronic conditions significant for HSV, MDD, insomnia, RLS, Afib dx in 2020 on eliquis, hypothyroidism, anxiety, IBS, renal cyst, fibroids, HFpEF, s/p femur fracture repair  Presenting today for acute problem.     Lower extremity swelling: Patient follows up today for RLE swelling. Recently underwent IM pratik insertion of femur on 5/2/2025. She has noted increased persistent right lower extremity swelling post operatively which has led to work up with US and echo.     US leg on 6/19/2025:    The right external iliac vein has a small nonocclusive echogenic focus adherent to the vessel wall concerning for chronic-appearing thrombus.  Therefore, cannot rule out chronic appearing DVT.    There is a Baker's cyst in the right lower extremity measuring 3.3 x 0 point 6 5 cm.    There is no evidence of a left lower extremity DVT.    There is a Baker's cyst in left lower extremity measuring 2.3 by 0.87 cm.    The patient is on full-dose anticoagulation with apixaban.  Recommend treating as acute DVT with apixaban 10 mg twice daily for 7 days, then 5 mg twice daily thereafter.  Repeat ultrasound in 3 months.    Echo on 6/19/2025:    Left Ventricle: The left ventricle is normal in size. Normal wall thickness. Mild global hypokinesis present. There is mildly reduced systolic function with a visually estimated ejection fraction of 45 - 50%. Unable to assess diastolic function due to atrial fibrillation.    Right Ventricle: The right ventricle is normal in size measuring 3.3 cm. Wall thickness is normal. Systolic function is borderline low.    Biatrial enlargement    Mitral Valve: There is mild to moderate regurgitation.    Tricuspid Valve: There is mild to moderate regurgitation.    Pulmonary Artery: There is moderate pulmonary hypertension. The estimated pulmonary  "artery systolic pressure is 51 mmHg.    IVC/SVC: Elevated venous pressure at 15 mmHg.    Right pleural effusion.    Pericardium: There is a small effusion. No indication of cardiac tamponade.    Given the potential acute DVT, the patient received increased dose of eliquis for a week when this was discovered. She is now back to her regular dosing of eliquis at 5mg BID. Discussed leg swelling. Patient complains of leg swelling which worsens throughout the day. No CP/SOB/lightheadedness/dizziness/palpitations.     Visit today is associated with current or anticipated ongoing medical care related to this patient's single serious condition/complex condition of HSV, MDD, insomnia, RLS, Afib dx in 2020 on eliquis, hypothyroidism, anxiety, IBS, renal cyst, fibroids, HFpEF, s/p femur fracture repair. The patient will return to see me as these issues will be followed longitudinally.    Denies any chest pain, shortness of breath, nausea vomiting constipation diarrhea, blood in stool, heartburn    Review of Systems   Constitutional:  Positive for malaise/fatigue. Negative for chills, fever and weight loss.   HENT:  Negative for congestion, ear pain and sore throat.    Eyes:  Negative for double vision.   Respiratory:  Negative for cough and shortness of breath.    Cardiovascular:  Positive for leg swelling. Negative for chest pain and palpitations.   Gastrointestinal:  Negative for abdominal pain, heartburn, nausea and vomiting.   Skin:  Negative for rash.   Neurological:  Negative for dizziness, tingling and headaches.   Psychiatric/Behavioral:  Negative for depression.         Current Medications[1]    Lab Results   Component Value Date    HGBA1C 5.2 03/06/2025    HGBA1C 5.4 11/12/2024    HGBA1C 5.4 08/24/2015     No results found for: "MICALBCREAT"  Lab Results   Component Value Date    LDLCALC 121.0 03/06/2025    LDLCALC 122.2 01/08/2025    CHOL 210 (H) 03/06/2025    HDL 57 03/06/2025    TRIG 160 (H) 03/06/2025       Lab " Results   Component Value Date     06/20/2025    K 3.6 06/25/2025     06/20/2025    CO2 28 06/20/2025    GLU 83 06/20/2025    BUN 9 06/20/2025    CREATININE 0.6 06/20/2025    CALCIUM 8.0 (L) 06/20/2025    PROT 7.2 05/02/2025    ALBUMIN 3.1 (L) 05/02/2025    BILITOT 0.3 05/02/2025    ALKPHOS 113 05/02/2025    AST 23 05/02/2025    ALT 12 05/02/2025    ANIONGAP 9 06/20/2025    ESTGFRAFRICA >60.0 05/25/2022    EGFRNONAA 55.6 (A) 05/25/2022    WBC 8.93 05/06/2025    HGB 8.6 (L) 05/06/2025    HGB 8.8 (L) 05/05/2025    HCT 27.2 (L) 05/06/2025     (H) 05/06/2025     05/06/2025    TSH 1.488 01/08/2025    HEPCAB Non-Reactive 04/20/2025       Lab Results   Component Value Date    YBRPAVUV13IP 42 01/08/2025    DMVPJNWI22ED 40 01/10/2024    FHHXGPTN70 1077 (H) 03/06/2025         Past Medical History:   Diagnosis Date    Arthritis     Atrial fibrillation     Bronchitis     Cataract     Dry eyes     GERD (gastroesophageal reflux disease)     Glaucoma, suspect - Both Eyes     Hypothyroidism 06/23/2016    Pulmonary hypertension     Rash     Renal angiomyolipoma     Renal disorder     SCC (squamous cell carcinoma) 07/2023    left lower lateral shin    SCC (squamous cell carcinoma) 04/2023    L mid lateral shin    SCC (squamous cell carcinoma) 09/2023    L mid lower shin    Spinal stenosis     Squamous cell carcinoma     in-situ right upper inner arm, right wrist    Status post lumbar surgery 06/23/2016    Stress fracture     Thyroid disease     Venous insufficiency      Past Surgical History:   Procedure Laterality Date    ANGIOPLASTY      CATARACT EXTRACTION W/  INTRAOCULAR LENS IMPLANT Left 12/6/2022    Procedure: EXTRACTION, CATARACT, WITH IOL INSERTION;  Surgeon: Tone Brown MD;  Location: [facility];  Service: Ophthalmology;  Laterality: Left;    CATARACT EXTRACTION W/  INTRAOCULAR LENS IMPLANT Right 12/20/2022    Procedure: EXTRACTION, CATARACT, WITH IOL INSERTION;  Surgeon: Tone OTOOLE  MD Kevin;  Location: [facility];  Service: Ophthalmology;  Laterality: Right;    CERVICAL FUSION      COLONOSCOPY      COLONOSCOPY N/A 11/14/2017    Procedure: COLONOSCOPY;  Surgeon: Kasi Mercado MD;  [address]  Service: Endoscopy;  Laterality: N/A;    COLONOSCOPY N/A 4/26/2023    Procedure: COLONOSCOPY;  Surgeon: Jeanmarie Hathaway MD;  [address]  Service: Colon and Rectal;  Laterality: N/A;  ok to hold Eliquis 2 days per Dr Pereira  constipation-ext Miralax prep  instr mailed/portal-GT  4/21/23 pre call attempted, no answer    COLONOSCOPY N/A 5/28/2024    Procedure: COLONOSCOPY;  Surgeon: Jeanmarie Hathaway MD;  [address]  Service: Endoscopy;  Laterality: N/A;  referral Dr. Hathaway. Annual Colonoscopy for High Risk. Golytely prep instr. to portal Pending Eliquis Hold from Dr. David Pereira. [facility] Afib.EC  ok to hold Eliquis 2 days per Dr Pereira-GT  5/21- precall confirmed; instructions re-sent via portal, aware of holding eliquis     ESOPHAGOGASTRODUODENOSCOPY N/A 10/10/2019    Procedure: EGD (ESOPHAGOGASTRODUODENOSCOPY);  Surgeon: Rg Milton MD;  [address]  Service: Endoscopy;  Laterality: N/A;    ESOPHAGOGASTRODUODENOSCOPY N/A 10/6/2021    Procedure: EGD (ESOPHAGOGASTRODUODENOSCOPY);  Surgeon: Rg Milton MD;  Location: [facility] ([address] [place] [address]);  Service: Endoscopy;  Laterality: N/A;  covid test 10/3 [place], instr emailed/portal -ml    EXCISION Left 5/17/2023    Procedure: EXCISION SQUAMOUS CELL CARCINOMA LEFT LEG;  Surgeon: Kasi Canela Jr., MD;  Location: [address];  Service: General;  Laterality: Left;    INTRAMEDULLARY RODDING OF FEMUR Right 5/3/2025    Procedure: INSERTION, INTRAMEDULLARY BERNADINE, FEMUR - right selina;  Surgeon: Aleida Pires MD;  Location: [facility] OR 2ND FLR;  Service: Orthopedics;  Laterality: Right;    l4-5 mid discectomy Left 03/2017    l4-5 MIS diskectomy Right 05/2016    RIGHT HEART  CATHETERIZATION Right 1/27/2022    Procedure: INSERTION, CATHETER, RIGHT HEART;  Surgeon: Satnam Pickard Jr., MD;  Location: [facility] CATH LAB;  Service: Cardiology;  Laterality: Right;    TRANSFORAMINAL EPIDURAL INJECTION OF STEROID Bilateral 3/12/2024    Procedure: LUMBAR TRANSFORAMINAL BILATERAL L3/4 *ELIQUIS CLEARANCE IN CHART*;  Surgeon: Sheree Abbott MD;  Location: [facility] PAIN MGT;  Service: Pain Management;  Laterality: Bilateral;  [phone]  2 WK F/U [place]    TREATMENT OF CARDIAC ARRHYTHMIA N/A 7/17/2020    Procedure: CARDIOVERSION;  Surgeon: Kwan Bray MD;  Location: [facility] EP LAB;  Service: Cardiology;  Laterality: N/A;  AF,DCCV/SANGITA, ANES, SK, 746    TREATMENT OF CARDIAC ARRHYTHMIA N/A 7/14/2021    Procedure: CARDIOVERSION;  Surgeon: SYDNIE Cedillo MD;  Location: [facility] EP LAB;  Service: Cardiology;  Laterality: N/A;  AF, SANGITA, DCCV, MAC, EH, 3 Prep    WASHOUT Right 5/17/2023    Procedure: WASHOUT right lower arm and closure;  Surgeon: Kasi Canela Jr., MD;  Location: [facility] OR [address] [place] [address];  Service: General;  Laterality: Right;     Social History     Social History Narrative    Not on file     Family History   Problem Relation Name Age of Onset    Cancer Mother          Lung cancer    Heart failure Father      Colon cancer Father      Hypertension Father      Cataracts Father      Cancer Father  80        colon    No Known Problems Sister      No Known Problems Brother      Melanoma Daughter      Diabetes Son      Heart disease Son      Cancer Son          esophageal cancer    No Known Problems Maternal Aunt      No Known Problems Maternal Uncle      No Known Problems Paternal Aunt      No Known Problems Paternal Uncle      No Known Problems Maternal Grandmother      No Known Problems Maternal Grandfather      No Known Problems Paternal Grandmother      No Known Problems Paternal Grandfather      Amblyopia Neg Hx      Blindness Neg Hx      Glaucoma Neg Hx      Macular degeneration Neg Hx       "Retinal detachment Neg Hx      Strabismus Neg Hx      Stroke Neg Hx      Thyroid disease Neg Hx      Breast cancer Neg Hx      Ovarian cancer Neg Hx       Vitals:    07/03/25 1105   BP: 114/60   Pulse: 79   SpO2: 100%   Weight: 45.7 kg (100 lb 12 oz)   Height: 5' 2.99" (1.6 m)   PainSc: 0-No pain     Objective:   Physical Exam  Constitutional:       Appearance: Normal appearance.   HENT:      Head: Normocephalic.      Nose: Nose normal.   Cardiovascular:      Rate and Rhythm: Rhythm irregular.   Musculoskeletal:         General: Swelling and tenderness present.      Right lower leg: Edema present.      Left lower leg: Edema present.   Neurological:      Mental Status: She is alert and oriented to person, place, and time. Mental status is at baseline.   Psychiatric:         Mood and Affect: Mood normal.         Behavior: Behavior normal.       Assessment/Plan     Angelina Man is a 75 y.o.female with:    Lymphedema  -     Ambulatory Referral/Consult to Physical Therapy; Future; Expected date: 07/10/2025    Swelling of lower extremity  -     CV Ultrasound doppler venous legs bilat; Future; Expected date: 09/17/2025    Localized edema  -     CV Ultrasound doppler venous legs bilat; Future; Expected date: 09/17/2025    Chronic heart failure with preserved ejection fraction  - discussed ways to take extra lasix if needed.   - would recommend weighing daily. If weight changes overnight >3-5lb, take extra dose of lasix at that time  - consider adjusting potassium supplement    Longstanding persistent atrial fibrillation    Chronic deep vein thrombosis (DVT) of other vein of right lower extremity         Chronic conditions status updated as per HPI.  Other than changes above, cont current medications and maintain follow up with specialists.  Return to clinic in Follow up in about 4 months (around 11/3/2025).      Zina Rockwell MD  [facility]    Total time spent on this encounter: 43 minutes. This includes face " to face time and non-face to face time preparing to see the patient (eg, review of tests), obtaining and/or reviewing separately obtained history, documenting clinical information in the electronic or other health record, independently interpreting results and communicating results to the patient/family/caregiver, or care coordinator.    Patient Instructions   High-Potassium Foods    High-potassium fruits:  Apricots  Bananas  Cantaloupe  Dried fruit  Honeydew melon  Kiwi  Krishna  Nectarines  Oranges and orange juice  Papaya  Pomegranate and pomegranate juice  Prunes and prune juice  Pumpkin  Raisins      High-potassium vegetables:  [place] squash, butternut squash, Hess squash  Avocado  Artichoke  Beets  Baked beans, black beans, refried beans  Broccoli (cooked)  [place] sprouts  Kohlrabi  Lentils  Okra  Onions (fried)  Parsnips  Potatoes (white and sweet)  Rutabagas  Spinach (cooked)  Tomatoes, tomato sauce, and tomato paste  Vegetable juice      Other high-potassium foods:  Bran products  Chocolate  Coconut  Creamed soups  French fries  Granola  Ice cream  Milk (buttermilk, chocolate, eggnog evaporated, malted, soy and milkshakes)  Miso  Molasses  Nuts  Peanut butter  Potato chips  Salt substitutes  Seeds  Tofu  Yogurt   All of your core healthy metrics are met.                                 [1]   Current Outpatient Medications:     acyclovir (ZOVIRAX) 400 MG tablet, Take 1 tablet (400 mg total) by mouth once daily., Disp: 180 tablet, Rfl: 1    ascorbic acid (VITAMIN C) 1000 MG tablet, Take 1,000 mg by mouth once daily. , Disp: , Rfl:     biotin 1 mg tablet, Take 1 mg by mouth 2 (two) times daily. , Disp: , Rfl:     buPROPion (WELLBUTRIN SR) 150 MG TBSR 12 hr tablet, Take 1 tablet (150 mg total) by mouth 2 (two) times daily., Disp: 180 tablet, Rfl: 3    digestive enzymes Tab, Take 1 tablet by mouth once daily. , Disp: , Rfl:     ELIQUIS 5 mg Tab, TAKE 1 TABLET(5 MG) BY MOUTH TWICE DAILY, Disp: 180 tablet, Rfl:  3    fluticasone propionate (FLONASE) 50 mcg/actuation nasal spray, 1 spray (50 mcg total) by Each Nostril route 2 (two) times daily as needed for Rhinitis., Disp: 16 g, Rfl: 11    ketoconazole (NIZORAL) 2 % cream, aaa bid prn flare under arm and qhs to areas on face, Disp: 60 g, Rfl: 3    levothyroxine (SYNTHROID) 137 MCG Tab tablet, Take 1 tablet (137 mcg total) by mouth before breakfast., Disp: 90 tablet, Rfl: 3    lubiprostone (AMITIZA) 24 MCG Cap, Take 1 capsule (24 mcg total) by mouth daily as needed (IBS symptoms)., Disp: 30 capsule, Rfl: 6    methocarbamoL (ROBAXIN) 500 MG Tab, Take 1 tablet (500 mg total) by mouth 3 (three) times daily as needed (Muscle spasms or pain)., Disp: 20 tablet, Rfl: 0    metoprolol succinate (TOPROL-XL) 25 MG 24 hr tablet, Take 1 tablet (25 mg total) by mouth once daily., Disp: 90 tablet, Rfl: 3    mirtazapine (REMERON) 7.5 MG Tab, TAKE 1 TABLET(7.5 MG) BY MOUTH EVERY NIGHT, Disp: 90 tablet, Rfl: 3    multivitamin (THERAGRAN) per tablet, Take 1 tablet by mouth once daily. , Disp: , Rfl:     potassium chloride SA (K-DUR,KLOR-CON) 20 MEQ tablet, Take 1 tablet (20 mEq total) by mouth once daily., Disp: 90 tablet, Rfl: 3    pramipexole (MIRAPEX) 0.25 MG tablet, Take 2 tablets (0.5 mg total) by mouth every evening., Disp: 180 tablet, Rfl: 1    traZODone (DESYREL) 50 MG tablet, Take 1-2 tablets ( mg total) by mouth every evening., Disp: 180 tablet, Rfl: 3    zinc 50 mg Tab, Take 50 mg by mouth once daily. , Disp: , Rfl:     furosemide (LASIX) 20 MG tablet, Take 1 tablet (20 mg total) by mouth once daily. May also take 1 tablet (20 mg total) nightly as needed (Take extra dose in the afternoon if your morning weight is greater than 3-5lb than your usual weight.)., Disp: 180 tablet, Rfl: 3    lactulose (CHRONULAC) 20 gram/30 mL Soln, Take 30mL twice daily (every 12 hours) until bowel movement (Patient not taking: Reported on 7/3/2025), Disp: 180 mL, Rfl: 0

## 2025-07-08 ENCOUNTER — CLINICAL SUPPORT (OUTPATIENT)
Dept: REHABILITATION | Facility: HOSPITAL | Age: 75
End: 2025-07-08
Payer: MEDICARE

## 2025-07-08 DIAGNOSIS — S72.144A CLOSED NONDISPLACED INTERTROCHANTERIC FRACTURE OF RIGHT FEMUR, INITIAL ENCOUNTER: ICD-10-CM

## 2025-07-08 DIAGNOSIS — Z74.09 IMPAIRED FUNCTIONAL MOBILITY, BALANCE, GAIT, AND ENDURANCE: Primary | ICD-10-CM

## 2025-07-08 DIAGNOSIS — S70.01XA CONTUSION OF RIGHT HIP, INITIAL ENCOUNTER: Primary | ICD-10-CM

## 2025-07-08 PROCEDURE — 97530 THERAPEUTIC ACTIVITIES: CPT | Mod: CQ

## 2025-07-08 PROCEDURE — 97112 NEUROMUSCULAR REEDUCATION: CPT | Mod: CQ

## 2025-07-09 NOTE — PROGRESS NOTES
"  Outpatient Rehab    Physical Therapy Visit    Patient Name: Angelina Man  MRN: [id number]  YOB: 1950  Encounter Date: 7/8/2025    Therapy Diagnosis:   Encounter Diagnosis   Name Primary?    Impaired functional mobility, balance, gait, and endurance Yes     Physician: Aleida Pires MD    Physician Orders: Eval and Treat  Medical Diagnosis: Nondisplaced intertrochanteric fracture of right femur, initial encounter for closed fracture  Surgical Diagnosis: Not applicable for this Episode   Surgical Date: Not applicable for this Episode  Days Since Last Surgery: Not applicable for this Episode    Visit # / Visits Authorized:  8 / 20  Insurance Authorization Period: 6/5/2025 to 9/30/2025  Date of Evaluation: 6/3/2025  Plan of Care Certification: 6/5/2025 to 8/14/2025      PT/PTA:     Number of PTA visits since last PT visit:   Time In: 1105   Time Out: 1200  Total Time (in minutes): 55   Total Billable Time (in minutes): 55    FOTO:  Intake Score:  %  Survey Score 2:  %  Survey Score 3:  %    Precautions:       Subjective   continues to endorse increased strength.  Pain reported as 1/10.      Objective            Treatment:  Balance/Neuromuscular Re-Education  NMR 1: Education on hep, poc, functional anatomy, and activity modification/load progressions  NMR 3: PPT + glut bridge + iso hip abd, green tb: 3 sec hold 3x10 with 5 sec hold  NMR 5: shuttle - B leg press, 62.5 lbs: 2x10; shuttle - U leg press: 37.5 lbs LLE 3x10, 50 lbs RLE 3x10  Therapeutic Activity  TA 1: runner step ups, 6" step: 3x10  TA 2: step downs, 4" step: 3x10  TA 4: dynamic marching with 3 sec holds in // bars    Time Entry(in minutes):  Neuromuscular Re-Education Time Entry: 28  Therapeutic Activity Time Entry: 27    Assessment & Plan   Assessment: Patient continues to be making good progress as she is performing functional activities with increased efficiency. Continue to progress with load and intensity and she performs with good " challenge. Patient would benefit from strengthening vestibular system in order to promote balance and increase gait stability.        The patient will continue to benefit from skilled outpatient physical therapy in order to address the deficits listed in the problem list on the initial evaluation, provide patient and family education, and maximize the patients level of independence in the home and community environments.     The patient's spiritual, cultural, and educational needs were considered, and the patient is agreeable to the plan of care and goals.           Plan: Will continue per POC towards treatment goals. PT/PTA met face to face to discuss patient's treatment plan and progress towards established goals. Patient will be seen by physical therapist every sixth visit and minimally once per month.    Goals:   Active       Ambulation/movement       Patient will ambulate at least 300 feet without need for assisive device for return to baseline (Ongoing)       Start:  06/03/25    Expected End:  08/14/25            Patient will ascend at least 7 steps with alternating gait pattern for activities of daily living  (Ongoing)       Start:  06/03/25    Expected End:  08/14/25            Patient will descend at least 7 steps with alternating gait pattern for activities of daily living  (Ongoing)       Start:  06/03/25    Expected End:  08/14/25               Pain       Patient will report pain of < 3/10 demonstrating a reduction of overall pain (Ongoing)       Start:  06/03/25    Expected End:  08/14/25            Patient will report a 2 point reduction in pain while performing standing/walking tasks (Ongoing)       Start:  06/03/25    Expected End:  08/14/25               Strength       Patient will achieve right hip flexion strength of 4/5 (Ongoing)       Start:  06/03/25    Expected End:  08/14/25            Patient will achieve right hip abduction strength of 4/5 (Ongoing)       Start:  06/03/25    Expected End:   08/14/25                Jose Bhandari, PTA

## 2025-07-10 ENCOUNTER — CLINICAL SUPPORT (OUTPATIENT)
Dept: REHABILITATION | Facility: HOSPITAL | Age: 75
End: 2025-07-10
Payer: MEDICARE

## 2025-07-10 DIAGNOSIS — Z74.09 IMPAIRED FUNCTIONAL MOBILITY, BALANCE, GAIT, AND ENDURANCE: Primary | ICD-10-CM

## 2025-07-10 PROCEDURE — 97530 THERAPEUTIC ACTIVITIES: CPT | Mod: CQ

## 2025-07-10 NOTE — PROGRESS NOTES
"  Outpatient Rehab    Physical Therapy Visit    Patient Name: Angelina Man  MRN: [id number]  YOB: 1950  Encounter Date: 7/10/2025    Therapy Diagnosis:   Encounter Diagnosis   Name Primary?    Impaired functional mobility, balance, gait, and endurance Yes     Physician: Aleida Pires MD    Physician Orders: Eval and Treat  Medical Diagnosis: Nondisplaced intertrochanteric fracture of right femur, initial encounter for closed fracture  Surgical Diagnosis: Not applicable for this Episode   Surgical Date: Not applicable for this Episode  Days Since Last Surgery: Not applicable for this Episode    Visit # / Visits Authorized:  9 / 20  Insurance Authorization Period: 6/5/2025 to 9/30/2025  Date of Evaluation: 6/3/2025  Plan of Care Certification: 6/5/2025 to 8/14/2025      PT/PTA: PTA   Number of PTA visits since last PT visit: (6)  Time In: 1115   Time Out: 1215  Total Time (in minutes): 60   Total Billable Time (in minutes): 30    FOTO:  Intake Score:  %  Survey Score 2:  %  Survey Score 3:  %    Precautions:       Subjective   reported decreased difficulty with steps.  Pain reported as 1/10.      Objective            Treatment:  Balance/Neuromuscular Re-Education  NMR 3: PPT + glut bridge + iso hip abd, green tb: 3 sec hold 3x10 with 5 sec hold  NMR 4: sidelying clams with 3 lbs: 2x15 with 5 sec hold  NMR 5: shuttle - B leg press, 62.5 lbs: 2x10; shuttle - U leg press: 37.5 lbs LLE 3x10, 50 lbs RLE 3x10  Therapeutic Activity  TA 1: runner step ups, 6" step: 3x10  TA 2: step downs, 4" step: 3x10  TA 3: lateral walks, yellow loop band: 2x2.5 min  TA 4: dynamic marching with 3 sec holds in // bars    Time Entry(in minutes):  Neuromuscular Re-Education Time Entry: 30  Therapeutic Activity Time Entry: 30    Assessment & Plan   Assessment: Patient continues to be making good progress as she is performing functional activities with increased efficiency. Continue to progress with load and intensity and she " performs with good challenge. Patient would benefit from strengthening vestibular system in order to promote balance and increase gait stability.        The patient will continue to benefit from skilled outpatient physical therapy in order to address the deficits listed in the problem list on the initial evaluation, provide patient and family education, and maximize the patients level of independence in the home and community environments.     The patient's spiritual, cultural, and educational needs were considered, and the patient is agreeable to the plan of care and goals.           Plan: Will continue per POC towards treatment goals. PT/PTA met face to face to discuss patient's treatment plan and progress towards established goals. Patient will be seen by physical therapist every sixth visit and minimally once per month.    Goals:   Active       Ambulation/movement       Patient will ambulate at least 300 feet without need for assisive device for return to baseline (Ongoing)       Start:  06/03/25    Expected End:  08/14/25            Patient will ascend at least 7 steps with alternating gait pattern for activities of daily living  (Ongoing)       Start:  06/03/25    Expected End:  08/14/25            Patient will descend at least 7 steps with alternating gait pattern for activities of daily living  (Ongoing)       Start:  06/03/25    Expected End:  08/14/25               Pain       Patient will report pain of < 3/10 demonstrating a reduction of overall pain (Ongoing)       Start:  06/03/25    Expected End:  08/14/25            Patient will report a 2 point reduction in pain while performing standing/walking tasks (Ongoing)       Start:  06/03/25    Expected End:  08/14/25               Strength       Patient will achieve right hip flexion strength of 4/5 (Ongoing)       Start:  06/03/25    Expected End:  08/14/25            Patient will achieve right hip abduction strength of 4/5 (Ongoing)       Start:   06/03/25    Expected End:  08/14/25                Jose Bhandari, PTA

## 2025-07-14 ENCOUNTER — PATIENT MESSAGE (OUTPATIENT)
Dept: INTERNAL MEDICINE | Facility: CLINIC | Age: 75
End: 2025-07-14
Payer: MEDICARE

## 2025-07-14 NOTE — PROGRESS NOTES
Orthopedic Trauma Surgery Progress Note    Patient Problem(s): Right nondisplaced Intertrochanteric femur fracture   Date of Injury: 4/20/25  Mechanism of Injury: GLF  Treatment: 5/3/25 Fixation of right intertrochanteric femur fracture using an intramedullary implant     Interval Present History: Angelina is 10 weeks out from surgery. Angelina continues to have pain and soreness over the right trochanteric region but she has no groin pain. She has been trying to get back to long walks and yoga but is struggling. She walks for 15 minutes and then has issues with stamina and right leg soreness. She did have right low back pain and right sided sciatica prior to this injury as well. She was seen by her PCP on 7/3/25 to evaluate for bilateral lower extremity swelling, and was evaluated for cardiac and renal causes, which were ruled out. The swelling actually completely resolved about 5 days ago without any clear reason.     Imaging: XRs demonstrate maintenance of fracture reduction. The hardware remains intact with any loosening, shifting or breakage. There are no interval changes relative to the intraoperative fluoroscopy images.     PE: Walks without assistive devices. No edema in the lower extremities.     Assessment and Plan: Angelina's pain is very gradually improving in her hip and her Xrs look good. She is going to to get back to spinning and swimming. I will see Angelina back for re-evaluation in 3 mo. We will not need Xrs at that visit.

## 2025-07-15 ENCOUNTER — TELEPHONE (OUTPATIENT)
Dept: ORTHOPEDICS | Facility: CLINIC | Age: 75
End: 2025-07-15
Payer: MEDICARE

## 2025-07-16 ENCOUNTER — HOSPITAL ENCOUNTER (OUTPATIENT)
Dept: RADIOLOGY | Facility: HOSPITAL | Age: 75
Discharge: HOME | End: 2025-07-16
Attending: STUDENT IN AN ORGANIZED HEALTH CARE EDUCATION/TRAINING PROGRAM
Payer: MEDICARE

## 2025-07-16 ENCOUNTER — OFFICE VISIT (OUTPATIENT)
Dept: ORTHOPEDICS | Facility: CLINIC | Age: 75
End: 2025-07-16
Payer: MEDICARE

## 2025-07-16 DIAGNOSIS — S70.01XA CONTUSION OF RIGHT HIP, INITIAL ENCOUNTER: ICD-10-CM

## 2025-07-16 DIAGNOSIS — S72.144A CLOSED NONDISPLACED INTERTROCHANTERIC FRACTURE OF RIGHT FEMUR, INITIAL ENCOUNTER: Primary | ICD-10-CM

## 2025-07-16 PROCEDURE — 99024 POSTOP FOLLOW-UP VISIT: CPT | Mod: HCNC,S$GLB,, | Performed by: STUDENT IN AN ORGANIZED HEALTH CARE EDUCATION/TRAINING PROGRAM

## 2025-07-16 PROCEDURE — 73502 X-RAY EXAM HIP UNI 2-3 VIEWS: CPT | Mod: TC,HCNC,RT

## 2025-07-16 PROCEDURE — 1157F ADVNC CARE PLAN IN RCRD: CPT | Mod: CPTII,HCNC,S$GLB, | Performed by: STUDENT IN AN ORGANIZED HEALTH CARE EDUCATION/TRAINING PROGRAM

## 2025-07-16 PROCEDURE — 1159F MED LIST DOCD IN RCRD: CPT | Mod: CPTII,HCNC,S$GLB, | Performed by: STUDENT IN AN ORGANIZED HEALTH CARE EDUCATION/TRAINING PROGRAM

## 2025-07-16 PROCEDURE — 99999 PR PBB SHADOW E&M-EST. PATIENT-LVL III: CPT | Mod: PBBFAC,HCNC,, | Performed by: STUDENT IN AN ORGANIZED HEALTH CARE EDUCATION/TRAINING PROGRAM

## 2025-07-16 PROCEDURE — 73502 X-RAY EXAM HIP UNI 2-3 VIEWS: CPT | Mod: 26,HCNC,RT, | Performed by: RADIOLOGY

## 2025-07-16 PROCEDURE — 1160F RVW MEDS BY RX/DR IN RCRD: CPT | Mod: CPTII,HCNC,S$GLB, | Performed by: STUDENT IN AN ORGANIZED HEALTH CARE EDUCATION/TRAINING PROGRAM

## 2025-07-19 ENCOUNTER — EXTERNAL HOME HEALTH (OUTPATIENT)
Dept: HOME HEALTH SERVICES | Facility: HOSPITAL | Age: 75
End: 2025-07-19
Payer: MEDICARE

## 2025-07-21 DIAGNOSIS — L21.9 SEBORRHEIC DERMATITIS, UNSPECIFIED: ICD-10-CM

## 2025-07-21 DIAGNOSIS — L30.4 INTERTRIGO: ICD-10-CM

## 2025-07-22 ENCOUNTER — CLINICAL SUPPORT (OUTPATIENT)
Dept: REHABILITATION | Facility: HOSPITAL | Age: 75
End: 2025-07-22
Payer: MEDICARE

## 2025-07-22 DIAGNOSIS — Z74.09 IMPAIRED FUNCTIONAL MOBILITY, BALANCE, GAIT, AND ENDURANCE: Primary | ICD-10-CM

## 2025-07-22 PROCEDURE — 97530 THERAPEUTIC ACTIVITIES: CPT

## 2025-07-22 NOTE — PROGRESS NOTES
Outpatient Rehab    Physical Therapy Progress Note    Patient Name: Angelina Man  MRN: [id number]  YOB: 1950  Encounter Date: 2025    Therapy Diagnosis:   Encounter Diagnosis   Name Primary?    Impaired functional mobility, balance, gait, and endurance Yes     Physician: Aleida Pires MD    Physician Orders: Eval and Treat  Medical Diagnosis: Nondisplaced intertrochanteric fracture of right femur, initial encounter for closed fracture  Surgical Diagnosis: Not applicable for this Episode   Surgical Date: Not applicable for this Episode  Days Since Last Surgery: Not applicable for this Episode    Visit # / Visits Authorized:  10 / 20  Insurance Authorization Period: 2025 to 2025  Date of Evaluation: 6/3/2025  Plan of Care Certification: 2025 to 2025      PT/PTA: PT   Number of PTA visits since last PT visit:0  Time In: 0802   Time Out: 0858  Total Time (in minutes): 56   Total Billable Time (in minutes): 28    FOTO:  Intake Score (%): Not applicable for this Episode  Survey Score 2 (%): Not applicable for this Episode  Survey Score 3 (%): Not applicable for this Episode    Precautions:       Subjective   patient reports that she went back to the gym yesterday to some of the low level classes. Noticed that she had the most difficulty with getting up and down from the chair but overall reports that she is doing much better and is happy with coming to therapy..  Pain reported as 2/10.      Objective          Observation: ambulates independent with decreased knee flexion through swing phase.      Posture: forward head, rounded shoulders     Hip Range of Motion: no deficits noted with range of motion      Lower Extremity Strength: grossly 3+/5     Functional Tests:  TU seconds; 9 seconds  30SSTS: 7x with bilateral upper extremity assistance; 12x without assistance      Joint Mobility: within functional limits      Edema: lower leg swelling bilateral; light touch diminished  "bilaterally through lower legs    Treatment:  Balance/Neuromuscular Re-Education  NMR 1: Education on hep, poc, functional anatomy, and activity modification/load progressions  NMR 3: PPT + glut bridge + iso hip abd, green tb: 3 sec hold 3x10 with 5 sec hold  NMR 4: sidelying clams with 3 lbs: 2x15 with 5 sec hold  NMR 5: shuttle - B leg press, 62.5 lbs: 2x10; shuttle - U leg press: 37.5 lbs LLE 3x10, 50 lbs RLE 3x10  Therapeutic Activity  TA 1: runner step ups, 6" step: 3x10  TA 2: step downs, 4" step: 3x10  TA 3: lateral walks, yellow loop band: 6 laps  TA 4: dynamic marching with 3 sec holds in // bars  TA 5: sit to stands std chair + airex pad 3 x 10  TA 6: Reassessment    Time Entry(in minutes):  Neuromuscular Re-Education Time Entry: 18  Therapeutic Activity Time Entry: 38    Assessment & Plan   Assessment: Angelina has attended physical therapy for 10 visits for status post femur fracture with pratik and is progressing well with therapy interventions. She has demonstrated improvement with range of motion, strength, TUG score, 30SSTS, and FOTO score. Patient's main functional limitation remains gross lower extremity strength as compared to normative values and contralateral side. Patient will benefit from continued skilled therapy to address these remaining deficits and return to prior level of function.        The patient will continue to benefit from skilled outpatient physical therapy in order to address the deficits listed in the problem list on the initial evaluation, provide patient and family education, and maximize the patients level of independence in the home and community environments.     The patient's spiritual, cultural, and educational needs were considered, and the patient is agreeable to the plan of care and goals.           Plan: progress towards functional movement patterns for functional strength    Goals:   Active       Pain       Patient will report pain of < 3/10 demonstrating a reduction of " overall pain (Ongoing)       Start:  06/03/25    Expected End:  08/14/25            Patient will report a 2 point reduction in pain while performing standing/walking tasks (Met)       Start:  06/03/25    Expected End:  08/14/25    Resolved:  07/22/25            Strength       Patient will achieve right hip flexion strength of 4/5 (Ongoing)       Start:  06/03/25    Expected End:  08/14/25            Patient will achieve right hip abduction strength of 4/5 (Ongoing)       Start:  06/03/25    Expected End:  08/14/25              Resolved       Ambulation/movement       Patient will ambulate at least 300 feet without need for assisive device for return to baseline (Met)       Start:  06/03/25    Expected End:  08/14/25    Resolved:  07/22/25         Patient will ascend at least 7 steps with alternating gait pattern for activities of daily living  (Met)       Start:  06/03/25    Expected End:  08/14/25    Resolved:  07/22/25         Patient will descend at least 7 steps with alternating gait pattern for activities of daily living  (Met)       Start:  06/03/25    Expected End:  08/14/25    Resolved:  07/22/25             Luisa Crabtree, PT

## 2025-07-23 RX ORDER — KETOCONAZOLE 20 MG/G
CREAM TOPICAL
Qty: 60 G | Refills: 3 | Status: SHIPPED | OUTPATIENT
Start: 2025-07-23

## 2025-07-24 ENCOUNTER — CLINICAL SUPPORT (OUTPATIENT)
Dept: REHABILITATION | Facility: HOSPITAL | Age: 75
End: 2025-07-24
Payer: MEDICARE

## 2025-07-24 DIAGNOSIS — Z74.09 IMPAIRED FUNCTIONAL MOBILITY, BALANCE, GAIT, AND ENDURANCE: Primary | ICD-10-CM

## 2025-07-24 PROCEDURE — 97530 THERAPEUTIC ACTIVITIES: CPT

## 2025-07-29 ENCOUNTER — CLINICAL SUPPORT (OUTPATIENT)
Dept: REHABILITATION | Facility: HOSPITAL | Age: 75
End: 2025-07-29
Payer: MEDICARE

## 2025-07-29 DIAGNOSIS — Z74.09 IMPAIRED FUNCTIONAL MOBILITY, BALANCE, GAIT, AND ENDURANCE: Primary | ICD-10-CM

## 2025-07-29 PROCEDURE — 97530 THERAPEUTIC ACTIVITIES: CPT | Mod: CQ

## 2025-07-29 PROCEDURE — 97112 NEUROMUSCULAR REEDUCATION: CPT | Mod: CQ

## 2025-07-29 NOTE — PROGRESS NOTES
"  Outpatient Rehab    Physical Therapy Visit    Patient Name: Angelina Man  MRN: [id number]  YOB: 1950  Encounter Date: 7/29/2025    Therapy Diagnosis:   Encounter Diagnosis   Name Primary?    Impaired functional mobility, balance, gait, and endurance Yes     Physician: Aleida Pires MD    Physician Orders: Eval and Treat  Medical Diagnosis: Nondisplaced intertrochanteric fracture of right femur, initial encounter for closed fracture  Surgical Diagnosis: Not applicable for this Episode   Surgical Date: Not applicable for this Episode  Days Since Last Surgery: Not applicable for this Episode    Visit # / Visits Authorized:  12 / 20  Insurance Authorization Period: 6/5/2025 to 9/30/2025  Date of Evaluation: 6/3/2025  Plan of Care Certification: 6/5/2025 to 8/14/2025      PT/PTA: PTA   Number of PTA visits since last PT visit:1  Time In: 0910   Time Out: 1005  Total Time (in minutes): 55   Total Billable Time (in minutes): 27    FOTO:  Intake Score (%): Not applicable for this Episode  Survey Score 2 (%): Not applicable for this Episode  Survey Score 3 (%): Not applicable for this Episode    Precautions:         Subjective   patient reports that she went back to the gym yesterday to some of the low level classes. Noticed that she had the most difficulty with getting up and down from the chair but overall reports that she is doing much better and is happy with coming to therapy..         Objective            Treatment:  Balance/Neuromuscular Re-Education  NMR 3: PPT + glut bridge + iso hip abd, green tb: 3 sec hold 3x10 with 5 sec hold  NMR 4: sidelying clams with 3 lbs: 2x15 with 5 sec hold  NMR 5: shuttle - B leg press, 62.5 lbs: 2x10; shuttle - U leg press: 37.5 lbs LLE 3x10, 50 lbs RLE 3x10  Therapeutic Activity  TA 1: runner step ups, 6" step: 3x10  TA 2: step downs, 4" step: 3x10  TA 3: lateral walks, yellow loop band: 6 laps  TA 4: dynamic marching with 3 sec holds in // bars  TA 5: sit to " stands std chair + airex pad 3 x 10    Time Entry(in minutes):  Neuromuscular Re-Education Time Entry: 25  Therapeutic Activity Time Entry: 30    Assessment & Plan   Assessment: Angelina has attended physical therapy for 12 visits for status post femur fracture with pratik and is progressing well with therapy interventions. However, patient's main functional limitation remains gross lower extremity strength as compared to normative values and contralateral side. Patient will benefit from continued skilled therapy to address these remaining deficits and return to prior level of function. Will continue promote functional activities during subsequent treatment sessions.        The patient will continue to benefit from skilled outpatient physical therapy in order to address the deficits listed in the problem list on the initial evaluation, provide patient and family education, and maximize the patients level of independence in the home and community environments.     The patient's spiritual, cultural, and educational needs were considered, and the patient is agreeable to the plan of care and goals.           Plan: progress towards functional movement patterns for functional strength    Goals:   Active       Pain       Patient will report pain of < 3/10 demonstrating a reduction of overall pain (Ongoing)       Start:  06/03/25    Expected End:  08/14/25            Patient will report a 2 point reduction in pain while performing standing/walking tasks (Met)       Start:  06/03/25    Expected End:  08/14/25    Resolved:  07/22/25            Strength       Patient will achieve right hip flexion strength of 4/5 (Ongoing)       Start:  06/03/25    Expected End:  08/14/25            Patient will achieve right hip abduction strength of 4/5 (Ongoing)       Start:  06/03/25    Expected End:  08/14/25              Resolved       Ambulation/movement       Patient will ambulate at least 300 feet without need for assisive device for  return to baseline (Met)       Start:  06/03/25    Expected End:  08/14/25    Resolved:  07/22/25         Patient will ascend at least 7 steps with alternating gait pattern for activities of daily living  (Met)       Start:  06/03/25    Expected End:  08/14/25    Resolved:  07/22/25         Patient will descend at least 7 steps with alternating gait pattern for activities of daily living  (Met)       Start:  06/03/25    Expected End:  08/14/25    Resolved:  07/22/25             Jose Bhandari, PTA

## 2025-07-30 NOTE — PROGRESS NOTES
"Neurology Clinic Follow-Up Note    Impression:  Likely small fiber neuropathy (skin biopsy low nl ENFD "suspicious for early or mild SFN"): etiology uncertain/clinically stable  Cervical spondylosis/stenosis s/p fusion with exam c/w mild cervical myelopathy: MRIs show fairly stable mod-severe stenosis at C6-7 (below fusion).  Lumbosacral spondylosis/stenosis s/p decompression with sciatica by history but without clear evidence of radiculopathy on exam (save #4)  Stable, mild R footdrop:  this can be cervical or LS in localization  RLS: controlled on pramipexole    Plan:  Continue trazodone 100mg qhs for sciatica; she will consider revisiting Pain Management  Continue to defer EMG for e/o R LS radic (as opposed to cervical footdrop) for now as it won't  unless she progresses  Continue pramipexole .25mg 2 (50mg) nightly  RTC 12 months or sooner, prn      Problem List Items Addressed This Visit          1 - High    Small fiber neuropathy - Primary       Unprioritized    Cervical spinal stenosis    Cervical spondylosis     Other Visit Diagnoses         Lumbosacral radiculopathy                Patient returns for follow-up of above.  Hepatitis, HIV, Lyme, REMY, RF, CXR were all nl  Daily RLE sciatica but not debilitating.  Increased dosage of trazodone from 50 to 100mg nighly helps at night.  Pramipexole helps RLS "most of the time"  She broke her femur in May.  + fatigue since.      Running history:  1/24/25:  HgA1c, B12, MMA, folate, cryoglobulinemia, SS-A, SS-B, TSH were nl.  MRI c-spine showed only slight progression of chronic stenosis    Skin biopsy revealed low nl ENFD "suspicious for early or mild SFN."    She endorses daily RLE sciatica.  She does not want to try gabapentin.  She tried PT in the past.  ..........................................................................    CC: BLE numbness/tingling    HPI:  75 y/o WF referred for evaluation of B foot numbness. She reports years of BLE " numbness/tingling and occasional hand symptoms.  Fingers get purple in cold.  Her mother had Raynaud's.   She reports intermittent RLE foot dragging.     Not a daily ETOH drinker.     Endorses dry eyes, dry mouth (since furosemide), constipation, + bloating, + orthostasis, decreased sweating      No outpatient medications have been marked as taking for the 7/31/25 encounter (Office Visit) with Malvin Bautista MD.       /76 (BP Location: Right arm, Patient Position: Sitting)   LMP  (LMP Unknown)   Well-developed, well nourished.  Awake, alert and oriented.  Face symmetric. Language is normal.      Motor:   Normal muscle tone, bulk and power except 5- R TA   No abnormal movements  Sensory (NT today; prior exam)   Intact to LT, position  Glove-stocking PP, temperature  DTRs    2++ and symmetric  Gait   steady    Malvin Bautista MD

## 2025-07-31 ENCOUNTER — CLINICAL SUPPORT (OUTPATIENT)
Dept: REHABILITATION | Facility: HOSPITAL | Age: 75
End: 2025-07-31
Payer: MEDICARE

## 2025-07-31 ENCOUNTER — PATIENT MESSAGE (OUTPATIENT)
Dept: NEUROSURGERY | Facility: CLINIC | Age: 75
End: 2025-07-31
Payer: MEDICARE

## 2025-07-31 ENCOUNTER — OFFICE VISIT (OUTPATIENT)
Dept: NEUROLOGY | Facility: CLINIC | Age: 75
End: 2025-07-31
Payer: MEDICARE

## 2025-07-31 VITALS — SYSTOLIC BLOOD PRESSURE: 114 MMHG | DIASTOLIC BLOOD PRESSURE: 76 MMHG

## 2025-07-31 DIAGNOSIS — M54.17 LUMBOSACRAL RADICULOPATHY: ICD-10-CM

## 2025-07-31 DIAGNOSIS — M47.812 CERVICAL SPONDYLOSIS: ICD-10-CM

## 2025-07-31 DIAGNOSIS — M48.02 CERVICAL SPINAL STENOSIS: ICD-10-CM

## 2025-07-31 DIAGNOSIS — G62.9 SMALL FIBER NEUROPATHY: Primary | ICD-10-CM

## 2025-07-31 DIAGNOSIS — Z74.09 IMPAIRED FUNCTIONAL MOBILITY, BALANCE, GAIT, AND ENDURANCE: Primary | ICD-10-CM

## 2025-07-31 PROCEDURE — 97530 THERAPEUTIC ACTIVITIES: CPT

## 2025-07-31 PROCEDURE — 3288F FALL RISK ASSESSMENT DOCD: CPT | Mod: CPTII,S$GLB,, | Performed by: PSYCHIATRY & NEUROLOGY

## 2025-07-31 PROCEDURE — 99999 PR PBB SHADOW E&M-EST. PATIENT-LVL III: CPT | Mod: PBBFAC,,, | Performed by: PSYCHIATRY & NEUROLOGY

## 2025-07-31 PROCEDURE — 1157F ADVNC CARE PLAN IN RCRD: CPT | Mod: CPTII,S$GLB,, | Performed by: PSYCHIATRY & NEUROLOGY

## 2025-07-31 PROCEDURE — 1101F PT FALLS ASSESS-DOCD LE1/YR: CPT | Mod: CPTII,S$GLB,, | Performed by: PSYCHIATRY & NEUROLOGY

## 2025-07-31 PROCEDURE — 1125F AMNT PAIN NOTED PAIN PRSNT: CPT | Mod: CPTII,S$GLB,, | Performed by: PSYCHIATRY & NEUROLOGY

## 2025-07-31 PROCEDURE — 1159F MED LIST DOCD IN RCRD: CPT | Mod: CPTII,S$GLB,, | Performed by: PSYCHIATRY & NEUROLOGY

## 2025-07-31 PROCEDURE — 99214 OFFICE O/P EST MOD 30 MIN: CPT | Mod: S$GLB,,, | Performed by: PSYCHIATRY & NEUROLOGY

## 2025-07-31 PROCEDURE — 3078F DIAST BP <80 MM HG: CPT | Mod: CPTII,S$GLB,, | Performed by: PSYCHIATRY & NEUROLOGY

## 2025-07-31 PROCEDURE — 3074F SYST BP LT 130 MM HG: CPT | Mod: CPTII,S$GLB,, | Performed by: PSYCHIATRY & NEUROLOGY

## 2025-07-31 PROCEDURE — 97112 NEUROMUSCULAR REEDUCATION: CPT

## 2025-07-31 NOTE — PROGRESS NOTES
"  Outpatient Rehab    Physical Therapy Visit    Patient Name: Angelina Man  MRN: [id number]  YOB: 1950  Encounter Date: 7/24/2025    Therapy Diagnosis:   Encounter Diagnosis   Name Primary?    Impaired functional mobility, balance, gait, and endurance Yes     Physician: Aleida Pires MD    Physician Orders: Eval and Treat  Medical Diagnosis: Nondisplaced intertrochanteric fracture of right femur, initial encounter for closed fracture  Lymphedema, not elsewhere classified  Surgical Diagnosis: Not applicable for this Episode   Surgical Date: Not applicable for this Episode  Days Since Last Surgery: Not applicable for this Episode    Visit # / Visits Authorized:  12 / 20  Insurance Authorization Period: 6/5/2025 to 9/30/2025  Date of Evaluation: 6/3/2025  Plan of Care Certification: 6/5/2025 to 8/14/2025      PT/PTA: PT   Number of PTA visits since last PT visit:0  Time In: 1100   Time Out: 1155  Total Time (in minutes): 55   Total Billable Time (in minutes): 23    FOTO:  Intake Score (%): Not applicable for this Episode  Survey Score 2 (%): Not applicable for this Episode  Survey Score 3 (%): Not applicable for this Episode    Precautions:         Subjective   patient states that she always feels better after she leaves therapy.  Pain reported as 2/10.      Objective            Treatment:  Balance/Neuromuscular Re-Education  NMR 1: Education on hep, poc, functional anatomy, and activity modification/load progressions  NMR 3: PPT + glut bridge + iso hip abd, green tb: 3 sec hold 3x10 with 5 sec hold  NMR 4: sidelying clams with 3 lbs: 2x15 with 5 sec hold  NMR 5: shuttle - B leg press, 62.5 lbs: 2x10; shuttle - U leg press: 37.5 lbs LLE 3x10, 50 lbs RLE 3x10  Therapeutic Activity  TA 1: runner step ups, 6" step: 3x10  TA 2: step downs, 4" step: 3x10  TA 3: lateral walks, yellow loop band: 6 laps  TA 4: dynamic marching with 3 sec holds in // bars  TA 5: sit to stands std chair + airex pad 3 x " 10    Time Entry(in minutes):  Neuromuscular Re-Education Time Entry: 25  Therapeutic Activity Time Entry: 30    Assessment & Plan   Assessment: Continued with progressions made last visit. Good challenge and tolerance to exercises. Making positive progressions with exercises.       The patient will continue to benefit from skilled outpatient physical therapy in order to address the deficits listed in the problem list on the initial evaluation, provide patient and family education, and maximize the patients level of independence in the home and community environments.     The patient's spiritual, cultural, and educational needs were considered, and the patient is agreeable to the plan of care and goals.           Plan: progress towards functional movement patterns for functional strength    Goals:   Active       Pain       Patient will report pain of < 3/10 demonstrating a reduction of overall pain (Ongoing)       Start:  06/03/25    Expected End:  08/14/25            Patient will report a 2 point reduction in pain while performing standing/walking tasks (Met)       Start:  06/03/25    Expected End:  08/14/25    Resolved:  07/22/25            Strength       Patient will achieve right hip flexion strength of 4/5 (Ongoing)       Start:  06/03/25    Expected End:  08/14/25            Patient will achieve right hip abduction strength of 4/5 (Ongoing)       Start:  06/03/25    Expected End:  08/14/25              Resolved       Ambulation/movement       Patient will ambulate at least 300 feet without need for assisive device for return to baseline (Met)       Start:  06/03/25    Expected End:  08/14/25    Resolved:  07/22/25         Patient will ascend at least 7 steps with alternating gait pattern for activities of daily living  (Met)       Start:  06/03/25    Expected End:  08/14/25    Resolved:  07/22/25         Patient will descend at least 7 steps with alternating gait pattern for activities of daily living  (Met)        Start:  06/03/25    Expected End:  08/14/25    Resolved:  07/22/25             Luisa Crabtree PT

## 2025-07-31 NOTE — PROGRESS NOTES
"  Outpatient Rehab    Physical Therapy Visit    Patient Name: Angelina Man  MRN: [id number]  YOB: 1950  Encounter Date: 7/31/2025    Therapy Diagnosis:   Encounter Diagnosis   Name Primary?    Impaired functional mobility, balance, gait, and endurance Yes     Physician: Aleida Pires MD    Physician Orders: Eval and Treat  Medical Diagnosis: Nondisplaced intertrochanteric fracture of right femur, initial encounter for closed fracture  Lymphedema, not elsewhere classified  Surgical Diagnosis: Not applicable for this Episode   Surgical Date: Not applicable for this Episode  Days Since Last Surgery: Not applicable for this Episode    Visit # / Visits Authorized:  13 / 20  Insurance Authorization Period: 6/5/2025 to 9/30/2025  Date of Evaluation: 6/3/2025  Plan of Care Certification: 6/5/2025 to 8/14/2025      PT/PTA: PT   Number of PTA visits since last PT visit:0  Time In: 1058   Time Out: 1154  Total Time (in minutes): 56   Total Billable Time (in minutes): 56    FOTO:  Intake Score (%): Not applicable for this Episode  Survey Score 2 (%): Not applicable for this Episode  Survey Score 3 (%): Not applicable for this Episode    Precautions:         Subjective   patient reports that she has good and bad days. her greatest concern is the pain and tiredness in her legs and her sciatic pain she had prior to fracture from the fall. She states that her actual hip does not bother her at all..  Pain reported as 0/10.      Objective            Treatment:  Balance/Neuromuscular Re-Education  NMR 1: Education on hep, poc, functional anatomy, and activity modification/load progressions  NMR 5: shuttle - B leg press, 62.5 lbs: 2x10; shuttle - U leg press: 37.5 lbs LLE 3x10, 50 lbs RLE 3x10  Therapeutic Activity  TA 1: runner step ups, 6" step: 3x10  TA 2: seated lumbar flexion + upright row CC 20# 3 x 10  TA 3: lateral walks, yellow loop band: 6 laps  TA 4: dead lifts yellow bar 3 x 10; dead lifts 10# bar 3 x " 10  TA 5: sit to stands std chair + airex pad 3 x 10 5#    Time Entry(in minutes):  Neuromuscular Re-Education Time Entry: 18  Therapeutic Activity Time Entry: 38    Assessment & Plan   Assessment: Progressed with exercises focusing on functional movement patterns such as dead lifts and squatting. Patient required moderate verbal and visual cues for proper performance and with improved symptom relief post session. Will continue as tolerated.        The patient will continue to benefit from skilled outpatient physical therapy in order to address the deficits listed in the problem list on the initial evaluation, provide patient and family education, and maximize the patients level of independence in the home and community environments.     The patient's spiritual, cultural, and educational needs were considered, and the patient is agreeable to the plan of care and goals.           Plan: progress with functional movement patterns including dead lifts, bending, squatting mechanics    Goals:   Active       Pain       Patient will report pain of < 3/10 demonstrating a reduction of overall pain (Ongoing)       Start:  06/03/25    Expected End:  08/14/25            Patient will report a 2 point reduction in pain while performing standing/walking tasks (Met)       Start:  06/03/25    Expected End:  08/14/25    Resolved:  07/22/25            Strength       Patient will achieve right hip flexion strength of 4/5 (Ongoing)       Start:  06/03/25    Expected End:  08/14/25            Patient will achieve right hip abduction strength of 4/5 (Ongoing)       Start:  06/03/25    Expected End:  08/14/25              Resolved       Ambulation/movement       Patient will ambulate at least 300 feet without need for assisive device for return to baseline (Met)       Start:  06/03/25    Expected End:  08/14/25    Resolved:  07/22/25         Patient will ascend at least 7 steps with alternating gait pattern for activities of daily living   (Met)       Start:  06/03/25    Expected End:  08/14/25    Resolved:  07/22/25         Patient will descend at least 7 steps with alternating gait pattern for activities of daily living  (Met)       Start:  06/03/25    Expected End:  08/14/25    Resolved:  07/22/25             Luisa Crabtree PT

## 2025-08-05 ENCOUNTER — CLINICAL SUPPORT (OUTPATIENT)
Dept: REHABILITATION | Facility: HOSPITAL | Age: 75
End: 2025-08-05
Payer: MEDICARE

## 2025-08-05 DIAGNOSIS — Z74.09 IMPAIRED FUNCTIONAL MOBILITY, BALANCE, GAIT, AND ENDURANCE: Primary | ICD-10-CM

## 2025-08-05 PROCEDURE — 97530 THERAPEUTIC ACTIVITIES: CPT | Mod: CQ

## 2025-08-05 PROCEDURE — 97112 NEUROMUSCULAR REEDUCATION: CPT | Mod: CQ

## 2025-08-05 NOTE — PROGRESS NOTES
"  Outpatient Rehab    Physical Therapy Visit    Patient Name: Angelina Man  MRN: [id number]  YOB: 1950  Encounter Date: 8/5/2025    Therapy Diagnosis:   Encounter Diagnosis   Name Primary?    Impaired functional mobility, balance, gait, and endurance Yes     Physician: Aleida Pires MD    Physician Orders: Eval and Treat  Medical Diagnosis: Nondisplaced intertrochanteric fracture of right femur, initial encounter for closed fracture  Lymphedema, not elsewhere classified  Surgical Diagnosis: Not applicable for this Episode   Surgical Date: Not applicable for this Episode  Days Since Last Surgery: Not applicable for this Episode    Visit # / Visits Authorized:  14 / 20  Insurance Authorization Period: 6/5/2025 to 9/30/2025  Date of Evaluation: 6/3/2025  Plan of Care Certification: 6/5/2025 to 8/14/2025      PT/PTA: PTA   Number of PTA visits since last PT visit:1  Time In: 1110   Time Out: 1205  Total Time (in minutes): 55   Total Billable Time (in minutes): 27    FOTO:  Intake Score (%): Not applicable for this Episode  Survey Score 2 (%): Not applicable for this Episode  Survey Score 3 (%): Not applicable for this Episode    Precautions:         Subjective   pain in R side of hip/low back.  Pain reported as 3/10.      Objective            Treatment:  Manual Therapy  MT 1: STM with theragun to R gluteus medius orgin  Balance/Neuromuscular Re-Education  NMR 3: PPT + glut bridge + iso hip abd, green tb: 3 sec hold 3x10 with 5 sec hold  NMR 4: sidelying clams with 3 lbs: 2x15 with 5 sec hold  NMR 5: shuttle - B leg press, 62.5 lbs: 3x10; shuttle - U leg press: 37.5 lbs: 3x8/LE  Therapeutic Activity  TA 1: runner step ups, 6" step: 3x10  TA 3: lateral walks, yellow loop band: 2x2.5 min  TA 5: sit to stands std chair + airex pad 3 x 10  TA 6: Nu step L1.5: 8 min    Time Entry(in minutes):  Manual Therapy Time Entry: 7  Neuromuscular Re-Education Time Entry: 23  Therapeutic Activity Time Entry: " 25    Assessment & Plan   Assessment: Continued to perform exercises focusing on functional movement patterns. Patient tolerated therapeutic exercises well with no adverse effects. It was noted that patient endorsed improved symptom relief post manual to right gluteus med origin. Will continue as tolerated.        The patient will continue to benefit from skilled outpatient physical therapy in order to address the deficits listed in the problem list on the initial evaluation, provide patient and family education, and maximize the patients level of independence in the home and community environments.     The patient's spiritual, cultural, and educational needs were considered, and the patient is agreeable to the plan of care and goals.           Plan: progress with functional movement patterns including dead lifts, bending, squatting mechanics    Goals:   Active       Pain       Patient will report pain of < 3/10 demonstrating a reduction of overall pain (Ongoing)       Start:  06/03/25    Expected End:  08/14/25            Patient will report a 2 point reduction in pain while performing standing/walking tasks (Met)       Start:  06/03/25    Expected End:  08/14/25    Resolved:  07/22/25            Strength       Patient will achieve right hip flexion strength of 4/5 (Ongoing)       Start:  06/03/25    Expected End:  08/14/25            Patient will achieve right hip abduction strength of 4/5 (Ongoing)       Start:  06/03/25    Expected End:  08/14/25              Resolved       Ambulation/movement       Patient will ambulate at least 300 feet without need for assisive device for return to baseline (Met)       Start:  06/03/25    Expected End:  08/14/25    Resolved:  07/22/25         Patient will ascend at least 7 steps with alternating gait pattern for activities of daily living  (Met)       Start:  06/03/25    Expected End:  08/14/25    Resolved:  07/22/25         Patient will descend at least 7 steps with  alternating gait pattern for activities of daily living  (Met)       Start:  06/03/25    Expected End:  08/14/25    Resolved:  07/22/25             Jose Bhandari, PTA

## 2025-08-07 ENCOUNTER — CLINICAL SUPPORT (OUTPATIENT)
Dept: REHABILITATION | Facility: HOSPITAL | Age: 75
End: 2025-08-07
Payer: MEDICARE

## 2025-08-07 DIAGNOSIS — Z74.09 IMPAIRED FUNCTIONAL MOBILITY, BALANCE, GAIT, AND ENDURANCE: Primary | ICD-10-CM

## 2025-08-07 PROCEDURE — 97112 NEUROMUSCULAR REEDUCATION: CPT | Mod: CQ

## 2025-08-07 PROCEDURE — 97530 THERAPEUTIC ACTIVITIES: CPT | Mod: CQ

## 2025-08-07 NOTE — PROGRESS NOTES
"  Outpatient Rehab    Physical Therapy Visit    Patient Name: Angelina Man  MRN: [id number]  YOB: 1950  Encounter Date: 8/7/2025    Therapy Diagnosis:   Encounter Diagnosis   Name Primary?    Impaired functional mobility, balance, gait, and endurance Yes     Physician: Aleida Pires MD    Physician Orders: Eval and Treat  Medical Diagnosis: Nondisplaced intertrochanteric fracture of right femur, initial encounter for closed fracture  Lymphedema, not elsewhere classified  Surgical Diagnosis: Not applicable for this Episode   Surgical Date: Not applicable for this Episode  Days Since Last Surgery: Not applicable for this Episode    Visit # / Visits Authorized:  15 / 20  Insurance Authorization Period: 6/5/2025 to 9/30/2025  Date of Evaluation: 6/3/2025  Plan of Care Certification: 6/5/2025 to 8/14/2025      PT/PTA: PTA   Number of PTA visits since last PT visit:2  Time In: 0805   Time Out: 0900  Total Time (in minutes): 55   Total Billable Time (in minutes): 55    FOTO:  Intake Score (%): Not applicable for this Episode  Survey Score 2 (%): Not applicable for this Episode  Survey Score 3 (%): Not applicable for this Episode    Precautions:         Subjective   that manual she received after her last treatment visit made her feel better.  Pain reported as 3/10.      Objective            Treatment:  Manual Therapy  MT 1: STM with theragun to R gluteus medius orgin  Balance/Neuromuscular Re-Education  NMR 3: PPT + staggered stance glut bridge + iso hip abd, green tb: 3 sec hold 2x10/position with 5 sec hold  NMR 4: sidelying clams with 3 lbs: 2x12 with 5 sec hold  NMR 5: shuttle - B leg press, 62.5 lbs: 3x10; shuttle - U leg press: 37.5 lbs: 3x8/LE  Therapeutic Activity  TA 1: runner step ups, 6" step: 2x10; step downs, 4" step: 3x10  TA 3: lateral walks, orange loop band: 2x2 min  TA 4: dead lifts, 10# bar: 2x8  TA 7: glut dominant box lift on 4" step, 15 lbs box: 2x5    Time Entry(in minutes):  Manual " Therapy Time Entry: 7  Neuromuscular Re-Education Time Entry: 23  Therapeutic Activity Time Entry: 25    Assessment & Plan   Assessment: Continued to perform exercises focusing on functional movement patterns. Patient tolerated therapeutic exercises well with no adverse effects. It was noted that patient continued to endorse improved symptom relief post manual to right gluteus med origin. Will continue as tolerated.        The patient will continue to benefit from skilled outpatient physical therapy in order to address the deficits listed in the problem list on the initial evaluation, provide patient and family education, and maximize the patients level of independence in the home and community environments.     The patient's spiritual, cultural, and educational needs were considered, and the patient is agreeable to the plan of care and goals.           Plan: progress with functional movement patterns including dead lifts, bending, squatting mechanics    Goals:   Active       Pain       Patient will report pain of < 3/10 demonstrating a reduction of overall pain (Ongoing)       Start:  06/03/25    Expected End:  08/14/25            Patient will report a 2 point reduction in pain while performing standing/walking tasks (Met)       Start:  06/03/25    Expected End:  08/14/25    Resolved:  07/22/25            Strength       Patient will achieve right hip flexion strength of 4/5 (Ongoing)       Start:  06/03/25    Expected End:  08/14/25            Patient will achieve right hip abduction strength of 4/5 (Ongoing)       Start:  06/03/25    Expected End:  08/14/25              Resolved       Ambulation/movement       Patient will ambulate at least 300 feet without need for assisive device for return to baseline (Met)       Start:  06/03/25    Expected End:  08/14/25    Resolved:  07/22/25         Patient will ascend at least 7 steps with alternating gait pattern for activities of daily living  (Met)       Start:   06/03/25    Expected End:  08/14/25    Resolved:  07/22/25         Patient will descend at least 7 steps with alternating gait pattern for activities of daily living  (Met)       Start:  06/03/25    Expected End:  08/14/25    Resolved:  07/22/25             Jose Bhandari, PTA

## 2025-08-11 ENCOUNTER — CLINICAL SUPPORT (OUTPATIENT)
Dept: REHABILITATION | Facility: HOSPITAL | Age: 75
End: 2025-08-11
Payer: MEDICARE

## 2025-08-11 DIAGNOSIS — I89.0 LYMPHEDEMA: Primary | ICD-10-CM

## 2025-08-11 DIAGNOSIS — I83.893 VARICOSE VEINS OF BOTH LOWER EXTREMITIES WITH COMPLICATIONS: ICD-10-CM

## 2025-08-11 DIAGNOSIS — I87.2 VENOUS INSUFFICIENCY OF BOTH LOWER EXTREMITIES: Primary | ICD-10-CM

## 2025-08-11 PROCEDURE — 97161 PT EVAL LOW COMPLEX 20 MIN: CPT

## 2025-08-11 PROCEDURE — 97535 SELF CARE MNGMENT TRAINING: CPT

## 2025-08-12 ENCOUNTER — CLINICAL SUPPORT (OUTPATIENT)
Dept: REHABILITATION | Facility: HOSPITAL | Age: 75
End: 2025-08-12
Payer: MEDICARE

## 2025-08-12 DIAGNOSIS — Z74.09 IMPAIRED FUNCTIONAL MOBILITY, BALANCE, GAIT, AND ENDURANCE: Primary | ICD-10-CM

## 2025-08-12 PROCEDURE — 97530 THERAPEUTIC ACTIVITIES: CPT

## 2025-08-12 PROCEDURE — 97112 NEUROMUSCULAR REEDUCATION: CPT

## 2025-08-14 ENCOUNTER — CLINICAL SUPPORT (OUTPATIENT)
Dept: REHABILITATION | Facility: HOSPITAL | Age: 75
End: 2025-08-14
Payer: MEDICARE

## 2025-08-14 DIAGNOSIS — Z74.09 IMPAIRED FUNCTIONAL MOBILITY, BALANCE, GAIT, AND ENDURANCE: Primary | ICD-10-CM

## 2025-08-14 PROCEDURE — 97530 THERAPEUTIC ACTIVITIES: CPT | Mod: CQ

## 2025-08-14 PROCEDURE — 97112 NEUROMUSCULAR REEDUCATION: CPT | Mod: CQ

## 2025-08-18 ENCOUNTER — OFFICE VISIT (OUTPATIENT)
Dept: CARDIOLOGY | Facility: CLINIC | Age: 75
End: 2025-08-18
Payer: MEDICARE

## 2025-08-18 VITALS
HEART RATE: 80 BPM | HEIGHT: 62 IN | SYSTOLIC BLOOD PRESSURE: 119 MMHG | WEIGHT: 95.69 LBS | BODY MASS INDEX: 17.61 KG/M2 | OXYGEN SATURATION: 100 % | DIASTOLIC BLOOD PRESSURE: 75 MMHG

## 2025-08-18 DIAGNOSIS — I87.2 VENOUS INSUFFICIENCY OF BOTH LOWER EXTREMITIES: ICD-10-CM

## 2025-08-18 DIAGNOSIS — E03.9 ACQUIRED HYPOTHYROIDISM: ICD-10-CM

## 2025-08-18 DIAGNOSIS — I50.32 CHRONIC HEART FAILURE WITH PRESERVED EJECTION FRACTION: Chronic | ICD-10-CM

## 2025-08-18 DIAGNOSIS — I48.11 LONGSTANDING PERSISTENT ATRIAL FIBRILLATION: Primary | Chronic | ICD-10-CM

## 2025-08-18 DIAGNOSIS — K21.9 GASTROESOPHAGEAL REFLUX DISEASE, UNSPECIFIED WHETHER ESOPHAGITIS PRESENT: ICD-10-CM

## 2025-08-18 DIAGNOSIS — Z98.1 S/P LUMBAR FUSION: ICD-10-CM

## 2025-08-18 PROCEDURE — 1126F AMNT PAIN NOTED NONE PRSNT: CPT | Mod: CPTII,HCNC,S$GLB, | Performed by: INTERNAL MEDICINE

## 2025-08-18 PROCEDURE — 3288F FALL RISK ASSESSMENT DOCD: CPT | Mod: CPTII,HCNC,S$GLB, | Performed by: INTERNAL MEDICINE

## 2025-08-18 PROCEDURE — 99214 OFFICE O/P EST MOD 30 MIN: CPT | Mod: HCNC,S$GLB,, | Performed by: INTERNAL MEDICINE

## 2025-08-18 PROCEDURE — 1157F ADVNC CARE PLAN IN RCRD: CPT | Mod: CPTII,HCNC,S$GLB, | Performed by: INTERNAL MEDICINE

## 2025-08-18 PROCEDURE — 1160F RVW MEDS BY RX/DR IN RCRD: CPT | Mod: CPTII,HCNC,S$GLB, | Performed by: INTERNAL MEDICINE

## 2025-08-18 PROCEDURE — 3074F SYST BP LT 130 MM HG: CPT | Mod: CPTII,HCNC,S$GLB, | Performed by: INTERNAL MEDICINE

## 2025-08-18 PROCEDURE — 99999 PR PBB SHADOW E&M-EST. PATIENT-LVL V: CPT | Mod: PBBFAC,HCNC,, | Performed by: INTERNAL MEDICINE

## 2025-08-18 PROCEDURE — 1159F MED LIST DOCD IN RCRD: CPT | Mod: CPTII,HCNC,S$GLB, | Performed by: INTERNAL MEDICINE

## 2025-08-18 PROCEDURE — 1100F PTFALLS ASSESS-DOCD GE2>/YR: CPT | Mod: CPTII,HCNC,S$GLB, | Performed by: INTERNAL MEDICINE

## 2025-08-18 PROCEDURE — 3078F DIAST BP <80 MM HG: CPT | Mod: CPTII,HCNC,S$GLB, | Performed by: INTERNAL MEDICINE

## 2025-08-19 ENCOUNTER — CLINICAL SUPPORT (OUTPATIENT)
Dept: REHABILITATION | Facility: HOSPITAL | Age: 75
End: 2025-08-19
Payer: MEDICARE

## 2025-08-19 DIAGNOSIS — Z74.09 IMPAIRED FUNCTIONAL MOBILITY, BALANCE, GAIT, AND ENDURANCE: Primary | ICD-10-CM

## 2025-08-19 PROCEDURE — 97530 THERAPEUTIC ACTIVITIES: CPT

## 2025-08-20 ENCOUNTER — PATIENT MESSAGE (OUTPATIENT)
Dept: INTERNAL MEDICINE | Facility: CLINIC | Age: 75
End: 2025-08-20
Payer: MEDICARE

## 2025-08-21 ENCOUNTER — LAB VISIT (OUTPATIENT)
Dept: LAB | Facility: HOSPITAL | Age: 75
End: 2025-08-21
Attending: THORACIC SURGERY (CARDIOTHORACIC VASCULAR SURGERY)
Payer: MEDICARE

## 2025-08-21 ENCOUNTER — OFFICE VISIT (OUTPATIENT)
Dept: INTERNAL MEDICINE | Facility: CLINIC | Age: 75
End: 2025-08-21
Payer: MEDICARE

## 2025-08-21 DIAGNOSIS — M81.8 OTHER OSTEOPOROSIS WITHOUT CURRENT PATHOLOGICAL FRACTURE: ICD-10-CM

## 2025-08-21 DIAGNOSIS — E43 SEVERE PROTEIN-CALORIE MALNUTRITION: ICD-10-CM

## 2025-08-21 DIAGNOSIS — R79.9 ABNORMAL FINDING OF BLOOD CHEMISTRY, UNSPECIFIED: ICD-10-CM

## 2025-08-21 DIAGNOSIS — R53.83 FATIGUE, UNSPECIFIED TYPE: ICD-10-CM

## 2025-08-21 DIAGNOSIS — R53.83 FATIGUE, UNSPECIFIED TYPE: Primary | ICD-10-CM

## 2025-08-21 LAB
25(OH)D3+25(OH)D2 SERPL-MCNC: 37 NG/ML (ref 30–96)
ABSOLUTE EOSINOPHIL (OHS): 0.16 K/UL
ABSOLUTE MONOCYTE (OHS): 0.54 K/UL (ref 0.3–1)
ABSOLUTE NEUTROPHIL COUNT (OHS): 3.63 K/UL (ref 1.8–7.7)
ALBUMIN SERPL BCP-MCNC: 3.5 G/DL (ref 3.5–5.2)
ALP SERPL-CCNC: 78 UNIT/L (ref 40–150)
ALT SERPL W/O P-5'-P-CCNC: 15 UNIT/L (ref 0–55)
ANION GAP (OHS): 11 MMOL/L (ref 8–16)
AST SERPL-CCNC: 24 UNIT/L (ref 0–50)
BASOPHILS # BLD AUTO: 0.06 K/UL
BASOPHILS NFR BLD AUTO: 1 %
BILIRUB SERPL-MCNC: 0.2 MG/DL (ref 0.1–1)
BUN SERPL-MCNC: 20 MG/DL (ref 8–23)
CALCIUM SERPL-MCNC: 8.7 MG/DL (ref 8.7–10.5)
CHLORIDE SERPL-SCNC: 102 MMOL/L (ref 95–110)
CO2 SERPL-SCNC: 25 MMOL/L (ref 23–29)
CREAT SERPL-MCNC: 0.8 MG/DL (ref 0.5–1.4)
ERYTHROCYTE [DISTWIDTH] IN BLOOD BY AUTOMATED COUNT: 13.4 % (ref 11.5–14.5)
GFR SERPLBLD CREATININE-BSD FMLA CKD-EPI: >60 ML/MIN/1.73/M2
GLUCOSE SERPL-MCNC: 97 MG/DL (ref 70–110)
HCT VFR BLD AUTO: 37.9 % (ref 37–48.5)
HGB BLD-MCNC: 12.3 GM/DL (ref 12–16)
IMM GRANULOCYTES # BLD AUTO: 0.01 K/UL (ref 0–0.04)
IMM GRANULOCYTES NFR BLD AUTO: 0.2 % (ref 0–0.5)
IRON SATN MFR SERPL: 29 % (ref 20–50)
IRON SERPL-MCNC: 108 UG/DL (ref 30–160)
LYMPHOCYTES # BLD AUTO: 1.66 K/UL (ref 1–4.8)
MCH RBC QN AUTO: 32.2 PG (ref 27–31)
MCHC RBC AUTO-ENTMCNC: 32.5 G/DL (ref 32–36)
MCV RBC AUTO: 99 FL (ref 82–98)
NUCLEATED RBC (/100WBC) (OHS): 0 /100 WBC
PLATELET # BLD AUTO: 265 K/UL (ref 150–450)
PMV BLD AUTO: 9.7 FL (ref 9.2–12.9)
POTASSIUM SERPL-SCNC: 3.7 MMOL/L (ref 3.5–5.1)
PROT SERPL-MCNC: 6.7 GM/DL (ref 6–8.4)
RBC # BLD AUTO: 3.82 M/UL (ref 4–5.4)
RELATIVE EOSINOPHIL (OHS): 2.6 %
RELATIVE LYMPHOCYTE (OHS): 27.4 % (ref 18–48)
RELATIVE MONOCYTE (OHS): 8.9 % (ref 4–15)
RELATIVE NEUTROPHIL (OHS): 59.9 % (ref 38–73)
SODIUM SERPL-SCNC: 138 MMOL/L (ref 136–145)
T4 FREE SERPL-MCNC: 1.23 NG/DL (ref 0.71–1.51)
TIBC SERPL-MCNC: 367 UG/DL (ref 250–450)
TRANSFERRIN SERPL-MCNC: 248 MG/DL (ref 200–375)
TSH SERPL-ACNC: 1.91 UIU/ML (ref 0.4–4)
VIT B12 SERPL-MCNC: 328 PG/ML (ref 210–950)
WBC # BLD AUTO: 6.06 K/UL (ref 3.9–12.7)

## 2025-08-21 PROCEDURE — 1159F MED LIST DOCD IN RCRD: CPT | Mod: CPTII,HCNC,95, | Performed by: INTERNAL MEDICINE

## 2025-08-21 PROCEDURE — 84439 ASSAY OF FREE THYROXINE: CPT | Mod: HCNC

## 2025-08-21 PROCEDURE — 82607 VITAMIN B-12: CPT | Mod: HCNC

## 2025-08-21 PROCEDURE — 83540 ASSAY OF IRON: CPT | Mod: HCNC

## 2025-08-21 PROCEDURE — 82040 ASSAY OF SERUM ALBUMIN: CPT | Mod: HCNC

## 2025-08-21 PROCEDURE — 1160F RVW MEDS BY RX/DR IN RCRD: CPT | Mod: CPTII,HCNC,95, | Performed by: INTERNAL MEDICINE

## 2025-08-21 PROCEDURE — 84443 ASSAY THYROID STIM HORMONE: CPT | Mod: HCNC

## 2025-08-21 PROCEDURE — 82306 VITAMIN D 25 HYDROXY: CPT | Mod: HCNC

## 2025-08-21 PROCEDURE — G2211 COMPLEX E/M VISIT ADD ON: HCPCS | Mod: HCNC,95,, | Performed by: INTERNAL MEDICINE

## 2025-08-21 PROCEDURE — 85025 COMPLETE CBC W/AUTO DIFF WBC: CPT | Mod: HCNC

## 2025-08-21 PROCEDURE — 36415 COLL VENOUS BLD VENIPUNCTURE: CPT | Mod: HCNC

## 2025-08-21 PROCEDURE — 98007 SYNCH AUDIO-VIDEO EST HI 40: CPT | Mod: HCNC,95,, | Performed by: INTERNAL MEDICINE

## 2025-08-21 PROCEDURE — 1157F ADVNC CARE PLAN IN RCRD: CPT | Mod: CPTII,HCNC,95, | Performed by: INTERNAL MEDICINE

## 2025-08-25 ENCOUNTER — HOSPITAL ENCOUNTER (OUTPATIENT)
Dept: CARDIOLOGY | Facility: HOSPITAL | Age: 75
Discharge: HOME | End: 2025-08-25
Attending: INTERNAL MEDICINE
Payer: MEDICARE

## 2025-08-25 DIAGNOSIS — R60.0 LOCALIZED EDEMA: ICD-10-CM

## 2025-08-25 DIAGNOSIS — M79.89 SWELLING OF LOWER EXTREMITY: ICD-10-CM

## 2025-08-25 PROCEDURE — 93970 EXTREMITY STUDY: CPT | Mod: HCNC

## 2025-09-02 ENCOUNTER — CLINICAL SUPPORT (OUTPATIENT)
Dept: REHABILITATION | Facility: HOSPITAL | Age: 75
End: 2025-09-02
Payer: MEDICARE

## 2025-09-02 DIAGNOSIS — I83.893 VARICOSE VEINS OF BOTH LOWER EXTREMITIES WITH COMPLICATIONS: Primary | ICD-10-CM

## 2025-09-02 DIAGNOSIS — I87.2 VENOUS INSUFFICIENCY OF BOTH LOWER EXTREMITIES: ICD-10-CM

## 2025-09-02 PROCEDURE — 97535 SELF CARE MNGMENT TRAINING: CPT

## 2025-09-03 ENCOUNTER — OFFICE VISIT (OUTPATIENT)
Dept: CARDIOLOGY | Facility: CLINIC | Age: 75
End: 2025-09-03
Payer: MEDICARE

## 2025-09-03 VITALS
BODY MASS INDEX: 17.85 KG/M2 | HEART RATE: 77 BPM | HEIGHT: 62 IN | WEIGHT: 97 LBS | SYSTOLIC BLOOD PRESSURE: 113 MMHG | DIASTOLIC BLOOD PRESSURE: 71 MMHG

## 2025-09-03 DIAGNOSIS — I87.2 VENOUS INSUFFICIENCY OF BOTH LOWER EXTREMITIES: ICD-10-CM

## 2025-09-03 DIAGNOSIS — Z74.09 IMPAIRED FUNCTIONAL MOBILITY, BALANCE, GAIT, AND ENDURANCE: ICD-10-CM

## 2025-09-03 DIAGNOSIS — I82.521 CHRONIC DEEP VEIN THROMBOSIS (DVT) OF RIGHT ILIAC VEIN: Primary | ICD-10-CM

## 2025-09-03 DIAGNOSIS — R60.9 SWELLING: ICD-10-CM

## 2025-09-03 DIAGNOSIS — I83.893 VARICOSE VEINS OF BOTH LOWER EXTREMITIES WITH COMPLICATIONS: ICD-10-CM

## 2025-09-03 DIAGNOSIS — I50.30 HEART FAILURE WITH PRESERVED EJECTION FRACTION, UNSPECIFIED HF CHRONICITY: Chronic | ICD-10-CM

## 2025-09-03 DIAGNOSIS — I27.20 PULMONARY HYPERTENSION: Chronic | ICD-10-CM

## 2025-09-03 PROBLEM — I82.501 CHRONIC DEEP VEIN THROMBOSIS (DVT) OF RIGHT LOWER EXTREMITY: Status: ACTIVE | Noted: 2025-09-03

## 2025-09-03 PROCEDURE — 1125F AMNT PAIN NOTED PAIN PRSNT: CPT | Mod: CPTII,HCNC,S$GLB, | Performed by: INTERNAL MEDICINE

## 2025-09-03 PROCEDURE — 99215 OFFICE O/P EST HI 40 MIN: CPT | Mod: HCNC,S$GLB,, | Performed by: INTERNAL MEDICINE

## 2025-09-03 PROCEDURE — 1159F MED LIST DOCD IN RCRD: CPT | Mod: CPTII,HCNC,S$GLB, | Performed by: INTERNAL MEDICINE

## 2025-09-03 PROCEDURE — 99999 PR PBB SHADOW E&M-EST. PATIENT-LVL IV: CPT | Mod: PBBFAC,HCNC,, | Performed by: INTERNAL MEDICINE

## 2025-09-03 PROCEDURE — 1157F ADVNC CARE PLAN IN RCRD: CPT | Mod: CPTII,HCNC,S$GLB, | Performed by: INTERNAL MEDICINE

## 2025-09-03 PROCEDURE — 3074F SYST BP LT 130 MM HG: CPT | Mod: CPTII,HCNC,S$GLB, | Performed by: INTERNAL MEDICINE

## 2025-09-03 PROCEDURE — 3288F FALL RISK ASSESSMENT DOCD: CPT | Mod: CPTII,HCNC,S$GLB, | Performed by: INTERNAL MEDICINE

## 2025-09-03 PROCEDURE — 3078F DIAST BP <80 MM HG: CPT | Mod: CPTII,HCNC,S$GLB, | Performed by: INTERNAL MEDICINE

## 2025-09-03 PROCEDURE — G2211 COMPLEX E/M VISIT ADD ON: HCPCS | Mod: HCNC,S$GLB,, | Performed by: INTERNAL MEDICINE

## 2025-09-03 PROCEDURE — 1100F PTFALLS ASSESS-DOCD GE2>/YR: CPT | Mod: CPTII,HCNC,S$GLB, | Performed by: INTERNAL MEDICINE

## (undated) DEVICE — SEE MEDLINE ITEM 157131

## (undated) DEVICE — ELECTRODE REM PLYHSV RETURN 9

## (undated) DEVICE — SYR 10CC LUER LOCK

## (undated) DEVICE — SUT D SPECIAL VICRYL 2-0

## (undated) DEVICE — GAUZE SPONGE 4X4 12PLY

## (undated) DEVICE — TRAY MINOR GEN SURG OMC

## (undated) DEVICE — DRAPE THREE-QTR REINF 53X77IN

## (undated) DEVICE — KIT PROBE COVER WITH GEL

## (undated) DEVICE — SHEATH INTRODUCER 7FR 11CM

## (undated) DEVICE — DURAPREP SURG SCRUB 26ML

## (undated) DEVICE — TRAY FOLEY 16FR INFECTION CONT

## (undated) DEVICE — MARKER SKIN STND TIP BLUE BARR

## (undated) DEVICE — CORD BIPOLAR 12 FOOT

## (undated) DEVICE — CONTAINER SPECIMEN STRL 4OZ

## (undated) DEVICE — DRAPE HALF SURGICAL 40X58IN

## (undated) DEVICE — CATH SWAN GANZ STND 7FR

## (undated) DEVICE — DRESSING MEPILEX BORDER 4 X 4

## (undated) DEVICE — Device

## (undated) DEVICE — SEE MEDLINE ITEM 156905

## (undated) DEVICE — DRESSING TRANS 4X4 TEGADERM

## (undated) DEVICE — SYR SLIP TIP 1CC

## (undated) DEVICE — DRAPE STERI INSTRUMENT 1018

## (undated) DEVICE — SOL BETADINE 5%

## (undated) DEVICE — NDL HYPO REG 25G X 1 1/2

## (undated) DEVICE — PAD DEFIB CADENCE ADULT R2

## (undated) DEVICE — ADAPTER TUBING 18G

## (undated) DEVICE — DRAPE U SPLIT SHEET 54X76IN

## (undated) DEVICE — DRAPE TOP 53X102IN

## (undated) DEVICE — SEE MEDLINE ITEM 156894

## (undated) DEVICE — DRESSING SURGICAL 1/2X1/2

## (undated) DEVICE — DRAPE IOBAN 2 STERI

## (undated) DEVICE — DRESSING TELFA STRL 4X3 LF

## (undated) DEVICE — KIT MICROINTRODUCE MINI 5X10CM

## (undated) DEVICE — DRAPE STERI U-SHAPED 47X51IN

## (undated) DEVICE — KIT BONE MARROW ASPIRATE CONCE

## (undated) DEVICE — NEOGUARD COVER 4X30CM STERILE

## (undated) DEVICE — TAPE SURG DURAPORE 2 X10YD

## (undated) DEVICE — CLOSURE SKIN STERI STRIP 1/2X4

## (undated) DEVICE — DRAPE C-ARM ELAS CLIP 42X120IN

## (undated) DEVICE — WIRE KIRSCHNER T2 3X285MM SS
Type: IMPLANTABLE DEVICE | Site: HIP | Status: NON-FUNCTIONAL
Removed: 2025-05-03

## (undated) DEVICE — DRAPE C-ARMOR EQUIPMENT COVER

## (undated) DEVICE — BLADE CLIPPER NEURO / MDL 4411

## (undated) DEVICE — APPLIER LIGACLIP SM 9.38IN

## (undated) DEVICE — GUIDE WIRE, BALL-TIPPED, STERILE
Type: IMPLANTABLE DEVICE | Site: HIP | Status: NON-FUNCTIONAL
Removed: 2025-05-03

## (undated) DEVICE — KIT SPINAL PATIENT CARE JACK

## (undated) DEVICE — SUT 2-0 MONOCRYL PLUS SH UD

## (undated) DEVICE — WIRE K BLUNT TIP 1.5X500MM
Type: IMPLANTABLE DEVICE | Site: BACK | Status: NON-FUNCTIONAL
Removed: 2018-02-05

## (undated) DEVICE — DRAPE INCISE IOBAN 2 23X17IN

## (undated) DEVICE — GLOVE BIOGEL SKINSENSE PI 7.5

## (undated) DEVICE — DRESSING AQUACEL FOAM 5 X 5

## (undated) DEVICE — NDL JAMSHIDI 10GA

## (undated) DEVICE — DRESSING ABSRBNT ISLAND 3.6X8

## (undated) DEVICE — DRESSING LEUKOPLAST FLEX 1X3IN

## (undated) DEVICE — PACK GRAFT DELIVERY SYSTEM

## (undated) DEVICE — PENCIL ROCKER SWITCH 10FT CORD

## (undated) DEVICE — BLADE ELECTRO EDGE INSULATED

## (undated) DEVICE — NDL SPINAL 18GX3.5 SPINOCAN

## (undated) DEVICE — DRESSING AQUACEL AG FOAM 4X4

## (undated) DEVICE — BUR BONE CUT MICRO TPS 3X3.8MM

## (undated) DEVICE — STAPLER SKIN PROXIMATE WIDE

## (undated) DEVICE — DRAPE OPMI STERILE

## (undated) DEVICE — SOL IRR STRL WATER 500ML

## (undated) DEVICE — PRECISION PIN TAPERED
Type: IMPLANTABLE DEVICE | Site: HIP | Status: NON-FUNCTIONAL
Brand: GAMMA
Removed: 2025-05-03

## (undated) DEVICE — DRESSING AQUACEL HEEL 8X5.5

## (undated) DEVICE — BANDAID STRIP PLASTIC 3/4X3

## (undated) DEVICE — REAMER MOD BIXCUT 8X48MM STER.

## (undated) DEVICE — BLADE SURG BVL ANG COAX 2.4MM

## (undated) DEVICE — SPONGE COTTON TRAY 4X4IN

## (undated) DEVICE — SOL PVP-I SCRUB 7.5% 4OZ

## (undated) DEVICE — CARTRIDGE OIL

## (undated) DEVICE — SEE MEDLINE ITEM 152529

## (undated) DEVICE — DRESSING AQUACEL SACRAL LARGE

## (undated) DEVICE — TUBE FRAZIER 5MM 2FT SOFT TIP

## (undated) DEVICE — GOWN POLY REINF BRTH SLV XL

## (undated) DEVICE — CATH SUCTION 10FR

## (undated) DEVICE — SUT MCRYL PLUS 4-0 PS2 27IN

## (undated) DEVICE — DRAPE STERI-DRAPE 1000 17X11IN

## (undated) DEVICE — DRESSING N ADH OIL EMUL 3X8

## (undated) DEVICE — TRAY CATH UM FOLEY SIL W 16FR

## (undated) DEVICE — PACK UNIVERSAL SPLIT II

## (undated) DEVICE — DIFFUSER

## (undated) DEVICE — DRAPE C ARM 42 X 120 10/BX

## (undated) DEVICE — KIT PT CARE HANA PROFX SSXT

## (undated) DEVICE — DRAPE ABDOMINAL TIBURON 14X11

## (undated) DEVICE — KIT SURGIFLO HEMOSTATIC MATRIX

## (undated) DEVICE — DRESSING AQUACEL SACRAL 9 X 9

## (undated) DEVICE — SHEILD & GARTERS FOX METAL EYE

## (undated) DEVICE — SEE MEDLINE ITEM 157150

## (undated) DEVICE — TRAY MINOR ORTHO OMC

## (undated) DEVICE — CLIPPER BLADE MOD 4406 (CAREF)

## (undated) DEVICE — DRUG BACITRACIN ZINC

## (undated) DEVICE — DRILL T2 ALPHA LOK 4.2X360MM

## (undated) DEVICE — DRESSING TRANS 8X12 TEGADERM

## (undated) DEVICE — DRESSING AQUACEL FOAM 4 X 12